# Patient Record
Sex: MALE | Race: ASIAN | NOT HISPANIC OR LATINO | URBAN - METROPOLITAN AREA
[De-identification: names, ages, dates, MRNs, and addresses within clinical notes are randomized per-mention and may not be internally consistent; named-entity substitution may affect disease eponyms.]

---

## 2021-09-25 ENCOUNTER — EMERGENCY (EMERGENCY)
Facility: HOSPITAL | Age: 41
LOS: 1 days | Discharge: HOME | End: 2021-09-25
Attending: EMERGENCY MEDICINE | Admitting: EMERGENCY MEDICINE
Payer: COMMERCIAL

## 2021-09-25 VITALS
OXYGEN SATURATION: 96 % | HEART RATE: 92 BPM | HEIGHT: 71 IN | DIASTOLIC BLOOD PRESSURE: 73 MMHG | RESPIRATION RATE: 16 BRPM | TEMPERATURE: 99 F | SYSTOLIC BLOOD PRESSURE: 112 MMHG | WEIGHT: 270.07 LBS

## 2021-09-25 VITALS
HEART RATE: 76 BPM | DIASTOLIC BLOOD PRESSURE: 77 MMHG | RESPIRATION RATE: 18 BRPM | OXYGEN SATURATION: 96 % | SYSTOLIC BLOOD PRESSURE: 123 MMHG | TEMPERATURE: 99 F

## 2021-09-25 LAB
ALBUMIN SERPL ELPH-MCNC: 2.5 G/DL — LOW (ref 3.3–5)
ALP SERPL-CCNC: 166 U/L — HIGH (ref 30–120)
ALT FLD-CCNC: 39 U/L DA — SIGNIFICANT CHANGE UP (ref 10–60)
ANION GAP SERPL CALC-SCNC: 6 MMOL/L — SIGNIFICANT CHANGE UP (ref 5–17)
APPEARANCE UR: CLEAR — SIGNIFICANT CHANGE UP
AST SERPL-CCNC: 125 U/L — HIGH (ref 10–40)
BACTERIA # UR AUTO: ABNORMAL
BASOPHILS # BLD AUTO: 0.05 K/UL — SIGNIFICANT CHANGE UP (ref 0–0.2)
BASOPHILS NFR BLD AUTO: 0.9 % — SIGNIFICANT CHANGE UP (ref 0–2)
BILIRUB SERPL-MCNC: 0.9 MG/DL — SIGNIFICANT CHANGE UP (ref 0.2–1.2)
BILIRUB UR-MCNC: NEGATIVE — SIGNIFICANT CHANGE UP
BUN SERPL-MCNC: 4 MG/DL — LOW (ref 7–23)
CALCIUM SERPL-MCNC: 8.5 MG/DL — SIGNIFICANT CHANGE UP (ref 8.4–10.5)
CHLORIDE SERPL-SCNC: 105 MMOL/L — SIGNIFICANT CHANGE UP (ref 96–108)
CO2 SERPL-SCNC: 30 MMOL/L — SIGNIFICANT CHANGE UP (ref 22–31)
COLOR SPEC: YELLOW — SIGNIFICANT CHANGE UP
CREAT SERPL-MCNC: 0.69 MG/DL — SIGNIFICANT CHANGE UP (ref 0.5–1.3)
D DIMER BLD IA.RAPID-MCNC: 541 NG/ML DDU — HIGH
DIFF PNL FLD: ABNORMAL
EOSINOPHIL # BLD AUTO: 0.27 K/UL — SIGNIFICANT CHANGE UP (ref 0–0.5)
EOSINOPHIL NFR BLD AUTO: 4.8 % — SIGNIFICANT CHANGE UP (ref 0–6)
EPI CELLS # UR: NEGATIVE — SIGNIFICANT CHANGE UP
ETHANOL SERPL-MCNC: 303 MG/DL — HIGH (ref 0–3)
GLUCOSE SERPL-MCNC: 127 MG/DL — HIGH (ref 70–99)
GLUCOSE UR QL: NEGATIVE MG/DL — SIGNIFICANT CHANGE UP
HCT VFR BLD CALC: 33.8 % — LOW (ref 39–50)
HGB BLD-MCNC: 11.1 G/DL — LOW (ref 13–17)
IMM GRANULOCYTES NFR BLD AUTO: 0.2 % — SIGNIFICANT CHANGE UP (ref 0–1.5)
KETONES UR-MCNC: NEGATIVE — SIGNIFICANT CHANGE UP
LEUKOCYTE ESTERASE UR-ACNC: NEGATIVE — SIGNIFICANT CHANGE UP
LIDOCAIN IGE QN: 604 U/L — HIGH (ref 73–393)
LYMPHOCYTES # BLD AUTO: 1.12 K/UL — SIGNIFICANT CHANGE UP (ref 1–3.3)
LYMPHOCYTES # BLD AUTO: 20 % — SIGNIFICANT CHANGE UP (ref 13–44)
MCHC RBC-ENTMCNC: 30.5 PG — SIGNIFICANT CHANGE UP (ref 27–34)
MCHC RBC-ENTMCNC: 32.8 GM/DL — SIGNIFICANT CHANGE UP (ref 32–36)
MCV RBC AUTO: 92.9 FL — SIGNIFICANT CHANGE UP (ref 80–100)
MONOCYTES # BLD AUTO: 0.76 K/UL — SIGNIFICANT CHANGE UP (ref 0–0.9)
MONOCYTES NFR BLD AUTO: 13.6 % — SIGNIFICANT CHANGE UP (ref 2–14)
NEUTROPHILS # BLD AUTO: 3.38 K/UL — SIGNIFICANT CHANGE UP (ref 1.8–7.4)
NEUTROPHILS NFR BLD AUTO: 60.5 % — SIGNIFICANT CHANGE UP (ref 43–77)
NITRITE UR-MCNC: NEGATIVE — SIGNIFICANT CHANGE UP
NRBC # BLD: 0 /100 WBCS — SIGNIFICANT CHANGE UP (ref 0–0)
PCP SPEC-MCNC: SIGNIFICANT CHANGE UP
PH UR: 8 — SIGNIFICANT CHANGE UP (ref 5–8)
PLATELET # BLD AUTO: 142 K/UL — LOW (ref 150–400)
POTASSIUM SERPL-MCNC: 3.8 MMOL/L — SIGNIFICANT CHANGE UP (ref 3.5–5.3)
POTASSIUM SERPL-SCNC: 3.8 MMOL/L — SIGNIFICANT CHANGE UP (ref 3.5–5.3)
PROT SERPL-MCNC: 8 G/DL — SIGNIFICANT CHANGE UP (ref 6–8.3)
PROT UR-MCNC: 100 MG/DL
RBC # BLD: 3.64 M/UL — LOW (ref 4.2–5.8)
RBC # FLD: 15.4 % — HIGH (ref 10.3–14.5)
RBC CASTS # UR COMP ASSIST: ABNORMAL /HPF (ref 0–4)
SARS-COV-2 RNA SPEC QL NAA+PROBE: SIGNIFICANT CHANGE UP
SODIUM SERPL-SCNC: 141 MMOL/L — SIGNIFICANT CHANGE UP (ref 135–145)
SP GR SPEC: 1.01 — SIGNIFICANT CHANGE UP (ref 1.01–1.02)
TROPONIN I SERPL-MCNC: 0 NG/ML — LOW (ref 0.02–0.06)
UROBILINOGEN FLD QL: NEGATIVE MG/DL — SIGNIFICANT CHANGE UP
WBC # BLD: 5.59 K/UL — SIGNIFICANT CHANGE UP (ref 3.8–10.5)
WBC # FLD AUTO: 5.59 K/UL — SIGNIFICANT CHANGE UP (ref 3.8–10.5)
WBC UR QL: SIGNIFICANT CHANGE UP

## 2021-09-25 PROCEDURE — 74177 CT ABD & PELVIS W/CONTRAST: CPT

## 2021-09-25 PROCEDURE — 96374 THER/PROPH/DIAG INJ IV PUSH: CPT | Mod: XU

## 2021-09-25 PROCEDURE — 85025 COMPLETE CBC W/AUTO DIFF WBC: CPT

## 2021-09-25 PROCEDURE — 71275 CT ANGIOGRAPHY CHEST: CPT

## 2021-09-25 PROCEDURE — 80307 DRUG TEST PRSMV CHEM ANLYZR: CPT

## 2021-09-25 PROCEDURE — 93005 ELECTROCARDIOGRAM TRACING: CPT

## 2021-09-25 PROCEDURE — 84484 ASSAY OF TROPONIN QUANT: CPT

## 2021-09-25 PROCEDURE — 85379 FIBRIN DEGRADATION QUANT: CPT

## 2021-09-25 PROCEDURE — 87635 SARS-COV-2 COVID-19 AMP PRB: CPT

## 2021-09-25 PROCEDURE — 80053 COMPREHEN METABOLIC PANEL: CPT

## 2021-09-25 PROCEDURE — 93010 ELECTROCARDIOGRAM REPORT: CPT

## 2021-09-25 PROCEDURE — 74177 CT ABD & PELVIS W/CONTRAST: CPT | Mod: 26,MA

## 2021-09-25 PROCEDURE — 87086 URINE CULTURE/COLONY COUNT: CPT

## 2021-09-25 PROCEDURE — 81001 URINALYSIS AUTO W/SCOPE: CPT

## 2021-09-25 PROCEDURE — 99284 EMERGENCY DEPT VISIT MOD MDM: CPT | Mod: 25

## 2021-09-25 PROCEDURE — 36415 COLL VENOUS BLD VENIPUNCTURE: CPT

## 2021-09-25 PROCEDURE — 71275 CT ANGIOGRAPHY CHEST: CPT | Mod: 26,MA

## 2021-09-25 PROCEDURE — 83690 ASSAY OF LIPASE: CPT

## 2021-09-25 PROCEDURE — 99285 EMERGENCY DEPT VISIT HI MDM: CPT

## 2021-09-25 RX ORDER — LIDOCAINE 4 G/100G
1 CREAM TOPICAL ONCE
Refills: 0 | Status: COMPLETED | OUTPATIENT
Start: 2021-09-25 | End: 2021-09-25

## 2021-09-25 RX ORDER — LIDOCAINE 4 G/100G
1 CREAM TOPICAL
Qty: 10 | Refills: 0
Start: 2021-09-25 | End: 2021-10-04

## 2021-09-25 RX ORDER — KETOROLAC TROMETHAMINE 30 MG/ML
15 SYRINGE (ML) INJECTION ONCE
Refills: 0 | Status: DISCONTINUED | OUTPATIENT
Start: 2021-09-25 | End: 2021-09-25

## 2021-09-25 RX ADMIN — Medication 15 MILLIGRAM(S): at 16:40

## 2021-09-25 RX ADMIN — Medication 15 MILLIGRAM(S): at 16:19

## 2021-09-25 RX ADMIN — LIDOCAINE 1 PATCH: 4 CREAM TOPICAL at 19:03

## 2021-09-25 NOTE — ED PROVIDER NOTE - OBJECTIVE STATEMENT
42 y/o male with a PMHx of sleep apnea presents to the ED c/o  right upper abd pain under the right  breast 3 days ago. Patient reports having constant pain. Patient reports having pain with deep breath and  exertion, movement of chest. Denies fever , chills , N/V/D  , pr falling today after cleaning pool. Denies head strike  Patient reports drinking alcohol, approximately 1.5 L of vodka. Patient reports having no surgery  No drug use, but patient reports vaping.  Vaccinated for COVID. Medications: advil, which didn't improve symptoms.

## 2021-09-25 NOTE — ED PROVIDER NOTE - CPE EDP RESP NORM
Patient extubated at 1610. Currently on room air, SP 02 @ 100%. Family at bedside. Will continue to monitor.   normal...

## 2021-09-25 NOTE — ED PROVIDER NOTE - PATIENT PORTAL LINK FT
You can access the FollowMyHealth Patient Portal offered by [facility] by registering at the following website: [email or web address]. By joining [employer]’s FollowMyHealth portal, you will also be able to view your health information using other applications (apps) compatible with our system.

## 2021-09-25 NOTE — ED PROVIDER NOTE - CLINICAL SUMMARY MEDICAL DECISION MAKING FREE TEXT BOX
pt presenting with reproducible TTP to right chest no meghana deformity also with chronic alcohol abuse. Will obtain screening labs, check d dimer, CE and monitor. Will obtain CT imaging of chest and due to alcohol use will obtain CT abd/pelvis

## 2021-09-25 NOTE — ED PROVIDER NOTE - PHYSICAL EXAMINATION
abrasion to anterior shin left   distended abd   TTP to right lower anterior ribs abrasion to anterior shin left no meghana TTP  distended abd   TTP to right lower anterior ribs no skin changes noted

## 2021-09-25 NOTE — ED ADULT NURSE NOTE - OBJECTIVE STATEMENT
41 YOM A&OX3 with pmh of sleep apnea presents to ED for abdominal pain. pt states abdominal pain in upper right quadrant started 3 days ago that worsens when breathing in. pt reports drinking 1.5L of vodka every 2 days. pt denies sob, chest pain, n/v/d, headaches, dizziness, blurry vision. safety maintained.

## 2021-09-25 NOTE — SBIRT NOTE ADULT - NSSBIRTSAVECONSULT_GEN_A_CORE
Complete
[FreeTextEntry1] : here with friends, Brianna Becker and her daughter\par Alea connect meter reviewed: 14d 174 (n12), 30d 167 (n12)\par testing once a day, every day except Sundays, alternating am and pm.\par am: 176 today.  185, 181, 220, 190, 182, 161\par after dinner (9p).  164, 147, 180, 178, 179, 109, 165\par have hypoglycemia rarely, when he doesn't eat "enough" carbs.\par eats 2 apples every day (not at the same time)\par going to gym\par seeing podiatry every 6 months but does not want to see ophtho\par no polyuria, polydipsia, SOB, chest pain, or neuropathy symptoms \par \par \par Meds: glipizide XL 5mg am \par metformin 1g bid\par Says he tried Actos in past and didn't like it (no specific symptoms mentioned)

## 2021-09-26 LAB
CULTURE RESULTS: SIGNIFICANT CHANGE UP
SPECIMEN SOURCE: SIGNIFICANT CHANGE UP

## 2021-10-02 ENCOUNTER — EMERGENCY (EMERGENCY)
Facility: HOSPITAL | Age: 41
LOS: 0 days | Discharge: HOME | End: 2021-10-02
Attending: EMERGENCY MEDICINE
Payer: COMMERCIAL

## 2021-10-02 VITALS
SYSTOLIC BLOOD PRESSURE: 126 MMHG | TEMPERATURE: 98 F | HEART RATE: 97 BPM | OXYGEN SATURATION: 95 % | RESPIRATION RATE: 18 BRPM | WEIGHT: 250 LBS | DIASTOLIC BLOOD PRESSURE: 73 MMHG | HEIGHT: 71 IN

## 2021-10-02 VITALS
OXYGEN SATURATION: 96 % | HEART RATE: 89 BPM | SYSTOLIC BLOOD PRESSURE: 129 MMHG | TEMPERATURE: 98 F | DIASTOLIC BLOOD PRESSURE: 77 MMHG | RESPIRATION RATE: 17 BRPM

## 2021-10-02 DIAGNOSIS — K70.10 ALCOHOLIC HEPATITIS WITHOUT ASCITES: ICD-10-CM

## 2021-10-02 DIAGNOSIS — R07.9 CHEST PAIN, UNSPECIFIED: ICD-10-CM

## 2021-10-02 DIAGNOSIS — R10.11 RIGHT UPPER QUADRANT PAIN: ICD-10-CM

## 2021-10-02 DIAGNOSIS — G47.33 OBSTRUCTIVE SLEEP APNEA (ADULT) (PEDIATRIC): ICD-10-CM

## 2021-10-02 DIAGNOSIS — I51.7 CARDIOMEGALY: ICD-10-CM

## 2021-10-02 LAB
ADD ON TEST-SPECIMEN IN LAB: SIGNIFICANT CHANGE UP
ALBUMIN SERPL ELPH-MCNC: 2.6 G/DL — LOW (ref 3.3–5)
ALP SERPL-CCNC: 185 U/L — HIGH (ref 40–120)
ALT FLD-CCNC: 34 U/L — SIGNIFICANT CHANGE UP (ref 12–78)
ANION GAP SERPL CALC-SCNC: 7 MMOL/L — SIGNIFICANT CHANGE UP (ref 5–17)
AST SERPL-CCNC: 111 U/L — HIGH (ref 15–37)
BASOPHILS # BLD AUTO: 0.06 K/UL — SIGNIFICANT CHANGE UP (ref 0–0.2)
BASOPHILS NFR BLD AUTO: 1.1 % — SIGNIFICANT CHANGE UP (ref 0–2)
BILIRUB SERPL-MCNC: 1 MG/DL — SIGNIFICANT CHANGE UP (ref 0.2–1.2)
BUN SERPL-MCNC: 6 MG/DL — LOW (ref 7–23)
CALCIUM SERPL-MCNC: 8.3 MG/DL — LOW (ref 8.5–10.1)
CHLORIDE SERPL-SCNC: 108 MMOL/L — SIGNIFICANT CHANGE UP (ref 96–108)
CO2 SERPL-SCNC: 26 MMOL/L — SIGNIFICANT CHANGE UP (ref 22–31)
CREAT SERPL-MCNC: 0.64 MG/DL — SIGNIFICANT CHANGE UP (ref 0.5–1.3)
EOSINOPHIL # BLD AUTO: 0.23 K/UL — SIGNIFICANT CHANGE UP (ref 0–0.5)
EOSINOPHIL NFR BLD AUTO: 4.1 % — SIGNIFICANT CHANGE UP (ref 0–6)
ETHANOL SERPL-MCNC: 255 MG/DL — HIGH (ref 0–10)
GLUCOSE SERPL-MCNC: 108 MG/DL — HIGH (ref 70–99)
HCT VFR BLD CALC: 33.9 % — LOW (ref 39–50)
HGB BLD-MCNC: 11.1 G/DL — LOW (ref 13–17)
IMM GRANULOCYTES NFR BLD AUTO: 0.2 % — SIGNIFICANT CHANGE UP (ref 0–1.5)
LIDOCAIN IGE QN: 515 U/L — HIGH (ref 73–393)
LYMPHOCYTES # BLD AUTO: 1.21 K/UL — SIGNIFICANT CHANGE UP (ref 1–3.3)
LYMPHOCYTES # BLD AUTO: 21.3 % — SIGNIFICANT CHANGE UP (ref 13–44)
MCHC RBC-ENTMCNC: 30.5 PG — SIGNIFICANT CHANGE UP (ref 27–34)
MCHC RBC-ENTMCNC: 32.7 GM/DL — SIGNIFICANT CHANGE UP (ref 32–36)
MCV RBC AUTO: 93.1 FL — SIGNIFICANT CHANGE UP (ref 80–100)
MONOCYTES # BLD AUTO: 0.59 K/UL — SIGNIFICANT CHANGE UP (ref 0–0.9)
MONOCYTES NFR BLD AUTO: 10.4 % — SIGNIFICANT CHANGE UP (ref 2–14)
NEUTROPHILS # BLD AUTO: 3.57 K/UL — SIGNIFICANT CHANGE UP (ref 1.8–7.4)
NEUTROPHILS NFR BLD AUTO: 62.9 % — SIGNIFICANT CHANGE UP (ref 43–77)
PLATELET # BLD AUTO: 104 K/UL — LOW (ref 150–400)
POTASSIUM SERPL-MCNC: 3.2 MMOL/L — LOW (ref 3.5–5.3)
POTASSIUM SERPL-SCNC: 3.2 MMOL/L — LOW (ref 3.5–5.3)
PROT SERPL-MCNC: 7.8 GM/DL — SIGNIFICANT CHANGE UP (ref 6–8.3)
RBC # BLD: 3.64 M/UL — LOW (ref 4.2–5.8)
RBC # FLD: 15 % — HIGH (ref 10.3–14.5)
SODIUM SERPL-SCNC: 141 MMOL/L — SIGNIFICANT CHANGE UP (ref 135–145)
TROPONIN I SERPL-MCNC: <0.015 NG/ML — SIGNIFICANT CHANGE UP (ref 0.01–0.04)
WBC # BLD: 5.67 K/UL — SIGNIFICANT CHANGE UP (ref 3.8–10.5)
WBC # FLD AUTO: 5.67 K/UL — SIGNIFICANT CHANGE UP (ref 3.8–10.5)

## 2021-10-02 PROCEDURE — 85025 COMPLETE CBC W/AUTO DIFF WBC: CPT

## 2021-10-02 PROCEDURE — 99285 EMERGENCY DEPT VISIT HI MDM: CPT

## 2021-10-02 PROCEDURE — 93010 ELECTROCARDIOGRAM REPORT: CPT

## 2021-10-02 PROCEDURE — 36415 COLL VENOUS BLD VENIPUNCTURE: CPT

## 2021-10-02 PROCEDURE — 76705 ECHO EXAM OF ABDOMEN: CPT

## 2021-10-02 PROCEDURE — 93005 ELECTROCARDIOGRAM TRACING: CPT

## 2021-10-02 PROCEDURE — 80053 COMPREHEN METABOLIC PANEL: CPT

## 2021-10-02 PROCEDURE — 96375 TX/PRO/DX INJ NEW DRUG ADDON: CPT

## 2021-10-02 PROCEDURE — 83690 ASSAY OF LIPASE: CPT

## 2021-10-02 PROCEDURE — 96374 THER/PROPH/DIAG INJ IV PUSH: CPT

## 2021-10-02 PROCEDURE — 83735 ASSAY OF MAGNESIUM: CPT

## 2021-10-02 PROCEDURE — 84484 ASSAY OF TROPONIN QUANT: CPT

## 2021-10-02 PROCEDURE — 99284 EMERGENCY DEPT VISIT MOD MDM: CPT | Mod: 25

## 2021-10-02 PROCEDURE — 76705 ECHO EXAM OF ABDOMEN: CPT | Mod: 26

## 2021-10-02 PROCEDURE — 80307 DRUG TEST PRSMV CHEM ANLYZR: CPT

## 2021-10-02 PROCEDURE — 87635 SARS-COV-2 COVID-19 AMP PRB: CPT

## 2021-10-02 RX ORDER — ONDANSETRON 8 MG/1
4 TABLET, FILM COATED ORAL ONCE
Refills: 0 | Status: COMPLETED | OUTPATIENT
Start: 2021-10-02 | End: 2021-10-02

## 2021-10-02 RX ORDER — MORPHINE SULFATE 50 MG/1
4 CAPSULE, EXTENDED RELEASE ORAL ONCE
Refills: 0 | Status: DISCONTINUED | OUTPATIENT
Start: 2021-10-02 | End: 2021-10-02

## 2021-10-02 RX ORDER — POTASSIUM CHLORIDE 20 MEQ
40 PACKET (EA) ORAL ONCE
Refills: 0 | Status: COMPLETED | OUTPATIENT
Start: 2021-10-02 | End: 2021-10-02

## 2021-10-02 RX ADMIN — Medication 40 MILLIEQUIVALENT(S): at 04:13

## 2021-10-02 NOTE — ED PROVIDER NOTE - OBJECTIVE STATEMENT
Pt. is a 42 yo M with PMHx of alcohol dependence, cirrhosis of the liver, ROSA on CPAP presents with right lower chest and RUQ abdominal pain.  Patient states there is pain under right lower anterior rib of chest.  He had this pain multiple times and was worked up at [facility] and [facility] and no cause was found. Patient states pain was severe tonight and he had nausea and constant pain across upper abdomen.  +alcohol use ; Denies drug use.

## 2021-10-02 NOTE — ED PROVIDER NOTE - PATIENT PORTAL LINK FT
You can access the FollowMyHealth Patient Portal offered by [facility] by registering at the following website: [email or web address]. By joining [employer]’s FollowMyHealth portal, you will also be able to view your health information using other applications (apps) compatible with our system.

## 2021-10-02 NOTE — ED PROVIDER NOTE - CLINICAL SUMMARY MEDICAL DECISION MAKING FREE TEXT BOX
RUQ abdominal pain. Labs including lipase, RUQ US and urine ordered.  Pain meds given.  Will re-assess.

## 2021-10-02 NOTE — ED PROVIDER NOTE - PROGRESS NOTE DETAILS
Patient without much pain anymore.  Did not need pain meds in ED, refused them.      Will f/u with GI.

## 2021-10-02 NOTE — ED ADULT NURSE NOTE - OBJECTIVE STATEMENT
Pt BIBEMS with c/o CP/RUQ abd pain. Pt states he was recently seen at two hospitals this week for same issues, was diagnosed with Cirrhosis but states she pain has continued.

## 2021-10-02 NOTE — ED PROVIDER NOTE - CARE PLAN
1 Principal Discharge DX:	Right upper quadrant abdominal pain  Secondary Diagnosis:	Alcoholic hepatitis

## 2021-10-02 NOTE — ED PROVIDER NOTE - NSFOLLOWUPINSTRUCTIONS_ED_ALL_ED_FT
See your gastroenterologist within 3-5 days.                       ALCOHOLIC HEPATITIS - AfterCare(R) Instructions(ER/ED)           Alcoholic Hepatitis    WHAT YOU NEED TO KNOW:    Alcoholic hepatitis (AH) is liver inflammation caused by heavy alcohol use. AH can develop if you binge drink or if you drink regularly or heavily over time. If you are at risk for AH, you may be able to prevent it from developing. Once your healthcare provider diagnoses AH, you will need to manage your condition.    DISCHARGE INSTRUCTIONS:    Call 911 for any of the following:   •You have trouble breathing.      •You vomit blood or material that looks like coffee grounds.      •You lose consciousness.      •You have dark or bloody bowel movements.      •You feel confused.      Return to the emergency department if:   •You have a fever or chills.      •You have pain or swelling in your abdomen.      •You feel dizzy or lightheaded.      •You vomit several times in a row.      Contact your healthcare provider if:   •You feel more tired than usual.      •You lose weight without trying, lose your appetite, or feel too nauseated to eat.      •You have worsening yellowing of your skin or the whites of your eyes.      •Your urine becomes very dark.       •You have questions or concerns about your condition or care.      Medicines:   •Medicines may be given to reduce inflammation in your liver or help your liver function better. You may also be given medicine to reduce fluid retention.       •Take your medicine as directed. Contact your healthcare provider if you think your medicine is not helping or if you have side effects. Tell him of her if you are allergic to any medicine. Keep a list of the medicines, vitamins, and herbs you take. Include the amounts, and when and why you take them. Bring the list or the pill bottles to follow-up visits. Carry your medicine list with you in case of an emergency.      Follow up with your healthcare provider as directed: You may need ongoing tests to check your liver function. Write down your questions so you remember to ask them during your visits.    Do not smoke: Nicotine and other chemicals in cigarettes and cigars can make it hard to manage AH. Ask your healthcare provider for information if you currently smoke and need help to quit. E-cigarettes or smokeless tobacco still contain nicotine. Talk to your healthcare provider before you use these products.    Prevent AH: If you are at risk for AH, the following may help prevent it from developing:  •Limit alcohol. Alcohol can cause liver damage and other serious health problems. Ask your healthcare provider how much alcohol is safe for you to have each day. A drink of alcohol is 12 ounces of beer, 5 ounces of wine, or 1½ ounces of liquor. Do not drink alcohol between meals or replace meals with alcohol.      •Ask about medicines. Some medicines can cause liver damage if taken with alcohol. Ask your healthcare provider about all of the medicines you take, including pain relievers such as acetaminophen.      Protect yourself against hepatitis C: Hepatitis C is a serious liver infection caused by a virus. Hepatitis C causes liver damage and makes it easier for you to develop AH. It also increases your risk for cirrhosis, especially if you continue to drink alcohol. Cirrhosis is a serious disease that causes scarring in your liver. Do not share needles if you inject drugs. Use a condom so you do not get hepatitis C during sex.     Ask about vaccines: You may need vaccines to protect you against hepatitis A or B, pneumonia, or the flu. AH can increase your risk for infections.    Drink liquids as directed: Your healthcare provider may recommend you drink more liquids to help your liver function or reduce fluid retention. Ask how much liquid to drink each day and which liquids are best for you.    Eat a variety of healthy foods: Healthy foods include fruits, vegetables, lean meats, fish, and low-fat dairy products. Your healthcare provider or dietitian can help you create meal plans to make sure you get enough calories and protein. It may be helpful to eat smaller meals throughout the day to prevent nausea and help your body absorb nutrition. Limit sodium (salt) to prevent or reduce fluid retention.    Take vitamins or minerals as directed: Your healthcare provider may recommend vitamin B or other vitamins or minerals. Do not take any vitamins or minerals without talking with your healthcare provider. Too much iron can be dangerous to your liver.        © Copyright [employer] 2021           back to top                          © Copyright [employer] 2021

## 2021-10-02 NOTE — ED ADULT TRIAGE NOTE - CHIEF COMPLAINT QUOTE
Pt BIBEMS with c/o of chest pain.  Pt states he was recently seen at two hospitals this week for same issues, was diagnosed with Cirrhosis but states she pain has continued.

## 2021-10-20 PROBLEM — Z00.00 ENCOUNTER FOR PREVENTIVE HEALTH EXAMINATION: Status: ACTIVE | Noted: 2021-10-20

## 2021-10-22 ENCOUNTER — APPOINTMENT (OUTPATIENT)
Dept: HEPATOLOGY | Facility: CLINIC | Age: 41
End: 2021-10-22
Payer: COMMERCIAL

## 2021-10-22 VITALS
TEMPERATURE: 98.3 F | HEIGHT: 66 IN | OXYGEN SATURATION: 98 % | HEART RATE: 73 BPM | SYSTOLIC BLOOD PRESSURE: 114 MMHG | DIASTOLIC BLOOD PRESSURE: 72 MMHG | RESPIRATION RATE: 16 BRPM | BODY MASS INDEX: 43.87 KG/M2 | WEIGHT: 273 LBS

## 2021-10-22 DIAGNOSIS — Z87.891 PERSONAL HISTORY OF NICOTINE DEPENDENCE: ICD-10-CM

## 2021-10-22 DIAGNOSIS — Z99.89 OBSTRUCTIVE SLEEP APNEA (ADULT) (PEDIATRIC): ICD-10-CM

## 2021-10-22 DIAGNOSIS — Z80.7 FAMILY HISTORY OF OTHER MALIGNANT NEOPLASMS OF LYMPHOID, HEMATOPOIETIC AND RELATED TISSUES: ICD-10-CM

## 2021-10-22 DIAGNOSIS — Z72.89 OTHER PROBLEMS RELATED TO LIFESTYLE: ICD-10-CM

## 2021-10-22 DIAGNOSIS — Z87.11 PERSONAL HISTORY OF PEPTIC ULCER DISEASE: ICD-10-CM

## 2021-10-22 DIAGNOSIS — G47.33 OBSTRUCTIVE SLEEP APNEA (ADULT) (PEDIATRIC): ICD-10-CM

## 2021-10-22 DIAGNOSIS — Z83.3 FAMILY HISTORY OF DIABETES MELLITUS: ICD-10-CM

## 2021-10-22 PROCEDURE — 99204 OFFICE O/P NEW MOD 45 MIN: CPT

## 2021-10-22 RX ORDER — NADOLOL 20 MG/1
20 TABLET ORAL
Refills: 0 | Status: ACTIVE | COMMUNITY

## 2021-10-22 RX ORDER — GLUC/MSM/COLGN2/HYAL/ANTIARTH3 375-375-20
TABLET ORAL
Refills: 0 | Status: ACTIVE | COMMUNITY

## 2021-10-22 RX ORDER — OMEGA-3/DHA/EPA/FISH OIL 300-1000MG
CAPSULE ORAL
Refills: 0 | Status: ACTIVE | COMMUNITY

## 2021-10-22 RX ORDER — MULTIVITAMIN
TABLET ORAL
Refills: 0 | Status: ACTIVE | COMMUNITY

## 2021-10-22 RX ORDER — NALTREXONE HYDROCHLORIDE 50 MG/1
50 TABLET, FILM COATED ORAL
Refills: 0 | Status: ACTIVE | COMMUNITY

## 2021-10-24 LAB
A1AT SERPL-MCNC: 151 MG/DL
AFP-TM SERPL-MCNC: 5.6 NG/ML
ALBUMIN SERPL ELPH-MCNC: 3.2 G/DL
ALP BLD-CCNC: 176 U/L
ALT SERPL-CCNC: 31 U/L
ANION GAP SERPL CALC-SCNC: 10 MMOL/L
AST SERPL-CCNC: 115 U/L
BASOPHILS # BLD AUTO: 0.06 K/UL
BASOPHILS NFR BLD AUTO: 1.3 %
BILIRUB DIRECT SERPL-MCNC: 0.5 MG/DL
BILIRUB INDIRECT SERPL-MCNC: 0.4 MG/DL
BILIRUB SERPL-MCNC: 0.8 MG/DL
BUN SERPL-MCNC: 6 MG/DL
CALCIUM SERPL-MCNC: 9 MG/DL
CERULOPLASMIN SERPL-MCNC: 26 MG/DL
CHLORIDE SERPL-SCNC: 104 MMOL/L
CO2 SERPL-SCNC: 26 MMOL/L
CREAT SERPL-MCNC: 0.67 MG/DL
DEPRECATED KAPPA LC FREE/LAMBDA SER: 1.46 RATIO
EOSINOPHIL # BLD AUTO: 0.27 K/UL
EOSINOPHIL NFR BLD AUTO: 5.8 %
FERRITIN SERPL-MCNC: 94 NG/ML
GLUCOSE SERPL-MCNC: 78 MG/DL
HBV CORE IGG+IGM SER QL: NONREACTIVE
HBV SURFACE AB SER QL: NONREACTIVE
HBV SURFACE AG SER QL: NONREACTIVE
HCT VFR BLD CALC: 36.9 %
HCV AB SER QL: NONREACTIVE
HCV S/CO RATIO: 0.25 S/CO
HEPATITIS A IGG ANTIBODY: REACTIVE
HGB BLD-MCNC: 11.4 G/DL
IGA SER QL IEP: 864 MG/DL
IGG SER QL IEP: 2149 MG/DL
IGM SER QL IEP: 283 MG/DL
IMM GRANULOCYTES NFR BLD AUTO: 0.2 %
INR PPP: 1.08 RATIO
IRON SATN MFR SERPL: 17 %
IRON SERPL-MCNC: 56 UG/DL
KAPPA LC CSF-MCNC: 5.9 MG/DL
KAPPA LC SERPL-MCNC: 8.59 MG/DL
LYMPHOCYTES # BLD AUTO: 1.07 K/UL
LYMPHOCYTES NFR BLD AUTO: 23 %
MAN DIFF?: NORMAL
MCHC RBC-ENTMCNC: 30.2 PG
MCHC RBC-ENTMCNC: 30.9 GM/DL
MCV RBC AUTO: 97.6 FL
MONOCYTES # BLD AUTO: 0.58 K/UL
MONOCYTES NFR BLD AUTO: 12.4 %
NEUTROPHILS # BLD AUTO: 2.67 K/UL
NEUTROPHILS NFR BLD AUTO: 57.3 %
PLATELET # BLD AUTO: 114 K/UL
POTASSIUM SERPL-SCNC: 4.2 MMOL/L
PROT SERPL-MCNC: 7.6 G/DL
PT BLD: 12.7 SEC
RBC # BLD: 3.78 M/UL
RBC # FLD: 16.2 %
SODIUM SERPL-SCNC: 141 MMOL/L
TIBC SERPL-MCNC: 318 UG/DL
UIBC SERPL-MCNC: 263 UG/DL
WBC # FLD AUTO: 4.66 K/UL

## 2021-10-24 NOTE — REASON FOR VISIT
[Initial Evaluation] : an initial evaluation [Parent] : parent [FreeTextEntry1] : Alcoholic cirrhosis

## 2021-10-24 NOTE — HISTORY OF PRESENT ILLNESS
[FreeTextEntry1] : 42 yo obese Male from [place] with Hx of alcohol use (since age 18, quit 10/18/21), alcoholic cirrhosis, ROSA on CPAP, was seen in [facility] in [place] (9/25/21), at [facility] and then at [facility] (10/2/21) for RUQ pain, 3 episodes. \par \par He was in [place] detox and then [facility] from 6/14/21-7/9/21. He has not been followed with outpatient program after the rehab regularly. He resumed drinking ~ 3 weeks after rehab and since then he has been drinking on and off. He was started on naltrexon around 10/9/21 and had last drink on 10/18.\par \par He had US abd 10/2/21 Cirrhosis, hepatomegaly, no ascites. \par He had CT abd/pelvis 9/25/21: Cirrhosis, with portal hypertension with recanalization of paraumbilical veins, esophageal varices, spontaneous splenorenal shunt, splenomegaly. Patent hepatic and portal veins. \par CTA chest 9/25/21: no PE\par Labs 10/2/21 Hb 11.1 , , WBC 5.67\par \par Last EGD 2021 summer. No records. Patient will ask his GI physician to fax us the result.\par

## 2021-10-24 NOTE — ASSESSMENT
[FreeTextEntry1] : 40 yo obese Male from [place] with Hx of alcohol use (since age 18, quit 10/18/21), alcoholic cirrhosis, ROSA on CPAP, with multiple ER visits for RUQ pain ([facility] in [place], at [facility] and then at [facility]), is here for initial evaluation for his cirrhosis. \par \par \par Cirrhosis (MELD - likely low, but no INR, ordered labs), without any signs or symptoms of decompensation.\par - No ascites on physical exam and on US abdomen, no hx of SBP in past , not on SBP prophylaxis, not on diuretics. Normal BUN/Cr.\par - No signs of overt hepatic encephalopathy, avoid constipation.\par - Esophageal varix (EV) screening: Last EGD reportedly in summer of 2021, but no records. He is on nadolol. Patient to ask his GI to send us EGD report.\par - HCC screening: Last US abdomen  without any focal lesion. Ordered AFP. Will c/w q6 months HCC surveillance. \par - Health maintenance:\par --- Will check Hep A and B immunity and offer vaccine if not immune.\par --- Will check Hep C abd and HIV\par --- Will get DEXA and vit D level next visit\par --- Counseled on liver healthy diet, including but not limited to avoiding alcohol, avoiding raw or undercooked seafood. \par - Etiology: likely EtOH, but ordered CLD blood work to rule out additional etiology\par \par Alcohol abuse\par - Substance abuse referral\par - Lengthy discussion about natural Hx of alcoholic liver disease, cirrhosis, risk of HCC, decompensation, and strongly advised on strict alcohol abstinence\par \par Morbid obesity\par - Lengthy discussion about metabolic risk factors. Requested to send us prior records. Will need HBA1c , lipid panel if has not been done. \par - Discussed that morbid obesity can lead to / worsen cirrhosis. Advised on diet, exercise as tolerated, weight loss.\par - Patient is actively using alcohol, thus elective surgery not recommended, but in future can discuss bariatric surgery if remains morbidly obese. Next visit will refer to weight management program, might benefit from semaglutide.\par \par RTC 3 weeks\par \par Addendum: WBC 4.66, Hb 11.4, , AFP WNL, INR 1.08, A1AT WNL, ceruloplasmin WNL, HBsAg neg, HBcAb neg, HBsAb neg, HCV ab neg, Hep A immune, ferritin 94, iron sat 17%, Cr 0.67, Na 141, AST 11, ALT 31, , Se bi 0.8, alb 3.2, all Igs elevated\par Hep A immune, Hep B neg, not immune, not exposed, need vaccine\par MELD-Na 7\par Anemia and thrombocytopenia - still current/recent EtOH use\par \par Addendum:\par Received outside records. Last EGD 5/2021, Gr I EVs\par

## 2021-10-24 NOTE — REVIEW OF SYSTEMS
[Negative] : Heme/Lymph [Vomiting] : no vomiting [Constipation] : no constipation [Diarrhea] : no diarrhea [Melena] : no melena [Dysuria] : no dysuria [Itching] : no itching [Confused] : no confusion [FreeTextEntry7] : 3 ER visits for RUQ and R sided lower chest pain

## 2021-10-24 NOTE — PHYSICAL EXAM
[General Appearance - Alert] : alert [General Appearance - In No Acute Distress] : in no acute distress [General Appearance - Well Nourished] : well nourished [General Appearance - Well Developed] : well developed [Sclera] : the sclera and conjunctiva were normal [Oropharynx] : the oropharynx was normal [Neck Appearance] : the appearance of the neck was normal [] : no respiratory distress [Respiration, Rhythm And Depth] : normal respiratory rhythm and effort [Exaggerated Use Of Accessory Muscles For Inspiration] : no accessory muscle use [Auscultation Breath Sounds / Voice Sounds] : lungs were clear to auscultation bilaterally [Heart Rate And Rhythm] : heart rate was normal and rhythm regular [Heart Sounds] : normal S1 and S2 [Obese] : obese [Normal] : normal [Soft, Nontender] : the abdomen was soft and nontender [No Mass] : no masses were palpated [None] : no CVA tenderness [Cervical Lymph Nodes Enlarged Anterior Bilaterally] : anterior cervical [Cervical Lymph Nodes Enlarged Posterior Bilaterally] : posterior cervical [Supraclavicular Lymph Nodes Enlarged Bilaterally] : supraclavicular [No CVA Tenderness] : no ~M costovertebral angle tenderness [No Spinal Tenderness] : no spinal tenderness [Abnormal Walk] : normal gait [Skin Color & Pigmentation] : normal skin color and pigmentation [Oriented To Time, Place, And Person] : oriented to person, place, and time [Impaired Insight] : insight and judgment were intact [Affect] : the affect was normal [Mood] : the mood was normal [Scleral Icterus] : No Scleral Icterus [Spider Angioma] : No spider angioma(s) were observed [Abdominal  Ascites] : no ascites [Asterixis] : no asterixis observed [Jaundice] : No jaundice [Palmar Erythema] : no Palmar Erythema [Depression] : no depression [FreeTextEntry4] : Limited exam due to morbid obesity [Dilated Collat. Veins] : no collateral vein dilation [Firm] : not firm [Rigid] : not rigid [Rebound] : no rebound [Guarding] : no guarding [Tejada's] : a negative Tejada's sign [Liver Tender To Palpation] : not tender [FreeTextEntry1] : Grossly intact

## 2021-11-02 ENCOUNTER — NON-APPOINTMENT (OUTPATIENT)
Age: 41
End: 2021-11-02

## 2021-11-02 LAB
ANA PAT FLD IF-IMP: ABNORMAL
ANA SER IF-ACNC: ABNORMAL
MITOCHONDRIA AB SER IF-ACNC: NORMAL
PHOSPHATIDYETHANOL (PETH), WHOLE BLOOD: 648 NG/ML
SMOOTH MUSCLE AB SER QL IF: NORMAL

## 2021-11-09 ENCOUNTER — APPOINTMENT (OUTPATIENT)
Dept: HEPATOLOGY | Facility: CLINIC | Age: 41
End: 2021-11-09

## 2021-11-09 ENCOUNTER — NON-APPOINTMENT (OUTPATIENT)
Age: 41
End: 2021-11-09

## 2021-11-18 ENCOUNTER — APPOINTMENT (OUTPATIENT)
Dept: HEPATOLOGY | Facility: CLINIC | Age: 41
End: 2021-11-18

## 2021-11-24 ENCOUNTER — APPOINTMENT (OUTPATIENT)
Dept: HEPATOLOGY | Facility: CLINIC | Age: 41
End: 2021-11-24
Payer: COMMERCIAL

## 2021-11-24 VITALS
WEIGHT: 276 LBS | OXYGEN SATURATION: 100 % | TEMPERATURE: 98.1 F | BODY MASS INDEX: 38.64 KG/M2 | DIASTOLIC BLOOD PRESSURE: 77 MMHG | HEIGHT: 71 IN | RESPIRATION RATE: 15 BRPM | SYSTOLIC BLOOD PRESSURE: 114 MMHG | HEART RATE: 84 BPM

## 2021-11-24 DIAGNOSIS — K70.30 ALCOHOLIC CIRRHOSIS OF LIVER W/OUT ASCITES: ICD-10-CM

## 2021-11-24 DIAGNOSIS — E66.9 OBESITY, UNSPECIFIED: ICD-10-CM

## 2021-11-24 DIAGNOSIS — Z23 ENCOUNTER FOR IMMUNIZATION: ICD-10-CM

## 2021-11-24 PROCEDURE — 90471 IMMUNIZATION ADMIN: CPT

## 2021-11-24 PROCEDURE — 90739 HEPB VACC 2/4 DOSE ADULT IM: CPT

## 2021-12-14 ENCOUNTER — APPOINTMENT (OUTPATIENT)
Dept: HEPATOLOGY | Facility: CLINIC | Age: 41
End: 2021-12-14

## 2022-01-04 ENCOUNTER — APPOINTMENT (OUTPATIENT)
Dept: HEPATOLOGY | Facility: CLINIC | Age: 42
End: 2022-01-04

## 2022-10-16 ENCOUNTER — EMERGENCY (EMERGENCY)
Facility: HOSPITAL | Age: 42
LOS: 1 days | Discharge: HOME | End: 2022-10-16
Attending: EMERGENCY MEDICINE | Admitting: EMERGENCY MEDICINE
Payer: COMMERCIAL

## 2022-10-16 VITALS
HEART RATE: 88 BPM | OXYGEN SATURATION: 98 % | DIASTOLIC BLOOD PRESSURE: 78 MMHG | RESPIRATION RATE: 18 BRPM | TEMPERATURE: 98 F | SYSTOLIC BLOOD PRESSURE: 136 MMHG

## 2022-10-16 VITALS
OXYGEN SATURATION: 94 % | WEIGHT: 281.97 LBS | TEMPERATURE: 98 F | RESPIRATION RATE: 24 BRPM | HEART RATE: 116 BPM | HEIGHT: 71 IN | SYSTOLIC BLOOD PRESSURE: 134 MMHG | DIASTOLIC BLOOD PRESSURE: 84 MMHG

## 2022-10-16 LAB
ALBUMIN SERPL ELPH-MCNC: 2.1 G/DL — LOW (ref 3.3–5)
ALP SERPL-CCNC: 197 U/L — HIGH (ref 30–120)
ALT FLD-CCNC: 24 U/L DA — SIGNIFICANT CHANGE UP (ref 10–60)
AMMONIA BLD-MCNC: 40 UMOL/L — HIGH (ref 11–32)
ANION GAP SERPL CALC-SCNC: 8 MMOL/L — SIGNIFICANT CHANGE UP (ref 5–17)
APPEARANCE UR: CLEAR — SIGNIFICANT CHANGE UP
APTT BLD: 35.6 SEC — HIGH (ref 27.5–35.5)
AST SERPL-CCNC: 166 U/L — HIGH (ref 10–40)
BASOPHILS # BLD AUTO: 0.07 K/UL — SIGNIFICANT CHANGE UP (ref 0–0.2)
BASOPHILS NFR BLD AUTO: 1.4 % — SIGNIFICANT CHANGE UP (ref 0–2)
BILIRUB SERPL-MCNC: 1 MG/DL — SIGNIFICANT CHANGE UP (ref 0.2–1.2)
BILIRUB UR-MCNC: NEGATIVE — SIGNIFICANT CHANGE UP
BUN SERPL-MCNC: 4 MG/DL — LOW (ref 7–23)
CALCIUM SERPL-MCNC: 7.9 MG/DL — LOW (ref 8.4–10.5)
CHLORIDE SERPL-SCNC: 104 MMOL/L — SIGNIFICANT CHANGE UP (ref 96–108)
CO2 SERPL-SCNC: 30 MMOL/L — SIGNIFICANT CHANGE UP (ref 22–31)
COLOR SPEC: YELLOW — SIGNIFICANT CHANGE UP
CREAT SERPL-MCNC: 0.57 MG/DL — SIGNIFICANT CHANGE UP (ref 0.5–1.3)
DIFF PNL FLD: ABNORMAL
EGFR: 124 ML/MIN/1.73M2 — SIGNIFICANT CHANGE UP
EOSINOPHIL # BLD AUTO: 0.35 K/UL — SIGNIFICANT CHANGE UP (ref 0–0.5)
EOSINOPHIL NFR BLD AUTO: 7 % — HIGH (ref 0–6)
ETHANOL SERPL-MCNC: 439 MG/DL — HIGH (ref 0–3)
GLUCOSE SERPL-MCNC: 128 MG/DL — HIGH (ref 70–99)
GLUCOSE UR QL: NEGATIVE MG/DL — SIGNIFICANT CHANGE UP
HCT VFR BLD CALC: 32.6 % — LOW (ref 39–50)
HGB BLD-MCNC: 10.8 G/DL — LOW (ref 13–17)
IMM GRANULOCYTES NFR BLD AUTO: 0.2 % — SIGNIFICANT CHANGE UP (ref 0–0.9)
INR BLD: 1.08 RATIO — SIGNIFICANT CHANGE UP (ref 0.88–1.16)
KETONES UR-MCNC: NEGATIVE — SIGNIFICANT CHANGE UP
LEUKOCYTE ESTERASE UR-ACNC: ABNORMAL
LIDOCAIN IGE QN: 369 U/L — SIGNIFICANT CHANGE UP (ref 73–393)
LYMPHOCYTES # BLD AUTO: 1.27 K/UL — SIGNIFICANT CHANGE UP (ref 1–3.3)
LYMPHOCYTES # BLD AUTO: 25.3 % — SIGNIFICANT CHANGE UP (ref 13–44)
MAGNESIUM SERPL-MCNC: 1.8 MG/DL — SIGNIFICANT CHANGE UP (ref 1.6–2.6)
MCHC RBC-ENTMCNC: 31.8 PG — SIGNIFICANT CHANGE UP (ref 27–34)
MCHC RBC-ENTMCNC: 33.1 GM/DL — SIGNIFICANT CHANGE UP (ref 32–36)
MCV RBC AUTO: 95.9 FL — SIGNIFICANT CHANGE UP (ref 80–100)
MONOCYTES # BLD AUTO: 0.58 K/UL — SIGNIFICANT CHANGE UP (ref 0–0.9)
MONOCYTES NFR BLD AUTO: 11.6 % — SIGNIFICANT CHANGE UP (ref 2–14)
NEUTROPHILS # BLD AUTO: 2.74 K/UL — SIGNIFICANT CHANGE UP (ref 1.8–7.4)
NEUTROPHILS NFR BLD AUTO: 54.5 % — SIGNIFICANT CHANGE UP (ref 43–77)
NITRITE UR-MCNC: NEGATIVE — SIGNIFICANT CHANGE UP
NRBC # BLD: 0 /100 WBCS — SIGNIFICANT CHANGE UP (ref 0–0)
NT-PROBNP SERPL-SCNC: 15 PG/ML — SIGNIFICANT CHANGE UP (ref 0–125)
PCP SPEC-MCNC: SIGNIFICANT CHANGE UP
PH UR: 6 — SIGNIFICANT CHANGE UP (ref 5–8)
PHOSPHATE SERPL-MCNC: 3.7 MG/DL — SIGNIFICANT CHANGE UP (ref 2.5–4.5)
PLATELET # BLD AUTO: 107 K/UL — LOW (ref 150–400)
POTASSIUM SERPL-MCNC: 3.6 MMOL/L — SIGNIFICANT CHANGE UP (ref 3.5–5.3)
POTASSIUM SERPL-SCNC: 3.6 MMOL/L — SIGNIFICANT CHANGE UP (ref 3.5–5.3)
PROT SERPL-MCNC: 7.2 G/DL — SIGNIFICANT CHANGE UP (ref 6–8.3)
PROT UR-MCNC: 500 MG/DL
PROTHROM AB SERPL-ACNC: 12.8 SEC — SIGNIFICANT CHANGE UP (ref 10.5–13.4)
RBC # BLD: 3.4 M/UL — LOW (ref 4.2–5.8)
RBC # FLD: 14.4 % — SIGNIFICANT CHANGE UP (ref 10.3–14.5)
SARS-COV-2 RNA SPEC QL NAA+PROBE: SIGNIFICANT CHANGE UP
SODIUM SERPL-SCNC: 142 MMOL/L — SIGNIFICANT CHANGE UP (ref 135–145)
SP GR SPEC: 1.01 — SIGNIFICANT CHANGE UP (ref 1.01–1.02)
UROBILINOGEN FLD QL: NEGATIVE MG/DL — SIGNIFICANT CHANGE UP
WBC # BLD: 5.02 K/UL — SIGNIFICANT CHANGE UP (ref 3.8–10.5)
WBC # FLD AUTO: 5.02 K/UL — SIGNIFICANT CHANGE UP (ref 3.8–10.5)

## 2022-10-16 PROCEDURE — 81001 URINALYSIS AUTO W/SCOPE: CPT

## 2022-10-16 PROCEDURE — 83735 ASSAY OF MAGNESIUM: CPT

## 2022-10-16 PROCEDURE — 83690 ASSAY OF LIPASE: CPT

## 2022-10-16 PROCEDURE — 93970 EXTREMITY STUDY: CPT | Mod: 26

## 2022-10-16 PROCEDURE — 80307 DRUG TEST PRSMV CHEM ANLYZR: CPT

## 2022-10-16 PROCEDURE — 71045 X-RAY EXAM CHEST 1 VIEW: CPT | Mod: 26

## 2022-10-16 PROCEDURE — 85025 COMPLETE CBC W/AUTO DIFF WBC: CPT

## 2022-10-16 PROCEDURE — 82140 ASSAY OF AMMONIA: CPT

## 2022-10-16 PROCEDURE — 83880 ASSAY OF NATRIURETIC PEPTIDE: CPT

## 2022-10-16 PROCEDURE — 99285 EMERGENCY DEPT VISIT HI MDM: CPT

## 2022-10-16 PROCEDURE — 80053 COMPREHEN METABOLIC PANEL: CPT

## 2022-10-16 PROCEDURE — 99285 EMERGENCY DEPT VISIT HI MDM: CPT | Mod: 25

## 2022-10-16 PROCEDURE — 76700 US EXAM ABDOM COMPLETE: CPT | Mod: 26

## 2022-10-16 PROCEDURE — 71045 X-RAY EXAM CHEST 1 VIEW: CPT

## 2022-10-16 PROCEDURE — 36415 COLL VENOUS BLD VENIPUNCTURE: CPT

## 2022-10-16 PROCEDURE — 85610 PROTHROMBIN TIME: CPT

## 2022-10-16 PROCEDURE — 93005 ELECTROCARDIOGRAM TRACING: CPT

## 2022-10-16 PROCEDURE — 87635 SARS-COV-2 COVID-19 AMP PRB: CPT

## 2022-10-16 PROCEDURE — 85730 THROMBOPLASTIN TIME PARTIAL: CPT

## 2022-10-16 PROCEDURE — 74177 CT ABD & PELVIS W/CONTRAST: CPT | Mod: MA

## 2022-10-16 PROCEDURE — 93970 EXTREMITY STUDY: CPT

## 2022-10-16 PROCEDURE — 76700 US EXAM ABDOM COMPLETE: CPT

## 2022-10-16 PROCEDURE — 93010 ELECTROCARDIOGRAM REPORT: CPT

## 2022-10-16 PROCEDURE — 84100 ASSAY OF PHOSPHORUS: CPT

## 2022-10-16 PROCEDURE — 74177 CT ABD & PELVIS W/CONTRAST: CPT | Mod: 26,MA

## 2022-10-16 RX ORDER — FUROSEMIDE 40 MG
1 TABLET ORAL
Qty: 0 | Refills: 0 | DISCHARGE

## 2022-10-16 RX ORDER — NADOLOL 80 MG/1
2 TABLET ORAL
Qty: 0 | Refills: 0 | DISCHARGE

## 2022-10-16 RX ORDER — URSODIOL 250 MG/1
0 TABLET, FILM COATED ORAL
Qty: 0 | Refills: 0 | DISCHARGE

## 2022-10-16 RX ORDER — SPIRONOLACTONE 25 MG/1
0 TABLET, FILM COATED ORAL
Qty: 0 | Refills: 0 | DISCHARGE

## 2022-10-16 NOTE — ED PROVIDER NOTE - PROGRESS NOTE DETAILS
spoke to pt and mother - pt is alert, walking in ED without assistance, speaking clearly, pt does have ascites and was clinically intoxicated on presentation, pt wants to go home, pt's mother wants him admitted for detox and paracentesis tomorrow. discussed with mother that as pt has significantly improved from his intoxicated state on arrival, I will not be able to admit him against his will, and he could be discharged with her at this time, it is in patient's best interest to seek help for his alcohol abuse but this is a decision the patient has to make and it cannot be forced on him, pt agrees and understands that he would need to check himself into a facility for detox/rehab, as pt states he has in the past, pt has a team of doctors for his liver in [place], will f/u with them for his ascites, provided copy of lab results, no acute finding that requires admission at this time, mother understands and agrees to take pt home tonight

## 2022-10-16 NOTE — ED PROVIDER NOTE - NS ED ATTENDING STATEMENT MOD
This was a shared visit with the ADRIANNA. I reviewed and verified the documentation and independently performed the documented:

## 2022-10-16 NOTE — ED PROVIDER NOTE - CARE PROVIDER_API CALL
YOUR GASTROENTEROLOGIST,   Phone: (   )    -  Fax: (   )    -  Follow Up Time: 1-3 Days    Jamie Smith (DO)  Internal Medicine  [address] [place] [address]  Phone: [phone]  Fax: [phone]  Follow Up Time: 1-3 Days

## 2022-10-16 NOTE — ED PROVIDER NOTE - OBJECTIVE STATEMENT
43-year-old male with history of sleep apnea, alcohol abuse, cirrhosis presents with complaint of abdominal distention x3 days.  Patient requesting paracentesis for fluid buildup in his stomach.  States that he had 2 beers last Wednesday and since has noticed worsening distention of his abdomen with discomfort and swelling of his legs bilateral.  States that he has a history of one prior paracentesis 6 months ago.  Patient saw his GI recently and was told to go to [facility] for paracentesis however was recommended with his cousin to come to [place] ED and hence drove from [place] today.  Patient states that he feels sluggish and weak.  States that he has bilateral lower extremity edema x 1 week.  States that he wants to go to rehab for his alcoholism and reports going to detox in [place] before. Denies chest pain, fever, vomiting.

## 2022-10-16 NOTE — ED PROVIDER NOTE - PROVIDER TOKENS
FREE:[LAST:[YOUR GASTROENTEROLOGIST],PHONE:[(   )    -],FAX:[(   )    -],FOLLOWUP:[1-3 Days]],PROVIDER:[TOKEN:[[id number]],FOLLOWUP:[1-3 Days]]

## 2022-10-16 NOTE — ED PROVIDER NOTE - NSFOLLOWUPINSTRUCTIONS_ED_ALL_ED_FT
Follow-up follow-up with gastroenterologist for reevaluation, ongoing care and treatment.  Follow-up with outpatient detox.  Quit drinking alcohol.  If having worsening symptoms, fever, vomiting or other related symptoms, return to the ER immediately.    Cirrhosis       Cirrhosis is long-term (chronic) liver injury. The liver is the body's largest internal organ, and it performs many functions. It converts food into energy, removes toxic material from the blood, makes important proteins, and absorbs necessary vitamins from food.    In cirrhosis, healthy liver cells are replaced by scar tissue. This prevents blood from flowing through the liver and makes it difficult for the liver to complete its functions.      What are the causes?    Common causes of this condition are hepatitis C and long-term alcohol abuse. Other causes include:  •Nonalcoholic fatty liver disease (NAFLD). This happens when fat is deposited in the liver by causes other than alcohol.      •Hepatitis B infection.      •Autoimmune hepatitis. In this condition, the body's defense system (immune system) mistakenly attacks the liver cells, causing inflammation.      •Diseases that cause blockage of ducts inside the liver.      •Inherited liver diseases, such as hemochromatosis. This is one of the most common inherited liver diseases. In this disease, deposits of iron collect in the liver and other organs.      •Reactions to certain long-term medicines, such as amiodarone, a heart medicine.      •Parasitic infections. These include schistosomiasis, which is caused by a flatworm.      •Long-term contact to certain toxins. These toxins include certain organic solvents, such as toluene and chloroform.        What increases the risk?    You are more likely to develop this condition if:  •You have certain types of viral hepatitis.      •You abuse alcohol, especially if you are female.      •You are overweight.      •You use IV drugs and share needles.      •You have unprotected sex with someone who has viral hepatitis.        What are the signs or symptoms?    You may not have any signs and symptoms at first. Symptoms may not develop until the damage to your liver starts to get worse.    Early symptoms may include:  •Weakness and tiredness (fatigue).      •Changes in sleep patterns or having trouble sleeping.      •Itchiness.      •Tenderness in the right-upper part of your abdomen.      •Weight loss and muscle loss.      •Nausea.      •Loss of appetite.      Later symptoms may include:  •Fatigue or weakness that is getting worse.      •Yellow skin and eyes (jaundice).      •Buildup of fluid in the abdomen (ascites). You may notice that your clothes are tight around your waist.      •Weight gain and swelling of the feet and ankles (edema).      •Trouble breathing.      •Easy bruising and bleeding.      •Vomiting blood, or black or bloody stool.      •Mental confusion.        How is this diagnosed?    Your health care provider may suspect cirrhosis based on your symptoms and medical history, especially if you have other medical conditions or a history of alcohol abuse. Your health care provider will do a physical exam to feel your liver and to check for signs of cirrhosis. Tests may include:•Blood tests to check:  •For hepatitis B or C.      •Kidney function.      •Liver function.      •Imaging tests such as:  •MRI or CT scan to look for changes seen in advanced cirrhosis.      •Ultrasound to see if normal liver tissue is being replaced by scar tissue.        •A procedure in which a long needle is used to take a sample of liver tissue to be checked in a lab (biopsy). Liver biopsy can confirm the diagnosis of cirrhosis.        How is this treated?    Treatment for this condition depends on how damaged your liver is and what caused the damage. It may include treating the symptoms of cirrhosis, or treating the underlying causes to slow the damage. Treatment may include:•Making lifestyle changes, such as:  •Eating a healthy diet. You may need to work with your health care provider or a dietitian to develop an eating plan.      •Restricting salt intake.      •Maintaining a healthy weight.      •Not abusing drugs or alcohol.      •Taking medicines to:  •Treat liver infections or other infections.      •Control itching.      •Reduce fluid buildup.      •Reduce certain blood toxins.      •Reduce risk of bleeding from enlarged blood vessels in the stomach or esophagus (varices).        •Liver transplant. In this procedure, a liver from a donor is used to replace your diseased liver. This is done if cirrhosis has caused liver failure.      Other treatments and procedures may be done depending on the problems that you get from cirrhosis. Common problems include liver-related kidney failure (hepatorenal syndrome).      Follow these instructions at home:     •Take medicines only as told by your health care provider. Do not use medicines that are toxic to your liver. Ask your health care provider before taking any new medicines, including over-the-counter medicines such as NSAIDs.      •Rest as needed.      •Eat a well-balanced diet.      •Limit your salt or water intake, if your health care provider asks you to do this.      • Do not drink alcohol. This is especially important if you routinely take acetaminophen.      •Keep all follow-up visits. This is important.        Contact a health care provider if you:    •Have fatigue or weakness that is getting worse.      •Develop swelling of the hands, feet, or legs, or a buildup of fluid in the abdomen (ascites).      •Have a fever or chills.      •Develop loss of appetite.      •Have nausea or vomiting.      •Develop jaundice.      •Develop easy bruising or bleeding.        Get help right away if you:    •Vomit bright red blood or a material that looks like coffee grounds.      •Have blood in your stools.      •Notice that your stools appear black and tarry.      •Become confused.      •Have chest pain or trouble breathing.      These symptoms may represent a serious problem that is an emergency. Do not wait to see if the symptoms will go away. Get medical help right away. Call your local emergency services (911 in the [place] Do not drive yourself to the hospital.       Summary    •Cirrhosis is chronic liver injury. Common causes are hepatitis C and long-term alcohol abuse.      •Tests used to diagnose cirrhosis include blood tests, imaging tests, and liver biopsy.      •Treatment for this condition involves treating the underlying cause. Avoid alcohol, drugs, salt, and medicines that may damage your liver.      •Get help right away if you vomit bright red blood or a material that looks like coffee grounds.      This information is not intended to replace advice given to you by your health care provider. Make sure you discuss any questions you have with your health care provider.    Alcohol Intoxication      Alcohol intoxication happens when you cannot think clearly or function well (get impaired) after drinking alcohol. This can happen after just one drink. The effect that alcohol has on how you think and function depends on:  •How much alcohol you drank.      •Your age, your weight, and whether you are a man or a woman.      •How often you drink alcohol.      •If you have other medical problems.      Alcohol intoxication can range from mild to very bad. It can be dangerous, especially if you:•Drink a large amount of alcohol in a short time (binge drink).  •For women, binge drinking is having four or more drinks at one time.      •For men, binge drinking is having five or more drinks at one time.        •Take certain drugs or medicines.      If you or anyone around you seems intoxicated:  •Tell someone.      •Get help from someone.        Follow these instructions at home:      Eating and drinking    •Ask your doctor if alcohol is safe for you.•If your doctor says that alcohol is safe for you, limit how much you drink to no more than 1 drink a day for women who are not pregnant and 2 drinks a day for men. One drink equals one of these:  •12 oz of beer.      •5 oz of wine.      •1½ oz of hard liquor.      •Do not drink alcohol if:  •Your doctor tells you not to drink.      •You are pregnant, may be pregnant, or are planning to get pregnant.      •You are under the legal drinking age (21 years old in the [place]      •You are taking medicines that you should not take with alcohol.      •Alcohol causes your medical problem to get worse.      •You have to drive or do activities that need you to be alert.      •You have substance use disorder. This is when using alcohol again and again causes problems with your health, your relationships, or with what you need to do at work, home, or school.        •Be sure to eat before you drink alcohol. Avoid drinking when you have an empty stomach.    •Make sure you have enough fluid in your body (stay hydrated). To do this:  •Drink enough fluid to keep your pee (urine) pale yellow.      •Avoid caffeine, which may be in coffee, tea, and some sodas. Caffeine can make you thirsty.        •Try not to drink more than one drink an hour.      •If you are having more than one drink, have a drink without alcohol (such as water) between your drinks.          General instructions      •Take over-the-counter and prescription medicines only as told by your doctor.      • Do not drive after drinking any amount of alcohol. Plan for a designated  or another way to go home.      •Have someone you trust stay with you while you are intoxicated. Youshould not be left alone.      •Keep all follow-up visits as told by your doctor. This is important.        Contact a doctor if:    •You do not feel better after a few days.      •You have problems at work, at school, or at home due to drinking.        Get help right away if:  •You have any of the following:•Moderate or very bad trouble with:  •Movement (coordination).      •Talking.      •Memory.      •Paying attention to things.        •Trouble staying awake.      •Being very confused.      •Jerky movements that you cannot control (seizure).      •Light-headedness.      •Fainting.      •Throwing up (vomiting) blood. The blood may be bright red or look like coffee grounds.    •Blood in your poop (stool). The blood may:  •Be bright red.      •Make your poop black and tarry and make it smell bad.        •Feeling shaky when you try to stop drinking.      •Thoughts about hurting yourself or others.        If you ever feel like you may hurt yourself or others, or have thoughts about taking your own life, get help right away. You can go to your nearest emergency department or call:   • Your local emergency services (911 in the [place]       • A suicide crisis helpline, such as the National Suicide Prevention Lifeline at 1-475.381.3969. This is open 24 hours a day.         Summary    •Alcohol intoxication happens when you cannot think clearly or function well (get impaired) after drinking alcohol. This can happen after just one drink.      •If your doctor says that alcohol is safe for you, limit how much you drink to no more than 1 drink a day for women who are not pregnant and 2 drinks a day for men.      •Contact a doctor if you have problems at work, at school, or at home due to drinking.      •Get help right away if you have thoughts about hurting yourself or others.      This information is not intended to replace advice given to you by your health care provider. Make sure you discuss any questions you have with your health care provider.

## 2022-10-16 NOTE — ED PROVIDER NOTE - CLINICAL SUMMARY MEDICAL DECISION MAKING FREE TEXT BOX
41 yo M with PMHx of sleep apnea presents to ED c/o abd pain. Pt wants a paracentesis for fluid (alcohol) build up in stomach. Reports having 2 beers last Wednesday. Had one prior paracentesis 6 months ago. Pt saw GI recently and was told to go to [facility] in [place] for the paracentesis procedure, but his cousin recommended [place] [facility] so he drove from [place] to come here today. Pt endorses sluggishness and b/l lower extremity edema since last week. Pt states he has cirrhosis of the liver x 1 year. Pt has seen PCP, but denies any pneumonia workup. Pt wants to go to rehab for his alcoholism and reports going to detox in [place] before. Takes Lasixs, spironolactone. PCP: Dr. Fowler (in [place]). Exma: VSS, afebrile, NAD, PERRL, EOMI, muddy sclera, neck is supple, heart S1S2, regular rhythm, no murmur. Lungs are clear, abd distended, ascites, no mass or HSM, nontender. Extremities: +2 pitting edema in lower legs. Neuro: A&O x3 mildly slurring, ?encephalopathy, no focal deficits, skin warm and dry with no rash. Impression: alcoholic liver dz ?encephalopathy. Plan: labs with PNA and alcohol level, CT/sono of abdominal, likely admission for GI evaluation Detail Level: Detailed Detail Level: Generalized

## 2022-10-16 NOTE — ED ADULT TRIAGE NOTE - HAVE YOU HAD A SECOND COVID-19 BOOSTER?
[FreeTextEntry1] : Varicella #1 \par \par RTC 1 mo for VZV #2  [] : The components of the vaccine(s) to be administered today are listed in the plan of care. The disease(s) for which the vaccine(s) are intended to prevent and the risks have been discussed with the caretaker.  The risks are also included in the appropriate vaccination information statements which have been provided to the patient's caregiver.  The caregiver has given consent to vaccinate. No

## 2022-10-16 NOTE — ED PROVIDER NOTE - PATIENT PORTAL LINK FT
You can access the FollowMyHealth Patient Portal offered by [facility] by registering at the following website: [email or web address]. By joining [facility]’s FollowMyHealth portal, you will also be able to view your health information using other applications (apps) compatible with our system.

## 2024-07-15 ENCOUNTER — RESULT REVIEW (OUTPATIENT)
Age: 44
End: 2024-07-15

## 2024-07-16 ENCOUNTER — RESULT REVIEW (OUTPATIENT)
Age: 44
End: 2024-07-16

## 2024-07-17 ENCOUNTER — TRANSCRIPTION ENCOUNTER (OUTPATIENT)
Age: 44
End: 2024-07-17

## 2024-07-18 ENCOUNTER — RESULT REVIEW (OUTPATIENT)
Age: 44
End: 2024-07-18

## 2024-07-21 NOTE — ED ADULT NURSE NOTE - NS ED NURSE IV DC DT
osteomyelitis of the first and second toes.  CBC showed no leukocytosis leukopenia.  Patient does not meet SIRS criteria.  Lactic acid is elevated, did get IV fluid however again patient does not meet SIRS criteria.  Patient started on cefepime and vancomycin.  Patient be admitted for further patient treatment, workup and evaluation for cellulitis and osteomyelitis. Consultation made to hospitalist regarding admission.     Disposition Considerations (tests considered but not done, Admit vs D/C, Shared Decision Making, Pt Expectation of Test or Tx.): see above       I am the Primary Clinician of Record.  FINAL IMPRESSION      1. Osteomyelitis of left foot, unspecified type (HCC)    2. Left leg cellulitis          DISPOSITION/PLAN     DISPOSITION Admitted 07/21/2024 03:55:18 PM      PATIENT REFERRED TO:  No follow-up provider specified.    DISCHARGE MEDICATIONS:  New Prescriptions    No medications on file       DISCONTINUED MEDICATIONS:  Discontinued Medications    No medications on file              (Please note that portions of this note were completed with a voice recognition program.  Efforts were made to edit the dictations but occasionally words are mis-transcribed.)    Mary Sanders PA-C (electronically signed)          Mary Sanders PA-C  07/21/24 [id number]     02-Oct-2021 04:31

## 2024-07-23 ENCOUNTER — TRANSCRIPTION ENCOUNTER (OUTPATIENT)
Age: 44
End: 2024-07-23

## 2024-07-29 PROBLEM — D64.9 ANEMIA, UNSPECIFIED: Chronic | Status: ACTIVE | Noted: 2024-07-13

## 2024-07-29 PROBLEM — K74.60 UNSPECIFIED CIRRHOSIS OF LIVER: Chronic | Status: ACTIVE | Noted: 2024-07-12

## 2024-07-29 PROBLEM — K76.6 PORTAL HYPERTENSION: Chronic | Status: ACTIVE | Noted: 2024-07-13

## 2024-07-29 PROBLEM — F10.10 ALCOHOL ABUSE, UNCOMPLICATED: Chronic | Status: ACTIVE | Noted: 2024-07-12

## 2024-07-29 PROBLEM — Z87.19 PERSONAL HISTORY OF OTHER DISEASES OF THE DIGESTIVE SYSTEM: Chronic | Status: ACTIVE | Noted: 2024-07-13

## 2024-07-29 PROBLEM — K72.91 HEPATIC COMA/ENCEPHALOPATHY: Status: ACTIVE | Noted: 2024-07-29

## 2024-07-29 RX ORDER — LACTULOSE 10 G/15ML
10 SOLUTION ORAL TWICE DAILY
Qty: 2 | Refills: 1 | Status: ACTIVE | COMMUNITY
Start: 2024-07-29 | End: 1900-01-01

## 2024-07-30 ENCOUNTER — NON-APPOINTMENT (OUTPATIENT)
Age: 44
End: 2024-07-30

## 2024-08-01 ENCOUNTER — APPOINTMENT (OUTPATIENT)
Dept: HEPATOLOGY | Facility: CLINIC | Age: 44
End: 2024-08-01

## 2024-08-06 ENCOUNTER — APPOINTMENT (OUTPATIENT)
Dept: HEPATOLOGY | Facility: CLINIC | Age: 44
End: 2024-08-06

## 2024-08-06 PROCEDURE — 99215 OFFICE O/P EST HI 40 MIN: CPT

## 2024-08-06 NOTE — PHYSICAL EXAM
[General Appearance - Alert] : alert [] : no respiratory distress [Respiration, Rhythm And Depth] : normal respiratory rhythm and effort [Auscultation Breath Sounds / Voice Sounds] : lungs were clear to auscultation bilaterally [Heart Rate And Rhythm] : heart rate was normal and rhythm regular [Heart Sounds] : normal S1 and S2 [Bowel Sounds] : normal bowel sounds [FreeTextEntry1] : Distended large pannus, difficult to assess for ascites, nontender [Oriented To Time, Place, And Person] : oriented to person, place, and time

## 2024-08-06 NOTE — ASSESSMENT
[FreeTextEntry1] : 43 y/o male with a pmh of Alcoholic Cirrhosis complicated by Etoh hepatitis, here for f/u after hospital d/c. Imaging and labs reviewed. Following issues were d/w pt and his mother in detail  EtOH hepatitis and cirrhosis on day 12 steroids, episode of high fever for 24 hours last week Pt educated on the diagnosis and natural history of cirrhosis including the manifestations of ESLD (HE, HCC, ascites, variceal bleeding etc). The discussion of medical management and/or candidacy for liver transplantation was discussed. The importance of Etoh and smoking cessation, adequate nutrition, activity, reporting new symptoms and compliance with follow up appointments advised.  I also emphasized the importance of HCC screening Q6 months and variceal screening as determined. Avoiding NSAIDs and common hepatotoxic medications stressed.  Hepatitis serologies will be checked if not already and patient will need appropriate Hep A/B vaccination if indicated.  The role of other meds like statins, acetaminophen safety was also discussed.  All questions were answered. Patient demonstrated understanding.  Continue alcohol abstinence-after blood work will consider acamprosate, need to follow-up with [facility] Volume overload-continue diuretics but will need adjustment based on electrolytes and renal function Update labs today We spent a long time talking about the importance of compliance specially the need to go to the hospital for example last week when he had fever and the risk of severe infection on steroids.  He now understands and will comply Continue steroids today for alcoholic hepatitis but given fever last week, very low threshold for tapering to off If bilirubin has risen from baseline of 4.5, will need transplant evaluation as he would have failed steroids at this point Will reduce lactulose dose to 30 cc twice daily, continue Xifaxan for hepatic encephalopathy, advised to not drive Will call patient tomorrow with further recommendations Follow-up in a month I spent total of 45 minutes on this patient encounter.

## 2024-08-06 NOTE — HISTORY OF PRESENT ILLNESS
[de-identified] : 43 y/o m with AUD here after hospital d/c. D/C summary below had appt with me last week but missed it as he had 104 fever x 1 day and 4-5 bms fever resolved; no cough; no dysuria; now feels better pt's mom accompanying and brother on the phone Meds- Lactulose 45 bid; xifaxan bid; carvedilol 3.125, bumex 2mg bid, spironolactone 150mg qd Prednisolone 45mg qd (day 12); Bactrim; thiamine, B1, Mg 400mg tid; protonix, gabapentin, ursodiol mvi, folic acid   wt today 240lbs; wt in hospital was 256lbs (7/27)  Mental status - stable Nutrition - low Na diet, high protien some etoh cravings but denies any alcohol consumption  Hospital Course: Discharge Date 25-Jul-2024 Admission Date 12-Jul-2024 21:04 Reason for Admission Worsening LE Edema Hospital Course HPI: Patient is a 43 y/o male with a pmh of Alcoholic Cirrhosis complicated by ascites (2 paracenteses in the past ~6 months) and esophageal varices (variceal bleed requiring banding ~10 years ago) and alcohol use disorder presenting to the hospital due to concerns of worsening LE edema. Patient reports that he has a Hx of LE edema but over the past few months the edema has worsened. He went to [facility] in April during which time he underwent a paracentesis. Afterwards he felt well and went to [facility] for a month. Since returning from that trip he has noticed that his edema has worsened and is the worst it has ever been. He abdominal distension is stable according to him. He currently denies any chest pain, dyspnea, or abdominal pain. Patient reports having intermittent fevers at home whenever his edema worsens. Last fever was last week. He denies any dysuria or hematuria. Denies hematochezia melena or hematemesis. Past has had irregular medical follow up regarding his cirrhosis.  Upon arrival to the ED, patient was afebrile and hemodynamically stable. Due to concerns of SBP, he underwent a diagnostic paracentesis and got a dose of Ceftriaxone. (13 Jul 2024 00:18)  Hospital Course: Paracentesis returned negative for SBP, however the patient was continued on a prophylactic dose of ceftriaxone and home rifaximin. The patient was found to have significant edema and pain in the legs, CIWA protocol was initiated, lactulose was continued, and the patient was admitted for further management. U/S of the lower extremities revealed no evidence of DVT, and Lasix 40mg IV BID was started for diuresis. The patient was noted to have a hyponatremia, likely secondary to volume overload, which resolved with diuresis. The patient's home spironolactone was initially held i/s/o MANUELITO with hyperkalemia, again likely secondary to volume overload vs. hepatorenal syndrome and potassium was successfully shifted and stabilized with Lokelma, insulin, and dextrose as needed and the MANUELITO resolved with continued diuresis. Labs also revealed hypoalbuminemia, and IV albumin was started. The patient also presented with significant anemia at Hgb 6.4. He was transfused with 1 U PRBC to 7.1, indicating poor response, concerning for active bleed i/s/o soft BPs. PPI therapy was initiated and home nadolol was held. CT abdomen was performed and did not show a source of intraabdominal bleeding. Shortly after, the patient had an episode of melena and had to be transfused with 1U of PRBCs on 7/14 and 7/15 for Hgb <7.0. EGD was performed and revealed no evidence of recent variceal/UGI bleeding. B12, folate, bilirubin, haptoglobin, and LDH were sent and did not suggest a megaloblastic or hemolytic anemia picture. The patient did not have another episode of melena and Hgb stabilized >7.0 without further transfusions.  Shortly after admission a significant holosystolic murmur was noted at the left 2nd intercostal space. A TTE was performed and revealed dilated LV, LA, and RV with tricuspid regurgitation and an EF of 66%, which was likely not contributing to this patient's volume status. Hepatology was consulted and recommended corticosteroid initiation after r/o GI bleed i/s/o alcoholic hepatitis with a MELD score of 24 and Maddrey score of 55. Lille score was calculated at 0.075 after 4 days of prednisolone treatment, indicating clinical benefit and corticosteroids were continued. MRI was performed for portal venous visualization i/s/o potential portal vein thrombosis but was limited by motion artifact. Follow up Duplex U/S RUQ was performed and indicated potential thrombosis in the right branch of the portal vein but was ultimately inconclusive. CT A/P w/ contrast was performed which showed patent portal veins and the patient was not started on anticoagulation. Pain controlled with dilaudid during his admission. The patient was transitioned to Bumex 2 g IV BID for increased diuresis and nadolol was restarted i/s/o normalizing BPs and low evidence of ongoing GI bleed. Spironolactone was also restarted to enhance diuresis i/s/o resolving MANUELITO and K+ stabilized WNL. Eventually transitioned to PO bumex 2mg BID prior to discharge as well as transition from nadolol to coreg 3.125mg BID for EV prophylaxis. Patient stable for discharge with plan for outpatient PCP and hepatology follow up for further management and evaluation. Total of 3U pRBCs given over course of admission.   Important Medication Changes and Reason: - Continue coreg 3.125mg BID for EV ppx - Continue prednisolone daily for total 28 days (8/13/24 last day) - Bactrim SS daily until done with this dose of steroids.   Active or Pending Issues Requiring Follow-up:   Please follow up with outpatient substance use treatment at: [address], [address] [phone]  EGD 7/18/24-  Impression:          - Grade II esophageal varices.                      - Non-bleeding gastric ulcer with no stigmata of bleeding. Biopsied.                      - Normal examined duodenum.                      - Portal hypertensive gastropathy   PROCEDURE DATE:  07/20/2024    INTERPRETATION:  CLINICAL INFORMATION: Questionable portal vein thrombus on MR abdomen 7/14/2024  COMPARISON: None.  CONTRAST/COMPLICATIONS: IV Contrast: Omnipaque 350  90 cc administered   10 cc discarded Oral Contrast: NONE Complications: None reported at time of study completion  PROCEDURE: CT of the Abdomen and Pelvis was performed. Sagittal and coronal reformats were performed.  FINDINGS: LOWER CHEST: Within normal limits.  LIVER:  Nodular cirrhotic contour. No focal hepatic lesion. BILE DUCTS: Normal caliber. GALLBLADDER: Within normal limits. SPLEEN: Enlarged, measuring 17.7 cm. PANCREAS: Within normal limits. ADRENALS: Within normal limits. KIDNEYS/URETERS: Within normal limits.  BLADDER: Within normal limits. REPRODUCTIVE ORGANS: Prostate within normal limits.  BOWEL: Scattered diverticulosis. No bowel obstruction. Appendix is normal. PERITONEUM/RETROPERITONEUM: Small amount of ascites. VESSELS: Massive left abdominal varices with splenorenal shunt. Paraesophageal varices also noted. The portal, hepatic and superior mesenteric veins are patent. LYMPH NODES: No lymphadenopathy. ABDOMINAL WALL: Anasarca. BONES: Within normal limits.  IMPRESSION: Patent portal veins. Cirrhotic liver with portal hypertension, splenomegaly and ascites. Massive abdominal varices.  labs from 7/25/24- reviewed

## 2024-08-08 ENCOUNTER — NON-APPOINTMENT (OUTPATIENT)
Age: 44
End: 2024-08-08

## 2024-08-08 PROBLEM — Z78.9 ALCOHOL USE: Status: ACTIVE | Noted: 2021-10-22

## 2024-08-20 DIAGNOSIS — K70.30 ALCOHOLIC CIRRHOSIS OF LIVER W/OUT ASCITES: ICD-10-CM

## 2024-08-27 ENCOUNTER — NON-APPOINTMENT (OUTPATIENT)
Age: 44
End: 2024-08-27

## 2024-09-03 ENCOUNTER — APPOINTMENT (OUTPATIENT)
Dept: HEPATOLOGY | Facility: CLINIC | Age: 44
End: 2024-09-03

## 2024-09-30 ENCOUNTER — INPATIENT (INPATIENT)
Facility: HOSPITAL | Age: 44
LOS: 18 days | Discharge: HOME | DRG: 605 | End: 2024-10-19
Attending: STUDENT IN AN ORGANIZED HEALTH CARE EDUCATION/TRAINING PROGRAM | Admitting: HOSPITALIST
Payer: MEDICAID

## 2024-09-30 VITALS
HEART RATE: 105 BPM | RESPIRATION RATE: 19 BRPM | DIASTOLIC BLOOD PRESSURE: 85 MMHG | TEMPERATURE: 98 F | HEIGHT: 71 IN | SYSTOLIC BLOOD PRESSURE: 143 MMHG | OXYGEN SATURATION: 100 %

## 2024-09-30 PROCEDURE — 99285 EMERGENCY DEPT VISIT HI MDM: CPT

## 2024-10-01 ENCOUNTER — APPOINTMENT (OUTPATIENT)
Dept: HEPATOLOGY | Facility: CLINIC | Age: 44
End: 2024-10-01

## 2024-10-01 DIAGNOSIS — Z29.9 ENCOUNTER FOR PROPHYLACTIC MEASURES, UNSPECIFIED: ICD-10-CM

## 2024-10-01 DIAGNOSIS — D64.9 ANEMIA, UNSPECIFIED: ICD-10-CM

## 2024-10-01 DIAGNOSIS — R60.0 LOCALIZED EDEMA: ICD-10-CM

## 2024-10-01 DIAGNOSIS — K70.30 ALCOHOLIC CIRRHOSIS OF LIVER WITHOUT ASCITES: ICD-10-CM

## 2024-10-01 DIAGNOSIS — G47.33 OBSTRUCTIVE SLEEP APNEA (ADULT) (PEDIATRIC): ICD-10-CM
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
## 2024-10-01 DIAGNOSIS — R18.8 OTHER ASCITES: ICD-10-CM

## 2024-10-01 LAB
ALBUMIN SERPL ELPH-MCNC: 2 G/DL — LOW (ref 3.3–5)
ALBUMIN SERPL ELPH-MCNC: 2.2 G/DL — LOW (ref 3.3–5)
ALP SERPL-CCNC: 154 U/L — HIGH (ref 40–120)
ALP SERPL-CCNC: 176 U/L — HIGH (ref 40–120)
ALT FLD-CCNC: 14 U/L — SIGNIFICANT CHANGE UP (ref 10–45)
ALT FLD-CCNC: 18 U/L — SIGNIFICANT CHANGE UP (ref 10–45)
AMMONIA BLD-MCNC: 60 UMOL/L — HIGH (ref 11–55)
ANION GAP SERPL CALC-SCNC: 10 MMOL/L — SIGNIFICANT CHANGE UP (ref 5–17)
ANION GAP SERPL CALC-SCNC: 14 MMOL/L — SIGNIFICANT CHANGE UP (ref 5–17)
APTT BLD: 36.3 SEC — HIGH (ref 24.5–35.6)
AST SERPL-CCNC: 102 U/L — HIGH (ref 10–40)
AST SERPL-CCNC: 94 U/L — HIGH (ref 10–40)
BASOPHILS # BLD AUTO: 0.07 K/UL — SIGNIFICANT CHANGE UP (ref 0–0.2)
BASOPHILS NFR BLD AUTO: 0.9 % — SIGNIFICANT CHANGE UP (ref 0–2)
BILIRUB DIRECT SERPL-MCNC: 2.7 MG/DL — HIGH (ref 0–0.3)
BILIRUB INDIRECT FLD-MCNC: 4.2 MG/DL — HIGH (ref 0.2–1)
BILIRUB SERPL-MCNC: 6.8 MG/DL — HIGH (ref 0.2–1.2)
BILIRUB SERPL-MCNC: 6.9 MG/DL — HIGH (ref 0.2–1.2)
BLD GP AB SCN SERPL QL: NEGATIVE — SIGNIFICANT CHANGE UP
BUN SERPL-MCNC: 8 MG/DL — SIGNIFICANT CHANGE UP (ref 7–23)
BUN SERPL-MCNC: 9 MG/DL — SIGNIFICANT CHANGE UP (ref 7–23)
CALCIUM SERPL-MCNC: 8.3 MG/DL — LOW (ref 8.4–10.5)
CALCIUM SERPL-MCNC: 8.4 MG/DL — SIGNIFICANT CHANGE UP (ref 8.4–10.5)
CHLORIDE SERPL-SCNC: 101 MMOL/L — SIGNIFICANT CHANGE UP (ref 96–108)
CHLORIDE SERPL-SCNC: 101 MMOL/L — SIGNIFICANT CHANGE UP (ref 96–108)
CO2 SERPL-SCNC: 20 MMOL/L — LOW (ref 22–31)
CO2 SERPL-SCNC: 21 MMOL/L — LOW (ref 22–31)
CREAT ?TM UR-MCNC: 79 MG/DL — SIGNIFICANT CHANGE UP
CREAT SERPL-MCNC: 0.94 MG/DL — SIGNIFICANT CHANGE UP (ref 0.5–1.3)
CREAT SERPL-MCNC: 1.04 MG/DL — SIGNIFICANT CHANGE UP (ref 0.5–1.3)
EGFR: 101 ML/MIN/1.73M2 — SIGNIFICANT CHANGE UP
EGFR: 101 ML/MIN/1.73M2 — SIGNIFICANT CHANGE UP
EGFR: 90 ML/MIN/1.73M2 — SIGNIFICANT CHANGE UP
EGFR: 90 ML/MIN/1.73M2 — SIGNIFICANT CHANGE UP
EOSINOPHIL # BLD AUTO: 0.96 K/UL — HIGH (ref 0–0.5)
EOSINOPHIL NFR BLD AUTO: 13.2 % — HIGH (ref 0–6)
ETHANOL SERPL-MCNC: <10 MG/DL — SIGNIFICANT CHANGE UP (ref 0–10)
GLUCOSE SERPL-MCNC: 109 MG/DL — HIGH (ref 70–99)
GLUCOSE SERPL-MCNC: 119 MG/DL — HIGH (ref 70–99)
HCT VFR BLD CALC: 20.7 % — CRITICAL LOW (ref 39–50)
HCT VFR BLD CALC: 20.7 % — CRITICAL LOW (ref 39–50)
HCT VFR BLD CALC: 22.6 % — LOW (ref 39–50)
HGB BLD-MCNC: 6.6 G/DL — CRITICAL LOW (ref 13–17)
HGB BLD-MCNC: 6.6 G/DL — CRITICAL LOW (ref 13–17)
HGB BLD-MCNC: 7.3 G/DL — LOW (ref 13–17)
INR BLD: 1.74 RATIO — HIGH (ref 0.85–1.16)
LYMPHOCYTES # BLD AUTO: 0.44 K/UL — LOW (ref 1–3.3)
LYMPHOCYTES # BLD AUTO: 6.1 % — LOW (ref 13–44)
MAGNESIUM SERPL-MCNC: 1.4 MG/DL — LOW (ref 1.6–2.6)
MAGNESIUM SERPL-MCNC: 1.4 MG/DL — LOW (ref 1.6–2.6)
MCHC RBC-ENTMCNC: 31.9 GM/DL — LOW (ref 32–36)
MCHC RBC-ENTMCNC: 31.9 GM/DL — LOW (ref 32–36)
MCHC RBC-ENTMCNC: 32.3 GM/DL — SIGNIFICANT CHANGE UP (ref 32–36)
MCHC RBC-ENTMCNC: 33.3 PG — SIGNIFICANT CHANGE UP (ref 27–34)
MCHC RBC-ENTMCNC: 33.3 PG — SIGNIFICANT CHANGE UP (ref 27–34)
MCHC RBC-ENTMCNC: 33.5 PG — SIGNIFICANT CHANGE UP (ref 27–34)
MCV RBC AUTO: 103.2 FL — HIGH (ref 80–100)
MCV RBC AUTO: 104.5 FL — HIGH (ref 80–100)
MCV RBC AUTO: 105.1 FL — HIGH (ref 80–100)
MONOCYTES # BLD AUTO: 0.19 K/UL — SIGNIFICANT CHANGE UP (ref 0–0.9)
MONOCYTES NFR BLD AUTO: 2.6 % — SIGNIFICANT CHANGE UP (ref 2–14)
NEUTROPHILS # BLD AUTO: 5.6 K/UL — SIGNIFICANT CHANGE UP (ref 1.8–7.4)
NEUTROPHILS NFR BLD AUTO: 77.2 % — HIGH (ref 43–77)
NRBC # BLD: 0 /100 WBCS — SIGNIFICANT CHANGE UP (ref 0–0)
NRBC # BLD: 0 /100 WBCS — SIGNIFICANT CHANGE UP (ref 0–0)
NRBC BLD-RTO: 0 /100 WBCS — SIGNIFICANT CHANGE UP (ref 0–0)
NRBC BLD-RTO: 0 /100 WBCS — SIGNIFICANT CHANGE UP (ref 0–0)
NT-PROBNP SERPL-SCNC: 168 PG/ML — SIGNIFICANT CHANGE UP (ref 0–300)
PHOSPHATE SERPL-MCNC: 2.4 MG/DL — LOW (ref 2.5–4.5)
PHOSPHATE SERPL-MCNC: 2.7 MG/DL — SIGNIFICANT CHANGE UP (ref 2.5–4.5)
PLATELET # BLD AUTO: 121 K/UL — LOW (ref 150–400)
PLATELET # BLD AUTO: 82 K/UL — LOW (ref 150–400)
PLATELET # BLD AUTO: 94 K/UL — LOW (ref 150–400)
POTASSIUM SERPL-MCNC: 3.7 MMOL/L — SIGNIFICANT CHANGE UP (ref 3.5–5.3)
POTASSIUM SERPL-MCNC: 3.9 MMOL/L — SIGNIFICANT CHANGE UP (ref 3.5–5.3)
POTASSIUM SERPL-SCNC: 3.7 MMOL/L — SIGNIFICANT CHANGE UP (ref 3.5–5.3)
POTASSIUM SERPL-SCNC: 3.9 MMOL/L — SIGNIFICANT CHANGE UP (ref 3.5–5.3)
PROT ?TM UR-MCNC: 14 MG/DL — HIGH (ref 0–12)
PROT SERPL-MCNC: 7.3 G/DL — SIGNIFICANT CHANGE UP (ref 6–8.3)
PROT SERPL-MCNC: 7.8 G/DL — SIGNIFICANT CHANGE UP (ref 6–8.3)
PROT/CREAT UR-RTO: 0.2 RATIO — SIGNIFICANT CHANGE UP (ref 0–0.2)
PROTHROM AB SERPL-ACNC: 19.7 SEC — HIGH (ref 9.9–13.4)
RBC # BLD: 1.97 M/UL — LOW (ref 4.2–5.8)
RBC # BLD: 1.98 M/UL — LOW (ref 4.2–5.8)
RBC # BLD: 2.19 M/UL — LOW (ref 4.2–5.8)
RBC # FLD: 16.6 % — HIGH (ref 10.3–14.5)
RBC # FLD: 17.5 % — HIGH (ref 10.3–14.5)
RBC # FLD: 18.4 % — HIGH (ref 10.3–14.5)
RH IG SCN BLD-IMP: POSITIVE — SIGNIFICANT CHANGE UP
SODIUM SERPL-SCNC: 132 MMOL/L — LOW (ref 135–145)
SODIUM SERPL-SCNC: 135 MMOL/L — SIGNIFICANT CHANGE UP (ref 135–145)
WBC # BLD: 5.72 K/UL — SIGNIFICANT CHANGE UP (ref 3.8–10.5)
WBC # BLD: 6.19 K/UL — SIGNIFICANT CHANGE UP (ref 3.8–10.5)
WBC # BLD: 7.25 K/UL — SIGNIFICANT CHANGE UP (ref 3.8–10.5)
WBC # FLD AUTO: 5.72 K/UL — SIGNIFICANT CHANGE UP (ref 3.8–10.5)
WBC # FLD AUTO: 6.19 K/UL — SIGNIFICANT CHANGE UP (ref 3.8–10.5)
WBC # FLD AUTO: 7.25 K/UL — SIGNIFICANT CHANGE UP (ref 3.8–10.5)

## 2024-10-01 PROCEDURE — 70450 CT HEAD/BRAIN W/O DYE: CPT | Mod: 26

## 2024-10-01 PROCEDURE — 73562 X-RAY EXAM OF KNEE 3: CPT | Mod: 26,RT

## 2024-10-01 PROCEDURE — 73590 X-RAY EXAM OF LOWER LEG: CPT | Mod: 26,RT

## 2024-10-01 PROCEDURE — 72125 CT NECK SPINE W/O DYE: CPT | Mod: 26

## 2024-10-01 PROCEDURE — 93970 EXTREMITY STUDY: CPT | Mod: 26

## 2024-10-01 PROCEDURE — 99221 1ST HOSP IP/OBS SF/LOW 40: CPT

## 2024-10-01 PROCEDURE — 76705 ECHO EXAM OF ABDOMEN: CPT | Mod: 26

## 2024-10-01 PROCEDURE — 73706 CT ANGIO LWR EXTR W/O&W/DYE: CPT | Mod: 26,RT

## 2024-10-01 PROCEDURE — 73610 X-RAY EXAM OF ANKLE: CPT | Mod: 26,RT

## 2024-10-01 PROCEDURE — 99233 SBSQ HOSP IP/OBS HIGH 50: CPT | Mod: GC

## 2024-10-01 RX ORDER — MAGNESIUM SULFATE 500 MG/ML
2 SYRINGE (ML) INJECTION ONCE
Refills: 0 | Status: DISCONTINUED | OUTPATIENT
Start: 2024-10-01 | End: 2024-10-01

## 2024-10-01 RX ORDER — SOD PHOS DI, MONO/K PHOS MONO 250 MG
1 TABLET ORAL EVERY 6 HOURS
Refills: 0 | Status: COMPLETED | OUTPATIENT
Start: 2024-10-01 | End: 2024-10-01

## 2024-10-01 RX ORDER — LACTULOSE 10 G/15ML
30 SOLUTION ORAL
Refills: 0 | Status: DISCONTINUED | OUTPATIENT
Start: 2024-10-01 | End: 2024-10-16

## 2024-10-01 RX ORDER — SPIRONOLACTONE 25 MG
150 TABLET ORAL DAILY
Refills: 0 | Status: DISCONTINUED | OUTPATIENT
Start: 2024-10-01 | End: 2024-10-19

## 2024-10-01 RX ORDER — GABAPENTIN 400 MG/1
300 CAPSULE ORAL
Refills: 0 | Status: DISCONTINUED | OUTPATIENT
Start: 2024-10-01 | End: 2024-10-19

## 2024-10-01 RX ORDER — CARVEDILOL 3.12 MG/1
3.12 TABLET, FILM COATED ORAL EVERY 12 HOURS
Refills: 0 | Status: DISCONTINUED | OUTPATIENT
Start: 2024-10-01 | End: 2024-10-02

## 2024-10-01 RX ORDER — ACETAMINOPHEN 500 MG/5ML
1000 LIQUID (ML) ORAL ONCE
Refills: 0 | Status: COMPLETED | OUTPATIENT
Start: 2024-10-01 | End: 2024-10-01

## 2024-10-01 RX ORDER — LORAZEPAM 4 MG/ML
1 VIAL (ML) INJECTION
Refills: 0 | Status: DISCONTINUED | OUTPATIENT
Start: 2024-10-01 | End: 2024-10-03

## 2024-10-01 RX ORDER — ACETAMINOPHEN 500 MG/5ML
1000 LIQUID (ML) ORAL ONCE
Refills: 0 | Status: DISCONTINUED | OUTPATIENT
Start: 2024-10-01 | End: 2024-10-02

## 2024-10-01 RX ORDER — MAGNESIUM SULFATE 500 MG/ML
2 SYRINGE (ML) INJECTION ONCE
Refills: 0 | Status: COMPLETED | OUTPATIENT
Start: 2024-10-01 | End: 2024-10-01

## 2024-10-01 RX ORDER — LORAZEPAM 4 MG/ML
1 VIAL (ML) INJECTION
Refills: 0 | Status: DISCONTINUED | OUTPATIENT
Start: 2024-10-01 | End: 2024-10-01

## 2024-10-01 RX ORDER — BUMETANIDE 1 MG/1
2 TABLET ORAL
Refills: 0 | Status: DISCONTINUED | OUTPATIENT
Start: 2024-10-01 | End: 2024-10-17

## 2024-10-01 RX ORDER — CHLORDIAZEPOXIDE HCL 10 MG
50 CAPSULE ORAL ONCE
Refills: 0 | Status: DISCONTINUED | OUTPATIENT
Start: 2024-10-01 | End: 2024-10-01

## 2024-10-01 RX ORDER — FOLIC ACID 1 MG/1
1 TABLET ORAL DAILY
Refills: 0 | Status: DISCONTINUED | OUTPATIENT
Start: 2024-10-01 | End: 2024-10-19

## 2024-10-01 RX ORDER — B1/B2/B3/B5/B6/B12/VIT C/FOLIC 500-0.5 MG
1 TABLET ORAL DAILY
Refills: 0 | Status: DISCONTINUED | OUTPATIENT
Start: 2024-10-01 | End: 2024-10-19

## 2024-10-01 RX ORDER — BUMETANIDE 1 MG/1
2 TABLET ORAL
Refills: 0 | Status: DISCONTINUED | OUTPATIENT
Start: 2024-10-01 | End: 2024-10-01

## 2024-10-01 RX ORDER — LORAZEPAM 4 MG/ML
2 VIAL (ML) INJECTION
Refills: 0 | Status: DISCONTINUED | OUTPATIENT
Start: 2024-10-01 | End: 2024-10-01

## 2024-10-01 RX ADMIN — CARVEDILOL 3.12 MILLIGRAM(S): 3.12 TABLET, FILM COATED ORAL at 17:57

## 2024-10-01 RX ADMIN — FOLIC ACID 1 MILLIGRAM(S): 1 TABLET ORAL at 12:09

## 2024-10-01 RX ADMIN — Medication 1 PACKET(S): at 10:00

## 2024-10-01 RX ADMIN — Medication 25 GRAM(S): at 09:48

## 2024-10-01 RX ADMIN — Medication 400 MILLIGRAM(S): at 09:48

## 2024-10-01 RX ADMIN — Medication 50 MILLIGRAM(S): at 01:23

## 2024-10-01 RX ADMIN — GABAPENTIN 300 MILLIGRAM(S): 400 CAPSULE ORAL at 17:56

## 2024-10-01 RX ADMIN — Medication 2 MILLIGRAM(S): at 18:25

## 2024-10-01 RX ADMIN — Medication 1 TABLET(S): at 12:09

## 2024-10-01 RX ADMIN — BUMETANIDE 2 MILLIGRAM(S): 1 TABLET ORAL at 14:51

## 2024-10-01 RX ADMIN — Medication 1 PACKET(S): at 12:24

## 2024-10-01 RX ADMIN — Medication 2 MILLIGRAM(S): at 01:23

## 2024-10-01 RX ADMIN — LACTULOSE 30 GRAM(S): 10 SOLUTION ORAL at 17:57

## 2024-10-01 RX ADMIN — Medication 100 MILLIGRAM(S): at 12:09

## 2024-10-02 DIAGNOSIS — S80.11XA CONTUSION OF RIGHT LOWER LEG, INITIAL ENCOUNTER: ICD-10-CM

## 2024-10-02 LAB
ALBUMIN SERPL ELPH-MCNC: 2 G/DL — LOW (ref 3.3–5)
ALBUMIN SERPL ELPH-MCNC: 2 G/DL — LOW (ref 3.3–5)
ALBUMIN SERPL ELPH-MCNC: 2.1 G/DL — LOW (ref 3.3–5)
ALP SERPL-CCNC: 138 U/L — HIGH (ref 40–120)
ALP SERPL-CCNC: 143 U/L — HIGH (ref 40–120)
ALP SERPL-CCNC: 148 U/L — HIGH (ref 40–120)
ALT FLD-CCNC: 12 U/L — SIGNIFICANT CHANGE UP (ref 10–45)
ALT FLD-CCNC: 13 U/L — SIGNIFICANT CHANGE UP (ref 10–45)
ALT FLD-CCNC: 14 U/L — SIGNIFICANT CHANGE UP (ref 10–45)
ANION GAP SERPL CALC-SCNC: 11 MMOL/L — SIGNIFICANT CHANGE UP (ref 5–17)
ANION GAP SERPL CALC-SCNC: 9 MMOL/L — SIGNIFICANT CHANGE UP (ref 5–17)
APTT BLD: 33.2 SEC — SIGNIFICANT CHANGE UP (ref 24.5–35.6)
APTT BLD: 37.6 SEC — HIGH (ref 24.5–35.6)
AST SERPL-CCNC: 85 U/L — HIGH (ref 10–40)
AST SERPL-CCNC: 92 U/L — HIGH (ref 10–40)
AST SERPL-CCNC: 93 U/L — HIGH (ref 10–40)
BASOPHILS # BLD AUTO: 0.05 K/UL — SIGNIFICANT CHANGE UP (ref 0–0.2)
BASOPHILS NFR BLD AUTO: 0.8 % — SIGNIFICANT CHANGE UP (ref 0–2)
BILIRUB DIRECT SERPL-MCNC: 3 MG/DL — HIGH (ref 0–0.3)
BILIRUB DIRECT SERPL-MCNC: 3.2 MG/DL — HIGH (ref 0–0.3)
BILIRUB INDIRECT FLD-MCNC: 4.3 MG/DL — HIGH (ref 0.2–1)
BILIRUB INDIRECT FLD-MCNC: 4.5 MG/DL — HIGH (ref 0.2–1)
BILIRUB SERPL-MCNC: 6.7 MG/DL — HIGH (ref 0.2–1.2)
BILIRUB SERPL-MCNC: 7.3 MG/DL — HIGH (ref 0.2–1.2)
BILIRUB SERPL-MCNC: 7.7 MG/DL — HIGH (ref 0.2–1.2)
BUN SERPL-MCNC: 10 MG/DL — SIGNIFICANT CHANGE UP (ref 7–23)
CALCIUM SERPL-MCNC: 8.3 MG/DL — LOW (ref 8.4–10.5)
CALCIUM SERPL-MCNC: 8.4 MG/DL — SIGNIFICANT CHANGE UP (ref 8.4–10.5)
CALCIUM SERPL-MCNC: 8.4 MG/DL — SIGNIFICANT CHANGE UP (ref 8.4–10.5)
CALCIUM SERPL-MCNC: 8.7 MG/DL — SIGNIFICANT CHANGE UP (ref 8.4–10.5)
CHLORIDE SERPL-SCNC: 100 MMOL/L — SIGNIFICANT CHANGE UP (ref 96–108)
CHLORIDE SERPL-SCNC: 101 MMOL/L — SIGNIFICANT CHANGE UP (ref 96–108)
CHLORIDE SERPL-SCNC: 99 MMOL/L — SIGNIFICANT CHANGE UP (ref 96–108)
CHLORIDE SERPL-SCNC: 99 MMOL/L — SIGNIFICANT CHANGE UP (ref 96–108)
CO2 SERPL-SCNC: 20 MMOL/L — LOW (ref 22–31)
CO2 SERPL-SCNC: 21 MMOL/L — LOW (ref 22–31)
CO2 SERPL-SCNC: 22 MMOL/L — SIGNIFICANT CHANGE UP (ref 22–31)
CO2 SERPL-SCNC: 22 MMOL/L — SIGNIFICANT CHANGE UP (ref 22–31)
CREAT SERPL-MCNC: 1.01 MG/DL — SIGNIFICANT CHANGE UP (ref 0.5–1.3)
CREAT SERPL-MCNC: 1.02 MG/DL — SIGNIFICANT CHANGE UP (ref 0.5–1.3)
CREAT SERPL-MCNC: 1.02 MG/DL — SIGNIFICANT CHANGE UP (ref 0.5–1.3)
CREAT SERPL-MCNC: 1.03 MG/DL — SIGNIFICANT CHANGE UP (ref 0.5–1.3)
EGFR: 91 ML/MIN/1.73M2 — SIGNIFICANT CHANGE UP
EGFR: 91 ML/MIN/1.73M2 — SIGNIFICANT CHANGE UP
EGFR: 92 ML/MIN/1.73M2 — SIGNIFICANT CHANGE UP
EGFR: 93 ML/MIN/1.73M2 — SIGNIFICANT CHANGE UP
EGFR: 93 ML/MIN/1.73M2 — SIGNIFICANT CHANGE UP
EOSINOPHIL # BLD AUTO: 0.86 K/UL — HIGH (ref 0–0.5)
EOSINOPHIL NFR BLD AUTO: 14.5 % — HIGH (ref 0–6)
GAS PNL BLDV: SIGNIFICANT CHANGE UP
GLUCOSE SERPL-MCNC: 106 MG/DL — HIGH (ref 70–99)
GLUCOSE SERPL-MCNC: 116 MG/DL — HIGH (ref 70–99)
GLUCOSE SERPL-MCNC: 117 MG/DL — HIGH (ref 70–99)
GLUCOSE SERPL-MCNC: 142 MG/DL — HIGH (ref 70–99)
HCT VFR BLD CALC: 21.2 % — LOW (ref 39–50)
HCT VFR BLD CALC: 22.7 % — LOW (ref 39–50)
HCT VFR BLD CALC: 24.1 % — LOW (ref 39–50)
HGB BLD-MCNC: 7.1 G/DL — LOW (ref 13–17)
HGB BLD-MCNC: 7.2 G/DL — LOW (ref 13–17)
HGB BLD-MCNC: 7.7 G/DL — LOW (ref 13–17)
IMM GRANULOCYTES NFR BLD AUTO: 0.3 % — SIGNIFICANT CHANGE UP (ref 0–0.9)
INR BLD: 1.77 RATIO — HIGH (ref 0.85–1.16)
INR BLD: 1.85 RATIO — HIGH (ref 0.85–1.16)
LYMPHOCYTES # BLD AUTO: 0.63 K/UL — LOW (ref 1–3.3)
LYMPHOCYTES # BLD AUTO: 10.6 % — LOW (ref 13–44)
MAGNESIUM SERPL-MCNC: 1.5 MG/DL — LOW (ref 1.6–2.6)
MAGNESIUM SERPL-MCNC: 1.5 MG/DL — LOW (ref 1.6–2.6)
MAGNESIUM SERPL-MCNC: 1.6 MG/DL — SIGNIFICANT CHANGE UP (ref 1.6–2.6)
MAGNESIUM SERPL-MCNC: 1.6 MG/DL — SIGNIFICANT CHANGE UP (ref 1.6–2.6)
MCHC RBC-ENTMCNC: 31.7 GM/DL — LOW (ref 32–36)
MCHC RBC-ENTMCNC: 32 GM/DL — SIGNIFICANT CHANGE UP (ref 32–36)
MCHC RBC-ENTMCNC: 32.6 PG — SIGNIFICANT CHANGE UP (ref 27–34)
MCHC RBC-ENTMCNC: 32.9 PG — SIGNIFICANT CHANGE UP (ref 27–34)
MCHC RBC-ENTMCNC: 33.5 GM/DL — SIGNIFICANT CHANGE UP (ref 32–36)
MCHC RBC-ENTMCNC: 34.3 PG — HIGH (ref 27–34)
MCV RBC AUTO: 102.1 FL — HIGH (ref 80–100)
MCV RBC AUTO: 102.4 FL — HIGH (ref 80–100)
MCV RBC AUTO: 103.7 FL — HIGH (ref 80–100)
MONOCYTES # BLD AUTO: 0.8 K/UL — SIGNIFICANT CHANGE UP (ref 0–0.9)
MONOCYTES NFR BLD AUTO: 13.5 % — SIGNIFICANT CHANGE UP (ref 2–14)
NEUTROPHILS # BLD AUTO: 3.56 K/UL — SIGNIFICANT CHANGE UP (ref 1.8–7.4)
NEUTROPHILS NFR BLD AUTO: 60.3 % — SIGNIFICANT CHANGE UP (ref 43–77)
NRBC # BLD: 0 /100 WBCS — SIGNIFICANT CHANGE UP (ref 0–0)
NRBC BLD-RTO: 0 /100 WBCS — SIGNIFICANT CHANGE UP (ref 0–0)
PHOSPHATE SERPL-MCNC: 2.8 MG/DL — SIGNIFICANT CHANGE UP (ref 2.5–4.5)
PHOSPHATE SERPL-MCNC: 2.9 MG/DL — SIGNIFICANT CHANGE UP (ref 2.5–4.5)
PHOSPHATE SERPL-MCNC: 2.9 MG/DL — SIGNIFICANT CHANGE UP (ref 2.5–4.5)
PHOSPHATE SERPL-MCNC: 3.5 MG/DL — SIGNIFICANT CHANGE UP (ref 2.5–4.5)
PLATELET # BLD AUTO: 108 K/UL — LOW (ref 150–400)
PLATELET # BLD AUTO: 109 K/UL — LOW (ref 150–400)
PLATELET # BLD AUTO: 87 K/UL — LOW (ref 150–400)
POTASSIUM SERPL-MCNC: 3.7 MMOL/L — SIGNIFICANT CHANGE UP (ref 3.5–5.3)
POTASSIUM SERPL-MCNC: 3.7 MMOL/L — SIGNIFICANT CHANGE UP (ref 3.5–5.3)
POTASSIUM SERPL-MCNC: 3.8 MMOL/L — SIGNIFICANT CHANGE UP (ref 3.5–5.3)
POTASSIUM SERPL-MCNC: 3.9 MMOL/L — SIGNIFICANT CHANGE UP (ref 3.5–5.3)
POTASSIUM SERPL-SCNC: 3.7 MMOL/L — SIGNIFICANT CHANGE UP (ref 3.5–5.3)
POTASSIUM SERPL-SCNC: 3.7 MMOL/L — SIGNIFICANT CHANGE UP (ref 3.5–5.3)
POTASSIUM SERPL-SCNC: 3.8 MMOL/L — SIGNIFICANT CHANGE UP (ref 3.5–5.3)
POTASSIUM SERPL-SCNC: 3.9 MMOL/L — SIGNIFICANT CHANGE UP (ref 3.5–5.3)
PROT SERPL-MCNC: 7 G/DL — SIGNIFICANT CHANGE UP (ref 6–8.3)
PROT SERPL-MCNC: 7.4 G/DL — SIGNIFICANT CHANGE UP (ref 6–8.3)
PROT SERPL-MCNC: 7.6 G/DL — SIGNIFICANT CHANGE UP (ref 6–8.3)
PROTHROM AB SERPL-ACNC: 20.1 SEC — HIGH (ref 9.9–13.4)
PROTHROM AB SERPL-ACNC: 21 SEC — HIGH (ref 9.9–13.4)
RAPIDTEG MAXIMUM AMPLITUDE: 54.1 MM — SIGNIFICANT CHANGE UP (ref 52–70)
RBC # BLD: 2.07 M/UL — LOW (ref 4.2–5.8)
RBC # BLD: 2.19 M/UL — LOW (ref 4.2–5.8)
RBC # BLD: 2.36 M/UL — LOW (ref 4.2–5.8)
RBC # FLD: 18.7 % — HIGH (ref 10.3–14.5)
RBC # FLD: 19.3 % — HIGH (ref 10.3–14.5)
RBC # FLD: 19.3 % — HIGH (ref 10.3–14.5)
SODIUM SERPL-SCNC: 129 MMOL/L — LOW (ref 135–145)
SODIUM SERPL-SCNC: 130 MMOL/L — LOW (ref 135–145)
SODIUM SERPL-SCNC: 131 MMOL/L — LOW (ref 135–145)
SODIUM SERPL-SCNC: 132 MMOL/L — LOW (ref 135–145)
TEG FUNCTIONAL FIBRINOGEN: 17.4 MM — SIGNIFICANT CHANGE UP (ref 15–32)
TEG LY30 (LYSIS): 0 % — SIGNIFICANT CHANGE UP (ref 0–2.6)
TEG REACTION TIME: 5.2 MIN — SIGNIFICANT CHANGE UP (ref 4.6–9.1)
WBC # BLD: 5.92 K/UL — SIGNIFICANT CHANGE UP (ref 3.8–10.5)
WBC # BLD: 6.72 K/UL — SIGNIFICANT CHANGE UP (ref 3.8–10.5)
WBC # BLD: 6.75 K/UL — SIGNIFICANT CHANGE UP (ref 3.8–10.5)
WBC # FLD AUTO: 5.92 K/UL — SIGNIFICANT CHANGE UP (ref 3.8–10.5)
WBC # FLD AUTO: 6.72 K/UL — SIGNIFICANT CHANGE UP (ref 3.8–10.5)
WBC # FLD AUTO: 6.75 K/UL — SIGNIFICANT CHANGE UP (ref 3.8–10.5)

## 2024-10-02 PROCEDURE — 99223 1ST HOSP IP/OBS HIGH 75: CPT

## 2024-10-02 PROCEDURE — 99231 SBSQ HOSP IP/OBS SF/LOW 25: CPT

## 2024-10-02 PROCEDURE — ZZZZZ: CPT

## 2024-10-02 RX ORDER — MAGNESIUM OXIDE 400 MG
400 TABLET ORAL ONCE
Refills: 0 | Status: DISCONTINUED | OUTPATIENT
Start: 2024-10-02 | End: 2024-10-02

## 2024-10-02 RX ORDER — SODIUM CHLORIDE 9 G/1000ML
1000 INJECTION, SOLUTION INTRAVENOUS
Refills: 0 | Status: DISCONTINUED | OUTPATIENT
Start: 2024-10-02 | End: 2024-10-02

## 2024-10-02 RX ORDER — OXYCODONE HYDROCHLORIDE 30 MG/1
5 TABLET ORAL EVERY 6 HOURS
Refills: 0 | Status: DISCONTINUED | OUTPATIENT
Start: 2024-10-02 | End: 2024-10-02

## 2024-10-02 RX ORDER — HYDROMORPHONE/SOD CHLOR,ISO/PF 2 MG/10 ML
0.5 SYRINGE (ML) INJECTION
Refills: 0 | Status: DISCONTINUED | OUTPATIENT
Start: 2024-10-02 | End: 2024-10-03

## 2024-10-02 RX ORDER — MAGNESIUM SULFATE 500 MG/ML
2 SYRINGE (ML) INJECTION
Refills: 0 | Status: COMPLETED | OUTPATIENT
Start: 2024-10-02 | End: 2024-10-02

## 2024-10-02 RX ORDER — OXYCODONE HYDROCHLORIDE 30 MG/1
10 TABLET ORAL EVERY 6 HOURS
Refills: 0 | Status: DISCONTINUED | OUTPATIENT
Start: 2024-10-02 | End: 2024-10-02

## 2024-10-02 RX ORDER — CARVEDILOL 3.12 MG/1
3.12 TABLET, FILM COATED ORAL EVERY 12 HOURS
Refills: 0 | Status: DISCONTINUED | OUTPATIENT
Start: 2024-10-02 | End: 2024-10-19

## 2024-10-02 RX ORDER — HYDROMORPHONE/SOD CHLOR,ISO/PF 2 MG/10 ML
0.5 SYRINGE (ML) INJECTION EVERY 4 HOURS
Refills: 0 | Status: DISCONTINUED | OUTPATIENT
Start: 2024-10-02 | End: 2024-10-02

## 2024-10-02 RX ORDER — ACETAMINOPHEN 500 MG/5ML
1000 LIQUID (ML) ORAL EVERY 6 HOURS
Refills: 0 | Status: DISCONTINUED | OUTPATIENT
Start: 2024-10-02 | End: 2024-10-02

## 2024-10-02 RX ADMIN — Medication 1 TABLET(S): at 13:35

## 2024-10-02 RX ADMIN — Medication 0.5 MILLIGRAM(S): at 11:26

## 2024-10-02 RX ADMIN — Medication 0.5 MILLIGRAM(S): at 12:00

## 2024-10-02 RX ADMIN — Medication 25 GRAM(S): at 14:53

## 2024-10-02 RX ADMIN — Medication 100 MILLIGRAM(S): at 13:35

## 2024-10-02 RX ADMIN — SODIUM CHLORIDE 50 MILLILITER(S): 9 INJECTION, SOLUTION INTRAVENOUS at 13:35

## 2024-10-02 RX ADMIN — LACTULOSE 30 GRAM(S): 10 SOLUTION ORAL at 17:59

## 2024-10-02 RX ADMIN — BUMETANIDE 2 MILLIGRAM(S): 1 TABLET ORAL at 05:50

## 2024-10-02 RX ADMIN — GABAPENTIN 300 MILLIGRAM(S): 400 CAPSULE ORAL at 05:51

## 2024-10-02 RX ADMIN — Medication 25 GRAM(S): at 16:54

## 2024-10-02 RX ADMIN — CARVEDILOL 3.12 MILLIGRAM(S): 3.12 TABLET, FILM COATED ORAL at 18:00

## 2024-10-02 RX ADMIN — GABAPENTIN 300 MILLIGRAM(S): 400 CAPSULE ORAL at 18:00

## 2024-10-02 RX ADMIN — Medication 0.5 MILLIGRAM(S): at 15:39

## 2024-10-02 RX ADMIN — BUMETANIDE 2 MILLIGRAM(S): 1 TABLET ORAL at 13:37

## 2024-10-02 RX ADMIN — Medication 0.5 MILLIGRAM(S): at 16:39

## 2024-10-02 RX ADMIN — FOLIC ACID 1 MILLIGRAM(S): 1 TABLET ORAL at 13:34

## 2024-10-02 RX ADMIN — CARVEDILOL 3.12 MILLIGRAM(S): 3.12 TABLET, FILM COATED ORAL at 05:51

## 2024-10-02 RX ADMIN — LACTULOSE 30 GRAM(S): 10 SOLUTION ORAL at 05:50

## 2024-10-02 RX ADMIN — Medication 40 MILLIGRAM(S): at 05:51

## 2024-10-02 RX ADMIN — Medication 150 MILLIGRAM(S): at 05:51

## 2024-10-03 LAB
ADD ON TEST-SPECIMEN IN LAB: SIGNIFICANT CHANGE UP
ALBUMIN FLD-MCNC: 0.3 G/DL — SIGNIFICANT CHANGE UP
ALBUMIN SERPL ELPH-MCNC: 2.3 G/DL — LOW (ref 3.3–5)
ALBUMIN SERPL ELPH-MCNC: 2.4 G/DL — LOW (ref 3.3–5)
ALP SERPL-CCNC: 129 U/L — HIGH (ref 40–120)
ALP SERPL-CCNC: 137 U/L — HIGH (ref 40–120)
ALT FLD-CCNC: 13 U/L — SIGNIFICANT CHANGE UP (ref 10–45)
ALT FLD-CCNC: 14 U/L — SIGNIFICANT CHANGE UP (ref 10–45)
AMMONIA BLD-MCNC: 53 UMOL/L — SIGNIFICANT CHANGE UP (ref 11–55)
ANION GAP SERPL CALC-SCNC: 9 MMOL/L — SIGNIFICANT CHANGE UP (ref 5–17)
ANION GAP SERPL CALC-SCNC: 9 MMOL/L — SIGNIFICANT CHANGE UP (ref 5–17)
APTT BLD: 34.5 SEC — SIGNIFICANT CHANGE UP (ref 24.5–35.6)
AST SERPL-CCNC: 83 U/L — HIGH (ref 10–40)
AST SERPL-CCNC: 89 U/L — HIGH (ref 10–40)
B PERT IGG+IGM PNL SER: CLEAR — SIGNIFICANT CHANGE UP
BILIRUB SERPL-MCNC: 7.2 MG/DL — HIGH (ref 0.2–1.2)
BILIRUB SERPL-MCNC: 7.3 MG/DL — HIGH (ref 0.2–1.2)
BILIRUB SERPL-MCNC: 7.8 MG/DL — HIGH (ref 0.2–1.2)
BUN SERPL-MCNC: 10 MG/DL — SIGNIFICANT CHANGE UP (ref 7–23)
BUN SERPL-MCNC: 10 MG/DL — SIGNIFICANT CHANGE UP (ref 7–23)
CALCIUM SERPL-MCNC: 8.6 MG/DL — SIGNIFICANT CHANGE UP (ref 8.4–10.5)
CALCIUM SERPL-MCNC: 8.8 MG/DL — SIGNIFICANT CHANGE UP (ref 8.4–10.5)
CHLORIDE SERPL-SCNC: 96 MMOL/L — SIGNIFICANT CHANGE UP (ref 96–108)
CHLORIDE SERPL-SCNC: 96 MMOL/L — SIGNIFICANT CHANGE UP (ref 96–108)
CK SERPL-CCNC: 64 U/L — SIGNIFICANT CHANGE UP (ref 30–200)
CK SERPL-CCNC: 70 U/L — SIGNIFICANT CHANGE UP (ref 30–200)
CO2 SERPL-SCNC: 23 MMOL/L — SIGNIFICANT CHANGE UP (ref 22–31)
CO2 SERPL-SCNC: 23 MMOL/L — SIGNIFICANT CHANGE UP (ref 22–31)
COLOR FLD: YELLOW
CREAT ?TM UR-MCNC: 65 MG/DL — SIGNIFICANT CHANGE UP
CREAT SERPL-MCNC: 1.06 MG/DL — SIGNIFICANT CHANGE UP (ref 0.5–1.3)
CREAT SERPL-MCNC: 1.06 MG/DL — SIGNIFICANT CHANGE UP (ref 0.5–1.3)
CREAT SERPL-MCNC: 1.07 MG/DL — SIGNIFICANT CHANGE UP (ref 0.5–1.3)
EGFR: 87 ML/MIN/1.73M2 — SIGNIFICANT CHANGE UP
EGFR: 87 ML/MIN/1.73M2 — SIGNIFICANT CHANGE UP
EGFR: 88 ML/MIN/1.73M2 — SIGNIFICANT CHANGE UP
FLUID INTAKE SUBSTANCE CLASS: SIGNIFICANT CHANGE UP
GLUCOSE FLD-MCNC: 143 MG/DL — SIGNIFICANT CHANGE UP
GLUCOSE SERPL-MCNC: 133 MG/DL — HIGH (ref 70–99)
GLUCOSE SERPL-MCNC: 151 MG/DL — HIGH (ref 70–99)
GRAM STN FLD: SIGNIFICANT CHANGE UP
GRAM STN FLD: SIGNIFICANT CHANGE UP
HCT VFR BLD CALC: 20.5 % — CRITICAL LOW (ref 39–50)
HCT VFR BLD CALC: 21.7 % — LOW (ref 39–50)
HCT VFR BLD CALC: 23.3 % — LOW (ref 39–50)
HGB BLD-MCNC: 6.7 G/DL — CRITICAL LOW (ref 13–17)
HGB BLD-MCNC: 7.1 G/DL — LOW (ref 13–17)
HGB BLD-MCNC: 7.5 G/DL — LOW (ref 13–17)
INR BLD: 1.55 RATIO — HIGH (ref 0.85–1.16)
INR BLD: 1.65 RATIO — HIGH (ref 0.85–1.16)
LDH SERPL L TO P-CCNC: 42 U/L — SIGNIFICANT CHANGE UP
LYMPHOCYTES # FLD: 25 % — SIGNIFICANT CHANGE UP
MAGNESIUM SERPL-MCNC: 2 MG/DL — SIGNIFICANT CHANGE UP (ref 1.6–2.6)
MAGNESIUM SERPL-MCNC: 2.1 MG/DL — SIGNIFICANT CHANGE UP (ref 1.6–2.6)
MCHC RBC-ENTMCNC: 32.2 GM/DL — SIGNIFICANT CHANGE UP (ref 32–36)
MCHC RBC-ENTMCNC: 32.6 PG — SIGNIFICANT CHANGE UP (ref 27–34)
MCHC RBC-ENTMCNC: 32.7 GM/DL — SIGNIFICANT CHANGE UP (ref 32–36)
MCHC RBC-ENTMCNC: 32.7 GM/DL — SIGNIFICANT CHANGE UP (ref 32–36)
MCHC RBC-ENTMCNC: 33.3 PG — SIGNIFICANT CHANGE UP (ref 27–34)
MCHC RBC-ENTMCNC: 33.7 PG — SIGNIFICANT CHANGE UP (ref 27–34)
MCV RBC AUTO: 101.3 FL — HIGH (ref 80–100)
MCV RBC AUTO: 101.9 FL — HIGH (ref 80–100)
MCV RBC AUTO: 103 FL — HIGH (ref 80–100)
MELD SCORE WITH DIALYSIS: 35 POINTS — SIGNIFICANT CHANGE UP
MELD SCORE WITHOUT DIALYSIS: 27 POINTS — SIGNIFICANT CHANGE UP
MONOS+MACROS # FLD: 54 % — SIGNIFICANT CHANGE UP
NEUTROPHILS-BODY FLUID: 21 % — SIGNIFICANT CHANGE UP
NRBC # BLD: 0 /100 WBCS — SIGNIFICANT CHANGE UP (ref 0–0)
NRBC BLD-RTO: 0 /100 WBCS — SIGNIFICANT CHANGE UP (ref 0–0)
OSMOLALITY SERPL: 281 MOSMOL/KG — SIGNIFICANT CHANGE UP (ref 275–300)
OSMOLALITY UR: 279 MOS/KG — LOW (ref 300–900)
PHOSPHATE SERPL-MCNC: 3.5 MG/DL — SIGNIFICANT CHANGE UP (ref 2.5–4.5)
PHOSPHATE SERPL-MCNC: 4.1 MG/DL — SIGNIFICANT CHANGE UP (ref 2.5–4.5)
PLATELET # BLD AUTO: 101 K/UL — LOW (ref 150–400)
PLATELET # BLD AUTO: 105 K/UL — LOW (ref 150–400)
PLATELET # BLD AUTO: 120 K/UL — LOW (ref 150–400)
POTASSIUM SERPL-MCNC: 4 MMOL/L — SIGNIFICANT CHANGE UP (ref 3.5–5.3)
POTASSIUM SERPL-MCNC: 4.1 MMOL/L — SIGNIFICANT CHANGE UP (ref 3.5–5.3)
POTASSIUM SERPL-SCNC: 4 MMOL/L — SIGNIFICANT CHANGE UP (ref 3.5–5.3)
POTASSIUM SERPL-SCNC: 4.1 MMOL/L — SIGNIFICANT CHANGE UP (ref 3.5–5.3)
PROT FLD-MCNC: 1 G/DL — SIGNIFICANT CHANGE UP
PROT SERPL-MCNC: 7.4 G/DL — SIGNIFICANT CHANGE UP (ref 6–8.3)
PROT SERPL-MCNC: 7.6 G/DL — SIGNIFICANT CHANGE UP (ref 6–8.3)
PROTHROM AB SERPL-ACNC: 17.6 SEC — HIGH (ref 9.9–13.4)
PROTHROM AB SERPL-ACNC: 18.7 SEC — HIGH (ref 9.9–13.4)
RBC # BLD: 1.99 M/UL — LOW (ref 4.2–5.8)
RBC # BLD: 2.13 M/UL — LOW (ref 4.2–5.8)
RBC # BLD: 2.3 M/UL — LOW (ref 4.2–5.8)
RBC # FLD: 18.7 % — HIGH (ref 10.3–14.5)
RBC # FLD: 18.9 % — HIGH (ref 10.3–14.5)
RBC # FLD: 20.1 % — HIGH (ref 10.3–14.5)
RCV VOL RI: <2000 CELLS/UL — HIGH (ref 0–5)
SODIUM SERPL-SCNC: 127 MMOL/L — LOW (ref 135–145)
SODIUM SERPL-SCNC: 128 MMOL/L — LOW (ref 135–145)
SODIUM SERPL-SCNC: 128 MMOL/L — LOW (ref 135–145)
SPECIMEN SOURCE: SIGNIFICANT CHANGE UP
TOTAL NUCLEATED CELL COUNT, BODY FLUID: 89 CELLS/UL — HIGH (ref 0–5)
TUBE TYPE: SIGNIFICANT CHANGE UP
WBC # BLD: 6 K/UL — SIGNIFICANT CHANGE UP (ref 3.8–10.5)
WBC # BLD: 6.22 K/UL — SIGNIFICANT CHANGE UP (ref 3.8–10.5)
WBC # BLD: 6.86 K/UL — SIGNIFICANT CHANGE UP (ref 3.8–10.5)
WBC # FLD AUTO: 6 K/UL — SIGNIFICANT CHANGE UP (ref 3.8–10.5)
WBC # FLD AUTO: 6.22 K/UL — SIGNIFICANT CHANGE UP (ref 3.8–10.5)
WBC # FLD AUTO: 6.86 K/UL — SIGNIFICANT CHANGE UP (ref 3.8–10.5)

## 2024-10-03 PROCEDURE — 99231 SBSQ HOSP IP/OBS SF/LOW 25: CPT

## 2024-10-03 PROCEDURE — 99233 SBSQ HOSP IP/OBS HIGH 50: CPT | Mod: 25

## 2024-10-03 PROCEDURE — 49082 ABD PARACENTESIS: CPT

## 2024-10-03 PROCEDURE — 99233 SBSQ HOSP IP/OBS HIGH 50: CPT

## 2024-10-03 RX ORDER — OXYCODONE HYDROCHLORIDE 30 MG/1
5 TABLET ORAL ONCE
Refills: 0 | Status: DISCONTINUED | OUTPATIENT
Start: 2024-10-03 | End: 2024-10-03

## 2024-10-03 RX ORDER — OXYCODONE HYDROCHLORIDE 30 MG/1
5 TABLET ORAL EVERY 4 HOURS
Refills: 0 | Status: DISCONTINUED | OUTPATIENT
Start: 2024-10-03 | End: 2024-10-03

## 2024-10-03 RX ORDER — OXYCODONE HYDROCHLORIDE 30 MG/1
2.5 TABLET ORAL ONCE
Refills: 0 | Status: DISCONTINUED | OUTPATIENT
Start: 2024-10-03 | End: 2024-10-03

## 2024-10-03 RX ORDER — ALBUMIN (HUMAN) 12.5 G/50ML
100 INJECTION, SOLUTION INTRAVENOUS ONCE
Refills: 0 | Status: COMPLETED | OUTPATIENT
Start: 2024-10-03 | End: 2024-10-03

## 2024-10-03 RX ORDER — LIDOCAINE HCL/PF 10 MG/ML
20 VIAL (ML) INJECTION ONCE
Refills: 0 | Status: DISCONTINUED | OUTPATIENT
Start: 2024-10-03 | End: 2024-10-03

## 2024-10-03 RX ORDER — OXYCODONE HYDROCHLORIDE 30 MG/1
2.5 TABLET ORAL EVERY 4 HOURS
Refills: 0 | Status: DISCONTINUED | OUTPATIENT
Start: 2024-10-03 | End: 2024-10-03

## 2024-10-03 RX ORDER — HYDROMORPHONE/SOD CHLOR,ISO/PF 2 MG/10 ML
0.5 SYRINGE (ML) INJECTION
Refills: 0 | Status: DISCONTINUED | OUTPATIENT
Start: 2024-10-03 | End: 2024-10-03

## 2024-10-03 RX ORDER — ALBUMIN (HUMAN) 12.5 G/50ML
100 INJECTION, SOLUTION INTRAVENOUS EVERY 6 HOURS
Refills: 0 | Status: COMPLETED | OUTPATIENT
Start: 2024-10-04 | End: 2024-10-04

## 2024-10-03 RX ORDER — OXYCODONE HYDROCHLORIDE 30 MG/1
10 TABLET ORAL EVERY 4 HOURS
Refills: 0 | Status: DISCONTINUED | OUTPATIENT
Start: 2024-10-03 | End: 2024-10-03

## 2024-10-03 RX ORDER — ALBUMIN (HUMAN) 12.5 G/50ML
100 INJECTION, SOLUTION INTRAVENOUS
Refills: 0 | Status: DISCONTINUED | OUTPATIENT
Start: 2024-10-03 | End: 2024-10-03

## 2024-10-03 RX ADMIN — Medication 0.5 MILLIGRAM(S): at 01:52

## 2024-10-03 RX ADMIN — OXYCODONE HYDROCHLORIDE 10 MILLIGRAM(S): 30 TABLET ORAL at 09:16

## 2024-10-03 RX ADMIN — Medication 1 TABLET(S): at 11:33

## 2024-10-03 RX ADMIN — BUMETANIDE 2 MILLIGRAM(S): 1 TABLET ORAL at 13:25

## 2024-10-03 RX ADMIN — CARVEDILOL 3.12 MILLIGRAM(S): 3.12 TABLET, FILM COATED ORAL at 17:25

## 2024-10-03 RX ADMIN — GABAPENTIN 300 MILLIGRAM(S): 400 CAPSULE ORAL at 05:46

## 2024-10-03 RX ADMIN — OXYCODONE HYDROCHLORIDE 5 MILLIGRAM(S): 30 TABLET ORAL at 18:22

## 2024-10-03 RX ADMIN — FOLIC ACID 1 MILLIGRAM(S): 1 TABLET ORAL at 11:33

## 2024-10-03 RX ADMIN — OXYCODONE HYDROCHLORIDE 10 MILLIGRAM(S): 30 TABLET ORAL at 08:16

## 2024-10-03 RX ADMIN — ALBUMIN (HUMAN) 200 MILLILITER(S): 12.5 INJECTION, SOLUTION INTRAVENOUS at 17:30

## 2024-10-03 RX ADMIN — BUMETANIDE 2 MILLIGRAM(S): 1 TABLET ORAL at 05:52

## 2024-10-03 RX ADMIN — LACTULOSE 30 GRAM(S): 10 SOLUTION ORAL at 17:27

## 2024-10-03 RX ADMIN — CARVEDILOL 3.12 MILLIGRAM(S): 3.12 TABLET, FILM COATED ORAL at 05:47

## 2024-10-03 RX ADMIN — GABAPENTIN 300 MILLIGRAM(S): 400 CAPSULE ORAL at 17:21

## 2024-10-03 RX ADMIN — Medication 150 MILLIGRAM(S): at 05:51

## 2024-10-03 RX ADMIN — Medication 100 MILLIGRAM(S): at 11:33

## 2024-10-03 RX ADMIN — LACTULOSE 30 GRAM(S): 10 SOLUTION ORAL at 05:53

## 2024-10-03 RX ADMIN — Medication 0.5 MILLIGRAM(S): at 02:05

## 2024-10-03 RX ADMIN — OXYCODONE HYDROCHLORIDE 2.5 MILLIGRAM(S): 30 TABLET ORAL at 15:45

## 2024-10-03 RX ADMIN — Medication 40 MILLIGRAM(S): at 06:10

## 2024-10-03 RX ADMIN — OXYCODONE HYDROCHLORIDE 2.5 MILLIGRAM(S): 30 TABLET ORAL at 14:45

## 2024-10-03 RX ADMIN — OXYCODONE HYDROCHLORIDE 5 MILLIGRAM(S): 30 TABLET ORAL at 17:22

## 2024-10-03 RX ADMIN — ALBUMIN (HUMAN) 200 MILLILITER(S): 12.5 INJECTION, SOLUTION INTRAVENOUS at 18:11

## 2024-10-04 LAB
ALBUMIN SERPL ELPH-MCNC: 2.6 G/DL — LOW (ref 3.3–5)
ALP SERPL-CCNC: 121 U/L — HIGH (ref 40–120)
ALT FLD-CCNC: 9 U/L — LOW (ref 10–45)
ANION GAP SERPL CALC-SCNC: 10 MMOL/L — SIGNIFICANT CHANGE UP (ref 5–17)
APTT BLD: 36 SEC — HIGH (ref 24.5–35.6)
AST SERPL-CCNC: 79 U/L — HIGH (ref 10–40)
BILIRUB SERPL-MCNC: 7.7 MG/DL — HIGH (ref 0.2–1.2)
BLD GP AB SCN SERPL QL: NEGATIVE — SIGNIFICANT CHANGE UP
BUN SERPL-MCNC: 10 MG/DL — SIGNIFICANT CHANGE UP (ref 7–23)
CALCIUM SERPL-MCNC: 8.6 MG/DL — SIGNIFICANT CHANGE UP (ref 8.4–10.5)
CHLORIDE SERPL-SCNC: 97 MMOL/L — SIGNIFICANT CHANGE UP (ref 96–108)
CK SERPL-CCNC: 121 U/L — SIGNIFICANT CHANGE UP (ref 30–200)
CO2 SERPL-SCNC: 22 MMOL/L — SIGNIFICANT CHANGE UP (ref 22–31)
CREAT SERPL-MCNC: 1.14 MG/DL — SIGNIFICANT CHANGE UP (ref 0.5–1.3)
EGFR: 80 ML/MIN/1.73M2 — SIGNIFICANT CHANGE UP
EGFR: 80 ML/MIN/1.73M2 — SIGNIFICANT CHANGE UP
GLUCOSE SERPL-MCNC: 118 MG/DL — HIGH (ref 70–99)
HCT VFR BLD CALC: 21.9 % — LOW (ref 39–50)
HGB BLD-MCNC: 7 G/DL — CRITICAL LOW (ref 13–17)
INR BLD: 1.81 RATIO — HIGH (ref 0.85–1.16)
MAGNESIUM SERPL-MCNC: 1.8 MG/DL — SIGNIFICANT CHANGE UP (ref 1.6–2.6)
MCHC RBC-ENTMCNC: 32 GM/DL — SIGNIFICANT CHANGE UP (ref 32–36)
MCHC RBC-ENTMCNC: 32.6 PG — SIGNIFICANT CHANGE UP (ref 27–34)
MCV RBC AUTO: 101.9 FL — HIGH (ref 80–100)
NRBC # BLD: 0 /100 WBCS — SIGNIFICANT CHANGE UP (ref 0–0)
NRBC BLD-RTO: 0 /100 WBCS — SIGNIFICANT CHANGE UP (ref 0–0)
PHOSPHATE SERPL-MCNC: 4 MG/DL — SIGNIFICANT CHANGE UP (ref 2.5–4.5)
PLATELET # BLD AUTO: 101 K/UL — LOW (ref 150–400)
POTASSIUM SERPL-MCNC: 3.8 MMOL/L — SIGNIFICANT CHANGE UP (ref 3.5–5.3)
POTASSIUM SERPL-SCNC: 3.8 MMOL/L — SIGNIFICANT CHANGE UP (ref 3.5–5.3)
PROT SERPL-MCNC: 7 G/DL — SIGNIFICANT CHANGE UP (ref 6–8.3)
PROTHROM AB SERPL-ACNC: 20.7 SEC — HIGH (ref 9.9–13.4)
RBC # BLD: 2.15 M/UL — LOW (ref 4.2–5.8)
RBC # FLD: 20.1 % — HIGH (ref 10.3–14.5)
RH IG SCN BLD-IMP: POSITIVE — SIGNIFICANT CHANGE UP
SODIUM SERPL-SCNC: 129 MMOL/L — LOW (ref 135–145)
SODIUM UR-SCNC: 79 MMOL/L — SIGNIFICANT CHANGE UP
WBC # BLD: 5.64 K/UL — SIGNIFICANT CHANGE UP (ref 3.8–10.5)
WBC # FLD AUTO: 5.64 K/UL — SIGNIFICANT CHANGE UP (ref 3.8–10.5)

## 2024-10-04 PROCEDURE — 99232 SBSQ HOSP IP/OBS MODERATE 35: CPT

## 2024-10-04 PROCEDURE — 99233 SBSQ HOSP IP/OBS HIGH 50: CPT

## 2024-10-04 RX ORDER — MAGNESIUM SULFATE 500 MG/ML
2 SYRINGE (ML) INJECTION ONCE
Refills: 0 | Status: COMPLETED | OUTPATIENT
Start: 2024-10-04 | End: 2024-10-04

## 2024-10-04 RX ORDER — ACETAMINOPHEN 500 MG/5ML
500 LIQUID (ML) ORAL EVERY 6 HOURS
Refills: 0 | Status: DISCONTINUED | OUTPATIENT
Start: 2024-10-04 | End: 2024-10-19

## 2024-10-04 RX ORDER — OXYCODONE HYDROCHLORIDE 30 MG/1
5 TABLET ORAL ONCE
Refills: 0 | Status: DISCONTINUED | OUTPATIENT
Start: 2024-10-04 | End: 2024-10-04

## 2024-10-04 RX ADMIN — Medication 500 MILLIGRAM(S): at 22:08

## 2024-10-04 RX ADMIN — Medication 500 MILLIGRAM(S): at 13:03

## 2024-10-04 RX ADMIN — Medication 100 MILLIGRAM(S): at 11:06

## 2024-10-04 RX ADMIN — Medication 100 MILLIEQUIVALENT(S): at 06:13

## 2024-10-04 RX ADMIN — OXYCODONE HYDROCHLORIDE 5 MILLIGRAM(S): 30 TABLET ORAL at 22:55

## 2024-10-04 RX ADMIN — Medication 500 MILLIGRAM(S): at 12:33

## 2024-10-04 RX ADMIN — Medication 1 APPLICATION(S): at 05:14

## 2024-10-04 RX ADMIN — ALBUMIN (HUMAN) 50 MILLILITER(S): 12.5 INJECTION, SOLUTION INTRAVENOUS at 05:15

## 2024-10-04 RX ADMIN — ALBUMIN (HUMAN) 50 MILLILITER(S): 12.5 INJECTION, SOLUTION INTRAVENOUS at 00:54

## 2024-10-04 RX ADMIN — OXYCODONE HYDROCHLORIDE 5 MILLIGRAM(S): 30 TABLET ORAL at 23:55

## 2024-10-04 RX ADMIN — ALBUMIN (HUMAN) 50 MILLILITER(S): 12.5 INJECTION, SOLUTION INTRAVENOUS at 11:06

## 2024-10-04 RX ADMIN — Medication 1 TABLET(S): at 11:06

## 2024-10-04 RX ADMIN — CARVEDILOL 3.12 MILLIGRAM(S): 3.12 TABLET, FILM COATED ORAL at 18:35

## 2024-10-04 RX ADMIN — CARVEDILOL 3.12 MILLIGRAM(S): 3.12 TABLET, FILM COATED ORAL at 05:13

## 2024-10-04 RX ADMIN — Medication 25 GRAM(S): at 06:13

## 2024-10-04 RX ADMIN — Medication 150 MILLIGRAM(S): at 05:13

## 2024-10-04 RX ADMIN — Medication 100 MILLIEQUIVALENT(S): at 07:37

## 2024-10-04 RX ADMIN — LACTULOSE 30 GRAM(S): 10 SOLUTION ORAL at 18:33

## 2024-10-04 RX ADMIN — ALBUMIN (HUMAN) 50 MILLILITER(S): 12.5 INJECTION, SOLUTION INTRAVENOUS at 18:34

## 2024-10-04 RX ADMIN — Medication 500 MILLIGRAM(S): at 21:08

## 2024-10-04 RX ADMIN — GABAPENTIN 300 MILLIGRAM(S): 400 CAPSULE ORAL at 18:34

## 2024-10-04 RX ADMIN — FOLIC ACID 1 MILLIGRAM(S): 1 TABLET ORAL at 11:06

## 2024-10-04 RX ADMIN — BUMETANIDE 2 MILLIGRAM(S): 1 TABLET ORAL at 13:02

## 2024-10-04 RX ADMIN — GABAPENTIN 300 MILLIGRAM(S): 400 CAPSULE ORAL at 05:13

## 2024-10-04 RX ADMIN — BUMETANIDE 2 MILLIGRAM(S): 1 TABLET ORAL at 05:14

## 2024-10-04 RX ADMIN — LACTULOSE 30 GRAM(S): 10 SOLUTION ORAL at 05:14

## 2024-10-04 RX ADMIN — Medication 40 MILLIGRAM(S): at 06:15

## 2024-10-05 LAB
ALBUMIN SERPL ELPH-MCNC: 3 G/DL — LOW (ref 3.3–5)
ALP SERPL-CCNC: 124 U/L — HIGH (ref 40–120)
ALT FLD-CCNC: 12 U/L — SIGNIFICANT CHANGE UP (ref 10–45)
ANION GAP SERPL CALC-SCNC: 10 MMOL/L — SIGNIFICANT CHANGE UP (ref 5–17)
AST SERPL-CCNC: 66 U/L — HIGH (ref 10–40)
BILIRUB SERPL-MCNC: 7.9 MG/DL — HIGH (ref 0.2–1.2)
BUN SERPL-MCNC: 13 MG/DL — SIGNIFICANT CHANGE UP (ref 7–23)
CALCIUM SERPL-MCNC: 8.6 MG/DL — SIGNIFICANT CHANGE UP (ref 8.4–10.5)
CHLORIDE SERPL-SCNC: 95 MMOL/L — LOW (ref 96–108)
CO2 SERPL-SCNC: 24 MMOL/L — SIGNIFICANT CHANGE UP (ref 22–31)
CREAT SERPL-MCNC: 1.18 MG/DL — SIGNIFICANT CHANGE UP (ref 0.5–1.3)
EGFR: 77 ML/MIN/1.73M2 — SIGNIFICANT CHANGE UP
EGFR: 77 ML/MIN/1.73M2 — SIGNIFICANT CHANGE UP
FERRITIN SERPL-MCNC: 437 NG/ML — HIGH (ref 30–400)
GLUCOSE SERPL-MCNC: 112 MG/DL — HIGH (ref 70–99)
HCT VFR BLD CALC: 20.9 % — CRITICAL LOW (ref 39–50)
HGB BLD-MCNC: 6.8 G/DL — CRITICAL LOW (ref 13–17)
IRON SATN MFR SERPL: 29 % — SIGNIFICANT CHANGE UP (ref 16–55)
IRON SATN MFR SERPL: 37 UG/DL — LOW (ref 45–165)
MAGNESIUM SERPL-MCNC: 1.9 MG/DL — SIGNIFICANT CHANGE UP (ref 1.6–2.6)
MCHC RBC-ENTMCNC: 32.5 GM/DL — SIGNIFICANT CHANGE UP (ref 32–36)
MCHC RBC-ENTMCNC: 33 PG — SIGNIFICANT CHANGE UP (ref 27–34)
MCV RBC AUTO: 101.5 FL — HIGH (ref 80–100)
NRBC # BLD: 0 /100 WBCS — SIGNIFICANT CHANGE UP (ref 0–0)
NRBC BLD-RTO: 0 /100 WBCS — SIGNIFICANT CHANGE UP (ref 0–0)
PHOSPHATE SERPL-MCNC: 3.2 MG/DL — SIGNIFICANT CHANGE UP (ref 2.5–4.5)
PLATELET # BLD AUTO: 98 K/UL — LOW (ref 150–400)
POTASSIUM SERPL-MCNC: 3.7 MMOL/L — SIGNIFICANT CHANGE UP (ref 3.5–5.3)
POTASSIUM SERPL-SCNC: 3.7 MMOL/L — SIGNIFICANT CHANGE UP (ref 3.5–5.3)
PROT SERPL-MCNC: 7.1 G/DL — SIGNIFICANT CHANGE UP (ref 6–8.3)
RBC # BLD: 2.06 M/UL — LOW (ref 4.2–5.8)
RBC # FLD: 20.1 % — HIGH (ref 10.3–14.5)
SODIUM SERPL-SCNC: 129 MMOL/L — LOW (ref 135–145)
TIBC SERPL-MCNC: 128 UG/DL — LOW (ref 220–430)
UIBC SERPL-MCNC: 91 UG/DL — LOW (ref 110–370)
WBC # BLD: 5.2 K/UL — SIGNIFICANT CHANGE UP (ref 3.8–10.5)
WBC # FLD AUTO: 5.2 K/UL — SIGNIFICANT CHANGE UP (ref 3.8–10.5)

## 2024-10-05 PROCEDURE — 99233 SBSQ HOSP IP/OBS HIGH 50: CPT

## 2024-10-05 RX ORDER — INFLUENZA A VIRUS A/IDAHO/07/2018 (H1N1) ANTIGEN (MDCK CELL DERIVED, PROPIOLACTONE INACTIVATED, INFLUENZA A VIRUS A/INDIANA/08/2018 (H3N2) ANTIGEN (MDCK CELL DERIVED, PROPIOLACTONE INACTIVATED), INFLUENZA B VIRUS B/SINGAPORE/INFTT-16-0610/2016 ANTIGEN (MDCK CELL DERIVED, PROPIOLACTONE INACTIVATED), INFLUENZA B VIRUS B/IOWA/06/2017 ANTIGEN (MDCK CELL DERIVED, PROPIOLACTONE INACTIVATED) 15; 15; 15; 15 UG/.5ML; UG/.5ML; UG/.5ML; UG/.5ML
0.5 INJECTION, SUSPENSION INTRAMUSCULAR ONCE
Refills: 0 | Status: DISCONTINUED | OUTPATIENT
Start: 2024-10-05 | End: 2024-10-19

## 2024-10-05 RX ADMIN — Medication 150 MILLIGRAM(S): at 06:41

## 2024-10-05 RX ADMIN — GABAPENTIN 300 MILLIGRAM(S): 400 CAPSULE ORAL at 17:11

## 2024-10-05 RX ADMIN — LACTULOSE 30 GRAM(S): 10 SOLUTION ORAL at 17:10

## 2024-10-05 RX ADMIN — GABAPENTIN 300 MILLIGRAM(S): 400 CAPSULE ORAL at 06:40

## 2024-10-05 RX ADMIN — CARVEDILOL 3.12 MILLIGRAM(S): 3.12 TABLET, FILM COATED ORAL at 17:11

## 2024-10-05 RX ADMIN — Medication 500 MILLIGRAM(S): at 18:28

## 2024-10-05 RX ADMIN — Medication 40 MILLIGRAM(S): at 06:41

## 2024-10-05 RX ADMIN — LACTULOSE 30 GRAM(S): 10 SOLUTION ORAL at 06:41

## 2024-10-05 RX ADMIN — FOLIC ACID 1 MILLIGRAM(S): 1 TABLET ORAL at 13:04

## 2024-10-05 RX ADMIN — Medication 100 MILLIGRAM(S): at 13:05

## 2024-10-05 RX ADMIN — BUMETANIDE 2 MILLIGRAM(S): 1 TABLET ORAL at 13:04

## 2024-10-05 RX ADMIN — Medication 1 TABLET(S): at 13:05

## 2024-10-05 RX ADMIN — BUMETANIDE 2 MILLIGRAM(S): 1 TABLET ORAL at 06:41

## 2024-10-05 RX ADMIN — CARVEDILOL 3.12 MILLIGRAM(S): 3.12 TABLET, FILM COATED ORAL at 06:41

## 2024-10-06 LAB
ALBUMIN SERPL ELPH-MCNC: 2.8 G/DL — LOW (ref 3.3–5)
ALP SERPL-CCNC: 102 U/L — SIGNIFICANT CHANGE UP (ref 40–120)
ALT FLD-CCNC: 13 U/L — SIGNIFICANT CHANGE UP (ref 10–45)
ANION GAP SERPL CALC-SCNC: 12 MMOL/L — SIGNIFICANT CHANGE UP (ref 5–17)
ANISOCYTOSIS BLD QL: SIGNIFICANT CHANGE UP
AST SERPL-CCNC: 73 U/L — HIGH (ref 10–40)
BASOPHILS # BLD AUTO: 0 K/UL — SIGNIFICANT CHANGE UP (ref 0–0.2)
BASOPHILS NFR BLD AUTO: 0 % — SIGNIFICANT CHANGE UP (ref 0–2)
BILIRUB DIRECT SERPL-MCNC: 3.4 MG/DL — HIGH (ref 0–0.3)
BILIRUB INDIRECT FLD-MCNC: 5.9 MG/DL — HIGH (ref 0.2–1)
BILIRUB SERPL-MCNC: 9.3 MG/DL — HIGH (ref 0.2–1.2)
BUN SERPL-MCNC: 14 MG/DL — SIGNIFICANT CHANGE UP (ref 7–23)
CALCIUM SERPL-MCNC: 8.8 MG/DL — SIGNIFICANT CHANGE UP (ref 8.4–10.5)
CHLORIDE SERPL-SCNC: 96 MMOL/L — SIGNIFICANT CHANGE UP (ref 96–108)
CO2 SERPL-SCNC: 25 MMOL/L — SIGNIFICANT CHANGE UP (ref 22–31)
CREAT SERPL-MCNC: 1 MG/DL — SIGNIFICANT CHANGE UP (ref 0.5–1.3)
EGFR: 94 ML/MIN/1.73M2 — SIGNIFICANT CHANGE UP
EGFR: 94 ML/MIN/1.73M2 — SIGNIFICANT CHANGE UP
ELLIPTOCYTES BLD QL SMEAR: SLIGHT — SIGNIFICANT CHANGE UP
EOSINOPHIL # BLD AUTO: 0.2 K/UL — SIGNIFICANT CHANGE UP (ref 0–0.5)
EOSINOPHIL NFR BLD AUTO: 3.5 % — SIGNIFICANT CHANGE UP (ref 0–6)
GLUCOSE SERPL-MCNC: 128 MG/DL — HIGH (ref 70–99)
HCT VFR BLD CALC: 20.8 % — CRITICAL LOW (ref 39–50)
HCT VFR BLD CALC: 22.2 % — LOW (ref 39–50)
HGB BLD-MCNC: 6.8 G/DL — CRITICAL LOW (ref 13–17)
HGB BLD-MCNC: 7.2 G/DL — LOW (ref 13–17)
HYPOCHROMIA BLD QL: SLIGHT — SIGNIFICANT CHANGE UP
INR BLD: 2.01 RATIO — HIGH (ref 0.85–1.16)
LYMPHOCYTES # BLD AUTO: 0.24 K/UL — LOW (ref 1–3.3)
LYMPHOCYTES # BLD AUTO: 4.3 % — LOW (ref 13–44)
MACROCYTES BLD QL: SIGNIFICANT CHANGE UP
MAGNESIUM SERPL-MCNC: 1.6 MG/DL — SIGNIFICANT CHANGE UP (ref 1.6–2.6)
MANUAL SMEAR VERIFICATION: SIGNIFICANT CHANGE UP
MCHC RBC-ENTMCNC: 32.4 GM/DL — SIGNIFICANT CHANGE UP (ref 32–36)
MCHC RBC-ENTMCNC: 32.7 GM/DL — SIGNIFICANT CHANGE UP (ref 32–36)
MCHC RBC-ENTMCNC: 32.9 PG — SIGNIFICANT CHANGE UP (ref 27–34)
MCHC RBC-ENTMCNC: 33.2 PG — SIGNIFICANT CHANGE UP (ref 27–34)
MCV RBC AUTO: 101.4 FL — HIGH (ref 80–100)
MCV RBC AUTO: 101.5 FL — HIGH (ref 80–100)
MONOCYTES # BLD AUTO: 0.98 K/UL — HIGH (ref 0–0.9)
MONOCYTES NFR BLD AUTO: 17.4 % — HIGH (ref 2–14)
NEUTROPHILS # BLD AUTO: 4.2 K/UL — SIGNIFICANT CHANGE UP (ref 1.8–7.4)
NEUTROPHILS NFR BLD AUTO: 74.8 % — SIGNIFICANT CHANGE UP (ref 43–77)
NRBC # BLD: 0 /100 WBCS — SIGNIFICANT CHANGE UP (ref 0–0)
NRBC BLD-RTO: 0 /100 WBCS — SIGNIFICANT CHANGE UP (ref 0–0)
PHOSPHATE SERPL-MCNC: 3.3 MG/DL — SIGNIFICANT CHANGE UP (ref 2.5–4.5)
PLAT MORPH BLD: NORMAL — SIGNIFICANT CHANGE UP
PLATELET # BLD AUTO: 91 K/UL — LOW (ref 150–400)
PLATELET # BLD AUTO: 94 K/UL — LOW (ref 150–400)
POLYCHROMASIA BLD QL SMEAR: SLIGHT — SIGNIFICANT CHANGE UP
POTASSIUM SERPL-MCNC: 3.7 MMOL/L — SIGNIFICANT CHANGE UP (ref 3.5–5.3)
POTASSIUM SERPL-SCNC: 3.7 MMOL/L — SIGNIFICANT CHANGE UP (ref 3.5–5.3)
PROT SERPL-MCNC: 7.2 G/DL — SIGNIFICANT CHANGE UP (ref 6–8.3)
PROTHROM AB SERPL-ACNC: 22.8 SEC — HIGH (ref 9.9–13.4)
RBC # BLD: 2.05 M/UL — LOW (ref 4.2–5.8)
RBC # BLD: 2.19 M/UL — LOW (ref 4.2–5.8)
RBC # FLD: 19.5 % — HIGH (ref 10.3–14.5)
RBC # FLD: 19.9 % — HIGH (ref 10.3–14.5)
RBC BLD AUTO: ABNORMAL
SODIUM SERPL-SCNC: 133 MMOL/L — LOW (ref 135–145)
WBC # BLD: 5.41 K/UL — SIGNIFICANT CHANGE UP (ref 3.8–10.5)
WBC # BLD: 5.61 K/UL — SIGNIFICANT CHANGE UP (ref 3.8–10.5)
WBC # FLD AUTO: 5.41 K/UL — SIGNIFICANT CHANGE UP (ref 3.8–10.5)
WBC # FLD AUTO: 5.61 K/UL — SIGNIFICANT CHANGE UP (ref 3.8–10.5)

## 2024-10-06 RX ADMIN — Medication 500 MILLIGRAM(S): at 21:28

## 2024-10-06 RX ADMIN — Medication 500 MILLIGRAM(S): at 11:11

## 2024-10-06 RX ADMIN — BUMETANIDE 2 MILLIGRAM(S): 1 TABLET ORAL at 13:28

## 2024-10-06 RX ADMIN — BUMETANIDE 2 MILLIGRAM(S): 1 TABLET ORAL at 05:45

## 2024-10-06 RX ADMIN — FOLIC ACID 1 MILLIGRAM(S): 1 TABLET ORAL at 11:12

## 2024-10-06 RX ADMIN — Medication 1 TABLET(S): at 11:12

## 2024-10-06 RX ADMIN — Medication 500 MILLIGRAM(S): at 04:19

## 2024-10-06 RX ADMIN — CARVEDILOL 3.12 MILLIGRAM(S): 3.12 TABLET, FILM COATED ORAL at 05:45

## 2024-10-06 RX ADMIN — Medication 500 MILLIGRAM(S): at 03:19

## 2024-10-06 RX ADMIN — GABAPENTIN 300 MILLIGRAM(S): 400 CAPSULE ORAL at 17:42

## 2024-10-06 RX ADMIN — CARVEDILOL 3.12 MILLIGRAM(S): 3.12 TABLET, FILM COATED ORAL at 17:42

## 2024-10-06 RX ADMIN — Medication 100 MILLIGRAM(S): at 11:12

## 2024-10-06 RX ADMIN — Medication 500 MILLIGRAM(S): at 22:28

## 2024-10-06 RX ADMIN — Medication 40 MILLIGRAM(S): at 05:45

## 2024-10-06 RX ADMIN — Medication 150 MILLIGRAM(S): at 05:45

## 2024-10-06 RX ADMIN — GABAPENTIN 300 MILLIGRAM(S): 400 CAPSULE ORAL at 05:44

## 2024-10-06 RX ADMIN — LACTULOSE 30 GRAM(S): 10 SOLUTION ORAL at 13:54

## 2024-10-06 RX ADMIN — Medication 500 MILLIGRAM(S): at 12:11

## 2024-10-07 LAB
ANION GAP SERPL CALC-SCNC: 11 MMOL/L — SIGNIFICANT CHANGE UP (ref 5–17)
BUN SERPL-MCNC: 16 MG/DL — SIGNIFICANT CHANGE UP (ref 7–23)
CALCIUM SERPL-MCNC: 8.7 MG/DL — SIGNIFICANT CHANGE UP (ref 8.4–10.5)
CHLORIDE SERPL-SCNC: 94 MMOL/L — LOW (ref 96–108)
CO2 SERPL-SCNC: 27 MMOL/L — SIGNIFICANT CHANGE UP (ref 22–31)
CREAT SERPL-MCNC: 0.97 MG/DL — SIGNIFICANT CHANGE UP (ref 0.5–1.3)
EGFR: 98 ML/MIN/1.73M2 — SIGNIFICANT CHANGE UP
EGFR: 98 ML/MIN/1.73M2 — SIGNIFICANT CHANGE UP
GLUCOSE SERPL-MCNC: 149 MG/DL — HIGH (ref 70–99)
HCT VFR BLD CALC: 21.5 % — LOW (ref 39–50)
HGB BLD-MCNC: 7 G/DL — CRITICAL LOW (ref 13–17)
MAGNESIUM SERPL-MCNC: 1.4 MG/DL — LOW (ref 1.6–2.6)
MCHC RBC-ENTMCNC: 32.6 GM/DL — SIGNIFICANT CHANGE UP (ref 32–36)
MCHC RBC-ENTMCNC: 32.9 PG — SIGNIFICANT CHANGE UP (ref 27–34)
MCV RBC AUTO: 100.9 FL — HIGH (ref 80–100)
NRBC # BLD: 0 /100 WBCS — SIGNIFICANT CHANGE UP (ref 0–0)
NRBC BLD-RTO: 0 /100 WBCS — SIGNIFICANT CHANGE UP (ref 0–0)
PHOSPHATE SERPL-MCNC: 3.3 MG/DL — SIGNIFICANT CHANGE UP (ref 2.5–4.5)
PLATELET # BLD AUTO: 108 K/UL — LOW (ref 150–400)
POTASSIUM SERPL-MCNC: 3.6 MMOL/L — SIGNIFICANT CHANGE UP (ref 3.5–5.3)
POTASSIUM SERPL-SCNC: 3.6 MMOL/L — SIGNIFICANT CHANGE UP (ref 3.5–5.3)
RBC # BLD: 2.13 M/UL — LOW (ref 4.2–5.8)
RBC # FLD: 19.4 % — HIGH (ref 10.3–14.5)
SODIUM SERPL-SCNC: 132 MMOL/L — LOW (ref 135–145)
WBC # BLD: 6.44 K/UL — SIGNIFICANT CHANGE UP (ref 3.8–10.5)
WBC # FLD AUTO: 6.44 K/UL — SIGNIFICANT CHANGE UP (ref 3.8–10.5)

## 2024-10-07 PROCEDURE — 99232 SBSQ HOSP IP/OBS MODERATE 35: CPT

## 2024-10-07 PROCEDURE — 99233 SBSQ HOSP IP/OBS HIGH 50: CPT

## 2024-10-07 RX ORDER — OXYCODONE HYDROCHLORIDE 30 MG/1
5 TABLET ORAL EVERY 6 HOURS
Refills: 0 | Status: DISCONTINUED | OUTPATIENT
Start: 2024-10-07 | End: 2024-10-07

## 2024-10-07 RX ORDER — MAGNESIUM SULFATE 500 MG/ML
2 SYRINGE (ML) INJECTION ONCE
Refills: 0 | Status: COMPLETED | OUTPATIENT
Start: 2024-10-07 | End: 2024-10-07

## 2024-10-07 RX ORDER — OXYCODONE HYDROCHLORIDE 30 MG/1
7.5 TABLET ORAL EVERY 6 HOURS
Refills: 0 | Status: DISCONTINUED | OUTPATIENT
Start: 2024-10-07 | End: 2024-10-14

## 2024-10-07 RX ADMIN — Medication 1 TABLET(S): at 12:13

## 2024-10-07 RX ADMIN — Medication 500 MILLIGRAM(S): at 06:39

## 2024-10-07 RX ADMIN — OXYCODONE HYDROCHLORIDE 7.5 MILLIGRAM(S): 30 TABLET ORAL at 15:00

## 2024-10-07 RX ADMIN — BUMETANIDE 2 MILLIGRAM(S): 1 TABLET ORAL at 05:31

## 2024-10-07 RX ADMIN — FOLIC ACID 1 MILLIGRAM(S): 1 TABLET ORAL at 12:13

## 2024-10-07 RX ADMIN — OXYCODONE HYDROCHLORIDE 7.5 MILLIGRAM(S): 30 TABLET ORAL at 20:24

## 2024-10-07 RX ADMIN — Medication 500 MILLIGRAM(S): at 05:39

## 2024-10-07 RX ADMIN — OXYCODONE HYDROCHLORIDE 7.5 MILLIGRAM(S): 30 TABLET ORAL at 13:59

## 2024-10-07 RX ADMIN — Medication 40 MILLIGRAM(S): at 05:32

## 2024-10-07 RX ADMIN — Medication 150 MILLIGRAM(S): at 05:32

## 2024-10-07 RX ADMIN — BUMETANIDE 2 MILLIGRAM(S): 1 TABLET ORAL at 13:39

## 2024-10-07 RX ADMIN — CARVEDILOL 3.12 MILLIGRAM(S): 3.12 TABLET, FILM COATED ORAL at 17:15

## 2024-10-07 RX ADMIN — OXYCODONE HYDROCHLORIDE 7.5 MILLIGRAM(S): 30 TABLET ORAL at 21:15

## 2024-10-07 RX ADMIN — Medication 40 MILLIEQUIVALENT(S): at 12:13

## 2024-10-07 RX ADMIN — CARVEDILOL 3.12 MILLIGRAM(S): 3.12 TABLET, FILM COATED ORAL at 05:32

## 2024-10-07 RX ADMIN — GABAPENTIN 300 MILLIGRAM(S): 400 CAPSULE ORAL at 17:15

## 2024-10-07 RX ADMIN — Medication 100 MILLIGRAM(S): at 12:13

## 2024-10-07 RX ADMIN — Medication 25 GRAM(S): at 12:13

## 2024-10-07 RX ADMIN — LACTULOSE 20 GRAM(S): 10 SOLUTION ORAL at 05:29

## 2024-10-07 RX ADMIN — Medication 500 MILLIGRAM(S): at 12:40

## 2024-10-07 RX ADMIN — Medication 500 MILLIGRAM(S): at 13:40

## 2024-10-07 RX ADMIN — GABAPENTIN 300 MILLIGRAM(S): 400 CAPSULE ORAL at 05:32

## 2024-10-08 LAB
CULTURE RESULTS: SIGNIFICANT CHANGE UP
SPECIMEN SOURCE: SIGNIFICANT CHANGE UP

## 2024-10-08 PROCEDURE — 99233 SBSQ HOSP IP/OBS HIGH 50: CPT

## 2024-10-08 RX ADMIN — Medication 40 MILLIGRAM(S): at 05:15

## 2024-10-08 RX ADMIN — BUMETANIDE 2 MILLIGRAM(S): 1 TABLET ORAL at 16:04

## 2024-10-08 RX ADMIN — Medication 500 MILLIGRAM(S): at 23:15

## 2024-10-08 RX ADMIN — OXYCODONE HYDROCHLORIDE 7.5 MILLIGRAM(S): 30 TABLET ORAL at 14:05

## 2024-10-08 RX ADMIN — OXYCODONE HYDROCHLORIDE 7.5 MILLIGRAM(S): 30 TABLET ORAL at 06:34

## 2024-10-08 RX ADMIN — GABAPENTIN 300 MILLIGRAM(S): 400 CAPSULE ORAL at 05:15

## 2024-10-08 RX ADMIN — OXYCODONE HYDROCHLORIDE 7.5 MILLIGRAM(S): 30 TABLET ORAL at 06:04

## 2024-10-08 RX ADMIN — Medication 500 MILLIGRAM(S): at 10:40

## 2024-10-08 RX ADMIN — OXYCODONE HYDROCHLORIDE 7.5 MILLIGRAM(S): 30 TABLET ORAL at 20:07

## 2024-10-08 RX ADMIN — OXYCODONE HYDROCHLORIDE 7.5 MILLIGRAM(S): 30 TABLET ORAL at 21:00

## 2024-10-08 RX ADMIN — Medication 1 TABLET(S): at 13:09

## 2024-10-08 RX ADMIN — Medication 500 MILLIGRAM(S): at 22:38

## 2024-10-08 RX ADMIN — Medication 100 MILLIGRAM(S): at 13:09

## 2024-10-08 RX ADMIN — Medication 500 MILLIGRAM(S): at 09:45

## 2024-10-08 RX ADMIN — BUMETANIDE 2 MILLIGRAM(S): 1 TABLET ORAL at 05:17

## 2024-10-08 RX ADMIN — Medication 150 MILLIGRAM(S): at 05:15

## 2024-10-08 RX ADMIN — CARVEDILOL 3.12 MILLIGRAM(S): 3.12 TABLET, FILM COATED ORAL at 05:15

## 2024-10-08 RX ADMIN — Medication 500 MILLIGRAM(S): at 16:04

## 2024-10-08 RX ADMIN — GABAPENTIN 300 MILLIGRAM(S): 400 CAPSULE ORAL at 17:55

## 2024-10-08 RX ADMIN — OXYCODONE HYDROCHLORIDE 7.5 MILLIGRAM(S): 30 TABLET ORAL at 13:09

## 2024-10-08 RX ADMIN — LACTULOSE 30 GRAM(S): 10 SOLUTION ORAL at 05:15

## 2024-10-08 RX ADMIN — CARVEDILOL 3.12 MILLIGRAM(S): 3.12 TABLET, FILM COATED ORAL at 17:55

## 2024-10-08 RX ADMIN — LACTULOSE 30 GRAM(S): 10 SOLUTION ORAL at 17:55

## 2024-10-08 RX ADMIN — Medication 500 MILLIGRAM(S): at 17:00

## 2024-10-09 LAB
ANION GAP SERPL CALC-SCNC: 10 MMOL/L — SIGNIFICANT CHANGE UP (ref 5–17)
BLD GP AB SCN SERPL QL: NEGATIVE — SIGNIFICANT CHANGE UP
BUN SERPL-MCNC: 28 MG/DL — HIGH (ref 7–23)
CALCIUM SERPL-MCNC: 9 MG/DL — SIGNIFICANT CHANGE UP (ref 8.4–10.5)
CHLORIDE SERPL-SCNC: 92 MMOL/L — LOW (ref 96–108)
CO2 SERPL-SCNC: 25 MMOL/L — SIGNIFICANT CHANGE UP (ref 22–31)
CREAT SERPL-MCNC: 1.39 MG/DL — HIGH (ref 0.5–1.3)
EGFR: 63 ML/MIN/1.73M2 — SIGNIFICANT CHANGE UP
EGFR: 63 ML/MIN/1.73M2 — SIGNIFICANT CHANGE UP
GLUCOSE SERPL-MCNC: 134 MG/DL — HIGH (ref 70–99)
HCT VFR BLD CALC: 20.4 % — CRITICAL LOW (ref 39–50)
HCT VFR BLD CALC: 20.8 % — CRITICAL LOW (ref 39–50)
HGB BLD-MCNC: 6.5 G/DL — CRITICAL LOW (ref 13–17)
HGB BLD-MCNC: 6.6 G/DL — CRITICAL LOW (ref 13–17)
MAGNESIUM SERPL-MCNC: 1.6 MG/DL — SIGNIFICANT CHANGE UP (ref 1.6–2.6)
MCHC RBC-ENTMCNC: 31.7 GM/DL — LOW (ref 32–36)
MCHC RBC-ENTMCNC: 31.9 GM/DL — LOW (ref 32–36)
MCHC RBC-ENTMCNC: 32.7 PG — SIGNIFICANT CHANGE UP (ref 27–34)
MCHC RBC-ENTMCNC: 32.8 PG — SIGNIFICANT CHANGE UP (ref 27–34)
MCV RBC AUTO: 103 FL — HIGH (ref 80–100)
MCV RBC AUTO: 103 FL — HIGH (ref 80–100)
NRBC # BLD: 0 /100 WBCS — SIGNIFICANT CHANGE UP (ref 0–0)
NRBC # BLD: 0 /100 WBCS — SIGNIFICANT CHANGE UP (ref 0–0)
NRBC BLD-RTO: 0 /100 WBCS — SIGNIFICANT CHANGE UP (ref 0–0)
NRBC BLD-RTO: 0 /100 WBCS — SIGNIFICANT CHANGE UP (ref 0–0)
PHOSPHATE SERPL-MCNC: 3.7 MG/DL — SIGNIFICANT CHANGE UP (ref 2.5–4.5)
PLATELET # BLD AUTO: 110 K/UL — LOW (ref 150–400)
PLATELET # BLD AUTO: 115 K/UL — LOW (ref 150–400)
POTASSIUM SERPL-MCNC: 4.9 MMOL/L — SIGNIFICANT CHANGE UP (ref 3.5–5.3)
POTASSIUM SERPL-SCNC: 4.9 MMOL/L — SIGNIFICANT CHANGE UP (ref 3.5–5.3)
RBC # BLD: 1.98 M/UL — LOW (ref 4.2–5.8)
RBC # BLD: 2.02 M/UL — LOW (ref 4.2–5.8)
RBC # FLD: 18.6 % — HIGH (ref 10.3–14.5)
RBC # FLD: 19 % — HIGH (ref 10.3–14.5)
RH IG SCN BLD-IMP: POSITIVE — SIGNIFICANT CHANGE UP
SODIUM SERPL-SCNC: 127 MMOL/L — LOW (ref 135–145)
WBC # BLD: 7.94 K/UL — SIGNIFICANT CHANGE UP (ref 3.8–10.5)
WBC # BLD: 8.14 K/UL — SIGNIFICANT CHANGE UP (ref 3.8–10.5)
WBC # FLD AUTO: 7.94 K/UL — SIGNIFICANT CHANGE UP (ref 3.8–10.5)
WBC # FLD AUTO: 8.14 K/UL — SIGNIFICANT CHANGE UP (ref 3.8–10.5)

## 2024-10-09 PROCEDURE — 99233 SBSQ HOSP IP/OBS HIGH 50: CPT

## 2024-10-09 PROCEDURE — 73706 CT ANGIO LWR EXTR W/O&W/DYE: CPT | Mod: 26,RT

## 2024-10-09 RX ADMIN — OXYCODONE HYDROCHLORIDE 7.5 MILLIGRAM(S): 30 TABLET ORAL at 05:40

## 2024-10-09 RX ADMIN — CARVEDILOL 3.12 MILLIGRAM(S): 3.12 TABLET, FILM COATED ORAL at 17:53

## 2024-10-09 RX ADMIN — Medication 500 MILLIGRAM(S): at 17:56

## 2024-10-09 RX ADMIN — LACTULOSE 30 GRAM(S): 10 SOLUTION ORAL at 22:35

## 2024-10-09 RX ADMIN — BUMETANIDE 2 MILLIGRAM(S): 1 TABLET ORAL at 05:30

## 2024-10-09 RX ADMIN — FOLIC ACID 1 MILLIGRAM(S): 1 TABLET ORAL at 12:43

## 2024-10-09 RX ADMIN — Medication 500 MILLIGRAM(S): at 08:35

## 2024-10-09 RX ADMIN — Medication 1 TABLET(S): at 12:44

## 2024-10-09 RX ADMIN — LACTULOSE 30 GRAM(S): 10 SOLUTION ORAL at 05:30

## 2024-10-09 RX ADMIN — CARVEDILOL 3.12 MILLIGRAM(S): 3.12 TABLET, FILM COATED ORAL at 05:30

## 2024-10-09 RX ADMIN — OXYCODONE HYDROCHLORIDE 7.5 MILLIGRAM(S): 30 TABLET ORAL at 13:15

## 2024-10-09 RX ADMIN — Medication 150 MILLIGRAM(S): at 05:29

## 2024-10-09 RX ADMIN — GABAPENTIN 300 MILLIGRAM(S): 400 CAPSULE ORAL at 05:30

## 2024-10-09 RX ADMIN — OXYCODONE HYDROCHLORIDE 7.5 MILLIGRAM(S): 30 TABLET ORAL at 12:44

## 2024-10-09 RX ADMIN — BUMETANIDE 2 MILLIGRAM(S): 1 TABLET ORAL at 14:23

## 2024-10-09 RX ADMIN — GABAPENTIN 300 MILLIGRAM(S): 400 CAPSULE ORAL at 17:52

## 2024-10-09 RX ADMIN — OXYCODONE HYDROCHLORIDE 7.5 MILLIGRAM(S): 30 TABLET ORAL at 06:30

## 2024-10-09 RX ADMIN — Medication 500 MILLIGRAM(S): at 07:48

## 2024-10-09 RX ADMIN — Medication 40 MILLIGRAM(S): at 05:29

## 2024-10-10 LAB
ALBUMIN SERPL ELPH-MCNC: 2.9 G/DL — LOW (ref 3.3–5)
ALP SERPL-CCNC: 110 U/L — SIGNIFICANT CHANGE UP (ref 40–120)
ALT FLD-CCNC: 14 U/L — SIGNIFICANT CHANGE UP (ref 10–45)
ANION GAP SERPL CALC-SCNC: 10 MMOL/L — SIGNIFICANT CHANGE UP (ref 5–17)
AST SERPL-CCNC: 88 U/L — HIGH (ref 10–40)
BILIRUB SERPL-MCNC: 9.8 MG/DL — HIGH (ref 0.2–1.2)
BUN SERPL-MCNC: 33 MG/DL — HIGH (ref 7–23)
CALCIUM SERPL-MCNC: 9 MG/DL — SIGNIFICANT CHANGE UP (ref 8.4–10.5)
CHLORIDE SERPL-SCNC: 93 MMOL/L — LOW (ref 96–108)
CO2 SERPL-SCNC: 24 MMOL/L — SIGNIFICANT CHANGE UP (ref 22–31)
CREAT SERPL-MCNC: 1.37 MG/DL — HIGH (ref 0.5–1.3)
EGFR: 64 ML/MIN/1.73M2 — SIGNIFICANT CHANGE UP
EGFR: 64 ML/MIN/1.73M2 — SIGNIFICANT CHANGE UP
GLUCOSE SERPL-MCNC: 143 MG/DL — HIGH (ref 70–99)
HCT VFR BLD CALC: 21.5 % — LOW (ref 39–50)
HCT VFR BLD CALC: 23 % — LOW (ref 39–50)
HCT VFR BLD CALC: 24.5 % — LOW (ref 39–50)
HEPARINASE TEG R TIME: 8.5 MIN — HIGH (ref 4.3–8.3)
HGB BLD-MCNC: 6.9 G/DL — CRITICAL LOW (ref 13–17)
HGB BLD-MCNC: 7.6 G/DL — LOW (ref 13–17)
HGB BLD-MCNC: 7.7 G/DL — LOW (ref 13–17)
MAGNESIUM SERPL-MCNC: 1.7 MG/DL — SIGNIFICANT CHANGE UP (ref 1.6–2.6)
MCHC RBC-ENTMCNC: 31.4 GM/DL — LOW (ref 32–36)
MCHC RBC-ENTMCNC: 31.6 PG — SIGNIFICANT CHANGE UP (ref 27–34)
MCHC RBC-ENTMCNC: 32.1 GM/DL — SIGNIFICANT CHANGE UP (ref 32–36)
MCHC RBC-ENTMCNC: 32.2 PG — SIGNIFICANT CHANGE UP (ref 27–34)
MCHC RBC-ENTMCNC: 32.8 PG — SIGNIFICANT CHANGE UP (ref 27–34)
MCHC RBC-ENTMCNC: 33 GM/DL — SIGNIFICANT CHANGE UP (ref 32–36)
MCV RBC AUTO: 100.4 FL — HIGH (ref 80–100)
MCV RBC AUTO: 100.5 FL — HIGH (ref 80–100)
MCV RBC AUTO: 99.1 FL — SIGNIFICANT CHANGE UP (ref 80–100)
NRBC # BLD: 0 /100 WBCS — SIGNIFICANT CHANGE UP (ref 0–0)
NRBC BLD-RTO: 0 /100 WBCS — SIGNIFICANT CHANGE UP (ref 0–0)
PETH 16:0/18:1: 255 NG/ML — HIGH
PETH 16:0/18:2: 213 NG/ML — HIGH
PETH COMMENTS: SIGNIFICANT CHANGE UP
PLATELET # BLD AUTO: 105 K/UL — LOW (ref 150–400)
PLATELET # BLD AUTO: 112 K/UL — LOW (ref 150–400)
PLATELET # BLD AUTO: 113 K/UL — LOW (ref 150–400)
POTASSIUM SERPL-MCNC: 5 MMOL/L — SIGNIFICANT CHANGE UP (ref 3.5–5.3)
POTASSIUM SERPL-SCNC: 5 MMOL/L — SIGNIFICANT CHANGE UP (ref 3.5–5.3)
PROT SERPL-MCNC: 7.2 G/DL — SIGNIFICANT CHANGE UP (ref 6–8.3)
RAPIDTEG MAXIMUM AMPLITUDE: 58.1 MM — SIGNIFICANT CHANGE UP (ref 52–70)
RBC # BLD: 2.14 M/UL — LOW (ref 4.2–5.8)
RBC # BLD: 2.32 M/UL — LOW (ref 4.2–5.8)
RBC # BLD: 2.44 M/UL — LOW (ref 4.2–5.8)
RBC # FLD: 18.8 % — HIGH (ref 10.3–14.5)
RBC # FLD: 19.4 % — HIGH (ref 10.3–14.5)
RBC # FLD: 19.5 % — HIGH (ref 10.3–14.5)
SODIUM SERPL-SCNC: 127 MMOL/L — LOW (ref 135–145)
TEG FUNCTIONAL FIBRINOGEN: 19.2 MM — SIGNIFICANT CHANGE UP (ref 15–32)
TEG MAXIMUM AMPLITUDE: 57.1 MM — SIGNIFICANT CHANGE UP (ref 52–69)
TEG REACTION TIME: 9.8 MIN — HIGH (ref 4.6–9.1)
WBC # BLD: 6.94 K/UL — SIGNIFICANT CHANGE UP (ref 3.8–10.5)
WBC # BLD: 7.25 K/UL — SIGNIFICANT CHANGE UP (ref 3.8–10.5)
WBC # BLD: 7.81 K/UL — SIGNIFICANT CHANGE UP (ref 3.8–10.5)
WBC # FLD AUTO: 6.94 K/UL — SIGNIFICANT CHANGE UP (ref 3.8–10.5)
WBC # FLD AUTO: 7.25 K/UL — SIGNIFICANT CHANGE UP (ref 3.8–10.5)
WBC # FLD AUTO: 7.81 K/UL — SIGNIFICANT CHANGE UP (ref 3.8–10.5)

## 2024-10-10 PROCEDURE — 99232 SBSQ HOSP IP/OBS MODERATE 35: CPT | Mod: GC

## 2024-10-10 PROCEDURE — 99232 SBSQ HOSP IP/OBS MODERATE 35: CPT

## 2024-10-10 PROCEDURE — 99233 SBSQ HOSP IP/OBS HIGH 50: CPT

## 2024-10-10 RX ADMIN — BUMETANIDE 2 MILLIGRAM(S): 1 TABLET ORAL at 15:27

## 2024-10-10 RX ADMIN — OXYCODONE HYDROCHLORIDE 7.5 MILLIGRAM(S): 30 TABLET ORAL at 01:09

## 2024-10-10 RX ADMIN — CARVEDILOL 3.12 MILLIGRAM(S): 3.12 TABLET, FILM COATED ORAL at 06:09

## 2024-10-10 RX ADMIN — BUMETANIDE 2 MILLIGRAM(S): 1 TABLET ORAL at 06:07

## 2024-10-10 RX ADMIN — Medication 500 MILLIGRAM(S): at 22:29

## 2024-10-10 RX ADMIN — OXYCODONE HYDROCHLORIDE 7.5 MILLIGRAM(S): 30 TABLET ORAL at 02:00

## 2024-10-10 RX ADMIN — Medication 150 MILLIGRAM(S): at 06:08

## 2024-10-10 RX ADMIN — GABAPENTIN 300 MILLIGRAM(S): 400 CAPSULE ORAL at 20:45

## 2024-10-10 RX ADMIN — LACTULOSE 30 GRAM(S): 10 SOLUTION ORAL at 20:45

## 2024-10-10 RX ADMIN — Medication 5 MILLIGRAM(S): at 14:02

## 2024-10-10 RX ADMIN — OXYCODONE HYDROCHLORIDE 7.5 MILLIGRAM(S): 30 TABLET ORAL at 09:08

## 2024-10-10 RX ADMIN — GABAPENTIN 300 MILLIGRAM(S): 400 CAPSULE ORAL at 06:08

## 2024-10-10 RX ADMIN — Medication 500 MILLIGRAM(S): at 23:00

## 2024-10-10 RX ADMIN — OXYCODONE HYDROCHLORIDE 7.5 MILLIGRAM(S): 30 TABLET ORAL at 10:08

## 2024-10-10 RX ADMIN — OXYCODONE HYDROCHLORIDE 7.5 MILLIGRAM(S): 30 TABLET ORAL at 22:29

## 2024-10-10 RX ADMIN — Medication 500 MILLIGRAM(S): at 01:09

## 2024-10-10 RX ADMIN — CARVEDILOL 3.12 MILLIGRAM(S): 3.12 TABLET, FILM COATED ORAL at 20:45

## 2024-10-10 RX ADMIN — Medication 40 MILLIGRAM(S): at 06:08

## 2024-10-10 RX ADMIN — Medication 500 MILLIGRAM(S): at 02:00

## 2024-10-10 RX ADMIN — OXYCODONE HYDROCHLORIDE 7.5 MILLIGRAM(S): 30 TABLET ORAL at 23:00

## 2024-10-10 RX ADMIN — LACTULOSE 30 GRAM(S): 10 SOLUTION ORAL at 06:07

## 2024-10-11 LAB
ALBUMIN SERPL ELPH-MCNC: 2.8 G/DL — LOW (ref 3.3–5)
ALP SERPL-CCNC: 117 U/L — SIGNIFICANT CHANGE UP (ref 40–120)
ALT FLD-CCNC: 18 U/L — SIGNIFICANT CHANGE UP (ref 10–45)
ANION GAP SERPL CALC-SCNC: 11 MMOL/L — SIGNIFICANT CHANGE UP (ref 5–17)
APTT BLD: 36.4 SEC — HIGH (ref 24.5–35.6)
AST SERPL-CCNC: 90 U/L — HIGH (ref 10–40)
BILIRUB SERPL-MCNC: 9.8 MG/DL — HIGH (ref 0.2–1.2)
BUN SERPL-MCNC: 35 MG/DL — HIGH (ref 7–23)
CALCIUM SERPL-MCNC: 9.1 MG/DL — SIGNIFICANT CHANGE UP (ref 8.4–10.5)
CHLORIDE SERPL-SCNC: 91 MMOL/L — LOW (ref 96–108)
CO2 SERPL-SCNC: 25 MMOL/L — SIGNIFICANT CHANGE UP (ref 22–31)
CREAT SERPL-MCNC: 1.27 MG/DL — SIGNIFICANT CHANGE UP (ref 0.5–1.3)
EGFR: 71 ML/MIN/1.73M2 — SIGNIFICANT CHANGE UP
EGFR: 71 ML/MIN/1.73M2 — SIGNIFICANT CHANGE UP
GLUCOSE SERPL-MCNC: 230 MG/DL — HIGH (ref 70–99)
HCT VFR BLD CALC: 21.7 % — LOW (ref 39–50)
HGB BLD-MCNC: 7.1 G/DL — LOW (ref 13–17)
INR BLD: 1.8 RATIO — HIGH (ref 0.85–1.16)
MCHC RBC-ENTMCNC: 32.6 PG — SIGNIFICANT CHANGE UP (ref 27–34)
MCHC RBC-ENTMCNC: 32.7 GM/DL — SIGNIFICANT CHANGE UP (ref 32–36)
MCV RBC AUTO: 99.5 FL — SIGNIFICANT CHANGE UP (ref 80–100)
NRBC # BLD: 0 /100 WBCS — SIGNIFICANT CHANGE UP (ref 0–0)
NRBC BLD-RTO: 0 /100 WBCS — SIGNIFICANT CHANGE UP (ref 0–0)
PLATELET # BLD AUTO: 106 K/UL — LOW (ref 150–400)
POTASSIUM SERPL-MCNC: 4.7 MMOL/L — SIGNIFICANT CHANGE UP (ref 3.5–5.3)
POTASSIUM SERPL-SCNC: 4.7 MMOL/L — SIGNIFICANT CHANGE UP (ref 3.5–5.3)
PROT SERPL-MCNC: 7.2 G/DL — SIGNIFICANT CHANGE UP (ref 6–8.3)
PROTHROM AB SERPL-ACNC: 20.6 SEC — HIGH (ref 9.9–13.4)
RBC # BLD: 2.18 M/UL — LOW (ref 4.2–5.8)
RBC # FLD: 19.2 % — HIGH (ref 10.3–14.5)
SODIUM SERPL-SCNC: 127 MMOL/L — LOW (ref 135–145)
WBC # BLD: 6.72 K/UL — SIGNIFICANT CHANGE UP (ref 3.8–10.5)
WBC # FLD AUTO: 6.72 K/UL — SIGNIFICANT CHANGE UP (ref 3.8–10.5)

## 2024-10-11 PROCEDURE — 99233 SBSQ HOSP IP/OBS HIGH 50: CPT

## 2024-10-11 RX ADMIN — Medication 5 MILLIGRAM(S): at 11:09

## 2024-10-11 RX ADMIN — Medication 1 TABLET(S): at 11:13

## 2024-10-11 RX ADMIN — CARVEDILOL 3.12 MILLIGRAM(S): 3.12 TABLET, FILM COATED ORAL at 09:07

## 2024-10-11 RX ADMIN — Medication 40 MILLIGRAM(S): at 05:34

## 2024-10-11 RX ADMIN — GABAPENTIN 300 MILLIGRAM(S): 400 CAPSULE ORAL at 08:55

## 2024-10-11 RX ADMIN — CARVEDILOL 3.12 MILLIGRAM(S): 3.12 TABLET, FILM COATED ORAL at 22:26

## 2024-10-11 RX ADMIN — BUMETANIDE 2 MILLIGRAM(S): 1 TABLET ORAL at 14:20

## 2024-10-11 RX ADMIN — OXYCODONE HYDROCHLORIDE 7.5 MILLIGRAM(S): 30 TABLET ORAL at 09:20

## 2024-10-11 RX ADMIN — GABAPENTIN 300 MILLIGRAM(S): 400 CAPSULE ORAL at 22:25

## 2024-10-11 RX ADMIN — Medication 100 MILLIGRAM(S): at 11:13

## 2024-10-11 RX ADMIN — LACTULOSE 30 GRAM(S): 10 SOLUTION ORAL at 08:55

## 2024-10-11 RX ADMIN — OXYCODONE HYDROCHLORIDE 7.5 MILLIGRAM(S): 30 TABLET ORAL at 10:20

## 2024-10-11 RX ADMIN — OXYCODONE HYDROCHLORIDE 7.5 MILLIGRAM(S): 30 TABLET ORAL at 22:24

## 2024-10-11 RX ADMIN — FOLIC ACID 1 MILLIGRAM(S): 1 TABLET ORAL at 11:13

## 2024-10-11 RX ADMIN — BUMETANIDE 2 MILLIGRAM(S): 1 TABLET ORAL at 05:33

## 2024-10-11 RX ADMIN — LACTULOSE 30 GRAM(S): 10 SOLUTION ORAL at 22:25

## 2024-10-11 RX ADMIN — Medication 150 MILLIGRAM(S): at 05:34

## 2024-10-11 RX ADMIN — OXYCODONE HYDROCHLORIDE 7.5 MILLIGRAM(S): 30 TABLET ORAL at 23:24

## 2024-10-12 LAB
ANION GAP SERPL CALC-SCNC: 10 MMOL/L — SIGNIFICANT CHANGE UP (ref 5–17)
BUN SERPL-MCNC: 32 MG/DL — HIGH (ref 7–23)
CALCIUM SERPL-MCNC: 9.3 MG/DL — SIGNIFICANT CHANGE UP (ref 8.4–10.5)
CHLORIDE SERPL-SCNC: 92 MMOL/L — LOW (ref 96–108)
CO2 SERPL-SCNC: 28 MMOL/L — SIGNIFICANT CHANGE UP (ref 22–31)
CREAT SERPL-MCNC: 1.26 MG/DL — SIGNIFICANT CHANGE UP (ref 0.5–1.3)
EGFR: 71 ML/MIN/1.73M2 — SIGNIFICANT CHANGE UP
EGFR: 71 ML/MIN/1.73M2 — SIGNIFICANT CHANGE UP
GLUCOSE SERPL-MCNC: 127 MG/DL — HIGH (ref 70–99)
HCT VFR BLD CALC: 23 % — LOW (ref 39–50)
HGB BLD-MCNC: 7.5 G/DL — LOW (ref 13–17)
MAGNESIUM SERPL-MCNC: 1.6 MG/DL — SIGNIFICANT CHANGE UP (ref 1.6–2.6)
MCHC RBC-ENTMCNC: 32.3 PG — SIGNIFICANT CHANGE UP (ref 27–34)
MCHC RBC-ENTMCNC: 32.6 GM/DL — SIGNIFICANT CHANGE UP (ref 32–36)
MCV RBC AUTO: 99.1 FL — SIGNIFICANT CHANGE UP (ref 80–100)
NRBC # BLD: 0 /100 WBCS — SIGNIFICANT CHANGE UP (ref 0–0)
NRBC BLD-RTO: 0 /100 WBCS — SIGNIFICANT CHANGE UP (ref 0–0)
PHOSPHATE SERPL-MCNC: 4.3 MG/DL — SIGNIFICANT CHANGE UP (ref 2.5–4.5)
PLATELET # BLD AUTO: 123 K/UL — LOW (ref 150–400)
POTASSIUM SERPL-MCNC: 4.6 MMOL/L — SIGNIFICANT CHANGE UP (ref 3.5–5.3)
POTASSIUM SERPL-SCNC: 4.6 MMOL/L — SIGNIFICANT CHANGE UP (ref 3.5–5.3)
RBC # BLD: 2.32 M/UL — LOW (ref 4.2–5.8)
RBC # FLD: 19.2 % — HIGH (ref 10.3–14.5)
SODIUM SERPL-SCNC: 130 MMOL/L — LOW (ref 135–145)
WBC # BLD: 7.12 K/UL — SIGNIFICANT CHANGE UP (ref 3.8–10.5)
WBC # FLD AUTO: 7.12 K/UL — SIGNIFICANT CHANGE UP (ref 3.8–10.5)

## 2024-10-12 PROCEDURE — 99233 SBSQ HOSP IP/OBS HIGH 50: CPT

## 2024-10-12 RX ADMIN — GABAPENTIN 300 MILLIGRAM(S): 400 CAPSULE ORAL at 08:24

## 2024-10-12 RX ADMIN — BUMETANIDE 2 MILLIGRAM(S): 1 TABLET ORAL at 05:38

## 2024-10-12 RX ADMIN — Medication 150 MILLIGRAM(S): at 05:38

## 2024-10-12 RX ADMIN — GABAPENTIN 300 MILLIGRAM(S): 400 CAPSULE ORAL at 20:24

## 2024-10-12 RX ADMIN — CARVEDILOL 3.12 MILLIGRAM(S): 3.12 TABLET, FILM COATED ORAL at 20:24

## 2024-10-12 RX ADMIN — Medication 40 MILLIGRAM(S): at 05:38

## 2024-10-12 RX ADMIN — OXYCODONE HYDROCHLORIDE 7.5 MILLIGRAM(S): 30 TABLET ORAL at 05:38

## 2024-10-12 RX ADMIN — OXYCODONE HYDROCHLORIDE 7.5 MILLIGRAM(S): 30 TABLET ORAL at 20:30

## 2024-10-12 RX ADMIN — CARVEDILOL 3.12 MILLIGRAM(S): 3.12 TABLET, FILM COATED ORAL at 08:24

## 2024-10-12 RX ADMIN — Medication 1 TABLET(S): at 11:15

## 2024-10-12 RX ADMIN — Medication 100 MILLIGRAM(S): at 11:16

## 2024-10-12 RX ADMIN — Medication 500 MILLIGRAM(S): at 00:17

## 2024-10-12 RX ADMIN — Medication 500 MILLIGRAM(S): at 01:17

## 2024-10-12 RX ADMIN — OXYCODONE HYDROCHLORIDE 7.5 MILLIGRAM(S): 30 TABLET ORAL at 21:30

## 2024-10-12 RX ADMIN — Medication 5 MILLIGRAM(S): at 14:44

## 2024-10-12 RX ADMIN — FOLIC ACID 1 MILLIGRAM(S): 1 TABLET ORAL at 11:15

## 2024-10-12 RX ADMIN — LACTULOSE 30 GRAM(S): 10 SOLUTION ORAL at 08:24

## 2024-10-12 RX ADMIN — BUMETANIDE 2 MILLIGRAM(S): 1 TABLET ORAL at 16:28

## 2024-10-13 LAB
ALBUMIN SERPL ELPH-MCNC: 2.5 G/DL — LOW (ref 3.3–5)
ALP SERPL-CCNC: 105 U/L — SIGNIFICANT CHANGE UP (ref 40–120)
ALT FLD-CCNC: 19 U/L — SIGNIFICANT CHANGE UP (ref 10–45)
ANION GAP SERPL CALC-SCNC: 10 MMOL/L — SIGNIFICANT CHANGE UP (ref 5–17)
AST SERPL-CCNC: 104 U/L — HIGH (ref 10–40)
BILIRUB SERPL-MCNC: 9.8 MG/DL — HIGH (ref 0.2–1.2)
BUN SERPL-MCNC: 29 MG/DL — HIGH (ref 7–23)
CALCIUM SERPL-MCNC: 8.9 MG/DL — SIGNIFICANT CHANGE UP (ref 8.4–10.5)
CHLORIDE SERPL-SCNC: 90 MMOL/L — LOW (ref 96–108)
CO2 SERPL-SCNC: 24 MMOL/L — SIGNIFICANT CHANGE UP (ref 22–31)
CREAT SERPL-MCNC: 1.28 MG/DL — SIGNIFICANT CHANGE UP (ref 0.5–1.3)
EGFR: 70 ML/MIN/1.73M2 — SIGNIFICANT CHANGE UP
EGFR: 70 ML/MIN/1.73M2 — SIGNIFICANT CHANGE UP
GLUCOSE SERPL-MCNC: 149 MG/DL — HIGH (ref 70–99)
HCT VFR BLD CALC: 21.9 % — LOW (ref 39–50)
HGB BLD-MCNC: 7.1 G/DL — LOW (ref 13–17)
MCHC RBC-ENTMCNC: 32.4 GM/DL — SIGNIFICANT CHANGE UP (ref 32–36)
MCHC RBC-ENTMCNC: 32.6 PG — SIGNIFICANT CHANGE UP (ref 27–34)
MCV RBC AUTO: 100.5 FL — HIGH (ref 80–100)
NRBC # BLD: 0 /100 WBCS — SIGNIFICANT CHANGE UP (ref 0–0)
NRBC BLD-RTO: 0 /100 WBCS — SIGNIFICANT CHANGE UP (ref 0–0)
PLATELET # BLD AUTO: 126 K/UL — LOW (ref 150–400)
POTASSIUM SERPL-MCNC: 4.4 MMOL/L — SIGNIFICANT CHANGE UP (ref 3.5–5.3)
POTASSIUM SERPL-SCNC: 4.4 MMOL/L — SIGNIFICANT CHANGE UP (ref 3.5–5.3)
PROT SERPL-MCNC: 7.3 G/DL — SIGNIFICANT CHANGE UP (ref 6–8.3)
RBC # BLD: 2.18 M/UL — LOW (ref 4.2–5.8)
RBC # FLD: 19.1 % — HIGH (ref 10.3–14.5)
SODIUM SERPL-SCNC: 124 MMOL/L — LOW (ref 135–145)
WBC # BLD: 8.98 K/UL — SIGNIFICANT CHANGE UP (ref 3.8–10.5)
WBC # FLD AUTO: 8.98 K/UL — SIGNIFICANT CHANGE UP (ref 3.8–10.5)

## 2024-10-13 PROCEDURE — 99233 SBSQ HOSP IP/OBS HIGH 50: CPT

## 2024-10-13 RX ADMIN — Medication 40 MILLIGRAM(S): at 05:34

## 2024-10-13 RX ADMIN — OXYCODONE HYDROCHLORIDE 7.5 MILLIGRAM(S): 30 TABLET ORAL at 05:41

## 2024-10-13 RX ADMIN — Medication 100 MILLIGRAM(S): at 11:55

## 2024-10-13 RX ADMIN — GABAPENTIN 300 MILLIGRAM(S): 400 CAPSULE ORAL at 08:44

## 2024-10-13 RX ADMIN — OXYCODONE HYDROCHLORIDE 7.5 MILLIGRAM(S): 30 TABLET ORAL at 19:41

## 2024-10-13 RX ADMIN — Medication 500 MILLIGRAM(S): at 20:04

## 2024-10-13 RX ADMIN — Medication 500 MILLIGRAM(S): at 21:04

## 2024-10-13 RX ADMIN — OXYCODONE HYDROCHLORIDE 7.5 MILLIGRAM(S): 30 TABLET ORAL at 06:41

## 2024-10-13 RX ADMIN — Medication 1 TABLET(S): at 11:55

## 2024-10-13 RX ADMIN — FOLIC ACID 1 MILLIGRAM(S): 1 TABLET ORAL at 11:55

## 2024-10-13 RX ADMIN — CARVEDILOL 3.12 MILLIGRAM(S): 3.12 TABLET, FILM COATED ORAL at 08:44

## 2024-10-13 RX ADMIN — Medication 150 MILLIGRAM(S): at 11:57

## 2024-10-13 RX ADMIN — BUMETANIDE 2 MILLIGRAM(S): 1 TABLET ORAL at 05:34

## 2024-10-13 RX ADMIN — GABAPENTIN 300 MILLIGRAM(S): 400 CAPSULE ORAL at 20:02

## 2024-10-13 RX ADMIN — LACTULOSE 30 GRAM(S): 10 SOLUTION ORAL at 08:45

## 2024-10-13 RX ADMIN — CARVEDILOL 3.12 MILLIGRAM(S): 3.12 TABLET, FILM COATED ORAL at 20:02

## 2024-10-13 RX ADMIN — Medication 500 MILLIGRAM(S): at 00:19

## 2024-10-13 RX ADMIN — BUMETANIDE 2 MILLIGRAM(S): 1 TABLET ORAL at 13:32

## 2024-10-13 RX ADMIN — OXYCODONE HYDROCHLORIDE 7.5 MILLIGRAM(S): 30 TABLET ORAL at 18:41

## 2024-10-13 RX ADMIN — Medication 500 MILLIGRAM(S): at 01:19

## 2024-10-14 DIAGNOSIS — Z29.9 ENCOUNTER FOR PROPHYLACTIC MEASURES, UNSPECIFIED: ICD-10-CM

## 2024-10-14 LAB
ALBUMIN SERPL ELPH-MCNC: 2.4 G/DL — LOW (ref 3.3–5)
ALP SERPL-CCNC: 118 U/L — SIGNIFICANT CHANGE UP (ref 40–120)
ALT FLD-CCNC: 21 U/L — SIGNIFICANT CHANGE UP (ref 10–45)
ANION GAP SERPL CALC-SCNC: 9 MMOL/L — SIGNIFICANT CHANGE UP (ref 5–17)
AST SERPL-CCNC: 91 U/L — HIGH (ref 10–40)
BILIRUB SERPL-MCNC: 9 MG/DL — HIGH (ref 0.2–1.2)
BLD GP AB SCN SERPL QL: NEGATIVE — SIGNIFICANT CHANGE UP
BUN SERPL-MCNC: 26 MG/DL — HIGH (ref 7–23)
CALCIUM SERPL-MCNC: 8.6 MG/DL — SIGNIFICANT CHANGE UP (ref 8.4–10.5)
CHLORIDE SERPL-SCNC: 95 MMOL/L — LOW (ref 96–108)
CK SERPL-CCNC: 51 U/L — SIGNIFICANT CHANGE UP (ref 30–200)
CO2 SERPL-SCNC: 26 MMOL/L — SIGNIFICANT CHANGE UP (ref 22–31)
CREAT SERPL-MCNC: 1.15 MG/DL — SIGNIFICANT CHANGE UP (ref 0.5–1.3)
EGFR: 79 ML/MIN/1.73M2 — SIGNIFICANT CHANGE UP
EGFR: 79 ML/MIN/1.73M2 — SIGNIFICANT CHANGE UP
FERRITIN SERPL-MCNC: 887 NG/ML — HIGH (ref 30–400)
FOLATE SERPL-MCNC: >20 NG/ML — SIGNIFICANT CHANGE UP
GLUCOSE SERPL-MCNC: 119 MG/DL — HIGH (ref 70–99)
HCT VFR BLD CALC: 21.1 % — LOW (ref 39–50)
HCT VFR BLD CALC: 22.7 % — LOW (ref 39–50)
HGB BLD-MCNC: 6.8 G/DL — CRITICAL LOW (ref 13–17)
HGB BLD-MCNC: 7.4 G/DL — LOW (ref 13–17)
INR BLD: 1.87 RATIO — HIGH (ref 0.85–1.16)
IRON SATN MFR SERPL: 59 % — HIGH (ref 16–55)
IRON SATN MFR SERPL: 69 UG/DL — SIGNIFICANT CHANGE UP (ref 45–165)
MCHC RBC-ENTMCNC: 32 PG — SIGNIFICANT CHANGE UP (ref 27–34)
MCHC RBC-ENTMCNC: 32.2 GM/DL — SIGNIFICANT CHANGE UP (ref 32–36)
MCHC RBC-ENTMCNC: 32.4 PG — SIGNIFICANT CHANGE UP (ref 27–34)
MCHC RBC-ENTMCNC: 32.6 GM/DL — SIGNIFICANT CHANGE UP (ref 32–36)
MCV RBC AUTO: 100.5 FL — HIGH (ref 80–100)
MCV RBC AUTO: 98.3 FL — SIGNIFICANT CHANGE UP (ref 80–100)
NRBC # BLD: 0 /100 WBCS — SIGNIFICANT CHANGE UP (ref 0–0)
NRBC # BLD: 0 /100 WBCS — SIGNIFICANT CHANGE UP (ref 0–0)
NRBC BLD-RTO: 0 /100 WBCS — SIGNIFICANT CHANGE UP (ref 0–0)
NRBC BLD-RTO: 0 /100 WBCS — SIGNIFICANT CHANGE UP (ref 0–0)
PLATELET # BLD AUTO: 118 K/UL — LOW (ref 150–400)
PLATELET # BLD AUTO: 126 K/UL — LOW (ref 150–400)
POTASSIUM SERPL-MCNC: 4.6 MMOL/L — SIGNIFICANT CHANGE UP (ref 3.5–5.3)
POTASSIUM SERPL-SCNC: 4.6 MMOL/L — SIGNIFICANT CHANGE UP (ref 3.5–5.3)
PROT SERPL-MCNC: 7 G/DL — SIGNIFICANT CHANGE UP (ref 6–8.3)
PROTHROM AB SERPL-ACNC: 21.2 SEC — HIGH (ref 9.9–13.4)
RBC # BLD: 2.1 M/UL — LOW (ref 4.2–5.8)
RBC # BLD: 2.11 M/UL — LOW (ref 4.2–5.8)
RBC # BLD: 2.31 M/UL — LOW (ref 4.2–5.8)
RBC # FLD: 18.7 % — HIGH (ref 10.3–14.5)
RBC # FLD: 19.7 % — HIGH (ref 10.3–14.5)
RETICS #: 121.1 K/UL — SIGNIFICANT CHANGE UP (ref 25–125)
RETICS/RBC NFR: 5.7 % — HIGH (ref 0.5–2.5)
RH IG SCN BLD-IMP: POSITIVE — SIGNIFICANT CHANGE UP
SODIUM SERPL-SCNC: 130 MMOL/L — LOW (ref 135–145)
TIBC SERPL-MCNC: 116 UG/DL — LOW (ref 220–430)
UIBC SERPL-MCNC: 48 UG/DL — LOW (ref 110–370)
VIT B12 SERPL-MCNC: 1106 PG/ML — SIGNIFICANT CHANGE UP (ref 232–1245)
WBC # BLD: 6.99 K/UL — SIGNIFICANT CHANGE UP (ref 3.8–10.5)
WBC # BLD: 8.43 K/UL — SIGNIFICANT CHANGE UP (ref 3.8–10.5)
WBC # FLD AUTO: 6.99 K/UL — SIGNIFICANT CHANGE UP (ref 3.8–10.5)
WBC # FLD AUTO: 8.43 K/UL — SIGNIFICANT CHANGE UP (ref 3.8–10.5)

## 2024-10-14 PROCEDURE — 99233 SBSQ HOSP IP/OBS HIGH 50: CPT

## 2024-10-14 PROCEDURE — 74176 CT ABD & PELVIS W/O CONTRAST: CPT | Mod: 26

## 2024-10-14 PROCEDURE — 93010 ELECTROCARDIOGRAM REPORT: CPT

## 2024-10-14 PROCEDURE — 99232 SBSQ HOSP IP/OBS MODERATE 35: CPT | Mod: GC

## 2024-10-14 RX ADMIN — OXYCODONE HYDROCHLORIDE 7.5 MILLIGRAM(S): 30 TABLET ORAL at 08:31

## 2024-10-14 RX ADMIN — OXYCODONE HYDROCHLORIDE 7.5 MILLIGRAM(S): 30 TABLET ORAL at 09:31

## 2024-10-14 RX ADMIN — LACTULOSE 30 GRAM(S): 10 SOLUTION ORAL at 08:24

## 2024-10-14 RX ADMIN — GABAPENTIN 300 MILLIGRAM(S): 400 CAPSULE ORAL at 19:59

## 2024-10-14 RX ADMIN — Medication 500 MILLIGRAM(S): at 06:18

## 2024-10-14 RX ADMIN — CARVEDILOL 3.12 MILLIGRAM(S): 3.12 TABLET, FILM COATED ORAL at 08:25

## 2024-10-14 RX ADMIN — OXYCODONE HYDROCHLORIDE 7.5 MILLIGRAM(S): 30 TABLET ORAL at 20:06

## 2024-10-14 RX ADMIN — Medication 150 MILLIGRAM(S): at 08:25

## 2024-10-14 RX ADMIN — Medication 101 MILLIGRAM(S): at 17:55

## 2024-10-14 RX ADMIN — BUMETANIDE 2 MILLIGRAM(S): 1 TABLET ORAL at 14:40

## 2024-10-14 RX ADMIN — Medication 1 TABLET(S): at 12:09

## 2024-10-14 RX ADMIN — FOLIC ACID 1 MILLIGRAM(S): 1 TABLET ORAL at 12:09

## 2024-10-14 RX ADMIN — Medication 500 MILLIGRAM(S): at 05:18

## 2024-10-14 RX ADMIN — Medication 500 MILLIGRAM(S): at 18:37

## 2024-10-14 RX ADMIN — Medication 500 MILLIGRAM(S): at 19:37

## 2024-10-14 RX ADMIN — Medication 40 MILLIGRAM(S): at 05:18

## 2024-10-14 RX ADMIN — Medication 100 MILLIGRAM(S): at 12:08

## 2024-10-14 RX ADMIN — OXYCODONE HYDROCHLORIDE 7.5 MILLIGRAM(S): 30 TABLET ORAL at 21:06

## 2024-10-14 RX ADMIN — CARVEDILOL 3.12 MILLIGRAM(S): 3.12 TABLET, FILM COATED ORAL at 20:00

## 2024-10-14 RX ADMIN — BUMETANIDE 2 MILLIGRAM(S): 1 TABLET ORAL at 05:19

## 2024-10-14 RX ADMIN — GABAPENTIN 300 MILLIGRAM(S): 400 CAPSULE ORAL at 08:25

## 2024-10-15 LAB
ANION GAP SERPL CALC-SCNC: 9 MMOL/L — SIGNIFICANT CHANGE UP (ref 5–17)
BUN SERPL-MCNC: 23 MG/DL — SIGNIFICANT CHANGE UP (ref 7–23)
CALCIUM SERPL-MCNC: 9 MG/DL — SIGNIFICANT CHANGE UP (ref 8.4–10.5)
CHLORIDE SERPL-SCNC: 94 MMOL/L — LOW (ref 96–108)
CK MB CFR SERPL CALC: 1.2 NG/ML — SIGNIFICANT CHANGE UP (ref 0–6.7)
CO2 SERPL-SCNC: 26 MMOL/L — SIGNIFICANT CHANGE UP (ref 22–31)
CREAT SERPL-MCNC: 1.09 MG/DL — SIGNIFICANT CHANGE UP (ref 0.5–1.3)
EGFR: 85 ML/MIN/1.73M2 — SIGNIFICANT CHANGE UP
EGFR: 85 ML/MIN/1.73M2 — SIGNIFICANT CHANGE UP
GLUCOSE BLDC GLUCOMTR-MCNC: 181 MG/DL — HIGH (ref 70–99)
GLUCOSE SERPL-MCNC: 97 MG/DL — SIGNIFICANT CHANGE UP (ref 70–99)
HCT VFR BLD CALC: 23.2 % — LOW (ref 39–50)
HGB BLD-MCNC: 7.6 G/DL — LOW (ref 13–17)
INR BLD: 1.76 RATIO — HIGH (ref 0.85–1.16)
MCHC RBC-ENTMCNC: 31.9 PG — SIGNIFICANT CHANGE UP (ref 27–34)
MCHC RBC-ENTMCNC: 32.8 GM/DL — SIGNIFICANT CHANGE UP (ref 32–36)
MCV RBC AUTO: 97.5 FL — SIGNIFICANT CHANGE UP (ref 80–100)
NRBC # BLD: 0 /100 WBCS — SIGNIFICANT CHANGE UP (ref 0–0)
NRBC BLD-RTO: 0 /100 WBCS — SIGNIFICANT CHANGE UP (ref 0–0)
PLATELET # BLD AUTO: 131 K/UL — LOW (ref 150–400)
POTASSIUM SERPL-MCNC: 4.1 MMOL/L — SIGNIFICANT CHANGE UP (ref 3.5–5.3)
POTASSIUM SERPL-SCNC: 4.1 MMOL/L — SIGNIFICANT CHANGE UP (ref 3.5–5.3)
PROTHROM AB SERPL-ACNC: 20 SEC — HIGH (ref 9.9–13.4)
RBC # BLD: 2.38 M/UL — LOW (ref 4.2–5.8)
RBC # FLD: 19.9 % — HIGH (ref 10.3–14.5)
SODIUM SERPL-SCNC: 129 MMOL/L — LOW (ref 135–145)
TROPONIN T, HIGH SENSITIVITY RESULT: 18 NG/L — SIGNIFICANT CHANGE UP (ref 0–51)
WBC # BLD: 9.18 K/UL — SIGNIFICANT CHANGE UP (ref 3.8–10.5)
WBC # FLD AUTO: 9.18 K/UL — SIGNIFICANT CHANGE UP (ref 3.8–10.5)

## 2024-10-15 PROCEDURE — 99233 SBSQ HOSP IP/OBS HIGH 50: CPT

## 2024-10-15 RX ORDER — OXYCODONE HYDROCHLORIDE 30 MG/1
2.5 TABLET ORAL EVERY 6 HOURS
Refills: 0 | Status: DISCONTINUED | OUTPATIENT
Start: 2024-10-15 | End: 2024-10-16

## 2024-10-15 RX ORDER — OXYCODONE HYDROCHLORIDE 30 MG/1
7.5 TABLET ORAL EVERY 6 HOURS
Refills: 0 | Status: DISCONTINUED | OUTPATIENT
Start: 2024-10-15 | End: 2024-10-15

## 2024-10-15 RX ADMIN — OXYCODONE HYDROCHLORIDE 2.5 MILLIGRAM(S): 30 TABLET ORAL at 19:51

## 2024-10-15 RX ADMIN — Medication 500 MILLIGRAM(S): at 22:00

## 2024-10-15 RX ADMIN — LACTULOSE 30 GRAM(S): 10 SOLUTION ORAL at 08:48

## 2024-10-15 RX ADMIN — BUMETANIDE 2 MILLIGRAM(S): 1 TABLET ORAL at 14:14

## 2024-10-15 RX ADMIN — BUMETANIDE 2 MILLIGRAM(S): 1 TABLET ORAL at 05:37

## 2024-10-15 RX ADMIN — FOLIC ACID 1 MILLIGRAM(S): 1 TABLET ORAL at 12:16

## 2024-10-15 RX ADMIN — OXYCODONE HYDROCHLORIDE 7.5 MILLIGRAM(S): 30 TABLET ORAL at 00:53

## 2024-10-15 RX ADMIN — Medication 100 MILLIGRAM(S): at 12:16

## 2024-10-15 RX ADMIN — Medication 500 MILLIGRAM(S): at 21:22

## 2024-10-15 RX ADMIN — CARVEDILOL 3.12 MILLIGRAM(S): 3.12 TABLET, FILM COATED ORAL at 08:47

## 2024-10-15 RX ADMIN — Medication 150 MILLIGRAM(S): at 05:38

## 2024-10-15 RX ADMIN — Medication 40 MILLIGRAM(S): at 05:38

## 2024-10-15 RX ADMIN — CARVEDILOL 3.12 MILLIGRAM(S): 3.12 TABLET, FILM COATED ORAL at 19:51

## 2024-10-15 RX ADMIN — OXYCODONE HYDROCHLORIDE 2.5 MILLIGRAM(S): 30 TABLET ORAL at 20:30

## 2024-10-15 RX ADMIN — OXYCODONE HYDROCHLORIDE 7.5 MILLIGRAM(S): 30 TABLET ORAL at 01:53

## 2024-10-15 RX ADMIN — GABAPENTIN 300 MILLIGRAM(S): 400 CAPSULE ORAL at 08:47

## 2024-10-15 RX ADMIN — Medication 1 TABLET(S): at 12:16

## 2024-10-15 RX ADMIN — Medication 101 MILLIGRAM(S): at 14:14

## 2024-10-15 RX ADMIN — GABAPENTIN 300 MILLIGRAM(S): 400 CAPSULE ORAL at 19:51

## 2024-10-16 LAB
ALBUMIN SERPL ELPH-MCNC: 2.4 G/DL — LOW (ref 3.3–5)
ALP SERPL-CCNC: 109 U/L — SIGNIFICANT CHANGE UP (ref 40–120)
ALT FLD-CCNC: 18 U/L — SIGNIFICANT CHANGE UP (ref 10–45)
ANION GAP SERPL CALC-SCNC: 10 MMOL/L — SIGNIFICANT CHANGE UP (ref 5–17)
AST SERPL-CCNC: 78 U/L — HIGH (ref 10–40)
BILIRUB SERPL-MCNC: 8.8 MG/DL — HIGH (ref 0.2–1.2)
BUN SERPL-MCNC: 24 MG/DL — HIGH (ref 7–23)
CALCIUM SERPL-MCNC: 8.5 MG/DL — SIGNIFICANT CHANGE UP (ref 8.4–10.5)
CHLORIDE SERPL-SCNC: 93 MMOL/L — LOW (ref 96–108)
CO2 SERPL-SCNC: 27 MMOL/L — SIGNIFICANT CHANGE UP (ref 22–31)
CREAT SERPL-MCNC: 1.18 MG/DL — SIGNIFICANT CHANGE UP (ref 0.5–1.3)
EGFR: 77 ML/MIN/1.73M2 — SIGNIFICANT CHANGE UP
EGFR: 77 ML/MIN/1.73M2 — SIGNIFICANT CHANGE UP
GLUCOSE SERPL-MCNC: 140 MG/DL — HIGH (ref 70–99)
HCT VFR BLD CALC: 22.4 % — LOW (ref 39–50)
HGB BLD-MCNC: 7.4 G/DL — LOW (ref 13–17)
INR BLD: 1.73 RATIO — HIGH (ref 0.85–1.16)
MAGNESIUM SERPL-MCNC: 1.5 MG/DL — LOW (ref 1.6–2.6)
MCHC RBC-ENTMCNC: 32.3 PG — SIGNIFICANT CHANGE UP (ref 27–34)
MCHC RBC-ENTMCNC: 33 GM/DL — SIGNIFICANT CHANGE UP (ref 32–36)
MCV RBC AUTO: 97.8 FL — SIGNIFICANT CHANGE UP (ref 80–100)
NRBC # BLD: 0 /100 WBCS — SIGNIFICANT CHANGE UP (ref 0–0)
NRBC BLD-RTO: 0 /100 WBCS — SIGNIFICANT CHANGE UP (ref 0–0)
PHOSPHATE SERPL-MCNC: 3.6 MG/DL — SIGNIFICANT CHANGE UP (ref 2.5–4.5)
PLATELET # BLD AUTO: 117 K/UL — LOW (ref 150–400)
POTASSIUM SERPL-MCNC: 3.9 MMOL/L — SIGNIFICANT CHANGE UP (ref 3.5–5.3)
POTASSIUM SERPL-SCNC: 3.9 MMOL/L — SIGNIFICANT CHANGE UP (ref 3.5–5.3)
PROT SERPL-MCNC: 7 G/DL — SIGNIFICANT CHANGE UP (ref 6–8.3)
PROTHROM AB SERPL-ACNC: 19.8 SEC — HIGH (ref 9.9–13.4)
RBC # BLD: 2.29 M/UL — LOW (ref 4.2–5.8)
RBC # FLD: 19.9 % — HIGH (ref 10.3–14.5)
SODIUM SERPL-SCNC: 130 MMOL/L — LOW (ref 135–145)
WBC # BLD: 7.43 K/UL — SIGNIFICANT CHANGE UP (ref 3.8–10.5)
WBC # FLD AUTO: 7.43 K/UL — SIGNIFICANT CHANGE UP (ref 3.8–10.5)

## 2024-10-16 PROCEDURE — 99233 SBSQ HOSP IP/OBS HIGH 50: CPT

## 2024-10-16 RX ORDER — LACTULOSE 10 G/15ML
30 SOLUTION ORAL
Refills: 0 | Status: DISCONTINUED | OUTPATIENT
Start: 2024-10-16 | End: 2024-10-19

## 2024-10-16 RX ORDER — OXYCODONE HYDROCHLORIDE 30 MG/1
7.5 TABLET ORAL EVERY 6 HOURS
Refills: 0 | Status: DISCONTINUED | OUTPATIENT
Start: 2024-10-16 | End: 2024-10-16

## 2024-10-16 RX ORDER — OXYCODONE HYDROCHLORIDE 30 MG/1
5 TABLET ORAL EVERY 4 HOURS
Refills: 0 | Status: DISCONTINUED | OUTPATIENT
Start: 2024-10-16 | End: 2024-10-19

## 2024-10-16 RX ORDER — OXYCODONE HYDROCHLORIDE 30 MG/1
2.5 TABLET ORAL EVERY 4 HOURS
Refills: 0 | Status: DISCONTINUED | OUTPATIENT
Start: 2024-10-16 | End: 2024-10-19

## 2024-10-16 RX ORDER — MAGNESIUM SULFATE 500 MG/ML
2 SYRINGE (ML) INJECTION ONCE
Refills: 0 | Status: COMPLETED | OUTPATIENT
Start: 2024-10-16 | End: 2024-10-16

## 2024-10-16 RX ADMIN — Medication 500 MILLIGRAM(S): at 20:15

## 2024-10-16 RX ADMIN — GABAPENTIN 300 MILLIGRAM(S): 400 CAPSULE ORAL at 08:23

## 2024-10-16 RX ADMIN — BUMETANIDE 2 MILLIGRAM(S): 1 TABLET ORAL at 06:26

## 2024-10-16 RX ADMIN — Medication 500 MILLIGRAM(S): at 06:27

## 2024-10-16 RX ADMIN — OXYCODONE HYDROCHLORIDE 2.5 MILLIGRAM(S): 30 TABLET ORAL at 09:25

## 2024-10-16 RX ADMIN — Medication 40 MILLIGRAM(S): at 05:24

## 2024-10-16 RX ADMIN — Medication 500 MILLIGRAM(S): at 21:15

## 2024-10-16 RX ADMIN — Medication 100 MILLIGRAM(S): at 13:33

## 2024-10-16 RX ADMIN — GABAPENTIN 300 MILLIGRAM(S): 400 CAPSULE ORAL at 20:15

## 2024-10-16 RX ADMIN — FOLIC ACID 1 MILLIGRAM(S): 1 TABLET ORAL at 14:14

## 2024-10-16 RX ADMIN — Medication 1 TABLET(S): at 13:33

## 2024-10-16 RX ADMIN — CARVEDILOL 3.12 MILLIGRAM(S): 3.12 TABLET, FILM COATED ORAL at 08:23

## 2024-10-16 RX ADMIN — OXYCODONE HYDROCHLORIDE 2.5 MILLIGRAM(S): 30 TABLET ORAL at 08:25

## 2024-10-16 RX ADMIN — LACTULOSE 30 GRAM(S): 10 SOLUTION ORAL at 08:22

## 2024-10-16 RX ADMIN — Medication 25 GRAM(S): at 13:33

## 2024-10-16 RX ADMIN — Medication 500 MILLIGRAM(S): at 06:05

## 2024-10-16 RX ADMIN — Medication 150 MILLIGRAM(S): at 05:24

## 2024-10-16 RX ADMIN — CARVEDILOL 3.12 MILLIGRAM(S): 3.12 TABLET, FILM COATED ORAL at 20:15

## 2024-10-16 RX ADMIN — Medication 101 MILLIGRAM(S): at 11:52

## 2024-10-16 RX ADMIN — BUMETANIDE 2 MILLIGRAM(S): 1 TABLET ORAL at 13:28

## 2024-10-17 ENCOUNTER — TRANSCRIPTION ENCOUNTER (OUTPATIENT)
Age: 44
End: 2024-10-17

## 2024-10-17 DIAGNOSIS — F43.22 ADJUSTMENT DISORDER WITH ANXIETY: ICD-10-CM

## 2024-10-17 DIAGNOSIS — Z87.898 PERSONAL HISTORY OF OTHER SPECIFIED CONDITIONS: ICD-10-CM

## 2024-10-17 LAB
ALBUMIN SERPL ELPH-MCNC: 2.5 G/DL — LOW (ref 3.3–5)
ALP SERPL-CCNC: 114 U/L — SIGNIFICANT CHANGE UP (ref 40–120)
ALT FLD-CCNC: 18 U/L — SIGNIFICANT CHANGE UP (ref 10–45)
ANION GAP SERPL CALC-SCNC: 10 MMOL/L — SIGNIFICANT CHANGE UP (ref 5–17)
APTT BLD: 36.1 SEC — HIGH (ref 24.5–35.6)
AST SERPL-CCNC: 86 U/L — HIGH (ref 10–40)
BILIRUB SERPL-MCNC: 9.4 MG/DL — HIGH (ref 0.2–1.2)
BUN SERPL-MCNC: 24 MG/DL — HIGH (ref 7–23)
CALCIUM SERPL-MCNC: 8.9 MG/DL — SIGNIFICANT CHANGE UP (ref 8.4–10.5)
CHLORIDE SERPL-SCNC: 94 MMOL/L — LOW (ref 96–108)
CO2 SERPL-SCNC: 25 MMOL/L — SIGNIFICANT CHANGE UP (ref 22–31)
CREAT SERPL-MCNC: 1.15 MG/DL — SIGNIFICANT CHANGE UP (ref 0.5–1.3)
EGFR: 79 ML/MIN/1.73M2 — SIGNIFICANT CHANGE UP
EGFR: 79 ML/MIN/1.73M2 — SIGNIFICANT CHANGE UP
GLUCOSE SERPL-MCNC: 117 MG/DL — HIGH (ref 70–99)
HCT VFR BLD CALC: 23.5 % — LOW (ref 39–50)
HGB BLD-MCNC: 7.9 G/DL — LOW (ref 13–17)
INR BLD: 1.73 RATIO — HIGH (ref 0.85–1.16)
MAGNESIUM SERPL-MCNC: 1.8 MG/DL — SIGNIFICANT CHANGE UP (ref 1.6–2.6)
MCHC RBC-ENTMCNC: 33.3 PG — SIGNIFICANT CHANGE UP (ref 27–34)
MCHC RBC-ENTMCNC: 33.6 GM/DL — SIGNIFICANT CHANGE UP (ref 32–36)
MCV RBC AUTO: 99.2 FL — SIGNIFICANT CHANGE UP (ref 80–100)
NRBC # BLD: 0 /100 WBCS — SIGNIFICANT CHANGE UP (ref 0–0)
NRBC BLD-RTO: 0 /100 WBCS — SIGNIFICANT CHANGE UP (ref 0–0)
PHOSPHATE SERPL-MCNC: 2.9 MG/DL — SIGNIFICANT CHANGE UP (ref 2.5–4.5)
PLATELET # BLD AUTO: 141 K/UL — LOW (ref 150–400)
POTASSIUM SERPL-MCNC: 4.3 MMOL/L — SIGNIFICANT CHANGE UP (ref 3.5–5.3)
POTASSIUM SERPL-SCNC: 4.3 MMOL/L — SIGNIFICANT CHANGE UP (ref 3.5–5.3)
PROT SERPL-MCNC: 7.4 G/DL — SIGNIFICANT CHANGE UP (ref 6–8.3)
PROTHROM AB SERPL-ACNC: 19.8 SEC — HIGH (ref 9.9–13.4)
RBC # BLD: 2.37 M/UL — LOW (ref 4.2–5.8)
RBC # FLD: 19.5 % — HIGH (ref 10.3–14.5)
SODIUM SERPL-SCNC: 129 MMOL/L — LOW (ref 135–145)
WBC # BLD: 8.59 K/UL — SIGNIFICANT CHANGE UP (ref 3.8–10.5)
WBC # FLD AUTO: 8.59 K/UL — SIGNIFICANT CHANGE UP (ref 3.8–10.5)

## 2024-10-17 PROCEDURE — 99239 HOSP IP/OBS DSCHRG MGMT >30: CPT

## 2024-10-17 PROCEDURE — 99222 1ST HOSP IP/OBS MODERATE 55: CPT

## 2024-10-17 RX ORDER — BUMETANIDE 1 MG/1
2 TABLET ORAL
Refills: 0 | Status: DISCONTINUED | OUTPATIENT
Start: 2024-10-17 | End: 2024-10-19

## 2024-10-17 RX ADMIN — Medication 500 MILLIGRAM(S): at 20:38

## 2024-10-17 RX ADMIN — CARVEDILOL 3.12 MILLIGRAM(S): 3.12 TABLET, FILM COATED ORAL at 20:00

## 2024-10-17 RX ADMIN — Medication 150 MILLIGRAM(S): at 06:19

## 2024-10-17 RX ADMIN — GABAPENTIN 300 MILLIGRAM(S): 400 CAPSULE ORAL at 09:21

## 2024-10-17 RX ADMIN — FOLIC ACID 1 MILLIGRAM(S): 1 TABLET ORAL at 11:22

## 2024-10-17 RX ADMIN — OXYCODONE HYDROCHLORIDE 5 MILLIGRAM(S): 30 TABLET ORAL at 00:11

## 2024-10-17 RX ADMIN — OXYCODONE HYDROCHLORIDE 5 MILLIGRAM(S): 30 TABLET ORAL at 12:22

## 2024-10-17 RX ADMIN — Medication 500 MILLIGRAM(S): at 19:38

## 2024-10-17 RX ADMIN — Medication 40 MILLIGRAM(S): at 06:18

## 2024-10-17 RX ADMIN — GABAPENTIN 300 MILLIGRAM(S): 400 CAPSULE ORAL at 20:00

## 2024-10-17 RX ADMIN — OXYCODONE HYDROCHLORIDE 5 MILLIGRAM(S): 30 TABLET ORAL at 01:11

## 2024-10-17 RX ADMIN — Medication 100 MILLIGRAM(S): at 11:22

## 2024-10-17 RX ADMIN — OXYCODONE HYDROCHLORIDE 5 MILLIGRAM(S): 30 TABLET ORAL at 18:33

## 2024-10-17 RX ADMIN — OXYCODONE HYDROCHLORIDE 5 MILLIGRAM(S): 30 TABLET ORAL at 11:22

## 2024-10-17 RX ADMIN — LACTULOSE 30 GRAM(S): 10 SOLUTION ORAL at 06:18

## 2024-10-17 RX ADMIN — Medication 500 MILLIGRAM(S): at 07:26

## 2024-10-17 RX ADMIN — BUMETANIDE 2 MILLIGRAM(S): 1 TABLET ORAL at 06:18

## 2024-10-17 RX ADMIN — Medication 1 TABLET(S): at 11:22

## 2024-10-17 RX ADMIN — Medication 500 MILLIGRAM(S): at 06:26

## 2024-10-17 RX ADMIN — OXYCODONE HYDROCHLORIDE 5 MILLIGRAM(S): 30 TABLET ORAL at 17:33

## 2024-10-17 RX ADMIN — BUMETANIDE 2 MILLIGRAM(S): 1 TABLET ORAL at 16:00

## 2024-10-17 RX ADMIN — CARVEDILOL 3.12 MILLIGRAM(S): 3.12 TABLET, FILM COATED ORAL at 08:49

## 2024-10-18 ENCOUNTER — TRANSCRIPTION ENCOUNTER (OUTPATIENT)
Age: 44
End: 2024-10-18

## 2024-10-18 LAB
ALBUMIN SERPL ELPH-MCNC: 2.4 G/DL — LOW (ref 3.3–5)
ALP SERPL-CCNC: 107 U/L — SIGNIFICANT CHANGE UP (ref 40–120)
ALT FLD-CCNC: 21 U/L — SIGNIFICANT CHANGE UP (ref 10–45)
ANION GAP SERPL CALC-SCNC: 9 MMOL/L — SIGNIFICANT CHANGE UP (ref 5–17)
AST SERPL-CCNC: 75 U/L — HIGH (ref 10–40)
BILIRUB SERPL-MCNC: 8.3 MG/DL — HIGH (ref 0.2–1.2)
BUN SERPL-MCNC: 25 MG/DL — HIGH (ref 7–23)
CALCIUM SERPL-MCNC: 9 MG/DL — SIGNIFICANT CHANGE UP (ref 8.4–10.5)
CHLORIDE SERPL-SCNC: 95 MMOL/L — LOW (ref 96–108)
CO2 SERPL-SCNC: 25 MMOL/L — SIGNIFICANT CHANGE UP (ref 22–31)
CREAT SERPL-MCNC: 1.12 MG/DL — SIGNIFICANT CHANGE UP (ref 0.5–1.3)
EGFR: 82 ML/MIN/1.73M2 — SIGNIFICANT CHANGE UP
EGFR: 82 ML/MIN/1.73M2 — SIGNIFICANT CHANGE UP
GLUCOSE SERPL-MCNC: 102 MG/DL — HIGH (ref 70–99)
HCT VFR BLD CALC: 22.7 % — LOW (ref 39–50)
HGB BLD-MCNC: 7.5 G/DL — LOW (ref 13–17)
INR BLD: 1.69 RATIO — HIGH (ref 0.85–1.16)
MCHC RBC-ENTMCNC: 32.8 PG — SIGNIFICANT CHANGE UP (ref 27–34)
MCHC RBC-ENTMCNC: 33 GM/DL — SIGNIFICANT CHANGE UP (ref 32–36)
MCV RBC AUTO: 99.1 FL — SIGNIFICANT CHANGE UP (ref 80–100)
NRBC # BLD: 0 /100 WBCS — SIGNIFICANT CHANGE UP (ref 0–0)
NRBC BLD-RTO: 0 /100 WBCS — SIGNIFICANT CHANGE UP (ref 0–0)
PLATELET # BLD AUTO: 131 K/UL — LOW (ref 150–400)
POTASSIUM SERPL-MCNC: 4.6 MMOL/L — SIGNIFICANT CHANGE UP (ref 3.5–5.3)
POTASSIUM SERPL-SCNC: 4.6 MMOL/L — SIGNIFICANT CHANGE UP (ref 3.5–5.3)
PROT SERPL-MCNC: 7.4 G/DL — SIGNIFICANT CHANGE UP (ref 6–8.3)
PROTHROM AB SERPL-ACNC: 19.3 SEC — HIGH (ref 9.9–13.4)
RBC # BLD: 2.29 M/UL — LOW (ref 4.2–5.8)
RBC # FLD: 19.5 % — HIGH (ref 10.3–14.5)
SODIUM SERPL-SCNC: 129 MMOL/L — LOW (ref 135–145)
WBC # BLD: 7.89 K/UL — SIGNIFICANT CHANGE UP (ref 3.8–10.5)
WBC # FLD AUTO: 7.89 K/UL — SIGNIFICANT CHANGE UP (ref 3.8–10.5)

## 2024-10-18 PROCEDURE — 99232 SBSQ HOSP IP/OBS MODERATE 35: CPT

## 2024-10-18 RX ORDER — MIRTAZAPINE 30 MG/1
7.5 TABLET, FILM COATED ORAL AT BEDTIME
Refills: 0 | Status: DISCONTINUED | OUTPATIENT
Start: 2024-10-18 | End: 2024-10-19

## 2024-10-18 RX ORDER — MELATONIN 5 MG
3 TABLET ORAL AT BEDTIME
Refills: 0 | Status: DISCONTINUED | OUTPATIENT
Start: 2024-10-18 | End: 2024-10-19

## 2024-10-18 RX ADMIN — GABAPENTIN 300 MILLIGRAM(S): 400 CAPSULE ORAL at 09:30

## 2024-10-18 RX ADMIN — Medication 1 TABLET(S): at 11:37

## 2024-10-18 RX ADMIN — Medication 3 MILLIGRAM(S): at 21:47

## 2024-10-18 RX ADMIN — Medication 100 MILLIGRAM(S): at 11:37

## 2024-10-18 RX ADMIN — BUMETANIDE 2 MILLIGRAM(S): 1 TABLET ORAL at 13:45

## 2024-10-18 RX ADMIN — CARVEDILOL 3.12 MILLIGRAM(S): 3.12 TABLET, FILM COATED ORAL at 09:29

## 2024-10-18 RX ADMIN — GABAPENTIN 300 MILLIGRAM(S): 400 CAPSULE ORAL at 21:46

## 2024-10-18 RX ADMIN — OXYCODONE HYDROCHLORIDE 5 MILLIGRAM(S): 30 TABLET ORAL at 14:45

## 2024-10-18 RX ADMIN — FOLIC ACID 1 MILLIGRAM(S): 1 TABLET ORAL at 11:37

## 2024-10-18 RX ADMIN — LACTULOSE 30 GRAM(S): 10 SOLUTION ORAL at 05:13

## 2024-10-18 RX ADMIN — LACTULOSE 30 GRAM(S): 10 SOLUTION ORAL at 13:44

## 2024-10-18 RX ADMIN — OXYCODONE HYDROCHLORIDE 5 MILLIGRAM(S): 30 TABLET ORAL at 10:30

## 2024-10-18 RX ADMIN — MIRTAZAPINE 7.5 MILLIGRAM(S): 30 TABLET, FILM COATED ORAL at 22:16

## 2024-10-18 RX ADMIN — Medication 150 MILLIGRAM(S): at 05:13

## 2024-10-18 RX ADMIN — Medication 40 MILLIGRAM(S): at 05:13

## 2024-10-18 RX ADMIN — OXYCODONE HYDROCHLORIDE 5 MILLIGRAM(S): 30 TABLET ORAL at 13:45

## 2024-10-18 RX ADMIN — CARVEDILOL 3.12 MILLIGRAM(S): 3.12 TABLET, FILM COATED ORAL at 21:46

## 2024-10-18 RX ADMIN — OXYCODONE HYDROCHLORIDE 5 MILLIGRAM(S): 30 TABLET ORAL at 23:16

## 2024-10-18 RX ADMIN — BUMETANIDE 2 MILLIGRAM(S): 1 TABLET ORAL at 05:12

## 2024-10-18 RX ADMIN — OXYCODONE HYDROCHLORIDE 5 MILLIGRAM(S): 30 TABLET ORAL at 22:16

## 2024-10-18 RX ADMIN — OXYCODONE HYDROCHLORIDE 5 MILLIGRAM(S): 30 TABLET ORAL at 09:30

## 2024-10-19 VITALS
HEART RATE: 60 BPM | DIASTOLIC BLOOD PRESSURE: 70 MMHG | OXYGEN SATURATION: 98 % | RESPIRATION RATE: 18 BRPM | SYSTOLIC BLOOD PRESSURE: 123 MMHG | TEMPERATURE: 98 F

## 2024-10-19 PROCEDURE — 73590 X-RAY EXAM OF LOWER LEG: CPT

## 2024-10-19 PROCEDURE — 82042 OTHER SOURCE ALBUMIN QUAN EA: CPT

## 2024-10-19 PROCEDURE — 97166 OT EVAL MOD COMPLEX 45 MIN: CPT

## 2024-10-19 PROCEDURE — 83735 ASSAY OF MAGNESIUM: CPT

## 2024-10-19 PROCEDURE — 87075 CULTR BACTERIA EXCEPT BLOOD: CPT

## 2024-10-19 PROCEDURE — 82570 ASSAY OF URINE CREATININE: CPT

## 2024-10-19 PROCEDURE — 84132 ASSAY OF SERUM POTASSIUM: CPT

## 2024-10-19 PROCEDURE — 85027 COMPLETE CBC AUTOMATED: CPT

## 2024-10-19 PROCEDURE — 36415 COLL VENOUS BLD VENIPUNCTURE: CPT

## 2024-10-19 PROCEDURE — 85025 COMPLETE CBC W/AUTO DIFF WBC: CPT

## 2024-10-19 PROCEDURE — 82945 GLUCOSE OTHER FLUID: CPT

## 2024-10-19 PROCEDURE — 85730 THROMBOPLASTIN TIME PARTIAL: CPT

## 2024-10-19 PROCEDURE — 83615 LACTATE (LD) (LDH) ENZYME: CPT

## 2024-10-19 PROCEDURE — 82550 ASSAY OF CK (CPK): CPT

## 2024-10-19 PROCEDURE — 85396 CLOTTING ASSAY WHOLE BLOOD: CPT

## 2024-10-19 PROCEDURE — 99232 SBSQ HOSP IP/OBS MODERATE 35: CPT

## 2024-10-19 PROCEDURE — P9059: CPT

## 2024-10-19 PROCEDURE — 82140 ASSAY OF AMMONIA: CPT

## 2024-10-19 PROCEDURE — 83540 ASSAY OF IRON: CPT

## 2024-10-19 PROCEDURE — 82803 BLOOD GASES ANY COMBINATION: CPT

## 2024-10-19 PROCEDURE — 82565 ASSAY OF CREATININE: CPT

## 2024-10-19 PROCEDURE — 82962 GLUCOSE BLOOD TEST: CPT

## 2024-10-19 PROCEDURE — 70450 CT HEAD/BRAIN W/O DYE: CPT | Mod: MC

## 2024-10-19 PROCEDURE — 97116 GAIT TRAINING THERAPY: CPT

## 2024-10-19 PROCEDURE — 82947 ASSAY GLUCOSE BLOOD QUANT: CPT

## 2024-10-19 PROCEDURE — 82728 ASSAY OF FERRITIN: CPT

## 2024-10-19 PROCEDURE — 93970 EXTREMITY STUDY: CPT

## 2024-10-19 PROCEDURE — 76705 ECHO EXAM OF ABDOMEN: CPT

## 2024-10-19 PROCEDURE — 73562 X-RAY EXAM OF KNEE 3: CPT

## 2024-10-19 PROCEDURE — 87015 SPECIMEN INFECT AGNT CONCNTJ: CPT

## 2024-10-19 PROCEDURE — 84100 ASSAY OF PHOSPHORUS: CPT

## 2024-10-19 PROCEDURE — 84484 ASSAY OF TROPONIN QUANT: CPT

## 2024-10-19 PROCEDURE — G0480: CPT

## 2024-10-19 PROCEDURE — 82607 VITAMIN B-12: CPT

## 2024-10-19 PROCEDURE — 82553 CREATINE MB FRACTION: CPT

## 2024-10-19 PROCEDURE — 74176 CT ABD & PELVIS W/O CONTRAST: CPT | Mod: MC

## 2024-10-19 PROCEDURE — 83930 ASSAY OF BLOOD OSMOLALITY: CPT

## 2024-10-19 PROCEDURE — 82247 BILIRUBIN TOTAL: CPT

## 2024-10-19 PROCEDURE — 73706 CT ANGIO LWR EXTR W/O&W/DYE: CPT | Mod: MC

## 2024-10-19 PROCEDURE — 80048 BASIC METABOLIC PNL TOTAL CA: CPT

## 2024-10-19 PROCEDURE — 86850 RBC ANTIBODY SCREEN: CPT

## 2024-10-19 PROCEDURE — 80053 COMPREHEN METABOLIC PANEL: CPT

## 2024-10-19 PROCEDURE — 84157 ASSAY OF PROTEIN OTHER: CPT

## 2024-10-19 PROCEDURE — 85045 AUTOMATED RETICULOCYTE COUNT: CPT

## 2024-10-19 PROCEDURE — 86901 BLOOD TYPING SEROLOGIC RH(D): CPT

## 2024-10-19 PROCEDURE — 85018 HEMOGLOBIN: CPT

## 2024-10-19 PROCEDURE — 87102 FUNGUS ISOLATION CULTURE: CPT

## 2024-10-19 PROCEDURE — 97535 SELF CARE MNGMENT TRAINING: CPT

## 2024-10-19 PROCEDURE — 85610 PROTHROMBIN TIME: CPT

## 2024-10-19 PROCEDURE — 82248 BILIRUBIN DIRECT: CPT

## 2024-10-19 PROCEDURE — 82746 ASSAY OF FOLIC ACID SERUM: CPT

## 2024-10-19 PROCEDURE — 89051 BODY FLUID CELL COUNT: CPT

## 2024-10-19 PROCEDURE — 97112 NEUROMUSCULAR REEDUCATION: CPT

## 2024-10-19 PROCEDURE — 36430 TRANSFUSION BLD/BLD COMPNT: CPT

## 2024-10-19 PROCEDURE — 82330 ASSAY OF CALCIUM: CPT

## 2024-10-19 PROCEDURE — 85014 HEMATOCRIT: CPT

## 2024-10-19 PROCEDURE — 72125 CT NECK SPINE W/O DYE: CPT | Mod: MC

## 2024-10-19 PROCEDURE — 87205 SMEAR GRAM STAIN: CPT

## 2024-10-19 PROCEDURE — P9016: CPT

## 2024-10-19 PROCEDURE — 86900 BLOOD TYPING SEROLOGIC ABO: CPT

## 2024-10-19 PROCEDURE — 83935 ASSAY OF URINE OSMOLALITY: CPT

## 2024-10-19 PROCEDURE — 83880 ASSAY OF NATRIURETIC PEPTIDE: CPT

## 2024-10-19 PROCEDURE — 80307 DRUG TEST PRSMV CHEM ANLYZR: CPT

## 2024-10-19 PROCEDURE — 73610 X-RAY EXAM OF ANKLE: CPT

## 2024-10-19 PROCEDURE — 83605 ASSAY OF LACTIC ACID: CPT

## 2024-10-19 PROCEDURE — 99285 EMERGENCY DEPT VISIT HI MDM: CPT

## 2024-10-19 PROCEDURE — 80076 HEPATIC FUNCTION PANEL: CPT

## 2024-10-19 PROCEDURE — 97110 THERAPEUTIC EXERCISES: CPT

## 2024-10-19 PROCEDURE — 83550 IRON BINDING TEST: CPT

## 2024-10-19 PROCEDURE — 84295 ASSAY OF SERUM SODIUM: CPT

## 2024-10-19 PROCEDURE — 97530 THERAPEUTIC ACTIVITIES: CPT

## 2024-10-19 PROCEDURE — 82435 ASSAY OF BLOOD CHLORIDE: CPT

## 2024-10-19 PROCEDURE — 93005 ELECTROCARDIOGRAM TRACING: CPT

## 2024-10-19 PROCEDURE — 87070 CULTURE OTHR SPECIMN AEROBIC: CPT

## 2024-10-19 PROCEDURE — 84300 ASSAY OF URINE SODIUM: CPT

## 2024-10-19 PROCEDURE — P9047: CPT

## 2024-10-19 PROCEDURE — 84156 ASSAY OF PROTEIN URINE: CPT

## 2024-10-19 PROCEDURE — 86922 COMPATIBILITY TEST ANTIGLOB: CPT

## 2024-10-19 RX ORDER — OXYCODONE HYDROCHLORIDE 30 MG/1
1 TABLET ORAL
Qty: 28 | Refills: 0
Start: 2024-10-19 | End: 2024-10-25

## 2024-10-19 RX ORDER — MIRTAZAPINE 30 MG/1
1 TABLET, FILM COATED ORAL
Qty: 30 | Refills: 0
Start: 2024-10-19 | End: 2024-11-17

## 2024-10-19 RX ADMIN — BUMETANIDE 2 MILLIGRAM(S): 1 TABLET ORAL at 14:38

## 2024-10-19 RX ADMIN — Medication 100 MILLIGRAM(S): at 14:34

## 2024-10-19 RX ADMIN — CARVEDILOL 3.12 MILLIGRAM(S): 3.12 TABLET, FILM COATED ORAL at 09:06

## 2024-10-19 RX ADMIN — OXYCODONE HYDROCHLORIDE 5 MILLIGRAM(S): 30 TABLET ORAL at 09:05

## 2024-10-19 RX ADMIN — OXYCODONE HYDROCHLORIDE 5 MILLIGRAM(S): 30 TABLET ORAL at 10:05

## 2024-10-19 RX ADMIN — Medication 1 TABLET(S): at 14:34

## 2024-10-19 RX ADMIN — LACTULOSE 30 GRAM(S): 10 SOLUTION ORAL at 05:13

## 2024-10-19 RX ADMIN — LACTULOSE 30 GRAM(S): 10 SOLUTION ORAL at 14:39

## 2024-10-19 RX ADMIN — Medication 40 MILLIGRAM(S): at 05:14

## 2024-10-19 RX ADMIN — GABAPENTIN 300 MILLIGRAM(S): 400 CAPSULE ORAL at 09:05

## 2024-10-19 RX ADMIN — OXYCODONE HYDROCHLORIDE 5 MILLIGRAM(S): 30 TABLET ORAL at 18:29

## 2024-10-19 RX ADMIN — BUMETANIDE 2 MILLIGRAM(S): 1 TABLET ORAL at 05:14

## 2024-10-19 RX ADMIN — FOLIC ACID 1 MILLIGRAM(S): 1 TABLET ORAL at 14:34

## 2024-10-19 RX ADMIN — Medication 150 MILLIGRAM(S): at 05:14

## 2024-10-21 DIAGNOSIS — K70.30 ALCOHOLIC CIRRHOSIS OF LIVER W/OUT ASCITES: ICD-10-CM

## 2024-11-02 LAB
CULTURE RESULTS: SIGNIFICANT CHANGE UP
SPECIMEN SOURCE: SIGNIFICANT CHANGE UP

## 2024-11-05 ENCOUNTER — APPOINTMENT (OUTPATIENT)
Dept: HEPATOLOGY | Facility: CLINIC | Age: 44
End: 2024-11-05

## 2024-11-16 NOTE — ED ADULT NURSE REASSESSMENT NOTE - NURSING NEURO ORIENTATION
Nephrology follow-up          Patient: Saranya Hightower MRN: [id number]  SSN: [id number]    YOB: 1953  Age: 71 y.o.  Sex: female      Subjective:   The patient is seen at the bedside.  She feels better.  On RA now  Elevated Bps  Worsening MAKENZIE  On p.o. Bumex 1 mg twice a day (will hold).  Past Medical History:   Diagnosis Date    AAA (abdominal aortic aneurysm) ([facility])     S/P STENTING    Abnormal weight loss 09/11/2021    Anemia     Atherosclerosis of native coronary artery without angina pectoris 09/11/2021    Bronchitis     CAD (coronary artery disease)     MI and hx of stents Lcx and RCA 8/2017    Carotid artery stenosis 09/11/2021    Cervicalgia 09/11/2021    Chronic bronchitis ([facility])     CKD (chronic kidney disease) stage 4, GFR 15-29 ml/min ([facility])     Colon polyps     Constipation 09/11/2021    COPD (chronic obstructive pulmonary disease) ([facility])     COPD (chronic obstructive pulmonary disease) ([facility])     HTN (hypertension) 09/11/2021    Hyperlipidemia     Menopause     Personal history of colonic polyps 09/11/2021    PVD (peripheral vascular disease) ([facility]) 2019    s/p left to right fem-fem bypass    Vitamin D deficiency 09/11/2021     Past Surgical History:   Procedure Laterality Date    CARDIAC CATHETERIZATION      X 3    CARDIAC DEFIBRILLATOR PLACEMENT  09/16/2013    COLONOSCOPY N/A 10/5/2021    COLONOSCOPY ( T I V A) performed by Talib Ortega MD at [facility] ENDOSCOPY    COLONOSCOPY  2015    ORTHOPEDIC SURGERY      R leg    OTHER SURGICAL HISTORY      BILAT LOWER EXTREM VASC BYPASS SURGERY    OTHER SURGICAL HISTORY      R CAROTID ENDORECTOMY 11-4-05,   L CAROTID ENDARECTOMY 11-6-2006    PACEMAKER      6 0r 7 years ago      Family History   Problem Relation Age of Onset    No Known Problems Father     Cancer Mother     Hypertension Mother      Social History     Tobacco Use    Smoking status: Every Day     Current packs/day: 0.50     Types: Cigarettes    Smokeless tobacco: Never   Substance  admission creatinine was 2.85.  Worsening MAKENZIE and BUN/creatinine 50/3.57   Clinically volume status is better and on room air..  I will hold bumex.    Hypokalemia  In the setting of on loop diuretics.  Potassium 3.1->3.8.  Received Oral p.o. KCl replacement.    Hypertension  Blood pressures are elevated.  On hydralazine 25 mg 3 times daily/Isordil 20 mg 3 times daily/metoprolol  mg daily.  increased hydralazine 50 mg 3 times daily.  Adding amlodipine 10 mg daily.    Acute CHF  Due to LV systolic dysfunction  LVEF 30 to 35%  Status post AICD placement  History of ischemic cardiomyopathy.  Presented with SOB/chest x-ray pulmonary edema.  Clinically responded to IV diuresis.  From nasal cannula 2 L down to room air.  Will hold bumex.    Acute hypoxic respiratory.  Due to fluid overload/pulmonary edema.  Initially required BiPAP support and currently on nasal cannula 2 L.  Iv to po diuresis.      DVT prophylaxis  Agreed with unfractioned subcu heparin.              Plan:       Signed By: Elian Duncan MD     November 16, 2024        oriented to person, place and time

## 2024-12-25 PROBLEM — F10.90 ALCOHOL USE: Status: ACTIVE | Noted: 2021-10-22

## 2025-01-16 ENCOUNTER — INPATIENT (INPATIENT)
Facility: HOSPITAL | Age: 45
LOS: 53 days | Discharge: HOME HEALTH | DRG: 5 | End: 2025-03-11
Attending: TRANSPLANT SURGERY | Admitting: TRANSPLANT SURGERY
Payer: MEDICAID

## 2025-01-16 VITALS
TEMPERATURE: 98 F | WEIGHT: 242.95 LBS | HEIGHT: 71 IN | HEART RATE: 78 BPM | OXYGEN SATURATION: 100 % | RESPIRATION RATE: 25 BRPM | SYSTOLIC BLOOD PRESSURE: 112 MMHG | DIASTOLIC BLOOD PRESSURE: 53 MMHG

## 2025-01-16 DIAGNOSIS — K72.90 HEPATIC FAILURE, UNSPECIFIED WITHOUT COMA: ICD-10-CM

## 2025-01-16 LAB
ADD ON TEST-SPECIMEN IN LAB: SIGNIFICANT CHANGE UP
ALBUMIN SERPL ELPH-MCNC: 2.8 G/DL — LOW (ref 3.3–5)
ALP SERPL-CCNC: 148 U/L — HIGH (ref 40–120)
ALT FLD-CCNC: 9 U/L — LOW (ref 10–45)
ANION GAP SERPL CALC-SCNC: 12 MMOL/L — SIGNIFICANT CHANGE UP (ref 5–17)
APTT BLD: 36.8 SEC — HIGH (ref 24.5–35.6)
AST SERPL-CCNC: 35 U/L — SIGNIFICANT CHANGE UP (ref 10–40)
BILIRUB SERPL-MCNC: 5.5 MG/DL — HIGH (ref 0.2–1.2)
BLD GP AB SCN SERPL QL: NEGATIVE — SIGNIFICANT CHANGE UP
BUN SERPL-MCNC: 39 MG/DL — HIGH (ref 7–23)
CALCIUM SERPL-MCNC: 8.6 MG/DL — SIGNIFICANT CHANGE UP (ref 8.4–10.5)
CHLORIDE SERPL-SCNC: 96 MMOL/L — SIGNIFICANT CHANGE UP (ref 96–108)
CO2 SERPL-SCNC: 22 MMOL/L — SIGNIFICANT CHANGE UP (ref 22–31)
CREAT SERPL-MCNC: 2.59 MG/DL — HIGH (ref 0.5–1.3)
EGFR: 30 ML/MIN/1.73M2 — LOW
EGFR: 30 ML/MIN/1.73M2 — LOW
GAS PNL BLDV: SIGNIFICANT CHANGE UP
GLUCOSE SERPL-MCNC: 117 MG/DL — HIGH (ref 70–99)
HCT VFR BLD CALC: 25.6 % — LOW (ref 39–50)
HCV AB S/CO SERPL IA: 0.06 S/CO — SIGNIFICANT CHANGE UP
HCV AB SERPL-IMP: SIGNIFICANT CHANGE UP
HGB BLD-MCNC: 8.5 G/DL — LOW (ref 13–17)
INR BLD: 1.71 RATIO — HIGH (ref 0.85–1.16)
MAGNESIUM SERPL-MCNC: 2.2 MG/DL — SIGNIFICANT CHANGE UP (ref 1.6–2.6)
MCHC RBC-ENTMCNC: 30.1 PG — SIGNIFICANT CHANGE UP (ref 27–34)
MCHC RBC-ENTMCNC: 33.2 G/DL — SIGNIFICANT CHANGE UP (ref 32–36)
MCV RBC AUTO: 90.8 FL — SIGNIFICANT CHANGE UP (ref 80–100)
NRBC # BLD: 0 /100 WBCS — SIGNIFICANT CHANGE UP (ref 0–0)
NRBC BLD-RTO: 0 /100 WBCS — SIGNIFICANT CHANGE UP (ref 0–0)
PHOSPHATE SERPL-MCNC: 3.5 MG/DL — SIGNIFICANT CHANGE UP (ref 2.5–4.5)
PLATELET # BLD AUTO: 64 K/UL — LOW (ref 150–400)
POTASSIUM SERPL-MCNC: 4.3 MMOL/L — SIGNIFICANT CHANGE UP (ref 3.5–5.3)
POTASSIUM SERPL-SCNC: 4.3 MMOL/L — SIGNIFICANT CHANGE UP (ref 3.5–5.3)
PROCALCITONIN SERPL-MCNC: 1.23 NG/ML — HIGH (ref 0.02–0.1)
PROT SERPL-MCNC: 7.1 G/DL — SIGNIFICANT CHANGE UP (ref 6–8.3)
PROTHROM AB SERPL-ACNC: 19.4 SEC — HIGH (ref 9.9–13.4)
RAPIDTEG MAXIMUM AMPLITUDE: 52.1 MM — SIGNIFICANT CHANGE UP (ref 52–70)
RBC # BLD: 2.82 M/UL — LOW (ref 4.2–5.8)
RBC # FLD: 20.9 % — HIGH (ref 10.3–14.5)
RH IG SCN BLD-IMP: POSITIVE — SIGNIFICANT CHANGE UP
SODIUM SERPL-SCNC: 130 MMOL/L — LOW (ref 135–145)
TEG FUNCTIONAL FIBRINOGEN: 18.1 MM — SIGNIFICANT CHANGE UP (ref 15–32)
TEG LY30 (LYSIS): 1.7 % — SIGNIFICANT CHANGE UP (ref 0–2.6)
TEG REACTION TIME: 6.7 MIN — SIGNIFICANT CHANGE UP (ref 4.6–9.1)
WBC # BLD: 14.53 K/UL — HIGH (ref 3.8–10.5)
WBC # FLD AUTO: 14.53 K/UL — HIGH (ref 3.8–10.5)

## 2025-01-16 PROCEDURE — 99223 1ST HOSP IP/OBS HIGH 75: CPT

## 2025-01-16 PROCEDURE — 71045 X-RAY EXAM CHEST 1 VIEW: CPT | Mod: 26,77

## 2025-01-16 PROCEDURE — 99233 SBSQ HOSP IP/OBS HIGH 50: CPT | Mod: GC

## 2025-01-16 PROCEDURE — 71045 X-RAY EXAM CHEST 1 VIEW: CPT | Mod: 26

## 2025-01-16 RX ORDER — B1/B2/B3/B5/B6/B12/VIT C/FOLIC 500-0.5 MG
1 TABLET ORAL DAILY
Refills: 0 | Status: DISCONTINUED | OUTPATIENT
Start: 2025-01-16 | End: 2025-01-28

## 2025-01-16 RX ORDER — PIPERACILLIN-TAZO-DEXTROSE,ISO 3.375G/5
3.38 IV SOLUTION, PIGGYBACK PREMIX FROZEN(ML) INTRAVENOUS EVERY 12 HOURS
Refills: 0 | Status: DISCONTINUED | OUTPATIENT
Start: 2025-01-17 | End: 2025-01-22

## 2025-01-16 RX ORDER — LACTULOSE 10 G/15ML
20 SOLUTION ORAL EVERY 12 HOURS
Refills: 0 | Status: DISCONTINUED | OUTPATIENT
Start: 2025-01-16 | End: 2025-01-28

## 2025-01-16 RX ORDER — FOLIC ACID 1 MG/1
1 TABLET ORAL DAILY
Refills: 0 | Status: DISCONTINUED | OUTPATIENT
Start: 2025-01-16 | End: 2025-01-28

## 2025-01-16 RX ORDER — PIPERACILLIN-TAZO-DEXTROSE,ISO 3.375G/5
3.38 IV SOLUTION, PIGGYBACK PREMIX FROZEN(ML) INTRAVENOUS ONCE
Refills: 0 | Status: COMPLETED | OUTPATIENT
Start: 2025-01-16 | End: 2025-01-16

## 2025-01-16 RX ORDER — ALBUTEROL SULFATE 2.5 MG/3ML
2 VIAL, NEBULIZER (ML) INHALATION EVERY 6 HOURS
Refills: 0 | Status: DISCONTINUED | OUTPATIENT
Start: 2025-01-16 | End: 2025-01-28

## 2025-01-16 RX ORDER — MIDODRINE HYDROCHLORIDE 5 MG/1
10 TABLET ORAL EVERY 8 HOURS
Refills: 0 | Status: DISCONTINUED | OUTPATIENT
Start: 2025-01-16 | End: 2025-01-28

## 2025-01-16 RX ADMIN — FOLIC ACID 1 MILLIGRAM(S): 1 TABLET ORAL at 12:19

## 2025-01-16 RX ADMIN — Medication 200 GRAM(S): at 08:27

## 2025-01-16 RX ADMIN — Medication 25 GRAM(S): at 17:42

## 2025-01-16 RX ADMIN — Medication 2 PUFF(S): at 22:02

## 2025-01-16 RX ADMIN — Medication 1 TABLET(S): at 12:19

## 2025-01-16 RX ADMIN — MIDODRINE HYDROCHLORIDE 10 MILLIGRAM(S): 5 TABLET ORAL at 21:00

## 2025-01-16 RX ADMIN — MIDODRINE HYDROCHLORIDE 10 MILLIGRAM(S): 5 TABLET ORAL at 12:19

## 2025-01-16 RX ADMIN — LACTULOSE 20 GRAM(S): 10 SOLUTION ORAL at 17:42

## 2025-01-16 RX ADMIN — Medication 100 MILLIGRAM(S): at 12:19

## 2025-01-16 RX ADMIN — Medication 1 APPLICATION(S): at 17:43

## 2025-01-16 NOTE — CONSULT NOTE ADULT - NS ATTEND AMEND GEN_ALL_CORE FT
Pt known to me from prev admission  H/o decompensated alc cirrhosis with ascites, s/p multiple paracentesis, recently admitted to [facility] with abd pain found to have worsening of HRS, failed medical management s/p PermCath placed by vascular surgery and started on HD. Meanwhile pt transferred here for uncontrolled bleed from catheter site bleed.  Awake and alert on Rifaximin and Lactulose  Saturating well, CXR small rt effusion.  Hemodynamically stable on Midodrine  PO, LFT improving  HD schedule  Empiric abx Zosyn  BS controlled  Hb and thrombocytopenia stable, [facility]    Bleeding from PermCath site stopped, was on pressure dressing

## 2025-01-16 NOTE — H&P ADULT - HISTORY OF PRESENT ILLNESS
45 yo M with PMH decompensated ETOH cirrhosis c/b recurrent ascites, grade II EV, and HE, right calf hematoma 10/2024 (s/p fall)  presented to [facility] for abdominal pain and distension. Patient found to have ascites on exam and MANUELITO requiring HD initation. PermCath was placed by vascular surgery on 1/10 with post procedure course c/b bleeding from insertion site. Transferred to [facility] for further management.     ETOH use:   -daily drinking since 2015,  up to 1L whiskey every two days.  -dx liver disease approximately 3-4 years ago  attempted alcohol rehab program in the past, but continued to drink

## 2025-01-16 NOTE — CONSULT NOTE ADULT - ASSESSMENT
ASSESSMENT:    45 y/o male w/ a PMHx of AUD c/b cirrhosis c/b ascites requiring frequent LVPs, hepatic encephalopathy, & grade II EVs initially presenting to [facility] w/ MANUELITO w/ concern for HRS requiring hemodialysis s/p Permacath placement c/b bleeding from insertion site    Neuro:  - Lactulose and rifaxmin for h/o hepatic encephalopathy  - Thiamine, folic acid, & multivitamin for h/o AUD    Resp:  - Out of bed to chair, ambulate as tolerated, and incentive spirometry to prevent atelectasis    CV:  - Midodrine for cirrhosis-related hypotension    GI:   - Regular diet as tolerated  - Lactulose and rifaxmin for h/o hepatic encephalopathy  - Holding home nadolol for h/o grade II EVs for now (non-formulary medication)  - Protonix for stress ulcer prophylaxis    Renal:  - Will consult nephrology  - Hemodialysis as per nephrology    Heme:  - Monitor CBC and coags, will also send TEG  - Holding chemical VTE prophylaxis given recent bleeding from Permacath insertion site, Venodynes for mechanical VTE prophylaxis    ID:   - Will continue empiric Zosyn that was started at [facility] on 1/12, management as per transplant surgery  - No positive cultures at [facility], will repeat blood cultures here    Endo: no acute issues    Other: bleeding right chest wall Permacath  - Bleeding has stopped for now but will consult IR vs. vascular surgery if bleeding reoccurs    Code Status: Full code    Disposition: Will admit to [facility] PA-C     [id number] ASSESSMENT:    45 y/o male w/ a PMHx of AUD c/b cirrhosis c/b ascites requiring frequent LVPs, hepatic encephalopathy, & grade II EVs initially presenting to [facility] w/ MANUELITO w/ concern for HRS requiring hemodialysis s/p Permacath placement c/b bleeding from insertion site    Neuro:  - Lactulose and rifaxmin for h/o hepatic encephalopathy  - Thiamine, folic acid, & multivitamin for h/o AUD    Resp:  - Out of bed to chair, ambulate as tolerated, and incentive spirometry to prevent atelectasis    CV:  - Midodrine for cirrhosis-related hypotension    GI:   - Regular diet as tolerated  - Lactulose and rifaxmin for h/o hepatic encephalopathy  - Holding home nadolol for h/o grade II EVs for now (non-formulary medication)  - Protonix for stress ulcer prophylaxis  - Will likely need another therapeutic LVP soon as abdomen is very distended    Renal:  - Will consult nephrology  - Hemodialysis as per nephrology    Heme:  - Monitor CBC and coags, will also send TEG  - Holding chemical VTE prophylaxis given recent bleeding from Permacath insertion site, Venodynes for mechanical VTE prophylaxis    ID:   - Will continue empiric Zosyn that was started at [facility] on 1/12, management as per transplant surgery  - No positive cultures at [facility], will repeat blood cultures here    Endo: no acute issues    Other: bleeding right chest wall Permacath  - Bleeding has stopped for now but will consult IR vs. vascular surgery if bleeding reoccurs    Code Status: Full code    [facility]     [id number]

## 2025-01-16 NOTE — H&P ADULT - NSHPPHYSICALEXAM_GEN_ALL_CORE
GENERAL:  No acute distress  HEENT:  Normocephalic/atraumatic, icteric   CHEST:  No accessory muscle use, crackles on the right  HEART:  Regular rate and rhythm  ABDOMEN:  Soft, mild diffuse tenderness, distended w/ fluid wave, normoactive bowel sounds, splenomegaly, stigmata of chronic liver dz  EXTREMITIES: No cyanosis, clubbing, or edema  SKIN:  No rash  NEURO:  Alert and oriented x 3, no asterixis

## 2025-01-16 NOTE — CONSULT NOTE ADULT - SUBJECTIVE AND OBJECTIVE BOX
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 Patient found to have ascites on exam and found to have MANUELITO with no improvement with diuretics and requiring HD initation. PermCath was placed by vascular surgery on 1/10 with post procedure course c/b bleeding from insertion site. Patient given 1 unit pRBC yesterday. Pt treated with empiric zosyn at [facility] and cultures there negative thus far. He was transferred to [facility] SICU for further management. He denies any f/c, n/v/d, dyspnea, or chest pain.     Patient reports daily drinking since 2015 after his father passed, up to 1L whiskey every two days. He states that he was diagnosed with liver disease approximately 3-4 years ago and received all previous care at [facility]. He is consistent with AA and completed previous detox programs in 2023 but has not attended any rehab programs in the past year and continues to drink regularly. He lives with family who are supportive including his mother. 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 He denies any family history of liver disease. He was previously admitted here 7/2024 for alcohol associated hepatitis with positive response to steroids and admitted 10/2024 for R calf hematoma and anemia. EGD 7/2024 for melena showed grade II EV, small gastric ulcer (neg HP), and portal gastropathy.     On arrival to SICU, T 98.2, bp 112/53, HR 78, RR 25, SPO2 100%. Labs notable for wbc 14/53, Hgb 8.5, plt 64 (from 131 10/2024), INR 1.71, Na 130, Cr 2.59 (Baseline 1.12), albumin 2.8, Tbili 5.5, , AST 35, and ALT 9. CT c/a/p without contrast notable for large complex right pleural effusion. Pt stable in no distress.     Allergies:  Salicylic Acid (Unknown)  sulfa drugs (Unknown)      Home Medications:  folic acid: 1 tab(s) orally once a day (25 Jul 2024 13:14)  gabapentin 300 mg oral tablet: 1 tab(s) orally 2 times a day (13 Jul 2024 00:12)  lactulose 10 g/15 mL oral syrup: 45 milliliter(s) orally 2 times a day (13 Jul 2024 00:12)  pantoprazole 40 mg oral delayed release tablet: 1 tab(s) orally once a day (13 Jul 2024 00:11)    Hospital Medications:  chlorhexidine 2% Cloths 1 Application(s) Topical <User Schedule>  folic acid 1 milliGRAM(s) Oral daily  lactulose Syrup 20 Gram(s) Oral every 12 hours  midodrine 10 milliGRAM(s) Oral every 8 hours  Nephro-shania 1 Tablet(s) Oral daily  pantoprazole    Tablet 40 milliGRAM(s) Oral before breakfast  piperacillin/tazobactam IVPB.- 3.375 Gram(s) IV Intermittent once  rifAXIMin 550 milliGRAM(s) Oral every 12 hours  thiamine 100 milliGRAM(s) Oral daily      PMHX/PSHX:  Sleep apnea, obstructive    Alcohol abuse    Cirrhosis of liver    Portal hypertension    H/O esophageal varices    Anemia    Mild alcohol use disorder    No significant past surgical history    No significant past surgical history        Family history:  No pertinent family history in first degree relatives    Family history of CABG (Mother)    FHx: multiple myeloma (Father)    FHx: diabetes mellitus (Father)        Denies family history of colon cancer/polyps, stomach cancer/polyps, pancreatic cancer/masses, liver cancer/disease, ovarian cancer and endometrial cancer.    Social History:     Tob: Denies  EtOH: As above  Illicit Drugs: Denies    ROS:   General:  No wt loss, fevers, chills, night sweats, fatigue  Eyes:  Good vision, no reported pain  ENT:  No sore throat, pain, runny nose, dysphagia  CV:  No pain, palpitations, hypo/hypertension  Pulm:  No dyspnea, cough, tachypnea, wheezing  GI:  As per HPI  :  No pain, bleeding, incontinence, nocturia  Muscle:  No pain, weakness  Neuro:  No weakness, tingling, memory problems  Psych:  No fatigue, insomnia, mood problems, depression  Endocrine:  No polyuria, polydipsia, cold/heat intolerance  Heme:  No petechiae, ecchymosis, easy bruisability  Skin:  No rash, tattoos, scars, edema    PHYSICAL EXAM:   GENERAL:  No acute distress  HEENT:  Normocephalic/atraumatic, +sceral icterus  CHEST:  No accessory muscle use, crackles on the right  HEART:  Regular rate and rhythm  ABDOMEN:  Soft, mild diffuse tenderness, distended w/ fluid wave, normoactive bowel sounds, splenomegaly, stigmata of chronic liver dz  EXTREMITIES: No cyanosis, clubbing, or edema  SKIN:  No rash  NEURO:  Alert and oriented x 3, no asterixis    Vital Signs:  Vital Signs Last 24 Hrs  T(C): 36.8 (16 Jan 2025 11:00), Max: 36.8 (16 Jan 2025 07:00)  T(F): 98.2 (16 Jan 2025 11:00), Max: 98.2 (16 Jan 2025 07:00)  HR: 75 (16 Jan 2025 14:00) (65 - 80)  BP: 123/58 (16 Jan 2025 14:00) (100/53 - 123/58)  BP(mean): 84 (16 Jan 2025 14:00) (74 - 84)  RR: 29 (16 Jan 2025 14:00) (18 - 30)  SpO2: 97% (16 Jan 2025 14:00) (95% - 100%)    Parameters below as of 16 Jan 2025 11:00  Patient On (Oxygen Delivery Method): room air      Daily Height in cm: 180.34 (16 Jan 2025 07:00)    Daily     LABS:                        8.5    14.53 )-----------( 64       ( 16 Jan 2025 07:39 )             25.6     Mean Cell Volume: 90.8 fl (01-16-25 @ 07:39)    01-16    130[L]  |  96  |  39[H]  ----------------------------<  117[H]  4.3   |  22  |  2.59[H]    Ca    8.6      16 Jan 2025 07:39  Phos  3.5     01-16  Mg     2.2     01-16    TPro  7.1  /  Alb  2.8[L]  /  TBili  5.5[H]  /  DBili  x   /  AST  35  /  ALT  9[L]  /  AlkPhos  148[H]  01-16    LIVER FUNCTIONS - ( 16 Jan 2025 07:39 )  Alb: 2.8 g/dL / Pro: 7.1 g/dL / ALK PHOS: 148 U/L / ALT: 9 U/L / AST: 35 U/L / GGT: x           PT/INR - ( 16 Jan 2025 07:39 )   PT: 19.4 sec;   INR: 1.71 ratio         PTT - ( 16 Jan 2025 07:39 )  PTT:36.8 sec  Urinalysis Basic - ( 16 Jan 2025 07:39 )    Color: x / Appearance: x / SG: x / pH: x  Gluc: 117 mg/dL / Ketone: x  / Bili: x / Urobili: x   Blood: x / Protein: x / Nitrite: x   Leuk Esterase: x / RBC: x / WBC x   Sq Epi: x / Non Sq Epi: x / Bacteria: x                              8.5    14.53 )-----------( 64       ( 16 Jan 2025 07:39 )             25.6              Chief Complaint: abdominal pain    HPI:  Zulema Rey is a 45 yo M with PMH of alcohol associated cirrhosis c/b recurrent ascites, grade II EV, and HE, 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 for abdominal pain and distension. Patient found to have ascites on exam and found to have MANUELITO with no improvement with diuretics and requiring HD initation. PermCath was placed by vascular surgery on 1/10 with post procedure course c/b bleeding from insertion site. Patient given 1 unit pRBC yesterday. Pt treated with empiric zosyn at [facility] and cultures there negative thus far. He was transferred to [facility] SICU for further management. He denies any f/c, n/v/d, dyspnea, or chest pain.     Patient reports daily drinking since 2015 after his father passed, up to 1L whiskey every two days. He states that he was diagnosed with liver disease approximately 3-4 years ago and received all previous care at [facility]. He is consistent with AA and completed previous detox programs in 2023 but has not attended any rehab programs in the past year and continues to drink regularly. He lives with family who are supportive including his mother. 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 He denies any family history of liver disease. He was previously admitted here 7/2024 for alcohol associated hepatitis with positive response to steroids and admitted 10/2024 for R calf hematoma and anemia. EGD 7/2024 for melena showed grade II EV, small gastric ulcer (neg HP), and portal gastropathy.     On arrival to SICU, T 98.2, bp 112/53, HR 78, RR 25, SPO2 100%. Labs notable for wbc 14/53, Hgb 8.5, plt 64 (from 131 10/2024), INR 1.71, Na 130, Cr 2.59 (Baseline 1.12), albumin 2.8, Tbili 5.5, , AST 35, and ALT 9. CT c/a/p without contrast notable for moderate complex right pleural effusion. Pt stable in no distress.     Allergies:  Salicylic Acid (Unknown)  sulfa drugs (Unknown)      Home Medications:  folic acid: 1 tab(s) orally once a day (25 Jul 2024 13:14)  gabapentin 300 mg oral tablet: 1 tab(s) orally 2 times a day (13 Jul 2024 00:12)  lactulose 10 g/15 mL oral syrup: 45 milliliter(s) orally 2 times a day (13 Jul 2024 00:12)  pantoprazole 40 mg oral delayed release tablet: 1 tab(s) orally once a day (13 Jul 2024 00:11)    Hospital Medications:  chlorhexidine 2% Cloths 1 Application(s) Topical <User Schedule>  folic acid 1 milliGRAM(s) Oral daily  lactulose Syrup 20 Gram(s) Oral every 12 hours  midodrine 10 milliGRAM(s) Oral every 8 hours  Nephro-shania 1 Tablet(s) Oral daily  pantoprazole    Tablet 40 milliGRAM(s) Oral before breakfast  piperacillin/tazobactam IVPB.- 3.375 Gram(s) IV Intermittent once  rifAXIMin 550 milliGRAM(s) Oral every 12 hours  thiamine 100 milliGRAM(s) Oral daily      PMHX/PSHX:  Sleep apnea, obstructive    Alcohol abuse    Cirrhosis of liver    Portal hypertension    H/O esophageal varices    Anemia    Mild alcohol use disorder    No significant past surgical history    No significant past surgical history        Family history:  No pertinent family history in first degree relatives    Family history of CABG (Mother)    FHx: multiple myeloma (Father)    FHx: diabetes mellitus (Father)        Denies family history of colon cancer/polyps, stomach cancer/polyps, pancreatic cancer/masses, liver cancer/disease, ovarian cancer and endometrial cancer.    Social History:     Tob: Denies  EtOH: As above  Illicit Drugs: Denies    ROS:   General:  No wt loss, fevers, chills, night sweats, fatigue  Eyes:  Good vision, no reported pain  ENT:  No sore throat, pain, runny nose, dysphagia  CV:  No pain, palpitations, hypo/hypertension  Pulm:  No dyspnea, cough, tachypnea, wheezing  GI:  As per HPI  :  No pain, bleeding, incontinence, nocturia  Muscle:  No pain, weakness  Neuro:  No weakness, tingling, memory problems  Psych:  No fatigue, insomnia, mood problems, depression  Endocrine:  No polyuria, polydipsia, cold/heat intolerance  Heme:  No petechiae, ecchymosis, easy bruisability  Skin:  No rash, tattoos, scars, edema    PHYSICAL EXAM:   GENERAL:  No acute distress  HEENT:  Normocephalic/atraumatic, +sceral icterus  CHEST:  No accessory muscle use, crackles on the right  HEART:  Regular rate and rhythm  ABDOMEN:  Soft, mild diffuse tenderness, distended w/ fluid wave, normoactive bowel sounds, splenomegaly, stigmata of chronic liver dz  EXTREMITIES: No cyanosis, clubbing, or edema  SKIN:  No rash  NEURO:  Alert and oriented x 3, no asterixis    Vital Signs:  Vital Signs Last 24 Hrs  T(C): 36.8 (16 Jan 2025 11:00), Max: 36.8 (16 Jan 2025 07:00)  T(F): 98.2 (16 Jan 2025 11:00), Max: 98.2 (16 Jan 2025 07:00)  HR: 75 (16 Jan 2025 14:00) (65 - 80)  BP: 123/58 (16 Jan 2025 14:00) (100/53 - 123/58)  BP(mean): 84 (16 Jan 2025 14:00) (74 - 84)  RR: 29 (16 Jan 2025 14:00) (18 - 30)  SpO2: 97% (16 Jan 2025 14:00) (95% - 100%)    Parameters below as of 16 Jan 2025 11:00  Patient On (Oxygen Delivery Method): room air      Daily Height in cm: 180.34 (16 Jan 2025 07:00)    Daily     LABS:                        8.5    14.53 )-----------( 64       ( 16 Jan 2025 07:39 )             25.6     Mean Cell Volume: 90.8 fl (01-16-25 @ 07:39)    01-16    130[L]  |  96  |  39[H]  ----------------------------<  117[H]  4.3   |  22  |  2.59[H]    Ca    8.6      16 Jan 2025 07:39  Phos  3.5     01-16  Mg     2.2     01-16    TPro  7.1  /  Alb  2.8[L]  /  TBili  5.5[H]  /  DBili  x   /  AST  35  /  ALT  9[L]  /  AlkPhos  148[H]  01-16    LIVER FUNCTIONS - ( 16 Jan 2025 07:39 )  Alb: 2.8 g/dL / Pro: 7.1 g/dL / ALK PHOS: 148 U/L / ALT: 9 U/L / AST: 35 U/L / GGT: x           PT/INR - ( 16 Jan 2025 07:39 )   PT: 19.4 sec;   INR: 1.71 ratio         PTT - ( 16 Jan 2025 07:39 )  PTT:36.8 sec  Urinalysis Basic - ( 16 Jan 2025 07:39 )    Color: x / Appearance: x / SG: x / pH: x  Gluc: 117 mg/dL / Ketone: x  / Bili: x / Urobili: x   Blood: x / Protein: x / Nitrite: x   Leuk Esterase: x / RBC: x / WBC x   Sq Epi: x / Non Sq Epi: x / Bacteria: x                              8.5    14.53 )-----------( 64       ( 16 Jan 2025 07:39 )             25.6

## 2025-01-16 NOTE — H&P ADULT - ASSESSMENT
43 yo M with PMH decompensated ETOH cirrhosis c/b recurrent ascites, grade II EV, and HE, right calf hematoma 10/2024 (s/p fall)  presented to [facility] for abdominal pain and distension. Patient found to have ascites on exam and MANUELITO requiring HD initation. PermCath was placed by vascular surgery on 1/10 with post procedure course c/b bleeding from insertion site. Transferred to [facility] SICU for further management.     [] Decompensated ETOH Cirrhosis   - infectious w/u   - Ascites: needs paracentesis    - EV: EGD 7/2024 for melena showed grade II EV, small gastric ulcer (neg HP), and portal gastropathy. Takes nadolol at home,  on hold in setting of hypotension   - HE: c/w lactulose and rifaximin, goal 3-4 BM/day  - HCC screening: last CT w/ contrast 7/2024 no lesion seen.   - MANUELITO: - C/w home midodrine 10 mg tid; renal c/s   - C/w PPI for stress ulcer ppx and hx of ulcer  - Liver transplant evaluation     [] Acute Renal Failure  - Found to have acute renal failure requiring dialysis at OSH  - Permacath placed at OSH  - Renal c/s     [] Complex Right Pleural Effusion  - CT chest noncontrast reviewed from OSH showing moderate sized complex R sided pleural effusion  - continue to monitor;

## 2025-01-16 NOTE — H&P ADULT - NS ATTEND AMEND GEN_ALL_CORE FT
repeat cx.  upload imaging from OSH  has tunneled line for HD  Open eval for Liver Txp  Renal txp eval

## 2025-01-16 NOTE — CONSULT NOTE ADULT - ASSESSMENT
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 Actively drinking. Now p/w abdominal pain/distension with worsening of liver tests possibly triggered by infection. DDx also includes alcohol associated hepatitis given hx of alc hep 7/2024 which improved with steroids  - OLT: send workup to activate evaluation, complex given active drinking. Will likely need colonoscopy (unknown last c-scope)  - Ascites: large ascites on exam, requiring frequent LVPs         - Please perform paracentesis today (send cell count w/ diff, culture, albumin, total protein, and cytology)  - EV: EGD 7/2024 for melena showed grade II EV, small gastric ulcer (neg HP), and portal gastropathy. Takes nadolol at home. Holding BB for now given hypotension  - HE: c/w lactulose and rifaximin, goal 3-4 BM/day  - HCC screening: last CT w/ contrast 7/2024 no lesion seen. Due for repeat         - Obtain MRI abdomen with and without contrast and MRCP for HCC screening (liver protocol) and evaluate portal veins  - C/w home midodrine 10 mg tid  - C/w PPI for stress ulcer ppx and hx of ulcer  - Trend MELD labs daily (cmp, INR) daily    #Complex Right Pleural Effusion  #Leukocytosis  CT chest noncontrast reviewed from OSH showing moderate sized complex R sided pleural effusion  - Clarify if patient underwent thoracentesis at OSH  - F/u BCx and UCx for infectious workup  - C/w empiric zosyn for now  - Trend cbc and fever curve    #Acute Renal Failure  Found to have acute renal failure requiring dialysis at OSH, ddx includes HRS vs ATN. Permacath in place. Cr baseline 1.12 10/2024  - Transplant nephrology consult  - HD per nephrology  - Trend CMP daily    #Anemia  #Thrombocytopenia  Normocytic anemia with Hgb 8.5 and plt count 64 likely due to chronic liver dz  - Trend cbc daily  - Transfuse Hgb>7, plt>10 (>20 if septic, >50 if bleeding)  - Hold DVT ppx for now given recent bleeding. If no further bleeding, likely can restart in 24-48 hrs    All recommendations are tentative until note is attested by attending.     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 Actively drinking. Now p/w abdominal pain/distension with worsening of liver tests possibly triggered by infection. DDx also includes alcohol associated hepatitis given hx of alc hep 7/2024 which improved with steroids  - OLT: send workup to activate evaluation, complex given active drinking. Will likely need colonoscopy (unknown last c-scope)  - Ascites: large ascites on exam, requiring frequent LVPs         - Please perform paracentesis today (send cell count w/ diff, culture, albumin, total protein, and cytology)  - EV: EGD 7/2024 for melena showed grade II EV, small gastric ulcer (neg HP), and portal gastropathy. Takes nadolol at home. Holding BB for now given hypotension  - HE: c/w lactulose and rifaximin, goal 3-4 BM/day  - HCC screening: last CT w/ contrast 7/2024 no lesion seen. Due for repeat         - Obtain MRI abdomen with and without contrast and MRCP for HCC screening (liver protocol) and evaluate portal veins  - C/w home midodrine 10 mg tid  - C/w PPI for stress ulcer ppx and hx of ulcer  - Trend MELD labs daily (cmp, INR) daily    #Complex Right Pleural Effusion  #Leukocytosis  CT chest noncontrast reviewed from OSH showing moderate sized complex R sided pleural effusion. No thoracentesis done per records  - Obtain CT chest noncontrast to evaluate pleural effusion  - F/u BCx and UCx for infectious workup  - C/w empiric zosyn for now  - Trend cbc and fever curve    #Acute Renal Failure  Found to have acute renal failure requiring dialysis at OSH, ddx includes HRS vs ATN. Permacath in place. Cr baseline 1.12 10/2024  - Transplant nephrology consult  - HD per nephrology  - Trend CMP daily    #Anemia  #Thrombocytopenia  Normocytic anemia with Hgb 8.5 and plt count 64 likely due to chronic liver dz  - Trend cbc daily  - Transfuse Hgb>7, plt>10 (>20 if septic, >50 if bleeding)  - Hold DVT ppx for now given recent bleeding. If no further bleeding, likely can restart in 24-48 hrs    All recommendations are tentative until note is attested by attending.     Judy Tucker, PGY4  Gastroenterology/Hepatology Fellow  Available on [employer] Teams  [phone] (Long Range Pager)  [phone] (Short Range Pager [facility])    After 5 pm, please contact the on-call GI fellow for any urgent issues via the Hospital Call

## 2025-01-16 NOTE — H&P ADULT - NSHPREVIEWOFSYSTEMS_GEN_ALL_CORE
Skin: No rashes, jaundiced  Head/Eyes/Ears/Mouth: No headache; Normal hearing; Normal vision w/o blurriness; No sinus pain/discomfort, sore throat  Respiratory: No dyspnea, cough, wheezing, hemoptysis  CV: No chest pain, PND, orthopnea  GI: abdominal distention   : No increased frequency, dysuria, hematuria, nocturia  MSK: No joint pain/swelling; no back pain; no edema  Neuro: No dizziness/lightheadedness, weakness, seizures, numbness, tingling  Heme: No easy bruising or bleeding  Endo: No heat/cold intolerance  Psych: No significant nervousness, anxiety, stress, depression  All other systems were reviewed and are negative, except as noted.
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
## 2025-01-17 DIAGNOSIS — F43.22 ADJUSTMENT DISORDER WITH ANXIETY: ICD-10-CM

## 2025-01-17 LAB
-  STAPHYLOCOCCUS EPIDERMIDIS, METHICILLIN RESISTANT: SIGNIFICANT CHANGE UP
A1C WITH ESTIMATED AVERAGE GLUCOSE RESULT: 5 % — SIGNIFICANT CHANGE UP (ref 4–5.6)
ACANTHOCYTES BLD QL SMEAR: SLIGHT — SIGNIFICANT CHANGE UP
AFP-TM SERPL-MCNC: <1.8 NG/ML — SIGNIFICANT CHANGE UP
ALBUMIN FLD-MCNC: 0.6 G/DL — SIGNIFICANT CHANGE UP
ALBUMIN SERPL ELPH-MCNC: 2.5 G/DL — LOW (ref 3.3–5)
ALP SERPL-CCNC: 116 U/L — SIGNIFICANT CHANGE UP (ref 40–120)
ALT FLD-CCNC: 7 U/L — LOW (ref 10–45)
AMMONIA BLD-MCNC: 46 UMOL/L — SIGNIFICANT CHANGE UP (ref 11–55)
ANION GAP SERPL CALC-SCNC: 13 MMOL/L — SIGNIFICANT CHANGE UP (ref 5–17)
ANISOCYTOSIS BLD QL: SLIGHT — SIGNIFICANT CHANGE UP
APPEARANCE UR: ABNORMAL
APTT BLD: 40.5 SEC — HIGH (ref 24.5–35.6)
AST SERPL-CCNC: 32 U/L — SIGNIFICANT CHANGE UP (ref 10–40)
B PERT IGG+IGM PNL SER: CLEAR — SIGNIFICANT CHANGE UP
BACTERIA # UR AUTO: NEGATIVE /HPF — SIGNIFICANT CHANGE UP
BASOPHILS # BLD AUTO: 0 K/UL — SIGNIFICANT CHANGE UP (ref 0–0.2)
BASOPHILS NFR BLD AUTO: 0 % — SIGNIFICANT CHANGE UP (ref 0–2)
BILIRUB DIRECT SERPL-MCNC: 2.1 MG/DL — HIGH (ref 0–0.3)
BILIRUB INDIRECT FLD-MCNC: 3.7 MG/DL — HIGH (ref 0.2–1)
BILIRUB SERPL-MCNC: 5.8 MG/DL — HIGH (ref 0.2–1.2)
BILIRUB UR-MCNC: ABNORMAL
BUN SERPL-MCNC: 47 MG/DL — HIGH (ref 7–23)
BURR CELLS BLD QL SMEAR: PRESENT — SIGNIFICANT CHANGE UP
CALCIUM SERPL-MCNC: 8.8 MG/DL — SIGNIFICANT CHANGE UP (ref 8.4–10.5)
CAST: 44 /LPF — HIGH (ref 0–4)
CERULOPLASMIN SERPL-MCNC: 14 MG/DL — LOW (ref 15–30)
CHLORIDE SERPL-SCNC: 98 MMOL/L — SIGNIFICANT CHANGE UP (ref 96–108)
CHOLEST SERPL-MCNC: 65 MG/DL — SIGNIFICANT CHANGE UP
CO2 SERPL-SCNC: 21 MMOL/L — LOW (ref 22–31)
COLOR FLD: YELLOW
COLOR SPEC: SIGNIFICANT CHANGE UP
CREAT ?TM UR-MCNC: 261 MG/DL — SIGNIFICANT CHANGE UP
CREAT SERPL-MCNC: 3.04 MG/DL — HIGH (ref 0.5–1.3)
DACRYOCYTES BLD QL SMEAR: SLIGHT — SIGNIFICANT CHANGE UP
DIFF PNL FLD: ABNORMAL
EBV DNA SERPL NAA+PROBE-ACNC: 41 IU/ML — HIGH
EBVPCR LOG: 1.61 LOG10IU/ML — HIGH
EGFR: 25 ML/MIN/1.73M2 — LOW
EGFR: 25 ML/MIN/1.73M2 — LOW
EOSINOPHIL # BLD AUTO: 0 K/UL — SIGNIFICANT CHANGE UP (ref 0–0.5)
EOSINOPHIL NFR BLD AUTO: 0 % — SIGNIFICANT CHANGE UP (ref 0–6)
ESTIMATED AVERAGE GLUCOSE: 97 MG/DL — SIGNIFICANT CHANGE UP (ref 68–114)
FERRITIN SERPL-MCNC: 849 NG/ML — HIGH (ref 30–400)
FLUID INTAKE SUBSTANCE CLASS: SIGNIFICANT CHANGE UP
FOLATE+VIT B12 SERBLD-IMP: 1 % — SIGNIFICANT CHANGE UP
G LAMBLIA DNA STL QL NAA+NON-PROBE: DETECTED
GI PCR PANEL: DETECTED
GLUCOSE BLDC GLUCOMTR-MCNC: 141 MG/DL — HIGH (ref 70–99)
GLUCOSE FLD-MCNC: 158 MG/DL — SIGNIFICANT CHANGE UP
GLUCOSE SERPL-MCNC: 153 MG/DL — HIGH (ref 70–99)
GLUCOSE UR QL: NEGATIVE MG/DL — SIGNIFICANT CHANGE UP
GRAM STN FLD: ABNORMAL
GRAM STN FLD: SIGNIFICANT CHANGE UP
HBV SURFACE AG SER-ACNC: SIGNIFICANT CHANGE UP
HCT VFR BLD CALC: 22.4 % — LOW (ref 39–50)
HCV RNA FLD QL NAA+PROBE: SIGNIFICANT CHANGE UP
HCV RNA SPEC QL PROBE+SIG AMP: SIGNIFICANT CHANGE UP
HDLC SERPL-MCNC: 26 MG/DL — LOW
HGB BLD-MCNC: 7.4 G/DL — LOW (ref 13–17)
IGA FLD-MCNC: 654 MG/DL — HIGH (ref 84–499)
IGG FLD-MCNC: 2239 MG/DL — HIGH (ref 610–1660)
IGM SERPL-MCNC: 234 MG/DL — SIGNIFICANT CHANGE UP (ref 35–242)
INR BLD: 1.88 RATIO — HIGH (ref 0.85–1.16)
IRON SATN MFR SERPL: 57 UG/DL — SIGNIFICANT CHANGE UP (ref 45–165)
IRON SATN MFR SERPL: 62 % — HIGH (ref 16–55)
KETONES UR-MCNC: ABNORMAL MG/DL
LDH SERPL L TO P-CCNC: 32 U/L — SIGNIFICANT CHANGE UP
LEUKOCYTE ESTERASE UR-ACNC: ABNORMAL
LIPID PNL WITH DIRECT LDL SERPL: 26 MG/DL — SIGNIFICANT CHANGE UP
LYMPHOCYTES # BLD AUTO: 0.87 K/UL — LOW (ref 1–3.3)
LYMPHOCYTES # BLD AUTO: 6.2 % — LOW (ref 13–44)
LYMPHOCYTES # FLD: 30 % — SIGNIFICANT CHANGE UP
MACROCYTES BLD QL: SLIGHT — SIGNIFICANT CHANGE UP
MAGNESIUM SERPL-MCNC: 2 MG/DL — SIGNIFICANT CHANGE UP (ref 1.6–2.6)
MANUAL SMEAR VERIFICATION: SIGNIFICANT CHANGE UP
MCHC RBC-ENTMCNC: 29.6 PG — SIGNIFICANT CHANGE UP (ref 27–34)
MCHC RBC-ENTMCNC: 33 G/DL — SIGNIFICANT CHANGE UP (ref 32–36)
MCV RBC AUTO: 89.6 FL — SIGNIFICANT CHANGE UP (ref 80–100)
METHOD TYPE: SIGNIFICANT CHANGE UP
MONOCYTES # BLD AUTO: 0.49 K/UL — SIGNIFICANT CHANGE UP (ref 0–0.9)
MONOCYTES NFR BLD AUTO: 3.5 % — SIGNIFICANT CHANGE UP (ref 2–14)
MONOS+MACROS # FLD: 53 % — SIGNIFICANT CHANGE UP
NEUTROPHILS # BLD AUTO: 12.61 K/UL — HIGH (ref 1.8–7.4)
NEUTROPHILS NFR BLD AUTO: 88.5 % — HIGH (ref 43–77)
NEUTROPHILS-BODY FLUID: 16 % — SIGNIFICANT CHANGE UP
NEUTS BAND # BLD: 1.8 % — SIGNIFICANT CHANGE UP (ref 0–8)
NEUTS BAND NFR BLD: 1.8 % — SIGNIFICANT CHANGE UP (ref 0–8)
NITRITE UR-MCNC: NEGATIVE — SIGNIFICANT CHANGE UP
NON HDL CHOLESTEROL: 39 MG/DL — SIGNIFICANT CHANGE UP
OVALOCYTES BLD QL SMEAR: SLIGHT — SIGNIFICANT CHANGE UP
PH UR: 5 — SIGNIFICANT CHANGE UP (ref 5–8)
PHOSPHATE SERPL-MCNC: 4.3 MG/DL — SIGNIFICANT CHANGE UP (ref 2.5–4.5)
PLAT MORPH BLD: NORMAL — SIGNIFICANT CHANGE UP
PLATELET # BLD AUTO: 73 K/UL — LOW (ref 150–400)
POIKILOCYTOSIS BLD QL AUTO: SLIGHT — SIGNIFICANT CHANGE UP
POTASSIUM SERPL-MCNC: 4.6 MMOL/L — SIGNIFICANT CHANGE UP (ref 3.5–5.3)
POTASSIUM SERPL-SCNC: 4.6 MMOL/L — SIGNIFICANT CHANGE UP (ref 3.5–5.3)
PROT ?TM UR-MCNC: 81 MG/DL — HIGH (ref 0–12)
PROT FLD-MCNC: 1.5 G/DL — SIGNIFICANT CHANGE UP
PROT SERPL-MCNC: 6.4 G/DL — SIGNIFICANT CHANGE UP (ref 6–8.3)
PROT UR-MCNC: 30 MG/DL
PROT/CREAT UR-RTO: 0.3 RATIO — HIGH (ref 0–0.2)
PROTHROM AB SERPL-ACNC: 21.3 SEC — HIGH (ref 9.9–13.4)
PSA SERPL-MCNC: <0.01 NG/ML — SIGNIFICANT CHANGE UP (ref 0–4)
RBC # BLD: 2.5 M/UL — LOW (ref 4.2–5.8)
RBC # FLD: 21 % — HIGH (ref 10.3–14.5)
RBC BLD AUTO: ABNORMAL
RBC CASTS # UR COMP ASSIST: 27 /HPF — HIGH (ref 0–4)
RCV VOL RI: <2000 CELLS/UL — HIGH
REVIEW: SIGNIFICANT CHANGE UP
SARS-COV-2 IGG+IGM SERPL QL IA: >250 U/ML — HIGH
SARS-COV-2 IGG+IGM SERPL QL IA: POSITIVE
SCHISTOCYTES BLD QL AUTO: SLIGHT — SIGNIFICANT CHANGE UP
SODIUM SERPL-SCNC: 132 MMOL/L — LOW (ref 135–145)
SP GR SPEC: 1.02 — SIGNIFICANT CHANGE UP (ref 1–1.03)
SPECIMEN SOURCE: SIGNIFICANT CHANGE UP
SPECIMEN SOURCE: SIGNIFICANT CHANGE UP
SQUAMOUS # UR AUTO: 24 /HPF — HIGH (ref 0–5)
TARGETS BLD QL SMEAR: SLIGHT — SIGNIFICANT CHANGE UP
TIBC SERPL-MCNC: 91 UG/DL — LOW (ref 220–430)
TOTAL NUCLEATED CELL COUNT, BODY FLUID: 155 CELLS/UL — SIGNIFICANT CHANGE UP
TRIGL SERPL-MCNC: 46 MG/DL — SIGNIFICANT CHANGE UP
TSH SERPL-MCNC: 8.18 UIU/ML — HIGH (ref 0.27–4.2)
TUBE TYPE: SIGNIFICANT CHANGE UP
UIBC SERPL-MCNC: 35 UG/DL — LOW (ref 110–370)
UROBILINOGEN FLD QL: 0.2 MG/DL — SIGNIFICANT CHANGE UP (ref 0.2–1)
WBC # BLD: 13.96 K/UL — HIGH (ref 3.8–10.5)
WBC # FLD AUTO: 13.96 K/UL — HIGH (ref 3.8–10.5)
WBC UR QL: 22 /HPF — HIGH (ref 0–5)

## 2025-01-17 PROCEDURE — 99232 SBSQ HOSP IP/OBS MODERATE 35: CPT | Mod: GC

## 2025-01-17 PROCEDURE — 71250 CT THORAX DX C-: CPT | Mod: 26

## 2025-01-17 PROCEDURE — 99222 1ST HOSP IP/OBS MODERATE 55: CPT

## 2025-01-17 PROCEDURE — 71045 X-RAY EXAM CHEST 1 VIEW: CPT | Mod: 26

## 2025-01-17 PROCEDURE — 49083 ABD PARACENTESIS W/IMAGING: CPT

## 2025-01-17 PROCEDURE — 99223 1ST HOSP IP/OBS HIGH 75: CPT

## 2025-01-17 RX ORDER — VANCOMYCIN HCL IN 5 % DEXTROSE 1.5G/250ML
1500 PLASTIC BAG, INJECTION (ML) INTRAVENOUS ONCE
Refills: 0 | Status: COMPLETED | OUTPATIENT
Start: 2025-01-17 | End: 2025-01-17

## 2025-01-17 RX ORDER — ACETAMINOPHEN 500 MG/5ML
1000 LIQUID (ML) ORAL ONCE
Refills: 0 | Status: COMPLETED | OUTPATIENT
Start: 2025-01-17 | End: 2025-01-17

## 2025-01-17 RX ORDER — ALBUMIN (HUMAN) 12.5 G/50ML
50 INJECTION, SOLUTION INTRAVENOUS ONCE
Refills: 0 | Status: COMPLETED | OUTPATIENT
Start: 2025-01-17 | End: 2025-01-17

## 2025-01-17 RX ORDER — MIRTAZAPINE 30 MG/1
7.5 TABLET, FILM COATED ORAL AT BEDTIME
Refills: 0 | Status: DISCONTINUED | OUTPATIENT
Start: 2025-01-17 | End: 2025-01-28

## 2025-01-17 RX ORDER — MELATONIN 5 MG
5 TABLET ORAL AT BEDTIME
Refills: 0 | Status: DISCONTINUED | OUTPATIENT
Start: 2025-01-17 | End: 2025-01-28

## 2025-01-17 RX ORDER — TRAMADOL HYDROCHLORIDE 50 MG/1
25 TABLET, FILM COATED ORAL EVERY 8 HOURS
Refills: 0 | Status: DISCONTINUED | OUTPATIENT
Start: 2025-01-17 | End: 2025-01-24

## 2025-01-17 RX ADMIN — Medication 100 MILLIGRAM(S): at 11:30

## 2025-01-17 RX ADMIN — Medication 1 TABLET(S): at 11:29

## 2025-01-17 RX ADMIN — Medication 1000 MILLIGRAM(S): at 01:00

## 2025-01-17 RX ADMIN — TRAMADOL HYDROCHLORIDE 25 MILLIGRAM(S): 50 TABLET, FILM COATED ORAL at 15:33

## 2025-01-17 RX ADMIN — FOLIC ACID 1 MILLIGRAM(S): 1 TABLET ORAL at 11:30

## 2025-01-17 RX ADMIN — MIDODRINE HYDROCHLORIDE 10 MILLIGRAM(S): 5 TABLET ORAL at 13:04

## 2025-01-17 RX ADMIN — Medication 25 GRAM(S): at 05:12

## 2025-01-17 RX ADMIN — ALBUMIN (HUMAN) 300 MILLILITER(S): 12.5 INJECTION, SOLUTION INTRAVENOUS at 11:04

## 2025-01-17 RX ADMIN — Medication 5 MILLIGRAM(S): at 22:16

## 2025-01-17 RX ADMIN — Medication 300 MILLIGRAM(S): at 17:20

## 2025-01-17 RX ADMIN — Medication 1 APPLICATION(S): at 13:05

## 2025-01-17 RX ADMIN — LACTULOSE 20 GRAM(S): 10 SOLUTION ORAL at 05:13

## 2025-01-17 RX ADMIN — Medication 25 GRAM(S): at 17:56

## 2025-01-17 RX ADMIN — Medication 400 MILLIGRAM(S): at 00:40

## 2025-01-17 RX ADMIN — Medication 40 MILLIGRAM(S): at 05:11

## 2025-01-17 RX ADMIN — MIDODRINE HYDROCHLORIDE 10 MILLIGRAM(S): 5 TABLET ORAL at 22:16

## 2025-01-17 RX ADMIN — MIDODRINE HYDROCHLORIDE 10 MILLIGRAM(S): 5 TABLET ORAL at 05:11

## 2025-01-17 RX ADMIN — Medication 1 APPLICATION(S): at 05:12

## 2025-01-17 RX ADMIN — Medication 1 APPLICATION(S): at 13:04

## 2025-01-17 RX ADMIN — TRAMADOL HYDROCHLORIDE 25 MILLIGRAM(S): 50 TABLET, FILM COATED ORAL at 16:33

## 2025-01-17 RX ADMIN — Medication 5 MILLIGRAM(S): at 00:40

## 2025-01-17 RX ADMIN — ALBUMIN (HUMAN) 300 MILLILITER(S): 12.5 INJECTION, SOLUTION INTRAVENOUS at 11:03

## 2025-01-17 RX ADMIN — MIRTAZAPINE 7.5 MILLIGRAM(S): 30 TABLET, FILM COATED ORAL at 22:16

## 2025-01-17 NOTE — PROGRESS NOTE ADULT - ASSESSMENT
45 yo M with PMH decompensated ETOH cirrhosis c/b recurrent ascites, grade II EV, and HE, right calf hematoma 10/2024 (s/p fall)  presented to [facility] for abdominal pain and distension. Patient found to have ascites on exam and MANUELITO requiring HD initation. PermCath was placed by vascular surgery on 1/10 with post procedure course c/b bleeding from insertion site. Transferred to [facility] SICU for further management.     [] Decompensated ETOH Cirrhosis   - infectious w/u   - Ascites: needs paracentesis    - EV: EGD 7/2024 for melena showed grade II EV, small gastric ulcer (neg HP), and portal gastropathy. Takes nadolol at home,  on hold in setting of hypotension   - HE: c/w lactulose and rifaximin, goal 3-4 BM/day  - HCC screening: last CT w/ contrast 7/2024 no lesion seen.   - MANUELITO: - C/w home midodrine 10 mg tid; renal c/s   - C/w PPI for stress ulcer ppx and hx of ulcer  - Liver transplant evaluation     [] Acute Renal Failure  - Found to have acute renal failure requiring dialysis at OSH  - Permacath placed at OSH  - Renal c/s     [] Complex Right Pleural Effusion  - CT chest noncontrast reviewed from OSH showing moderate sized complex R sided pleural effusion  - continue to monitor;

## 2025-01-17 NOTE — CONSULT NOTE ADULT - ASSESSMENT
44 year-old with history and cardiovascular risk factor as above presents with decompensated EtOH cirrhosis.  Liver transplant evaluation initiated.  MANUELITO requiring HD.    Patient is hypotensive requiring midodrine support. This is likely a vasodilatory state due to cirrhosis.  His hypotension precludes both DSE and CTCA.    Prior ECG and TTE reviewed.   Will plan for LHC if patient is otherwise considered an appropriate liver transplant candidate. I would not recommend invasive cardiac testing until he is otherwise deemed an appropriate transplant candidate.     Discussed with Transplant Team on rounds.

## 2025-01-17 NOTE — DIETITIAN INITIAL EVALUATION ADULT - PERSON TAUGHT/METHOD
Provided brief introduction to education on post transplant nutrition therapy and food safety guidelines for transplant recipients. Discussed needs to follow food safety guidelines with medications, increased needs for proper post-surgical healing, and moderate intake of sodium and carbohydrates; pt made aware detailed education with written material will be provided post-transplant. Pt amenable - denies having questions/concerns about diet and nutrition at this time; made aware RD remains available./verbal instruction/patient instructed

## 2025-01-17 NOTE — BH CONSULTATION LIAISON ASSESSMENT NOTE - RISK ASSESSMENT
Risk factors: substance abuse, acute/chronic medical issues, financial stressors    Protective factors: no current SIIP/HIIP, no h/o SA/SIB, no h/o psych admissions, dependent children, domiciled, intact marriage, social supports, help-seeking behavior, future-orientation    Overall, pt is at chronically elevated risk of harm mitigated by multiple protective factors and does not meet criteria for psychiatric admission at this time.

## 2025-01-17 NOTE — CONSULT NOTE ADULT - SUBJECTIVE AND OBJECTIVE BOX
Patient seen and evaluated @   Chief Complaint:     HPI:  43 yo M with PMH decompensated ETOH cirrhosis c/b recurrent ascites, grade II EV, and HE, right calf hematoma 10/2024 (s/p fall)  presented to [facility] for abdominal pain and distension. Patient found to have ascites on exam and MANUELITO requiring HD initation. PermCath was placed by vascular surgery on 1/10 with post procedure course c/b bleeding from insertion site. Transferred to [facility] SICU for further management.     ETOH use:   -daily drinking since ,  up to 1L whiskey every two days.  -dx liver disease approximately 3-4 years ago  attempted alcohol rehab program in the past, but continued to drink        (2025 08:32)    PMH:   Sleep apnea, obstructive    Alcohol abuse    Cirrhosis of liver    Portal hypertension    H/O esophageal varices    Anemia    Mild alcohol use disorder      PSH:   No significant past surgical history    No significant past surgical history      Medications:   albuterol    90 MICROgram(s) HFA Inhaler 2 Puff(s) Inhalation every 6 hours PRN  chlorhexidine 2% Cloths 1 Application(s) Topical daily  folic acid 1 milliGRAM(s) Oral daily  lactulose Syrup 20 Gram(s) Oral every 12 hours  melatonin 5 milliGRAM(s) Oral at bedtime  midodrine 10 milliGRAM(s) Oral every 8 hours  mirtazapine 7.5 milliGRAM(s) Oral at bedtime  Nephro-shania 1 Tablet(s) Oral daily  pantoprazole    Tablet 40 milliGRAM(s) Oral before breakfast  piperacillin/tazobactam IVPB.. 3.375 Gram(s) IV Intermittent every 12 hours  rifAXIMin 550 milliGRAM(s) Oral every 12 hours  thiamine 100 milliGRAM(s) Oral daily  traMADol 25 milliGRAM(s) Oral every 8 hours PRN    Allergies:  Salicylic Acid (Unknown)  sulfa drugs (Unknown)    FAMILY HISTORY:  Family history of CABG (Mother)    FHx: multiple myeloma (Father)    FHx: diabetes mellitus (Father)      Social History: , two children under the age of 5 years; unemployed  Smoking: nonsmoker  Alcohol: +EtOH abuse  Drugs: no illicit drug use    Review of Systems:    Constitutional: No fever, chills, fatigue, or changes in weight  HEENT: No blurry vision, eye pain, headache, runny nose, or sore throat  Respiratory: No shortness of breath, cough, or wheezing  Cardiovascular: No chest pain or palpitations  Gastrointestinal: No nausea, vomiting, diarrhea, or abdominal pain  Genitourinary: No dysuria or incontinence  Extremities: No lower extremity swelling  Neurologic: No focal findings  Lymphatic: No lymphadenopathy   Skin: No rash  Psychiatry: No anxiety or depression  10 point review of systems is otherwise negative except as mentioned above            Physical Exam:  T(C): 37.2 (25 @ 17:00), Max: 37.3 (25 @ 00:35)  HR: 90 (25 @ 17:00) (72 - 106)  BP: 102/56 (25 @ 17:00) (102/56 - 123/72)  RR: 18 (25 @ 17:00) (16 - 20)  SpO2: 97% (25 @ 17:00) (96% - 99%)  Wt(kg): --     @ 07:  -   @ 07:00  --------------------------------------------------------  IN: 850 mL / OUT: 100 mL / NET: 750 mL     @ 07:  -   @ 18:03  --------------------------------------------------------  IN: 480 mL / OUT: 300 mL / NET: 180 mL      Daily     Daily     Appearance: Normal, well groomed, NAD  Eyes: +scleral icterus   HENT: Normal oral mucosa  Cardiovascular: RRR, S1, S2, no murmur, rub, or gallop; no edema; no JVD  Procedural Access Site: Clean, dry, intact, without hematoma  Respiratory: Clear to auscultation bilaterally  Gastrointestinal: Soft, non-tender, +acsites  Musculoskeletal: No clubbing or joint deformity   Neurologic: No focal weakness  Lymphatic: No lymphadenopathy  Psychiatry: AAOx3 with appropriate mood and affect  Skin: No rashes, ecchymoses, or cyanosis    Cardiovascular Diagnostic Testing:  ECG: 10/14/2024 - sinus rhythm at 89 bpm with LVH    Echo:  < from: TTE W or WO Ultrasound Enhancing Agent (07.15.24 @ 15:56) >  _______________________________________________________________________________________     CONCLUSIONS:      1. Left ventricular cavity is mildly dilated. Left ventricular systolic function is normal with an ejection fraction of 66 % by Roberson's method of disks. There are no regional wall motion abnormalities seen.   2. The left ventricular diastolic function is indeterminate, with normal left ventricular filling pressure.   3. Mildly enlarged right ventricular cavity size, with normal wall thickness, and normal right ventricular systolic function.Tricuspid annular plane systolic excursion (TAPSE) is 2.9 cm (normal >=1.7 cm).   4. The left atrium is severely dilated.   5. No pericardial effusion seen.   6. Moderate tricuspid regurgitation.   7. Estimated pulmonary artery systolic pressure is 46 mmHg.   8. No prior echocardiogram is available for comparison.    ____________________________________________________________________    < end of copied text >    Stress Testing:    Cath:    Interpretation of Telemetry:    Imaging:    Labs:                        7.4    13.96 )-----------( 73       ( 2025 06:42 )             22.4     -    132[L]  |  98  |  47[H]  ----------------------------<  153[H]  4.6   |  21[L]  |  3.04[H]    Ca    8.8      2025 09:10  Phos  4.3       Mg     2.0         TPro  6.4  /  Alb  2.5[L]  /  TBili  5.8[H]  /  DBili  2.1[H]  /  AST  32  /  ALT  7[L]  /  AlkPhos  116      PT/INR - ( 2025 06:40 )   PT: 21.3 sec;   INR: 1.88 ratio         PTT - ( 2025 06:40 )  PTT:40.5 sec        Total Cholesterol: 65  LDL: --  HDL: 26  T      Thyroid Stimulating Hormone, Serum: 8.18 uIU/mL ( @ 06:41)

## 2025-01-17 NOTE — BH CONSULTATION LIAISON ASSESSMENT NOTE - NSICDXPASTMEDICALHX_GEN_ALL_CORE_FT
PAST MEDICAL HISTORY:  Alcohol abuse     Anemia     Cirrhosis of liver     H/O esophageal varices     Mild alcohol use disorder     Portal hypertension     Sleep apnea, obstructive
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
## 2025-01-17 NOTE — BH CONSULTATION LIAISON ASSESSMENT NOTE - NSBHCONSULTRECOMMENDOTHER_PSY_A_CORE FT
Pt declining MAT for AUD    Amenable for Remeron 7.5mg PO qhS    Melatonin 3mg PO qhS     SW for OP psych/substance abuse referral resources

## 2025-01-17 NOTE — CONSULT NOTE ADULT - TIME BILLING
Patient with decompensated chronic liver disease ETOH related, with MANUELITO, likely HRS, now dialysis dependent. Has been on dialysis for approx 1 week. Oliguric. Has anasarca, s/p paracentesis.  Suggestions:  Monitor blood chemistry, I/O.  Will schedule hemodialysis on 1/18  Discussed plan of care with liver team and dialysis team  Patient is aware of plan of care  I was present during and reviewed clinical and lab data as well as assessment and plan as documented by the house staff as noted. Please contact if any additional questions with any change in clinical condition or on availability of any additional information or reports.

## 2025-01-17 NOTE — BH CONSULTATION LIAISON ASSESSMENT NOTE - HPI (INCLUDE ILLNESS QUALITY, SEVERITY, DURATION, TIMING, CONTEXT, MODIFYING FACTORS, ASSOCIATED SIGNS AND SYMPTOMS)
Pt is a 45 yo  man with PMH decompensated ETOH cirrhosis c/b recurrent ascites, grade II EV, and HE, right calf hematoma 10/2024 (s/p fall)  presented to [facility] for abdominal pain and distension. Patient found to have ascites on exam and MANUELITO requiring HD initiation. PermCath was placed by vascular surgery on 1/10 with post procedure course c/b bleeding from insertion site. Transferred to [facility] SICU for further management.   Psych consulted for alcohol use disorder. Patient reports that heavy alcohol consumption began 2016 after he father passed away and he moved to [place]. Pt was in rehabilitation twice prior, 2021 and 2023, pt was sober for about 1 yr between two rehabs. Pt began drinking again since 03/2024. Pt reports that he was in [place] 5/2024 and did not drink while he was there because it was election month, no drinking was allowed. Pt says he was in the ICU in Nov 2024 and decided to stop drinking them and has not had any alcohol since the first week in Nov, 2024.

## 2025-01-17 NOTE — DIETITIAN INITIAL EVALUATION ADULT - REASON FOR ADMISSION
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

## 2025-01-17 NOTE — DIETITIAN INITIAL EVALUATION ADULT - OTHER INFO
GI/Intake:  -Moderate PO intake of meals thus far  -Agreeable to PO nutrition supplements  -x4 BMs documented ; Lactulose ordered   -S/p para: 5.5L removed ()   -Presenting with decompensated ETOH cirrhosis    Renal:  -Hyponatremic   -ARF; previously on HD at outside hospital     Weight Hx:  -Current dosin pounds   -Per Pt, UBW: 240's pounds   -? weight s/p paracentesis    LIVER TRANSPLANT EVALUATION:  -Pt resides with Mother and Aunt   -BMI: 33 - obese   -HbA1c: 5.0% - no DM detected   -Frailty: [pending PT evaluation]   -MELD: [pending]    Education:    -Reviewed post-transplant nutrition therapy and food safety guidelines for transplant recipients   -Reviewed recommendations to avoid grapefruit, pomegranate and star fruit while taking immunosuppressant medication.    -Reviewed recommendations for moderate intake of sodium and carbohydrates with transplant medications.      Pt was receptive and expressed understanding.     Pt with no known nutrition contraindication to transplant.     Nutrition assessment communicated with Transplant Hepatology Team and outpatient Transplant RDs

## 2025-01-17 NOTE — CONSULT NOTE ADULT - SUBJECTIVE AND OBJECTIVE BOX
Interventional Radiology    Evaluate for Procedure: Paracentesis     HPI: 43 yo M with PMH decompensated ETOH cirrhosis c/b recurrent ascites, grade II EV, and HE, right calf hematoma 10/2024 (s/p fall)  presented to [facility] for abdominal pain and distension. Patient found to have ascites on exam and MANUELITO requiring HD initation. PermCath was placed by vascular surgery on 1/10 with post procedure course c/b bleeding from insertion site. Transferred to [facility] SICU for further management. IR now consulted for paracentesis.     Allergies: Salicylic Acid (Unknown)  sulfa drugs (Unknown)    Medications (Abx/Cardiac/Anticoagulation/Blood Products)  midodrine: 10 milliGRAM(s) Oral (01-17 @ 05:11)  piperacillin/tazobactam IVPB.: 200 mL/Hr IV Intermittent (01-16 @ 08:27)  piperacillin/tazobactam IVPB.-: 25 mL/Hr IV Intermittent (01-16 @ 17:42)  piperacillin/tazobactam IVPB..: 25 mL/Hr IV Intermittent (01-17 @ 05:12)  rifAXIMin: 550 milliGRAM(s) Oral (01-17 @ 05:11)    Data:  180.3  108.3  T(C): 37  HR: 106  BP: 114/66  RR: 19  SpO2: 97%    -WBC 14.53 / HgB 8.5 / Hct 25.6 / Plt 64  -Na 130 / Cl 96 / BUN 39 / Glucose 117  -K 4.3 / CO2 22 / Cr 2.59  -ALT 9 / Alk Phos 148 / T.Bili 5.5  -INR 1.71 / PTT 36.8    Radiology:       Assessment/Plan:   43 yo M with PMH decompensated ETOH cirrhosis c/b recurrent ascites, grade II EV, and HE, right calf hematoma 10/2024 (s/p fall)  presented to [facility] for abdominal pain and distension. Patient found to have ascites on exam and MANUELITO requiring HD initation. PermCath was placed by vascular surgery on 1/10 with post procedure course c/b bleeding from insertion site. Transferred to [facility] SICU for further management. IR now consulted for paracentesis.     - case reviewed and approved for paracentesis today 1/17  - please place IR procedure order under PIEDAD Nazario (free text)   - STAT labs in AM (cbc,coags, bmp, T&S)  - patient not currently on any anticoagulation, if new anticoagulation started prior to procedure please contact IR for appropriate hold time   - d/w primary team

## 2025-01-17 NOTE — DIETITIAN INITIAL EVALUATION ADULT - NS FNS DIET ORDER
Diet, Renal Restrictions:   For patients receiving Renal Replacement - No Protein Restr, No Conc K, No Conc Phos, Low Sodium (01-16-25 @ 14:35) [Active]

## 2025-01-17 NOTE — PROGRESS NOTE ADULT - SUBJECTIVE AND OBJECTIVE BOX
Transplant Surgery - Multidisciplinary Rounds  --------------------------------------------------------------  Present:   Patient seen and examined with multidisciplinary Transplant team including  (Surgeon: Dr. Callaway. Hepatologist: Dr. Cantor. ACPs: Nicholas RNs in AM rounds.   Disciplines not in attendance will be notified of the plan.     Interval Events:  - downgraded from SICU  - increasing abd distention and slightly worsening R pleural effusion  - UOP not recorded as patient is voiding/incontinent    Immunosuppression:  Induction with   Maintenance Immunosuppression  Ongoing monitoring for signs of rejection.    Potential Discharge date:  Education:  Medications  Plan of care:  See below    MEDICATIONS  (STANDING):  chlorhexidine 2% Cloths 1 Application(s) Topical <User Schedule>  folic acid 1 milliGRAM(s) Oral daily  lactulose Syrup 20 Gram(s) Oral every 12 hours  melatonin 5 milliGRAM(s) Oral at bedtime  midodrine 10 milliGRAM(s) Oral every 8 hours  Nephro-shania 1 Tablet(s) Oral daily  pantoprazole    Tablet 40 milliGRAM(s) Oral before breakfast  piperacillin/tazobactam IVPB.. 3.375 Gram(s) IV Intermittent every 12 hours  rifAXIMin 550 milliGRAM(s) Oral every 12 hours  thiamine 100 milliGRAM(s) Oral daily    MEDICATIONS  (PRN):  albuterol    90 MICROgram(s) HFA Inhaler 2 Puff(s) Inhalation every 6 hours PRN Bronchospasm      PAST MEDICAL & SURGICAL HISTORY:  Sleep apnea, obstructive      Alcohol abuse      Cirrhosis of liver      Portal hypertension      H/O esophageal varices      Anemia      Mild alcohol use disorder      No significant past surgical history          Vital Signs Last 24 Hrs  T(C): 37.3 (17 Jan 2025 00:35), Max: 37.3 (17 Jan 2025 00:35)  T(F): 99.2 (17 Jan 2025 00:35), Max: 99.2 (17 Jan 2025 00:35)  HR: 98 (17 Jan 2025 00:35) (65 - 98)  BP: 123/72 (17 Jan 2025 00:35) (100/53 - 129/62)  BP(mean): 89 (16 Jan 2025 18:00) (74 - 89)  RR: 18 (17 Jan 2025 00:35) (18 - 36)  SpO2: 97% (17 Jan 2025 00:35) (95% - 100%)    Parameters below as of 17 Jan 2025 00:35  Patient On (Oxygen Delivery Method): room air        I&O's Summary    16 Jan 2025 07:01  -  17 Jan 2025 05:21  --------------------------------------------------------  IN: 750 mL / OUT: 100 mL / NET: 650 mL                              8.5    14.53 )-----------( 64       ( 16 Jan 2025 07:39 )             25.6     01-16    130[L]  |  96  |  39[H]  ----------------------------<  117[H]  4.3   |  22  |  2.59[H]    Ca    8.6      16 Jan 2025 07:39  Phos  3.5     01-16  Mg     2.2     01-16    TPro  7.1  /  Alb  2.8[L]  /  TBili  5.5[H]  /  DBili  x   /  AST  35  /  ALT  9[L]  /  AlkPhos  148[H]  01-16    ROS:   General:  No wt loss, fevers, chills, night sweats, fatigue  Eyes:  Good vision, no reported pain  ENT:  No sore throat, pain, runny nose, dysphagia  CV:  No pain, palpitations, hypo/hypertension  Pulm:  No dyspnea, cough, tachypnea, wheezing  GI:  As per HPI  :  No pain, bleeding, incontinence, nocturia  Muscle:  No pain, weakness  Neuro:  No weakness, tingling, memory problems  Psych:  No fatigue, insomnia, mood problems, depression  Endocrine:  No polyuria, polydipsia, cold/heat intolerance  Heme:  No petechiae, ecchymosis, easy bruisability  Skin:  No rash, tattoos, scars, edema    PHYSICAL EXAM:   GENERAL:  No acute distress  HEENT:  Normocephalic/atraumatic, +sceral icterus  CHEST:  No accessory muscle use, crackles on the right  HEART:  Regular rate and rhythm  ABDOMEN:  Soft, mild diffuse tenderness, distended w/ fluid wave, normoactive bowel sounds, splenomegaly, stigmata of chronic liver dz  EXTREMITIES: No cyanosis, clubbing, or edema  SKIN:  No rash  NEURO:  Alert and oriented x 3, no asterixis

## 2025-01-17 NOTE — PROGRESS NOTE ADULT - SUBJECTIVE AND OBJECTIVE BOX
Interval Events:   - He endorses mild abdominal discomfort  - He reports mild dyspnea and denies cough or f/c    ROS:   12 point review of systems performed and negative except otherwise noted in HPI.    Hospital Medications:  albuterol    90 MICROgram(s) HFA Inhaler 2 Puff(s) Inhalation every 6 hours PRN  chlorhexidine 2% Cloths 1 Application(s) Topical <User Schedule>  folic acid 1 milliGRAM(s) Oral daily  lactulose Syrup 20 Gram(s) Oral every 12 hours  melatonin 5 milliGRAM(s) Oral at bedtime  midodrine 10 milliGRAM(s) Oral every 8 hours  Nephro-shania 1 Tablet(s) Oral daily  pantoprazole    Tablet 40 milliGRAM(s) Oral before breakfast  piperacillin/tazobactam IVPB.. 3.375 Gram(s) IV Intermittent every 12 hours  rifAXIMin 550 milliGRAM(s) Oral every 12 hours  thiamine 100 milliGRAM(s) Oral daily      PHYSICAL EXAM:   Vital Signs:  Vital Signs Last 24 Hrs  T(C): 36.9 (17 Jan 2025 09:29), Max: 37.3 (17 Jan 2025 00:35)  T(F): 98.4 (17 Jan 2025 09:29), Max: 99.2 (17 Jan 2025 00:35)  HR: 84 (17 Jan 2025 09:29) (65 - 106)  BP: 109/54 (17 Jan 2025 09:29) (104/51 - 129/62)  BP(mean): 89 (16 Jan 2025 18:00) (74 - 89)  RR: 16 (17 Jan 2025 09:29) (16 - 36)  SpO2: 99% (17 Jan 2025 09:29) (95% - 99%)    Parameters below as of 17 Jan 2025 05:00  Patient On (Oxygen Delivery Method): room air      Daily     Daily     PHYSICAL EXAM:   GENERAL:  No acute distress  HEENT:  Normocephalic/atraumatic, +sceral icterus  CHEST:  No accessory muscle use, crackles on the right  HEART:  Regular rate and rhythm  ABDOMEN:  Soft, mild diffuse tenderness, distended w/ fluid wave, normoactive bowel sounds, splenomegaly, stigmata of chronic liver dz  EXTREMITIES: No cyanosis, clubbing, or edema  SKIN:  No rash  NEURO:  Alert and oriented x 3, no asterixis    LABS: reviewed                        7.4    13.96 )-----------( 73       ( 17 Jan 2025 06:42 )             22.4     01-16    130[L]  |  96  |  39[H]  ----------------------------<  117[H]  4.3   |  22  |  2.59[H]    Ca    8.6      16 Jan 2025 07:39  Phos  4.3     01-17  Mg     2.0     01-17    TPro  6.4  /  Alb  2.5[L]  /  TBili  5.8[H]  /  DBili  2.1[H]  /  AST  32  /  ALT  7[L]  /  AlkPhos  116  01-17    LIVER FUNCTIONS - ( 17 Jan 2025 06:41 )  Alb: 2.5 g/dL / Pro: 6.4 g/dL / ALK PHOS: 116 U/L / ALT: 7 U/L / AST: 32 U/L / GGT: x             Interval Diagnostic Studies: see sunrise for full report
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
## 2025-01-17 NOTE — BH CONSULTATION LIAISON ASSESSMENT NOTE - NSBHSAALC_PSY_A_CORE FT
almost 10 years of heavy ETOH use, previously having 1 handle of liquor every 2-3 days, now sober since Nov 2024

## 2025-01-17 NOTE — DIETITIAN INITIAL EVALUATION ADULT - ORAL INTAKE PTA/DIET HISTORY
Anesthesia Type: 1% lidocaine with epinephrine Bill For Surgical Tray: no PTA per pt  -Intake: poor PO intake x 3 weeks after being d/c'ed from OSH   -Chewing/Swallowing: denies difficulty   -Allergies/Intolerances: NKFA  -Vitamins/Supplements: thiamine, folic acid  Number Of Curettages: 2 Post-Care Instructions: I reviewed with the patient in detail post-care instructions. Patient is to keep the area dry for 48 hours, and not to engage in any swimming until the area is healed. Should the patient develop any fevers, chills, bleeding, severe pain patient will contact the office immediately. Billing Type: Third-Party Bill Biopsy Type: H and E Cautery Type: electrodesiccation Detail Level: Simple Additional Information: (Optional): The wound was cleaned, and a pressure dressing was applied.  The patient received detailed post-op instructions. Histology Text: Following the procedure a portion of the curetted material was sent for histologic evaluation. Size Of Lesion After Curettage: 1.2 Bill As A Line Item Or As Units: Line Item What Was Performed First?: Curettage Consent was obtained from the patient. The risks, benefits and alternatives to therapy were discussed in detail. Specifically, the risks of infection, scarring, bleeding, prolonged wound healing, nerve injury, incomplete removal, allergy to anesthesia and recurrence were addressed. Alternatives to ED&C, such as: surgical removal and XRT were also discussed.  Prior to the procedure, the treatment site was clearly identified and confirmed by the patient. All components of Universal Protocol/PAUSE Rule completed.

## 2025-01-17 NOTE — BH CONSULTATION LIAISON ASSESSMENT NOTE - SUMMARY
Pt is a 45 yo  man with PMH decompensated ETOH cirrhosis c/b recurrent ascites, grade II EV, and HE, right calf hematoma 10/2024 (s/p fall)  presented to [facility] for abdominal pain and distension. Patient found to have ascites on exam and MANUELITO requiring HD initiation. PermCath was placed by vascular surgery on 1/10 with post procedure course c/b bleeding from insertion site. Transferred to [facility] SICU for further management.   Psych consulted for alcohol use disorder. Patient reports that heavy alcohol consumption began 2016 after he father passed away and he moved to [place]. Pt was in rehabilitation twice prior, 2021 and 2023, pt was sober for about 1 yr between two rehabs. Pt began drinking again since 03/2024. Pt reports that he was in [place] 5/2024 and did not drink while he was there because it was election month, no drinking was allowed. Pt says he was in the ICU in Nov 2024 and decided to stop drinking them and has not had any alcohol since the first week in Nov, 2024.  Pt is a 45 yo  man with PMH decompensated ETOH cirrhosis c/b recurrent ascites, grade II EV, and HE, right calf hematoma 10/2024 (s/p fall)  presented to [facility] for abdominal pain and distension. Patient found to have ascites on exam and MANUELITO requiring HD initiation. PermCath was placed by vascular surgery on 1/10 with post procedure course c/b bleeding from insertion site. Transferred to [facility] SICU for further management.   Psych consulted for alcohol use disorder. Patient reports that heavy alcohol consumption began 2016 after he father passed away and he moved to [place]. Pt was in rehabilitation twice prior, 2021 and 2023, pt was sober for about 1 yr between two rehabs. Pt began drinking again since 03/2024. Pt reports that he was in [place] 5/2024 and did not drink while he was there because it was election month, no drinking was allowed. Pt says he was in the ICU in Nov 2024 and decided to stop drinking them and has not had any alcohol since the first week in Nov, 2024.   Pt is highly motivated to follow treatment recommendations but says he has not yet been educated regarding risks and benefits of immunosuppressant medications.  Will assess SIPATH score once pt is educated about the transplant process.

## 2025-01-17 NOTE — PRE PROCEDURE NOTE - PRE PROCEDURE EVALUATION
Interventional Radiology    HPI: 45 yo M with PMH decompensated ETOH cirrhosis c/b recurrent ascites, grade II EV, and HE, right calf hematoma 10/2024 (s/p fall)  presented to [facility] for abdominal pain and distension. Patient found to have ascites on exam and MANUELITO requiring HD initation. R now consulted for paracentesis. 2:1 albumin replacement.    Allergies: Salicylic Acid (Unknown)  sulfa drugs (Unknown)    Medications (Abx/Cardiac/Anticoagulation/Blood Products)  midodrine: 10 milliGRAM(s) Oral (01-17 @ 05:11)  piperacillin/tazobactam IVPB.: 200 mL/Hr IV Intermittent (01-16 @ 08:27)  piperacillin/tazobactam IVPB.-: 25 mL/Hr IV Intermittent (01-16 @ 17:42)  piperacillin/tazobactam IVPB..: 25 mL/Hr IV Intermittent (01-17 @ 05:12)  rifAXIMin: 550 milliGRAM(s) Oral (01-17 @ 05:11)    Data:    108.3  T(C): 36.6  HR: 92  BP: 120/72  RR: 20  SpO2: 96%    Exam  General: No acute distress  Chest: Non labored breathing  Abdomen: Non-distended  Extremities: No swelling, warm    -WBC 13.96 / HgB 7.4 / Hct 22.4 / Plt 73  -Na -- / Cl -- / BUN -- / Glucose --  -K -- / CO2 -- / Cr --  -ALT 7 / Alk Phos 116 / T.Bili 5.8  -INR1.88    Imaging: Reviewed    Plan: 44y Male presents for paracentesis.    -Risks/Benefits/alternatives explained with the patient and/or healthcare proxy and witnessed informed consent obtained.     --  Davdi Chapin NP  Interventional Radiology  Available on [employer] TEAMS / [email or web address]    For EMERGENT inquiries/questions:  IR Pager ([facility]): [phone]    For non-emergent consults/questions:   Please place a sunrise order "Consult- Interventional Radiology" with an appropriate callback number    For questions about scheduling during appropriate work hours, call IR :  [facility]: [phone]    For outpatient IR booking:  [facility]: [phone]

## 2025-01-17 NOTE — BH CONSULTATION LIAISON ASSESSMENT NOTE - DESCRIPTION
, has 2 children (Dtr age 4.5, son age 2), currently staying in  with mother in between medical treatments, wife/children are in [place]    pt currently unemployed, previously worked in

## 2025-01-17 NOTE — BH CONSULTATION LIAISON ASSESSMENT NOTE - CURRENT MEDICATION
MEDICATIONS  (STANDING):  chlorhexidine 2% Cloths 1 Application(s) Topical daily  folic acid 1 milliGRAM(s) Oral daily  lactulose Syrup 20 Gram(s) Oral every 12 hours  melatonin 5 milliGRAM(s) Oral at bedtime  midodrine 10 milliGRAM(s) Oral every 8 hours  Nephro-shania 1 Tablet(s) Oral daily  pantoprazole    Tablet 40 milliGRAM(s) Oral before breakfast  piperacillin/tazobactam IVPB.. 3.375 Gram(s) IV Intermittent every 12 hours  rifAXIMin 550 milliGRAM(s) Oral every 12 hours  thiamine 100 milliGRAM(s) Oral daily    MEDICATIONS  (PRN):  albuterol    90 MICROgram(s) HFA Inhaler 2 Puff(s) Inhalation every 6 hours PRN Bronchospasm

## 2025-01-17 NOTE — PROCEDURE NOTE - PROCEDURE FINDINGS AND DETAILS
Successful paracentesis via RUQ site. Site anesthetized, 5 fr drainer introduced under US guidance, 5500cc clear yellow fluid drained to completion. Patient tolerate procedure well. 3 albumin administered for 2:1 ascites replacement. Full report to follow.

## 2025-01-17 NOTE — DIETITIAN INITIAL EVALUATION ADULT - ADD RECOMMEND
1) Liberalize to regular diet   2) Add Ensure Max x1/day   3) multivitamin, folic acid, thiamine   4) Monitor for malnutrition   5) Monitor PO intake, diet tolerance, weight trends, labs, GI function, and skin integrity    Lila Hoskins MS, RDN, CDN (Teams)

## 2025-01-17 NOTE — BH CONSULTATION LIAISON ASSESSMENT NOTE - NSBHCHARTREVIEWVS_PSY_A_CORE FT
Vital Signs Last 24 Hrs  T(C): 36.9 (17 Jan 2025 12:53), Max: 37.3 (17 Jan 2025 00:35)  T(F): 98.4 (17 Jan 2025 12:53), Max: 99.2 (17 Jan 2025 00:35)  HR: 82 (17 Jan 2025 12:53) (71 - 106)  BP: 110/58 (17 Jan 2025 12:53) (109/54 - 129/62)  BP(mean): 89 (16 Jan 2025 18:00) (80 - 89)  RR: 20 (17 Jan 2025 12:53) (16 - 36)  SpO2: 99% (17 Jan 2025 12:53) (95% - 99%)    Parameters below as of 17 Jan 2025 12:53  Patient On (Oxygen Delivery Method): room air

## 2025-01-17 NOTE — DIETITIAN INITIAL EVALUATION ADULT - PERTINENT LABORATORY DATA
01-17    132[L]  |  98  |  47[H]  ----------------------------<  153[H]  4.6   |  21[L]  |  3.04[H]    Ca    8.8      17 Jan 2025 09:10  Phos  4.3     01-17  Mg     2.0     01-17    TPro  6.4  /  Alb  2.5[L]  /  TBili  5.8[H]  /  DBili  2.1[H]  /  AST  32  /  ALT  7[L]  /  AlkPhos  116  01-17  POCT Blood Glucose.: 141 mg/dL (01-17-25 @ 08:35)  A1C with Estimated Average Glucose Result: 5.0 % (01-17-25 @ 06:40)

## 2025-01-18 LAB
ALBUMIN SERPL ELPH-MCNC: 2.8 G/DL — LOW (ref 3.3–5)
ALP SERPL-CCNC: 117 U/L — SIGNIFICANT CHANGE UP (ref 40–120)
ALT FLD-CCNC: <5 U/L — LOW (ref 10–45)
ANION GAP SERPL CALC-SCNC: 13 MMOL/L — SIGNIFICANT CHANGE UP (ref 5–17)
APTT BLD: 42 SEC — HIGH (ref 24.5–35.6)
AST SERPL-CCNC: 28 U/L — SIGNIFICANT CHANGE UP (ref 10–40)
BASOPHILS # BLD AUTO: 0.08 K/UL — SIGNIFICANT CHANGE UP (ref 0–0.2)
BASOPHILS NFR BLD AUTO: 0.6 % — SIGNIFICANT CHANGE UP (ref 0–2)
BILIRUB DIRECT SERPL-MCNC: 2.1 MG/DL — HIGH (ref 0–0.3)
BILIRUB INDIRECT FLD-MCNC: 3.3 MG/DL — HIGH (ref 0.2–1)
BILIRUB SERPL-MCNC: 5.4 MG/DL — HIGH (ref 0.2–1.2)
BILIRUB SERPL-MCNC: 5.5 MG/DL — HIGH (ref 0.2–1.2)
BUN SERPL-MCNC: 52 MG/DL — HIGH (ref 7–23)
CALCIUM SERPL-MCNC: 8.7 MG/DL — SIGNIFICANT CHANGE UP (ref 8.4–10.5)
CHLORIDE SERPL-SCNC: 99 MMOL/L — SIGNIFICANT CHANGE UP (ref 96–108)
CMV IGG FLD QL: >10 U/ML — HIGH
CMV IGG SERPL-IMP: POSITIVE
CO2 SERPL-SCNC: 20 MMOL/L — LOW (ref 22–31)
CREAT SERPL-MCNC: 3.04 MG/DL — HIGH (ref 0.5–1.3)
CREAT SERPL-MCNC: 3.15 MG/DL — HIGH (ref 0.5–1.3)
CULTURE RESULTS: ABNORMAL
EBV EA AB SER IA-ACNC: 35 U/ML — HIGH
EBV EA AB TITR SER IF: POSITIVE
EBV EA IGG SER-ACNC: POSITIVE
EBV NA IGG SER IA-ACNC: >600 U/ML — HIGH
EBV PATRN SPEC IB-IMP: SIGNIFICANT CHANGE UP
EBV VCA IGG AVIDITY SER QL IA: POSITIVE
EBV VCA IGM SER IA-ACNC: <10 U/ML — SIGNIFICANT CHANGE UP
EBV VCA IGM SER IA-ACNC: >750 U/ML — HIGH
EBV VCA IGM TITR FLD: NEGATIVE — SIGNIFICANT CHANGE UP
EGFR: 24 ML/MIN/1.73M2 — LOW
EGFR: 24 ML/MIN/1.73M2 — LOW
EGFR: 25 ML/MIN/1.73M2 — LOW
EGFR: 25 ML/MIN/1.73M2 — LOW
EOSINOPHIL # BLD AUTO: 0.38 K/UL — SIGNIFICANT CHANGE UP (ref 0–0.5)
EOSINOPHIL NFR BLD AUTO: 3.1 % — SIGNIFICANT CHANGE UP (ref 0–6)
GLUCOSE SERPL-MCNC: 127 MG/DL — HIGH (ref 70–99)
HAV IGG SER QL IA: REACTIVE
HAV IGM SER-ACNC: SIGNIFICANT CHANGE UP
HBV CORE AB SER-ACNC: SIGNIFICANT CHANGE UP
HBV SURFACE AB SER-ACNC: 55.9 MIU/ML — SIGNIFICANT CHANGE UP
HBV SURFACE AB SER-ACNC: REACTIVE — SIGNIFICANT CHANGE UP
HCT VFR BLD CALC: 22.7 % — LOW (ref 39–50)
HGB BLD-MCNC: 7.6 G/DL — LOW (ref 13–17)
HIV 1+2 AB+HIV1 P24 AG SERPL QL IA: SIGNIFICANT CHANGE UP
HSV1 IGG SER-ACNC: 3.91 INDEX — HIGH
HSV1 IGG SERPL QL IA: POSITIVE
HSV2 IGG FLD-ACNC: 0.32 INDEX — SIGNIFICANT CHANGE UP
HSV2 IGG SERPL QL IA: NEGATIVE — SIGNIFICANT CHANGE UP
IMM GRANULOCYTES NFR BLD AUTO: 1.1 % — HIGH (ref 0–0.9)
INR BLD: 1.86 RATIO — HIGH (ref 0.85–1.16)
INR BLD: 1.93 RATIO — HIGH (ref 0.85–1.16)
LYMPHOCYTES # BLD AUTO: 1 K/UL — SIGNIFICANT CHANGE UP (ref 1–3.3)
LYMPHOCYTES # BLD AUTO: 8 % — LOW (ref 13–44)
MAGNESIUM SERPL-MCNC: 2.1 MG/DL — SIGNIFICANT CHANGE UP (ref 1.6–2.6)
MCHC RBC-ENTMCNC: 30.2 PG — SIGNIFICANT CHANGE UP (ref 27–34)
MCHC RBC-ENTMCNC: 33.5 G/DL — SIGNIFICANT CHANGE UP (ref 32–36)
MCV RBC AUTO: 90.1 FL — SIGNIFICANT CHANGE UP (ref 80–100)
MELD SCORE WITH DIALYSIS: 34 POINTS — SIGNIFICANT CHANGE UP
MELD SCORE WITHOUT DIALYSIS: 32 POINTS — SIGNIFICANT CHANGE UP
MEV IGG SER-ACNC: 155 AU/ML — SIGNIFICANT CHANGE UP
MEV IGG+IGM SER-IMP: POSITIVE — SIGNIFICANT CHANGE UP
MONOCYTES # BLD AUTO: 1.53 K/UL — HIGH (ref 0–0.9)
MONOCYTES NFR BLD AUTO: 12.3 % — SIGNIFICANT CHANGE UP (ref 2–14)
MUV AB SER-ACNC: POSITIVE — SIGNIFICANT CHANGE UP
MUV IGG FLD-ACNC: 191 AU/ML — SIGNIFICANT CHANGE UP
NEUTROPHILS # BLD AUTO: 9.32 K/UL — HIGH (ref 1.8–7.4)
NEUTROPHILS NFR BLD AUTO: 74.9 % — SIGNIFICANT CHANGE UP (ref 43–77)
NRBC # BLD: 0 /100 WBCS — SIGNIFICANT CHANGE UP (ref 0–0)
NRBC BLD-RTO: 0 /100 WBCS — SIGNIFICANT CHANGE UP (ref 0–0)
ORGANISM # SPEC MICROSCOPIC CNT: ABNORMAL
ORGANISM # SPEC MICROSCOPIC CNT: ABNORMAL
PHOSPHATE SERPL-MCNC: 4.4 MG/DL — SIGNIFICANT CHANGE UP (ref 2.5–4.5)
PLATELET # BLD AUTO: 72 K/UL — LOW (ref 150–400)
POTASSIUM SERPL-MCNC: 4.9 MMOL/L — SIGNIFICANT CHANGE UP (ref 3.5–5.3)
POTASSIUM SERPL-SCNC: 4.9 MMOL/L — SIGNIFICANT CHANGE UP (ref 3.5–5.3)
PROT SERPL-MCNC: 6.5 G/DL — SIGNIFICANT CHANGE UP (ref 6–8.3)
PROTHROM AB SERPL-ACNC: 21.3 SEC — HIGH (ref 9.9–13.4)
PROTHROM AB SERPL-ACNC: 22.1 SEC — HIGH (ref 9.9–13.4)
RAPIDTEG MAXIMUM AMPLITUDE: 50.5 MM — LOW (ref 52–70)
RBC # BLD: 2.52 M/UL — LOW (ref 4.2–5.8)
RBC # FLD: 21 % — HIGH (ref 10.3–14.5)
RUBV IGG SER-ACNC: 13.3 INDEX — SIGNIFICANT CHANGE UP
RUBV IGG SER-IMP: POSITIVE — SIGNIFICANT CHANGE UP
SODIUM SERPL-SCNC: 131 MMOL/L — LOW (ref 135–145)
SODIUM SERPL-SCNC: 132 MMOL/L — LOW (ref 135–145)
SPECIMEN SOURCE: SIGNIFICANT CHANGE UP
T GONDII IGG SER QL: <3 IU/ML — SIGNIFICANT CHANGE UP
T GONDII IGG SER QL: NEGATIVE — SIGNIFICANT CHANGE UP
T PALLIDUM AB TITR SER: NEGATIVE — SIGNIFICANT CHANGE UP
TEG FUNCTIONAL FIBRINOGEN: 15.8 MM — SIGNIFICANT CHANGE UP (ref 15–32)
TEG LY30 (LYSIS): 1.5 % — SIGNIFICANT CHANGE UP (ref 0–2.6)
TEG REACTION TIME: 8.2 MIN — SIGNIFICANT CHANGE UP (ref 4.6–9.1)
VZV IGG FLD QL IA: 20 S/CO — SIGNIFICANT CHANGE UP
VZV IGG FLD QL IA: POSITIVE — SIGNIFICANT CHANGE UP
WBC # BLD: 12.45 K/UL — HIGH (ref 3.8–10.5)
WBC # FLD AUTO: 12.45 K/UL — HIGH (ref 3.8–10.5)

## 2025-01-18 PROCEDURE — G0545: CPT

## 2025-01-18 PROCEDURE — 32557 INSERT CATH PLEURA W/ IMAGE: CPT | Mod: RT

## 2025-01-18 PROCEDURE — 99232 SBSQ HOSP IP/OBS MODERATE 35: CPT | Mod: GC

## 2025-01-18 PROCEDURE — 90935 HEMODIALYSIS ONE EVALUATION: CPT

## 2025-01-18 PROCEDURE — 74183 MRI ABD W/O CNTR FLWD CNTR: CPT | Mod: 26,76

## 2025-01-18 PROCEDURE — 99223 1ST HOSP IP/OBS HIGH 75: CPT

## 2025-01-18 RX ORDER — METRONIDAZOLE 250 MG
500 TABLET ORAL
Refills: 0 | Status: COMPLETED | OUTPATIENT
Start: 2025-01-18 | End: 2025-01-25

## 2025-01-18 RX ORDER — TRAMADOL HYDROCHLORIDE 50 MG/1
50 TABLET, FILM COATED ORAL ONCE
Refills: 0 | Status: DISCONTINUED | OUTPATIENT
Start: 2025-01-18 | End: 2025-01-18

## 2025-01-18 RX ADMIN — Medication 1 APPLICATION(S): at 14:16

## 2025-01-18 RX ADMIN — Medication 500 MILLIGRAM(S): at 17:45

## 2025-01-18 RX ADMIN — MIDODRINE HYDROCHLORIDE 10 MILLIGRAM(S): 5 TABLET ORAL at 21:29

## 2025-01-18 RX ADMIN — MIDODRINE HYDROCHLORIDE 10 MILLIGRAM(S): 5 TABLET ORAL at 05:26

## 2025-01-18 RX ADMIN — TRAMADOL HYDROCHLORIDE 50 MILLIGRAM(S): 50 TABLET, FILM COATED ORAL at 21:52

## 2025-01-18 RX ADMIN — Medication 100 MILLIGRAM(S): at 14:16

## 2025-01-18 RX ADMIN — Medication 25 GRAM(S): at 05:26

## 2025-01-18 RX ADMIN — TRAMADOL HYDROCHLORIDE 25 MILLIGRAM(S): 50 TABLET, FILM COATED ORAL at 18:46

## 2025-01-18 RX ADMIN — Medication 40 MILLIGRAM(S): at 05:26

## 2025-01-18 RX ADMIN — TRAMADOL HYDROCHLORIDE 50 MILLIGRAM(S): 50 TABLET, FILM COATED ORAL at 22:30

## 2025-01-18 RX ADMIN — LACTULOSE 20 GRAM(S): 10 SOLUTION ORAL at 14:15

## 2025-01-18 RX ADMIN — TRAMADOL HYDROCHLORIDE 25 MILLIGRAM(S): 50 TABLET, FILM COATED ORAL at 17:46

## 2025-01-18 RX ADMIN — Medication 5 MILLIGRAM(S): at 21:29

## 2025-01-18 RX ADMIN — MIRTAZAPINE 7.5 MILLIGRAM(S): 30 TABLET, FILM COATED ORAL at 21:29

## 2025-01-18 RX ADMIN — FOLIC ACID 1 MILLIGRAM(S): 1 TABLET ORAL at 14:16

## 2025-01-18 RX ADMIN — MIDODRINE HYDROCHLORIDE 10 MILLIGRAM(S): 5 TABLET ORAL at 14:16

## 2025-01-18 RX ADMIN — Medication 1 TABLET(S): at 14:16

## 2025-01-18 RX ADMIN — Medication 25 GRAM(S): at 21:29

## 2025-01-18 NOTE — PROCEDURE NOTE - PROCEDURE FINDINGS AND DETAILS
Successful ultrasound-guided right chest tube placement.  650cc of purulent fluid aspirated and sent for analysis.  Patient tolerated the procedure well without immediate complication.   Chest tube management per primary team.

## 2025-01-18 NOTE — PROGRESS NOTE ADULT - SUBJECTIVE AND OBJECTIVE BOX
Interval Events:   NAEO  patient with dry cough and normal respiratory effort, otherwise feeling well  Afebrile, vitally stable, NAD   CT Chest with loculated effusion  MELD 34    Hospital Medications:  albuterol    90 MICROgram(s) HFA Inhaler 2 Puff(s) Inhalation every 6 hours PRN  chlorhexidine 2% Cloths 1 Application(s) Topical daily  folic acid 1 milliGRAM(s) Oral daily  lactulose Syrup 20 Gram(s) Oral every 12 hours  melatonin 5 milliGRAM(s) Oral at bedtime  metroNIDAZOLE    Tablet 500 milliGRAM(s) Oral two times a day  midodrine 10 milliGRAM(s) Oral every 8 hours  mirtazapine 7.5 milliGRAM(s) Oral at bedtime  Nephro-shania 1 Tablet(s) Oral daily  pantoprazole    Tablet 40 milliGRAM(s) Oral before breakfast  piperacillin/tazobactam IVPB.. 3.375 Gram(s) IV Intermittent every 12 hours  rifAXIMin 550 milliGRAM(s) Oral every 12 hours  thiamine 100 milliGRAM(s) Oral daily  traMADol 25 milliGRAM(s) Oral every 8 hours PRN      ROS: See above.    PHYSICAL EXAM:   Vital Signs:  Vital Signs Last 24 Hrs  T(C): 36.8 (18 Jan 2025 13:00), Max: 37.5 (18 Jan 2025 00:00)  T(F): 98.2 (18 Jan 2025 13:00), Max: 99.5 (18 Jan 2025 00:00)  HR: 82 (18 Jan 2025 13:00) (82 - 102)  BP: 115/63 (18 Jan 2025 13:00) (102/56 - 119/66)  BP(mean): --  RR: 18 (18 Jan 2025 13:00) (16 - 18)  SpO2: 98% (18 Jan 2025 13:00) (94% - 99%)    Parameters below as of 18 Jan 2025 13:00  Patient On (Oxygen Delivery Method): room air      GENERAL:  NAD, resting comfortably in bed  HEENT:  sclera icteric  RESPIRATORY:  Normal Effort  CARDIAC:  HDS  ABDOMEN:  Soft, non-tender, non-distended  SKIN:  Warm & Dry. jaundice  NEURO:  Alert, conversant, no focal deficit    LABS:                        7.6    12.45 )-----------( 72       ( 18 Jan 2025 06:29 )             22.7     Mean Cell Volume: 90.1 fl (01-18-25 @ 06:29)    01-18    131[L]  |  99  |  52[H]  ----------------------------<  127[H]  4.9   |  20[L]  |  3.04[H]    Ca    8.7      18 Jan 2025 06:33  Phos  4.4     01-18  Mg     2.1     01-18    TPro  6.5  /  Alb  2.8[L]  /  TBili  5.5[H]  /  DBili  2.1[H]  /  AST  28  /  ALT  <5[L]  /  AlkPhos  117  01-18    LIVER FUNCTIONS - ( 18 Jan 2025 06:33 )  Alb: 2.8 g/dL / Pro: 6.5 g/dL / ALK PHOS: 117 U/L / ALT: <5 U/L / AST: 28 U/L / GGT: x           PT/INR - ( 18 Jan 2025 06:33 )   PT: 21.3 sec;   INR: 1.86 ratio         PTT - ( 18 Jan 2025 06:29 )  PTT:42.0 sec   Interval Events:   NAEO  patient with dry cough and normal respiratory effort, otherwise feeling well  Afebrile, vitally stable, NAD   CT Chest with loculated effusion  MELD 34    Hospital Medications:  albuterol    90 MICROgram(s) HFA Inhaler 2 Puff(s) Inhalation every 6 hours PRN  chlorhexidine 2% Cloths 1 Application(s) Topical daily  folic acid 1 milliGRAM(s) Oral daily  lactulose Syrup 20 Gram(s) Oral every 12 hours  melatonin 5 milliGRAM(s) Oral at bedtime  metroNIDAZOLE    Tablet 500 milliGRAM(s) Oral two times a day  midodrine 10 milliGRAM(s) Oral every 8 hours  mirtazapine 7.5 milliGRAM(s) Oral at bedtime  Nephro-shania 1 Tablet(s) Oral daily  pantoprazole    Tablet 40 milliGRAM(s) Oral before breakfast  piperacillin/tazobactam IVPB.. 3.375 Gram(s) IV Intermittent every 12 hours  rifAXIMin 550 milliGRAM(s) Oral every 12 hours  thiamine 100 milliGRAM(s) Oral daily  traMADol 25 milliGRAM(s) Oral every 8 hours PRN      ROS: See above.    PHYSICAL EXAM:   Vital Signs:  Vital Signs Last 24 Hrs  T(C): 36.8 (18 Jan 2025 13:00), Max: 37.5 (18 Jan 2025 00:00)  T(F): 98.2 (18 Jan 2025 13:00), Max: 99.5 (18 Jan 2025 00:00)  HR: 82 (18 Jan 2025 13:00) (82 - 102)  BP: 115/63 (18 Jan 2025 13:00) (102/56 - 119/66)  BP(mean): --  RR: 18 (18 Jan 2025 13:00) (16 - 18)  SpO2: 98% (18 Jan 2025 13:00) (94% - 99%)    Parameters below as of 18 Jan 2025 13:00  Patient On (Oxygen Delivery Method): room air      GENERAL:  NAD, resting comfortably in bed  HEENT:  sclera icteric  RESPIRATORY:  Normal Effort, decreased BS R base  CARDIAC:  HDS  ABDOMEN:  Soft, non-tender, non-distended  SKIN:  Warm & Dry. jaundiced  NEURO:  Alert, conversant, no focal deficit    LABS:                        7.6    12.45 )-----------( 72       ( 18 Jan 2025 06:29 )             22.7     Mean Cell Volume: 90.1 fl (01-18-25 @ 06:29)    01-18    131[L]  |  99  |  52[H]  ----------------------------<  127[H]  4.9   |  20[L]  |  3.04[H]    Ca    8.7      18 Jan 2025 06:33  Phos  4.4     01-18  Mg     2.1     01-18    TPro  6.5  /  Alb  2.8[L]  /  TBili  5.5[H]  /  DBili  2.1[H]  /  AST  28  /  ALT  <5[L]  /  AlkPhos  117  01-18    LIVER FUNCTIONS - ( 18 Jan 2025 06:33 )  Alb: 2.8 g/dL / Pro: 6.5 g/dL / ALK PHOS: 117 U/L / ALT: <5 U/L / AST: 28 U/L / GGT: x           PT/INR - ( 18 Jan 2025 06:33 )   PT: 21.3 sec;   INR: 1.86 ratio         PTT - ( 18 Jan 2025 06:29 )  PTT:42.0 sec
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
## 2025-01-18 NOTE — PROGRESS NOTE ADULT - ATTENDING COMMENTS
45 yo M with AUD (with PEth 255 and 213 ng/mL in 10/2024 and with repeat PEth sent on 1/17 and pending), obesity, ROSA, PUD, history of alcohol-associated hepatitis (7/2024), history of traumatic right calf hematoma (10/2024), and decompensated alcohol-associated cirrhosis complicated by ascites, pleural effusions, hyponatremia, and hepatic encephalopathy transferred from [facility] to [facility] on 1/16/25 due to recurrent ascites, increased right pleural effusion, and MANUELITO (with baseline Cr 1.1 in 10/2024) for which he has been receiving intermittent HD since 1/10, though need further urine studies (if feasible) to better understand the etiology of his MANUELITO. He underwent 5.5L LVP yesterday with 1:2 albumin replacement and no SBP, but CT chest (1/17) showed a moderate, loculated right pleural effusion with new locules of air concerning for possible empyema. HE is now s/p right chest tube placement today by IR with 650 mL of purulent fluid aspirated and sent for analysis. We will consult Thoracic Surgery tomorrow for chest tube management. Blood cultures (1/16) with 1 bottle MR Staph epidermidis, with repeat blood cultures (1/18) sent and pending. S/p IV vancomycin (1/17) and currently on Zosyn (1/16- ) as per Transplant ID, with metronidazole (1/18- ) added today given Giardia on GI PCR (1/17). MRI (1/18) was a limited study and therefore will obtain Doppler US to confirm patency of portal veins. ABO O with current MELD 3.0 score of 34 with liver transplant evaluation in progress.    Victoriano Marie M.D., Ph.D.  Transplant Hepatology

## 2025-01-18 NOTE — CONSULT NOTE ADULT - ASSESSMENT
45 yo M with PMH decompensated ETOH cirrhosis c/b recurrent ascites, grade II EV, and HE, right calf hematoma 10/2024 (s/p fall)  presented to [facility] for abdominal pain and distension. Patient found to have ascites on exam and MANUELITO requiring HD initation. PermCath was placed by vascular surgery on 1/10 with post procedure course c/b bleeding from insertion site. Transferred to [facility] SICU for further management.     Blood Cultures (1/16) 1/4 Staph epi.   GI PCR (1/17) + Giardia.   Paracentesis (1/17) 155 nucleated cell counts.     CT Chest (1/17) Moderate loculated right pleural effusion containing a few foci of air, which are new from 1/8/2025 and may represent empyema/bronchopleural fistula versus sequelae of prior thoracentesis.     #Giardia  Unclear if it's either lactulose or giardia contributing to diarrhea but reasonable to treat  --Agree with Metronidazole 500 mg Q12H    #Positive Blood Culture (Staph epi)  Concern for procurement contaminant  --Continue to follow CBC with diff  --Continue to follow temperature curve  --Follow up on preliminary blood cultures    #Pre-Liver Transplant Evaluation, Decompensated Cirrhosis  --Recommend COVID19 Nucleocapsid Antibody  --Recommend COVID19 Rodríguez Antibody  --Recommend HAV IgG  --Recommend HBVs Ab, HBVsAg, HBVc Ab  --Recommend HCV Ab  --Recommend HSV 1 IgG /HSV 2 IgG  --Recommend EBV IgG  --Recommend CMV IgG  --Recommend VZV IgG  --Recommend Measles IgG , Mumps IgG and Rubella IgG   --Recommend Quantiferon Gold  --Recommend HIV Ag/Ab by [facility]  --Recommend Syphilis Screen  --Recommend Toxoplasma IgG  --Recommend Strongyloides Ab  --Recommend Coccidiodes Ab  --Recommend Leishmania Ab  --Planned for right sided chest tube insertion, would ensure bacterial, fungal and AFB cultures are sent    #Encounter to Vaccinate Patient  COVID19: Would benefit from COVID19 [id number] Vaccine Dose  Influenza: Had vaccination for this year  Pneumococcal: Would benefit from PCV20  HAV: Check HAV IgG  HBV: Check HBVs Ab  MMR: Check MMR IgG   Varicella: Check Varicella IgG   Shingles: Will require Shingrix  Tdap: Tdap 6/23/24    I will continue to follow. Please feel free to contact me with any further questions.    Froilan Whitmore M.D.  [facility] Division of Infectious Disease  8AM-5PM Monday - Friday: Available on [employer] Teams  After Hours and Holidays (or if no response on [employer] Teams): Please contact the Infectious Diseases Office at [phone]    The above assessment and plan were discussed with Talon, transplant surgery pa

## 2025-01-18 NOTE — PROGRESS NOTE ADULT - ASSESSMENT
43 yo M with PMH of alcohol associated cirrhosis c/b recurrent ascites, grade II EV, and HE, alcohol associated hepatitis 7/2024, and right calf hematoma 10/2024 presenting to [facility] for abdominal pain and distension, found to have renal failure, large volume ascites and findings of right pleural effusion on CT chest.    Impression:  #Decompensated EtOH Associated Cirrhosis  #Hx of alcohol associated hepatitis 7/2024  MELD 3 score 34 on 1/18  Hx of decompensated cirrhosis c/b recurrent ascites requiring LVPs, grade II EV, and HE. Actively drinking. Now p/w abdominal pain/distension with worsening of liver tests possibly triggered by infection. DDx also includes alcohol associated hepatitis given hx of alc hep 7/2024 which improved with steroids  - OLT: send workup to activate evaluation, complex given active drinking. Will likely need colonoscopy (unknown last c-scope). Cardiology consult  - Ascites: large ascites on exam, requiring frequent LVPs         - Please perform paracentesis today (send cell count w/ diff, culture, albumin, total protein, and cytology)  - EV: EGD 7/2024 for melena showed grade II EV, small gastric ulcer (neg HP), and portal gastropathy. Takes nadolol at home. Holding BB for now given hypotension  - HE: c/w lactulose and rifaximin, goal 3-4 BM/day  - HCC screening: last CT w/ contrast 7/2024 no lesion seen. Due for repeat         - Obtain MRI abdomen with and without contrast and MRCP for HCC screening (liver protocol) and evaluate portal veins  - C/w home midodrine 10 mg tid  - C/w PPI for stress ulcer ppx and hx of ulcer  - Trend MELD labs daily (cmp, INR) daily  - MRI today     #Complex Right Pleural Effusion  #Leukocytosis  - CT chest noncontrast showing moderate sized complex R sided pleural effusion. No thoracentesis done per records  - F/u final CT chest report   - D/w IR pending chest tube placement. Check TEG pre-procedure  - F/u BCx and UCx for infectious workup  - C/w empiric zosyn for now  - Trend cbc and fever curve    #Acute Renal Failure  Found to have acute renal failure requiring dialysis at OSH, ddx includes HRS vs ATN. Permacath in place. Cr baseline 1.12 10/2024  - Transplant nephrology consult  - HD per nephrology. Plan for session today  - Trend CMP daily    #Anemia  #Thrombocytopenia  Normocytic anemia with Hgb 8.5 and plt count 64 likely due to chronic liver dz  - Trend cbc daily  - Transfuse Hgb>7, plt>10 (>20 if septic, >50 if bleeding)  - Hold DVT ppx for now given recent bleeding. If no further bleeding, likely can restart in 24-48 hrs    #Giardia infection  - Giardia lamblia detected in stool on 1/17. Not having significant diarrhea  - Treat with Metronidazole x 7 days (1/18-1/24)

## 2025-01-18 NOTE — CONSULT NOTE ADULT - SUBJECTIVE AND OBJECTIVE BOX
Patient is a 44y old  Male who presents with a chief complaint of decompensated cirrhosis (18 Jan 2025 14:15)      HPI:  45 yo M with PMH decompensated ETOH cirrhosis c/b recurrent ascites, grade II EV, and HE, right calf hematoma 10/2024 (s/p fall)  presented to [facility] for abdominal pain and distension. Patient found to have ascites on exam and MANUELITO requiring HD initation. PermCath was placed by vascular surgery on 1/10 with post procedure course c/b bleeding from insertion site. Transferred to [facility] SICU for further management.     ETOH use:   -daily drinking since 2015,  up to 1L whiskey every two days.  -dx liver disease approximately 3-4 years ago  attempted alcohol rehab program in the past, but continued to drink        (16 Jan 2025 08:32)     prior hospital charts reviewed [  ]  primary team notes reviewed [  ]  other consultant notes reviewed [  ]    PAST MEDICAL & SURGICAL HISTORY:  Sleep apnea, obstructive      Alcohol abuse      Cirrhosis of liver      Portal hypertension      H/O esophageal varices      Anemia      Mild alcohol use disorder      No significant past surgical history          Allergies  Salicylic Acid (Unknown)  sulfa drugs (Unknown)    ANTIMICROBIALS (past 90 days)  MEDICATIONS  (STANDING):    piperacillin/tazobactam IVPB.   200 mL/Hr IV Intermittent (01-16-25 @ 08:27)    piperacillin/tazobactam IVPB.-   25 mL/Hr IV Intermittent (01-16-25 @ 17:42)    piperacillin/tazobactam IVPB..   25 mL/Hr IV Intermittent (01-18-25 @ 05:26)   25 mL/Hr IV Intermittent (01-17-25 @ 17:56)   25 mL/Hr IV Intermittent (01-17-25 @ 05:12)    rifAXIMin   550 milliGRAM(s) Oral (01-18-25 @ 05:26)   550 milliGRAM(s) Oral (01-17-25 @ 17:22)   550 milliGRAM(s) Oral (01-17-25 @ 05:11)   550 milliGRAM(s) Oral (01-16-25 @ 17:42)    vancomycin  IVPB   300 mL/Hr IV Intermittent (01-17-25 @ 17:20)      ANTIMICROBIALS:    metroNIDAZOLE    Tablet 500 two times a day  piperacillin/tazobactam IVPB.. 3.375 every 12 hours  rifAXIMin 550 every 12 hours    OTHER MEDS: MEDICATIONS  (STANDING):  albuterol    90 MICROgram(s) HFA Inhaler 2 every 6 hours PRN  lactulose Syrup 20 every 12 hours  melatonin 5 at bedtime  midodrine 10 every 8 hours  mirtazapine 7.5 at bedtime  pantoprazole    Tablet 40 before breakfast  traMADol 25 every 8 hours PRN    SOCIAL HISTORY:   hx smoking  non-smoker    FAMILY HISTORY:  Family history of CABG (Mother)    FHx: multiple myeloma (Father)    FHx: diabetes mellitus (Father)      REVIEW OF SYSTEMS  [  ] ROS unobtainable because:    [  ] All other systems negative except as noted below:	    Constitutional:  [ ] fever [ ] chills  [ ] weight loss  [ ] weakness  Skin:  [ ] rash [ ] phlebitis	  Eyes: [ ] icterus [ ] pain  [ ] discharge	  ENMT: [ ] sore throat  [ ] thrush [ ] ulcers [ ] exudates  Respiratory: [ ] dyspnea [ ] hemoptysis [ ] cough [ ] sputum	  Cardiovascular:  [ ] chest pain [ ] palpitations [ ] edema	  Gastrointestinal:  [ ] nausea [ ] vomiting [ ] diarrhea [ ] constipation [ ] pain	  Genitourinary:  [ ] dysuria [ ] frequency [ ] hematuria [ ] discharge [ ] flank pain  [ ] incontinence  Musculoskeletal:  [ ] myalgias [ ] arthralgias [ ] arthritis  [ ] back pain  Neurological:  [ ] headache [ ] seizures  [ ] confusion/altered mental status  Psychiatric:  [ ] anxiety [ ] depression	  Hematology/Lymphatics:  [ ] lymphadenopathy  Endocrine:  [ ] adrenal [ ] thyroid  Allergic/Immunologic:	 [ ] transplant [ ] seasonal    Vital Signs Last 24 Hrs  T(F): 97.6 (01-18-25 @ 16:07), Max: 99.5 (01-18-25 @ 00:00)  Vital Signs Last 24 Hrs  HR: 84 (01-18-25 @ 16:07) (82 - 102)  BP: 119/56 (01-18-25 @ 16:07) (102/56 - 119/66)  RR: 18 (01-18-25 @ 16:07)  SpO2: 100% (01-18-25 @ 16:07) (94% - 100%)  Wt(kg): --    PHYSICAL EXAMINATION:  General: Alert and Awake, NAD  HEENT: PERRL, EOMI, No subconjunctival hemorrhages, Oropharynx Clear, MMM  Neck: Supple, No PINKY  Cardiac: RRR, No M/R/G  Resp: CTAB, No Wh/Rh/Ra  Abdomen: NBS, NT/ND, No HSM, No rigidity or guarding  MSK: No LE edema. No stigmata of IE. No evidence of phlebitis. No evidence of synovitis.  : No yates  Skin: No rashes or lesions. Skin is warm and dry to the touch.   Neuro: Alert and Awake. CN 2-12 Grossly intact. Moves all four extremities spontaneously.  Psych: Calm, Pleasant, Cooperative                          7.6    12.45 )-----------( 72       ( 18 Jan 2025 06:29 )             22.7     01-18    131[L]  |  99  |  52[H]  ----------------------------<  127[H]  4.9   |  20[L]  |  3.04[H]    Ca    8.7      18 Jan 2025 06:33  Phos  4.4     01-18  Mg     2.1     01-18    TPro  6.5  /  Alb  2.8[L]  /  TBili  5.5[H]  /  DBili  2.1[H]  /  AST  28  /  ALT  <5[L]  /  AlkPhos  117  01-18    Urinalysis Basic - ( 18 Jan 2025 06:33 )    Color: x / Appearance: x / SG: x / pH: x  Gluc: 127 mg/dL / Ketone: x  / Bili: x / Urobili: x   Blood: x / Protein: x / Nitrite: x   Leuk Esterase: x / RBC: x / WBC x   Sq Epi: x / Non Sq Epi: x / Bacteria: x    MICROBIOLOGY:  Culture - Fungal, Body Fluid (collected 17 Jan 2025 10:32)  Source: Peritoneal  Preliminary Report (18 Jan 2025 07:55):    Testing in progress    Culture - Body Fluid with Gram Stain (collected 17 Jan 2025 10:32)  Source: Ascites Fl  Gram Stain (17 Jan 2025 23:50):    No polymorphonuclear cells seen    No organisms seen    by cytocentrifuge    Culture - Blood (collected 16 Jan 2025 12:15)  Source: .Blood BLOOD  Preliminary Report (17 Jan 2025 18:01):    No growth at 24 hours    Culture - Blood (collected 16 Jan 2025 08:00)  Source: .Blood BLOOD  Gram Stain (17 Jan 2025 16:17):    Growth in aerobic bottle: Gram Positive Cocci in Clusters  Final Report (18 Jan 2025 14:57):    Growth in aerobic bottle: Staphylococcus epidermidis    Isolation of Coagulase negative Staphylococcus from single blood culture    sets may represent    contamination. Contact the Microbiology Department at [phone] if    susceptibility testing is needed.    clinically indicated.    Direct identification is available within approximately 3-5    hours either by Blood Panel Multiplexed PCR or Direct    MALDI-TOF. Details: [email or web address]  Organism: Blood Culture PCR (18 Jan 2025 14:57)  Organism: Blood Culture PCR (18 Jan 2025 14:57)      Method Type: PCR      -  Staphylococcus epidermidis, Methicillin resistant: Detec                    RADIOLOGY:    <The imaging below has been reviewed and visualized by me independently. Findings as detailed in report below>     Patient is a 44y old  Male who presents with a chief complaint of decompensated cirrhosis (18 Jan 2025 14:15)    HPI:    45 yo M with PMH decompensated ETOH cirrhosis c/b recurrent ascites, grade II EV, and HE, right calf hematoma 10/2024 (s/p fall)  presented to [facility] for abdominal pain and distension. Patient found to have ascites on exam and MANUELITO requiring HD initation. PermCath was placed by vascular surgery on 1/10 with post procedure course c/b bleeding from insertion site. Transferred to [facility] SICU for further management.     Blood Cultures (1/16) 1/4 Staph epi. GI PCR (1/17) + Giardia. Paracentesis (1/17) 155 nucleated cell counts. CT Chest (1/17) Moderate loculated right pleural effusion containing a few foci of air, which are new from 1/8/2025 and may represent empyema/bronchopleural fistula versus sequelae of prior thoracentesis.      prior hospital charts reviewed [  ]  primary team notes reviewed [ x ]  other consultant notes reviewed [x  ]    PAST MEDICAL & SURGICAL HISTORY:  Sleep apnea, obstructive      Alcohol abuse      Cirrhosis of liver      Portal hypertension      H/O esophageal varices      Anemia      Mild alcohol use disorder      No significant past surgical history          Allergies  Salicylic Acid (Unknown)  sulfa drugs (Unknown)    ANTIMICROBIALS (past 90 days)  MEDICATIONS  (STANDING):    piperacillin/tazobactam IVPB.   200 mL/Hr IV Intermittent (01-16-25 @ 08:27)    piperacillin/tazobactam IVPB.-   25 mL/Hr IV Intermittent (01-16-25 @ 17:42)    piperacillin/tazobactam IVPB..   25 mL/Hr IV Intermittent (01-18-25 @ 05:26)   25 mL/Hr IV Intermittent (01-17-25 @ 17:56)   25 mL/Hr IV Intermittent (01-17-25 @ 05:12)    rifAXIMin   550 milliGRAM(s) Oral (01-18-25 @ 05:26)   550 milliGRAM(s) Oral (01-17-25 @ 17:22)   550 milliGRAM(s) Oral (01-17-25 @ 05:11)   550 milliGRAM(s) Oral (01-16-25 @ 17:42)    vancomycin  IVPB   300 mL/Hr IV Intermittent (01-17-25 @ 17:20)      ANTIMICROBIALS:    metroNIDAZOLE    Tablet 500 two times a day  piperacillin/tazobactam IVPB.. 3.375 every 12 hours  rifAXIMin 550 every 12 hours    OTHER MEDS: MEDICATIONS  (STANDING):  albuterol    90 MICROgram(s) HFA Inhaler 2 every 6 hours PRN  lactulose Syrup 20 every 12 hours  melatonin 5 at bedtime  midodrine 10 every 8 hours  mirtazapine 7.5 at bedtime  pantoprazole    Tablet 40 before breakfast  traMADol 25 every 8 hours PRN    SOCIAL HISTORY: [place] in the [place]. Travelled to [facility] frequently (last in 2024). [place] his [place]. Works in [employer] for [employer]. No pets. No unusual hobbies. No prior positive latent MTB testing.     FAMILY HISTORY:  Family history of CABG (Mother)    FHx: multiple myeloma (Father)    FHx: diabetes mellitus (Father)      REVIEW OF SYSTEMS  [  ] ROS unobtainable because:    [ x ] All other systems negative except as noted below:	    Constitutional:  [ ] fever [ ] chills  [ ] weight loss  [x ] weakness  Skin:  [ ] rash [ ] phlebitis	  Eyes: [ ] icterus [ ] pain  [ ] discharge	  ENMT: [ ] sore throat  [ ] thrush [ ] ulcers [ ] exudates  Respiratory: [ ] dyspnea [ ] hemoptysis [ ] cough [ ] sputum	  Cardiovascular:  [ ] chest pain [ ] palpitations [ ] edema	  Gastrointestinal:  [ ] nausea [ ] vomiting [ ] diarrhea [ ] constipation [ ] pain	  Genitourinary:  [ ] dysuria [ ] frequency [ ] hematuria [ ] discharge [ ] flank pain  [ ] incontinence  Musculoskeletal:  [ ] myalgias [ ] arthralgias [ ] arthritis  [ ] back pain  Neurological:  [ ] headache [ ] seizures  [ ] confusion/altered mental status  Psychiatric:  [ ] anxiety [ ] depression	  Hematology/Lymphatics:  [ ] lymphadenopathy  Endocrine:  [ ] adrenal [ ] thyroid  Allergic/Immunologic:	 [ ] transplant [ ] seasonal    Vital Signs Last 24 Hrs  T(F): 97.6 (01-18-25 @ 16:07), Max: 99.5 (01-18-25 @ 00:00)  Vital Signs Last 24 Hrs  HR: 84 (01-18-25 @ 16:07) (82 - 102)  BP: 119/56 (01-18-25 @ 16:07) (102/56 - 119/66)  RR: 18 (01-18-25 @ 16:07)  SpO2: 100% (01-18-25 @ 16:07) (94% - 100%)  Wt(kg): --    PHYSICAL EXAMINATION:  General: Alert and Awake, NAD, jaundice  HEENT: PERRL, EOMI, No subconjunctival hemorrhages, Oropharynx Clear, MMM  Neck: Supple, No PINKY  Cardiac: RRR, No M/R/G  Resp: CTAB, No Wh/Rh/Ra  Abdomen: NBS, NT/ND, No HSM, No rigidity or guarding  MSK: 1-2+ LE edema. No stigmata of IE. No evidence of phlebitis. No evidence of synovitis.  : No yates  Skin: No rashes or lesions. Skin is warm and dry to the touch.   Neuro: Alert and Awake. CN 2-12 Grossly intact. Moves all four extremities spontaneously.  Psych: Calm, Pleasant, Cooperative                          7.6    12.45 )-----------( 72       ( 18 Jan 2025 06:29 )             22.7     01-18    131[L]  |  99  |  52[H]  ----------------------------<  127[H]  4.9   |  20[L]  |  3.04[H]    Ca    8.7      18 Jan 2025 06:33  Phos  4.4     01-18  Mg     2.1     01-18    TPro  6.5  /  Alb  2.8[L]  /  TBili  5.5[H]  /  DBili  2.1[H]  /  AST  28  /  ALT  <5[L]  /  AlkPhos  117  01-18    Urinalysis Basic - ( 18 Jan 2025 06:33 )    Color: x / Appearance: x / SG: x / pH: x  Gluc: 127 mg/dL / Ketone: x  / Bili: x / Urobili: x   Blood: x / Protein: x / Nitrite: x   Leuk Esterase: x / RBC: x / WBC x   Sq Epi: x / Non Sq Epi: x / Bacteria: x    MICROBIOLOGY:    Culture - Fungal, Body Fluid (collected 17 Jan 2025 10:32)  Source: Peritoneal  Preliminary Report (18 Jan 2025 07:55):    Testing in progress    Culture - Body Fluid with Gram Stain (collected 17 Jan 2025 10:32)  Source: Ascites Fl  Gram Stain (17 Jan 2025 23:50):    No polymorphonuclear cells seen    No organisms seen    by cytocentrifuge    Culture - Blood (collected 16 Jan 2025 12:15)  Source: .Blood BLOOD  Preliminary Report (17 Jan 2025 18:01):    No growth at 24 hours    Culture - Blood (collected 16 Jan 2025 08:00)  Source: .Blood BLOOD  Gram Stain (17 Jan 2025 16:17):    Growth in aerobic bottle: Gram Positive Cocci in Clusters  Final Report (18 Jan 2025 14:57):    Growth in aerobic bottle: Staphylococcus epidermidis    Isolation of Coagulase negative Staphylococcus from single blood culture    sets may represent    contamination. Contact the Microbiology Department at [phone] if    susceptibility testing is needed.    clinically indicated.    Direct identification is available within approximately 3-5    hours either by Blood Panel Multiplexed PCR or Direct    MALDI-TOF. Details: [email or web address]  Organism: Blood Culture PCR (18 Jan 2025 14:57)  Organism: Blood Culture PCR (18 Jan 2025 14:57)      Method Type: PCR      -  Staphylococcus epidermidis, Methicillin resistant: Detec    RADIOLOGY:    <The imaging below has been reviewed and visualized by me independently. Findings as detailed in report below>    < from: MR MRCP w/wo IV Cont (01.18.25 @ 12:03) >  IMPRESSION:  *  Limited examination due to increased artifacts associated with ascites   and scanning on 3 Chelle magnet. Consider repeat scanning on a 1.5 Chelle   magnet after paracentesis.  *  Nondiagnostic evaluation of the portal veins secondary to artifact in   the hafsa hepatis. Consider repeat MRI as above or duplex ultrasound.  *  Cirrhosis without obvious liver lesion.  *  Portal hypertension including splenomegaly, large ascites, and   extensive upper abdominal varices.    < end of copied text >

## 2025-01-19 LAB
ACANTHOCYTES BLD QL SMEAR: SLIGHT — SIGNIFICANT CHANGE UP
ALBUMIN SERPL ELPH-MCNC: 2.6 G/DL — LOW (ref 3.3–5)
ALP SERPL-CCNC: 97 U/L — SIGNIFICANT CHANGE UP (ref 40–120)
ALT FLD-CCNC: 7 U/L — LOW (ref 10–45)
ANION GAP SERPL CALC-SCNC: 11 MMOL/L — SIGNIFICANT CHANGE UP (ref 5–17)
ANISOCYTOSIS BLD QL: SIGNIFICANT CHANGE UP
APTT BLD: 41.2 SEC — HIGH (ref 24.5–35.6)
AST SERPL-CCNC: 25 U/L — SIGNIFICANT CHANGE UP (ref 10–40)
BASOPHILS # BLD AUTO: 0 K/UL — SIGNIFICANT CHANGE UP (ref 0–0.2)
BASOPHILS # BLD AUTO: 0.08 K/UL — SIGNIFICANT CHANGE UP (ref 0–0.2)
BASOPHILS NFR BLD AUTO: 0 % — SIGNIFICANT CHANGE UP (ref 0–2)
BASOPHILS NFR BLD AUTO: 0.8 % — SIGNIFICANT CHANGE UP (ref 0–2)
BILIRUB DIRECT SERPL-MCNC: 2.1 MG/DL — HIGH (ref 0–0.3)
BILIRUB INDIRECT FLD-MCNC: 3.9 MG/DL — HIGH (ref 0.2–1)
BILIRUB SERPL-MCNC: 6 MG/DL — HIGH (ref 0.2–1.2)
BLD GP AB SCN SERPL QL: NEGATIVE — SIGNIFICANT CHANGE UP
BUN SERPL-MCNC: 39 MG/DL — HIGH (ref 7–23)
BURR CELLS BLD QL SMEAR: PRESENT — SIGNIFICANT CHANGE UP
CALCIUM SERPL-MCNC: 8.5 MG/DL — SIGNIFICANT CHANGE UP (ref 8.4–10.5)
CHLORIDE SERPL-SCNC: 97 MMOL/L — SIGNIFICANT CHANGE UP (ref 96–108)
CO2 SERPL-SCNC: 22 MMOL/L — SIGNIFICANT CHANGE UP (ref 22–31)
CREAT SERPL-MCNC: 2.36 MG/DL — HIGH (ref 0.5–1.3)
EGFR: 34 ML/MIN/1.73M2 — LOW
EGFR: 34 ML/MIN/1.73M2 — LOW
EOSINOPHIL # BLD AUTO: 0.44 K/UL — SIGNIFICANT CHANGE UP (ref 0–0.5)
EOSINOPHIL # BLD AUTO: 0.63 K/UL — HIGH (ref 0–0.5)
EOSINOPHIL NFR BLD AUTO: 4.4 % — SIGNIFICANT CHANGE UP (ref 0–6)
EOSINOPHIL NFR BLD AUTO: 5.4 % — SIGNIFICANT CHANGE UP (ref 0–6)
GLUCOSE SERPL-MCNC: 132 MG/DL — HIGH (ref 70–99)
HCT VFR BLD CALC: 20.6 % — CRITICAL LOW (ref 39–50)
HCT VFR BLD CALC: 25.9 % — LOW (ref 39–50)
HGB BLD-MCNC: 6.8 G/DL — CRITICAL LOW (ref 13–17)
HGB BLD-MCNC: 8.5 G/DL — LOW (ref 13–17)
IMM GRANULOCYTES NFR BLD AUTO: 0.5 % — SIGNIFICANT CHANGE UP (ref 0–0.9)
INR BLD: 1.94 RATIO — HIGH (ref 0.85–1.16)
LYMPHOCYTES # BLD AUTO: 0.63 K/UL — LOW (ref 1–3.3)
LYMPHOCYTES # BLD AUTO: 1.22 K/UL — SIGNIFICANT CHANGE UP (ref 1–3.3)
LYMPHOCYTES # BLD AUTO: 12.1 % — LOW (ref 13–44)
LYMPHOCYTES # BLD AUTO: 5.4 % — LOW (ref 13–44)
MACROCYTES BLD QL: SLIGHT — SIGNIFICANT CHANGE UP
MAGNESIUM SERPL-MCNC: 1.9 MG/DL — SIGNIFICANT CHANGE UP (ref 1.6–2.6)
MANUAL SMEAR VERIFICATION: SIGNIFICANT CHANGE UP
MCHC RBC-ENTMCNC: 29.4 PG — SIGNIFICANT CHANGE UP (ref 27–34)
MCHC RBC-ENTMCNC: 29.6 PG — SIGNIFICANT CHANGE UP (ref 27–34)
MCHC RBC-ENTMCNC: 32.8 G/DL — SIGNIFICANT CHANGE UP (ref 32–36)
MCHC RBC-ENTMCNC: 33 G/DL — SIGNIFICANT CHANGE UP (ref 32–36)
MCV RBC AUTO: 89.2 FL — SIGNIFICANT CHANGE UP (ref 80–100)
MCV RBC AUTO: 90.2 FL — SIGNIFICANT CHANGE UP (ref 80–100)
MONOCYTES # BLD AUTO: 1.04 K/UL — HIGH (ref 0–0.9)
MONOCYTES # BLD AUTO: 1.2 K/UL — HIGH (ref 0–0.9)
MONOCYTES NFR BLD AUTO: 11.9 % — SIGNIFICANT CHANGE UP (ref 2–14)
MONOCYTES NFR BLD AUTO: 8.9 % — SIGNIFICANT CHANGE UP (ref 2–14)
NEUTROPHILS # BLD AUTO: 7.07 K/UL — SIGNIFICANT CHANGE UP (ref 1.8–7.4)
NEUTROPHILS # BLD AUTO: 9.29 K/UL — HIGH (ref 1.8–7.4)
NEUTROPHILS NFR BLD AUTO: 70.3 % — SIGNIFICANT CHANGE UP (ref 43–77)
NEUTROPHILS NFR BLD AUTO: 71.4 % — SIGNIFICANT CHANGE UP (ref 43–77)
NEUTS BAND # BLD: 8 % — SIGNIFICANT CHANGE UP (ref 0–8)
NEUTS BAND NFR BLD: 8 % — SIGNIFICANT CHANGE UP (ref 0–8)
NRBC # BLD: 0 /100 WBCS — SIGNIFICANT CHANGE UP (ref 0–0)
NRBC BLD-RTO: 0 /100 WBCS — SIGNIFICANT CHANGE UP (ref 0–0)
OVALOCYTES BLD QL SMEAR: SLIGHT — SIGNIFICANT CHANGE UP
PHOSPHATE SERPL-MCNC: 4.2 MG/DL — SIGNIFICANT CHANGE UP (ref 2.5–4.5)
PLAT MORPH BLD: NORMAL — SIGNIFICANT CHANGE UP
PLATELET # BLD AUTO: 63 K/UL — LOW (ref 150–400)
PLATELET # BLD AUTO: 75 K/UL — LOW (ref 150–400)
POIKILOCYTOSIS BLD QL AUTO: SIGNIFICANT CHANGE UP
POTASSIUM SERPL-MCNC: 4.3 MMOL/L — SIGNIFICANT CHANGE UP (ref 3.5–5.3)
POTASSIUM SERPL-SCNC: 4.3 MMOL/L — SIGNIFICANT CHANGE UP (ref 3.5–5.3)
POTASSIUM UR-SCNC: 60 MMOL/L — SIGNIFICANT CHANGE UP
PROT SERPL-MCNC: 6.3 G/DL — SIGNIFICANT CHANGE UP (ref 6–8.3)
PROTHROM AB SERPL-ACNC: 22 SEC — HIGH (ref 9.9–13.4)
RBC # BLD: 2.31 M/UL — LOW (ref 4.2–5.8)
RBC # BLD: 2.87 M/UL — LOW (ref 4.2–5.8)
RBC # FLD: 19.6 % — HIGH (ref 10.3–14.5)
RBC # FLD: 20.7 % — HIGH (ref 10.3–14.5)
RBC BLD AUTO: ABNORMAL
RH IG SCN BLD-IMP: POSITIVE — SIGNIFICANT CHANGE UP
SCHISTOCYTES BLD QL AUTO: SLIGHT — SIGNIFICANT CHANGE UP
SMUDGE CELLS # BLD: PRESENT — SIGNIFICANT CHANGE UP
SODIUM SERPL-SCNC: 130 MMOL/L — LOW (ref 135–145)
SODIUM UR-SCNC: 9 MMOL/L — SIGNIFICANT CHANGE UP
VARIANT LYMPHS # BLD: 0.9 % — SIGNIFICANT CHANGE UP (ref 0–6)
VARIANT LYMPHS NFR BLD MANUAL: 0.9 % — SIGNIFICANT CHANGE UP (ref 0–6)
WBC # BLD: 10.06 K/UL — SIGNIFICANT CHANGE UP (ref 3.8–10.5)
WBC # BLD: 11.7 K/UL — HIGH (ref 3.8–10.5)
WBC # FLD AUTO: 10.06 K/UL — SIGNIFICANT CHANGE UP (ref 3.8–10.5)
WBC # FLD AUTO: 11.7 K/UL — HIGH (ref 3.8–10.5)

## 2025-01-19 PROCEDURE — 99233 SBSQ HOSP IP/OBS HIGH 50: CPT

## 2025-01-19 PROCEDURE — G0545: CPT

## 2025-01-19 PROCEDURE — 99232 SBSQ HOSP IP/OBS MODERATE 35: CPT | Mod: GC

## 2025-01-19 PROCEDURE — 71045 X-RAY EXAM CHEST 1 VIEW: CPT | Mod: 26

## 2025-01-19 PROCEDURE — 93975 VASCULAR STUDY: CPT | Mod: 26

## 2025-01-19 RX ORDER — TRAMADOL HYDROCHLORIDE 50 MG/1
50 TABLET, FILM COATED ORAL ONCE
Refills: 0 | Status: DISCONTINUED | OUTPATIENT
Start: 2025-01-19 | End: 2025-01-19

## 2025-01-19 RX ADMIN — MIRTAZAPINE 7.5 MILLIGRAM(S): 30 TABLET, FILM COATED ORAL at 21:52

## 2025-01-19 RX ADMIN — Medication 25 GRAM(S): at 12:25

## 2025-01-19 RX ADMIN — Medication 1 APPLICATION(S): at 12:26

## 2025-01-19 RX ADMIN — Medication 5 MILLIGRAM(S): at 21:50

## 2025-01-19 RX ADMIN — FOLIC ACID 1 MILLIGRAM(S): 1 TABLET ORAL at 12:26

## 2025-01-19 RX ADMIN — Medication 500 MILLIGRAM(S): at 05:48

## 2025-01-19 RX ADMIN — Medication 500 MILLIGRAM(S): at 17:19

## 2025-01-19 RX ADMIN — TRAMADOL HYDROCHLORIDE 25 MILLIGRAM(S): 50 TABLET, FILM COATED ORAL at 15:52

## 2025-01-19 RX ADMIN — Medication 100 MILLIGRAM(S): at 12:25

## 2025-01-19 RX ADMIN — MIDODRINE HYDROCHLORIDE 10 MILLIGRAM(S): 5 TABLET ORAL at 13:12

## 2025-01-19 RX ADMIN — TRAMADOL HYDROCHLORIDE 25 MILLIGRAM(S): 50 TABLET, FILM COATED ORAL at 14:52

## 2025-01-19 RX ADMIN — MIDODRINE HYDROCHLORIDE 10 MILLIGRAM(S): 5 TABLET ORAL at 05:49

## 2025-01-19 RX ADMIN — Medication 1 TABLET(S): at 12:26

## 2025-01-19 RX ADMIN — TRAMADOL HYDROCHLORIDE 50 MILLIGRAM(S): 50 TABLET, FILM COATED ORAL at 20:52

## 2025-01-19 RX ADMIN — LACTULOSE 20 GRAM(S): 10 SOLUTION ORAL at 05:49

## 2025-01-19 RX ADMIN — MIDODRINE HYDROCHLORIDE 10 MILLIGRAM(S): 5 TABLET ORAL at 21:52

## 2025-01-19 RX ADMIN — Medication 2 PUFF(S): at 21:59

## 2025-01-19 RX ADMIN — TRAMADOL HYDROCHLORIDE 50 MILLIGRAM(S): 50 TABLET, FILM COATED ORAL at 19:49

## 2025-01-19 RX ADMIN — TRAMADOL HYDROCHLORIDE 25 MILLIGRAM(S): 50 TABLET, FILM COATED ORAL at 06:30

## 2025-01-19 RX ADMIN — TRAMADOL HYDROCHLORIDE 25 MILLIGRAM(S): 50 TABLET, FILM COATED ORAL at 05:50

## 2025-01-19 RX ADMIN — Medication 40 MILLIGRAM(S): at 05:49

## 2025-01-19 RX ADMIN — Medication 2 PUFF(S): at 05:50

## 2025-01-19 NOTE — PROGRESS NOTE ADULT - ASSESSMENT
43 yo M with PMH decompensated ETOH cirrhosis c/b recurrent ascites, grade II EV, and HE, right calf hematoma 10/2024 (s/p fall)  presented to [facility] for abdominal pain and distension. Patient found to have ascites on exam and MANUELITO requiring HD initation. PermCath was placed by vascular surgery on 1/10 with post procedure course c/b bleeding from insertion site. Transferred to [facility] SICU for further management.     Blood Cultures (1/16) 1/4 Staph epi.   GI PCR (1/17) + Giardia.   Paracentesis (1/17) 155 nucleated cell counts.     CT Chest (1/17) Moderate loculated right pleural effusion containing a few foci of air, which are new from 1/8/2025 and may represent empyema/bronchopleural fistula versus sequelae of prior thoracentesis.     #Empyema  --Continue Zosyn  --Follow up on bacterial, fungal and AFB cultures  --Follow up on CTS surgical plan    #Giardia  Unclear if it's either lactulose or giardia contributing to diarrhea but reasonable to treat  --Agree with Metronidazole 500 mg Q12H    #Positive Blood Culture (Staph epi)  Concern for procurement contaminant  --Continue to follow CBC with diff  --Continue to follow temperature curve  --Follow up on preliminary blood cultures    #Pre-Liver Transplant Evaluation, Decompensated Cirrhosis  COVID19 Rodríguez Antibody Positive  HAV IgG Positive  HBVs Ab Positive  HBVsAg Negative  HBVc Ab Negative  HCV Ab Negative  HSV 1 IgG Positive  HSV 2 IgG Negative  EBV IgG Positive  CMV IgG Positive  VZV IgG Positive  Measles IgG Positive  Mumps IgG Positive  Rubella IgG Positive  Quantiferon Gold PENDING  HIV Ag/Ab by CMIA Negative  Syphilis Screen Negative  Toxoplasma IgG Negative  Strongyloides Ab PENDING  Coccidiodes Ab PENDING  Leishmania Ab PENDING  --ID clearance to OLT pending treatment of empyema and Quantiferon Gold    #Encounter to Vaccinate Patient  COVID19: Would benefit from COVID19 [id number] Vaccine Dose  Influenza: Had vaccination for this year  Pneumococcal: Would benefit from PCV20  HAV: Immune, will not require further vaccination  HBV: Immune, will not require further vaccination  MMR: Immune, will not require further vaccination  Varicella: Immune, will not require further vaccination  Shingles: Will require Shingrix  Tdap: Tdap 6/23/24    I will continue to follow. Please feel free to contact me with any further questions.    Froilan Whitmore M.D.  [facility] Division of Infectious Disease  8AM-5PM Monday - Friday: Available on [employer] Teams  After Hours and Holidays (or if no response on [employer] Teams): Please contact the Infectious Diseases Office at [phone]

## 2025-01-19 NOTE — PROGRESS NOTE ADULT - SUBJECTIVE AND OBJECTIVE BOX
Follow Up:      Interval History:    REVIEW OF SYSTEMS  [  ] ROS unobtainable because:    [  ] All other systems negative except as noted below    Constitutional:  [ ] fever [ ] chills  [ ] weight loss  [ ] weakness  Skin:  [ ] rash [ ] phlebitis	  Eyes: [ ] icterus [ ] pain  [ ] discharge	  ENMT: [ ] sore throat  [ ] thrush [ ] ulcers [ ] exudates  Respiratory: [ ] dyspnea [ ] hemoptysis [ ] cough [ ] sputum	  Cardiovascular:  [ ] chest pain [ ] palpitations [ ] edema	  Gastrointestinal:  [ ] nausea [ ] vomiting [ ] diarrhea [ ] constipation [ ] pain	  Genitourinary:  [ ] dysuria [ ] frequency [ ] hematuria [ ] discharge [ ] flank pain  [ ] incontinence  Musculoskeletal:  [ ] myalgias [ ] arthralgias [ ] arthritis  [ ] back pain  Neurological:  [ ] headache [ ] seizures  [ ] confusion/altered mental status    Allergies  Salicylic Acid (Unknown)  sulfa drugs (Unknown)        ANTIMICROBIALS:  metroNIDAZOLE    Tablet 500 two times a day  piperacillin/tazobactam IVPB.. 3.375 every 12 hours  rifAXIMin 550 every 12 hours      OTHER MEDS:  MEDICATIONS  (STANDING):  albuterol    90 MICROgram(s) HFA Inhaler 2 every 6 hours PRN  lactulose Syrup 20 every 12 hours  melatonin 5 at bedtime  midodrine 10 every 8 hours  mirtazapine 7.5 at bedtime  pantoprazole    Tablet 40 before breakfast  traMADol 25 every 8 hours PRN      Vital Signs Last 24 Hrs  T(C): 36.7 (19 Jan 2025 13:00), Max: 37.1 (18 Jan 2025 14:30)  T(F): 98 (19 Jan 2025 13:00), Max: 98.7 (18 Jan 2025 14:30)  HR: 76 (19 Jan 2025 13:00) (71 - 135)  BP: 100/54 (19 Jan 2025 13:00) (100/54 - 127/71)  BP(mean): --  RR: 18 (19 Jan 2025 13:00) (18 - 18)  SpO2: 96% (19 Jan 2025 13:00) (95% - 100%)    Parameters below as of 19 Jan 2025 13:00  Patient On (Oxygen Delivery Method): room air        PHYSICAL EXAMINATION:  General: Alert and Awake, NAD  HEENT: PERRL, EOMI  Neck: Supple  Cardiac: RRR, No M/R/G  Resp: CTAB, No Wh/Rh/Ra  Abdomen: NBS, NT/ND, No HSM, No rigidity or guarding  MSK: No LE edema. No Calf tenderness  : No yates  Skin: No rashes or lesions. Skin is warm and dry to the touch.   Neuro: Alert and Awake. CN 2-12 Grossly intact. Moves all four extremities spontaneously.  Psych: Calm, Pleasant, Cooperative                          6.8    11.70 )-----------( 63       ( 19 Jan 2025 06:31 )             20.6       01-19    130[L]  |  97  |  39[H]  ----------------------------<  132[H]  4.3   |  22  |  2.36[H]    Ca    8.5      19 Jan 2025 06:29  Phos  4.2     01-19  Mg     1.9     01-19    TPro  6.3  /  Alb  2.6[L]  /  TBili  6.0[H]  /  DBili  2.1[H]  /  AST  25  /  ALT  7[L]  /  AlkPhos  97  01-19      Urinalysis Basic - ( 19 Jan 2025 06:29 )    Color: x / Appearance: x / SG: x / pH: x  Gluc: 132 mg/dL / Ketone: x  / Bili: x / Urobili: x   Blood: x / Protein: x / Nitrite: x   Leuk Esterase: x / RBC: x / WBC x   Sq Epi: x / Non Sq Epi: x / Bacteria: x        MICROBIOLOGY:  v  Pleural Fl  01-18-25 --  --    polymorphonuclear leukocytes seen  Gram Negative Rods seen  by cytocentrifuge      Ascites Fl  01-17-25   No growth  --    No polymorphonuclear cells seen  No organisms seen  by cytocentrifuge      .Blood BLOOD  01-16-25   No growth at 48 Hours  --  --      .Blood BLOOD  01-16-25   Growth in aerobic bottle: Staphylococcus epidermidis  Isolation of Coagulase negative Staphylococcus from single blood culture  sets may represent  contamination. Contact the Microbiology Department at [phone] if  susceptibility testing is needed.  clinically indicated.  Direct identification is available within approximately 3-5  hours either by Blood Panel Multiplexed PCR or Direct  MALDI-TOF. Details: [email or web address]  --  Blood Culture PCR        CMV IgG Antibody: >10.00 U/mL (01-17-25 @ 06:41)  Toxoplasma IgG Screen: <3.00 IU/mL (01-17-25 @ 06:41)          RADIOLOGY:    <The imaging below has been reviewed and visualized by me independently. Findings as detailed in report below> Follow Up:  Pre-OLT    Interval History: pleural fluid appeared purulent and gram stain with GNR. noting discomfort at chest tube insertion site.     REVIEW OF SYSTEMS  [  ] ROS unobtainable because:    [ x ] All other systems negative except as noted below    Constitutional:  [ ] fever [ ] chills  [ ] weight loss  [ ] weakness  Skin:  [ ] rash [ ] phlebitis	  Eyes: [ ] icterus [ ] pain  [ ] discharge	  ENMT: [ ] sore throat  [ ] thrush [ ] ulcers [ ] exudates  Respiratory: [ ] dyspnea [ ] hemoptysis [ ] cough [ ] sputum	  Cardiovascular:  [ ] chest pain [ ] palpitations [ ] edema	  Gastrointestinal:  [ ] nausea [ ] vomiting [ ] diarrhea [ ] constipation [ ] pain	  Genitourinary:  [ ] dysuria [ ] frequency [ ] hematuria [ ] discharge [ ] flank pain  [ ] incontinence  Musculoskeletal:  [ ] myalgias [ ] arthralgias [ ] arthritis  [ ] back pain  Neurological:  [ ] headache [ ] seizures  [ ] confusion/altered mental status    Allergies  Salicylic Acid (Unknown)  sulfa drugs (Unknown)        ANTIMICROBIALS:  metroNIDAZOLE    Tablet 500 two times a day  piperacillin/tazobactam IVPB.. 3.375 every 12 hours  rifAXIMin 550 every 12 hours      OTHER MEDS:  MEDICATIONS  (STANDING):  albuterol    90 MICROgram(s) HFA Inhaler 2 every 6 hours PRN  lactulose Syrup 20 every 12 hours  melatonin 5 at bedtime  midodrine 10 every 8 hours  mirtazapine 7.5 at bedtime  pantoprazole    Tablet 40 before breakfast  traMADol 25 every 8 hours PRN      Vital Signs Last 24 Hrs  T(C): 36.7 (19 Jan 2025 13:00), Max: 37.1 (18 Jan 2025 14:30)  T(F): 98 (19 Jan 2025 13:00), Max: 98.7 (18 Jan 2025 14:30)  HR: 76 (19 Jan 2025 13:00) (71 - 135)  BP: 100/54 (19 Jan 2025 13:00) (100/54 - 127/71)  BP(mean): --  RR: 18 (19 Jan 2025 13:00) (18 - 18)  SpO2: 96% (19 Jan 2025 13:00) (95% - 100%)    Parameters below as of 19 Jan 2025 13:00  Patient On (Oxygen Delivery Method): room air    PHYSICAL EXAMINATION:  General: Alert and Awake, NAD  HEENT: Normocephalic / Atraumatic  Resp: No accessory muscles of respiration utilized, R Chest tube with blood tinged output  Abdomen: Not distended.  MSK: No LE edema.   : No yates  Skin: No rashes or lesions.    Neuro: Alert and Awake. CN 2-12 Grossly intact. Moves all four extremities spontaneously.  Psych: Calm, Pleasant, Cooperative                        6.8    11.70 )-----------( 63       ( 19 Jan 2025 06:31 )             20.6       01-19    130[L]  |  97  |  39[H]  ----------------------------<  132[H]  4.3   |  22  |  2.36[H]    Ca    8.5      19 Jan 2025 06:29  Phos  4.2     01-19  Mg     1.9     01-19    TPro  6.3  /  Alb  2.6[L]  /  TBili  6.0[H]  /  DBili  2.1[H]  /  AST  25  /  ALT  7[L]  /  AlkPhos  97  01-19      Urinalysis Basic - ( 19 Jan 2025 06:29 )    Color: x / Appearance: x / SG: x / pH: x  Gluc: 132 mg/dL / Ketone: x  / Bili: x / Urobili: x   Blood: x / Protein: x / Nitrite: x   Leuk Esterase: x / RBC: x / WBC x   Sq Epi: x / Non Sq Epi: x / Bacteria: x        MICROBIOLOGY:  v  Pleural Fl  01-18-25 --  --    polymorphonuclear leukocytes seen  Gram Negative Rods seen  by cytocentrifuge      Ascites Fl  01-17-25   No growth  --    No polymorphonuclear cells seen  No organisms seen  by cytocentrifuge      .Blood BLOOD  01-16-25   No growth at 48 Hours  --  --      .Blood BLOOD  01-16-25   Growth in aerobic bottle: Staphylococcus epidermidis  Isolation of Coagulase negative Staphylococcus from single blood culture  sets may represent  contamination. Contact the Microbiology Department at [phone] if  susceptibility testing is needed.  clinically indicated.  Direct identification is available within approximately 3-5  hours either by Blood Panel Multiplexed PCR or Direct  MALDI-TOF. Details: [email or web address]  --  Blood Culture PCR        CMV IgG Antibody: >10.00 U/mL (01-17-25 @ 06:41)  Toxoplasma IgG Screen: <3.00 IU/mL (01-17-25 @ 06:41)          RADIOLOGY:    <The imaging below has been reviewed and visualized by me independently. Findings as detailed in report below>    < from: US Abdomen Doppler (01.19.25 @ 15:05) >  IMPRESSION:    1. Reversal of flow throughout the portal system. No evidence of thrombus.  2. Elevated velocities within the main hepatic artery which can be seen   in the setting of stenosis.  3. Cirrhosis, splenomegaly and moderate volume ascites.    < end of copied text >

## 2025-01-19 NOTE — PROVIDER CONTACT NOTE (OTHER) - SITUATION
Upon start of shift, chest tube output was 320 and now output is 750. Upon start of shift, chest tube output was 320 and now output is 750, put out 370 in 4 hrs.

## 2025-01-19 NOTE — PROGRESS NOTE ADULT - SUBJECTIVE AND OBJECTIVE BOX
Interval Events:   chest tube was placed by IR with drainage of purulent material, now growing GNR with > 900 cc output since placement. tramadol was provided for pain at the chest tube insertion site with relief  patient became tachycardic to 130s during IHD yesterday. IHD was held with improvement in HR. He remained hemodynamically stable   vitals otherwise stable. pt remains afebrile  hgb down to 6.8 g/dL s/p transfusion 1U pRBC this AM. Repeat Hgb 8.5 g/dL  Case d/w Pulm & Thoracics   Patient reports motivation and insight to achieve and maintain sobriety   MELD 35    Hospital Medications:  albuterol    90 MICROgram(s) HFA Inhaler 2 Puff(s) Inhalation every 6 hours PRN  chlorhexidine 2% Cloths 1 Application(s) Topical daily  folic acid 1 milliGRAM(s) Oral daily  lactulose Syrup 20 Gram(s) Oral every 12 hours  melatonin 5 milliGRAM(s) Oral at bedtime  metroNIDAZOLE    Tablet 500 milliGRAM(s) Oral two times a day  midodrine 10 milliGRAM(s) Oral every 8 hours  mirtazapine 7.5 milliGRAM(s) Oral at bedtime  Nephro-shania 1 Tablet(s) Oral daily  pantoprazole    Tablet 40 milliGRAM(s) Oral before breakfast  piperacillin/tazobactam IVPB.. 3.375 Gram(s) IV Intermittent every 12 hours  rifAXIMin 550 milliGRAM(s) Oral every 12 hours  thiamine 100 milliGRAM(s) Oral daily  traMADol 25 milliGRAM(s) Oral every 8 hours PRN    ROS: See above.    PHYSICAL EXAM:   Vital Signs:  Vital Signs Last 24 Hrs  T(C): 36.7 (19 Jan 2025 13:00), Max: 37 (18 Jan 2025 17:15)  T(F): 98 (19 Jan 2025 13:00), Max: 98.6 (18 Jan 2025 17:15)  HR: 76 (19 Jan 2025 13:00) (71 - 135)  BP: 100/54 (19 Jan 2025 13:00) (100/54 - 127/71)  BP(mean): --  RR: 18 (19 Jan 2025 13:00) (18 - 18)  SpO2: 96% (19 Jan 2025 13:00) (95% - 100%)    Parameters below as of 19 Jan 2025 13:00  Patient On (Oxygen Delivery Method): room air    GENERAL:  NAD, resting comfortably in bed  HEENT:  sclera icteric  RESPIRATORY:  Normal Effort. Chest tube in place w purulent drainage. site appears intact  CARDIAC:  HDS  ABDOMEN:  Soft, non-tender, non-distended  SKIN:  Warm & Dry. jaundice  NEURO:  Alert, conversant, no focal deficit    LABS:                        6.8    11.70 )-----------( 63       ( 19 Jan 2025 06:31 )             20.6     Mean Cell Volume: 89.2 fl (01-19-25 @ 06:31)    01-19    130[L]  |  97  |  39[H]  ----------------------------<  132[H]  4.3   |  22  |  2.36[H]    Ca    8.5      19 Jan 2025 06:29  Phos  4.2     01-19  Mg     1.9     01-19    TPro  6.3  /  Alb  2.6[L]  /  TBili  6.0[H]  /  DBili  2.1[H]  /  AST  25  /  ALT  7[L]  /  AlkPhos  97  01-19    LIVER FUNCTIONS - ( 19 Jan 2025 06:29 )  Alb: 2.6 g/dL / Pro: 6.3 g/dL / ALK PHOS: 97 U/L / ALT: 7 U/L / AST: 25 U/L / GGT: x           PT/INR - ( 19 Jan 2025 06:29 )   PT: 22.0 sec;   INR: 1.94 ratio         PTT - ( 19 Jan 2025 06:29 )  PTT:41.2 sec

## 2025-01-19 NOTE — CONSULT NOTE ADULT - SUBJECTIVE AND OBJECTIVE BOX
GENERAL SURGERY CONSULT NOTE  Consulting surgical team: Thoracic Surgery  Consulting attending: Dr. Maximiliano Hurt    HPI:  45yo M with Hx decompensated EtOH cirrhosis (c/b recurrent ascites, grade II EV, and HE) who presented to OSH on 1/16 with abdominal pain and distension, found to have ascites and acute renal failure requiring initiation of HD (s/p Permacath placement c/b bleeding), transferred to [facility] for transplant evaluation and management. CT chest at OSH was significant for a complex R pleural effusion s/p R pigtail placement by IR on 1/18 with drainage of purulent material c/f empyema. Thoracic Surgery consulted for empyema and chest tube management.     Patient seen and evaluated at bedside. He states that his chest pain and breathing have improved significantly since insertion of chest tube. He no longer has increased WOB and feels that he is no longer wheezing.     PAST MEDICAL HISTORY:  Sleep apnea, obstructive  Alcohol abuse  Cirrhosis of liver  Portal hypertension  H/O esophageal varices  Anemia  Mild alcohol use disorder    PAST SURGICAL HISTORY:  No significant past surgical history    MEDICATIONS:  albuterol    90 MICROgram(s) HFA Inhaler 2 Puff(s) Inhalation every 6 hours PRN  chlorhexidine 2% Cloths 1 Application(s) Topical daily  folic acid 1 milliGRAM(s) Oral daily  lactulose Syrup 20 Gram(s) Oral every 12 hours  melatonin 5 milliGRAM(s) Oral at bedtime  metroNIDAZOLE    Tablet 500 milliGRAM(s) Oral two times a day  midodrine 10 milliGRAM(s) Oral every 8 hours  mirtazapine 7.5 milliGRAM(s) Oral at bedtime  Nephro-shania 1 Tablet(s) Oral daily  pantoprazole    Tablet 40 milliGRAM(s) Oral before breakfast  piperacillin/tazobactam IVPB.. 3.375 Gram(s) IV Intermittent every 12 hours  rifAXIMin 550 milliGRAM(s) Oral every 12 hours  thiamine 100 milliGRAM(s) Oral daily  traMADol 25 milliGRAM(s) Oral every 8 hours PRN    ALLERGIES:  Salicylic Acid (Unknown)  sulfa drugs (Unknown)    VITALS & I/Os:  Vital Signs Last 24 Hrs  T(C): 36.7 (19 Jan 2025 13:00), Max: 37 (18 Jan 2025 17:15)  T(F): 98 (19 Jan 2025 13:00), Max: 98.6 (18 Jan 2025 17:15)  HR: 83 (19 Jan 2025 14:00) (71 - 135)  BP: 106/63 (19 Jan 2025 14:00) (100/54 - 127/71)  RR: 18 (19 Jan 2025 13:00) (18 - 18)  SpO2: 96% (19 Jan 2025 13:00) (95% - 99%)    Parameters below as of 19 Jan 2025 13:00  Patient On (Oxygen Delivery Method): room air    I&O's Summary    18 Jan 2025 07:01  -  19 Jan 2025 07:00  --------------------------------------------------------  IN: 580 mL / OUT: 2550 mL / NET: -1970 mL    19 Jan 2025 07:01  -  19 Jan 2025 17:02  --------------------------------------------------------  IN: 540 mL / OUT: 250 mL / NET: 290 mL      PHYSICAL EXAM:  GEN: resting in bed comfortably in NAD  NEURO: awake, alert  CV: warm, well-perfused  RESP: no increased WOB, pigtail in place, dressing c/d/i, with purulent OP  ABD: soft, non-distended, non-tender without rebound tenderness or guarding  EXTR: no gross deformities; spontaneous movement in b/l U/L extrem       LABS:             8.5    10.06 )-----------( 75       ( 19 Jan 2025 14:30 )             25.9     130[L]  |  97  |  39[H]  ----------------------------<  132[H]  4.3   |  22  |  2.36[H]    Ca    8.5      19 Jan 2025 06:29  Phos  4.2     01-19  Mg     1.9     01-19    TPro  6.3  /  Alb  2.6[L]  /  TBili  6.0[H]  /  DBili  2.1[H]  /  AST  25  /  ALT  7[L]  /  AlkPhos  97  01-19    Lactate:    PT/INR - ( 19 Jan 2025 06:29 )   PT: 22.0 sec;   INR: 1.94 ratio    PTT - ( 19 Jan 2025 06:29 )  PTT:41.2 sec    Urinalysis Basic - ( 19 Jan 2025 06:29 )    Color: x / Appearance: x / SG: x / pH: x  Gluc: 132 mg/dL / Ketone: x  / Bili: x / Urobili: x   Blood: x / Protein: x / Nitrite: x   Leuk Esterase: x / RBC: x / WBC x   Sq Epi: x / Non Sq Epi: x / Bacteria: x      IMAGING:  < from: CT Chest No Cont (01.17.25 @ 20:10) >  FINDINGS:    LUNGS AND LARGE AIRWAYS: Patent central airways. Near complete   atelectasis of the right middle and right lower lobes. Linear atelectasis   in the right upper lobe. No parenchymal consolidation on the left.  PLEURA: Moderate loculated pleural effusion containing a few internal   foci of air and thickening of the visceral and parietal pleura.  VESSELS: Enlarged main pulmonary artery measuring 4.2 cm.  HEART: Heart is enlarged. No pericardial effusion.  MEDIASTINUM AND REBECCA: Few mildly prominent mediastinal lymph nodes. For   reference a right paratracheal node measures up to 1.2 cm in short axis   (3, 28).  CHEST WALL AND LOWER NECK: Right IJ approach tunneled catheter terminates   in the SVC.  VISUALIZED UPPER ABDOMEN: Partially imaged ascites and upper abdominal   varices.  BONES: Multiple stable subacute right-sided rib fractures. Chronic   left-sidedrib deformities.    IMPRESSION:  Moderate loculated right pleural effusion containing a few foci of air,   which are new from 1/8/2025 and may represent empyema/bronchopleural   fistula versus sequelae of prior thoracentesis. There is associated   compressive atelectasis of the right middle and right lower lobes.    Partially imaged moderate volume ascites.    < end of copied text >

## 2025-01-19 NOTE — CONSULT NOTE ADULT - ASSESSMENT
43yo M with Hx decompensated EtOH cirrhosis (c/b recurrent ascites, grade II EV, and HE) who presented to OSH on 1/16 with abdominal pain and distension, found to have ascites and acute renal failure requiring initiation of HD (s/p Permacath placement c/b bleeding), transferred to NS for transplant evaluation and management. CT chest at OSH was significant for a complex R pleural effusion s/p R pigtail placement by IR on 1/18 with drainage of purulent material c/f empyema. Thoracic Surgery consulted for empyema and chest tube management.     PLAN:   - No acute thoracic surgery intervention at this time  - C/w pigtail to LCWS   - Repeat CT chest on Tuesday     Discussed with Dr. Hurt    Thoracic Surgery  #[id number]  45yo M with Hx decompensated EtOH cirrhosis (c/b recurrent ascites, grade II EV, and HE) who presented to OSH on 1/16 with abdominal pain and distension, found to have ascites and acute renal failure requiring initiation of HD (s/p Permacath placement c/b bleeding), transferred to NS for transplant evaluation and management. CT chest at OSH was significant for a complex R pleural effusion s/p R pigtail placement by IR on 1/18 with drainage of purulent material c/f empyema. Thoracic Surgery consulted for empyema and chest tube management.     PLAN:   - No acute thoracic surgery intervention at this time  - C/w pigtail to suction  - Repeat CT chest on Tuesday     Discussed with Dr. Hurt    Thoracic Surgery  #[id number]

## 2025-01-19 NOTE — PROGRESS NOTE ADULT - ASSESSMENT
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 Actively drinking. Now p/w abdominal pain/distension with worsening of liver tests possibly triggered by infection. DDx also includes alcohol associated hepatitis given hx of alc hep 7/2024 which improved with steroids  - OLT: send workup to activate evaluation, complex given active drinking. Will likely need colonoscopy (unknown last c-scope). Cardiology consult  - Ascites: large ascites on exam, requiring frequent LVPs         - Please perform paracentesis today (send cell count w/ diff, culture, albumin, total protein, and cytology)  - EV: EGD 7/2024 for melena showed grade II EV, small gastric ulcer (neg HP), and portal gastropathy. Takes nadolol at home. Holding BB for now given hypotension  - HE: c/w lactulose and rifaximin, goal 3-4 BM/day  - HCC screening: last CT w/ contrast 7/2024 no lesion seen. Due for repeat         - Obtain MRI abdomen with and without contrast and MRCP for HCC screening (liver protocol) and evaluate portal veins  - C/w home midodrine 10 mg tid  - C/w PPI for stress ulcer ppx and hx of ulcer  - Trend MELD labs daily (cmp, INR) daily  - MRI with suboptimal evaluation of portal vein. US doppler ordered     #Empyema, right chest  - CT chest demonstrated a moderate sized complex R sided pleural effusion with foci of air likely due to empyema due to gas-producing bacteria  - Right-sided pigtail catheter was placed by IR on 1/18 with drainage of purulent fluid. Fluid chemistry does not appear to be collected at the time of chest tube placement. Gram stain is positive for gram negative rods  - F/u fluid culture speciation   - Continue empiric Zosyn  - Appreciate ID input  - Thoracic Surgery consultation for empyema and assistance with chest tube management   - F/u BCx   - Trend cbc and fever curve    #Acute Renal Failure  Found to have acute renal failure requiring dialysis at OSH, ddx includes HRS vs ATN. Permacath in place. Cr baseline 1.12 10/2024  - Transplant nephrology consult  - HD per nephrology  - Trend CMP daily    #Anemia  #Thrombocytopenia  Normocytic anemia with Hgb 8.5 and plt count 64 likely due to chronic liver dz  - Trend cbc daily  - Transfuse Hgb>7, plt>10 (>20 if septic, >50 if bleeding)  - Hold DVT ppx for now given recent bleeding. If no further bleeding, likely can restart in 24-48 hrs    #Giardia infection  - Giardia lamblia detected in stool on 1/17. Not having significant diarrhea  - Treat with Metronidazole x 7 days (1/18-1/24)

## 2025-01-20 DIAGNOSIS — J90 PLEURAL EFFUSION, NOT ELSEWHERE CLASSIFIED: ICD-10-CM

## 2025-01-20 LAB
ALBUMIN SERPL ELPH-MCNC: 2.6 G/DL — LOW (ref 3.3–5)
ALP SERPL-CCNC: 135 U/L — HIGH (ref 40–120)
ALT FLD-CCNC: 8 U/L — LOW (ref 10–45)
ANION GAP SERPL CALC-SCNC: 10 MMOL/L — SIGNIFICANT CHANGE UP (ref 5–17)
APTT BLD: 41.4 SEC — HIGH (ref 24.5–35.6)
AST SERPL-CCNC: 29 U/L — SIGNIFICANT CHANGE UP (ref 10–40)
BASOPHILS # BLD AUTO: 0.08 K/UL — SIGNIFICANT CHANGE UP (ref 0–0.2)
BASOPHILS NFR BLD AUTO: 0.9 % — SIGNIFICANT CHANGE UP (ref 0–2)
BILIRUB DIRECT SERPL-MCNC: 2.2 MG/DL — HIGH (ref 0–0.3)
BILIRUB INDIRECT FLD-MCNC: 4 MG/DL — HIGH (ref 0.2–1)
BILIRUB SERPL-MCNC: 6.2 MG/DL — HIGH (ref 0.2–1.2)
BUN SERPL-MCNC: 43 MG/DL — HIGH (ref 7–23)
CALCIUM SERPL-MCNC: 8.7 MG/DL — SIGNIFICANT CHANGE UP (ref 8.4–10.5)
CHLORIDE SERPL-SCNC: 98 MMOL/L — SIGNIFICANT CHANGE UP (ref 96–108)
CO2 SERPL-SCNC: 23 MMOL/L — SIGNIFICANT CHANGE UP (ref 22–31)
CREAT SERPL-MCNC: 2.67 MG/DL — HIGH (ref 0.5–1.3)
DEPRECATED M TB RPOB XXX QL NAA+PROBE: SIGNIFICANT CHANGE UP
EGFR: 29 ML/MIN/1.73M2 — LOW
EGFR: 29 ML/MIN/1.73M2 — LOW
EOSINOPHIL # BLD AUTO: 0.53 K/UL — HIGH (ref 0–0.5)
EOSINOPHIL NFR BLD AUTO: 5.9 % — SIGNIFICANT CHANGE UP (ref 0–6)
GLUCOSE SERPL-MCNC: 160 MG/DL — HIGH (ref 70–99)
HCT VFR BLD CALC: 25.2 % — LOW (ref 39–50)
HGB BLD-MCNC: 8.4 G/DL — LOW (ref 13–17)
IMM GRANULOCYTES NFR BLD AUTO: 0.6 % — SIGNIFICANT CHANGE UP (ref 0–0.9)
INR BLD: 1.83 RATIO — HIGH (ref 0.85–1.16)
LYMPHOCYTES # BLD AUTO: 1.11 K/UL — SIGNIFICANT CHANGE UP (ref 1–3.3)
LYMPHOCYTES # BLD AUTO: 12.4 % — LOW (ref 13–44)
M TB CMPLX DNA SPT/BRO QL NAA+PROBE: SIGNIFICANT CHANGE UP
M TB CMPLX DNA SPT/BRO QL NAA+PROBE: SIGNIFICANT CHANGE UP
MAGNESIUM SERPL-MCNC: 2 MG/DL — SIGNIFICANT CHANGE UP (ref 1.6–2.6)
MCHC RBC-ENTMCNC: 30.8 PG — SIGNIFICANT CHANGE UP (ref 27–34)
MCHC RBC-ENTMCNC: 33.3 G/DL — SIGNIFICANT CHANGE UP (ref 32–36)
MCV RBC AUTO: 92.3 FL — SIGNIFICANT CHANGE UP (ref 80–100)
MONOCYTES # BLD AUTO: 1.15 K/UL — HIGH (ref 0–0.9)
MONOCYTES NFR BLD AUTO: 12.8 % — SIGNIFICANT CHANGE UP (ref 2–14)
MRSA PCR RESULT.: SIGNIFICANT CHANGE UP
MTB RIF RES GENE SPT NAA+PROBE: SIGNIFICANT CHANGE UP
NEUTROPHILS # BLD AUTO: 6.03 K/UL — SIGNIFICANT CHANGE UP (ref 1.8–7.4)
NEUTROPHILS NFR BLD AUTO: 67.4 % — SIGNIFICANT CHANGE UP (ref 43–77)
NIGHT BLUE STAIN TISS: SIGNIFICANT CHANGE UP
NRBC # BLD: 0 /100 WBCS — SIGNIFICANT CHANGE UP (ref 0–0)
NRBC BLD-RTO: 0 /100 WBCS — SIGNIFICANT CHANGE UP (ref 0–0)
PHOSPHATE SERPL-MCNC: 5.4 MG/DL — HIGH (ref 2.5–4.5)
PLATELET # BLD AUTO: 83 K/UL — LOW (ref 150–400)
POTASSIUM SERPL-MCNC: 4.9 MMOL/L — SIGNIFICANT CHANGE UP (ref 3.5–5.3)
POTASSIUM SERPL-SCNC: 4.9 MMOL/L — SIGNIFICANT CHANGE UP (ref 3.5–5.3)
PROT SERPL-MCNC: 6.7 G/DL — SIGNIFICANT CHANGE UP (ref 6–8.3)
PROTHROM AB SERPL-ACNC: 20.9 SEC — HIGH (ref 9.9–13.4)
RBC # BLD: 2.73 M/UL — LOW (ref 4.2–5.8)
RBC # FLD: 19.6 % — HIGH (ref 10.3–14.5)
S AUREUS DNA NOSE QL NAA+PROBE: SIGNIFICANT CHANGE UP
SODIUM SERPL-SCNC: 131 MMOL/L — LOW (ref 135–145)
SPECIMEN SOURCE: SIGNIFICANT CHANGE UP
STRONGYLOIDES AB SER-ACNC: NEGATIVE — SIGNIFICANT CHANGE UP
WBC # BLD: 8.95 K/UL — SIGNIFICANT CHANGE UP (ref 3.8–10.5)
WBC # FLD AUTO: 8.95 K/UL — SIGNIFICANT CHANGE UP (ref 3.8–10.5)

## 2025-01-20 PROCEDURE — 71045 X-RAY EXAM CHEST 1 VIEW: CPT | Mod: 26

## 2025-01-20 PROCEDURE — 99233 SBSQ HOSP IP/OBS HIGH 50: CPT

## 2025-01-20 PROCEDURE — 99232 SBSQ HOSP IP/OBS MODERATE 35: CPT

## 2025-01-20 PROCEDURE — 99231 SBSQ HOSP IP/OBS SF/LOW 25: CPT

## 2025-01-20 RX ORDER — FUROSEMIDE 10 MG/ML
80 INJECTION INTRAMUSCULAR; INTRAVENOUS ONCE
Refills: 0 | Status: COMPLETED | OUTPATIENT
Start: 2025-01-20 | End: 2025-01-20

## 2025-01-20 RX ORDER — FUROSEMIDE 10 MG/ML
80 INJECTION INTRAMUSCULAR; INTRAVENOUS ONCE
Refills: 0 | Status: DISCONTINUED | OUTPATIENT
Start: 2025-01-20 | End: 2025-01-20

## 2025-01-20 RX ADMIN — FUROSEMIDE 80 MILLIGRAM(S): 10 INJECTION INTRAMUSCULAR; INTRAVENOUS at 12:39

## 2025-01-20 RX ADMIN — Medication 1 TABLET(S): at 12:39

## 2025-01-20 RX ADMIN — TRAMADOL HYDROCHLORIDE 25 MILLIGRAM(S): 50 TABLET, FILM COATED ORAL at 04:03

## 2025-01-20 RX ADMIN — MIDODRINE HYDROCHLORIDE 10 MILLIGRAM(S): 5 TABLET ORAL at 14:31

## 2025-01-20 RX ADMIN — TRAMADOL HYDROCHLORIDE 25 MILLIGRAM(S): 50 TABLET, FILM COATED ORAL at 15:30

## 2025-01-20 RX ADMIN — Medication 25 GRAM(S): at 01:40

## 2025-01-20 RX ADMIN — MIRTAZAPINE 7.5 MILLIGRAM(S): 30 TABLET, FILM COATED ORAL at 22:21

## 2025-01-20 RX ADMIN — Medication 500 MILLIGRAM(S): at 17:40

## 2025-01-20 RX ADMIN — Medication 25 GRAM(S): at 23:13

## 2025-01-20 RX ADMIN — FUROSEMIDE 80 MILLIGRAM(S): 10 INJECTION INTRAMUSCULAR; INTRAVENOUS at 19:53

## 2025-01-20 RX ADMIN — Medication 1 APPLICATION(S): at 12:38

## 2025-01-20 RX ADMIN — MIDODRINE HYDROCHLORIDE 10 MILLIGRAM(S): 5 TABLET ORAL at 06:06

## 2025-01-20 RX ADMIN — TRAMADOL HYDROCHLORIDE 25 MILLIGRAM(S): 50 TABLET, FILM COATED ORAL at 03:20

## 2025-01-20 RX ADMIN — MIDODRINE HYDROCHLORIDE 10 MILLIGRAM(S): 5 TABLET ORAL at 22:21

## 2025-01-20 RX ADMIN — LACTULOSE 20 GRAM(S): 10 SOLUTION ORAL at 06:07

## 2025-01-20 RX ADMIN — Medication 500 MILLIGRAM(S): at 06:06

## 2025-01-20 RX ADMIN — TRAMADOL HYDROCHLORIDE 25 MILLIGRAM(S): 50 TABLET, FILM COATED ORAL at 23:13

## 2025-01-20 RX ADMIN — Medication 40 MILLIGRAM(S): at 06:06

## 2025-01-20 RX ADMIN — TRAMADOL HYDROCHLORIDE 25 MILLIGRAM(S): 50 TABLET, FILM COATED ORAL at 14:31

## 2025-01-20 RX ADMIN — TRAMADOL HYDROCHLORIDE 25 MILLIGRAM(S): 50 TABLET, FILM COATED ORAL at 22:33

## 2025-01-20 RX ADMIN — FOLIC ACID 1 MILLIGRAM(S): 1 TABLET ORAL at 12:40

## 2025-01-20 RX ADMIN — Medication 100 MILLIGRAM(S): at 12:40

## 2025-01-20 RX ADMIN — Medication 25 GRAM(S): at 12:39

## 2025-01-20 RX ADMIN — Medication 5 MILLIGRAM(S): at 22:21

## 2025-01-20 NOTE — PROGRESS NOTE ADULT - SUBJECTIVE AND OBJECTIVE BOX
[facility] DIVISION OF KIDNEY DISEASES AND HYPERTENSION -- FOLLOW UP NOTE  --------------------------------------------------------------------------------  Chief Complaint:    24 hour events/subjective:  Patient was seen and examined at bedside      PAST HISTORY  --------------------------------------------------------------------------------  No significant changes to PMH, PSH, FHx, SHx, unless otherwise noted    ALLERGIES & MEDICATIONS  --------------------------------------------------------------------------------  Allergies    Salicylic Acid (Unknown)  sulfa drugs (Unknown)    Intolerances      Standing Inpatient Medications  chlorhexidine 2% Cloths 1 Application(s) Topical daily  folic acid 1 milliGRAM(s) Oral daily  furosemide   Injectable 80 milliGRAM(s) IV Push once  lactulose Syrup 20 Gram(s) Oral every 12 hours  melatonin 5 milliGRAM(s) Oral at bedtime  metroNIDAZOLE    Tablet 500 milliGRAM(s) Oral two times a day  midodrine 10 milliGRAM(s) Oral every 8 hours  mirtazapine 7.5 milliGRAM(s) Oral at bedtime  Nephro-shania 1 Tablet(s) Oral daily  pantoprazole    Tablet 40 milliGRAM(s) Oral before breakfast  piperacillin/tazobactam IVPB.. 3.375 Gram(s) IV Intermittent every 12 hours  rifAXIMin 550 milliGRAM(s) Oral every 12 hours  thiamine 100 milliGRAM(s) Oral daily    PRN Inpatient Medications  albuterol    90 MICROgram(s) HFA Inhaler 2 Puff(s) Inhalation every 6 hours PRN  traMADol 25 milliGRAM(s) Oral every 8 hours PRN      REVIEW OF SYSTEMS  --------------------------------------------------------------------------------  Gen: +fatigue, No fevers/chills  Skin: No rashes  Head/Eyes/Ears: No HA  Respiratory: No SOB, cough  CV: No CP  GI: +distension, No abdominal pain, no diarrhea, no N/V  : decreased UOP  MSK: +edema  Heme: No easy bruising or bleeding      VITALS/PHYSICAL EXAM  --------------------------------------------------------------------------------  T(C): 36.4 (01-20-25 @ 09:07), Max: 37 (01-20-25 @ 00:56)  HR: 62 (01-20-25 @ 09:07) (62 - 92)  BP: 104/61 (01-20-25 @ 09:07) (100/54 - 110/66)  RR: 18 (01-20-25 @ 09:07) (18 - 18)  SpO2: 100% (01-20-25 @ 09:07) (95% - 100%)  Wt(kg): --        01-19-25 @ 07:01  -  01-20-25 @ 07:00  --------------------------------------------------------  IN: 900 mL / OUT: 1010 mL / NET: -110 mL      Physical Exam:              Gen: NAD  	HEENT: MMM  	Pulm: CTA B/L  	CV: S1S2  	Abd: Soft, +BS   	Ext: No LE edema B/L  	Neuro: Awake  	Skin: Warm and dry      LABS/STUDIES  --------------------------------------------------------------------------------              8.4    8.95  >-----------<  83       [01-20-25 @ 06:49]              25.2     131  |  98  |  43  ----------------------------<  160      [01-20-25 @ 06:51]  4.9   |  23  |  2.67        Ca     8.7     [01-20-25 @ 06:51]      Mg     2.0     [01-20-25 @ 06:51]      Phos  5.4     [01-20-25 @ 06:51]    TPro  6.7  /  Alb  2.6  /  TBili  6.2  /  DBili  2.2  /  AST  29  /  ALT  8   /  AlkPhos  135  [01-20-25 @ 06:51]    PT/INR: PT 20.9 , INR 1.83       [01-20-25 @ 06:49]  PTT: 41.4       [01-20-25 @ 06:49]      Creatinine Trend:  SCr 2.67 [01-20 @ 06:51]  SCr 2.36 [01-19 @ 06:29]  SCr 3.04 [01-18 @ 06:33]  SCr 3.04 [01-17 @ 09:10]  SCr 2.59 [01-16 @ 07:39]            Urinalysis - [01-20-25 @ 06:51]      Color  / Appearance  / SG  / pH       Gluc 160 / Ketone   / Bili  / Urobili        Blood  / Protein  / Leuk Est  / Nitrite       RBC  / WBC  / Hyaline  / Gran  / Sq Epi  / Non Sq Epi  / Bacteria     Urine Creatinine 261      [01-17-25 @ 08:53]  Urine Protein 81      [01-17-25 @ 08:53]  Urine Sodium 9      [01-19-25 @ 16:41]  Urine Potassium 60      [01-19-25 @ 16:41]    Iron 57, TIBC 91, %sat 62      [01-17-25 @ 06:41]  Ferritin 849      [01-17-25 @ 06:41]  TSH 8.18      [01-17-25 @ 06:41]  Lipid: chol 65, TG 46, HDL 26, LDL --      [01-17-25 @ 06:41]    HBsAb 55.9      [01-17-25 @ 06:41]  HBsAb Reactive      [01-17-25 @ 06:41]  HBsAg Nonreact      [01-17-25 @ 06:41]  HBcAb Nonreact      [01-17-25 @ 06:41]  HCV 0.06, Nonreact      [01-16-25 @ 18:23]  HIV Nonreact      [01-17-25 @ 06:41]    Syphilis Screen (Treponema Pallidum Ab) Negative      [01-17-25 @ 06:41]

## 2025-01-20 NOTE — PROGRESS NOTE ADULT - ASSESSMENT
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 Takes nadolol at home. Holding BB for now given hypotension  - HE: c/w lactulose and rifaximin, goal 3-4 BM/day  - HCC screening: last CT w/ contrast 7/2024 no lesion seen.   - MRI/MRPC (1/18) portal HTN, spleenomeg/ascites and extensive UE varices   - C/w home midodrine 10 mg tid  - C/w PPI for stress ulcer ppx and hx of ulcer  - Trend MELD labs daily (cmp, INR) daily    [] R Empyema   - s/p thoracentesis wtih CT placement 1/18  - pleural fluid grew: Citrobacter koseri    -CTS: repeat CT chest tomorrow     [] Acute Renal failure   - Permacath placed at OSH 1/10   - Renal consulted   - no indication for HD today     #Anemia  #Thrombocytopenia  Normocytic anemia with Hgb 8.5 and plt count 64 likely due to chronic liver dz  - Trend cbc daily  - Transfuse Hgb>7, plt>10 (>20 if septic, >50 if bleeding)  - Hold DVT ppx for now given recent bleeding. If no further bleeding, likely can restart in 24-48 hrs    [] Giardia infection  - Giardia lamblia detected in stool on 1/17. Not having significant diarrhea  - Treat with Metronidazole x 7 days (1/18-1/24)
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
## 2025-01-20 NOTE — PROGRESS NOTE ADULT - ASSESSMENT
45yo M with Hx decompensated EtOH cirrhosis (c/b recurrent ascites, grade II EV, and HE) who presented to OSH on 1/16 with abdominal pain and distension, found to have ascites and acute renal failure requiring initiation of HD (s/p Permacath placement c/b bleeding), transferred to [facility] for transplant evaluation and management. CT chest at OSH was significant for a complex R pleural effusion s/p R pigtail placement by IR on 1/18 with drainage of purulent material c/f empyema. Thoracic Surgery consulted for empyema and chest tube management.   1/20 R pigtail drained 700/910, CT of Chest on Tuesday to assess effusion and make final plan determination

## 2025-01-20 NOTE — PROGRESS NOTE ADULT - SUBJECTIVE AND OBJECTIVE BOX
Interval Events:   - transf 1u PRBC for Hgb 6.8  - Chest tube to suction   - no acute events overnight     MEDICATIONS  (STANDING):  chlorhexidine 2% Cloths 1 Application(s) Topical daily  folic acid 1 milliGRAM(s) Oral daily  lactulose Syrup 20 Gram(s) Oral every 12 hours  melatonin 5 milliGRAM(s) Oral at bedtime  metroNIDAZOLE    Tablet 500 milliGRAM(s) Oral two times a day  midodrine 10 milliGRAM(s) Oral every 8 hours  mirtazapine 7.5 milliGRAM(s) Oral at bedtime  Nephro-shania 1 Tablet(s) Oral daily  pantoprazole    Tablet 40 milliGRAM(s) Oral before breakfast  piperacillin/tazobactam IVPB.. 3.375 Gram(s) IV Intermittent every 12 hours  rifAXIMin 550 milliGRAM(s) Oral every 12 hours  thiamine 100 milliGRAM(s) Oral daily    MEDICATIONS  (PRN):  albuterol    90 MICROgram(s) HFA Inhaler 2 Puff(s) Inhalation every 6 hours PRN Bronchospasm  traMADol 25 milliGRAM(s) Oral every 8 hours PRN Moderate Pain (4 - 6)      PAST MEDICAL & SURGICAL HISTORY:  Sleep apnea, obstructive  Alcohol abuse  Cirrhosis of liver  Portal hypertension  H/O esophageal varices  Anemia  Mild alcohol use disorder  No significant past surgical history    Vital Signs Last 24 Hrs  T(C): 36.6 (20 Jan 2025 12:50), Max: 37 (20 Jan 2025 00:56)  T(F): 97.8 (20 Jan 2025 12:50), Max: 98.6 (20 Jan 2025 00:56)  HR: 76 (20 Jan 2025 12:50) (62 - 92)  BP: 113/66 (20 Jan 2025 12:50) (104/61 - 113/66)  BP(mean): --  RR: 18 (20 Jan 2025 12:50) (18 - 18)  SpO2: 99% (20 Jan 2025 12:50) (95% - 100%)    Parameters below as of 20 Jan 2025 12:50  Patient On (Oxygen Delivery Method): room air    I&O's Summary    19 Jan 2025 07:01  -  20 Jan 2025 07:00  --------------------------------------------------------  IN: 900 mL / OUT: 1010 mL / NET: -110 mL    20 Jan 2025 07:01  -  20 Jan 2025 15:12  --------------------------------------------------------  IN: 0 mL / OUT: 200 mL / NET: -200 mL                       8.4    8.95  )-----------( 83       ( 20 Jan 2025 06:49 )             25.2     01-20    131[L]  |  98  |  43[H]  ----------------------------<  160[H]  4.9   |  23  |  2.67[H]    Ca    8.7      20 Jan 2025 06:51  Phos  5.4     01-20  Mg     2.0     01-20    TPro  6.7  /  Alb  2.6[L]  /  TBili  6.2[H]  /  DBili  2.2[H]  /  AST  29  /  ALT  8[L]  /  AlkPhos  135[H]  01-20    Culture - Blood (collected 01-19-25 @ 06:30)  Source: .Blood BLOOD  Preliminary Report (01-20-25 @ 10:01):    No growth at 24 hours    Culture - Body Fluid with Gram Stain (collected 01-18-25 @ 16:44)  Source: Pleural Fl  Gram Stain (01-19-25 @ 02:12):    polymorphonuclear leukocytes seen    Gram Negative Rods seen    by cytocentrifuge  Preliminary Report (01-19-25 @ 21:22):    Moderate Citrobacter koseri    Culture - Acid Fast - Body Fluid w/Smear (collected 01-18-25 @ 16:38)  Source: Body Fluid    Culture - Fungal, Body Fluid (collected 01-18-25 @ 16:38)  Source: Body Fluid  Preliminary Report (01-20-25 @ 10:59):    Testing in progress    Culture - Blood (collected 01-18-25 @ 13:20)  Source: .Blood BLOOD  Preliminary Report (01-19-25 @ 19:01):    No growth at 24 hours    Culture - Fungal, Body Fluid (collected 01-17-25 @ 10:32)  Source: Peritoneal  Preliminary Report (01-19-25 @ 11:17):    No growth    Culture - Body Fluid with Gram Stain (collected 01-17-25 @ 10:32)  Source: Ascites Fl  Gram Stain (01-17-25 @ 23:50):    No polymorphonuclear cells seen    No organisms seen    by cytocentrifuge  Preliminary Report (01-18-25 @ 19:45):    No growth    Culture - Blood (collected 01-16-25 @ 12:15)  Source: .Blood BLOOD  Preliminary Report (01-19-25 @ 18:00):    No growth at 72 Hours    Culture - Blood (collected 01-16-25 @ 08:00)  Source: .Blood BLOOD  Gram Stain (01-17-25 @ 16:17):    Growth in aerobic bottle: Gram Positive Cocci in Clusters  Final Report (01-18-25 @ 14:57):    Growth in aerobic bottle: Staphylococcus epidermidis    Isolation of Coagulase negative Staphylococcus from single blood culture    sets may represent    contamination. Contact the Microbiology Department at [phone] if    susceptibility testing is needed.    clinically indicated.    Direct identification is available within approximately 3-5    hours either by Blood Panel Multiplexed PCR or Direct    MALDI-TOF. Details: [email or web address]  Organism: Blood Culture PCR (01-18-25 @ 14:57)  Organism: Blood Culture PCR (01-18-25 @ 14:57)    ROS: See above.    PHYSICAL EXAM:   GENERAL:  NAD, resting comfortably in bed  HEENT:  sclera icteric  RESPIRATORY:  Normal Effort. Chest tube in place w purulent drainage. site appears intact  CARDIAC:  HDS  ABDOMEN:  Soft, non-tender, non-distended  SKIN:  Warm & Dry. jaundice  NEURO:  Alert, conversant, no focal deficit

## 2025-01-20 NOTE — PROGRESS NOTE ADULT - SUBJECTIVE AND OBJECTIVE BOX
Patient is a 44y old  Male who presents with a chief complaint of decompensated cirrhosis (19 Jan 2025 17:01)      Vital Signs Last 24 Hrs  T(C): 36.4 (01-20-25 @ 09:07), Max: 37 (01-20-25 @ 00:56)  T(F): 97.6 (01-20-25 @ 09:07), Max: 98.6 (01-20-25 @ 00:56)  HR: 62 (01-20-25 @ 09:07) (62 - 92)  BP: 104/61 (01-20-25 @ 09:07) (100/54 - 110/66)  RR: 18 (01-20-25 @ 09:07) (18 - 18)  SpO2: 100% (01-20-25 @ 09:07) (95% - 100%)                01-19-25 @ 07:01  -  01-20-25 @ 07:00  --------------------------------------------------------  IN: 900 mL / OUT: 1010 mL / NET: -110 mL                                8.4    8.95  )-----------( 83       ( 20 Jan 2025 06:49 )             25.2     01-20    131[L]  |  98  |  43[H]  ----------------------------<  160[H]  4.9   |  23  |  2.67[H]    Ca    8.7      20 Jan 2025 06:51  Phos  5.4     01-20  Mg     2.0     01-20    TPro  6.7  /  Alb  2.6[L]  /  TBili  6.2[H]  /  DBili  2.2[H]  /  AST  29  /  ALT  8[L]  /  AlkPhos  135[H]  01-20          PHYSICAL EXAM  Neurology: A&Ox3, NAD, no gross deficits; C/O pain at chest tube site  CV : RRR+S1S2  Lungs: Respirations non-labored, B/L BS  Abdomen: Soft, NT/+ Distension from ascites, +BSx4Q  Extremities: 2+ B/L LE edema, negative calf tenderness, +PP    R pigtail catheter drained 700 cc overnight /910 cc /24 hrs         MEDICATIONS  albuterol    90 MICROgram(s) HFA Inhaler 2 Puff(s) Inhalation every 6 hours PRN  chlorhexidine 2% Cloths 1 Application(s) Topical daily  folic acid 1 milliGRAM(s) Oral daily  furosemide   Injectable 80 milliGRAM(s) IV Push once  lactulose Syrup 20 Gram(s) Oral every 12 hours  melatonin 5 milliGRAM(s) Oral at bedtime  metroNIDAZOLE    Tablet 500 milliGRAM(s) Oral two times a day  midodrine 10 milliGRAM(s) Oral every 8 hours  mirtazapine 7.5 milliGRAM(s) Oral at bedtime  Nephro-shania 1 Tablet(s) Oral daily  pantoprazole    Tablet 40 milliGRAM(s) Oral before breakfast  piperacillin/tazobactam IVPB.. 3.375 Gram(s) IV Intermittent every 12 hours  rifAXIMin 550 milliGRAM(s) Oral every 12 hours  thiamine 100 milliGRAM(s) Oral daily  traMADol 25 milliGRAM(s) Oral every 8 hours PRN

## 2025-01-20 NOTE — PROGRESS NOTE ADULT - PROBLEM SELECTOR PLAN 1
s/p R pigtail placement by IR on 1/18 with drainage of purulent material c/f empyema  Continue Pigtail to suction  Repeat CT of chest on Tuesday  General care as per primary team    Discussed with Dr. Hurt  [place] [facility] [place] [phone] with any questions

## 2025-01-21 LAB
ALBUMIN SERPL ELPH-MCNC: 2.5 G/DL — LOW (ref 3.3–5)
ALP SERPL-CCNC: 153 U/L — HIGH (ref 40–120)
ALT FLD-CCNC: 6 U/L — LOW (ref 10–45)
ANA PAT FLD IF-IMP: ABNORMAL
ANA TITR SER: ABNORMAL
ANION GAP SERPL CALC-SCNC: 11 MMOL/L — SIGNIFICANT CHANGE UP (ref 5–17)
APTT BLD: 44.9 SEC — HIGH (ref 24.5–35.6)
AST SERPL-CCNC: 29 U/L — SIGNIFICANT CHANGE UP (ref 10–40)
BASOPHILS # BLD AUTO: 0.09 K/UL — SIGNIFICANT CHANGE UP (ref 0–0.2)
BASOPHILS NFR BLD AUTO: 1.1 % — SIGNIFICANT CHANGE UP (ref 0–2)
BILIRUB DIRECT SERPL-MCNC: 1.9 MG/DL — HIGH (ref 0–0.3)
BILIRUB INDIRECT FLD-MCNC: 3.5 MG/DL — HIGH (ref 0.2–1)
BILIRUB SERPL-MCNC: 5.4 MG/DL — HIGH (ref 0.2–1.2)
BUN SERPL-MCNC: 45 MG/DL — HIGH (ref 7–23)
CALCIUM SERPL-MCNC: 8.8 MG/DL — SIGNIFICANT CHANGE UP (ref 8.4–10.5)
CHLORIDE SERPL-SCNC: 96 MMOL/L — SIGNIFICANT CHANGE UP (ref 96–108)
CO2 SERPL-SCNC: 22 MMOL/L — SIGNIFICANT CHANGE UP (ref 22–31)
CREAT SERPL-MCNC: 2.95 MG/DL — HIGH (ref 0.5–1.3)
CULTURE RESULTS: SIGNIFICANT CHANGE UP
EGFR: 26 ML/MIN/1.73M2 — LOW
EGFR: 26 ML/MIN/1.73M2 — LOW
EOSINOPHIL # BLD AUTO: 0.59 K/UL — HIGH (ref 0–0.5)
EOSINOPHIL NFR BLD AUTO: 7.2 % — HIGH (ref 0–6)
GLUCOSE SERPL-MCNC: 123 MG/DL — HIGH (ref 70–99)
HCT VFR BLD CALC: 22.6 % — LOW (ref 39–50)
HGB BLD-MCNC: 7.5 G/DL — LOW (ref 13–17)
IMM GRANULOCYTES NFR BLD AUTO: 0.6 % — SIGNIFICANT CHANGE UP (ref 0–0.9)
INR BLD: 2.16 RATIO — HIGH (ref 0.85–1.16)
LYMPHOCYTES # BLD AUTO: 1.04 K/UL — SIGNIFICANT CHANGE UP (ref 1–3.3)
LYMPHOCYTES # BLD AUTO: 12.7 % — LOW (ref 13–44)
MAGNESIUM SERPL-MCNC: 1.9 MG/DL — SIGNIFICANT CHANGE UP (ref 1.6–2.6)
MCHC RBC-ENTMCNC: 30.5 PG — SIGNIFICANT CHANGE UP (ref 27–34)
MCHC RBC-ENTMCNC: 33.2 G/DL — SIGNIFICANT CHANGE UP (ref 32–36)
MCV RBC AUTO: 91.9 FL — SIGNIFICANT CHANGE UP (ref 80–100)
MITOCHONDRIA AB SER-ACNC: SIGNIFICANT CHANGE UP
MONOCYTES # BLD AUTO: 1.13 K/UL — HIGH (ref 0–0.9)
MONOCYTES NFR BLD AUTO: 13.8 % — SIGNIFICANT CHANGE UP (ref 2–14)
NEUTROPHILS # BLD AUTO: 5.3 K/UL — SIGNIFICANT CHANGE UP (ref 1.8–7.4)
NEUTROPHILS NFR BLD AUTO: 64.6 % — SIGNIFICANT CHANGE UP (ref 43–77)
NRBC # BLD: 0 /100 WBCS — SIGNIFICANT CHANGE UP (ref 0–0)
NRBC BLD-RTO: 0 /100 WBCS — SIGNIFICANT CHANGE UP (ref 0–0)
PETH 16:0/18:1: 23 NG/ML — HIGH
PETH 16:0/18:2: NEGATIVE NG/ML — SIGNIFICANT CHANGE UP
PETH COMMENTS: SIGNIFICANT CHANGE UP
PHOSPHATE SERPL-MCNC: 6.1 MG/DL — HIGH (ref 2.5–4.5)
PLATELET # BLD AUTO: 83 K/UL — LOW (ref 150–400)
POTASSIUM SERPL-MCNC: 5 MMOL/L — SIGNIFICANT CHANGE UP (ref 3.5–5.3)
POTASSIUM SERPL-SCNC: 5 MMOL/L — SIGNIFICANT CHANGE UP (ref 3.5–5.3)
PROT SERPL-MCNC: 6.7 G/DL — SIGNIFICANT CHANGE UP (ref 6–8.3)
PROTHROM AB SERPL-ACNC: 24.4 SEC — HIGH (ref 9.9–13.4)
RBC # BLD: 2.46 M/UL — LOW (ref 4.2–5.8)
RBC # FLD: 19.7 % — HIGH (ref 10.3–14.5)
SMOOTH MUSCLE AB SER-ACNC: SIGNIFICANT CHANGE UP
SODIUM SERPL-SCNC: 129 MMOL/L — LOW (ref 135–145)
SPECIMEN SOURCE: SIGNIFICANT CHANGE UP
WBC # BLD: 8.2 K/UL — SIGNIFICANT CHANGE UP (ref 3.8–10.5)
WBC # FLD AUTO: 8.2 K/UL — SIGNIFICANT CHANGE UP (ref 3.8–10.5)

## 2025-01-21 PROCEDURE — 99233 SBSQ HOSP IP/OBS HIGH 50: CPT | Mod: 57

## 2025-01-21 PROCEDURE — 99231 SBSQ HOSP IP/OBS SF/LOW 25: CPT

## 2025-01-21 PROCEDURE — 99232 SBSQ HOSP IP/OBS MODERATE 35: CPT | Mod: GC

## 2025-01-21 PROCEDURE — 71250 CT THORAX DX C-: CPT | Mod: 26

## 2025-01-21 RX ORDER — FUROSEMIDE 10 MG/ML
80 INJECTION INTRAMUSCULAR; INTRAVENOUS ONCE
Refills: 0 | Status: COMPLETED | OUTPATIENT
Start: 2025-01-21 | End: 2025-01-21

## 2025-01-21 RX ORDER — ALBUMIN (HUMAN) 12.5 G/50ML
100 INJECTION, SOLUTION INTRAVENOUS ONCE
Refills: 0 | Status: DISCONTINUED | OUTPATIENT
Start: 2025-01-21 | End: 2025-01-21

## 2025-01-21 RX ORDER — FUROSEMIDE 10 MG/ML
80 INJECTION INTRAMUSCULAR; INTRAVENOUS
Refills: 0 | Status: DISCONTINUED | OUTPATIENT
Start: 2025-01-21 | End: 2025-01-21

## 2025-01-21 RX ORDER — ALBUMIN (HUMAN) 12.5 G/50ML
100 INJECTION, SOLUTION INTRAVENOUS
Refills: 0 | Status: COMPLETED | OUTPATIENT
Start: 2025-01-21 | End: 2025-01-21

## 2025-01-21 RX ORDER — ALBUMIN (HUMAN) 12.5 G/50ML
100 INJECTION, SOLUTION INTRAVENOUS
Refills: 0 | Status: DISCONTINUED | OUTPATIENT
Start: 2025-01-21 | End: 2025-01-21

## 2025-01-21 RX ORDER — FUROSEMIDE 10 MG/ML
80 INJECTION INTRAMUSCULAR; INTRAVENOUS
Refills: 0 | Status: COMPLETED | OUTPATIENT
Start: 2025-01-21 | End: 2025-01-21

## 2025-01-21 RX ORDER — INFLUENZA A VIRUS A/IDAHO/07/2018 (H1N1) ANTIGEN (MDCK CELL DERIVED, PROPIOLACTONE INACTIVATED, INFLUENZA A VIRUS A/INDIANA/08/2018 (H3N2) ANTIGEN (MDCK CELL DERIVED, PROPIOLACTONE INACTIVATED), INFLUENZA B VIRUS B/SINGAPORE/INFTT-16-0610/2016 ANTIGEN (MDCK CELL DERIVED, PROPIOLACTONE INACTIVATED), INFLUENZA B VIRUS B/IOWA/06/2017 ANTIGEN (MDCK CELL DERIVED, PROPIOLACTONE INACTIVATED) 15; 15; 15; 15 UG/.5ML; UG/.5ML; UG/.5ML; UG/.5ML
0.5 INJECTION, SUSPENSION INTRAMUSCULAR ONCE
Refills: 0 | Status: DISCONTINUED | OUTPATIENT
Start: 2025-01-21 | End: 2025-03-11

## 2025-01-21 RX ORDER — TRAMADOL HYDROCHLORIDE 50 MG/1
50 TABLET, FILM COATED ORAL ONCE
Refills: 0 | Status: DISCONTINUED | OUTPATIENT
Start: 2025-01-21 | End: 2025-01-21

## 2025-01-21 RX ORDER — ALBUMIN (HUMAN) 12.5 G/50ML
100 INJECTION, SOLUTION INTRAVENOUS ONCE
Refills: 0 | Status: COMPLETED | OUTPATIENT
Start: 2025-01-21 | End: 2025-01-21

## 2025-01-21 RX ORDER — LIDOCAINE HYDROCHLORIDE 20 MG/ML
1 JELLY TOPICAL EVERY 24 HOURS
Refills: 0 | Status: DISCONTINUED | OUTPATIENT
Start: 2025-01-21 | End: 2025-01-28

## 2025-01-21 RX ORDER — TRAMADOL HYDROCHLORIDE 50 MG/1
25 TABLET, FILM COATED ORAL ONCE
Refills: 0 | Status: DISCONTINUED | OUTPATIENT
Start: 2025-01-21 | End: 2025-01-21

## 2025-01-21 RX ADMIN — LACTULOSE 20 GRAM(S): 10 SOLUTION ORAL at 17:40

## 2025-01-21 RX ADMIN — TRAMADOL HYDROCHLORIDE 50 MILLIGRAM(S): 50 TABLET, FILM COATED ORAL at 21:43

## 2025-01-21 RX ADMIN — MIDODRINE HYDROCHLORIDE 10 MILLIGRAM(S): 5 TABLET ORAL at 05:52

## 2025-01-21 RX ADMIN — MIRTAZAPINE 7.5 MILLIGRAM(S): 30 TABLET, FILM COATED ORAL at 21:44

## 2025-01-21 RX ADMIN — TRAMADOL HYDROCHLORIDE 25 MILLIGRAM(S): 50 TABLET, FILM COATED ORAL at 02:22

## 2025-01-21 RX ADMIN — ALBUMIN (HUMAN) 50 MILLILITER(S): 12.5 INJECTION, SOLUTION INTRAVENOUS at 17:38

## 2025-01-21 RX ADMIN — TRAMADOL HYDROCHLORIDE 25 MILLIGRAM(S): 50 TABLET, FILM COATED ORAL at 02:52

## 2025-01-21 RX ADMIN — TRAMADOL HYDROCHLORIDE 25 MILLIGRAM(S): 50 TABLET, FILM COATED ORAL at 09:40

## 2025-01-21 RX ADMIN — LIDOCAINE HYDROCHLORIDE 1 PATCH: 20 JELLY TOPICAL at 21:44

## 2025-01-21 RX ADMIN — TRAMADOL HYDROCHLORIDE 50 MILLIGRAM(S): 50 TABLET, FILM COATED ORAL at 22:41

## 2025-01-21 RX ADMIN — Medication 500 MILLIGRAM(S): at 05:51

## 2025-01-21 RX ADMIN — TRAMADOL HYDROCHLORIDE 25 MILLIGRAM(S): 50 TABLET, FILM COATED ORAL at 17:47

## 2025-01-21 RX ADMIN — FOLIC ACID 1 MILLIGRAM(S): 1 TABLET ORAL at 12:37

## 2025-01-21 RX ADMIN — Medication 40 MILLIGRAM(S): at 05:50

## 2025-01-21 RX ADMIN — Medication 25 GRAM(S): at 14:14

## 2025-01-21 RX ADMIN — TRAMADOL HYDROCHLORIDE 25 MILLIGRAM(S): 50 TABLET, FILM COATED ORAL at 18:45

## 2025-01-21 RX ADMIN — Medication 5 MILLIGRAM(S): at 21:43

## 2025-01-21 RX ADMIN — Medication 500 MILLIGRAM(S): at 17:39

## 2025-01-21 RX ADMIN — MIDODRINE HYDROCHLORIDE 10 MILLIGRAM(S): 5 TABLET ORAL at 14:14

## 2025-01-21 RX ADMIN — Medication 1 TABLET(S): at 12:37

## 2025-01-21 RX ADMIN — TRAMADOL HYDROCHLORIDE 50 MILLIGRAM(S): 50 TABLET, FILM COATED ORAL at 12:37

## 2025-01-21 RX ADMIN — ALBUMIN (HUMAN) 50 MILLILITER(S): 12.5 INJECTION, SOLUTION INTRAVENOUS at 11:40

## 2025-01-21 RX ADMIN — LACTULOSE 20 GRAM(S): 10 SOLUTION ORAL at 05:51

## 2025-01-21 RX ADMIN — MIDODRINE HYDROCHLORIDE 10 MILLIGRAM(S): 5 TABLET ORAL at 21:43

## 2025-01-21 RX ADMIN — TRAMADOL HYDROCHLORIDE 25 MILLIGRAM(S): 50 TABLET, FILM COATED ORAL at 08:42

## 2025-01-21 RX ADMIN — Medication 1 APPLICATION(S): at 14:13

## 2025-01-21 RX ADMIN — TRAMADOL HYDROCHLORIDE 50 MILLIGRAM(S): 50 TABLET, FILM COATED ORAL at 13:35

## 2025-01-21 RX ADMIN — Medication 100 MILLIGRAM(S): at 12:37

## 2025-01-21 RX ADMIN — FUROSEMIDE 80 MILLIGRAM(S): 10 INJECTION INTRAMUSCULAR; INTRAVENOUS at 14:14

## 2025-01-21 RX ADMIN — FUROSEMIDE 80 MILLIGRAM(S): 10 INJECTION INTRAMUSCULAR; INTRAVENOUS at 21:43

## 2025-01-21 NOTE — PROGRESS NOTE ADULT - ASSESSMENT
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 Actively drinking. Now p/w abdominal pain/distension with worsening of liver tests possibly triggered by infection. DDx also includes alcohol associated hepatitis given hx of alc hep 7/2024 which improved with steroids  - OLT: evaluation ongoing, complex given active drinking. Pending cardiac eval- needs TTE  - Ascites: large ascites on exam, requiring frequent LVPs. s/p para 1/17 with 1.7 L fluid removed, neg for SBP  - EV: EGD 7/2024 for melena showed grade II EV, small gastric ulcer (neg HP), and portal gastropathy. Takes nadolol at home. Holding BB for now given hypotension  - HE: c/w lactulose and rifaximin, goal 3-4 BM/day  - HCC screening: no lesions seen on MRI 1/2025  - C/w home midodrine 10 mg tid  - C/w PPI for stress ulcer ppx and hx of ulcer  - Trend MELD labs daily (cmp, INR) daily    #Empyema  #Leukocytosis  CT chest noncontrast reviewed from OSH showing moderate sized complex R sided pleural effusion. s/p chest tube placed 1/18 with cultures growing Citrobacter c/w empyema   - Repeat CT chest noncontrast today  - C/w zosyn pending sensitivities  - Maintain chest tube in place for drainage  - Thoracic surgery following  - Trend cbc and fever curve    #Acute Renal Failure  Found to have acute renal failure requiring dialysis at OSH, ddx includes HRS vs ATN. Permacath in place. Cr baseline 1.12 10/2024  - Albumin assisted diuresis with lasix 80 IV q12h  - Strict I/Os, daily wts  - Trend CMP daily  - Appreciate transplant renal recs    #Anemia  #Thrombocytopenia  Normocytic anemia with Hgb 8.5 and plt count 64 likely due to chronic liver dz  - Trend cbc daily  - Transfuse Hgb>7, plt>10 (>20 if septic, >50 if bleeding)  - Hold DVT ppx for now given recent bleeding. If no further bleeding, likely can restart tomorrow    All recommendations are tentative until note is attested by attending.     Judy Tucker, PGY4  Gastroenterology/Hepatology Fellow  Available on [employer] Teams  [phone] (Long Range Pager)  [phone] (Short Range Pager [facility])    After 5 pm, please contact the on-call GI fellow for any urgent issues via the Hospital Call

## 2025-01-21 NOTE — PATIENT PROFILE ADULT - FALL HARM RISK - HARM RISK INTERVENTIONS

## 2025-01-21 NOTE — PROGRESS NOTE ADULT - SUBJECTIVE AND OBJECTIVE BOX
[facility] DIVISION OF KIDNEY DISEASES AND HYPERTENSION -- FOLLOW UP NOTE  --------------------------------------------------------------------------------  Chief Complaint: oliguric MANUELITO now on HD     24 hour events/subjective: Pt seen and evaluated bedside this morning. Endorses fatigue, abd distension and LE edema. UOP improved with diuretics yesterday. Otherwise no acute complaints. Denies any headaches, nausea, vomiting, fevers/chills, chest pain, SOB,.    PAST HISTORY  --------------------------------------------------------------------------------  No significant changes to PMH, PSH, FHx, SHx, unless otherwise noted    ALLERGIES & MEDICATIONS  --------------------------------------------------------------------------------  Allergies    Salicylic Acid (Unknown)  sulfa drugs (Unknown)    Intolerances    Standing Inpatient Medications  albumin human 25% IVPB 100 milliLiter(s) IV Intermittent once  albumin human 25% IVPB 100 milliLiter(s) IV Intermittent <User Schedule>  chlorhexidine 2% Cloths 1 Application(s) Topical daily  folic acid 1 milliGRAM(s) Oral daily  furosemide   Injectable 80 milliGRAM(s) IV Push once  furosemide   Injectable 80 milliGRAM(s) IV Push <User Schedule>  lactulose Syrup 20 Gram(s) Oral every 12 hours  melatonin 5 milliGRAM(s) Oral at bedtime  metroNIDAZOLE    Tablet 500 milliGRAM(s) Oral two times a day  midodrine 10 milliGRAM(s) Oral every 8 hours  mirtazapine 7.5 milliGRAM(s) Oral at bedtime  Nephro-shania 1 Tablet(s) Oral daily  pantoprazole    Tablet 40 milliGRAM(s) Oral before breakfast  piperacillin/tazobactam IVPB.. 3.375 Gram(s) IV Intermittent every 12 hours  rifAXIMin 550 milliGRAM(s) Oral every 12 hours  thiamine 100 milliGRAM(s) Oral daily    PRN Inpatient Medications  albuterol    90 MICROgram(s) HFA Inhaler 2 Puff(s) Inhalation every 6 hours PRN  traMADol 25 milliGRAM(s) Oral every 8 hours PRN    REVIEW OF SYSTEMS  --------------------------------------------------------------------------------  see above    VITALS/PHYSICAL EXAM  --------------------------------------------------------------------------------  T(C): 36.6 (01-21-25 @ 08:01), Max: 36.9 (01-20-25 @ 17:00)  HR: 83 (01-21-25 @ 08:01) (70 - 90)  BP: 121/70 (01-21-25 @ 08:01) (105/60 - 122/67)  RR: 18 (01-21-25 @ 08:01) (18 - 18)  SpO2: 98% (01-21-25 @ 08:01) (96% - 100%)  Wt(kg): --    01-20-25 @ 07:01  -  01-21-25 @ 07:00  --------------------------------------------------------  IN: 1140 mL / OUT: 890 mL / NET: 250 mL    01-21-25 @ 07:01  -  01-21-25 @ 11:40  --------------------------------------------------------  IN: 0 mL / OUT: 125 mL / NET: -125 mL    Physical Exam:  	Gen: NAD  	HEENT: MMM  	Pulm: CTA B/L  	CV: S1S2  	Abd: Soft, +BS   	Ext: No LE edema B/L  	Neuro: Awake  	Skin: Warm and dry  	Vascular access: [facility] HD Catheter     LABS/STUDIES  --------------------------------------------------------------------------------              7.5    8.20  >-----------<  83       [01-21-25 @ 06:28]              22.6     129  |  96  |  45  ----------------------------<  123      [01-21-25 @ 06:27]  5.0   |  22  |  2.95        Ca     8.8     [01-21-25 @ 06:27]      Mg     1.9     [01-21-25 @ 06:27]      Phos  6.1     [01-21-25 @ 06:27]    TPro  6.7  /  Alb  2.5  /  TBili  5.4  /  DBili  1.9  /  AST  29  /  ALT  6   /  AlkPhos  153  [01-21-25 @ 06:27]    PT/INR: PT 24.4 , INR 2.16       [01-21-25 @ 06:28]  PTT: 44.9       [01-21-25 @ 06:28]      Creatinine Trend:  SCr 2.95 [01-21 @ 06:27]  SCr 2.67 [01-20 @ 06:51]  SCr 2.36 [01-19 @ 06:29]  SCr 3.04 [01-18 @ 06:33]  SCr 3.04 [01-17 @ 09:10]    Urine Creatinine 261      [01-17-25 @ 08:53]  Urine Protein 81      [01-17-25 @ 08:53]  Urine Sodium 9      [01-19-25 @ 16:41]  Urine Potassium 60      [01-19-25 @ 16:41]    Iron 57, TIBC 91, %sat 62      [01-17-25 @ 06:41]  Ferritin 849      [01-17-25 @ 06:41]  TSH 8.18      [01-17-25 @ 06:41]  Lipid: chol 65, TG 46, HDL 26, LDL --      [01-17-25 @ 06:41]    HBsAb 55.9      [01-17-25 @ 06:41]  HBsAb Reactive      [01-17-25 @ 06:41]  HBsAg Nonreact      [01-17-25 @ 06:41]  HBcAb Nonreact      [01-17-25 @ 06:41]  HCV 0.06, Nonreact      [01-16-25 @ 18:23]  HIV Nonreact      [01-17-25 @ 06:41]    Syphilis Screen (Treponema Pallidum Ab) Negative      [01-17-25 @ 06:41]

## 2025-01-21 NOTE — PROGRESS NOTE ADULT - ASSESSMENT
43yo M with Hx decompensated EtOH cirrhosis (c/b recurrent ascites, grade II EV, and HE) who presented to OSH on 1/16 with abdominal pain and distension, found to have ascites and acute renal failure requiring initiation of HD (s/p Permacath placement c/b bleeding), transferred to [facility] for transplant evaluation and management. CT chest at OSH was significant for a complex R pleural effusion s/p R pigtail placement by IR on 1/18 with drainage of purulent material c/f empyema. Thoracic Surgery consulted for empyema and chest tube management.   1/20 R pigtail drained 700/910, CT of Chest on Tuesday to assess effusion and make final plan determination  1/21 R pigtail drained 80/240, CT of chest to assess effusion today

## 2025-01-21 NOTE — PROGRESS NOTE ADULT - PROBLEM SELECTOR PLAN 1
s/p R pigtail placement by IR on 1/18 with drainage of purulent material c/f empyema  Continue Pigtail to suction  Repeat CT of chest on Tuesday  General care as per primary team      Call Thoracic surgery at [phone] with any questions
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
## 2025-01-21 NOTE — PROGRESS NOTE ADULT - SUBJECTIVE AND OBJECTIVE BOX
Patient is a 44y old  Male who presents with a chief complaint of decompensated cirrhosis (2025 15:11)      Vital Signs Last 24 Hrs  T(C): 36.6 (25 @ 08:01), Max: 36.9 (25 @ 17:00)  T(F): 97.8 (25 @ 08:01), Max: 98.5 (25 @ 17:00)  HR: 83 (25 @ 08:01) (70 - 90)  BP: 121/70 (25 @ 08:01) (105/60 - 122/67)  RR: 18 (25 @ 08:01) (18 - 18)  SpO2: 98% (25 @ 08:01) (96% - 100%)                25 @ 07:01  -  25 @ 07:00  --------------------------------------------------------  IN: 1140 mL / OUT: 890 mL / NET: 250 mL    25 @ 07:01  -  25 @ 10:41  --------------------------------------------------------  IN: 0 mL / OUT: 125 mL / NET: -125 mL        Daily     Daily Weight in k.7 (2025 06:00)                          7.5    8.20  )-----------( 83       ( 2025 06:28 )             22.6         129[L]  |  96  |  45[H]  ----------------------------<  123[H]  5.0   |  22  |  2.95[H]    Ca    8.8      2025 06:27  Phos  6.1       Mg     1.9         TPro  6.7  /  Alb  2.5[L]  /  TBili  5.4[H]  /  DBili  1.9[H]  /  AST  29  /  ALT  6[L]  /  AlkPhos  153[H]            PHYSICAL EXAM  Neurology: A&Ox3, NAD, no gross deficits  CV : RRR+S1S2  Lungs: Respirations non-labored, B/L BS  Abdomen: Soft, NT/ND, +BSx4Q  Extremities: B/L LE edema, negative calf tenderness, +PP           +left chest tube to suction - AL     MEDICATIONS  albumin human  5% IVPB 100 milliLiter(s) IV Intermittent once  albumin human 25% IVPB 100 milliLiter(s) IV Intermittent <User Schedule>  albuterol    90 MICROgram(s) HFA Inhaler 2 Puff(s) Inhalation every 6 hours PRN  chlorhexidine 2% Cloths 1 Application(s) Topical daily  folic acid 1 milliGRAM(s) Oral daily  furosemide   Injectable 80 milliGRAM(s) IV Push once  furosemide   Injectable 80 milliGRAM(s) IV Push <User Schedule>  lactulose Syrup 20 Gram(s) Oral every 12 hours  melatonin 5 milliGRAM(s) Oral at bedtime  metroNIDAZOLE    Tablet 500 milliGRAM(s) Oral two times a day  midodrine 10 milliGRAM(s) Oral every 8 hours  mirtazapine 7.5 milliGRAM(s) Oral at bedtime  Nephro-shania 1 Tablet(s) Oral daily  pantoprazole    Tablet 40 milliGRAM(s) Oral before breakfast  piperacillin/tazobactam IVPB.. 3.375 Gram(s) IV Intermittent every 12 hours  rifAXIMin 550 milliGRAM(s) Oral every 12 hours  thiamine 100 milliGRAM(s) Oral daily  traMADol 25 milliGRAM(s) Oral every 8 hours PRN

## 2025-01-21 NOTE — PROGRESS NOTE ADULT - SUBJECTIVE AND OBJECTIVE BOX
44M with PMH of EtOH cirrhosis s/b recurrent ascites, EV and HE. Presents to OSH for abdominal pain and distension, found to have MANUELITO. Transferred to [facility] for OLT evaluation.    Allergies  sulfa drugs (Unknown) - pt reports a rash on face that he believes was unrelated to sulfa drug but has not retrialed since reaction.  Salicylic Acid (Other)    MEDICATIONS  (STANDING):  albumin human 25% IVPB 100 milliLiter(s) IV Intermittent <User Schedule>  chlorhexidine 2% Cloths 1 Application(s) Topical daily  folic acid 1 milliGRAM(s) Oral daily  furosemide   Injectable 80 milliGRAM(s) IV Push <User Schedule>  furosemide   Injectable 80 milliGRAM(s) IV Push once  lactulose Syrup 20 Gram(s) Oral every 12 hours  melatonin 5 milliGRAM(s) Oral at bedtime  metroNIDAZOLE    Tablet 500 milliGRAM(s) Oral two times a day  midodrine 10 milliGRAM(s) Oral every 8 hours  mirtazapine 7.5 milliGRAM(s) Oral at bedtime  Nephro-shania 1 Tablet(s) Oral daily  pantoprazole    Tablet 40 milliGRAM(s) Oral before breakfast  piperacillin/tazobactam IVPB.. 3.375 Gram(s) IV Intermittent every 12 hours  rifAXIMin 550 milliGRAM(s) Oral every 12 hours  thiamine 100 milliGRAM(s) Oral daily    MEDICATIONS  (PRN):  albuterol    90 MICROgram(s) HFA Inhaler 2 Puff(s) Inhalation every 6 hours PRN Bronchospasm  traMADol 25 milliGRAM(s) Oral every 8 hours PRN Moderate Pain (4 - 6)    Home Medications:  folic acid: 1 tab(s) orally once a day (25 Jul 2024 13:14)  gabapentin 300 mg oral tablet: 1 tab(s) orally 2 times a day (13 Jul 2024 00:12)  lactulose 10 g/15 mL oral syrup: 45 milliliter(s) orally 2 times a day (13 Jul 2024 00:12)  pantoprazole 40 mg oral delayed release tablet: 1 tab(s) orally once a day (13 Jul 2024 00:11)    Patient is being evaluated for a liver transplant. The patient was educated about transplant medications, and all questions and concerns were addressed with the patient. He manages medications independently and lives with his mother if he requires help post transplant. The patient has no pharmacologic contraindications to transplant and is cleared.    Kelly Novoa, PharmD  Clinical pharmacy specialist
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
## 2025-01-21 NOTE — PROGRESS NOTE ADULT - SUBJECTIVE AND OBJECTIVE BOX
Interval Events:   - He endorses improved dyspnea, afebrile  - Chest tube output: 240 cc in 24 hours    ROS:   12 point review of systems performed and negative except otherwise noted in HPI.    Hospital Medications:  albuterol    90 MICROgram(s) HFA Inhaler 2 Puff(s) Inhalation every 6 hours PRN  chlorhexidine 2% Cloths 1 Application(s) Topical <User Schedule>  folic acid 1 milliGRAM(s) Oral daily  lactulose Syrup 20 Gram(s) Oral every 12 hours  melatonin 5 milliGRAM(s) Oral at bedtime  midodrine 10 milliGRAM(s) Oral every 8 hours  Nephro-shania 1 Tablet(s) Oral daily  pantoprazole    Tablet 40 milliGRAM(s) Oral before breakfast  piperacillin/tazobactam IVPB.. 3.375 Gram(s) IV Intermittent every 12 hours  rifAXIMin 550 milliGRAM(s) Oral every 12 hours  thiamine 100 milliGRAM(s) Oral daily      PHYSICAL EXAM:   Vital Signs:  Vital Signs Last 24 Hrs  T(C): 36.9 (17 Jan 2025 09:29), Max: 37.3 (17 Jan 2025 00:35)  T(F): 98.4 (17 Jan 2025 09:29), Max: 99.2 (17 Jan 2025 00:35)  HR: 84 (17 Jan 2025 09:29) (65 - 106)  BP: 109/54 (17 Jan 2025 09:29) (104/51 - 129/62)  BP(mean): 89 (16 Jan 2025 18:00) (74 - 89)  RR: 16 (17 Jan 2025 09:29) (16 - 36)  SpO2: 99% (17 Jan 2025 09:29) (95% - 99%)    Parameters below as of 17 Jan 2025 05:00  Patient On (Oxygen Delivery Method): room air      Daily     Daily     PHYSICAL EXAM:   GENERAL:  No acute distress  HEENT:  Normocephalic/atraumatic, +sceral icterus  CHEST:  No accessory muscle use, mild crackles on the right base  HEART:  Regular rate and rhythm  ABDOMEN:  Soft, mild diffuse tenderness, distended w/ fluid wave, normoactive bowel sounds, splenomegaly, stigmata of chronic liver dz  EXTREMITIES: No cyanosis, clubbing, or edema  SKIN:  No rash  NEURO:  Alert and oriented x 3, no asterixis    LABS: reviewed                        7.4    13.96 )-----------( 73       ( 17 Jan 2025 06:42 )             22.4     01-16    130[L]  |  96  |  39[H]  ----------------------------<  117[H]  4.3   |  22  |  2.59[H]    Ca    8.6      16 Jan 2025 07:39  Phos  4.3     01-17  Mg     2.0     01-17    TPro  6.4  /  Alb  2.5[L]  /  TBili  5.8[H]  /  DBili  2.1[H]  /  AST  32  /  ALT  7[L]  /  AlkPhos  116  01-17    LIVER FUNCTIONS - ( 17 Jan 2025 06:41 )  Alb: 2.5 g/dL / Pro: 6.4 g/dL / ALK PHOS: 116 U/L / ALT: 7 U/L / AST: 32 U/L / GGT: x             Interval Diagnostic Studies: see sunrise for full report

## 2025-01-22 LAB
ALBUMIN SERPL ELPH-MCNC: 2.7 G/DL — LOW (ref 3.3–5)
ALP SERPL-CCNC: 101 U/L — SIGNIFICANT CHANGE UP (ref 40–120)
ALT FLD-CCNC: 8 U/L — LOW (ref 10–45)
ANION GAP SERPL CALC-SCNC: 12 MMOL/L — SIGNIFICANT CHANGE UP (ref 5–17)
APTT BLD: 48.6 SEC — HIGH (ref 24.5–35.6)
AST SERPL-CCNC: 30 U/L — SIGNIFICANT CHANGE UP (ref 10–40)
BASOPHILS # BLD AUTO: 0.07 K/UL — SIGNIFICANT CHANGE UP (ref 0–0.2)
BASOPHILS NFR BLD AUTO: 1.1 % — SIGNIFICANT CHANGE UP (ref 0–2)
BILIRUB DIRECT SERPL-MCNC: 1.7 MG/DL — HIGH (ref 0–0.3)
BILIRUB INDIRECT FLD-MCNC: 3.9 MG/DL — HIGH (ref 0.2–1)
BILIRUB SERPL-MCNC: 5.6 MG/DL — HIGH (ref 0.2–1.2)
BLD GP AB SCN SERPL QL: NEGATIVE — SIGNIFICANT CHANGE UP
BUN SERPL-MCNC: 48 MG/DL — HIGH (ref 7–23)
CALCIUM SERPL-MCNC: 9 MG/DL — SIGNIFICANT CHANGE UP (ref 8.4–10.5)
CHLORIDE SERPL-SCNC: 98 MMOL/L — SIGNIFICANT CHANGE UP (ref 96–108)
CO2 SERPL-SCNC: 20 MMOL/L — LOW (ref 22–31)
CREAT SERPL-MCNC: 2.92 MG/DL — HIGH (ref 0.5–1.3)
CULTURE RESULTS: SIGNIFICANT CHANGE UP
EGFR: 26 ML/MIN/1.73M2 — LOW
EGFR: 26 ML/MIN/1.73M2 — LOW
EOSINOPHIL # BLD AUTO: 0.59 K/UL — HIGH (ref 0–0.5)
EOSINOPHIL NFR BLD AUTO: 9.2 % — HIGH (ref 0–6)
GAMMA INTERFERON BACKGROUND BLD IA-ACNC: 0.08 IU/ML — SIGNIFICANT CHANGE UP
GLUCOSE SERPL-MCNC: 135 MG/DL — HIGH (ref 70–99)
HCT VFR BLD CALC: 21.1 % — LOW (ref 39–50)
HGB BLD-MCNC: 7 G/DL — CRITICAL LOW (ref 13–17)
IMM GRANULOCYTES NFR BLD AUTO: 0.5 % — SIGNIFICANT CHANGE UP (ref 0–0.9)
INR BLD: 2.36 RATIO — HIGH (ref 0.85–1.16)
LYMPHOCYTES # BLD AUTO: 1.03 K/UL — SIGNIFICANT CHANGE UP (ref 1–3.3)
LYMPHOCYTES # BLD AUTO: 16.1 % — SIGNIFICANT CHANGE UP (ref 13–44)
M TB IFN-G BLD-IMP: NEGATIVE — SIGNIFICANT CHANGE UP
M TB IFN-G CD4+ BCKGRND COR BLD-ACNC: 0 IU/ML — SIGNIFICANT CHANGE UP
M TB IFN-G CD4+CD8+ BCKGRND COR BLD-ACNC: 0 IU/ML — SIGNIFICANT CHANGE UP
MAGNESIUM SERPL-MCNC: 1.8 MG/DL — SIGNIFICANT CHANGE UP (ref 1.6–2.6)
MCHC RBC-ENTMCNC: 30.3 PG — SIGNIFICANT CHANGE UP (ref 27–34)
MCHC RBC-ENTMCNC: 33.2 G/DL — SIGNIFICANT CHANGE UP (ref 32–36)
MCV RBC AUTO: 91.3 FL — SIGNIFICANT CHANGE UP (ref 80–100)
MONOCYTES # BLD AUTO: 1.01 K/UL — HIGH (ref 0–0.9)
MONOCYTES NFR BLD AUTO: 15.8 % — HIGH (ref 2–14)
NEUTROPHILS # BLD AUTO: 3.67 K/UL — SIGNIFICANT CHANGE UP (ref 1.8–7.4)
NEUTROPHILS NFR BLD AUTO: 57.3 % — SIGNIFICANT CHANGE UP (ref 43–77)
NRBC # BLD: 0 /100 WBCS — SIGNIFICANT CHANGE UP (ref 0–0)
NRBC BLD-RTO: 0 /100 WBCS — SIGNIFICANT CHANGE UP (ref 0–0)
PHOSPHATE SERPL-MCNC: 6.4 MG/DL — HIGH (ref 2.5–4.5)
PLATELET # BLD AUTO: 67 K/UL — LOW (ref 150–400)
POTASSIUM SERPL-MCNC: 4.9 MMOL/L — SIGNIFICANT CHANGE UP (ref 3.5–5.3)
POTASSIUM SERPL-SCNC: 4.9 MMOL/L — SIGNIFICANT CHANGE UP (ref 3.5–5.3)
PROT SERPL-MCNC: 6.4 G/DL — SIGNIFICANT CHANGE UP (ref 6–8.3)
PROTHROM AB SERPL-ACNC: 26.9 SEC — HIGH (ref 9.9–13.4)
QUANT TB PLUS MITOGEN MINUS NIL: 1.24 IU/ML — SIGNIFICANT CHANGE UP
RAPIDTEG MAXIMUM AMPLITUDE: 44.3 MM — LOW (ref 52–70)
RBC # BLD: 2.31 M/UL — LOW (ref 4.2–5.8)
RBC # FLD: 19.7 % — HIGH (ref 10.3–14.5)
RH IG SCN BLD-IMP: POSITIVE — SIGNIFICANT CHANGE UP
SODIUM SERPL-SCNC: 130 MMOL/L — LOW (ref 135–145)
SPECIMEN SOURCE: SIGNIFICANT CHANGE UP
TEG FUNCTIONAL FIBRINOGEN: 12.4 MM — LOW (ref 15–32)
TEG LY30 (LYSIS): 0.5 % — SIGNIFICANT CHANGE UP (ref 0–2.6)
TEG REACTION TIME: 7.9 MIN — SIGNIFICANT CHANGE UP (ref 4.6–9.1)
WBC # BLD: 6.4 K/UL — SIGNIFICANT CHANGE UP (ref 3.8–10.5)
WBC # FLD AUTO: 6.4 K/UL — SIGNIFICANT CHANGE UP (ref 3.8–10.5)

## 2025-01-22 PROCEDURE — 99232 SBSQ HOSP IP/OBS MODERATE 35: CPT

## 2025-01-22 PROCEDURE — G0545: CPT

## 2025-01-22 PROCEDURE — 71045 X-RAY EXAM CHEST 1 VIEW: CPT | Mod: 26

## 2025-01-22 PROCEDURE — 99232 SBSQ HOSP IP/OBS MODERATE 35: CPT | Mod: GC

## 2025-01-22 PROCEDURE — 99233 SBSQ HOSP IP/OBS HIGH 50: CPT

## 2025-01-22 PROCEDURE — 99233 SBSQ HOSP IP/OBS HIGH 50: CPT | Mod: 57

## 2025-01-22 RX ORDER — BUMETANIDE 1 MG/1
2 TABLET ORAL
Refills: 0 | Status: COMPLETED | OUTPATIENT
Start: 2025-01-22 | End: 2025-01-22

## 2025-01-22 RX ORDER — SEVELAMER HYDROCHLORIDE 800 MG/1
800 TABLET ORAL
Refills: 0 | Status: DISCONTINUED | OUTPATIENT
Start: 2025-01-22 | End: 2025-01-28

## 2025-01-22 RX ORDER — MAGNESIUM SULFATE 500 MG/ML
2 SYRINGE (ML) INJECTION ONCE
Refills: 0 | Status: COMPLETED | OUTPATIENT
Start: 2025-01-22 | End: 2025-01-22

## 2025-01-22 RX ORDER — CEFTRIAXONE 500 MG/1
2000 INJECTION, POWDER, FOR SOLUTION INTRAMUSCULAR; INTRAVENOUS EVERY 24 HOURS
Refills: 0 | Status: DISCONTINUED | OUTPATIENT
Start: 2025-01-23 | End: 2025-01-28

## 2025-01-22 RX ORDER — CEFTRIAXONE 500 MG/1
INJECTION, POWDER, FOR SOLUTION INTRAMUSCULAR; INTRAVENOUS
Refills: 0 | Status: DISCONTINUED | OUTPATIENT
Start: 2025-01-22 | End: 2025-01-28

## 2025-01-22 RX ORDER — ALBUMIN (HUMAN) 12.5 G/50ML
100 INJECTION, SOLUTION INTRAVENOUS
Refills: 0 | Status: COMPLETED | OUTPATIENT
Start: 2025-01-22 | End: 2025-01-22

## 2025-01-22 RX ORDER — SIMETHICONE 80 MG
80 TABLET,CHEWABLE ORAL ONCE
Refills: 0 | Status: COMPLETED | OUTPATIENT
Start: 2025-01-22 | End: 2025-01-22

## 2025-01-22 RX ORDER — BUMETANIDE 1 MG/1
2 TABLET ORAL ONCE
Refills: 0 | Status: COMPLETED | OUTPATIENT
Start: 2025-01-22 | End: 2025-01-22

## 2025-01-22 RX ORDER — CEFTRIAXONE 500 MG/1
1000 INJECTION, POWDER, FOR SOLUTION INTRAMUSCULAR; INTRAVENOUS EVERY 24 HOURS
Refills: 0 | Status: DISCONTINUED | OUTPATIENT
Start: 2025-01-22 | End: 2025-01-22

## 2025-01-22 RX ORDER — CEFTRIAXONE 500 MG/1
1000 INJECTION, POWDER, FOR SOLUTION INTRAMUSCULAR; INTRAVENOUS ONCE
Refills: 0 | Status: DISCONTINUED | OUTPATIENT
Start: 2025-01-22 | End: 2025-01-22

## 2025-01-22 RX ORDER — TRAMADOL HYDROCHLORIDE 50 MG/1
50 TABLET, FILM COATED ORAL ONCE
Refills: 0 | Status: DISCONTINUED | OUTPATIENT
Start: 2025-01-22 | End: 2025-01-22

## 2025-01-22 RX ORDER — CEFTRIAXONE 500 MG/1
2000 INJECTION, POWDER, FOR SOLUTION INTRAMUSCULAR; INTRAVENOUS ONCE
Refills: 0 | Status: COMPLETED | OUTPATIENT
Start: 2025-01-22 | End: 2025-01-22

## 2025-01-22 RX ADMIN — BUMETANIDE 2 MILLIGRAM(S): 1 TABLET ORAL at 10:49

## 2025-01-22 RX ADMIN — LACTULOSE 20 GRAM(S): 10 SOLUTION ORAL at 05:59

## 2025-01-22 RX ADMIN — TRAMADOL HYDROCHLORIDE 25 MILLIGRAM(S): 50 TABLET, FILM COATED ORAL at 19:13

## 2025-01-22 RX ADMIN — TRAMADOL HYDROCHLORIDE 50 MILLIGRAM(S): 50 TABLET, FILM COATED ORAL at 22:08

## 2025-01-22 RX ADMIN — Medication 1 APPLICATION(S): at 14:34

## 2025-01-22 RX ADMIN — Medication 25 GRAM(S): at 00:53

## 2025-01-22 RX ADMIN — FOLIC ACID 1 MILLIGRAM(S): 1 TABLET ORAL at 14:34

## 2025-01-22 RX ADMIN — Medication 100 MILLIGRAM(S): at 14:33

## 2025-01-22 RX ADMIN — Medication 25 GRAM(S): at 14:35

## 2025-01-22 RX ADMIN — BUMETANIDE 2 MILLIGRAM(S): 1 TABLET ORAL at 21:41

## 2025-01-22 RX ADMIN — Medication 500 MILLIGRAM(S): at 05:58

## 2025-01-22 RX ADMIN — Medication 80 MILLIGRAM(S): at 22:08

## 2025-01-22 RX ADMIN — LIDOCAINE HYDROCHLORIDE 1 PATCH: 20 JELLY TOPICAL at 21:41

## 2025-01-22 RX ADMIN — MIDODRINE HYDROCHLORIDE 10 MILLIGRAM(S): 5 TABLET ORAL at 21:41

## 2025-01-22 RX ADMIN — Medication 500 MILLIGRAM(S): at 18:13

## 2025-01-22 RX ADMIN — Medication 5 MILLIGRAM(S): at 21:41

## 2025-01-22 RX ADMIN — CEFTRIAXONE 2000 MILLIGRAM(S): 500 INJECTION, POWDER, FOR SOLUTION INTRAMUSCULAR; INTRAVENOUS at 22:23

## 2025-01-22 RX ADMIN — Medication 40 MILLIGRAM(S): at 05:58

## 2025-01-22 RX ADMIN — ALBUMIN (HUMAN) 50 MILLILITER(S): 12.5 INJECTION, SOLUTION INTRAVENOUS at 18:15

## 2025-01-22 RX ADMIN — TRAMADOL HYDROCHLORIDE 25 MILLIGRAM(S): 50 TABLET, FILM COATED ORAL at 06:50

## 2025-01-22 RX ADMIN — MIRTAZAPINE 7.5 MILLIGRAM(S): 30 TABLET, FILM COATED ORAL at 21:41

## 2025-01-22 RX ADMIN — Medication 1 TABLET(S): at 14:34

## 2025-01-22 RX ADMIN — MIDODRINE HYDROCHLORIDE 10 MILLIGRAM(S): 5 TABLET ORAL at 14:46

## 2025-01-22 RX ADMIN — LIDOCAINE HYDROCHLORIDE 1 PATCH: 20 JELLY TOPICAL at 09:00

## 2025-01-22 RX ADMIN — TRAMADOL HYDROCHLORIDE 50 MILLIGRAM(S): 50 TABLET, FILM COATED ORAL at 23:00

## 2025-01-22 RX ADMIN — TRAMADOL HYDROCHLORIDE 25 MILLIGRAM(S): 50 TABLET, FILM COATED ORAL at 05:58

## 2025-01-22 RX ADMIN — MIDODRINE HYDROCHLORIDE 10 MILLIGRAM(S): 5 TABLET ORAL at 05:59

## 2025-01-22 RX ADMIN — SEVELAMER HYDROCHLORIDE 800 MILLIGRAM(S): 800 TABLET ORAL at 14:33

## 2025-01-22 RX ADMIN — Medication 25 GRAM(S): at 09:14

## 2025-01-22 RX ADMIN — LIDOCAINE HYDROCHLORIDE 1 PATCH: 20 JELLY TOPICAL at 07:00

## 2025-01-22 RX ADMIN — TRAMADOL HYDROCHLORIDE 25 MILLIGRAM(S): 50 TABLET, FILM COATED ORAL at 18:13

## 2025-01-22 RX ADMIN — SEVELAMER HYDROCHLORIDE 800 MILLIGRAM(S): 800 TABLET ORAL at 18:13

## 2025-01-22 NOTE — CONSULT NOTE ADULT - SUBJECTIVE AND OBJECTIVE BOX
Interventional Radiology    Evaluate for Procedure:   right pigtail access for possible reposition and large volume para    HPI: 43yo M with Hx decompensated EtOH cirrhosis (c/b recurrent ascites, grade II EV, and HE) who presented to OSH on  with abdominal pain and distension, found to have ascites and acute renal failure requiring initiation of HD (s/p Permacath placement c/b bleeding), transferred to [facility] for transplant evaluation and management. CT chest at OSH was significant for a complex R pleural effusion s/p R pigtail placement by IR on  with drainage of purulent material c/f empyema. Thoracic Surgery consulted for empyema and chest tube management.  IR consulted for chest tube repositioning and dx+tx para on  .         Allergies: sulfa drugs (Unknown)  Salicylic Acid (Other)    Medications (Abx/Cardiac/Anticoagulation/Blood Products)    buMETAnide Injectable: 2 milliGRAM(s) IV Push ( @ 10:49)  furosemide   Injectable: 80 milliGRAM(s) IV Push ( @ 19:53)  furosemide   Injectable: 80 milliGRAM(s) IV Push ( @ 21:43)  furosemide   Injectable: 80 milliGRAM(s) IV Push ( @ 14:14)  metroNIDAZOLE    Tablet: 500 milliGRAM(s) Oral ( @ 05:58)  midodrine: 10 milliGRAM(s) Oral ( @ 05:59)  piperacillin/tazobactam IVPB..: 25 mL/Hr IV Intermittent ( @ 00:53)  rifAXIMin: 550 milliGRAM(s) Oral ( @ 05:58)    Data:    T(C): 36.6  HR: 83  BP: 110/57  RR: 18  SpO2: 99%    -WBC 6.40 / HgB 7.0 / Hct 21.1 / Plt 67  -Na 130 / Cl 98 / BUN 48 / Glucose 135  -K 4.9 / CO2 20 / Cr 2.92  -ALT 8 / Alk Phos 101 / T.Bili 5.6  -INR 2.36 / PTT 48.6    Radiology: < from: CT Chest No Cont (25 @ 19:29) >      IMPRESSION:.    Marked interval decrease in size ofright-sided pleural fluid and gas   collection (presumably empyema) status post placement of pigtail catheter   as described above.    Much interval improvement of posterior right upper lobe and right lower   lobe consolidation.    New patchy ground-glass opacities at the left apex may be related to   edema or be infectious/inflammatory in etiology.       New small left pleural effusion.         Assessment/Plan: 43yo M with Hx decompensated EtOH cirrhosis (c/b recurrent ascites, grade II EV, and HE) who presented to OSH on  with abdominal pain and distension, found to have ascites and acute renal failure requiring initiation of HD (s/p Permacath placement c/b bleeding), transferred to NS for transplant evaluation and management. CT chest at OSH was significant for a complex R pleural effusion s/p R pigtail placement by IR on  with drainage of purulent material c/f empyema. Thoracic Surgery consulted for empyema and chest tube management.  IR consulted for chest tube repositioning and dx+tx para on  .      -  chest tube evaluated at bedside , outputs 250cc in 24hrs. CT imaging and xray shows good positioning.  at this time will not adjust positioning   -Will plan for dx and therapeutic para tmrw   -Please place IR procedure order under PA JALILI   -pt does not need to be npo. para performed under local   -STAT am labs  -Hold a/c x 24-48hrs prior to procedure and tentative plan for resumption 12 hrs post procedure.  -Maintain active T&S  -Above d/w primary team              Any questions or concerns regarding above please reach out to IR:   -Available on microsoft teams  -During working hours (7a-5p): call [phone]  -Emergent issues after 5pm: page: [phone]  -Non-emergent consults: Please place a [place] order "IR Consult" with an appropriate callback number  -Scheduling questions: [phone]  -Clinic/Outpatient [phone]   Interventional Radiology    Evaluate for Procedure:   right pigtail access for possible reposition and large volume para    HPI: 43yo M with Hx decompensated EtOH cirrhosis (c/b recurrent ascites, grade II EV, and HE) who presented to OSH on  with abdominal pain and distension, found to have ascites and acute renal failure requiring initiation of HD (s/p Permacath placement c/b bleeding), transferred to NS for transplant evaluation and management. CT chest at OSH was significant for a complex R pleural effusion s/p R pigtail placement by IR on  with drainage of purulent material c/f empyema. IR consulted for chest tube repositioning and dx+tx para on  .         Allergies: sulfa drugs (Unknown)  Salicylic Acid (Other)    Medications (Abx/Cardiac/Anticoagulation/Blood Products)    buMETAnide Injectable: 2 milliGRAM(s) IV Push ( @ 10:49)  furosemide   Injectable: 80 milliGRAM(s) IV Push ( @ 19:53)  furosemide   Injectable: 80 milliGRAM(s) IV Push ( @ 21:43)  furosemide   Injectable: 80 milliGRAM(s) IV Push ( @ 14:14)  metroNIDAZOLE    Tablet: 500 milliGRAM(s) Oral ( @ 05:58)  midodrine: 10 milliGRAM(s) Oral ( @ 05:59)  piperacillin/tazobactam IVPB..: 25 mL/Hr IV Intermittent ( @ 00:53)  rifAXIMin: 550 milliGRAM(s) Oral ( @ 05:58)    Data:    T(C): 36.6  HR: 83  BP: 110/57  RR: 18  SpO2: 99%    -WBC 6.40 / HgB 7.0 / Hct 21.1 / Plt 67  -Na 130 / Cl 98 / BUN 48 / Glucose 135  -K 4.9 / CO2 20 / Cr 2.92  -ALT 8 / Alk Phos 101 / T.Bili 5.6  -INR 2.36 / PTT 48.6    Radiology: < from: CT Chest No Cont (25 @ 19:29) >      IMPRESSION:.    Marked interval decrease in size ofright-sided pleural fluid and gas   collection (presumably empyema) status post placement of pigtail catheter   as described above.    Much interval improvement of posterior right upper lobe and right lower   lobe consolidation.    New patchy ground-glass opacities at the left apex may be related to   edema or be infectious/inflammatory in etiology.       New small left pleural effusion.         Assessment/Plan: 43yo M with Hx decompensated EtOH cirrhosis (c/b recurrent ascites, grade II EV, and HE) who presented to OSH on  with abdominal pain and distension, found to have ascites and acute renal failure requiring initiation of HD (s/p Permacath placement c/b bleeding), transferred to NS for transplant evaluation and management. CT chest at OSH was significant for a complex R pleural effusion s/p R pigtail placement by IR on  with drainage of purulent material c/f empyema. Thoracic Surgery consulted for empyema and chest tube management.  IR consulted for chest tube repositioning and dx+tx para on  .      -  chest tube evaluated at bedside , outputs 250cc in 24hrs.   -  CT imaging shows right pigtail marker slightly pulled out.  IR will plan for guidewire repositioning of current pigtail or new pigtail placement in more medial collection  + also paracentesis (dx+tx) on  under local anesthesia.  -Please place IR procedure order under Dr. Teixeira  -keep patient npo  -STAT am labs  -Hold a/c x 24-48hrs prior to procedure and tentative plan for resumption 12 hrs post procedure.  -Maintain active T&S  -Above d/w primary team              Any questions or concerns regarding above please reach out to IR:   -Available on micro[employer] teams  -During working hours (7a-5p): call [phone]  -Emergent issues after 5pm: page: [phone]  -Non-emergent consults: Please place a [place] order "IR Consult" with an appropriate callback number  -Scheduling questions: [phone]  -Clinic/Outpatient [phone]   Interventional Radiology    Evaluate for Procedure:   right pigtail access for possible reposition and large volume para    HPI: 45yo M with Hx decompensated EtOH cirrhosis (c/b recurrent ascites, grade II EV, and HE) who presented to OSH on  with abdominal pain and distension, found to have ascites and acute renal failure requiring initiation of HD (s/p Permacath placement c/b bleeding), transferred to NS for transplant evaluation and management. CT chest at OSH was significant for a complex R pleural effusion s/p R pigtail placement by IR on  with drainage of purulent material c/f empyema. IR consulted for chest tube repositioning and dx+tx para on  .         Allergies: sulfa drugs (Unknown)  Salicylic Acid (Other)    Medications (Abx/Cardiac/Anticoagulation/Blood Products)    buMETAnide Injectable: 2 milliGRAM(s) IV Push ( @ 10:49)  furosemide   Injectable: 80 milliGRAM(s) IV Push ( @ 19:53)  furosemide   Injectable: 80 milliGRAM(s) IV Push ( @ 21:43)  furosemide   Injectable: 80 milliGRAM(s) IV Push ( @ 14:14)  metroNIDAZOLE    Tablet: 500 milliGRAM(s) Oral ( @ 05:58)  midodrine: 10 milliGRAM(s) Oral ( @ 05:59)  piperacillin/tazobactam IVPB..: 25 mL/Hr IV Intermittent ( @ 00:53)  rifAXIMin: 550 milliGRAM(s) Oral ( @ 05:58)    Data:    T(C): 36.6  HR: 83  BP: 110/57  RR: 18  SpO2: 99%    -WBC 6.40 / HgB 7.0 / Hct 21.1 / Plt 67  -Na 130 / Cl 98 / BUN 48 / Glucose 135  -K 4.9 / CO2 20 / Cr 2.92  -ALT 8 / Alk Phos 101 / T.Bili 5.6  -INR 2.36 / PTT 48.6    Radiology: < from: CT Chest No Cont (25 @ 19:29) >      IMPRESSION:.    Marked interval decrease in size ofright-sided pleural fluid and gas   collection (presumably empyema) status post placement of pigtail catheter   as described above.    Much interval improvement of posterior right upper lobe and right lower   lobe consolidation.    New patchy ground-glass opacities at the left apex may be related to   edema or be infectious/inflammatory in etiology.       New small left pleural effusion.         Assessment/Plan: 45yo M with Hx decompensated EtOH cirrhosis (c/b recurrent ascites, grade II EV, and HE) who presented to OSH on  with abdominal pain and distension, found to have ascites and acute renal failure requiring initiation of HD (s/p Permacath placement c/b bleeding), transferred to [facility] for transplant evaluation and management. CT chest at OSH was significant for a complex R pleural effusion s/p R pigtail placement by IR on  with drainage of purulent material c/f empyema. Thoracic Surgery consulted for empyema and chest tube management.  IR consulted for chest tube repositioning and dx+tx para on  .      -  chest tube evaluated at bedside , outputs 250cc in 24hrs.   -  CT imaging shows right pigtail marker slightly pulled out.  IR will plan for guidewire repositioning of current pigtail or new pigtail placement in more medial collection  + also paracentesis (dx+tx) on  under local anesthesia.  -Please place IR procedure order under Dr. Teixeira  -keep patient npo  -STAT am labs  -Hold a/c x 24-48hrs prior to procedure and tentative plan for resumption 12 hrs post procedure.  -Maintain active T&S ;   may need  ffp prior to procedure  -Above d/w primary team              Any questions or concerns regarding above please reach out to IR:   -Available on [employer] teams  -During working hours (7a-5p): call [phone]  -Emergent issues after 5pm: page: [phone]  -Non-emergent consults: Please place a [place] order "IR Consult" with an appropriate callback number  -Scheduling questions: [phone]  -Clinic/Outpatient [phone]   Interventional Radiology    Evaluate for Procedure:   right pigtail access for possible reposition and large volume para    HPI: 43yo M with Hx decompensated EtOH cirrhosis (c/b recurrent ascites, grade II EV, and HE) who presented to OSH on  with abdominal pain and distension, found to have ascites and acute renal failure requiring initiation of HD (s/p Permacath placement c/b bleeding), transferred to NS for transplant evaluation and management. CT chest at OSH was significant for a complex R pleural effusion s/p R pigtail placement by IR on  with drainage of purulent material c/f empyema. IR consulted for chest tube repositioning and dx+tx para on  .         Allergies: sulfa drugs (Unknown)  Salicylic Acid (Other)    Medications (Abx/Cardiac/Anticoagulation/Blood Products)    buMETAnide Injectable: 2 milliGRAM(s) IV Push ( @ 10:49)  furosemide   Injectable: 80 milliGRAM(s) IV Push ( @ 19:53)  furosemide   Injectable: 80 milliGRAM(s) IV Push ( @ 21:43)  furosemide   Injectable: 80 milliGRAM(s) IV Push ( @ 14:14)  metroNIDAZOLE    Tablet: 500 milliGRAM(s) Oral ( @ 05:58)  midodrine: 10 milliGRAM(s) Oral ( @ 05:59)  piperacillin/tazobactam IVPB..: 25 mL/Hr IV Intermittent ( @ 00:53)  rifAXIMin: 550 milliGRAM(s) Oral ( @ 05:58)    Data:    T(C): 36.6  HR: 83  BP: 110/57  RR: 18  SpO2: 99%    -WBC 6.40 / HgB 7.0 / Hct 21.1 / Plt 67  -Na 130 / Cl 98 / BUN 48 / Glucose 135  -K 4.9 / CO2 20 / Cr 2.92  -ALT 8 / Alk Phos 101 / T.Bili 5.6  -INR 2.36 / PTT 48.6    Radiology: < from: CT Chest No Cont (25 @ 19:29) >      IMPRESSION:.    Marked interval decrease in size ofright-sided pleural fluid and gas   collection (presumably empyema) status post placement of pigtail catheter   as described above.    Much interval improvement of posterior right upper lobe and right lower   lobe consolidation.    New patchy ground-glass opacities at the left apex may be related to   edema or be infectious/inflammatory in etiology.       New small left pleural effusion.         Assessment/Plan: 43yo M with Hx decompensated EtOH cirrhosis (c/b recurrent ascites, grade II EV, and HE) who presented to OSH on  with abdominal pain and distension, found to have ascites and acute renal failure requiring initiation of HD (s/p Permacath placement c/b bleeding), transferred to NS for transplant evaluation and management. CT chest at OSH was significant for a complex R pleural effusion s/p R pigtail placement by IR on  with drainage of purulent material c/f empyema. Thoracic Surgery consulted for empyema and chest tube management.  IR consulted for chest tube repositioning and dx+tx para on  .      -  chest tube evaluated at bedside , outputs 250cc in 24hrs.   -  CT imaging shows right pigtail marker slightly pulled out.  IR will plan for guidewire repositioning of current pigtail or new pigtail placement in more medial collection  + also paracentesis (dx+tx) on  under local anesthesia.  -Please place IR procedure order under Dr. eTixeira  -procedures to be performed under local anesthesia.  patient does not need to be NPO.  -STAT am labs  -Hold a/c x 24-48hrs prior to procedure and tentative plan for resumption 12 hrs post procedure.  -Maintain active T&S ;   may need  ffp prior to procedure  -Above d/w primary team              Any questions or concerns regarding above please reach out to IR:   -Available on [employer] teams  -During working hours (7a-5p): call [phone]  -Emergent issues after 5pm: page: [phone]  -Non-emergent consults: Please place a [place] order "IR Consult" with an appropriate callback number  -Scheduling questions: [phone]  -Clinic/Outpatient [phone]

## 2025-01-22 NOTE — PROGRESS NOTE ADULT - PROBLEM SELECTOR PLAN 1
s/p R pigtail placement by IR on 1/18 with drainage of purulent material c/f empyema  Continue Pigtail to suction  CT chest 1/21:   Marked interval decrease in size of right-sided pleural fluid and gas   collection (presumably empyema) status post placement of pigtail catheter   as described above.  Much interval improvement of posterior right upper lobe and right lower   lobe consolidation.  New patchy ground-glass opacities at the left apex may be related to   edema or be infectious/inflammatory in etiology.  New small left pleural effusion.    General care as per primary team  Call Thoracic surgery at [phone] with any questions

## 2025-01-22 NOTE — BH CONSULTATION LIAISON PROGRESS NOTE - NSBHFUPINTERVALHXFT_PSY_A_CORE
Pt states that he was diagnosed with pneumonia and his ascites is much worse now so he has not yet spoken with staff about the transplant process.  He is in back pain and having much difficulty ambulating though walking to the bath room with much effort using his walker.  He is hopeful that his condition will improve when he receives a transplant but says the must first recover from infections.

## 2025-01-22 NOTE — PROGRESS NOTE ADULT - SUBJECTIVE AND OBJECTIVE BOX
INFECTIOUS DISEASES FOLLOW UP-- Carley Kam  [phone]    This is a follow up note for this  44yMale with  Liver failure without hepatic coma        ROS:  CONSTITUTIONAL:  feeling weak today      Allergies    sulfa drugs (Unknown)  Salicylic Acid (Other)    Intolerances        ANTIBIOTICS/RELEVANT:  antimicrobials  metroNIDAZOLE    Tablet 500 milliGRAM(s) Oral two times a day  rifAXIMin 550 milliGRAM(s) Oral every 12 hours    immunologic:  influenza   Vaccine 0.5 milliLiter(s) IntraMuscular once    OTHER:  albuterol    90 MICROgram(s) HFA Inhaler 2 Puff(s) Inhalation every 6 hours PRN  buMETAnide Injectable 2 milliGRAM(s) IV Push <User Schedule>  chlorhexidine 2% Cloths 1 Application(s) Topical daily  folic acid 1 milliGRAM(s) Oral daily  lactulose Syrup 20 Gram(s) Oral every 12 hours  lidocaine   4% Patch 1 Patch Transdermal every 24 hours  melatonin 5 milliGRAM(s) Oral at bedtime  midodrine 10 milliGRAM(s) Oral every 8 hours  mirtazapine 7.5 milliGRAM(s) Oral at bedtime  Nephro-shania 1 Tablet(s) Oral daily  pantoprazole    Tablet 40 milliGRAM(s) Oral before breakfast  sevelamer carbonate 800 milliGRAM(s) Oral three times a day with meals  thiamine 100 milliGRAM(s) Oral daily  traMADol 25 milliGRAM(s) Oral every 8 hours PRN      Objective:  Vital Signs Last 24 Hrs  T(C): 36.8 (22 Jan 2025 17:15), Max: 36.8 (21 Jan 2025 21:00)  T(F): 98.2 (22 Jan 2025 17:15), Max: 98.3 (21 Jan 2025 21:00)  HR: 81 (22 Jan 2025 17:15) (76 - 95)  BP: 123/74 (22 Jan 2025 17:15) (108/57 - 129/73)  BP(mean): --  RR: 18 (22 Jan 2025 17:15) (18 - 18)  SpO2: 100% (22 Jan 2025 17:15) (96% - 100%)    Parameters below as of 22 Jan 2025 17:15  Patient On (Oxygen Delivery Method): room air        PHYSICAL EXAM:  Constitutional:no acute distress  Eyes:EDITH, icteric  Ear/Nose/Throat: no oral lesions, 	  Respiratory: decreased BS right side  Cardiovascular: S1S2  Gastrointestinal: distended ascites  Extremities:no e/e/c edema 3+  No Lymphadenopathy  IV sites not inflammed.    LABS:                        7.0    6.40  )-----------( 67       ( 22 Jan 2025 06:39 )             21.1     01-22    130[L]  |  98  |  48[H]  ----------------------------<  135[H]  4.9   |  20[L]  |  2.92[H]    Ca    9.0      22 Jan 2025 06:39  Phos  6.4     01-22  Mg     1.8     01-22    TPro  6.4  /  Alb  2.7[L]  /  TBili  5.6[H]  /  DBili  1.7[H]  /  AST  30  /  ALT  8[L]  /  AlkPhos  101  01-22    PT/INR - ( 22 Jan 2025 06:39 )   PT: 26.9 sec;   INR: 2.36 ratio         PTT - ( 22 Jan 2025 06:39 )  PTT:48.6 sec  Urinalysis Basic - ( 22 Jan 2025 06:39 )    Color: x / Appearance: x / SG: x / pH: x  Gluc: 135 mg/dL / Ketone: x  / Bili: x / Urobili: x   Blood: x / Protein: x / Nitrite: x   Leuk Esterase: x / RBC: x / WBC x   Sq Epi: x / Non Sq Epi: x / Bacteria: x        MICROBIOLOGY:            RECENT CULTURES:  01-19 @ 06:30  .Blood BLOOD  --  --  --    No growth at 72 Hours  --  01-18 @ 16:44  Pleural Fl  Citrobacter koseri  Citrobacter koseri  AVA    Moderate Citrobacter koseri  --  01-18 @ 16:38  Body Fluid  --  --  --    No growth  --  01-18 @ 13:20  .Blood BLOOD  --  --  --    No growth at 4 days  --  01-17 @ 10:32  Ascites Fl  --  --  --    No growth  --  01-16 @ 12:15  .Blood BLOOD  --  --  --    No growth at 5 days  --  01-16 @ 08:00  .Blood BLOOD  Blood Culture PCR  Blood Culture PCR  PCR    Growth in aerobic bottle: Staphylococcus epidermidis  Isolation of Coagulase negative Staphylococcus from single blood culture  sets may represent  contamination. Contact the Microbiology Department at [phone] if  susceptibility testing is needed.  clinically indicated.  Direct identification is available within approximately 3-5  hours either by Blood Panel Multiplexed PCR or Direct  MALDI-TOF. Details: [email or web address]  --      RADIOLOGY & ADDITIONAL STUDIES:

## 2025-01-22 NOTE — PROGRESS NOTE ADULT - ASSESSMENT
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 for abdominal pain and distension, found to have renal failure, large volume ascites and findings of empyema s/p chest tube placement.     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 Actively drinking. Now p/w abdominal pain/distension with worsening of liver tests possibly triggered by infection. DDx also includes alcohol associated hepatitis given hx of alc hep 7/2024 which improved with steroids  - OLT: evaluation ongoing, complex given active drinking. Pending cardiac eval- needs TTE  - Ascites: large ascites on exam, requiring frequent LVPs. s/p para 1/17 with 1.7 L fluid removed, neg for SBP        - Repeat paracentesis today with IR (send cell count, culture, cytology, albumin, total protein). If >5 L removed, give albumin 6-8 g/L  - EV: EGD 7/2024 for melena showed grade II EV, small gastric ulcer (neg HP), and portal gastropathy. Takes nadolol at home. Holding BB for now given hypotension  - HE: c/w lactulose and rifaximin, goal 3-4 BM/day  - HCC screening: no lesions seen on MRI 1/2025  - C/w home midodrine 10 mg tid  - C/w PPI for stress ulcer ppx and hx of ulcer  - Trend MELD labs daily (cmp, INR) daily    #Empyema  #Leukocytosis  CT chest noncontrast reviewed from OSH showing moderate sized complex R sided pleural effusion. s/p chest tube placed 1/18 with cultures growing Citrobacter c/w empyema. Repeat CT chest shows improving R pleural effusion   - IR consult for repositioning/exchanging chest tube to improve drainage  - Discuss narrowing abx from zosyn given sensitivities  - Maintain chest tube in place for drainage  - Thoracic surgery following  - Trend cbc and fever curve    #Acute Renal Failure  Found to have acute renal failure requiring dialysis at OSH, ddx includes HRS vs ATN. Permacath in place. Cr baseline 1.12 10/2024  - Increase diuretics to bumex 2 mg q12h  - Dose albumin with diuretics  - Strict I/Os, daily wts  - Trend CMP daily  - Appreciate transplant renal recs    #Anemia  #Thrombocytopenia  Normocytic anemia with Hgb 8.5 and plt count 64 likely due to chronic liver dz  - Transfuse 1 unit pRBC today  - Send TEG to guide transfusion  - Trend cbc daily  - Transfuse Hgb>7, plt>10 (>20 if septic, >50 if bleeding)  - Hold DVT ppx for now given recent bleeding. If no further bleeding, likely can restart tomorrow    All recommendations are tentative until note is attested by attending.     Judy Tucker, PGY4  Gastroenterology/Hepatology Fellow  Available on [employer] Teams  [phone] ([place] Pager)  [phone] ([place] Pager [facility])    After 5 pm, please contact the on-call GI fellow for any urgent issues via the Hospital Call

## 2025-01-22 NOTE — BH CONSULTATION LIAISON PROGRESS NOTE - NSBHASSESSMENTFT_PSY_ALL_CORE
Pt is a 43 yo  man with PMH decompensated ETOH cirrhosis c/b recurrent ascites, grade II EV, and HE, right calf hematoma 10/2024 (s/p fall)  presented to [facility] for abdominal pain and distension. Patient found to have ascites on exam and MANUELITO requiring HD initiation. PermCath was placed by vascular surgery on 1/10 with post procedure course c/b bleeding from insertion site. Transferred to [facility] SICU for further management.   Psych consulted for alcohol use disorder. Patient reports that heavy alcohol consumption began 2016 after he father passed away and he moved to [place]. Pt was in rehabilitation twice prior, 2021 and 2023, pt was sober for about 1 yr between two rehabs. Pt began drinking again since 03/2024. Pt reports that he was in [place] 5/2024 and did not drink while he was there because it was election month, no drinking was allowed. Pt says he was in the ICU in Nov 2024 and decided to stop drinking them and has not had any alcohol since the first week in Nov, 2024.   Pt is highly motivated to follow treatment recommendations but says he has not yet been educated regarding risks and benefits of immunosuppressant medications.  Will assess SIPATH score once pt is educated about the transplant process.    1/22/25 Pt states that he was diagnosed with pneumonia and his ascites is much worse now so he has not yet spoken with staff about the transplant process.  He is in back pain and having much difficulty ambulating though walking to the bath room with much effort using his walker.  He is hopeful that his condition will improve when he receives a transplant but says the must first recover from infections.

## 2025-01-22 NOTE — PROGRESS NOTE ADULT - SUBJECTIVE AND OBJECTIVE BOX
Patient is a 44y old  Male who presents with a chief complaint of decompensated cirrhosis (2025 13:42)      Vital Signs Last 24 Hrs  T(C): 36.6 (25 @ 08:37), Max: 36.8 (25 @ 21:00)  T(F): 97.9 (25 @ 08:37), Max: 98.3 (25 @ 21:00)  HR: 83 (25 08:37) (78 - 95)  BP: 110/57 (25 @ 08:37) (104/58 - 129/73)  RR: 18 (25 @ 08:37) (18 - 18)  SpO2: 99% (25 @ 08:37) (96% - 100%)                25 @ 07:01  -  25 @ 07:00  --------------------------------------------------------  IN: 1240 mL / OUT: 775 mL / NET: 465 mL    25 @ 07:01  -  25 @ 10:04  --------------------------------------------------------  IN: 0 mL / OUT: 100 mL / NET: -100 mL        Daily     Daily Weight in k.7 (2025 05:51)                          7.0    6.40  )-----------( 67       ( 2025 06:39 )             21.1         130[L]  |  98  |  48[H]  ----------------------------<  135[H]  4.9   |  20[L]  |  2.92[H]    Ca    9.0      2025 06:39  Phos  6.4       Mg     1.8         TPro  6.4  /  Alb  2.7[L]  /  TBili  5.6[H]  /  DBili  1.7[H]  /  AST  30  /  ALT  8[L]  /  AlkPhos  101            PHYSICAL EXAM  Neurology: A&Ox3, NAD, no gross deficits  CV : RRR+S1S2  Lungs: Respirations non-labored, B/L BS  Abdomen: Soft, NT/ND, +BSx4Q  Extremities: B/L LE edema, negative calf tenderness, +PP           MEDICATIONS  albuterol    90 MICROgram(s) HFA Inhaler 2 Puff(s) Inhalation every 6 hours PRN  chlorhexidine 2% Cloths 1 Application(s) Topical daily  folic acid 1 milliGRAM(s) Oral daily  influenza   Vaccine 0.5 milliLiter(s) IntraMuscular once  lactulose Syrup 20 Gram(s) Oral every 12 hours  lidocaine   4% Patch 1 Patch Transdermal every 24 hours  melatonin 5 milliGRAM(s) Oral at bedtime  metroNIDAZOLE    Tablet 500 milliGRAM(s) Oral two times a day  midodrine 10 milliGRAM(s) Oral every 8 hours  mirtazapine 7.5 milliGRAM(s) Oral at bedtime  Nephro-shania 1 Tablet(s) Oral daily  pantoprazole    Tablet 40 milliGRAM(s) Oral before breakfast  piperacillin/tazobactam IVPB.. 3.375 Gram(s) IV Intermittent every 12 hours  rifAXIMin 550 milliGRAM(s) Oral every 12 hours  thiamine 100 milliGRAM(s) Oral daily  traMADol 25 milliGRAM(s) Oral every 8 hours PRN

## 2025-01-22 NOTE — PROGRESS NOTE ADULT - SUBJECTIVE AND OBJECTIVE BOX
Interval Events:   - He reports ongoing improvement in dyspnea  - Chest tube output: 250 cc in 24 hours    ROS:   12 point review of systems performed and negative except otherwise noted in HPI.    Hospital Medications:  albuterol    90 MICROgram(s) HFA Inhaler 2 Puff(s) Inhalation every 6 hours PRN  chlorhexidine 2% Cloths 1 Application(s) Topical <User Schedule>  folic acid 1 milliGRAM(s) Oral daily  lactulose Syrup 20 Gram(s) Oral every 12 hours  melatonin 5 milliGRAM(s) Oral at bedtime  midodrine 10 milliGRAM(s) Oral every 8 hours  Nephro-shania 1 Tablet(s) Oral daily  pantoprazole    Tablet 40 milliGRAM(s) Oral before breakfast  piperacillin/tazobactam IVPB.. 3.375 Gram(s) IV Intermittent every 12 hours  rifAXIMin 550 milliGRAM(s) Oral every 12 hours  thiamine 100 milliGRAM(s) Oral daily      PHYSICAL EXAM:   Vital Signs:  Vital Signs Last 24 Hrs  T(C): 36.9 (17 Jan 2025 09:29), Max: 37.3 (17 Jan 2025 00:35)  T(F): 98.4 (17 Jan 2025 09:29), Max: 99.2 (17 Jan 2025 00:35)  HR: 84 (17 Jan 2025 09:29) (65 - 106)  BP: 109/54 (17 Jan 2025 09:29) (104/51 - 129/62)  BP(mean): 89 (16 Jan 2025 18:00) (74 - 89)  RR: 16 (17 Jan 2025 09:29) (16 - 36)  SpO2: 99% (17 Jan 2025 09:29) (95% - 99%)    Parameters below as of 17 Jan 2025 05:00  Patient On (Oxygen Delivery Method): room air      Daily     Daily     PHYSICAL EXAM:   GENERAL:  No acute distress  HEENT:  Normocephalic/atraumatic, +sceral icterus  CHEST:  No accessory muscle use, mild crackles on the right base  HEART:  Regular rate and rhythm  ABDOMEN:  Soft, mild diffuse tenderness, distended w/ fluid wave, normoactive bowel sounds, splenomegaly, stigmata of chronic liver dz  EXTREMITIES: No cyanosis, clubbing, or edema  SKIN:  No rash  NEURO:  Alert and oriented x 3, no asterixis    LABS: reviewed                        7.4    13.96 )-----------( 73       ( 17 Jan 2025 06:42 )             22.4     01-16    130[L]  |  96  |  39[H]  ----------------------------<  117[H]  4.3   |  22  |  2.59[H]    Ca    8.6      16 Jan 2025 07:39  Phos  4.3     01-17  Mg     2.0     01-17    TPro  6.4  /  Alb  2.5[L]  /  TBili  5.8[H]  /  DBili  2.1[H]  /  AST  32  /  ALT  7[L]  /  AlkPhos  116  01-17    LIVER FUNCTIONS - ( 17 Jan 2025 06:41 )  Alb: 2.5 g/dL / Pro: 6.4 g/dL / ALK PHOS: 116 U/L / ALT: 7 U/L / AST: 32 U/L / GGT: x             Interval Diagnostic Studies: see sunrise for full report

## 2025-01-22 NOTE — PROGRESS NOTE ADULT - ASSESSMENT
45yo M with Hx decompensated EtOH cirrhosis (c/b recurrent ascites, grade II EV, and HE) who presented to OSH on 1/16 with abdominal pain and distension, found to have ascites and acute renal failure requiring initiation of HD (s/p Permacath placement c/b bleeding), transferred to NS for transplant evaluation and management. CT chest at OSH was significant for a complex R pleural effusion s/p R pigtail placement by IR on 1/18 with drainage of purulent material c/f empyema. Thoracic Surgery consulted for empyema and chest tube management.   1/20 R pigtail drained 700/910, CT of Chest on Tuesday to assess effusion and make final plan determination  1/21 R pigtail drained 80/240, CT of chest to assess effusion today   1/22 R pigtail maintained. CT of chest revealing: < from: CT Chest No Cont (01.21.25 @ 19:29) >  IMPRESSION:.    Marked interval decrease in size ofright-sided pleural fluid and gas   collection (presumably empyema) status post placement of pigtail catheter   as described above.    Much interval improvement of posterior right upper lobe and right lower   lobe consolidation.    New patchy ground-glass opacities at the left apex may be related to   edema or be infectious/inflammatory in etiology.    New small left pleural effusion.    < end of copied text >

## 2025-01-22 NOTE — PROGRESS NOTE ADULT - ATTENDING COMMENTS
45 yo M with AUD (with last reported drink in 11/2024 and with PEth borderline positive with 23 ng/mL 16:0/18:1 but negative 16:0/18:2 on 1/17/25), obesity, ROSA, PUD, history of alcohol-associated hepatitis (7/2024), history of traumatic right calf hematoma (10/2024), and decompensated alcohol-associated cirrhosis complicated by ascites, pleural effusions, hyponatremia, and hepatic encephalopathy, transferred from [facility] to [facility] on 1/16/25 due to recurrent ascites (with most recent LVP on 1/17 with 5.5L removed and no SBP), increased right pleural effusion, and HRS-MANUELITO (with baseline Cr 1.1 in 10/2024) for which received intermittent HD (started on 1/10 and last on 1/19). He is currently being monitored off HD per Transplant Nephrology and receiving albumin-supported IV diuresis. He will also receive 1u PRBCs today. CT chest (1/17) showed an empyema, now s/p IR placement of a right-sided chest tube (1/18- ) with fluid growing Citrobacter koseri. S/p Zosyn (1/16-22) and switched for ceftriaxone today (1/23- ) for the empyema as per Transplant ID and also completing a 7-day course of metronidazole (1/18-25) for Giardia on GI PCR (1/17). Based on repeat non-contrast CT chest (1/21), he is planned for guidewire repositioning of the current pigtail or new pigtail placement in more medial collection tomorrow by IR as well as paracentesis. If empyema is unable to be fully evacuated, he may need VATS bvy Thoracic Surgery early next week for adequate soure control but may be morbid given his advanced cirrhosis. Blood cultures (1/16) with 1 bottle MR Staph epidermidis, with repeat blood cultures (1/18 and 1/19) with NGTD. S/p IV vancomycin (1/17). ABO O with current MELD 3.0 score of 36 with expedited liver transplant evaluation in progress. Awaiting repeat TTE to reassess pulmonary pressures which were elevated on last TTE (7/2024). Pending the results, anticipate that he will need LHC +/- RHC for pre-transplant cardiac testing as per discussion with cardiologist Dr. Dean.    Victoriano Marie M.D., Ph.D.  Transplant Hepatology

## 2025-01-22 NOTE — PROGRESS NOTE ADULT - SUBJECTIVE AND OBJECTIVE BOX
[facility] DIVISION OF KIDNEY DISEASES AND HYPERTENSION -- FOLLOW UP NOTE  --------------------------------------------------------------------------------  Chief Complaint: oliguric MANUELITO now on HD     24 hour events/subjective: Pt seen and evaluated bedside this morning. Endorses fatigue, abd distension and LE edema. UOP stable with diuretics yesterday. Otherwise no acute complaints. Denies any headaches, nausea, vomiting, fevers/chills, chest pain, SOB,.    PAST HISTORY  --------------------------------------------------------------------------------  No significant changes to PMH, PSH, FHx, SHx, unless otherwise noted    ALLERGIES & MEDICATIONS  --------------------------------------------------------------------------------  Allergies    sulfa drugs (Unknown)  Salicylic Acid (Other)    Intolerances      Standing Inpatient Medications  chlorhexidine 2% Cloths 1 Application(s) Topical daily  folic acid 1 milliGRAM(s) Oral daily  influenza   Vaccine 0.5 milliLiter(s) IntraMuscular once  lactulose Syrup 20 Gram(s) Oral every 12 hours  lidocaine   4% Patch 1 Patch Transdermal every 24 hours  melatonin 5 milliGRAM(s) Oral at bedtime  metroNIDAZOLE    Tablet 500 milliGRAM(s) Oral two times a day  midodrine 10 milliGRAM(s) Oral every 8 hours  mirtazapine 7.5 milliGRAM(s) Oral at bedtime  Nephro-shania 1 Tablet(s) Oral daily  pantoprazole    Tablet 40 milliGRAM(s) Oral before breakfast  piperacillin/tazobactam IVPB.. 3.375 Gram(s) IV Intermittent every 12 hours  rifAXIMin 550 milliGRAM(s) Oral every 12 hours  sevelamer carbonate 800 milliGRAM(s) Oral three times a day with meals  thiamine 100 milliGRAM(s) Oral daily    PRN Inpatient Medications  albuterol    90 MICROgram(s) HFA Inhaler 2 Puff(s) Inhalation every 6 hours PRN  traMADol 25 milliGRAM(s) Oral every 8 hours PRN      REVIEW OF SYSTEMS  --------------------------------------------------------------------------------  see above    VITALS/PHYSICAL EXAM  --------------------------------------------------------------------------------  T(C): 36.6 (01-22-25 @ 08:37), Max: 36.8 (01-21-25 @ 21:00)  HR: 83 (01-22-25 @ 08:37) (78 - 95)  BP: 110/57 (01-22-25 @ 08:37) (104/58 - 129/73)  RR: 18 (01-22-25 @ 08:37) (18 - 18)  SpO2: 99% (01-22-25 @ 08:37) (96% - 100%)  Wt(kg): --        01-21-25 @ 07:01  -  01-22-25 @ 07:00  --------------------------------------------------------  IN: 1240 mL / OUT: 775 mL / NET: 465 mL    01-22-25 @ 07:01  -  01-22-25 @ 11:57  --------------------------------------------------------  IN: 0 mL / OUT: 100 mL / NET: -100 mL      Physical Exam:  	Gen: NAD  	HEENT: MMM  	Pulm: CTA B/L  	CV: S1S2  	Abd: Soft, +BS   	Ext: No LE edema B/L  	Neuro: Awake  	Skin: Warm and dry  	Vascular access: [facility] HD Catheter     LABS/STUDIES  --------------------------------------------------------------------------------              7.0    6.40  >-----------<  67       [01-22-25 @ 06:39]              21.1     130  |  98  |  48  ----------------------------<  135      [01-22-25 @ 06:39]  4.9   |  20  |  2.92        Ca     9.0     [01-22-25 @ 06:39]      Mg     1.8     [01-22-25 @ 06:39]      Phos  6.4     [01-22-25 @ 06:39]    TPro  6.4  /  Alb  2.7  /  TBili  5.6  /  DBili  1.7  /  AST  30  /  ALT  8   /  AlkPhos  101  [01-22-25 @ 06:39]    PT/INR: PT 26.9 , INR 2.36       [01-22-25 @ 06:39]  PTT: 48.6       [01-22-25 @ 06:39]      Creatinine Trend:  SCr 2.92 [01-22 @ 06:39]  SCr 2.95 [01-21 @ 06:27]  SCr 2.67 [01-20 @ 06:51]  SCr 2.36 [01-19 @ 06:29]  SCr 3.04 [01-18 @ 06:33]    Urine Creatinine 261      [01-17-25 @ 08:53]  Urine Protein 81      [01-17-25 @ 08:53]  Urine Sodium 9      [01-19-25 @ 16:41]  Urine Potassium 60      [01-19-25 @ 16:41]    Iron 57, TIBC 91, %sat 62      [01-17-25 @ 06:41]  Ferritin 849      [01-17-25 @ 06:41]  TSH 8.18      [01-17-25 @ 06:41]  Lipid: chol 65, TG 46, HDL 26, LDL --      [01-17-25 @ 06:41]    HBsAb 55.9      [01-17-25 @ 06:41]  HBsAb Reactive      [01-17-25 @ 06:41]  HBsAg Nonreact      [01-17-25 @ 06:41]  HBcAb Nonreact      [01-17-25 @ 06:41]  HCV 0.06, Nonreact      [01-16-25 @ 18:23]  HIV Nonreact      [01-17-25 @ 06:41]    IRMA: titer 1:160, pattern Speckled      [01-17-25 @ 06:41]  Syphilis Screen (Treponema Pallidum Ab) Negative      [01-17-25 @ 06:41]
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
## 2025-01-23 ENCOUNTER — RESULT REVIEW (OUTPATIENT)
Age: 45
End: 2025-01-23

## 2025-01-23 LAB
ALBUMIN FLD-MCNC: 1 G/DL — SIGNIFICANT CHANGE UP
ALBUMIN SERPL ELPH-MCNC: 2.8 G/DL — LOW (ref 3.3–5)
ALP SERPL-CCNC: 99 U/L — SIGNIFICANT CHANGE UP (ref 40–120)
ALT FLD-CCNC: 7 U/L — LOW (ref 10–45)
ANION GAP SERPL CALC-SCNC: 13 MMOL/L — SIGNIFICANT CHANGE UP (ref 5–17)
APTT BLD: 49.4 SEC — HIGH (ref 24.5–35.6)
AST SERPL-CCNC: 29 U/L — SIGNIFICANT CHANGE UP (ref 10–40)
B PERT IGG+IGM PNL SER: CLEAR — SIGNIFICANT CHANGE UP
BASOPHILS # BLD AUTO: 0.08 K/UL — SIGNIFICANT CHANGE UP (ref 0–0.2)
BASOPHILS NFR BLD AUTO: 1.2 % — SIGNIFICANT CHANGE UP (ref 0–2)
BILIRUB DIRECT SERPL-MCNC: 1.8 MG/DL — HIGH (ref 0–0.3)
BILIRUB INDIRECT FLD-MCNC: 4.6 MG/DL — HIGH (ref 0.2–1)
BILIRUB SERPL-MCNC: 6.4 MG/DL — HIGH (ref 0.2–1.2)
BUN SERPL-MCNC: 50 MG/DL — HIGH (ref 7–23)
CALCIUM SERPL-MCNC: 9.2 MG/DL — SIGNIFICANT CHANGE UP (ref 8.4–10.5)
CHLORIDE SERPL-SCNC: 98 MMOL/L — SIGNIFICANT CHANGE UP (ref 96–108)
CO2 SERPL-SCNC: 20 MMOL/L — LOW (ref 22–31)
COLOR FLD: YELLOW
CREAT SERPL-MCNC: 3.05 MG/DL — HIGH (ref 0.5–1.3)
CULTURE RESULTS: SIGNIFICANT CHANGE UP
EGFR: 25 ML/MIN/1.73M2 — LOW
EGFR: 25 ML/MIN/1.73M2 — LOW
EOSINOPHIL # BLD AUTO: 0.51 K/UL — HIGH (ref 0–0.5)
EOSINOPHIL NFR BLD AUTO: 7.8 % — HIGH (ref 0–6)
FLUID INTAKE SUBSTANCE CLASS: SIGNIFICANT CHANGE UP
GLUCOSE FLD-MCNC: 104 MG/DL — SIGNIFICANT CHANGE UP
GLUCOSE SERPL-MCNC: 120 MG/DL — HIGH (ref 70–99)
HCT VFR BLD CALC: 23.3 % — LOW (ref 39–50)
HGB BLD-MCNC: 7.6 G/DL — LOW (ref 13–17)
IMM GRANULOCYTES NFR BLD AUTO: 0.5 % — SIGNIFICANT CHANGE UP (ref 0–0.9)
INR BLD: 2.15 RATIO — HIGH (ref 0.85–1.16)
LDH SERPL L TO P-CCNC: 44 U/L — SIGNIFICANT CHANGE UP
LYMPHOCYTES # BLD AUTO: 1.02 K/UL — SIGNIFICANT CHANGE UP (ref 1–3.3)
LYMPHOCYTES # BLD AUTO: 15.5 % — SIGNIFICANT CHANGE UP (ref 13–44)
LYMPHOCYTES # FLD: 80 % — SIGNIFICANT CHANGE UP
MAGNESIUM SERPL-MCNC: 1.9 MG/DL — SIGNIFICANT CHANGE UP (ref 1.6–2.6)
MCHC RBC-ENTMCNC: 30.2 PG — SIGNIFICANT CHANGE UP (ref 27–34)
MCHC RBC-ENTMCNC: 32.6 G/DL — SIGNIFICANT CHANGE UP (ref 32–36)
MCV RBC AUTO: 92.5 FL — SIGNIFICANT CHANGE UP (ref 80–100)
MESOTHL CELL # FLD: SIGNIFICANT CHANGE UP
MONOCYTES # BLD AUTO: 0.84 K/UL — SIGNIFICANT CHANGE UP (ref 0–0.9)
MONOCYTES NFR BLD AUTO: 12.8 % — SIGNIFICANT CHANGE UP (ref 2–14)
MONOS+MACROS # FLD: 16 % — SIGNIFICANT CHANGE UP
NEUTROPHILS # BLD AUTO: 4.09 K/UL — SIGNIFICANT CHANGE UP (ref 1.8–7.4)
NEUTROPHILS NFR BLD AUTO: 62.2 % — SIGNIFICANT CHANGE UP (ref 43–77)
NEUTROPHILS-BODY FLUID: 4 % — SIGNIFICANT CHANGE UP
NRBC # BLD: 0 /100 WBCS — SIGNIFICANT CHANGE UP (ref 0–0)
NRBC BLD-RTO: 0 /100 WBCS — SIGNIFICANT CHANGE UP (ref 0–0)
PHOSPHATE SERPL-MCNC: 5.7 MG/DL — HIGH (ref 2.5–4.5)
PLATELET # BLD AUTO: 75 K/UL — LOW (ref 150–400)
POTASSIUM SERPL-MCNC: 4.9 MMOL/L — SIGNIFICANT CHANGE UP (ref 3.5–5.3)
POTASSIUM SERPL-SCNC: 4.9 MMOL/L — SIGNIFICANT CHANGE UP (ref 3.5–5.3)
PROT FLD-MCNC: 2.1 G/DL — SIGNIFICANT CHANGE UP
PROT SERPL-MCNC: 6.8 G/DL — SIGNIFICANT CHANGE UP (ref 6–8.3)
PROTHROM AB SERPL-ACNC: 24.5 SEC — HIGH (ref 9.9–13.4)
RAPIDTEG MAXIMUM AMPLITUDE: 45.2 MM — LOW (ref 52–70)
RBC # BLD: 2.52 M/UL — LOW (ref 4.2–5.8)
RBC # FLD: 19.7 % — HIGH (ref 10.3–14.5)
RCV VOL RI: <2000 CELLS/UL — HIGH
SODIUM SERPL-SCNC: 131 MMOL/L — LOW (ref 135–145)
SPECIMEN SOURCE: SIGNIFICANT CHANGE UP
TEG FUNCTIONAL FIBRINOGEN: 11.9 MM — LOW (ref 15–32)
TEG LY30 (LYSIS): 0.4 % — SIGNIFICANT CHANGE UP (ref 0–2.6)
TEG REACTION TIME: 10.2 MIN — HIGH (ref 4.6–9.1)
TOTAL CELLS COUNTED, BODY FLUID: 85 CELLS — SIGNIFICANT CHANGE UP
TOTAL NUCLEATED CELL COUNT, BODY FLUID: 85 CELLS/UL — SIGNIFICANT CHANGE UP
TUBE TYPE: SIGNIFICANT CHANGE UP
WBC # BLD: 6.57 K/UL — SIGNIFICANT CHANGE UP (ref 3.8–10.5)
WBC # FLD AUTO: 6.57 K/UL — SIGNIFICANT CHANGE UP (ref 3.8–10.5)
WBC COUNT.: 83 CELLS/UL — SIGNIFICANT CHANGE UP

## 2025-01-23 PROCEDURE — 88112 CYTOPATH CELL ENHANCE TECH: CPT | Mod: 26

## 2025-01-23 PROCEDURE — 49083 ABD PARACENTESIS W/IMAGING: CPT

## 2025-01-23 PROCEDURE — 49082 ABD PARACENTESIS: CPT

## 2025-01-23 PROCEDURE — 71045 X-RAY EXAM CHEST 1 VIEW: CPT | Mod: 26

## 2025-01-23 PROCEDURE — G0545: CPT

## 2025-01-23 PROCEDURE — 99232 SBSQ HOSP IP/OBS MODERATE 35: CPT | Mod: GC

## 2025-01-23 PROCEDURE — 93356 MYOCRD STRAIN IMG SPCKL TRCK: CPT

## 2025-01-23 PROCEDURE — 99232 SBSQ HOSP IP/OBS MODERATE 35: CPT

## 2025-01-23 PROCEDURE — 88305 TISSUE EXAM BY PATHOLOGIST: CPT | Mod: 26

## 2025-01-23 PROCEDURE — 99233 SBSQ HOSP IP/OBS HIGH 50: CPT

## 2025-01-23 PROCEDURE — 93308 TTE F-UP OR LMTD: CPT | Mod: 26

## 2025-01-23 PROCEDURE — 93321 DOPPLER ECHO F-UP/LMTD STD: CPT | Mod: 26

## 2025-01-23 PROCEDURE — 93325 DOPPLER ECHO COLOR FLOW MAPG: CPT | Mod: 26

## 2025-01-23 RX ORDER — ALBUMIN (HUMAN) 12.5 G/50ML
50 INJECTION, SOLUTION INTRAVENOUS ONCE
Refills: 0 | Status: COMPLETED | OUTPATIENT
Start: 2025-01-23 | End: 2025-01-23

## 2025-01-23 RX ORDER — BUMETANIDE 1 MG/1
2 TABLET ORAL ONCE
Refills: 0 | Status: COMPLETED | OUTPATIENT
Start: 2025-01-23 | End: 2025-01-23

## 2025-01-23 RX ORDER — TRAMADOL HYDROCHLORIDE 50 MG/1
25 TABLET, FILM COATED ORAL ONCE
Refills: 0 | Status: DISCONTINUED | OUTPATIENT
Start: 2025-01-23 | End: 2025-01-23

## 2025-01-23 RX ORDER — ALBUMIN (HUMAN) 12.5 G/50ML
50 INJECTION, SOLUTION INTRAVENOUS ONCE
Refills: 0 | Status: DISCONTINUED | OUTPATIENT
Start: 2025-01-23 | End: 2025-01-24

## 2025-01-23 RX ORDER — ALBUMIN (HUMAN) 12.5 G/50ML
100 INJECTION, SOLUTION INTRAVENOUS ONCE
Refills: 0 | Status: COMPLETED | OUTPATIENT
Start: 2025-01-23 | End: 2025-01-23

## 2025-01-23 RX ORDER — BUMETANIDE 1 MG/1
2 TABLET ORAL ONCE
Refills: 0 | Status: DISCONTINUED | OUTPATIENT
Start: 2025-01-23 | End: 2025-01-23

## 2025-01-23 RX ADMIN — Medication 40 MILLIGRAM(S): at 05:15

## 2025-01-23 RX ADMIN — Medication 1 APPLICATION(S): at 11:07

## 2025-01-23 RX ADMIN — LIDOCAINE HYDROCHLORIDE 1 PATCH: 20 JELLY TOPICAL at 08:43

## 2025-01-23 RX ADMIN — LACTULOSE 20 GRAM(S): 10 SOLUTION ORAL at 05:15

## 2025-01-23 RX ADMIN — SEVELAMER HYDROCHLORIDE 800 MILLIGRAM(S): 800 TABLET ORAL at 18:28

## 2025-01-23 RX ADMIN — FOLIC ACID 1 MILLIGRAM(S): 1 TABLET ORAL at 11:07

## 2025-01-23 RX ADMIN — MIRTAZAPINE 7.5 MILLIGRAM(S): 30 TABLET, FILM COATED ORAL at 22:18

## 2025-01-23 RX ADMIN — TRAMADOL HYDROCHLORIDE 25 MILLIGRAM(S): 50 TABLET, FILM COATED ORAL at 09:16

## 2025-01-23 RX ADMIN — TRAMADOL HYDROCHLORIDE 25 MILLIGRAM(S): 50 TABLET, FILM COATED ORAL at 04:20

## 2025-01-23 RX ADMIN — Medication 100 MILLIGRAM(S): at 11:07

## 2025-01-23 RX ADMIN — ALBUMIN (HUMAN) 50 MILLILITER(S): 12.5 INJECTION, SOLUTION INTRAVENOUS at 18:32

## 2025-01-23 RX ADMIN — ALBUMIN (HUMAN) 300 MILLILITER(S): 12.5 INJECTION, SOLUTION INTRAVENOUS at 16:40

## 2025-01-23 RX ADMIN — TRAMADOL HYDROCHLORIDE 25 MILLIGRAM(S): 50 TABLET, FILM COATED ORAL at 10:26

## 2025-01-23 RX ADMIN — TRAMADOL HYDROCHLORIDE 25 MILLIGRAM(S): 50 TABLET, FILM COATED ORAL at 23:30

## 2025-01-23 RX ADMIN — TRAMADOL HYDROCHLORIDE 25 MILLIGRAM(S): 50 TABLET, FILM COATED ORAL at 05:00

## 2025-01-23 RX ADMIN — BUMETANIDE 2 MILLIGRAM(S): 1 TABLET ORAL at 22:20

## 2025-01-23 RX ADMIN — CEFTRIAXONE 100 MILLIGRAM(S): 500 INJECTION, POWDER, FOR SOLUTION INTRAMUSCULAR; INTRAVENOUS at 22:22

## 2025-01-23 RX ADMIN — MIDODRINE HYDROCHLORIDE 10 MILLIGRAM(S): 5 TABLET ORAL at 05:15

## 2025-01-23 RX ADMIN — Medication 1 TABLET(S): at 11:07

## 2025-01-23 RX ADMIN — Medication 500 MILLIGRAM(S): at 18:28

## 2025-01-23 RX ADMIN — MIDODRINE HYDROCHLORIDE 10 MILLIGRAM(S): 5 TABLET ORAL at 22:17

## 2025-01-23 RX ADMIN — TRAMADOL HYDROCHLORIDE 25 MILLIGRAM(S): 50 TABLET, FILM COATED ORAL at 18:31

## 2025-01-23 RX ADMIN — ALBUMIN (HUMAN) 300 MILLILITER(S): 12.5 INJECTION, SOLUTION INTRAVENOUS at 16:55

## 2025-01-23 RX ADMIN — Medication 5 MILLIGRAM(S): at 22:18

## 2025-01-23 RX ADMIN — BUMETANIDE 2 MILLIGRAM(S): 1 TABLET ORAL at 11:06

## 2025-01-23 RX ADMIN — Medication 500 MILLIGRAM(S): at 05:14

## 2025-01-23 NOTE — PROGRESS NOTE ADULT - ASSESSMENT
45 yo M with PMH decompensated ETOH cirrhosis c/b recurrent ascites, grade II EV, and HE, right calf hematoma 10/2024 (s/p fall)  presented to [facility] for abdominal pain and distension. Patient found to have ascites on exam and MANUELITO requiring HD initiation PermCath was placed by vascular surgery on 1/10 with post procedure course c/b bleeding from insertion site. Transferred to [facility] SICU for further management.     Blood Cultures (1/16) 1/4 Staph epi.   GI PCR (1/17) + Giardia.   Paracentesis (1/17) 155 nucleated cell counts.     CT Chest (1/17) Moderate loculated right pleural effusion containing a few foci of air, which are new from 1/8/2025 and may represent empyema/bronchopleural fistula versus sequelae of prior thoracentesis.     #Empyema  --Continue antibiotics but changed to Ceftriaxone/flagyl  --Follow up on bacterial, fungal and AFB cultures  --Follow up on CTS surgical plan which currently IR to upsize the right chest tube as fluid has decreased on most recent CT scan, if unable to completely resolve the collection will take to [facility] on Tuesday 1/28    #Giardia  Unclear if it's either lactulose or giardia contributing to diarrhea but reasonable to treat  --Agree with Metronidazole 500 mg Q12H with plans to complete 7 days of therapy    #Positive Blood Culture (Staph epi)  Concern for procurement contaminant  --Continue to follow CBC with diff  --Continue to follow temperature curve  --Follow up on preliminary blood cultures    # ascites  --repeat paracentesis done today  --send fluid for cell count and cultures and pending     #Pre-Liver Transplant Evaluation, Decompensated Cirrhosis  COVID19 Rodríguez Antibody Positive  HAV IgG Positive  HBVs Ab Positive  HBVsAg Negative  HBVc Ab Negative  HCV Ab Negative  HSV 1 IgG Positive  HSV 2 IgG Negative  EBV IgG Positive  CMV IgG Positive  VZV IgG Positive  Measles IgG Positive  Mumps IgG Positive  Rubella IgG Positive  Quantiferon Gold negative  HIV Ag/Ab by CMIA Negative  Syphilis Screen Negative  Toxoplasma IgG Negative  Strongyloides Ab negative  Coccidiodes Ab PENDING  Leishmania Ab PENDING  --ID clearance to OLT pending treatment of empyema     #Encounter to Vaccinate Patient  COVID19: Would benefit from COVID19 [id number] Vaccine Dose  Influenza: Had vaccination for this year  Pneumococcal: Would benefit from PCV20  HAV: Immune, will not require further vaccination  HBV: Immune, will not require further vaccination  MMR: Immune, will not require further vaccination  Varicella: Immune, will not require further vaccination  Shingles: Will require Shingrix  Tdap: Tdap 6/23/24    I will continue to follow. Please feel free to contact me with any further questions.    Meliton Kam MD  Can be called via Teams  After 5pm/weekends [phone]

## 2025-01-23 NOTE — BH CONSULTATION LIAISON PROGRESS NOTE - NSBHFUPINTERVALHXFT_PSY_A_CORE
Patient seen for follow up for medication management and psychosocial clearance for liver transplant.     Patient seen at bedside. Patient's mother also present at bedside with patient's consent.     Patient was seen virtually.  ?  On evaluation, patient is calm, cooperative, pleasant, AOx4, denies any acute concerns. Reports that he was able to speak with the team and received education about his current medical condition and proposed treatment. Patient is aware that he will need a paracentesis at some point, but the primary concern is ongoing infection. States that the plan is for a larger catheter to be placed for drainage. Continues to require dialysis and expresses understanding of the need for various antibiotics. Today patient once again discusses longstanding history of alcohol use but expresses motivation to maintain sobriety. Reports his last drink was in November 4, 2024 and he does not have any cravings for it. Understands that post-transplant, there will be regular monitoring of alcohol use and is motivated to pursue recommended inpatient rehab treatment. Patient states he has been to rehab twice before so he knows what to expect. He denies any other recreational drug use including use of marijuana/gummies. Education provided in regards to the need to maintain sobriety. Patient expresses understanding and is in agreement with this. He reports he has met with the nutritionist and is also aware of changes to diet that are necessary for the liver transplant process. Denies any pertinent psychiatric history, denies history of inpatient/outpatient psychiatric treatment and denies history of SA/SIB. Patient admits to intermittent anxiety, has been tolerating mirtazapine well with overall benefit to mood. Endorses stable mood today and denies any SI/HI and AVH. Denies any safety concerns. Understands that after transplant, he may need to remain in the hospital for some time, and discussed with the team risks/benefits associated with steroids and lifelong need for immunosuppressants. Discussed with patient effect of steroid use on mood. Patient reports that should he find his anxiety worsening, he will be sure to follow up with psychiatry.  Patient states he is sleeping well, has somewhat of an appetite but is unable to eat due to pressure/distention of his stomach. . Patient states that his primary support system is his mother, and the plan will be to live with her after the hospitalization in [place]. His wife and two children are currently in [place]. States his family is aware and ready to help with post-transplant recovery and care. Pt states he has received education in regard to the transplant process and recovery. Pt demonstrates good understanding of transplant process including risks of organ rejection, infection, post-operative hospitalization and care, need for immunosuppression and lifestyle changes. He is notably future-oriented and help-seeking.   Patient seen for follow up for medication management and psychosocial clearance for liver transplant. Patient seen at bedside. Patient's mother also present at bedside with patient's consent.     On evaluation, patient is calm, cooperative, pleasant, AOx4, denies any acute concerns. Reports that he was able to speak with the team and received education about his current medical condition and proposed treatment. Patient is aware that he will need a paracentesis at some point, but the primary concern is ongoing infection. States that the plan is for a larger catheter to be placed for drainage. Continues to require dialysis and expresses understanding of the need for various antibiotics. Today patient once again discusses longstanding history of alcohol use but expresses motivation to maintain sobriety. Reports his last drink was in November 4, 2024 and he does not have any cravings for it. Understands that post-transplant, there will be regular monitoring of alcohol use and is motivated to pursue recommended inpatient rehab treatment. Patient states he has been to rehab twice before so he knows what to expect. He denies any other recreational drug use including use of marijuana/gummies. Education provided in regards to the need to maintain sobriety. Patient expresses understanding and is in agreement with this. He reports he has met with the nutritionist and is also aware of changes to diet that are necessary for the liver transplant process. Denies any pertinent psychiatric history, denies history of inpatient/outpatient psychiatric treatment and denies history of SA/SIB. Patient admits to intermittent anxiety, has been tolerating mirtazapine well with overall benefit to mood. Endorses stable mood today and denies any SI/HI and AVH. Denies any safety concerns. Understands that after transplant, he may need to remain in the hospital for some time, and discussed with the team risks/benefits associated with steroids and lifelong need for immunosuppressants. Discussed with patient effect of steroid use on mood. Patient reports that should he find his anxiety worsening, he will be sure to follow up with psychiatry.  Patient states he is sleeping well, has somewhat of an appetite but is unable to eat due to pressure/distention of his stomach. . Patient states that his primary support system is his mother, and the plan will be to live with her after the hospitalization in [place]. His wife and two children are currently in [place]. States his family is aware and ready to help with post-transplant recovery and care. Pt states he has received education in regard to the transplant process and recovery. Pt demonstrates good understanding of transplant process including risks of organ rejection, infection, post-operative hospitalization and care, need for immunosuppression and lifestyle changes. He is notably future-oriented and help-seeking.

## 2025-01-23 NOTE — PROGRESS NOTE ADULT - SUBJECTIVE AND OBJECTIVE BOX
{\rtf1\jyaojz50066\ansi\zhbnmnz1400\ftnbj\uc1\deff0  {\fonttbl{\f0 \fnil Segoe UI;}{\f1 \fnil \fcharset0 Segoe UI;}{\f2 \fnil Times New Fabián;}}  {\colortbl ;\mqz689\ygget916\yhno018 ;\red0\green0\blue0 ;\red0\green0\brkr866 ;\red0\green0\blue0 ;}  {\stylesheet{\f0\fs20 Normal;}{\cs1 Default Paragraph Font;}{\cs2\f0\fs16 Line Number;}{\cs3\f2\fs24\ul\cf3 Hyperlink;}}  {\*\revtbl{Unknown;}}  \jbavlg89679\rypkgk02500\tuarr0632\ghonp4979\pzrsc8998\vaaxp3375\kpwrzvo036\kxmugda232\nogrowautofit\rprspu620\formshade\nofeaturethrottle1\dntblnsbdb\fet4\aendnotes\aftnnrlc\pgbrdrhead\pgbrdrfoot  \sectd\eojkvc15093\knjpla38660\guttersxn0\rdgwceco4401\pbjxiwyc8998\tvuwtglr0622\cmiwhgvn6275\qoaiell083\ietznjq012\sbkpage\pgncont\pgndec  \plain\plain\f0\fs24\pard\plain\f0\fs24\plain\f0\fs20\rmat8498\hich\f0\dbch\f0\loch\f0\fs20 INFECTIOUS DISEASES FOLLOW UP-- Carley Kam  [phone]\par  \par  This is a follow up note for this  44yMale with\par  Liver failure without hepatic coma\par  \par  with ascites and pleural empyema\par  \par  underwent paracentesis today- with fluid studies pending\par  \par  ROS:\par  CONSTITUTIONAL:  No fever, good appetite\par  CARDIOVASCULAR:  No chest pain or palpitations\par  RESPIRATORY:  No dyspnea\par  GASTROINTESTINAL:  No nausea, vomiting, diarrhea, or abdominal pain\par  GENITOURINARY:  No dysuria\par  NEUROLOGIC:  No headache, \par  \par  Allergies\par  \par  sulfa drugs (Unknown)\par  Salicylic Acid (Other)\par  \par  Intolerances\par  \par  \par  \par  ANTIBIOTICS/RELEVANT:\par  antimicrobials\par  cefTRIAXone   IVPB    \par  cefTRIAXone   IVPB 2000 milliGRAM(s) IV Intermittent every 24 hours\par  metroNIDAZOLE    Tablet 500 milliGRAM(s) Oral two times a day\par  rifAXIMin 550 milliGRAM(s) Oral every 12 hours\par  \par  immunologic:\par  influenza   Vaccine 0.5 milliLiter(s) IntraMuscular once\par  \par  OTHER:\par  albumin human 25% IVPB 50 milliLiter(s) IV Intermittent once\par  albuterol    90 MICROgram(s) HFA Inhaler 2 Puff(s) Inhalation every 6 hours PRN\par  buMETAnide Injectable 2 milliGRAM(s) IV Push once\par  chlorhexidine 2% Cloths 1 Application(s) Topical daily\par  folic acid 1 milliGRAM(s) Oral daily\par  lactulose Syrup 20 Gram(s) Oral every 12 hours\par  lidocaine   4% Patch 1 Patch Transdermal every 24 hours\par  melatonin 5 milliGRAM(s) Oral at bedtime\par  midodrine 10 milliGRAM(s) Oral every 8 hours\par  mirtazapine 7.5 milliGRAM(s) Oral at bedtime\par  Nephro-shania 1 Tablet(s) Oral daily\par  pantoprazole    Tablet 40 milliGRAM(s) Oral before breakfast\par  sevelamer carbonate 800 milliGRAM(s) Oral three times a day with meals\par  thiamine 100 milliGRAM(s) Oral daily\par  traMADol 25 milliGRAM(s) Oral every 8 hours PRN\par  \par  \par  Objective:\par  Vital Signs Last 24 Hrs\par  T(C): 36.8 (23 Jan 2025 18:30), Max: 36.8 (23 Jan 2025 00:35)\par  T(F): 98.3 (23 Jan 2025 18:30), Max: 98.3 (23 Jan 2025 00:35)\par  HR: 68 (23 Jan 2025 18:30) (68 - 93)\par  BP: 118/62 (23 Jan 2025 18:30) (110/60 - 125/64)\par  BP(mean): --\par  RR: 18 (23 Jan 2025 18:30) (18 - 18)\par  SpO2: 98% (23 Jan 2025 18:30) (94% - 99%)\par  \par  Parameters below as of 23 Jan 2025 18:30\par  Patient On (Oxygen Delivery Method): room air\par  \par  \par  \par  PHYSICAL EXAM:\par  Constitutional:no acute distress\par  Eyes:EDITH, EOMI\par  Ear/Nose/Throat: no oral lesions, \tab\par  Respiratory: chest tube on right\par  Cardiovascular: S1S2\par  Gastrointestinal:soft, (+) BS, no tenderness\par  Extremities:no e/e/c\par  No Lymphadenopathy\par  IV sites not inflammed.\par  \par  LABS:\par           \par             7.6  \par  6.57  )-----------( 75       ( 23 Jan 2025 07:10 )\par             23.3 \par  \par  01-23\par  \par  131[L]  |  98  |  50[H]\par  ----------------------------<  120[H]\par  4.9   |  20[L]  |  3.05[H]\par  \par  Ca    9.2      23 Jan 2025 07:04\par  Phos  5.7     01-23\par  Mg     1.9     01-23\par  \par  TPro  6.8  /  Alb  2.8[L]  /  TBili  6.4[H]  /  DBili  1.8[H]  /  AST  29  /  ALT  7[L]  /  AlkPhos  99  01-23\par  \par  PT/INR - ( 23 Jan 2025 07:06 )   PT: 24.5 sec;   INR: 2.15 ratio  \par  \par   \par  PTT - ( 23 Jan 2025 07:06 )  PTT:49.4 sec\par  Urinalysis Basic - ( 23 Jan 2025 07:04 )\par  \par  Color: x / Appearance: x / SG: x / pH: x\par  Gluc: 120 mg/dL / Ketone: x  / Bili: x / Urobili: x \par  Blood: x / Protein: x / Nitrite: x \par  Leuk Esterase: x / RBC: x / WBC x \par  Sq Epi: x / Non Sq Epi: x / Bacteria: x\par  \par  \par  \par  MICROBIOLOGY:\par  \par  \par  \par  \par  \par  RECENT CULTURES:\par  01-19 @ 06:30\par  .Blood BLOOD\par  --\par  --\par  --\par  \par  No growth at 4 days\par  --\par  01-18 @ 16:44\par  Pleural Fl\par  Citrobacter koseri\par  Citrobacter koseri\par  AVA\par  \par  Moderate Citrobacter koseri\par  --\par  01-18 @ 16:38\par  Body Fluid\par  --\par  --\par  --\par  \par  No growth\par  --\par  01-18 @ 13:20\par  .Blood BLOOD\par  --\par  --\par  --\par  \par  No growth at 5 days\par  --\par  01-17 @ 10:32\par  Ascites Fl\par  --\par  --\par  --\par  \par  No growth at 5 days\par  --\par  \par  \par  RADIOLOGY & ADDITIONAL STUDIES:\par  \par  \trowd\hetyio13\egghyzd56\trpaddfl3\tmcvtzc55\trpaddfr3\trpaddt0\trpaddft3\trpaddb0\trpaddfb3\trleft0  \clvertalt\mlnvtf43\clpadft3\doicys35\clpadfr3\clpadl0\clpadfl3\clpadb0\clpadfb3\atxuf4772  \clvertalt\hyzdep78\clpadft3\jpfocb00\clpadfr3\clpadl0\clpadfl3\clpadb0\clpadfb3\hqizn8300  \pard\intbl\ssparaaux0\s0\fi-120\li120\ql\plain\f0\fs24{\*\bkmkstart [id number]}{\*\bkmkend [id number]}\plain\f0\fs20\vnty3532\hich\f0\dbch\f0\loch\f0\fs20 \'b7 \plain\f1\fs20\ybwa9441\hich\f1\dbch\f1\loch\f1\cf2\fs20\b Procedure Findings and   Details\plain\f0\fs20\ttbp7438\hich\f0\dbch\f0\loch\f0\fs20\cell  \pard\intbl\ssparaaux0\s0\li300\ri300\ql\plain\f0\fs24\plain\f0\fs20\ivmd2628\hich\f0\dbch\f0\loch\f0\fs20 Unsuccessful repositioning of chest tube, drain left in position. Clean, dry and sterile dressing applied. Full report to follow.\cell  \intbl\row  \pard\intbl\ssparaaux0\s0\li300\ri300\ql\plain\f0\fs24\plain\f0\fs20\xyxh6263\hich\f0\dbch\f0\loch\f0\fs20\cell  \pard\intbl\ssparaaux0\s0\li300\ri300\ql\plain\f0\fs24\plain\f1\fs20\bxfz7656\hich\f1\dbch\f1\loch\f1\cf2\fs20\strike\plain\f0\fs20\crdl7715\hich\f0\dbch\f0\loch\f0\fs20\cell  \intbl\row  \trowd\ymvblj07\lastrow\vmxlquh16\trpaddfl3\wkggamh56\trpaddfr3\trpaddt0\trpaddft3\trpaddb0\trpaddfb3\trleft0  \clvertalt\hlrwlo40\clpadft3\wudnsb27\clpadfr3\clpadl0\clpadfl3\clpadb0\clpadfb3\orzof2253  \clvertalt\kehzty06\clpadft3\wlwhus09\clpadfr3\clpadl0\clpadfl3\clpadb0\clpadfb3\cfizw9627  \pard\intbl\ssparaaux0\s0\fi-120\li120\ql\plain\f0\fs24{\*\bkmkstart [id number]}{\*\bkmkend [id number]}\plain\f0\fs20\uzwk7887\hich\f0\dbch\f0\loch\f0\fs20 \'b7 \plain\f1\fs20\waqc5133\hich\f1\dbch\f1\loch\f1\cf2\fs20\b Plan\plain\f0\fs20\qkuy5153\hich\f0\dbch\f0\loch\f0\fs20\cell  \pard\intbl\ssparaaux0\s0\li300\ri300\ql\plain\f0\fs24\plain\f0\fs20\nfzw3171\hich\f0\dbch\f0\loch\f0\fs20 Pt will return tomorrow for possible placement of 2nd chest tube, will need 2 units of FFP on call.\cell  \intbl\row  \pard\plain\f0\fs24\plain\f0\fs20\upme5703\hich\f0\dbch\f0\loch\f0\fs20\par  }

## 2025-01-23 NOTE — PROGRESS NOTE ADULT - ASSESSMENT
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 Actively drinking. Now p/w abdominal pain/distension with worsening of liver tests possibly triggered by infection. DDx also includes alcohol associated hepatitis given hx of alc hep 7/2024 which improved with steroids  - OLT: evaluation ongoing, complex given active drinking. Pending cardiac eval- needs TTE  - Ascites: large ascites on exam, requiring frequent LVPs. s/p para 1/17 with 1.7 L fluid removed, neg for SBP        - Repeat paracentesis today with IR (send cell count, culture, cytology, albumin, total protein)  - EV: EGD 7/2024 for melena showed grade II EV, small gastric ulcer (neg HP), and portal gastropathy. Takes nadolol at home. Holding BB for now given hypotension  - HE: c/w lactulose and rifaximin, goal 3-4 BM/day  - HCC screening: no lesions seen on MRI 1/2025  - C/w home midodrine 10 mg tid  - C/w PPI for stress ulcer ppx and hx of ulcer  - Trend MELD labs daily (cmp, INR) daily    #Empyema  #Leukocytosis  CT chest noncontrast reviewed from OSH showing moderate sized complex R sided pleural effusion. s/p chest tube placed 1/18 with cultures growing Citrobacter c/w empyema. Repeat CT chest shows improving R pleural effusion   - Plan for IR procedure today for repositioning/exchanging chest tube  - C/w CTX 2 g IV daily  - Maintain chest tube in place for drainage  - Trend cbc and fever curve  - Appreciate thoracic surgery and ID recs    #Acute Renal Failure  Found to have acute renal failure requiring dialysis at OSH, ddx includes HRS vs ATN. Permacath in place. Cr baseline 1.12 10/2024  - C/w albumin assisted diuresis with bumex 2 mg q12h  - Strict I/Os, daily wts  - Trend CMP daily  - Appreciate transplant renal recs    #Anemia  #Thrombocytopenia  Normocytic anemia with Hgb 8.5 and plt count 64 likely due to chronic liver dz  - s/p 1 unit plasma today  - Trend cbc daily  - Transfuse Hgb>7, plt>10 (>20 if septic, >50 if bleeding)  - Hold DVT ppx for now given recent bleeding. If no further bleeding, likely can restart tomorrow    All recommendations are tentative until note is attested by attending.     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 Actively drinking. Now p/w abdominal pain/distension with worsening of liver tests possibly triggered by infection. DDx also includes alcohol associated hepatitis given hx of alc hep 7/2024 which improved with steroids  - OLT: evaluation ongoing, complex given active drinking. Pending cardiac eval- needs TTE  - Ascites: large ascites on exam, requiring frequent LVPs. s/p para 1/17 with 1.7 L fluid removed, neg for SBP        - Repeat paracentesis today with IR (send cell count, culture, cytology, albumin, total protein)  - EV: EGD 7/2024 for melena showed grade II EV, small gastric ulcer (neg HP), and portal gastropathy. Takes nadolol at home. Holding BB for now given hypotension  - HE: c/w lactulose and rifaximin, goal 3-4 BM/day  - HCC screening: no lesions seen on MRI 1/2025  - C/w home midodrine 10 mg tid  - C/w PPI for stress ulcer ppx and hx of ulcer  - Trend MELD labs daily (cmp, INR) daily    #Empyema  #Leukocytosis  CT chest noncontrast reviewed from OSH showing moderate sized complex R sided pleural effusion. s/p chest tube placed 1/18 with cultures growing Citrobacter c/w empyema. Repeat CT chest shows improving R pleural effusion   - Plan for IR procedure today for repositioning/exchanging chest tube  - C/w CTX 2 g IV daily  - Maintain chest tube in place for drainage  - Trend cbc and fever curve  - Appreciate thoracic surgery and ID recs    #Acute Renal Failure  Found to have acute renal failure requiring dialysis at OSH, ddx includes HRS vs ATN. Permacath in place. Cr baseline 1.12 10/2024  - C/w albumin assisted diuresis with bumex 2 mg q12h  - Strict I/Os, daily wts  - Trend CMP daily  - Appreciate transplant renal recs    #Anemia  #Thrombocytopenia  Normocytic anemia with Hgb 8.5 and plt count 64 likely due to chronic liver dz  - s/p 1 unit plasma today  - Trend cbc daily  - Transfuse Hgb>7, plt>10 (>20 if septic, >50 if bleeding)  - Hold DVT ppx for procedure today    All recommendations are tentative until note is attested by attending.     Judy Tucker, PGY4  Gastroenterology/Hepatology Fellow  Available on Microsoft Teams  [phone] (Long Range Pager)  [phone] (Short Range Pager [facility])    After 5 pm, please contact the on-call GI fellow for any urgent issues via the Hospital Call

## 2025-01-23 NOTE — PROCEDURE NOTE - PROCEDURE FINDINGS AND DETAILS
No Unsuccessful repositioning of chest tube, drain left in position. Clean, dry and sterile dressing applied. Full report to follow.

## 2025-01-23 NOTE — PROGRESS NOTE ADULT - ATTENDING COMMENTS
43 yo M with AUD (with last reported drink in 11/2024 and with PEth borderline positive with 23 ng/mL 16:0/18:1 but negative 16:0/18:2 on 1/17/25), obesity, ROSA, PUD, history of alcohol-associated hepatitis (7/2024), history of traumatic right calf hematoma (10/2024), and decompensated alcohol-associated cirrhosis complicated by ascites, pleural effusions, hyponatremia, and hepatic encephalopathy, transferred from [facility] to [facility] on 1/16/25 due to recurrent ascites (with most recent LVP today with 4.1L removed and no SBP), increased right pleural effusion, and HRS-MANUELITO (with baseline Cr 1.1 in 10/2024) for which received intermittent HD (started on 1/10 and last on 1/19). He is currently being monitored off HD per Transplant Nephrology and receiving albumin-supported IV diuresis. CT chest (1/17) showed an empyema, now s/p IR placement of a right-sided chest tube (1/18- ) with fluid growing Citrobacter koseri. S/p Zosyn (1/16-22) and now on ceftriaxone (1/23- ) for the empyema as per Transplant ID and also completing a 7-day course of metronidazole (1/18-25) for Giardia on GI PCR (1/17). Based on repeat non-contrast CT chest (1/21), he is planned for guidewire repositioning of the current pigtail or new pigtail placement in more medial collection today by IR. If empyema is unable to be fully evacuated, he may need VATS bvy Thoracic Surgery early next week for adequate soure control but may be morbid given his advanced cirrhosis. Blood cultures (1/16) with 1 bottle MR Staph epidermidis, with repeat blood cultures (1/18 and 1/19) with NGTD. S/p IV vancomycin (1/17). ABO O with current MELD 3.0 score of 36 with expedited liver transplant evaluation in progress. Awaiting repeat TTE to reassess pulmonary pressures which were elevated on last TTE (7/2024). Pending the results, anticipate that he will need LHC +/- RHC for pre-transplant cardiac testing as per discussion with cardiologist Dr. Dean.    Victoriano Marie M.D., Ph.D.  Transplant Hepatology

## 2025-01-23 NOTE — PRE PROCEDURE NOTE - PRE PROCEDURE EVALUATION
Interventional Radiology    HPI: 43yo M with Hx decompensated EtOH cirrhosis (c/b recurrent ascites, grade II EV, and HE) who presented to OSH on 1/16 with abdominal pain and distension, found to have ascites and acute renal failure requiring initiation of HD (s/p Permacath placement c/b bleeding), transferred to [facility] for transplant evaluation and management. CT chest at OSH was significant for a complex R pleural effusion s/p R pigtail placement by IR on 1/18 with drainage of purulent material c/f empyema. Thoracic Surgery consulted for empyema and chest tube management.  IR consulted for chest tube repositioning and dx+tx para on 1/22.    Allergies: sulfa drugs (Unknown)  Salicylic Acid (Other)    Medications (Abx/Cardiac/Anticoagulation/Blood Products)    buMETAnide Injectable: 2 milliGRAM(s) IV Push (01-23 @ 11:06)  buMETAnide Injectable: 2 milliGRAM(s) IV Push (01-22 @ 21:41)  buMETAnide Injectable: 2 milliGRAM(s) IV Push (01-22 @ 10:49)  cefTRIAXone   IVPB: 2000 milliGRAM(s) IV Intermittent (01-22 @ 22:23)  furosemide   Injectable: 80 milliGRAM(s) IV Push (01-21 @ 21:43)  metroNIDAZOLE    Tablet: 500 milliGRAM(s) Oral (01-23 @ 05:14)  midodrine: 10 milliGRAM(s) Oral (01-23 @ 05:15)  piperacillin/tazobactam IVPB..: 25 mL/Hr IV Intermittent (01-22 @ 14:35)  rifAXIMin: 550 milliGRAM(s) Oral (01-23 @ 05:15)    Data:    T(C): 36.5  HR: 76  BP: 122/73  RR: 18  SpO2: 99%    Exam  General: No acute distress  Chest: Non labored breathing  Abdomen: Non-distended  Extremities: No swelling, warm    -WBC 6.57 / HgB 7.6 / Hct 23.3 / Plt 75  -Na 131 / Cl 98 / BUN 50 / Glucose 120  -K 4.9 / CO2 20 / Cr 3.05  -ALT 7 / Alk Phos 99 / T.Bili 6.4  -INR2.15    Imaging: Reviewed.    Plan: 44y Male presents for paracentesis and chest tube placement.    -Risks/Benefits/alternatives explained with the patient and/or healthcare proxy and witnessed informed consent obtained.

## 2025-01-23 NOTE — PROGRESS NOTE ADULT - ASSESSMENT
43yo M with Hx decompensated EtOH cirrhosis (c/b recurrent ascites, grade II EV, and HE) who presented to OSH on 1/16 with abdominal pain and distension, found to have ascites and acute renal failure requiring initiation of HD (s/p Permacath placement c/b bleeding), transferred to [facility] for transplant evaluation and management. CT chest at OSH was significant for a complex R pleural effusion s/p R pigtail placement by IR on 1/18 with drainage of purulent material c/f empyema. Thoracic Surgery consulted for empyema and chest tube management.   1/20 R pigtail drained 700/910, CT of Chest on Tuesday to assess effusion and make final plan determination  1/21 R pigtail drained 80/240, CT of chest to assess effusion today   1/22 R pigtail maintained. CT of chest revealing:   < from: CT Chest No Cont (01.21.25 @ 19:29) >  IMPRESSION:.  Marked interval decrease in size ofright-sided pleural fluid and gas   collection (presumably empyema) status post placement of pigtail catheter   as described above.  Much interval improvement of posterior right upper lobe and right lower   lobe consolidation.  New patchy ground-glass opacities at the left apex may be related to   edema or be infectious/inflammatory in etiology.  New small left pleural effusion. < end of copied text >  1/23 R PTC  (placed by IR 1/18) --> LWS this AM; patient NPO for IR repositioning/swap of chest tube today. Monitor Chest tube outputs

## 2025-01-23 NOTE — PROGRESS NOTE ADULT - SUBJECTIVE AND OBJECTIVE BOX
[facility] DIVISION OF KIDNEY DISEASES AND HYPERTENSION -- FOLLOW UP NOTE  --------------------------------------------------------------------------------  Chief Complaint: oliguric MANUELITO now on HD     24 hour events/subjective: Pt seen and evaluated bedside this morning. Endorses fatigue, abd distension and LE edema. UOP improved with diuretics yesterday. Currently NPO for paracentesis and pigtail reposition/swap. Otherwise denies any headaches, nausea, vomiting, fevers/chills, chest pain, SOB,.    PAST HISTORY  --------------------------------------------------------------------------------  No significant changes to PMH, PSH, FHx, SHx, unless otherwise noted    ALLERGIES & MEDICATIONS  --------------------------------------------------------------------------------  Allergies    sulfa drugs (Unknown)  Salicylic Acid (Other)    Intolerances    Standing Inpatient Medications  cefTRIAXone   IVPB 2000 milliGRAM(s) IV Intermittent every 24 hours  cefTRIAXone   IVPB      chlorhexidine 2% Cloths 1 Application(s) Topical daily  folic acid 1 milliGRAM(s) Oral daily  influenza   Vaccine 0.5 milliLiter(s) IntraMuscular once  lactulose Syrup 20 Gram(s) Oral every 12 hours  lidocaine   4% Patch 1 Patch Transdermal every 24 hours  melatonin 5 milliGRAM(s) Oral at bedtime  metroNIDAZOLE    Tablet 500 milliGRAM(s) Oral two times a day  midodrine 10 milliGRAM(s) Oral every 8 hours  mirtazapine 7.5 milliGRAM(s) Oral at bedtime  Nephro-shania 1 Tablet(s) Oral daily  pantoprazole    Tablet 40 milliGRAM(s) Oral before breakfast  rifAXIMin 550 milliGRAM(s) Oral every 12 hours  sevelamer carbonate 800 milliGRAM(s) Oral three times a day with meals  thiamine 100 milliGRAM(s) Oral daily    PRN Inpatient Medications  albuterol    90 MICROgram(s) HFA Inhaler 2 Puff(s) Inhalation every 6 hours PRN  traMADol 25 milliGRAM(s) Oral every 8 hours PRN    REVIEW OF SYSTEMS  --------------------------------------------------------------------------------  see above    VITALS/PHYSICAL EXAM  --------------------------------------------------------------------------------  T(C): 36.7 (01-23-25 @ 05:00), Max: 36.8 (01-22-25 @ 14:20)  HR: 89 (01-23-25 @ 05:00) (76 - 90)  BP: 116/57 (01-23-25 @ 05:00) (110/57 - 124/68)  RR: 18 (01-23-25 @ 05:00) (18 - 18)  SpO2: 94% (01-23-25 @ 05:00) (94% - 100%)  Wt(kg): --    01-22-25 @ 07:01  -  01-23-25 @ 07:00  --------------------------------------------------------  IN: 1390 mL / OUT: 1120 mL / NET: 270 mL    Physical Exam:  	Gen: NAD  	HEENT: MMM  	Pulm: CTA B/L  	CV: S1S2  	Abd: +BS, distended   	Ext: +++++LE edema B/L  	Neuro: Awake  	Skin: Warm and dry  	Vascular access: [facility]  LABS/STUDIES  --------------------------------------------------------------------------------              7.6    6.57  >-----------<  75       [01-23-25 @ 07:10]              23.3     131  |  98  |  50  ----------------------------<  120      [01-23-25 @ 07:04]  4.9   |  20  |  3.05        Ca     9.2     [01-23-25 @ 07:04]      Mg     1.9     [01-23-25 @ 07:04]      Phos  5.7     [01-23-25 @ 07:04]    TPro  6.8  /  Alb  2.8  /  TBili  6.4  /  DBili  1.8  /  AST  29  /  ALT  7   /  AlkPhos  99  [01-23-25 @ 07:04]    PT/INR: PT 24.5 , INR 2.15       [01-23-25 @ 07:06]  PTT: 49.4       [01-23-25 @ 07:06]    Creatinine Trend:  SCr 3.05 [01-23 @ 07:04]  SCr 2.92 [01-22 @ 06:39]  SCr 2.95 [01-21 @ 06:27]  SCr 2.67 [01-20 @ 06:51]  SCr 2.36 [01-19 @ 06:29]    Urine Creatinine 261      [01-17-25 @ 08:53]  Urine Protein 81      [01-17-25 @ 08:53]  Urine Sodium 9      [01-19-25 @ 16:41]  Urine Potassium 60      [01-19-25 @ 16:41]    Iron 57, TIBC 91, %sat 62      [01-17-25 @ 06:41]  Ferritin 849      [01-17-25 @ 06:41]  TSH 8.18      [01-17-25 @ 06:41]  Lipid: chol 65, TG 46, HDL 26, LDL --      [01-17-25 @ 06:41]    HBsAb 55.9      [01-17-25 @ 06:41]  HBsAb Reactive      [01-17-25 @ 06:41]  HBsAg Nonreact      [01-17-25 @ 06:41]  HBcAb Nonreact      [01-17-25 @ 06:41]  HCV 0.06, Nonreact      [01-16-25 @ 18:23]  HIV Nonreact      [01-17-25 @ 06:41]    IRMA: titer 1:160, pattern Speckled      [01-17-25 @ 06:41]  Syphilis Screen (Treponema Pallidum Ab) Negative      [01-17-25 @ 06:41]

## 2025-01-23 NOTE — PROGRESS NOTE ADULT - SUBJECTIVE AND OBJECTIVE BOX
Interval Events:   - He reports ongoing mild dyspnea, denies f/c  - Chest tube output: 85 cc in 24 hours    ROS:   12 point review of systems performed and negative except otherwise noted in HPI.    Hospital Medications:  albuterol    90 MICROgram(s) HFA Inhaler 2 Puff(s) Inhalation every 6 hours PRN  chlorhexidine 2% Cloths 1 Application(s) Topical <User Schedule>  folic acid 1 milliGRAM(s) Oral daily  lactulose Syrup 20 Gram(s) Oral every 12 hours  melatonin 5 milliGRAM(s) Oral at bedtime  midodrine 10 milliGRAM(s) Oral every 8 hours  Nephro-shania 1 Tablet(s) Oral daily  pantoprazole    Tablet 40 milliGRAM(s) Oral before breakfast  piperacillin/tazobactam IVPB.. 3.375 Gram(s) IV Intermittent every 12 hours  rifAXIMin 550 milliGRAM(s) Oral every 12 hours  thiamine 100 milliGRAM(s) Oral daily      PHYSICAL EXAM:   Vital Signs:  Vital Signs Last 24 Hrs  T(C): 36.9 (17 Jan 2025 09:29), Max: 37.3 (17 Jan 2025 00:35)  T(F): 98.4 (17 Jan 2025 09:29), Max: 99.2 (17 Jan 2025 00:35)  HR: 84 (17 Jan 2025 09:29) (65 - 106)  BP: 109/54 (17 Jan 2025 09:29) (104/51 - 129/62)  BP(mean): 89 (16 Jan 2025 18:00) (74 - 89)  RR: 16 (17 Jan 2025 09:29) (16 - 36)  SpO2: 99% (17 Jan 2025 09:29) (95% - 99%)    Parameters below as of 17 Jan 2025 05:00  Patient On (Oxygen Delivery Method): room air      Daily     Daily     PHYSICAL EXAM:   GENERAL:  No acute distress  HEENT:  Normocephalic/atraumatic, +sceral icterus  CHEST:  No accessory muscle use, mild crackles on the right base  HEART:  Regular rate and rhythm  ABDOMEN:  Soft, mild diffuse tenderness, distended w/ fluid wave, normoactive bowel sounds, splenomegaly, stigmata of chronic liver dz  EXTREMITIES: No cyanosis, clubbing, or edema  SKIN:  No rash  NEURO:  Alert and oriented x 3, no asterixis    LABS: reviewed                        7.4    13.96 )-----------( 73       ( 17 Jan 2025 06:42 )             22.4     01-16    130[L]  |  96  |  39[H]  ----------------------------<  117[H]  4.3   |  22  |  2.59[H]    Ca    8.6      16 Jan 2025 07:39  Phos  4.3     01-17  Mg     2.0     01-17    TPro  6.4  /  Alb  2.5[L]  /  TBili  5.8[H]  /  DBili  2.1[H]  /  AST  32  /  ALT  7[L]  /  AlkPhos  116  01-17    LIVER FUNCTIONS - ( 17 Jan 2025 06:41 )  Alb: 2.5 g/dL / Pro: 6.4 g/dL / ALK PHOS: 116 U/L / ALT: 7 U/L / AST: 32 U/L / GGT: x             Interval Diagnostic Studies: see sunrise for full report

## 2025-01-23 NOTE — PROCEDURE NOTE - ADDITIONAL PROCEDURE DETAILS
Technically successful paracentesis through RLQ window. 4.1L of clear yellow fluid was drained. Albumin 25% in 50 cc was given x 2. Full report to follow.

## 2025-01-23 NOTE — BH CONSULTATION LIAISON PROGRESS NOTE - NSBHATTESTCOMMENTATTENDFT_PSY_A_CORE
Pt is a 45 yo  man with PMH decompensated ETOH cirrhosis c/b recurrent ascites, grade II EV, and HE, right calf hematoma 10/2024 (s/p fall)  presented to [facility] for abdominal pain and distension. Patient found to have ascites on exam and MANUELITO requiring HD initiation. PermCath was placed by vascular surgery on 1/10 with post procedure course c/b bleeding from insertion site. Transferred to [facility] SICU for further management.   Psych consulted for alcohol use disorder. Patient reports that heavy alcohol consumption began 2016 after he father passed away and he moved to [place]. Pt was in rehabilitation twice prior, 2021 and 2023, pt was sober for about 1 yr between two rehabs. Pt began drinking again since 03/2024. Pt reports that he was in [place] 5/2024 and did not drink while he was there because it was election month, no drinking was allowed. Pt says he was in the ICU in Nov 2024 and decided to stop drinking them and has not had any alcohol since the first week in Nov, 2024.   Pt is highly motivated to follow treatment recommendations.  Agree with recommendations above.

## 2025-01-23 NOTE — PROGRESS NOTE ADULT - PROBLEM SELECTOR PLAN 1
s/p R pigtail placement by IR on 1/18 with drainage of purulent material c/f empyema  Continue Pigtail to suction; monitor output   CT chest 1/21:  Marked interval decrease in size of right-sided pleural fluid and gas collection (presumably empyema) status post placement of pigtail catheter as described above. Much interval improvement of posterior right upper lobe and right lower lobe consolidation. New patchy ground-glass opacities at the left apex may be related to edema or be infectious/inflammatory in etiology. New small left pleural effusion.  IR consult for reposition/increase size of PTC to help with drainage --> patient NPO for IR procedure today 1/23  Monitor chest tube outputs  Daily CXR while chest tube in  [facility] care as per primary team  Above plan as per d/w on-call Thoracic Attending Dr. Ramírez  Call Thoracic surgery at [phone] with any questions

## 2025-01-23 NOTE — PROGRESS NOTE ADULT - SUBJECTIVE AND OBJECTIVE BOX
SUBJECTIVE: "Hi." Denies acute chest pain, palpitations, or shortness of breath    Vital Signs Last 24 Hrs  T(C): 36.6 (01-23-25 @ 09:52), Max: 36.8 (01-22-25 @ 14:20)  T(F): 97.8 (01-23-25 @ 09:52), Max: 98.3 (01-22-25 @ 14:20)  HR: 84 (01-23-25 @ 09:52) (76 - 93)  BP: 112/63 (01-23-25 @ 09:52) (112/63 - 125/64)  RR: 18 (01-23-25 @ 09:52) (18 - 18)  SpO2: 98% (01-23-25 @ 09:52) (94% - 100%)           INPUT/OUTPUT:  22 Jan 2025 07:01  -  23 Jan 2025 07:00  --------------------------------------------------------  IN:    IV PiggyBack: 50 mL    Oral Fluid: 1340 mL  Total IN: 1390 mL  OUT:    Chest Tube (mL): 145 mL    Voided (mL): 975 mL  Total OUT: 1120 mL  Total NET: 270 mL      LABS:  01-23  131[L]  |  98  |  50[H]  ----------------------------<  120[H]  4.9   |  20[L]  |  3.05[H]  Ca    9.2      23 Jan 2025 07:04  Phos  5.7     01-23  Mg     1.9     01-23  TPro  6.8  /  Alb  2.8[L]  /  TBili  6.4[H]  /  DBili  1.8[H]  /  AST  29  /  ALT  7[L]  /  AlkPhos  99  01-23             7.6    6.57  )-----------( 75       ( 23 Jan 2025 07:10 )             23.3        PT/INR - ( 23 Jan 2025 07:06 )   PT: 24.5 sec;   INR: 2.15 ratio    PTT - ( 23 Jan 2025 07:06 )  PTT:49.4 sec      PHYSICAL EXAM:  Neurology: no gross deficits appreciated at time of evaluation  CV : +S1S2  Lungs: Respirations non-labored, B/L BS  (+) chestube (placed by IR 1/18) to LWS  Abdomen: Soft, NT/ND, +BSx4Q  Extremities: +peripheral pulses intact         ACTIVE MEDICATIONS:  albuterol    90 MICROgram(s) HFA Inhaler 2 Puff(s) Inhalation every 6 hours PRN  cefTRIAXone   IVPB 2000 milliGRAM(s) IV Intermittent every 24 hours  cefTRIAXone   IVPB      chlorhexidine 2% Cloths 1 Application(s) Topical daily  folic acid 1 milliGRAM(s) Oral daily  influenza   Vaccine 0.5 milliLiter(s) IntraMuscular once  lactulose Syrup 20 Gram(s) Oral every 12 hours  lidocaine   4% Patch 1 Patch Transdermal every 24 hours  melatonin 5 milliGRAM(s) Oral at bedtime  metroNIDAZOLE    Tablet 500 milliGRAM(s) Oral two times a day  midodrine 10 milliGRAM(s) Oral every 8 hours  mirtazapine 7.5 milliGRAM(s) Oral at bedtime  Nephro-shania 1 Tablet(s) Oral daily  pantoprazole    Tablet 40 milliGRAM(s) Oral before breakfast  rifAXIMin 550 milliGRAM(s) Oral every 12 hours  sevelamer carbonate 800 milliGRAM(s) Oral three times a day with meals  thiamine 100 milliGRAM(s) Oral daily  traMADol 25 milliGRAM(s) Oral every 8 hours PRN    Case discussed in detail with Thoracic on-call Attending Dr. Ramírez. Plan below as per discussion.

## 2025-01-23 NOTE — BH CONSULTATION LIAISON PROGRESS NOTE - NSBHASSESSMENTFT_PSY_ALL_CORE
Pt is a 43 yo  man with PMH decompensated ETOH cirrhosis c/b recurrent ascites, grade II EV, and HE, right calf hematoma 10/2024 (s/p fall)  presented to [facility] for abdominal pain and distension. Patient found to have ascites on exam and MANUELITO requiring HD initiation. PermCath was placed by vascular surgery on 1/10 with post procedure course c/b bleeding from insertion site. Transferred to [facility] SICU for further management.   Psych consulted for alcohol use disorder. Patient reports that heavy alcohol consumption began 2016 after he father passed away and he moved to [place]. Pt was in rehabilitation twice prior, 2021 and 2023, pt was sober for about 1 yr between two rehabs. Pt began drinking again since 03/2024. Pt reports that he was in [place] 5/2024 and did not drink while he was there because it was election month, no drinking was allowed. Pt says he was in the ICU in Nov 2024 and decided to stop drinking them and has not had any alcohol since the first week in Nov, 2024.   Pt is highly motivated to follow treatment recommendations but says he has not yet been educated regarding risks and benefits of immunosuppressant medications.  Will assess SIPATH score once pt is educated about the transplant process.    1/22/25 Pt states that he was diagnosed with pneumonia and his ascites is much worse now so he has not yet spoken with staff about the transplant process.  He is in back pain and having much difficulty ambulating though walking to the bath room with much effort using his walker.  He is hopeful that his condition will improve when he receives a transplant but says the must first recover from infections.                 1/23/25:  Pt demonstrates excellent understanding of transplant process including potential risks and post-operative recovery needs including but not limited to need for immunosuppression, risk of infection, and lifestyle modifications. Admits to some mild anxiety in context of medical stressors with good therapeutic response to mirtazapine which we will recommend continuing. Reporting overall stable mood, is notably future-oriented, is motivated to maintain sobriety from alcohol (last use 11/4/24). Patient is adherent to current recommendations made by transplant team and motivated to continue to follow any future suggestions. Is open to considering counseling or support groups should anxiety worsen post-transplant. Endorses primary family support for this process is his mother.   ?  DDX;  Pre-transplant evaluation  R/O adjustment disorder with anxiety    Plan  -Continue mirtazapine 7.5 mg QHS  - continue with melatonin 3 mg QHS  - SW assistance for referral to inpatient rehab appreciated. Patient is motivated to attend and complete the program.   - Discussed with patient additional psychiatric resources  for anxiety management including counseling/therapy/groups. Patient is open to exploring that in the future if anxiety worsens.  - SIPAT score: 23,  Pt minimally acceptable candidate. Would consider listing and will benefit greatly from ongoing counseling for alcohol use, and further treatment in inpatient rehab program.    addressed before representing for consideration.  - Dispo: likely to inpatient rehab. no indication for inpatient psychiatric hospitalization Pt is a 43 yo  man with PMH decompensated ETOH cirrhosis c/b recurrent ascites, grade II EV, and HE, right calf hematoma 10/2024 (s/p fall)  presented to [facility] for abdominal pain and distension. Patient found to have ascites on exam and MANUELITO requiring HD initiation. PermCath was placed by vascular surgery on 1/10 with post procedure course c/b bleeding from insertion site. Transferred to [facility] SICU for further management.   Psych consulted for alcohol use disorder. Patient reports that heavy alcohol consumption began 2016 after he father passed away and he moved to [place]. Pt was in rehabilitation twice prior, 2021 and 2023, pt was sober for about 1 yr between two rehabs. Pt began drinking again since 03/2024. Pt reports that he was in [place] 5/2024 and did not drink while he was there because it was election month, no drinking was allowed. Pt says he was in the ICU in Nov 2024 and decided to stop drinking them and has not had any alcohol since the first week in Nov, 2024.   Pt is highly motivated to follow treatment recommendations but says he has not yet been educated regarding risks and benefits of immunosuppressant medications.  Will assess SIPATH score once pt is educated about the transplant process.    1/22/25 Pt states that he was diagnosed with pneumonia and his ascites is much worse now so he has not yet spoken with staff about the transplant process.  He is in back pain and having much difficulty ambulating though walking to the bath room with much effort using his walker.  He is hopeful that his condition will improve when he receives a transplant but says the must first recover from infections.                 1/23/25:  Pt demonstrates overall good understanding of transplant process including potential risks and post-operative recovery needs including but not limited to need for immunosuppression, risk of infection, and lifestyle modifications. Admits to some mild anxiety in context of medical stressors with good therapeutic response to mirtazapine which we will recommend continuing. Reporting overall stable mood, is notably future-oriented, is motivated to maintain sobriety from alcohol (last use 11/4/24). Patient is adherent to current recommendations made by transplant team and motivated to continue to follow any future suggestions. Is open to considering counseling or support groups should anxiety worsen post-transplant. Endorses primary family support for this process is his mother.     DDX;  Pre-transplant evaluation  R/O adjustment disorder with anxiety    Plan  -Continue mirtazapine 7.5 mg QHS  - continue with melatonin 3 mg QHS  - SW assistance for referral to inpatient rehab appreciated. Patient is motivated to attend and complete the program.   - Discussed with patient additional psychiatric resources  for anxiety management including counseling/therapy/groups. Patient is open to exploring that in the future if anxiety worsens.  - SIPAT score: 23,  Pt minimally acceptable candidate. Would consider listing and will benefit greatly from ongoing counseling for alcohol use, and further treatment in inpatient rehab program.    addressed before representing for consideration.  - Dispo: likely to inpatient rehab. no indication for inpatient psychiatric hospitalization Pt is a 43 yo  man with PMH decompensated ETOH cirrhosis c/b recurrent ascites, grade II EV, and HE, right calf hematoma 10/2024 (s/p fall)  presented to [facility] for abdominal pain and distension. Patient found to have ascites on exam and MANUELITO requiring HD initiation. PermCath was placed by vascular surgery on 1/10 with post procedure course c/b bleeding from insertion site. Transferred to [facility] SICU for further management.   Psych consulted for alcohol use disorder. Patient reports that heavy alcohol consumption began 2016 after he father passed away and he moved to [place]. Pt was in rehabilitation twice prior, 2021 and 2023, pt was sober for about 1 yr between two rehabs. Pt began drinking again since 03/2024. Pt reports that he was in [place] 5/2024 and did not drink while he was there because it was election month, no drinking was allowed. Pt says he was in the ICU in Nov 2024 and decided to stop drinking them and has not had any alcohol since the first week in Nov, 2024.   Pt is highly motivated to follow treatment recommendations but says he has not yet been educated regarding risks and benefits of immunosuppressant medications.  Will assess SIPATH score once pt is educated about the transplant process.    1/22/25 Pt states that he was diagnosed with pneumonia and his ascites is much worse now so he has not yet spoken with staff about the transplant process.  He is in back pain and having much difficulty ambulating though walking to the bath room with much effort using his walker.  He is hopeful that his condition will improve when he receives a transplant but says the must first recover from infections.                 1/23/25:  Pt demonstrates overall good understanding of transplant process including potential risks and post-operative recovery needs including but not limited to need for immunosuppression, risk of infection, and lifestyle modifications. Admits to some mild anxiety in context of medical stressors with good therapeutic response to mirtazapine which we will recommend continuing. Reporting overall stable mood, is notably future-oriented, is motivated to maintain sobriety from alcohol (last use 11/4/24). Patient is adherent to current recommendations made by transplant team and motivated to continue to follow any future suggestions. Is open to considering counseling or support groups should anxiety worsen post-transplant. Endorses primary family support for this process is his mother.     DDX;  Pre-transplant evaluation  R/O adjustment disorder with anxiety    Plan  -Continue mirtazapine 7.5 mg QHS  - continue with melatonin 3 mg QHS  - SW assistance for referral to inpatient rehab appreciated. Patient is motivated to attend and complete the program.   - Discussed with patient additional psychiatric resources  for anxiety management including counseling/therapy/groups. Patient is open to exploring that in the future if anxiety worsens.  - SIPAT score: 23,  Pt minimally acceptable candidate. Would consider listing and will benefit greatly from ongoing counseling for alcohol use, and further treatment in inpatient rehab program.    - Dispo: likely to inpatient rehab. no indication for inpatient psychiatric hospitalization Pt is a 45 yo  man with PMH decompensated ETOH cirrhosis c/b recurrent ascites, grade II EV, and HE, right calf hematoma 10/2024 (s/p fall)  presented to [facility] for abdominal pain and distension. Patient found to have ascites on exam and MANUELITO requiring HD initiation. PermCath was placed by vascular surgery on 1/10 with post procedure course c/b bleeding from insertion site. Transferred to [facility] SICU for further management.   Psych consulted for alcohol use disorder. Patient reports that heavy alcohol consumption began 2016 after he father passed away and he moved to [place]. Pt was in rehabilitation twice prior, 2021 and 2023, pt was sober for about 1 yr between two rehabs. Pt began drinking again since 03/2024. Pt reports that he was in [place] 5/2024 and did not drink while he was there because it was election month, no drinking was allowed. Pt says he was in the ICU in Nov 2024 and decided to stop drinking them and has not had any alcohol since the first week in Nov, 2024.   Pt is highly motivated to follow treatment recommendations but says he has not yet been educated regarding risks and benefits of immunosuppressant medications.     1/22/25 Pt states that he was diagnosed with pneumonia and his ascites is much worse now so he has not yet spoken with staff about the transplant process.  He is in back pain and having much difficulty ambulating though walking to the bath room with much effort using his walker.  He is hopeful that his condition will improve when he receives a transplant but says the must first recover from infections.                 1/23/25:  Pt demonstrates overall good understanding of transplant process including potential risks and post-operative recovery needs including but not limited to need for immunosuppression, risk of infection, and lifestyle modifications. Admits to some mild anxiety in context of medical stressors with good therapeutic response to mirtazapine which we will recommend continuing. Reporting overall stable mood, is notably future-oriented, is motivated to maintain sobriety from alcohol (last use 11/4/24). Patient is adherent to current recommendations made by transplant team and motivated to continue to follow any future suggestions. Is open to considering counseling or support groups should anxiety worsen post-transplant. Endorses primary family support for this process is his mother.     DDX;  Pre-transplant evaluation  R/O adjustment disorder with anxiety    Plan  -Continue mirtazapine 7.5 mg QHS  - continue with melatonin 3 mg QHS  - SW assistance for referral to inpatient rehab appreciated. Patient is motivated to attend and complete the program.   - Discussed with patient additional psychiatric resources  for anxiety management including counseling/therapy/groups. Patient is open to exploring that in the future if anxiety worsens.  - SIPAT score: 23,  Pt minimally acceptable candidate. Would consider listing and will benefit greatly from ongoing counseling for alcohol use, and further treatment in inpatient rehab program.    - Dispo: likely to inpatient rehab. no indication for inpatient psychiatric hospitalization
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
## 2025-01-24 LAB
ALBUMIN SERPL ELPH-MCNC: 2.9 G/DL — LOW (ref 3.3–5)
ALP SERPL-CCNC: 80 U/L — SIGNIFICANT CHANGE UP (ref 40–120)
ALT FLD-CCNC: 5 U/L — LOW (ref 10–45)
ANION GAP SERPL CALC-SCNC: 13 MMOL/L — SIGNIFICANT CHANGE UP (ref 5–17)
APTT BLD: 50.8 SEC — HIGH (ref 24.5–35.6)
AST SERPL-CCNC: 23 U/L — SIGNIFICANT CHANGE UP (ref 10–40)
BASOPHILS # BLD AUTO: 0.05 K/UL — SIGNIFICANT CHANGE UP (ref 0–0.2)
BASOPHILS NFR BLD AUTO: 0.9 % — SIGNIFICANT CHANGE UP (ref 0–2)
BILIRUB DIRECT SERPL-MCNC: 2 MG/DL — HIGH (ref 0–0.3)
BILIRUB INDIRECT FLD-MCNC: 3.9 MG/DL — HIGH (ref 0.2–1)
BILIRUB SERPL-MCNC: 5.9 MG/DL — HIGH (ref 0.2–1.2)
BUN SERPL-MCNC: 46 MG/DL — HIGH (ref 7–23)
CALCIUM SERPL-MCNC: 9.8 MG/DL — SIGNIFICANT CHANGE UP (ref 8.4–10.5)
CHLORIDE SERPL-SCNC: 102 MMOL/L — SIGNIFICANT CHANGE UP (ref 96–108)
CO2 SERPL-SCNC: 21 MMOL/L — LOW (ref 22–31)
CREAT SERPL-MCNC: 2.59 MG/DL — HIGH (ref 0.5–1.3)
CULTURE RESULTS: SIGNIFICANT CHANGE UP
EGFR: 30 ML/MIN/1.73M2 — LOW
EGFR: 30 ML/MIN/1.73M2 — LOW
EOSINOPHIL # BLD AUTO: 0.39 K/UL — SIGNIFICANT CHANGE UP (ref 0–0.5)
EOSINOPHIL NFR BLD AUTO: 7.4 % — HIGH (ref 0–6)
GLUCOSE SERPL-MCNC: 170 MG/DL — HIGH (ref 70–99)
GRAM STN FLD: SIGNIFICANT CHANGE UP
HCT VFR BLD CALC: 21.6 % — LOW (ref 39–50)
HGB BLD-MCNC: 7.3 G/DL — LOW (ref 13–17)
IMM GRANULOCYTES NFR BLD AUTO: 0.4 % — SIGNIFICANT CHANGE UP (ref 0–0.9)
INR BLD: 2.46 RATIO — HIGH (ref 0.85–1.16)
LYMPHOCYTES # BLD AUTO: 0.79 K/UL — LOW (ref 1–3.3)
LYMPHOCYTES # BLD AUTO: 15 % — SIGNIFICANT CHANGE UP (ref 13–44)
MAGNESIUM SERPL-MCNC: 1.7 MG/DL — SIGNIFICANT CHANGE UP (ref 1.6–2.6)
MCHC RBC-ENTMCNC: 30.8 PG — SIGNIFICANT CHANGE UP (ref 27–34)
MCHC RBC-ENTMCNC: 33.8 G/DL — SIGNIFICANT CHANGE UP (ref 32–36)
MCV RBC AUTO: 91.1 FL — SIGNIFICANT CHANGE UP (ref 80–100)
MONOCYTES # BLD AUTO: 0.69 K/UL — SIGNIFICANT CHANGE UP (ref 0–0.9)
MONOCYTES NFR BLD AUTO: 13.1 % — SIGNIFICANT CHANGE UP (ref 2–14)
NEUTROPHILS # BLD AUTO: 3.33 K/UL — SIGNIFICANT CHANGE UP (ref 1.8–7.4)
NEUTROPHILS NFR BLD AUTO: 63.2 % — SIGNIFICANT CHANGE UP (ref 43–77)
NRBC # BLD: 0 /100 WBCS — SIGNIFICANT CHANGE UP (ref 0–0)
NRBC BLD-RTO: 0 /100 WBCS — SIGNIFICANT CHANGE UP (ref 0–0)
PHOSPHATE SERPL-MCNC: 5.8 MG/DL — HIGH (ref 2.5–4.5)
PLATELET # BLD AUTO: 55 K/UL — LOW (ref 150–400)
POTASSIUM SERPL-MCNC: 4.4 MMOL/L — SIGNIFICANT CHANGE UP (ref 3.5–5.3)
POTASSIUM SERPL-SCNC: 4.4 MMOL/L — SIGNIFICANT CHANGE UP (ref 3.5–5.3)
PROT SERPL-MCNC: 6.4 G/DL — SIGNIFICANT CHANGE UP (ref 6–8.3)
PROTHROM AB SERPL-ACNC: 28 SEC — HIGH (ref 9.9–13.4)
RAPIDTEG MAXIMUM AMPLITUDE: 42.7 MM — LOW (ref 52–70)
RBC # BLD: 2.37 M/UL — LOW (ref 4.2–5.8)
RBC # FLD: 19.7 % — HIGH (ref 10.3–14.5)
SODIUM SERPL-SCNC: 136 MMOL/L — SIGNIFICANT CHANGE UP (ref 135–145)
SPECIMEN SOURCE: SIGNIFICANT CHANGE UP
SPECIMEN SOURCE: SIGNIFICANT CHANGE UP
TEG FUNCTIONAL FIBRINOGEN: 11.4 MM — LOW (ref 15–32)
TEG LY30 (LYSIS): 0.5 % — SIGNIFICANT CHANGE UP (ref 0–2.6)
TEG REACTION TIME: 9.2 MIN — HIGH (ref 4.6–9.1)
WBC # BLD: 5.27 K/UL — SIGNIFICANT CHANGE UP (ref 3.8–10.5)
WBC # FLD AUTO: 5.27 K/UL — SIGNIFICANT CHANGE UP (ref 3.8–10.5)

## 2025-01-24 PROCEDURE — 71045 X-RAY EXAM CHEST 1 VIEW: CPT | Mod: 26

## 2025-01-24 PROCEDURE — 99232 SBSQ HOSP IP/OBS MODERATE 35: CPT | Mod: GC

## 2025-01-24 PROCEDURE — 32557 INSERT CATH PLEURA W/ IMAGE: CPT | Mod: RT

## 2025-01-24 PROCEDURE — 99233 SBSQ HOSP IP/OBS HIGH 50: CPT

## 2025-01-24 PROCEDURE — 99232 SBSQ HOSP IP/OBS MODERATE 35: CPT

## 2025-01-24 PROCEDURE — 99233 SBSQ HOSP IP/OBS HIGH 50: CPT | Mod: 57

## 2025-01-24 PROCEDURE — G0545: CPT

## 2025-01-24 RX ORDER — TRAMADOL HYDROCHLORIDE 50 MG/1
50 TABLET, FILM COATED ORAL ONCE
Refills: 0 | Status: DISCONTINUED | OUTPATIENT
Start: 2025-01-24 | End: 2025-01-24

## 2025-01-24 RX ORDER — BUMETANIDE 1 MG/1
4 TABLET ORAL ONCE
Refills: 0 | Status: COMPLETED | OUTPATIENT
Start: 2025-01-24 | End: 2025-01-24

## 2025-01-24 RX ORDER — BUMETANIDE 1 MG/1
2 TABLET ORAL
Refills: 0 | Status: DISCONTINUED | OUTPATIENT
Start: 2025-01-25 | End: 2025-01-28

## 2025-01-24 RX ADMIN — TRAMADOL HYDROCHLORIDE 50 MILLIGRAM(S): 50 TABLET, FILM COATED ORAL at 20:49

## 2025-01-24 RX ADMIN — Medication 1 APPLICATION(S): at 11:36

## 2025-01-24 RX ADMIN — Medication 100 MILLIGRAM(S): at 11:35

## 2025-01-24 RX ADMIN — CEFTRIAXONE 100 MILLIGRAM(S): 500 INJECTION, POWDER, FOR SOLUTION INTRAMUSCULAR; INTRAVENOUS at 20:50

## 2025-01-24 RX ADMIN — SEVELAMER HYDROCHLORIDE 800 MILLIGRAM(S): 800 TABLET ORAL at 09:13

## 2025-01-24 RX ADMIN — Medication 40 MILLIGRAM(S): at 06:14

## 2025-01-24 RX ADMIN — SEVELAMER HYDROCHLORIDE 800 MILLIGRAM(S): 800 TABLET ORAL at 19:34

## 2025-01-24 RX ADMIN — TRAMADOL HYDROCHLORIDE 25 MILLIGRAM(S): 50 TABLET, FILM COATED ORAL at 00:30

## 2025-01-24 RX ADMIN — LACTULOSE 20 GRAM(S): 10 SOLUTION ORAL at 06:14

## 2025-01-24 RX ADMIN — FOLIC ACID 1 MILLIGRAM(S): 1 TABLET ORAL at 11:35

## 2025-01-24 RX ADMIN — Medication 5 MILLIGRAM(S): at 20:50

## 2025-01-24 RX ADMIN — SEVELAMER HYDROCHLORIDE 800 MILLIGRAM(S): 800 TABLET ORAL at 11:35

## 2025-01-24 RX ADMIN — MIDODRINE HYDROCHLORIDE 10 MILLIGRAM(S): 5 TABLET ORAL at 20:50

## 2025-01-24 RX ADMIN — BUMETANIDE 132 MILLIGRAM(S): 1 TABLET ORAL at 19:32

## 2025-01-24 RX ADMIN — MIDODRINE HYDROCHLORIDE 10 MILLIGRAM(S): 5 TABLET ORAL at 06:14

## 2025-01-24 RX ADMIN — TRAMADOL HYDROCHLORIDE 25 MILLIGRAM(S): 50 TABLET, FILM COATED ORAL at 16:45

## 2025-01-24 RX ADMIN — TRAMADOL HYDROCHLORIDE 50 MILLIGRAM(S): 50 TABLET, FILM COATED ORAL at 21:30

## 2025-01-24 RX ADMIN — TRAMADOL HYDROCHLORIDE 25 MILLIGRAM(S): 50 TABLET, FILM COATED ORAL at 15:02

## 2025-01-24 RX ADMIN — MIRTAZAPINE 7.5 MILLIGRAM(S): 30 TABLET, FILM COATED ORAL at 20:49

## 2025-01-24 RX ADMIN — MIDODRINE HYDROCHLORIDE 10 MILLIGRAM(S): 5 TABLET ORAL at 15:02

## 2025-01-24 RX ADMIN — TRAMADOL HYDROCHLORIDE 25 MILLIGRAM(S): 50 TABLET, FILM COATED ORAL at 06:00

## 2025-01-24 RX ADMIN — Medication 1 TABLET(S): at 11:35

## 2025-01-24 RX ADMIN — TRAMADOL HYDROCHLORIDE 25 MILLIGRAM(S): 50 TABLET, FILM COATED ORAL at 05:01

## 2025-01-24 RX ADMIN — Medication 500 MILLIGRAM(S): at 06:14

## 2025-01-24 RX ADMIN — Medication 500 MILLIGRAM(S): at 19:34

## 2025-01-24 NOTE — PROCEDURE NOTE - PLAN
Pt will return tomorrow for possible placement of 2nd chest tube, will need 2 units of FFP on call.
Management of chest tube per thoracic.

## 2025-01-24 NOTE — PROGRESS NOTE ADULT - ASSESSMENT
45 yo M with PMH decompensated ETOH cirrhosis c/b recurrent ascites, grade II EV, and HE, right calf hematoma 10/2024 (s/p fall)  presented to [facility] for abdominal pain and distension. Patient found to have ascites on exam and MANUELITO requiring HD initiation PermCath was placed by vascular surgery on 1/10 with post procedure course c/b bleeding from insertion site. Transferred to [facility] SICU for further management.     Blood Cultures (1/16) 1/4 Staph epi.   GI PCR (1/17) + Giardia.   Paracentesis (1/17) 155 nucleated cell counts.     CT Chest (1/17) Moderate loculated right pleural effusion containing a few foci of air, which are new from 1/8/2025 and may represent empyema/bronchopleural fistula versus sequelae of prior thoracentesis.     #Empyema  --Continue antibiotics but changed to Ceftriaxone/flagyl  --Follow up on bacterial, fungal and AFB cultures  --Follow up on CTS surgical plan which currently IR to upsize the right chest tube as fluid has decreased on most recent CT scan, if unable to completely resolve the collection thoracic surgery will take to [place] on Tuesday 1/28    #Giardia  --Agree with Metronidazole with plans to complete 7 days of therapy through 1/25    #Positive Blood Culture (Staph epi)  Concern for procurement contaminant  --Continue to follow CBC with diff  --Continue to follow temperature curve  --Follow up on preliminary blood cultures    # ascites  --repeat paracentesis done 1/23 and cultures no growth      #Pre-Liver Transplant Evaluation, Decompensated Cirrhosis  COVID19 Rodríguez Antibody Positive  HAV IgG Positive  HBVs Ab Positive  HBVsAg Negative  HBVc Ab Negative  HCV Ab Negative  HSV 1 IgG Positive  HSV 2 IgG Negative  EBV IgG Positive  CMV IgG Positive  VZV IgG Positive  Measles IgG Positive  Mumps IgG Positive  Rubella IgG Positive  Quantiferon Gold negative  HIV Ag/Ab by CMIA Negative  Syphilis Screen Negative  Toxoplasma IgG Negative  Strongyloides Ab negative  Coccidiodes Ab PENDING  Leishmania Ab PENDING  --ID clearance to [facility] pending treatment of empyema     #Encounter to Vaccinate Patient  COVID19: Would benefit from COVID19 5600-5564 Vaccine Dose  Influenza: Had vaccination for this year  Pneumococcal: Would benefit from PCV20  HAV: Immune, will not require further vaccination  HBV: Immune, will not require further vaccination  MMR: Immune, will not require further vaccination  Varicella: Immune, will not require further vaccination  Shingles: Will require Shingrix  Tdap: Tdap 6/23/24        Meliton Kam MD  Can be called via Teams  After 5pm/weekends [phone]

## 2025-01-24 NOTE — BH CONSULTATION LIAISON PROGRESS NOTE - NSBHFUPINTERVALHXFT_PSY_A_CORE
Patient seen for follow up for medication management and psychosocial clearance for liver transplant. Patient seen at bedside. Patient's mother also present at bedside with patient's consent.     On evaluation, patient is calm, cooperative, pleasant, AOx4, denies any acute concerns. Reports that he was able to speak with the team and received education about his current medical condition and proposed treatment. Patient is aware that he will need a paracentesis at some point, but the primary concern is ongoing infection. States that the plan is for a larger catheter to be placed for drainage. Today patient once again discusses longstanding history of alcohol use but expresses motivation to maintain sobriety. Reports his last drink was in November 4, 2024 and he does not have any cravings for it. Understands that post-transplant, there will be regular monitoring of alcohol use and is motivated to pursue recommended inpatient rehab treatment. Patient states he has been to rehab twice before so he knows what to expect. He denies any other recreational drug use including use of marijuana/gummies. Education provided in regards to the need to maintain sobriety. Patient expresses understanding and is in agreement with this. He reports he has met with the nutritionist and is also aware of changes to diet that are necessary for the liver transplant process. Denies any pertinent psychiatric history, denies history of inpatient/outpatient psychiatric treatment and denies history of SA/SIB. Patient admits to intermittent anxiety, has been tolerating mirtazapine well with overall benefit to mood. Endorses stable mood today and denies any SI/HI and AVH. Denies any safety concerns. Understands that after transplant, he may need to remain in the hospital for some time, and discussed with the team risks/benefits associated with steroids and lifelong need for immunosuppressants. Discussed with patient effect of steroid use on mood. Patient reports that should he find his anxiety worsening, he will be sure to follow up with psychiatry.  Patient states he is sleeping well, has somewhat of an appetite but is unable to eat due to pressure/distention of his stomach. . Patient states that his primary support system is his mother, and the plan will be to live with her after the hospitalization in [place]. His wife and two children are currently in [place]. States his family is aware and ready to help with post-transplant recovery and care. Pt states he has received education in regard to the transplant process and recovery. Pt demonstrates good understanding of transplant process including risks of organ rejection, infection, post-operative hospitalization and care, need for immunosuppression and lifestyle changes. He is notably future-oriented and help-seeking.

## 2025-01-24 NOTE — PROGRESS NOTE ADULT - SUBJECTIVE AND OBJECTIVE BOX
[facility] DIVISION OF KIDNEY DISEASES AND HYPERTENSION -- FOLLOW UP NOTE  --------------------------------------------------------------------------------  Chief Complaint: oliguric MANUELITO now on HD     24 hour events/subjective: Pt seen and evaluated bedside this morning. Endorses fatigue, abd distension w/ pain and LE edema. UOP improved with diuretics yesterday. Otherwise denies any headaches, nausea, vomiting, fevers/chills, chest pain, SOB,.      PAST HISTORY  --------------------------------------------------------------------------------  No significant changes to PMH, PSH, FHx, SHx, unless otherwise noted    ALLERGIES & MEDICATIONS  --------------------------------------------------------------------------------  Allergies    sulfa drugs (Unknown)  Salicylic Acid (Other)    Intolerances    Standing Inpatient Medications  albumin human 25% IVPB 50 milliLiter(s) IV Intermittent once  cefTRIAXone   IVPB      cefTRIAXone   IVPB 2000 milliGRAM(s) IV Intermittent every 24 hours  chlorhexidine 2% Cloths 1 Application(s) Topical daily  folic acid 1 milliGRAM(s) Oral daily  influenza   Vaccine 0.5 milliLiter(s) IntraMuscular once  lactulose Syrup 20 Gram(s) Oral every 12 hours  lidocaine   4% Patch 1 Patch Transdermal every 24 hours  melatonin 5 milliGRAM(s) Oral at bedtime  metroNIDAZOLE    Tablet 500 milliGRAM(s) Oral two times a day  midodrine 10 milliGRAM(s) Oral every 8 hours  mirtazapine 7.5 milliGRAM(s) Oral at bedtime  Nephro-shania 1 Tablet(s) Oral daily  pantoprazole    Tablet 40 milliGRAM(s) Oral before breakfast  rifAXIMin 550 milliGRAM(s) Oral every 12 hours  sevelamer carbonate 800 milliGRAM(s) Oral three times a day with meals  thiamine 100 milliGRAM(s) Oral daily    PRN Inpatient Medications  albuterol    90 MICROgram(s) HFA Inhaler 2 Puff(s) Inhalation every 6 hours PRN  traMADol 25 milliGRAM(s) Oral every 8 hours PRN    REVIEW OF SYSTEMS  --------------------------------------------------------------------------------  see above    VITALS/PHYSICAL EXAM  --------------------------------------------------------------------------------  T(C): 36.9 (01-24-25 @ 13:09), Max: 37 (01-24-25 @ 09:08)  HR: 99 (01-24-25 @ 13:09) (68 - 104)  BP: 121/60 (01-24-25 @ 13:09) (113/58 - 131/68)  RR: 18 (01-24-25 @ 13:09) (17 - 18)  SpO2: 98% (01-24-25 @ 13:09) (94% - 98%)  Wt(kg): --    01-23-25 @ 07:01  -  01-24-25 @ 07:00  --------------------------------------------------------  IN: 480 mL / OUT: 1688 mL / NET: -1208 mL    01-24-25 @ 07:01  -  01-24-25 @ 14:07  --------------------------------------------------------  IN: 300 mL / OUT: 200 mL / NET: 100 mL    Physical Exam:  	Gen: NAD  	HEENT: MMM  	Pulm: CTA B/L  	CV: S1S2  	Abd: +BS, distended   	Ext: +++++LE edema B/L  	Neuro: Awake  	Skin: Warm and dry  	Vascular access: [facility]    LABS/STUDIES  --------------------------------------------------------------------------------              7.3    5.27  >-----------<  55       [01-24-25 @ 01:41]              21.6     136  |  102  |  46  ----------------------------<  170      [01-24-25 @ 01:41]  4.4   |  21  |  2.59        Ca     9.8     [01-24-25 @ 01:41]      Mg     1.7     [01-24-25 @ 01:41]      Phos  5.8     [01-24-25 @ 01:41]    TPro  6.4  /  Alb  2.9  /  TBili  5.9  /  DBili  2.0  /  AST  23  /  ALT  5   /  AlkPhos  80  [01-24-25 @ 01:41]    PT/INR: PT 28.0 , INR 2.46       [01-24-25 @ 01:41]  PTT: 50.8       [01-24-25 @ 01:41]    Creatinine Trend:  SCr 2.59 [01-24 @ 01:41]  SCr 3.05 [01-23 @ 07:04]  SCr 2.92 [01-22 @ 06:39]  SCr 2.95 [01-21 @ 06:27]  SCr 2.67 [01-20 @ 06:51]    Urine Sodium 9      [01-19-25 @ 16:41]  Urine Potassium 60      [01-19-25 @ 16:41]    Iron 57, TIBC 91, %sat 62      [01-17-25 @ 06:41]  Ferritin 849      [01-17-25 @ 06:41]  TSH 8.18      [01-17-25 @ 06:41]  Lipid: chol 65, TG 46, HDL 26, LDL --      [01-17-25 @ 06:41]    HBsAb 55.9      [01-17-25 @ 06:41]  HBsAb Reactive      [01-17-25 @ 06:41]  HBsAg Nonreact      [01-17-25 @ 06:41]  HBcAb Nonreact      [01-17-25 @ 06:41]  HCV 0.06, Nonreact      [01-16-25 @ 18:23]  HIV Nonreact      [01-17-25 @ 06:41]    IRMA: titer 1:160, pattern Speckled      [01-17-25 @ 06:41]  Syphilis Screen (Treponema Pallidum Ab) Negative      [01-17-25 @ 06:41]

## 2025-01-24 NOTE — PROGRESS NOTE ADULT - SUBJECTIVE AND OBJECTIVE BOX
Subjective: hello      Vital Signs Last 24 Hrs  T(C): 36.9 (25 @ 05:00), Max: 36.9 (25 @ 05:00)  T(F): 98.5 (25 @ 05:00), Max: 98.5 (25 @ 05:00)  HR: 103 (25 @ 05:00) (68 - 104)  BP: 125/75 (25 @ 05:00) (110/60 - 125/75)  RR: 18 (25 @ 05:00) (17 - 18)  SpO2: 94% (25 @ 05:00) (94% - 99%)            @ 07:  -   @ 07:00  --------------------------------------------------------  IN: 1390 mL / OUT: 1120 mL / NET: 270 mL     @ 07:  -   @ 06:44  --------------------------------------------------------  IN: 480 mL / OUT: 1600 mL / NET: -1120 mL                        7.3    5.27  )-----------( 55       ( 2025 01:41 )             21.6         136  |  102  |  46[H]  ----------------------------<  170[H]  4.4   |  21[L]  |  2.59[H]    Ca    9.8      2025 01:41  Phos  5.8       Mg     1.7         TPro  6.4  /  Alb  2.9[L]  /  TBili  5.9[H]  /  DBili  2.0[H]  /  AST  23  /  ALT  5[L]  /  AlkPhos  80      Peritoneal   @ 17:42 --  --    No polymorphonuclear cells seen  No organisms seen  by cytocentrifuge      .Blood BLOOD   @ 06:30   No growth at 4 days  --  --      Pleural Fl   @ 16:44   Moderate Citrobacter koseri  --  Citrobacter koseri      Body Fluid   @ 16:38   No growth  --  --      .Blood BLOOD   @ 13:20   No growth at 5 days  --  --      Ascites Fl   @ 10:32   No growth at 5 days  --    No polymorphonuclear cells seen  No organisms seen  by cytocentrifuge      .Blood BLOOD   @ 12:15   No growth at 5 days  --  --      .Blood BLOOD   @ 08:00   Growth in aerobic bottle: Staphylococcus epidermidis  Isolation of Coagulase negative Staphylococcus from single blood culture  sets may represent  contamination. Contact the Microbiology Department at [phone] if  susceptibility testing is needed.  clinically indicated.  Direct identification is available within approximately 3-5  hours either by Blood Panel Multiplexed PCR or Direct  MALDI-TOF. Details: [email or web address]  --  Blood Culture PCR          MEDICATIONS  (STANDING):  albumin human 25% IVPB 50 milliLiter(s) IV Intermittent once  cefTRIAXone   IVPB      cefTRIAXone   IVPB 2000 milliGRAM(s) IV Intermittent every 24 hours  chlorhexidine 2% Cloths 1 Application(s) Topical daily  folic acid 1 milliGRAM(s) Oral daily  influenza   Vaccine 0.5 milliLiter(s) IntraMuscular once  lactulose Syrup 20 Gram(s) Oral every 12 hours  lidocaine   4% Patch 1 Patch Transdermal every 24 hours  melatonin 5 milliGRAM(s) Oral at bedtime  metroNIDAZOLE    Tablet 500 milliGRAM(s) Oral two times a day  midodrine 10 milliGRAM(s) Oral every 8 hours  mirtazapine 7.5 milliGRAM(s) Oral at bedtime  Nephro-shania 1 Tablet(s) Oral daily  pantoprazole    Tablet 40 milliGRAM(s) Oral before breakfast  rifAXIMin 550 milliGRAM(s) Oral every 12 hours  sevelamer carbonate 800 milliGRAM(s) Oral three times a day with meals  thiamine 100 milliGRAM(s) Oral daily    MEDICATIONS  (PRN):  albuterol    90 MICROgram(s) HFA Inhaler 2 Puff(s) Inhalation every 6 hours PRN Bronchospasm  traMADol 25 milliGRAM(s) Oral every 8 hours PRN Moderate Pain (4 - 6)    Daily Weight in k (2025 05:00)      Bilirubin Direct: 2.0 mg/dL ( @ 01:41)  Bilirubin Total: 5.9 mg/dL ( @ 01:41)  Bilirubin Direct: 1.8 mg/dL ( @ 07:04)  Bilirubin Total: 6.4 mg/dL ( @ 07:04)    CAPILLARY BLOOD GLUCOSE              Drains:            right  Pleural  [ x ]  Drainage: 1120 LWS                             PHYSICAL EXAM        Neurology: alert and oriented x 3, nonfocal, no gross deficits    CV :S1S2    Lungs: B/l breath sounds Roomair  left pigtail     Abdomen:  soft, nontender, distended, positive bowel sounds, + bm     :    voids           Extremities:     warm well perfused equal strength throughout  B/L e + dp  no calftenderness                                           Discussed with Cardiothoracic Team at AM rounds.

## 2025-01-24 NOTE — PROGRESS NOTE ADULT - ASSESSMENT
43yo M with Hx decompensated EtOH cirrhosis (c/b recurrent ascites, grade II EV, and HE) who presented to OSH on 1/16 with abdominal pain and distension, found to have ascites and acute renal failure requiring initiation of HD (s/p Permacath placement c/b bleeding), transferred to [facility] for transplant evaluation and management. CT chest at OSH was significant for a complex R pleural effusion s/p R pigtail placement by IR on 1/18 with drainage of purulent material c/f empyema. Thoracic Surgery consulted for empyema and chest tube management.   1/20 R pigtail drained 700/910, CT of Chest on Tuesday to assess effusion and make final plan determination  1/21 R pigtail drained 80/240, CT of chest to assess effusion today   1/22 R pigtail maintained. CT of chest revealing:   < from: CT Chest No Cont (01.21.25 @ 19:29) >  IMPRESSION:.  Marked interval decrease in size ofright-sided pleural fluid and gas   collection (presumably empyema) status post placement of pigtail catheter   as described above.  Much interval improvement of posterior right upper lobe and right lower   lobe consolidation.  New patchy ground-glass opacities at the left apex may be related to   edema or be infectious/inflammatory in etiology.  New small left pleural effusion. < end of copied text >  1/23 R PTC  (placed by IR 1/18) --> LWS this AM; patient NPO for IR repositioning/swap of chest tube today. Monitor Chest tube outputs  1/24 VSS  pigtail repositioned 1/23 - output 1120 overnight -AM  XRay pending       43yo M with Hx decompensated EtOH cirrhosis (c/b recurrent ascites, grade II EV, and HE) who presented to OSH on 1/16 with abdominal pain and distension, found to have ascites and acute renal failure requiring initiation of HD (s/p Permacath placement c/b bleeding), transferred to NS for transplant evaluation and management. CT chest at OSH was significant for a complex R pleural effusion s/p R pigtail placement by IR on 1/18 with drainage of purulent material c/f empyema. Thoracic Surgery consulted for empyema and chest tube management.   1/20 R pigtail drained 700/910, CT of Chest on Tuesday to assess effusion and make final plan determination  1/21 R pigtail drained 80/240, CT of chest to assess effusion today   1/22 R pigtail maintained. CT of chest revealing:   < from: CT Chest No Cont (01.21.25 @ 19:29) >  IMPRESSION:.  Marked interval decrease in size ofright-sided pleural fluid and gas   collection (presumably empyema) status post placement of pigtail catheter   as described above.  Much interval improvement of posterior right upper lobe and right lower   lobe consolidation.  New patchy ground-glass opacities at the left apex may be related to   edema or be infectious/inflammatory in etiology.  New small left pleural effusion. < end of copied text >  1/23 R PTC  (placed by IR 1/18) --> LWS this AM; patient NPO for IR repositioning/swap of chest tube today. Monitor Chest tube outputs  1/24 VSS  pigtail  unsuccessful repositioning  1/23 - output 1120 overnight -AM  XRay pending

## 2025-01-24 NOTE — PROGRESS NOTE ADULT - SUBJECTIVE AND OBJECTIVE BOX
INFECTIOUS DISEASES FOLLOW UP-- Carley Kam  [phone]    This is a follow up note for this  44yMale with  Liver failure without hepatic coma        ROS:  CONSTITUTIONAL:  No fever, good appetite  CARDIOVASCULAR:  No chest pain or palpitations  RESPIRATORY:  No dyspnea  GASTROINTESTINAL:  No nausea, vomiting, diarrhea, or abdominal pain  GENITOURINARY:  No dysuria  NEUROLOGIC:  No headache,     Allergies    sulfa drugs (Unknown)  Salicylic Acid (Other)    Intolerances        ANTIBIOTICS/RELEVANT:  antimicrobials  cefTRIAXone   IVPB      cefTRIAXone   IVPB 2000 milliGRAM(s) IV Intermittent every 24 hours  metroNIDAZOLE    Tablet 500 milliGRAM(s) Oral two times a day  rifAXIMin 550 milliGRAM(s) Oral every 12 hours    immunologic:  influenza   Vaccine 0.5 milliLiter(s) IntraMuscular once    OTHER:  albuterol    90 MICROgram(s) HFA Inhaler 2 Puff(s) Inhalation every 6 hours PRN  buMETAnide IVPB 4 milliGRAM(s) IV Intermittent once  chlorhexidine 2% Cloths 1 Application(s) Topical daily  folic acid 1 milliGRAM(s) Oral daily  lactulose Syrup 20 Gram(s) Oral every 12 hours  lidocaine   4% Patch 1 Patch Transdermal every 24 hours  melatonin 5 milliGRAM(s) Oral at bedtime  midodrine 10 milliGRAM(s) Oral every 8 hours  mirtazapine 7.5 milliGRAM(s) Oral at bedtime  Nephro-shania 1 Tablet(s) Oral daily  pantoprazole    Tablet 40 milliGRAM(s) Oral before breakfast  sevelamer carbonate 800 milliGRAM(s) Oral three times a day with meals  thiamine 100 milliGRAM(s) Oral daily  traMADol 25 milliGRAM(s) Oral every 8 hours PRN      Objective:  Vital Signs Last 24 Hrs  T(C): 36.8 (24 Jan 2025 17:15), Max: 37 (24 Jan 2025 09:08)  T(F): 98.3 (24 Jan 2025 17:15), Max: 98.6 (24 Jan 2025 09:08)  HR: 86 (24 Jan 2025 17:15) (68 - 104)  BP: 118/66 (24 Jan 2025 17:15) (113/58 - 131/68)  BP(mean): --  RR: 18 (24 Jan 2025 17:15) (17 - 18)  SpO2: 99% (24 Jan 2025 17:15) (94% - 99%)    Parameters below as of 24 Jan 2025 17:15  Patient On (Oxygen Delivery Method): room air        PHYSICAL EXAM:  Constitutional:no acute distress  Eyes:EDITH, EOMI  Ear/Nose/Throat: no oral lesions, 	  Respiratory: clear BL  Cardiovascular: S1S2  Gastrointestinal:soft, (+) BS, no tenderness  Extremities:no e/e/c  No Lymphadenopathy  IV sites not inflammed.    LABS:                        7.3    5.27  )-----------( 55       ( 24 Jan 2025 01:41 )             21.6     01-24    136  |  102  |  46[H]  ----------------------------<  170[H]  4.4   |  21[L]  |  2.59[H]    Ca    9.8      24 Jan 2025 01:41  Phos  5.8     01-24  Mg     1.7     01-24    TPro  6.4  /  Alb  2.9[L]  /  TBili  5.9[H]  /  DBili  2.0[H]  /  AST  23  /  ALT  5[L]  /  AlkPhos  80  01-24    PT/INR - ( 24 Jan 2025 01:41 )   PT: 28.0 sec;   INR: 2.46 ratio         PTT - ( 24 Jan 2025 01:41 )  PTT:50.8 sec  Urinalysis Basic - ( 24 Jan 2025 01:41 )    Color: x / Appearance: x / SG: x / pH: x  Gluc: 170 mg/dL / Ketone: x  / Bili: x / Urobili: x   Blood: x / Protein: x / Nitrite: x   Leuk Esterase: x / RBC: x / WBC x   Sq Epi: x / Non Sq Epi: x / Bacteria: x        MICROBIOLOGY:            RECENT CULTURES:  01-23 @ 17:42  Peritoneal  --  --  --    Testing in progress  --  01-19 @ 06:30  .Blood BLOOD  --  --  --    No growth at 5 days  --  01-18 @ 16:44  Pleural Fl  Citrobacter koseri  Citrobacter koseri  AVA    Moderate Citrobacter koseri  --  01-18 @ 16:38  Body Fluid  --  --  --    No growth  --  01-18 @ 13:20  .Blood BLOOD  --  --  --    No growth at 5 days  --      RADIOLOGY & ADDITIONAL STUDIES:    Consent: Risks/benefits/alternatives were explained and informed written consent was obtained.     Procedure Plan: Plan for chest tube repositioning vs new chest tube placement.  INFECTIOUS DISEASES FOLLOW UP-- Carley Kam  [phone]    This is a follow up note for this  44yMale with  Liver failure without hepatic coma        ROS:  CONSTITUTIONAL:  No fever, good appetite  CARDIOVASCULAR:  No chest pain or palpitations  RESPIRATORY:  No dyspnea  GASTROINTESTINAL:  No nausea, vomiting, diarrhea, or abdominal pain  GENITOURINARY:  No dysuria  NEUROLOGIC:  No headache,     Allergies    sulfa drugs (Unknown)  Salicylic Acid (Other)    Intolerances        ANTIBIOTICS/RELEVANT:  antimicrobials  cefTRIAXone   IVPB      cefTRIAXone   IVPB 2000 milliGRAM(s) IV Intermittent every 24 hours  metroNIDAZOLE    Tablet 500 milliGRAM(s) Oral two times a day  rifAXIMin 550 milliGRAM(s) Oral every 12 hours    immunologic:  influenza   Vaccine 0.5 milliLiter(s) IntraMuscular once    OTHER:  albuterol    90 MICROgram(s) HFA Inhaler 2 Puff(s) Inhalation every 6 hours PRN  buMETAnide IVPB 4 milliGRAM(s) IV Intermittent once  chlorhexidine 2% Cloths 1 Application(s) Topical daily  folic acid 1 milliGRAM(s) Oral daily  lactulose Syrup 20 Gram(s) Oral every 12 hours  lidocaine   4% Patch 1 Patch Transdermal every 24 hours  melatonin 5 milliGRAM(s) Oral at bedtime  midodrine 10 milliGRAM(s) Oral every 8 hours  mirtazapine 7.5 milliGRAM(s) Oral at bedtime  Nephro-shania 1 Tablet(s) Oral daily  pantoprazole    Tablet 40 milliGRAM(s) Oral before breakfast  sevelamer carbonate 800 milliGRAM(s) Oral three times a day with meals  thiamine 100 milliGRAM(s) Oral daily  traMADol 25 milliGRAM(s) Oral every 8 hours PRN      Objective:  Vital Signs Last 24 Hrs  T(C): 36.8 (24 Jan 2025 17:15), Max: 37 (24 Jan 2025 09:08)  T(F): 98.3 (24 Jan 2025 17:15), Max: 98.6 (24 Jan 2025 09:08)  HR: 86 (24 Jan 2025 17:15) (68 - 104)  BP: 118/66 (24 Jan 2025 17:15) (113/58 - 131/68)  BP(mean): --  RR: 18 (24 Jan 2025 17:15) (17 - 18)  SpO2: 99% (24 Jan 2025 17:15) (94% - 99%)    Parameters below as of 24 Jan 2025 17:15  Patient On (Oxygen Delivery Method): room air        PHYSICAL EXAM:  Constitutional:no acute distress  Eyes:EDITH, EOMI, icteric  Ear/Nose/Throat: no oral lesions, 	  Respiratory: clear BL but decreased right base  Cardiovascular: S1S2  Gastrointestinal:soft, (+) BS, no tenderness, ascites  Extremities:no e/e/c  No Lymphadenopathy  IV sites not inflammed.    LABS:                        7.3    5.27  )-----------( 55       ( 24 Jan 2025 01:41 )             21.6     01-24    136  |  102  |  46[H]  ----------------------------<  170[H]  4.4   |  21[L]  |  2.59[H]    Ca    9.8      24 Jan 2025 01:41  Phos  5.8     01-24  Mg     1.7     01-24    TPro  6.4  /  Alb  2.9[L]  /  TBili  5.9[H]  /  DBili  2.0[H]  /  AST  23  /  ALT  5[L]  /  AlkPhos  80  01-24    PT/INR - ( 24 Jan 2025 01:41 )   PT: 28.0 sec;   INR: 2.46 ratio         PTT - ( 24 Jan 2025 01:41 )  PTT:50.8 sec  Urinalysis Basic - ( 24 Jan 2025 01:41 )    Color: x / Appearance: x / SG: x / pH: x  Gluc: 170 mg/dL / Ketone: x  / Bili: x / Urobili: x   Blood: x / Protein: x / Nitrite: x   Leuk Esterase: x / RBC: x / WBC x   Sq Epi: x / Non Sq Epi: x / Bacteria: x        MICROBIOLOGY:            RECENT CULTURES:  01-23 @ 17:42  Peritoneal  --  --  --    Testing in progress  --  01-19 @ 06:30  .Blood BLOOD  --  --  --    No growth at 5 days  --  01-18 @ 16:44  Pleural Fl  Citrobacter koseri  Citrobacter koseri  AVA    Moderate Citrobacter koseri  --  01-18 @ 16:38  Body Fluid  --  --  --    No growth  --  01-18 @ 13:20  .Blood BLOOD  --  --  --    No growth at 5 days  --      RADIOLOGY & ADDITIONAL STUDIES:    Consent: Risks/benefits/alternatives were explained and informed written consent was obtained.     Procedure Plan: Plan for chest tube repositioning vs new chest tube placement.

## 2025-01-24 NOTE — PROGRESS NOTE ADULT - ATTENDING COMMENTS
45 yo M with AUD (with last reported drink in 11/2024 and with PEth borderline positive with 23 ng/mL 16:0/18:1 but negative 16:0/18:2 on 1/17/25), obesity, ROSA, PUD, history of alcohol-associated hepatitis (7/2024), history of traumatic right calf hematoma (10/2024), and decompensated alcohol-associated cirrhosis complicated by ascites, pleural effusions, hyponatremia, and hepatic encephalopathy, transferred from [facility] to [facility] on 1/16/25 due to recurrent ascites (with most recent LVP today with 4.1L removed and no SBP), increased right pleural effusion, and HRS-MANUELITO (with baseline Cr 1.1 in 10/2024) for which received intermittent HD (started on 1/10 and last on 1/19). He is currently being monitored off HD per Transplant Nephrology and receiving albumin (or blood product)-supported IV diuresis with plan for additional Bumex 2 mg iv bid today. CT chest (1/17) showed an empyema, now s/p IR placement of a right-sided chest tube (1/18- ) with fluid growing Citrobacter koseri. S/p Zosyn (1/16-22) and now on ceftriaxone (1/23- ) for the empyema as per Transplant ID and also completing a 7-day course of metronidazole (1/18-25) for Giardia on GI PCR (1/17). Based on repeat non-contrast CT chest (1/21), guidewire repositioning of the current pigtail was attempted on 1/23 but unsuccessful and he is now planned for new pigtail placement in more medial collection today by IR. If empyema is unable to be fully evacuated, he may need VATS by Thoracic Surgery early next week for adequate source control but may be morbid given his advanced cirrhosis. Blood cultures (1/16) with 1 bottle MR Staph epidermidis, with repeat blood cultures (1/18 and 1/19) with NGTD. S/p IV vancomycin (1/17). ABO O with current MELD 3.0 score of 33 with expedited liver transplant evaluation in progress. He will need [facility] and [facility] for pre-transplant cardiac testing.    Victoriano Marie M.D., Ph.D.  Transplant Hepatology

## 2025-01-24 NOTE — PROCEDURE NOTE - PROCEDURE FINDINGS AND DETAILS
Status post successful placement of a 10Fr right chest tube with aspiration of air and blood tinged fluid. The catheter was secured to the skin and dry sterile dressing was applied.

## 2025-01-24 NOTE — PRE PROCEDURE NOTE - PRE PROCEDURE EVALUATION
------------------------------------------------------------  Interventional Radiology Pre-Procedure Note  ------------------------------------------------------------    Indication: 43yo M with Hx decompensated EtOH cirrhosis (c/b recurrent ascites, grade II EV, and HE) who presented to OSH on 1/16 with abdominal pain and distension, found to have ascites and acute renal failure requiring initiation of HD (s/p Permacath placement c/b bleeding), transferred to NS for transplant evaluation and management. CT chest at OSH was significant for a complex R pleural effusion s/p R pigtail placement by IR on 1/18 with drainage of purulent material c/f empyema. Thoracic Surgery consulted for empyema and chest tube management. Patient is status post unsuccessful chest tube repositioning on 1/23. Plan for chest tube repositioning vs new chest tube placement.     Past Medical History:  Sleep apnea, obstructive    Alcohol abuse    Cirrhosis of liver    Portal hypertension    H/O esophageal varices    Anemia    Mild alcohol use disorder        Allergies: sulfa drugs (Unknown)  Salicylic Acid (Other)      Medications:    buMETAnide Injectable: 2 milliGRAM(s) IV Push (01-23-25 @ 11:06)  buMETAnide Injectable: 2 milliGRAM(s) IV Push (01-23-25 @ 22:20)  buMETAnide Injectable: 2 milliGRAM(s) IV Push (01-22-25 @ 21:41)  cefTRIAXone   IVPB: 2000 milliGRAM(s) IV Intermittent (01-22-25 @ 22:23)  cefTRIAXone   IVPB: 100 mL/Hr IV Intermittent (01-23-25 @ 22:22)  metroNIDAZOLE    Tablet: 500 milliGRAM(s) Oral (01-24-25 @ 06:14)  midodrine: 10 milliGRAM(s) Oral (01-24-25 @ 15:02)  rifAXIMin: 550 milliGRAM(s) Oral (01-24-25 @ 06:14)      Vital Signs:   T(F): 98.3 (17:15), Max: 98.6 (09:08)  HR: 86 (17:15)  BP: 118/66 (17:15)  RR: 18 (17:15)  SpO2: 99% (17:15)    Labs:           7.3  5.27)-----(55     (01-24-25 @ 01:41)         21.6     136 | 102 | 46  --------------------< 170     (01-24-25 @ 01:41)  4.4 | 21 | 2.59       PT: 28.0[H] 01-24-25 @ 01:41  aPTT: 50.8[H] 01-24-25 @ 01:41   INR: 2.46[H] 01-24-25 @ 01:41    Consent: Risks/benefits/alternatives were explained and informed written consent was obtained.     Procedure Plan: Plan for chest tube repositioning vs new chest tube placement.

## 2025-01-24 NOTE — PROGRESS NOTE ADULT - SUBJECTIVE AND OBJECTIVE BOX
Interval Events:   - He had para yesterday with 4.1 L fluid removed and given albumin 25% 50 cc x2  - He reports ongoing mild dyspnea, denies f/c  - Chest tube output: 480 cc in 24 hours    ROS:   12 point review of systems performed and negative except otherwise noted in HPI.    Hospital Medications:  albuterol    90 MICROgram(s) HFA Inhaler 2 Puff(s) Inhalation every 6 hours PRN  chlorhexidine 2% Cloths 1 Application(s) Topical <User Schedule>  folic acid 1 milliGRAM(s) Oral daily  lactulose Syrup 20 Gram(s) Oral every 12 hours  melatonin 5 milliGRAM(s) Oral at bedtime  midodrine 10 milliGRAM(s) Oral every 8 hours  Nephro-shania 1 Tablet(s) Oral daily  pantoprazole    Tablet 40 milliGRAM(s) Oral before breakfast  piperacillin/tazobactam IVPB.. 3.375 Gram(s) IV Intermittent every 12 hours  rifAXIMin 550 milliGRAM(s) Oral every 12 hours  thiamine 100 milliGRAM(s) Oral daily      PHYSICAL EXAM:   Vital Signs:  Vital Signs Last 24 Hrs  T(C): 36.9 (17 Jan 2025 09:29), Max: 37.3 (17 Jan 2025 00:35)  T(F): 98.4 (17 Jan 2025 09:29), Max: 99.2 (17 Jan 2025 00:35)  HR: 84 (17 Jan 2025 09:29) (65 - 106)  BP: 109/54 (17 Jan 2025 09:29) (104/51 - 129/62)  BP(mean): 89 (16 Jan 2025 18:00) (74 - 89)  RR: 16 (17 Jan 2025 09:29) (16 - 36)  SpO2: 99% (17 Jan 2025 09:29) (95% - 99%)    Parameters below as of 17 Jan 2025 05:00  Patient On (Oxygen Delivery Method): room air      Daily     Daily     PHYSICAL EXAM:   GENERAL:  No acute distress  HEENT:  Normocephalic/atraumatic, +sceral icterus  CHEST:  No accessory muscle use, mild crackles on the right base  HEART:  Regular rate and rhythm  ABDOMEN:  Soft, mild diffuse tenderness, mildly distended, normoactive bowel sounds, splenomegaly, stigmata of chronic liver dz  EXTREMITIES: No cyanosis, clubbing, or edema  SKIN:  No rash  NEURO:  Alert and oriented x 3, no asterixis    LABS: reviewed                        7.4    13.96 )-----------( 73       ( 17 Jan 2025 06:42 )             22.4     01-16    130[L]  |  96  |  39[H]  ----------------------------<  117[H]  4.3   |  22  |  2.59[H]    Ca    8.6      16 Jan 2025 07:39  Phos  4.3     01-17  Mg     2.0     01-17    TPro  6.4  /  Alb  2.5[L]  /  TBili  5.8[H]  /  DBili  2.1[H]  /  AST  32  /  ALT  7[L]  /  AlkPhos  116  01-17    LIVER FUNCTIONS - ( 17 Jan 2025 06:41 )  Alb: 2.5 g/dL / Pro: 6.4 g/dL / ALK PHOS: 116 U/L / ALT: 7 U/L / AST: 32 U/L / GGT: x             Interval Diagnostic Studies: see sunrise for full report

## 2025-01-24 NOTE — BH CONSULTATION LIAISON PROGRESS NOTE - NSBHASSESSMENTFT_PSY_ALL_CORE
Pt is a 43 yo  man with PMH decompensated ETOH cirrhosis c/b recurrent ascites, grade II EV, and HE, right calf hematoma 10/2024 (s/p fall)  presented to [facility] for abdominal pain and distension. Patient found to have ascites on exam and MANUELITO requiring HD initiation. PermCath was placed by vascular surgery on 1/10 with post procedure course c/b bleeding from insertion site. Transferred to [facility] SICU for further management.   Psych consulted for alcohol use disorder. Patient reports that heavy alcohol consumption began 2016 after he father passed away and he moved to [place]. Pt was in rehabilitation twice prior, 2021 and 2023, pt was sober for about 1 yr between two rehabs. Pt began drinking again since 03/2024. Pt reports that he was in [place] 5/2024 and did not drink while he was there because it was election month, no drinking was allowed. Pt says he was in the ICU in Nov 2024 and decided to stop drinking them and has not had any alcohol since the first week in Nov, 2024.   Pt is highly motivated to follow treatment recommendations but says he has not yet been educated regarding risks and benefits of immunosuppressant medications.     1/22/25 Pt states that he was diagnosed with pneumonia and his ascites is much worse now so he has not yet spoken with staff about the transplant process.  He is in back pain and having much difficulty ambulating though walking to the bath room with much effort using his walker.  He is hopeful that his condition will improve when he receives a transplant but says the must first recover from infections.                 1/23/25:  Pt demonstrates overall good understanding of transplant process including potential risks and post-operative recovery needs including but not limited to need for immunosuppression, risk of infection, and lifestyle modifications. Admits to some mild anxiety in context of medical stressors with good therapeutic response to mirtazapine which we will recommend continuing. Reporting overall stable mood, is notably future-oriented, is motivated to maintain sobriety from alcohol (last use 11/4/24). Patient is adherent to current recommendations made by transplant team and motivated to continue to follow any future suggestions. Is open to considering counseling or support groups should anxiety worsen post-transplant. Endorses primary family support for this process is his mother.     DDX;  Pre-transplant evaluation  R/O adjustment disorder with anxiety    Plan  -Continue mirtazapine 7.5 mg QHS  - continue with melatonin 3 mg QHS  - SW assistance for referral to inpatient rehab appreciated. Patient is motivated to attend and complete the program.   - Discussed with patient additional psychiatric resources  for anxiety management including counseling/therapy/groups. Patient is open to exploring that in the future if anxiety worsens.  - SIPAT score: 23,  Pt minimally acceptable candidate. Would consider listing and will benefit greatly from ongoing counseling for alcohol use, and further treatment in inpatient rehab program.    - Dispo: likely to inpatient rehab. no indication for inpatient psychiatric hospitalization

## 2025-01-24 NOTE — PROGRESS NOTE ADULT - PROBLEM SELECTOR PLAN 1
s/p R pigtail placement by IR on 1/18 with drainage of purulent material c/f empyema  Continue Pigtail to suction; monitor output   CT chest 1/21:  Marked interval decrease in size of right-sided pleural fluid and gas collection (presumably empyema) status post placement of pigtail catheter as described above. Much interval improvement of posterior right upper lobe and right lower lobe consolidation. New patchy ground-glass opacities at the left apex may be related to edema or be infectious/inflammatory in etiology. New small left pleural effusion.  IR consult for reposition/increase size of PTC to help with drainage --> patient NPO for IR procedure today 1/23 1/24- 1120 output  Monitor chest tube outputs  Daily CXR while chest tube in  [facility] care as per primary team  Above plan as per d/w on-call Thoracic Attending Dr. Ramírez  Call Thoracic surgery at [phone] with any questions

## 2025-01-25 LAB
ALBUMIN SERPL ELPH-MCNC: 3.8 G/DL — SIGNIFICANT CHANGE UP (ref 3.3–5)
ALP SERPL-CCNC: 102 U/L — SIGNIFICANT CHANGE UP (ref 40–120)
ALT FLD-CCNC: 7 U/L — LOW (ref 10–45)
ANION GAP SERPL CALC-SCNC: 12 MMOL/L — SIGNIFICANT CHANGE UP (ref 5–17)
APTT BLD: 44.4 SEC — HIGH (ref 24.5–35.6)
AST SERPL-CCNC: 27 U/L — SIGNIFICANT CHANGE UP (ref 10–40)
BASOPHILS # BLD AUTO: 0.05 K/UL — SIGNIFICANT CHANGE UP (ref 0–0.2)
BASOPHILS NFR BLD AUTO: 1.1 % — SIGNIFICANT CHANGE UP (ref 0–2)
BILIRUB DIRECT SERPL-MCNC: 1.3 MG/DL — HIGH (ref 0–0.3)
BILIRUB INDIRECT FLD-MCNC: 3 MG/DL — HIGH (ref 0.2–1)
BILIRUB SERPL-MCNC: 4.3 MG/DL — HIGH (ref 0.2–1.2)
BLD GP AB SCN SERPL QL: NEGATIVE — SIGNIFICANT CHANGE UP
BUN SERPL-MCNC: 43 MG/DL — HIGH (ref 7–23)
C IMMITIS AB SER QL IA: REACTIVE — SIGNIFICANT CHANGE UP
CALCIUM SERPL-MCNC: 9.7 MG/DL — SIGNIFICANT CHANGE UP (ref 8.4–10.5)
CHLORIDE SERPL-SCNC: 101 MMOL/L — SIGNIFICANT CHANGE UP (ref 96–108)
CO2 SERPL-SCNC: 23 MMOL/L — SIGNIFICANT CHANGE UP (ref 22–31)
CREAT ?TM UR-MCNC: 84 MG/DL — SIGNIFICANT CHANGE UP
CREAT SERPL-MCNC: 2.26 MG/DL — HIGH (ref 0.5–1.3)
EGFR: 35 ML/MIN/1.73M2 — LOW
EGFR: 35 ML/MIN/1.73M2 — LOW
EOSINOPHIL # BLD AUTO: 0.38 K/UL — SIGNIFICANT CHANGE UP (ref 0–0.5)
EOSINOPHIL NFR BLD AUTO: 8.4 % — HIGH (ref 0–6)
GLUCOSE SERPL-MCNC: 146 MG/DL — HIGH (ref 70–99)
GRAM STN FLD: SIGNIFICANT CHANGE UP
HCT VFR BLD CALC: 21.8 % — LOW (ref 39–50)
HGB BLD-MCNC: 7.1 G/DL — LOW (ref 13–17)
IMM GRANULOCYTES NFR BLD AUTO: 0.4 % — SIGNIFICANT CHANGE UP (ref 0–0.9)
INR BLD: 2.1 RATIO — HIGH (ref 0.85–1.16)
LEISHMANIA AB SER QL: NEGATIVE — SIGNIFICANT CHANGE UP
LYMPHOCYTES # BLD AUTO: 0.84 K/UL — LOW (ref 1–3.3)
LYMPHOCYTES # BLD AUTO: 18.6 % — SIGNIFICANT CHANGE UP (ref 13–44)
MAGNESIUM SERPL-MCNC: 1.6 MG/DL — SIGNIFICANT CHANGE UP (ref 1.6–2.6)
MCHC RBC-ENTMCNC: 29.8 PG — SIGNIFICANT CHANGE UP (ref 27–34)
MCHC RBC-ENTMCNC: 32.6 G/DL — SIGNIFICANT CHANGE UP (ref 32–36)
MCV RBC AUTO: 91.6 FL — SIGNIFICANT CHANGE UP (ref 80–100)
MONOCYTES # BLD AUTO: 0.64 K/UL — SIGNIFICANT CHANGE UP (ref 0–0.9)
MONOCYTES NFR BLD AUTO: 14.2 % — HIGH (ref 2–14)
NEUTROPHILS # BLD AUTO: 2.58 K/UL — SIGNIFICANT CHANGE UP (ref 1.8–7.4)
NEUTROPHILS NFR BLD AUTO: 57.3 % — SIGNIFICANT CHANGE UP (ref 43–77)
NRBC # BLD: 0 /100 WBCS — SIGNIFICANT CHANGE UP (ref 0–0)
NRBC BLD-RTO: 0 /100 WBCS — SIGNIFICANT CHANGE UP (ref 0–0)
OSMOLALITY UR: 282 MOS/KG — LOW (ref 300–900)
PHOSPHATE SERPL-MCNC: 5 MG/DL — HIGH (ref 2.5–4.5)
PLATELET # BLD AUTO: 65 K/UL — LOW (ref 150–400)
POTASSIUM SERPL-MCNC: 3.7 MMOL/L — SIGNIFICANT CHANGE UP (ref 3.5–5.3)
POTASSIUM SERPL-SCNC: 3.7 MMOL/L — SIGNIFICANT CHANGE UP (ref 3.5–5.3)
POTASSIUM UR-SCNC: 50 MMOL/L — SIGNIFICANT CHANGE UP
PROT ?TM UR-MCNC: 13 MG/DL — HIGH (ref 0–12)
PROT SERPL-MCNC: 7 G/DL — SIGNIFICANT CHANGE UP (ref 6–8.3)
PROT/CREAT UR-RTO: 0.2 RATIO — SIGNIFICANT CHANGE UP (ref 0–0.2)
PROTHROM AB SERPL-ACNC: 23.8 SEC — HIGH (ref 9.9–13.4)
RBC # BLD: 2.38 M/UL — LOW (ref 4.2–5.8)
RBC # FLD: 19.6 % — HIGH (ref 10.3–14.5)
RH IG SCN BLD-IMP: POSITIVE — SIGNIFICANT CHANGE UP
SODIUM SERPL-SCNC: 136 MMOL/L — SIGNIFICANT CHANGE UP (ref 135–145)
SODIUM UR-SCNC: 11 MMOL/L — SIGNIFICANT CHANGE UP
SPECIMEN SOURCE: SIGNIFICANT CHANGE UP
WBC # BLD: 4.51 K/UL — SIGNIFICANT CHANGE UP (ref 3.8–10.5)
WBC # FLD AUTO: 4.51 K/UL — SIGNIFICANT CHANGE UP (ref 3.8–10.5)

## 2025-01-25 PROCEDURE — 71045 X-RAY EXAM CHEST 1 VIEW: CPT | Mod: 26

## 2025-01-25 PROCEDURE — 99232 SBSQ HOSP IP/OBS MODERATE 35: CPT

## 2025-01-25 PROCEDURE — 99233 SBSQ HOSP IP/OBS HIGH 50: CPT

## 2025-01-25 PROCEDURE — 99232 SBSQ HOSP IP/OBS MODERATE 35: CPT | Mod: GC

## 2025-01-25 RX ORDER — TRAMADOL HYDROCHLORIDE 50 MG/1
25 TABLET, FILM COATED ORAL EVERY 8 HOURS
Refills: 0 | Status: DISCONTINUED | OUTPATIENT
Start: 2025-01-25 | End: 2025-01-28

## 2025-01-25 RX ORDER — TRAMADOL HYDROCHLORIDE 50 MG/1
50 TABLET, FILM COATED ORAL ONCE
Refills: 0 | Status: DISCONTINUED | OUTPATIENT
Start: 2025-01-25 | End: 2025-01-25

## 2025-01-25 RX ORDER — TRAMADOL HYDROCHLORIDE 50 MG/1
50 TABLET, FILM COATED ORAL EVERY 8 HOURS
Refills: 0 | Status: DISCONTINUED | OUTPATIENT
Start: 2025-01-25 | End: 2025-01-28

## 2025-01-25 RX ORDER — MAGNESIUM SULFATE 500 MG/ML
1 SYRINGE (ML) INJECTION ONCE
Refills: 0 | Status: COMPLETED | OUTPATIENT
Start: 2025-01-25 | End: 2025-01-25

## 2025-01-25 RX ORDER — TRAMADOL HYDROCHLORIDE 50 MG/1
25 TABLET, FILM COATED ORAL ONCE
Refills: 0 | Status: DISCONTINUED | OUTPATIENT
Start: 2025-01-25 | End: 2025-01-25

## 2025-01-25 RX ADMIN — CEFTRIAXONE 100 MILLIGRAM(S): 500 INJECTION, POWDER, FOR SOLUTION INTRAMUSCULAR; INTRAVENOUS at 22:11

## 2025-01-25 RX ADMIN — MIDODRINE HYDROCHLORIDE 10 MILLIGRAM(S): 5 TABLET ORAL at 13:03

## 2025-01-25 RX ADMIN — TRAMADOL HYDROCHLORIDE 25 MILLIGRAM(S): 50 TABLET, FILM COATED ORAL at 13:59

## 2025-01-25 RX ADMIN — MIRTAZAPINE 7.5 MILLIGRAM(S): 30 TABLET, FILM COATED ORAL at 22:11

## 2025-01-25 RX ADMIN — Medication 1 APPLICATION(S): at 11:23

## 2025-01-25 RX ADMIN — FOLIC ACID 1 MILLIGRAM(S): 1 TABLET ORAL at 11:17

## 2025-01-25 RX ADMIN — Medication 40 MILLIGRAM(S): at 05:16

## 2025-01-25 RX ADMIN — Medication 100 GRAM(S): at 10:21

## 2025-01-25 RX ADMIN — SEVELAMER HYDROCHLORIDE 800 MILLIGRAM(S): 800 TABLET ORAL at 08:44

## 2025-01-25 RX ADMIN — TRAMADOL HYDROCHLORIDE 25 MILLIGRAM(S): 50 TABLET, FILM COATED ORAL at 19:40

## 2025-01-25 RX ADMIN — TRAMADOL HYDROCHLORIDE 50 MILLIGRAM(S): 50 TABLET, FILM COATED ORAL at 10:21

## 2025-01-25 RX ADMIN — MIDODRINE HYDROCHLORIDE 10 MILLIGRAM(S): 5 TABLET ORAL at 22:10

## 2025-01-25 RX ADMIN — Medication 5 MILLIGRAM(S): at 22:11

## 2025-01-25 RX ADMIN — SEVELAMER HYDROCHLORIDE 800 MILLIGRAM(S): 800 TABLET ORAL at 17:06

## 2025-01-25 RX ADMIN — SEVELAMER HYDROCHLORIDE 800 MILLIGRAM(S): 800 TABLET ORAL at 11:48

## 2025-01-25 RX ADMIN — TRAMADOL HYDROCHLORIDE 50 MILLIGRAM(S): 50 TABLET, FILM COATED ORAL at 22:10

## 2025-01-25 RX ADMIN — Medication 500 MILLIGRAM(S): at 05:16

## 2025-01-25 RX ADMIN — TRAMADOL HYDROCHLORIDE 25 MILLIGRAM(S): 50 TABLET, FILM COATED ORAL at 18:33

## 2025-01-25 RX ADMIN — TRAMADOL HYDROCHLORIDE 50 MILLIGRAM(S): 50 TABLET, FILM COATED ORAL at 10:51

## 2025-01-25 RX ADMIN — Medication 100 MILLIGRAM(S): at 11:18

## 2025-01-25 RX ADMIN — TRAMADOL HYDROCHLORIDE 25 MILLIGRAM(S): 50 TABLET, FILM COATED ORAL at 13:29

## 2025-01-25 RX ADMIN — Medication 1 TABLET(S): at 11:22

## 2025-01-25 RX ADMIN — BUMETANIDE 2 MILLIGRAM(S): 1 TABLET ORAL at 09:16

## 2025-01-25 RX ADMIN — BUMETANIDE 2 MILLIGRAM(S): 1 TABLET ORAL at 15:05

## 2025-01-25 RX ADMIN — LACTULOSE 20 GRAM(S): 10 SOLUTION ORAL at 05:16

## 2025-01-25 RX ADMIN — TRAMADOL HYDROCHLORIDE 50 MILLIGRAM(S): 50 TABLET, FILM COATED ORAL at 23:30

## 2025-01-25 RX ADMIN — MIDODRINE HYDROCHLORIDE 10 MILLIGRAM(S): 5 TABLET ORAL at 05:16

## 2025-01-25 NOTE — PROGRESS NOTE ADULT - SUBJECTIVE AND OBJECTIVE BOX
Patient seen and evaluated at bedside with Thoracic team and on-call Attending during AM rounds.     Vital Signs Last 24 Hrs  T(C): 36.8 (01-25-25 @ 12:21), Max: 37.1 (01-25-25 @ 05:05)  T(F): 98.2 (01-25-25 @ 12:21), Max: 98.7 (01-25-25 @ 05:05)  HR: 86 (01-25-25 @ 12:21) (81 - 92)  BP: 119/65 (01-25-25 @ 12:21) (107/60 - 124/63)  RR: 18 (01-25-25 @ 12:21) (18 - 18)  SpO2: 97% (01-25-25 @ 12:21) (95% - 99%)           INPUT/OUTPUT:  24 Jan 2025 07:01  -  25 Jan 2025 07:00  --------------------------------------------------------  IN:    Oral Fluid: 1410 mL  Total IN: 1410 mL  OUT:    Chest Tube (mL): 70 mL    Chest Tube (mL): 70 mL    Voided (mL): 1700 mL  Total OUT: 1840 mL  Total NET: -430 mL      LABS:  01-25  136  |  101  |  43[H]  ----------------------------<  146[H]  3.7   |  23  |  2.26[H]  Ca    9.7      25 Jan 2025 07:13  TPro  7.0  /  Alb  3.8  /  TBili  4.3[H]  /  DBili  1.3[H]  /  AST  27  /  ALT  7[L]  /  AlkPhos  102  01-25             7.1    4.51  )-----------( 65       ( 25 Jan 2025 07:13 )             21.8   PT/INR - ( 25 Jan 2025 07:13 )   PT: 23.8 sec;   INR: 2.10 ratio    PTT - ( 25 Jan 2025 07:13 )  PTT:44.4 sec      PHYSICAL EXAM:  Neurology: alert and oriented x 3, nonfocal, no gross deficits  CV :S1S2  Lungs: B/l breath sounds;   left pigtail   Abdomen:  soft, nontender, distended, positive bowel sounds  Extremities:     warm well perfused equal strength throughout  B/L e + dp  no calftenderness       ACTIVE MEDICATIONS:  albuterol    90 MICROgram(s) HFA Inhaler 2 Puff(s) Inhalation every 6 hours PRN  buMETAnide Injectable 2 milliGRAM(s) IV Push <User Schedule>  cefTRIAXone   IVPB      cefTRIAXone   IVPB 2000 milliGRAM(s) IV Intermittent every 24 hours  chlorhexidine 2% Cloths 1 Application(s) Topical daily  folic acid 1 milliGRAM(s) Oral daily  influenza   Vaccine 0.5 milliLiter(s) IntraMuscular once  lactulose Syrup 20 Gram(s) Oral every 12 hours  lidocaine   4% Patch 1 Patch Transdermal every 24 hours  melatonin 5 milliGRAM(s) Oral at bedtime  midodrine 10 milliGRAM(s) Oral every 8 hours  mirtazapine 7.5 milliGRAM(s) Oral at bedtime  Nephro-shania 1 Tablet(s) Oral daily  pantoprazole    Tablet 40 milliGRAM(s) Oral before breakfast  rifAXIMin 550 milliGRAM(s) Oral every 12 hours  sevelamer carbonate 800 milliGRAM(s) Oral three times a day with meals  thiamine 100 milliGRAM(s) Oral daily    Case discussed in detail with Thoracic Team and Attending Dr. Segura. Plan below as per discussion.

## 2025-01-25 NOTE — PROGRESS NOTE ADULT - SUBJECTIVE AND OBJECTIVE BOX
Interval Events:   - IR placement of new R chest tube  - diuresis with Bumex, u/o 1.7L  - no acute overnight events     MEDICATIONS  (STANDING):  buMETAnide Injectable 2 milliGRAM(s) IV Push <User Schedule>  cefTRIAXone   IVPB      cefTRIAXone   IVPB 2000 milliGRAM(s) IV Intermittent every 24 hours  chlorhexidine 2% Cloths 1 Application(s) Topical daily  folic acid 1 milliGRAM(s) Oral daily  influenza   Vaccine 0.5 milliLiter(s) IntraMuscular once  lactulose Syrup 20 Gram(s) Oral every 12 hours  lidocaine   4% Patch 1 Patch Transdermal every 24 hours  melatonin 5 milliGRAM(s) Oral at bedtime  midodrine 10 milliGRAM(s) Oral every 8 hours  mirtazapine 7.5 milliGRAM(s) Oral at bedtime  Nephro-shania 1 Tablet(s) Oral daily  pantoprazole    Tablet 40 milliGRAM(s) Oral before breakfast  rifAXIMin 550 milliGRAM(s) Oral every 12 hours  sevelamer carbonate 800 milliGRAM(s) Oral three times a day with meals  thiamine 100 milliGRAM(s) Oral daily    MEDICATIONS  (PRN):  albuterol    90 MICROgram(s) HFA Inhaler 2 Puff(s) Inhalation every 6 hours PRN Bronchospasm      PAST MEDICAL & SURGICAL HISTORY:  Sleep apnea, obstructive  Alcohol abuse  Cirrhosis of liver  Portal hypertension  H/O esophageal varices  Anemia  Mild alcohol use disorder  No significant past surgical history    Vital Signs Last 24 Hrs  T(C): 36.8 (25 Jan 2025 17:25), Max: 37.1 (25 Jan 2025 05:05)  T(F): 98.2 (25 Jan 2025 17:25), Max: 98.7 (25 Jan 2025 05:05)  HR: 90 (25 Jan 2025 17:25) (81 - 92)  BP: 120/67 (25 Jan 2025 17:25) (107/60 - 124/63)  BP(mean): --  RR: 16 (25 Jan 2025 17:25) (16 - 18)  SpO2: 100% (25 Jan 2025 17:25) (95% - 100%)    Parameters below as of 25 Jan 2025 12:21  Patient On (Oxygen Delivery Method): room air    I&O's Summary    24 Jan 2025 07:01  -  25 Jan 2025 07:00  --------------------------------------------------------  IN: 1410 mL / OUT: 1840 mL / NET: -430 mL    25 Jan 2025 07:01  -  25 Jan 2025 17:35  --------------------------------------------------------  IN: 900 mL / OUT: 1400 mL / NET: -500 mL                        7.1    4.51  )-----------( 65       ( 25 Jan 2025 07:13 )             21.8     01-25    136  |  101  |  43[H]  ----------------------------<  146[H]  3.7   |  23  |  2.26[H]    Ca    9.7      25 Jan 2025 07:13  Phos  5.0     01-25  Mg     1.6     01-25    TPro  7.0  /  Alb  3.8  /  TBili  4.3[H]  /  DBili  1.3[H]  /  AST  27  /  ALT  7[L]  /  AlkPhos  102  01-25    Culture - Body Fluid with Gram Stain (collected 01-24-25 @ 19:10)  Source: Pleural Fl  Gram Stain (01-25-25 @ 06:14):    polymorphonuclear leukocytes seen    No organisms seen    by cytocentrifuge    Culture - Fungal, Body Fluid (collected 01-23-25 @ 17:42)  Source: Peritoneal  Preliminary Report (01-25-25 @ 07:47):    No growth    Culture - Body Fluid with Gram Stain (collected 01-23-25 @ 17:42)  Source: Peritoneal  Gram Stain (01-24-25 @ 01:52):    No polymorphonuclear cells seen    No organisms seen    by cytocentrifuge  Preliminary Report (01-25-25 @ 10:04):    No growth to date.    Culture - Blood (collected 01-19-25 @ 06:30)  Source: .Blood BLOOD  Final Report (01-24-25 @ 10:00):    No growth at 5 days        PHYSICAL EXAM:   GENERAL:  No acute distress  HEENT:  Normocephalic/atraumatic, +sceral icterus  CHEST:  No accessory muscle use, mild crackles on the right base, chest tube x 2  HEART:  Regular rate and rhythm  ABDOMEN:  Soft, mild diffuse tenderness, mildly distended, normoactive bowel sounds, splenomegaly, stigmata of chronic liver dz  EXTREMITIES: No cyanosis, clubbing, or edema  SKIN:  No rash  NEURO:  Alert and oriented x 3, no asterixis

## 2025-01-25 NOTE — PROGRESS NOTE ADULT - PROBLEM SELECTOR PLAN 1
s/p R pigtail placement by IR on 1/18 with drainage of purulent material c/f empyema  Continue Pigtail to suction; monitor output   s/p CT chest 1/21: Marked interval decrease in size of right-sided pleural fluid and gas collection (presumably empyema) status post placement of pigtail catheter as described above. Much interval improvement of posterior right upper lobe and right lower lobe consolidation. New patchy ground-glass opacities at the left apex may be related to edema or be infectious/inflammatory in etiology. New small left pleural effusion.  IR consult for reposition/increase size of PTC to help with drainage --> s/p new placement 1/24  REPEAT CT IMAGING ON MONDAY 1/27  Monitor chest tube outputs  Daily CXR while chest tube in  [facility] care as per primary team  Above plan as per d/w on-call Thoracic Attending Dr. Segura  ------  Call Thoracic surgery at [phone] with any questions

## 2025-01-25 NOTE — PROGRESS NOTE ADULT - ASSESSMENT
43yo M with Hx decompensated EtOH cirrhosis (c/b recurrent ascites, grade II EV, and HE) who presented to OSH on 1/16 with abdominal pain and distension, found to have ascites and acute renal failure requiring initiation of HD (s/p Permacath placement c/b bleeding), transferred to NS for transplant evaluation and management. CT chest at OSH was significant for a complex R pleural effusion s/p R pigtail placement by IR on 1/18 with drainage of purulent material c/f empyema. Thoracic Surgery consulted for empyema and chest tube management.   1/20 R pigtail drained 700/910, CT of Chest on Tuesday to assess effusion and make final plan determination  1/21 R pigtail drained 80/240, CT of chest to assess effusion today   1/22 R pigtail maintained. CT of chest revealing:   < from: CT Chest No Cont (01.21.25 @ 19:29) >  IMPRESSION:.  Marked interval decrease in size ofright-sided pleural fluid and gas   collection (presumably empyema) status post placement of pigtail catheter   as described above.  Much interval improvement of posterior right upper lobe and right lower   lobe consolidation.  New patchy ground-glass opacities at the left apex may be related to   edema or be infectious/inflammatory in etiology.  New small left pleural effusion. < end of copied text >  1/23 R PTC  (placed by IR 1/18) --> LWS this AM; patient NPO for IR repositioning/swap of chest tube today. Monitor Chest tube outputs  1/24 VSS  pigtail  unsuccessful repositioning  1/23 - output 1120 overnight -AM  XRay pending  1/25 Daily CXR;  pulm toileting. rpt CT imaging on MONDAY per Thoracic Attending

## 2025-01-25 NOTE — PROGRESS NOTE ADULT - ASSESSMENT
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 Actively drinking. Now p/w abdominal pain/distension with worsening of liver tests possibly triggered by infection. DDx also includes alcohol associated hepatitis given hx of alc hep 7/2024 which improved with steroids  - OLT: evaluation ongoing, complex given active drinking  - EV: EGD 7/2024 for melena showed grade II EV, small gastric ulcer (neg HP), and portal gastropathy. Takes nadolol at home. Holding BB for now given hypotension  - HE: c/w lactulose and rifaximin, goal 3-4 BM/day  - HCC screening: no lesions seen on MRI 1/2025  - C/w bumex 2 mg IV BID for diuresis  - C/w home midodrine 10 mg tid  - C/w PPI for stress ulcer ppx and hx of ulcer  - Trend MELD labs daily (cmp, INR) daily  - Strict I/Os, daily wts    #Empyema  #Leukocytosis  - R sided pleural effusion. s/p chest tube placed 1/18 with cultures growing Citrobacter c/w empyema.   - new R sided chest tube placed 1/24   - Thoracis team following: daily CXR, repeat CTC on Mon 1/27   - C/w CTX 2 g IV daily  - Maintain chest tube in place for drainage  - Trend cbc and fever curve  - Appreciate thoracic surgery and ID recs    #Acute Renal Failure  Found to have acute renal failure requiring dialysis at OSH, ddx includes HRS vs ATN. Permacath in place. Cr baseline 1.12 10/2024  - C/w albumin assisted diuresis with bumex 2 mg q12h (give albumin only with 2nd dose given transfusions)  - Strict I/Os, daily wts  - Trend CMP daily  - Appreciate transplant renal recs    #Anemia  #Thrombocytopenia  Normocytic anemia with Hgb 8.5 and plt count 64 likely due to chronic liver dz  - Trend cbc daily  - Transfuse Hgb>7, plt>10 (>20 if septic, >50 if bleeding)  - Hold DVT ppx (low H/H and plt)

## 2025-01-26 LAB
ALBUMIN SERPL ELPH-MCNC: 3.2 G/DL — LOW (ref 3.3–5)
ALP SERPL-CCNC: 112 U/L — SIGNIFICANT CHANGE UP (ref 40–120)
ALT FLD-CCNC: 7 U/L — LOW (ref 10–45)
ANION GAP SERPL CALC-SCNC: 11 MMOL/L — SIGNIFICANT CHANGE UP (ref 5–17)
APTT BLD: 44.2 SEC — HIGH (ref 24.5–35.6)
AST SERPL-CCNC: 31 U/L — SIGNIFICANT CHANGE UP (ref 10–40)
BASOPHILS # BLD AUTO: 0.06 K/UL — SIGNIFICANT CHANGE UP (ref 0–0.2)
BASOPHILS NFR BLD AUTO: 0.9 % — SIGNIFICANT CHANGE UP (ref 0–2)
BILIRUB DIRECT SERPL-MCNC: 1.4 MG/DL — HIGH (ref 0–0.3)
BILIRUB INDIRECT FLD-MCNC: 3.3 MG/DL — HIGH (ref 0.2–1)
BILIRUB SERPL-MCNC: 4.7 MG/DL — HIGH (ref 0.2–1.2)
BUN SERPL-MCNC: 42 MG/DL — HIGH (ref 7–23)
CALCIUM SERPL-MCNC: 9.9 MG/DL — SIGNIFICANT CHANGE UP (ref 8.4–10.5)
CHLORIDE SERPL-SCNC: 97 MMOL/L — SIGNIFICANT CHANGE UP (ref 96–108)
CO2 SERPL-SCNC: 23 MMOL/L — SIGNIFICANT CHANGE UP (ref 22–31)
CREAT SERPL-MCNC: 1.94 MG/DL — HIGH (ref 0.5–1.3)
EGFR: 42 ML/MIN/1.73M2 — LOW
EGFR: 42 ML/MIN/1.73M2 — LOW
EOSINOPHIL # BLD AUTO: 0.68 K/UL — HIGH (ref 0–0.5)
EOSINOPHIL NFR BLD AUTO: 10 % — HIGH (ref 0–6)
GLUCOSE SERPL-MCNC: 144 MG/DL — HIGH (ref 70–99)
GRAM STN FLD: ABNORMAL
HCT VFR BLD CALC: 24.5 % — LOW (ref 39–50)
HGB BLD-MCNC: 8.2 G/DL — LOW (ref 13–17)
IMM GRANULOCYTES NFR BLD AUTO: 0.4 % — SIGNIFICANT CHANGE UP (ref 0–0.9)
INR BLD: 2.17 RATIO — HIGH (ref 0.85–1.16)
LYMPHOCYTES # BLD AUTO: 1.56 K/UL — SIGNIFICANT CHANGE UP (ref 1–3.3)
LYMPHOCYTES # BLD AUTO: 22.9 % — SIGNIFICANT CHANGE UP (ref 13–44)
MAGNESIUM SERPL-MCNC: 1.6 MG/DL — SIGNIFICANT CHANGE UP (ref 1.6–2.6)
MCHC RBC-ENTMCNC: 30.7 PG — SIGNIFICANT CHANGE UP (ref 27–34)
MCHC RBC-ENTMCNC: 33.5 G/DL — SIGNIFICANT CHANGE UP (ref 32–36)
MCV RBC AUTO: 91.8 FL — SIGNIFICANT CHANGE UP (ref 80–100)
MONOCYTES # BLD AUTO: 0.87 K/UL — SIGNIFICANT CHANGE UP (ref 0–0.9)
MONOCYTES NFR BLD AUTO: 12.8 % — SIGNIFICANT CHANGE UP (ref 2–14)
NEUTROPHILS # BLD AUTO: 3.62 K/UL — SIGNIFICANT CHANGE UP (ref 1.8–7.4)
NEUTROPHILS NFR BLD AUTO: 53 % — SIGNIFICANT CHANGE UP (ref 43–77)
NRBC # BLD: 0 /100 WBCS — SIGNIFICANT CHANGE UP (ref 0–0)
NRBC BLD-RTO: 0 /100 WBCS — SIGNIFICANT CHANGE UP (ref 0–0)
PHOSPHATE SERPL-MCNC: 4.5 MG/DL — SIGNIFICANT CHANGE UP (ref 2.5–4.5)
PLATELET # BLD AUTO: 73 K/UL — LOW (ref 150–400)
POTASSIUM SERPL-MCNC: 3.8 MMOL/L — SIGNIFICANT CHANGE UP (ref 3.5–5.3)
POTASSIUM SERPL-SCNC: 3.8 MMOL/L — SIGNIFICANT CHANGE UP (ref 3.5–5.3)
PROT SERPL-MCNC: 8 G/DL — SIGNIFICANT CHANGE UP (ref 6–8.3)
PROTHROM AB SERPL-ACNC: 24.8 SEC — HIGH (ref 9.9–13.4)
RBC # BLD: 2.67 M/UL — LOW (ref 4.2–5.8)
RBC # FLD: 19.3 % — HIGH (ref 10.3–14.5)
SODIUM SERPL-SCNC: 131 MMOL/L — LOW (ref 135–145)
UUN UR-MCNC: 345 MG/DL — SIGNIFICANT CHANGE UP
WBC # BLD: 6.82 K/UL — SIGNIFICANT CHANGE UP (ref 3.8–10.5)
WBC # FLD AUTO: 6.82 K/UL — SIGNIFICANT CHANGE UP (ref 3.8–10.5)

## 2025-01-26 PROCEDURE — 99233 SBSQ HOSP IP/OBS HIGH 50: CPT

## 2025-01-26 PROCEDURE — 71045 X-RAY EXAM CHEST 1 VIEW: CPT | Mod: 26

## 2025-01-26 PROCEDURE — 99232 SBSQ HOSP IP/OBS MODERATE 35: CPT

## 2025-01-26 RX ORDER — TRAMADOL HYDROCHLORIDE 50 MG/1
25 TABLET, FILM COATED ORAL ONCE
Refills: 0 | Status: DISCONTINUED | OUTPATIENT
Start: 2025-01-26 | End: 2025-01-26

## 2025-01-26 RX ORDER — ALBUMIN (HUMAN) 12.5 G/50ML
50 INJECTION, SOLUTION INTRAVENOUS EVERY 8 HOURS
Refills: 0 | Status: COMPLETED | OUTPATIENT
Start: 2025-01-26 | End: 2025-01-27

## 2025-01-26 RX ORDER — HEPARIN SODIUM 1000 [USP'U]/ML
5000 INJECTION INTRAVENOUS; SUBCUTANEOUS EVERY 12 HOURS
Refills: 0 | Status: DISCONTINUED | OUTPATIENT
Start: 2025-01-26 | End: 2025-01-27

## 2025-01-26 RX ORDER — TRAMADOL HYDROCHLORIDE 50 MG/1
50 TABLET, FILM COATED ORAL ONCE
Refills: 0 | Status: DISCONTINUED | OUTPATIENT
Start: 2025-01-26 | End: 2025-01-26

## 2025-01-26 RX ADMIN — TRAMADOL HYDROCHLORIDE 50 MILLIGRAM(S): 50 TABLET, FILM COATED ORAL at 15:07

## 2025-01-26 RX ADMIN — MIRTAZAPINE 7.5 MILLIGRAM(S): 30 TABLET, FILM COATED ORAL at 21:35

## 2025-01-26 RX ADMIN — TRAMADOL HYDROCHLORIDE 50 MILLIGRAM(S): 50 TABLET, FILM COATED ORAL at 20:02

## 2025-01-26 RX ADMIN — FOLIC ACID 1 MILLIGRAM(S): 1 TABLET ORAL at 12:41

## 2025-01-26 RX ADMIN — MIDODRINE HYDROCHLORIDE 10 MILLIGRAM(S): 5 TABLET ORAL at 21:35

## 2025-01-26 RX ADMIN — MIDODRINE HYDROCHLORIDE 10 MILLIGRAM(S): 5 TABLET ORAL at 06:54

## 2025-01-26 RX ADMIN — MIDODRINE HYDROCHLORIDE 10 MILLIGRAM(S): 5 TABLET ORAL at 14:42

## 2025-01-26 RX ADMIN — BUMETANIDE 2 MILLIGRAM(S): 1 TABLET ORAL at 14:43

## 2025-01-26 RX ADMIN — TRAMADOL HYDROCHLORIDE 50 MILLIGRAM(S): 50 TABLET, FILM COATED ORAL at 06:54

## 2025-01-26 RX ADMIN — LACTULOSE 20 GRAM(S): 10 SOLUTION ORAL at 06:52

## 2025-01-26 RX ADMIN — Medication 100 MILLIGRAM(S): at 12:41

## 2025-01-26 RX ADMIN — BUMETANIDE 2 MILLIGRAM(S): 1 TABLET ORAL at 09:01

## 2025-01-26 RX ADMIN — TRAMADOL HYDROCHLORIDE 50 MILLIGRAM(S): 50 TABLET, FILM COATED ORAL at 07:45

## 2025-01-26 RX ADMIN — TRAMADOL HYDROCHLORIDE 50 MILLIGRAM(S): 50 TABLET, FILM COATED ORAL at 15:58

## 2025-01-26 RX ADMIN — Medication 40 MILLIGRAM(S): at 06:52

## 2025-01-26 RX ADMIN — TRAMADOL HYDROCHLORIDE 25 MILLIGRAM(S): 50 TABLET, FILM COATED ORAL at 10:50

## 2025-01-26 RX ADMIN — SEVELAMER HYDROCHLORIDE 800 MILLIGRAM(S): 800 TABLET ORAL at 12:41

## 2025-01-26 RX ADMIN — HEPARIN SODIUM 5000 UNIT(S): 1000 INJECTION INTRAVENOUS; SUBCUTANEOUS at 17:22

## 2025-01-26 RX ADMIN — SEVELAMER HYDROCHLORIDE 800 MILLIGRAM(S): 800 TABLET ORAL at 17:14

## 2025-01-26 RX ADMIN — TRAMADOL HYDROCHLORIDE 25 MILLIGRAM(S): 50 TABLET, FILM COATED ORAL at 11:30

## 2025-01-26 RX ADMIN — CEFTRIAXONE 100 MILLIGRAM(S): 500 INJECTION, POWDER, FOR SOLUTION INTRAMUSCULAR; INTRAVENOUS at 21:35

## 2025-01-26 RX ADMIN — TRAMADOL HYDROCHLORIDE 50 MILLIGRAM(S): 50 TABLET, FILM COATED ORAL at 19:02

## 2025-01-26 RX ADMIN — Medication 5 MILLIGRAM(S): at 21:35

## 2025-01-26 RX ADMIN — ALBUMIN (HUMAN) 50 MILLILITER(S): 12.5 INJECTION, SOLUTION INTRAVENOUS at 21:35

## 2025-01-26 RX ADMIN — Medication 1 TABLET(S): at 12:41

## 2025-01-26 RX ADMIN — SEVELAMER HYDROCHLORIDE 800 MILLIGRAM(S): 800 TABLET ORAL at 09:01

## 2025-01-26 RX ADMIN — Medication 1 APPLICATION(S): at 12:42

## 2025-01-26 NOTE — PROGRESS NOTE ADULT - SUBJECTIVE AND OBJECTIVE BOX
Subjective:    V/S  T(C): 37.1 (01-26-25 @ 12:47), Max: 37.1 (01-26-25 @ 09:00)  HR: 78 (01-26-25 @ 12:47) (78 - 100)  BP: 113/65 (01-26-25 @ 12:47) (101/69 - 131/73)  ABP: --  ABP(mean): --  RR: 18 (01-26-25 @ 12:47) (16 - 18)  SpO2: 98% (01-26-25 @ 12:47) (95% - 100%)                                          CHEST TUBE:  RCT #1  OUTPUT:             per 24 hours     Drainage:    AIR LEAKS:  [ ] YES [ ] NO    I&O's Detail    25 Jan 2025 07:01  -  26 Jan 2025 07:00  --------------------------------------------------------  IN:    Oral Fluid: 1500 mL  Total IN: 1500 mL    OUT:    Chest Tube (mL): 5 mL    Chest Tube (mL): 180 mL    Voided (mL): 2600 mL  Total OUT: 2785 mL    Total NET: -1285 mL      26 Jan 2025 07:01  -  26 Jan 2025 12:51  --------------------------------------------------------  IN:    Oral Fluid: 360 mL  Total IN: 360 mL    OUT:    Voided (mL): 400 mL  Total OUT: 400 mL    Total NET: -40 mL    01-25-25 @ 07:01  -  01-26-25 @ 07:00  --------------------------------------------------------  IN: 1500 mL / OUT: 2785 mL / NET: -1285 mL    01-26-25 @ 07:01  -  01-26-25 @ 12:51  --------------------------------------------------------  IN: 360 mL / OUT: 400 mL / NET: -40 mL    MEDICATIONS  (STANDING):  buMETAnide Injectable 2 milliGRAM(s) IV Push <User Schedule>  cefTRIAXone   IVPB      cefTRIAXone   IVPB 2000 milliGRAM(s) IV Intermittent every 24 hours  chlorhexidine 2% Cloths 1 Application(s) Topical daily  folic acid 1 milliGRAM(s) Oral daily  influenza   Vaccine 0.5 milliLiter(s) IntraMuscular once  lactulose Syrup 20 Gram(s) Oral every 12 hours  lidocaine   4% Patch 1 Patch Transdermal every 24 hours  melatonin 5 milliGRAM(s) Oral at bedtime  midodrine 10 milliGRAM(s) Oral every 8 hours  mirtazapine 7.5 milliGRAM(s) Oral at bedtime  Nephro-shania 1 Tablet(s) Oral daily  pantoprazole    Tablet 40 milliGRAM(s) Oral before breakfast  rifAXIMin 550 milliGRAM(s) Oral every 12 hours  sevelamer carbonate 800 milliGRAM(s) Oral three times a day with meals  thiamine 100 milliGRAM(s) Oral daily    01-26    131[L]  |  97  |  42[H]  ----------------------------<  144[H]  3.8   |  23  |  1.94[H]    Ca    9.9      26 Jan 2025 07:07  Phos  4.5     01-26  Mg     1.6     01-26    TPro  8.0  /  Alb  3.2[L]  /  TBili  4.7[H]  /  DBili  1.4[H]  /  AST  31  /  ALT  7[L]  /  AlkPhos  112  01-26                               8.2    6.82  )-----------( 73       ( 26 Jan 2025 07:05 )             24.5        PT/INR - ( 26 Jan 2025 07:05 )   PT: 24.8 sec;   INR: 2.17 ratio    PTT - ( 26 Jan 2025 07:05 )  PTT:44.2 sec      CXR:     Physical Exam:    Neuro: a&o x 4  Pulm:  cta - diminished r base  CV: S1S2  Abd: soft nt nd +bs  Extremities: no c/c/e      PAST MEDICAL & SURGICAL HISTORY:  Sleep apnea, obstructive      Alcohol abuse      Cirrhosis of liver      Portal hypertension      H/O esophageal varices      Anemia      Mild alcohol use disorder      No significant past surgical history

## 2025-01-26 NOTE — PROGRESS NOTE ADULT - ASSESSMENT
45yo M with Hx decompensated EtOH cirrhosis (c/b recurrent ascites, grade II EV, and HE) who presented to OSH on 1/16 with abdominal pain and distension, found to have ascites and acute renal failure requiring initiation of HD (s/p Permacath placement c/b bleeding), transferred to NS for transplant evaluation and management. CT chest at OSH was significant for a complex R pleural effusion s/p R pigtail placement by IR on 1/18 with drainage of purulent material c/f empyema. Thoracic Surgery consulted for empyema and chest tube management.   1/20 R pigtail drained 700/910, CT of Chest on Tuesday to assess effusion and make final plan determination  1/21 R pigtail drained 80/240, CT of chest to assess effusion today   1/22 R pigtail maintained. CT of chest revealing:   < from: CT Chest No Cont (01.21.25 @ 19:29) >  IMPRESSION:.  Marked interval decrease in size ofright-sided pleural fluid and gas   collection (presumably empyema) status post placement of pigtail catheter   as described above.  Much interval improvement of posterior right upper lobe and right lower   lobe consolidation.  New patchy ground-glass opacities at the left apex may be related to   edema or be infectious/inflammatory in etiology.  New small left pleural effusion. < end of copied text >  1/23 R PTC  (placed by IR 1/18) --> LWS this AM; patient NPO for IR repositioning/swap of chest tube today. Monitor Chest tube outputs  1/24 VSS  pigtail  unsuccessful repositioning  1/23 - output 1120 overnight -AM  XRay pending  1/25 Daily CXR;  pulm toileting. rpt CT imaging on MONDAY per Thoracic Attending  1/26 VSS R Ct # 1 minimal drainage.  RCT #2- w/ 180cc in last 24 hrs keep to lws - rpt imaging monday to assess need for RVATS decort.

## 2025-01-26 NOTE — PROGRESS NOTE ADULT - ATTENDING COMMENTS
I have seen and examined patient at bedside. I agree with hx, ROS, physical exam, imp/suggestions as written by the fellow.

## 2025-01-26 NOTE — PROGRESS NOTE ADULT - PROBLEM SELECTOR PLAN 1
s/p R pigtail placement by IR on 1/18 with drainage of purulent material c/f empyema  Continue Pigtail to suction; monitor output   s/p CT chest 1/21: Marked interval decrease in size of right-sided pleural fluid and gas collection (presumably empyema) status post placement of pigtail catheter as described above. Much interval improvement of posterior right upper lobe and right lower lobe consolidation. New patchy ground-glass opacities at the left apex may be related to edema or be infectious/inflammatory in etiology. New small left pleural effusion.  IR consult for reposition/increase size of PTC to help with drainage --> s/p new placement 1/24  REPEAT CT IMAGING ON MONDAY 1/27  Monitor chest tube outputs  Daily CXR while chest tube in  [facility] care as per primary team  Above plan as per d/w on-call Thoracic Attending Dr. Segura  ------  Call Thoracic surgery at [phone] with any questions

## 2025-01-26 NOTE — PROGRESS NOTE ADULT - SUBJECTIVE AND OBJECTIVE BOX
Hepatology Progress Note    Interval Events:   -Pt overall feeling well with no ongoing nausea, vomiting or abd pain  -CXR with wrose interstitial edema, development of bibasilar atelectasis right greater than left with plan for CT chest on     Allergies:  sulfa drugs (Unknown)  Salicylic Acid (Other)      Hospital Medications:  albuterol    90 MICROgram(s) HFA Inhaler 2 Puff(s) Inhalation every 6 hours PRN  buMETAnide Injectable 2 milliGRAM(s) IV Push <User Schedule>  cefTRIAXone   IVPB 2000 milliGRAM(s) IV Intermittent every 24 hours  cefTRIAXone   IVPB      chlorhexidine 2% Cloths 1 Application(s) Topical daily  folic acid 1 milliGRAM(s) Oral daily  influenza   Vaccine 0.5 milliLiter(s) IntraMuscular once  lactulose Syrup 20 Gram(s) Oral every 12 hours  lidocaine   4% Patch 1 Patch Transdermal every 24 hours  melatonin 5 milliGRAM(s) Oral at bedtime  midodrine 10 milliGRAM(s) Oral every 8 hours  mirtazapine 7.5 milliGRAM(s) Oral at bedtime  Nephro-shania 1 Tablet(s) Oral daily  pantoprazole    Tablet 40 milliGRAM(s) Oral before breakfast  rifAXIMin 550 milliGRAM(s) Oral every 12 hours  sevelamer carbonate 800 milliGRAM(s) Oral three times a day with meals  thiamine 100 milliGRAM(s) Oral daily  traMADol 25 milliGRAM(s) Oral every 8 hours PRN  traMADol 50 milliGRAM(s) Oral every 8 hours PRN      ROS: 14 point ROS negative unless otherwise state in subjective    PHYSICAL EXAM:   Vital Signs:  Vital Signs Last 24 Hrs  T(C): 37.1 (2025 12:47), Max: 37.1 (2025 09:00)  T(F): 98.7 (2025 12:47), Max: 98.8 (2025 09:00)  HR: 78 (2025 12:47) (78 - 100)  BP: 113/65 (2025 12:47) (101/69 - 131/73)  BP(mean): --  RR: 18 (2025 12:47) (16 - 18)  SpO2: 98% (2025 12:47) (95% - 100%)    Parameters below as of 2025 12:47  Patient On (Oxygen Delivery Method): room air      Daily     Daily Weight in k.8 (2025 06:47)    GENERAL:  No acute distress  HEENT:  +scleral icterus  CHEST: chest tube in place   HEART:  RRR  ABDOMEN:  Soft, non-tender, non-distended, normoactive bowel sound  EXTREMITIES:  No edema  NEURO:  Alert and oriented x 3, no asterixis    LABS:                        8.2    6.82  )-----------( 73       ( 2025 07:05 )             24.5     Mean Cell Volume: 91.8 fl (25 @ 07:05)        131[L]  |  97  |  42[H]  ----------------------------<  144[H]  3.8   |  23  |  1.94[H]    Ca    9.9      2025 07:07  Phos  4.5       Mg     1.6         TPro  8.0  /  Alb  3.2[L]  /  TBili  4.7[H]  /  DBili  1.4[H]  /  AST  31  /  ALT  7[L]  /  AlkPhos  112      LIVER FUNCTIONS - ( 2025 07:07 )  Alb: 3.2 g/dL / Pro: 8.0 g/dL / ALK PHOS: 112 U/L / ALT: 7 U/L / AST: 31 U/L / GGT: x           PT/INR - ( 2025 07:05 )   PT: 24.8 sec;   INR: 2.17 ratio         PTT - ( 2025 07:05 )  PTT:44.2 sec  Urinalysis Basic - ( 2025 07:07 )    Color: x / Appearance: x / SG: x / pH: x  Gluc: 144 mg/dL / Ketone: x  / Bili: x / Urobili: x   Blood: x / Protein: x / Nitrite: x   Leuk Esterase: x / RBC: x / WBC x   Sq Epi: x / Non Sq Epi: x / Bacteria: x        Imaging:  < from: Xray Chest 1 View- PORTABLE-Routine (Xray Chest 1 View- PORTABLE-Routine in AM.) (01.25.25 @ 08:41) >  The follow-up is from  and shows pigtail catheter right base.   Worsening interstitial edema, development of bibasilar atelectasis right   greater than left.    --- End of Report ---    < end of copied text >

## 2025-01-26 NOTE — PROGRESS NOTE ADULT - ASSESSMENT
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 Actively drinking. Now p/w abdominal pain/distension with worsening of liver tests possibly triggered by infection. DDx also includes alcohol associated hepatitis given hx of alc hep 7/2024 which improved with steroids  - OLT: evaluation ongoing, complex given active drinking  - EV: EGD 7/2024 for melena showed grade II EV, small gastric ulcer (neg HP), and portal gastropathy. Takes nadolol at home. Holding BB for now given hypotension. Hgb overall stable today around 8 with no overt signs of bleeding   - HE: c/w lactulose and rifaximin, goal 3-4 BM/day  - HCC screening: no lesions seen on MRI 1/2025  - C/w bumex 2 mg IV BID for diuresis  - C/w home midodrine 10 mg tid  - Will give albumin 25% TID x 1 day  - C/w PPI for stress ulcer ppx and hx of ulcer  - Trend MELD labs daily (cmp, INR) daily  - Strict I/Os, daily wts    #Empyema  #Leukocytosis  - R sided pleural effusion. s/p chest tube placed 1/18 with cultures growing Citrobacter c/w empyema.   - new R sided chest tube placed 1/24   - Thoracis team following: daily CXR, repeat CTC on Mon 1/27   - C/w CTX 2 g IV daily  - Maintain chest tube in place for drainage  - Trend cbc and fever curve  - Appreciate thoracic surgery and ID recs    #Acute Renal Failure  Found to have acute renal failure requiring dialysis at OSH, ddx includes HRS vs ATN. Permacath in place. Cr baseline 1.12 10/2024  - C/w albumin assisted diuresis with bumex 2 mg q12h-> Will give albumin 25% TID x 1 day  - Strict I/Os, daily wts  - Trend CMP daily  - Appreciate transplant renal recs    #Anemia  #Thrombocytopenia  Normocytic anemia with Hgb 8.5 and plt count 64 likely due to chronic liver dz  - Trend cbc daily  - Transfuse Hgb>7, plt>10 (>20 if septic, >50 if bleeding)  - Resume DVT ppx today    All recommendations are tentative until note is attested by attending.    Previously Declined (within the last year)

## 2025-01-27 LAB
-  AMOXICILLIN/CLAVULANIC ACID: SIGNIFICANT CHANGE UP
-  AMPICILLIN/SULBACTAM: SIGNIFICANT CHANGE UP
-  AMPICILLIN: SIGNIFICANT CHANGE UP
-  AZTREONAM: SIGNIFICANT CHANGE UP
-  CEFAZOLIN: SIGNIFICANT CHANGE UP
-  CEFEPIME: SIGNIFICANT CHANGE UP
-  CEFOXITIN: SIGNIFICANT CHANGE UP
-  CEFTRIAXONE: SIGNIFICANT CHANGE UP
-  CIPROFLOXACIN: SIGNIFICANT CHANGE UP
-  ERTAPENEM: SIGNIFICANT CHANGE UP
-  GENTAMICIN: SIGNIFICANT CHANGE UP
-  IMIPENEM: SIGNIFICANT CHANGE UP
-  LEVOFLOXACIN: SIGNIFICANT CHANGE UP
-  MEROPENEM: SIGNIFICANT CHANGE UP
-  PIPERACILLIN/TAZOBACTAM: SIGNIFICANT CHANGE UP
-  TOBRAMYCIN: SIGNIFICANT CHANGE UP
-  TRIMETHOPRIM/SULFAMETHOXAZOLE: SIGNIFICANT CHANGE UP
ALBUMIN SERPL ELPH-MCNC: 3.1 G/DL — LOW (ref 3.3–5)
ALP SERPL-CCNC: 105 U/L — SIGNIFICANT CHANGE UP (ref 40–120)
ALT FLD-CCNC: 9 U/L — LOW (ref 10–45)
ANION GAP SERPL CALC-SCNC: 10 MMOL/L — SIGNIFICANT CHANGE UP (ref 5–17)
ANISOCYTOSIS BLD QL: SLIGHT — SIGNIFICANT CHANGE UP
APTT BLD: 48.2 SEC — HIGH (ref 24.5–35.6)
AST SERPL-CCNC: 32 U/L — SIGNIFICANT CHANGE UP (ref 10–40)
BASOPHILS # BLD AUTO: 0.06 K/UL — SIGNIFICANT CHANGE UP (ref 0–0.2)
BASOPHILS NFR BLD AUTO: 0.9 % — SIGNIFICANT CHANGE UP (ref 0–2)
BILIRUB DIRECT SERPL-MCNC: 1.3 MG/DL — HIGH (ref 0–0.3)
BILIRUB INDIRECT FLD-MCNC: 3.2 MG/DL — HIGH (ref 0.2–1)
BILIRUB SERPL-MCNC: 4.5 MG/DL — HIGH (ref 0.2–1.2)
BUN SERPL-MCNC: 47 MG/DL — HIGH (ref 7–23)
BURR CELLS BLD QL SMEAR: PRESENT — SIGNIFICANT CHANGE UP
CALCIUM SERPL-MCNC: 9.6 MG/DL — SIGNIFICANT CHANGE UP (ref 8.4–10.5)
CHLORIDE SERPL-SCNC: 98 MMOL/L — SIGNIFICANT CHANGE UP (ref 96–108)
CO2 SERPL-SCNC: 25 MMOL/L — SIGNIFICANT CHANGE UP (ref 22–31)
CREAT SERPL-MCNC: 1.84 MG/DL — HIGH (ref 0.5–1.3)
DACRYOCYTES BLD QL SMEAR: SLIGHT — SIGNIFICANT CHANGE UP
EGFR: 45 ML/MIN/1.73M2 — LOW
EGFR: 45 ML/MIN/1.73M2 — LOW
EOSINOPHIL # BLD AUTO: 0.54 K/UL — HIGH (ref 0–0.5)
EOSINOPHIL NFR BLD AUTO: 8.7 % — HIGH (ref 0–6)
GLUCOSE SERPL-MCNC: 113 MG/DL — HIGH (ref 70–99)
HCT VFR BLD CALC: 23 % — LOW (ref 39–50)
HGB BLD-MCNC: 7.6 G/DL — LOW (ref 13–17)
INR BLD: 2.4 RATIO — HIGH (ref 0.85–1.16)
LYMPHOCYTES # BLD AUTO: 0.59 K/UL — LOW (ref 1–3.3)
LYMPHOCYTES # BLD AUTO: 9.5 % — LOW (ref 13–44)
MACROCYTES BLD QL: SLIGHT — SIGNIFICANT CHANGE UP
MAGNESIUM SERPL-MCNC: 1.5 MG/DL — LOW (ref 1.6–2.6)
MANUAL SMEAR VERIFICATION: SIGNIFICANT CHANGE UP
MCHC RBC-ENTMCNC: 30.4 PG — SIGNIFICANT CHANGE UP (ref 27–34)
MCHC RBC-ENTMCNC: 33 G/DL — SIGNIFICANT CHANGE UP (ref 32–36)
MCV RBC AUTO: 92 FL — SIGNIFICANT CHANGE UP (ref 80–100)
METHOD TYPE: SIGNIFICANT CHANGE UP
MONOCYTES # BLD AUTO: 0.7 K/UL — SIGNIFICANT CHANGE UP (ref 0–0.9)
MONOCYTES NFR BLD AUTO: 11.3 % — SIGNIFICANT CHANGE UP (ref 2–14)
NEUTROPHILS # BLD AUTO: 4.32 K/UL — SIGNIFICANT CHANGE UP (ref 1.8–7.4)
NEUTROPHILS NFR BLD AUTO: 66.1 % — SIGNIFICANT CHANGE UP (ref 43–77)
NEUTS BAND # BLD: 3.5 % — SIGNIFICANT CHANGE UP (ref 0–8)
NEUTS BAND NFR BLD: 3.5 % — SIGNIFICANT CHANGE UP (ref 0–8)
NON-GYNECOLOGICAL CYTOLOGY STUDY: SIGNIFICANT CHANGE UP
OVALOCYTES BLD QL SMEAR: SLIGHT — SIGNIFICANT CHANGE UP
PHOSPHATE SERPL-MCNC: 4.1 MG/DL — SIGNIFICANT CHANGE UP (ref 2.5–4.5)
PLAT MORPH BLD: NORMAL — SIGNIFICANT CHANGE UP
PLATELET # BLD AUTO: 61 K/UL — LOW (ref 150–400)
POIKILOCYTOSIS BLD QL AUTO: SLIGHT — SIGNIFICANT CHANGE UP
POTASSIUM SERPL-MCNC: 3.9 MMOL/L — SIGNIFICANT CHANGE UP (ref 3.5–5.3)
POTASSIUM SERPL-SCNC: 3.9 MMOL/L — SIGNIFICANT CHANGE UP (ref 3.5–5.3)
PROT SERPL-MCNC: 7.9 G/DL — SIGNIFICANT CHANGE UP (ref 6–8.3)
PROTHROM AB SERPL-ACNC: 27.1 SEC — HIGH (ref 9.9–13.4)
RBC # BLD: 2.5 M/UL — LOW (ref 4.2–5.8)
RBC # FLD: 19.3 % — HIGH (ref 10.3–14.5)
RBC BLD AUTO: ABNORMAL
SCHISTOCYTES BLD QL AUTO: SLIGHT — SIGNIFICANT CHANGE UP
SODIUM SERPL-SCNC: 133 MMOL/L — LOW (ref 135–145)
TARGETS BLD QL SMEAR: SLIGHT — SIGNIFICANT CHANGE UP
WBC # BLD: 6.21 K/UL — SIGNIFICANT CHANGE UP (ref 3.8–10.5)
WBC # FLD AUTO: 6.21 K/UL — SIGNIFICANT CHANGE UP (ref 3.8–10.5)

## 2025-01-27 PROCEDURE — 71250 CT THORAX DX C-: CPT | Mod: 26

## 2025-01-27 PROCEDURE — 99231 SBSQ HOSP IP/OBS SF/LOW 25: CPT

## 2025-01-27 PROCEDURE — 71045 X-RAY EXAM CHEST 1 VIEW: CPT | Mod: 26

## 2025-01-27 PROCEDURE — 99232 SBSQ HOSP IP/OBS MODERATE 35: CPT | Mod: GC

## 2025-01-27 RX ORDER — MAGNESIUM SULFATE 500 MG/ML
2 SYRINGE (ML) INJECTION ONCE
Refills: 0 | Status: COMPLETED | OUTPATIENT
Start: 2025-01-27 | End: 2025-01-27

## 2025-01-27 RX ORDER — ALBUMIN (HUMAN) 12.5 G/50ML
50 INJECTION, SOLUTION INTRAVENOUS EVERY 8 HOURS
Refills: 0 | Status: DISCONTINUED | OUTPATIENT
Start: 2025-01-27 | End: 2025-01-28

## 2025-01-27 RX ADMIN — TRAMADOL HYDROCHLORIDE 50 MILLIGRAM(S): 50 TABLET, FILM COATED ORAL at 06:30

## 2025-01-27 RX ADMIN — TRAMADOL HYDROCHLORIDE 25 MILLIGRAM(S): 50 TABLET, FILM COATED ORAL at 20:45

## 2025-01-27 RX ADMIN — TRAMADOL HYDROCHLORIDE 25 MILLIGRAM(S): 50 TABLET, FILM COATED ORAL at 09:00

## 2025-01-27 RX ADMIN — CEFTRIAXONE 100 MILLIGRAM(S): 500 INJECTION, POWDER, FOR SOLUTION INTRAMUSCULAR; INTRAVENOUS at 22:02

## 2025-01-27 RX ADMIN — BUMETANIDE 2 MILLIGRAM(S): 1 TABLET ORAL at 14:28

## 2025-01-27 RX ADMIN — Medication 100 MILLIGRAM(S): at 11:30

## 2025-01-27 RX ADMIN — TRAMADOL HYDROCHLORIDE 50 MILLIGRAM(S): 50 TABLET, FILM COATED ORAL at 05:27

## 2025-01-27 RX ADMIN — MIDODRINE HYDROCHLORIDE 10 MILLIGRAM(S): 5 TABLET ORAL at 05:29

## 2025-01-27 RX ADMIN — Medication 40 MILLIGRAM(S): at 05:27

## 2025-01-27 RX ADMIN — MIDODRINE HYDROCHLORIDE 10 MILLIGRAM(S): 5 TABLET ORAL at 14:28

## 2025-01-27 RX ADMIN — TRAMADOL HYDROCHLORIDE 50 MILLIGRAM(S): 50 TABLET, FILM COATED ORAL at 23:23

## 2025-01-27 RX ADMIN — Medication 5 MILLIGRAM(S): at 22:01

## 2025-01-27 RX ADMIN — SEVELAMER HYDROCHLORIDE 800 MILLIGRAM(S): 800 TABLET ORAL at 18:06

## 2025-01-27 RX ADMIN — TRAMADOL HYDROCHLORIDE 25 MILLIGRAM(S): 50 TABLET, FILM COATED ORAL at 19:49

## 2025-01-27 RX ADMIN — SEVELAMER HYDROCHLORIDE 800 MILLIGRAM(S): 800 TABLET ORAL at 08:16

## 2025-01-27 RX ADMIN — BUMETANIDE 2 MILLIGRAM(S): 1 TABLET ORAL at 11:29

## 2025-01-27 RX ADMIN — TRAMADOL HYDROCHLORIDE 50 MILLIGRAM(S): 50 TABLET, FILM COATED ORAL at 15:11

## 2025-01-27 RX ADMIN — TRAMADOL HYDROCHLORIDE 25 MILLIGRAM(S): 50 TABLET, FILM COATED ORAL at 08:16

## 2025-01-27 RX ADMIN — TRAMADOL HYDROCHLORIDE 50 MILLIGRAM(S): 50 TABLET, FILM COATED ORAL at 16:00

## 2025-01-27 RX ADMIN — FOLIC ACID 1 MILLIGRAM(S): 1 TABLET ORAL at 11:30

## 2025-01-27 RX ADMIN — Medication 1 APPLICATION(S): at 11:30

## 2025-01-27 RX ADMIN — ALBUMIN (HUMAN) 50 MILLILITER(S): 12.5 INJECTION, SOLUTION INTRAVENOUS at 14:26

## 2025-01-27 RX ADMIN — MIDODRINE HYDROCHLORIDE 10 MILLIGRAM(S): 5 TABLET ORAL at 22:01

## 2025-01-27 RX ADMIN — Medication 25 GRAM(S): at 11:29

## 2025-01-27 RX ADMIN — ALBUMIN (HUMAN) 50 MILLILITER(S): 12.5 INJECTION, SOLUTION INTRAVENOUS at 05:27

## 2025-01-27 RX ADMIN — ALBUMIN (HUMAN) 50 MILLILITER(S): 12.5 INJECTION, SOLUTION INTRAVENOUS at 22:01

## 2025-01-27 RX ADMIN — HEPARIN SODIUM 5000 UNIT(S): 1000 INJECTION INTRAVENOUS; SUBCUTANEOUS at 05:27

## 2025-01-27 RX ADMIN — Medication 1 TABLET(S): at 11:30

## 2025-01-27 RX ADMIN — MIRTAZAPINE 7.5 MILLIGRAM(S): 30 TABLET, FILM COATED ORAL at 22:01

## 2025-01-27 RX ADMIN — SEVELAMER HYDROCHLORIDE 800 MILLIGRAM(S): 800 TABLET ORAL at 14:28

## 2025-01-27 RX ADMIN — LACTULOSE 20 GRAM(S): 10 SOLUTION ORAL at 05:27

## 2025-01-27 NOTE — PROGRESS NOTE ADULT - ASSESSMENT
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 Actively drinking. Now p/w abdominal pain/distension with worsening of liver tests possibly triggered by infection. DDx also includes alcohol associated hepatitis given hx of alc hep 7/2024 which improved with steroids  - OLT: evaluation ongoing, complex given active drinking  - EV: EGD 7/2024 for melena showed grade II EV, small gastric ulcer (neg HP), and portal gastropathy. Resume coreg 3.125 mg bid, uptitrate to home dose tomorrow  - HE: c/w lactulose and rifaximin, goal 3-4 BM/day  - HCC screening: pending MRI read  - C/w bumex 2 mg IV BID for albumin assisted diuresis  - C/w home midodrine 10 mg tid  - C/w PPI for stress ulcer ppx and hx of ulcer  - Trend MELD labs daily (cmp, INR) daily  - Strict I/Os, daily wts    #Empyema  #Leukocytosis  CT chest noncontrast reviewed from OSH showing moderate sized complex R sided pleural effusion. s/p chest tube placed 1/18 with cultures growing Citrobacter c/w empyema. Repeat CT chest shows improving R pleural effusion   - Plan for IR procedure today for 2nd chest tube placement  - C/w CTX 2 g IV daily  - Maintain chest tube in place for drainage  - Trend cbc and fever curve  - Appreciate thoracic surgery and ID recs    #Acute Renal Failure  Found to have acute renal failure requiring dialysis at OSH, ddx includes HRS vs ATN. Permacath in place. Cr baseline 1.12 10/2024  - C/w albumin assisted diuresis with bumex 2 mg q12h (give albumin only with 2nd dose given transfusions)  - Strict I/Os, daily wts  - Trend CMP daily  - Appreciate transplant renal recs    #Anemia  #Thrombocytopenia  Normocytic anemia with Hgb 8.5 and plt count 64 likely due to chronic liver dz  - Give 1 FFP,1 plt, 1 cyro pre procedure  - Trend cbc daily  - Transfuse Hgb>7, plt>10 (>20 if septic, >50 if bleeding)  - Hold DVT ppx for procedure today    All recommendations are tentative until note is attested by attending.     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 Actively drinking. Now p/w abdominal pain/distension with worsening of liver tests possibly triggered by infection. DDx also includes alcohol associated hepatitis given hx of alc hep 7/2024 which improved with steroids  - OLT: evaluation ongoing, pending cardiac evaluation  - EV: EGD 7/2024 for melena showed grade II EV, small gastric ulcer (neg HP), and portal gastropathy. Resume coreg 3.125 mg bid, uptitrate to home dose tomorrow  - HE: c/w lactulose and rifaximin, goal 3-4 BM/day  - HCC screening: pending MRI read  - MANUELITO: C/w bumex 2 mg IV BID for albumin assisted diuresis  - C/w home midodrine 10 mg tid  - C/w PPI for stress ulcer ppx and hx of ulcer  - Trend MELD labs daily (cmp, INR) daily  - Strict I/Os, daily wts    #Empyema  #Leukocytosis  CT chest noncontrast reviewed from OSH showing moderate sized complex R sided pleural effusion. s/p chest tube placed 1/18 with cultures growing Citrobacter c/w empyema. Required 2nd chest tube for persistent effusion  - Plan for IR procedure today for 2nd chest tube placement  - Repeat CT chest  - F/u thoracic surgery recs for VTS pending CT chest  - C/w CTX 2 g IV daily  - Maintain chest tubes in place for drainage  - Trend cbc and fever curve  - Appreciate thoracic surgery and ID recs    #MANUELITO, improving  Found to have acute renal failure requiring dialysis at OSH, ddx includes HRS vs ATN. Permacath in place. Cr baseline 1.12 10/2024  - C/w albumin assisted diuresis with bumex 2 mg q12h  - IR consult to remove tunneled cath  - Strict I/Os, daily wts  - Trend CMP daily  - Appreciate transplant renal recs    #Anemia  #Thrombocytopenia  Normocytic anemia with Hgb 8.5 and plt count 64 likely due to chronic liver dz  - Trend cbc daily  - Transfuse Hgb>7, plt>10 (>20 if septic, >50 if bleeding)  - Hold DVT ppx for procedure today    All recommendations are tentative until note is attested by attending.     Judy Tucker, PGY4  Gastroenterology/Hepatology Fellow  Available on [employer] Teams  [phone] (Long Range Pager)  [phone] (Short Range Pager [facility])    After 5 pm, please contact the on-call GI fellow for any urgent issues via the Hospital Call

## 2025-01-27 NOTE — PROGRESS NOTE ADULT - ATTENDING COMMENTS
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

## 2025-01-27 NOTE — PROGRESS NOTE ADULT - SUBJECTIVE AND OBJECTIVE BOX
Interval Events:   - He reports mild pain at catheter insertion sites, afebrile  - Chest tube output: 145 cc in 24 hours    ROS:   12 point review of systems performed and negative except otherwise noted in HPI.    Hospital Medications:  albuterol    90 MICROgram(s) HFA Inhaler 2 Puff(s) Inhalation every 6 hours PRN  chlorhexidine 2% Cloths 1 Application(s) Topical <User Schedule>  folic acid 1 milliGRAM(s) Oral daily  lactulose Syrup 20 Gram(s) Oral every 12 hours  melatonin 5 milliGRAM(s) Oral at bedtime  midodrine 10 milliGRAM(s) Oral every 8 hours  Nephro-shania 1 Tablet(s) Oral daily  pantoprazole    Tablet 40 milliGRAM(s) Oral before breakfast  piperacillin/tazobactam IVPB.. 3.375 Gram(s) IV Intermittent every 12 hours  rifAXIMin 550 milliGRAM(s) Oral every 12 hours  thiamine 100 milliGRAM(s) Oral daily      PHYSICAL EXAM:   Vital Signs:  Vital Signs Last 24 Hrs  T(C): 36.9 (17 Jan 2025 09:29), Max: 37.3 (17 Jan 2025 00:35)  T(F): 98.4 (17 Jan 2025 09:29), Max: 99.2 (17 Jan 2025 00:35)  HR: 84 (17 Jan 2025 09:29) (65 - 106)  BP: 109/54 (17 Jan 2025 09:29) (104/51 - 129/62)  BP(mean): 89 (16 Jan 2025 18:00) (74 - 89)  RR: 16 (17 Jan 2025 09:29) (16 - 36)  SpO2: 99% (17 Jan 2025 09:29) (95% - 99%)    Parameters below as of 17 Jan 2025 05:00  Patient On (Oxygen Delivery Method): room air      Daily     Daily     PHYSICAL EXAM:   GENERAL:  No acute distress  HEENT:  Normocephalic/atraumatic, +sceral icterus  CHEST:  No accessory muscle use, mild crackles on the right base  HEART:  Regular rate and rhythm  ABDOMEN:  Soft, mild diffuse tenderness, mildly distended, normoactive bowel sounds, splenomegaly, stigmata of chronic liver dz  EXTREMITIES: No cyanosis, clubbing, or edema  SKIN:  No rash  NEURO:  Alert and oriented x 3, no asterixis    LABS: reviewed                        7.4    13.96 )-----------( 73       ( 17 Jan 2025 06:42 )             22.4     01-16    130[L]  |  96  |  39[H]  ----------------------------<  117[H]  4.3   |  22  |  2.59[H]    Ca    8.6      16 Jan 2025 07:39  Phos  4.3     01-17  Mg     2.0     01-17    TPro  6.4  /  Alb  2.5[L]  /  TBili  5.8[H]  /  DBili  2.1[H]  /  AST  32  /  ALT  7[L]  /  AlkPhos  116  01-17    LIVER FUNCTIONS - ( 17 Jan 2025 06:41 )  Alb: 2.5 g/dL / Pro: 6.4 g/dL / ALK PHOS: 116 U/L / ALT: 7 U/L / AST: 32 U/L / GGT: x             Interval Diagnostic Studies: see sunrise for full report
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
## 2025-01-27 NOTE — PROGRESS NOTE ADULT - SUBJECTIVE AND OBJECTIVE BOX
Interventional Radiology Follow-Up Note    This is a 44y Male s/p right chest tube placement x 2 on 1/18/24 and subsequently 1/24/24 in Interventional Radiology with Dr. Webber.     S: Patient seen and examined @ bedside. No complaints offered.     Medication:     buMETAnide Injectable: (01-27)  cefTRIAXone   IVPB: (01-26)  heparin   Injectable: (01-27)  midodrine: (01-27)  rifAXIMin: (01-27)    Vitals:   T(F): 97.7, Max: 98.4 (08:04)  HR: 94  BP: 118/66  RR: 18  SpO2: 98%    Physical Exam:  General: Nontoxic, in NAD, A&O x3.  Abdomen: soft, NTND, no peritoneal signs.  Chest tube x 2 posteriorly dressings dry and intact. Both connected to pleurvac    24hr Drain output:   1/18 Chest tube: 10cc  1/24 Chest tube: 135cc    LABS:  WBC 6.21 / Hgb 7.6 / Hct 23.0 / Plt 61  Na 133 / K 3.9 / CO2 25 / Cl 98 / BUN 47 / Cr 1.84 / Glucose 113  ALT 9 / AST 32 / Alk Phos 105 / Tbili 4.5  Ptt 48.2 / Pt 27.1 / INR 2.40      Assessment/Plan:  45yo M with Hx decompensated EtOH cirrhosis (c/b recurrent ascites, grade II EV, and HE) who presented to OSH on 1/16 with abdominal pain and distension, found to have ascites and acute renal failure requiring initiation of HD (s/p Permacath placement c/b bleeding), transferred to [facility] for transplant evaluation and management. CT chest at OSH was significant for a complex R pleural effusion s/p R pigtail placement by IR on 1/18 with drainage of purulent material c/f empyema. Thoracic Surgery consulted for empyema and chest tube management. Patient is status post unsuccessful chest tube repositioning on 1/23. Patient now s/p 2nd chest tube placed into loculated effusion on 1/24.    -continue global management per primary team  -monitor h/h; transfuse as needed  -trend vs/labs  -Repeat CT today with minimal change in effusion  -Patient potentially going for VATS with thoracic surgery.  -Will keep both chest tubes in place at this time.  -Daily CXR while chest tubes are in place.  -IR to follow.     --  David Chapin NP  Interventional Radiology  Available on [employer] TEAMS / [email or web address]    For EMERGENT inquiries/questions:  IR Pager ([facility]): [phone]    For non-emergent consults/questions:   Please place a sunrise order "Consult- Interventional Radiology" with an appropriate callback number    For questions about scheduling during appropriate work hours, call IR :  [facility]: [phone]    For outpatient IR booking:  [facility]: [phone]

## 2025-01-27 NOTE — PROGRESS NOTE ADULT - ATTENDING COMMENTS
44 year old man with alcohol use disorder, liver cirrhosis complicated by portal hypertension, ascites requiring large volume paracentesis and, esophageal varices and hepatic encephalopathy.  Presented to [facility] on 1/8/25 with abdominal pain and distension, found to have worsening ascites and MANUELITO, PermCath placed on 1/10 and initiated on dialysis, transferred to [facility] for further care. CT chest 1/17 with loculated right effusion, s/p chest tube, culture +ve for Citrobacter. GI PCR +ve for Giardia. Currently on Ceftriaxone, completed metronidazole for giardia.     MANUELITO in the setting of infection (empyema), initiated on dialysis on 1/10/25, last HD done on 1/18/25.   Creatinine has been improving 1.8mg/dL today   UOP fair > 1 liter  Anasarca present   BP acceptable on midodrine   - No need for dialysis, may remove permcath   - Continue midodrine 10mg po q8 hours   - Bumex 2mg iv q8h, 25% Albumin 50ml iv q8h  - Phos normalized, may discontinue sevelamer

## 2025-01-27 NOTE — PRE PROCEDURE NOTE - PRE PROCEDURE EVALUATION
Thoracic Pre-op Note:    CC: Patient is a 44y old  Male who presents with a chief complaint of decompensated cirrhosis (26 Jan 2025 14:21)                                                                                                             Surgeon: Darrell    Procedure: R VATS    Allergies    sulfa drugs (Unknown)  Salicylic Acid (Other)    Intolerances        HPI:  43 yo M with PMH decompensated ETOH cirrhosis c/b recurrent ascites, grade II EV, and HE, right calf hematoma 10/2024 (s/p fall)  presented to [facility] for abdominal pain and distension. Patient found to have ascites on exam and MANUELITO requiring HD initation. PermCath was placed by vascular surgery on 1/10 with post procedure course c/b bleeding from insertion site. Transferred to [facility] SICU for further management.     ETOH use:   -daily drinking since 2015,  up to 1L whiskey every two days.  -dx liver disease approximately 3-4 years ago  attempted alcohol rehab program in the past, but continued to drink        (16 Jan 2025 08:32)      Subjective Assessment:    PAST MEDICAL & SURGICAL HISTORY:  Sleep apnea, obstructive      Alcohol abuse      Cirrhosis of liver      Portal hypertension      H/O esophageal varices      Anemia      Mild alcohol use disorder      No significant past surgical history          MEDICATIONS  (STANDING):  albumin human 25% IVPB 50 milliLiter(s) IV Intermittent every 8 hours  buMETAnide Injectable 2 milliGRAM(s) IV Push <User Schedule>  cefTRIAXone   IVPB      cefTRIAXone   IVPB 2000 milliGRAM(s) IV Intermittent every 24 hours  chlorhexidine 2% Cloths 1 Application(s) Topical daily  folic acid 1 milliGRAM(s) Oral daily  influenza   Vaccine 0.5 milliLiter(s) IntraMuscular once  lactulose Syrup 20 Gram(s) Oral every 12 hours  lidocaine   4% Patch 1 Patch Transdermal every 24 hours  melatonin 5 milliGRAM(s) Oral at bedtime  midodrine 10 milliGRAM(s) Oral every 8 hours  mirtazapine 7.5 milliGRAM(s) Oral at bedtime  Nephro-shania 1 Tablet(s) Oral daily  pantoprazole    Tablet 40 milliGRAM(s) Oral before breakfast  rifAXIMin 550 milliGRAM(s) Oral every 12 hours  sevelamer carbonate 800 milliGRAM(s) Oral three times a day with meals  thiamine 100 milliGRAM(s) Oral daily    MEDICATIONS  (PRN):  albuterol    90 MICROgram(s) HFA Inhaler 2 Puff(s) Inhalation every 6 hours PRN Bronchospasm  traMADol 25 milliGRAM(s) Oral every 8 hours PRN Moderate Pain (4 - 6)  traMADol 50 milliGRAM(s) Oral every 8 hours PRN Severe Pain (7 - 10)        Labs:                        7.6    6.21  )-----------( 61       ( 27 Jan 2025 06:42 )             23.0     01-27    133[L]  |  98  |  47[H]  ----------------------------<  113[H]  3.9   |  25  |  1.84[H]    Ca    9.6      27 Jan 2025 06:42  Phos  4.1     01-27  Mg     1.5     01-27    TPro  7.9  /  Alb  3.1[L]  /  TBili  4.5[H]  /  DBili  1.3[H]  /  AST  32  /  ALT  9[L]  /  AlkPhos  105  01-27    PT/INR - ( 27 Jan 2025 06:42 )   PT: 27.1 sec;   INR: 2.40 ratio         PTT - ( 27 Jan 2025 06:42 )  PTT:48.2 sec    Blood Type: ABO Interpretation: O (01-25 @ 08:35)      Urinalysis Basic - ( 27 Jan 2025 06:42 )    Color: x / Appearance: x / SG: x / pH: x  Gluc: 113 mg/dL / Ketone: x  / Bili: x / Urobili: x   Blood: x / Protein: x / Nitrite: x   Leuk Esterase: x / RBC: x / WBC x   Sq Epi: x / Non Sq Epi: x / Bacteria: x        CXR:     Gen: WN/WD NAD  Neuro: AAOx3, nonfocal  Pulm: CTA B/L  CV: RRR, S1S2  Abd: Soft, NT, ND +BS  Ext: No edema, + peripheral pulses      Pt has AICD/PPM [ ] Yes  [ ] No             Brand Name:  Type & Cross  [ ] Yes  [ ] No  NPO after Midnight [ ] Yes  [ ] No  Pre-op ABX ordered, to be taped on chart:  [ ] Yes  [ ] No     Intraop on Hold: PRBCs [ ]   Consent obtained  [ ] Yes  [ ] No   Thoracic Pre-op Note:    CC: Patient is a 44y old  Male who presents with a chief complaint of decompensated cirrhosis (26 Jan 2025 14:21)                                                                                                             Surgeon: Darrell    Procedure: R VATS    Allergies    sulfa drugs (Unknown)  Salicylic Acid (Other)    Intolerances        HPI:  45 yo M with PMH decompensated ETOH cirrhosis c/b recurrent ascites, grade II EV, and HE, right calf hematoma 10/2024 (s/p fall)  presented to [facility] for abdominal pain and distension. Patient found to have ascites on exam and MANUELITO requiring HD initation. PermCath was placed by vascular surgery on 1/10 with post procedure course c/b bleeding from insertion site. Transferred to [facility] SICU for further management.     ETOH use:   -daily drinking since 2015,  up to 1L whiskey every two days.  -dx liver disease approximately 3-4 years ago  attempted alcohol rehab program in the past, but continued to drink        (16 Jan 2025 08:32)      Subjective Assessment:    PAST MEDICAL & SURGICAL HISTORY:  Sleep apnea, obstructive      Alcohol abuse      Cirrhosis of liver      Portal hypertension      H/O esophageal varices      Anemia      Mild alcohol use disorder      No significant past surgical history          MEDICATIONS  (STANDING):  albumin human 25% IVPB 50 milliLiter(s) IV Intermittent every 8 hours  buMETAnide Injectable 2 milliGRAM(s) IV Push <User Schedule>  cefTRIAXone   IVPB      cefTRIAXone   IVPB 2000 milliGRAM(s) IV Intermittent every 24 hours  chlorhexidine 2% Cloths 1 Application(s) Topical daily  folic acid 1 milliGRAM(s) Oral daily  influenza   Vaccine 0.5 milliLiter(s) IntraMuscular once  lactulose Syrup 20 Gram(s) Oral every 12 hours  lidocaine   4% Patch 1 Patch Transdermal every 24 hours  melatonin 5 milliGRAM(s) Oral at bedtime  midodrine 10 milliGRAM(s) Oral every 8 hours  mirtazapine 7.5 milliGRAM(s) Oral at bedtime  Nephro-shania 1 Tablet(s) Oral daily  pantoprazole    Tablet 40 milliGRAM(s) Oral before breakfast  rifAXIMin 550 milliGRAM(s) Oral every 12 hours  sevelamer carbonate 800 milliGRAM(s) Oral three times a day with meals  thiamine 100 milliGRAM(s) Oral daily    MEDICATIONS  (PRN):  albuterol    90 MICROgram(s) HFA Inhaler 2 Puff(s) Inhalation every 6 hours PRN Bronchospasm  traMADol 25 milliGRAM(s) Oral every 8 hours PRN Moderate Pain (4 - 6)  traMADol 50 milliGRAM(s) Oral every 8 hours PRN Severe Pain (7 - 10)        Labs:                        7.6    6.21  )-----------( 61       ( 27 Jan 2025 06:42 )             23.0     01-27    133[L]  |  98  |  47[H]  ----------------------------<  113[H]  3.9   |  25  |  1.84[H]    Ca    9.6      27 Jan 2025 06:42  Phos  4.1     01-27  Mg     1.5     01-27    TPro  7.9  /  Alb  3.1[L]  /  TBili  4.5[H]  /  DBili  1.3[H]  /  AST  32  /  ALT  9[L]  /  AlkPhos  105  01-27    PT/INR - ( 27 Jan 2025 06:42 )   PT: 27.1 sec;   INR: 2.40 ratio         PTT - ( 27 Jan 2025 06:42 )  PTT:48.2 sec    Blood Type: ABO Interpretation: O (01-25 @ 08:35)      Urinalysis Basic - ( 27 Jan 2025 06:42 )    Color: x / Appearance: x / SG: x / pH: x  Gluc: 113 mg/dL / Ketone: x  / Bili: x / Urobili: x   Blood: x / Protein: x / Nitrite: x   Leuk Esterase: x / RBC: x / WBC x   Sq Epi: x / Non Sq Epi: x / Bacteria: x        CXR:     Gen: WN/WD NAD  Neuro: AAOx3, nonfocal  Pulm: CTA B/L  CV: RRR, S1S2  Abd: Soft, NT, ND +BS  Ext: No edema, + peripheral pulses      Pt has AICD/PPM [ ] Yes  [x ] No             Brand Name:  Type & Cross  [x ] Yes  [ ] No  NPO after Midnight [ x] Yes  [ ] No  Pre-op ABX ordered, to be taped on chart:  [x ] Yes  [ ] No     Intraop on Hold: PRBCs [x ]   Consent obtained  [ ] Yes  [ ] No  ***PT WILL NEED PLATELETS/FFP TOMORROW PRIOR TO SURGERY WITH GOAL PLTS >100 K INR BELOW 1.7  Discussed wIssa Cooley

## 2025-01-27 NOTE — PROGRESS NOTE ADULT - SUBJECTIVE AND OBJECTIVE BOX
[facility] DIVISION OF KIDNEY DISEASES AND HYPERTENSION -- FOLLOW UP NOTE  --------------------------------------------------------------------------------  Chief Complaint: oliguric MANUELITO now on HD     24 hour events/subjective: Pt seen and evaluated bedside this morning. Endorses fatigue, abd distension and LE edema, however all improving. UOP improved with diuretics. Otherwise denies any headaches, nausea, vomiting, fevers/chills, chest pain, SOB,.    PAST HISTORY  --------------------------------------------------------------------------------  No significant changes to PMH, PSH, FHx, SHx, unless otherwise noted    ALLERGIES & MEDICATIONS  --------------------------------------------------------------------------------  Allergies    sulfa drugs (Unknown)  Salicylic Acid (Other)    Intolerances    Standing Inpatient Medications  albumin human 25% IVPB 50 milliLiter(s) IV Intermittent every 8 hours  buMETAnide Injectable 2 milliGRAM(s) IV Push <User Schedule>  cefTRIAXone   IVPB      cefTRIAXone   IVPB 2000 milliGRAM(s) IV Intermittent every 24 hours  chlorhexidine 2% Cloths 1 Application(s) Topical daily  folic acid 1 milliGRAM(s) Oral daily  influenza   Vaccine 0.5 milliLiter(s) IntraMuscular once  lactulose Syrup 20 Gram(s) Oral every 12 hours  lidocaine   4% Patch 1 Patch Transdermal every 24 hours  melatonin 5 milliGRAM(s) Oral at bedtime  midodrine 10 milliGRAM(s) Oral every 8 hours  mirtazapine 7.5 milliGRAM(s) Oral at bedtime  Nephro-shania 1 Tablet(s) Oral daily  pantoprazole    Tablet 40 milliGRAM(s) Oral before breakfast  rifAXIMin 550 milliGRAM(s) Oral every 12 hours  sevelamer carbonate 800 milliGRAM(s) Oral three times a day with meals  thiamine 100 milliGRAM(s) Oral daily    PRN Inpatient Medications  albuterol    90 MICROgram(s) HFA Inhaler 2 Puff(s) Inhalation every 6 hours PRN  traMADol 25 milliGRAM(s) Oral every 8 hours PRN  traMADol 50 milliGRAM(s) Oral every 8 hours PRN    REVIEW OF SYSTEMS  --------------------------------------------------------------------------------  see above     VITALS/PHYSICAL EXAM  --------------------------------------------------------------------------------  T(C): 36.5 (01-27-25 @ 11:20), Max: 37.1 (01-26-25 @ 12:47)  HR: 81 (01-27-25 @ 11:20) (78 - 104)  BP: 111/62 (01-27-25 @ 11:20) (111/62 - 121/68)  RR: 18 (01-27-25 @ 11:20) (16 - 18)  SpO2: 97% (01-27-25 @ 11:20) (94% - 98%)  Wt(kg): --    01-26-25 @ 07:01  -  01-27-25 @ 07:00  --------------------------------------------------------  IN: 1080 mL / OUT: 1145 mL / NET: -65 mL    01-27-25 @ 07:01  -  01-27-25 @ 12:20  --------------------------------------------------------  IN: 240 mL / OUT: 150 mL / NET: 90 mL    Physical Exam:  	Gen: NAD  	HEENT: MMM  	Pulm: CTA B/L  	CV: S1S2  	Abd: +BS, soft, distended   	Ext: +++LE edema B/L (improving)  	Neuro: Awake  	Skin: Warm and dry  	Vascular access: [facility]    LABS/STUDIES  --------------------------------------------------------------------------------              7.6    6.21  >-----------<  61       [01-27-25 @ 06:42]              23.0     133  |  98  |  47  ----------------------------<  113      [01-27-25 @ 06:42]  3.9   |  25  |  1.84        Ca     9.6     [01-27-25 @ 06:42]      Mg     1.5     [01-27-25 @ 06:42]      Phos  4.1     [01-27-25 @ 06:42]    TPro  7.9  /  Alb  3.1  /  TBili  4.5  /  DBili  1.3  /  AST  32  /  ALT  9   /  AlkPhos  105  [01-27-25 @ 06:42]    PT/INR: PT 27.1 , INR 2.40       [01-27-25 @ 06:42]  PTT: 48.2       [01-27-25 @ 06:42]    Creatinine Trend:  SCr 1.84 [01-27 @ 06:42]  SCr 1.94 [01-26 @ 07:07]  SCr 2.26 [01-25 @ 07:13]  SCr 2.59 [01-24 @ 01:41]  SCr 3.05 [01-23 @ 07:04]    Urine Creatinine 84      [01-25-25 @ 11:11]  Urine Protein 13      [01-25-25 @ 11:11]  Urine Sodium 11      [01-25-25 @ 11:11]  Urine Urea Nitrogen 345      [01-25-25 @ 11:11]  Urine Potassium 50      [01-25-25 @ 11:11]  Urine Osmolality 282      [01-25-25 @ 11:11]    Iron 57, TIBC 91, %sat 62      [01-17-25 @ 06:41]  Ferritin 849      [01-17-25 @ 06:41]  TSH 8.18      [01-17-25 @ 06:41]  Lipid: chol 65, TG 46, HDL 26, LDL --      [01-17-25 @ 06:41]    HBsAb 55.9      [01-17-25 @ 06:41]  HBsAb Reactive      [01-17-25 @ 06:41]  HBsAg Nonreact      [01-17-25 @ 06:41]  HBcAb Nonreact      [01-17-25 @ 06:41]  HCV 0.06, Nonreact      [01-16-25 @ 18:23]  HIV Nonreact      [01-17-25 @ 06:41]    IRMA: titer 1:160, pattern Speckled      [01-17-25 @ 06:41]  Syphilis Screen (Treponema Pallidum Ab) Negative      [01-17-25 @ 06:41]

## 2025-01-28 ENCOUNTER — APPOINTMENT (OUTPATIENT)
Dept: THORACIC SURGERY | Facility: HOSPITAL | Age: 45
End: 2025-01-28

## 2025-01-28 ENCOUNTER — TRANSCRIPTION ENCOUNTER (OUTPATIENT)
Age: 45
End: 2025-01-28

## 2025-01-28 LAB
ALBUMIN SERPL ELPH-MCNC: 2.8 G/DL — LOW (ref 3.3–5)
ALBUMIN SERPL ELPH-MCNC: 2.8 G/DL — LOW (ref 3.3–5)
ALP SERPL-CCNC: 102 U/L — SIGNIFICANT CHANGE UP (ref 40–120)
ALP SERPL-CCNC: 81 U/L — SIGNIFICANT CHANGE UP (ref 40–120)
ALT FLD-CCNC: 8 U/L — LOW (ref 10–45)
ALT FLD-CCNC: 9 U/L — LOW (ref 10–45)
ANION GAP SERPL CALC-SCNC: 10 MMOL/L — SIGNIFICANT CHANGE UP (ref 5–17)
ANION GAP SERPL CALC-SCNC: 12 MMOL/L — SIGNIFICANT CHANGE UP (ref 5–17)
APTT BLD: 35.5 SEC — SIGNIFICANT CHANGE UP (ref 24.5–35.6)
APTT BLD: 43.3 SEC — HIGH (ref 24.5–35.6)
APTT BLD: 51.3 SEC — HIGH (ref 24.5–35.6)
AST SERPL-CCNC: 25 U/L — SIGNIFICANT CHANGE UP (ref 10–40)
AST SERPL-CCNC: 27 U/L — SIGNIFICANT CHANGE UP (ref 10–40)
BASOPHILS # BLD AUTO: 0.02 K/UL — SIGNIFICANT CHANGE UP (ref 0–0.2)
BASOPHILS # BLD AUTO: 0.06 K/UL — SIGNIFICANT CHANGE UP (ref 0–0.2)
BASOPHILS NFR BLD AUTO: 0.4 % — SIGNIFICANT CHANGE UP (ref 0–2)
BASOPHILS NFR BLD AUTO: 1 % — SIGNIFICANT CHANGE UP (ref 0–2)
BILIRUB DIRECT SERPL-MCNC: 1.2 MG/DL — HIGH (ref 0–0.3)
BILIRUB INDIRECT FLD-MCNC: 2.5 MG/DL — HIGH (ref 0.2–1)
BILIRUB SERPL-MCNC: 3.7 MG/DL — HIGH (ref 0.2–1.2)
BILIRUB SERPL-MCNC: 4 MG/DL — HIGH (ref 0.2–1.2)
BLD GP AB SCN SERPL QL: NEGATIVE — SIGNIFICANT CHANGE UP
BUN SERPL-MCNC: 40 MG/DL — HIGH (ref 7–23)
BUN SERPL-MCNC: 44 MG/DL — HIGH (ref 7–23)
CALCIUM SERPL-MCNC: 9.3 MG/DL — SIGNIFICANT CHANGE UP (ref 8.4–10.5)
CALCIUM SERPL-MCNC: 9.5 MG/DL — SIGNIFICANT CHANGE UP (ref 8.4–10.5)
CHLORIDE SERPL-SCNC: 97 MMOL/L — SIGNIFICANT CHANGE UP (ref 96–108)
CHLORIDE SERPL-SCNC: 99 MMOL/L — SIGNIFICANT CHANGE UP (ref 96–108)
CO2 SERPL-SCNC: 25 MMOL/L — SIGNIFICANT CHANGE UP (ref 22–31)
CO2 SERPL-SCNC: 25 MMOL/L — SIGNIFICANT CHANGE UP (ref 22–31)
CREAT SERPL-MCNC: 1.39 MG/DL — HIGH (ref 0.5–1.3)
CREAT SERPL-MCNC: 1.69 MG/DL — HIGH (ref 0.5–1.3)
EGFR: 50 ML/MIN/1.73M2 — LOW
EGFR: 50 ML/MIN/1.73M2 — LOW
EGFR: 63 ML/MIN/1.73M2 — SIGNIFICANT CHANGE UP
EGFR: 63 ML/MIN/1.73M2 — SIGNIFICANT CHANGE UP
EOSINOPHIL # BLD AUTO: 0.51 K/UL — HIGH (ref 0–0.5)
EOSINOPHIL # BLD AUTO: 0.67 K/UL — HIGH (ref 0–0.5)
EOSINOPHIL NFR BLD AUTO: 10.1 % — HIGH (ref 0–6)
EOSINOPHIL NFR BLD AUTO: 11.2 % — HIGH (ref 0–6)
GAS PNL BLDA: SIGNIFICANT CHANGE UP
GLUCOSE BLDC GLUCOMTR-MCNC: 104 MG/DL — HIGH (ref 70–99)
GLUCOSE BLDC GLUCOMTR-MCNC: 131 MG/DL — HIGH (ref 70–99)
GLUCOSE SERPL-MCNC: 138 MG/DL — HIGH (ref 70–99)
GLUCOSE SERPL-MCNC: 191 MG/DL — HIGH (ref 70–99)
HCT VFR BLD CALC: 17.8 % — CRITICAL LOW (ref 39–50)
HCT VFR BLD CALC: 18.3 % — CRITICAL LOW (ref 39–50)
HCT VFR BLD CALC: 23.1 % — LOW (ref 39–50)
HGB BLD-MCNC: 6.1 G/DL — CRITICAL LOW (ref 13–17)
HGB BLD-MCNC: 6.2 G/DL — CRITICAL LOW (ref 13–17)
HGB BLD-MCNC: 7.7 G/DL — LOW (ref 13–17)
IMM GRANULOCYTES NFR BLD AUTO: 0.2 % — SIGNIFICANT CHANGE UP (ref 0–0.9)
IMM GRANULOCYTES NFR BLD AUTO: 0.8 % — SIGNIFICANT CHANGE UP (ref 0–0.9)
INR BLD: 1.66 RATIO — HIGH (ref 0.85–1.16)
INR BLD: 1.95 RATIO — HIGH (ref 0.85–1.16)
INR BLD: 2.57 RATIO — HIGH (ref 0.85–1.16)
LYMPHOCYTES # BLD AUTO: 0.72 K/UL — LOW (ref 1–3.3)
LYMPHOCYTES # BLD AUTO: 0.96 K/UL — LOW (ref 1–3.3)
LYMPHOCYTES # BLD AUTO: 14.2 % — SIGNIFICANT CHANGE UP (ref 13–44)
LYMPHOCYTES # BLD AUTO: 16.1 % — SIGNIFICANT CHANGE UP (ref 13–44)
MAGNESIUM SERPL-MCNC: 1.6 MG/DL — SIGNIFICANT CHANGE UP (ref 1.6–2.6)
MAGNESIUM SERPL-MCNC: 1.7 MG/DL — SIGNIFICANT CHANGE UP (ref 1.6–2.6)
MCHC RBC-ENTMCNC: 30.3 PG — SIGNIFICANT CHANGE UP (ref 27–34)
MCHC RBC-ENTMCNC: 30.7 PG — SIGNIFICANT CHANGE UP (ref 27–34)
MCHC RBC-ENTMCNC: 31.3 PG — SIGNIFICANT CHANGE UP (ref 27–34)
MCHC RBC-ENTMCNC: 33.3 G/DL — SIGNIFICANT CHANGE UP (ref 32–36)
MCHC RBC-ENTMCNC: 33.3 G/DL — SIGNIFICANT CHANGE UP (ref 32–36)
MCHC RBC-ENTMCNC: 34.8 G/DL — SIGNIFICANT CHANGE UP (ref 32–36)
MCV RBC AUTO: 89.9 FL — SIGNIFICANT CHANGE UP (ref 80–100)
MCV RBC AUTO: 91 FL — SIGNIFICANT CHANGE UP (ref 80–100)
MCV RBC AUTO: 92 FL — SIGNIFICANT CHANGE UP (ref 80–100)
MONOCYTES # BLD AUTO: 0.6 K/UL — SIGNIFICANT CHANGE UP (ref 0–0.9)
MONOCYTES # BLD AUTO: 0.65 K/UL — SIGNIFICANT CHANGE UP (ref 0–0.9)
MONOCYTES NFR BLD AUTO: 10.9 % — SIGNIFICANT CHANGE UP (ref 2–14)
MONOCYTES NFR BLD AUTO: 11.9 % — SIGNIFICANT CHANGE UP (ref 2–14)
NEUTROPHILS # BLD AUTO: 3.2 K/UL — SIGNIFICANT CHANGE UP (ref 1.8–7.4)
NEUTROPHILS # BLD AUTO: 3.59 K/UL — SIGNIFICANT CHANGE UP (ref 1.8–7.4)
NEUTROPHILS NFR BLD AUTO: 60 % — SIGNIFICANT CHANGE UP (ref 43–77)
NEUTROPHILS NFR BLD AUTO: 63.2 % — SIGNIFICANT CHANGE UP (ref 43–77)
NRBC # BLD: 0 /100 WBCS — SIGNIFICANT CHANGE UP (ref 0–0)
NRBC BLD-RTO: 0 /100 WBCS — SIGNIFICANT CHANGE UP (ref 0–0)
PHOSPHATE SERPL-MCNC: 3.8 MG/DL — SIGNIFICANT CHANGE UP (ref 2.5–4.5)
PHOSPHATE SERPL-MCNC: 4 MG/DL — SIGNIFICANT CHANGE UP (ref 2.5–4.5)
PLATELET # BLD AUTO: 127 K/UL — LOW (ref 150–400)
PLATELET # BLD AUTO: 48 K/UL — LOW (ref 150–400)
PLATELET # BLD AUTO: 58 K/UL — LOW (ref 150–400)
POTASSIUM SERPL-MCNC: 3.5 MMOL/L — SIGNIFICANT CHANGE UP (ref 3.5–5.3)
POTASSIUM SERPL-MCNC: 3.5 MMOL/L — SIGNIFICANT CHANGE UP (ref 3.5–5.3)
POTASSIUM SERPL-SCNC: 3.5 MMOL/L — SIGNIFICANT CHANGE UP (ref 3.5–5.3)
POTASSIUM SERPL-SCNC: 3.5 MMOL/L — SIGNIFICANT CHANGE UP (ref 3.5–5.3)
PROT SERPL-MCNC: 6.3 G/DL — SIGNIFICANT CHANGE UP (ref 6–8.3)
PROT SERPL-MCNC: 6.9 G/DL — SIGNIFICANT CHANGE UP (ref 6–8.3)
PROTHROM AB SERPL-ACNC: 18.8 SEC — HIGH (ref 9.9–13.4)
PROTHROM AB SERPL-ACNC: 22.1 SEC — HIGH (ref 9.9–13.4)
PROTHROM AB SERPL-ACNC: 29 SEC — HIGH (ref 9.9–13.4)
RAPIDTEG MAXIMUM AMPLITUDE: 40.8 MM — LOW (ref 52–70)
RAPIDTEG MAXIMUM AMPLITUDE: 61.4 MM — SIGNIFICANT CHANGE UP (ref 52–70)
RBC # BLD: 1.98 M/UL — LOW (ref 4.2–5.8)
RBC # BLD: 2.01 M/UL — LOW (ref 4.2–5.8)
RBC # BLD: 2.51 M/UL — LOW (ref 4.2–5.8)
RBC # FLD: 16.8 % — HIGH (ref 10.3–14.5)
RBC # FLD: 18.1 % — HIGH (ref 10.3–14.5)
RBC # FLD: 18.8 % — HIGH (ref 10.3–14.5)
RH IG SCN BLD-IMP: POSITIVE — SIGNIFICANT CHANGE UP
SODIUM SERPL-SCNC: 132 MMOL/L — LOW (ref 135–145)
SODIUM SERPL-SCNC: 136 MMOL/L — SIGNIFICANT CHANGE UP (ref 135–145)
TEG FUNCTIONAL FIBRINOGEN: 11.7 MM — LOW (ref 15–32)
TEG FUNCTIONAL FIBRINOGEN: 20.7 MM — SIGNIFICANT CHANGE UP (ref 15–32)
TEG LY30 (LYSIS): 0 % — SIGNIFICANT CHANGE UP (ref 0–2.6)
TEG LY30 (LYSIS): 2.3 % — SIGNIFICANT CHANGE UP (ref 0–2.6)
TEG REACTION TIME: 11.1 MIN — HIGH (ref 4.6–9.1)
TEG REACTION TIME: 5.7 MIN — SIGNIFICANT CHANGE UP (ref 4.6–9.1)
WBC # BLD: 5.06 K/UL — SIGNIFICANT CHANGE UP (ref 3.8–10.5)
WBC # BLD: 5.98 K/UL — SIGNIFICANT CHANGE UP (ref 3.8–10.5)
WBC # BLD: 8.29 K/UL — SIGNIFICANT CHANGE UP (ref 3.8–10.5)
WBC # FLD AUTO: 5.06 K/UL — SIGNIFICANT CHANGE UP (ref 3.8–10.5)
WBC # FLD AUTO: 5.98 K/UL — SIGNIFICANT CHANGE UP (ref 3.8–10.5)
WBC # FLD AUTO: 8.29 K/UL — SIGNIFICANT CHANGE UP (ref 3.8–10.5)

## 2025-01-28 PROCEDURE — 31624 DX BRONCHOSCOPE/LAVAGE: CPT

## 2025-01-28 PROCEDURE — 32200 DRAIN OPEN LUNG LESION: CPT | Mod: 22,RT

## 2025-01-28 PROCEDURE — 31645 BRNCHSC W/THER ASPIR 1ST: CPT

## 2025-01-28 PROCEDURE — 99232 SBSQ HOSP IP/OBS MODERATE 35: CPT | Mod: GC

## 2025-01-28 PROCEDURE — S2900 ROBOTIC SURGICAL SYSTEM: CPT | Mod: NC

## 2025-01-28 PROCEDURE — 99232 SBSQ HOSP IP/OBS MODERATE 35: CPT

## 2025-01-28 PROCEDURE — 71045 X-RAY EXAM CHEST 1 VIEW: CPT | Mod: 26

## 2025-01-28 PROCEDURE — 32653 THORACOSCOPY REMOV FB/FIBRIN: CPT | Mod: 22,RT

## 2025-01-28 PROCEDURE — 32652 THORACOSCOPY REM TOTL CORTEX: CPT | Mod: 22,RT

## 2025-01-28 PROCEDURE — ZZZZZ: CPT

## 2025-01-28 DEVICE — PROGEL PLEURAL AIR LEAK 4ML: Type: IMPLANTABLE DEVICE | Status: FUNCTIONAL

## 2025-01-28 DEVICE — SURGICEL 2 X 14": Type: IMPLANTABLE DEVICE | Status: FUNCTIONAL

## 2025-01-28 DEVICE — ARISTA 3GR: Type: IMPLANTABLE DEVICE | Status: FUNCTIONAL

## 2025-01-28 DEVICE — CHEST DRAIN THORACIC ARGYLE PVC 28FR STRAIGHT: Type: IMPLANTABLE DEVICE | Status: FUNCTIONAL

## 2025-01-28 DEVICE — CHEST DRAIN THORACIC ARGYLE PVC 24FR RIGHT ANGLE: Type: IMPLANTABLE DEVICE | Status: FUNCTIONAL

## 2025-01-28 RX ORDER — NOREPINEPHRINE BITARTRATE 8 MG
0.03 SOLUTION INTRAVENOUS
Qty: 8 | Refills: 0 | Status: DISCONTINUED | OUTPATIENT
Start: 2025-01-28 | End: 2025-01-28

## 2025-01-28 RX ORDER — DEXMEDETOMIDINE HYDROCHLORIDE IN SODIUM CHLORIDE 4 UG/ML
0.5 INJECTION INTRAVENOUS
Qty: 200 | Refills: 0 | Status: DISCONTINUED | OUTPATIENT
Start: 2025-01-28 | End: 2025-01-29

## 2025-01-28 RX ORDER — MAGNESIUM SULFATE 500 MG/ML
1 SYRINGE (ML) INJECTION ONCE
Refills: 0 | Status: COMPLETED | OUTPATIENT
Start: 2025-01-28 | End: 2025-01-28

## 2025-01-28 RX ORDER — MAGNESIUM OXIDE 400 MG
400 TABLET ORAL
Refills: 0 | Status: DISCONTINUED | OUTPATIENT
Start: 2025-01-28 | End: 2025-01-28

## 2025-01-28 RX ORDER — BUMETANIDE 1 MG/1
2 TABLET ORAL
Refills: 0 | Status: DISCONTINUED | OUTPATIENT
Start: 2025-01-28 | End: 2025-01-30

## 2025-01-28 RX ORDER — CEFTRIAXONE 500 MG/1
1000 INJECTION, POWDER, FOR SOLUTION INTRAMUSCULAR; INTRAVENOUS EVERY 24 HOURS
Refills: 0 | Status: DISCONTINUED | OUTPATIENT
Start: 2025-01-28 | End: 2025-01-29

## 2025-01-28 RX ORDER — HYDROMORPHONE/SOD CHLOR,ISO/PF 2 MG/10 ML
0.5 SYRINGE (ML) INJECTION
Refills: 0 | Status: DISCONTINUED | OUTPATIENT
Start: 2025-01-28 | End: 2025-01-30

## 2025-01-28 RX ADMIN — BUMETANIDE 2 MILLIGRAM(S): 1 TABLET ORAL at 10:52

## 2025-01-28 RX ADMIN — Medication 100 GRAM(S): at 04:49

## 2025-01-28 RX ADMIN — Medication 20 MILLIEQUIVALENT(S): at 04:49

## 2025-01-28 RX ADMIN — Medication 40 MILLIGRAM(S): at 05:06

## 2025-01-28 RX ADMIN — TRAMADOL HYDROCHLORIDE 50 MILLIGRAM(S): 50 TABLET, FILM COATED ORAL at 00:15

## 2025-01-28 RX ADMIN — TRAMADOL HYDROCHLORIDE 25 MILLIGRAM(S): 50 TABLET, FILM COATED ORAL at 05:05

## 2025-01-28 RX ADMIN — TRAMADOL HYDROCHLORIDE 25 MILLIGRAM(S): 50 TABLET, FILM COATED ORAL at 06:00

## 2025-01-28 RX ADMIN — MIDODRINE HYDROCHLORIDE 10 MILLIGRAM(S): 5 TABLET ORAL at 05:05

## 2025-01-28 RX ADMIN — ALBUMIN (HUMAN) 50 MILLILITER(S): 12.5 INJECTION, SOLUTION INTRAVENOUS at 10:49

## 2025-01-28 NOTE — PROGRESS NOTE ADULT - ATTENDING COMMENTS
44 year old man with alcohol use disorder, liver cirrhosis complicated by portal hypertension, ascites requiring large volume paracentesis and, esophageal varices and hepatic encephalopathy.  Presented to [facility] on 1/8/25 with abdominal pain and distension, found to have worsening ascites and MANUELITO, PermCath placed on 1/10 and initiated on dialysis, transferred to [facility] for further care. CT chest 1/17 with loculated right effusion, s/p chest tube, culture +ve for Citrobacter. GI PCR +ve for Giardia. Currently on Ceftriaxone, completed metronidazole for giardia.     MANUELITO in the setting of infection (empyema), initiated on dialysis on 1/10/25, last HD done on 1/18/25.   Creatinine has been improving 1.69mg/dL today   UOP good 1.4 liter  Anasarca present   BP acceptable on midodrine   - No need for dialysis, may remove PermCath   - Continue midodrine 10mg po q8 hours   - Bumex 2mg iv q8h, 25% Albumin 50ml iv q8h  - Phos normalized, may discontinue sevelamer   - Wrap bilateral LE with ACE bandages to reduce edema

## 2025-01-28 NOTE — CONSULT NOTE ADULT - ASSESSMENT
44M PMH decompensated ETOH cirrhosis c/b recurrent ascites, grade II esophageal varices, and hepatic encephalopathy, who presented to OSH with abd pain and distension found to have decompensated EtOH cirrhosis with MELD 30, MANUELITO (HRS vs ATN; previous HD need with [facility] PC) found to have R empyema s/p R pigtail placement w/ IR 1/18 requiring several adjustments, s/p R robotic VATS with decortication (Dr. Ramírez 1/28/2025) for loculated empyema. OR course with oropharyngeal bleeding during intubation, coagulopathy/elevated EBL during case.     EBL 1.5L, 3 cryo, 3 platelets, 2 PRBC, 1 TXA, 3FFP, 1 crystal    NEUROLOGIC   - Precedex for sedation  - Dilaudid PRN pain/agitation  - Plan to wean to extubation likely 1/29 AM pending bloody airway secretions    RESPIRATORY   - Ventilator , RR 18, PEEP 10, FiO2 30  - Wean vent to SBT in AM  - 3x chest tubes to suction 20 cm H2O via dry pleurevacs    CARDIOVASCULAR   - Monitor hemodynamics   - SBP goal >110    GASTROINTESTINAL   - Diet: NPO    /RENAL   - IVL; Getting Bumex BID  - Prev HD; transplant nephro following  - Strict I/Os    HEMATOLOGIC  - Monitor H/H   - Will monitor TEG and transfuse PRN  - DVT ppx: SCDs, holding chem dvt ppx given coagulopathy    INFECTIOUS DISEASE  - CTX for empyema    ENDOCRINE  - Monitor gluc    DISPO: SICU  --------------------------------------------------------------------------------------

## 2025-01-28 NOTE — PROGRESS NOTE ADULT - SUBJECTIVE AND OBJECTIVE BOX
Interval Events:   - Given 1 unit pRBC for Hgb 6.1 overnight  - Chest tube output: 140 cc in 24 hours  - Ct chest showed persistent effusion     ROS:   12 point review of systems performed and negative except otherwise noted in HPI.    Hospital Medications:  albuterol    90 MICROgram(s) HFA Inhaler 2 Puff(s) Inhalation every 6 hours PRN  chlorhexidine 2% Cloths 1 Application(s) Topical <User Schedule>  folic acid 1 milliGRAM(s) Oral daily  lactulose Syrup 20 Gram(s) Oral every 12 hours  melatonin 5 milliGRAM(s) Oral at bedtime  midodrine 10 milliGRAM(s) Oral every 8 hours  Nephro-shania 1 Tablet(s) Oral daily  pantoprazole    Tablet 40 milliGRAM(s) Oral before breakfast  piperacillin/tazobactam IVPB.. 3.375 Gram(s) IV Intermittent every 12 hours  rifAXIMin 550 milliGRAM(s) Oral every 12 hours  thiamine 100 milliGRAM(s) Oral daily      PHYSICAL EXAM:   Vital Signs:  Vital Signs Last 24 Hrs  T(C): 36.9 (17 Jan 2025 09:29), Max: 37.3 (17 Jan 2025 00:35)  T(F): 98.4 (17 Jan 2025 09:29), Max: 99.2 (17 Jan 2025 00:35)  HR: 84 (17 Jan 2025 09:29) (65 - 106)  BP: 109/54 (17 Jan 2025 09:29) (104/51 - 129/62)  BP(mean): 89 (16 Jan 2025 18:00) (74 - 89)  RR: 16 (17 Jan 2025 09:29) (16 - 36)  SpO2: 99% (17 Jan 2025 09:29) (95% - 99%)    Parameters below as of 17 Jan 2025 05:00  Patient On (Oxygen Delivery Method): room air      Daily     Daily     PHYSICAL EXAM:   GENERAL:  No acute distress  HEENT:  Normocephalic/atraumatic, +sceral icterus  CHEST:  No accessory muscle use, mild crackles on the right base  HEART:  Regular rate and rhythm  ABDOMEN:  Soft, mild diffuse tenderness, mildly distended, normoactive bowel sounds, splenomegaly, stigmata of chronic liver dz  EXTREMITIES: No cyanosis, clubbing, or edema  SKIN:  No rash  NEURO:  Alert and oriented x 3, no asterixis    LABS: reviewed                        7.4    13.96 )-----------( 73       ( 17 Jan 2025 06:42 )             22.4     01-16    130[L]  |  96  |  39[H]  ----------------------------<  117[H]  4.3   |  22  |  2.59[H]    Ca    8.6      16 Jan 2025 07:39  Phos  4.3     01-17  Mg     2.0     01-17    TPro  6.4  /  Alb  2.5[L]  /  TBili  5.8[H]  /  DBili  2.1[H]  /  AST  32  /  ALT  7[L]  /  AlkPhos  116  01-17    LIVER FUNCTIONS - ( 17 Jan 2025 06:41 )  Alb: 2.5 g/dL / Pro: 6.4 g/dL / ALK PHOS: 116 U/L / ALT: 7 U/L / AST: 32 U/L / GGT: x             Interval Diagnostic Studies: see sunrise for full report

## 2025-01-28 NOTE — PROGRESS NOTE ADULT - ASSESSMENT
43yo M with Hx decompensated EtOH cirrhosis (c/b recurrent ascites, grade II EV, and HE) who presented to OSH on 1/16 with abdominal pain and distension, found to have ascites and acute renal failure requiring initiation of HD (s/p Permacath placement c/b bleeding), transferred to [facility] for transplant evaluation and management. CT chest at OSH was significant for a complex R pleural effusion s/p R pigtail placement by IR on 1/18 with drainage of purulent material c/f empyema. Thoracic Surgery consulted for empyema and chest tube management.   1/20 R pigtail drained 700/910, CT of Chest on Tuesday to assess effusion and make final plan determination  1/21 R pigtail drained 80/240, CT of chest to assess effusion today   1/22 R pigtail maintained. CT of chest revealing:   < from: CT Chest No Cont (01.21.25 @ 19:29) >  IMPRESSION:.  Marked interval decrease in size ofright-sided pleural fluid and gas   collection (presumably empyema) status post placement of pigtail catheter   as described above.  Much interval improvement of posterior right upper lobe and right lower   lobe consolidation.  New patchy ground-glass opacities at the left apex may be related to   edema or be infectious/inflammatory in etiology.  New small left pleural effusion. < end of copied text >  1/23 R PTC  (placed by IR 1/18) --> LWS this AM; patient NPO for IR repositioning/swap of chest tube today. Monitor Chest tube outputs  1/24 VSS  pigtail  unsuccessful repositioning  1/23 - output 1120 overnight -AM  XRay pending  1/25 Daily CXR;  pulm toileting. rpt CT imaging on MONDAY per Thoracic Attending  1/26 VSS R Ct # 1 minimal drainage.  RCT #2- w/ 180cc in last 24 hrs keep to lws - rpt imaging Monday to assess need for RVATS decort.  1/28 Keep NPO for Right VATs decort today with Dr. Ramírez. H/H 6.1/18.3, Platelets 48K, INR 2.57, please transfuse PRBC/PLATELETS/FFP WITH GOAL Hgb >8, PLTS >100 K, INR BELOW 1.7.

## 2025-01-28 NOTE — BRIEF OPERATIVE NOTE - NSICDXBRIEFPROCEDURE_GEN_ALL_CORE_FT
PROCEDURES:  Robot-assisted video-assisted thoracic surgery (VATS) using da Moises Si 29-Jan-2025 10:27:47  Chuck Ni
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
## 2025-01-28 NOTE — BH CONSULTATION LIAISON PROGRESS NOTE - NSBHASSESSMENTFT_PSY_ALL_CORE
Pt is a 45 yo  man with PMH decompensated ETOH cirrhosis c/b recurrent ascites, grade II EV, and HE, right calf hematoma 10/2024 (s/p fall)  presented to [facility] for abdominal pain and distension. Patient found to have ascites on exam and MANUELITO requiring HD initiation. PermCath was placed by vascular surgery on 1/10 with post procedure course c/b bleeding from insertion site. Transferred to [facility] SICU for further management.   Psych consulted for alcohol use disorder. Patient reports that heavy alcohol consumption began 2016 after he father passed away and he moved to [place]. Pt was in rehabilitation twice prior, 2021 and 2023, pt was sober for about 1 yr between two rehabs. Pt began drinking again since 03/2024. Pt reports that he was in [place] 5/2024 and did not drink while he was there because it was election month, no drinking was allowed. Pt says he was in the ICU in Nov 2024 and decided to stop drinking them and has not had any alcohol since the first week in Nov, 2024.   Pt is highly motivated to follow treatment recommendations but says he has not yet been educated regarding risks and benefits of immunosuppressant medications.     1/22/25 Pt states that he was diagnosed with pneumonia and his ascites is much worse now so he has not yet spoken with staff about the transplant process.  He is in back pain and having much difficulty ambulating though walking to the bath room with much effort using his walker.  He is hopeful that his condition will improve when he receives a transplant but says the must first recover from infections.                 1/23/25:  Pt demonstrates overall good understanding of transplant process including potential risks and post-operative recovery needs including but not limited to need for immunosuppression, risk of infection, and lifestyle modifications. Admits to some mild anxiety in context of medical stressors with good therapeutic response to mirtazapine which we will recommend continuing. Reporting overall stable mood, is notably future-oriented, is motivated to maintain sobriety from alcohol (last use 11/4/24). Patient is adherent to current recommendations made by transplant team and motivated to continue to follow any future suggestions. Is open to considering counseling or support groups should anxiety worsen post-transplant. Endorses primary family support for this process is his mother.     DDX;  Pre-transplant evaluation  R/O adjustment disorder with anxiety    Plan  -Continue mirtazapine 7.5 mg QHS  - continue with melatonin 3 mg QHS  - SW assistance for referral to inpatient rehab appreciated. Patient is motivated to attend and complete the program.   - Discussed with patient additional psychiatric resources  for anxiety management including counseling/therapy/groups. Patient is open to exploring that in the future if anxiety worsens.  - SIPAT score: 23,  Pt minimally acceptable candidate. Would consider listing and will benefit greatly from ongoing counseling for alcohol use, and further treatment in inpatient rehab program.    - Dispo: likely to inpatient rehab. no indication for inpatient psychiatric hospitalization

## 2025-01-28 NOTE — PROGRESS NOTE ADULT - ASSESSMENT
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 Actively drinking. Now p/w abdominal pain/distension with worsening of liver tests possibly triggered by infection. DDx also includes alcohol associated hepatitis given hx of alc hep 7/2024 which improved with steroids  - OLT: evaluation ongoing, pending cardiac evaluation and empyema clearance  - EV: EGD 7/2024 for melena showed grade II EV, small gastric ulcer (neg HP), and portal gastropathy   - HE: c/w lactulose and rifaximin, goal 3-4 BM/day  - HCC screening: pending MRI read  - MANUELITO: C/w bumex 2 mg IV BID for albumin assisted diuresis  - C/w home midodrine 10 mg tid  - C/w PPI for stress ulcer ppx and hx of ulcer  - Trend MELD labs daily (cmp, INR) daily  - Strict I/Os, daily wts    #Empyema  #Leukocytosis  CT chest noncontrast reviewed from OSH showing moderate sized complex R sided pleural effusion. s/p chest tube placed 1/18 with cultures growing Citrobacter c/w empyema. Required 2nd chest tube for persistent effusion. Still persistent empyema on CT scan  - F/u thoracic surgery recs for VATS. Clarify need with ID and CTS  - C/w CTX 2 g IV daily  - Maintain chest tubes in place for drainage  - Trend cbc and fever curve  - Appreciate thoracic surgery and ID recs    #MANUELTIO, improving  Found to have acute renal failure requiring dialysis at OSH, ddx includes HRS vs ATN. Permacath in place. Cr baseline 1.12 10/2024  - C/w albumin assisted diuresis with bumex 2 mg q12h  - IR consult to remove tunneled cath  - Strict I/Os, daily wts  - Trend CMP daily  - Appreciate transplant renal recs    #Anemia  #Thrombocytopenia  Normocytic anemia with Hgb 8.5 and plt count 64 likely due to chronic liver dz  - Transfuse Hgb>7, plt>10 (>20 if septic, >50 if bleeding)  - Send TEG to guide transfusion  - Trend cbc daily  - Hold DVT ppx for procedure today    All recommendations are tentative until note is attested by attending.     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 Actively drinking. Now p/w abdominal pain/distension with worsening of liver tests possibly triggered by infection. DDx also includes alcohol associated hepatitis given hx of alc hep 7/2024 which improved with steroids  - OLT: evaluation ongoing, pending cardiac evaluation and empyema clearance  - EV: EGD 7/2024 for melena showed grade II EV, small gastric ulcer (neg HP), and portal gastropathy   - HE: c/w lactulose and rifaximin, goal 3-4 BM/day  - HCC screening: pending MRI read  - MANUELITO: C/w bumex 2 mg IV BID for albumin assisted diuresis  - C/w home midodrine 10 mg tid  - C/w PPI for stress ulcer ppx and hx of ulcer  - Trend MELD labs daily (cmp, INR) daily  - Strict I/Os, daily wts    #Empyema  #Leukocytosis  CT chest noncontrast reviewed from OSH showing moderate sized complex R sided pleural effusion. s/p chest tube placed 1/18 with cultures growing Citrobacter c/w empyema. Required 2nd chest tube for persistent effusion. Still persistent empyema on CT scan  - F/u thoracic surgery recs for VATS. Clarify need with ID and CTS  - C/w CTX 2 g IV daily  - Maintain chest tubes in place for drainage  - Trend cbc and fever curve  - Appreciate thoracic surgery and ID recs    #MANUELITO, improving  Found to have acute renal failure requiring dialysis at OSH, ddx includes HRS vs ATN. Permacath in place. Cr baseline 1.12 10/2024  - C/w albumin assisted diuresis with bumex 2 mg q12h  - IR consult to remove tunneled cath  - Strict I/Os, daily wts  - Trend CMP daily  - Appreciate transplant renal recs    #Anemia  #Thrombocytopenia  Normocytic anemia with Hgb 8.5 and plt count 64 likely due to chronic liver dz  - s/p 1 unit pRBXC 1/28. Repeat cbc in pm  - Transfuse Hgb>7, plt>10 (>20 if septic, >50 if bleeding)  - Send TEG to guide transfusion  - Hold DVT ppx for procedure today    All recommendations are tentative until note is attested by attending.     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 Actively drinking. Now p/w abdominal pain/distension with worsening of liver tests possibly triggered by infection. DDx also includes alcohol associated hepatitis given hx of alc hep 7/2024 which improved with steroids  - OLT: evaluation ongoing, pending cardiac evaluation and empyema clearance  - EV: EGD 7/2024 for melena showed grade II EV, small gastric ulcer (neg HP), and portal gastropathy   - HE: c/w lactulose and rifaximin, goal 3-4 BM/day  - HCC screening: pending MRI read  - MANUELITO: C/w bumex 2 mg IV BID for albumin assisted diuresis  - C/w home midodrine 10 mg tid  - C/w PPI for stress ulcer ppx and hx of ulcer  - Trend MELD labs daily (cmp, INR) daily  - Strict I/Os, daily wts    #Empyema  #Leukocytosis  CT chest noncontrast reviewed from OSH showing moderate sized complex R sided pleural effusion. s/p chest tube placed 1/18 with cultures growing Citrobacter c/w empyema. Required 2nd chest tube for persistent effusion. Still persistent empyema on CT scan  - F/u thoracic surgery recs for VATS. Clarify need with ID and CTS  - C/w CTX 2 g IV daily  - Maintain chest tubes in place for drainage  - Trend cbc and fever curve  - Appreciate thoracic surgery and ID recs    #MANUELITO, improving  Found to have acute renal failure requiring dialysis at OSH, ddx includes HRS vs ATN. Permacath in place. Cr baseline 1.12 10/2024  - C/w albumin assisted diuresis with bumex 2 mg q12h  - IR consult to remove tunneled cath  - Strict I/Os, daily wts  - Trend CMP daily  - Appreciate transplant renal recs    #Anemia  #Thrombocytopenia  Normocytic anemia with Hgb 8.5 and plt count 64 likely due to chronic liver dz  - s/p 1 unit pRBC 1/28. Repeat cbc in pm  - Transfuse Hgb>7, plt>10 (>20 if septic, >50 if bleeding)  - Send TEG to guide transfusion  - Hold DVT ppx for procedure today    All recommendations are tentative until note is attested by attending.     Judy Tucker, PGY4  Gastroenterology/Hepatology Fellow  Available on Microsoft Teams  [phone] (Long Range Pager)  [phone] (Short Range Pager [facility])    After 5 pm, please contact the on-call GI fellow for any urgent issues via the Hospital Call

## 2025-01-28 NOTE — PROGRESS NOTE ADULT - ATTENDING COMMENTS
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

## 2025-01-28 NOTE — BRIEF OPERATIVE NOTE - OPERATION/FINDINGS
3 port robotic VATS/decortication for a loculated pleural effusion, course complicated by coagulapathy secondary to alcoholic cirrhosis. Right lower lobe loculated pleural effusion. Placement of 3 chest tubes (anterior, posterior, and diaphragm)

## 2025-01-28 NOTE — PROGRESS NOTE ADULT - SUBJECTIVE AND OBJECTIVE BOX
[facility] DIVISION OF KIDNEY DISEASES AND HYPERTENSION -- FOLLOW UP NOTE  --------------------------------------------------------------------------------  Chief Complaint: divina    24 hour events/subjective: Pt seen and evaluated bedside this morning. Endorses fatigue, abd distension and LE edema, however all improving. Otherwise denies any headaches, nausea, vomiting, fevers/chills, chest pain, SOB    PAST HISTORY  --------------------------------------------------------------------------------  No significant changes to PMH, PSH, FHx, SHx, unless otherwise noted    ALLERGIES & MEDICATIONS  --------------------------------------------------------------------------------  Allergies    sulfa drugs (Unknown)  Salicylic Acid (Other)    Intolerances      Standing Inpatient Medications  albumin human 25% IVPB 50 milliLiter(s) IV Intermittent every 8 hours  buMETAnide Injectable 2 milliGRAM(s) IV Push <User Schedule>  cefTRIAXone   IVPB 2000 milliGRAM(s) IV Intermittent every 24 hours  cefTRIAXone   IVPB      chlorhexidine 2% Cloths 1 Application(s) Topical daily  folic acid 1 milliGRAM(s) Oral daily  influenza   Vaccine 0.5 milliLiter(s) IntraMuscular once  lactulose Syrup 20 Gram(s) Oral every 12 hours  lidocaine   4% Patch 1 Patch Transdermal every 24 hours  magnesium oxide 400 milliGRAM(s) Oral two times a day with meals  melatonin 5 milliGRAM(s) Oral at bedtime  midodrine 10 milliGRAM(s) Oral every 8 hours  mirtazapine 7.5 milliGRAM(s) Oral at bedtime  Nephro-shania 1 Tablet(s) Oral daily  pantoprazole    Tablet 40 milliGRAM(s) Oral before breakfast  rifAXIMin 550 milliGRAM(s) Oral every 12 hours  sevelamer carbonate 800 milliGRAM(s) Oral three times a day with meals  thiamine 100 milliGRAM(s) Oral daily    PRN Inpatient Medications  albuterol    90 MICROgram(s) HFA Inhaler 2 Puff(s) Inhalation every 6 hours PRN  traMADol 25 milliGRAM(s) Oral every 8 hours PRN  traMADol 50 milliGRAM(s) Oral every 8 hours PRN      REVIEW OF SYSTEMS  --------------------------------------------------------------------------------  see above    VITALS/PHYSICAL EXAM  --------------------------------------------------------------------------------  T(C): 36.8 (01-28-25 @ 11:57), Max: 36.9 (01-28-25 @ 04:40)  HR: 88 (01-28-25 @ 11:57) (84 - 118)  BP: 113/63 (01-28-25 @ 11:57) (113/63 - 127/74)  RR: 18 (01-28-25 @ 11:57) (18 - 18)  SpO2: 96% (01-28-25 @ 11:57) (91% - 98%)  Wt(kg): --    01-27-25 @ 07:01  -  01-28-25 @ 07:00  --------------------------------------------------------  IN: 1010 mL / OUT: 1565 mL / NET: -555 mL    01-28-25 @ 07:01  -  01-28-25 @ 12:41  --------------------------------------------------------  IN: 0 mL / OUT: 475 mL / NET: -475 mL    Physical Exam:  	Gen: NAD  	HEENT: MMM  	Pulm: CTA B/L  	CV: S1S2  	Abd: +BS, soft, distended   	Ext: +++LE edema B/L (improving)  	Neuro: Awake  	Skin: Warm and dry  	Vascular access: [facility]  LABS/STUDIES  --------------------------------------------------------------------------------              7.7    5.98  >-----------<  58       [01-28-25 @ 12:29]              23.1     132  |  97  |  44  ----------------------------<  191      [01-28-25 @ 03:17]  3.5   |  25  |  1.69        Ca     9.3     [01-28-25 @ 03:17]      Mg     1.6     [01-28-25 @ 03:17]      Phos  3.8     [01-28-25 @ 03:17]    TPro  6.9  /  Alb  2.8  /  TBili  3.7  /  DBili  1.2  /  AST  27  /  ALT  8   /  AlkPhos  102  [01-28-25 @ 03:17]    PT/INR: PT 29.0 , INR 2.57       [01-28-25 @ 03:17]  PTT: 51.3       [01-28-25 @ 03:17]      Creatinine Trend:  SCr 1.69 [01-28 @ 03:17]  SCr 1.84 [01-27 @ 06:42]  SCr 1.94 [01-26 @ 07:07]  SCr 2.26 [01-25 @ 07:13]  SCr 2.59 [01-24 @ 01:41]    Urine Creatinine 84      [01-25-25 @ 11:11]  Urine Protein 13      [01-25-25 @ 11:11]  Urine Sodium 11      [01-25-25 @ 11:11]  Urine Urea Nitrogen 345      [01-25-25 @ 11:11]  Urine Potassium 50      [01-25-25 @ 11:11]  Urine Osmolality 282      [01-25-25 @ 11:11]    Iron 57, TIBC 91, %sat 62      [01-17-25 @ 06:41]  Ferritin 849      [01-17-25 @ 06:41]  TSH 8.18      [01-17-25 @ 06:41]  Lipid: chol 65, TG 46, HDL 26, LDL --      [01-17-25 @ 06:41]    HBsAb 55.9      [01-17-25 @ 06:41]  HBsAb Reactive      [01-17-25 @ 06:41]  HBsAg Nonreact      [01-17-25 @ 06:41]  HBcAb Nonreact      [01-17-25 @ 06:41]  HCV 0.06, Nonreact      [01-16-25 @ 18:23]  HIV Nonreact      [01-17-25 @ 06:41]    IRMA: titer 1:160, pattern Speckled      [01-17-25 @ 06:41]  Syphilis Screen (Treponema Pallidum Ab) Negative      [01-17-25 @ 06:41]

## 2025-01-28 NOTE — PROGRESS NOTE ADULT - SUBJECTIVE AND OBJECTIVE BOX
Patient is a 44y old  Male who presents with a chief complaint of decompensated cirrhosis (2025 09:40)      Vital Signs Last 24 Hrs  T(C): 36.8 (25 @ 07:51), Max: 36.9 (25 @ 04:40)  T(F): 98.2 (25 @ 07:51), Max: 98.5 (25 @ 04:57)  HR: 84 (25 07:51) (84 - 118)  BP: 127/74 (25 @ 07:51) (118/66 - 127/74)  RR: 18 (25 @ 07:51) (18 - 18)  SpO2: 97% (25 @ 07:51) (91% - 98%)              25 @ 07:01  -  25 @ 07:00  --------------------------------------------------------  IN: 1010 mL / OUT: 1565 mL / NET: -555 mL      Daily Weight in k.6 (2025 04:40)                          6.1    5.06  )-----------( 48       ( 2025 03:16 )             18.3     132[L]  |  97  |  44[H]  ----------------------------<  191[H]  3.5   |  25  |  1.69[H]      AST  27  /  ALT  8[L]  /  AlkPhos  102            PHYSICAL EXAM  Neurology: A&Ox3, NAD  CV : RRR+S1S2  Lungs: Respirations non-labored, B/L BS clear, diminished at bases R>L  +Right pigtail x2 to LWS with serosanguinous drainage, no air leak  Abdomen: Soft, NT/ND, +BSx4Q  Extremities: B/L LE warm, no edema, +PP             MEDICATIONS  albumin human 25% IVPB 50 milliLiter(s) IV Intermittent every 8 hours  albuterol    90 MICROgram(s) HFA Inhaler 2 Puff(s) Inhalation every 6 hours PRN  buMETAnide Injectable 2 milliGRAM(s) IV Push <User Schedule>  cefTRIAXone   IVPB 2000 milliGRAM(s) IV Intermittent every 24 hours     chlorhexidine 2% Cloths 1 Application(s) Topical daily  folic acid 1 milliGRAM(s) Oral daily  influenza   Vaccine 0.5 milliLiter(s) IntraMuscular once  lactulose Syrup 20 Gram(s) Oral every 12 hours  lidocaine   4% Patch 1 Patch Transdermal every 24 hours  magnesium oxide 400 milliGRAM(s) Oral two times a day with meals  melatonin 5 milliGRAM(s) Oral at bedtime  midodrine 10 milliGRAM(s) Oral every 8 hours  mirtazapine 7.5 milliGRAM(s) Oral at bedtime  Nephro-shania 1 Tablet(s) Oral daily  pantoprazole    Tablet 40 milliGRAM(s) Oral before breakfast  rifAXIMin 550 milliGRAM(s) Oral every 12 hours  sevelamer carbonate 800 milliGRAM(s) Oral three times a day with meals  thiamine 100 milliGRAM(s) Oral daily  traMADol 25 milliGRAM(s) Oral every 8 hours PRN  traMADol 50 milliGRAM(s) Oral every 8 hours PRN

## 2025-01-28 NOTE — CONSULT NOTE ADULT - SUBJECTIVE AND OBJECTIVE BOX
Incomplete SICU Consult Note    HPI: 44M PMH decompensated ETOH cirrhosis c/b recurrent ascites, grade II esophageal varices, and hepatic encephalopathy, who presented to OSH with abd pain and distension found to have decompensated EtOH cirrhosis with MELD 30, MANUELITO (HRS vs ATN; previous HD need with [facility]) who was found to have R empyema s/p R pigtail placement w/ IR 1/18 requiring several adjustments, who is now s/p R robotic VATS with decortication (Dr. Ramírez 1/28/2025) for loculated empyema.     SICU consulted for ventilator management and HD monitoring periop    ALLERGIES:  sulfa drugs (Unknown)  Salicylic Acid (Other)  --------------------------------------------------------------------------------------  MEDICATIONS:    Neurologic Medications  fentaNYL    Injectable 25 MICROGram(s) IV Push every 5 minutes PRN Moderate Pain (4 - 6)  fentaNYL    Injectable 50 MICROGram(s) IV Push every 15 minutes PRN Severe Pain (7 - 10)  ondansetron Injectable 4 milliGRAM(s) IV Push once PRN Nausea and/or Vomiting    Respiratory Medications    Cardiovascular Medications    Gastrointestinal Medications  lactated ringers. 1000 milliLiter(s) IV Continuous <Continuous>    Genitourinary Medications    Hematologic/Oncologic Medications    Antimicrobial/Immunologic Medications    Endocrine/Metabolic Medications  insulin lispro (ADMELOG) corrective regimen sliding scale   SubCutaneous every 6 hours    Topical/Other Medications  chlorhexidine 4% Liquid 1 Application(s) Topical daily    --------------------------------------------------------------------------------------  VITAL SIGNS:  T(C): 36.1 (01-29-25 @ 00:00), Max: 36.9 (01-28-25 @ 04:40)  HR: 72 (01-29-25 @ 00:00) (69 - 106)  BP: 113/63 (01-28-25 @ 15:45) (113/63 - 127/74)  RR: 18 (01-29-25 @ 00:00) (4 - 72)  SpO2: 100% (01-29-25 @ 00:00) (91% - 100%)  --------------------------------------------------------------------------------------  INS AND OUTS:    01-27-25 @ 07:01  -  01-28-25 @ 07:00  --------------------------------------------------------  IN: 1010 mL / OUT: 1565 mL / NET: -555 mL    01-28-25 @ 07:01  -  01-29-25 @ 01:08  --------------------------------------------------------  IN: 673.2 mL / OUT: 1260 mL / NET: -586.8 mL      --------------------------------------------------------------------------------------  EXAM    NEURO: Intubated, sedated with precedex, following commands  RESPIRATORY: Ventilator , RR 18, PEEP 10, FiO2 30  CARDIO: Mildly hypotensive to 90s SBP, non tachycardic, sinus  ABDOMEN: soft, nontender, nondistended  CHEST: R chest with 3 chest tubes covered by dressing c/d/i with serosanguinous output  EXTREMITIES: B/l pitting edema    --------------------------------------------------------------------------------------  LABS                          7.2    5.44  )-----------( 75       ( 29 Jan 2025 00:23 )             20.5     01-29    136  |  100  |  42[H]  ----------------------------<  148[H]  4.0   |  24  |  1.42[H]    Ca    9.3      29 Jan 2025 00:23  Phos  4.3     01-29  Mg     1.6     01-29    TPro  5.9[L]  /  Alb  2.7[L]  /  TBili  4.6[H]  /  DBili  x   /  AST  24  /  ALT  7[L]  /  AlkPhos  77  01-29  --------------------------------------------------------------------------------------

## 2025-01-28 NOTE — PROGRESS NOTE ADULT - PROBLEM SELECTOR PLAN 1
s/p R pigtail placement by IR on 1/18 with drainage of purulent material c/f empyema  Continue Pigtail to suction; monitor output   s/p CT chest 1/21: Marked interval decrease in size of right-sided pleural fluid and gas collection (presumably empyema) status post placement of pigtail catheter as described above. Much interval improvement of posterior right upper lobe and right lower lobe consolidation. New patchy ground-glass opacities at the left apex may be related to edema or be infectious/inflammatory in etiology. New small left pleural effusion.  IR consult for reposition/increase size of PTC to help with drainage --> s/p new placement 1/24  REPEAT CT IMAGING ON MONDAY 1/27  Monitor chest tube outputs  Daily CXR while chest tube in  [facility] care as per primary team  Above plan as per d/w on-call Thoracic Attending Dr. Segura  ------  [place] [phone] with any questions s/p R pigtail placement by IR on 1/18 with drainage of purulent material c/f empyema  s/p CT chest 1/21: Marked interval decrease in size of right-sided pleural fluid and gas collection (presumably empyema) status post placement of pigtail catheter as described above. Much interval improvement of posterior right upper lobe and right lower lobe consolidation. New patchy ground-glass opacities at the left apex may be related to edema or be infectious/inflammatory in etiology. New small left pleural effusion.  s/p new right pigtail on 1/24 by IR  Maintain tube to suction; monitor output   Daily CXR while chest tube in  NPO for Right VATS decortication 1/28 with Dr. Ramírez  please transfuse PRBC/PLATELETS/FFP WITH GOAL Hgb >8, PLTS >100 K, INR BELOW 1.7  Continue care as per primary team

## 2025-01-29 DIAGNOSIS — D69.9 HEMORRHAGIC CONDITION, UNSPECIFIED: ICD-10-CM

## 2025-01-29 LAB
ALBUMIN SERPL ELPH-MCNC: 2.7 G/DL — LOW (ref 3.3–5)
ALBUMIN SERPL ELPH-MCNC: 3 G/DL — LOW (ref 3.3–5)
ALP SERPL-CCNC: 77 U/L — SIGNIFICANT CHANGE UP (ref 40–120)
ALP SERPL-CCNC: 85 U/L — SIGNIFICANT CHANGE UP (ref 40–120)
ALT FLD-CCNC: 7 U/L — LOW (ref 10–45)
ALT FLD-CCNC: 8 U/L — LOW (ref 10–45)
ANION GAP SERPL CALC-SCNC: 12 MMOL/L — SIGNIFICANT CHANGE UP (ref 5–17)
ANION GAP SERPL CALC-SCNC: 13 MMOL/L — SIGNIFICANT CHANGE UP (ref 5–17)
APTT BLD: 36 SEC — HIGH (ref 24.5–35.6)
AST SERPL-CCNC: 24 U/L — SIGNIFICANT CHANGE UP (ref 10–40)
AST SERPL-CCNC: 34 U/L — SIGNIFICANT CHANGE UP (ref 10–40)
BILIRUB SERPL-MCNC: 4.6 MG/DL — HIGH (ref 0.2–1.2)
BILIRUB SERPL-MCNC: 6.5 MG/DL — HIGH (ref 0.2–1.2)
BUN SERPL-MCNC: 42 MG/DL — HIGH (ref 7–23)
BUN SERPL-MCNC: 47 MG/DL — HIGH (ref 7–23)
CALCIUM SERPL-MCNC: 9.2 MG/DL — SIGNIFICANT CHANGE UP (ref 8.4–10.5)
CALCIUM SERPL-MCNC: 9.3 MG/DL — SIGNIFICANT CHANGE UP (ref 8.4–10.5)
CHLORIDE SERPL-SCNC: 100 MMOL/L — SIGNIFICANT CHANGE UP (ref 96–108)
CHLORIDE SERPL-SCNC: 100 MMOL/L — SIGNIFICANT CHANGE UP (ref 96–108)
CO2 SERPL-SCNC: 23 MMOL/L — SIGNIFICANT CHANGE UP (ref 22–31)
CO2 SERPL-SCNC: 24 MMOL/L — SIGNIFICANT CHANGE UP (ref 22–31)
CREAT SERPL-MCNC: 1.42 MG/DL — HIGH (ref 0.5–1.3)
CREAT SERPL-MCNC: 1.54 MG/DL — HIGH (ref 0.5–1.3)
CULTURE RESULTS: SIGNIFICANT CHANGE UP
EGFR: 56 ML/MIN/1.73M2 — LOW
EGFR: 56 ML/MIN/1.73M2 — LOW
EGFR: 62 ML/MIN/1.73M2 — SIGNIFICANT CHANGE UP
EGFR: 62 ML/MIN/1.73M2 — SIGNIFICANT CHANGE UP
GAS PNL BLDA: SIGNIFICANT CHANGE UP
GLUCOSE SERPL-MCNC: 117 MG/DL — HIGH (ref 70–99)
GLUCOSE SERPL-MCNC: 148 MG/DL — HIGH (ref 70–99)
GRAM STN FLD: SIGNIFICANT CHANGE UP
GRAM STN FLD: SIGNIFICANT CHANGE UP
HCT VFR BLD CALC: 20.5 % — CRITICAL LOW (ref 39–50)
HCT VFR BLD CALC: 21.1 % — LOW (ref 39–50)
HCT VFR BLD CALC: 22.7 % — LOW (ref 39–50)
HCT VFR BLD CALC: 22.8 % — LOW (ref 39–50)
HCT VFR BLD CALC: 23.3 % — LOW (ref 39–50)
HGB BLD-MCNC: 7.2 G/DL — LOW (ref 13–17)
HGB BLD-MCNC: 7.3 G/DL — LOW (ref 13–17)
HGB BLD-MCNC: 8 G/DL — LOW (ref 13–17)
HGB BLD-MCNC: 8 G/DL — LOW (ref 13–17)
HGB BLD-MCNC: 8.3 G/DL — LOW (ref 13–17)
INR BLD: 1.68 RATIO — HIGH (ref 0.85–1.16)
MAGNESIUM SERPL-MCNC: 1.6 MG/DL — SIGNIFICANT CHANGE UP (ref 1.6–2.6)
MAGNESIUM SERPL-MCNC: 1.7 MG/DL — SIGNIFICANT CHANGE UP (ref 1.6–2.6)
MCHC RBC-ENTMCNC: 30.3 PG — SIGNIFICANT CHANGE UP (ref 27–34)
MCHC RBC-ENTMCNC: 30.3 PG — SIGNIFICANT CHANGE UP (ref 27–34)
MCHC RBC-ENTMCNC: 30.5 PG — SIGNIFICANT CHANGE UP (ref 27–34)
MCHC RBC-ENTMCNC: 30.7 PG — SIGNIFICANT CHANGE UP (ref 27–34)
MCHC RBC-ENTMCNC: 31.1 PG — SIGNIFICANT CHANGE UP (ref 27–34)
MCHC RBC-ENTMCNC: 34.6 G/DL — SIGNIFICANT CHANGE UP (ref 32–36)
MCHC RBC-ENTMCNC: 35.1 G/DL — SIGNIFICANT CHANGE UP (ref 32–36)
MCHC RBC-ENTMCNC: 35.1 G/DL — SIGNIFICANT CHANGE UP (ref 32–36)
MCHC RBC-ENTMCNC: 35.2 G/DL — SIGNIFICANT CHANGE UP (ref 32–36)
MCHC RBC-ENTMCNC: 35.6 G/DL — SIGNIFICANT CHANGE UP (ref 32–36)
MCV RBC AUTO: 86 FL — SIGNIFICANT CHANGE UP (ref 80–100)
MCV RBC AUTO: 86.1 FL — SIGNIFICANT CHANGE UP (ref 80–100)
MCV RBC AUTO: 87.3 FL — SIGNIFICANT CHANGE UP (ref 80–100)
MCV RBC AUTO: 87.4 FL — SIGNIFICANT CHANGE UP (ref 80–100)
MCV RBC AUTO: 88.3 FL — SIGNIFICANT CHANGE UP (ref 80–100)
NIGHT BLUE STAIN TISS: SIGNIFICANT CHANGE UP
NIGHT BLUE STAIN TISS: SIGNIFICANT CHANGE UP
NRBC # BLD: 0 /100 WBCS — SIGNIFICANT CHANGE UP (ref 0–0)
NRBC BLD-RTO: 0 /100 WBCS — SIGNIFICANT CHANGE UP (ref 0–0)
PHOSPHATE SERPL-MCNC: 4.3 MG/DL — SIGNIFICANT CHANGE UP (ref 2.5–4.5)
PHOSPHATE SERPL-MCNC: 4.4 MG/DL — SIGNIFICANT CHANGE UP (ref 2.5–4.5)
PLATELET # BLD AUTO: 73 K/UL — LOW (ref 150–400)
PLATELET # BLD AUTO: 75 K/UL — LOW (ref 150–400)
PLATELET # BLD AUTO: 85 K/UL — LOW (ref 150–400)
PLATELET # BLD AUTO: 87 K/UL — LOW (ref 150–400)
PLATELET # BLD AUTO: 89 K/UL — LOW (ref 150–400)
POTASSIUM SERPL-MCNC: 3.9 MMOL/L — SIGNIFICANT CHANGE UP (ref 3.5–5.3)
POTASSIUM SERPL-MCNC: 4 MMOL/L — SIGNIFICANT CHANGE UP (ref 3.5–5.3)
POTASSIUM SERPL-SCNC: 3.9 MMOL/L — SIGNIFICANT CHANGE UP (ref 3.5–5.3)
POTASSIUM SERPL-SCNC: 4 MMOL/L — SIGNIFICANT CHANGE UP (ref 3.5–5.3)
PROT SERPL-MCNC: 5.9 G/DL — LOW (ref 6–8.3)
PROT SERPL-MCNC: 6.7 G/DL — SIGNIFICANT CHANGE UP (ref 6–8.3)
PROTHROM AB SERPL-ACNC: 19.1 SEC — HIGH (ref 9.9–13.4)
RBC # BLD: 2.38 M/UL — LOW (ref 4.2–5.8)
RBC # BLD: 2.39 M/UL — LOW (ref 4.2–5.8)
RBC # BLD: 2.61 M/UL — LOW (ref 4.2–5.8)
RBC # BLD: 2.64 M/UL — LOW (ref 4.2–5.8)
RBC # BLD: 2.67 M/UL — LOW (ref 4.2–5.8)
RBC # FLD: 15.5 % — HIGH (ref 10.3–14.5)
RBC # FLD: 15.7 % — HIGH (ref 10.3–14.5)
RBC # FLD: 16 % — HIGH (ref 10.3–14.5)
RBC # FLD: 16 % — HIGH (ref 10.3–14.5)
RBC # FLD: 16.3 % — HIGH (ref 10.3–14.5)
SODIUM SERPL-SCNC: 136 MMOL/L — SIGNIFICANT CHANGE UP (ref 135–145)
SODIUM SERPL-SCNC: 136 MMOL/L — SIGNIFICANT CHANGE UP (ref 135–145)
SPECIMEN SOURCE: SIGNIFICANT CHANGE UP
WBC # BLD: 5.44 K/UL — SIGNIFICANT CHANGE UP (ref 3.8–10.5)
WBC # BLD: 6.76 K/UL — SIGNIFICANT CHANGE UP (ref 3.8–10.5)
WBC # BLD: 6.85 K/UL — SIGNIFICANT CHANGE UP (ref 3.8–10.5)
WBC # BLD: 6.95 K/UL — SIGNIFICANT CHANGE UP (ref 3.8–10.5)
WBC # BLD: 7.51 K/UL — SIGNIFICANT CHANGE UP (ref 3.8–10.5)
WBC # FLD AUTO: 5.44 K/UL — SIGNIFICANT CHANGE UP (ref 3.8–10.5)
WBC # FLD AUTO: 6.76 K/UL — SIGNIFICANT CHANGE UP (ref 3.8–10.5)
WBC # FLD AUTO: 6.85 K/UL — SIGNIFICANT CHANGE UP (ref 3.8–10.5)
WBC # FLD AUTO: 6.95 K/UL — SIGNIFICANT CHANGE UP (ref 3.8–10.5)
WBC # FLD AUTO: 7.51 K/UL — SIGNIFICANT CHANGE UP (ref 3.8–10.5)

## 2025-01-29 PROCEDURE — 99232 SBSQ HOSP IP/OBS MODERATE 35: CPT | Mod: GC

## 2025-01-29 PROCEDURE — 99291 CRITICAL CARE FIRST HOUR: CPT

## 2025-01-29 PROCEDURE — 71045 X-RAY EXAM CHEST 1 VIEW: CPT | Mod: 26

## 2025-01-29 PROCEDURE — 99232 SBSQ HOSP IP/OBS MODERATE 35: CPT

## 2025-01-29 PROCEDURE — G0545: CPT

## 2025-01-29 PROCEDURE — 93010 ELECTROCARDIOGRAM REPORT: CPT

## 2025-01-29 PROCEDURE — 99233 SBSQ HOSP IP/OBS HIGH 50: CPT

## 2025-01-29 RX ORDER — CEFTRIAXONE 500 MG/1
1000 INJECTION, POWDER, FOR SOLUTION INTRAMUSCULAR; INTRAVENOUS ONCE
Refills: 0 | Status: COMPLETED | OUTPATIENT
Start: 2025-01-29 | End: 2025-01-29

## 2025-01-29 RX ORDER — CEFTRIAXONE 500 MG/1
2000 INJECTION, POWDER, FOR SOLUTION INTRAMUSCULAR; INTRAVENOUS EVERY 24 HOURS
Refills: 0 | Status: COMPLETED | OUTPATIENT
Start: 2025-01-30 | End: 2025-02-05

## 2025-01-29 RX ORDER — NOREPINEPHRINE BITARTRATE 8 MG
0.01 SOLUTION INTRAVENOUS
Qty: 8 | Refills: 0 | Status: DISCONTINUED | OUTPATIENT
Start: 2025-01-29 | End: 2025-01-29

## 2025-01-29 RX ORDER — CEFTRIAXONE 500 MG/1
INJECTION, POWDER, FOR SOLUTION INTRAMUSCULAR; INTRAVENOUS
Refills: 0 | Status: DISCONTINUED | OUTPATIENT
Start: 2025-01-29 | End: 2025-01-29

## 2025-01-29 RX ORDER — CEFTRIAXONE 500 MG/1
1000 INJECTION, POWDER, FOR SOLUTION INTRAMUSCULAR; INTRAVENOUS EVERY 24 HOURS
Refills: 0 | Status: DISCONTINUED | OUTPATIENT
Start: 2025-01-29 | End: 2025-01-29

## 2025-01-29 RX ORDER — METRONIDAZOLE 250 MG
500 TABLET ORAL EVERY 12 HOURS
Refills: 0 | Status: DISCONTINUED | OUTPATIENT
Start: 2025-01-29 | End: 2025-02-06

## 2025-01-29 RX ORDER — CEFTRIAXONE 500 MG/1
INJECTION, POWDER, FOR SOLUTION INTRAMUSCULAR; INTRAVENOUS
Refills: 0 | Status: COMPLETED | OUTPATIENT
Start: 2025-01-29 | End: 2025-02-06

## 2025-01-29 RX ORDER — OXYCODONE HYDROCHLORIDE 30 MG/1
5 TABLET ORAL ONCE
Refills: 0 | Status: DISCONTINUED | OUTPATIENT
Start: 2025-01-29 | End: 2025-01-30

## 2025-01-29 RX ORDER — MAGNESIUM SULFATE 500 MG/ML
2 SYRINGE (ML) INJECTION
Refills: 0 | Status: COMPLETED | OUTPATIENT
Start: 2025-01-29 | End: 2025-01-29

## 2025-01-29 RX ORDER — CEFTRIAXONE 500 MG/1
2000 INJECTION, POWDER, FOR SOLUTION INTRAMUSCULAR; INTRAVENOUS ONCE
Refills: 0 | Status: DISCONTINUED | OUTPATIENT
Start: 2025-01-29 | End: 2025-01-29

## 2025-01-29 RX ADMIN — Medication 0.5 MILLIGRAM(S): at 13:15

## 2025-01-29 RX ADMIN — Medication 0.5 MILLIGRAM(S): at 12:58

## 2025-01-29 RX ADMIN — CEFTRIAXONE 100 MILLIGRAM(S): 500 INJECTION, POWDER, FOR SOLUTION INTRAMUSCULAR; INTRAVENOUS at 00:38

## 2025-01-29 RX ADMIN — BUMETANIDE 2 MILLIGRAM(S): 1 TABLET ORAL at 14:19

## 2025-01-29 RX ADMIN — Medication 0.5 MILLIGRAM(S): at 23:05

## 2025-01-29 RX ADMIN — Medication 40 MILLIGRAM(S): at 11:27

## 2025-01-29 RX ADMIN — DEXMEDETOMIDINE HYDROCHLORIDE IN SODIUM CHLORIDE 13.5 MICROGRAM(S)/KG/HR: 4 INJECTION INTRAVENOUS at 07:48

## 2025-01-29 RX ADMIN — NOREPINEPHRINE BITARTRATE 2.03 MICROGRAM(S)/KG/MIN: 8 SOLUTION at 07:48

## 2025-01-29 RX ADMIN — Medication 20 MILLIEQUIVALENT(S): at 16:04

## 2025-01-29 RX ADMIN — Medication 0.5 MILLIGRAM(S): at 20:04

## 2025-01-29 RX ADMIN — Medication 1 APPLICATION(S): at 06:18

## 2025-01-29 RX ADMIN — Medication 0.5 MILLIGRAM(S): at 23:35

## 2025-01-29 RX ADMIN — Medication 25 GRAM(S): at 17:15

## 2025-01-29 RX ADMIN — Medication 15 MILLILITER(S): at 06:18

## 2025-01-29 RX ADMIN — BUMETANIDE 2 MILLIGRAM(S): 1 TABLET ORAL at 09:10

## 2025-01-29 RX ADMIN — Medication 25 GRAM(S): at 16:03

## 2025-01-29 RX ADMIN — Medication 500 MILLIGRAM(S): at 17:15

## 2025-01-29 RX ADMIN — CEFTRIAXONE 100 MILLIGRAM(S): 500 INJECTION, POWDER, FOR SOLUTION INTRAMUSCULAR; INTRAVENOUS at 18:25

## 2025-01-29 RX ADMIN — Medication 0.5 MILLIGRAM(S): at 20:34

## 2025-01-29 NOTE — PROGRESS NOTE ADULT - CRITICAL CARE ATTENDING COMMENT
S/p Decompensated Laennec's cirrhosis R VATs for loculated empyema.  N Following commands. Dexmedetomidine gtt RASS 0.  P PRVC 18/500/5/40. SBT.  C Levophed 0.03, goal MAP >65.  G Monitor LFTs. NPO.  R Cr 1.4. Trend CMP, UOP. Net -700. Continue bumetanide.  DVT SCDs, d/w primary regarding chemical ppx.  I Continue ceftriaxone.  E Monitor glycemia. S/p Decompensated Laennec's cirrhosis R VATs for loculated empyema.  N Following commands. Dexmedetomidine gtt RASS 0.  P PRVC 18/500/5/40. SBT.  C Levophed 0.03, goal MAP >65.  G Monitor LFTs. NPO.  R Cr 1.4. Trend CMP, UOP. Net -700. Continue bumetanide. H/o MANUELITO/HRS, no current indication for RRT, nephrology following.  DVT SCDs, d/w primary regarding chemical ppx.  I Continue ceftriaxone.  E Monitor glycemia.

## 2025-01-29 NOTE — PROGRESS NOTE ADULT - SUBJECTIVE AND OBJECTIVE BOX
24 HOUR EVENTS:  - 2u PRBC     NEURO  Exam: follows commands   Meds: dexMEDEtomidine Infusion 0.5 MICROgram(s)/kG/Hr IV Continuous <Continuous>  HYDROmorphone  Injectable 0.5 milliGRAM(s) IV Push every 3 hours PRN breakthrough pain      RESPIRATORY  RR: 18 (01-29-25 @ 00:00) (4 - 72)  SpO2: 100% (01-29-25 @ 00:00) (91% - 100%)  Exam: unlabored  Mechanical Ventilation: Mode: AC/ CMV (Assist Control/ Continuous Mandatory Ventilation), RR (machine): 18, RR (patient): 21, TV (machine): 500, FiO2: 30, PEEP: 10, ITime: 1, MAP: 16, PIP: 31  ABG - ( 29 Jan 2025 00:17 )  pH: 7.46  /  pCO2: 36    /  pO2: 109   / HCO3: 26    / Base Excess: 1.7   /  SaO2: 99.2        CARDIOVASCULAR  HR: 72 (01-29-25 @ 00:00) (69 - 118)  BP: 113/63 (01-28-25 @ 15:45) (113/63 - 127/74)  BP(mean): 89 (01-28-25 @ 15:45) (89 - 89)  ABP: 132/61 (01-29-25 @ 00:00) (99/38 - 160/63)  ABP(mean): 87 (01-29-25 @ 00:00) (55 - 96)      Exam:   Cardiac Rhythm:   Perfusion     [ x]Adequate   [ ]Inadequate  Mentation   [x ]Normal       [ ]Reduced  Extremities  [x ]Warm         [ ]Cool  Volume Status [ ]Hypervolemic [ x]Euvolemic [ ]Hypovolemic  Meds: buMETAnide Injectable 2 milliGRAM(s) IV Push <User Schedule>      GI/NUTRITION  Exam: soft, NT/ND  Diet: NPO  Meds: pantoprazole  Injectable 40 milliGRAM(s) IV Push daily      GENITOURINARY  I&O's Detail    01-27 @ 07:01  -  01-28 @ 07:00  --------------------------------------------------------  IN:    Oral Fluid: 1010 mL  Total IN: 1010 mL    OUT:    Chest Tube (mL): 0 mL    Chest Tube (mL): 140 mL    Voided (mL): 1425 mL  Total OUT: 1565 mL    Total NET: -555 mL      01-28 @ 07:01  -  01-29 @ 00:45  --------------------------------------------------------  IN:    Dexmedetomidine: 21.6 mL    Norepinephrine: 1.6 mL    PRBCs (Packed Red Blood Cells): 600 mL  Total IN: 623.2 mL    OUT:    Chest Tube (mL): 175 mL    Chest Tube (mL): 150 mL    Chest Tube (mL): 275 mL    Indwelling Catheter - Urethral (mL): 170 mL    Voided (mL): 475 mL  Total OUT: 1245 mL    Total NET: -621.8 mL        Weight (kg): 108.3 (01-28 @ 15:45)  01-28    136  |  99  |  40[H]  ----------------------------<  138[H]  3.5   |  25  |  1.39[H]    Ca    9.5      28 Jan 2025 20:51  Phos  4.0     01-28  Mg     1.7     01-28    TPro  6.3  /  Alb  2.8[L]  /  TBili  4.0[H]  /  DBili  x   /  AST  25  /  ALT  9[L]  /  AlkPhos  81  01-28    Meds:     HEMATOLOGIC  Meds:                         6.2    8.29  )-----------( 127      ( 28 Jan 2025 20:51 )             17.8     PT/INR - ( 28 Jan 2025 20:51 )   PT: 18.8 sec;   INR: 1.66 ratio         PTT - ( 28 Jan 2025 20:51 )  PTT:35.5 sec    INFECTIOUS DISEASES  T(C): 36.1 (01-29-25 @ 00:00), Max: 36.9 (01-28-25 @ 04:40)  Wt(kg): --  WBC Count: 8.29 K/uL (01-28 @ 20:51)  WBC Count: 5.98 K/uL (01-28 @ 12:29)  WBC Count: 5.06 K/uL (01-28 @ 03:16)    Recent Cultures:  Specimen Source: Pleural Fl, 01-24 @ 19:10; Results   Rare Citrobacter koseri[!]; Gram Stain:   polymorphonuclear leukocytes seen  No organisms seen  by cytocentrifuge[!]; Organism: Citrobacter koseri[!]  Specimen Source: Peritoneal, 01-23 @ 17:42; Results   No growth to date.; Gram Stain:   No polymorphonuclear cells seen  No organisms seen  by cytocentrifuge; Organism: --    Meds: cefTRIAXone   IVPB 1000 milliGRAM(s) IV Intermittent every 24 hours  influenza   Vaccine 0.5 milliLiter(s) IntraMuscular once      ENDOCRINE  Capillary Blood Glucose        OTHER MEDICATIONS:  chlorhexidine 0.12% Liquid 15 milliLiter(s) Oral Mucosa every 12 hours  chlorhexidine 2% Cloths 1 Application(s) Topical <User Schedule>     24 HOUR EVENTS:  - 3u PRBC     NEURO  Exam: follows commands   Meds: dexMEDEtomidine Infusion 0.5 MICROgram(s)/kG/Hr IV Continuous <Continuous>  HYDROmorphone  Injectable 0.5 milliGRAM(s) IV Push every 3 hours PRN breakthrough pain    RESPIRATORY  RR: 18 (01-29-25 @ 00:00) (4 - 72)  SpO2: 100% (01-29-25 @ 00:00) (91% - 100%)  Exam: unlabored  Mechanical Ventilation: Mode: AC/ CMV (Assist Control/ Continuous Mandatory Ventilation), RR (machine): 18, RR (patient): 21, TV (machine): 500, FiO2: 30, PEEP: 10, ITime: 1, MAP: 16, PIP: 31  ABG - ( 29 Jan 2025 00:17 )  pH: 7.46  /  pCO2: 36    /  pO2: 109   / HCO3: 26    / Base Excess: 1.7   /  SaO2: 99.2        CARDIOVASCULAR  HR: 72 (01-29-25 @ 00:00) (69 - 118)  BP: 113/63 (01-28-25 @ 15:45) (113/63 - 127/74)  BP(mean): 89 (01-28-25 @ 15:45) (89 - 89)  ABP: 132/61 (01-29-25 @ 00:00) (99/38 - 160/63)  ABP(mean): 87 (01-29-25 @ 00:00) (55 - 96)    Exam:   Cardiac Rhythm:   Perfusion     [ x]Adequate   [ ]Inadequate  Mentation   [x ]Normal       [ ]Reduced  Extremities  [x ]Warm         [ ]Cool  Volume Status [ ]Hypervolemic [ x]Euvolemic [ ]Hypovolemic  Meds: buMETAnide Injectable 2 milliGRAM(s) IV Push <User Schedule>    GI/NUTRITION  Exam: soft, NT/ND  Diet: NPO  Meds: pantoprazole  Injectable 40 milliGRAM(s) IV Push daily    GENITOURINARY  I&O's Detail    01-27 @ 07:01  -  01-28 @ 07:00  --------------------------------------------------------  IN:    Oral Fluid: 1010 mL  Total IN: 1010 mL    OUT:    Chest Tube (mL): 0 mL    Chest Tube (mL): 140 mL    Voided (mL): 1425 mL  Total OUT: 1565 mL    Total NET: -555 mL    01-28 @ 07:01  -  01-29 @ 00:45  --------------------------------------------------------  IN:    Dexmedetomidine: 21.6 mL    Norepinephrine: 1.6 mL    PRBCs (Packed Red Blood Cells): 600 mL  Total IN: 623.2 mL    OUT:    Chest Tube (mL): 175 mL    Chest Tube (mL): 150 mL    Chest Tube (mL): 275 mL    Indwelling Catheter - Urethral (mL): 170 mL    Voided (mL): 475 mL  Total OUT: 1245 mL    Total NET: -621.8 mL    Weight (kg): 108.3 (01-28 @ 15:45)  01-28    136  |  99  |  40[H]  ----------------------------<  138[H]  3.5   |  25  |  1.39[H]    Ca    9.5      28 Jan 2025 20:51  Phos  4.0     01-28  Mg     1.7     01-28    TPro  6.3  /  Alb  2.8[L]  /  TBili  4.0[H]  /  DBili  x   /  AST  25  /  ALT  9[L]  /  AlkPhos  81  01-28    Meds:     HEMATOLOGIC  Meds:                         6.2    8.29  )-----------( 127      ( 28 Jan 2025 20:51 )             17.8     PT/INR - ( 28 Jan 2025 20:51 )   PT: 18.8 sec;   INR: 1.66 ratio         PTT - ( 28 Jan 2025 20:51 )  PTT:35.5 sec    INFECTIOUS DISEASES  T(C): 36.1 (01-29-25 @ 00:00), Max: 36.9 (01-28-25 @ 04:40)  Wt(kg): --  WBC Count: 8.29 K/uL (01-28 @ 20:51)  WBC Count: 5.98 K/uL (01-28 @ 12:29)  WBC Count: 5.06 K/uL (01-28 @ 03:16)    Recent Cultures:  Specimen Source: Pleural Fl, 01-24 @ 19:10; Results   Rare Citrobacter koseri[!]; Gram Stain:   polymorphonuclear leukocytes seen  No organisms seen  by cytocentrifuge[!]; Organism: Citrobacter koseri[!]  Specimen Source: Peritoneal, 01-23 @ 17:42; Results   No growth to date.; Gram Stain:   No polymorphonuclear cells seen  No organisms seen  by cytocentrifuge; Organism: --    Meds: cefTRIAXone   IVPB 1000 milliGRAM(s) IV Intermittent every 24 hours  influenza   Vaccine 0.5 milliLiter(s) IntraMuscular once    ENDOCRINE  Capillary Blood Glucose    OTHER MEDICATIONS:  chlorhexidine 0.12% Liquid 15 milliLiter(s) Oral Mucosa every 12 hours  chlorhexidine 2% Cloths 1 Application(s) Topical <User Schedule>

## 2025-01-29 NOTE — AIRWAY REMOVAL NOTE  ADULT & PEDS - ARTIFICAL AIRWAY REMOVAL COMMENTS
Written order for extubation verified, patient was identified by full name and birth date compared to the identification band. Present during the procedure was ... Sara Juarez (LYNDON)

## 2025-01-29 NOTE — PROGRESS NOTE ADULT - SUBJECTIVE AND OBJECTIVE BOX
Follow Up:  Pre-OLT    Interval History: s/p VATS overnight. extubated today.     REVIEW OF SYSTEMS  [  ] ROS unobtainable because:    [ x ] All other systems negative except as noted below    Constitutional:  [ ] fever [ ] chills  [ ] weight loss  [ ] weakness  Skin:  [ ] rash [ ] phlebitis	  Eyes: [ ] icterus [ ] pain  [ ] discharge	  ENMT: [ ] sore throat  [ ] thrush [ ] ulcers [ ] exudates  CHest: +Pain  Respiratory: [ ] dyspnea [ ] hemoptysis [ ] cough [ ] sputum	  Cardiovascular:  [ ] chest pain [ ] palpitations [ ] edema	  Gastrointestinal:  [ ] nausea [ ] vomiting [ ] diarrhea [ ] constipation [ ] pain	  Genitourinary:  [ ] dysuria [ ] frequency [ ] hematuria [ ] discharge [ ] flank pain  [ ] incontinence  Musculoskeletal:  [ ] myalgias [ ] arthralgias [ ] arthritis  [ ] back pain  Neurological:  [ ] headache [ ] seizures  [ ] confusion/altered mental status    Allergies  sulfa drugs (Unknown)  Salicylic Acid (Other)        ANTIMICROBIALS:  cefTRIAXone   IVPB 1000 every 24 hours      OTHER MEDS:  MEDICATIONS  (STANDING):  buMETAnide Injectable 2 <User Schedule>  HYDROmorphone  Injectable 0.5 every 3 hours PRN  influenza   Vaccine 0.5 once  norepinephrine Infusion 0.01 <Continuous>  pantoprazole  Injectable 40 daily      Vital Signs Last 24 Hrs  T(C): 36.6 (29 Jan 2025 15:00), Max: 37 (29 Jan 2025 07:00)  T(F): 97.8 (29 Jan 2025 15:00), Max: 98.6 (29 Jan 2025 07:00)  HR: 100 (29 Jan 2025 16:00) (66 - 116)  BP: 111/53 (29 Jan 2025 07:45) (111/53 - 111/53)  BP(mean): 76 (29 Jan 2025 07:45) (76 - 76)  RR: 13 (29 Jan 2025 16:00) (4 - 72)  SpO2: 100% (29 Jan 2025 16:00) (89% - 100%)    Parameters below as of 29 Jan 2025 14:20  Patient On (Oxygen Delivery Method): nasal cannula  O2 Flow (L/min): 1      PHYSICAL EXAMINATION:  General: Alert and Awake, NAD, jaundice  Cardiac: RRR, No M/R/G  Resp: Decreased breath sounds at right lung bases,   Chest: 3x Chest Tube Drains with s/s output  Abdomen: NBS, NT/ND, No HSM, No rigidity or guarding  MSK: No LE edema. No Calf tenderness  : No yates  Skin: No rashes or lesions. Skin is warm and dry to the touch.   Neuro: Alert and Awake. CN 2-12 Grossly intact. Moves all four extremities spontaneously.  Psych: Calm, Pleasant, Cooperative                          8.0    7.51  )-----------( 89       ( 29 Jan 2025 15:09 )             22.8       01-29    136  |  100  |  47[H]  ----------------------------<  117[H]  3.9   |  23  |  1.54[H]    Ca    9.2      29 Jan 2025 15:09  Phos  4.4     01-29  Mg     1.7     01-29    TPro  6.7  /  Alb  3.0[L]  /  TBili  6.5[H]  /  DBili  x   /  AST  34  /  ALT  8[L]  /  AlkPhos  85  01-29      Urinalysis Basic - ( 29 Jan 2025 15:09 )    Color: x / Appearance: x / SG: x / pH: x  Gluc: 117 mg/dL / Ketone: x  / Bili: x / Urobili: x   Blood: x / Protein: x / Nitrite: x   Leuk Esterase: x / RBC: x / WBC x   Sq Epi: x / Non Sq Epi: x / Bacteria: x        MICROBIOLOGY:  v  .Abscess  01-28-25   Testing in progress  --    Few polymorphonuclear leukocytes seen per low power field  No organisms seen per oil power field      Tissue  01-28-25   Testing in progress  --    Few polymorphonuclear leukocytes seen per low power field  No organisms seen per oil power field      Pleural Fl  01-24-25   Rare Citrobacter koseri  --  Citrobacter koseri      Peritoneal  01-23-25   No growth at 5 days  --    No polymorphonuclear cells seen  No organisms seen  by cytocentrifuge      .Blood BLOOD  01-19-25   No growth at 5 days  --  --      Pleural Fl  01-18-25   Moderate Citrobacter koseri  --  Citrobacter koseri      Body Fluid  01-18-25   No growth  --  --      .Blood BLOOD  01-18-25   No growth at 5 days  --  --      Ascites Fl  01-17-25   No growth at 5 days  --    No polymorphonuclear cells seen  No organisms seen  by cytocentrifuge      .Blood BLOOD  01-16-25   No growth at 5 days  --  --      .Blood BLOOD  01-16-25   Growth in aerobic bottle: Staphylococcus epidermidis  Isolation of Coagulase negative Staphylococcus from single blood culture  sets may represent  contamination. Contact the Microbiology Department at [phone] if  susceptibility testing is needed.  clinically indicated.  Direct identification is available within approximately 3-5  hours either by Blood Panel Multiplexed PCR or Direct  MALDI-TOF. Details: [email or web address]  --  Blood Culture PCR        CMV IgG Antibody: >10.00 U/mL (01-17-25 @ 06:41)  Toxoplasma IgG Screen: <3.00 IU/mL (01-17-25 @ 06:41)          RADIOLOGY:    <The imaging below has been reviewed and visualized by me independently. Findings as detailed in report below>    < from: Xray Chest 1 View- PORTABLE-Routine (Xray Chest 1 View- PORTABLE-Routine in AM.) (01.29.25 @ 06:16) >  IMPRESSION:  Lines and tubes as described above.    Mild interval decrease in size of loculated right pleural effusion.   Diffuse right lung airspace opacities.    < end of copied text >

## 2025-01-29 NOTE — PROGRESS NOTE ADULT - SUBJECTIVE AND OBJECTIVE BOX
Interval Events:   - Underwent VATS procedure yesterday  - Given 1 u pRBC,2 FFP, and 2 plt preprocedure  - Given 3 additional pRBC after procedure  - Chest tube output: 995 cc in 24 hours    ROS:   12 point review of systems performed and negative except otherwise noted in HPI.    Hospital Medications:  albuterol    90 MICROgram(s) HFA Inhaler 2 Puff(s) Inhalation every 6 hours PRN  chlorhexidine 2% Cloths 1 Application(s) Topical <User Schedule>  folic acid 1 milliGRAM(s) Oral daily  lactulose Syrup 20 Gram(s) Oral every 12 hours  melatonin 5 milliGRAM(s) Oral at bedtime  midodrine 10 milliGRAM(s) Oral every 8 hours  Nephro-shania 1 Tablet(s) Oral daily  pantoprazole    Tablet 40 milliGRAM(s) Oral before breakfast  piperacillin/tazobactam IVPB.. 3.375 Gram(s) IV Intermittent every 12 hours  rifAXIMin 550 milliGRAM(s) Oral every 12 hours  thiamine 100 milliGRAM(s) Oral daily      PHYSICAL EXAM:   Vital Signs:  Vital Signs Last 24 Hrs  T(C): 36.9 (17 Jan 2025 09:29), Max: 37.3 (17 Jan 2025 00:35)  T(F): 98.4 (17 Jan 2025 09:29), Max: 99.2 (17 Jan 2025 00:35)  HR: 84 (17 Jan 2025 09:29) (65 - 106)  BP: 109/54 (17 Jan 2025 09:29) (104/51 - 129/62)  BP(mean): 89 (16 Jan 2025 18:00) (74 - 89)  RR: 16 (17 Jan 2025 09:29) (16 - 36)  SpO2: 99% (17 Jan 2025 09:29) (95% - 99%)    Parameters below as of 17 Jan 2025 05:00  Patient On (Oxygen Delivery Method): room air      Daily     Daily     PHYSICAL EXAM:   GENERAL: intubated, sedated  HEENT:  Normocephalic/atraumatic, scleral icterus  CHEST:  intubated, chest tubes in place draining sanguinous output  HEART:  Regular rate and rhythm  ABDOMEN:  Soft, mild diffuse tenderness, mildly distended, normoactive bowel sounds, splenomegaly, stigmata of chronic liver dz  EXTREMITIES: No cyanosis, clubbing, or edema  SKIN:  No rash  NEURO:  sedated    LABS: reviewed                        7.4    13.96 )-----------( 73       ( 17 Jan 2025 06:42 )             22.4     01-16    130[L]  |  96  |  39[H]  ----------------------------<  117[H]  4.3   |  22  |  2.59[H]    Ca    8.6      16 Jan 2025 07:39  Phos  4.3     01-17  Mg     2.0     01-17    TPro  6.4  /  Alb  2.5[L]  /  TBili  5.8[H]  /  DBili  2.1[H]  /  AST  32  /  ALT  7[L]  /  AlkPhos  116  01-17    LIVER FUNCTIONS - ( 17 Jan 2025 06:41 )  Alb: 2.5 g/dL / Pro: 6.4 g/dL / ALK PHOS: 116 U/L / ALT: 7 U/L / AST: 32 U/L / GGT: x             Interval Diagnostic Studies: see sunrise for full report

## 2025-01-29 NOTE — PROGRESS NOTE ADULT - PROBLEM SELECTOR PLAN 1
s/p Right VATS decortication 1/28 with Dr. Ramírez  Transfused multiple blood products PRBC/PLATELETS/FFP WITH GOAL Hgb >8, PLTS >100 K, INR BELOW 1.7  Maintain chest tube to LWS x 72 hrs  anticipate extubation today  Continue care as per primary team

## 2025-01-29 NOTE — PROGRESS NOTE ADULT - SUBJECTIVE AND OBJECTIVE BOX
[facility] DIVISION OF KIDNEY DISEASES AND HYPERTENSION -- FOLLOW UP NOTE  --------------------------------------------------------------------------------  Chief Complaint:    24 hour events/subjective: s/p R VATS with decortication for loculated empyema overnight w/ OR course c/b oropharyngeal bleeding during intubation. Pt seen this morning in SICU, intubated/sedated on IV vasopressor. Unable to obtain ROS.     PAST HISTORY  --------------------------------------------------------------------------------  No significant changes to PMH, PSH, FHx, SHx, unless otherwise noted    ALLERGIES & MEDICATIONS  --------------------------------------------------------------------------------  Allergies    sulfa drugs (Unknown)  Salicylic Acid (Other)    Intolerances      Standing Inpatient Medications  buMETAnide Injectable 2 milliGRAM(s) IV Push <User Schedule>  cefTRIAXone   IVPB 1000 milliGRAM(s) IV Intermittent every 24 hours  chlorhexidine 0.12% Liquid 15 milliLiter(s) Oral Mucosa every 12 hours  chlorhexidine 2% Cloths 1 Application(s) Topical <User Schedule>  dexMEDEtomidine Infusion 0.5 MICROgram(s)/kG/Hr IV Continuous <Continuous>  influenza   Vaccine 0.5 milliLiter(s) IntraMuscular once  norepinephrine Infusion 0.01 MICROgram(s)/kG/Min IV Continuous <Continuous>  pantoprazole  Injectable 40 milliGRAM(s) IV Push daily    PRN Inpatient Medications  HYDROmorphone  Injectable 0.5 milliGRAM(s) IV Push every 3 hours PRN      REVIEW OF SYSTEMS  --------------------------------------------------------------------------------  see above    VITALS/PHYSICAL EXAM  --------------------------------------------------------------------------------  T(C): 37 (01-29-25 @ 07:00), Max: 37 (01-29-25 @ 07:00)  HR: 83 (01-29-25 @ 09:15) (66 - 106)  BP: 111/53 (01-29-25 @ 07:45) (111/53 - 113/63)  RR: 7 (01-29-25 @ 09:15) (4 - 72)  SpO2: 98% (01-29-25 @ 09:15) (91% - 100%)  Wt(kg): --  Height (cm): 180.3 (01-28-25 @ 15:45)  Weight (kg): 108.3 (01-28-25 @ 15:45)  BMI (kg/m2): 33.3 (01-28-25 @ 15:45)  BSA (m2): 2.27 (01-28-25 @ 15:45)      01-28-25 @ 07:01  -  01-29-25 @ 07:00  --------------------------------------------------------  IN: 1073 mL / OUT: 1785 mL / NET: -712 mL    01-29-25 @ 07:01  -  01-29-25 @ 09:25  --------------------------------------------------------  IN: 12.2 mL / OUT: 75 mL / NET: -62.8 mL      Physical Exam:  	Gen: NAD  	HEENT: +ETT  	Pulm: Mechanical breath sounds  	CV: S1S2  	Abd: +BS, soft, distended   	Ext: +++LE edema B/L (improving)  	Neuro: Sedated  	Skin: Warm and dry  	Vascular access: [facility]    LABS/STUDIES  --------------------------------------------------------------------------------              8.3    6.76  >-----------<  85       [01-29-25 @ 03:01]              23.3     136  |  100  |  42  ----------------------------<  148      [01-29-25 @ 00:23]  4.0   |  24  |  1.42        Ca     9.3     [01-29-25 @ 00:23]      Mg     1.6     [01-29-25 @ 00:23]      Phos  4.3     [01-29-25 @ 00:23]    TPro  5.9  /  Alb  2.7  /  TBili  4.6  /  DBili  x   /  AST  24  /  ALT  7   /  AlkPhos  77  [01-29-25 @ 00:23]    PT/INR: PT 19.1 , INR 1.68       [01-29-25 @ 00:23]  PTT: 36.0       [01-29-25 @ 00:23]      Creatinine Trend:  SCr 1.42 [01-29 @ 00:23]  SCr 1.39 [01-28 @ 20:51]  SCr 1.69 [01-28 @ 03:17]  SCr 1.84 [01-27 @ 06:42]  SCr 1.94 [01-26 @ 07:07]              Urinalysis - [01-29-25 @ 00:23]      Color  / Appearance  / SG  / pH       Gluc 148 / Ketone   / Bili  / Urobili        Blood  / Protein  / Leuk Est  / Nitrite       RBC  / WBC  / Hyaline  / Gran  / Sq Epi  / Non Sq Epi  / Bacteria     Urine Creatinine 84      [01-25-25 @ 11:11]  Urine Protein 13      [01-25-25 @ 11:11]  Urine Sodium 11      [01-25-25 @ 11:11]  Urine Urea Nitrogen 345      [01-25-25 @ 11:11]  Urine Potassium 50      [01-25-25 @ 11:11]  Urine Osmolality 282      [01-25-25 @ 11:11]    Iron 57, TIBC 91, %sat 62      [01-17-25 @ 06:41]  Ferritin 849      [01-17-25 @ 06:41]  TSH 8.18      [01-17-25 @ 06:41]  Lipid: chol 65, TG 46, HDL 26, LDL --      [01-17-25 @ 06:41]    HBsAb 55.9      [01-17-25 @ 06:41]  HBsAb Reactive      [01-17-25 @ 06:41]  HBsAg Nonreact      [01-17-25 @ 06:41]  HBcAb Nonreact      [01-17-25 @ 06:41]  HCV 0.06, Nonreact      [01-16-25 @ 18:23]  HIV Nonreact      [01-17-25 @ 06:41]    IRMA: titer 1:160, pattern Speckled      [01-17-25 @ 06:41]  Syphilis Screen (Treponema Pallidum Ab) Negative      [01-17-25 @ 06:41]

## 2025-01-29 NOTE — PROGRESS NOTE ADULT - SUBJECTIVE AND OBJECTIVE BOX
Multidiciplinary Rounds- Transplant surgery     Interval Events:   - VATS done, recieved 2FFP, 2 PLT, 2 PRBC       TX DATA HERE    Review of systems  All other systems were reviewed and are negative, except as noted.          PHYSICAL EXAM:   GENERAL:  No acute distress  HEENT:  Normocephalic/atraumatic, scleral icterus  CHEST:  No accessory muscle use, mild crackles on the right base, chest tube x 2  HEART:  Regular rate and rhythm  ABDOMEN:  Soft, mild diffuse tenderness, mildly distended, normoactive bowel sounds, splenomegaly, stigmata of chronic liver dz  EXTREMITIES: No cyanosis, clubbing, or edema  SKIN:  No rash  NEURO:  Alert and oriented x 3, no asterixis         Transplant Surgery Multidisciplinary Rounds    Interval Events:   - Underwent VATS procedure yesterday  - Given 1 u pRBC,2 FFP, and 2 plt preprocedure  - Given 3 additional pRBC after procedure  - Chest tube output: 995 cc in 24 hours        MEDICATIONS  (STANDING):  buMETAnide Injectable 2 milliGRAM(s) IV Push <User Schedule>  cefTRIAXone   IVPB 1000 milliGRAM(s) IV Intermittent every 24 hours  chlorhexidine 2% Cloths 1 Application(s) Topical <User Schedule>  influenza   Vaccine 0.5 milliLiter(s) IntraMuscular once  norepinephrine Infusion 0.01 MICROgram(s)/kG/Min (2.03 mL/Hr) IV Continuous <Continuous>  pantoprazole  Injectable 40 milliGRAM(s) IV Push daily    MEDICATIONS  (PRN):  HYDROmorphone  Injectable 0.5 milliGRAM(s) IV Push every 3 hours PRN breakthrough pain      PAST MEDICAL & SURGICAL HISTORY:  Sleep apnea, obstructive      Alcohol abuse      Cirrhosis of liver      Portal hypertension      H/O esophageal varices      Anemia      Mild alcohol use disorder      No significant past surgical history          Vital Signs Last 24 Hrs  T(C): 36.6 (29 Jan 2025 15:00), Max: 37 (29 Jan 2025 07:00)  T(F): 97.8 (29 Jan 2025 15:00), Max: 98.6 (29 Jan 2025 07:00)  HR: 97 (29 Jan 2025 15:15) (66 - 116)  BP: 111/53 (29 Jan 2025 07:45) (111/53 - 113/63)  BP(mean): 76 (29 Jan 2025 07:45) (76 - 89)  RR: 9 (29 Jan 2025 15:15) (4 - 72)  SpO2: 100% (29 Jan 2025 15:15) (89% - 100%)    Parameters below as of 29 Jan 2025 12:35    O2 Flow (L/min): 10  O2 Concentration (%): 40    I&O's Summary    28 Jan 2025 07:01  -  29 Jan 2025 07:00  --------------------------------------------------------  IN: 1073 mL / OUT: 1785 mL / NET: -712 mL    29 Jan 2025 07:01  -  29 Jan 2025 15:25  --------------------------------------------------------  IN: 33.2 mL / OUT: 255 mL / NET: -221.8 mL                              8.0    7.51  )-----------( 89       ( 29 Jan 2025 15:09 )             22.8     01-29    136  |  100  |  42[H]  ----------------------------<  148[H]  4.0   |  24  |  1.42[H]    Ca    9.3      29 Jan 2025 00:23  Phos  4.3     01-29  Mg     1.6     01-29    TPro  5.9[L]  /  Alb  2.7[L]  /  TBili  4.6[H]  /  DBili  x   /  AST  24  /  ALT  7[L]  /  AlkPhos  77  01-29          Culture - Acid Fast - Other w/Smear (collected 01-28-25 @ 20:57)  Source: .Other    Culture - Fungal, Other (collected 01-28-25 @ 20:57)  Source: .Other  Preliminary Report (01-29-25 @ 08:36):    Testing in progress    Culture - Abscess with Gram Stain (collected 01-28-25 @ 20:57)  Source: .Abscess  Gram Stain (01-29-25 @ 02:42):    Few polymorphonuclear leukocytes seen per low power field    No organisms seen per oil power field    Culture - Acid Fast - Tissue w/Smear (collected 01-28-25 @ 20:51)  Source: Tissue    Culture - Fungal, Tissue (collected 01-28-25 @ 20:51)  Source: Tissue  Preliminary Report (01-29-25 @ 08:09):    Testing in progress    Culture - Tissue with Gram Stain (collected 01-28-25 @ 20:51)  Source: Tissue  Gram Stain (01-29-25 @ 06:02):    Few polymorphonuclear leukocytes seen per low power field    No organisms seen per oil power field    Culture - Body Fluid with Gram Stain (collected 01-24-25 @ 19:10)  Source: Pleural Fl  Gram Stain (01-26-25 @ 12:04):    polymorphonuclear leukocytes seen    No organisms seen    by cytocentrifuge  Preliminary Report (01-26-25 @ 12:04):    Rare Citrobacter koseri  Organism: Citrobacter koseri (01-27-25 @ 08:17)  Organism: Citrobacter koseri (01-27-25 @ 08:17)    Culture - Fungal, Body Fluid (collected 01-23-25 @ 17:42)  Source: Peritoneal  Preliminary Report (01-26-25 @ 08:23):    No growth    Culture - Body Fluid with Gram Stain (collected 01-23-25 @ 17:42)  Source: Peritoneal  Gram Stain (01-24-25 @ 01:52):    No polymorphonuclear cells seen    No organisms seen    by cytocentrifuge  Final Report (01-29-25 @ 08:22):    No growth at 5 days                    Review of systems  All other systems were reviewed and are negative, except as noted.          PHYSICAL EXAM:   GENERAL:  No acute distress  HEENT:  Normocephalic/atraumatic, scleral icterus  CHEST:  No accessory muscle use, chest tube x 2 ss, intubated/sedated  HEART:  Regular rate and rhythm  ABDOMEN:  Soft, mild diffuse tenderness, mildly distended, normoactive bowel sounds, splenomegaly, stigmata of chronic liver dz  EXTREMITIES: No cyanosis, clubbing, or edema  SKIN:  No rash  NEURO:  intubated/sedated

## 2025-01-29 NOTE — PROGRESS NOTE ADULT - ATTENDING COMMENTS
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

## 2025-01-29 NOTE — PROGRESS NOTE ADULT - ASSESSMENT
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     #Decompensated EtOH Associated Cirrhosis  MELD 23O on 1/29  - OLT: evaluation ongoing, pending cardiac evaluation and empyema management  - EV: EGD 7/2024 for melena showed grade II EV, small gastric ulcer (neg HP), and portal gastropathy   - HE: c/w lactulose and rifaximin, goal 3-4 BM/day  - HCC screening: MRI nondiagnostic, consider repeat imaging when stabilized                                      - MANUELITO: C/w bumex 2 mg IV BID for albumin assisted diuresis  - C/w home midodrine 10 mg tid  - C/w PPI for stress ulcer ppx and hx of ulcer  - Trend MELD labs daily (cmp, INR) daily  - Strict I/Os, daily wts    #Empyema  -  CT chest noncontrast reviewed from OSH showing moderate sized complex R sided pleural effusion. s/p chest tube placed 1/18 with cultures growing Citrobacter c/w empyema. Requiring 2nd chest tube placement then now s/p VATS for persistent effusion  - s/p VATS 1/29  - C/w CTX 2 g IV daily  - Maintain chest tubes in place for drainage  - Trend cbc and fever curve  - Appreciate thoracic surgery and ID recs    #MANUELITO, improving  Found to have acute renal failure requiring dialysis at OSH, ddx includes HRS vs ATN. Permacath in place. Cr baseline 1.12 10/2024  - C/w albumin assisted diuresis with bumex 2 mg q12h  - IR consult to remove tunneled cath  - Strict I/Os, daily wts  - Trend CMP daily  - Appreciate transplant renal recs
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
## 2025-01-29 NOTE — PROGRESS NOTE ADULT - ASSESSMENT
ASSESSMENT:    PLAN:    Neuro:  - Assess neurological neurovascular status every  hours in the setting of  - Sedation while intubated w/  - Multimodal pain control w/    Resp:  - Out of bed to chair, ambulate as tolerated, and incentive spirometry to prevent atelectasis  - Mechanical ventilation for    CV:  - Will wean vasopressor support w/ goal MAP > 65 mmHg    GI:   -   - Bowel regimen with senna & Miralax  - Protonix for stress ulcer prophylaxis    Renal:  - Monitor I&Os and electrolytes w/ repletions as necessary    Heme:  - Monitor CBC and coags  - Lovenox for VTE prophylaxis    ID:   - Monitor for clinical evidence of active infection  - Monitor WBC, temperature, and procalcitonin  - Empiric antibiotics    Endo:   - Monitor glucose ; HgbA1C  - for HLD  - for hypothyroidism    Code Status:    Disposition:    Lorin Marinelli PA-C     Please call [phone] after 6am 44M PMH decompensated ETOH cirrhosis c/b recurrent ascites, grade II esophageal varices, and hepatic encephalopathy, who presented to OSH with abd pain and distension found to have decompensated EtOH cirrhosis with MELD 30, MANUELITO (HRS vs ATN; previous HD need with [facility]) found to have R empyema s/p R pigtail placement w/ IR 1/18 requiring several adjustments, s/p R robotic VATS with decortication (Dr. Ramírez 1/28/2025) for loculated empyema. OR course with oropharyngeal bleeding during intubation, coagulopathy/elevated EBL during case.     EBL 1.5L, 3 cryo, 3 platelets, 2 PRBC, 1 TXA, 3FFP, 1 crystal    Overnight events:   - 3U PRBC    NEUROLOGIC   - Precedex for sedation  - Dilaudid PRN pain/agitation  - Plan to wean to extubation likely 1/29 AM pending bloody airway secretions    RESPIRATORY   - Ventilator , RR 18, PEEP 10, FiO2 30  - Wean vent to SBT in AM  - 3x chest tubes to suction 20 cm H2O via dry pleurevacs    CARDIOVASCULAR   - Monitor hemodynamics   - SBP goal >110    GASTROINTESTINAL   - Diet: NPO    /RENAL   - IVL; Getting Bumex BID  - Prev HD; transplant nephro following  - Strict I/Os    HEMATOLOGIC  - Monitor H/H   - Will monitor TEG and transfuse PRN  - DVT ppx: SCDs, holding chem dvt ppx given coagulopathy    INFECTIOUS DISEASE  - CTX for empyema    ENDOCRINE  - Monitor gluc    DISPO: SICU  --------------------------------------------------------------------------------------

## 2025-01-29 NOTE — PROGRESS NOTE ADULT - ASSESSMENT
45yo M with Hx decompensated EtOH cirrhosis (c/b recurrent ascites, grade II EV, and HE) who presented to OSH on 1/16 with abdominal pain and distension, found to have ascites and acute renal failure requiring initiation of HD (s/p Permacath placement c/b bleeding), transferred to [facility] for transplant evaluation and management. CT chest at OSH was significant for a complex R pleural effusion s/p R pigtail placement by IR on 1/18 with drainage of purulent material c/f empyema. Thoracic Surgery consulted for empyema and chest tube management.   1/20 R pigtail drained 700/910, CT of Chest on Tuesday to assess effusion and make final plan determination  1/21 R pigtail drained 80/240, CT of chest to assess effusion today   1/22 R pigtail maintained. CT of chest revealing:   < from: CT Chest No Cont (01.21.25 @ 19:29) >  IMPRESSION:.  Marked interval decrease in size ofright-sided pleural fluid and gas   collection (presumably empyema) status post placement of pigtail catheter   as described above.  Much interval improvement of posterior right upper lobe and right lower   lobe consolidation.  New patchy ground-glass opacities at the left apex may be related to   edema or be infectious/inflammatory in etiology.  New small left pleural effusion. < end of copied text >  1/23 R PTC  (placed by IR 1/18) --> LWS this AM; patient NPO for IR repositioning/swap of chest tube today. Monitor Chest tube outputs  1/24 VSS  pigtail  unsuccessful repositioning  1/23 - output 1120 overnight -AM  XRay pending  1/25 Daily CXR;  pulm toileting. rpt CT imaging on MONDAY per Thoracic Attending  1/26 VSS R Ct # 1 minimal drainage.  RCT #2- w/ 180cc in last 24 hrs keep to lws - rpt imaging Monday to assess need for RVATS decort.  1/28 Keep NPO for Right VATs decort today with Dr. Ramírez. H/H 6.1/18.3, Platelets 48K, INR 2.57, please transfuse PRBC/PLATELETS/FFP WITH GOAL Hgb >8, PLTS >100 K, INR BELOW 1.7.   1/29 h/h 8/23, Maintain chest tubes to lws x 72 hrs

## 2025-01-29 NOTE — PROGRESS NOTE ADULT - SUBJECTIVE AND OBJECTIVE BOX
VITAL SIGNS    Vital Signs Last 24 Hrs  T(C): 36.8 (01-29-25 @ 11:00), Max: 37 (01-29-25 @ 07:00)  T(F): 98.2 (01-29-25 @ 11:00), Max: 98.6 (01-29-25 @ 07:00)  HR: 94 (01-29-25 @ 14:00) (66 - 107)  BP: 111/53 (01-29-25 @ 07:45) (111/53 - 113/63)  RR: 27 (01-29-25 @ 14:00) (4 - 72)  SpO2: 98% (01-29-25 @ 14:00) (91% - 100%)            01-28 @ 07:01  -  01-29 @ 07:00  --------------------------------------------------------  IN: 1073 mL / OUT: 1785 mL / NET: -712 mL    01-29 @ 07:01  -  01-29 @ 14:14  --------------------------------------------------------  IN: 33.2 mL / OUT: 240 mL / NET: -206.8 mL       Daily Height in cm: 180.34 (28 Jan 2025 15:45)    Daily   Admit Wt: Drug Dosing Weight  Height (cm): 180.3 (28 Jan 2025 15:45)  Weight (kg): 108.3 (28 Jan 2025 15:45)  BMI (kg/m2): 33.3 (28 Jan 2025 15:45)  BSA (m2): 2.27 (28 Jan 2025 15:45)    Bilirubin Total: 4.6 mg/dL (01-29 @ 00:23)  Bilirubin Total: 4.0 mg/dL (01-28 @ 20:51)    CAPILLARY BLOOD GLUCOSE              MEDICATIONS  buMETAnide Injectable 2 milliGRAM(s) IV Push <User Schedule>  cefTRIAXone   IVPB 1000 milliGRAM(s) IV Intermittent every 24 hours  chlorhexidine 2% Cloths 1 Application(s) Topical <User Schedule>  HYDROmorphone  Injectable 0.5 milliGRAM(s) IV Push every 3 hours PRN  influenza   Vaccine 0.5 milliLiter(s) IntraMuscular once  norepinephrine Infusion 0.01 MICROgram(s)/kG/Min IV Continuous <Continuous>  pantoprazole  Injectable 40 milliGRAM(s) IV Push daily      >>> <<<  PHYSICAL EXAM  Subjective: intubated, alert  Neurology: alert    CV :s1s2  Lungs: rt chest tubes to lws  Abdomen: soft, NT,ND, (- )BM  :  yates  Extremities:- c/c/e        LABS  01-29    136  |  100  |  42[H]  ----------------------------<  148[H]  4.0   |  24  |  1.42[H]    Ca    9.3      29 Jan 2025 00:23  Phos  4.3     01-29  Mg     1.6     01-29    TPro  5.9[L]  /  Alb  2.7[L]  /  TBili  4.6[H]  /  DBili  x   /  AST  24  /  ALT  7[L]  /  AlkPhos  77  01-29                                 8.0    6.85  )-----------( 87       ( 29 Jan 2025 09:28 )             22.7          PT/INR - ( 29 Jan 2025 00:23 )   PT: 19.1 sec;   INR: 1.68 ratio         PTT - ( 29 Jan 2025 00:23 )  PTT:36.0 sec       PAST MEDICAL & SURGICAL HISTORY:  Sleep apnea, obstructive      Alcohol abuse      Cirrhosis of liver      Portal hypertension      H/O esophageal varices      Anemia      Mild alcohol use disorder      No significant past surgical history

## 2025-01-29 NOTE — PROGRESS NOTE ADULT - ASSESSMENT
45 yo M with PMH decompensated ETOH cirrhosis c/b recurrent ascites, grade II EV, and HE, right calf hematoma 10/2024 (s/p fall)  presented to [facility] for abdominal pain and distension. Patient found to have ascites on exam and MANUELITO requiring HD initiation PermCath was placed by vascular surgery on 1/10 with post procedure course c/b bleeding from insertion site. Transferred to [facility] SICU for further management.     Blood Cultures (1/16) 1/4 Staph epi.   GI PCR (1/17) + Giardia.   Paracentesis (1/17) 155 nucleated cell counts.     CT Chest (1/17) Moderate loculated right pleural effusion containing a few foci of air, which are new from 1/8/2025 and may represent empyema/bronchopleural fistula versus sequelae of prior thoracentesis.     #Empyema  s/p VATS 1/28/25  --Will increase Ceftriaxone to 2g IV Q24H  --Will add Metronidazole 500 mg BID  --Follow up on operative cultures    #Giardia  s/p 7 days of Metronidazole    #Positive Blood Culture (Staph epi, 1/16)  Concern for procurement contaminant  --Continue to follow CBC with diff  --Continue to follow temperature curve  --Follow up on preliminary blood cultures      #Pre-Liver Transplant Evaluation, Decompensated Cirrhosis  COVID19 Rodríguez Antibody Positive  HAV IgG Positive  HBVs Ab Positive  HBVsAg Negative  HBVc Ab Negative  HCV Ab Negative  HSV 1 IgG Positive  HSV 2 IgG Negative  EBV IgG Positive  CMV IgG Positive  VZV IgG Positive  Measles IgG Positive  Mumps IgG Positive  Rubella IgG Positive  Quantiferon Gold negative  HIV Ag/Ab by CMIA Negative  Syphilis Screen Negative  Toxoplasma IgG Negative  Strongyloides Ab negative  Coccidiodes Ab PENDING  Leishmania Ab PENDING  --ID clearance to OLT pending treatment of empyema     #Encounter to Vaccinate Patient  COVID19: Would benefit from COVID19 [id number] Vaccine Dose  Influenza: Had vaccination for this year  Pneumococcal: Would benefit from PCV20  HAV: Immune, will not require further vaccination  HBV: Immune, will not require further vaccination  MMR: Immune, will not require further vaccination  Varicella: Immune, will not require further vaccination  Shingles: Will require Shingrix  Tdap: Tdap 6/23/24    I will continue to follow. Please feel free to contact me with any further questions.    Froilan Whitmore M.D.  [facility] Division of Infectious Disease  8AM-5PM Monday - Friday: Available on [employer] Teams  After Hours and Holidays (or if no response on [employer] Teams): Please contact the Infectious Diseases Office at [phone]    The above assessment and plan were discussed with SICU Team

## 2025-01-29 NOTE — PROGRESS NOTE ADULT - ASSESSMENT
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 Actively drinking. Now p/w abdominal pain/distension with worsening of liver tests possibly triggered by infection. DDx also includes alcohol associated hepatitis given hx of alc hep 7/2024 which improved with steroids  - OLT: evaluation ongoing, pending cardiac evaluation and empyema management  - EV: EGD 7/2024 for melena showed grade II EV, small gastric ulcer (neg HP), and portal gastropathy   - HE: c/w lactulose and rifaximin, goal 3-4 BM/day  - HCC screening: MRI nondiagnostic, consider repeat imaging when stabilized                                      - MANUELITO: C/w bumex 2 mg IV BID for albumin assisted diuresis  - C/w home midodrine 10 mg tid  - C/w PPI for stress ulcer ppx and hx of ulcer  - Trend MELD labs daily (cmp, INR) daily  - Strict I/Os, daily wts    #Empyema  #Leukocytosis  CT chest noncontrast reviewed from OSH showing moderate sized complex R sided pleural effusion. s/p chest tube placed 1/18 with cultures growing Citrobacter c/w empyema. Requiring 2nd chest tube placement then now s/p VATS for persistent effuision  - s/p VATS 1/29  - C/w CTX 2 g IV daily  - Maintain chest tubes in place for drainage  - Trend cbc and fever curve  - Appreciate thoracic surgery and ID recs    #MANUELITO, improving  Found to have acute renal failure requiring dialysis at OSH, ddx includes HRS vs ATN. Permacath in place. Cr baseline 1.12 10/2024  - C/w albumin assisted diuresis with bumex 2 mg q12h  - IR consult to remove tunneled cath  - Strict I/Os, daily wts  - Trend CMP daily  - Appreciate transplant renal recs    #Anemia  #Thrombocytopenia  Normocytic anemia with Hgb 8.5 and plt count 64 likely due to chronic liver dz  - s/p 4 units pRBC, 2 FFP, 2 plts on 1/28  - Transfuse Hgb>7, plt>50 given bleeding  - Hold DVT ppx for bleeding    All recommendations are tentative until note is attested by attending.     Judy Tucker, PGY4  Gastroenterology/Hepatology Fellow  Available on [employer] Teams  [phone] (Long Range Pager)  [phone] (Short Range Pager [facility])    After 5 pm, please contact the on-call GI fellow for any urgent issues via the Hospital Call

## 2025-01-29 NOTE — PROGRESS NOTE ADULT - ATTENDING COMMENTS
44 year old man with alcohol use disorder, liver cirrhosis complicated by portal hypertension, ascites requiring large volume paracentesis and, esophageal varices and hepatic encephalopathy.  Presented to [facility] on 1/8/25 with abdominal pain and distension, found to have worsening ascites and MANUELITO, PermCath placed on 1/10 and initiated on dialysis, transferred to [facility] for further care. CT chest 1/17 with loculated right effusion, s/p chest tube, culture +ve for Citrobacter. GI PCR +ve for Giardia. Currently on Ceftriaxone, completed metronidazole for giardia.     MANUELITO in the setting of infection (empyema), initiated on dialysis on 1/10/25, last HD done on 1/18/25.   S/p VATS and decortication for loculated empyema on 1/28, post op transferred to SICU intubated,, required levophed.   Extubated this AM, Levo weaned off, UOP 0.7liter   Creatinine continues to improve 1.4mg/dL today   Anasarca present   Hypotension improved, levo discontinued, continue midodrine   - No need for dialysis, may remove PermCath   - Continue midodrine 10mg po q8 hours   - Bumex 2mg iv q8h, 25% Albumin 50ml iv q8h

## 2025-01-30 LAB
ALBUMIN SERPL ELPH-MCNC: 2.9 G/DL — LOW (ref 3.3–5)
ALBUMIN SERPL ELPH-MCNC: 3 G/DL — LOW (ref 3.3–5)
ALP SERPL-CCNC: 102 U/L — SIGNIFICANT CHANGE UP (ref 40–120)
ALP SERPL-CCNC: 93 U/L — SIGNIFICANT CHANGE UP (ref 40–120)
ALT FLD-CCNC: 6 U/L — LOW (ref 10–45)
ALT FLD-CCNC: 7 U/L — LOW (ref 10–45)
ANION GAP SERPL CALC-SCNC: 13 MMOL/L — SIGNIFICANT CHANGE UP (ref 5–17)
ANION GAP SERPL CALC-SCNC: 9 MMOL/L — SIGNIFICANT CHANGE UP (ref 5–17)
APTT BLD: 37.1 SEC — HIGH (ref 24.5–35.6)
AST SERPL-CCNC: 32 U/L — SIGNIFICANT CHANGE UP (ref 10–40)
AST SERPL-CCNC: 33 U/L — SIGNIFICANT CHANGE UP (ref 10–40)
BILIRUB SERPL-MCNC: 4.4 MG/DL — HIGH (ref 0.2–1.2)
BILIRUB SERPL-MCNC: 5.2 MG/DL — HIGH (ref 0.2–1.2)
BUN SERPL-MCNC: 48 MG/DL — HIGH (ref 7–23)
BUN SERPL-MCNC: 50 MG/DL — HIGH (ref 7–23)
CALCIUM SERPL-MCNC: 8.3 MG/DL — LOW (ref 8.4–10.5)
CALCIUM SERPL-MCNC: 8.8 MG/DL — SIGNIFICANT CHANGE UP (ref 8.4–10.5)
CHLORIDE SERPL-SCNC: 97 MMOL/L — SIGNIFICANT CHANGE UP (ref 96–108)
CHLORIDE SERPL-SCNC: 99 MMOL/L — SIGNIFICANT CHANGE UP (ref 96–108)
CO2 SERPL-SCNC: 23 MMOL/L — SIGNIFICANT CHANGE UP (ref 22–31)
CO2 SERPL-SCNC: 25 MMOL/L — SIGNIFICANT CHANGE UP (ref 22–31)
CREAT SERPL-MCNC: 1.5 MG/DL — HIGH (ref 0.5–1.3)
CREAT SERPL-MCNC: 1.63 MG/DL — HIGH (ref 0.5–1.3)
CULTURE RESULTS: ABNORMAL
EGFR: 52 ML/MIN/1.73M2 — LOW
EGFR: 52 ML/MIN/1.73M2 — LOW
EGFR: 58 ML/MIN/1.73M2 — LOW
EGFR: 58 ML/MIN/1.73M2 — LOW
GAS PNL BLDA: SIGNIFICANT CHANGE UP
GLUCOSE BLDC GLUCOMTR-MCNC: 116 MG/DL — HIGH (ref 70–99)
GLUCOSE BLDC GLUCOMTR-MCNC: 147 MG/DL — HIGH (ref 70–99)
GLUCOSE BLDC GLUCOMTR-MCNC: 227 MG/DL — HIGH (ref 70–99)
GLUCOSE SERPL-MCNC: 215 MG/DL — HIGH (ref 70–99)
GLUCOSE SERPL-MCNC: 217 MG/DL — HIGH (ref 70–99)
HCT VFR BLD CALC: 20.6 % — CRITICAL LOW (ref 39–50)
HCT VFR BLD CALC: 21.7 % — LOW (ref 39–50)
HCT VFR BLD CALC: 22.4 % — LOW (ref 39–50)
HGB BLD-MCNC: 7.2 G/DL — LOW (ref 13–17)
HGB BLD-MCNC: 7.5 G/DL — LOW (ref 13–17)
HGB BLD-MCNC: 8.1 G/DL — LOW (ref 13–17)
INR BLD: 1.88 RATIO — HIGH (ref 0.85–1.16)
MAGNESIUM SERPL-MCNC: 2 MG/DL — SIGNIFICANT CHANGE UP (ref 1.6–2.6)
MAGNESIUM SERPL-MCNC: 2.2 MG/DL — SIGNIFICANT CHANGE UP (ref 1.6–2.6)
MCHC RBC-ENTMCNC: 30.6 PG — SIGNIFICANT CHANGE UP (ref 27–34)
MCHC RBC-ENTMCNC: 31.2 PG — SIGNIFICANT CHANGE UP (ref 27–34)
MCHC RBC-ENTMCNC: 32 PG — SIGNIFICANT CHANGE UP (ref 27–34)
MCHC RBC-ENTMCNC: 34.6 G/DL — SIGNIFICANT CHANGE UP (ref 32–36)
MCHC RBC-ENTMCNC: 35 G/DL — SIGNIFICANT CHANGE UP (ref 32–36)
MCHC RBC-ENTMCNC: 36.2 G/DL — HIGH (ref 32–36)
MCV RBC AUTO: 88.5 FL — SIGNIFICANT CHANGE UP (ref 80–100)
MCV RBC AUTO: 88.6 FL — SIGNIFICANT CHANGE UP (ref 80–100)
MCV RBC AUTO: 89.2 FL — SIGNIFICANT CHANGE UP (ref 80–100)
NRBC # BLD: 0 /100 WBCS — SIGNIFICANT CHANGE UP (ref 0–0)
NRBC BLD-RTO: 0 /100 WBCS — SIGNIFICANT CHANGE UP (ref 0–0)
ORGANISM # SPEC MICROSCOPIC CNT: ABNORMAL
ORGANISM # SPEC MICROSCOPIC CNT: ABNORMAL
PHOSPHATE SERPL-MCNC: 2.9 MG/DL — SIGNIFICANT CHANGE UP (ref 2.5–4.5)
PHOSPHATE SERPL-MCNC: 3.5 MG/DL — SIGNIFICANT CHANGE UP (ref 2.5–4.5)
PLATELET # BLD AUTO: 58 K/UL — LOW (ref 150–400)
PLATELET # BLD AUTO: 65 K/UL — LOW (ref 150–400)
PLATELET # BLD AUTO: 70 K/UL — LOW (ref 150–400)
POTASSIUM SERPL-MCNC: 3.6 MMOL/L — SIGNIFICANT CHANGE UP (ref 3.5–5.3)
POTASSIUM SERPL-MCNC: 3.7 MMOL/L — SIGNIFICANT CHANGE UP (ref 3.5–5.3)
POTASSIUM SERPL-SCNC: 3.6 MMOL/L — SIGNIFICANT CHANGE UP (ref 3.5–5.3)
POTASSIUM SERPL-SCNC: 3.7 MMOL/L — SIGNIFICANT CHANGE UP (ref 3.5–5.3)
PROT SERPL-MCNC: 6.6 G/DL — SIGNIFICANT CHANGE UP (ref 6–8.3)
PROT SERPL-MCNC: 6.7 G/DL — SIGNIFICANT CHANGE UP (ref 6–8.3)
PROTHROM AB SERPL-ACNC: 21.5 SEC — HIGH (ref 9.9–13.4)
RBC # BLD: 2.31 M/UL — LOW (ref 4.2–5.8)
RBC # BLD: 2.45 M/UL — LOW (ref 4.2–5.8)
RBC # BLD: 2.53 M/UL — LOW (ref 4.2–5.8)
RBC # FLD: 15.8 % — HIGH (ref 10.3–14.5)
RBC # FLD: 15.9 % — HIGH (ref 10.3–14.5)
RBC # FLD: 15.9 % — HIGH (ref 10.3–14.5)
SODIUM SERPL-SCNC: 133 MMOL/L — LOW (ref 135–145)
SODIUM SERPL-SCNC: 133 MMOL/L — LOW (ref 135–145)
SPECIMEN SOURCE: SIGNIFICANT CHANGE UP
WBC # BLD: 6.48 K/UL — SIGNIFICANT CHANGE UP (ref 3.8–10.5)
WBC # BLD: 6.59 K/UL — SIGNIFICANT CHANGE UP (ref 3.8–10.5)
WBC # BLD: 6.73 K/UL — SIGNIFICANT CHANGE UP (ref 3.8–10.5)
WBC # FLD AUTO: 6.48 K/UL — SIGNIFICANT CHANGE UP (ref 3.8–10.5)
WBC # FLD AUTO: 6.59 K/UL — SIGNIFICANT CHANGE UP (ref 3.8–10.5)
WBC # FLD AUTO: 6.73 K/UL — SIGNIFICANT CHANGE UP (ref 3.8–10.5)

## 2025-01-30 PROCEDURE — 71045 X-RAY EXAM CHEST 1 VIEW: CPT | Mod: 26

## 2025-01-30 PROCEDURE — 99232 SBSQ HOSP IP/OBS MODERATE 35: CPT

## 2025-01-30 PROCEDURE — 93010 ELECTROCARDIOGRAM REPORT: CPT

## 2025-01-30 PROCEDURE — 99233 SBSQ HOSP IP/OBS HIGH 50: CPT

## 2025-01-30 PROCEDURE — G0545: CPT

## 2025-01-30 PROCEDURE — 99232 SBSQ HOSP IP/OBS MODERATE 35: CPT | Mod: GC

## 2025-01-30 RX ORDER — OXYCODONE HYDROCHLORIDE 30 MG/1
2.5 TABLET ORAL EVERY 4 HOURS
Refills: 0 | Status: DISCONTINUED | OUTPATIENT
Start: 2025-01-30 | End: 2025-01-31

## 2025-01-30 RX ORDER — OXYCODONE HYDROCHLORIDE 30 MG/1
5 TABLET ORAL EVERY 4 HOURS
Refills: 0 | Status: DISCONTINUED | OUTPATIENT
Start: 2025-01-30 | End: 2025-01-31

## 2025-01-30 RX ORDER — MIRTAZAPINE 30 MG/1
7.5 TABLET, FILM COATED ORAL AT BEDTIME
Refills: 0 | Status: DISCONTINUED | OUTPATIENT
Start: 2025-01-30 | End: 2025-01-30

## 2025-01-30 RX ORDER — LACTULOSE 10 G/15ML
30 SOLUTION ORAL EVERY 12 HOURS
Refills: 0 | Status: DISCONTINUED | OUTPATIENT
Start: 2025-01-30 | End: 2025-02-16

## 2025-01-30 RX ORDER — B1/B2/B3/B5/B6/B12/VIT C/FOLIC 500-0.5 MG
1 TABLET ORAL DAILY
Refills: 0 | Status: DISCONTINUED | OUTPATIENT
Start: 2025-01-30 | End: 2025-02-27

## 2025-01-30 RX ORDER — INSULIN LISPRO 100 U/ML
INJECTION, SOLUTION INTRAVENOUS; SUBCUTANEOUS
Refills: 0 | Status: DISCONTINUED | OUTPATIENT
Start: 2025-01-30 | End: 2025-02-27

## 2025-01-30 RX ORDER — POLYETHYLENE GLYCOL 3350 17 G/17G
17 POWDER, FOR SOLUTION ORAL DAILY
Refills: 0 | Status: DISCONTINUED | OUTPATIENT
Start: 2025-01-30 | End: 2025-01-30

## 2025-01-30 RX ORDER — METOPROLOL SUCCINATE 50 MG/1
12.5 TABLET, EXTENDED RELEASE ORAL EVERY 12 HOURS
Refills: 0 | Status: DISCONTINUED | OUTPATIENT
Start: 2025-01-30 | End: 2025-02-01

## 2025-01-30 RX ORDER — GABAPENTIN 400 MG/1
300 CAPSULE ORAL EVERY 12 HOURS
Refills: 0 | Status: DISCONTINUED | OUTPATIENT
Start: 2025-01-30 | End: 2025-02-04

## 2025-01-30 RX ORDER — FOLIC ACID 1 MG/1
1 TABLET ORAL DAILY
Refills: 0 | Status: DISCONTINUED | OUTPATIENT
Start: 2025-01-30 | End: 2025-02-27

## 2025-01-30 RX ORDER — IPRATROPIUM BROMIDE AND ALBUTEROL SULFATE .5; 2.5 MG/3ML; MG/3ML
3 SOLUTION RESPIRATORY (INHALATION) ONCE
Refills: 0 | Status: COMPLETED | OUTPATIENT
Start: 2025-01-30 | End: 2025-01-30

## 2025-01-30 RX ORDER — INSULIN LISPRO 100 U/ML
INJECTION, SOLUTION INTRAVENOUS; SUBCUTANEOUS AT BEDTIME
Refills: 0 | Status: DISCONTINUED | OUTPATIENT
Start: 2025-01-30 | End: 2025-02-27

## 2025-01-30 RX ORDER — SOD PHOS DI, MONO/K PHOS MONO 250 MG
2 TABLET ORAL ONCE
Refills: 0 | Status: COMPLETED | OUTPATIENT
Start: 2025-01-30 | End: 2025-01-30

## 2025-01-30 RX ORDER — ALBUMIN (HUMAN) 12.5 G/50ML
250 INJECTION, SOLUTION INTRAVENOUS ONCE
Refills: 0 | Status: COMPLETED | OUTPATIENT
Start: 2025-01-30 | End: 2025-01-30

## 2025-01-30 RX ORDER — SENNA 187 MG
2 TABLET ORAL AT BEDTIME
Refills: 0 | Status: DISCONTINUED | OUTPATIENT
Start: 2025-01-30 | End: 2025-01-30

## 2025-01-30 RX ORDER — QUETIAPINE FUMARATE 25 MG/1
25 TABLET ORAL ONCE
Refills: 0 | Status: COMPLETED | OUTPATIENT
Start: 2025-01-30 | End: 2025-01-30

## 2025-01-30 RX ORDER — IPRATROPIUM BROMIDE AND ALBUTEROL SULFATE .5; 2.5 MG/3ML; MG/3ML
3 SOLUTION RESPIRATORY (INHALATION) EVERY 6 HOURS
Refills: 0 | Status: COMPLETED | OUTPATIENT
Start: 2025-01-30 | End: 2025-02-02

## 2025-01-30 RX ORDER — METOPROLOL SUCCINATE 50 MG/1
12.5 TABLET, EXTENDED RELEASE ORAL ONCE
Refills: 0 | Status: COMPLETED | OUTPATIENT
Start: 2025-01-30 | End: 2025-01-30

## 2025-01-30 RX ADMIN — Medication 0.5 MILLIGRAM(S): at 16:46

## 2025-01-30 RX ADMIN — Medication 40 MILLIGRAM(S): at 11:47

## 2025-01-30 RX ADMIN — Medication 0.5 MILLIGRAM(S): at 09:55

## 2025-01-30 RX ADMIN — Medication 100 MILLIGRAM(S): at 11:48

## 2025-01-30 RX ADMIN — Medication 4 MILLILITER(S): at 23:11

## 2025-01-30 RX ADMIN — GABAPENTIN 300 MILLIGRAM(S): 400 CAPSULE ORAL at 17:21

## 2025-01-30 RX ADMIN — Medication 500 MILLIGRAM(S): at 05:06

## 2025-01-30 RX ADMIN — Medication 0.5 MILLIGRAM(S): at 15:46

## 2025-01-30 RX ADMIN — QUETIAPINE FUMARATE 25 MILLIGRAM(S): 25 TABLET ORAL at 21:30

## 2025-01-30 RX ADMIN — Medication 0.5 MILLIGRAM(S): at 06:47

## 2025-01-30 RX ADMIN — FOLIC ACID 1 MILLIGRAM(S): 1 TABLET ORAL at 11:47

## 2025-01-30 RX ADMIN — METOPROLOL SUCCINATE 12.5 MILLIGRAM(S): 50 TABLET, EXTENDED RELEASE ORAL at 17:22

## 2025-01-30 RX ADMIN — IPRATROPIUM BROMIDE AND ALBUTEROL SULFATE 3 MILLILITER(S): .5; 2.5 SOLUTION RESPIRATORY (INHALATION) at 18:06

## 2025-01-30 RX ADMIN — Medication 0.5 MILLIGRAM(S): at 07:17

## 2025-01-30 RX ADMIN — Medication 0.5 MILLIGRAM(S): at 10:55

## 2025-01-30 RX ADMIN — ALBUMIN (HUMAN) 250 MILLILITER(S): 12.5 INJECTION, SOLUTION INTRAVENOUS at 10:19

## 2025-01-30 RX ADMIN — Medication 4 MILLILITER(S): at 18:07

## 2025-01-30 RX ADMIN — OXYCODONE HYDROCHLORIDE 5 MILLIGRAM(S): 30 TABLET ORAL at 21:18

## 2025-01-30 RX ADMIN — Medication 2 TABLET(S): at 12:40

## 2025-01-30 RX ADMIN — METOPROLOL SUCCINATE 12.5 MILLIGRAM(S): 50 TABLET, EXTENDED RELEASE ORAL at 10:19

## 2025-01-30 RX ADMIN — ALBUMIN (HUMAN) 500 MILLILITER(S): 12.5 INJECTION, SOLUTION INTRAVENOUS at 03:22

## 2025-01-30 RX ADMIN — IPRATROPIUM BROMIDE AND ALBUTEROL SULFATE 3 MILLILITER(S): .5; 2.5 SOLUTION RESPIRATORY (INHALATION) at 08:02

## 2025-01-30 RX ADMIN — CEFTRIAXONE 100 MILLIGRAM(S): 500 INJECTION, POWDER, FOR SOLUTION INTRAMUSCULAR; INTRAVENOUS at 17:22

## 2025-01-30 RX ADMIN — OXYCODONE HYDROCHLORIDE 5 MILLIGRAM(S): 30 TABLET ORAL at 00:09

## 2025-01-30 RX ADMIN — LACTULOSE 30 GRAM(S): 10 SOLUTION ORAL at 17:21

## 2025-01-30 RX ADMIN — IPRATROPIUM BROMIDE AND ALBUTEROL SULFATE 3 MILLILITER(S): .5; 2.5 SOLUTION RESPIRATORY (INHALATION) at 23:10

## 2025-01-30 RX ADMIN — Medication 500 MILLIGRAM(S): at 17:22

## 2025-01-30 RX ADMIN — ALBUMIN (HUMAN) 500 MILLILITER(S): 12.5 INJECTION, SOLUTION INTRAVENOUS at 02:27

## 2025-01-30 RX ADMIN — Medication 1 APPLICATION(S): at 05:05

## 2025-01-30 RX ADMIN — INSULIN LISPRO 4: 100 INJECTION, SOLUTION INTRAVENOUS; SUBCUTANEOUS at 11:48

## 2025-01-30 RX ADMIN — OXYCODONE HYDROCHLORIDE 5 MILLIGRAM(S): 30 TABLET ORAL at 01:09

## 2025-01-30 RX ADMIN — Medication 1 TABLET(S): at 11:47

## 2025-01-30 RX ADMIN — OXYCODONE HYDROCHLORIDE 5 MILLIGRAM(S): 30 TABLET ORAL at 20:18

## 2025-01-30 RX ADMIN — OXYCODONE HYDROCHLORIDE 5 MILLIGRAM(S): 30 TABLET ORAL at 15:00

## 2025-01-30 RX ADMIN — OXYCODONE HYDROCHLORIDE 5 MILLIGRAM(S): 30 TABLET ORAL at 14:00

## 2025-01-30 RX ADMIN — BUMETANIDE 2 MILLIGRAM(S): 1 TABLET ORAL at 09:17

## 2025-01-30 NOTE — PROGRESS NOTE ADULT - ASSESSMENT
ASSESSMENT: 44M PMH decompensated ETOH cirrhosis c/b recurrent ascites, grade II esophageal varices, and hepatic encephalopathy, who presented to OSH with abd pain and distension found to have decompensated EtOH cirrhosis with MELD 30, MANUELITO (HRS vs ATN; previous HD need with [facility]) found to have R empyema s/p R pigtail placement w/ IR 1/18 requiring several adjustments, s/p R robotic VATS with decortication (Dr. Ramírez 1/28/2025) for loculated empyema. OR course with oropharyngeal bleeding during intubation, coagulopathy/elevated EBL during case.     PLAN:    NEUROLOGIC   - A&O x 4  - Dilaudid PRN pain    RESPIRATORY   - room air  - 3x chest tubes to suction 20 cm H2O via dry pleurevacs 72h    CARDIOVASCULAR   - off pressors  - SBP goal >100  - trend lactate    GASTROINTESTINAL   - Regular diet  - Protonix     /RENAL   - Bumex BID  - Previously requiring HD, no longer needs, plan to remove permacath 1/30  - Strict I/Os, trend electrolytes, and replete PRN    HEMATOLOGIC  - Monitor H/H   - holding chemical VTE ppx i/s/o coagulopathy  - SCDs     INFECTIOUS DISEASE  - ABX; CTX & Flagyl   - Pleural cx (+) for Citrobacter 1/24  - Giardia (+)     ENDOCRINE  - Monitor gluc    Code Status: full code    Disposition: CHARLES Marinelli PA-C     Please call [phone] after 6am

## 2025-01-30 NOTE — PROGRESS NOTE ADULT - ASSESSMENT
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 Actively drinking. Now p/w abdominal pain/distension with worsening of liver tests possibly triggered by infection. DDx also includes alcohol associated hepatitis given hx of alc hep 7/2024 which improved with steroids  - OLT: evaluation ongoing, pending cardiac evaluation and empyema management  - EV: EGD 7/2024 for melena showed grade II EV, small gastric ulcer (neg HP), and portal gastropathy   - HE: c/w lactulose and rifaximin, goal 3-4 BM/day  - HCC screening: MRI nondiagnostic, consider repeat imaging when stabilized                                      - MANUELITO: C/w bumex 2 mg IV BID for albumin assisted diuresis  - C/w home midodrine 10 mg tid  - C/w PPI for stress ulcer ppx and hx of ulcer  - Trend MELD labs daily (cmp, INR) daily  - Strict I/Os, daily wts    #Empyema  #Leukocytosis  CT chest noncontrast reviewed from OSH showing moderate sized complex R sided pleural effusion. s/p chest tube placed 1/18 with cultures growing Citrobacter c/w empyema. Requiring 2nd chest tube placement then now s/p VATS for persistent effuision  - s/p VATS 1/29  - C/w CTX 2 g IV daily  - Maintain chest tubes in place for drainage  - Trend cbc and fever curve  - Appreciate thoracic surgery and ID recs    #MANUELITO, improving  Found to have acute renal failure requiring dialysis at OSH, ddx includes HRS vs ATN. Permacath in place. Cr baseline 1.12 10/2024  - C/w albumin assisted diuresis with bumex 2 mg q12h  - IR consult to remove tunneled cath  - Strict I/Os, daily wts  - Trend CMP daily  - Appreciate transplant renal recs    #Anemia  #Thrombocytopenia  Normocytic anemia with Hgb 8.5 and plt count 64 likely due to chronic liver dz  - s/p 4 units pRBC, 2 FFP, 2 plts on 1/28  - Transfuse Hgb>7, plt>50 given bleeding  - Hold DVT ppx for bleeding    All recommendations are tentative until note is attested by attending.     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 Actively drinking. Now p/w abdominal pain/distension with worsening of liver tests possibly triggered by infection. DDx also includes alcohol associated hepatitis given hx of alc hep 7/2024 which improved with steroids  - OLT: evaluation ongoing, pending cardiac evaluation and empyema management. f/u ID and cardiology recs  - EV: EGD 7/2024 for melena showed grade II EV, small gastric ulcer (neg HP), and portal gastropathy   - HE: c/w lactulose and rifaximin, goal 3-4 BM/day  - HCC screening: MRI nondiagnostic, consider repeat imaging when stabilized                                      - MANUELITO: C/w bumex 2 mg IV BID for albumin assisted diuresis  - C/w home midodrine 10 mg tid  - C/w PPI for stress ulcer ppx and hx of ulcer  - Trend MELD labs daily (cmp, INR) daily  - Strict I/Os, daily wts    #Empyema  #Leukocytosis  CT chest noncontrast reviewed from OSH showing moderate sized complex R sided pleural effusion. s/p chest tube placed 1/18 with cultures growing Citrobacter c/w empyema. Requiring 2nd chest tube placement then now s/p VATS for persistent effuision  - s/p VATS 1/29  - C/w CTX 2 g IV daily  - Maintain chest tubes in place for drainage  - Trend cbc and fever curve  - Appreciate thoracic surgery and ID recs    #MANUELITO, improving  Found to have acute renal failure requiring dialysis at OSH, ddx includes HRS vs ATN. Permacath in place. Cr baseline 1.12 10/2024  - C/w albumin assisted diuresis with bumex 2 mg q12h  - IR consult to remove tunneled cath  - Strict I/Os, daily wts  - Trend CMP daily  - Appreciate transplant renal recs    #Anemia  #Thrombocytopenia  Normocytic anemia with Hgb 8.5 and plt count 64 likely due to chronic liver dz  - s/p 4 units pRBC, 2 FFP, 2 plts on 1/28  - Transfuse Hgb>7, plt>50 given bleeding  - Hold DVT ppx for bleeding    All recommendations are tentative until note is attested by attending.     Judy Tucker, PGY4  Gastroenterology/Hepatology Fellow  Available on Microsoft Teams  [phone] (Long Range Pager)  [phone] (Short Range Pager [facility])    After 5 pm, please contact the on-call GI fellow for any urgent issues via the Hospital Call

## 2025-01-30 NOTE — PROGRESS NOTE ADULT - ASSESSMENT
45yo M with Hx decompensated EtOH cirrhosis (c/b recurrent ascites, grade II EV, and HE) who presented to OSH on 1/16 with abdominal pain and distension, found to have ascites and acute renal failure requiring initiation of HD (s/p Permacath placement c/b bleeding), transferred to [facility] for transplant evaluation and management. CT chest at OSH was significant for a complex R pleural effusion s/p R pigtail placement by IR on 1/18 with drainage of purulent material c/f empyema. Thoracic Surgery consulted for empyema and chest tube management.   1/20 R pigtail drained 700/910, CT of Chest on Tuesday to assess effusion and make final plan determination  1/21 R pigtail drained 80/240, CT of chest to assess effusion today   1/22 R pigtail maintained. CT of chest revealing:   < from: CT Chest No Cont (01.21.25 @ 19:29) >  IMPRESSION:.  Marked interval decrease in size ofright-sided pleural fluid and gas   collection (presumably empyema) status post placement of pigtail catheter   as described above.  Much interval improvement of posterior right upper lobe and right lower   lobe consolidation.  New patchy ground-glass opacities at the left apex may be related to   edema or be infectious/inflammatory in etiology.  New small left pleural effusion. < end of copied text >  1/23 R PTC  (placed by IR 1/18) --> LWS this AM; patient NPO for IR repositioning/swap of chest tube today. Monitor Chest tube outputs  1/24 VSS  pigtail  unsuccessful repositioning  1/23 - output 1120 overnight -AM  XRay pending  1/25 Daily CXR;  pulm toileting. rpt CT imaging on MONDAY per Thoracic Attending  1/26 VSS R Ct # 1 minimal drainage.  RCT #2- w/ 180cc in last 24 hrs keep to lws - rpt imaging Monday to assess need for RVATS decort.  1/28 Keep NPO for Right VATs decort today with Dr. Ramírez. H/H 6.1/18.3, Platelets 48K, INR 2.57, please transfuse PRBC/PLATELETS/FFP WITH GOAL Hgb >8, PLTS >100 K, INR BELOW 1.7.   1/29 h/h 8/23, Maintain chest tubes to lws x 72 hrs  1/30 Extubated, chest tube drainage serosanguinous. Transfused 1uPRBC today h/h 7/21
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
## 2025-01-30 NOTE — PROGRESS NOTE ADULT - SUBJECTIVE AND OBJECTIVE BOX
24 HOUR EVENTS:  - extubated  - plan for permacath removal tomorrow (1/30)  - bedside speech and swallow eval  - 1PRBC for drop in H/H and tachycardia       NEURO  Exam: A&O x 4  Meds: HYDROmorphone  Injectable 0.5 milliGRAM(s) IV Push every 3 hours PRN breakthrough pain      RESPIRATORY  RR: 24 (01-29-25 @ 23:00) (6 - 27)  SpO2: 94% (01-29-25 @ 23:00) (89% - 100%)  Exam: unlabored  Mechanical Ventilation: Mode: CPAP with PS, RR (patient): 23, FiO2: 30, PEEP: 5, PS: 5, MAP: 6.7  ABG - ( 29 Jan 2025 23:54 )  pH: 7.41  /  pCO2: 42    /  pO2: 88    / HCO3: 27    / Base Excess: 1.8   /  SaO2: 97.9        CARDIOVASCULAR  HR: 124 (01-29-25 @ 23:00) (66 - 124)  BP: 117/55 (01-29-25 @ 19:00) (111/53 - 117/55)  BP(mean): 78 (01-29-25 @ 19:00) (76 - 78)  ABP: 130/61 (01-29-25 @ 23:00) (89/31 - 147/58)  ABP(mean): 85 (01-29-25 @ 23:00) (49 - 92)      Exam:   Cardiac Rhythm:   Perfusion     [ x]Adequate   [ ]Inadequate  Mentation   [ x]Normal       [ ]Reduced  Extremities  [ x]Warm         [ ]Cool  Volume Status [ ]Hypervolemic [ x]Euvolemic [ ]Hypovolemic  Meds: buMETAnide Injectable 2 milliGRAM(s) IV Push <User Schedule>      GI/NUTRITION  Exam: soft, NT/ND  Diet: regular  Meds: pantoprazole  Injectable 40 milliGRAM(s) IV Push daily      GENITOURINARY  I&O's Detail    01-28 @ 07:01  -  01-29 @ 07:00  --------------------------------------------------------  IN:    Dexmedetomidine: 113.4 mL    IV PiggyBack: 50 mL    Norepinephrine: 1.6 mL    Norepinephrine: 8 mL    PRBCs (Packed Red Blood Cells): 900 mL  Total IN: 1073 mL    OUT:    Chest Tube (mL): 315 mL    Chest Tube (mL): 270 mL    Chest Tube (mL): 410 mL    Indwelling Catheter - Urethral (mL): 315 mL    Voided (mL): 475 mL  Total OUT: 1785 mL    Total NET: -712 mL      01-29 @ 07:01 - 01-30 @ 00:12  --------------------------------------------------------  IN:    Dexmedetomidine: 10.8 mL    IV PiggyBack: 150 mL    Norepinephrine: 22.4 mL    Oral Fluid: 180 mL    PRBCs (Packed Red Blood Cells): 300 mL  Total IN: 663.2 mL    OUT:    Chest Tube (mL): 30 mL    Chest Tube (mL): 40 mL    Chest Tube (mL): 70 mL    Indwelling Catheter - Urethral (mL): 500 mL  Total OUT: 640 mL    Total NET: 23.2 mL          01-29    136  |  100  |  47[H]  ----------------------------<  117[H]  3.9   |  23  |  1.54[H]    Ca    9.2      29 Jan 2025 15:09  Phos  4.4     01-29  Mg     1.7     01-29    TPro  6.7  /  Alb  3.0[L]  /  TBili  6.5[H]  /  DBili  x   /  AST  34  /  ALT  8[L]  /  AlkPhos  85  01-29    Meds:     HEMATOLOGIC  Meds:                         7.3    6.95  )-----------( 73       ( 29 Jan 2025 21:18 )             21.1     PT/INR - ( 29 Jan 2025 00:23 )   PT: 19.1 sec;   INR: 1.68 ratio         PTT - ( 29 Jan 2025 00:23 )  PTT:36.0 sec    INFECTIOUS DISEASES  T(C): 37.1 (01-29-25 @ 23:00), Max: 37.1 (01-29-25 @ 23:00)  Wt(kg): --  WBC Count: 6.95 K/uL (01-29 @ 21:18)  WBC Count: 7.51 K/uL (01-29 @ 15:09)  WBC Count: 6.85 K/uL (01-29 @ 09:28)  WBC Count: 6.76 K/uL (01-29 @ 03:01)  WBC Count: 5.44 K/uL (01-29 @ 00:23)    Recent Cultures:  Specimen Source: .Abscess, 01-28 @ 20:57; Results   No growth to date.; Gram Stain:   Few polymorphonuclear leukocytes seen per low power field  No organisms seen per oil power field; Organism: --  Specimen Source: Tissue, 01-28 @ 20:51; Results   Testing in progress; Gram Stain:   Few polymorphonuclear leukocytes seen per low power field  No organisms seen per oil power field; Organism: --  Specimen Source: Pleural Fl, 01-24 @ 19:10; Results   Rare Citrobacter koseri[!]; Gram Stain:   polymorphonuclear leukocytes seen  No organisms seen  by cytocentrifuge[!]; Organism: Citrobacter koseri[!]  Specimen Source: Peritoneal, 01-23 @ 17:42; Results   No growth at 5 days; Gram Stain:   No polymorphonuclear cells seen  No organisms seen  by cytocentrifuge; Organism: --    Meds: cefTRIAXone   IVPB      cefTRIAXone   IVPB 2000 milliGRAM(s) IV Intermittent every 24 hours  influenza   Vaccine 0.5 milliLiter(s) IntraMuscular once  metroNIDAZOLE    Tablet 500 milliGRAM(s) Oral every 12 hours      ENDOCRINE  Capillary Blood Glucose      OTHER MEDICATIONS:  chlorhexidine 2% Cloths 1 Application(s) Topical <User Schedule>

## 2025-01-30 NOTE — PROGRESS NOTE ADULT - SUBJECTIVE AND OBJECTIVE BOX
VITAL SIGNS    Telemetry:  ST 90's  Vital Signs Last 24 Hrs  T(C): 36.7 (01-30-25 @ 11:00), Max: 37.1 (01-29-25 @ 23:00)  T(F): 98.1 (01-30-25 @ 11:00), Max: 98.7 (01-29-25 @ 23:00)  HR: 101 (01-30-25 @ 12:00) (90 - 153)  BP: 117/55 (01-29-25 @ 19:00) (117/55 - 117/55)  RR: 29 (01-30-25 @ 12:00) (8 - 29)  SpO2: 97% (01-30-25 @ 12:00) (89% - 100%)            01-29 @ 07:01  -  01-30 @ 07:00  --------------------------------------------------------  IN: 1463.2 mL / OUT: 1230 mL / NET: 233.2 mL    01-30 @ 07:01  -  01-30 @ 12:57  --------------------------------------------------------  IN: 650 mL / OUT: 400 mL / NET: 250 mL       Daily     Daily   Admit Wt: Drug Dosing Weight  Height (cm): 180.3 (28 Jan 2025 15:45)  Weight (kg): 108.3 (28 Jan 2025 15:45)  BMI (kg/m2): 33.3 (28 Jan 2025 15:45)  BSA (m2): 2.27 (28 Jan 2025 15:45)    Bilirubin Total: 4.4 mg/dL (01-30 @ 11:48)  Bilirubin Total: 5.2 mg/dL (01-30 @ 00:06)  Bilirubin Total: 6.5 mg/dL (01-29 @ 15:09)    CAPILLARY BLOOD GLUCOSE      POCT Blood Glucose.: 227 mg/dL (30 Jan 2025 11:43)          MEDICATIONS  cefTRIAXone   IVPB      cefTRIAXone   IVPB 2000 milliGRAM(s) IV Intermittent every 24 hours  chlorhexidine 2% Cloths 1 Application(s) Topical <User Schedule>  folic acid 1 milliGRAM(s) Oral daily  gabapentin 300 milliGRAM(s) Oral every 12 hours  HYDROmorphone  Injectable 0.5 milliGRAM(s) IV Push every 3 hours PRN  influenza   Vaccine 0.5 milliLiter(s) IntraMuscular once  insulin lispro (ADMELOG) corrective regimen sliding scale   SubCutaneous three times a day before meals  insulin lispro (ADMELOG) corrective regimen sliding scale   SubCutaneous at bedtime  lactulose Syrup 30 Gram(s) Oral every 12 hours  metoprolol tartrate 12.5 milliGRAM(s) Oral every 12 hours  metroNIDAZOLE    Tablet 500 milliGRAM(s) Oral every 12 hours  mirtazapine 7.5 milliGRAM(s) Oral at bedtime  multivitamin 1 Tablet(s) Oral daily  oxyCODONE    IR 2.5 milliGRAM(s) Oral every 4 hours PRN  oxyCODONE    IR 5 milliGRAM(s) Oral every 4 hours PRN  pantoprazole    Tablet 40 milliGRAM(s) Oral daily  rifAXIMin 550 milliGRAM(s) Oral every 12 hours  thiamine 100 milliGRAM(s) Oral daily      >>> <<<  PHYSICAL EXAM  Subjective: NAD   Neurology: alert and oriented x 3, nonfocal, no gross deficits  CV :s1s2  Lungs:  right chest tubes to lws , serosanguinous drainage    Abdomen: soft, NT,ND, ( -)BM  :  voiding  Extremities: -c/c/e      LABS  01-30    133[L]  |  99  |  50[H]  ----------------------------<  217[H]  3.6   |  25  |  1.50[H]    Ca    8.3[L]      30 Jan 2025 11:48  Phos  2.9     01-30  Mg     2.0     01-30    TPro  6.6  /  Alb  3.0[L]  /  TBili  4.4[H]  /  DBili  x   /  AST  32  /  ALT  6[L]  /  AlkPhos  102  01-30                                 7.2    6.48  )-----------( 58       ( 30 Jan 2025 11:48 )             20.6          PT/INR - ( 30 Jan 2025 00:06 )   PT: 21.5 sec;   INR: 1.88 ratio         PTT - ( 30 Jan 2025 00:06 )  PTT:37.1 sec       PAST MEDICAL & SURGICAL HISTORY:  Sleep apnea, obstructive      Alcohol abuse      Cirrhosis of liver      Portal hypertension      H/O esophageal varices      Anemia      Mild alcohol use disorder      No significant past surgical history

## 2025-01-30 NOTE — OCCUPATIONAL THERAPY INITIAL EVALUATION ADULT - NSOTDISCHREC_GEN_A_CORE
TBD pending functional evaluation for ADLs and safety assessment in home environment. Assist as needed from mother/Home OT

## 2025-01-30 NOTE — PROGRESS NOTE ADULT - ASSESSMENT
43 yo M with PMH decompensated ETOH cirrhosis c/b recurrent ascites, grade II EV, and HE, right calf hematoma 10/2024 (s/p fall)  presented to [facility] for abdominal pain and distension. Patient found to have ascites on exam and MANUELITO requiring HD initiation PermCath was placed by vascular surgery on 1/10 with post procedure course c/b bleeding from insertion site. Transferred to [facility] SICU for further management.     Blood Cultures (1/16) 1/4 Staph epi.   GI PCR (1/17) + Giardia.   Paracentesis (1/17) 155 nucleated cell counts.     CT Chest (1/17) Moderate loculated right pleural effusion containing a few foci of air, which are new from 1/8/2025 and may represent empyema/bronchopleural fistula versus sequelae of prior thoracentesis.     #Empyema  s/p VATS 1/28/25  --Continue Ceftriaxone to 2g IV Q24H + Metronidazole 500 mg BID. Anticipate continuing for 7 days post-VATS (assuming no acute events)  --Follow up on operative cultures    #Giardia  s/p 7 days of Metronidazole    #Positive Blood Culture (Staph epi, 1/16)  Concern for procurement contaminant  --Continue to follow CBC with diff  --Continue to follow temperature curve  --Follow up on preliminary blood cultures      #Pre-Liver Transplant Evaluation, Decompensated Cirrhosis  COVID19 Rodríguez Antibody Positive  HAV IgG Positive  HBVs Ab Positive  HBVsAg Negative  HBVc Ab Negative  HCV Ab Negative  HSV 1 IgG Positive  HSV 2 IgG Negative  EBV IgG Positive  CMV IgG Positive  VZV IgG Positive  Measles IgG Positive  Mumps IgG Positive  Rubella IgG Positive  Quantiferon Gold negative  HIV Ag/Ab by CMIA Negative  Syphilis Screen Negative  Toxoplasma IgG Negative  Strongyloides Ab negative  Coccidiodes Ab PENDING  Leishmania Ab PENDING  --ID clearance to OLT pending treatment of empyema; anticipate that if there are no acute events through the weekend can reactivate from ID perspective    #Encounter to Vaccinate Patient  COVID19: Would benefit from COVID19 [id number] Vaccine Dose  Influenza: Had vaccination for this year  Pneumococcal: Would benefit from PCV20  HAV: Immune, will not require further vaccination  HBV: Immune, will not require further vaccination  MMR: Immune, will not require further vaccination  Varicella: Immune, will not require further vaccination  Shingles: Will require Shingrix  Tdap: Tdap 6/23/24    I will continue to follow. Please feel free to contact me with any further questions.    Froilan Whitmore M.D.  [facility] Division of Infectious Disease  8AM-5PM Monday - Friday: Available on [employer] Teams  After Hours and Holidays (or if no response on [employer] Teams): Please contact the Infectious Diseases Office at [phone]    The above assessment and plan were discussed with SICU Team

## 2025-01-30 NOTE — PROGRESS NOTE ADULT - SUBJECTIVE AND OBJECTIVE BOX
[facility] DIVISION OF KIDNEY DISEASES AND HYPERTENSION -- FOLLOW UP NOTE  --------------------------------------------------------------------------------  Chief Complaint: MANUELITO    24 hour events/subjective: Pt seen in SICU, awake and reports all over pain and swelling. Otherwise tolerating po intake this morning. No N/V, SOB, CP.     PAST HISTORY  --------------------------------------------------------------------------------  No significant changes to PMH, PSH, FHx, SHx, unless otherwise noted    ALLERGIES & MEDICATIONS  --------------------------------------------------------------------------------  Allergies    sulfa drugs (Unknown)  Salicylic Acid (Other)    Intolerances      Standing Inpatient Medications  albuterol/ipratropium for Nebulization 3 milliLiter(s) Nebulizer every 6 hours  cefTRIAXone   IVPB      cefTRIAXone   IVPB 2000 milliGRAM(s) IV Intermittent every 24 hours  chlorhexidine 2% Cloths 1 Application(s) Topical <User Schedule>  folic acid 1 milliGRAM(s) Oral daily  gabapentin 300 milliGRAM(s) Oral every 12 hours  influenza   Vaccine 0.5 milliLiter(s) IntraMuscular once  insulin lispro (ADMELOG) corrective regimen sliding scale   SubCutaneous three times a day before meals  insulin lispro (ADMELOG) corrective regimen sliding scale   SubCutaneous at bedtime  lactulose Syrup 30 Gram(s) Oral every 12 hours  metoprolol tartrate 12.5 milliGRAM(s) Oral every 12 hours  metroNIDAZOLE    Tablet 500 milliGRAM(s) Oral every 12 hours  mirtazapine 7.5 milliGRAM(s) Oral at bedtime  multivitamin 1 Tablet(s) Oral daily  pantoprazole    Tablet 40 milliGRAM(s) Oral daily  rifAXIMin 550 milliGRAM(s) Oral every 12 hours  sodium chloride 3%  Inhalation 4 milliLiter(s) Inhalation every 6 hours  thiamine 100 milliGRAM(s) Oral daily    PRN Inpatient Medications  HYDROmorphone  Injectable 0.5 milliGRAM(s) IV Push every 3 hours PRN  oxyCODONE    IR 5 milliGRAM(s) Oral every 4 hours PRN  oxyCODONE    IR 2.5 milliGRAM(s) Oral every 4 hours PRN    REVIEW OF SYSTEMS  --------------------------------------------------------------------------------  see above    VITALS/PHYSICAL EXAM  --------------------------------------------------------------------------------  T(C): 36.7 (01-30-25 @ 15:00), Max: 37.1 (01-29-25 @ 23:00)  HR: 99 (01-30-25 @ 15:00) (92 - 153)  BP: 117/55 (01-29-25 @ 19:00) (117/55 - 117/55)  RR: 25 (01-30-25 @ 15:00) (15 - 31)  SpO2: 95% (01-30-25 @ 15:00) (93% - 100%)  Wt(kg): --    01-29-25 @ 07:01  -  01-30-25 @ 07:00  --------------------------------------------------------  IN: 1463.2 mL / OUT: 1230 mL / NET: 233.2 mL    01-30-25 @ 07:01  -  01-30-25 @ 16:03  --------------------------------------------------------  IN: 1150 mL / OUT: 750 mL / NET: 400 mL    Physical Exam:  	Gen: NAD  	HEENT: MMM  	Pulm: 3 chest tubes, CTAB anterior lung fields   	CV: S1S2  	Abd: +BS, soft, distended   	Ext: +++LE edema B/L (improving)  	Neuro: Awake  	Skin: Warm and dry  	Vascular access: [facility]    LABS/STUDIES  --------------------------------------------------------------------------------              7.2    6.48  >-----------<  58       [01-30-25 @ 11:48]              20.6     133  |  99  |  50  ----------------------------<  217      [01-30-25 @ 11:48]  3.6   |  25  |  1.50        Ca     8.3     [01-30-25 @ 11:48]      Mg     2.0     [01-30-25 @ 11:48]      Phos  2.9     [01-30-25 @ 11:48]    TPro  6.6  /  Alb  3.0  /  TBili  4.4  /  DBili  x   /  AST  32  /  ALT  6   /  AlkPhos  102  [01-30-25 @ 11:48]    PT/INR: PT 21.5 , INR 1.88       [01-30-25 @ 00:06]  PTT: 37.1       [01-30-25 @ 00:06]      Creatinine Trend:  SCr 1.50 [01-30 @ 11:48]  SCr 1.63 [01-30 @ 00:06]  SCr 1.54 [01-29 @ 15:09]  SCr 1.42 [01-29 @ 00:23]  SCr 1.39 [01-28 @ 20:51]    Urine Creatinine 84      [01-25-25 @ 11:11]  Urine Protein 13      [01-25-25 @ 11:11]  Urine Sodium 11      [01-25-25 @ 11:11]  Urine Urea Nitrogen 345      [01-25-25 @ 11:11]  Urine Potassium 50      [01-25-25 @ 11:11]  Urine Osmolality 282      [01-25-25 @ 11:11]    Iron 57, TIBC 91, %sat 62      [01-17-25 @ 06:41]  Ferritin 849      [01-17-25 @ 06:41]  TSH 8.18      [01-17-25 @ 06:41]  Lipid: chol 65, TG 46, HDL 26, LDL --      [01-17-25 @ 06:41]    HBsAb 55.9      [01-17-25 @ 06:41]  HBsAb Reactive      [01-17-25 @ 06:41]  HBsAg Nonreact      [01-17-25 @ 06:41]  HBcAb Nonreact      [01-17-25 @ 06:41]  HCV 0.06, Nonreact      [01-16-25 @ 18:23]  HIV Nonreact      [01-17-25 @ 06:41]    IRMA: titer 1:160, pattern Speckled      [01-17-25 @ 06:41]  Syphilis Screen (Treponema Pallidum Ab) Negative      [01-17-25 @ 06:41]

## 2025-01-30 NOTE — PROGRESS NOTE ADULT - ASSESSMENT
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     #Decompensated EtOH Associated Cirrhosis  MELD 23O on 1/29  - OLT: evaluation ongoing, pending cardiac evaluation and empyema management  - EV: EGD 7/2024 for melena showed grade II EV, small gastric ulcer (neg HP), and portal gastropathy   - HE: c/w lactulose and rifaximin, goal 3-4 BM/day  - HCC screening: MRI nondiagnostic, consider repeat imaging when stabilized                                      - MANUELITO: C/w bumex 2 mg IV BID for albumin assisted diuresis  - C/w home midodrine 10 mg tid  - C/w PPI for stress ulcer ppx and hx of ulcer  - Trend MELD labs daily (cmp, INR) daily  - Strict I/Os, daily wts    #Empyema  -  CT chest noncontrast reviewed from OSH showing moderate sized complex R sided pleural effusion. s/p chest tube placed 1/18 with cultures growing Citrobacter c/w empyema. Requiring 2nd chest tube placement then now s/p VATS for persistent effusion  - s/p VATS 1/29  - C/w CTX 2 g IV daily  - Maintain chest tubes in place for drainage  - Trend cbc and fever curve  - Appreciate thoracic surgery and ID recs    #MANUELITO, improving  Found to have acute renal failure requiring dialysis at OSH, ddx includes HRS vs ATN. Permacath in place. 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     #Decompensated EtOH Associated Cirrhosis  MELD 27O on 1/29  - OLT: evaluation ongoing, pending cardiac evaluation and empyema management  - EV: EGD 7/2024 for melena showed grade II EV, small gastric ulcer (neg HP), and portal gastropathy   - HE: c/w lactulose and rifaximin, goal 3-4 BM/day  - HCC screening: MRI nondiagnostic, consider repeat imaging when stabilized                                      - MANUELITO: C/w bumex 2 mg IV BID for albumin assisted diuresis  - C/w home midodrine 10 mg tid  - C/w PPI for stress ulcer ppx and hx of ulcer  - Trend MELD labs daily (cmp, INR) daily  - Strict I/Os, daily wts    #Empyema  -  CT chest noncontrast reviewed from OSH showing moderate sized complex R sided pleural effusion. s/p chest tube placed 1/18 with cultures growing Citrobacter c/w empyema. Requiring 2nd chest tube placement then now s/p VATS for persistent effusion  - s/p VATS 1/29  - C/w CTX 2 g IV daily  - Maintain chest tubes in place for drainage  - Trend cbc and fever curve  - Appreciate thoracic surgery and ID recs    #MANUELITO, improving  Found to have acute renal failure requiring dialysis at OSH, ddx includes HRS vs ATN. Permacath in place.  Cr baseline 1.12 10/2024  - IR consult to remove tunneled cath 1/31  - Strict I/Os, daily wts  - Trend CMP daily  - Appreciate transplant renal recs

## 2025-01-30 NOTE — PROGRESS NOTE ADULT - ATTENDING COMMENTS
44 year old man with alcohol use disorder, liver cirrhosis complicated by portal hypertension, ascites requiring large volume paracentesis and, esophageal varices and hepatic encephalopathy.  Presented to [facility] on 1/8/25 with abdominal pain and distension, found to have worsening ascites and MANUELITO, PermCath placed on 1/10 and initiated on dialysis, transferred to [facility] for further care. CT chest 1/17 with loculated right effusion, s/p chest tube, culture +ve for Citrobacter. GI PCR +ve for Giardia. Currently on Ceftriaxone, completed metronidazole for giardia.     MANUELITO in the setting of infection (empyema), initiated on dialysis on 1/10/25, last HD done on 1/18/25.   S/p VATS and decortication for loculated empyema on 1/28, Extubated on 1/30/25     Creatinine 1.6mg/dL slightly up fro 1.4 but overall better. Anasarca present   - No need for dialysis  - Continue midodrine 10mg po q8 hours   - Bumex 2mg iv q8h, 25% Albumin 50ml iv q8h .

## 2025-01-30 NOTE — PROGRESS NOTE ADULT - ATTENDING COMMENTS
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

## 2025-01-30 NOTE — PROGRESS NOTE ADULT - SUBJECTIVE AND OBJECTIVE BOX
Transplant Surgery Multidisciplinary Rounds    Interval Events:   - s/p VATS  - extubated  - 1 prbc for drop in h/h      tx data here          Review of systems  All other systems were reviewed and are negative, except as noted.          PHYSICAL EXAM:   GENERAL:  No acute distress  HEENT:  Normocephalic/atraumatic, scleral icterus  CHEST:  No accessory muscle use, chest tube x 2 ss, intubated/sedated  HEART:  Regular rate and rhythm  ABDOMEN:  Soft, mild diffuse tenderness, mildly distended, normoactive bowel sounds, splenomegaly, stigmata of chronic liver dz  EXTREMITIES: No cyanosis, clubbing, or edema  SKIN:  No rash  NEURO:  intubated/sedated         Transplant Surgery Multidisciplinary Rounds    Interval Events:   - Extubated yesterday  - Tachycardiac wiht drop in Hgb, given 1 unit pRBC  - Chest tube output: 325 cc in 24 hours      MEDICATIONS  (STANDING):  albuterol/ipratropium for Nebulization 3 milliLiter(s) Nebulizer every 6 hours  cefTRIAXone   IVPB      cefTRIAXone   IVPB 2000 milliGRAM(s) IV Intermittent every 24 hours  chlorhexidine 2% Cloths 1 Application(s) Topical <User Schedule>  folic acid 1 milliGRAM(s) Oral daily  gabapentin 300 milliGRAM(s) Oral every 12 hours  influenza   Vaccine 0.5 milliLiter(s) IntraMuscular once  insulin lispro (ADMELOG) corrective regimen sliding scale   SubCutaneous three times a day before meals  insulin lispro (ADMELOG) corrective regimen sliding scale   SubCutaneous at bedtime  lactulose Syrup 30 Gram(s) Oral every 12 hours  metoprolol tartrate 12.5 milliGRAM(s) Oral every 12 hours  metroNIDAZOLE    Tablet 500 milliGRAM(s) Oral every 12 hours  multivitamin 1 Tablet(s) Oral daily  pantoprazole    Tablet 40 milliGRAM(s) Oral daily  rifAXIMin 550 milliGRAM(s) Oral every 12 hours  sodium chloride 3%  Inhalation 4 milliLiter(s) Inhalation every 6 hours  thiamine 100 milliGRAM(s) Oral daily    MEDICATIONS  (PRN):  HYDROmorphone  Injectable 0.5 milliGRAM(s) IV Push every 3 hours PRN breakthrough pain  oxyCODONE    IR 2.5 milliGRAM(s) Oral every 4 hours PRN Moderate Pain (4 - 6)  oxyCODONE    IR 5 milliGRAM(s) Oral every 4 hours PRN Severe Pain (7 - 10)      PAST MEDICAL & SURGICAL HISTORY:  Sleep apnea, obstructive      Alcohol abuse      Cirrhosis of liver      Portal hypertension      H/O esophageal varices      Anemia      Mild alcohol use disorder      No significant past surgical history          Vital Signs Last 24 Hrs  T(C): 36.7 (30 Jan 2025 15:00), Max: 37.1 (29 Jan 2025 23:00)  T(F): 98.1 (30 Jan 2025 15:00), Max: 98.7 (29 Jan 2025 23:00)  HR: 103 (30 Jan 2025 20:00) (92 - 153)  BP: --  BP(mean): --  RR: 26 (30 Jan 2025 20:00) (15 - 31)  SpO2: 97% (30 Jan 2025 20:00) (91% - 100%)    Parameters below as of 30 Jan 2025 20:00  Patient On (Oxygen Delivery Method): nasal cannula  O2 Flow (L/min): 1      I&O's Summary    29 Jan 2025 07:01  -  30 Jan 2025 07:00  --------------------------------------------------------  IN: 1463.2 mL / OUT: 1230 mL / NET: 233.2 mL    30 Jan 2025 07:01  -  30 Jan 2025 21:43  --------------------------------------------------------  IN: 1360 mL / OUT: 1245 mL / NET: 115 mL                              7.2    6.48  )-----------( 58       ( 30 Jan 2025 11:48 )             20.6     01-30    133[L]  |  99  |  50[H]  ----------------------------<  217[H]  3.6   |  25  |  1.50[H]    Ca    8.3[L]      30 Jan 2025 11:48  Phos  2.9     01-30  Mg     2.0     01-30    TPro  6.6  /  Alb  3.0[L]  /  TBili  4.4[H]  /  DBili  x   /  AST  32  /  ALT  6[L]  /  AlkPhos  102  01-30          Culture - Acid Fast - Other w/Smear (collected 01-28-25 @ 20:57)  Source: .Other    Culture - Fungal, Other (collected 01-28-25 @ 20:57)  Source: .Other  Preliminary Report (01-29-25 @ 08:36):    Testing in progress    Culture - Abscess with Gram Stain (collected 01-28-25 @ 20:57)  Source: .Abscess  Gram Stain (01-29-25 @ 02:42):    Few polymorphonuclear leukocytes seen per low power field    No organisms seen per oil power field  Preliminary Report (01-29-25 @ 17:38):    No growth to date.    Culture - Acid Fast - Tissue w/Smear (collected 01-28-25 @ 20:51)  Source: Tissue    Culture - Fungal, Tissue (collected 01-28-25 @ 20:51)  Source: Tissue  Preliminary Report (01-30-25 @ 10:42):    No growth    Culture - Tissue with Gram Stain (collected 01-28-25 @ 20:51)  Source: Tissue  Gram Stain (01-29-25 @ 06:02):    Few polymorphonuclear leukocytes seen per low power field    No organisms seen per oil power field  Preliminary Report (01-30-25 @ 07:50):    No growth to date.    Culture - Body Fluid with Gram Stain (collected 01-24-25 @ 19:10)  Source: Pleural Fl  Gram Stain (01-26-25 @ 12:04):    polymorphonuclear leukocytes seen    No organisms seen    by cytocentrifuge  Final Report (01-30-25 @ 07:12):    Rare Citrobacter koseri  Organism: Citrobacter koseri (01-30-25 @ 07:12)  Organism: Citrobacter koseri (01-30-25 @ 07:12)                      Review of systems  All other systems were reviewed and are negative, except as noted.          PHYSICAL EXAM:   GENERAL:  No acute distress  HEENT:  Normocephalic/atraumatic, scleral icterus  CHEST:  No accessory muscle use, chest tube x 3 ss,   HEART:  Regular rate and rhythm  ABDOMEN:  Soft, mild diffuse tenderness, mildly distended, normoactive bowel sounds, splenomegaly, stigmata of chronic liver dz  EXTREMITIES: No cyanosis, clubbing, or edema  SKIN:  No rash  NEURO:  AAOx3

## 2025-01-30 NOTE — PROGRESS NOTE ADULT - SUBJECTIVE AND OBJECTIVE BOX
Follow Up:  Pre-OLT    Interval History: afebrile. no acute events.     REVIEW OF SYSTEMS  [  ] ROS unobtainable because:    [ x ] All other systems negative except as noted below    Constitutional:  [ ] fever [ ] chills  [ ] weight loss  [ ] weakness  Skin:  [ ] rash [ ] phlebitis	  Eyes: [ ] icterus [ ] pain  [ ] discharge	  ENMT: [ ] sore throat  [ ] thrush [ ] ulcers [ ] exudates  CHest: +Pain  Respiratory: [ ] dyspnea [ ] hemoptysis [ ] cough [ ] sputum	  Cardiovascular:  [ ] chest pain [ ] palpitations [ ] edema	  Gastrointestinal:  [ ] nausea [ ] vomiting [ ] diarrhea [ ] constipation [ ] pain	  Genitourinary:  [ ] dysuria [ ] frequency [ ] hematuria [ ] discharge [ ] flank pain  [ ] incontinence  Musculoskeletal:  [ ] myalgias [ ] arthralgias [ ] arthritis  [ ] back pain  Neurological:  [ ] headache [ ] seizures  [ ] confusion/altered mental status    Allergies  sulfa drugs (Unknown)  Salicylic Acid (Other)        ANTIMICROBIALS:  cefTRIAXone   IVPB    cefTRIAXone   IVPB 2000 every 24 hours  metroNIDAZOLE    Tablet 500 every 12 hours  rifAXIMin 550 every 12 hours      OTHER MEDS:  MEDICATIONS  (STANDING):  albuterol/ipratropium for Nebulization 3 every 6 hours  gabapentin 300 every 12 hours  HYDROmorphone  Injectable 0.5 every 3 hours PRN  influenza   Vaccine 0.5 once  insulin lispro (ADMELOG) corrective regimen sliding scale  three times a day before meals  insulin lispro (ADMELOG) corrective regimen sliding scale  at bedtime  lactulose Syrup 30 every 12 hours  metoprolol tartrate 12.5 every 12 hours  mirtazapine 7.5 at bedtime  oxyCODONE    IR 2.5 every 4 hours PRN  oxyCODONE    IR 5 every 4 hours PRN  pantoprazole    Tablet 40 daily  sodium chloride 3%  Inhalation 4 every 6 hours      Vital Signs Last 24 Hrs  T(C): 36.7 (30 Jan 2025 15:00), Max: 37.1 (29 Jan 2025 23:00)  T(F): 98.1 (30 Jan 2025 15:00), Max: 98.7 (29 Jan 2025 23:00)  HR: 107 (30 Jan 2025 19:00) (92 - 153)  BP: --  BP(mean): --  RR: 28 (30 Jan 2025 19:00) (15 - 31)  SpO2: 92% (30 Jan 2025 19:00) (91% - 100%)    Parameters below as of 30 Jan 2025 19:00  Patient On (Oxygen Delivery Method): room air    PHYSICAL EXAMINATION:  General: Alert and Awake, NAD, jaundice  Cardiac: RRR, No M/R/G  Resp: Decreased breath sounds at right lung bases,   Chest: 3x Chest Tube Drains with s/s output  Abdomen: NBS, NT/ND, No HSM, No rigidity or guarding  MSK: No LE edema. No Calf tenderness  : No yates  Skin: No rashes or lesions. Skin is warm and dry to the touch.   Neuro: Alert and Awake. CN 2-12 Grossly intact. Moves all four extremities spontaneously.  Psych: Calm, Pleasant, Cooperative                          7.2    6.48  )-----------( 58       ( 30 Jan 2025 11:48 )             20.6       01-30    133[L]  |  99  |  50[H]  ----------------------------<  217[H]  3.6   |  25  |  1.50[H]    Ca    8.3[L]      30 Jan 2025 11:48  Phos  2.9     01-30  Mg     2.0     01-30    TPro  6.6  /  Alb  3.0[L]  /  TBili  4.4[H]  /  DBili  x   /  AST  32  /  ALT  6[L]  /  AlkPhos  102  01-30      Urinalysis Basic - ( 30 Jan 2025 11:48 )    Color: x / Appearance: x / SG: x / pH: x  Gluc: 217 mg/dL / Ketone: x  / Bili: x / Urobili: x   Blood: x / Protein: x / Nitrite: x   Leuk Esterase: x / RBC: x / WBC x   Sq Epi: x / Non Sq Epi: x / Bacteria: x        MICROBIOLOGY:  v  .Abscess  01-28-25   No growth to date.  --    Few polymorphonuclear leukocytes seen per low power field  No organisms seen per oil power field      Tissue  01-28-25   No growth to date.  --    Few polymorphonuclear leukocytes seen per low power field  No organisms seen per oil power field      Pleural Fl  01-24-25   Rare Citrobacter koseri  --  Citrobacter koseri      Peritoneal  01-23-25   No growth at 5 days  --    No polymorphonuclear cells seen  No organisms seen  by cytocentrifuge      .Blood BLOOD  01-19-25   No growth at 5 days  --  --      Pleural Fl  01-18-25   Moderate Citrobacter koseri  --  Citrobacter koseri      Body Fluid  01-18-25   No fungus isolated at 1 week.  --  --      .Blood BLOOD  01-18-25   No growth at 5 days  --  --      Ascites Fl  01-17-25   No growth at 5 days  --    No polymorphonuclear cells seen  No organisms seen  by cytocentrifuge      .Blood BLOOD  01-16-25   No growth at 5 days  --  --      .Blood BLOOD  01-16-25   Growth in aerobic bottle: Staphylococcus epidermidis  Isolation of Coagulase negative Staphylococcus from single blood culture  sets may represent  contamination. Contact the Microbiology Department at [phone] if  susceptibility testing is needed.  clinically indicated.  Direct identification is available within approximately 3-5  hours either by Blood Panel Multiplexed PCR or Direct  MALDI-TOF. Details: [email or web address]  --  Blood Culture PCR        CMV IgG Antibody: >10.00 U/mL (01-17-25 @ 06:41)  Toxoplasma IgG Screen: <3.00 IU/mL (01-17-25 @ 06:41)          RADIOLOGY:    <The imaging below has been reviewed and visualized by me independently. Findings as detailed in report below>    < from: Xray Chest 1 View- PORTABLE-Routine (Xray Chest 1 View- PORTABLE-Routine in AM.) (01.29.25 @ 06:16) >  IMPRESSION:  Lines and tubes as described above.    Mild interval decrease in size of loculated right pleural effusion.   Diffuse right lung airspace opacities.    < end of copied text >

## 2025-01-30 NOTE — PROGRESS NOTE ADULT - SUBJECTIVE AND OBJECTIVE BOX
Interval Events:   - Extubated yesterday  - Tachycardiac wiht drop in Hgb, given 1 unit pRBC  - Chest tube output: 325 cc in 24 hours    ROS:   12 point review of systems performed and negative except otherwise noted in HPI.    Hospital Medications:  albuterol    90 MICROgram(s) HFA Inhaler 2 Puff(s) Inhalation every 6 hours PRN  chlorhexidine 2% Cloths 1 Application(s) Topical <User Schedule>  folic acid 1 milliGRAM(s) Oral daily  lactulose Syrup 20 Gram(s) Oral every 12 hours  melatonin 5 milliGRAM(s) Oral at bedtime  midodrine 10 milliGRAM(s) Oral every 8 hours  Nephro-shania 1 Tablet(s) Oral daily  pantoprazole    Tablet 40 milliGRAM(s) Oral before breakfast  piperacillin/tazobactam IVPB.. 3.375 Gram(s) IV Intermittent every 12 hours  rifAXIMin 550 milliGRAM(s) Oral every 12 hours  thiamine 100 milliGRAM(s) Oral daily      PHYSICAL EXAM:   Vital Signs:  Vital Signs Last 24 Hrs  T(C): 36.9 (17 Jan 2025 09:29), Max: 37.3 (17 Jan 2025 00:35)  T(F): 98.4 (17 Jan 2025 09:29), Max: 99.2 (17 Jan 2025 00:35)  HR: 84 (17 Jan 2025 09:29) (65 - 106)  BP: 109/54 (17 Jan 2025 09:29) (104/51 - 129/62)  BP(mean): 89 (16 Jan 2025 18:00) (74 - 89)  RR: 16 (17 Jan 2025 09:29) (16 - 36)  SpO2: 99% (17 Jan 2025 09:29) (95% - 99%)    Parameters below as of 17 Jan 2025 05:00  Patient On (Oxygen Delivery Method): room air      Daily     Daily     PHYSICAL EXAM:   GENERAL: intubated, sedated  HEENT:  Normocephalic/atraumatic, scleral icterus  CHEST:  intubated, chest tubes in place draining sanguinous output  HEART:  Regular rate and rhythm  ABDOMEN:  Soft, mild diffuse tenderness, mildly distended, normoactive bowel sounds, splenomegaly, stigmata of chronic liver dz  EXTREMITIES: No cyanosis, clubbing, or edema  SKIN:  No rash  NEURO:  sedated    LABS: reviewed                        7.4    13.96 )-----------( 73       ( 17 Jan 2025 06:42 )             22.4     01-16    130[L]  |  96  |  39[H]  ----------------------------<  117[H]  4.3   |  22  |  2.59[H]    Ca    8.6      16 Jan 2025 07:39  Phos  4.3     01-17  Mg     2.0     01-17    TPro  6.4  /  Alb  2.5[L]  /  TBili  5.8[H]  /  DBili  2.1[H]  /  AST  32  /  ALT  7[L]  /  AlkPhos  116  01-17    LIVER FUNCTIONS - ( 17 Jan 2025 06:41 )  Alb: 2.5 g/dL / Pro: 6.4 g/dL / ALK PHOS: 116 U/L / ALT: 7 U/L / AST: 32 U/L / GGT: x             Interval Diagnostic Studies: see sunrise for full report

## 2025-01-31 LAB
ALBUMIN SERPL ELPH-MCNC: 2.9 G/DL — LOW (ref 3.3–5)
ALP SERPL-CCNC: 110 U/L — SIGNIFICANT CHANGE UP (ref 40–120)
ALT FLD-CCNC: 6 U/L — LOW (ref 10–45)
ANION GAP SERPL CALC-SCNC: 12 MMOL/L — SIGNIFICANT CHANGE UP (ref 5–17)
APTT BLD: 40.7 SEC — HIGH (ref 24.5–35.6)
AST SERPL-CCNC: 33 U/L — SIGNIFICANT CHANGE UP (ref 10–40)
BILIRUB SERPL-MCNC: 4.1 MG/DL — HIGH (ref 0.2–1.2)
BUN SERPL-MCNC: 50 MG/DL — HIGH (ref 7–23)
CALCIUM SERPL-MCNC: 8.3 MG/DL — LOW (ref 8.4–10.5)
CHLORIDE SERPL-SCNC: 100 MMOL/L — SIGNIFICANT CHANGE UP (ref 96–108)
CO2 SERPL-SCNC: 23 MMOL/L — SIGNIFICANT CHANGE UP (ref 22–31)
CREAT SERPL-MCNC: 1.54 MG/DL — HIGH (ref 0.5–1.3)
EGFR: 56 ML/MIN/1.73M2 — LOW
EGFR: 56 ML/MIN/1.73M2 — LOW
GLUCOSE BLDC GLUCOMTR-MCNC: 129 MG/DL — HIGH (ref 70–99)
GLUCOSE BLDC GLUCOMTR-MCNC: 131 MG/DL — HIGH (ref 70–99)
GLUCOSE BLDC GLUCOMTR-MCNC: 142 MG/DL — HIGH (ref 70–99)
GLUCOSE BLDC GLUCOMTR-MCNC: 150 MG/DL — HIGH (ref 70–99)
GLUCOSE SERPL-MCNC: 186 MG/DL — HIGH (ref 70–99)
HCT VFR BLD CALC: 23.5 % — LOW (ref 39–50)
HGB BLD-MCNC: 8.1 G/DL — LOW (ref 13–17)
INR BLD: 2.18 RATIO — HIGH (ref 0.85–1.16)
MAGNESIUM SERPL-MCNC: 1.9 MG/DL — SIGNIFICANT CHANGE UP (ref 1.6–2.6)
MCHC RBC-ENTMCNC: 30.7 PG — SIGNIFICANT CHANGE UP (ref 27–34)
MCHC RBC-ENTMCNC: 34.5 G/DL — SIGNIFICANT CHANGE UP (ref 32–36)
MCV RBC AUTO: 89 FL — SIGNIFICANT CHANGE UP (ref 80–100)
NRBC # BLD: 0 /100 WBCS — SIGNIFICANT CHANGE UP (ref 0–0)
NRBC BLD-RTO: 0 /100 WBCS — SIGNIFICANT CHANGE UP (ref 0–0)
PHOSPHATE SERPL-MCNC: 3.4 MG/DL — SIGNIFICANT CHANGE UP (ref 2.5–4.5)
PLATELET # BLD AUTO: 66 K/UL — LOW (ref 150–400)
POTASSIUM SERPL-MCNC: 3.7 MMOL/L — SIGNIFICANT CHANGE UP (ref 3.5–5.3)
POTASSIUM SERPL-SCNC: 3.7 MMOL/L — SIGNIFICANT CHANGE UP (ref 3.5–5.3)
PROT SERPL-MCNC: 6.6 G/DL — SIGNIFICANT CHANGE UP (ref 6–8.3)
PROTHROM AB SERPL-ACNC: 24.7 SEC — HIGH (ref 9.9–13.4)
RBC # BLD: 2.64 M/UL — LOW (ref 4.2–5.8)
RBC # FLD: 16.5 % — HIGH (ref 10.3–14.5)
SODIUM SERPL-SCNC: 135 MMOL/L — SIGNIFICANT CHANGE UP (ref 135–145)
WBC # BLD: 6.62 K/UL — SIGNIFICANT CHANGE UP (ref 3.8–10.5)
WBC # FLD AUTO: 6.62 K/UL — SIGNIFICANT CHANGE UP (ref 3.8–10.5)

## 2025-01-31 PROCEDURE — 99233 SBSQ HOSP IP/OBS HIGH 50: CPT

## 2025-01-31 PROCEDURE — 99232 SBSQ HOSP IP/OBS MODERATE 35: CPT | Mod: GC

## 2025-01-31 PROCEDURE — 36589 REMOVAL TUNNELED CV CATH: CPT

## 2025-01-31 PROCEDURE — 71045 X-RAY EXAM CHEST 1 VIEW: CPT | Mod: 26

## 2025-01-31 PROCEDURE — G0545: CPT

## 2025-01-31 PROCEDURE — 99232 SBSQ HOSP IP/OBS MODERATE 35: CPT

## 2025-01-31 RX ORDER — MAGNESIUM SULFATE 500 MG/ML
2 SYRINGE (ML) INJECTION ONCE
Refills: 0 | Status: COMPLETED | OUTPATIENT
Start: 2025-01-31 | End: 2025-01-31

## 2025-01-31 RX ORDER — ALBUMIN (HUMAN) 12.5 G/50ML
50 INJECTION, SOLUTION INTRAVENOUS EVERY 8 HOURS
Refills: 0 | Status: COMPLETED | OUTPATIENT
Start: 2025-01-31 | End: 2025-02-01

## 2025-01-31 RX ORDER — TRAMADOL HYDROCHLORIDE 50 MG/1
25 TABLET, FILM COATED ORAL EVERY 6 HOURS
Refills: 0 | Status: DISCONTINUED | OUTPATIENT
Start: 2025-01-31 | End: 2025-02-07

## 2025-01-31 RX ORDER — BUMETANIDE 1 MG/1
2 TABLET ORAL EVERY 8 HOURS
Refills: 0 | Status: COMPLETED | OUTPATIENT
Start: 2025-01-31 | End: 2025-02-01

## 2025-01-31 RX ORDER — HEPARIN SODIUM 1000 [USP'U]/ML
5000 INJECTION INTRAVENOUS; SUBCUTANEOUS EVERY 8 HOURS
Refills: 0 | Status: DISCONTINUED | OUTPATIENT
Start: 2025-01-31 | End: 2025-01-31

## 2025-01-31 RX ORDER — TRAMADOL HYDROCHLORIDE 50 MG/1
50 TABLET, FILM COATED ORAL EVERY 6 HOURS
Refills: 0 | Status: DISCONTINUED | OUTPATIENT
Start: 2025-01-31 | End: 2025-02-07

## 2025-01-31 RX ORDER — HEPARIN SODIUM 1000 [USP'U]/ML
5000 INJECTION INTRAVENOUS; SUBCUTANEOUS EVERY 8 HOURS
Refills: 0 | Status: DISCONTINUED | OUTPATIENT
Start: 2025-01-31 | End: 2025-02-05

## 2025-01-31 RX ORDER — MIDODRINE HYDROCHLORIDE 5 MG/1
10 TABLET ORAL EVERY 8 HOURS
Refills: 0 | Status: DISCONTINUED | OUTPATIENT
Start: 2025-01-31 | End: 2025-02-27

## 2025-01-31 RX ADMIN — Medication 500 MILLIGRAM(S): at 06:11

## 2025-01-31 RX ADMIN — IPRATROPIUM BROMIDE AND ALBUTEROL SULFATE 3 MILLILITER(S): .5; 2.5 SOLUTION RESPIRATORY (INHALATION) at 23:11

## 2025-01-31 RX ADMIN — Medication 20 MILLIEQUIVALENT(S): at 06:14

## 2025-01-31 RX ADMIN — METOPROLOL SUCCINATE 12.5 MILLIGRAM(S): 50 TABLET, EXTENDED RELEASE ORAL at 06:11

## 2025-01-31 RX ADMIN — Medication 1 APPLICATION(S): at 06:12

## 2025-01-31 RX ADMIN — ALBUMIN (HUMAN) 50 MILLILITER(S): 12.5 INJECTION, SOLUTION INTRAVENOUS at 17:23

## 2025-01-31 RX ADMIN — Medication 50 GRAM(S): at 06:14

## 2025-01-31 RX ADMIN — FOLIC ACID 1 MILLIGRAM(S): 1 TABLET ORAL at 11:28

## 2025-01-31 RX ADMIN — OXYCODONE HYDROCHLORIDE 5 MILLIGRAM(S): 30 TABLET ORAL at 15:13

## 2025-01-31 RX ADMIN — MIDODRINE HYDROCHLORIDE 10 MILLIGRAM(S): 5 TABLET ORAL at 11:28

## 2025-01-31 RX ADMIN — GABAPENTIN 300 MILLIGRAM(S): 400 CAPSULE ORAL at 17:23

## 2025-01-31 RX ADMIN — BUMETANIDE 2 MILLIGRAM(S): 1 TABLET ORAL at 11:29

## 2025-01-31 RX ADMIN — GABAPENTIN 300 MILLIGRAM(S): 400 CAPSULE ORAL at 06:11

## 2025-01-31 RX ADMIN — LACTULOSE 30 GRAM(S): 10 SOLUTION ORAL at 06:12

## 2025-01-31 RX ADMIN — ALBUMIN (HUMAN) 50 MILLILITER(S): 12.5 INJECTION, SOLUTION INTRAVENOUS at 10:55

## 2025-01-31 RX ADMIN — MIDODRINE HYDROCHLORIDE 10 MILLIGRAM(S): 5 TABLET ORAL at 17:23

## 2025-01-31 RX ADMIN — CEFTRIAXONE 100 MILLIGRAM(S): 500 INJECTION, POWDER, FOR SOLUTION INTRAMUSCULAR; INTRAVENOUS at 18:25

## 2025-01-31 RX ADMIN — HEPARIN SODIUM 5000 UNIT(S): 1000 INJECTION INTRAVENOUS; SUBCUTANEOUS at 23:10

## 2025-01-31 RX ADMIN — Medication 1 TABLET(S): at 11:28

## 2025-01-31 RX ADMIN — Medication 100 MILLIGRAM(S): at 11:28

## 2025-01-31 RX ADMIN — BUMETANIDE 2 MILLIGRAM(S): 1 TABLET ORAL at 18:24

## 2025-01-31 RX ADMIN — Medication 500 MILLIGRAM(S): at 17:22

## 2025-01-31 RX ADMIN — Medication 4 MILLILITER(S): at 19:02

## 2025-01-31 RX ADMIN — HEPARIN SODIUM 5000 UNIT(S): 1000 INJECTION INTRAVENOUS; SUBCUTANEOUS at 14:15

## 2025-01-31 RX ADMIN — Medication 4 MILLILITER(S): at 23:12

## 2025-01-31 RX ADMIN — Medication 40 MILLIGRAM(S): at 11:28

## 2025-01-31 RX ADMIN — OXYCODONE HYDROCHLORIDE 5 MILLIGRAM(S): 30 TABLET ORAL at 14:13

## 2025-01-31 RX ADMIN — IPRATROPIUM BROMIDE AND ALBUTEROL SULFATE 3 MILLILITER(S): .5; 2.5 SOLUTION RESPIRATORY (INHALATION) at 19:02

## 2025-01-31 NOTE — PROGRESS NOTE ADULT - SUBJECTIVE AND OBJECTIVE BOX
VITAL SIGNS    Vital Signs Last 24 Hrs  T(C): 36.3 (01-31-25 @ 11:00), Max: 37 (01-30-25 @ 19:00)  T(F): 97.3 (01-31-25 @ 11:00), Max: 98.6 (01-30-25 @ 19:00)  HR: 78 (01-31-25 @ 11:00) (78 - 107)  BP: 115/57 (01-30-25 @ 21:00) (115/57 - 115/57)  RR: 15 (01-31-25 @ 11:00) (12 - 31)  SpO2: 100% (01-31-25 @ 11:00) (91% - 100%)            01-30 @ 07:01  -  01-31 @ 07:00  --------------------------------------------------------  IN: 1690 mL / OUT: 2260 mL / NET: -570 mL    01-31 @ 07:01  -  01-31 @ 12:42  --------------------------------------------------------  IN: 250 mL / OUT: 400 mL / NET: -150 mL    Daily   Admit Wt: Drug Dosing Weight  Height (cm): 180.3 (28 Jan 2025 15:45)  Weight (kg): 108.3 (28 Jan 2025 15:45)  BMI (kg/m2): 33.3 (28 Jan 2025 15:45)  BSA (m2): 2.27 (28 Jan 2025 15:45)    Bilirubin Total: 4.1 mg/dL (01-31 @ 00:30)    CAPILLARY BLOOD GLUCOSE      POCT Blood Glucose.: 142 mg/dL (31 Jan 2025 12:13)  POCT Blood Glucose.: 150 mg/dL (31 Jan 2025 07:52)  POCT Blood Glucose.: 147 mg/dL (30 Jan 2025 22:06)  POCT Blood Glucose.: 116 mg/dL (30 Jan 2025 16:29)          MEDICATIONS  albumin human 25% IVPB 50 milliLiter(s) IV Intermittent every 8 hours  albuterol/ipratropium for Nebulization 3 milliLiter(s) Nebulizer every 6 hours  buMETAnide Injectable 2 milliGRAM(s) IV Push every 8 hours  cefTRIAXone   IVPB      cefTRIAXone   IVPB 2000 milliGRAM(s) IV Intermittent every 24 hours  chlorhexidine 2% Cloths 1 Application(s) Topical <User Schedule>  folic acid 1 milliGRAM(s) Oral daily  gabapentin 300 milliGRAM(s) Oral every 12 hours  heparin   Injectable 5000 Unit(s) SubCutaneous every 8 hours  HYDROmorphone  Injectable 0.5 milliGRAM(s) IV Push every 3 hours PRN  influenza   Vaccine 0.5 milliLiter(s) IntraMuscular once  insulin lispro (ADMELOG) corrective regimen sliding scale   SubCutaneous three times a day before meals  insulin lispro (ADMELOG) corrective regimen sliding scale   SubCutaneous at bedtime  lactulose Syrup 30 Gram(s) Oral every 12 hours  metoprolol tartrate 12.5 milliGRAM(s) Oral every 12 hours  metroNIDAZOLE    Tablet 500 milliGRAM(s) Oral every 12 hours  midodrine 10 milliGRAM(s) Oral every 8 hours  multivitamin 1 Tablet(s) Oral daily  oxyCODONE    IR 2.5 milliGRAM(s) Oral every 4 hours PRN  oxyCODONE    IR 5 milliGRAM(s) Oral every 4 hours PRN  pantoprazole    Tablet 40 milliGRAM(s) Oral daily  rifAXIMin 550 milliGRAM(s) Oral every 12 hours  sodium chloride 3%  Inhalation 4 milliLiter(s) Inhalation every 6 hours  thiamine 100 milliGRAM(s) Oral daily      >>> <<<  PHYSICAL EXAM  Subjective: NAD OOB to chair  Neurology: alert and oriented x 3, nonfocal, no gross deficits  CV :s1s2  Lungs: right chest tubes to lws   Abdomen: soft, NT,ND, ( +)BM  :  voiding  Extremities:  -c/c/e     LABS  01-31    135  |  100  |  50[H]  ----------------------------<  186[H]  3.7   |  23  |  1.54[H]    Ca    8.3[L]      31 Jan 2025 00:30  Phos  3.4     01-31  Mg     1.9     01-31    TPro  6.6  /  Alb  2.9[L]  /  TBili  4.1[H]  /  DBili  x   /  AST  33  /  ALT  6[L]  /  AlkPhos  110  01-31                                 8.1    6.62  )-----------( 66       ( 31 Jan 2025 00:30 )             23.5          PT/INR - ( 31 Jan 2025 00:30 )   PT: 24.7 sec;   INR: 2.18 ratio         PTT - ( 31 Jan 2025 00:30 )  PTT:40.7 sec       PAST MEDICAL & SURGICAL HISTORY:  Sleep apnea, obstructive      Alcohol abuse      Cirrhosis of liver      Portal hypertension      H/O esophageal varices      Anemia      Mild alcohol use disorder      No significant past surgical history

## 2025-01-31 NOTE — PROGRESS NOTE ADULT - SUBJECTIVE AND OBJECTIVE BOX
Follow Up:      Interval History:    REVIEW OF SYSTEMS  [  ] ROS unobtainable because:    [  ] All other systems negative except as noted below    Constitutional:  [ ] fever [ ] chills  [ ] weight loss  [ ] weakness  Skin:  [ ] rash [ ] phlebitis	  Eyes: [ ] icterus [ ] pain  [ ] discharge	  ENMT: [ ] sore throat  [ ] thrush [ ] ulcers [ ] exudates  Respiratory: [ ] dyspnea [ ] hemoptysis [ ] cough [ ] sputum	  Cardiovascular:  [ ] chest pain [ ] palpitations [ ] edema	  Gastrointestinal:  [ ] nausea [ ] vomiting [ ] diarrhea [ ] constipation [ ] pain	  Genitourinary:  [ ] dysuria [ ] frequency [ ] hematuria [ ] discharge [ ] flank pain  [ ] incontinence  Musculoskeletal:  [ ] myalgias [ ] arthralgias [ ] arthritis  [ ] back pain  Neurological:  [ ] headache [ ] seizures  [ ] confusion/altered mental status    Allergies  sulfa drugs (Unknown)  Salicylic Acid (Other)        ANTIMICROBIALS:  cefTRIAXone   IVPB    cefTRIAXone   IVPB 2000 every 24 hours  metroNIDAZOLE    Tablet 500 every 12 hours  rifAXIMin 550 every 12 hours      OTHER MEDS:  MEDICATIONS  (STANDING):  albuterol/ipratropium for Nebulization 3 every 6 hours  buMETAnide Injectable 2 every 8 hours  gabapentin 300 every 12 hours  heparin   Injectable 5000 every 8 hours  HYDROmorphone  Injectable 0.5 every 3 hours PRN  influenza   Vaccine 0.5 once  insulin lispro (ADMELOG) corrective regimen sliding scale  three times a day before meals  insulin lispro (ADMELOG) corrective regimen sliding scale  at bedtime  lactulose Syrup 30 every 12 hours  metoprolol tartrate 12.5 every 12 hours  midodrine 10 every 8 hours  oxyCODONE    IR 2.5 every 4 hours PRN  oxyCODONE    IR 5 every 4 hours PRN  pantoprazole    Tablet 40 daily  sodium chloride 3%  Inhalation 4 every 6 hours      Vital Signs Last 24 Hrs  T(C): 36.3 (31 Jan 2025 11:00), Max: 37 (30 Jan 2025 19:00)  T(F): 97.3 (31 Jan 2025 11:00), Max: 98.6 (30 Jan 2025 19:00)  HR: 71 (31 Jan 2025 13:00) (71 - 107)  BP: 112/57 (31 Jan 2025 13:00) (112/57 - 115/57)  BP(mean): 82 (31 Jan 2025 13:00) (80 - 82)  RR: 15 (31 Jan 2025 13:00) (12 - 31)  SpO2: 97% (31 Jan 2025 13:00) (91% - 100%)    Parameters below as of 31 Jan 2025 10:00  Patient On (Oxygen Delivery Method): nasal cannula  O2 Flow (L/min): 1      PHYSICAL EXAMINATION:  General: Alert and Awake, NAD  HEENT: PERRL, EOMI  Neck: Supple  Cardiac: RRR, No M/R/G  Resp: CTAB, No Wh/Rh/Ra  Abdomen: NBS, NT/ND, No HSM, No rigidity or guarding  MSK: No LE edema. No Calf tenderness  : No yates  Skin: No rashes or lesions. Skin is warm and dry to the touch.   Neuro: Alert and Awake. CN 2-12 Grossly intact. Moves all four extremities spontaneously.  Psych: Calm, Pleasant, Cooperative                          8.1    6.62  )-----------( 66       ( 31 Jan 2025 00:30 )             23.5       01-31    135  |  100  |  50[H]  ----------------------------<  186[H]  3.7   |  23  |  1.54[H]    Ca    8.3[L]      31 Jan 2025 00:30  Phos  3.4     01-31  Mg     1.9     01-31    TPro  6.6  /  Alb  2.9[L]  /  TBili  4.1[H]  /  DBili  x   /  AST  33  /  ALT  6[L]  /  AlkPhos  110  01-31      Urinalysis Basic - ( 31 Jan 2025 00:30 )    Color: x / Appearance: x / SG: x / pH: x  Gluc: 186 mg/dL / Ketone: x  / Bili: x / Urobili: x   Blood: x / Protein: x / Nitrite: x   Leuk Esterase: x / RBC: x / WBC x   Sq Epi: x / Non Sq Epi: x / Bacteria: x        MICROBIOLOGY:  v  .Abscess  01-28-25   No growth to date.  --    Few polymorphonuclear leukocytes seen per low power field  No organisms seen per oil power field      Tissue  01-28-25   No growth to date.  --    Few polymorphonuclear leukocytes seen per low power field  No organisms seen per oil power field      Pleural Fl  01-24-25   Rare Citrobacter koseri  --  Citrobacter koseri      Peritoneal  01-23-25   No growth at 5 days  --    No polymorphonuclear cells seen  No organisms seen  by cytocentrifuge      .Blood BLOOD  01-19-25   No growth at 5 days  --  --      Pleural Fl  01-18-25   Moderate Citrobacter koseri  --  Citrobacter koseri      Body Fluid  01-18-25   No fungus isolated at 1 week.  --  --      .Blood BLOOD  01-18-25   No growth at 5 days  --  --      Ascites Fl  01-17-25   No growth at 5 days  --    No polymorphonuclear cells seen  No organisms seen  by cytocentrifuge      .Blood BLOOD  01-16-25   No growth at 5 days  --  --      .Blood BLOOD  01-16-25   Growth in aerobic bottle: Staphylococcus epidermidis  Isolation of Coagulase negative Staphylococcus from single blood culture  sets may represent  contamination. Contact the Microbiology Department at [phone] if  susceptibility testing is needed.  clinically indicated.  Direct identification is available within approximately 3-5  hours either by Blood Panel Multiplexed PCR or Direct  MALDI-TOF. Details: [email or web address]  --  Blood Culture PCR        CMV IgG Antibody: >10.00 U/mL (01-17-25 @ 06:41)  Toxoplasma IgG Screen: <3.00 IU/mL (01-17-25 @ 06:41)          RADIOLOGY:    <The imaging below has been reviewed and visualized by me independently. Findings as detailed in report below> Follow Up:  Pre-OLT    Interval History: afebrile. no acute events.     REVIEW OF SYSTEMS  [  ] ROS unobtainable because:    [ x ] All other systems negative except as noted below    Constitutional:  [ ] fever [ ] chills  [ ] weight loss  [ ] weakness  Skin:  [ ] rash [ ] phlebitis	  Eyes: [ ] icterus [ ] pain  [ ] discharge	  ENMT: [ ] sore throat  [ ] thrush [ ] ulcers [ ] exudates  CHest: +Pain  Respiratory: [ ] dyspnea [ ] hemoptysis [ ] cough [ ] sputum	  Cardiovascular:  [ ] chest pain [ ] palpitations [ ] edema	  Gastrointestinal:  [ ] nausea [ ] vomiting [ ] diarrhea [ ] constipation [ ] pain	  Genitourinary:  [ ] dysuria [ ] frequency [ ] hematuria [ ] discharge [ ] flank pain  [ ] incontinence  Musculoskeletal:  [ ] myalgias [ ] arthralgias [ ] arthritis  [ ] back pain  Neurological:  [ ] headache [ ] seizures  [ ] confusion/altered mental status      Allergies  sulfa drugs (Unknown)  Salicylic Acid (Other)        ANTIMICROBIALS:  cefTRIAXone   IVPB    cefTRIAXone   IVPB 2000 every 24 hours  metroNIDAZOLE    Tablet 500 every 12 hours  rifAXIMin 550 every 12 hours      OTHER MEDS:  MEDICATIONS  (STANDING):  albuterol/ipratropium for Nebulization 3 every 6 hours  buMETAnide Injectable 2 every 8 hours  gabapentin 300 every 12 hours  heparin   Injectable 5000 every 8 hours  HYDROmorphone  Injectable 0.5 every 3 hours PRN  influenza   Vaccine 0.5 once  insulin lispro (ADMELOG) corrective regimen sliding scale  three times a day before meals  insulin lispro (ADMELOG) corrective regimen sliding scale  at bedtime  lactulose Syrup 30 every 12 hours  metoprolol tartrate 12.5 every 12 hours  midodrine 10 every 8 hours  oxyCODONE    IR 2.5 every 4 hours PRN  oxyCODONE    IR 5 every 4 hours PRN  pantoprazole    Tablet 40 daily  sodium chloride 3%  Inhalation 4 every 6 hours      Vital Signs Last 24 Hrs  T(C): 36.3 (31 Jan 2025 11:00), Max: 37 (30 Jan 2025 19:00)  T(F): 97.3 (31 Jan 2025 11:00), Max: 98.6 (30 Jan 2025 19:00)  HR: 71 (31 Jan 2025 13:00) (71 - 107)  BP: 112/57 (31 Jan 2025 13:00) (112/57 - 115/57)  BP(mean): 82 (31 Jan 2025 13:00) (80 - 82)  RR: 15 (31 Jan 2025 13:00) (12 - 31)  SpO2: 97% (31 Jan 2025 13:00) (91% - 100%)    Parameters below as of 31 Jan 2025 10:00  Patient On (Oxygen Delivery Method): nasal cannula  O2 Flow (L/min): 1      PHYSICAL EXAMINATION:  General: Alert and Awake, NAD, jaundice  Cardiac: RRR, No M/R/G  Resp: Decreased breath sounds at right lung bases,   Chest: 3x Chest Tube Drains with s/s output  Abdomen: NBS, NT/ND, No HSM, No rigidity or guarding  MSK: No LE edema. No Calf tenderness  : No yates  Skin: No rashes or lesions. Skin is warm and dry to the touch.   Neuro: Alert and Awake. CN 2-12 Grossly intact. Moves all four extremities spontaneously.  Psych: Calm, Pleasant, Cooperative                          8.1    6.62  )-----------( 66       ( 31 Jan 2025 00:30 )             23.5       01-31    135  |  100  |  50[H]  ----------------------------<  186[H]  3.7   |  23  |  1.54[H]    Ca    8.3[L]      31 Jan 2025 00:30  Phos  3.4     01-31  Mg     1.9     01-31    TPro  6.6  /  Alb  2.9[L]  /  TBili  4.1[H]  /  DBili  x   /  AST  33  /  ALT  6[L]  /  AlkPhos  110  01-31      Urinalysis Basic - ( 31 Jan 2025 00:30 )    Color: x / Appearance: x / SG: x / pH: x  Gluc: 186 mg/dL / Ketone: x  / Bili: x / Urobili: x   Blood: x / Protein: x / Nitrite: x   Leuk Esterase: x / RBC: x / WBC x   Sq Epi: x / Non Sq Epi: x / Bacteria: x        MICROBIOLOGY:  v  .Abscess  01-28-25   No growth to date.  --    Few polymorphonuclear leukocytes seen per low power field  No organisms seen per oil power field      Tissue  01-28-25   No growth to date.  --    Few polymorphonuclear leukocytes seen per low power field  No organisms seen per oil power field      Pleural Fl  01-24-25   Rare Citrobacter koseri  --  Citrobacter koseri      Peritoneal  01-23-25   No growth at 5 days  --    No polymorphonuclear cells seen  No organisms seen  by cytocentrifuge      .Blood BLOOD  01-19-25   No growth at 5 days  --  --      Pleural Fl  01-18-25   Moderate Citrobacter koseri  --  Citrobacter koseri      Body Fluid  01-18-25   No fungus isolated at 1 week.  --  --      .Blood BLOOD  01-18-25   No growth at 5 days  --  --      Ascites Fl  01-17-25   No growth at 5 days  --    No polymorphonuclear cells seen  No organisms seen  by cytocentrifuge      .Blood BLOOD  01-16-25   No growth at 5 days  --  --      .Blood BLOOD  01-16-25   Growth in aerobic bottle: Staphylococcus epidermidis  Isolation of Coagulase negative Staphylococcus from single blood culture  sets may represent  contamination. Contact the Microbiology Department at [phone] if  susceptibility testing is needed.  clinically indicated.  Direct identification is available within approximately 3-5  hours either by Blood Panel Multiplexed PCR or Direct  MALDI-TOF. Details: [email or web address]  --  Blood Culture PCR        CMV IgG Antibody: >10.00 U/mL (01-17-25 @ 06:41)  Toxoplasma IgG Screen: <3.00 IU/mL (01-17-25 @ 06:41)          RADIOLOGY:    <The imaging below has been reviewed and visualized by me independently. Findings as detailed in report below>    < from: Xray Chest 1 View- PORTABLE-Routine (Xray Chest 1 View- PORTABLE-Routine in AM.) (01.29.25 @ 06:16) >  IMPRESSION:  Lines and tubes as described above.    Mild interval decrease in size of loculated right pleural effusion.   Diffuse right lung airspace opacities.    < end of copied text >

## 2025-01-31 NOTE — PROGRESS NOTE ADULT - ASSESSMENT
ASSESSMENT: 44M PMH decompensated ETOH cirrhosis c/b recurrent ascites, grade II esophageal varices, and hepatic encephalopathy, who presented to OSH with abd pain and distension found to have decompensated EtOH cirrhosis with MELD 30, MANUELITO (HRS vs ATN; previous HD need with [facility]) found to have R empyema s/p R pigtail placement w/ IR 1/18 requiring several adjustments, s/p R robotic VATS with decortication (Dr. Ramírez 1/28/2025) for loculated empyema. OR course with oropharyngeal bleeding during intubation, coagulopathy/elevated EBL during case.     PLAN:    NEUROLOGIC   - A&O x 4  - Dilaudid PRN pain  - seroquel for sleep x1    RESPIRATORY   - room air  - 3x chest tubes to suction 20 cm H2O via dry pleuravacs 72h    CARDIOVASCULAR   - off pressors  - SBP goal >100  - trend lactate    GASTROINTESTINAL   - Regular diet  - Protonix     /RENAL   - Bumex BID held  - Previously requiring HD, no longer needs, plan to remove permacath  - Strict I/Os, trend electrolytes, and replete PRN    HEMATOLOGIC  - Monitor H/H   - holding chemical VTE ppx i/s/o coagulopathy  - SCDs   - 1 PRBC for 7.2    INFECTIOUS DISEASE  - ABX; CTX & Flagyl   - Pleural cx (+) for Citrobacter 1/24  - Giardia (+)     ENDOCRINE  - Monitor gluc    Code Status: full code    Disposition: CHARLES Almodovar PA-C  Surgical Intensive Care Unit  [id number]
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
## 2025-01-31 NOTE — PROGRESS NOTE ADULT - ASSESSMENT
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 Actively drinking. Now p/w abdominal pain/distension with worsening of liver tests possibly triggered by infection. DDx also includes alcohol associated hepatitis given hx of alc hep 7/2024 which improved with steroids  - OLT: evaluation ongoing, pending cardiac evaluation and empyema management. Tentative plan for [facility] Monday  - EV: EGD 7/2024 for melena showed grade II EV, small gastric ulcer (neg HP), and portal gastropathy   - HE: c/w lactulose and rifaximin, goal 3-4 BM/day  - HCC screening: MRI nondiagnostic, consider repeat imaging when stabilized                                      - MANUELITO: bumex for diuresis/volume overload  - C/w home midodrine 10 mg tid  - C/w PPI for stress ulcer ppx and hx of ulcer  - Trend MELD labs daily (cmp, INR) daily  - Strict I/Os, daily wts    #Empyema  #Leukocytosis  CT chest noncontrast reviewed from OSH showing moderate sized complex R sided pleural effusion. s/p chest tube placed 1/18 with cultures growing Citrobacter c/w empyema. Requiring 2nd chest tube placement then now s/p VATS for persistent effuision  - s/p VATS 1/29  - C/w CTX 2 g IV daily  - Maintain chest tubes in place for drainage  - Trend cbc and fever curve  - Appreciate thoracic surgery and ID recs    #MANUELITO  Found to have acute renal failure requiring dialysis at OSH, ddx includes HRS vs ATN. Permacath in place. Cr baseline 1.12 10/2024 required albumin assisted diuresis with bumex 2 mg IV q12h  - Restart bumex 2 mg IV x1  - IR consult to remove tunneled cath  - Strict I/Os, daily wts  - Trend CMP daily  - Appreciate transplant renal recs    #Anemia  #Thrombocytopenia  Normocytic anemia with Hgb 8.5 and plt count 64 likely due to chronic liver dz  - s/p 4 units pRBC, 2 FFP, 2 plts on 1/28  - Transfuse Hgb>7, plt>50 given bleeding  - Hold DVT ppx for bleeding    All recommendations are tentative until note is attested by attending.     Judy Tucker, PGY4  Gastroenterology/Hepatology Fellow  Available on [employer] Teams  [phone] (Long Range Pager)  [phone] (Short Range Pager [facility])    After 5 pm, please contact the on-call GI fellow for any urgent issues via the Hospital Call

## 2025-01-31 NOTE — PRE PROCEDURE NOTE - PRE PROCEDURE EVALUATION
Interventional Radiology    HPI: 43yo M with Hx decompensated EtOH cirrhosis (c/b recurrent ascites, grade II EV, and HE) who presented to OSH on 1/16 with abdominal pain and distension, found to have ascites and acute renal failure requiring initiation of HD (s/p Permacath placement c/b bleeding), transferred to [facility] for transplant evaluation and management.     IR consulted for removal of tunneled HD catheter. Per nephrology, no longer requiring dialysis.    Allergies: sulfa drugs (Unknown)  Salicylic Acid (Other)    Medications (Abx/Cardiac/Anticoagulation/Blood Products)    buMETAnide Injectable: 2 milliGRAM(s) IV Push (01-30 @ 09:17)  cefTRIAXone   IVPB: 100 mL/Hr IV Intermittent (01-29 @ 18:25)  cefTRIAXone   IVPB: 100 mL/Hr IV Intermittent (01-30 @ 17:22)  metoprolol tartrate: 12.5 milliGRAM(s) Oral (01-30 @ 10:19)  metoprolol tartrate: 12.5 milliGRAM(s) Oral (01-31 @ 06:11)  metroNIDAZOLE    Tablet: 500 milliGRAM(s) Oral (01-31 @ 06:11)  rifAXIMin: 550 milliGRAM(s) Oral (01-31 @ 06:11)    Data:    T(C): 36.4  HR: 85  BP: 115/57  RR: 12  SpO2: 98%    Exam  General: No acute distress  Chest: Non labored breathing  Abdomen: Non-distended  Extremities: No swelling, warm    -WBC 6.62 / HgB 8.1 / Hct 23.5 / Plt 66  -Na 135 / Cl 100 / BUN 50 / Glucose 186  -K 3.7 / CO2 23 / Cr 1.54  -ALT 6 / Alk Phos 110 / T.Bili 4.1  -INR2.18    Imaging: Reviewed.    Plan: 44y Male presents for tunneled HD catheter removal.     -Risks/Benefits/alternatives explained with the patient and/or healthcare proxy and witnessed informed consent obtained.

## 2025-01-31 NOTE — PROGRESS NOTE ADULT - ATTENDING COMMENTS
44 year old man with alcohol use disorder, liver cirrhosis complicated by portal hypertension, ascites requiring large volume paracentesis and, esophageal varices and hepatic encephalopathy.  Presented to [facility] on 1/8/25 with abdominal pain and distension, found to have worsening ascites and MANUELITO, PermCath placed on 1/10 and initiated on dialysis, transferred to [facility] for further care. CT chest 1/17 with loculated right effusion, s/p chest tube, culture +ve for Citrobacter. GI PCR +ve for Giardia. Currently on Ceftriaxone, completed metronidazole for giardia.     MANUELITO in the setting of infection (empyema), initiated on dialysis on 1/10/25, last HD done on 1/18/25.   S/p VATS and decortication for loculated empyema on 1/28, Extubated on 1/30/25     Creatinine 1.5mg/dL, UOP 1liter,  Anasarca present   - No need for dialysis  - Continue midodrine 10mg po q8 hours   - Bumex 2mg iv q8h, 25% Albumin 50ml iv q8h .

## 2025-01-31 NOTE — PROGRESS NOTE ADULT - SUBJECTIVE AND OBJECTIVE BOX
Interval Events:   - Afebrile, NAEON  - denies any dyspnea or f/c    ROS:   12 point review of systems performed and negative except otherwise noted in HPI.    Hospital Medications:  albuterol    90 MICROgram(s) HFA Inhaler 2 Puff(s) Inhalation every 6 hours PRN  chlorhexidine 2% Cloths 1 Application(s) Topical <User Schedule>  folic acid 1 milliGRAM(s) Oral daily  lactulose Syrup 20 Gram(s) Oral every 12 hours  melatonin 5 milliGRAM(s) Oral at bedtime  midodrine 10 milliGRAM(s) Oral every 8 hours  Nephro-shania 1 Tablet(s) Oral daily  pantoprazole    Tablet 40 milliGRAM(s) Oral before breakfast  piperacillin/tazobactam IVPB.. 3.375 Gram(s) IV Intermittent every 12 hours  rifAXIMin 550 milliGRAM(s) Oral every 12 hours  thiamine 100 milliGRAM(s) Oral daily      PHYSICAL EXAM:   Vital Signs:  Vital Signs Last 24 Hrs  T(C): 36.9 (17 Jan 2025 09:29), Max: 37.3 (17 Jan 2025 00:35)  T(F): 98.4 (17 Jan 2025 09:29), Max: 99.2 (17 Jan 2025 00:35)  HR: 84 (17 Jan 2025 09:29) (65 - 106)  BP: 109/54 (17 Jan 2025 09:29) (104/51 - 129/62)  BP(mean): 89 (16 Jan 2025 18:00) (74 - 89)  RR: 16 (17 Jan 2025 09:29) (16 - 36)  SpO2: 99% (17 Jan 2025 09:29) (95% - 99%)    Parameters below as of 17 Jan 2025 05:00  Patient On (Oxygen Delivery Method): room air      Daily     Daily     PHYSICAL EXAM:   GENERAL: intubated, sedated  HEENT:  Normocephalic/atraumatic, scleral icterus  CHEST:  intubated, chest tubes in place draining sanguinous output  HEART:  Regular rate and rhythm  ABDOMEN:  Soft, mild diffuse tenderness, mildly distended, normoactive bowel sounds, splenomegaly, stigmata of chronic liver dz  EXTREMITIES: No cyanosis, clubbing, or edema  SKIN:  No rash  NEURO:  sedated    LABS: reviewed                        7.4    13.96 )-----------( 73       ( 17 Jan 2025 06:42 )             22.4     01-16    130[L]  |  96  |  39[H]  ----------------------------<  117[H]  4.3   |  22  |  2.59[H]    Ca    8.6      16 Jan 2025 07:39  Phos  4.3     01-17  Mg     2.0     01-17    TPro  6.4  /  Alb  2.5[L]  /  TBili  5.8[H]  /  DBili  2.1[H]  /  AST  32  /  ALT  7[L]  /  AlkPhos  116  01-17    LIVER FUNCTIONS - ( 17 Jan 2025 06:41 )  Alb: 2.5 g/dL / Pro: 6.4 g/dL / ALK PHOS: 116 U/L / ALT: 7 U/L / AST: 32 U/L / GGT: x             Interval Diagnostic Studies: see sunrise for full report

## 2025-01-31 NOTE — PROGRESS NOTE ADULT - SUBJECTIVE AND OBJECTIVE BOX
24 HOUR EVENTS:  - sys >100  - held bumex   - 1 PRBC  - permacath removal deferred    NEURO  RASS (if intubated): 		CAM ICU (if concern for delirium):  Exam: AOx4  Meds: gabapentin 300 milliGRAM(s) Oral every 12 hours  HYDROmorphone  Injectable 0.5 milliGRAM(s) IV Push every 3 hours PRN breakthrough pain  oxyCODONE    IR 2.5 milliGRAM(s) Oral every 4 hours PRN Moderate Pain (4 - 6)  oxyCODONE    IR 5 milliGRAM(s) Oral every 4 hours PRN Severe Pain (7 - 10)      RESPIRATORY  RR: 20 (01-31-25 @ 01:00) (15 - 31)  SpO2: 99% (01-31-25 @ 01:00) (91% - 100%)  Wt(kg): --  Exam: Lungs CTA b/l  Mechanical Ventilation:   ABG - ( 30 Jan 2025 05:51 )  pH: 7.42  /  pCO2: 42    /  pO2: 89    / HCO3: 27    / Base Excess: 2.5   /  SaO2: 99.6    Lactate: x                Meds: albuterol/ipratropium for Nebulization 3 milliLiter(s) Nebulizer every 6 hours  sodium chloride 3%  Inhalation 4 milliLiter(s) Inhalation every 6 hours      CARDIOVASCULAR  HR: 93 (01-31-25 @ 01:00) (88 - 126)  BP: 115/57 (01-30-25 @ 21:00) (115/57 - 115/57)  BP(mean): 80 (01-30-25 @ 21:00) (80 - 80)  ABP: 100/50 (01-31-25 @ 01:00) (100/50 - 147/69)  ABP(mean): 69 (01-31-25 @ 01:00) (69 - 98)  Wt(kg): --  CVP(cm H2O): --      Exam: Normal S1/S2 w/o murmurs or rubs  Cardiac Rhythm: sinus tach  Perfusion     [x ]Adequate   [ ]Inadequate  Mentation   [ x]Normal       [ ]Reduced  Extremities  [ x]Warm         [ ]Cool  Volume Status [ ]Hypervolemic [x ]Euvolemic [ ]Hypovolemic  Meds: metoprolol tartrate 12.5 milliGRAM(s) Oral every 12 hours      GI/NUTRITION  Exam: abd non distended, non TTP  Diet: regular  Last Bowel Movement: 30-Jan-2025 (01-30-25 @ 19:00)  Last Bowel Movement: 30-Jan-2025 (01-30-25 @ 07:00)  Last Bowel Movement: 28-Jan-2025 (01-29-25 @ 19:00)  Last Bowel Movement: 27-Jan-2025 (01-29-25 @ 07:00)  Last Bowel Movement: 27-Jan-2025 (01-28-25 @ 20:45)  Last Bowel Movement: 27-Jan-2025 (01-28-25 @ 08:56)      Meds: lactulose Syrup 30 Gram(s) Oral every 12 hours  pantoprazole    Tablet 40 milliGRAM(s) Oral daily      GENITOURINARY  I&O's Detail    01-29 @ 07:01  -  01-30 @ 07:00  --------------------------------------------------------  IN:    Albumin 5%  - 250 mL: 500 mL    Dexmedetomidine: 10.8 mL    IV PiggyBack: 150 mL    Norepinephrine: 22.4 mL    Oral Fluid: 480 mL    PRBCs (Packed Red Blood Cells): 300 mL  Total IN: 1463.2 mL    OUT:    Chest Tube (mL): 50 mL    Chest Tube (mL): 95 mL    Chest Tube (mL): 180 mL    Indwelling Catheter - Urethral (mL): 905 mL  Total OUT: 1230 mL    Total NET: 233.2 mL      01-30 @ 07:01 - 01-31 @ 01:04  --------------------------------------------------------  IN:    Albumin 5%  - 250 mL: 250 mL    IV PiggyBack: 50 mL    Oral Fluid: 940 mL    PRBCs (Packed Red Blood Cells): 300 mL  Total IN: 1540 mL    OUT:    Chest Tube (mL): 50 mL    Chest Tube (mL): 30 mL    Chest Tube (mL): 10 mL    Indwelling Catheter - Urethral (mL): 1355 mL  Total OUT: 1445 mL    Total NET: 95 mL          01-30    133[L]  |  99  |  50[H]  ----------------------------<  217[H]  3.6   |  25  |  1.50[H]    Ca    8.3[L]      30 Jan 2025 11:48  Phos  2.9     01-30  Mg     2.0     01-30    TPro  6.6  /  Alb  3.0[L]  /  TBili  4.4[H]  /  DBili  x   /  AST  32  /  ALT  6[L]  /  AlkPhos  102  01-30    Meds: folic acid 1 milliGRAM(s) Oral daily  multivitamin 1 Tablet(s) Oral daily  thiamine 100 milliGRAM(s) Oral daily      HEMATOLOGIC  Meds:                         8.1    6.62  )-----------( 66       ( 31 Jan 2025 00:30 )             23.5     PT/INR - ( 31 Jan 2025 00:30 )   PT: 24.7 sec;   INR: 2.18 ratio         PTT - ( 31 Jan 2025 00:30 )  PTT:40.7 sec    INFECTIOUS DISEASES  T(C): 36.8 (01-30-25 @ 22:00), Max: 37 (01-30-25 @ 19:00)  Wt(kg): --  WBC Count: 6.62 K/uL (01-31 @ 00:30)  WBC Count: 6.48 K/uL (01-30 @ 11:48)  WBC Count: 6.73 K/uL (01-30 @ 05:56)    Recent Cultures:  Specimen Source: .Abscess, 01-28 @ 20:57; Results   No growth to date.; Gram Stain:   Few polymorphonuclear leukocytes seen per low power field  No organisms seen per oil power field; Organism: --  Specimen Source: Tissue, 01-28 @ 20:51; Results   No growth to date.; Gram Stain:   Few polymorphonuclear leukocytes seen per low power field  No organisms seen per oil power field; Organism: --  Specimen Source: Pleural Fl, 01-24 @ 19:10; Results   Rare Citrobacter koseri[!]; Gram Stain:   polymorphonuclear leukocytes seen  No organisms seen  by cytocentrifuge[!]; Organism: Citrobacter koseri[!]    Meds: cefTRIAXone   IVPB      cefTRIAXone   IVPB 2000 milliGRAM(s) IV Intermittent every 24 hours  influenza   Vaccine 0.5 milliLiter(s) IntraMuscular once  metroNIDAZOLE    Tablet 500 milliGRAM(s) Oral every 12 hours  rifAXIMin 550 milliGRAM(s) Oral every 12 hours      ENDOCRINE  Capillary Blood Glucose    Meds: insulin lispro (ADMELOG) corrective regimen sliding scale   SubCutaneous three times a day before meals  insulin lispro (ADMELOG) corrective regimen sliding scale   SubCutaneous at bedtime      ACCESS DEVICES:  [ ] Peripheral IV  [ ] Central Venous Line		[ ] R	[ ] L	[ ] IJ	[ ] Fem	[ ] SC	Placed:   [ ] Arterial Line			[ ] R	[ ] L	[ ] Fem	[ ] Rad	[ ] Ax	Placed:   [ ] PICC:					[ ] Mediport  [ ] Urinary Catheter, Date Placed:   [ ] Necessity of urinary, arterial, and venous catheters discussed    OTHER MEDICATIONS:  chlorhexidine 2% Cloths 1 Application(s) Topical <User Schedule>      IMAGING:

## 2025-01-31 NOTE — PROGRESS NOTE ADULT - ASSESSMENT
43 yo M with PMH decompensated ETOH cirrhosis c/b recurrent ascites, grade II EV, and HE, right calf hematoma 10/2024 (s/p fall)  presented to [facility] for abdominal pain and distension. Patient found to have ascites on exam and MANUELITO requiring HD initiation PermCath was placed by vascular surgery on 1/10 with post procedure course c/b bleeding from insertion site. Transferred to [facility] SICU for further management.     Blood Cultures (1/16) 1/4 Staph epi.   GI PCR (1/17) + Giardia.   Paracentesis (1/17) 155 nucleated cell counts.     CT Chest (1/17) Moderate loculated right pleural effusion containing a few foci of air, which are new from 1/8/2025 and may represent empyema/bronchopleural fistula versus sequelae of prior thoracentesis.     #Empyema  s/p VATS 1/28/25  --Continue Ceftriaxone to 2g IV Q24H + Metronidazole 500 mg BID. Anticipate continuing for 7 days post-VATS (assuming no acute events)  --Follow up on operative cultures    #Giardia  s/p 7 days of Metronidazole    #Positive Blood Culture (Staph epi, 1/16)  Concern for procurement contaminant  --Continue to follow CBC with diff  --Continue to follow temperature curve  --Follow up on preliminary blood cultures      #Pre-Liver Transplant Evaluation, Decompensated Cirrhosis  COVID19 Rodríguez Antibody Positive  HAV IgG Positive  HBVs Ab Positive  HBVsAg Negative  HBVc Ab Negative  HCV Ab Negative  HSV 1 IgG Positive  HSV 2 IgG Negative  EBV IgG Positive  CMV IgG Positive  VZV IgG Positive  Measles IgG Positive  Mumps IgG Positive  Rubella IgG Positive  Quantiferon Gold negative  HIV Ag/Ab by CMIA Negative  Syphilis Screen Negative  Toxoplasma IgG Negative  Strongyloides Ab negative  Coccidiodes Ab PENDING  Leishmania Ab PENDING  --ID clearance to OLT pending treatment of empyema; anticipate that if there are no acute events through the weekend can reactivate from ID perspective    #Encounter to Vaccinate Patient  COVID19: Would benefit from COVID19 8716-3330 Vaccine Dose  Influenza: Had vaccination for this year  Pneumococcal: Would benefit from PCV20  HAV: Immune, will not require further vaccination  HBV: Immune, will not require further vaccination  MMR: Immune, will not require further vaccination  Varicella: Immune, will not require further vaccination  Shingles: Will require Shingrix  Tdap: Tdap 6/23/24    Dr. Meliton Kam will be covering the patient starting from tomorrow. Please reach out to her for further questions and follow up.     Froilan Whitmore M.D.  [facility] Division of Infectious Disease  8AM-5PM Monday - Friday: Available on [employer] Teams  After Hours and Holidays (or if no response on [employer] Teams): Please contact the Infectious Diseases Office at [phone]     The above assessment and plan were discussed with Transplant Hepatology team

## 2025-01-31 NOTE — PROGRESS NOTE ADULT - ATTENDING COMMENTS
S/p R VATS empyema, cirrhosis.    N Rifaximin, lactulose.  P 1L NC sat >90. IS, pulmonary toilet.  C Off pressors. Resume midodrine. Lactate cleared.   G Mathew PO.   R Albumin challenge 25% q8, bumetanide 2mg. Cr 1.54. Monitor UOP.  H Trend CBC.  DVT SCDs, chemical on hold.  E ISS.

## 2025-01-31 NOTE — PROCEDURE NOTE - PROCEDURE FINDINGS AND DETAILS
Site prepped and draped in usual sterile fashion. Right IJ tunneled catheter was removed using blunt dissection without difficulty. Direct jugular and site pressure applied x10min, hemostasis achieved. Opsite dressing applied.    -Keep pt upright x2hrs   -Keep site dry x48hrs  -Check site with vitals q15min x1hr  -Can resume hep SQ in 6 hours if no concern for bleeding  -Please contact IR with any questions/ concerns regarding above

## 2025-01-31 NOTE — PROGRESS NOTE ADULT - ASSESSMENT
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     #Decompensated EtOH Associated Cirrhosis  MELD 27O   - OLT: evaluation ongoing, pending cardiac evaluation and empyema management  - EV: EGD 7/2024 for melena showed grade II EV, small gastric ulcer (neg HP), and portal gastropathy   - HE: c/w lactulose and rifaximin, goal 3-4 BM/day  - HCC screening: MRI nondiagnostic, consider repeat imaging when stabilized                                      - C/w PPI for stress ulcer ppx and hx of ulcer  - Trend MELD labs daily (cmp, INR) daily  - Strict I/Os, daily wts    #Empyema  - CT chest noncontrast reviewed from OSH showing moderate sized complex R sided pleural effusion. s/p chest tube placed 1/18 with cultures growing Citrobacter c/w empyema. Requiring 2nd chest tube placement then now s/p VATS for persistent effusion  - s/p VATS 1/29  - C/w CTX 2 g IV daily  - Maintain chest tubes in place for drainage  - Trend cbc and fever curve  - Appreciate thoracic surgery and ID recs    #MANUELITO, improving  Found to have acute renal failure requiring dialysis at OSH, ddx includes HRS vs ATN. Permacath in place.  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     [] Decompensated EtOH Associated Cirrhosis  MELD 27O   - OLT: evaluation ongoing, pending cardiac evaluation and empyema management  - EV: EGD 7/2024 for melena showed grade II EV, small gastric ulcer (neg HP), and portal gastropathy   - HE: c/w lactulose and rifaximin, goal 3-4 BM/day  - HCC screening: MRI nondiagnostic, consider repeat imaging when stabilized                                      - C/w PPI for stress ulcer ppx and hx of ulcer  - Trend MELD labs daily (cmp, INR) daily  - Strict I/Os, daily wts  - Bumex 2mg IV x 1      [] Empyema  - CT chest noncontrast reviewed from OSH showing moderate sized complex R sided pleural effusion. s/p chest tube placed 1/18 with cultures growing Citrobacter c/w empyema. Requiring 2nd chest tube placement then now s/p VATS for persistent effusion  - s/p VATS 1/29  - C/w CTX 2 g IV daily  - Maintain chest tubes in place for drainage  - Trend cbc and fever curve  - Appreciate thoracic surgery and ID recs    [] MANUELITO, improving  Found to have acute renal failure requiring dialysis at OSH, ddx includes HRS vs ATN. Permacath in place.  Cr baseline 1.12 10/2024  - IR s/p permcath removal today  - Strict I/Os, daily wts  - Trend CMP daily  - Appreciate transplant renal recs

## 2025-01-31 NOTE — PROGRESS NOTE ADULT - PROBLEM SELECTOR PLAN 1
s/p Right VATS decortication 1/28 with Dr. Ramírez  Transfused multiple blood products PRBC/PLATELETS/FFP WITH GOAL Hgb >8, PLTS >100 K, INR BELOW 1.7  Chest ubes to water seal  Continue care as per primary team

## 2025-01-31 NOTE — PROCEDURE NOTE - GENERAL PROCEDURE NAME
Tunneled hemodialysis catheter removal
Paracentesis
right chest tube placement
Right chest tube repositioning
right chest tube placement

## 2025-01-31 NOTE — PROGRESS NOTE ADULT - ASSESSMENT
45yo M with Hx decompensated EtOH cirrhosis (c/b recurrent ascites, grade II EV, and HE) who presented to OSH on 1/16 with abdominal pain and distension, found to have ascites and acute renal failure requiring initiation of HD (s/p Permacath placement c/b bleeding), transferred to [facility] for transplant evaluation and management. CT chest at OSH was significant for a complex R pleural effusion s/p R pigtail placement by IR on 1/18 with drainage of purulent material c/f empyema. Thoracic Surgery consulted for empyema and chest tube management.   1/20 R pigtail drained 700/910, CT of Chest on Tuesday to assess effusion and make final plan determination  1/21 R pigtail drained 80/240, CT of chest to assess effusion today   1/22 R pigtail maintained. CT of chest revealing:   < from: CT Chest No Cont (01.21.25 @ 19:29) >  IMPRESSION:.  Marked interval decrease in size ofright-sided pleural fluid and gas   collection (presumably empyema) status post placement of pigtail catheter   as described above.  Much interval improvement of posterior right upper lobe and right lower   lobe consolidation.  New patchy ground-glass opacities at the left apex may be related to   edema or be infectious/inflammatory in etiology.  New small left pleural effusion. < end of copied text >  1/23 R PTC  (placed by IR 1/18) --> LWS this AM; patient NPO for IR repositioning/swap of chest tube today. Monitor Chest tube outputs  1/24 VSS  pigtail  unsuccessful repositioning  1/23 - output 1120 overnight -AM  XRay pending  1/25 Daily CXR;  pulm toileting. rpt CT imaging on MONDAY per Thoracic Attending  1/26 VSS R Ct # 1 minimal drainage.  RCT #2- w/ 180cc in last 24 hrs keep to lws - rpt imaging Monday to assess need for RVATS decort.  1/28 Keep NPO for Right VATs decort today with Dr. Ramírez. H/H 6.1/18.3, Platelets 48K, INR 2.57, please transfuse PRBC/PLATELETS/FFP WITH GOAL Hgb >8, PLTS >100 K, INR BELOW 1.7.   1/29 h/h 8/23, Maintain chest tubes to lws x 72 hrs  1/30 Extubated, chest tube drainage serosanguinous. Transfused 1uPRBC today h/h 7/21 1/31 CXR increase right sided opacities. c/w pulmonary toileting, incentive spirometry

## 2025-01-31 NOTE — PROGRESS NOTE ADULT - ATTENDING COMMENTS
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

## 2025-01-31 NOTE — PROGRESS NOTE ADULT - SUBJECTIVE AND OBJECTIVE BOX
[facility] DIVISION OF KIDNEY DISEASES AND HYPERTENSION -- FOLLOW UP NOTE  --------------------------------------------------------------------------------  Chief Complaint: MANUELITO    24 hour events/subjective: No new complaints. Tolerating po intake and swelling improving.    PAST HISTORY  --------------------------------------------------------------------------------  No significant changes to PMH, PSH, FHx, SHx, unless otherwise noted    ALLERGIES & MEDICATIONS  --------------------------------------------------------------------------------  Allergies    sulfa drugs (Unknown)  Salicylic Acid (Other)    Intolerances      Standing Inpatient Medications  albumin human 25% IVPB 50 milliLiter(s) IV Intermittent every 8 hours  albuterol/ipratropium for Nebulization 3 milliLiter(s) Nebulizer every 6 hours  buMETAnide Injectable 2 milliGRAM(s) IV Push every 8 hours  cefTRIAXone   IVPB      cefTRIAXone   IVPB 2000 milliGRAM(s) IV Intermittent every 24 hours  chlorhexidine 2% Cloths 1 Application(s) Topical <User Schedule>  folic acid 1 milliGRAM(s) Oral daily  gabapentin 300 milliGRAM(s) Oral every 12 hours  heparin   Injectable 5000 Unit(s) SubCutaneous every 8 hours  influenza   Vaccine 0.5 milliLiter(s) IntraMuscular once  insulin lispro (ADMELOG) corrective regimen sliding scale   SubCutaneous three times a day before meals  insulin lispro (ADMELOG) corrective regimen sliding scale   SubCutaneous at bedtime  lactulose Syrup 30 Gram(s) Oral every 12 hours  metoprolol tartrate 12.5 milliGRAM(s) Oral every 12 hours  metroNIDAZOLE    Tablet 500 milliGRAM(s) Oral every 12 hours  midodrine 10 milliGRAM(s) Oral every 8 hours  multivitamin 1 Tablet(s) Oral daily  pantoprazole    Tablet 40 milliGRAM(s) Oral daily  rifAXIMin 550 milliGRAM(s) Oral every 12 hours  sodium chloride 3%  Inhalation 4 milliLiter(s) Inhalation every 6 hours  thiamine 100 milliGRAM(s) Oral daily    PRN Inpatient Medications  HYDROmorphone  Injectable 0.5 milliGRAM(s) IV Push every 3 hours PRN  oxyCODONE    IR 2.5 milliGRAM(s) Oral every 4 hours PRN  oxyCODONE    IR 5 milliGRAM(s) Oral every 4 hours PRN      REVIEW OF SYSTEMS  --------------------------------------------------------------------------------  see above     VITALS/PHYSICAL EXAM  --------------------------------------------------------------------------------  T(C): 36.4 (01-31-25 @ 15:00), Max: 37 (01-30-25 @ 19:00)  HR: 89 (01-31-25 @ 14:00) (71 - 107)  BP: 131/74 (01-31-25 @ 14:00) (112/57 - 131/74)  RR: 35 (01-31-25 @ 14:00) (12 - 35)  SpO2: 94% (01-31-25 @ 14:00) (91% - 100%)  Wt(kg): --        01-30-25 @ 07:01  -  01-31-25 @ 07:00  --------------------------------------------------------  IN: 1690 mL / OUT: 2260 mL / NET: -570 mL    01-31-25 @ 07:01  -  01-31-25 @ 15:14  --------------------------------------------------------  IN: 250 mL / OUT: 1075 mL / NET: -825 mL      Physical Exam:  	Gen: NAD  	HEENT: MMM  	Pulm: 3 chest tubes, CTAB anterior lung fields   	CV: S1S2  	Abd: +BS, soft, distended   	Ext: +++LE edema B/L (improving)  	Neuro: Awake  	Skin: Warm and dry  	Vascular access: [facility]    LABS/STUDIES  --------------------------------------------------------------------------------              8.1    6.62  >-----------<  66       [01-31-25 @ 00:30]              23.5     135  |  100  |  50  ----------------------------<  186      [01-31-25 @ 00:30]  3.7   |  23  |  1.54        Ca     8.3     [01-31-25 @ 00:30]      Mg     1.9     [01-31-25 @ 00:30]      Phos  3.4     [01-31-25 @ 00:30]    TPro  6.6  /  Alb  2.9  /  TBili  4.1  /  DBili  x   /  AST  33  /  ALT  6   /  AlkPhos  110  [01-31-25 @ 00:30]    PT/INR: PT 24.7 , INR 2.18       [01-31-25 @ 00:30]  PTT: 40.7       [01-31-25 @ 00:30]      Creatinine Trend:  SCr 1.54 [01-31 @ 00:30]  SCr 1.50 [01-30 @ 11:48]  SCr 1.63 [01-30 @ 00:06]  SCr 1.54 [01-29 @ 15:09]  SCr 1.42 [01-29 @ 00:23]              Urinalysis - [01-31-25 @ 00:30]      Color  / Appearance  / SG  / pH       Gluc 186 / Ketone   / Bili  / Urobili        Blood  / Protein  / Leuk Est  / Nitrite       RBC  / WBC  / Hyaline  / Gran  / Sq Epi  / Non Sq Epi  / Bacteria     Urine Creatinine 84      [01-25-25 @ 11:11]  Urine Protein 13      [01-25-25 @ 11:11]  Urine Sodium 11      [01-25-25 @ 11:11]  Urine Urea Nitrogen 345      [01-25-25 @ 11:11]  Urine Potassium 50      [01-25-25 @ 11:11]  Urine Osmolality 282      [01-25-25 @ 11:11]    Iron 57, TIBC 91, %sat 62      [01-17-25 @ 06:41]  Ferritin 849      [01-17-25 @ 06:41]  TSH 8.18      [01-17-25 @ 06:41]  Lipid: chol 65, TG 46, HDL 26, LDL --      [01-17-25 @ 06:41]    HBsAb 55.9      [01-17-25 @ 06:41]  HBsAb Reactive      [01-17-25 @ 06:41]  HBsAg Nonreact      [01-17-25 @ 06:41]  HBcAb Nonreact      [01-17-25 @ 06:41]  HCV 0.06, Nonreact      [01-16-25 @ 18:23]  HIV Nonreact      [01-17-25 @ 06:41]    IRMA: titer 1:160, pattern Speckled      [01-17-25 @ 06:41]  Syphilis Screen (Treponema Pallidum Ab) Negative      [01-17-25 @ 06:41]

## 2025-01-31 NOTE — PROGRESS NOTE ADULT - SUBJECTIVE AND OBJECTIVE BOX
Transplant Surgery - Multidisciplinary Rounds  Present: Patient seen and examined with multidisciplinary transplant team including Surgeon Dr. Vanessa, KAMAR Cooley, Hepatologist Dr. Burgess, Pharmacist and bedside RN during AM rounds. Disciplines not in attendance will be notified of plan.     Interval Events:   - afebrile on abx, VSS   - 3 CT, thoracic following  - tolerating diet    TX DATA HERE    Review of systems  All other systems were reviewed and are negative, except as noted.    PHYSICAL EXAM:   GENERAL:  No acute distress  HEENT:  Normocephalic/atraumatic, scleral icterus  CHEST:  No accessory muscle use, chest tube x 3 ss,   HEART:  Regular rate and rhythm  ABDOMEN:  Soft, mild diffuse tenderness, mildly distended, normoactive bowel sounds, splenomegaly, stigmata of chronic liver dz  EXTREMITIES: No cyanosis, clubbing, or edema  SKIN:  No rash  NEURO:  AAOx3         Transplant Surgery - Multidisciplinary Rounds  ----------------------------------------  Present: Patient seen and examined with multidisciplinary transplant team including Surgeon Dr. Vanessa, KAMAR Cooley, Hepatologist Dr. Burgess, Pharmacist and bedside RN during AM rounds. Disciplines not in attendance will be notified of plan.     Interval Events:   - afebrile on abx, VSS   - 3 CT, thoracic following  - tolerating diet    MEDICATIONS  (STANDING):  albumin human 25% IVPB 50 milliLiter(s) IV Intermittent every 8 hours  albuterol/ipratropium for Nebulization 3 milliLiter(s) Nebulizer every 6 hours  buMETAnide Injectable 2 milliGRAM(s) IV Push every 8 hours  cefTRIAXone   IVPB      cefTRIAXone   IVPB 2000 milliGRAM(s) IV Intermittent every 24 hours  chlorhexidine 2% Cloths 1 Application(s) Topical <User Schedule>  folic acid 1 milliGRAM(s) Oral daily  gabapentin 300 milliGRAM(s) Oral every 12 hours  heparin   Injectable 5000 Unit(s) SubCutaneous every 8 hours  influenza   Vaccine 0.5 milliLiter(s) IntraMuscular once  insulin lispro (ADMELOG) corrective regimen sliding scale   SubCutaneous three times a day before meals  insulin lispro (ADMELOG) corrective regimen sliding scale   SubCutaneous at bedtime  lactulose Syrup 30 Gram(s) Oral every 12 hours  metoprolol tartrate 12.5 milliGRAM(s) Oral every 12 hours  metroNIDAZOLE    Tablet 500 milliGRAM(s) Oral every 12 hours  midodrine 10 milliGRAM(s) Oral every 8 hours  multivitamin 1 Tablet(s) Oral daily  pantoprazole    Tablet 40 milliGRAM(s) Oral daily  rifAXIMin 550 milliGRAM(s) Oral every 12 hours  sodium chloride 3%  Inhalation 4 milliLiter(s) Inhalation every 6 hours  thiamine 100 milliGRAM(s) Oral daily    MEDICATIONS  (PRN):  HYDROmorphone  Injectable 0.5 milliGRAM(s) IV Push every 3 hours PRN breakthrough pain  oxyCODONE    IR 2.5 milliGRAM(s) Oral every 4 hours PRN Moderate Pain (4 - 6)  oxyCODONE    IR 5 milliGRAM(s) Oral every 4 hours PRN Severe Pain (7 - 10)      PAST MEDICAL & SURGICAL HISTORY:  Sleep apnea, obstructive  Alcohol abuse  Cirrhosis of liver  Portal hypertension  H/O esophageal varices  Anemia  Mild alcohol use disorder  No significant past surgical history    Vital Signs Last 24 Hrs  T(C): 36.4 (31 Jan 2025 15:00), Max: 37 (30 Jan 2025 19:00)  T(F): 97.6 (31 Jan 2025 15:00), Max: 98.6 (30 Jan 2025 19:00)  HR: 101 (31 Jan 2025 16:00) (71 - 107)  BP: 108/64 (31 Jan 2025 16:00) (108/64 - 131/74)  BP(mean): 81 (31 Jan 2025 16:00) (79 - 96)  RR: 22 (31 Jan 2025 16:00) (12 - 35)  SpO2: 98% (31 Jan 2025 16:00) (91% - 100%)    Parameters below as of 31 Jan 2025 15:00  Patient On (Oxygen Delivery Method): nasal cannula  O2 Flow (L/min): 1      I&O's Summary    30 Jan 2025 07:01  -  31 Jan 2025 07:00  --------------------------------------------------------  IN: 1690 mL / OUT: 2260 mL / NET: -570 mL    31 Jan 2025 07:01  -  31 Jan 2025 17:15  --------------------------------------------------------  IN: 250 mL / OUT: 1275 mL / NET: -1025 mL                         8.1    6.62  )-----------( 66       ( 31 Jan 2025 00:30 )             23.5     01-31    135  |  100  |  50[H]  ----------------------------<  186[H]  3.7   |  23  |  1.54[H]    Ca    8.3[L]      31 Jan 2025 00:30  Phos  3.4     01-31  Mg     1.9     01-31    TPro  6.6  /  Alb  2.9[L]  /  TBili  4.1[H]  /  DBili  x   /  AST  33  /  ALT  6[L]  /  AlkPhos  110  01-31    Culture - Acid Fast - Other w/Smear (collected 01-28-25 @ 20:57)  Source: .Other    Culture - Fungal, Other (collected 01-28-25 @ 20:57)  Source: .Other  Preliminary Report (01-29-25 @ 08:36):    Testing in progress    Culture - Abscess with Gram Stain (collected 01-28-25 @ 20:57)  Source: .Abscess  Gram Stain (01-29-25 @ 02:42):    Few polymorphonuclear leukocytes seen per low power field    No organisms seen per oil power field  Preliminary Report (01-29-25 @ 17:38):    No growth to date.    Culture - Acid Fast - Tissue w/Smear (collected 01-28-25 @ 20:51)  Source: Tissue    Culture - Fungal, Tissue (collected 01-28-25 @ 20:51)  Source: Tissue  Preliminary Report (01-30-25 @ 10:42):    No growth    Culture - Tissue with Gram Stain (collected 01-28-25 @ 20:51)  Source: Tissue  Gram Stain (01-29-25 @ 06:02):    Few polymorphonuclear leukocytes seen per low power field    No organisms seen per oil power field  Preliminary Report (01-30-25 @ 07:50):    No growth to date.    Culture - Body Fluid with Gram Stain (collected 01-24-25 @ 19:10)  Source: Pleural Fl  Gram Stain (01-26-25 @ 12:04):    polymorphonuclear leukocytes seen    No organisms seen    by cytocentrifuge  Final Report (01-30-25 @ 07:12):    Rare Citrobacter koseri  Organism: Citrobacter koseri (01-30-25 @ 07:12)  Organism: Citrobacter koseri (01-30-25 @ 07:12)    Review of systems  All other systems were reviewed and are negative, except as noted.    PHYSICAL EXAM:   GENERAL:  No acute distress  HEENT:  Normocephalic/atraumatic, scleral icterus  CHEST:  No accessory muscle use, chest tube x 3 ss,   HEART:  Regular rate and rhythm  ABDOMEN:  Soft, mild diffuse tenderness, mildly distended, normoactive bowel sounds, splenomegaly, stigmata of chronic liver dz  EXTREMITIES: No cyanosis, clubbing, or edema  SKIN:  No rash  NEURO:  AAOx3

## 2025-02-01 LAB
ALBUMIN SERPL ELPH-MCNC: 3 G/DL — LOW (ref 3.3–5)
ALP SERPL-CCNC: 117 U/L — SIGNIFICANT CHANGE UP (ref 40–120)
ALT FLD-CCNC: <5 U/L — LOW (ref 10–45)
ANION GAP SERPL CALC-SCNC: 10 MMOL/L — SIGNIFICANT CHANGE UP (ref 5–17)
APTT BLD: 45.3 SEC — HIGH (ref 24.5–35.6)
AST SERPL-CCNC: 30 U/L — SIGNIFICANT CHANGE UP (ref 10–40)
BILIRUB SERPL-MCNC: 4.3 MG/DL — HIGH (ref 0.2–1.2)
BUN SERPL-MCNC: 56 MG/DL — HIGH (ref 7–23)
CALCIUM SERPL-MCNC: 8.5 MG/DL — SIGNIFICANT CHANGE UP (ref 8.4–10.5)
CHLORIDE SERPL-SCNC: 101 MMOL/L — SIGNIFICANT CHANGE UP (ref 96–108)
CO2 SERPL-SCNC: 25 MMOL/L — SIGNIFICANT CHANGE UP (ref 22–31)
CREAT SERPL-MCNC: 1.38 MG/DL — HIGH (ref 0.5–1.3)
EGFR: 64 ML/MIN/1.73M2 — SIGNIFICANT CHANGE UP
EGFR: 64 ML/MIN/1.73M2 — SIGNIFICANT CHANGE UP
GLUCOSE BLDC GLUCOMTR-MCNC: 121 MG/DL — HIGH (ref 70–99)
GLUCOSE BLDC GLUCOMTR-MCNC: 139 MG/DL — HIGH (ref 70–99)
GLUCOSE BLDC GLUCOMTR-MCNC: 141 MG/DL — HIGH (ref 70–99)
GLUCOSE BLDC GLUCOMTR-MCNC: 169 MG/DL — HIGH (ref 70–99)
GLUCOSE SERPL-MCNC: 116 MG/DL — HIGH (ref 70–99)
HCT VFR BLD CALC: 23.7 % — LOW (ref 39–50)
HGB BLD-MCNC: 7.7 G/DL — LOW (ref 13–17)
INR BLD: 2.3 RATIO — HIGH (ref 0.85–1.16)
LYSOSOMAL ACID LIPASE INTERPRETATION: SIGNIFICANT CHANGE UP
LYSOSOMAL ACID LIPASE, RESULT: 61 CD:384473389 — SIGNIFICANT CHANGE UP (ref 54–220)
MAGNESIUM SERPL-MCNC: 1.8 MG/DL — SIGNIFICANT CHANGE UP (ref 1.6–2.6)
MCHC RBC-ENTMCNC: 29.6 PG — SIGNIFICANT CHANGE UP (ref 27–34)
MCHC RBC-ENTMCNC: 32.5 G/DL — SIGNIFICANT CHANGE UP (ref 32–36)
MCV RBC AUTO: 91.2 FL — SIGNIFICANT CHANGE UP (ref 80–100)
NRBC # BLD: 0 /100 WBCS — SIGNIFICANT CHANGE UP (ref 0–0)
NRBC BLD-RTO: 0 /100 WBCS — SIGNIFICANT CHANGE UP (ref 0–0)
PHOSPHATE SERPL-MCNC: 3.2 MG/DL — SIGNIFICANT CHANGE UP (ref 2.5–4.5)
PLATELET # BLD AUTO: 70 K/UL — LOW (ref 150–400)
POTASSIUM SERPL-MCNC: 3.8 MMOL/L — SIGNIFICANT CHANGE UP (ref 3.5–5.3)
POTASSIUM SERPL-SCNC: 3.8 MMOL/L — SIGNIFICANT CHANGE UP (ref 3.5–5.3)
PROT SERPL-MCNC: 6.8 G/DL — SIGNIFICANT CHANGE UP (ref 6–8.3)
PROTHROM AB SERPL-ACNC: 26.2 SEC — HIGH (ref 9.9–13.4)
RBC # BLD: 2.6 M/UL — LOW (ref 4.2–5.8)
RBC # FLD: 17 % — HIGH (ref 10.3–14.5)
SODIUM SERPL-SCNC: 136 MMOL/L — SIGNIFICANT CHANGE UP (ref 135–145)
WBC # BLD: 6.54 K/UL — SIGNIFICANT CHANGE UP (ref 3.8–10.5)
WBC # FLD AUTO: 6.54 K/UL — SIGNIFICANT CHANGE UP (ref 3.8–10.5)

## 2025-02-01 PROCEDURE — 99232 SBSQ HOSP IP/OBS MODERATE 35: CPT

## 2025-02-01 PROCEDURE — 71045 X-RAY EXAM CHEST 1 VIEW: CPT | Mod: 26

## 2025-02-01 RX ADMIN — LACTULOSE 30 GRAM(S): 10 SOLUTION ORAL at 06:44

## 2025-02-01 RX ADMIN — MIDODRINE HYDROCHLORIDE 10 MILLIGRAM(S): 5 TABLET ORAL at 01:13

## 2025-02-01 RX ADMIN — FOLIC ACID 1 MILLIGRAM(S): 1 TABLET ORAL at 13:02

## 2025-02-01 RX ADMIN — IPRATROPIUM BROMIDE AND ALBUTEROL SULFATE 3 MILLILITER(S): .5; 2.5 SOLUTION RESPIRATORY (INHALATION) at 17:52

## 2025-02-01 RX ADMIN — BUMETANIDE 2 MILLIGRAM(S): 1 TABLET ORAL at 02:23

## 2025-02-01 RX ADMIN — IPRATROPIUM BROMIDE AND ALBUTEROL SULFATE 3 MILLILITER(S): .5; 2.5 SOLUTION RESPIRATORY (INHALATION) at 13:01

## 2025-02-01 RX ADMIN — MIDODRINE HYDROCHLORIDE 10 MILLIGRAM(S): 5 TABLET ORAL at 18:04

## 2025-02-01 RX ADMIN — Medication 4 MILLILITER(S): at 17:51

## 2025-02-01 RX ADMIN — GABAPENTIN 300 MILLIGRAM(S): 400 CAPSULE ORAL at 17:51

## 2025-02-01 RX ADMIN — METOPROLOL SUCCINATE 12.5 MILLIGRAM(S): 50 TABLET, EXTENDED RELEASE ORAL at 06:44

## 2025-02-01 RX ADMIN — Medication 500 MILLIGRAM(S): at 06:45

## 2025-02-01 RX ADMIN — HEPARIN SODIUM 5000 UNIT(S): 1000 INJECTION INTRAVENOUS; SUBCUTANEOUS at 06:45

## 2025-02-01 RX ADMIN — Medication 1 TABLET(S): at 13:01

## 2025-02-01 RX ADMIN — HEPARIN SODIUM 5000 UNIT(S): 1000 INJECTION INTRAVENOUS; SUBCUTANEOUS at 13:01

## 2025-02-01 RX ADMIN — TRAMADOL HYDROCHLORIDE 25 MILLIGRAM(S): 50 TABLET, FILM COATED ORAL at 09:22

## 2025-02-01 RX ADMIN — IPRATROPIUM BROMIDE AND ALBUTEROL SULFATE 3 MILLILITER(S): .5; 2.5 SOLUTION RESPIRATORY (INHALATION) at 06:45

## 2025-02-01 RX ADMIN — Medication 4 MILLILITER(S): at 13:01

## 2025-02-01 RX ADMIN — TRAMADOL HYDROCHLORIDE 25 MILLIGRAM(S): 50 TABLET, FILM COATED ORAL at 10:22

## 2025-02-01 RX ADMIN — TRAMADOL HYDROCHLORIDE 50 MILLIGRAM(S): 50 TABLET, FILM COATED ORAL at 17:02

## 2025-02-01 RX ADMIN — TRAMADOL HYDROCHLORIDE 50 MILLIGRAM(S): 50 TABLET, FILM COATED ORAL at 02:34

## 2025-02-01 RX ADMIN — ALBUMIN (HUMAN) 50 MILLILITER(S): 12.5 INJECTION, SOLUTION INTRAVENOUS at 01:13

## 2025-02-01 RX ADMIN — TRAMADOL HYDROCHLORIDE 50 MILLIGRAM(S): 50 TABLET, FILM COATED ORAL at 18:02

## 2025-02-01 RX ADMIN — Medication 40 MILLIGRAM(S): at 13:02

## 2025-02-01 RX ADMIN — Medication 100 MILLIGRAM(S): at 13:02

## 2025-02-01 RX ADMIN — TRAMADOL HYDROCHLORIDE 50 MILLIGRAM(S): 50 TABLET, FILM COATED ORAL at 01:34

## 2025-02-01 RX ADMIN — HEPARIN SODIUM 5000 UNIT(S): 1000 INJECTION INTRAVENOUS; SUBCUTANEOUS at 21:14

## 2025-02-01 RX ADMIN — Medication 500 MILLIGRAM(S): at 17:51

## 2025-02-01 RX ADMIN — GABAPENTIN 300 MILLIGRAM(S): 400 CAPSULE ORAL at 06:44

## 2025-02-01 RX ADMIN — MIDODRINE HYDROCHLORIDE 10 MILLIGRAM(S): 5 TABLET ORAL at 09:32

## 2025-02-01 RX ADMIN — Medication 4 MILLILITER(S): at 06:45

## 2025-02-01 RX ADMIN — Medication 1 APPLICATION(S): at 09:20

## 2025-02-01 RX ADMIN — CEFTRIAXONE 100 MILLIGRAM(S): 500 INJECTION, POWDER, FOR SOLUTION INTRAMUSCULAR; INTRAVENOUS at 17:52

## 2025-02-01 NOTE — PROGRESS NOTE ADULT - SUBJECTIVE AND OBJECTIVE BOX
Transplant Hepatology Progress Note    Interval Events:   - no acute ON events     Allergies:  sulfa drugs (Unknown)  Salicylic Acid (Other)      Hospital Medications:  albuterol/ipratropium for Nebulization 3 milliLiter(s) Nebulizer every 6 hours  cefTRIAXone   IVPB      cefTRIAXone   IVPB 2000 milliGRAM(s) IV Intermittent every 24 hours  chlorhexidine 2% Cloths 1 Application(s) Topical <User Schedule>  folic acid 1 milliGRAM(s) Oral daily  gabapentin 300 milliGRAM(s) Oral every 12 hours  heparin   Injectable 5000 Unit(s) SubCutaneous every 8 hours  HYDROmorphone  Injectable 0.5 milliGRAM(s) IV Push every 3 hours PRN  influenza   Vaccine 0.5 milliLiter(s) IntraMuscular once  insulin lispro (ADMELOG) corrective regimen sliding scale   SubCutaneous three times a day before meals  insulin lispro (ADMELOG) corrective regimen sliding scale   SubCutaneous at bedtime  lactulose Syrup 30 Gram(s) Oral every 12 hours  metoprolol tartrate 12.5 milliGRAM(s) Oral every 12 hours  metroNIDAZOLE    Tablet 500 milliGRAM(s) Oral every 12 hours  midodrine 10 milliGRAM(s) Oral every 8 hours  multivitamin 1 Tablet(s) Oral daily  pantoprazole    Tablet 40 milliGRAM(s) Oral daily  rifAXIMin 550 milliGRAM(s) Oral every 12 hours  sodium chloride 3%  Inhalation 4 milliLiter(s) Inhalation every 6 hours  thiamine 100 milliGRAM(s) Oral daily  traMADol 50 milliGRAM(s) Oral every 6 hours PRN  traMADol 25 milliGRAM(s) Oral every 6 hours PRN      ROS: 14 point ROS negative unless otherwise state in subjective    PHYSICAL EXAM:   Vital Signs:  Vital Signs Last 24 Hrs  T(C): 36.9 (01 Feb 2025 08:17), Max: 36.9 (31 Jan 2025 18:00)  T(F): 98.5 (01 Feb 2025 08:17), Max: 98.5 (31 Jan 2025 18:00)  HR: 86 (01 Feb 2025 09:20) (71 - 101)  BP: 115/71 (01 Feb 2025 09:20) (101/64 - 131/74)  BP(mean): 78 (31 Jan 2025 17:00) (78 - 96)  RR: 18 (01 Feb 2025 08:17) (15 - 35)  SpO2: 99% (01 Feb 2025 08:17) (94% - 100%)    Parameters below as of 01 Feb 2025 08:17  Patient On (Oxygen Delivery Method): nasal cannula  O2 Flow (L/min): 1.5    Daily     Daily     GENERAL:  No acute distress  HEENT:  NCAT, no scleral icterus  CHEST: no resp distress, chest tubes in place draining sanguinous output  HEART:  RRR  ABDOMEN:  Soft, non-tender, non-distended, no masses  EXTREMITIES:  No cyanosis, clubbing, or edema  SKIN:  No rash/erythema/ecchymoses/petechiae/wounds/abscess/warm/dry  NEURO:  Alert and oriented x 3, no asterixis, no tremor    LABS:                        7.7    6.54  )-----------( 70       ( 01 Feb 2025 07:26 )             23.7     Mean Cell Volume: 91.2 fl (02-01-25 @ 07:26)    02-01    136  |  101  |  56[H]  ----------------------------<  116[H]  3.8   |  25  |  1.38[H]    Ca    8.5      01 Feb 2025 07:25  Phos  3.2     02-01  Mg     1.8     02-01    TPro  6.8  /  Alb  3.0[L]  /  TBili  4.3[H]  /  DBili  x   /  AST  30  /  ALT  <5[L]  /  AlkPhos  117  02-01    LIVER FUNCTIONS - ( 01 Feb 2025 07:25 )  Alb: 3.0 g/dL / Pro: 6.8 g/dL / ALK PHOS: 117 U/L / ALT: <5 U/L / AST: 30 U/L / GGT: x           PT/INR - ( 01 Feb 2025 07:26 )   PT: 26.2 sec;   INR: 2.30 ratio         PTT - ( 01 Feb 2025 07:26 )  PTT:45.3 sec  Urinalysis Basic - ( 01 Feb 2025 07:25 )    Color: x / Appearance: x / SG: x / pH: x  Gluc: 116 mg/dL / Ketone: x  / Bili: x / Urobili: x   Blood: x / Protein: x / Nitrite: x   Leuk Esterase: x / RBC: x / WBC x   Sq Epi: x / Non Sq Epi: x / Bacteria: x            Imaging:

## 2025-02-01 NOTE — PROGRESS NOTE ADULT - SUBJECTIVE AND OBJECTIVE BOX
Subjective " hello I am feeling better today"      Vital Signs Last 24 Hrs  T(C): 36.9 (02-01-25 @ 08:17), Max: 36.9 (01-31-25 @ 18:00)  T(F): 98.5 (02-01-25 @ 08:17), Max: 98.5 (01-31-25 @ 18:00)  HR: 86 (02-01-25 @ 09:20) (71 - 101)  BP: 115/71 (02-01-25 @ 09:20) (101/64 - 131/74)  RR: 18 (02-01-25 @ 08:17) (15 - 35)  SpO2: 99% (02-01-25 @ 08:17) (94% - 100%)           01-31 @ 07:01  -  02-01 @ 07:00  --------------------------------------------------------  IN: 520 mL / OUT: 1460 mL / NET: -940 mL    02-01 @ 07:01  -  02-01 @ 12:33  --------------------------------------------------------  IN: 0 mL / OUT: 325 mL / NET: -325 mL                            7.7    6.54  )-----------( 70       ( 01 Feb 2025 07:26 )             23.7     02-01    136  |  101  |  56[H]  ----------------------------<  116[H]  3.8   |  25  |  1.38[H]    Ca    8.5      01 Feb 2025 07:25  Phos  3.2     02-01  Mg     1.8     02-01    TPro  6.8  /  Alb  3.0[L]  /  TBili  4.3[H]  /  DBili  x   /  AST  30  /  ALT  <5[L]  /  AlkPhos  117  02-01      MEDICATIONS  (STANDING):  albuterol/ipratropium for Nebulization 3 milliLiter(s) Nebulizer every 6 hours  cefTRIAXone   IVPB      cefTRIAXone   IVPB 2000 milliGRAM(s) IV Intermittent every 24 hours  chlorhexidine 2% Cloths 1 Application(s) Topical <User Schedule>  folic acid 1 milliGRAM(s) Oral daily  gabapentin 300 milliGRAM(s) Oral every 12 hours  heparin   Injectable 5000 Unit(s) SubCutaneous every 8 hours  influenza   Vaccine 0.5 milliLiter(s) IntraMuscular once  insulin lispro (ADMELOG) corrective regimen sliding scale   SubCutaneous three times a day before meals  insulin lispro (ADMELOG) corrective regimen sliding scale   SubCutaneous at bedtime  lactulose Syrup 30 Gram(s) Oral every 12 hours  metoprolol tartrate 12.5 milliGRAM(s) Oral every 12 hours  metroNIDAZOLE    Tablet 500 milliGRAM(s) Oral every 12 hours  midodrine 10 milliGRAM(s) Oral every 8 hours  multivitamin 1 Tablet(s) Oral daily  pantoprazole    Tablet 40 milliGRAM(s) Oral daily  rifAXIMin 550 milliGRAM(s) Oral every 12 hours  sodium chloride 3%  Inhalation 4 milliLiter(s) Inhalation every 6 hours  thiamine 100 milliGRAM(s) Oral daily    MEDICATIONS  (PRN):  HYDROmorphone  Injectable 0.5 milliGRAM(s) IV Push every 3 hours PRN breakthrough pain  traMADol 50 milliGRAM(s) Oral every 6 hours PRN Severe Pain (7 - 10)  traMADol 25 milliGRAM(s) Oral every 6 hours PRN Moderate Pain (4 - 6)    CAPILLARY BLOOD GLUCOSE      POCT Blood Glucose.: 139 mg/dL (01 Feb 2025 12:32)  POCT Blood Glucose.: 121 mg/dL (01 Feb 2025 08:20)  POCT Blood Glucose.: 129 mg/dL (31 Jan 2025 23:18)  POCT Blood Glucose.: 131 mg/dL (31 Jan 2025 17:02)          Drains:    RT anterior 5/5 Rt posterior 5/50  Diagphragm5/5                              PHYSICAL EXAM        Neurology: alert and oriented x 3, nonfocal, no gross deficits    CV :S1S2    Lungs: B/l breath sounds   \chest tubes x3 lws dark sanguinous drainage    Abdomen: soft, nontender, distended, positive bowel sounds, last bowel movement  2/1    : yates              Extremities: warm well perfused equal strength throughout  B/lle + DP +edema no calf tenderness                                              Discussed with Cardiothoracic Team at AM rounds.

## 2025-02-01 NOTE — PROGRESS NOTE ADULT - ASSESSMENT
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 Actively drinking. Now p/w abdominal pain/distension with worsening of liver tests possibly triggered by infection. DDx also includes alcohol associated hepatitis given hx of alc hep 7/2024 which improved with steroids  - OLT: evaluation ongoing, pending cardiac evaluation and empyema management. Tentative plan for [facility] Monday  - EV: EGD 7/2024 for melena showed grade II EV, small gastric ulcer (neg HP), and portal gastropathy   - HE: c/w lactulose and rifaximin, goal 3-4 BM/day  - HCC screening: MRI nondiagnostic, consider repeat imaging when stabilized                                      - MANUELITO: bumex for diuresis/volume overload  - C/w home midodrine 10 mg tid  - C/w PPI for stress ulcer ppx and hx of ulcer  - Trend MELD labs daily (cmp, INR) daily  - Strict I/Os, daily wts    #Empyema  #Leukocytosis  CT chest noncontrast reviewed from OSH showing moderate sized complex R sided pleural effusion. s/p chest tube placed 1/18 with cultures growing Citrobacter c/w empyema. Requiring 2nd chest tube placement then now s/p VATS for persistent effuision  - s/p VATS 1/29  - C/w CTX 2 g IV daily  - Maintain chest tubes in place for drainage  - Trend cbc and fever curve  - Appreciate thoracic surgery and ID recs    #MANUELITO  Found to have acute renal failure requiring dialysis at OSH, ddx includes HRS vs ATN. Permacath in place. Cr baseline 1.12 10/2024 required albumin assisted diuresis with bumex 2 mg IV q12h  - Restart bumex 2 mg IV x1  - IR consult to remove tunneled cath  - Strict I/Os, daily wts  - Trend CMP daily  - Appreciate transplant renal recs    #Anemia  #Thrombocytopenia  Normocytic anemia with Hgb 8.5 and plt count 64 likely due to chronic liver dz  - s/p 4 units pRBC, 2 FFP, 2 plts on 1/28  - Transfuse Hgb>7, plt>50 given bleeding  - Hold DVT ppx for bleeding    All recommendations are tentative until note is attested by attending.     Nina Bah, PGY-6  Gastroenterology/Hepatology Fellow  Available on [employer] Teams  [phone] ([facility] Short Range Pager)  [phone] ([facility] Long Range Pager)    On Weekends/Holidays (All day) and Weekdays after 5 PM to 8AM:  For non-urgent consults, please email [email or web address] or [email or web address]  For emergent consults, please contact on call GI team.  See Amion schedule ([facility]), [employer] paging system ([facility]), or call hospital  ([facility])

## 2025-02-01 NOTE — PROGRESS NOTE ADULT - ASSESSMENT
45yo M with Hx decompensated EtOH cirrhosis (c/b recurrent ascites, grade II EV, and HE) who presented to OSH on 1/16 with abdominal pain and distension, found to have ascites and acute renal failure requiring initiation of HD (s/p Permacath placement c/b bleeding), transferred to [facility] for transplant evaluation and management. CT chest at OSH was significant for a complex R pleural effusion s/p R pigtail placement by IR on 1/18 with drainage of purulent material c/f empyema. Thoracic Surgery consulted for empyema and chest tube management.   1/20 R pigtail drained 700/910, CT of Chest on Tuesday to assess effusion and make final plan determination  1/21 R pigtail drained 80/240, CT of chest to assess effusion today   1/22 R pigtail maintained. CT of chest revealing:   < from: CT Chest No Cont (01.21.25 @ 19:29) >  IMPRESSION:.  Marked interval decrease in size ofright-sided pleural fluid and gas   collection (presumably empyema) status post placement of pigtail catheter   as described above.  Much interval improvement of posterior right upper lobe and right lower   lobe consolidation.  New patchy ground-glass opacities at the left apex may be related to   edema or be infectious/inflammatory in etiology.  New small left pleural effusion. < end of copied text >  1/23 R PTC  (placed by IR 1/18) --> LWS this AM; patient NPO for IR repositioning/swap of chest tube today. Monitor Chest tube outputs  1/24 VSS  pigtail  unsuccessful repositioning  1/23 - output 1120 overnight -AM  XRay pending  1/25 Daily CXR;  pulm toileting. rpt CT imaging on MONDAY per Thoracic Attending  1/26 VSS R Ct # 1 minimal drainage.  RCT #2- w/ 180cc in last 24 hrs keep to lws - rpt imaging Monday to assess need for RVATS decort.  1/28 Keep NPO for Right VATs decort today with Dr. Ramírez. H/H 6.1/18.3, Platelets 48K, INR 2.57, please transfuse PRBC/PLATELETS/FFP WITH GOAL Hgb >8, PLTS >100 K, INR BELOW 1.7.   1/29 h/h 8/23, Maintain chest tubes to lws x 72 hrs  1/30 Extubated, chest tube drainage serosanguinous. Transfused 1uPRBC today h/h 7/21 1/31 CXR increase right sided opacities. c/w pulmonary toileting, incentive spirometry  2/1 VSS OOB  + BM   XRAY this am chest tubes  to water seal      45yo M with Hx decompensated EtOH cirrhosis (c/b recurrent ascites, grade II EV, and HE) who presented to OSH on 1/16 with abdominal pain and distension, found to have ascites and acute renal failure requiring initiation of HD (s/p Permacath placement c/b bleeding), transferred to [facility] for transplant evaluation and management. CT chest at OSH was significant for a complex R pleural effusion s/p R pigtail placement by IR on 1/18 with drainage of purulent material c/f empyema. Thoracic Surgery consulted for empyema and chest tube management.   1/20 R pigtail drained 700/910, CT of Chest on Tuesday to assess effusion and make final plan determination  1/21 R pigtail drained 80/240, CT of chest to assess effusion today   1/22 R pigtail maintained. CT of chest revealing:   < from: CT Chest No Cont (01.21.25 @ 19:29) >  IMPRESSION:.  Marked interval decrease in size ofright-sided pleural fluid and gas   collection (presumably empyema) status post placement of pigtail catheter   as described above.  Much interval improvement of posterior right upper lobe and right lower   lobe consolidation.  New patchy ground-glass opacities at the left apex may be related to   edema or be infectious/inflammatory in etiology.  New small left pleural effusion. < end of copied text >  1/23 R PTC  (placed by IR 1/18) --> LWS this AM; patient NPO for IR repositioning/swap of chest tube today. Monitor Chest tube outputs  1/24 VSS  pigtail  unsuccessful repositioning  1/23 - output 1120 overnight -AM  XRay pending  1/25 Daily CXR;  pulm toileting. rpt CT imaging on MONDAY per Thoracic Attending  1/26 VSS R Ct # 1 minimal drainage.  RCT #2- w/ 180cc in last 24 hrs keep to lws - rpt imaging Monday to assess need for RVATS decort.  1/28 Keep NPO for Right VATs decort today with Dr. Ramírez. H/H 6.1/18.3, Platelets 48K, INR 2.57, please transfuse PRBC/PLATELETS/FFP WITH GOAL Hgb >8, PLTS >100 K, INR BELOW 1.7.   1/29 h/h 8/23, Maintain chest tubes to lws x 72 hrs  1/30 Extubated, chest tube drainage serosanguinous. Transfused 1uPRBC today h/h 7/21 1/31 CXR increase right sided opacities. c/w pulmonary toileting, incentive spirometry  2/1 VSS OOB  + BM   XRAY this am chest tubes  tosuction -please plts or products INR 2.3 tube swith sanguinous drainage

## 2025-02-01 NOTE — PROGRESS NOTE ADULT - PROBLEM SELECTOR PLAN 1
s/p Right VATS decortication 1/28 with Dr. Ramírez  Transfused multiple blood products PRBC/PLATELETS/FFP WITH GOAL Hgb >8, PLTS >100 K, INR BELOW 1.7   2/1 Chest tubes to water seal  Continue care as per primary team  daily xray  Plan as per Dr Ramírez s/p Right VATS decortication 1/28 with Dr. Ramírez  Transfused multiple blood products PRBC/PLATELETS/FFP WITH GOAL Hgb >8, PLTS >100 K, INR BELOW 1.7   2/1 Chest tubes tosuction  Continue care as per primary team-please plts or products INR 2.3 tube swith sanguinous drainage       daily xray  Plan as per Dr Ramírez

## 2025-02-02 LAB
ALBUMIN SERPL ELPH-MCNC: 2.8 G/DL — LOW (ref 3.3–5)
ALP SERPL-CCNC: 125 U/L — HIGH (ref 40–120)
ALT FLD-CCNC: <5 U/L — LOW (ref 10–45)
ANION GAP SERPL CALC-SCNC: 11 MMOL/L — SIGNIFICANT CHANGE UP (ref 5–17)
APTT BLD: 48.4 SEC — HIGH (ref 24.5–35.6)
AST SERPL-CCNC: 30 U/L — SIGNIFICANT CHANGE UP (ref 10–40)
BILIRUB SERPL-MCNC: 3.8 MG/DL — HIGH (ref 0.2–1.2)
BUN SERPL-MCNC: 60 MG/DL — HIGH (ref 7–23)
CALCIUM SERPL-MCNC: 8.4 MG/DL — SIGNIFICANT CHANGE UP (ref 8.4–10.5)
CHLORIDE SERPL-SCNC: 96 MMOL/L — SIGNIFICANT CHANGE UP (ref 96–108)
CO2 SERPL-SCNC: 23 MMOL/L — SIGNIFICANT CHANGE UP (ref 22–31)
CREAT SERPL-MCNC: 1.37 MG/DL — HIGH (ref 0.5–1.3)
CULTURE RESULTS: SIGNIFICANT CHANGE UP
EGFR: 64 ML/MIN/1.73M2 — SIGNIFICANT CHANGE UP
EGFR: 64 ML/MIN/1.73M2 — SIGNIFICANT CHANGE UP
GLUCOSE BLDC GLUCOMTR-MCNC: 117 MG/DL — HIGH (ref 70–99)
GLUCOSE BLDC GLUCOMTR-MCNC: 147 MG/DL — HIGH (ref 70–99)
GLUCOSE BLDC GLUCOMTR-MCNC: 171 MG/DL — HIGH (ref 70–99)
GLUCOSE BLDC GLUCOMTR-MCNC: 217 MG/DL — HIGH (ref 70–99)
GLUCOSE SERPL-MCNC: 79 MG/DL — SIGNIFICANT CHANGE UP (ref 70–99)
HCT VFR BLD CALC: 23 % — LOW (ref 39–50)
HEPARINASE TEG R TIME: 9.8 MIN — HIGH (ref 4.3–8.3)
HGB BLD-MCNC: 7.8 G/DL — LOW (ref 13–17)
INR BLD: 2.24 RATIO — HIGH (ref 0.85–1.16)
MAGNESIUM SERPL-MCNC: 1.8 MG/DL — SIGNIFICANT CHANGE UP (ref 1.6–2.6)
MCHC RBC-ENTMCNC: 31.3 PG — SIGNIFICANT CHANGE UP (ref 27–34)
MCHC RBC-ENTMCNC: 33.9 G/DL — SIGNIFICANT CHANGE UP (ref 32–36)
MCV RBC AUTO: 92.4 FL — SIGNIFICANT CHANGE UP (ref 80–100)
NRBC # BLD: 0 /100 WBCS — SIGNIFICANT CHANGE UP (ref 0–0)
NRBC BLD-RTO: 0 /100 WBCS — SIGNIFICANT CHANGE UP (ref 0–0)
PHOSPHATE SERPL-MCNC: 2.9 MG/DL — SIGNIFICANT CHANGE UP (ref 2.5–4.5)
PLATELET # BLD AUTO: 91 K/UL — LOW (ref 150–400)
POTASSIUM SERPL-MCNC: 3.7 MMOL/L — SIGNIFICANT CHANGE UP (ref 3.5–5.3)
POTASSIUM SERPL-SCNC: 3.7 MMOL/L — SIGNIFICANT CHANGE UP (ref 3.5–5.3)
PROT SERPL-MCNC: 6.9 G/DL — SIGNIFICANT CHANGE UP (ref 6–8.3)
PROTHROM AB SERPL-ACNC: 25.5 SEC — HIGH (ref 9.9–13.4)
RAPIDTEG MAXIMUM AMPLITUDE: 48.1 MM — LOW (ref 52–70)
RBC # BLD: 2.49 M/UL — LOW (ref 4.2–5.8)
RBC # FLD: 17.4 % — HIGH (ref 10.3–14.5)
SODIUM SERPL-SCNC: 130 MMOL/L — LOW (ref 135–145)
SPECIMEN SOURCE: SIGNIFICANT CHANGE UP
TEG FUNCTIONAL FIBRINOGEN: 15.5 MM — SIGNIFICANT CHANGE UP (ref 15–32)
TEG MAXIMUM AMPLITUDE: 44.7 MM — LOW (ref 52–69)
TEG REACTION TIME: 11.3 MIN — HIGH (ref 4.6–9.1)
WBC # BLD: 7.25 K/UL — SIGNIFICANT CHANGE UP (ref 3.8–10.5)
WBC # FLD AUTO: 7.25 K/UL — SIGNIFICANT CHANGE UP (ref 3.8–10.5)

## 2025-02-02 PROCEDURE — 99232 SBSQ HOSP IP/OBS MODERATE 35: CPT

## 2025-02-02 PROCEDURE — 71045 X-RAY EXAM CHEST 1 VIEW: CPT | Mod: 26

## 2025-02-02 RX ORDER — ALBUMIN (HUMAN) 12.5 G/50ML
100 INJECTION, SOLUTION INTRAVENOUS EVERY 12 HOURS
Refills: 0 | Status: COMPLETED | OUTPATIENT
Start: 2025-02-02 | End: 2025-02-03

## 2025-02-02 RX ADMIN — Medication 1 APPLICATION(S): at 06:35

## 2025-02-02 RX ADMIN — TRAMADOL HYDROCHLORIDE 50 MILLIGRAM(S): 50 TABLET, FILM COATED ORAL at 13:59

## 2025-02-02 RX ADMIN — HEPARIN SODIUM 5000 UNIT(S): 1000 INJECTION INTRAVENOUS; SUBCUTANEOUS at 06:33

## 2025-02-02 RX ADMIN — Medication 4 MILLILITER(S): at 06:33

## 2025-02-02 RX ADMIN — HEPARIN SODIUM 5000 UNIT(S): 1000 INJECTION INTRAVENOUS; SUBCUTANEOUS at 23:29

## 2025-02-02 RX ADMIN — FOLIC ACID 1 MILLIGRAM(S): 1 TABLET ORAL at 12:29

## 2025-02-02 RX ADMIN — TRAMADOL HYDROCHLORIDE 50 MILLIGRAM(S): 50 TABLET, FILM COATED ORAL at 23:38

## 2025-02-02 RX ADMIN — CEFTRIAXONE 100 MILLIGRAM(S): 500 INJECTION, POWDER, FOR SOLUTION INTRAMUSCULAR; INTRAVENOUS at 17:04

## 2025-02-02 RX ADMIN — IPRATROPIUM BROMIDE AND ALBUTEROL SULFATE 3 MILLILITER(S): .5; 2.5 SOLUTION RESPIRATORY (INHALATION) at 06:33

## 2025-02-02 RX ADMIN — ALBUMIN (HUMAN) 50 MILLILITER(S): 12.5 INJECTION, SOLUTION INTRAVENOUS at 17:04

## 2025-02-02 RX ADMIN — TRAMADOL HYDROCHLORIDE 50 MILLIGRAM(S): 50 TABLET, FILM COATED ORAL at 14:40

## 2025-02-02 RX ADMIN — Medication 500 MILLIGRAM(S): at 06:32

## 2025-02-02 RX ADMIN — MIDODRINE HYDROCHLORIDE 10 MILLIGRAM(S): 5 TABLET ORAL at 17:03

## 2025-02-02 RX ADMIN — Medication 4 MILLILITER(S): at 12:30

## 2025-02-02 RX ADMIN — TRAMADOL HYDROCHLORIDE 50 MILLIGRAM(S): 50 TABLET, FILM COATED ORAL at 01:09

## 2025-02-02 RX ADMIN — Medication 1 TABLET(S): at 12:29

## 2025-02-02 RX ADMIN — TRAMADOL HYDROCHLORIDE 50 MILLIGRAM(S): 50 TABLET, FILM COATED ORAL at 07:58

## 2025-02-02 RX ADMIN — TRAMADOL HYDROCHLORIDE 50 MILLIGRAM(S): 50 TABLET, FILM COATED ORAL at 02:09

## 2025-02-02 RX ADMIN — IPRATROPIUM BROMIDE AND ALBUTEROL SULFATE 3 MILLILITER(S): .5; 2.5 SOLUTION RESPIRATORY (INHALATION) at 12:30

## 2025-02-02 RX ADMIN — HEPARIN SODIUM 5000 UNIT(S): 1000 INJECTION INTRAVENOUS; SUBCUTANEOUS at 13:57

## 2025-02-02 RX ADMIN — MIDODRINE HYDROCHLORIDE 10 MILLIGRAM(S): 5 TABLET ORAL at 12:31

## 2025-02-02 RX ADMIN — Medication 100 MILLIGRAM(S): at 12:30

## 2025-02-02 RX ADMIN — TRAMADOL HYDROCHLORIDE 25 MILLIGRAM(S): 50 TABLET, FILM COATED ORAL at 18:18

## 2025-02-02 RX ADMIN — TRAMADOL HYDROCHLORIDE 25 MILLIGRAM(S): 50 TABLET, FILM COATED ORAL at 19:00

## 2025-02-02 RX ADMIN — Medication 500 MILLIGRAM(S): at 17:03

## 2025-02-02 RX ADMIN — INSULIN LISPRO 2: 100 INJECTION, SOLUTION INTRAVENOUS; SUBCUTANEOUS at 12:30

## 2025-02-02 RX ADMIN — LACTULOSE 30 GRAM(S): 10 SOLUTION ORAL at 06:33

## 2025-02-02 RX ADMIN — MIDODRINE HYDROCHLORIDE 10 MILLIGRAM(S): 5 TABLET ORAL at 01:09

## 2025-02-02 RX ADMIN — INSULIN LISPRO 4: 100 INJECTION, SOLUTION INTRAVENOUS; SUBCUTANEOUS at 17:05

## 2025-02-02 RX ADMIN — Medication 40 MILLIGRAM(S): at 12:29

## 2025-02-02 RX ADMIN — TRAMADOL HYDROCHLORIDE 50 MILLIGRAM(S): 50 TABLET, FILM COATED ORAL at 08:45

## 2025-02-02 NOTE — PROGRESS NOTE ADULT - ASSESSMENT
43yo M with Hx decompensated EtOH cirrhosis (c/b recurrent ascites, grade II EV, and HE) who presented to OSH on 1/16 with abdominal pain and distension, found to have ascites and acute renal failure requiring initiation of HD (s/p Permacath placement c/b bleeding), transferred to [facility] for transplant evaluation and management. CT chest at OSH was significant for a complex R pleural effusion s/p R pigtail placement by IR on 1/18 with drainage of purulent material c/f empyema. Thoracic Surgery consulted for empyema and chest tube management.   1/20 R pigtail drained 700/910, CT of Chest on Tuesday to assess effusion and make final plan determination  1/21 R pigtail drained 80/240, CT of chest to assess effusion today   1/22 R pigtail maintained. CT of chest revealing:   < from: CT Chest No Cont (01.21.25 @ 19:29) >  IMPRESSION:.  Marked interval decrease in size ofright-sided pleural fluid and gas   collection (presumably empyema) status post placement of pigtail catheter   as described above.  Much interval improvement of posterior right upper lobe and right lower   lobe consolidation.  New patchy ground-glass opacities at the left apex may be related to   edema or be infectious/inflammatory in etiology.  New small left pleural effusion. < end of copied text >  1/23 R PTC  (placed by IR 1/18) --> LWS this AM; patient NPO for IR repositioning/swap of chest tube today. Monitor Chest tube outputs  1/24 VSS  pigtail  unsuccessful repositioning  1/23 - output 1120 overnight -AM  XRay pending  1/25 Daily CXR;  pulm toileting. rpt CT imaging on MONDAY per Thoracic Attending  1/26 VSS R Ct # 1 minimal drainage.  RCT #2- w/ 180cc in last 24 hrs keep to lws - rpt imaging Monday to assess need for RVATS decort.  1/28 Keep NPO for Right VATs decort today with Dr. Ramírez. H/H 6.1/18.3, Platelets 48K, INR 2.57, please transfuse PRBC/PLATELETS/FFP WITH GOAL Hgb >8, PLTS >100 K, INR BELOW 1.7.   1/29 h/h 8/23, Maintain chest tubes to lws x 72 hrs  1/30 Extubated, chest tube drainage serosanguinous. Transfused 1uPRBC today h/h 7/21 1/31 CXR increase right sided opacities. c/w pulmonary toileting, incentive spirometry  2/1 VSS OOB  + BM   XRAY this am chest tubes  tosuction -please plts or products INR 2.3 tube with sanguinous drainage   2/2 H/H 7/23 INR 2.2 Recommend FFP today. Maintain chest tubes to LWS

## 2025-02-02 NOTE — PROGRESS NOTE ADULT - ASSESSMENT
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 Actively drinking. Now p/w abdominal pain/distension with worsening of liver tests possibly triggered by infection. DDx also includes alcohol associated hepatitis given hx of alc hep 7/2024 which improved with steroids  - OLT: evaluation ongoing, pending cardiac evaluation and empyema management. Tentative plan for [facility] Monday  - EV: EGD 7/2024 for melena showed grade II EV, small gastric ulcer (neg HP), and portal gastropathy   - HE: c/w lactulose and rifaximin, goal 3-4 BM/day  - HCC screening: MRI nondiagnostic, consider repeat imaging when stabilized                                      - MANUELITO: bumex for diuresis/volume overload  - C/w home midodrine 10 mg tid  - C/w PPI for stress ulcer ppx and hx of ulcer  - Trend MELD labs daily (cmp, INR) daily  - Strict I/Os, daily wts    #Empyema  #Leukocytosis  CT chest noncontrast reviewed from OSH showing moderate sized complex R sided pleural effusion. s/p chest tube placed 1/18 with cultures growing Citrobacter c/w empyema. Requiring 2nd chest tube placement then now s/p VATS for persistent effuision  - s/p VATS 1/29  - C/w CTX 2 g IV daily  - Maintain chest tubes in place for drainage  - Trend cbc and fever curve  - Appreciate thoracic surgery and ID recs    #MANUELITO  Found to have acute renal failure requiring dialysis at OSH, ddx includes HRS vs ATN. Permacath in place. Cr baseline 1.12 10/2024 required albumin assisted diuresis with bumex 2 mg IV q12h  - IR consult to remove tunneled cath  - Strict I/Os, daily wts  - Trend CMP daily  - Appreciate transplant renal recs    #Anemia  #Thrombocytopenia  Normocytic anemia with Hgb 8.5 and plt count 64 likely due to chronic liver dz  - s/p 4 units pRBC, 2 FFP, 2 plts on 1/28  - Transfuse Hgb>7, plt>50 given bleeding  - Hold DVT ppx for bleeding    All recommendations are tentative until note is attested by attending.     Nina Bah, PGY-6  Gastroenterology/Hepatology Fellow  Available on [employer] Teams  [phone] ([facility] Short Range Pager)  [phone] ([facility] Long Range Pager)    On Weekends/Holidays (All day) and Weekdays after 5 PM to 8AM:  For non-urgent consults, please email [email or web address]  For emergent consults, please contact on call GI team.  See Amion schedule ([facility]), [employer] paging system ([facility]), or call hospital  ([facility])

## 2025-02-02 NOTE — PROGRESS NOTE ADULT - PROVIDER SPECIALTY LIST ADULT
Thoracic Surgery Topical Clindamycin Counseling: Patient counseled that this medication may cause skin irritation or allergic reactions.  In the event of skin irritation, the patient was advised to reduce the amount of the drug applied or use it less frequently.   The patient verbalized understanding of the proper use and possible adverse effects of clindamycin.  All of the patient's questions and concerns were addressed.

## 2025-02-02 NOTE — PROGRESS NOTE ADULT - SUBJECTIVE AND OBJECTIVE BOX
Transplant Hepatology Progress Note    Interval Events:   - no acute ON events     Allergies:  sulfa drugs (Unknown)  Salicylic Acid (Other)      Hospital Medications:  albuterol/ipratropium for Nebulization 3 milliLiter(s) Nebulizer every 6 hours  cefTRIAXone   IVPB      cefTRIAXone   IVPB 2000 milliGRAM(s) IV Intermittent every 24 hours  chlorhexidine 2% Cloths 1 Application(s) Topical <User Schedule>  folic acid 1 milliGRAM(s) Oral daily  gabapentin 300 milliGRAM(s) Oral every 12 hours  heparin   Injectable 5000 Unit(s) SubCutaneous every 8 hours  HYDROmorphone  Injectable 0.5 milliGRAM(s) IV Push every 3 hours PRN  influenza   Vaccine 0.5 milliLiter(s) IntraMuscular once  insulin lispro (ADMELOG) corrective regimen sliding scale   SubCutaneous three times a day before meals  insulin lispro (ADMELOG) corrective regimen sliding scale   SubCutaneous at bedtime  lactulose Syrup 30 Gram(s) Oral every 12 hours  metroNIDAZOLE    Tablet 500 milliGRAM(s) Oral every 12 hours  midodrine 10 milliGRAM(s) Oral every 8 hours  multivitamin 1 Tablet(s) Oral daily  pantoprazole    Tablet 40 milliGRAM(s) Oral daily  rifAXIMin 550 milliGRAM(s) Oral every 12 hours  sodium chloride 3%  Inhalation 4 milliLiter(s) Inhalation every 6 hours  thiamine 100 milliGRAM(s) Oral daily  traMADol 50 milliGRAM(s) Oral every 6 hours PRN  traMADol 25 milliGRAM(s) Oral every 6 hours PRN      ROS: 14 point ROS negative unless otherwise state in subjective    PHYSICAL EXAM:   Vital Signs:  Vital Signs Last 24 Hrs  T(C): 36.8 (2025 08:04), Max: 37.4 (2025 17:23)  T(F): 98.3 (2025 08:04), Max: 99.4 (2025 17:23)  HR: 97 (2025 08:04) (76 - 101)  BP: 117/71 (2025 08:04) (108/64 - 118/67)  BP(mean): --  RR: 18 (2025 08:04) (18 - 18)  SpO2: 99% (2025 08:04) (95% - 99%)    Parameters below as of 2025 08:04  Patient On (Oxygen Delivery Method): room air      Daily     Daily Weight in k.1 (2025 04:57)    GENERAL:  No acute distress  HEENT:  NCAT, no scleral icterus  CHEST: no resp distress, chest tubes in place draining dark red output  HEART:  RRR  ABDOMEN:  Soft, non-tender, non-distended, no masses  EXTREMITIES:  No cyanosis, clubbing, or edema  SKIN:  No rash/erythema/ecchymoses/petechiae/wounds/abscess/warm/dry  NEURO:  Alert and oriented x 3     LABS:                        7.8    7.25  )-----------( 91       ( 2025 07:21 )             23.0     Mean Cell Volume: 92.4 fl (25 @ 07:21)        130[L]  |  96  |  60[H]  ----------------------------<  79  3.7   |  23  |  1.37[H]    Ca    8.4      2025 07:25  Phos  2.9     -  Mg     1.8         TPro  6.9  /  Alb  2.8[L]  /  TBili  3.8[H]  /  DBili  x   /  AST  30  /  ALT  <5[L]  /  AlkPhos  125[H]      LIVER FUNCTIONS - ( 2025 07:25 )  Alb: 2.8 g/dL / Pro: 6.9 g/dL / ALK PHOS: 125 U/L / ALT: <5 U/L / AST: 30 U/L / GGT: x           PT/INR - ( 2025 07:28 )   PT: 25.5 sec;   INR: 2.24 ratio         PTT - ( 2025 07:28 )  PTT:48.4 sec  Urinalysis Basic - ( 2025 07:25 )    Color: x / Appearance: x / SG: x / pH: x  Gluc: 79 mg/dL / Ketone: x  / Bili: x / Urobili: x   Blood: x / Protein: x / Nitrite: x   Leuk Esterase: x / RBC: x / WBC x   Sq Epi: x / Non Sq Epi: x / Bacteria: x            Imaging:

## 2025-02-02 NOTE — PROGRESS NOTE ADULT - SUBJECTIVE AND OBJECTIVE BOX
VITAL SIGNS    Vital Signs Last 24 Hrs  T(C): 36.8 (25 @ 08:04), Max: 37.4 (25 @ 17:23)  T(F): 98.3 (25 @ 08:04), Max: 99.4 (25 @ 17:23)  HR: 97 (25 @ 08:04) (76 - 101)  BP: 117/71 (25 @ 08:04) (108/64 - 118/67)  RR: 18 (25 @ 08:04) (18 - 18)  SpO2: 99% (25 @ 08:04) (95% - 99%)             07:01  -   @ 07:00  --------------------------------------------------------  IN: 1980 mL / OUT: 1405 mL / NET: 575 mL    :01  -   @ 12:25  --------------------------------------------------------  IN: 240 mL / OUT: 0 mL / NET: 240 mL       Daily     Daily Weight in k.1 (2025 04:57)  Admit Wt: Drug Dosing Weight  Height (cm): 180.3 (2025 15:45)  Weight (kg): 108.3 (2025 15:45)  BMI (kg/m2): 33.3 (2025 15:45)  BSA (m2): 2.27 (2025 15:45)    Bilirubin Total: 3.8 mg/dL ( 07:25)    CAPILLARY BLOOD GLUCOSE      POCT Blood Glucose.: 171 mg/dL (2025 12:07)  POCT Blood Glucose.: 117 mg/dL (2025 08:07)  POCT Blood Glucose.: 169 mg/dL (2025 21:12)  POCT Blood Glucose.: 141 mg/dL (2025 17:07)  POCT Blood Glucose.: 139 mg/dL (2025 12:32)          MEDICATIONS  albumin human 25% IVPB 100 milliLiter(s) IV Intermittent every 12 hours  albuterol/ipratropium for Nebulization 3 milliLiter(s) Nebulizer every 6 hours  cefTRIAXone   IVPB      cefTRIAXone   IVPB 2000 milliGRAM(s) IV Intermittent every 24 hours  chlorhexidine 2% Cloths 1 Application(s) Topical <User Schedule>  folic acid 1 milliGRAM(s) Oral daily  gabapentin 300 milliGRAM(s) Oral every 12 hours  heparin   Injectable 5000 Unit(s) SubCutaneous every 8 hours  HYDROmorphone  Injectable 0.5 milliGRAM(s) IV Push every 3 hours PRN  influenza   Vaccine 0.5 milliLiter(s) IntraMuscular once  insulin lispro (ADMELOG) corrective regimen sliding scale   SubCutaneous three times a day before meals  insulin lispro (ADMELOG) corrective regimen sliding scale   SubCutaneous at bedtime  lactulose Syrup 30 Gram(s) Oral every 12 hours  metroNIDAZOLE    Tablet 500 milliGRAM(s) Oral every 12 hours  midodrine 10 milliGRAM(s) Oral every 8 hours  multivitamin 1 Tablet(s) Oral daily  pantoprazole    Tablet 40 milliGRAM(s) Oral daily  rifAXIMin 550 milliGRAM(s) Oral every 12 hours  sodium chloride 3%  Inhalation 4 milliLiter(s) Inhalation every 6 hours  thiamine 100 milliGRAM(s) Oral daily  traMADol 50 milliGRAM(s) Oral every 6 hours PRN  traMADol 25 milliGRAM(s) Oral every 6 hours PRN      >>> <<<  PHYSICAL EXAM  Subjective: NAD  Neurology: alert and oriented x 3, nonfocal, no gross deficits  CV :s1s2  Lungs: 3 right chest tubes, minimal serosanguinous drainage.  Abdomen: soft, NT,ND, (+ )BM  :  voiding  Extremities: + 2edema      LABS  -    130[L]  |  96  |  60[H]  ----------------------------<  79  3.7   |  23  |  1.37[H]    Ca    8.4      2025 07:25  Phos  2.9     02-  Mg     1.8         TPro  6.9  /  Alb  2.8[L]  /  TBili  3.8[H]  /  DBili  x   /  AST  30  /  ALT  <5[L]  /  AlkPhos  125[H]                                   7.8    7.25  )-----------( 91       ( 2025 07:21 )             23.0          PT/INR - ( 2025 07:28 )   PT: 25.5 sec;   INR: 2.24 ratio         PTT - ( 2025 07:28 )  PTT:48.4 sec       PAST MEDICAL & SURGICAL HISTORY:  Sleep apnea, obstructive      Alcohol abuse      Cirrhosis of liver      Portal hypertension      H/O esophageal varices      Anemia      Mild alcohol use disorder      No significant past surgical history

## 2025-02-02 NOTE — PROGRESS NOTE ADULT - PROBLEM SELECTOR PLAN 1
s/p Right VATS decortication 1/28 with Dr. Ramírez  Transfused multiple blood products PRBC/PLATELETS/FFP WITH GOAL Hgb >8, PLTS >100 K, INR BELOW 1.7  Chest tubes to suction  Continue care as per primary team-INR 2.2 tube with sanguinous drainage   Recommend FFP today  daily xray  Plan as per Dr Ramírez

## 2025-02-03 LAB
ALBUMIN SERPL ELPH-MCNC: 2.9 G/DL — LOW (ref 3.3–5)
ALP SERPL-CCNC: 125 U/L — HIGH (ref 40–120)
ALT FLD-CCNC: 9 U/L — LOW (ref 10–45)
ANION GAP SERPL CALC-SCNC: 8 MMOL/L — SIGNIFICANT CHANGE UP (ref 5–17)
APTT BLD: 44.9 SEC — HIGH (ref 24.5–35.6)
AST SERPL-CCNC: 36 U/L — SIGNIFICANT CHANGE UP (ref 10–40)
BILIRUB SERPL-MCNC: 3.8 MG/DL — HIGH (ref 0.2–1.2)
BLD GP AB SCN SERPL QL: NEGATIVE — SIGNIFICANT CHANGE UP
BUN SERPL-MCNC: 54 MG/DL — HIGH (ref 7–23)
CALCIUM SERPL-MCNC: 9 MG/DL — SIGNIFICANT CHANGE UP (ref 8.4–10.5)
CHLORIDE SERPL-SCNC: 98 MMOL/L — SIGNIFICANT CHANGE UP (ref 96–108)
CO2 SERPL-SCNC: 24 MMOL/L — SIGNIFICANT CHANGE UP (ref 22–31)
CREAT SERPL-MCNC: 1.2 MG/DL — SIGNIFICANT CHANGE UP (ref 0.5–1.3)
CULTURE RESULTS: SIGNIFICANT CHANGE UP
EGFR: 76 ML/MIN/1.73M2 — SIGNIFICANT CHANGE UP
EGFR: 76 ML/MIN/1.73M2 — SIGNIFICANT CHANGE UP
GLUCOSE BLDC GLUCOMTR-MCNC: 120 MG/DL — HIGH (ref 70–99)
GLUCOSE BLDC GLUCOMTR-MCNC: 127 MG/DL — HIGH (ref 70–99)
GLUCOSE BLDC GLUCOMTR-MCNC: 140 MG/DL — HIGH (ref 70–99)
GLUCOSE BLDC GLUCOMTR-MCNC: 161 MG/DL — HIGH (ref 70–99)
GLUCOSE SERPL-MCNC: 106 MG/DL — HIGH (ref 70–99)
HCT VFR BLD CALC: 22.9 % — LOW (ref 39–50)
HGB BLD-MCNC: 7.5 G/DL — LOW (ref 13–17)
INR BLD: 2.28 RATIO — HIGH (ref 0.85–1.16)
MAGNESIUM SERPL-MCNC: 1.8 MG/DL — SIGNIFICANT CHANGE UP (ref 1.6–2.6)
MCHC RBC-ENTMCNC: 30 PG — SIGNIFICANT CHANGE UP (ref 27–34)
MCHC RBC-ENTMCNC: 32.8 G/DL — SIGNIFICANT CHANGE UP (ref 32–36)
MCV RBC AUTO: 91.6 FL — SIGNIFICANT CHANGE UP (ref 80–100)
NRBC # BLD: 0 /100 WBCS — SIGNIFICANT CHANGE UP (ref 0–0)
NRBC BLD-RTO: 0 /100 WBCS — SIGNIFICANT CHANGE UP (ref 0–0)
PHOSPHATE SERPL-MCNC: 2.6 MG/DL — SIGNIFICANT CHANGE UP (ref 2.5–4.5)
PLATELET # BLD AUTO: 84 K/UL — LOW (ref 150–400)
POTASSIUM SERPL-MCNC: 3.9 MMOL/L — SIGNIFICANT CHANGE UP (ref 3.5–5.3)
POTASSIUM SERPL-SCNC: 3.9 MMOL/L — SIGNIFICANT CHANGE UP (ref 3.5–5.3)
PROT SERPL-MCNC: 7.2 G/DL — SIGNIFICANT CHANGE UP (ref 6–8.3)
PROTHROM AB SERPL-ACNC: 26 SEC — HIGH (ref 9.9–13.4)
RBC # BLD: 2.5 M/UL — LOW (ref 4.2–5.8)
RBC # FLD: 17.8 % — HIGH (ref 10.3–14.5)
RH IG SCN BLD-IMP: POSITIVE — SIGNIFICANT CHANGE UP
SODIUM SERPL-SCNC: 130 MMOL/L — LOW (ref 135–145)
SPECIMEN SOURCE: SIGNIFICANT CHANGE UP
WBC # BLD: 5.85 K/UL — SIGNIFICANT CHANGE UP (ref 3.8–10.5)
WBC # FLD AUTO: 5.85 K/UL — SIGNIFICANT CHANGE UP (ref 3.8–10.5)

## 2025-02-03 PROCEDURE — 99232 SBSQ HOSP IP/OBS MODERATE 35: CPT

## 2025-02-03 PROCEDURE — 71045 X-RAY EXAM CHEST 1 VIEW: CPT | Mod: 26,77

## 2025-02-03 PROCEDURE — 71045 X-RAY EXAM CHEST 1 VIEW: CPT | Mod: 26

## 2025-02-03 PROCEDURE — G0545: CPT

## 2025-02-03 RX ORDER — BUMETANIDE 1 MG/1
1 TABLET ORAL
Refills: 0 | Status: DISCONTINUED | OUTPATIENT
Start: 2025-02-03 | End: 2025-02-05

## 2025-02-03 RX ORDER — ALBUMIN (HUMAN) 12.5 G/50ML
100 INJECTION, SOLUTION INTRAVENOUS EVERY 12 HOURS
Refills: 0 | Status: COMPLETED | OUTPATIENT
Start: 2025-02-03 | End: 2025-02-06

## 2025-02-03 RX ADMIN — HEPARIN SODIUM 5000 UNIT(S): 1000 INJECTION INTRAVENOUS; SUBCUTANEOUS at 15:18

## 2025-02-03 RX ADMIN — MIDODRINE HYDROCHLORIDE 10 MILLIGRAM(S): 5 TABLET ORAL at 02:40

## 2025-02-03 RX ADMIN — LACTULOSE 30 GRAM(S): 10 SOLUTION ORAL at 05:26

## 2025-02-03 RX ADMIN — Medication 100 MILLIGRAM(S): at 11:28

## 2025-02-03 RX ADMIN — HEPARIN SODIUM 5000 UNIT(S): 1000 INJECTION INTRAVENOUS; SUBCUTANEOUS at 21:57

## 2025-02-03 RX ADMIN — BUMETANIDE 1 MILLIGRAM(S): 1 TABLET ORAL at 11:28

## 2025-02-03 RX ADMIN — TRAMADOL HYDROCHLORIDE 25 MILLIGRAM(S): 50 TABLET, FILM COATED ORAL at 11:32

## 2025-02-03 RX ADMIN — TRAMADOL HYDROCHLORIDE 50 MILLIGRAM(S): 50 TABLET, FILM COATED ORAL at 06:27

## 2025-02-03 RX ADMIN — TRAMADOL HYDROCHLORIDE 50 MILLIGRAM(S): 50 TABLET, FILM COATED ORAL at 17:11

## 2025-02-03 RX ADMIN — TRAMADOL HYDROCHLORIDE 50 MILLIGRAM(S): 50 TABLET, FILM COATED ORAL at 17:53

## 2025-02-03 RX ADMIN — INSULIN LISPRO 2: 100 INJECTION, SOLUTION INTRAVENOUS; SUBCUTANEOUS at 12:04

## 2025-02-03 RX ADMIN — Medication 1 APPLICATION(S): at 05:26

## 2025-02-03 RX ADMIN — MIDODRINE HYDROCHLORIDE 10 MILLIGRAM(S): 5 TABLET ORAL at 17:07

## 2025-02-03 RX ADMIN — Medication 40 MILLIGRAM(S): at 11:28

## 2025-02-03 RX ADMIN — CEFTRIAXONE 100 MILLIGRAM(S): 500 INJECTION, POWDER, FOR SOLUTION INTRAMUSCULAR; INTRAVENOUS at 16:40

## 2025-02-03 RX ADMIN — TRAMADOL HYDROCHLORIDE 25 MILLIGRAM(S): 50 TABLET, FILM COATED ORAL at 12:10

## 2025-02-03 RX ADMIN — Medication 500 MILLIGRAM(S): at 17:07

## 2025-02-03 RX ADMIN — FOLIC ACID 1 MILLIGRAM(S): 1 TABLET ORAL at 11:27

## 2025-02-03 RX ADMIN — Medication 500 MILLIGRAM(S): at 05:26

## 2025-02-03 RX ADMIN — HEPARIN SODIUM 5000 UNIT(S): 1000 INJECTION INTRAVENOUS; SUBCUTANEOUS at 05:25

## 2025-02-03 RX ADMIN — ALBUMIN (HUMAN) 50 MILLILITER(S): 12.5 INJECTION, SOLUTION INTRAVENOUS at 17:43

## 2025-02-03 RX ADMIN — TRAMADOL HYDROCHLORIDE 50 MILLIGRAM(S): 50 TABLET, FILM COATED ORAL at 00:38

## 2025-02-03 RX ADMIN — ALBUMIN (HUMAN) 50 MILLILITER(S): 12.5 INJECTION, SOLUTION INTRAVENOUS at 05:24

## 2025-02-03 RX ADMIN — Medication 1 TABLET(S): at 11:27

## 2025-02-03 RX ADMIN — MIDODRINE HYDROCHLORIDE 10 MILLIGRAM(S): 5 TABLET ORAL at 11:27

## 2025-02-03 NOTE — PROGRESS NOTE ADULT - ASSESSMENT
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 Actively drinking. Now p/w abdominal pain/distension with worsening of liver tests possibly triggered by infection. DDx also includes alcohol associated hepatitis given hx of alc hep 7/2024 which improved with steroids  - OLT: evaluation ongoing, if cleared from ID standpoint pursue [facility] today  - EV: EGD 7/2024 for melena showed grade II EV, small gastric ulcer (neg HP), and portal gastropathy   - HE: c/w lactulose and rifaximin, goal 3-4 BM/day  - HCC screening: MRI nondiagnostic, consider repeat imaging                                 - MANUELITO: restart albumin assisted diuresis with bumex 1 mg BID for volume overload  - C/w home midodrine 10 mg tid  - C/w PPI for stress ulcer ppx and hx of ulcer  - Trend MELD labs daily (cmp, INR) daily  - Strict I/Os, daily wts    #Empyema  #Leukocytosis  CT chest noncontrast reviewed from OSH showing moderate sized complex R sided pleural effusion. s/p chest tube placed 1/18 with cultures growing Citrobacter c/w empyema. Requiring 2nd chest tube placement then now s/p VATS for persistent effusion  - s/p VATS 1/29  - Clarify with CTS regarding chest tube removal today  - C/w CTX 2 g IV daily (clarify tx duration w/ ID)  - Trend cbc and fever curve  - Appreciate thoracic surgery and ID recs    #MANUELITO  Found to have acute renal failure requiring dialysis at OSH, ddx includes HRS vs ATN. Permacath in place. Cr baseline 1.12 10/2024 required albumin assisted diuresis with bumex 2 mg IV q12h  - Give albumin assisted diuresis w/ bumex 1 mg bid  - Strict I/Os, daily wts  - Trend CMP daily  - Appreciate transplant renal recs    #Anemia  #Thrombocytopenia  Normocytic anemia with Hgb 8.5 and plt count 64 likely due to chronic liver dz  - If planned for chest tube removal today, give 1 unit plt and 1 unit FFP prior to removal  - s/p 4 units pRBC, 2 FFP, 2 plts on 1/28  - Transfuse Hgb>7, plt>50 given bleeding  - Hold DVT ppx for bleeding    All recommendations are tentative until note is attested by attending.     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 Actively drinking. Now p/w abdominal pain/distension with worsening of liver tests possibly triggered by infection. DDx also includes alcohol associated hepatitis given hx of alc hep 7/2024 which improved with steroids  - OLT: evaluation ongoing, if cleared from ID standpoint pursue [facility] today  - EV: EGD 7/2024 for melena showed grade II EV, small gastric ulcer (neg HP), and portal gastropathy   - HE: c/w lactulose and rifaximin, goal 3-4 BM/day  - HCC screening: MRI nondiagnostic, consider repeat imaging                                 - MANUELITO: restart albumin assisted diuresis with bumex 1 mg BID for volume overload  - C/w home midodrine 10 mg tid  - C/w PPI for stress ulcer ppx and hx of ulcer  - Trend MELD labs daily (cmp, INR) daily  - Strict I/Os, daily wts    #Empyema  CT chest noncontrast reviewed from OSH showing moderate sized complex R sided pleural effusion. s/p chest tube placed 1/18 with cultures growing Citrobacter c/w empyema. Requiring 2nd chest tube placement then now s/p VATS for persistent effusion  - s/p VATS 1/29  - Clarify with CTS regarding chest tube removal today  - C/w CTX 2 g IV daily (clarify tx duration w/ ID)  - Trend cbc and fever curve  - Appreciate thoracic surgery and ID recs    #MANUELITO  Found to have acute renal failure requiring dialysis at OSH, ddx includes HRS vs ATN. Permacath in place. Cr baseline 1.12 10/2024 required albumin assisted diuresis with bumex 2 mg IV q12h  - Give albumin assisted diuresis w/ bumex 1 mg bid  - Strict I/Os, daily wts  - Trend CMP daily  - Appreciate transplant renal recs    #Anemia  #Thrombocytopenia  Normocytic anemia with Hgb 8.5 and plt count 64 likely due to chronic liver dz  - If planned for chest tube removal today, give 1 unit plt and 1 unit FFP prior to removal  - s/p 4 units pRBC, 2 FFP, 2 plts on 1/28  - Transfuse Hgb>7, plt>50 given bleeding  - Hold DVT ppx for bleeding    All recommendations are tentative until note is attested by attending.     Judy Tucker, PGY4  Gastroenterology/Hepatology Fellow  Available on [employer] Teams  [phone] (Long Range Pager)  [phone] (Short Range Pager [facility])    After 5 pm, please contact the on-call GI fellow for any urgent issues via the Hospital Call

## 2025-02-03 NOTE — PROGRESS NOTE ADULT - SUBJECTIVE AND OBJECTIVE BOX
INFECTIOUS DISEASES FOLLOW UP-- Carley Kam  [phone]    This is a follow up note for this  44yMale with  Liver failure without hepatic coma    afebrile, no interval events    ROS:  CONSTITUTIONAL:  No fever, good appetite  CARDIOVASCULAR:  No chest pain or palpitations  RESPIRATORY:  No dyspnea  GASTROINTESTINAL:  No nausea, vomiting, diarrhea, or abdominal pain  GENITOURINARY:  No dysuria  NEUROLOGIC:  No headache,     Allergies    sulfa drugs (Unknown)  Salicylic Acid (Other)    Intolerances        ANTIBIOTICS/RELEVANT:  antimicrobials  cefTRIAXone   IVPB      cefTRIAXone   IVPB 2000 milliGRAM(s) IV Intermittent every 24 hours  metroNIDAZOLE    Tablet 500 milliGRAM(s) Oral every 12 hours  rifAXIMin 550 milliGRAM(s) Oral every 12 hours    immunologic:  influenza   Vaccine 0.5 milliLiter(s) IntraMuscular once    OTHER:  albumin human 25% IVPB 100 milliLiter(s) IV Intermittent every 12 hours  buMETAnide 1 milliGRAM(s) Oral two times a day  chlorhexidine 2% Cloths 1 Application(s) Topical <User Schedule>  folic acid 1 milliGRAM(s) Oral daily  gabapentin 300 milliGRAM(s) Oral every 12 hours  heparin   Injectable 5000 Unit(s) SubCutaneous every 8 hours  HYDROmorphone  Injectable 0.5 milliGRAM(s) IV Push every 3 hours PRN  insulin lispro (ADMELOG) corrective regimen sliding scale   SubCutaneous three times a day before meals  insulin lispro (ADMELOG) corrective regimen sliding scale   SubCutaneous at bedtime  lactulose Syrup 30 Gram(s) Oral every 12 hours  midodrine 10 milliGRAM(s) Oral every 8 hours  multivitamin 1 Tablet(s) Oral daily  pantoprazole    Tablet 40 milliGRAM(s) Oral daily  thiamine 100 milliGRAM(s) Oral daily  traMADol 50 milliGRAM(s) Oral every 6 hours PRN  traMADol 25 milliGRAM(s) Oral every 6 hours PRN      Objective:  Vital Signs Last 24 Hrs  T(C): 36.8 (03 Feb 2025 18:20), Max: 36.9 (03 Feb 2025 08:21)  T(F): 98.2 (03 Feb 2025 18:20), Max: 98.4 (03 Feb 2025 08:21)  HR: 89 (03 Feb 2025 18:20) (81 - 105)  BP: 124/72 (03 Feb 2025 18:20) (115/64 - 125/72)  BP(mean): --  RR: 18 (03 Feb 2025 18:20) (18 - 18)  SpO2: 98% (03 Feb 2025 18:20) (93% - 99%)    Parameters below as of 03 Feb 2025 18:20  Patient On (Oxygen Delivery Method): room air        PHYSICAL EXAM:  Constitutional:no acute distress  Eyes:EDITH, EOMI  Ear/Nose/Throat: no oral lesions, 	  Respiratory: clear BL  right chest tube  Cardiovascular: S1S2  Gastrointestinal:soft, (+) BS, no tenderness  Extremities:no e/e/c  No Lymphadenopathy  IV sites not inflammed.    LABS:                        7.5    5.85  )-----------( 84       ( 03 Feb 2025 06:59 )             22.9     02-03    130[L]  |  98  |  54[H]  ----------------------------<  106[H]  3.9   |  24  |  1.20    Ca    9.0      03 Feb 2025 06:57  Phos  2.6     02-03  Mg     1.8     02-03    TPro  7.2  /  Alb  2.9[L]  /  TBili  3.8[H]  /  DBili  x   /  AST  36  /  ALT  9[L]  /  AlkPhos  125[H]  02-03    PT/INR - ( 03 Feb 2025 06:57 )   PT: 26.0 sec;   INR: 2.28 ratio         PTT - ( 03 Feb 2025 06:57 )  PTT:44.9 sec  Urinalysis Basic - ( 03 Feb 2025 06:57 )    Color: x / Appearance: x / SG: x / pH: x  Gluc: 106 mg/dL / Ketone: x  / Bili: x / Urobili: x   Blood: x / Protein: x / Nitrite: x   Leuk Esterase: x / RBC: x / WBC x   Sq Epi: x / Non Sq Epi: x / Bacteria: x        MICROBIOLOGY:    Culture - Body Fluid with Gram Stain (01.24.25 @ 19:10)    Gram Stain:   polymorphonuclear leukocytes seen  No organisms seen  by cytocentrifuge   -  Amoxicillin/Clavulanic Acid: S <=8/4   -  Ampicillin: R >16 These ampicillin results predict results for amoxicillin   -  Ampicillin/Sulbactam: S <=4/2   -  Aztreonam: S <=4   -  Cefazolin: S <=2   -  Cefepime: S <=2   -  Cefoxitin: S <=8   -  Ceftriaxone: S <=1   -  Trimethoprim/Sulfamethoxazole: S <=0.5/9.5   -  Ciprofloxacin: S <=0.25   -  Ertapenem: S <=0.5   -  Gentamicin: S <=2   -  Imipenem: S <=1   -  Levofloxacin: S <=0.5   -  Meropenem: S <=1   -  Piperacillin/Tazobactam: S <=8   -  Tobramycin: S <=2   Specimen Source: Pleural Fl   Culture Results:   Rare Citrobacter koseri   Organism Identification: Citrobacter koseri   Organism: Citrobacter koseri   Method Type: AVA            RECENT CULTURES:  01-28 @ 20:57  .Abscess  --  --  --    No growth at 5 days  --  01-28 @ 20:51  Tissue  --  --  --    No growth at 5 days  --      RADIOLOGY & ADDITIONAL STUDIES:    < from: Xray Chest 1 View- PORTABLE-Urgent (Xray Chest 1 View- PORTABLE-Urgent .) (02.03.25 @ 11:18) >    IMPRESSION:  Decreasing right effusion. Small pneumothorax in latest image.        Thank you for the courtesy of this referral.    --- End of Report ---    < end of copied text >

## 2025-02-03 NOTE — PROGRESS NOTE ADULT - ASSESSMENT
45 yo M with PMH decompensated ETOH cirrhosis c/b recurrent ascites, grade II EV, and HE, right calf hematoma 10/2024 (s/p fall)  presented to [facility] for abdominal pain and distension. Patient found to have ascites on exam and MANUELITO requiring HD initiation PermCath was placed by vascular surgery on 1/10 with post procedure course c/b bleeding from insertion site. Transferred to [facility] SICU for further management.     Blood Cultures (1/16) 1/4 Staph epi.   GI PCR (1/17) + Giardia.   Paracentesis (1/17) 155 nucleated cell counts.     CT Chest (1/17) Moderate loculated right pleural effusion containing a few foci of air, which are new from 1/8/2025 and may represent empyema/bronchopleural fistula versus sequelae of prior thoracentesis.     #Empyema  s/p VATS 1/28/25  --Continue Ceftriaxone to 2g IV Q24H + Metronidazole 500 mg BID. Anticipate continuing for 7 days post-VATS (assuming no acute events)  --Follow up on operative cultures    #Giardia  s/p 7 days of Metronidazole    #Positive Blood Culture (Staph epi, 1/16)  Concern for procurement contaminant  --Continue to follow CBC with diff  --Continue to follow temperature curve  --Follow up on preliminary blood cultures      #Pre-Liver Transplant Evaluation, Decompensated Cirrhosis  COVID19 Rodríguez Antibody Positive  HAV IgG Positive  HBVs Ab Positive  HBVsAg Negative  HBVc Ab Negative  HCV Ab Negative  HSV 1 IgG Positive  HSV 2 IgG Negative  EBV IgG Positive  CMV IgG Positive  VZV IgG Positive  Measles IgG Positive  Mumps IgG Positive  Rubella IgG Positive  Quantiferon Gold negative  HIV Ag/Ab by CMIA Negative  Syphilis Screen Negative  Toxoplasma IgG Negative  Strongyloides Ab negative  Coccidiodes Ab PENDING  Leishmania Ab PENDING  --ID clearance to OLT pending treatment of empyema; anticipate that if there are no acute events through the weekend can reactivate from ID perspective    #Encounter to Vaccinate Patient  COVID19: Would benefit from COVID19 [id number] Vaccine Dose  Influenza: Had vaccination for this year  Pneumococcal: Would benefit from PCV20  HAV: Immune, will not require further vaccination  HBV: Immune, will not require further vaccination  MMR: Immune, will not require further vaccination  Varicella: Immune, will not require further vaccination  Shingles: Will require Shingrix  Tdap: Tdap 6/23/24    Meliton Kam MD  Can be called via Teams  After 5pm/weekends [phone]      The above assessment and plan were discussed with Transplant Hepatology team

## 2025-02-03 NOTE — ED PROVIDER NOTE - NSFOLLOWUPINSTRUCTIONS_ED_ALL_ED_FT
"Patient Education     Routine physical for adults   The Basics   Written by the doctors and editors at [facility]   What is a physical? -- A physical is a routine visit, or \"check-up,\" with your doctor. You might also hear it called a \"wellness visit\" or \"preventive visit.\"  During each visit, the doctor will:   Ask about your physical and mental health   Ask about your habits, behaviors, and lifestyle   Do an exam   Give you vaccines if needed   Talk to you about any medicines you take   Give advice about your health   Answer your questions  Getting regular check-ups is an important part of taking care of your health. It can help your doctor find and treat any problems you have. But it's also important for preventing health problems.  A routine physical is different from a \"sick visit.\" A sick visit is when you see a doctor because of a health concern or problem. Since physicals are scheduled ahead of time, you can think about what you want to ask the doctor.  How often should I get a physical? -- It depends on your age and health. In general, for people age 21 years and older:   If you are younger than 50 years, you might be able to get a physical every 3 years.   If you are 50 years or older, your doctor might recommend a physical every year.  If you have an ongoing health condition, like diabetes or high blood pressure, your doctor will probably want to see you more often.  What happens during a physical? -- In general, each visit will include:   Physical exam - The doctor or nurse will check your height, weight, heart rate, and blood pressure. They will also look at your eyes and ears. They will ask about how you are feeling and whether you have any symptoms that bother you.   Medicines - It's a good idea to bring a list of all the medicines you take to each doctor visit. Your doctor will talk to you about your medicines and answer any questions. Tell them if you are having any side effects that bother you. You " "should also tell them if you are having trouble paying for any of your medicines.   Habits and behaviors - This includes:   Your diet   Your exercise habits   Whether you smoke, drink alcohol, or use drugs   Whether you are sexually active   Whether you feel safe at home  Your doctor will talk to you about things you can do to improve your health and lower your risk of health problems. They will also offer help and support. For example, if you want to quit smoking, they can give you advice and might prescribe medicines. If you want to improve your diet or get more physical activity, they can help you with this, too.   Lab tests, if needed - The tests you get will depend on your age and situation. For example, your doctor might want to check your:   Cholesterol   Blood sugar   Iron level   Vaccines - The recommended vaccines will depend on your age, health, and what vaccines you already had. Vaccines are very important because they can prevent certain serious or deadly infections.   Discussion of screening - \"Screening\" means checking for diseases or other health problems before they cause symptoms. Your doctor can recommend screening based on your age, risk, and preferences. This might include tests to check for:   Cancer, such as breast, prostate, cervical, ovarian, colorectal, prostate, lung, or skin cancer   Sexually transmitted infections, such as chlamydia and gonorrhea   Mental health conditions like depression and anxiety  Your doctor will talk to you about the different types of screening tests. They can help you decide which screenings to have. They can also explain what the results might mean.   Answering questions - The physical is a good time to ask the doctor or nurse questions about your health. If needed, they can refer you to other doctors or specialists, too.  Adults older than 65 years often need other care, too. As you get older, your doctor will talk to you about:   How to prevent falling at " home   Hearing or vision tests   Memory testing   How to take your medicines safely   Making sure that you have the help and support you need at home  All topics are updated as new evidence becomes available and our peer review process is complete.  This topic retrieved from [employer] on: May 02, 2024.  Topic 081718 Version 1.0  Release: 32.4.3 - C32.122  © 2024 UpToDate, Inc. and/or its affiliates. All rights reserved.  Consumer Information Use and Disclaimer   Disclaimer: This generalized information is a limited summary of diagnosis, treatment, and/or medication information. It is not meant to be comprehensive and should be used as a tool to help the user understand and/or assess potential diagnostic and treatment options. It does NOT include all information about conditions, treatments, medications, side effects, or risks that may apply to a specific patient. It is not intended to be medical advice or a substitute for the medical advice, diagnosis, or treatment of a health care provider based on the health care provider's examination and assessment of a patient's specific and unique circumstances. Patients must speak with a health care provider for complete information about their health, medical questions, and treatment options, including any risks or benefits regarding use of medications. This information does not endorse any treatments or medications as safe, effective, or approved for treating a specific patient. UpToDate, Inc. and its affiliates disclaim any warranty or liability relating to this information or the use thereof.The use of this information is governed by the Terms of Use, available at https://www.woltersBoombotixuwer.com/en/know/clinical-effectiveness-terms. 2024© UpToDate, Inc. and its affiliates and/or licensors. All rights reserved.  Copyright   © 2024 UpToDate, Inc. and/or its affiliates. All rights reserved.     Return to the ED for any new or worsening symptoms  Take your medication as prescribed  Lidoderm patch to affected region as instructed  Please consider refraining from alcohol and following up for outpatient support groups  Follow up with your gastroenterologist call on Monday to schedule follow up  Follow up with your PMD call on Monday to schedule follow up   Advance activity as tolerated    Costochondritis    WHAT YOU NEED TO KNOW:    Costochondritis is a condition that causes pain in the cartilage that connect your ribs to your sternum (breastbone). Cartilage is the tough, bendable tissue that protects your bones.     DISCHARGE INSTRUCTIONS:    Medicines:   •Acetaminophen: This medicine decreases pain. Acetaminophen is available without a doctor's order. Ask how much to take and how often to take it. Follow directions. Acetaminophen can cause liver damage if not taken correctly.      •NSAIDs, such as ibuprofen, help decrease swelling, pain, and fever. This medicine is available with or without a doctor's order. NSAIDs can cause stomach bleeding or kidney problems in certain people. If you take blood thinner medicine, always ask if NSAIDs are safe for you. Always read the medicine label and follow directions. Do not give these medicines to children under 6 months of age without direction from your child's healthcare provider.      •Take your medicine as directed. Contact your healthcare provider if you think your medicine is not helping or if you have side effects. Tell him of her if you are allergic to any medicine. Keep a list of the medicines, vitamins, and herbs you take. Include the amounts, and when and why you take them. Bring the list or the pill bottles to follow-up visits. Carry your medicine list with you in case of an emergency.      Follow up with your doctor as directed: Write down your questions so you remember to ask them during your visits.     Rest: You may need to get more rest. Learn which movements and activities cause pain, and avoid doing them. Do not carry objects, such as a purse or backpack, if this is painful. Avoid activities such as rowing and weightlifting until your pain decreases or goes away. Ask which activities are best for you to do while you recover.    Heat: Heat helps decrease pain in some patients. Apply heat on the area for 20 to 30 minutes every 2 hours for as many days as directed.     Ice: Ice helps decrease swelling and pain. Ice may also help prevent tissue damage. Use an ice pack, or put crushed ice in a plastic bag. Cover it with a towel and place it on the painful area for 15 to 20 minutes every hour or as directed.    Stretching exercises: Gentle stretching may help your symptoms.  a doorway and put your hands on the door frame at the level of your ears or shoulders. Take 1 step forward and gently stretch your chest. Try this with your hands higher up on the doorway.     Contact your healthcare provider if:   •You have a fever.      •The painful areas of your chest look swollen, red, and feel warm to the touch.       •You cannot sleep because of the pain.      •You have questions or concerns about your condition or care.

## 2025-02-03 NOTE — PROGRESS NOTE ADULT - PROBLEM SELECTOR PLAN 1
s/p Right VATS decortication 1/28 with Dr. Ramírez  Transfused multiple blood products PRBC/PLATELETS/FFP WITH GOAL Hgb >8, PLTS >100 K, INR BELOW 1.7  Chest tubes to suction  Continue care as per primary team-INR 2.28 tube with sanguinous drainage   daily xray  Plan as per Dr Ramírez

## 2025-02-03 NOTE — PROGRESS NOTE ADULT - SUBJECTIVE AND OBJECTIVE BOX
VITAL SIGNS      Vital Signs Last 24 Hrs  T(C): 36.9 (25 @ 08:21), Max: 36.9 (25 @ 08:21)  T(F): 98.4 (25 @ 08:21), Max: 98.4 (25 @ 08:21)  HR: 101 (25 @ 08:21) (79 - 105)  BP: 117/69 (25 @ 08:21) (109/67 - 121/68)  RR: 18 (25 @ 08:21) (18 - 18)  SpO2: 93% (25 @ 08:21) (93% - 100%)             @ 07:01  -   @ 07:00  --------------------------------------------------------  IN: 890 mL / OUT: 1570 mL / NET: -680 mL     @ 07:01  -   @ 09:52  --------------------------------------------------------  IN: 0 mL / OUT: 150 mL / NET: -150 mL       Daily     Daily Weight in k.2 (2025 05:16)  Admit Wt: Drug Dosing Weight  Height (cm): 180.3 (2025 15:45)  Weight (kg): 108.3 (2025 15:45)  BMI (kg/m2): 33.3 (2025 15:45)  BSA (m2): 2.27 (2025 15:45)    Bilirubin Total: 3.8 mg/dL ( @ 06:57)    CAPILLARY BLOOD GLUCOSE      POCT Blood Glucose.: 127 mg/dL (2025 08:16)  POCT Blood Glucose.: 147 mg/dL (2025 21:23)  POCT Blood Glucose.: 217 mg/dL (2025 16:40)  POCT Blood Glucose.: 171 mg/dL (2025 12:07)          MEDICATIONS  cefTRIAXone   IVPB      cefTRIAXone   IVPB 2000 milliGRAM(s) IV Intermittent every 24 hours  chlorhexidine 2% Cloths 1 Application(s) Topical <User Schedule>  folic acid 1 milliGRAM(s) Oral daily  gabapentin 300 milliGRAM(s) Oral every 12 hours  heparin   Injectable 5000 Unit(s) SubCutaneous every 8 hours  HYDROmorphone  Injectable 0.5 milliGRAM(s) IV Push every 3 hours PRN  influenza   Vaccine 0.5 milliLiter(s) IntraMuscular once  insulin lispro (ADMELOG) corrective regimen sliding scale   SubCutaneous three times a day before meals  insulin lispro (ADMELOG) corrective regimen sliding scale   SubCutaneous at bedtime  lactulose Syrup 30 Gram(s) Oral every 12 hours  metroNIDAZOLE    Tablet 500 milliGRAM(s) Oral every 12 hours  midodrine 10 milliGRAM(s) Oral every 8 hours  multivitamin 1 Tablet(s) Oral daily  pantoprazole    Tablet 40 milliGRAM(s) Oral daily  rifAXIMin 550 milliGRAM(s) Oral every 12 hours  thiamine 100 milliGRAM(s) Oral daily  traMADol 50 milliGRAM(s) Oral every 6 hours PRN  traMADol 25 milliGRAM(s) Oral every 6 hours PRN      >>> <<<  PHYSICAL EXAM  Subjective: NAD  Neurology: alert and oriented x 3, nonfocal, no gross deficits  CV :s1s2  Lungs: right ant  -100            posterior -20            diaphragm -40  Abdomen: soft, NT,ND, (+ )BM  :  voiding  Extremities:   + 1-2 edema    LABS      130[L]  |  98  |  54[H]  ----------------------------<  106[H]  3.9   |  24  |  1.20    Ca    9.0      2025 06:57  Phos  2.6       Mg     1.8         TPro  7.2  /  Alb  2.9[L]  /  TBili  3.8[H]  /  DBili  x   /  AST  36  /  ALT  9[L]  /  AlkPhos  125[H]                                   7.5    5.85  )-----------( 84       ( 2025 06:59 )             22.9          PT/INR - ( 2025 06:57 )   PT: 26.0 sec;   INR: 2.28 ratio         PTT - ( 2025 06:57 )  PTT:44.9 sec       PAST MEDICAL & SURGICAL HISTORY:  Sleep apnea, obstructive      Alcohol abuse      Cirrhosis of liver      Portal hypertension      H/O esophageal varices      Anemia      Mild alcohol use disorder      No significant past surgical history

## 2025-02-03 NOTE — PROGRESS NOTE ADULT - ATTENDING COMMENTS
44M with alcohol-associated cirrhosis, ascites, grade II EVs, and HE presents with abdominal pain/distension, renal failure, and empyema (s/p chest tube and VATS 1/29/25).  He has a history of alcoholic hepatitis (7/2024) and is actively drinking. MELD is 26. OLT evaluation is ongoing, pending ID clearance.  He is being treated for HE with lactulose and rifaximin. Prior EGD (7/2024) showed grade II EVs and a small gastric ulcer. HCC screening MRI was nondiagnostic.  He is on albumin and bumetanide for ascites/MANUELITO (likely HRS), midodrine, and a PPI. The empyema is being treated with ceftriaxone (duration to be clarified with ID). Thoracic surgery will be consulted regarding chest tube removal.  He has anemia and thrombocytopenia (s/p transfusion) and will be transfused for Hgb < 7 and platelets < 50. DVT prophylaxis is held. Will continue to monitor. 44M with alcohol-associated cirrhosis, ascites, grade II EVs, and HE presents with abdominal pain/distension, renal failure, and empyema (s/p chest tube and VATS 1/29/25).  He has a history of alcoholic hepatitis (7/2024) and is actively drinking. MELD is 26. OLT evaluation is ongoing, pending ID clearance.  He is being treated for HE with lactulose and rifaximin. Prior EGD (7/2024) showed grade II EVs and a small gastric ulcer. HCC screening MRI was nondiagnostic.  He is on albumin and bumetanide for ascites/MANUELITO (likely HRS), midodrine, and a PPI. The empyema is being treated with ceftriaxone (duration to be clarified with ID). Thoracic surgery will be consulted regarding chest tube removal.  He has anemia and thrombocytopenia (s/p transfusion) and will be transfused for Hgb < 7 and platelets < 50. DVT prophylaxis is held. He will need left heart cardiac cath prior to presentation for potential listing. Will continue to monitor.

## 2025-02-03 NOTE — PROGRESS NOTE ADULT - ASSESSMENT
45yo M with Hx decompensated EtOH cirrhosis (c/b recurrent ascites, grade II EV, and HE) who presented to OSH on 1/16 with abdominal pain and distension, found to have ascites and acute renal failure requiring initiation of HD (s/p Permacath placement c/b bleeding), transferred to [facility] for transplant evaluation and management. CT chest at OSH was significant for a complex R pleural effusion s/p R pigtail placement by IR on 1/18 with drainage of purulent material c/f empyema. Thoracic Surgery consulted for empyema and chest tube management.   1/20 R pigtail drained 700/910, CT of Chest on Tuesday to assess effusion and make final plan determination  1/21 R pigtail drained 80/240, CT of chest to assess effusion today   1/22 R pigtail maintained. CT of chest revealing:   < from: CT Chest No Cont (01.21.25 @ 19:29) >  IMPRESSION:.  Marked interval decrease in size ofright-sided pleural fluid and gas   collection (presumably empyema) status post placement of pigtail catheter   as described above.  Much interval improvement of posterior right upper lobe and right lower   lobe consolidation.  New patchy ground-glass opacities at the left apex may be related to   edema or be infectious/inflammatory in etiology.  New small left pleural effusion. < end of copied text >  1/23 R PTC  (placed by IR 1/18) --> LWS this AM; patient NPO for IR repositioning/swap of chest tube today. Monitor Chest tube outputs  1/24 VSS  pigtail  unsuccessful repositioning  1/23 - output 1120 overnight -AM  XRay pending  1/25 Daily CXR;  pulm toileting. rpt CT imaging on MONDAY per Thoracic Attending  1/26 VSS R Ct # 1 minimal drainage.  RCT #2- w/ 180cc in last 24 hrs keep to lws - rpt imaging Monday to assess need for RVATS decort.  1/28 Keep NPO for Right VATs decort today with Dr. Ramírez. H/H 6.1/18.3, Platelets 48K, INR 2.57, please transfuse PRBC/PLATELETS/FFP WITH GOAL Hgb >8, PLTS >100 K, INR BELOW 1.7.   1/29 h/h 8/23, Maintain chest tubes to lws x 72 hrs  1/30 Extubated, chest tube drainage serosanguinous. Transfused 1uPRBC today h/h 7/21 1/31 CXR increase right sided opacities. c/w pulmonary toileting, incentive spirometry  2/1 VSS OOB  + BM   XRAY this am chest tubes  tosuction -please plts or products INR 2.3 tube with sanguinous drainage   2/2 H/H 7/23 INR 2.2 Recommend FFP today. Maintain chest tubes to LWS  2/3 H/H 7/22 Pending cxr. Anticipate chest tube removal

## 2025-02-03 NOTE — PROGRESS NOTE ADULT - SUBJECTIVE AND OBJECTIVE BOX
Interval Events:   - Afebrile, NAEON  - He denies any dyspnea but notes progressive LE edema    ROS:   12 point review of systems performed and negative except otherwise noted in HPI.    Hospital Medications:  albuterol    90 MICROgram(s) HFA Inhaler 2 Puff(s) Inhalation every 6 hours PRN  chlorhexidine 2% Cloths 1 Application(s) Topical <User Schedule>  folic acid 1 milliGRAM(s) Oral daily  lactulose Syrup 20 Gram(s) Oral every 12 hours  melatonin 5 milliGRAM(s) Oral at bedtime  midodrine 10 milliGRAM(s) Oral every 8 hours  Nephro-shania 1 Tablet(s) Oral daily  pantoprazole    Tablet 40 milliGRAM(s) Oral before breakfast  piperacillin/tazobactam IVPB.. 3.375 Gram(s) IV Intermittent every 12 hours  rifAXIMin 550 milliGRAM(s) Oral every 12 hours  thiamine 100 milliGRAM(s) Oral daily      PHYSICAL EXAM:   Vital Signs:  Vital Signs Last 24 Hrs  T(C): 36.9 (17 Jan 2025 09:29), Max: 37.3 (17 Jan 2025 00:35)  T(F): 98.4 (17 Jan 2025 09:29), Max: 99.2 (17 Jan 2025 00:35)  HR: 84 (17 Jan 2025 09:29) (65 - 106)  BP: 109/54 (17 Jan 2025 09:29) (104/51 - 129/62)  BP(mean): 89 (16 Jan 2025 18:00) (74 - 89)  RR: 16 (17 Jan 2025 09:29) (16 - 36)  SpO2: 99% (17 Jan 2025 09:29) (95% - 99%)    Parameters below as of 17 Jan 2025 05:00  Patient On (Oxygen Delivery Method): room air      Daily     Daily     PHYSICAL EXAM:   GENERAL: intubated, sedated  HEENT:  Normocephalic/atraumatic, scleral icterus  CHEST:  intubated, chest tubes in place draining sanguinous output  HEART:  Regular rate and rhythm  ABDOMEN:  Soft, mild diffuse tenderness, mildly distended, normoactive bowel sounds, splenomegaly, stigmata of chronic liver dz  EXTREMITIES: No cyanosis, clubbing, or edema  SKIN:  No rash  NEURO:  sedated    LABS: reviewed                        7.4    13.96 )-----------( 73       ( 17 Jan 2025 06:42 )             22.4     01-16    130[L]  |  96  |  39[H]  ----------------------------<  117[H]  4.3   |  22  |  2.59[H]    Ca    8.6      16 Jan 2025 07:39  Phos  4.3     01-17  Mg     2.0     01-17    TPro  6.4  /  Alb  2.5[L]  /  TBili  5.8[H]  /  DBili  2.1[H]  /  AST  32  /  ALT  7[L]  /  AlkPhos  116  01-17    LIVER FUNCTIONS - ( 17 Jan 2025 06:41 )  Alb: 2.5 g/dL / Pro: 6.4 g/dL / ALK PHOS: 116 U/L / ALT: 7 U/L / AST: 32 U/L / GGT: x             Interval Diagnostic Studies: see sunrise for full report

## 2025-02-04 LAB
ALBUMIN SERPL ELPH-MCNC: 3.2 G/DL — LOW (ref 3.3–5)
ALP SERPL-CCNC: 105 U/L — SIGNIFICANT CHANGE UP (ref 40–120)
ALT FLD-CCNC: 8 U/L — LOW (ref 10–45)
ANION GAP SERPL CALC-SCNC: 9 MMOL/L — SIGNIFICANT CHANGE UP (ref 5–17)
APTT BLD: 49.7 SEC — HIGH (ref 24.5–35.6)
AST SERPL-CCNC: 37 U/L — SIGNIFICANT CHANGE UP (ref 10–40)
BILIRUB SERPL-MCNC: 3.9 MG/DL — HIGH (ref 0.2–1.2)
BUN SERPL-MCNC: 54 MG/DL — HIGH (ref 7–23)
CALCIUM SERPL-MCNC: 9.4 MG/DL — SIGNIFICANT CHANGE UP (ref 8.4–10.5)
CHLORIDE SERPL-SCNC: 99 MMOL/L — SIGNIFICANT CHANGE UP (ref 96–108)
CO2 SERPL-SCNC: 25 MMOL/L — SIGNIFICANT CHANGE UP (ref 22–31)
CREAT SERPL-MCNC: 1.2 MG/DL — SIGNIFICANT CHANGE UP (ref 0.5–1.3)
EGFR: 76 ML/MIN/1.73M2 — SIGNIFICANT CHANGE UP
EGFR: 76 ML/MIN/1.73M2 — SIGNIFICANT CHANGE UP
GLUCOSE BLDC GLUCOMTR-MCNC: 116 MG/DL — HIGH (ref 70–99)
GLUCOSE BLDC GLUCOMTR-MCNC: 119 MG/DL — HIGH (ref 70–99)
GLUCOSE BLDC GLUCOMTR-MCNC: 160 MG/DL — HIGH (ref 70–99)
GLUCOSE SERPL-MCNC: 126 MG/DL — HIGH (ref 70–99)
HCT VFR BLD CALC: 22.8 % — LOW (ref 39–50)
HGB BLD-MCNC: 7.5 G/DL — LOW (ref 13–17)
INR BLD: 2.34 RATIO — HIGH (ref 0.85–1.16)
MAGNESIUM SERPL-MCNC: 1.8 MG/DL — SIGNIFICANT CHANGE UP (ref 1.6–2.6)
MCHC RBC-ENTMCNC: 30.6 PG — SIGNIFICANT CHANGE UP (ref 27–34)
MCHC RBC-ENTMCNC: 32.9 G/DL — SIGNIFICANT CHANGE UP (ref 32–36)
MCV RBC AUTO: 93.1 FL — SIGNIFICANT CHANGE UP (ref 80–100)
NRBC # BLD: 0 /100 WBCS — SIGNIFICANT CHANGE UP (ref 0–0)
NRBC BLD-RTO: 0 /100 WBCS — SIGNIFICANT CHANGE UP (ref 0–0)
PHOSPHATE SERPL-MCNC: 2.6 MG/DL — SIGNIFICANT CHANGE UP (ref 2.5–4.5)
PLATELET # BLD AUTO: 81 K/UL — LOW (ref 150–400)
POTASSIUM SERPL-MCNC: 3.9 MMOL/L — SIGNIFICANT CHANGE UP (ref 3.5–5.3)
POTASSIUM SERPL-SCNC: 3.9 MMOL/L — SIGNIFICANT CHANGE UP (ref 3.5–5.3)
PROT SERPL-MCNC: 7.2 G/DL — SIGNIFICANT CHANGE UP (ref 6–8.3)
PROTHROM AB SERPL-ACNC: 26.4 SEC — HIGH (ref 9.9–13.4)
RBC # BLD: 2.45 M/UL — LOW (ref 4.2–5.8)
RBC # FLD: 18.1 % — HIGH (ref 10.3–14.5)
SODIUM SERPL-SCNC: 133 MMOL/L — LOW (ref 135–145)
SODIUM UR-SCNC: 12 MMOL/L — SIGNIFICANT CHANGE UP
WBC # BLD: 5.04 K/UL — SIGNIFICANT CHANGE UP (ref 3.8–10.5)
WBC # FLD AUTO: 5.04 K/UL — SIGNIFICANT CHANGE UP (ref 3.8–10.5)

## 2025-02-04 PROCEDURE — 99232 SBSQ HOSP IP/OBS MODERATE 35: CPT

## 2025-02-04 PROCEDURE — 71045 X-RAY EXAM CHEST 1 VIEW: CPT | Mod: 26

## 2025-02-04 PROCEDURE — 99152 MOD SED SAME PHYS/QHP 5/>YRS: CPT

## 2025-02-04 PROCEDURE — 74183 MRI ABD W/O CNTR FLWD CNTR: CPT | Mod: 26

## 2025-02-04 PROCEDURE — 93454 CORONARY ARTERY ANGIO S&I: CPT | Mod: 26

## 2025-02-04 RX ADMIN — Medication 1 APPLICATION(S): at 05:29

## 2025-02-04 RX ADMIN — BUMETANIDE 1 MILLIGRAM(S): 1 TABLET ORAL at 12:02

## 2025-02-04 RX ADMIN — HEPARIN SODIUM 5000 UNIT(S): 1000 INJECTION INTRAVENOUS; SUBCUTANEOUS at 21:18

## 2025-02-04 RX ADMIN — FOLIC ACID 1 MILLIGRAM(S): 1 TABLET ORAL at 12:02

## 2025-02-04 RX ADMIN — MIDODRINE HYDROCHLORIDE 10 MILLIGRAM(S): 5 TABLET ORAL at 17:28

## 2025-02-04 RX ADMIN — MIDODRINE HYDROCHLORIDE 10 MILLIGRAM(S): 5 TABLET ORAL at 01:53

## 2025-02-04 RX ADMIN — MIDODRINE HYDROCHLORIDE 10 MILLIGRAM(S): 5 TABLET ORAL at 12:02

## 2025-02-04 RX ADMIN — ALBUMIN (HUMAN) 50 MILLILITER(S): 12.5 INJECTION, SOLUTION INTRAVENOUS at 17:32

## 2025-02-04 RX ADMIN — CEFTRIAXONE 100 MILLIGRAM(S): 500 INJECTION, POWDER, FOR SOLUTION INTRAMUSCULAR; INTRAVENOUS at 17:31

## 2025-02-04 RX ADMIN — ALBUMIN (HUMAN) 50 MILLILITER(S): 12.5 INJECTION, SOLUTION INTRAVENOUS at 05:27

## 2025-02-04 RX ADMIN — Medication 500 MILLIGRAM(S): at 17:27

## 2025-02-04 RX ADMIN — Medication 40 MILLIGRAM(S): at 12:02

## 2025-02-04 RX ADMIN — TRAMADOL HYDROCHLORIDE 50 MILLIGRAM(S): 50 TABLET, FILM COATED ORAL at 00:35

## 2025-02-04 RX ADMIN — LACTULOSE 30 GRAM(S): 10 SOLUTION ORAL at 17:27

## 2025-02-04 RX ADMIN — TRAMADOL HYDROCHLORIDE 50 MILLIGRAM(S): 50 TABLET, FILM COATED ORAL at 18:59

## 2025-02-04 RX ADMIN — HEPARIN SODIUM 5000 UNIT(S): 1000 INJECTION INTRAVENOUS; SUBCUTANEOUS at 05:29

## 2025-02-04 RX ADMIN — Medication 1 TABLET(S): at 12:02

## 2025-02-04 RX ADMIN — INSULIN LISPRO 2: 100 INJECTION, SOLUTION INTRAVENOUS; SUBCUTANEOUS at 17:26

## 2025-02-04 RX ADMIN — TRAMADOL HYDROCHLORIDE 50 MILLIGRAM(S): 50 TABLET, FILM COATED ORAL at 06:57

## 2025-02-04 RX ADMIN — HEPARIN SODIUM 5000 UNIT(S): 1000 INJECTION INTRAVENOUS; SUBCUTANEOUS at 12:03

## 2025-02-04 RX ADMIN — TRAMADOL HYDROCHLORIDE 50 MILLIGRAM(S): 50 TABLET, FILM COATED ORAL at 00:05

## 2025-02-04 RX ADMIN — Medication 100 MILLIGRAM(S): at 12:03

## 2025-02-04 RX ADMIN — BUMETANIDE 1 MILLIGRAM(S): 1 TABLET ORAL at 05:28

## 2025-02-04 RX ADMIN — Medication 500 MILLIGRAM(S): at 05:29

## 2025-02-04 RX ADMIN — TRAMADOL HYDROCHLORIDE 50 MILLIGRAM(S): 50 TABLET, FILM COATED ORAL at 19:27

## 2025-02-04 RX ADMIN — LACTULOSE 30 GRAM(S): 10 SOLUTION ORAL at 05:29

## 2025-02-04 NOTE — PROGRESS NOTE ADULT - SUBJECTIVE AND OBJECTIVE BOX
Interval Events:   - Afebrile, NAEON  - He denies any dyspnea at this time  - Had 1.77 L urine output    ROS:   12 point review of systems performed and negative except otherwise noted in HPI.    Hospital Medications:  albuterol    90 MICROgram(s) HFA Inhaler 2 Puff(s) Inhalation every 6 hours PRN  chlorhexidine 2% Cloths 1 Application(s) Topical <User Schedule>  folic acid 1 milliGRAM(s) Oral daily  lactulose Syrup 20 Gram(s) Oral every 12 hours  melatonin 5 milliGRAM(s) Oral at bedtime  midodrine 10 milliGRAM(s) Oral every 8 hours  Nephro-shania 1 Tablet(s) Oral daily  pantoprazole    Tablet 40 milliGRAM(s) Oral before breakfast  piperacillin/tazobactam IVPB.. 3.375 Gram(s) IV Intermittent every 12 hours  rifAXIMin 550 milliGRAM(s) Oral every 12 hours  thiamine 100 milliGRAM(s) Oral daily      PHYSICAL EXAM:   Vital Signs:  Vital Signs Last 24 Hrs  T(C): 36.9 (17 Jan 2025 09:29), Max: 37.3 (17 Jan 2025 00:35)  T(F): 98.4 (17 Jan 2025 09:29), Max: 99.2 (17 Jan 2025 00:35)  HR: 84 (17 Jan 2025 09:29) (65 - 106)  BP: 109/54 (17 Jan 2025 09:29) (104/51 - 129/62)  BP(mean): 89 (16 Jan 2025 18:00) (74 - 89)  RR: 16 (17 Jan 2025 09:29) (16 - 36)  SpO2: 99% (17 Jan 2025 09:29) (95% - 99%)    Parameters below as of 17 Jan 2025 05:00  Patient On (Oxygen Delivery Method): room air      Daily     Daily     PHYSICAL EXAM:   GENERAL: awake, well appearing  HEENT:  Normocephalic/atraumatic, scleral icterus  CHEST:  no resp distress, crackles at base  HEART:  Regular rate and rhythm  ABDOMEN:  Soft, mild diffuse tenderness, mildly distended, normoactive bowel sounds, splenomegaly, stigmata of chronic liver dz  EXTREMITIES: No cyanosis, clubbing, or edema  SKIN:  No rash  NEURO: AAOx3, no asterixis    LABS: reviewed                        7.4    13.96 )-----------( 73       ( 17 Jan 2025 06:42 )             22.4     01-16    130[L]  |  96  |  39[H]  ----------------------------<  117[H]  4.3   |  22  |  2.59[H]    Ca    8.6      16 Jan 2025 07:39  Phos  4.3     01-17  Mg     2.0     01-17    TPro  6.4  /  Alb  2.5[L]  /  TBili  5.8[H]  /  DBili  2.1[H]  /  AST  32  /  ALT  7[L]  /  AlkPhos  116  01-17    LIVER FUNCTIONS - ( 17 Jan 2025 06:41 )  Alb: 2.5 g/dL / Pro: 6.4 g/dL / ALK PHOS: 116 U/L / ALT: 7 U/L / AST: 32 U/L / GGT: x             Interval Diagnostic Studies: see sunrise for full report

## 2025-02-04 NOTE — PROGRESS NOTE ADULT - ATTENDING COMMENTS
44M with alcohol-associated cirrhosis, ascites, grade 2 EVs, and HE, with a history of alcoholic hepatitis (7/2024) and right calf hematoma (10/2024), presents with abdominal pain/distension, renal failure, and empyema. He is actively drinking. MELD is 24. OLT evaluation is ongoing. Prior EGD (7/2024) showed grade 2 EVs and a small gastric ulcer. He is being treated for HE (now resolved).  Repeat MRI abdomen is needed for HCC surveillance (prior MRI poor quality). He has MANUELITO (likely HRS/ATN) requiring dialysis and is on albumin and bumetanide, tolerating well, Cr ~1.2 now. He also has an empyema (s/p chest tube placement and VATS) with *Citrobacter* bacteremia treated with ceftriaxone and metronidazole.  ID and thoracic surgery recommendations are being followed. Chest tube removal per CT surgery. He has anemia and thrombocytopenia and received blood products on 1/28 ( HB 7.5 stable). Further transfusions will be given as needed. DVT prophylaxis is held. He will need left heart cath prior to presentation for listing. SW/psyche evaluated.

## 2025-02-04 NOTE — PROGRESS NOTE ADULT - ASSESSMENT
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 Actively drinking. Now p/w abdominal pain/distension with worsening of liver tests possibly triggered by infection. DDx also includes alcohol associated hepatitis given hx of alc hep 7/2024 which improved with steroids  - OLT: evaluation ongoing, if cleared from ID standpoint pursue [facility] today  - EV: EGD 7/2024 for melena showed grade II EV, small gastric ulcer (neg HP), and portal gastropathy   - HE: c/w lactulose and rifaximin, goal 3-4 BM/day  - HCC screening: MRI nondiagnostic, consider repeat imaging                                 - MANUELITO: C/w albumin assisted diuresis with bumex 1 mg BID for volume overload  - C/w home midodrine 10 mg tid  - C/w PPI for stress ulcer ppx and hx of ulcer  - Trend MELD labs daily (cmp, INR) daily  - Strict I/Os, daily wts    #Empyema  CT chest noncontrast reviewed from OSH showing moderate sized complex R sided pleural effusion. s/p chest tube placed 1/18 with cultures growing Citrobacter c/w empyema. Requiring 2nd chest tube placement then now s/p VATS for persistent effusion  - s/p VATS 1/29  - Clarify with CTS regarding chest tube removal  - C/w CTX 2 g IV and metronidazole 500 mg q12h daily (for 7d from VATS)  - Trend cbc and fever curve  - Appreciate thoracic surgery and ID recs    #MANUELITO  Found to have acute renal failure requiring dialysis at OSH, ddx includes HRS vs ATN. Permacath in place. Cr baseline 1.12 10/2024 required albumin assisted diuresis with bumex 2 mg IV q12h  - C/w albumin assisted diuresis w/ bumex 1 mg bid  - Strict I/Os, daily wts  - Trend CMP daily  - Appreciate transplant renal recs    #Anemia  #Thrombocytopenia  Normocytic anemia with Hgb 8.5 and plt count 64 likely due to chronic liver dz  - If planned for chest tube removal today, give 1 unit plt and 1 unit FFP prior to removal  - s/p 4 units pRBC, 2 FFP, 2 plts on 1/28  - Transfuse Hgb>7, plt>50 given bleeding  - Hold DVT ppx for bleeding    All recommendations are tentative until note is attested by attending.     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 Actively drinking. Now p/w abdominal pain/distension with worsening of liver tests possibly triggered by infection. DDx also includes alcohol associated hepatitis given hx of alc hep 7/2024 which improved with steroids  - OLT: evaluation ongoing, pursue [facility] today  - EV: EGD 7/2024 for melena showed grade II EV, small gastric ulcer (neg HP), and portal gastropathy   - HE: c/w lactulose and rifaximin, goal 3-4 BM/day  - HCC screening: MRI nondiagnostic, repeat MRI abdomen (1.5 Chelle magnet)                               - MANUELITO: C/w albumin assisted diuresis with bumex 1 mg BID for volume overload  - C/w home midodrine 10 mg tid  - C/w PPI for stress ulcer ppx and hx of ulcer  - Trend MELD labs daily (cmp, INR) daily  - Strict I/Os, daily wts    #Empyema  CT chest noncontrast reviewed from OSH showing moderate sized complex R sided pleural effusion. s/p chest tube placed 1/18 with cultures growing Citrobacter c/w empyema. Requiring 2nd chest tube placement then now s/p VATS for persistent effusion  - s/p VATS 1/29  - Clarify with CTS regarding chest tube removal  - C/w CTX 2 g IV and metronidazole 500 mg q12h daily (for 7d from VATS)  - Trend cbc and fever curve  - Appreciate thoracic surgery and ID recs    #MANUELITO  Found to have acute renal failure requiring dialysis at OSH, ddx includes HRS vs ATN. Permacath in place. Cr baseline 1.12 10/2024 required albumin assisted diuresis with bumex 2 mg IV q12h  - C/w albumin assisted diuresis w/ bumex 1 mg bid  - Send Louise for diuretic dosing  - Strict I/Os, daily wts  - Trend CMP daily  - Appreciate transplant renal recs    #Anemia  #Thrombocytopenia  Normocytic anemia with Hgb 8.5 and plt count 64 likely due to chronic liver dz  - If planned for chest tube removal today, give 1 unit plt and 1 unit FFP prior to removal  - s/p 4 units pRBC, 2 FFP, 2 plts on 1/28  - Transfuse Hgb>7, plt>50 given bleeding  - Hold DVT ppx for bleeding    All recommendations are tentative until note is attested by attending.     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 Actively drinking. Now p/w abdominal pain/distension with worsening of liver tests possibly triggered by infection. DDx also includes alcohol associated hepatitis given hx of alc hep 7/2024 which improved with steroids  - OLT: evaluation ongoing, pursue [facility] today  - EV: EGD 7/2024 for melena showed grade II EV, small gastric ulcer (neg HP), and portal gastropathy   - HE: c/w lactulose and rifaximin, goal 3-4 BM/day  - HCC screening: MRI nondiagnostic, repeat MRI abdomen (1.5 Chelle magnet)                               - MANUELITO: C/w albumin assisted diuresis with bumex 1 mg BID for volume overload  - C/w home midodrine 10 mg tid  - C/w PPI for stress ulcer ppx and hx of ulcer  - Trend MELD labs daily (cmp, INR) daily  - Strict I/Os, daily wts    #Empyema  CT chest noncontrast reviewed from OSH showing moderate sized complex R sided pleural effusion. s/p chest tube placed 1/18 with cultures growing Citrobacter c/w empyema. Requiring 2nd chest tube placement then now s/p VATS for persistent effusion  - s/p VATS 1/29  - s/p removal of one chest tube with 2 chest tubes in place  - C/w CTX 2 g IV and metronidazole 500 mg q12h daily (for 7d from VATS)  - Trend cbc and fever curve  - Appreciate thoracic surgery and ID recs    #MANUELITO  Found to have acute renal failure requiring dialysis at OSH, ddx includes HRS vs ATN. Permacath in place. Cr baseline 1.12 10/2024 required albumin assisted diuresis with bumex 2 mg IV q12h  - C/w albumin assisted diuresis w/ bumex 1 mg bid  - Send Louise for diuretic dosing  - Strict I/Os, daily wts  - Trend CMP daily  - Appreciate transplant renal recs    #Anemia  #Thrombocytopenia  Normocytic anemia with Hgb 8.5 and plt count 64 likely due to chronic liver dz  - If planned for chest tube removal today, give 1 unit plt and 1 unit FFP prior to removal  - s/p 4 units pRBC, 2 FFP, 2 plts on 1/28  - Transfuse Hgb>7, plt>50 given bleeding  - Hold DVT ppx for bleeding    All recommendations are tentative until note is attested by attending.     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 Actively drinking. Now p/w abdominal pain/distension with worsening of liver tests possibly triggered by infection. DDx also includes alcohol associated hepatitis given hx of alc hep 7/2024 which improved with steroids  - OLT: evaluation ongoing, pursue [facility] today  - EV: EGD 7/2024 for melena showed grade II EV, small gastric ulcer (neg HP), and portal gastropathy   - HE: c/w lactulose and rifaximin, goal 3-4 BM/day  - HCC screening: MRI nondiagnostic, repeat MRI abdomen (1.5 Chelle magnet)                               - MANUELITO: C/w albumin assisted diuresis with bumex 1 mg BID for volume overload  - C/w home midodrine 10 mg tid  - C/w PPI for stress ulcer ppx and hx of ulcer  - Trend MELD labs daily (cmp, INR) daily  - Strict I/Os, daily wts    #Empyema  CT chest noncontrast reviewed from OSH showing moderate sized complex R sided pleural effusion. s/p chest tube placed 1/18 with cultures growing Citrobacter c/w empyema. Requiring 2nd chest tube placement then now s/p VATS for persistent effusion  - s/p VATS 1/29  - s/p removal of one chest tube with 2 chest tubes in place  - Confirm can stop CTX 2 g IV and metronidazole 500 mg q12h daily with ID s/p 7d course  - Trend cbc and fever curve  - Appreciate thoracic surgery and ID recs    #MANUELITO  Found to have acute renal failure requiring dialysis at OSH, ddx includes HRS vs ATN. Permacath in place. Cr baseline 1.12 10/2024 required albumin assisted diuresis with bumex 2 mg IV q12h  - C/w albumin assisted diuresis w/ bumex 1 mg bid  - Send Louise for diuretic dosing  - Strict I/Os, daily wts  - Trend CMP daily  - Appreciate transplant renal recs    #Anemia  #Thrombocytopenia  Normocytic anemia with Hgb 8.5 and plt count 64 likely due to chronic liver dz  - If planned for chest tube removal today, give 1 unit plt and 1 unit FFP prior to removal  - s/p 4 units pRBC, 2 FFP, 2 plts on 1/28  - Transfuse Hgb>7, plt>50 given bleeding  - Hold DVT ppx for bleeding    All recommendations are tentative until note is attested by attending.     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 Actively drinking. Now p/w abdominal pain/distension with worsening of liver tests possibly triggered by infection. DDx also includes alcohol associated hepatitis given hx of alc hep 7/2024 which improved with steroids  - OLT: evaluation ongoing, pursue [facility] today  - EV: EGD 7/2024 for melena showed grade II EV, small gastric ulcer (neg HP), and portal gastropathy   - HE: c/w lactulose and rifaximin, goal 3-4 BM/day  - HCC screening: MRI nondiagnostic, repeat MRI abdomen (1.5 Chelle magnet)                               - MANUELITO: C/w albumin assisted diuresis with bumex 1 mg BID for volume overload  - C/w home midodrine 10 mg tid  - C/w PPI for stress ulcer ppx and hx of ulcer  - Trend MELD labs daily (cmp, INR) daily  - Strict I/Os, daily wts    #Empyema  CT chest noncontrast reviewed from OSH showing moderate sized complex R sided pleural effusion. s/p chest tube placed 1/18 with cultures growing Citrobacter c/w empyema. Requiring 2nd chest tube placement then now s/p VATS for persistent effusion  - s/p VATS 1/28  - s/p removal of one chest tube with 2 chest tubes in place  - s/p CTX 2 g IV and metronidazole 500 mg q12h daily for 7d from VATS 1/28, will confirm can stop abx with ID  - Trend cbc and fever curve  - Appreciate thoracic surgery and ID recs    #MANUELITO  Found to have acute renal failure requiring dialysis at OSH, ddx includes HRS vs ATN. Permacath in place. Cr baseline 1.12 10/2024 required albumin assisted diuresis with bumex 2 mg IV q12h  - C/w albumin assisted diuresis w/ bumex 1 mg bid  - Send Louise for diuretic dosing  - Strict I/Os, daily wts  - Trend CMP daily  - Appreciate transplant renal recs    #Anemia  #Thrombocytopenia  Normocytic anemia with Hgb 8.5 and plt count 64 likely due to chronic liver dz  - If planned for chest tube removal today, give 1 unit plt and 1 unit FFP prior to removal  - s/p 4 units pRBC, 2 FFP, 2 plts on 1/28  - Transfuse Hgb>7, plt>50 given bleeding  - Hold DVT ppx for bleeding    All recommendations are tentative until note is attested by attending.     Judy Tucker, PGY4  Gastroenterology/Hepatology Fellow  Available on [employer] Teams  [phone] (Long Range Pager)  [phone] (Short Range Pager [facility])    After 5 pm, please contact the on-call GI fellow for any urgent issues via the Hospital Call

## 2025-02-04 NOTE — PROGRESS NOTE ADULT - SUBJECTIVE AND OBJECTIVE BOX
Patient is a 44y old  Male who presents with a chief complaint of decompensated cirrhosis (2025 18:56)      Vital Signs Last 24 Hrs  T(C): 36.9 (25 @ 09:00), Max: 37.2 (25 @ 05:00)  T(F): 98.5 (25 @ 09:00), Max: 98.9 (25 @ 05:00)  HR: 108 (25 @ 09:00) (81 - 108)  BP: 108/59 (25 @ 09:00) (108/59 - 130/71)  RR: 20 (25 @ 09:00) (18 - 20)  SpO2: 97% (25 @ 09:00) (94% - 99%)                25 @ 07:01  -  25 @ 07:00  --------------------------------------------------------  IN: 1000 mL / OUT: 1765 mL / NET: -765 mL        Daily     Daily Weight in k.4 (2025 05:00)                          7.5    5.04  )-----------( 81       ( 2025 07:21 )             22.8     02-04    133[L]  |  99  |  54[H]  ----------------------------<  126[H]  3.9   |  25  |  1.20    Ca    9.4      2025 07:21  Phos  2.6     02-04  Mg     1.8     02-04    TPro  7.2  /  Alb  3.2[L]  /  TBili  3.9[H]  /  DBili  x   /  AST  37  /  ALT  8[L]  /  AlkPhos  105  02-04          PHYSICAL EXAM  Neurology: A&Ox3, NAD  CV : RRR+S1S2  Lungs: Respirations non-labored, B/L BS CTA  Abdomen: Soft, NT/ND, +BSx4Q  Extremities: B/L LE warm, no edema, +PP           MEDICATIONS  albumin human 25% IVPB 100 milliLiter(s) IV Intermittent every 12 hours  buMETAnide 1 milliGRAM(s) Oral two times a day  cefTRIAXone   IVPB      cefTRIAXone   IVPB 2000 milliGRAM(s) IV Intermittent every 24 hours  chlorhexidine 2% Cloths 1 Application(s) Topical <User Schedule>  folic acid 1 milliGRAM(s) Oral daily  heparin   Injectable 5000 Unit(s) SubCutaneous every 8 hours  HYDROmorphone  Injectable 0.5 milliGRAM(s) IV Push every 3 hours PRN  influenza   Vaccine 0.5 milliLiter(s) IntraMuscular once  insulin lispro (ADMELOG) corrective regimen sliding scale   SubCutaneous three times a day before meals  insulin lispro (ADMELOG) corrective regimen sliding scale   SubCutaneous at bedtime  lactulose Syrup 30 Gram(s) Oral every 12 hours  metroNIDAZOLE    Tablet 500 milliGRAM(s) Oral every 12 hours  midodrine 10 milliGRAM(s) Oral every 8 hours  multivitamin 1 Tablet(s) Oral daily  pantoprazole    Tablet 40 milliGRAM(s) Oral daily  rifAXIMin 550 milliGRAM(s) Oral every 12 hours  thiamine 100 milliGRAM(s) Oral daily  traMADol 25 milliGRAM(s) Oral every 6 hours PRN  traMADol 50 milliGRAM(s) Oral every 6 hours PRN     Patient is a 44y old  Male who presents with a chief complaint of decompensated cirrhosis (2025 18:56)      Vital Signs Last 24 Hrs  T(C): 36.9 (25 @ 09:00), Max: 37.2 (25 @ 05:00)  T(F): 98.5 (25 @ 09:00), Max: 98.9 (25 @ 05:00)  HR: 108 (25 @ 09:00) (81 - 108)  BP: 108/59 (25 @ 09:00) (108/59 - 130/71)  RR: 20 (25 @ 09:00) (18 - 20)  SpO2: 97% (25 @ 09:00) (94% - 99%)                25 @ 07:01  -  25 @ 07:00  --------------------------------------------------------  IN: 1000 mL / OUT: 1765 mL / NET: -765 mL        Daily Weight in k.4 (2025 05:00)                          7.5    5.04  )-----------( 81       ( 2025 07:21 )             22.8     133[L]  |  99  |  54[H]  ----------------------------<  126[H]  3.9   |  25  |  1.20    Phos  2.6       Mg     1.8       AST  37  /  ALT  8[L]  /  AlkPhos  105  -          PHYSICAL EXAM  Neurology: A&Ox3, NAD  CV : RRR+S1S2  Lungs: Respirations non-labored, B/L BS CTA  +Right chest tubes x2 with serosanguinous drainage to LWS, no air leak  Abdomen: Soft, NT/ND, +BSx4Q  Extremities: B/L LE warm, no edema, +PP             MEDICATIONS  albumin human 25% IVPB 100 milliLiter(s) IV Intermittent every 12 hours  buMETAnide 1 milliGRAM(s) Oral two times a day  cefTRIAXone   IVPB 2000 milliGRAM(s) IV Intermittent every 24 hours  chlorhexidine 2% Cloths 1 Application(s) Topical <User Schedule>  folic acid 1 milliGRAM(s) Oral daily  heparin   Injectable 5000 Unit(s) SubCutaneous every 8 hours  HYDROmorphone  Injectable 0.5 milliGRAM(s) IV Push every 3 hours PRN  influenza   Vaccine 0.5 milliLiter(s) IntraMuscular once  insulin lispro (ADMELOG) corrective regimen sliding scale   SubCutaneous three times a day before meals  insulin lispro (ADMELOG) corrective regimen sliding scale   SubCutaneous at bedtime  lactulose Syrup 30 Gram(s) Oral every 12 hours  metroNIDAZOLE    Tablet 500 milliGRAM(s) Oral every 12 hours  midodrine 10 milliGRAM(s) Oral every 8 hours  multivitamin 1 Tablet(s) Oral daily  pantoprazole    Tablet 40 milliGRAM(s) Oral daily  rifAXIMin 550 milliGRAM(s) Oral every 12 hours  thiamine 100 milliGRAM(s) Oral daily  traMADol 25 milliGRAM(s) Oral every 6 hours PRN  traMADol 50 milliGRAM(s) Oral every 6 hours PRN

## 2025-02-04 NOTE — PROGRESS NOTE ADULT - PROBLEM SELECTOR PLAN 1
s/p Right VATS decortication 1/28 with Dr. Ramírez  Transfused multiple blood products PRBC/PLATELETS/FFP WITH GOAL Hgb >8, PLTS >100 K, INR BELOW 1.7  Chest tubes to suction  Continue care as per primary team-INR 2.28 tube with sanguinous drainage   daily xray  Plan as per Dr Ramírez s/p Right VATS decortication 1/28 with Dr. Ramírez  Maintain right chest tubes to water seal  Strict I & Os, monitor drainage  Daily CXR while tube in place   Transfused multiple blood products PRBC/PLATELETS/FFP WITH GOAL Hgb >8, PLTS >100 K, INR BELOW 1.7  Continue care as per primary team

## 2025-02-04 NOTE — PROGRESS NOTE ADULT - ASSESSMENT
43yo M with Hx decompensated EtOH cirrhosis (c/b recurrent ascites, grade II EV, and HE) who presented to OSH on 1/16 with abdominal pain and distension, found to have ascites and acute renal failure requiring initiation of HD (s/p Permacath placement c/b bleeding), transferred to [facility] for transplant evaluation and management. CT chest at OSH was significant for a complex R pleural effusion s/p R pigtail placement by IR on 1/18 with drainage of purulent material c/f empyema. Thoracic Surgery consulted for empyema and chest tube management.   1/20 R pigtail drained 700/910, CT of Chest on Tuesday to assess effusion and make final plan determination  1/21 R pigtail drained 80/240, CT of chest to assess effusion today   1/22 R pigtail maintained. CT of chest revealing:   < from: CT Chest No Cont (01.21.25 @ 19:29) >  IMPRESSION:.  Marked interval decrease in size ofright-sided pleural fluid and gas   collection (presumably empyema) status post placement of pigtail catheter   as described above.  Much interval improvement of posterior right upper lobe and right lower   lobe consolidation.  New patchy ground-glass opacities at the left apex may be related to   edema or be infectious/inflammatory in etiology.  New small left pleural effusion. < end of copied text >  1/23 R PTC  (placed by IR 1/18) --> LWS this AM; patient NPO for IR repositioning/swap of chest tube today. Monitor Chest tube outputs  1/24 VSS  pigtail  unsuccessful repositioning  1/23 - output 1120 overnight -AM  XRay pending  1/25 Daily CXR;  pulm toileting. rpt CT imaging on MONDAY per Thoracic Attending  1/26 VSS R Ct # 1 minimal drainage.  RCT #2- w/ 180cc in last 24 hrs keep to lws - rpt imaging Monday to assess need for RVATS decort.  1/28 Keep NPO for Right VATs decort today with Dr. Ramírez. H/H 6.1/18.3, Platelets 48K, INR 2.57, please transfuse PRBC/PLATELETS/FFP WITH GOAL Hgb >8, PLTS >100 K, INR BELOW 1.7.   1/29 h/h 8/23, Maintain chest tubes to lws x 72 hrs  1/30 Extubated, chest tube drainage serosanguinous. Transfused 1uPRBC today h/h 7/21 1/31 CXR increase right sided opacities. c/w pulmonary toileting, incentive spirometry  2/1 VSS OOB  + BM   XRAY this am chest tubes  tosuction -please plts or products INR 2.3 tube with sanguinous drainage   2/2 H/H 7/23 INR 2.2 Recommend FFP today. Maintain chest tubes to LWS  2/3 H/H 7/22 Pending cxr. Anticipate chest tube removal   45yo M with Hx decompensated EtOH cirrhosis (c/b recurrent ascites, grade II EV, and HE) who presented to OSH on 1/16 with abdominal pain and distension, found to have ascites and acute renal failure requiring initiation of HD (s/p Permacath placement c/b bleeding), transferred to NS for transplant evaluation and management. CT chest at OSH was significant for a complex R pleural effusion s/p R pigtail placement by IR on 1/18 with drainage of purulent material c/f empyema. Thoracic Surgery consulted for empyema and chest tube management.   1/20 R pigtail drained 700/910, CT of Chest on Tuesday to assess effusion and make final plan determination  1/21 R pigtail drained 80/240, CT of chest to assess effusion today   1/22 R pigtail maintained. CT of chest revealing:   < from: CT Chest No Cont (01.21.25 @ 19:29) >  IMPRESSION:.  Marked interval decrease in size ofright-sided pleural fluid and gas   collection (presumably empyema) status post placement of pigtail catheter   as described above.  Much interval improvement of posterior right upper lobe and right lower   lobe consolidation.  New patchy ground-glass opacities at the left apex may be related to   edema or be infectious/inflammatory in etiology.  New small left pleural effusion. < end of copied text >  1/23 R PTC  (placed by IR 1/18) --> LWS this AM; patient NPO for IR repositioning/swap of chest tube today. Monitor Chest tube outputs  1/24 VSS  pigtail  unsuccessful repositioning  1/23 - output 1120 overnight -AM  XRay pending  1/25 Daily CXR;  pulm toileting. rpt CT imaging on MONDAY per Thoracic Attending  1/26 VSS R Ct # 1 minimal drainage.  RCT #2- w/ 180cc in last 24 hrs keep to lws - rpt imaging Monday to assess need for RVATS decort.  1/28 Keep NPO for Right VATs decort today with Dr. Ramírez. H/H 6.1/18.3, Platelets 48K, INR 2.57, please transfuse PRBC/PLATELETS/FFP WITH GOAL Hgb >8, PLTS >100 K, INR BELOW 1.7.   1/29 h/h 8/23, Maintain chest tubes to lws x 72 hrs  1/30 Extubated, chest tube drainage serosanguinous. Transfused 1uPRBC today h/h 7/21 1/31 CXR increase right sided opacities. c/w pulmonary toileting, incentive spirometry  2/1 VSS OOB  + BM   XRAY this am chest tubes  tosuction -please plts or products INR 2.3 tube with sanguinous drainage   2/2 H/H 7/23 INR 2.2 Recommend FFP today. Maintain chest tubes to LWS  2/3 H/H 7/22 Pending cxr. Anticipate chest tube removal  2/4 VSS, H/H stable 7.5/22.8, pending AM CXR. Right #1 CT output 30cc/24h, #2 CT 30cc/24h. Chest tubes placed to water seal.      45yo M with Hx decompensated EtOH cirrhosis (c/b recurrent ascites, grade II EV, and HE) who presented to OSH on 1/16 with abdominal pain and distension, found to have ascites and acute renal failure requiring initiation of HD (s/p Permacath placement c/b bleeding), transferred to NS for transplant evaluation and management. CT chest at OSH was significant for a complex R pleural effusion s/p R pigtail placement by IR on 1/18 with drainage of purulent material c/f empyema. Thoracic Surgery consulted for empyema and chest tube management.   1/20 R pigtail drained 700/910, CT of Chest on Tuesday to assess effusion and make final plan determination  1/21 R pigtail drained 80/240, CT of chest to assess effusion today   1/22 R pigtail maintained. CT of chest revealing:   < from: CT Chest No Cont (01.21.25 @ 19:29) >  IMPRESSION:.  Marked interval decrease in size ofright-sided pleural fluid and gas   collection (presumably empyema) status post placement of pigtail catheter   as described above.  Much interval improvement of posterior right upper lobe and right lower   lobe consolidation.  New patchy ground-glass opacities at the left apex may be related to   edema or be infectious/inflammatory in etiology.  New small left pleural effusion. < end of copied text >  1/23 R PTC  (placed by IR 1/18) --> LWS this AM; patient NPO for IR repositioning/swap of chest tube today. Monitor Chest tube outputs  1/24 VSS  pigtail  unsuccessful repositioning  1/23 - output 1120 overnight -AM  XRay pending  1/25 Daily CXR;  pulm toileting. rpt CT imaging on MONDAY per Thoracic Attending  1/26 VSS R Ct # 1 minimal drainage.  RCT #2- w/ 180cc in last 24 hrs keep to lws - rpt imaging Monday to assess need for RVATS decort.  1/28 Keep NPO for Right VATs decort today with Dr. Ramírez. H/H 6.1/18.3, Platelets 48K, INR 2.57, please transfuse PRBC/PLATELETS/FFP WITH GOAL Hgb >8, PLTS >100 K, INR BELOW 1.7.   1/29 h/h 8/23, Maintain chest tubes to lws x 72 hrs  1/30 Extubated, chest tube drainage serosanguinous. Transfused 1uPRBC today h/h 7/21 1/31 CXR increase right sided opacities. c/w pulmonary toileting, incentive spirometry  2/1 VSS OOB  + BM   XRAY this am chest tubes  tosuction -please plts or products INR 2.3 tube with sanguinous drainage   2/2 H/H 7/23 INR 2.2 Recommend FFP today. Maintain chest tubes to LWS  2/3 H/H 7/22 Pending cxr. Anticipate chest tube removal  2/4 VSS, H/H stable 7.5/22.8, pending AM CXR. Right #1 CT output 30cc/24h, #2 CT 30cc/24h. Chest tubes placed to water seal. Right anterior CT dc'd.

## 2025-02-05 LAB
ALBUMIN SERPL ELPH-MCNC: 3.2 G/DL — LOW (ref 3.3–5)
ALP SERPL-CCNC: 108 U/L — SIGNIFICANT CHANGE UP (ref 40–120)
ALT FLD-CCNC: 9 U/L — LOW (ref 10–45)
ANION GAP SERPL CALC-SCNC: 8 MMOL/L — SIGNIFICANT CHANGE UP (ref 5–17)
APTT BLD: 53.6 SEC — HIGH (ref 24.5–35.6)
AST SERPL-CCNC: 38 U/L — SIGNIFICANT CHANGE UP (ref 10–40)
BILIRUB SERPL-MCNC: 3.8 MG/DL — HIGH (ref 0.2–1.2)
BUN SERPL-MCNC: 49 MG/DL — HIGH (ref 7–23)
CALCIUM SERPL-MCNC: 9.4 MG/DL — SIGNIFICANT CHANGE UP (ref 8.4–10.5)
CHLORIDE SERPL-SCNC: 100 MMOL/L — SIGNIFICANT CHANGE UP (ref 96–108)
CO2 SERPL-SCNC: 25 MMOL/L — SIGNIFICANT CHANGE UP (ref 22–31)
CREAT SERPL-MCNC: 1.23 MG/DL — SIGNIFICANT CHANGE UP (ref 0.5–1.3)
EGFR: 73 ML/MIN/1.73M2 — SIGNIFICANT CHANGE UP
EGFR: 73 ML/MIN/1.73M2 — SIGNIFICANT CHANGE UP
GLUCOSE BLDC GLUCOMTR-MCNC: 114 MG/DL — HIGH (ref 70–99)
GLUCOSE BLDC GLUCOMTR-MCNC: 120 MG/DL — HIGH (ref 70–99)
GLUCOSE BLDC GLUCOMTR-MCNC: 144 MG/DL — HIGH (ref 70–99)
GLUCOSE BLDC GLUCOMTR-MCNC: 152 MG/DL — HIGH (ref 70–99)
GLUCOSE SERPL-MCNC: 127 MG/DL — HIGH (ref 70–99)
HCT VFR BLD CALC: 22.9 % — LOW (ref 39–50)
HGB BLD-MCNC: 7.4 G/DL — LOW (ref 13–17)
INR BLD: 2.45 RATIO — HIGH (ref 0.85–1.16)
MAGNESIUM SERPL-MCNC: 1.6 MG/DL — SIGNIFICANT CHANGE UP (ref 1.6–2.6)
MCHC RBC-ENTMCNC: 30.5 PG — SIGNIFICANT CHANGE UP (ref 27–34)
MCHC RBC-ENTMCNC: 32.3 G/DL — SIGNIFICANT CHANGE UP (ref 32–36)
MCV RBC AUTO: 94.2 FL — SIGNIFICANT CHANGE UP (ref 80–100)
NRBC # BLD: 0 /100 WBCS — SIGNIFICANT CHANGE UP (ref 0–0)
NRBC BLD-RTO: 0 /100 WBCS — SIGNIFICANT CHANGE UP (ref 0–0)
PHOSPHATE SERPL-MCNC: 2.5 MG/DL — SIGNIFICANT CHANGE UP (ref 2.5–4.5)
PLATELET # BLD AUTO: 74 K/UL — LOW (ref 150–400)
POTASSIUM SERPL-MCNC: 3.9 MMOL/L — SIGNIFICANT CHANGE UP (ref 3.5–5.3)
POTASSIUM SERPL-SCNC: 3.9 MMOL/L — SIGNIFICANT CHANGE UP (ref 3.5–5.3)
PROT SERPL-MCNC: 7.1 G/DL — SIGNIFICANT CHANGE UP (ref 6–8.3)
PROTHROM AB SERPL-ACNC: 27.7 SEC — HIGH (ref 9.9–13.4)
RBC # BLD: 2.43 M/UL — LOW (ref 4.2–5.8)
RBC # FLD: 18.3 % — HIGH (ref 10.3–14.5)
SODIUM SERPL-SCNC: 133 MMOL/L — LOW (ref 135–145)
WBC # BLD: 5.32 K/UL — SIGNIFICANT CHANGE UP (ref 3.8–10.5)
WBC # FLD AUTO: 5.32 K/UL — SIGNIFICANT CHANGE UP (ref 3.8–10.5)

## 2025-02-05 PROCEDURE — 99232 SBSQ HOSP IP/OBS MODERATE 35: CPT

## 2025-02-05 PROCEDURE — G0545: CPT

## 2025-02-05 PROCEDURE — 71045 X-RAY EXAM CHEST 1 VIEW: CPT | Mod: 26

## 2025-02-05 RX ORDER — BUMETANIDE 1 MG/1
1 TABLET ORAL ONCE
Refills: 0 | Status: COMPLETED | OUTPATIENT
Start: 2025-02-05 | End: 2025-02-05

## 2025-02-05 RX ORDER — BUMETANIDE 1 MG/1
2 TABLET ORAL
Refills: 0 | Status: DISCONTINUED | OUTPATIENT
Start: 2025-02-05 | End: 2025-02-06

## 2025-02-05 RX ORDER — MAGNESIUM SULFATE 500 MG/ML
1 SYRINGE (ML) INJECTION ONCE
Refills: 0 | Status: COMPLETED | OUTPATIENT
Start: 2025-02-05 | End: 2025-02-05

## 2025-02-05 RX ADMIN — LACTULOSE 30 GRAM(S): 10 SOLUTION ORAL at 05:21

## 2025-02-05 RX ADMIN — ALBUMIN (HUMAN) 50 MILLILITER(S): 12.5 INJECTION, SOLUTION INTRAVENOUS at 17:03

## 2025-02-05 RX ADMIN — Medication 40 MILLIGRAM(S): at 12:32

## 2025-02-05 RX ADMIN — TRAMADOL HYDROCHLORIDE 50 MILLIGRAM(S): 50 TABLET, FILM COATED ORAL at 09:04

## 2025-02-05 RX ADMIN — Medication 500 MILLIGRAM(S): at 05:20

## 2025-02-05 RX ADMIN — TRAMADOL HYDROCHLORIDE 50 MILLIGRAM(S): 50 TABLET, FILM COATED ORAL at 01:02

## 2025-02-05 RX ADMIN — TRAMADOL HYDROCHLORIDE 25 MILLIGRAM(S): 50 TABLET, FILM COATED ORAL at 05:33

## 2025-02-05 RX ADMIN — TRAMADOL HYDROCHLORIDE 50 MILLIGRAM(S): 50 TABLET, FILM COATED ORAL at 17:11

## 2025-02-05 RX ADMIN — TRAMADOL HYDROCHLORIDE 50 MILLIGRAM(S): 50 TABLET, FILM COATED ORAL at 16:22

## 2025-02-05 RX ADMIN — HEPARIN SODIUM 5000 UNIT(S): 1000 INJECTION INTRAVENOUS; SUBCUTANEOUS at 05:21

## 2025-02-05 RX ADMIN — BUMETANIDE 2 MILLIGRAM(S): 1 TABLET ORAL at 16:22

## 2025-02-05 RX ADMIN — MIDODRINE HYDROCHLORIDE 10 MILLIGRAM(S): 5 TABLET ORAL at 09:06

## 2025-02-05 RX ADMIN — FOLIC ACID 1 MILLIGRAM(S): 1 TABLET ORAL at 12:32

## 2025-02-05 RX ADMIN — TRAMADOL HYDROCHLORIDE 50 MILLIGRAM(S): 50 TABLET, FILM COATED ORAL at 22:55

## 2025-02-05 RX ADMIN — MIDODRINE HYDROCHLORIDE 10 MILLIGRAM(S): 5 TABLET ORAL at 17:04

## 2025-02-05 RX ADMIN — Medication 1 APPLICATION(S): at 05:28

## 2025-02-05 RX ADMIN — TRAMADOL HYDROCHLORIDE 50 MILLIGRAM(S): 50 TABLET, FILM COATED ORAL at 23:55

## 2025-02-05 RX ADMIN — BUMETANIDE 1 MILLIGRAM(S): 1 TABLET ORAL at 12:32

## 2025-02-05 RX ADMIN — ALBUMIN (HUMAN) 50 MILLILITER(S): 12.5 INJECTION, SOLUTION INTRAVENOUS at 05:25

## 2025-02-05 RX ADMIN — BUMETANIDE 1 MILLIGRAM(S): 1 TABLET ORAL at 05:20

## 2025-02-05 RX ADMIN — TRAMADOL HYDROCHLORIDE 25 MILLIGRAM(S): 50 TABLET, FILM COATED ORAL at 06:31

## 2025-02-05 RX ADMIN — TRAMADOL HYDROCHLORIDE 50 MILLIGRAM(S): 50 TABLET, FILM COATED ORAL at 02:02

## 2025-02-05 RX ADMIN — Medication 100 MILLIGRAM(S): at 12:32

## 2025-02-05 RX ADMIN — Medication 1 TABLET(S): at 12:32

## 2025-02-05 RX ADMIN — Medication 500 MILLIGRAM(S): at 17:05

## 2025-02-05 RX ADMIN — MIDODRINE HYDROCHLORIDE 10 MILLIGRAM(S): 5 TABLET ORAL at 01:02

## 2025-02-05 RX ADMIN — CEFTRIAXONE 100 MILLIGRAM(S): 500 INJECTION, POWDER, FOR SOLUTION INTRAMUSCULAR; INTRAVENOUS at 16:22

## 2025-02-05 RX ADMIN — TRAMADOL HYDROCHLORIDE 50 MILLIGRAM(S): 50 TABLET, FILM COATED ORAL at 10:04

## 2025-02-05 RX ADMIN — Medication 100 GRAM(S): at 09:46

## 2025-02-05 NOTE — PROGRESS NOTE ADULT - ATTENDING COMMENTS
44M with alcohol-associated cirrhosis, recurrent ascites, grade II esophageal varices, and hepatic encephalopathy (HE) presents with abdominal pain and distension, renal failure, and empyema. He has a history of alcoholic hepatitis (July 2024) and a right calf hematoma (October 2024). He is actively drinking.  His MELD score is 25.  He is being evaluated for liver transplantation; left heart catheterization is complete.  He has large-volume ascites and is being treated with albumin and bumetanide ( will increase to 1 mg bid); IR is consulted for paracentesis.  He is receiving lactulose and rifaximin for HE.  An EGD in July 2024 showed grade II esophageal varices, a small gastric ulcer, and portal gastropathy.  He is on a PPI.  An MRI for HCC screening was nondiagnostic.  He has an empyema (positive for *Citrobacter*) and has undergone chest tube placement and VATS; he is receiving ceftriaxone and metronidazole.  He has MANUELITO (likely HRS vs. ATN) and required dialysis at an outside hospital; a permacath is in place.  He also has anemia and thrombocytopenia and received blood product transfusions; DVT prophylaxis is held. I/Os, daily weights, MELD labs, and CMP are being monitored.  Thoracic surgery and ID recommendations appreciated. Transplant evaluation completed, and candidacy will be discussed in the upcoming selection  meeting.

## 2025-02-05 NOTE — PROGRESS NOTE ADULT - SUBJECTIVE AND OBJECTIVE BOX
Subjective:  "Hello, this dressing was leaking blood last nite"  Sitting up in bed  Wife visiting      V/S  T(C): 37.1 (02-05-25 @ 08:38), Max: 37.1 (02-05-25 @ 08:38)  HR: 113 (02-05-25 @ 08:38) (66 - 120)  BP: 122/57 (02-05-25 @ 08:38) (111/66 - 144/65)  RR: 20 (02-05-25 @ 08:38) (15 - 23)  SpO2: 95% (02-05-25 @ 08:38) (93% - 100%)    CHEST TUBES:    Right CT   [  ]LWS  [ x ]H2O seal    I&O's Detail    04 Feb 2025 07:01  -  05 Feb 2025 07:00  --------------------------------------------------------  IN:    Oral Fluid: 1000 mL  Total IN: 1000 mL    OUT:    Chest Tube (mL): 25 mL    Chest Tube (mL): 110 mL    Voided (mL): 1440 mL  Total OUT: 1575 mL    Total NET: -575 mL          02-04-25 @ 07:01  -  02-05-25 @ 07:00  --------------------------------------------------------  IN: 1000 mL / OUT: 1575 mL / NET: -575 mL      MEDICATIONS  (STANDING):  albumin human 25% IVPB 100 milliLiter(s) IV Intermittent every 12 hours  buMETAnide 1 milliGRAM(s) Oral two times a day  cefTRIAXone   IVPB      cefTRIAXone   IVPB 2000 milliGRAM(s) IV Intermittent every 24 hours  chlorhexidine 2% Cloths 1 Application(s) Topical <User Schedule>  folic acid 1 milliGRAM(s) Oral daily  heparin   Injectable 5000 Unit(s) SubCutaneous every 8 hours  influenza   Vaccine 0.5 milliLiter(s) IntraMuscular once  insulin lispro (ADMELOG) corrective regimen sliding scale   SubCutaneous three times a day before meals  insulin lispro (ADMELOG) corrective regimen sliding scale   SubCutaneous at bedtime  lactulose Syrup 30 Gram(s) Oral every 12 hours  metroNIDAZOLE    Tablet 500 milliGRAM(s) Oral every 12 hours  midodrine 10 milliGRAM(s) Oral every 8 hours  multivitamin 1 Tablet(s) Oral daily  pantoprazole    Tablet 40 milliGRAM(s) Oral daily  rifAXIMin 550 milliGRAM(s) Oral every 12 hours  thiamine 100 milliGRAM(s) Oral daily      02-05    133[L]  |  100  |  49[H]  ----------------------------<  127[H]  3.9   |  25  |  1.23    Ca    9.4      05 Feb 2025 06:24  Phos  2.5     02-05  Mg     1.6     02-05    TPro  7.1  /  Alb  3.2[L]  /  TBili  3.8[H]  /  DBili  x   /  AST  38  /  ALT  9[L]  /  AlkPhos  108  02-05                               7.4    5.32  )-----------( 74       ( 05 Feb 2025 06:24 )             22.9        PT/INR - ( 05 Feb 2025 06:24 )   PT: 27.7 sec;   INR: 2.45 ratio         PTT - ( 05 Feb 2025 06:24 )  PTT:53.6 sec         CAPILLARY BLOOD GLUCOSE      POCT Blood Glucose.: 120 mg/dL (05 Feb 2025 08:06)  POCT Blood Glucose.: 119 mg/dL (04 Feb 2025 21:17)  POCT Blood Glucose.: 160 mg/dL (04 Feb 2025 16:35)           CXR:      Physical Exam:    Neurology: A&Ox3, NAD  CV : RRR+S1S2  Lungs: Respirations non-labored, B/L BS CTA  +Right chest tube with serosanguinous drainage waterseal , no air leak  Abdomen: Soft, NT/ND, +BSx4Q  Extremities: B/L LE warm, no edema, +PP                 PAST MEDICAL & SURGICAL HISTORY:  Sleep apnea, obstructive      Alcohol abuse      Cirrhosis of liver      Portal hypertension      H/O esophageal varices      Anemia      Mild alcohol use disorder      No significant past surgical history

## 2025-02-05 NOTE — PROGRESS NOTE ADULT - SUBJECTIVE AND OBJECTIVE BOX
Interval Events:   - Afebrile, NAEON  - 2nd chest tube removed yesterday  - Had 1.44 L urine output    ROS:   12 point review of systems performed and negative except otherwise noted in HPI.    Hospital Medications:  albuterol    90 MICROgram(s) HFA Inhaler 2 Puff(s) Inhalation every 6 hours PRN  chlorhexidine 2% Cloths 1 Application(s) Topical <User Schedule>  folic acid 1 milliGRAM(s) Oral daily  lactulose Syrup 20 Gram(s) Oral every 12 hours  melatonin 5 milliGRAM(s) Oral at bedtime  midodrine 10 milliGRAM(s) Oral every 8 hours  Nephro-shania 1 Tablet(s) Oral daily  pantoprazole    Tablet 40 milliGRAM(s) Oral before breakfast  piperacillin/tazobactam IVPB.. 3.375 Gram(s) IV Intermittent every 12 hours  rifAXIMin 550 milliGRAM(s) Oral every 12 hours  thiamine 100 milliGRAM(s) Oral daily      PHYSICAL EXAM:   Vital Signs:  Vital Signs Last 24 Hrs  T(C): 36.9 (17 Jan 2025 09:29), Max: 37.3 (17 Jan 2025 00:35)  T(F): 98.4 (17 Jan 2025 09:29), Max: 99.2 (17 Jan 2025 00:35)  HR: 84 (17 Jan 2025 09:29) (65 - 106)  BP: 109/54 (17 Jan 2025 09:29) (104/51 - 129/62)  BP(mean): 89 (16 Jan 2025 18:00) (74 - 89)  RR: 16 (17 Jan 2025 09:29) (16 - 36)  SpO2: 99% (17 Jan 2025 09:29) (95% - 99%)    Parameters below as of 17 Jan 2025 05:00  Patient On (Oxygen Delivery Method): room air      Daily     Daily     PHYSICAL EXAM:   GENERAL: awake, well appearing  HEENT:  Normocephalic/atraumatic, scleral icterus  CHEST:  no resp distress, crackles at base  HEART:  Regular rate and rhythm  ABDOMEN:  Soft, mild diffuse tenderness, mildly distended, normoactive bowel sounds, splenomegaly, stigmata of chronic liver dz  EXTREMITIES: 2+ LE edema to knee  SKIN:  No rash  NEURO: AAOx3, no asterixis    LABS: reviewed                        7.4    13.96 )-----------( 73       ( 17 Jan 2025 06:42 )             22.4     01-16    130[L]  |  96  |  39[H]  ----------------------------<  117[H]  4.3   |  22  |  2.59[H]    Ca    8.6      16 Jan 2025 07:39  Phos  4.3     01-17  Mg     2.0     01-17    TPro  6.4  /  Alb  2.5[L]  /  TBili  5.8[H]  /  DBili  2.1[H]  /  AST  32  /  ALT  7[L]  /  AlkPhos  116  01-17    LIVER FUNCTIONS - ( 17 Jan 2025 06:41 )  Alb: 2.5 g/dL / Pro: 6.4 g/dL / ALK PHOS: 116 U/L / ALT: 7 U/L / AST: 32 U/L / GGT: x             Interval Diagnostic Studies: see sunrise for full report

## 2025-02-05 NOTE — PROGRESS NOTE ADULT - ASSESSMENT
43 yo M with PMH decompensated ETOH cirrhosis c/b recurrent ascites, grade II EV, and HE, right calf hematoma 10/2024 (s/p fall)  presented to [facility] for abdominal pain and distension. Patient found to have ascites on exam and MANUELITO requiring HD initiation PermCath was placed by vascular surgery on 1/10 with post procedure course c/b bleeding from insertion site. Transferred to [facility] SICU for further management.     Blood Cultures (1/16) 1/4 Staph epi.   GI PCR (1/17) + Giardia.   Paracentesis (1/17) 155 nucleated cell counts.     CT Chest (1/17) Moderate loculated right pleural effusion containing a few foci of air, which are new from 1/8/2025 and may represent empyema/bronchopleural fistula versus sequelae of prior thoracentesis.     #Empyema  s/p VATS 1/28/25  --Continue Ceftriaxone to 2g IV Q24H ( please reorder) + Metronidazole 500 mg BID. Anticipate continuing for 7 days post-VATS (assuming no acute events)  --Follow up on operative cultures grew citrobacter    #Giardia  s/p 7 days of Metronidazole    #Positive Blood Culture (Staph epi, 1/16)  Concern for procurement contaminant  --Continue to follow CBC with diff  --Continue to follow temperature curve  --Follow up on  blood cultures no growth      #Pre-Liver Transplant Evaluation, Decompensated Cirrhosis  COVID19 Rodríguez Antibody Positive  HAV IgG Positive  HBVs Ab Positive  HBVsAg Negative  HBVc Ab Negative  HCV Ab Negative  HSV 1 IgG Positive  HSV 2 IgG Negative  EBV IgG Positive  CMV IgG Positive  VZV IgG Positive  Measles IgG Positive  Mumps IgG Positive  Rubella IgG Positive  Quantiferon Gold negative  HIV Ag/Ab by CMIA Negative  Syphilis Screen Negative  Toxoplasma IgG Negative  Strongyloides Ab negative  Coccidiodes Ab PENDING  Leishmania Ab PENDING    #Encounter to Vaccinate Patient  COVID19: Would benefit from COVID19 [id number] Vaccine Dose  Influenza: Had vaccination for this year  Pneumococcal: Would benefit from PCV20  HAV: Immune, will not require further vaccination  HBV: Immune, will not require further vaccination  MMR: Immune, will not require further vaccination  Varicella: Immune, will not require further vaccination  Shingles: Will require Shingrix  Tdap: Tdap 6/23/24    No absolute contra indications to listing for liver transplant at this time    Meliton Kam MD  Can be called via Teams  After 5pm/weekends [phone]      The above assessment and plan were discussed with Transplant Hepatology team

## 2025-02-05 NOTE — PROGRESS NOTE ADULT - PROBLEM SELECTOR PLAN 1
s/p Right VATS decortication 1/28 with Dr. Ramírez  Maintain mediastinal chest tube to water seal  Strict I & Os, monitor drainage  Daily CXR while tube in place   Transfused multiple blood products PRBC/PLATELETS/FFP WITH GOAL Hgb >8, PLTS >100 K, INR BELOW 1.7  Continue care as per primary team

## 2025-02-05 NOTE — BH CONSULTATION LIAISON PROGRESS NOTE - NSBHFUPINTERVALHXFT_PSY_A_CORE
Patient seen for follow up    On evaluation, patient is calm, cooperative, pleasant, AOx4, denies any acute concerns. Reports that he was able to speak with the team and received education about his current medical condition and proposed treatment. Patient is aware that he will need a paracentesis. Reports his last drink was in November 4, 2024 and he does not have any cravings for it. Understands that post-transplant, there will be regular monitoring of alcohol use and is motivated to pursue recommended inpatient rehab treatment. Patient states he has been to rehab twice before so he knows what to expect. He denies any other recreational drug use including use of marijuana/gummies. Education provided in regards to the need to maintain sobriety. Patient expresses understanding and is in agreement with this. He reports he has met with the nutritionist and is also aware of changes to diet that are necessary for the liver transplant process. Denies any pertinent psychiatric history, denies history of inpatient/outpatient psychiatric treatment and denies history of SA/SIB. Patient admits to intermittent anxiety, has been tolerating mirtazapine well with overall benefit to mood. Endorses stable mood today and denies any SI/HI and AVH. Denies any safety concerns. Understands that after transplant, he may need to remain in the hospital for some time, and discussed with the team risks/benefits associated with steroids and lifelong need for immunosuppressants. Discussed with patient effect of steroid use on mood. Patient reports that should he find his anxiety worsening, he will be sure to follow up with psychiatry.  Patient states he is sleeping well, has somewhat of an appetite but is unable to eat due to pressure/distention of his stomach. . Patient states that his primary support system is his mother, and the plan will be to live with her after the hospitalization in [place]. His wife and two children are currently in [place]. States his family is aware and ready to help with post-transplant recovery and care. Pt states he has received education in regard to the transplant process and recovery. Pt demonstrates good understanding of transplant process including risks of organ rejection, infection, post-operative hospitalization and care, need for immunosuppression and lifestyle changes. He is notably future-oriented and help-seeking.

## 2025-02-05 NOTE — PROGRESS NOTE ADULT - ASSESSMENT
43 yo M with PMH of alcohol associated cirrhosis c/b recurrent ascites, grade II EV, and HE, alcohol associated hepatitis 7/2024, and right calf hematoma 10/2024 presenting to [facility] for abdominal pain and distension, found to have renal failure, large volume ascites and findings of empyema s/p chest tube placement.     Impression:  #Decompensated EtOH Associated Cirrhosis  #Hx of alcohol associated hepatitis 7/2024  MELD 3 score 25 (2/5/25)  Hx of decompensated cirrhosis c/b recurrent ascites requiring LVPs, grade II EV, and HE. Actively drinking. Now p/w abdominal pain/distension with worsening of liver tests possibly triggered by infection. DDx also includes alcohol associated hepatitis given hx of alc hep 7/2024 which improved with steroids  - OLT: evaluation ongoing, [facility] completed, present on Friday   - EV: EGD 7/2024 for melena showed grade II EV, small gastric ulcer (neg HP), and portal gastropathy   - Ascites: consult IR for paracentesis, increase bumex to 2 mg bid for volume overload  - HE: c/w lactulose and rifaximin, goal 3-4 BM/day  - HCC screening: MRI nondiagnostic, no clear lesion seen                             - MANUELITO: C/w albumin assisted diuresis with bumex 2 mg BID for volume overload  - C/w home midodrine 10 mg tid  - C/w PPI for stress ulcer ppx and hx of ulcer  - Trend MELD labs daily (cmp, INR) daily  - Strict I/Os, daily wts    #Empyema  CT chest noncontrast reviewed from OSH showing moderate sized complex R sided pleural effusion. s/p chest tube placed 1/18 with cultures growing Citrobacter c/w empyema. Requiring 2nd chest tube placement then now s/p VATS 1/28 for persistent effusion  - s/p removal of two chest tubes with 1 chest tube in place  - C/w CTX 2 g IV and metronidazole 500 mg q12h daily until chest tubes removed  - Trend cbc and fever curve  - Appreciate thoracic surgery and ID recs    #MANUELITO  Found to have acute renal failure requiring dialysis at OSH, ddx includes HRS vs ATN. Permacath in place. Cr baseline 1.12 10/2024 required albumin assisted diuresis with bumex 2 mg IV q12h  - C/w albumin assisted diuresis w/ bumex 2 mg bid  - Repeat Louise to adjust diuresis  - Strict I/Os, daily wts  - Trend CMP daily  - Appreciate transplant renal recs    #Anemia  #Thrombocytopenia  Normocytic anemia with Hgb 8.5 and plt count 64 likely due to chronic liver dz  - s/p 4 units pRBC, 2 FFP, 2 plts on 1/28  - Transfuse Hgb>7, plt>50 given bleeding  - Hold DVT ppx for bleeding    All recommendations are tentative until note is attested by attending.     Judy Tucker, PGY4  Gastroenterology/Hepatology Fellow  Available on [employer] Teams  [phone] (Long Range Pager)  [phone] (Short Range Pager [facility])    After 5 pm, please contact the on-call GI fellow for any urgent issues via the Hospital Call

## 2025-02-05 NOTE — PROGRESS NOTE ADULT - ASSESSMENT
45yo M with Hx decompensated EtOH cirrhosis (c/b recurrent ascites, grade II EV, and HE) who presented to OSH on 1/16 with abdominal pain and distension, found to have ascites and acute renal failure requiring initiation of HD (s/p Permacath placement c/b bleeding), transferred to NS for transplant evaluation and management. CT chest at OSH was significant for a complex R pleural effusion s/p R pigtail placement by IR on 1/18 with drainage of purulent material c/f empyema. Thoracic Surgery consulted for empyema and chest tube management.   1/20 R pigtail drained 700/910, CT of Chest on Tuesday to assess effusion and make final plan determination  1/21 R pigtail drained 80/240, CT of chest to assess effusion today   1/22 R pigtail maintained. CT of chest revealing:   < from: CT Chest No Cont (01.21.25 @ 19:29) >  IMPRESSION:.  Marked interval decrease in size ofright-sided pleural fluid and gas   collection (presumably empyema) status post placement of pigtail catheter   as described above.  Much interval improvement of posterior right upper lobe and right lower   lobe consolidation.  New patchy ground-glass opacities at the left apex may be related to   edema or be infectious/inflammatory in etiology.  New small left pleural effusion. < end of copied text >  1/23 R PTC  (placed by IR 1/18) --> LWS this AM; patient NPO for IR repositioning/swap of chest tube today. Monitor Chest tube outputs  1/24 VSS  pigtail  unsuccessful repositioning  1/23 - output 1120 overnight -AM  XRay pending  1/25 Daily CXR;  pulm toileting. rpt CT imaging on MONDAY per Thoracic Attending  1/26 VSS R Ct # 1 minimal drainage.  RCT #2- w/ 180cc in last 24 hrs keep to lws - rpt imaging Monday to assess need for RVATS decort.  1/28 Keep NPO for Right VATs decort today with Dr. Ramírez. H/H 6.1/18.3, Platelets 48K, INR 2.57, please transfuse PRBC/PLATELETS/FFP WITH GOAL Hgb >8, PLTS >100 K, INR BELOW 1.7.   1/29 h/h 8/23, Maintain chest tubes to lws x 72 hrs  1/30 Extubated, chest tube drainage serosanguinous. Transfused 1uPRBC today h/h 7/21 1/31 CXR increase right sided opacities. c/w pulmonary toileting, incentive spirometry  2/1 VSS OOB  + BM   XRAY this am chest tubes  tosuction -please plts or products INR 2.3 tube with sanguinous drainage   2/2 H/H 7/23 INR 2.2 Recommend FFP today. Maintain chest tubes to LWS  2/3 H/H 7/22 Pending cxr. Anticipate chest tube removal  2/4 VSS, H/H stable 7.5/22.8, pending AM CXR. Right #1 CT output 30cc/24h, #2 CT 30cc/24h. Chest tubes placed to water seal. Right anterior CT dc'd.   2/5  Stable hemodynamics  Maintain diaphragmatic tube waterseal

## 2025-02-05 NOTE — CONSULT NOTE ADULT - SUBJECTIVE AND OBJECTIVE BOX
Interventional Radiology    Evaluate for Procedure:   therapeutic paracentesis     HPI:    43 yo M with PMH of alcohol associated cirrhosis c/b recurrent ascites, grade II EV, and HE, alcohol associated hepatitis 2024, and right calf hematoma 10/2024 presenting to [facility] for abdominal pain and distension, found to have renal failure, large volume ascites and findings of empyema s/p chest tube placement.  IR consulted on  for paracentesis.        Allergies: sulfa drugs (Unknown)  Salicylic Acid (Other)    Medications (Abx/Cardiac/Anticoagulation/Blood Products)    buMETAnide: 1 milliGRAM(s) Oral ( @ 05:20)  cefTRIAXone   IVPB: 100 mL/Hr IV Intermittent ( @ 17:31)  heparin   Injectable: 5000 Unit(s) SubCutaneous ( @ 05:21)  metroNIDAZOLE    Tablet: 500 milliGRAM(s) Oral ( @ 05:20)  midodrine: 10 milliGRAM(s) Oral ( @ 09:06)  rifAXIMin: 550 milliGRAM(s) Oral ( @ 05:21)    Data:    T(C): 37.1  HR: 113  BP: 122/57  RR: 20  SpO2: 95%    -WBC 5.32 / HgB 7.4 / Hct 22.9 / Plt 74  -Na 133 / Cl 100 / BUN 49 / Glucose 127  -K 3.9 / CO2 25 / Cr 1.23  -ALT 9 / Alk Phos 108 / T.Bili 3.8  -INR 2.45 / PTT 53.6    Radiology:   reviewed     Assessment/Plan: 43 yo M with PMH of alcohol associated cirrhosis c/b recurrent ascites, grade II EV, and HE, alcohol associated hepatitis 2024, and right calf hematoma 10/2024 presenting to [facility] for abdominal pain and distension, found to have renal failure, large volume ascites and findings of empyema s/p chest tube placement.  IR consulted on  for paracentesis.          - Will plan for therapeutic para tomorrow  in IR.   - Please place IR procedure order under Dr. Teixeira  - STAT am labs  - performed under local anesthesia.  patient does not need to be npo  - Hold prophylactic dvt  meds- 12hrs prior to procedure and tentative plan for resumption  12 hrs post procedure.  - Maintain active T&S  - Above d/w  Satapathy.  therapeutic para only.     - 3:1 albumin replacement.       Any questions or concerns regarding above please reach out to IR:   -Available on [employer] teams  -During working hours (7a-5p): call [phone]  -Emergent issues after 5pm: page: [phone]  -Non-emergent consults: Please place a Carlos order "IR Consult" with an appropriate callback number  -Scheduling questions: [phone]  -Clinic/Outpatient [phone]

## 2025-02-05 NOTE — PROGRESS NOTE ADULT - SUBJECTIVE AND OBJECTIVE BOX
INFECTIOUS DISEASES FOLLOW UP-- Carley Kam  [phone]    This is a follow up note for this  44yMale with  Liver failure without hepatic coma        ROS:  CONSTITUTIONAL:  No fever, good appetite  CARDIOVASCULAR:  No chest pain or palpitations  RESPIRATORY:  No dyspnea  GASTROINTESTINAL:  No nausea, vomiting, diarrhea, or abdominal pain  GENITOURINARY:  No dysuria  NEUROLOGIC:  No headache,     Allergies    sulfa drugs (Unknown)  Salicylic Acid (Other)    Intolerances        ANTIBIOTICS/RELEVANT:  antimicrobials  metroNIDAZOLE    Tablet 500 milliGRAM(s) Oral every 12 hours  rifAXIMin 550 milliGRAM(s) Oral every 12 hours    immunologic:  influenza   Vaccine 0.5 milliLiter(s) IntraMuscular once    OTHER:  albumin human 25% IVPB 100 milliLiter(s) IV Intermittent every 12 hours  buMETAnide 2 milliGRAM(s) Oral <User Schedule>  chlorhexidine 2% Cloths 1 Application(s) Topical <User Schedule>  folic acid 1 milliGRAM(s) Oral daily  insulin lispro (ADMELOG) corrective regimen sliding scale   SubCutaneous three times a day before meals  insulin lispro (ADMELOG) corrective regimen sliding scale   SubCutaneous at bedtime  lactulose Syrup 30 Gram(s) Oral every 12 hours  midodrine 10 milliGRAM(s) Oral every 8 hours  multivitamin 1 Tablet(s) Oral daily  pantoprazole    Tablet 40 milliGRAM(s) Oral daily  thiamine 100 milliGRAM(s) Oral daily  traMADol 50 milliGRAM(s) Oral every 6 hours PRN  traMADol 25 milliGRAM(s) Oral every 6 hours PRN      Objective:  Vital Signs Last 24 Hrs  T(C): 37.2 (05 Feb 2025 21:29), Max: 37.2 (05 Feb 2025 21:29)  T(F): 98.9 (05 Feb 2025 21:29), Max: 98.9 (05 Feb 2025 21:29)  HR: 109 (05 Feb 2025 21:29) (89 - 113)  BP: 120/57 (05 Feb 2025 21:29) (117/62 - 132/65)  BP(mean): --  RR: 17 (05 Feb 2025 21:29) (17 - 20)  SpO2: 97% (05 Feb 2025 21:29) (95% - 99%)    Parameters below as of 05 Feb 2025 21:29  Patient On (Oxygen Delivery Method): room air        PHYSICAL EXAM:  Constitutional:no acute distress  Eyes:EDITH, EOMI, icteric  Ear/Nose/Throat: no oral lesions, 	  Respiratory: clear BL  right side chest tube with sanguinous fluid  Cardiovascular: S1S2  Gastrointestinal:soft, (+) BS, no tenderness, ascites  Extremities:no e/e/c  No Lymphadenopathy  IV sites not inflammed.    LABS:                        7.4    5.32  )-----------( 74       ( 05 Feb 2025 06:24 )             22.9     02-05    133[L]  |  100  |  49[H]  ----------------------------<  127[H]  3.9   |  25  |  1.23    Ca    9.4      05 Feb 2025 06:24  Phos  2.5     02-05  Mg     1.6     02-05    TPro  7.1  /  Alb  3.2[L]  /  TBili  3.8[H]  /  DBili  x   /  AST  38  /  ALT  9[L]  /  AlkPhos  108  02-05    PT/INR - ( 05 Feb 2025 06:24 )   PT: 27.7 sec;   INR: 2.45 ratio         PTT - ( 05 Feb 2025 06:24 )  PTT:53.6 sec  Urinalysis Basic - ( 05 Feb 2025 06:24 )    Color: x / Appearance: x / SG: x / pH: x  Gluc: 127 mg/dL / Ketone: x  / Bili: x / Urobili: x   Blood: x / Protein: x / Nitrite: x   Leuk Esterase: x / RBC: x / WBC x   Sq Epi: x / Non Sq Epi: x / Bacteria: x        MICROBIOLOGY:    Culture - Abscess with Gram Stain (01.28.25 @ 20:57)    Gram Stain:   Few polymorphonuclear leukocytes seen per low power field  No organisms seen per oil power field   Specimen Source: .Abscess   Culture Results:   No growth at 5 days    Culture - Body Fluid with Gram Stain (01.24.25 @ 19:10)    Gram Stain:   polymorphonuclear leukocytes seen  No organisms seen  by cytocentrifuge   -  Amoxicillin/Clavulanic Acid: S <=8/4   -  Ampicillin: R >16 These ampicillin results predict results for amoxicillin   -  Ampicillin/Sulbactam: S <=4/2   -  Aztreonam: S <=4   -  Cefazolin: S <=2   -  Cefepime: S <=2   -  Piperacillin/Tazobactam: S <=8   -  Tobramycin: S <=2   -  Trimethoprim/Sulfamethoxazole: S <=0.5/9.5   -  Cefoxitin: S <=8   -  Ceftriaxone: S <=1   -  Ciprofloxacin: S <=0.25   -  Ertapenem: S <=0.5   -  Gentamicin: S <=2   -  Imipenem: S <=1   -  Levofloxacin: S <=0.5   -  Meropenem: S <=1   Specimen Source: Pleural Fl   Culture Results:   Rare Citrobacter koseri   Organism Identification: Citrobacter koseri   Organism: Citrobacter koseri   Method Type: AVA        Culture - Acid Fast - Other w/Smear (01.28.25 @ 20:57)    Specimen Source: .Other   Acid Fast Bacilli Smear:   No acid-fast bacilli seen by fluorochrome stain   Culture Results:   Culture is being performed.    Culture - Fungal, Other (01.28.25 @ 20:57)    Specimen Source: .Other   Culture Results:   Culture is being performed. Fungal cultures are held for 4 weeks.        RECENT CULTURES:      RADIOLOGY & ADDITIONAL STUDIES:    < from: MR Abdomen w/wo IV Cont (02.04.25 @ 22:53) >  IMPRESSION:  Severely limited study.  No obvious liver lesions.    < end of copied text >

## 2025-02-06 LAB
ALBUMIN FLD-MCNC: 1.3 G/DL — SIGNIFICANT CHANGE UP
ALBUMIN SERPL ELPH-MCNC: 3.4 G/DL — SIGNIFICANT CHANGE UP (ref 3.3–5)
ALP SERPL-CCNC: 125 U/L — HIGH (ref 40–120)
ALT FLD-CCNC: 8 U/L — LOW (ref 10–45)
ANION GAP SERPL CALC-SCNC: 9 MMOL/L — SIGNIFICANT CHANGE UP (ref 5–17)
APTT BLD: 48 SEC — HIGH (ref 24.5–35.6)
AST SERPL-CCNC: 41 U/L — HIGH (ref 10–40)
B PERT IGG+IGM PNL SER: CLEAR — SIGNIFICANT CHANGE UP
BILIRUB SERPL-MCNC: 3.9 MG/DL — HIGH (ref 0.2–1.2)
BLD GP AB SCN SERPL QL: NEGATIVE — SIGNIFICANT CHANGE UP
BUN SERPL-MCNC: 46 MG/DL — HIGH (ref 7–23)
C IMMITIS AB SER QL IA: REACTIVE — SIGNIFICANT CHANGE UP
CALCIUM SERPL-MCNC: 9.5 MG/DL — SIGNIFICANT CHANGE UP (ref 8.4–10.5)
CHLORIDE SERPL-SCNC: 100 MMOL/L — SIGNIFICANT CHANGE UP (ref 96–108)
CO2 SERPL-SCNC: 26 MMOL/L — SIGNIFICANT CHANGE UP (ref 22–31)
COLOR FLD: YELLOW
CREAT ?TM UR-MCNC: 37 MG/DL — SIGNIFICANT CHANGE UP
CREAT SERPL-MCNC: 1.19 MG/DL — SIGNIFICANT CHANGE UP (ref 0.5–1.3)
EGFR: 76 ML/MIN/1.73M2 — SIGNIFICANT CHANGE UP
EGFR: 76 ML/MIN/1.73M2 — SIGNIFICANT CHANGE UP
FLUID INTAKE SUBSTANCE CLASS: SIGNIFICANT CHANGE UP
GLUCOSE BLDC GLUCOMTR-MCNC: 124 MG/DL — HIGH (ref 70–99)
GLUCOSE BLDC GLUCOMTR-MCNC: 138 MG/DL — HIGH (ref 70–99)
GLUCOSE BLDC GLUCOMTR-MCNC: 162 MG/DL — HIGH (ref 70–99)
GLUCOSE BLDC GLUCOMTR-MCNC: 188 MG/DL — HIGH (ref 70–99)
GLUCOSE FLD-MCNC: 137 MG/DL — SIGNIFICANT CHANGE UP
GLUCOSE SERPL-MCNC: 120 MG/DL — HIGH (ref 70–99)
HCT VFR BLD CALC: 22.2 % — LOW (ref 39–50)
HGB BLD-MCNC: 7.2 G/DL — LOW (ref 13–17)
INR BLD: 2.59 RATIO — HIGH (ref 0.85–1.16)
LDH SERPL L TO P-CCNC: 55 U/L — SIGNIFICANT CHANGE UP
LYMPHOCYTES # FLD: 82 % — SIGNIFICANT CHANGE UP
MAGNESIUM SERPL-MCNC: 1.6 MG/DL — SIGNIFICANT CHANGE UP (ref 1.6–2.6)
MCHC RBC-ENTMCNC: 30.8 PG — SIGNIFICANT CHANGE UP (ref 27–34)
MCHC RBC-ENTMCNC: 32.4 G/DL — SIGNIFICANT CHANGE UP (ref 32–36)
MCV RBC AUTO: 94.9 FL — SIGNIFICANT CHANGE UP (ref 80–100)
MESOTHL CELL # FLD: SIGNIFICANT CHANGE UP
MONOS+MACROS # FLD: 13 % — SIGNIFICANT CHANGE UP
NEUTROPHILS-BODY FLUID: 5 % — SIGNIFICANT CHANGE UP
NRBC # BLD: 0 /100 WBCS — SIGNIFICANT CHANGE UP (ref 0–0)
NRBC BLD-RTO: 0 /100 WBCS — SIGNIFICANT CHANGE UP (ref 0–0)
OSMOLALITY UR: 289 MOS/KG — LOW (ref 300–900)
PHOSPHATE SERPL-MCNC: 3 MG/DL — SIGNIFICANT CHANGE UP (ref 2.5–4.5)
PLATELET # BLD AUTO: 72 K/UL — LOW (ref 150–400)
POTASSIUM SERPL-MCNC: 3.7 MMOL/L — SIGNIFICANT CHANGE UP (ref 3.5–5.3)
POTASSIUM SERPL-SCNC: 3.7 MMOL/L — SIGNIFICANT CHANGE UP (ref 3.5–5.3)
POTASSIUM UR-SCNC: 27 MMOL/L — SIGNIFICANT CHANGE UP
PROT ?TM UR-MCNC: 24 MG/DL — HIGH (ref 0–12)
PROT FLD-MCNC: 2.5 G/DL — SIGNIFICANT CHANGE UP
PROT SERPL-MCNC: 7.3 G/DL — SIGNIFICANT CHANGE UP (ref 6–8.3)
PROT/CREAT UR-RTO: 0.6 RATIO — HIGH (ref 0–0.2)
PROTHROM AB SERPL-ACNC: 29.2 SEC — HIGH (ref 9.9–13.4)
RBC # BLD: 2.34 M/UL — LOW (ref 4.2–5.8)
RBC # FLD: 19.2 % — HIGH (ref 10.3–14.5)
RCV VOL RI: <2000 CELLS/UL — HIGH
RH IG SCN BLD-IMP: POSITIVE — SIGNIFICANT CHANGE UP
SODIUM SERPL-SCNC: 135 MMOL/L — SIGNIFICANT CHANGE UP (ref 135–145)
SODIUM UR-SCNC: 37 MMOL/L — SIGNIFICANT CHANGE UP
TOTAL CELLS COUNTED, BODY FLUID: 89 CELLS — SIGNIFICANT CHANGE UP
TOTAL NUCLEATED CELL COUNT, BODY FLUID: 89 CELLS/UL — SIGNIFICANT CHANGE UP
TUBE TYPE: SIGNIFICANT CHANGE UP
UUN UR-MCNC: 376 MG/DL — SIGNIFICANT CHANGE UP
WBC # BLD: 5.03 K/UL — SIGNIFICANT CHANGE UP (ref 3.8–10.5)
WBC # FLD AUTO: 5.03 K/UL — SIGNIFICANT CHANGE UP (ref 3.8–10.5)
WBC COUNT.: 86 CELLS/UL — SIGNIFICANT CHANGE UP

## 2025-02-06 PROCEDURE — 49083 ABD PARACENTESIS W/IMAGING: CPT

## 2025-02-06 PROCEDURE — 71045 X-RAY EXAM CHEST 1 VIEW: CPT | Mod: 26,76

## 2025-02-06 PROCEDURE — 99232 SBSQ HOSP IP/OBS MODERATE 35: CPT

## 2025-02-06 PROCEDURE — G0545: CPT

## 2025-02-06 RX ORDER — MAGNESIUM SULFATE 500 MG/ML
1 SYRINGE (ML) INJECTION ONCE
Refills: 0 | Status: COMPLETED | OUTPATIENT
Start: 2025-02-06 | End: 2025-02-06

## 2025-02-06 RX ORDER — SPIRONOLACTONE 25 MG
50 TABLET ORAL DAILY
Refills: 0 | Status: DISCONTINUED | OUTPATIENT
Start: 2025-02-06 | End: 2025-02-09

## 2025-02-06 RX ORDER — ALBUMIN (HUMAN) 12.5 G/50ML
50 INJECTION, SOLUTION INTRAVENOUS
Refills: 0 | Status: COMPLETED | OUTPATIENT
Start: 2025-02-06 | End: 2025-02-06

## 2025-02-06 RX ORDER — CEFTRIAXONE 500 MG/1
1000 INJECTION, POWDER, FOR SOLUTION INTRAMUSCULAR; INTRAVENOUS ONCE
Refills: 0 | Status: COMPLETED | OUTPATIENT
Start: 2025-02-06 | End: 2025-02-06

## 2025-02-06 RX ORDER — ALBUMIN (HUMAN) 12.5 G/50ML
100 INJECTION, SOLUTION INTRAVENOUS EVERY 12 HOURS
Refills: 0 | Status: DISCONTINUED | OUTPATIENT
Start: 2025-02-06 | End: 2025-02-08

## 2025-02-06 RX ORDER — BUMETANIDE 1 MG/1
2 TABLET ORAL EVERY 12 HOURS
Refills: 0 | Status: DISCONTINUED | OUTPATIENT
Start: 2025-02-06 | End: 2025-02-09

## 2025-02-06 RX ADMIN — ALBUMIN (HUMAN) 50 MILLILITER(S): 12.5 INJECTION, SOLUTION INTRAVENOUS at 17:00

## 2025-02-06 RX ADMIN — FOLIC ACID 1 MILLIGRAM(S): 1 TABLET ORAL at 11:54

## 2025-02-06 RX ADMIN — Medication 500 MILLIGRAM(S): at 17:51

## 2025-02-06 RX ADMIN — Medication 1 TABLET(S): at 11:54

## 2025-02-06 RX ADMIN — Medication 500 MILLIGRAM(S): at 06:07

## 2025-02-06 RX ADMIN — TRAMADOL HYDROCHLORIDE 50 MILLIGRAM(S): 50 TABLET, FILM COATED ORAL at 12:23

## 2025-02-06 RX ADMIN — TRAMADOL HYDROCHLORIDE 50 MILLIGRAM(S): 50 TABLET, FILM COATED ORAL at 06:07

## 2025-02-06 RX ADMIN — CEFTRIAXONE 100 MILLIGRAM(S): 500 INJECTION, POWDER, FOR SOLUTION INTRAMUSCULAR; INTRAVENOUS at 11:54

## 2025-02-06 RX ADMIN — TRAMADOL HYDROCHLORIDE 50 MILLIGRAM(S): 50 TABLET, FILM COATED ORAL at 21:51

## 2025-02-06 RX ADMIN — Medication 40 MILLIGRAM(S): at 11:53

## 2025-02-06 RX ADMIN — TRAMADOL HYDROCHLORIDE 25 MILLIGRAM(S): 50 TABLET, FILM COATED ORAL at 04:00

## 2025-02-06 RX ADMIN — TRAMADOL HYDROCHLORIDE 25 MILLIGRAM(S): 50 TABLET, FILM COATED ORAL at 23:30

## 2025-02-06 RX ADMIN — BUMETANIDE 2 MILLIGRAM(S): 1 TABLET ORAL at 09:19

## 2025-02-06 RX ADMIN — TRAMADOL HYDROCHLORIDE 50 MILLIGRAM(S): 50 TABLET, FILM COATED ORAL at 20:50

## 2025-02-06 RX ADMIN — Medication 50 MILLIGRAM(S): at 12:23

## 2025-02-06 RX ADMIN — TRAMADOL HYDROCHLORIDE 50 MILLIGRAM(S): 50 TABLET, FILM COATED ORAL at 07:00

## 2025-02-06 RX ADMIN — LACTULOSE 30 GRAM(S): 10 SOLUTION ORAL at 06:07

## 2025-02-06 RX ADMIN — MIDODRINE HYDROCHLORIDE 10 MILLIGRAM(S): 5 TABLET ORAL at 02:59

## 2025-02-06 RX ADMIN — ALBUMIN (HUMAN) 50 MILLILITER(S): 12.5 INJECTION, SOLUTION INTRAVENOUS at 17:52

## 2025-02-06 RX ADMIN — ALBUMIN (HUMAN) 50 MILLILITER(S): 12.5 INJECTION, SOLUTION INTRAVENOUS at 06:08

## 2025-02-06 RX ADMIN — BUMETANIDE 2 MILLIGRAM(S): 1 TABLET ORAL at 17:52

## 2025-02-06 RX ADMIN — ALBUMIN (HUMAN) 50 MILLILITER(S): 12.5 INJECTION, SOLUTION INTRAVENOUS at 16:21

## 2025-02-06 RX ADMIN — MIDODRINE HYDROCHLORIDE 10 MILLIGRAM(S): 5 TABLET ORAL at 11:53

## 2025-02-06 RX ADMIN — TRAMADOL HYDROCHLORIDE 25 MILLIGRAM(S): 50 TABLET, FILM COATED ORAL at 22:24

## 2025-02-06 RX ADMIN — INSULIN LISPRO 2: 100 INJECTION, SOLUTION INTRAVENOUS; SUBCUTANEOUS at 09:18

## 2025-02-06 RX ADMIN — TRAMADOL HYDROCHLORIDE 25 MILLIGRAM(S): 50 TABLET, FILM COATED ORAL at 03:03

## 2025-02-06 RX ADMIN — Medication 100 MILLIGRAM(S): at 11:54

## 2025-02-06 RX ADMIN — MIDODRINE HYDROCHLORIDE 10 MILLIGRAM(S): 5 TABLET ORAL at 17:51

## 2025-02-06 RX ADMIN — TRAMADOL HYDROCHLORIDE 50 MILLIGRAM(S): 50 TABLET, FILM COATED ORAL at 13:20

## 2025-02-06 RX ADMIN — Medication 100 GRAM(S): at 09:17

## 2025-02-06 RX ADMIN — LACTULOSE 30 GRAM(S): 10 SOLUTION ORAL at 17:50

## 2025-02-06 NOTE — PROGRESS NOTE ADULT - PROBLEM SELECTOR PLAN 1
s/p Right VATS decortication 1/28 with Dr. Ramírez  DC R chest tube   CXR reviewed  Strict I & Os, monitor drainage  Thoracic surgery will sign off  Continue care as per primary team

## 2025-02-06 NOTE — PROGRESS NOTE ADULT - ASSESSMENT
45yo M with Hx decompensated EtOH cirrhosis (c/b recurrent ascites, grade II EV, and HE) who presented to OSH on 1/16 with abdominal pain and distension, found to have ascites and acute renal failure requiring initiation of HD (s/p Permacath placement c/b bleeding), transferred to NS for transplant evaluation and management. CT chest at OSH was significant for a complex R pleural effusion s/p R pigtail placement by IR on 1/18 with drainage of purulent material c/f empyema. Thoracic Surgery consulted for empyema and chest tube management.   1/20 R pigtail drained 700/910, CT of Chest on Tuesday to assess effusion and make final plan determination  1/21 R pigtail drained 80/240, CT of chest to assess effusion today   1/22 R pigtail maintained. CT of chest revealing:   < from: CT Chest No Cont (01.21.25 @ 19:29) >  IMPRESSION:.  Marked interval decrease in size ofright-sided pleural fluid and gas   collection (presumably empyema) status post placement of pigtail catheter   as described above.  Much interval improvement of posterior right upper lobe and right lower   lobe consolidation.  New patchy ground-glass opacities at the left apex may be related to   edema or be infectious/inflammatory in etiology.  New small left pleural effusion. < end of copied text >  1/23 R PTC  (placed by IR 1/18) --> LWS this AM; patient NPO for IR repositioning/swap of chest tube today. Monitor Chest tube outputs  1/24 VSS  pigtail  unsuccessful repositioning  1/23 - output 1120 overnight -AM  XRay pending  1/25 Daily CXR;  pulm toileting. rpt CT imaging on MONDAY per Thoracic Attending  1/26 VSS R Ct # 1 minimal drainage.  RCT #2- w/ 180cc in last 24 hrs keep to lws - rpt imaging Monday to assess need for RVATS decort.  1/28 Keep NPO for Right VATs decort today with Dr. Ramírez. H/H 6.1/18.3, Platelets 48K, INR 2.57, please transfuse PRBC/PLATELETS/FFP WITH GOAL Hgb >8, PLTS >100 K, INR BELOW 1.7.   1/29 h/h 8/23, Maintain chest tubes to lws x 72 hrs  1/30 Extubated, chest tube drainage serosanguinous. Transfused 1uPRBC today h/h 7/21 1/31 CXR increase right sided opacities. c/w pulmonary toileting, incentive spirometry  2/1 VSS OOB  + BM   XRAY this am chest tubes  tosuction -please plts or products INR 2.3 tube with sanguinous drainage   2/2 H/H 7/23 INR 2.2 Recommend FFP today. Maintain chest tubes to LWS  2/3 H/H 7/22 Pending cxr. Anticipate chest tube removal  2/4 VSS, H/H stable 7.5/22.8, pending AM CXR. Right #1 CT output 30cc/24h, #2 CT 30cc/24h. Chest tubes placed to water seal. Right anterior CT dc'd.   2/5  Stable hemodynamics  Maintain diaphragmatic tube waterseal  2/6 FFP this am> R CT removed CXR reviewed  IR for para this afternoon

## 2025-02-06 NOTE — PRE PROCEDURE NOTE - PRE PROCEDURE EVALUATION
Interventional Radiology    HPI: 45 yo M with PMH of alcohol associated cirrhosis c/b recurrent ascites, grade II EV, and HE, alcohol associated hepatitis 7/2024, and right calf hematoma 10/2024 presenting to [facility] for abdominal pain and distension, found to have renal failure, large volume ascites and findings of empyema s/p chest tube placement.  IR consulted on 2/5 for paracentesis. Last para on 1/23 with 4100cc drained, Albumin 25% in 50 cc was given x 2.    Allergies: sulfa drugs (Unknown)  Salicylic Acid (Other)    Medications (Abx/Cardiac/Anticoagulation/Blood Products)  buMETAnide: 1 milliGRAM(s) Oral (02-05 @ 05:20)  buMETAnide: 2 milliGRAM(s) Oral (02-06 @ 09:19)  buMETAnide: 1 milliGRAM(s) Oral (02-05 @ 12:32)  cefTRIAXone   IVPB: 100 mL/Hr IV Intermittent (02-05 @ 16:22)  cefTRIAXone   IVPB: 100 mL/Hr IV Intermittent (02-06 @ 11:54)  heparin   Injectable: 5000 Unit(s) SubCutaneous (02-05 @ 05:21)  metroNIDAZOLE    Tablet: 500 milliGRAM(s) Oral (02-06 @ 06:07)  midodrine: 10 milliGRAM(s) Oral (02-06 @ 11:53)  rifAXIMin: 550 milliGRAM(s) Oral (02-06 @ 06:07)  spironolactone: 50 milliGRAM(s) Oral (02-06 @ 12:23)    Data:    T(C): 36.9  HR: 103  BP: 127/70  RR: 18  SpO2: 92%    Exam  General: No acute distress  Chest: Non labored breathing  Abdomen: Non-distended  Extremities: No swelling, warm    -WBC 5.03 / HgB 7.2 / Hct 22.2 / Plt 72  -Na 135 / Cl 100 / BUN 46 / Glucose 120  -K 3.7 / CO2 26 / Cr 1.19  -ALT 8 / Alk Phos 125 / T.Bili 3.9  -INR2.59    Imaging: Reviewed    Plan: 45 yo M with PMH of alcohol associated cirrhosis c/b recurrent ascites, grade II EV, and HE, alcohol associated hepatitis 7/2024, and right calf hematoma 10/2024 presenting to [facility] for abdominal pain and distension, found to have renal failure, large volume ascites and findings of empyema s/p chest tube placement.  IR consulted on 2/5 for paracentesis. Last para on 1/23 with 4100cc drained, Albumin 25% in 50 cc was given x 2.    -Risks/Benefits/alternatives explained with the patient and/or healthcare proxy and witnessed informed consent obtained.

## 2025-02-06 NOTE — PROGRESS NOTE ADULT - SUBJECTIVE AND OBJECTIVE BOX
INFECTIOUS DISEASES FOLLOW UP-- Carley Kam  [phone]    This is a follow up note for this  44yMale with  Liver failure without hepatic coma    s/p removal of right sided chest tube    plans for paracentesis    ROS:  CONSTITUTIONAL:  No fever, good appetite  CARDIOVASCULAR:  No chest pain or palpitations  RESPIRATORY:  No dyspnea  GASTROINTESTINAL:  No nausea, vomiting, diarrhea, or abdominal pain  GENITOURINARY:  No dysuria  NEUROLOGIC:  No headache,     Allergies    sulfa drugs (Unknown)  Salicylic Acid (Other)    Intolerances        ANTIBIOTICS/RELEVANT:  antimicrobials  rifAXIMin 550 milliGRAM(s) Oral every 12 hours    immunologic:  influenza   Vaccine 0.5 milliLiter(s) IntraMuscular once    OTHER:  albumin human 25% IVPB 100 milliLiter(s) IV Intermittent every 12 hours  buMETAnide Injectable 2 milliGRAM(s) IV Push every 12 hours  chlorhexidine 2% Cloths 1 Application(s) Topical <User Schedule>  folic acid 1 milliGRAM(s) Oral daily  insulin lispro (ADMELOG) corrective regimen sliding scale   SubCutaneous three times a day before meals  insulin lispro (ADMELOG) corrective regimen sliding scale   SubCutaneous at bedtime  lactulose Syrup 30 Gram(s) Oral every 12 hours  midodrine 10 milliGRAM(s) Oral every 8 hours  multivitamin 1 Tablet(s) Oral daily  pantoprazole    Tablet 40 milliGRAM(s) Oral daily  spironolactone 50 milliGRAM(s) Oral daily  thiamine 100 milliGRAM(s) Oral daily  traMADol 25 milliGRAM(s) Oral every 6 hours PRN  traMADol 50 milliGRAM(s) Oral every 6 hours PRN      Objective:  Vital Signs Last 24 Hrs  T(C): 37.1 (06 Feb 2025 20:57), Max: 37.3 (06 Feb 2025 06:10)  T(F): 98.7 (06 Feb 2025 20:57), Max: 99.1 (06 Feb 2025 06:10)  HR: 102 (06 Feb 2025 20:57) (102 - 113)  BP: 125/62 (06 Feb 2025 20:57) (108/58 - 127/70)  BP(mean): --  RR: 18 (06 Feb 2025 20:57) (18 - 18)  SpO2: 93% (06 Feb 2025 20:57) (92% - 96%)    Parameters below as of 06 Feb 2025 20:57  Patient On (Oxygen Delivery Method): room air        PHYSICAL EXAM:  Constitutional:no acute distress  Eyes:EDITH, EOMI  Ear/Nose/Throat: no oral lesions, 	  Respiratory: clear BL  Cardiovascular: S1S2  Gastrointestinal:soft, (+) BS, no tenderness,+ ascites  Extremities:no e/e/c  No Lymphadenopathy  IV sites not inflammed.    LABS:                        7.2    5.03  )-----------( 72       ( 06 Feb 2025 07:16 )             22.2     02-06    135  |  100  |  46[H]  ----------------------------<  120[H]  3.7   |  26  |  1.19    Ca    9.5      06 Feb 2025 07:27  Phos  3.0     02-06  Mg     1.6     02-06    TPro  7.3  /  Alb  3.4  /  TBili  3.9[H]  /  DBili  x   /  AST  41[H]  /  ALT  8[L]  /  AlkPhos  125[H]  02-06    PT/INR - ( 06 Feb 2025 07:17 )   PT: 29.2 sec;   INR: 2.59 ratio         PTT - ( 06 Feb 2025 07:17 )  PTT:48.0 sec  Urinalysis Basic - ( 06 Feb 2025 07:27 )    Color: x / Appearance: x / SG: x / pH: x  Gluc: 120 mg/dL / Ketone: x  / Bili: x / Urobili: x   Blood: x / Protein: x / Nitrite: x   Leuk Esterase: x / RBC: x / WBC x   Sq Epi: x / Non Sq Epi: x / Bacteria: x        MICROBIOLOGY:    Culture - Acid Fast - Other w/Smear (01.28.25 @ 20:57)    Specimen Source: .Other   Acid Fast Bacilli Smear:   No acid-fast bacilli seen by fluorochrome stain   Culture Results:   No acid-fast bacilli isolated at 1 week. ****Acid-fast cultures are held  for 6 weeks.****    Culture - Fungal, Other (01.28.25 @ 20:57)    Specimen Source: .Other   Culture Results:   No fungus isolated at 1 week.                RECENT CULTURES:      RADIOLOGY & ADDITIONAL STUDIES:    < from: Xray Chest 1 View- PORTABLE-Urgent (Xray Chest 1 View- PORTABLE-Urgent .) (02.06.25 @ 12:56) >  IMPRESSION:  Interval removal of right-sided chest tube on most recent film. Small   right apical pneumothorax, unchanged.  Moderate right pleural effusion, either loculated or layering laterally,   with adjacent atelectasis, slightly improved.  Mild to moderate pulmonary edema. Cardiomegaly.    --- End of Report ---    < end of copied text >

## 2025-02-06 NOTE — PROGRESS NOTE ADULT - ASSESSMENT
43 yo M with PMH decompensated ETOH cirrhosis c/b recurrent ascites, grade II EV, and HE, right calf hematoma 10/2024 (s/p fall)  presented to [facility] for abdominal pain and distension. Patient found to have ascites on exam and MANUELITO requiring HD initiation PermCath was placed by vascular surgery on 1/10 with post procedure course c/b bleeding from insertion site. Transferred to [facility] SICU for further management.     Blood Cultures (1/16) 1/4 Staph epi.   GI PCR (1/17) + Giardia.   Paracentesis (1/17) 155 nucleated cell counts.     CT Chest (1/17) Moderate loculated right pleural effusion containing a few foci of air, which are new from 1/8/2025 and may represent empyema/bronchopleural fistula versus sequelae of prior thoracentesis.     #Empyema  s/p VATS 1/28/25  --Continue Ceftriaxone to 2g IV Q24H ( please reorder) + Metronidazole 500 mg BID. Anticipate continuing for 7 days post-VATS (assuming no acute events)  --Follow up on operative cultures grew citrobacter    #Giardia  s/p 7 days of Metronidazole    #Positive Blood Culture (Staph epi, 1/16)  Concern for procurement contaminant  --Continue to follow CBC with diff  --Continue to follow temperature curve  --Follow up on  blood cultures no growth      #Pre-Liver Transplant Evaluation, Decompensated Cirrhosis  COVID19 Rodríguez Antibody Positive  HAV IgG Positive  HBVs Ab Positive  HBVsAg Negative  HBVc Ab Negative  HCV Ab Negative  HSV 1 IgG Positive  HSV 2 IgG Negative  EBV IgG Positive  CMV IgG Positive  VZV IgG Positive  Measles IgG Positive  Mumps IgG Positive  Rubella IgG Positive  Quantiferon Gold negative  HIV Ag/Ab by CMIA Negative  Syphilis Screen Negative  Toxoplasma IgG Negative  Strongyloides Ab negative  Coccidiodes Ab PENDING  Leishmania Ab PENDING    #Encounter to Vaccinate Patient  COVID19: Would benefit from COVID19 [id number] Vaccine Dose  Influenza: Had vaccination for this year  Pneumococcal: Would benefit from PCV20  HAV: Immune, will not require further vaccination  HBV: Immune, will not require further vaccination  MMR: Immune, will not require further vaccination  Varicella: Immune, will not require further vaccination  Shingles: Will require Shingrix  Tdap: Tdap 6/23/24    No absolute contra indications to listing for liver transplant at this time    Meliton Kam MD  Can be called via Teams  After 5pm/weekends [phone]      The above assessment and plan were discussed with Transplant Hepatology team

## 2025-02-06 NOTE — PROGRESS NOTE ADULT - SUBJECTIVE AND OBJECTIVE BOX
Interval Events:   - Afebrile, NAEON  - 3rd chest tube removed today  - Had 1.3 L urine output    ROS:   12 point review of systems performed and negative except otherwise noted in HPI.    Hospital Medications:  albuterol    90 MICROgram(s) HFA Inhaler 2 Puff(s) Inhalation every 6 hours PRN  chlorhexidine 2% Cloths 1 Application(s) Topical <User Schedule>  folic acid 1 milliGRAM(s) Oral daily  lactulose Syrup 20 Gram(s) Oral every 12 hours  melatonin 5 milliGRAM(s) Oral at bedtime  midodrine 10 milliGRAM(s) Oral every 8 hours  Nephro-shania 1 Tablet(s) Oral daily  pantoprazole    Tablet 40 milliGRAM(s) Oral before breakfast  piperacillin/tazobactam IVPB.. 3.375 Gram(s) IV Intermittent every 12 hours  rifAXIMin 550 milliGRAM(s) Oral every 12 hours  thiamine 100 milliGRAM(s) Oral daily      PHYSICAL EXAM:   Vital Signs:  Vital Signs Last 24 Hrs  T(C): 36.9 (17 Jan 2025 09:29), Max: 37.3 (17 Jan 2025 00:35)  T(F): 98.4 (17 Jan 2025 09:29), Max: 99.2 (17 Jan 2025 00:35)  HR: 84 (17 Jan 2025 09:29) (65 - 106)  BP: 109/54 (17 Jan 2025 09:29) (104/51 - 129/62)  BP(mean): 89 (16 Jan 2025 18:00) (74 - 89)  RR: 16 (17 Jan 2025 09:29) (16 - 36)  SpO2: 99% (17 Jan 2025 09:29) (95% - 99%)    Parameters below as of 17 Jan 2025 05:00  Patient On (Oxygen Delivery Method): room air      Daily     Daily     PHYSICAL EXAM:   GENERAL: awake, well appearing  HEENT:  Normocephalic/atraumatic, scleral icterus  CHEST:  no resp distress, crackles at base  HEART:  Regular rate and rhythm  ABDOMEN:  Soft, mild diffuse tenderness, mildly distended, normoactive bowel sounds, splenomegaly, stigmata of chronic liver dz  EXTREMITIES: 2+ LE edema to knee  SKIN:  No rash  NEURO: AAOx3, no asterixis    LABS: reviewed                        7.4    13.96 )-----------( 73       ( 17 Jan 2025 06:42 )             22.4     01-16    130[L]  |  96  |  39[H]  ----------------------------<  117[H]  4.3   |  22  |  2.59[H]    Ca    8.6      16 Jan 2025 07:39  Phos  4.3     01-17  Mg     2.0     01-17    TPro  6.4  /  Alb  2.5[L]  /  TBili  5.8[H]  /  DBili  2.1[H]  /  AST  32  /  ALT  7[L]  /  AlkPhos  116  01-17    LIVER FUNCTIONS - ( 17 Jan 2025 06:41 )  Alb: 2.5 g/dL / Pro: 6.4 g/dL / ALK PHOS: 116 U/L / ALT: 7 U/L / AST: 32 U/L / GGT: x             Interval Diagnostic Studies: see sunrise for full report

## 2025-02-06 NOTE — PROGRESS NOTE ADULT - ASSESSMENT
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 Actively drinking. Now p/w abdominal pain/distension with worsening of liver tests possibly triggered by infection. DDx also includes alcohol associated hepatitis given hx of alc hep 7/2024 which improved with steroids  - OLT: evaluation ongoing, [facility] completed, present on Friday   - EV: EGD 7/2024 for melena showed grade II EV, small gastric ulcer (neg HP), and portal gastropathy   - Ascites: consult IR for paracentesis, increase bumex to 2 mg bid for volume overload  - HE: c/w lactulose and rifaximin, goal 3-4 BM/day  - HCC screening: MRI nondiagnostic, no clear lesion seen                             - MANUELITO: C/w albumin assisted diuresis with bumex 2 mg BID for volume overload  - C/w home midodrine 10 mg tid  - C/w PPI for stress ulcer ppx and hx of ulcer  - Trend MELD labs daily (cmp, INR) daily  - Strict I/Os, daily wts    #Empyema  CT chest noncontrast reviewed from OSH showing moderate sized complex R sided pleural effusion. s/p chest tube placed 1/18 with cultures growing Citrobacter c/w empyema. Requiring 2nd chest tube placement then now s/p VATS 1/28 for persistent effusion  - s/p removal of two chest tubes with 1 chest tube in place  - C/w CTX 2 g IV and metronidazole 500 mg q12h daily until chest tubes removed  - Trend cbc and fever curve  - Appreciate thoracic surgery and ID recs    #MANUELITO  Found to have acute renal failure requiring dialysis at OSH, ddx includes HRS vs ATN. Permacath in place. Cr baseline 1.12 10/2024 required albumin assisted diuresis with bumex 2 mg IV q12h  - C/w albumin assisted diuresis w/ bumex 2 mg bid  - Repeat Louise to adjust diuresis  - Strict I/Os, daily wts  - Trend CMP daily  - Appreciate transplant renal recs    #Anemia  #Thrombocytopenia  Normocytic anemia with Hgb 8.5 and plt count 64 likely due to chronic liver dz  - s/p 4 units pRBC, 2 FFP, 2 plts on 1/28  - Transfuse Hgb>7, plt>50 given bleeding  - Hold DVT ppx for bleeding    All recommendations are tentative until note is attested by attending.     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 Actively drinking. Now p/w abdominal pain/distension with worsening of liver tests possibly triggered by infection. DDx also includes alcohol associated hepatitis given hx of alc hep 7/2024 which improved with steroids  - OLT: evaluation ongoing, [facility] completed, present on Friday   - EV: EGD 7/2024 for melena showed grade II EV, small gastric ulcer (neg HP), and portal gastropathy   - Ascites: therapeutic paracentesis today, c/w bumex 2 mg bid for volume overload  - HE: c/w lactulose and rifaximin, goal 3-4 BM/day  - HCC screening: MRI nondiagnostic, no clear lesion seen                             - MANUELITO: C/w albumin assisted diuresis with bumex 2 mg BID for volume overload  - C/w home midodrine 10 mg tid  - C/w PPI for stress ulcer ppx and hx of ulcer  - Trend MELD labs daily (cmp, INR) daily  - Strict I/Os, daily wts    #Empyema  CT chest noncontrast reviewed from OSH showing moderate sized complex R sided pleural effusion. s/p chest tube placed 1/18 with cultures growing Citrobacter c/w empyema. Requiring 2nd chest tube placement then now s/p VATS 1/28 for persistent effusion  - s/p removal of all chest tubes  - Stop CTX and flagyl now that chest tubes removed  - Trend cbc and fever curve  - Appreciate thoracic surgery and ID recs    #MANUELITO  Found to have acute renal failure requiring dialysis at OSH, ddx includes HRS vs ATN. Permacath in place. Cr baseline 1.12 10/2024 required albumin assisted diuresis with bumex 2 mg IV q12h  - C/w albumin assisted diuresis w/ bumex 2 mg bid  - Repeat Louise to adjust diuresis  - Strict I/Os, daily wts  - Trend CMP daily  - Appreciate transplant renal recs    #Anemia  #Thrombocytopenia  Normocytic anemia with Hgb 8.5 and plt count 64 likely due to chronic liver dz  - s/p 4 units pRBC, 2 FFP, 2 plts on 1/28  - Transfuse Hgb>7, plt>50 given bleeding  - Hold DVT ppx for bleeding    All recommendations are tentative until note is attested by attending.     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 Actively drinking. Now p/w abdominal pain/distension with worsening of liver tests possibly triggered by infection. DDx also includes alcohol associated hepatitis given hx of alc hep 7/2024 which improved with steroids  - OLT: evaluation ongoing, [facility] completed, present on Friday   - EV: EGD 7/2024 for melena showed grade II EV, small gastric ulcer (neg HP), and portal gastropathy   - Ascites: therapeutic paracentesis today, increase bumex to 2 mg IV BID and add sprinolactone 50 daily  - HE: c/w lactulose and rifaximin, goal 3-4 BM/day  - HCC screening: MRI nondiagnostic, no clear lesion seen                             - MANUELITO: increase bumex to 2 mg q12h and add aldactone 50 mg daily with albumin for volume overload  - C/w home midodrine 10 mg tid  - C/w PPI for stress ulcer ppx and hx of ulcer  - Trend MELD labs daily (cmp, INR) daily  - Strict I/Os, daily wts    #Empyema  CT chest noncontrast reviewed from OSH showing moderate sized complex R sided pleural effusion. s/p chest tube placed 1/18 with cultures growing Citrobacter c/w empyema. Requiring 2nd chest tube placement then now s/p VATS 1/28 for persistent effusion  - s/p removal of all chest tubes  - Stop CTX and flagyl now that chest tubes removed  - Trend cbc and fever curve  - Appreciate thoracic surgery and ID recs    #MANUELITO  Found to have acute renal failure requiring dialysis at OSH, ddx includes HRS vs ATN. Permacath in place. Cr baseline 1.12 10/2024 required albumin assisted diuresis with bumex 2 mg IV q12h  - Albumin assisted diuressis with bumex 2 mg IV q12h and aldactone 50 daily  - Repeat Louise to adjust diuresis  - Strict I/Os, daily wts  - Trend CMP daily  - Appreciate transplant renal recs    #Anemia  #Thrombocytopenia  Normocytic anemia with Hgb 8.5 and plt count 64 likely due to chronic liver dz  - s/p 4 units pRBC, 2 FFP, 2 plts on 1/28  - Transfuse Hgb>7, plt>50 given bleeding  - Hold DVT ppx for bleeding    All recommendations are tentative until note is attested by attending.     Judy Tucker, PGY4  Gastroenterology/Hepatology Fellow  Available on Microsoft Teams  [phone] (Long Range Pager)  [phone] (Short Range Pager [facility])    After 5 pm, please contact the on-call GI fellow for any urgent issues via the Hospital Call

## 2025-02-06 NOTE — PROCEDURE NOTE - ADDITIONAL PROCEDURE DETAILS
Specimen sent for [  x ] analysis [  x ] culture  [   ] cytology    Total 2 albumin 25%/50ml administered at 3:1 ratio

## 2025-02-06 NOTE — PROGRESS NOTE ADULT - ATTENDING COMMENTS
44M with a history of alcohol-associated cirrhosis complicated by recurrent ascites, grade II esophageal varices, hepatic encephalopathy, alcohol-associated hepatitis (July 2024), and a right calf hematoma (October 2024) presents with abdominal pain and distension. He was found to have renal failure, large volume ascites, and an empyema requiring chest tube placement and subsequent VATS procedure. His MELD score is 27.  He was actively drinking alcohol until recently.  Worsening liver tests are possibly due to infection or recurrent alcoholic hepatitis.    He underwent a left heart catheterization as part of his liver transplant evaluation. A July 2024 EGD showed grade II esophageal varices, a small gastric ulcer (H. pylori negative), and portal gastropathy. He is receiving therapeutic paracentesis, bumetanide, spironolactone, albumin, lactulose, rifaximin, midodrine, and a PPI. An abdominal MRI did not show a definite HCC lesion.      His empyema was treated with chest tube placement and VATS; cultures grew *Citrobacter*.  All chest tubes have been removed, and ceftriaxone and metronidazole have been stopped.  His acute kidney injury (likely HRS vs. ATN) is being managed with albumin, bumetanide ( 1 mg bid), spironolactone (added on 2/8), and dialysis via Permacath. A urine sodium shows > 78 mEq/L suggesting effective natriuresis.  He also has anemia and thrombocytopenia (Hgb 8.5, platelets 64), attributed to chronic liver disease.  He has received blood product transfusions, and DVT prophylaxis is held due to bleeding.  Thoracic surgery, ID, and renal recommendations appreciated. CMP, INR, CBC, I/Os, and daily weights are being monitored. We will discuss his transplant candidacy at the selection meeting tomorrow.

## 2025-02-06 NOTE — PROGRESS NOTE ADULT - SUBJECTIVE AND OBJECTIVE BOX
Subjective:      V/S  T(C): 36.6 (02-06-25 @ 09:00), Max: 37.3 (02-06-25 @ 06:10)  HR: 110 (02-06-25 @ 09:00) (102 - 113)  BP: 108/58 (02-06-25 @ 09:00) (108/58 - 132/65)  ABP: --  ABP(mean): --  RR: 18 (02-06-25 @ 09:00) (17 - 20)  SpO2: 93% (02-06-25 @ 09:00) (93% - 97%)  Wt(kg): --  CVP(mm Hg): --  CO: --  CI: --  PA: --                                                Tele:  CHEST TUBES:  Left CT     [  ]LWS  [  ]H2O seal  Right CT   [  ]LWS  [  ]H2O seal    I&O's Detail    05 Feb 2025 07:01  -  06 Feb 2025 07:00  --------------------------------------------------------  IN:    Albumin 5%  - 250 mL: 100 mL    IV PiggyBack: 100 mL    IV PiggyBack: 50 mL    Oral Fluid: 1000 mL    Platelets - Single Donor: 300 mL  Total IN: 1550 mL    OUT:    Chest Tube (mL): 170 mL    Voided (mL): 1300 mL  Total OUT: 1470 mL    Total NET: 80 mL      06 Feb 2025 07:01  -  06 Feb 2025 12:21  --------------------------------------------------------  IN:    IV PiggyBack: 100 mL    IV PiggyBack: 50 mL    Oral Fluid: 420 mL  Total IN: 570 mL    OUT:  Total OUT: 0 mL    Total NET: 570 mL          02-05-25 @ 07:01  -  02-06-25 @ 07:00  --------------------------------------------------------  IN: 1550 mL / OUT: 1470 mL / NET: 80 mL    02-06-25 @ 07:01  -  02-06-25 @ 12:21  --------------------------------------------------------  IN: 570 mL / OUT: 0 mL / NET: 570 mL      MEDICATIONS  (STANDING):  albumin human 25% IVPB 100 milliLiter(s) IV Intermittent every 12 hours  buMETAnide Injectable 2 milliGRAM(s) IV Push every 12 hours  chlorhexidine 2% Cloths 1 Application(s) Topical <User Schedule>  folic acid 1 milliGRAM(s) Oral daily  influenza   Vaccine 0.5 milliLiter(s) IntraMuscular once  insulin lispro (ADMELOG) corrective regimen sliding scale   SubCutaneous three times a day before meals  insulin lispro (ADMELOG) corrective regimen sliding scale   SubCutaneous at bedtime  lactulose Syrup 30 Gram(s) Oral every 12 hours  metroNIDAZOLE    Tablet 500 milliGRAM(s) Oral every 12 hours  midodrine 10 milliGRAM(s) Oral every 8 hours  multivitamin 1 Tablet(s) Oral daily  pantoprazole    Tablet 40 milliGRAM(s) Oral daily  rifAXIMin 550 milliGRAM(s) Oral every 12 hours  spironolactone 50 milliGRAM(s) Oral daily  thiamine 100 milliGRAM(s) Oral daily      02-06    135  |  100  |  46[H]  ----------------------------<  120[H]  3.7   |  26  |  1.19    Ca    9.5      06 Feb 2025 07:27  Phos  3.0     02-06  Mg     1.6     02-06    TPro  7.3  /  Alb  3.4  /  TBili  3.9[H]  /  DBili  x   /  AST  41[H]  /  ALT  8[L]  /  AlkPhos  125[H]  02-06                               7.2    5.03  )-----------( 72       ( 06 Feb 2025 07:16 )             22.2        PT/INR - ( 06 Feb 2025 07:17 )   PT: 29.2 sec;   INR: 2.59 ratio         PTT - ( 06 Feb 2025 07:17 )  PTT:48.0 sec         CAPILLARY BLOOD GLUCOSE      POCT Blood Glucose.: 162 mg/dL (06 Feb 2025 09:01)  POCT Blood Glucose.: 152 mg/dL (05 Feb 2025 21:29)  POCT Blood Glucose.: 144 mg/dL (05 Feb 2025 16:37)  POCT Blood Glucose.: 114 mg/dL (05 Feb 2025 12:40)           CXR:      Physical Exam:    Neuro:     Pulm:     CV:    Abd:     Extremities:     Incision:              PAST MEDICAL & SURGICAL HISTORY:  Sleep apnea, obstructive      Alcohol abuse      Cirrhosis of liver      Portal hypertension      H/O esophageal varices      Anemia      Mild alcohol use disorder      No significant past surgical history               Subjective: "Hello"  Sitting up in bed      V/S  T(C): 36.6 (02-06-25 @ 09:00), Max: 37.3 (02-06-25 @ 06:10)  HR: 110 (02-06-25 @ 09:00) (102 - 113)  BP: 108/58 (02-06-25 @ 09:00) (108/58 - 132/65)  RR: 18 (02-06-25 @ 09:00) (17 - 20)  SpO2: 93% (02-06-25 @ 09:00) (93% - 97%)    CHEST TUBES:    Right CT   [  ]LWS  [ x ]H2O seal> removed    I&O's Detail    05 Feb 2025 07:01  -  06 Feb 2025 07:00  --------------------------------------------------------  IN:    Albumin 5%  - 250 mL: 100 mL    IV PiggyBack: 100 mL    IV PiggyBack: 50 mL    Oral Fluid: 1000 mL    Platelets - Single Donor: 300 mL  Total IN: 1550 mL    OUT:    Chest Tube (mL): 170 mL    Voided (mL): 1300 mL  Total OUT: 1470 mL    Total NET: 80 mL      06 Feb 2025 07:01  -  06 Feb 2025 12:21  --------------------------------------------------------  IN:    IV PiggyBack: 100 mL    IV PiggyBack: 50 mL    Oral Fluid: 420 mL  Total IN: 570 mL    OUT:  Total OUT: 0 mL    Total NET: 570 mL          02-05-25 @ 07:01  -  02-06-25 @ 07:00  --------------------------------------------------------  IN: 1550 mL / OUT: 1470 mL / NET: 80 mL    02-06-25 @ 07:01  -  02-06-25 @ 12:21  --------------------------------------------------------  IN: 570 mL / OUT: 0 mL / NET: 570 mL      MEDICATIONS  (STANDING):  albumin human 25% IVPB 100 milliLiter(s) IV Intermittent every 12 hours  buMETAnide Injectable 2 milliGRAM(s) IV Push every 12 hours  chlorhexidine 2% Cloths 1 Application(s) Topical <User Schedule>  folic acid 1 milliGRAM(s) Oral daily  influenza   Vaccine 0.5 milliLiter(s) IntraMuscular once  insulin lispro (ADMELOG) corrective regimen sliding scale   SubCutaneous three times a day before meals  insulin lispro (ADMELOG) corrective regimen sliding scale   SubCutaneous at bedtime  lactulose Syrup 30 Gram(s) Oral every 12 hours  metroNIDAZOLE    Tablet 500 milliGRAM(s) Oral every 12 hours  midodrine 10 milliGRAM(s) Oral every 8 hours  multivitamin 1 Tablet(s) Oral daily  pantoprazole    Tablet 40 milliGRAM(s) Oral daily  rifAXIMin 550 milliGRAM(s) Oral every 12 hours  spironolactone 50 milliGRAM(s) Oral daily  thiamine 100 milliGRAM(s) Oral daily      02-06    135  |  100  |  46[H]  ----------------------------<  120[H]  3.7   |  26  |  1.19    Ca    9.5      06 Feb 2025 07:27  Phos  3.0     02-06  Mg     1.6     02-06    TPro  7.3  /  Alb  3.4  /  TBili  3.9[H]  /  DBili  x   /  AST  41[H]  /  ALT  8[L]  /  AlkPhos  125[H]  02-06                               7.2    5.03  )-----------( 72       ( 06 Feb 2025 07:16 )             22.2        PT/INR - ( 06 Feb 2025 07:17 )   PT: 29.2 sec;   INR: 2.59 ratio         PTT - ( 06 Feb 2025 07:17 )  PTT:48.0 sec         CAPILLARY BLOOD GLUCOSE      POCT Blood Glucose.: 162 mg/dL (06 Feb 2025 09:01)  POCT Blood Glucose.: 152 mg/dL (05 Feb 2025 21:29)  POCT Blood Glucose.: 144 mg/dL (05 Feb 2025 16:37)  POCT Blood Glucose.: 114 mg/dL (05 Feb 2025 12:40)           CXR:      Physical Exam:    Neurology: A&Ox3, NAD  CV : [id number]  Lungs: Respirations non-labored, B/L BS CTA  +Right chest tube removed this am  Abdomen: Soft, NT/ND, +[id number]  Extremities: B/L LE warm, no edema, +PP                 PAST MEDICAL & SURGICAL HISTORY:  Sleep apnea, obstructive      Alcohol abuse      Cirrhosis of liver      Portal hypertension      H/O esophageal varices      Anemia      Mild alcohol use disorder      No significant past surgical history

## 2025-02-07 ENCOUNTER — NON-APPOINTMENT (OUTPATIENT)
Age: 45
End: 2025-02-07

## 2025-02-07 DIAGNOSIS — Z01.818 ENCOUNTER FOR OTHER PREPROCEDURAL EXAMINATION: ICD-10-CM

## 2025-02-07 LAB
ALBUMIN SERPL ELPH-MCNC: 3.4 G/DL — SIGNIFICANT CHANGE UP (ref 3.3–5)
ALP SERPL-CCNC: 111 U/L — SIGNIFICANT CHANGE UP (ref 40–120)
ALT FLD-CCNC: 7 U/L — LOW (ref 10–45)
ANION GAP SERPL CALC-SCNC: 11 MMOL/L — SIGNIFICANT CHANGE UP (ref 5–17)
APTT BLD: 47.5 SEC — HIGH (ref 24.5–35.6)
AST SERPL-CCNC: 35 U/L — SIGNIFICANT CHANGE UP (ref 10–40)
BILIRUB SERPL-MCNC: 3.9 MG/DL — HIGH (ref 0.2–1.2)
BUN SERPL-MCNC: 38 MG/DL — HIGH (ref 7–23)
CALCIUM SERPL-MCNC: 9.7 MG/DL — SIGNIFICANT CHANGE UP (ref 8.4–10.5)
CHLORIDE SERPL-SCNC: 101 MMOL/L — SIGNIFICANT CHANGE UP (ref 96–108)
CO2 SERPL-SCNC: 25 MMOL/L — SIGNIFICANT CHANGE UP (ref 22–31)
CREAT SERPL-MCNC: 1.05 MG/DL — SIGNIFICANT CHANGE UP (ref 0.5–1.3)
EGFR: 89 ML/MIN/1.73M2 — SIGNIFICANT CHANGE UP
EGFR: 89 ML/MIN/1.73M2 — SIGNIFICANT CHANGE UP
GLUCOSE BLDC GLUCOMTR-MCNC: 139 MG/DL — HIGH (ref 70–99)
GLUCOSE BLDC GLUCOMTR-MCNC: 156 MG/DL — HIGH (ref 70–99)
GLUCOSE BLDC GLUCOMTR-MCNC: 159 MG/DL — HIGH (ref 70–99)
GLUCOSE BLDC GLUCOMTR-MCNC: 180 MG/DL — HIGH (ref 70–99)
GLUCOSE SERPL-MCNC: 138 MG/DL — HIGH (ref 70–99)
GRAM STN FLD: SIGNIFICANT CHANGE UP
HCT VFR BLD CALC: 22.5 % — LOW (ref 39–50)
HGB BLD-MCNC: 7.3 G/DL — LOW (ref 13–17)
INR BLD: 2.73 RATIO — HIGH (ref 0.85–1.16)
MAGNESIUM SERPL-MCNC: 1.4 MG/DL — LOW (ref 1.6–2.6)
MCHC RBC-ENTMCNC: 30.9 PG — SIGNIFICANT CHANGE UP (ref 27–34)
MCHC RBC-ENTMCNC: 32.4 G/DL — SIGNIFICANT CHANGE UP (ref 32–36)
MCV RBC AUTO: 95.3 FL — SIGNIFICANT CHANGE UP (ref 80–100)
NRBC # BLD: 0 /100 WBCS — SIGNIFICANT CHANGE UP (ref 0–0)
NRBC BLD-RTO: 0 /100 WBCS — SIGNIFICANT CHANGE UP (ref 0–0)
PHOSPHATE SERPL-MCNC: 3 MG/DL — SIGNIFICANT CHANGE UP (ref 2.5–4.5)
PLATELET # BLD AUTO: 57 K/UL — LOW (ref 150–400)
POTASSIUM SERPL-MCNC: 3.8 MMOL/L — SIGNIFICANT CHANGE UP (ref 3.5–5.3)
POTASSIUM SERPL-SCNC: 3.8 MMOL/L — SIGNIFICANT CHANGE UP (ref 3.5–5.3)
PROT SERPL-MCNC: 7.2 G/DL — SIGNIFICANT CHANGE UP (ref 6–8.3)
PROTHROM AB SERPL-ACNC: 30.8 SEC — HIGH (ref 9.9–13.4)
RBC # BLD: 2.36 M/UL — LOW (ref 4.2–5.8)
RBC # FLD: 19.7 % — HIGH (ref 10.3–14.5)
SODIUM SERPL-SCNC: 137 MMOL/L — SIGNIFICANT CHANGE UP (ref 135–145)
SPECIMEN SOURCE: SIGNIFICANT CHANGE UP
WBC # BLD: 4.74 K/UL — SIGNIFICANT CHANGE UP (ref 3.8–10.5)
WBC # FLD AUTO: 4.74 K/UL — SIGNIFICANT CHANGE UP (ref 3.8–10.5)

## 2025-02-07 PROCEDURE — G0545: CPT

## 2025-02-07 PROCEDURE — 99232 SBSQ HOSP IP/OBS MODERATE 35: CPT

## 2025-02-07 PROCEDURE — 71045 X-RAY EXAM CHEST 1 VIEW: CPT | Mod: 26

## 2025-02-07 PROCEDURE — 71250 CT THORAX DX C-: CPT | Mod: 26

## 2025-02-07 PROCEDURE — 99233 SBSQ HOSP IP/OBS HIGH 50: CPT

## 2025-02-07 RX ORDER — CEFTRIAXONE 500 MG/1
1000 INJECTION, POWDER, FOR SOLUTION INTRAMUSCULAR; INTRAVENOUS EVERY 24 HOURS
Refills: 0 | Status: DISCONTINUED | OUTPATIENT
Start: 2025-02-07 | End: 2025-02-08

## 2025-02-07 RX ORDER — TRAMADOL HYDROCHLORIDE 50 MG/1
25 TABLET, FILM COATED ORAL EVERY 6 HOURS
Refills: 0 | Status: DISCONTINUED | OUTPATIENT
Start: 2025-02-07 | End: 2025-02-15

## 2025-02-07 RX ORDER — MAGNESIUM OXIDE 400 MG
400 TABLET ORAL
Refills: 0 | Status: DISCONTINUED | OUTPATIENT
Start: 2025-02-07 | End: 2025-02-11

## 2025-02-07 RX ORDER — MAGNESIUM SULFATE 500 MG/ML
1 SYRINGE (ML) INJECTION ONCE
Refills: 0 | Status: COMPLETED | OUTPATIENT
Start: 2025-02-07 | End: 2025-02-07

## 2025-02-07 RX ORDER — METRONIDAZOLE 250 MG
500 TABLET ORAL EVERY 12 HOURS
Refills: 0 | Status: DISCONTINUED | OUTPATIENT
Start: 2025-02-07 | End: 2025-02-20

## 2025-02-07 RX ADMIN — CEFTRIAXONE 100 MILLIGRAM(S): 500 INJECTION, POWDER, FOR SOLUTION INTRAMUSCULAR; INTRAVENOUS at 12:34

## 2025-02-07 RX ADMIN — TRAMADOL HYDROCHLORIDE 25 MILLIGRAM(S): 50 TABLET, FILM COATED ORAL at 19:49

## 2025-02-07 RX ADMIN — Medication 40 MILLIGRAM(S): at 12:34

## 2025-02-07 RX ADMIN — INSULIN LISPRO 2: 100 INJECTION, SOLUTION INTRAVENOUS; SUBCUTANEOUS at 17:01

## 2025-02-07 RX ADMIN — TRAMADOL HYDROCHLORIDE 25 MILLIGRAM(S): 50 TABLET, FILM COATED ORAL at 20:10

## 2025-02-07 RX ADMIN — LACTULOSE 30 GRAM(S): 10 SOLUTION ORAL at 05:26

## 2025-02-07 RX ADMIN — Medication 100 GRAM(S): at 10:35

## 2025-02-07 RX ADMIN — TRAMADOL HYDROCHLORIDE 25 MILLIGRAM(S): 50 TABLET, FILM COATED ORAL at 13:39

## 2025-02-07 RX ADMIN — TRAMADOL HYDROCHLORIDE 50 MILLIGRAM(S): 50 TABLET, FILM COATED ORAL at 22:19

## 2025-02-07 RX ADMIN — TRAMADOL HYDROCHLORIDE 50 MILLIGRAM(S): 50 TABLET, FILM COATED ORAL at 22:50

## 2025-02-07 RX ADMIN — TRAMADOL HYDROCHLORIDE 50 MILLIGRAM(S): 50 TABLET, FILM COATED ORAL at 17:02

## 2025-02-07 RX ADMIN — TRAMADOL HYDROCHLORIDE 50 MILLIGRAM(S): 50 TABLET, FILM COATED ORAL at 18:02

## 2025-02-07 RX ADMIN — Medication 100 MILLIGRAM(S): at 12:34

## 2025-02-07 RX ADMIN — TRAMADOL HYDROCHLORIDE 25 MILLIGRAM(S): 50 TABLET, FILM COATED ORAL at 12:39

## 2025-02-07 RX ADMIN — TRAMADOL HYDROCHLORIDE 50 MILLIGRAM(S): 50 TABLET, FILM COATED ORAL at 11:35

## 2025-02-07 RX ADMIN — Medication 1 APPLICATION(S): at 08:02

## 2025-02-07 RX ADMIN — MIDODRINE HYDROCHLORIDE 10 MILLIGRAM(S): 5 TABLET ORAL at 17:02

## 2025-02-07 RX ADMIN — FOLIC ACID 1 MILLIGRAM(S): 1 TABLET ORAL at 12:34

## 2025-02-07 RX ADMIN — TRAMADOL HYDROCHLORIDE 50 MILLIGRAM(S): 50 TABLET, FILM COATED ORAL at 10:35

## 2025-02-07 RX ADMIN — Medication 500 MILLIGRAM(S): at 17:02

## 2025-02-07 RX ADMIN — MIDODRINE HYDROCHLORIDE 10 MILLIGRAM(S): 5 TABLET ORAL at 10:36

## 2025-02-07 RX ADMIN — ALBUMIN (HUMAN) 50 MILLILITER(S): 12.5 INJECTION, SOLUTION INTRAVENOUS at 05:26

## 2025-02-07 RX ADMIN — ALBUMIN (HUMAN) 50 MILLILITER(S): 12.5 INJECTION, SOLUTION INTRAVENOUS at 17:03

## 2025-02-07 RX ADMIN — Medication 400 MILLIGRAM(S): at 17:02

## 2025-02-07 RX ADMIN — MIDODRINE HYDROCHLORIDE 10 MILLIGRAM(S): 5 TABLET ORAL at 01:56

## 2025-02-07 RX ADMIN — BUMETANIDE 2 MILLIGRAM(S): 1 TABLET ORAL at 17:03

## 2025-02-07 RX ADMIN — Medication 1 TABLET(S): at 12:34

## 2025-02-07 RX ADMIN — Medication 50 MILLIGRAM(S): at 05:25

## 2025-02-07 RX ADMIN — INSULIN LISPRO 2: 100 INJECTION, SOLUTION INTRAVENOUS; SUBCUTANEOUS at 12:35

## 2025-02-07 RX ADMIN — BUMETANIDE 2 MILLIGRAM(S): 1 TABLET ORAL at 05:25

## 2025-02-07 NOTE — PROGRESS NOTE ADULT - SUBJECTIVE AND OBJECTIVE BOX
Interval Events:   - Afebrile, NAEON  - Had 2.6 L urine output in past 24 hr  - s/p paracentesis with 6.8 L fluid removed    ROS:   12 point review of systems performed and negative except otherwise noted in HPI.    Hospital Medications:  albuterol    90 MICROgram(s) HFA Inhaler 2 Puff(s) Inhalation every 6 hours PRN  chlorhexidine 2% Cloths 1 Application(s) Topical <User Schedule>  folic acid 1 milliGRAM(s) Oral daily  lactulose Syrup 20 Gram(s) Oral every 12 hours  melatonin 5 milliGRAM(s) Oral at bedtime  midodrine 10 milliGRAM(s) Oral every 8 hours  Nephro-shania 1 Tablet(s) Oral daily  pantoprazole    Tablet 40 milliGRAM(s) Oral before breakfast  piperacillin/tazobactam IVPB.. 3.375 Gram(s) IV Intermittent every 12 hours  rifAXIMin 550 milliGRAM(s) Oral every 12 hours  thiamine 100 milliGRAM(s) Oral daily      PHYSICAL EXAM:   Vital Signs:  Vital Signs Last 24 Hrs  T(C): 36.9 (17 Jan 2025 09:29), Max: 37.3 (17 Jan 2025 00:35)  T(F): 98.4 (17 Jan 2025 09:29), Max: 99.2 (17 Jan 2025 00:35)  HR: 84 (17 Jan 2025 09:29) (65 - 106)  BP: 109/54 (17 Jan 2025 09:29) (104/51 - 129/62)  BP(mean): 89 (16 Jan 2025 18:00) (74 - 89)  RR: 16 (17 Jan 2025 09:29) (16 - 36)  SpO2: 99% (17 Jan 2025 09:29) (95% - 99%)    Parameters below as of 17 Jan 2025 05:00  Patient On (Oxygen Delivery Method): room air      Daily     Daily     PHYSICAL EXAM:   GENERAL: awake, well appearing  HEENT:  Normocephalic/atraumatic, scleral icterus  CHEST:  no resp distress, crackles at base  HEART:  Regular rate and rhythm  ABDOMEN:  Soft, mild diffuse tenderness, mildly distended, normoactive bowel sounds, splenomegaly, stigmata of chronic liver dz  EXTREMITIES: 2+ LE edema to knee  SKIN:  No rash  NEURO: AAOx3, no asterixis    LABS: reviewed                        7.4    13.96 )-----------( 73       ( 17 Jan 2025 06:42 )             22.4     01-16    130[L]  |  96  |  39[H]  ----------------------------<  117[H]  4.3   |  22  |  2.59[H]    Ca    8.6      16 Jan 2025 07:39  Phos  4.3     01-17  Mg     2.0     01-17    TPro  6.4  /  Alb  2.5[L]  /  TBili  5.8[H]  /  DBili  2.1[H]  /  AST  32  /  ALT  7[L]  /  AlkPhos  116  01-17    LIVER FUNCTIONS - ( 17 Jan 2025 06:41 )  Alb: 2.5 g/dL / Pro: 6.4 g/dL / ALK PHOS: 116 U/L / ALT: 7 U/L / AST: 32 U/L / GGT: x             Interval Diagnostic Studies: see sunrise for full report

## 2025-02-07 NOTE — PROGRESS NOTE ADULT - ASSESSMENT
44 year-old with history and cardiovascular risk factor as above presents with decompensated EtOH cirrhosis.  Liver transplant evaluation initiated.  MANUELITO requiring HD.  Chest tube placed earlier in admission for empyema, now removed.    Patient is hypotensive requiring midodrine support. This is likely a vasodilatory state due to cirrhosis.  His hypotension precludes both DSE and CTCA.  Cath performed 2/4/2025 shows normal coronary arteries.       No further cardiovascular testing is necessary prior to consideration for liver transplant.   Discussed with Transplant Team on rounds.

## 2025-02-07 NOTE — PROGRESS NOTE ADULT - SUBJECTIVE AND OBJECTIVE BOX
INFECTIOUS DISEASES FOLLOW UP-- Carley Kam  [phone]    This is a follow up note for this  44yMale with  Liver failure without hepatic coma        ROS:  CONSTITUTIONAL:  No fever, good appetite  CARDIOVASCULAR:  No chest pain or palpitations  RESPIRATORY:  No dyspnea  GASTROINTESTINAL:  No nausea, vomiting, diarrhea, or abdominal pain  GENITOURINARY:  No dysuria  NEUROLOGIC:  No headache,     Allergies    sulfa drugs (Unknown)  Salicylic Acid (Other)    Intolerances        ANTIBIOTICS/RELEVANT:  antimicrobials  cefTRIAXone   IVPB 1000 milliGRAM(s) IV Intermittent every 24 hours  metroNIDAZOLE    Tablet 500 milliGRAM(s) Oral every 12 hours  rifAXIMin 550 milliGRAM(s) Oral every 12 hours    immunologic:  influenza   Vaccine 0.5 milliLiter(s) IntraMuscular once    OTHER:  albumin human 25% IVPB 100 milliLiter(s) IV Intermittent every 12 hours  buMETAnide Injectable 2 milliGRAM(s) IV Push every 12 hours  chlorhexidine 2% Cloths 1 Application(s) Topical <User Schedule>  folic acid 1 milliGRAM(s) Oral daily  insulin lispro (ADMELOG) corrective regimen sliding scale   SubCutaneous three times a day before meals  insulin lispro (ADMELOG) corrective regimen sliding scale   SubCutaneous at bedtime  lactulose Syrup 30 Gram(s) Oral every 12 hours  magnesium oxide 400 milliGRAM(s) Oral two times a day with meals  midodrine 10 milliGRAM(s) Oral every 8 hours  multivitamin 1 Tablet(s) Oral daily  pantoprazole    Tablet 40 milliGRAM(s) Oral daily  spironolactone 50 milliGRAM(s) Oral daily  thiamine 100 milliGRAM(s) Oral daily  traMADol 25 milliGRAM(s) Oral every 6 hours PRN  traMADol 50 milliGRAM(s) Oral every 6 hours PRN      Objective:  Vital Signs Last 24 Hrs  T(C): 36.8 (07 Feb 2025 16:48), Max: 37.1 (06 Feb 2025 20:57)  T(F): 98.3 (07 Feb 2025 16:48), Max: 98.8 (07 Feb 2025 09:10)  HR: 96 (07 Feb 2025 16:48) (96 - 116)  BP: 132/67 (07 Feb 2025 16:48) (121/61 - 132/67)  BP(mean): --  RR: 18 (07 Feb 2025 16:48) (18 - 20)  SpO2: 97% (07 Feb 2025 16:48) (93% - 98%)    Parameters below as of 07 Feb 2025 16:48  Patient On (Oxygen Delivery Method): room air        PHYSICAL EXAM:  Constitutional:no acute distress  Eyes:EDITH, EOMI  Ear/Nose/Throat: no oral lesions, 	  Respiratory: clear BL  Cardiovascular: S1S2  Gastrointestinal:soft, (+) BS, no tenderness  Extremities:no e/e/c  No Lymphadenopathy  IV sites not inflammed.    LABS:                        7.3    4.74  )-----------( 57       ( 07 Feb 2025 06:33 )             22.5     02-07    137  |  101  |  38[H]  ----------------------------<  138[H]  3.8   |  25  |  1.05    Ca    9.7      07 Feb 2025 06:33  Phos  3.0     02-07  Mg     1.4     02-07    TPro  7.2  /  Alb  3.4  /  TBili  3.9[H]  /  DBili  x   /  AST  35  /  ALT  7[L]  /  AlkPhos  111  02-07    PT/INR - ( 07 Feb 2025 06:33 )   PT: 30.8 sec;   INR: 2.73 ratio         PTT - ( 07 Feb 2025 06:33 )  PTT:47.5 sec  Urinalysis Basic - ( 07 Feb 2025 06:33 )    Color: x / Appearance: x / SG: x / pH: x  Gluc: 138 mg/dL / Ketone: x  / Bili: x / Urobili: x   Blood: x / Protein: x / Nitrite: x   Leuk Esterase: x / RBC: x / WBC x   Sq Epi: x / Non Sq Epi: x / Bacteria: x        MICROBIOLOGY:            RECENT CULTURES:  02-06 @ 18:00  Ascites Fl  --  --  --    No growth  --      RADIOLOGY & ADDITIONAL STUDIES:    < from: Xray Chest 1 View- PORTABLE-Urgent (Xray Chest 1 View- PORTABLE-Urgent .) (02.06.25 @ 12:56) >    IMPRESSION:  Interval removal of right-sided chest tube on most recent film. Small   right apical pneumothorax, unchanged.  Moderate right pleural effusion, either loculated or layering laterally,   with adjacent atelectasis, slightly improved.  Mild to moderate pulmonary edema. Cardiomegaly.    < end of copied text >

## 2025-02-07 NOTE — PROGRESS NOTE ADULT - ATTENDING COMMENTS
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 He was found to have renal failure, large volume ascites, and an empyema.  His MELD score is 22. He was actively drinking until recently.  Worsening liver tests are possibly infection driven ACLF or recurrent alcoholic hepatitis. He underwent large-volume paracentesis (6.8L). He is on lactulose and rifaximin for hepatic encephalopathy, bumetanide, spironolactone, and albumin for ascites and volume overload, and a PPI for stress ulcer prophylaxis.  A July 2024 EGD showed grade II varices, a small gastric ulcer (H. pylori negative), and portal gastropathy. An MRI for HCC screening was nondiagnostic.  He previously had acute renal failure requiring dialysis; the etiology is unclear (HRS vs. ATN).  His creatinine is now improving.  He is being treated for an empyema (*Citrobacter*) with ceftriaxone and metronidazole following chest tube placement and VATS.  Repeat CT chest is pending. Thoracic surgery and ID recommendations appreciated.  He has anemia and thrombocytopenia (likely from cirrhosis, alcohol related BM suppression, hypersplenism) and has received multiple blood product transfusions.  DVT prophylaxis is held due to bleeding, but could be resumed, if no worsening coagulopathy.  His case was discussed at the transplant listing meeting today, pending repeat CT chest, approved for listing. He is on strict I/Os and daily weights, and his MELD labs are being monitored. SW/Psyche concerns were discussed, and cleared for listing with concerns, and recommendations for RPP.

## 2025-02-07 NOTE — BH CONSULTATION LIAISON PROGRESS NOTE - NSBHASSESSMENTFT_PSY_ALL_CORE
Pt is a 45 yo  man with PMH decompensated ETOH cirrhosis c/b recurrent ascites, grade II EV, and HE, right calf hematoma 10/2024 (s/p fall)  presented to [facility] for abdominal pain and distension. Patient found to have ascites on exam and MANUELITO requiring HD initiation. PermCath was placed by vascular surgery on 1/10 with post procedure course c/b bleeding from insertion site. Transferred to [facility] SICU for further management.   Psych consulted for alcohol use disorder. Patient reports that heavy alcohol consumption began 2016 after he father passed away and he moved to [place]. Pt was in rehabilitation twice prior, 2021 and 2023, pt was sober for about 1 yr between two rehabs. Pt began drinking again since 03/2024. Pt reports that he was in [place] 5/2024 and did not drink while he was there because it was election month, no drinking was allowed. Pt says he was in the ICU in Nov 2024 and decided to stop drinking them and has not had any alcohol since the first week in Nov, 2024.   Pt is highly motivated to follow treatment recommendations   Pt demonstrates overall good understanding of transplant process including potential risks and post-operative recovery needs including but not limited to need for immunosuppression, risk of infection, and lifestyle modifications. Admits to some mild anxiety in context of medical stressors with good therapeutic response to mirtazapine which we will recommend continuing. Reporting overall stable mood, is notably future-oriented, is motivated to maintain sobriety from alcohol (last use 11/4/24). Patient is adherent to current recommendations made by transplant team and motivated to continue to follow any future suggestions. Is open to considering counseling or support groups should anxiety worsen post-transplant. Endorses primary family support for this process is his mother.     DDX;  Pre-transplant evaluation  R/O adjustment disorder with anxiety    Plan  -Continue mirtazapine 7.5 mg QHS  - continue with melatonin 3 mg QHS  - SW assistance for referral to inpatient rehab appreciated. Patient is motivated to attend and complete the program.   - Discussed with patient additional psychiatric resources  for anxiety management including counseling/therapy/groups. Patient is open to exploring that in the future if anxiety worsens.  - SIPAT score: 23,  Pt minimally acceptable candidate. Would consider listing and will benefit greatly from ongoing counseling for alcohol use, and further treatment in inpatient rehab program.    - Dispo: likely to inpatient rehab. no indication for inpatient psychiatric hospitalization

## 2025-02-07 NOTE — PROGRESS NOTE ADULT - SUBJECTIVE AND OBJECTIVE BOX
HPI:  Patient seen and examined at bedside on [address] [place].    Review Of Systems:           Respiratory: No shortness of breath, cough, or wheezing  Cardiovascular: No chest pain or palpitations  10 point review of systems is otherwise negative except as mentioned above        Medications:  albumin human 25% IVPB 100 milliLiter(s) IV Intermittent every 12 hours  buMETAnide Injectable 2 milliGRAM(s) IV Push every 12 hours  chlorhexidine 2% Cloths 1 Application(s) Topical <User Schedule>  folic acid 1 milliGRAM(s) Oral daily  influenza   Vaccine 0.5 milliLiter(s) IntraMuscular once  insulin lispro (ADMELOG) corrective regimen sliding scale   SubCutaneous three times a day before meals  insulin lispro (ADMELOG) corrective regimen sliding scale   SubCutaneous at bedtime  lactulose Syrup 30 Gram(s) Oral every 12 hours  magnesium oxide 400 milliGRAM(s) Oral two times a day with meals  magnesium sulfate  IVPB 1 Gram(s) IV Intermittent once  midodrine 10 milliGRAM(s) Oral every 8 hours  multivitamin 1 Tablet(s) Oral daily  pantoprazole    Tablet 40 milliGRAM(s) Oral daily  rifAXIMin 550 milliGRAM(s) Oral every 12 hours  spironolactone 50 milliGRAM(s) Oral daily  thiamine 100 milliGRAM(s) Oral daily  traMADol 25 milliGRAM(s) Oral every 6 hours PRN  traMADol 50 milliGRAM(s) Oral every 6 hours PRN    PAST MEDICAL & SURGICAL HISTORY:  Sleep apnea, obstructive      Alcohol abuse      Cirrhosis of liver      Portal hypertension      H/O esophageal varices      Anemia      Mild alcohol use disorder      No significant past surgical history        Vitals:  T(C): 37.1 (25 @ 09:10), Max: 37.1 (25 @ 17:56)  HR: 105 (25 @ 09:10) (102 - 116)  BP: 128/61 (25 @ 09:10) (118/62 - 128/72)  BP(mean): --  RR: 20 (25 @ 09:10) (18 - 20)  SpO2: 98% (25 @ 09:10) (92% - 98%)  Wt(kg): --  Daily     Daily Weight in k.2 (2025 05:22)  I&O's Summary    2025 07:01  -  2025 07:00  --------------------------------------------------------  IN: 1310 mL / OUT: 2600 mL / NET: -1290 mL    2025 07:01  -  2025 10:25  --------------------------------------------------------  IN: 280 mL / OUT: 600 mL / NET: -320 mL        Physical Exam:  Appearance: No acute distress; well appearing  Eyes: PERRL, EOMI, pink conjunctiva  HENT: Normal oral mucosa  Cardiovascular: RRR, S1, S2, no murmurs, rubs, or gallops; no edema; no JVD  Respiratory: Clear to auscultation bilaterally  Gastrointestinal: soft, non-tender, non-distended with normal bowel sounds  Musculoskeletal: No clubbing; no joint deformity   Neurologic: Non-focal  Lymphatic: No lymphadenopathy  Psychiatry: AAOx3, mood & affect appropriate  Skin: No rashes, ecchymoses, or cyanosis                          7.3    4.74  )-----------( 57       ( 2025 06:33 )             22.5     02-07    137  |  101  |  38[H]  ----------------------------<  138[H]  3.8   |  25  |  1.05    Ca    9.7      2025 06:33  Phos  3.0     02-07  Mg     1.4     02-07    TPro  7.2  /  Alb  3.4  /  TBili  3.9[H]  /  DBili  x   /  AST  35  /  ALT  7[L]  /  AlkPhos  111  02-07    PT/INR - ( 2025 06:33 )   PT: 30.8 sec;   INR: 2.73 ratio         PTT - ( 2025 06:33 )  PTT:47.5 sec      Cardiovascular Diagnostic Testing:  ECG: 10/14/2024 - sinus rhythm at 89 bpm with LVH    Echo:  < from: TTE W or WO Ultrasound Enhancing Agent (07.15.24 @ 15:56) >  _______________________________________________________________________________________     CONCLUSIONS:      1. Left ventricular cavity is mildly dilated. Left ventricular systolic function is normal with an ejection fraction of 66 % by Roberson's method of disks. There are no regional wall motion abnormalities seen.   2. The left ventricular diastolic function is indeterminate, with normal left ventricular filling pressure.   3. Mildly enlarged right ventricular cavity size, with normal wall thickness, and normal right ventricular systolic function.Tricuspid annular plane systolic excursion (TAPSE) is 2.9 cm (normal >=1.7 cm).   4. The left atrium is severely dilated.   5. No pericardial effusion seen.   6. Moderate tricuspid regurgitation.   7. Estimated pulmonary artery systolic pressure is 46 mmHg.   8. No prior echocardiogram is available for comparison.    ____________________________________________________________________    < end of copied text >    Stress Testing:    Cath: 2025 with Josselin Ayala MD - normal coronary arteries    Interpretation of Telemetry:    Imaging:

## 2025-02-07 NOTE — PROGRESS NOTE ADULT - ASSESSMENT
45 yo M with PMH decompensated ETOH cirrhosis c/b recurrent ascites, grade II EV, and HE, right calf hematoma 10/2024 (s/p fall)  presented to [facility] for abdominal pain and distension. Patient found to have ascites on exam and MANUELITO requiring HD initiation PermCath was placed by vascular surgery on 1/10 with post procedure course c/b bleeding from insertion site. Transferred to [facility] SICU for further management.     Blood Cultures (1/16) 1/4 Staph epi.   GI PCR (1/17) + Giardia.   Paracentesis (1/17) 155 nucleated cell counts.     CT Chest (1/17) Moderate loculated right pleural effusion containing a few foci of air, which are new from 1/8/2025 and may represent empyema/bronchopleural fistula versus sequelae of prior thoracentesis.     #Empyema  s/p VATS 1/28/25  --Continue Ceftriaxone to 2g IV Q24H ( please reorder) + Metronidazole 500 mg BID.   --Obtain chest CT to better evaluate ?reaccumulation of pleural effusion vs hematoma in right chest  --Follow up on operative cultures grew citrobacter sensitive to the ceftriaxone    #Giardia  s/p 7 days of Metronidazole    #Positive Blood Culture (Staph epi, 1/16)  Concern for procurement contaminant  --Continue to follow CBC with diff  --Continue to follow temperature curve  --Follow up on  blood cultures no growth      #Pre-Liver Transplant Evaluation, Decompensated Cirrhosis  COVID19 Rodríguez Antibody Positive  HAV IgG Positive  HBVs Ab Positive  HBVsAg Negative  HBVc Ab Negative  HCV Ab Negative  HSV 1 IgG Positive  HSV 2 IgG Negative  EBV IgG Positive  CMV IgG Positive  VZV IgG Positive  Measles IgG Positive  Mumps IgG Positive  Rubella IgG Positive  Quantiferon Gold negative  HIV Ag/Ab by CMIA Negative  Syphilis Screen Negative  Toxoplasma IgG Negative  Strongyloides Ab negative  Coccidiodes Ab PENDING  Leishmania Ab PENDING    #Encounter to Vaccinate Patient  COVID19: Would benefit from COVID19 [id number] Vaccine Dose  Influenza: Had vaccination for this year  Pneumococcal: Would benefit from PCV20  HAV: Immune, will not require further vaccination  HBV: Immune, will not require further vaccination  MMR: Immune, will not require further vaccination  Varicella: Immune, will not require further vaccination  Shingles: Will require Shingrix  Tdap: Tdap 6/23/24        Meliton Kam MD  Can be called via Teams  After 5pm/weekends [phone]      The above assessment and plan were discussed with Transplant Hepatology team

## 2025-02-07 NOTE — PROGRESS NOTE ADULT - ASSESSMENT
43 yo M with PMH of alcohol associated cirrhosis c/b recurrent ascites, grade II EV, and HE, alcohol associated hepatitis 7/2024, and right calf hematoma 10/2024 presenting to [facility] for abdominal pain and distension, found to have renal failure, large volume ascites and findings of empyema s/p chest tube placement.     Impression:  #Decompensated EtOH Associated Cirrhosis  #Hx of alcohol associated hepatitis 7/2024  MELD 3 score 22 (2/7/25)  Hx of decompensated cirrhosis c/b recurrent ascites requiring LVPs, grade II EV, and HE. Actively drinking. Now p/w abdominal pain/distension with worsening of liver tests possibly triggered by infection. DDx also includes alcohol associated hepatitis given hx of alc hep 7/2024 which improved with steroids  - OLT: discuss at transplant meeting today, pending repeat CT chest  - EV: EGD 7/2024 for melena showed grade II EV, small gastric ulcer (neg HP), and portal gastropathy   - Ascites: s/p para with 6.8 L fluid removed, c/w bumex 2 mg IV BID and sprinolactone 50 daily  - HE: c/w lactulose and rifaximin, goal 3-4 BM/day  - HCC screening: MRI nondiagnostic, no clear lesion seen                             - MANUELIOT: C/w bumex 2 mg q12h and aldactone 50 mg daily with albumin for volume overload  - C/w home midodrine 10 mg tid  - C/w PPI for stress ulcer ppx and hx of ulcer  - Trend MELD labs daily (cmp, INR) daily  - Strict I/Os, daily wts    #Empyema  CT chest noncontrast reviewed from OSH showing moderate sized complex R sided pleural effusion. s/p chest tube placed 1/18 with cultures growing Citrobacter c/w empyema. Requiring 2nd chest tube placement then now s/p VATS 1/28 for persistent effusion  - Repeat CT chest to evaluate empyema  - s/p removal of all chest tubes  - C/w CTX and flagyl pending repeat CT chest  - Trend cbc and fever curve  - Appreciate thoracic surgery and ID recs    #MANUELITO, improving  Found to have acute renal failure requiring dialysis at OSH, ddx includes HRS vs ATN. Cr baseline 1.12 10/2024 required albumin assisted diuresis with bumex 2 mg IV q12h. Cr improving  - Albumin assisted diuresis with bumex 2 mg IV q12h and aldactone 50 daily  - Strict I/Os, daily wts  - Trend CMP daily  - Appreciate transplant renal recs    #Anemia  #Thrombocytopenia  Normocytic anemia with Hgb 8.5 and plt count 64 likely due to chronic liver dz  - s/p 4 units pRBC, 2 FFP, 2 plts on 1/28  - Transfuse Hgb>7, plt>50 given bleeding  - Hold DVT ppx for bleeding    All recommendations are tentative until note is attested by attending.     Judy Tucker, PGY4  Gastroenterology/Hepatology Fellow  Available on [employer] Teams  [phone] (Long Range Pager)  [phone] (Short Range Pager [facility])    After 5 pm, please contact the on-call GI fellow for any urgent issues via the Hospital Call

## 2025-02-08 LAB
ALBUMIN SERPL ELPH-MCNC: 3.6 G/DL — SIGNIFICANT CHANGE UP (ref 3.3–5)
ALP SERPL-CCNC: 120 U/L — SIGNIFICANT CHANGE UP (ref 40–120)
ALT FLD-CCNC: 9 U/L — LOW (ref 10–45)
ANION GAP SERPL CALC-SCNC: 9 MMOL/L — SIGNIFICANT CHANGE UP (ref 5–17)
APTT BLD: 50.2 SEC — HIGH (ref 24.5–35.6)
AST SERPL-CCNC: 35 U/L — SIGNIFICANT CHANGE UP (ref 10–40)
BILIRUB SERPL-MCNC: 4.8 MG/DL — HIGH (ref 0.2–1.2)
BUN SERPL-MCNC: 37 MG/DL — HIGH (ref 7–23)
CALCIUM SERPL-MCNC: 9.3 MG/DL — SIGNIFICANT CHANGE UP (ref 8.4–10.5)
CHLORIDE SERPL-SCNC: 99 MMOL/L — SIGNIFICANT CHANGE UP (ref 96–108)
CO2 SERPL-SCNC: 26 MMOL/L — SIGNIFICANT CHANGE UP (ref 22–31)
CREAT SERPL-MCNC: 1.11 MG/DL — SIGNIFICANT CHANGE UP (ref 0.5–1.3)
EGFR: 83 ML/MIN/1.73M2 — SIGNIFICANT CHANGE UP
EGFR: 83 ML/MIN/1.73M2 — SIGNIFICANT CHANGE UP
GLUCOSE BLDC GLUCOMTR-MCNC: 126 MG/DL — HIGH (ref 70–99)
GLUCOSE BLDC GLUCOMTR-MCNC: 130 MG/DL — HIGH (ref 70–99)
GLUCOSE BLDC GLUCOMTR-MCNC: 156 MG/DL — HIGH (ref 70–99)
GLUCOSE BLDC GLUCOMTR-MCNC: 156 MG/DL — HIGH (ref 70–99)
GLUCOSE SERPL-MCNC: 114 MG/DL — HIGH (ref 70–99)
HCT VFR BLD CALC: 22 % — LOW (ref 39–50)
HGB BLD-MCNC: 7.1 G/DL — LOW (ref 13–17)
INR BLD: 2.86 RATIO — HIGH (ref 0.85–1.16)
MAGNESIUM SERPL-MCNC: 1.4 MG/DL — LOW (ref 1.6–2.6)
MCHC RBC-ENTMCNC: 30.7 PG — SIGNIFICANT CHANGE UP (ref 27–34)
MCHC RBC-ENTMCNC: 32.3 G/DL — SIGNIFICANT CHANGE UP (ref 32–36)
MCV RBC AUTO: 95.2 FL — SIGNIFICANT CHANGE UP (ref 80–100)
NRBC # BLD: 0 /100 WBCS — SIGNIFICANT CHANGE UP (ref 0–0)
NRBC BLD-RTO: 0 /100 WBCS — SIGNIFICANT CHANGE UP (ref 0–0)
PHOSPHATE SERPL-MCNC: 2.9 MG/DL — SIGNIFICANT CHANGE UP (ref 2.5–4.5)
PLATELET # BLD AUTO: 59 K/UL — LOW (ref 150–400)
POTASSIUM SERPL-MCNC: 3.9 MMOL/L — SIGNIFICANT CHANGE UP (ref 3.5–5.3)
POTASSIUM SERPL-SCNC: 3.9 MMOL/L — SIGNIFICANT CHANGE UP (ref 3.5–5.3)
PROT SERPL-MCNC: 7.2 G/DL — SIGNIFICANT CHANGE UP (ref 6–8.3)
PROTHROM AB SERPL-ACNC: 32.5 SEC — HIGH (ref 9.9–13.4)
RAPIDTEG MAXIMUM AMPLITUDE: 41.1 MM — LOW (ref 52–70)
RBC # BLD: 2.31 M/UL — LOW (ref 4.2–5.8)
RBC # FLD: 20 % — HIGH (ref 10.3–14.5)
SODIUM SERPL-SCNC: 134 MMOL/L — LOW (ref 135–145)
TEG FUNCTIONAL FIBRINOGEN: 8 MM — LOW (ref 15–32)
TEG LY30 (LYSIS): 0.3 % — SIGNIFICANT CHANGE UP (ref 0–2.6)
TEG REACTION TIME: 8 MIN — SIGNIFICANT CHANGE UP (ref 4.6–9.1)
WBC # BLD: 6.11 K/UL — SIGNIFICANT CHANGE UP (ref 3.8–10.5)
WBC # FLD AUTO: 6.11 K/UL — SIGNIFICANT CHANGE UP (ref 3.8–10.5)

## 2025-02-08 PROCEDURE — 99232 SBSQ HOSP IP/OBS MODERATE 35: CPT | Mod: GC

## 2025-02-08 RX ORDER — IPRATROPIUM BROMIDE AND ALBUTEROL SULFATE .5; 2.5 MG/3ML; MG/3ML
3 SOLUTION RESPIRATORY (INHALATION) ONCE
Refills: 0 | Status: COMPLETED | OUTPATIENT
Start: 2025-02-08 | End: 2025-02-08

## 2025-02-08 RX ORDER — ALBUTEROL SULFATE 2.5 MG/3ML
1 VIAL, NEBULIZER (ML) INHALATION EVERY 12 HOURS
Refills: 0 | Status: DISCONTINUED | OUTPATIENT
Start: 2025-02-08 | End: 2025-02-27

## 2025-02-08 RX ORDER — TRAMADOL HYDROCHLORIDE 50 MG/1
25 TABLET, FILM COATED ORAL ONCE
Refills: 0 | Status: DISCONTINUED | OUTPATIENT
Start: 2025-02-08 | End: 2025-02-08

## 2025-02-08 RX ORDER — TRAMADOL HYDROCHLORIDE 50 MG/1
50 TABLET, FILM COATED ORAL EVERY 6 HOURS
Refills: 0 | Status: DISCONTINUED | OUTPATIENT
Start: 2025-02-08 | End: 2025-02-15

## 2025-02-08 RX ORDER — IPRATROPIUM BROMIDE AND ALBUTEROL SULFATE .5; 2.5 MG/3ML; MG/3ML
3 SOLUTION RESPIRATORY (INHALATION) EVERY 6 HOURS
Refills: 0 | Status: DISCONTINUED | OUTPATIENT
Start: 2025-02-08 | End: 2025-02-12

## 2025-02-08 RX ORDER — MAGNESIUM SULFATE 500 MG/ML
2 SYRINGE (ML) INJECTION ONCE
Refills: 0 | Status: COMPLETED | OUTPATIENT
Start: 2025-02-08 | End: 2025-02-08

## 2025-02-08 RX ORDER — CEFTRIAXONE 500 MG/1
2000 INJECTION, POWDER, FOR SOLUTION INTRAMUSCULAR; INTRAVENOUS EVERY 24 HOURS
Refills: 0 | Status: COMPLETED | OUTPATIENT
Start: 2025-02-09 | End: 2025-02-15

## 2025-02-08 RX ADMIN — Medication 100 MILLIGRAM(S): at 12:15

## 2025-02-08 RX ADMIN — Medication 1 APPLICATION(S): at 09:06

## 2025-02-08 RX ADMIN — BUMETANIDE 2 MILLIGRAM(S): 1 TABLET ORAL at 17:01

## 2025-02-08 RX ADMIN — Medication 1 TABLET(S): at 12:15

## 2025-02-08 RX ADMIN — ALBUMIN (HUMAN) 50 MILLILITER(S): 12.5 INJECTION, SOLUTION INTRAVENOUS at 05:53

## 2025-02-08 RX ADMIN — TRAMADOL HYDROCHLORIDE 50 MILLIGRAM(S): 50 TABLET, FILM COATED ORAL at 18:28

## 2025-02-08 RX ADMIN — Medication 500 MILLIGRAM(S): at 05:52

## 2025-02-08 RX ADMIN — IPRATROPIUM BROMIDE AND ALBUTEROL SULFATE 3 MILLILITER(S): .5; 2.5 SOLUTION RESPIRATORY (INHALATION) at 22:58

## 2025-02-08 RX ADMIN — Medication 400 MILLIGRAM(S): at 17:00

## 2025-02-08 RX ADMIN — TRAMADOL HYDROCHLORIDE 50 MILLIGRAM(S): 50 TABLET, FILM COATED ORAL at 17:00

## 2025-02-08 RX ADMIN — ALBUMIN (HUMAN) 50 MILLILITER(S): 12.5 INJECTION, SOLUTION INTRAVENOUS at 17:08

## 2025-02-08 RX ADMIN — TRAMADOL HYDROCHLORIDE 50 MILLIGRAM(S): 50 TABLET, FILM COATED ORAL at 22:01

## 2025-02-08 RX ADMIN — Medication 25 GRAM(S): at 09:04

## 2025-02-08 RX ADMIN — Medication 50 MILLIGRAM(S): at 05:52

## 2025-02-08 RX ADMIN — TRAMADOL HYDROCHLORIDE 50 MILLIGRAM(S): 50 TABLET, FILM COATED ORAL at 23:00

## 2025-02-08 RX ADMIN — TRAMADOL HYDROCHLORIDE 50 MILLIGRAM(S): 50 TABLET, FILM COATED ORAL at 05:50

## 2025-02-08 RX ADMIN — Medication 40 MILLIGRAM(S): at 12:15

## 2025-02-08 RX ADMIN — Medication 400 MILLIGRAM(S): at 09:05

## 2025-02-08 RX ADMIN — MIDODRINE HYDROCHLORIDE 10 MILLIGRAM(S): 5 TABLET ORAL at 17:00

## 2025-02-08 RX ADMIN — TRAMADOL HYDROCHLORIDE 25 MILLIGRAM(S): 50 TABLET, FILM COATED ORAL at 03:17

## 2025-02-08 RX ADMIN — FOLIC ACID 1 MILLIGRAM(S): 1 TABLET ORAL at 12:15

## 2025-02-08 RX ADMIN — MIDODRINE HYDROCHLORIDE 10 MILLIGRAM(S): 5 TABLET ORAL at 10:26

## 2025-02-08 RX ADMIN — INSULIN LISPRO 2: 100 INJECTION, SOLUTION INTRAVENOUS; SUBCUTANEOUS at 16:41

## 2025-02-08 RX ADMIN — INSULIN LISPRO 2: 100 INJECTION, SOLUTION INTRAVENOUS; SUBCUTANEOUS at 13:28

## 2025-02-08 RX ADMIN — TRAMADOL HYDROCHLORIDE 25 MILLIGRAM(S): 50 TABLET, FILM COATED ORAL at 04:00

## 2025-02-08 RX ADMIN — TRAMADOL HYDROCHLORIDE 50 MILLIGRAM(S): 50 TABLET, FILM COATED ORAL at 08:00

## 2025-02-08 RX ADMIN — BUMETANIDE 2 MILLIGRAM(S): 1 TABLET ORAL at 05:52

## 2025-02-08 RX ADMIN — LACTULOSE 30 GRAM(S): 10 SOLUTION ORAL at 05:51

## 2025-02-08 RX ADMIN — CEFTRIAXONE 100 MILLIGRAM(S): 500 INJECTION, POWDER, FOR SOLUTION INTRAMUSCULAR; INTRAVENOUS at 10:20

## 2025-02-08 RX ADMIN — Medication 500 MILLIGRAM(S): at 17:00

## 2025-02-08 NOTE — PROGRESS NOTE ADULT - ATTENDING COMMENTS
Repeat CT chest (2/7) with gas and fluid within right pleural space with associated atelectasis, likely representing known hemothorax per discussion with thoracic surgeon Dr. Ramírez today who recommended against re-intervention with thoracentesis, reinsertion of a chest tube, or repeat VATS at least for now given his risks of re-bleeding and no definite active infection. Currently, he is back on ceftriaxone (2/7- ) and metronidazole (2/7- ) with plan for close monitoring given prior Citrobacter empyema as well as recent borderline temperature of 99.5F this AM. We will need further multidisciplinary discussion with thoracic surgery, transplant ID, and transplant surgery re: plan for post-transplant management of the hemothorax. Meanwhile, he is continuing midodrine 10 mg po q8h and albumin-supported diuresis with albumin 25% 100 mL iv q12h, Bumex 2 mg iv q12h, and spironolactone 50 mg po daily. Net negative 2L in the past 24h. ABO O with current MELD 3.0 score of 25. His candidacy was discussed at our multidisciplinary liver transplant selection meeting and he was tentatively approved once medically optimized with clear plan in terms of the pleural space issue.    Victoriano Marie M.D., Ph.D.  Transplant Hepatology

## 2025-02-08 NOTE — PROGRESS NOTE ADULT - SUBJECTIVE AND OBJECTIVE BOX
Gastroenterology/Hepatology Progress Note      Interval Events:     - Patient complained of pain in the right side of the chest where the sutures are located. Sharp pain and worsens with  movement. Reported he has mild shortness of breath w/ exertion but not hypoxic.   - No fevers or chills.   - Tolerating po diet well.   - CT chest showed recurrence of the right sided pleural effusion.     Allergies:  sulfa drugs (Unknown)  Salicylic Acid (Other)      Hospital Medications:  albumin human 25% IVPB 100 milliLiter(s) IV Intermittent every 12 hours  buMETAnide Injectable 2 milliGRAM(s) IV Push every 12 hours  chlorhexidine 2% Cloths 1 Application(s) Topical <User Schedule>  folic acid 1 milliGRAM(s) Oral daily  influenza   Vaccine 0.5 milliLiter(s) IntraMuscular once  insulin lispro (ADMELOG) corrective regimen sliding scale   SubCutaneous three times a day before meals  insulin lispro (ADMELOG) corrective regimen sliding scale   SubCutaneous at bedtime  lactulose Syrup 30 Gram(s) Oral every 12 hours  magnesium oxide 400 milliGRAM(s) Oral two times a day with meals  metroNIDAZOLE    Tablet 500 milliGRAM(s) Oral every 12 hours  midodrine 10 milliGRAM(s) Oral every 8 hours  multivitamin 1 Tablet(s) Oral daily  pantoprazole    Tablet 40 milliGRAM(s) Oral daily  rifAXIMin 550 milliGRAM(s) Oral every 12 hours  spironolactone 50 milliGRAM(s) Oral daily  thiamine 100 milliGRAM(s) Oral daily  traMADol 25 milliGRAM(s) Oral every 6 hours PRN  traMADol 50 milliGRAM(s) Oral every 6 hours PRN      ROS: 14 point ROS negative unless otherwise state in subjective    PHYSICAL EXAM:   Vital Signs:  Vital Signs Last 24 Hrs  T(C): 36.9 (2025 12:41), Max: 37.5 (2025 05:00)  T(F): 98.4 (2025 12:41), Max: 99.5 (2025 05:00)  HR: 89 (2025 12:41) (89 - 116)  BP: 112/72 (2025 12:41) (112/72 - 134/71)  BP(mean): --  RR: 20 (2025 12:41) (18 - 20)  SpO2: 100% (2025 12:41) (93% - 100%)    Parameters below as of 2025 12:41  Patient On (Oxygen Delivery Method): room air      Daily     Daily Weight in k (2025 05:00)    PHYSICAL EXAM:   GENERAL: awake, well appearing  HEENT:  Normocephalic/atraumatic, scleral icterus  CHEST:  no resp distress, crackles at base  HEART:  Regular rate and rhythm  ABDOMEN:  Soft, mild diffuse tenderness, mildly distended, normoactive bowel sounds, splenomegaly, stigmata of chronic liver dz  EXTREMITIES: 2+ LE edema to knee  SKIN:  No rash  NEURO: AAOx3, no asterixis    LABS:                        7.1    6.11  )-----------( 59       ( 2025 06:34 )             22.0     Mean Cell Volume: 95.2 fl (- @ 06:34)    02-    134[L]  |  99  |  37[H]  ----------------------------<  114[H]  3.9   |  26  |  1.11    Ca    9.3      2025 06:32  Phos  2.9     -08  Mg     1.4     -08    TPro  7.2  /  Alb  3.6  /  TBili  4.8[H]  /  DBili  x   /  AST  35  /  ALT  9[L]  /  AlkPhos  120  02-08    LIVER FUNCTIONS - ( 2025 06:32 )  Alb: 3.6 g/dL / Pro: 7.2 g/dL / ALK PHOS: 120 U/L / ALT: 9 U/L / AST: 35 U/L / GGT: x           PT/INR - ( 2025 06:34 )   PT: 32.5 sec;   INR: 2.86 ratio         PTT - ( 2025 06:34 )  PTT:50.2 sec  Urinalysis Basic - ( 2025 06:32 )    Color: x / Appearance: x / SG: x / pH: x  Gluc: 114 mg/dL / Ketone: x  / Bili: x / Urobili: x   Blood: x / Protein: x / Nitrite: x   Leuk Esterase: x / RBC: x / WBC x   Sq Epi: x / Non Sq Epi: x / Bacteria: x                 Gastroenterology/Hepatology Progress Note      Interval Events:     - Patient complained of pain in the right side of the chest where the sutures are located. Sharp pain and worsens with  movement. Reported he has mild shortness of breath w/ exertion but not hypoxic.   - No fevers or chills.   - Tolerating po diet well.   - CT chest showed recurrence of the right sided pleural effusion.     Allergies:  sulfa drugs (Unknown)  Salicylic Acid (Other)      Hospital Medications:  albumin human 25% IVPB 100 milliLiter(s) IV Intermittent every 12 hours  buMETAnide Injectable 2 milliGRAM(s) IV Push every 12 hours  chlorhexidine 2% Cloths 1 Application(s) Topical <User Schedule>  folic acid 1 milliGRAM(s) Oral daily  influenza   Vaccine 0.5 milliLiter(s) IntraMuscular once  insulin lispro (ADMELOG) corrective regimen sliding scale   SubCutaneous three times a day before meals  insulin lispro (ADMELOG) corrective regimen sliding scale   SubCutaneous at bedtime  lactulose Syrup 30 Gram(s) Oral every 12 hours  magnesium oxide 400 milliGRAM(s) Oral two times a day with meals  metroNIDAZOLE    Tablet 500 milliGRAM(s) Oral every 12 hours  midodrine 10 milliGRAM(s) Oral every 8 hours  multivitamin 1 Tablet(s) Oral daily  pantoprazole    Tablet 40 milliGRAM(s) Oral daily  rifAXIMin 550 milliGRAM(s) Oral every 12 hours  spironolactone 50 milliGRAM(s) Oral daily  thiamine 100 milliGRAM(s) Oral daily  traMADol 25 milliGRAM(s) Oral every 6 hours PRN  traMADol 50 milliGRAM(s) Oral every 6 hours PRN      ROS: 14 point ROS negative unless otherwise state in subjective    PHYSICAL EXAM:   Vital Signs:  Vital Signs Last 24 Hrs  T(C): 36.9 (2025 12:41), Max: 37.5 (2025 05:00)  T(F): 98.4 (2025 12:41), Max: 99.5 (2025 05:00)  HR: 89 (2025 12:41) (89 - 116)  BP: 112/72 (2025 12:41) (112/72 - 134/71)  BP(mean): --  RR: 20 (2025 12:41) (18 - 20)  SpO2: 100% (2025 12:41) (93% - 100%)    Parameters below as of 2025 12:41  Patient On (Oxygen Delivery Method): room air      Daily     Daily Weight in k (2025 05:00)    PHYSICAL EXAM:   GENERAL: awake, well appearing  HEENT:  Normocephalic/atraumatic, scleral icterus  CHEST:  no resp distress, decreased BS at R lower lung  HEART:  Regular rate and rhythm  ABDOMEN:  Soft, mild diffuse tenderness, mildly distended, normoactive bowel sounds, splenomegaly, stigmata of chronic liver dz  EXTREMITIES: 2+ LE edema to knee  SKIN:  No rash  NEURO: AAOx3, no asterixis    LABS:                        7.1    6.11  )-----------( 59       ( 2025 06:34 )             22.0     Mean Cell Volume: 95.2 fl (- @ 06:34)    02-    134[L]  |  99  |  37[H]  ----------------------------<  114[H]  3.9   |  26  |  1.11    Ca    9.3      2025 06:32  Phos  2.9     02-08  Mg     1.4     -    TPro  7.2  /  Alb  3.6  /  TBili  4.8[H]  /  DBili  x   /  AST  35  /  ALT  9[L]  /  AlkPhos  120  02-08    LIVER FUNCTIONS - ( 2025 06:32 )  Alb: 3.6 g/dL / Pro: 7.2 g/dL / ALK PHOS: 120 U/L / ALT: 9 U/L / AST: 35 U/L / GGT: x           PT/INR - ( 2025 06:34 )   PT: 32.5 sec;   INR: 2.86 ratio         PTT - ( 2025 06:34 )  PTT:50.2 sec  Urinalysis Basic - ( 2025 06:32 )    Color: x / Appearance: x / SG: x / pH: x  Gluc: 114 mg/dL / Ketone: x  / Bili: x / Urobili: x   Blood: x / Protein: x / Nitrite: x   Leuk Esterase: x / RBC: x / WBC x   Sq Epi: x / Non Sq Epi: x / Bacteria: x

## 2025-02-08 NOTE — PROGRESS NOTE ADULT - ASSESSMENT
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 Actively drinking. Now p/w abdominal pain/distension with worsening of liver tests possibly triggered by infection. DDx also includes alcohol associated hepatitis given hx of alc hep 7/2024 which improved with steroids  - OLT: discuss at transplant meeting today, repeated CT chest which showed worsening right sided pleural effusion, will need to discuss w/ CT thoracic surgeon.   - EV: EGD 7/2024 for melena showed grade II EV, small gastric ulcer (neg HP), and portal gastropathy   - Ascites: s/p para with 6.8 L fluid removed, c/w bumex 2 mg IV BID and sprinolactone 50 daily  - HE: c/w lactulose and rifaximin, goal 3-4 BM/day  - HCC screening: MRI nondiagnostic, no clear lesion seen                             - MANUELITO: C/w bumex 2 mg q12h and aldactone 50 mg daily with albumin for volume overload  - C/w home midodrine 10 mg tid  - C/w PPI for stress ulcer ppx and hx of ulcer  - Trend MELD labs daily (cmp, INR) daily  - Strict I/Os, daily wts    #Empyema  CT chest noncontrast reviewed from OSH showing moderate sized complex R sided pleural effusion. s/p chest tube placed 1/18 with cultures growing Citrobacter c/w empyema. Requiring 2nd chest tube placement then now s/p VATS 1/28 for persistent effusion  - Repeat CT chest to evaluate empyema  - s/p removal of all chest tubes  - C/w CTX and flagyl pending repeat CT chest  - Trend cbc and fever curve  - Appreciate thoracic surgery and ID recs    #MANUELITO, improving  Found to have acute renal failure requiring dialysis at OSH, ddx includes HRS vs ATN. Cr baseline 1.12 10/2024 required albumin assisted diuresis with bumex 2 mg IV q12h. Cr improving  - Albumin assisted diuresis with bumex 2 mg IV q12h and aldactone 50 daily  - Strict I/Os, daily wts  - Trend CMP daily  - Appreciate transplant renal recs    #Anemia  #Thrombocytopenia  Normocytic anemia with Hgb 8.5 and plt count 64 likely due to chronic liver dz  - s/p 4 units pRBC, 2 FFP, 2 plts on 1/28  - Transfuse Hgb>7, plt>50 given bleeding  - Hold DVT ppx for bleeding    Plan:   - discussed with CT thoracic surgical team, high risk for repeat surgery, will need to discuss about chest tube placement.   - Will obtain TEG levels.   - Daily MELD labs.     Discussed the case with Dr. Marie.     All recommendations are tentative until note is attested by an attending.     Bianca Vail, PGY-6  Gastroenterology/Hepatology Fellow  Available on [employer] Teams  [phone] (Short Range Pager)  [phone] (Long Range Pager)    After 5pm, please contact the on-call GI fellow.

## 2025-02-09 LAB
ALBUMIN SERPL ELPH-MCNC: 3.8 G/DL — SIGNIFICANT CHANGE UP (ref 3.3–5)
ALP SERPL-CCNC: 109 U/L — SIGNIFICANT CHANGE UP (ref 40–120)
ALT FLD-CCNC: 6 U/L — LOW (ref 10–45)
ANION GAP SERPL CALC-SCNC: 11 MMOL/L — SIGNIFICANT CHANGE UP (ref 5–17)
APTT BLD: 50.6 SEC — HIGH (ref 24.5–35.6)
AST SERPL-CCNC: 37 U/L — SIGNIFICANT CHANGE UP (ref 10–40)
BILIRUB SERPL-MCNC: 6.3 MG/DL — HIGH (ref 0.2–1.2)
BUN SERPL-MCNC: 35 MG/DL — HIGH (ref 7–23)
CALCIUM SERPL-MCNC: 9.6 MG/DL — SIGNIFICANT CHANGE UP (ref 8.4–10.5)
CHLORIDE SERPL-SCNC: 98 MMOL/L — SIGNIFICANT CHANGE UP (ref 96–108)
CO2 SERPL-SCNC: 25 MMOL/L — SIGNIFICANT CHANGE UP (ref 22–31)
CREAT SERPL-MCNC: 1.12 MG/DL — SIGNIFICANT CHANGE UP (ref 0.5–1.3)
EGFR: 82 ML/MIN/1.73M2 — SIGNIFICANT CHANGE UP
EGFR: 82 ML/MIN/1.73M2 — SIGNIFICANT CHANGE UP
GLUCOSE BLDC GLUCOMTR-MCNC: 138 MG/DL — HIGH (ref 70–99)
GLUCOSE BLDC GLUCOMTR-MCNC: 143 MG/DL — HIGH (ref 70–99)
GLUCOSE BLDC GLUCOMTR-MCNC: 144 MG/DL — HIGH (ref 70–99)
GLUCOSE BLDC GLUCOMTR-MCNC: 155 MG/DL — HIGH (ref 70–99)
GLUCOSE SERPL-MCNC: 110 MG/DL — HIGH (ref 70–99)
HCT VFR BLD CALC: 23.7 % — LOW (ref 39–50)
HGB BLD-MCNC: 7.6 G/DL — LOW (ref 13–17)
INR BLD: 3.14 RATIO — HIGH (ref 0.85–1.16)
MAGNESIUM SERPL-MCNC: 1.5 MG/DL — LOW (ref 1.6–2.6)
MCHC RBC-ENTMCNC: 31.3 PG — SIGNIFICANT CHANGE UP (ref 27–34)
MCHC RBC-ENTMCNC: 32.1 G/DL — SIGNIFICANT CHANGE UP (ref 32–36)
MCV RBC AUTO: 97.5 FL — SIGNIFICANT CHANGE UP (ref 80–100)
NRBC # BLD: 0 /100 WBCS — SIGNIFICANT CHANGE UP (ref 0–0)
NRBC BLD-RTO: 0 /100 WBCS — SIGNIFICANT CHANGE UP (ref 0–0)
PHOSPHATE SERPL-MCNC: 2.9 MG/DL — SIGNIFICANT CHANGE UP (ref 2.5–4.5)
PLATELET # BLD AUTO: 63 K/UL — LOW (ref 150–400)
POTASSIUM SERPL-MCNC: 4 MMOL/L — SIGNIFICANT CHANGE UP (ref 3.5–5.3)
POTASSIUM SERPL-SCNC: 4 MMOL/L — SIGNIFICANT CHANGE UP (ref 3.5–5.3)
PROT SERPL-MCNC: 7.5 G/DL — SIGNIFICANT CHANGE UP (ref 6–8.3)
PROTHROM AB SERPL-ACNC: 35.4 SEC — HIGH (ref 9.9–13.4)
RBC # BLD: 2.43 M/UL — LOW (ref 4.2–5.8)
RBC # FLD: 20.6 % — HIGH (ref 10.3–14.5)
SODIUM SERPL-SCNC: 134 MMOL/L — LOW (ref 135–145)
WBC # BLD: 7.76 K/UL — SIGNIFICANT CHANGE UP (ref 3.8–10.5)
WBC # FLD AUTO: 7.76 K/UL — SIGNIFICANT CHANGE UP (ref 3.8–10.5)

## 2025-02-09 PROCEDURE — 99232 SBSQ HOSP IP/OBS MODERATE 35: CPT | Mod: GC

## 2025-02-09 PROCEDURE — 71045 X-RAY EXAM CHEST 1 VIEW: CPT | Mod: 26

## 2025-02-09 RX ORDER — ALBUMIN (HUMAN) 12.5 G/50ML
100 INJECTION, SOLUTION INTRAVENOUS ONCE
Refills: 0 | Status: COMPLETED | OUTPATIENT
Start: 2025-02-09 | End: 2025-02-09

## 2025-02-09 RX ORDER — HYPROMELLOSE 0.4 %
1 DROPS OPHTHALMIC (EYE)
Refills: 0 | Status: DISCONTINUED | OUTPATIENT
Start: 2025-02-09 | End: 2025-02-27

## 2025-02-09 RX ORDER — MAGNESIUM SULFATE 500 MG/ML
2 SYRINGE (ML) INJECTION ONCE
Refills: 0 | Status: COMPLETED | OUTPATIENT
Start: 2025-02-09 | End: 2025-02-09

## 2025-02-09 RX ADMIN — Medication 500 MILLIGRAM(S): at 05:33

## 2025-02-09 RX ADMIN — Medication 100 MILLIGRAM(S): at 11:41

## 2025-02-09 RX ADMIN — Medication 1 DROP(S): at 21:57

## 2025-02-09 RX ADMIN — Medication 1 TABLET(S): at 11:41

## 2025-02-09 RX ADMIN — MIDODRINE HYDROCHLORIDE 10 MILLIGRAM(S): 5 TABLET ORAL at 18:21

## 2025-02-09 RX ADMIN — Medication 50 MILLIGRAM(S): at 05:33

## 2025-02-09 RX ADMIN — MIDODRINE HYDROCHLORIDE 10 MILLIGRAM(S): 5 TABLET ORAL at 11:41

## 2025-02-09 RX ADMIN — BUMETANIDE 2 MILLIGRAM(S): 1 TABLET ORAL at 05:33

## 2025-02-09 RX ADMIN — Medication 400 MILLIGRAM(S): at 18:22

## 2025-02-09 RX ADMIN — ALBUMIN (HUMAN) 50 MILLILITER(S): 12.5 INJECTION, SOLUTION INTRAVENOUS at 15:12

## 2025-02-09 RX ADMIN — IPRATROPIUM BROMIDE AND ALBUTEROL SULFATE 3 MILLILITER(S): .5; 2.5 SOLUTION RESPIRATORY (INHALATION) at 05:32

## 2025-02-09 RX ADMIN — CEFTRIAXONE 100 MILLIGRAM(S): 500 INJECTION, POWDER, FOR SOLUTION INTRAMUSCULAR; INTRAVENOUS at 08:30

## 2025-02-09 RX ADMIN — Medication 500 MILLIGRAM(S): at 18:21

## 2025-02-09 RX ADMIN — Medication 40 MILLIGRAM(S): at 11:40

## 2025-02-09 RX ADMIN — TRAMADOL HYDROCHLORIDE 50 MILLIGRAM(S): 50 TABLET, FILM COATED ORAL at 08:30

## 2025-02-09 RX ADMIN — Medication 1 APPLICATION(S): at 08:31

## 2025-02-09 RX ADMIN — LACTULOSE 30 GRAM(S): 10 SOLUTION ORAL at 05:32

## 2025-02-09 RX ADMIN — IPRATROPIUM BROMIDE AND ALBUTEROL SULFATE 3 MILLILITER(S): .5; 2.5 SOLUTION RESPIRATORY (INHALATION) at 11:42

## 2025-02-09 RX ADMIN — FOLIC ACID 1 MILLIGRAM(S): 1 TABLET ORAL at 11:40

## 2025-02-09 RX ADMIN — Medication 400 MILLIGRAM(S): at 08:28

## 2025-02-09 RX ADMIN — Medication 25 GRAM(S): at 11:39

## 2025-02-09 RX ADMIN — IPRATROPIUM BROMIDE AND ALBUTEROL SULFATE 3 MILLILITER(S): .5; 2.5 SOLUTION RESPIRATORY (INHALATION) at 18:22

## 2025-02-09 RX ADMIN — INSULIN LISPRO 2: 100 INJECTION, SOLUTION INTRAVENOUS; SUBCUTANEOUS at 11:43

## 2025-02-09 RX ADMIN — TRAMADOL HYDROCHLORIDE 50 MILLIGRAM(S): 50 TABLET, FILM COATED ORAL at 18:26

## 2025-02-09 RX ADMIN — TRAMADOL HYDROCHLORIDE 50 MILLIGRAM(S): 50 TABLET, FILM COATED ORAL at 09:10

## 2025-02-09 NOTE — PROGRESS NOTE ADULT - ASSESSMENT
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 Actively drinking. Now p/w abdominal pain/distension with worsening of liver tests possibly triggered by infection. DDx also includes alcohol associated hepatitis given hx of alc hep 7/2024 which improved with steroids  - OLT: discuss at transplant meeting today, repeated CT chest which showed worsening right sided pleural effusion, will need to discuss w/ CT thoracic surgeon.   - EV: EGD 7/2024 for melena showed grade II EV, small gastric ulcer (neg HP), and portal gastropathy   - Ascites: s/p para with 6.8 L fluid removed, c/w bumex 2 mg IV BID and sprinolactone 50 daily  - HE: c/w lactulose and rifaximin, goal 3-4 BM/day  - HCC screening: MRI nondiagnostic, no clear lesion seen                             - MANUELITO: C/w bumex 2 mg q12h and aldactone 50 mg daily with albumin for volume overload  - C/w home midodrine 10 mg tid  - C/w PPI for stress ulcer ppx and hx of ulcer  - Trend MELD labs daily (cmp, INR) daily  - Strict I/Os, daily wts    #Empyema  CT chest noncontrast reviewed from OSH showing moderate sized complex R sided pleural effusion. s/p chest tube placed 1/18 with cultures growing Citrobacter c/w empyema. Requiring 2nd chest tube placement then now s/p VATS 1/28 for persistent effusion  - Repeat CT chest to evaluate empyema  - s/p removal of all chest tubes  - C/w CTX and flagyl pending repeat CT chest  - Trend cbc and fever curve  - Appreciate thoracic surgery and ID recs  - CT surgery - deferred both the chest tube and surgery as the patient is high risk.     #MANUELITO, improving  Found to have acute renal failure requiring dialysis at OSH, ddx includes HRS vs ATN. Cr baseline 1.12 10/2024 required albumin assisted diuresis with bumex 2 mg IV q12h. Cr improving  - Albumin assisted diuresis with bumex 2 mg IV q12h and aldactone 50 daily  - Strict I/Os, daily wts  - Trend CMP daily  - Appreciate transplant renal recs    #Anemia  #Thrombocytopenia  Normocytic anemia with Hgb 8.5 and plt count 64 likely due to chronic liver dz  - s/p 4 units pRBC, 2 FFP, 2 plts on 1/28  - Transfuse Hgb>7, plt>50 given bleeding  - Hold DVT ppx for bleeding    Plan:   - will consult cardiology for tachycardia.   - Will need to discuss w/ surgical team regarding LT.   - Discontinued diuretics as he was tachycardic.   - Gave one dose of 25% Albumin 100 cc.   - Will obtain TEG levels.   - Daily MELD labs.     Discussed the case with Dr. Marie.     All recommendations are tentative until note is attested by an attending.     Bianca Vail, PGY-6  Gastroenterology/Hepatology Fellow  Available on [employer] Teams  [phone] (Short Range Pager)  [phone] (Long Range Pager)    After 5pm, please contact the on-call GI fellow.

## 2025-02-09 NOTE — PROGRESS NOTE ADULT - SUBJECTIVE AND OBJECTIVE BOX
Gastroenterology/Hepatology Progress Note      Interval Events:     Allergies:  sulfa drugs (Unknown)  Salicylic Acid (Other)      Hospital Medications:  albuterol    90 MICROgram(s) HFA Inhaler 1 Puff(s) Inhalation every 12 hours PRN  albuterol/ipratropium for Nebulization 3 milliLiter(s) Nebulizer every 6 hours  buMETAnide Injectable 2 milliGRAM(s) IV Push every 12 hours  cefTRIAXone   IVPB 2000 milliGRAM(s) IV Intermittent every 24 hours  chlorhexidine 2% Cloths 1 Application(s) Topical <User Schedule>  folic acid 1 milliGRAM(s) Oral daily  influenza   Vaccine 0.5 milliLiter(s) IntraMuscular once  insulin lispro (ADMELOG) corrective regimen sliding scale   SubCutaneous three times a day before meals  insulin lispro (ADMELOG) corrective regimen sliding scale   SubCutaneous at bedtime  lactulose Syrup 30 Gram(s) Oral every 12 hours  magnesium oxide 400 milliGRAM(s) Oral two times a day with meals  magnesium sulfate  IVPB 2 Gram(s) IV Intermittent once  metroNIDAZOLE    Tablet 500 milliGRAM(s) Oral every 12 hours  midodrine 10 milliGRAM(s) Oral every 8 hours  multivitamin 1 Tablet(s) Oral daily  pantoprazole    Tablet 40 milliGRAM(s) Oral daily  rifAXIMin 550 milliGRAM(s) Oral every 12 hours  spironolactone 50 milliGRAM(s) Oral daily  thiamine 100 milliGRAM(s) Oral daily  traMADol 25 milliGRAM(s) Oral every 6 hours PRN  traMADol 50 milliGRAM(s) Oral every 6 hours PRN      ROS: 14 point ROS negative unless otherwise state in subjective    PHYSICAL EXAM:   Vital Signs:  Vital Signs Last 24 Hrs  T(C): 37.2 (2025 08:52), Max: 37.2 (2025 17:00)  T(F): 98.9 (2025 08:52), Max: 99 (2025 17:00)  HR: 106 (2025 08:56) (75 - 115)  BP: 129/71 (2025 08:52) (112/72 - 136/74)  BP(mean): --  RR: 20 (2025 08:52) (18 - 20)  SpO2: 96% (2025 08:56) (92% - 100%)    Parameters below as of 2025 08:52  Patient On (Oxygen Delivery Method): room air      Daily     Daily Weight in k.2 (2025 05:37)    GENERAL:  No acute distress  HEENT:  NCAT, no scleral icterus  CHEST: no resp distress  HEART:  RRR  ABDOMEN:  Soft, non-tender, non-distended, normoactive bowel sounds, no masses  EXTREMITIES:  No cyanosis, clubbing, or edema  SKIN:  No rash/erythema/ecchymoses/petechiae/wounds/abscess/warm/dry  NEURO:  Alert and oriented x 3, no asterixis, no tremor    LABS:                        7.6    7.76  )-----------( 63       ( 2025 06:31 )             23.7     Mean Cell Volume: 97.5 fl (25 @ 06:31)    -    134[L]  |  98  |  35[H]  ----------------------------<  110[H]  4.0   |  25  |  1.12    Ca    9.6      2025 06:27  Phos  2.9     -  Mg     1.5     -    TPro  7.5  /  Alb  3.8  /  TBili  6.3[H]  /  DBili  x   /  AST  37  /  ALT  6[L]  /  AlkPhos  109  02-09    LIVER FUNCTIONS - ( 2025 06:27 )  Alb: 3.8 g/dL / Pro: 7.5 g/dL / ALK PHOS: 109 U/L / ALT: 6 U/L / AST: 37 U/L / GGT: x           PT/INR - ( 2025 06:31 )   PT: 35.4 sec;   INR: 3.14 ratio         PTT - ( 2025 06:31 )  PTT:50.6 sec  Urinalysis Basic - ( 2025 06:27 )    Color: x / Appearance: x / SG: x / pH: x  Gluc: 110 mg/dL / Ketone: x  / Bili: x / Urobili: x   Blood: x / Protein: x / Nitrite: x   Leuk Esterase: x / RBC: x / WBC x   Sq Epi: x / Non Sq Epi: x / Bacteria: x            Imaging:           Gastroenterology/Hepatology Progress Note      Interval Events:     - No acute events overnight.   - This morning, patient complained of palpitations, HR increased to 140s, and EKG showed sinus tach w/ possible Afib.   - He also complained of intermittent SOB, but denied abd pain, nausea or vomiting.   - Still has LE edema b/l.   - complained of pain at the suture site in the right side of the chest where the CT was there.     Allergies:  sulfa drugs (Unknown)  Salicylic Acid (Other)      Hospital Medications:  albuterol    90 MICROgram(s) HFA Inhaler 1 Puff(s) Inhalation every 12 hours PRN  albuterol/ipratropium for Nebulization 3 milliLiter(s) Nebulizer every 6 hours  buMETAnide Injectable 2 milliGRAM(s) IV Push every 12 hours  cefTRIAXone   IVPB 2000 milliGRAM(s) IV Intermittent every 24 hours  chlorhexidine 2% Cloths 1 Application(s) Topical <User Schedule>  folic acid 1 milliGRAM(s) Oral daily  influenza   Vaccine 0.5 milliLiter(s) IntraMuscular once  insulin lispro (ADMELOG) corrective regimen sliding scale   SubCutaneous three times a day before meals  insulin lispro (ADMELOG) corrective regimen sliding scale   SubCutaneous at bedtime  lactulose Syrup 30 Gram(s) Oral every 12 hours  magnesium oxide 400 milliGRAM(s) Oral two times a day with meals  magnesium sulfate  IVPB 2 Gram(s) IV Intermittent once  metroNIDAZOLE    Tablet 500 milliGRAM(s) Oral every 12 hours  midodrine 10 milliGRAM(s) Oral every 8 hours  multivitamin 1 Tablet(s) Oral daily  pantoprazole    Tablet 40 milliGRAM(s) Oral daily  rifAXIMin 550 milliGRAM(s) Oral every 12 hours  spironolactone 50 milliGRAM(s) Oral daily  thiamine 100 milliGRAM(s) Oral daily  traMADol 25 milliGRAM(s) Oral every 6 hours PRN  traMADol 50 milliGRAM(s) Oral every 6 hours PRN      ROS: 14 point ROS negative unless otherwise state in subjective    PHYSICAL EXAM:   Vital Signs:  Vital Signs Last 24 Hrs  T(C): 37.2 (2025 08:52), Max: 37.2 (2025 17:00)  T(F): 98.9 (2025 08:52), Max: 99 (2025 17:00)  HR: 106 (2025 08:56) (75 - 115)  BP: 129/71 (2025 08:52) (112/72 - 136/74)  BP(mean): --  RR: 20 (2025 08:52) (18 - 20)  SpO2: 96% (2025 08:56) (92% - 100%)    Parameters below as of 2025 08:52  Patient On (Oxygen Delivery Method): room air      Daily     Daily Weight in k.2 (2025 05:37)    PHYSICAL EXAM:   GENERAL: awake, well appearing  HEENT:  Normocephalic/atraumatic, scleral icterus  CHEST:  no resp distress, decreased BS at R lower lung  HEART:  Regular rate and rhythm  ABDOMEN:  Soft, mild diffuse tenderness, mildly distended, normoactive bowel sounds, splenomegaly, stigmata of chronic liver dz  EXTREMITIES: 2+ LE edema to knee  SKIN:  No rash  NEURO: AAOx3, no asterixis    LABS:                        7.6    7.76  )-----------( 63       ( 2025 06:31 )             23.7     Mean Cell Volume: 97.5 fl (25 @ 06:31)        134[L]  |  98  |  35[H]  ----------------------------<  110[H]  4.0   |  25  |  1.12    Ca    9.6      2025 06:27  Phos  2.9       Mg     1.5         TPro  7.5  /  Alb  3.8  /  TBili  6.3[H]  /  DBili  x   /  AST  37  /  ALT  6[L]  /  AlkPhos  109  09    LIVER FUNCTIONS - ( 2025 06:27 )  Alb: 3.8 g/dL / Pro: 7.5 g/dL / ALK PHOS: 109 U/L / ALT: 6 U/L / AST: 37 U/L / GGT: x           PT/INR - ( 2025 06:31 )   PT: 35.4 sec;   INR: 3.14 ratio         PTT - ( 2025 06:31 )  PTT:50.6 sec  Urinalysis Basic - ( 2025 06:27 )    Color: x / Appearance: x / SG: x / pH: x  Gluc: 110 mg/dL / Ketone: x  / Bili: x / Urobili: x   Blood: x / Protein: x / Nitrite: x   Leuk Esterase: x / RBC: x / WBC x   Sq Epi: x / Non Sq Epi: x / Bacteria: x

## 2025-02-10 ENCOUNTER — NON-APPOINTMENT (OUTPATIENT)
Age: 45
End: 2025-02-10

## 2025-02-10 ENCOUNTER — APPOINTMENT (OUTPATIENT)
Dept: HEPATOLOGY | Facility: CLINIC | Age: 45
End: 2025-02-10

## 2025-02-10 LAB
ALBUMIN SERPL ELPH-MCNC: 3.5 G/DL — SIGNIFICANT CHANGE UP (ref 3.3–5)
ALP SERPL-CCNC: 104 U/L — SIGNIFICANT CHANGE UP (ref 40–120)
ALT FLD-CCNC: 6 U/L — LOW (ref 10–45)
ANION GAP SERPL CALC-SCNC: 12 MMOL/L — SIGNIFICANT CHANGE UP (ref 5–17)
APTT BLD: 50.2 SEC — HIGH (ref 24.5–35.6)
AST SERPL-CCNC: 34 U/L — SIGNIFICANT CHANGE UP (ref 10–40)
BILIRUB SERPL-MCNC: 5.8 MG/DL — HIGH (ref 0.2–1.2)
BLD GP AB SCN SERPL QL: NEGATIVE — SIGNIFICANT CHANGE UP
BUN SERPL-MCNC: 40 MG/DL — HIGH (ref 7–23)
CALCIUM SERPL-MCNC: 9 MG/DL — SIGNIFICANT CHANGE UP (ref 8.4–10.5)
CHLORIDE SERPL-SCNC: 100 MMOL/L — SIGNIFICANT CHANGE UP (ref 96–108)
CO2 SERPL-SCNC: 25 MMOL/L — SIGNIFICANT CHANGE UP (ref 22–31)
CREAT SERPL-MCNC: 1.22 MG/DL — SIGNIFICANT CHANGE UP (ref 0.5–1.3)
EGFR: 74 ML/MIN/1.73M2 — SIGNIFICANT CHANGE UP
EGFR: 74 ML/MIN/1.73M2 — SIGNIFICANT CHANGE UP
GLUCOSE BLDC GLUCOMTR-MCNC: 133 MG/DL — HIGH (ref 70–99)
GLUCOSE BLDC GLUCOMTR-MCNC: 142 MG/DL — HIGH (ref 70–99)
GLUCOSE BLDC GLUCOMTR-MCNC: 155 MG/DL — HIGH (ref 70–99)
GLUCOSE BLDC GLUCOMTR-MCNC: 180 MG/DL — HIGH (ref 70–99)
GLUCOSE SERPL-MCNC: 130 MG/DL — HIGH (ref 70–99)
HCT VFR BLD CALC: 21.4 % — LOW (ref 39–50)
HCT VFR BLD CALC: 23.7 % — LOW (ref 39–50)
HGB BLD-MCNC: 6.9 G/DL — CRITICAL LOW (ref 13–17)
HGB BLD-MCNC: 7.7 G/DL — LOW (ref 13–17)
INR BLD: 3.02 RATIO — HIGH (ref 0.85–1.16)
MAGNESIUM SERPL-MCNC: 1.6 MG/DL — SIGNIFICANT CHANGE UP (ref 1.6–2.6)
MCHC RBC-ENTMCNC: 31.2 PG — SIGNIFICANT CHANGE UP (ref 27–34)
MCHC RBC-ENTMCNC: 31.7 PG — SIGNIFICANT CHANGE UP (ref 27–34)
MCHC RBC-ENTMCNC: 32.2 G/DL — SIGNIFICANT CHANGE UP (ref 32–36)
MCHC RBC-ENTMCNC: 32.5 G/DL — SIGNIFICANT CHANGE UP (ref 32–36)
MCV RBC AUTO: 96 FL — SIGNIFICANT CHANGE UP (ref 80–100)
MCV RBC AUTO: 98.2 FL — SIGNIFICANT CHANGE UP (ref 80–100)
NRBC # BLD: 0 /100 WBCS — SIGNIFICANT CHANGE UP (ref 0–0)
NRBC # BLD: 0 /100 WBCS — SIGNIFICANT CHANGE UP (ref 0–0)
NRBC BLD-RTO: 0 /100 WBCS — SIGNIFICANT CHANGE UP (ref 0–0)
NRBC BLD-RTO: 0 /100 WBCS — SIGNIFICANT CHANGE UP (ref 0–0)
PHOSPHATE SERPL-MCNC: 3 MG/DL — SIGNIFICANT CHANGE UP (ref 2.5–4.5)
PLATELET # BLD AUTO: 60 K/UL — LOW (ref 150–400)
PLATELET # BLD AUTO: 72 K/UL — LOW (ref 150–400)
POTASSIUM SERPL-MCNC: 4.3 MMOL/L — SIGNIFICANT CHANGE UP (ref 3.5–5.3)
POTASSIUM SERPL-SCNC: 4.3 MMOL/L — SIGNIFICANT CHANGE UP (ref 3.5–5.3)
PROT SERPL-MCNC: 7.3 G/DL — SIGNIFICANT CHANGE UP (ref 6–8.3)
PROTHROM AB SERPL-ACNC: 34 SEC — HIGH (ref 9.9–13.4)
RBC # BLD: 2.18 M/UL — LOW (ref 4.2–5.8)
RBC # BLD: 2.47 M/UL — LOW (ref 4.2–5.8)
RBC # FLD: 20.7 % — HIGH (ref 10.3–14.5)
RBC # FLD: 20.7 % — HIGH (ref 10.3–14.5)
RH IG SCN BLD-IMP: POSITIVE — SIGNIFICANT CHANGE UP
SODIUM SERPL-SCNC: 137 MMOL/L — SIGNIFICANT CHANGE UP (ref 135–145)
WBC # BLD: 7.49 K/UL — SIGNIFICANT CHANGE UP (ref 3.8–10.5)
WBC # BLD: 8.27 K/UL — SIGNIFICANT CHANGE UP (ref 3.8–10.5)
WBC # FLD AUTO: 7.49 K/UL — SIGNIFICANT CHANGE UP (ref 3.8–10.5)
WBC # FLD AUTO: 8.27 K/UL — SIGNIFICANT CHANGE UP (ref 3.8–10.5)

## 2025-02-10 PROCEDURE — G0545: CPT

## 2025-02-10 PROCEDURE — 99232 SBSQ HOSP IP/OBS MODERATE 35: CPT | Mod: GC

## 2025-02-10 PROCEDURE — 99232 SBSQ HOSP IP/OBS MODERATE 35: CPT

## 2025-02-10 PROCEDURE — 71045 X-RAY EXAM CHEST 1 VIEW: CPT | Mod: 26

## 2025-02-10 RX ORDER — LIDOCAINE HCL/PF 10 MG/ML
20 VIAL (ML) INJECTION ONCE
Refills: 0 | Status: COMPLETED | OUTPATIENT
Start: 2025-02-10 | End: 2025-02-10

## 2025-02-10 RX ORDER — CARVEDILOL 3.12 MG/1
3.12 TABLET, FILM COATED ORAL ONCE
Refills: 0 | Status: DISCONTINUED | OUTPATIENT
Start: 2025-02-10 | End: 2025-02-10

## 2025-02-10 RX ORDER — BUMETANIDE 1 MG/1
2 TABLET ORAL ONCE
Refills: 0 | Status: COMPLETED | OUTPATIENT
Start: 2025-02-10 | End: 2025-02-10

## 2025-02-10 RX ADMIN — Medication 1 DROP(S): at 17:55

## 2025-02-10 RX ADMIN — CEFTRIAXONE 100 MILLIGRAM(S): 500 INJECTION, POWDER, FOR SOLUTION INTRAMUSCULAR; INTRAVENOUS at 09:28

## 2025-02-10 RX ADMIN — IPRATROPIUM BROMIDE AND ALBUTEROL SULFATE 3 MILLILITER(S): .5; 2.5 SOLUTION RESPIRATORY (INHALATION) at 00:28

## 2025-02-10 RX ADMIN — INSULIN LISPRO 2: 100 INJECTION, SOLUTION INTRAVENOUS; SUBCUTANEOUS at 17:47

## 2025-02-10 RX ADMIN — IPRATROPIUM BROMIDE AND ALBUTEROL SULFATE 3 MILLILITER(S): .5; 2.5 SOLUTION RESPIRATORY (INHALATION) at 06:37

## 2025-02-10 RX ADMIN — TRAMADOL HYDROCHLORIDE 50 MILLIGRAM(S): 50 TABLET, FILM COATED ORAL at 17:55

## 2025-02-10 RX ADMIN — TRAMADOL HYDROCHLORIDE 50 MILLIGRAM(S): 50 TABLET, FILM COATED ORAL at 01:28

## 2025-02-10 RX ADMIN — TRAMADOL HYDROCHLORIDE 50 MILLIGRAM(S): 50 TABLET, FILM COATED ORAL at 09:59

## 2025-02-10 RX ADMIN — Medication 1 TABLET(S): at 12:55

## 2025-02-10 RX ADMIN — MIDODRINE HYDROCHLORIDE 10 MILLIGRAM(S): 5 TABLET ORAL at 01:30

## 2025-02-10 RX ADMIN — Medication 1 DROP(S): at 06:37

## 2025-02-10 RX ADMIN — IPRATROPIUM BROMIDE AND ALBUTEROL SULFATE 3 MILLILITER(S): .5; 2.5 SOLUTION RESPIRATORY (INHALATION) at 17:55

## 2025-02-10 RX ADMIN — TRAMADOL HYDROCHLORIDE 50 MILLIGRAM(S): 50 TABLET, FILM COATED ORAL at 00:28

## 2025-02-10 RX ADMIN — Medication 400 MILLIGRAM(S): at 09:25

## 2025-02-10 RX ADMIN — FOLIC ACID 1 MILLIGRAM(S): 1 TABLET ORAL at 12:54

## 2025-02-10 RX ADMIN — MIDODRINE HYDROCHLORIDE 10 MILLIGRAM(S): 5 TABLET ORAL at 09:57

## 2025-02-10 RX ADMIN — Medication 500 MILLIGRAM(S): at 17:55

## 2025-02-10 RX ADMIN — TRAMADOL HYDROCHLORIDE 50 MILLIGRAM(S): 50 TABLET, FILM COATED ORAL at 18:30

## 2025-02-10 RX ADMIN — Medication 500 MILLIGRAM(S): at 06:36

## 2025-02-10 RX ADMIN — LACTULOSE 30 GRAM(S): 10 SOLUTION ORAL at 06:37

## 2025-02-10 RX ADMIN — Medication 100 MILLIGRAM(S): at 12:54

## 2025-02-10 RX ADMIN — MIDODRINE HYDROCHLORIDE 10 MILLIGRAM(S): 5 TABLET ORAL at 17:55

## 2025-02-10 RX ADMIN — Medication 40 MILLIGRAM(S): at 12:55

## 2025-02-10 RX ADMIN — Medication 1 APPLICATION(S): at 13:01

## 2025-02-10 RX ADMIN — Medication 400 MILLIGRAM(S): at 17:55

## 2025-02-10 RX ADMIN — TRAMADOL HYDROCHLORIDE 50 MILLIGRAM(S): 50 TABLET, FILM COATED ORAL at 23:23

## 2025-02-10 RX ADMIN — BUMETANIDE 2 MILLIGRAM(S): 1 TABLET ORAL at 12:56

## 2025-02-10 RX ADMIN — Medication 20 MILLILITER(S): at 14:24

## 2025-02-10 RX ADMIN — IPRATROPIUM BROMIDE AND ALBUTEROL SULFATE 3 MILLILITER(S): .5; 2.5 SOLUTION RESPIRATORY (INHALATION) at 23:23

## 2025-02-10 RX ADMIN — TRAMADOL HYDROCHLORIDE 50 MILLIGRAM(S): 50 TABLET, FILM COATED ORAL at 10:30

## 2025-02-10 NOTE — PROVIDER CONTACT NOTE (CRITICAL VALUE NOTIFICATION) - NAME OF MD/NP/PA/DO NOTIFIED:
ESTRELLA Barnard
John DE LA ROSA
KAMAR Fox
MD Alex Fofana
ESTRELLA Peterson
ESTRELLA Barnard
ESTRELLA Barnard
ESTRELLA Peterson
Karen Gagnon and Irasema Gutierrez

## 2025-02-10 NOTE — PROGRESS NOTE ADULT - ASSESSMENT
43 yo M with PMH decompensated ETOH cirrhosis c/b recurrent ascites, grade II EV, and HE, right calf hematoma 10/2024 (s/p fall)  presented to [facility] for abdominal pain and distension. Patient found to have ascites on exam and MANUELITO requiring HD initiation PermCath was placed by vascular surgery on 1/10 with post procedure course c/b bleeding from insertion site. Transferred to [facility] SICU for further management.     Blood Cultures (1/16) 1/4 Staph epi.   GI PCR (1/17) + Giardia.   Paracentesis (1/17) 155 nucleated cell counts.     CT Chest (1/17) Moderate loculated right pleural effusion containing a few foci of air, which are new from 1/8/2025 and may represent empyema/bronchopleural fistula versus sequelae of prior thoracentesis.     #Empyema  s/p VATS 1/28/25  --Continue Ceftriaxone to 2g IV Q24H ( please reorder) + Metronidazole 500 mg BID.   --While CT chest appears to visualize residual fluid at the right lung base it is unclear at this point if this represents hematoma or infected hematoma.  Patient does not have any systemic signs or symptoms of infection.  It does not appear that we will achieve optimal source control prior to liver transplant given patient's coagulopathy.  At the minimum I am reassured that patient has had good source control with the VATS procedure for the empyema.  At this point benefits of proceeding with transplant likely outweigh risks and thus no absolute ID contraindications for transplant.  I would continue patient's antibiotics and would try to achieve drainage of the residual hematoma versus fluid post liver transplant.    #Giardia  s/p 7 days of Metronidazole    #Positive Blood Culture (Staph epi, 1/16)  Concern for procurement contaminant  --Continue to follow CBC with diff  --Continue to follow temperature curve  --Follow up on  blood cultures no growth      #Pre-Liver Transplant Evaluation, Decompensated Cirrhosis  COVID19 Rodríguez Antibody Positive  HAV IgG Positive  HBVs Ab Positive  HBVsAg Negative  HBVc Ab Negative  HCV Ab Negative  HSV 1 IgG Positive  HSV 2 IgG Negative  EBV IgG Positive  CMV IgG Positive  VZV IgG Positive  Measles IgG Positive  Mumps IgG Positive  Rubella IgG Positive  Quantiferon Gold negative  HIV Ag/Ab by CMIA Negative  Syphilis Screen Negative  Toxoplasma IgG Negative  Strongyloides Ab negative  Coccidiodes Ab Screen Positive but confirmatory negative  Leishmania Ab negative    #Encounter to Vaccinate Patient  COVID19: Would benefit from COVID19 [id number] Vaccine Dose  Influenza: Had vaccination for this year  Pneumococcal: Would benefit from PCV20  HAV: Immune, will not require further vaccination  HBV: Immune, will not require further vaccination  MMR: Immune, will not require further vaccination  Varicella: Immune, will not require further vaccination  Shingles: Will require Shingrix  Tdap: Tdap 6/23/24    I will continue to follow. Please feel free to contact me with any further questions.    Froilan Whitmore M.D.  [facility] Division of Infectious Disease  8AM-5PM Monday - Friday: Available on [employer] Teams  After Hours and Holidays (or if no response on [employer] Teams): Please contact the Infectious Diseases Office at [phone]    The above assessment and plan were discussed with transplant surgery team

## 2025-02-10 NOTE — PROGRESS NOTE ADULT - ATTENDING COMMENTS
S/p 1u PRBCs today for acute on chronic anemia with serosanguinous oozing from R VATS incision with plan for additional suture to be placed today. Continuing ceftriaxone (2/7- ) and metronidazole (2/7- ), with plan for continuation until transplant. He remains afebrile. Tachycardia has partially improved since yesterday with reduced diuresis, with plan for Bumex 2 mg iv x1 only today. Continuing midodrine 10 mg po q8h. He is now actively wait-listed for liver transplant as of today. ABO O with current MELD 3.0 score of 26 (with re-certification due 2/17). Due to residual R hemothorax, he will need chest tube reinserted at the time of his liver transplant.    Victoriano Marie M.D., Ph.D.  Transplant Hepatology

## 2025-02-10 NOTE — PROVIDER CONTACT NOTE (CRITICAL VALUE NOTIFICATION) - ACTION/TREATMENT ORDERED:
MD aware. No further interventions as of this note.
1 unit PRBC
1 unit PRBC's ordered
KAMAR Fox made aware. 1 unit PRBC's given as per orders.
Provider notified
continue to monitor and as treat as per EMAR
ESTRELLA Barnard notified, will follow up with team, pt is already on Zosyn.
ESTRELLA Barnard made aware.
ESTRELLA Barnard made aware. as per ESTRELLA chery already on zosyn.

## 2025-02-10 NOTE — PROGRESS NOTE ADULT - SUBJECTIVE AND OBJECTIVE BOX
Follow Up:      Interval History:    REVIEW OF SYSTEMS  [  ] ROS unobtainable because:    [  ] All other systems negative except as noted below    Constitutional:  [ ] fever [ ] chills  [ ] weight loss  [ ] weakness  Skin:  [ ] rash [ ] phlebitis	  Eyes: [ ] icterus [ ] pain  [ ] discharge	  ENMT: [ ] sore throat  [ ] thrush [ ] ulcers [ ] exudates  Respiratory: [ ] dyspnea [ ] hemoptysis [ ] cough [ ] sputum	  Cardiovascular:  [ ] chest pain [ ] palpitations [ ] edema	  Gastrointestinal:  [ ] nausea [ ] vomiting [ ] diarrhea [ ] constipation [ ] pain	  Genitourinary:  [ ] dysuria [ ] frequency [ ] hematuria [ ] discharge [ ] flank pain  [ ] incontinence  Musculoskeletal:  [ ] myalgias [ ] arthralgias [ ] arthritis  [ ] back pain  Neurological:  [ ] headache [ ] seizures  [ ] confusion/altered mental status    Allergies  sulfa drugs (Unknown)  Salicylic Acid (Other)        ANTIMICROBIALS:  cefTRIAXone   IVPB 2000 every 24 hours  metroNIDAZOLE    Tablet 500 every 12 hours  rifAXIMin 550 every 12 hours      OTHER MEDS:  MEDICATIONS  (STANDING):  albuterol    90 MICROgram(s) HFA Inhaler 1 every 12 hours PRN  albuterol/ipratropium for Nebulization 3 every 6 hours  influenza   Vaccine 0.5 once  insulin lispro (ADMELOG) corrective regimen sliding scale  three times a day before meals  insulin lispro (ADMELOG) corrective regimen sliding scale  at bedtime  lactulose Syrup 30 every 12 hours  midodrine 10 every 8 hours  pantoprazole    Tablet 40 daily  traMADol 25 every 6 hours PRN  traMADol 50 every 6 hours PRN      Vital Signs Last 24 Hrs  T(C): 37.1 (10 Feb 2025 13:00), Max: 37.4 (09 Feb 2025 21:34)  T(F): 98.8 (10 Feb 2025 13:00), Max: 99.3 (09 Feb 2025 21:34)  HR: 99 (10 Feb 2025 13:00) (99 - 121)  BP: 127/69 (10 Feb 2025 13:00) (116/64 - 130/65)  BP(mean): --  RR: 18 (10 Feb 2025 13:00) (18 - 18)  SpO2: 96% (10 Feb 2025 13:00) (92% - 100%)    Parameters below as of 10 Feb 2025 13:00  Patient On (Oxygen Delivery Method): room air        PHYSICAL EXAMINATION:  General: Alert and Awake, NAD  HEENT: PERRL, EOMI  Neck: Supple  Cardiac: RRR, No M/R/G  Resp: CTAB, No Wh/Rh/Ra  Abdomen: NBS, NT/ND, No HSM, No rigidity or guarding  MSK: No LE edema. No Calf tenderness  : No yates  Skin: No rashes or lesions. Skin is warm and dry to the touch.   Neuro: Alert and Awake. CN 2-12 Grossly intact. Moves all four extremities spontaneously.  Psych: Calm, Pleasant, Cooperative                          7.7    7.49  )-----------( 60       ( 10 Feb 2025 15:39 )             23.7       02-10    137  |  100  |  40[H]  ----------------------------<  130[H]  4.3   |  25  |  1.22    Ca    9.0      10 Feb 2025 07:17  Phos  3.0     02-10  Mg     1.6     02-10    TPro  7.3  /  Alb  3.5  /  TBili  5.8[H]  /  DBili  x   /  AST  34  /  ALT  6[L]  /  AlkPhos  104  02-10      Urinalysis Basic - ( 10 Feb 2025 07:17 )    Color: x / Appearance: x / SG: x / pH: x  Gluc: 130 mg/dL / Ketone: x  / Bili: x / Urobili: x   Blood: x / Protein: x / Nitrite: x   Leuk Esterase: x / RBC: x / WBC x   Sq Epi: x / Non Sq Epi: x / Bacteria: x        MICROBIOLOGY:  v  Ascites Fl  02-06-25   No growth  --    No polymorphonuclear cells seen  No organisms seen  by cytocentrifuge      .Abscess  01-28-25   No growth at 5 days  --    Few polymorphonuclear leukocytes seen per low power field  No organisms seen per oil power field      Tissue  01-28-25   No growth at 5 days  --    Few polymorphonuclear leukocytes seen per low power field  No organisms seen per oil power field      Pleural Fl  01-24-25   Rare Citrobacter koseri  --  Citrobacter koseri      Peritoneal  01-23-25   No growth at 5 days  --    No polymorphonuclear cells seen  No organisms seen  by cytocentrifuge      .Blood BLOOD  01-19-25   No growth at 5 days  --  --      Pleural Fl  01-18-25   Moderate Citrobacter koseri  --  Citrobacter koseri      Body Fluid  01-18-25   No fungus isolated at 2 weeks.  --  --      .Blood BLOOD  01-18-25   No growth at 5 days  --  --      Ascites Fl  01-17-25   No growth at 5 days  --    No polymorphonuclear cells seen  No organisms seen  by cytocentrifuge      .Blood BLOOD  01-16-25   No growth at 5 days  --  --      .Blood BLOOD  01-16-25   Growth in aerobic bottle: Staphylococcus epidermidis  Isolation of Coagulase negative Staphylococcus from single blood culture  sets may represent  contamination. Contact the Microbiology Department at [phone] if  susceptibility testing is needed.  clinically indicated.  Direct identification is available within approximately 3-5  hours either by Blood Panel Multiplexed PCR or Direct  MALDI-TOF. Details: [email or web address]  --  Blood Culture PCR        CMV IgG Antibody: >10.00 U/mL (01-17-25 @ 06:41)  Toxoplasma IgG Screen: <3.00 IU/mL (01-17-25 @ 06:41)          RADIOLOGY:    <The imaging below has been reviewed and visualized by me independently. Findings as detailed in report below> Follow Up:  Pre-OLT    Interval History: afebrile. no acute events.     REVIEW OF SYSTEMS  [  ] ROS unobtainable because:    [ x ] All other systems negative except as noted below    Constitutional:  [ ] fever [ ] chills  [ ] weight loss  [ ] weakness  Skin:  [ ] rash [ ] phlebitis	  Eyes: [ ] icterus [ ] pain  [ ] discharge	  ENMT: [ ] sore throat  [ ] thrush [ ] ulcers [ ] exudates  Respiratory: [ ] dyspnea [ ] hemoptysis [ ] cough [ ] sputum	  Cardiovascular:  [ ] chest pain [ ] palpitations [ ] edema	  Gastrointestinal:  [ ] nausea [ ] vomiting [ ] diarrhea [ ] constipation [ ] pain	  Genitourinary:  [ ] dysuria [ ] frequency [ ] hematuria [ ] discharge [ ] flank pain  [ ] incontinence  Musculoskeletal:  [ ] myalgias [ ] arthralgias [ ] arthritis  [ ] back pain  Neurological:  [ ] headache [ ] seizures  [ ] confusion/altered mental status    Allergies  sulfa drugs (Unknown)  Salicylic Acid (Other)        ANTIMICROBIALS:  cefTRIAXone   IVPB 2000 every 24 hours  metroNIDAZOLE    Tablet 500 every 12 hours  rifAXIMin 550 every 12 hours      OTHER MEDS:  MEDICATIONS  (STANDING):  albuterol    90 MICROgram(s) HFA Inhaler 1 every 12 hours PRN  albuterol/ipratropium for Nebulization 3 every 6 hours  influenza   Vaccine 0.5 once  insulin lispro (ADMELOG) corrective regimen sliding scale  three times a day before meals  insulin lispro (ADMELOG) corrective regimen sliding scale  at bedtime  lactulose Syrup 30 every 12 hours  midodrine 10 every 8 hours  pantoprazole    Tablet 40 daily  traMADol 25 every 6 hours PRN  traMADol 50 every 6 hours PRN      Vital Signs Last 24 Hrs  T(C): 37.1 (10 Feb 2025 13:00), Max: 37.4 (09 Feb 2025 21:34)  T(F): 98.8 (10 Feb 2025 13:00), Max: 99.3 (09 Feb 2025 21:34)  HR: 99 (10 Feb 2025 13:00) (99 - 121)  BP: 127/69 (10 Feb 2025 13:00) (116/64 - 130/65)  BP(mean): --  RR: 18 (10 Feb 2025 13:00) (18 - 18)  SpO2: 96% (10 Feb 2025 13:00) (92% - 100%)    Parameters below as of 10 Feb 2025 13:00  Patient On (Oxygen Delivery Method): room air        PHYSICAL EXAMINATION:  General: Alert and Awake, NAD  Cardiac: RRR, No M/R/G  Resp: CTAB, No Wh/Rh/Ra  Abdomen: NBS, NT/ND, No HSM, No rigidity or guarding  MSK: No LE edema. No Calf tenderness  : No yates  Skin: No rashes or lesions. Skin is warm and dry to the touch.   Neuro: Alert and Awake. CN 2-12 Grossly intact. Moves all four extremities spontaneously.  Psych: Calm, Pleasant, Cooperative                          7.7    7.49  )-----------( 60       ( 10 Feb 2025 15:39 )             23.7       02-10    137  |  100  |  40[H]  ----------------------------<  130[H]  4.3   |  25  |  1.22    Ca    9.0      10 Feb 2025 07:17  Phos  3.0     02-10  Mg     1.6     02-10    TPro  7.3  /  Alb  3.5  /  TBili  5.8[H]  /  DBili  x   /  AST  34  /  ALT  6[L]  /  AlkPhos  104  02-10      Urinalysis Basic - ( 10 Feb 2025 07:17 )    Color: x / Appearance: x / SG: x / pH: x  Gluc: 130 mg/dL / Ketone: x  / Bili: x / Urobili: x   Blood: x / Protein: x / Nitrite: x   Leuk Esterase: x / RBC: x / WBC x   Sq Epi: x / Non Sq Epi: x / Bacteria: x        MICROBIOLOGY:  v  Ascites Fl  02-06-25   No growth  --    No polymorphonuclear cells seen  No organisms seen  by cytocentrifuge      .Abscess  01-28-25   No growth at 5 days  --    Few polymorphonuclear leukocytes seen per low power field  No organisms seen per oil power field      Tissue  01-28-25   No growth at 5 days  --    Few polymorphonuclear leukocytes seen per low power field  No organisms seen per oil power field      Pleural Fl  01-24-25   Rare Citrobacter koseri  --  Citrobacter koseri      Peritoneal  01-23-25   No growth at 5 days  --    No polymorphonuclear cells seen  No organisms seen  by cytocentrifuge      .Blood BLOOD  01-19-25   No growth at 5 days  --  --      Pleural Fl  01-18-25   Moderate Citrobacter koseri  --  Citrobacter koseri      Body Fluid  01-18-25   No fungus isolated at 2 weeks.  --  --      .Blood BLOOD  01-18-25   No growth at 5 days  --  --      Ascites Fl  01-17-25   No growth at 5 days  --    No polymorphonuclear cells seen  No organisms seen  by cytocentrifuge      .Blood BLOOD  01-16-25   No growth at 5 days  --  --      .Blood BLOOD  01-16-25   Growth in aerobic bottle: Staphylococcus epidermidis  Isolation of Coagulase negative Staphylococcus from single blood culture  sets may represent  contamination. Contact the Microbiology Department at [phone] if  susceptibility testing is needed.  clinically indicated.  Direct identification is available within approximately 3-5  hours either by Blood Panel Multiplexed PCR or Direct  MALDI-TOF. Details: [email or web address]  --  Blood Culture PCR        CMV IgG Antibody: >10.00 U/mL (01-17-25 @ 06:41)  Toxoplasma IgG Screen: <3.00 IU/mL (01-17-25 @ 06:41)          RADIOLOGY:    <The imaging below has been reviewed and visualized by me independently. Findings as detailed in report below>    < from: CT Chest No Cont (02.07.25 @ 22:37) >  IMPRESSION:    Interval removal of right chest tubes. A moderate right gas and liquid   containing right pleural collection is mildly increased since 1/27/2025.    Persistent bilateral nephrograms suggestive of acute kidney injury.    < end of copied text >

## 2025-02-10 NOTE — PROVIDER CONTACT NOTE (CRITICAL VALUE NOTIFICATION) - BACKGROUND
Pt 45 y/o male, PMH ETOH Cirrhosis, c/b recurrent ascites.
Pt PMH decompensated ETOH cirrhosis with recurrent ascites, grade II EV and HE.
liver failure
PMH decompensated ETOH cirrhosis c/b recurrent ascites, grade II EV, and HE, right calf hematoma, presents for liver evaluation.
Pt admitted w/ liver failure
liver failure
43 yo M with PMH decompensated ETOH cirrhosis c/b recurrent ascites, grade II EV, and HE, right calf hematoma. [facility] for abdominal pain and distension. Patient found to have ascites on exam and MANUELITO requiring HD initation, right pleural effusion on CT chest.
pt admitted on 1/16/25 with liver failure
Pt diagnosed with liver failure

## 2025-02-10 NOTE — PROGRESS NOTE ADULT - ASSESSMENT
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 Actively drinking. Now p/w abdominal pain/distension with worsening of liver tests possibly triggered by infection. DDx also includes alcohol associated hepatitis given hx of alc hep 7/2024 which improved with steroids  - OLT: discuss to offically list pending ID clearance for empyema  - EV: EGD 7/2024 for melena showed grade II EV, small gastric ulcer (neg HP), and portal gastropathy   - Ascites: s/p para with 6.8 L fluid removed, give bumex 2 mg IV x1 with transfusion  - HE: c/w lactulose and rifaximin, goal 3-4 BM/day  - HCC screening: MRI nondiagnostic, no clear lesion seen                             - C/w home midodrine 10 mg tid  - C/w PPI for stress ulcer ppx and hx of ulcer  - Trend MELD labs daily (cmp, INR) daily  - Strict I/Os, daily wts    #Empyema  CT chest noncontrast reviewed from OSH showing moderate sized complex R sided pleural effusion. s/p chest tube placed 1/18 with cultures growing Citrobacter c/w empyema. Requiring 2nd chest tube placement then now s/p VATS 1/28 for persistent effusion with 3rd chest tube placed. Now all chest tubes removed with persistent collection on CXR  - C/w CTX and flagyl given persistent collection  - Discuss whether can proceed with listing with persistent empyema  - Trend cbc and fever curve  - Appreciate thoracic surgery and ID recs    #MANUELITO, improving  Found to have acute renal failure requiring dialysis at OSH, ddx includes HRS vs ATN. Cr baseline 1.12 10/2024 required albumin assisted diuresis with bumex 2 mg IV q12h. Cr improving. May now be intravascularly depleted  - Give bumex 2 mg IV x1 today  - Strict I/Os, daily wts  - Trend CMP daily    #Anemia  #Thrombocytopenia  Normocytic anemia with Hgb 8.5 and plt count 64 likely due to chronic liver dz  - Transfuse 1 unit pRBC today for Hgb<7  - s/p 4 units pRBC, 2 FFP, 2 plts on 1/28  - Hold DVT ppx for bleeding    All recommendations are tentative until note is attested by attending.     Judy Tucker, PGY4  Gastroenterology/Hepatology Fellow  Available on Microsoft Teams  [phone] (Long Range Pager)  [phone] (Short Range Pager [facility])    After 5 pm, please contact the on-call GI fellow for any urgent issues via the Hospital Call

## 2025-02-10 NOTE — PROVIDER CONTACT NOTE (CRITICAL VALUE NOTIFICATION) - ASSESSMENT
Pt A&Ox4. Pt had complex pleural effusion, received chest tube 1/18. Pt has some pain at chest tube insertion site over night. Pt hemodynamically stable.
pt remains at baseline, pt denies pain.
pt remains at baseline.
Pt AOx4. Remains hemodynamically stable. No s/s of bleeding. No signs of cp, sob or distress at this time
VS as charted
No acute distress noted
Pt A&Ox4. VS as charted. Pt offers no complaints at this time, resting comfortably in bed.
Vital charted
Pt a&Ox4. VS as charted. No c/o pain or discomfort at the moment.

## 2025-02-10 NOTE — PROVIDER CONTACT NOTE (CRITICAL VALUE NOTIFICATION) - SITUATION
Body fluid cx showed polymorphonulcear leukocytes gram negative rods seen by centrifuge
Hemaglobin 6.8, hematacrit 20.6
Pt arrived to [facility] with ascites and abdominal pain. Upon exam had MANUELITO and recuring HD initation. Received a PermCath. Pt transferred for further management.
Hgb 6.1, Hct 18.3
Hgb 6.9
Blood cultures 1/16 Growth in aerobic bottle: Gram Positive
GI PCR dected giarotia lamblia
hemoglobin 7.0
Body fluid culture collected on 1/24 is positive for rare citrobacter koseri

## 2025-02-10 NOTE — PROVIDER CONTACT NOTE (CRITICAL VALUE NOTIFICATION) - RECOMMENDATIONS
notify provider
Notify Provider. Await orders.
Notify provider
1 unit PRBC
notify provider.
Notify provider
continue to monitor and as treat as per EMAR

## 2025-02-10 NOTE — PROGRESS NOTE ADULT - SUBJECTIVE AND OBJECTIVE BOX
Interval Events:   - Afebrile, NAEON  - Had 2 L urine output overnight  - CXR shows persistent R sided empyema  - Hgb 6.9 today, ordered for 1 unit pRBC    ROS:   12 point review of systems performed and negative except otherwise noted in HPI.    Hospital Medications:  albuterol    90 MICROgram(s) HFA Inhaler 2 Puff(s) Inhalation every 6 hours PRN  chlorhexidine 2% Cloths 1 Application(s) Topical <User Schedule>  folic acid 1 milliGRAM(s) Oral daily  lactulose Syrup 20 Gram(s) Oral every 12 hours  melatonin 5 milliGRAM(s) Oral at bedtime  midodrine 10 milliGRAM(s) Oral every 8 hours  Nephro-shania 1 Tablet(s) Oral daily  pantoprazole    Tablet 40 milliGRAM(s) Oral before breakfast  piperacillin/tazobactam IVPB.. 3.375 Gram(s) IV Intermittent every 12 hours  rifAXIMin 550 milliGRAM(s) Oral every 12 hours  thiamine 100 milliGRAM(s) Oral daily      PHYSICAL EXAM:   Vital Signs:  Vital Signs Last 24 Hrs  T(C): 36.9 (17 Jan 2025 09:29), Max: 37.3 (17 Jan 2025 00:35)  T(F): 98.4 (17 Jan 2025 09:29), Max: 99.2 (17 Jan 2025 00:35)  HR: 84 (17 Jan 2025 09:29) (65 - 106)  BP: 109/54 (17 Jan 2025 09:29) (104/51 - 129/62)  BP(mean): 89 (16 Jan 2025 18:00) (74 - 89)  RR: 16 (17 Jan 2025 09:29) (16 - 36)  SpO2: 99% (17 Jan 2025 09:29) (95% - 99%)    Parameters below as of 17 Jan 2025 05:00  Patient On (Oxygen Delivery Method): room air      Daily     Daily     PHYSICAL EXAM:   GENERAL: awake, well appearing  HEENT:  Normocephalic/atraumatic, scleral icterus  CHEST:  no resp distress, crackles at base  HEART:  Regular rate and rhythm  ABDOMEN:  Soft, mild diffuse tenderness, mildly distended, normoactive bowel sounds, splenomegaly, stigmata of chronic liver dz  EXTREMITIES: 2+ LE edema to knee  SKIN:  No rash  NEURO: AAOx3, no asterixis    LABS: reviewed                        7.4    13.96 )-----------( 73       ( 17 Jan 2025 06:42 )             22.4     01-16    130[L]  |  96  |  39[H]  ----------------------------<  117[H]  4.3   |  22  |  2.59[H]    Ca    8.6      16 Jan 2025 07:39  Phos  4.3     01-17  Mg     2.0     01-17    TPro  6.4  /  Alb  2.5[L]  /  TBili  5.8[H]  /  DBili  2.1[H]  /  AST  32  /  ALT  7[L]  /  AlkPhos  116  01-17    LIVER FUNCTIONS - ( 17 Jan 2025 06:41 )  Alb: 2.5 g/dL / Pro: 6.4 g/dL / ALK PHOS: 116 U/L / ALT: 7 U/L / AST: 32 U/L / GGT: x             Interval Diagnostic Studies: see sunrise for full report

## 2025-02-10 NOTE — PROVIDER CONTACT NOTE (CRITICAL VALUE NOTIFICATION) - TEST AND RESULT REPORTED:
Blood cultures 1/16 Growth in aerobic bottle: Gram Positive Cocci in Clusters
Body fluid culture collected on 1/24 is positive for rare citrobacter koseri
Body fluid cx showed polymorphonulcear leukocytes gram negative rods seen by centrifuge
Hemaglobin 6.8, hematacrit 20.6
Pos Mod Citro Bactor Koseri
Hgb 6.1, Hct 18.3
hemoglobin 7.0
GI PCR dected giarotia lamblia
Hgb 6.9

## 2025-02-10 NOTE — PROVIDER CONTACT NOTE (CRITICAL VALUE NOTIFICATION) - PERSON GIVING RESULT:
Reema Martinez
Reema Martinez
fermin cho
Bijan Thomas
Nahid Hernandez
Dee Garg
Gokul Vanessa [facility]
Mary Kay from lab
Lindsay Fang

## 2025-02-11 LAB
ALBUMIN SERPL ELPH-MCNC: 3.5 G/DL — SIGNIFICANT CHANGE UP (ref 3.3–5)
ALP SERPL-CCNC: 108 U/L — SIGNIFICANT CHANGE UP (ref 40–120)
ALT FLD-CCNC: 6 U/L — LOW (ref 10–45)
ANION GAP SERPL CALC-SCNC: 10 MMOL/L — SIGNIFICANT CHANGE UP (ref 5–17)
APTT BLD: 52.1 SEC — HIGH (ref 24.5–35.6)
AST SERPL-CCNC: 41 U/L — HIGH (ref 10–40)
BILIRUB SERPL-MCNC: 5.7 MG/DL — HIGH (ref 0.2–1.2)
BUN SERPL-MCNC: 41 MG/DL — HIGH (ref 7–23)
CALCIUM SERPL-MCNC: 9.8 MG/DL — SIGNIFICANT CHANGE UP (ref 8.4–10.5)
CHLORIDE SERPL-SCNC: 100 MMOL/L — SIGNIFICANT CHANGE UP (ref 96–108)
CO2 SERPL-SCNC: 25 MMOL/L — SIGNIFICANT CHANGE UP (ref 22–31)
CREAT SERPL-MCNC: 1.22 MG/DL — SIGNIFICANT CHANGE UP (ref 0.5–1.3)
CULTURE RESULTS: SIGNIFICANT CHANGE UP
EGFR: 74 ML/MIN/1.73M2 — SIGNIFICANT CHANGE UP
EGFR: 74 ML/MIN/1.73M2 — SIGNIFICANT CHANGE UP
GLUCOSE BLDC GLUCOMTR-MCNC: 136 MG/DL — HIGH (ref 70–99)
GLUCOSE BLDC GLUCOMTR-MCNC: 151 MG/DL — HIGH (ref 70–99)
GLUCOSE BLDC GLUCOMTR-MCNC: 163 MG/DL — HIGH (ref 70–99)
GLUCOSE BLDC GLUCOMTR-MCNC: 199 MG/DL — HIGH (ref 70–99)
GLUCOSE SERPL-MCNC: 149 MG/DL — HIGH (ref 70–99)
HCT VFR BLD CALC: 23 % — LOW (ref 39–50)
HGB BLD-MCNC: 7.6 G/DL — LOW (ref 13–17)
INR BLD: 3.1 RATIO — HIGH (ref 0.85–1.16)
MAGNESIUM SERPL-MCNC: 1.6 MG/DL — SIGNIFICANT CHANGE UP (ref 1.6–2.6)
MCHC RBC-ENTMCNC: 32.1 PG — SIGNIFICANT CHANGE UP (ref 27–34)
MCHC RBC-ENTMCNC: 33 G/DL — SIGNIFICANT CHANGE UP (ref 32–36)
MCV RBC AUTO: 97 FL — SIGNIFICANT CHANGE UP (ref 80–100)
NRBC # BLD: 0 /100 WBCS — SIGNIFICANT CHANGE UP (ref 0–0)
NRBC BLD-RTO: 0 /100 WBCS — SIGNIFICANT CHANGE UP (ref 0–0)
PHOSPHATE SERPL-MCNC: 2.9 MG/DL — SIGNIFICANT CHANGE UP (ref 2.5–4.5)
PLATELET # BLD AUTO: 70 K/UL — LOW (ref 150–400)
POTASSIUM SERPL-MCNC: 4.4 MMOL/L — SIGNIFICANT CHANGE UP (ref 3.5–5.3)
POTASSIUM SERPL-SCNC: 4.4 MMOL/L — SIGNIFICANT CHANGE UP (ref 3.5–5.3)
PROT SERPL-MCNC: 7.4 G/DL — SIGNIFICANT CHANGE UP (ref 6–8.3)
PROTHROM AB SERPL-ACNC: 35.2 SEC — HIGH (ref 9.9–13.4)
RBC # BLD: 2.37 M/UL — LOW (ref 4.2–5.8)
RBC # FLD: 20.9 % — HIGH (ref 10.3–14.5)
SODIUM SERPL-SCNC: 135 MMOL/L — SIGNIFICANT CHANGE UP (ref 135–145)
SPECIMEN SOURCE: SIGNIFICANT CHANGE UP
WBC # BLD: 6.98 K/UL — SIGNIFICANT CHANGE UP (ref 3.8–10.5)
WBC # FLD AUTO: 6.98 K/UL — SIGNIFICANT CHANGE UP (ref 3.8–10.5)

## 2025-02-11 PROCEDURE — 99232 SBSQ HOSP IP/OBS MODERATE 35: CPT | Mod: GC

## 2025-02-11 RX ORDER — MAGNESIUM OXIDE 400 MG
800 TABLET ORAL
Refills: 0 | Status: DISCONTINUED | OUTPATIENT
Start: 2025-02-11 | End: 2025-02-27

## 2025-02-11 RX ORDER — BUMETANIDE 1 MG/1
2 TABLET ORAL ONCE
Refills: 0 | Status: COMPLETED | OUTPATIENT
Start: 2025-02-11 | End: 2025-02-11

## 2025-02-11 RX ORDER — SPIRONOLACTONE 25 MG
100 TABLET ORAL ONCE
Refills: 0 | Status: COMPLETED | OUTPATIENT
Start: 2025-02-11 | End: 2025-02-11

## 2025-02-11 RX ORDER — MAGNESIUM SULFATE 500 MG/ML
1 SYRINGE (ML) INJECTION ONCE
Refills: 0 | Status: COMPLETED | OUTPATIENT
Start: 2025-02-11 | End: 2025-02-11

## 2025-02-11 RX ADMIN — Medication 100 MILLIGRAM(S): at 13:29

## 2025-02-11 RX ADMIN — INSULIN LISPRO 2: 100 INJECTION, SOLUTION INTRAVENOUS; SUBCUTANEOUS at 09:00

## 2025-02-11 RX ADMIN — Medication 1 DROP(S): at 17:35

## 2025-02-11 RX ADMIN — Medication 500 MILLIGRAM(S): at 17:35

## 2025-02-11 RX ADMIN — IPRATROPIUM BROMIDE AND ALBUTEROL SULFATE 3 MILLILITER(S): .5; 2.5 SOLUTION RESPIRATORY (INHALATION) at 22:25

## 2025-02-11 RX ADMIN — Medication 100 MILLIGRAM(S): at 13:28

## 2025-02-11 RX ADMIN — TRAMADOL HYDROCHLORIDE 50 MILLIGRAM(S): 50 TABLET, FILM COATED ORAL at 00:30

## 2025-02-11 RX ADMIN — TRAMADOL HYDROCHLORIDE 25 MILLIGRAM(S): 50 TABLET, FILM COATED ORAL at 23:30

## 2025-02-11 RX ADMIN — Medication 1 TABLET(S): at 13:28

## 2025-02-11 RX ADMIN — Medication 1 APPLICATION(S): at 06:20

## 2025-02-11 RX ADMIN — FOLIC ACID 1 MILLIGRAM(S): 1 TABLET ORAL at 13:32

## 2025-02-11 RX ADMIN — Medication 800 MILLIGRAM(S): at 17:33

## 2025-02-11 RX ADMIN — MIDODRINE HYDROCHLORIDE 10 MILLIGRAM(S): 5 TABLET ORAL at 17:35

## 2025-02-11 RX ADMIN — Medication 40 MILLIGRAM(S): at 13:30

## 2025-02-11 RX ADMIN — Medication 1 DROP(S): at 05:11

## 2025-02-11 RX ADMIN — MIDODRINE HYDROCHLORIDE 10 MILLIGRAM(S): 5 TABLET ORAL at 00:43

## 2025-02-11 RX ADMIN — CEFTRIAXONE 100 MILLIGRAM(S): 500 INJECTION, POWDER, FOR SOLUTION INTRAMUSCULAR; INTRAVENOUS at 09:01

## 2025-02-11 RX ADMIN — Medication 400 MILLIGRAM(S): at 09:01

## 2025-02-11 RX ADMIN — IPRATROPIUM BROMIDE AND ALBUTEROL SULFATE 3 MILLILITER(S): .5; 2.5 SOLUTION RESPIRATORY (INHALATION) at 08:59

## 2025-02-11 RX ADMIN — INSULIN LISPRO 2: 100 INJECTION, SOLUTION INTRAVENOUS; SUBCUTANEOUS at 17:32

## 2025-02-11 RX ADMIN — Medication 500 MILLIGRAM(S): at 05:11

## 2025-02-11 RX ADMIN — BUMETANIDE 2 MILLIGRAM(S): 1 TABLET ORAL at 17:35

## 2025-02-11 RX ADMIN — TRAMADOL HYDROCHLORIDE 25 MILLIGRAM(S): 50 TABLET, FILM COATED ORAL at 22:25

## 2025-02-11 RX ADMIN — TRAMADOL HYDROCHLORIDE 50 MILLIGRAM(S): 50 TABLET, FILM COATED ORAL at 18:20

## 2025-02-11 RX ADMIN — MIDODRINE HYDROCHLORIDE 10 MILLIGRAM(S): 5 TABLET ORAL at 13:28

## 2025-02-11 RX ADMIN — Medication 100 GRAM(S): at 11:05

## 2025-02-11 RX ADMIN — TRAMADOL HYDROCHLORIDE 50 MILLIGRAM(S): 50 TABLET, FILM COATED ORAL at 17:33

## 2025-02-11 RX ADMIN — IPRATROPIUM BROMIDE AND ALBUTEROL SULFATE 3 MILLILITER(S): .5; 2.5 SOLUTION RESPIRATORY (INHALATION) at 17:33

## 2025-02-11 RX ADMIN — TRAMADOL HYDROCHLORIDE 50 MILLIGRAM(S): 50 TABLET, FILM COATED ORAL at 06:40

## 2025-02-11 RX ADMIN — LACTULOSE 30 GRAM(S): 10 SOLUTION ORAL at 05:11

## 2025-02-11 RX ADMIN — BUMETANIDE 2 MILLIGRAM(S): 1 TABLET ORAL at 13:31

## 2025-02-11 NOTE — PROGRESS NOTE ADULT - SUBJECTIVE AND OBJECTIVE BOX
Interval Events:   - Afebrile, NAEON  - Had 1.17 L urine output past 24 hr    ROS:   12 point review of systems performed and negative except otherwise noted in HPI.    Hospital Medications:  albuterol    90 MICROgram(s) HFA Inhaler 2 Puff(s) Inhalation every 6 hours PRN  chlorhexidine 2% Cloths 1 Application(s) Topical <User Schedule>  folic acid 1 milliGRAM(s) Oral daily  lactulose Syrup 20 Gram(s) Oral every 12 hours  melatonin 5 milliGRAM(s) Oral at bedtime  midodrine 10 milliGRAM(s) Oral every 8 hours  Nephro-shania 1 Tablet(s) Oral daily  pantoprazole    Tablet 40 milliGRAM(s) Oral before breakfast  piperacillin/tazobactam IVPB.. 3.375 Gram(s) IV Intermittent every 12 hours  rifAXIMin 550 milliGRAM(s) Oral every 12 hours  thiamine 100 milliGRAM(s) Oral daily      PHYSICAL EXAM:   Vital Signs:  Vital Signs Last 24 Hrs  T(C): 36.9 (17 Jan 2025 09:29), Max: 37.3 (17 Jan 2025 00:35)  T(F): 98.4 (17 Jan 2025 09:29), Max: 99.2 (17 Jan 2025 00:35)  HR: 84 (17 Jan 2025 09:29) (65 - 106)  BP: 109/54 (17 Jan 2025 09:29) (104/51 - 129/62)  BP(mean): 89 (16 Jan 2025 18:00) (74 - 89)  RR: 16 (17 Jan 2025 09:29) (16 - 36)  SpO2: 99% (17 Jan 2025 09:29) (95% - 99%)    Parameters below as of 17 Jan 2025 05:00  Patient On (Oxygen Delivery Method): room air      Daily     Daily     PHYSICAL EXAM:   GENERAL: awake, well appearing  HEENT:  Normocephalic/atraumatic, scleral icterus  CHEST:  no resp distress, crackles at base, leaking of sangunious fluid from VATS surgery site  HEART:  Regular rate and rhythm  ABDOMEN:  Soft, mild diffuse tenderness, mildly distended, normoactive bowel sounds, splenomegaly, stigmata of chronic liver dz  EXTREMITIES: 2+ LE edema to knee  SKIN:  No rash  NEURO: AAOx3, no asterixis    LABS: reviewed                        7.4    13.96 )-----------( 73       ( 17 Jan 2025 06:42 )             22.4     01-16    130[L]  |  96  |  39[H]  ----------------------------<  117[H]  4.3   |  22  |  2.59[H]    Ca    8.6      16 Jan 2025 07:39  Phos  4.3     01-17  Mg     2.0     01-17    TPro  6.4  /  Alb  2.5[L]  /  TBili  5.8[H]  /  DBili  2.1[H]  /  AST  32  /  ALT  7[L]  /  AlkPhos  116  01-17    LIVER FUNCTIONS - ( 17 Jan 2025 06:41 )  Alb: 2.5 g/dL / Pro: 6.4 g/dL / ALK PHOS: 116 U/L / ALT: 7 U/L / AST: 32 U/L / GGT: x             Interval Diagnostic Studies: see sunrise for full report

## 2025-02-11 NOTE — PROGRESS NOTE ADULT - ATTENDING COMMENTS
Despite additional suturing, he continues to have serosanguinous oozing from R VATS incision with plan for pressure dressing to be placed today by Thoracic Surgery. Continuing ceftriaxone (2/7- ) and metronidazole (2/7- ), with plan for continuation until transplant. He remains afebrile. Continuing diuresis, increased to Bumex 2 mg iv bid and spironolactone 100 mg po daily today. Continuing midodrine 10 mg po q8h. He remains wait-listed for liver transplant. ABO O with current MELD 3.0 score of 27 (re-certified in [facility] today and now with re-certification due 2/18). Due to residual R hemothorax, he will need chest tube reinserted at the time of his liver transplant.    Victoriano Marie M.D., Ph.D.  Transplant Hepatology

## 2025-02-11 NOTE — PROGRESS NOTE ADULT - ASSESSMENT
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 Actively drinking. Now p/w abdominal pain/distension with worsening of liver tests possibly triggered by infection. DDx also includes alcohol associated hepatitis given hx of alc hep 7/2024 which improved with steroids  - OLT: listed at meld 27 (2/11/25)  - EV: EGD 7/2024 for melena showed grade II EV, small gastric ulcer (neg HP), and portal gastropathy   - Ascites: s/p para with 6.8 L fluid removed, increase bumex to 2 mg IV q12h and add aldactone 100 mg daily  - HE: c/w lactulose and rifaximin, goal 3-4 BM/day  - HCC screening: MRI nondiagnostic, no clear lesion seen                             - C/w home midodrine 10 mg tid  - C/w PPI for stress ulcer ppx and hx of ulcer  - Trend MELD labs daily (cmp, INR) daily  - Strict I/Os, daily wts    #Empyema  CT chest noncontrast reviewed from OSH showing moderate sized complex R sided pleural effusion. s/p chest tube placed 1/18 with cultures growing Citrobacter c/w empyema. Requiring 2nd chest tube placement then now s/p VATS 1/28 for persistent effusion with 3rd chest tube placed. Now all chest tubes removed with persistent collection on CXR  - C/w CTX and flagyl given persistent collection  - CTS evaluation for oozing from VATS site  - Trend cbc and fever curve  - Appreciate thoracic surgery and ID recs    #MANUELITO, improving  #Volume overload  Found to have acute renal failure requiring dialysis at OSH, ddx includes HRS vs ATN. Cr baseline 1.12 10/2024 required albumin assisted diuresis with bumex 2 mg IV q12h. Cr improving. May now be intravascularly depleted  - Increase bumex to 2 mg IV q12h and add aldactone 100 mg daily  - Strict I/Os, daily wts  - Trend CMP daily    #Anemia  #Thrombocytopenia  Normocytic anemia with Hgb 8.5 and plt count 64 likely due to chronic liver dz  - s/p 4 units pRBC, 2 FFP, 2 plts on 1/28 and 1 unit on 2/10  - Hold DVT ppx for bleeding    All recommendations are tentative until note is attested by attending.     Judy Tucker, PGY4  Gastroenterology/Hepatology Fellow  Available on [employer] Teams  [phone] (Long Range Pager)  [phone] (Short Range Pager LIN)    After 5 pm, please contact the on-call GI fellow for any urgent issues via the Hospital Call

## 2025-02-12 LAB
ALBUMIN SERPL ELPH-MCNC: 3.4 G/DL — SIGNIFICANT CHANGE UP (ref 3.3–5)
ALP SERPL-CCNC: 123 U/L — HIGH (ref 40–120)
ALT FLD-CCNC: 6 U/L — LOW (ref 10–45)
ANION GAP SERPL CALC-SCNC: 10 MMOL/L — SIGNIFICANT CHANGE UP (ref 5–17)
APTT BLD: 50.8 SEC — HIGH (ref 24.5–35.6)
AST SERPL-CCNC: 36 U/L — SIGNIFICANT CHANGE UP (ref 10–40)
BILIRUB SERPL-MCNC: 5.8 MG/DL — HIGH (ref 0.2–1.2)
BUN SERPL-MCNC: 41 MG/DL — HIGH (ref 7–23)
CALCIUM SERPL-MCNC: 9.6 MG/DL — SIGNIFICANT CHANGE UP (ref 8.4–10.5)
CHLORIDE SERPL-SCNC: 100 MMOL/L — SIGNIFICANT CHANGE UP (ref 96–108)
CO2 SERPL-SCNC: 24 MMOL/L — SIGNIFICANT CHANGE UP (ref 22–31)
CREAT SERPL-MCNC: 1.21 MG/DL — SIGNIFICANT CHANGE UP (ref 0.5–1.3)
EGFR: 75 ML/MIN/1.73M2 — SIGNIFICANT CHANGE UP
EGFR: 75 ML/MIN/1.73M2 — SIGNIFICANT CHANGE UP
GLUCOSE BLDC GLUCOMTR-MCNC: 128 MG/DL — HIGH (ref 70–99)
GLUCOSE BLDC GLUCOMTR-MCNC: 133 MG/DL — HIGH (ref 70–99)
GLUCOSE BLDC GLUCOMTR-MCNC: 137 MG/DL — HIGH (ref 70–99)
GLUCOSE BLDC GLUCOMTR-MCNC: 156 MG/DL — HIGH (ref 70–99)
GLUCOSE SERPL-MCNC: 109 MG/DL — HIGH (ref 70–99)
HCT VFR BLD CALC: 24.5 % — LOW (ref 39–50)
HGB BLD-MCNC: 8 G/DL — LOW (ref 13–17)
INR BLD: 3.1 RATIO — HIGH (ref 0.85–1.16)
MAGNESIUM SERPL-MCNC: 1.5 MG/DL — LOW (ref 1.6–2.6)
MCHC RBC-ENTMCNC: 31.1 PG — SIGNIFICANT CHANGE UP (ref 27–34)
MCHC RBC-ENTMCNC: 32.7 G/DL — SIGNIFICANT CHANGE UP (ref 32–36)
MCV RBC AUTO: 95.3 FL — SIGNIFICANT CHANGE UP (ref 80–100)
NRBC BLD AUTO-RTO: 0 /100 WBCS — SIGNIFICANT CHANGE UP (ref 0–0)
PHOSPHATE SERPL-MCNC: 3.1 MG/DL — SIGNIFICANT CHANGE UP (ref 2.5–4.5)
PLATELET # BLD AUTO: 85 K/UL — LOW (ref 150–400)
POTASSIUM SERPL-MCNC: 4.8 MMOL/L — SIGNIFICANT CHANGE UP (ref 3.5–5.3)
POTASSIUM SERPL-SCNC: 4.8 MMOL/L — SIGNIFICANT CHANGE UP (ref 3.5–5.3)
PROT SERPL-MCNC: 7.8 G/DL — SIGNIFICANT CHANGE UP (ref 6–8.3)
PROTHROM AB SERPL-ACNC: 35.3 SEC — HIGH (ref 9.9–13.4)
RBC # BLD: 2.57 M/UL — LOW (ref 4.2–5.8)
RBC # FLD: 21 % — HIGH (ref 10.3–14.5)
SODIUM SERPL-SCNC: 134 MMOL/L — LOW (ref 135–145)
WBC # BLD: 6.93 K/UL — SIGNIFICANT CHANGE UP (ref 3.8–10.5)
WBC # FLD AUTO: 6.93 K/UL — SIGNIFICANT CHANGE UP (ref 3.8–10.5)

## 2025-02-12 PROCEDURE — 99232 SBSQ HOSP IP/OBS MODERATE 35: CPT | Mod: GC

## 2025-02-12 RX ORDER — LEVALBUTEROL HYDROCHLORIDE 1.25 MG/3ML
0.63 SOLUTION RESPIRATORY (INHALATION) EVERY 6 HOURS
Refills: 0 | Status: DISCONTINUED | OUTPATIENT
Start: 2025-02-12 | End: 2025-02-27

## 2025-02-12 RX ORDER — MAGNESIUM SULFATE 500 MG/ML
2 SYRINGE (ML) INJECTION ONCE
Refills: 0 | Status: COMPLETED | OUTPATIENT
Start: 2025-02-12 | End: 2025-02-12

## 2025-02-12 RX ORDER — BUMETANIDE 1 MG/1
2 TABLET ORAL ONCE
Refills: 0 | Status: COMPLETED | OUTPATIENT
Start: 2025-02-12 | End: 2025-02-12

## 2025-02-12 RX ORDER — SPIRONOLACTONE 25 MG
100 TABLET ORAL ONCE
Refills: 0 | Status: COMPLETED | OUTPATIENT
Start: 2025-02-12 | End: 2025-02-12

## 2025-02-12 RX ADMIN — LACTULOSE 30 GRAM(S): 10 SOLUTION ORAL at 05:34

## 2025-02-12 RX ADMIN — TRAMADOL HYDROCHLORIDE 50 MILLIGRAM(S): 50 TABLET, FILM COATED ORAL at 17:31

## 2025-02-12 RX ADMIN — Medication 500 MILLIGRAM(S): at 17:30

## 2025-02-12 RX ADMIN — BUMETANIDE 2 MILLIGRAM(S): 1 TABLET ORAL at 12:11

## 2025-02-12 RX ADMIN — TRAMADOL HYDROCHLORIDE 50 MILLIGRAM(S): 50 TABLET, FILM COATED ORAL at 10:28

## 2025-02-12 RX ADMIN — Medication 800 MILLIGRAM(S): at 08:32

## 2025-02-12 RX ADMIN — MIDODRINE HYDROCHLORIDE 10 MILLIGRAM(S): 5 TABLET ORAL at 00:35

## 2025-02-12 RX ADMIN — TRAMADOL HYDROCHLORIDE 50 MILLIGRAM(S): 50 TABLET, FILM COATED ORAL at 00:00

## 2025-02-12 RX ADMIN — Medication 1 TABLET(S): at 12:10

## 2025-02-12 RX ADMIN — TRAMADOL HYDROCHLORIDE 50 MILLIGRAM(S): 50 TABLET, FILM COATED ORAL at 01:30

## 2025-02-12 RX ADMIN — TRAMADOL HYDROCHLORIDE 25 MILLIGRAM(S): 50 TABLET, FILM COATED ORAL at 07:00

## 2025-02-12 RX ADMIN — Medication 1 DROP(S): at 17:30

## 2025-02-12 RX ADMIN — CEFTRIAXONE 100 MILLIGRAM(S): 500 INJECTION, POWDER, FOR SOLUTION INTRAMUSCULAR; INTRAVENOUS at 08:33

## 2025-02-12 RX ADMIN — MIDODRINE HYDROCHLORIDE 10 MILLIGRAM(S): 5 TABLET ORAL at 17:31

## 2025-02-12 RX ADMIN — Medication 800 MILLIGRAM(S): at 17:30

## 2025-02-12 RX ADMIN — FOLIC ACID 1 MILLIGRAM(S): 1 TABLET ORAL at 12:10

## 2025-02-12 RX ADMIN — Medication 100 MILLIGRAM(S): at 12:11

## 2025-02-12 RX ADMIN — Medication 100 MILLIGRAM(S): at 12:10

## 2025-02-12 RX ADMIN — TRAMADOL HYDROCHLORIDE 25 MILLIGRAM(S): 50 TABLET, FILM COATED ORAL at 05:45

## 2025-02-12 RX ADMIN — BUMETANIDE 2 MILLIGRAM(S): 1 TABLET ORAL at 15:40

## 2025-02-12 RX ADMIN — LEVALBUTEROL HYDROCHLORIDE 0.63 MILLIGRAM(S): 1.25 SOLUTION RESPIRATORY (INHALATION) at 13:01

## 2025-02-12 RX ADMIN — Medication 1 DROP(S): at 05:34

## 2025-02-12 RX ADMIN — Medication 1 APPLICATION(S): at 05:36

## 2025-02-12 RX ADMIN — LEVALBUTEROL HYDROCHLORIDE 0.63 MILLIGRAM(S): 1.25 SOLUTION RESPIRATORY (INHALATION) at 05:35

## 2025-02-12 RX ADMIN — TRAMADOL HYDROCHLORIDE 50 MILLIGRAM(S): 50 TABLET, FILM COATED ORAL at 11:00

## 2025-02-12 RX ADMIN — Medication 500 MILLIGRAM(S): at 05:34

## 2025-02-12 RX ADMIN — TRAMADOL HYDROCHLORIDE 50 MILLIGRAM(S): 50 TABLET, FILM COATED ORAL at 00:35

## 2025-02-12 RX ADMIN — LEVALBUTEROL HYDROCHLORIDE 0.63 MILLIGRAM(S): 1.25 SOLUTION RESPIRATORY (INHALATION) at 17:31

## 2025-02-12 RX ADMIN — Medication 40 MILLIGRAM(S): at 12:10

## 2025-02-12 RX ADMIN — INSULIN LISPRO 2: 100 INJECTION, SOLUTION INTRAVENOUS; SUBCUTANEOUS at 17:30

## 2025-02-12 RX ADMIN — Medication 25 GRAM(S): at 10:28

## 2025-02-12 RX ADMIN — MIDODRINE HYDROCHLORIDE 10 MILLIGRAM(S): 5 TABLET ORAL at 10:28

## 2025-02-12 NOTE — PROGRESS NOTE ADULT - ASSESSMENT
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 Actively drinking. Now p/w abdominal pain/distension with worsening of liver tests possibly triggered by infection. DDx also includes alcohol associated hepatitis given hx of alc hep 7/2024 which improved with steroids  - OLT: listed at meld 27 (2/11/25)  - EV: EGD 7/2024 for melena showed grade II EV, small gastric ulcer (neg HP), and portal gastropathy   - Ascites: s/p para with 6.8 L fluid removed, increase bumex to 2 mg IV q12h and add aldactone 100 mg daily  - HE: c/w lactulose and rifaximin, goal 3-4 BM/day  - HCC screening: MRI nondiagnostic, no clear lesion seen                             - C/w home midodrine 10 mg tid  - C/w PPI for stress ulcer ppx and hx of ulcer  - Trend MELD labs daily (cmp, INR) daily  - Strict I/Os, daily wts    #Empyema  CT chest noncontrast reviewed from OSH showing moderate sized complex R sided pleural effusion. s/p chest tube placed 1/18 with cultures growing Citrobacter c/w empyema. Requiring 2nd chest tube placement then now s/p VATS 1/28 for persistent effusion with 3rd chest tube placed. Now all chest tubes removed with persistent collection on CXR  - C/w CTX and flagyl given persistent collection  - CTS evaluation for oozing from VATS site  - Trend cbc and fever curve  - Appreciate thoracic surgery and ID recs    #MANUELITO, improving  #Volume overload  Found to have acute renal failure requiring dialysis at OSH, ddx includes HRS vs ATN. Cr baseline 1.12 10/2024 required albumin assisted diuresis with bumex 2 mg IV q12h. Cr improving. Currently at goal diuresis  - Continue bumex to 2 mg IV q12h and add aldactone 100 mg daily - Goal 2.5L removal/day  - Strict I/Os, daily wts  - Trend CMP daily    #Anemia  #Thrombocytopenia  Normocytic anemia with Hgb 8.5 and plt count 64 likely due to chronic liver dz  - s/p 4 units pRBC, 2 FFP, 2 plts on 1/28 and 1 unit on 2/10  - Hold DVT ppx for bleeding

## 2025-02-12 NOTE — PROGRESS NOTE ADULT - SUBJECTIVE AND OBJECTIVE BOX
Patient is a 44y old  Male who presents with a chief complaint of decompensated cirrhosis (2025 12:18)      Interval Events:   Patient still complaining of some tightness in his legs b/l at the ankles.  2.7L output yesterday on diuretic therapy.    ROS:   A 12-point ROS was performed and negative except as noted in HPI.    Hospital Medications:  albuterol    90 MICROgram(s) HFA Inhaler 1 Puff(s) Inhalation every 12 hours PRN  artificial tears (preservative free) Ophthalmic Solution 1 Drop(s) Both EYES two times a day  buMETAnide Injectable 2 milliGRAM(s) IV Push once  cefTRIAXone   IVPB 2000 milliGRAM(s) IV Intermittent every 24 hours  chlorhexidine 2% Cloths 1 Application(s) Topical <User Schedule>  folic acid 1 milliGRAM(s) Oral daily  influenza   Vaccine 0.5 milliLiter(s) IntraMuscular once  insulin lispro (ADMELOG) corrective regimen sliding scale   SubCutaneous three times a day before meals  insulin lispro (ADMELOG) corrective regimen sliding scale   SubCutaneous at bedtime  lactulose Syrup 30 Gram(s) Oral every 12 hours  levalbuterol Inhalation 0.63 milliGRAM(s) Inhalation every 6 hours  magnesium oxide 800 milliGRAM(s) Oral two times a day with meals  metroNIDAZOLE    Tablet 500 milliGRAM(s) Oral every 12 hours  midodrine 10 milliGRAM(s) Oral every 8 hours  multivitamin 1 Tablet(s) Oral daily  pantoprazole    Tablet 40 milliGRAM(s) Oral daily  rifAXIMin 550 milliGRAM(s) Oral every 12 hours  thiamine 100 milliGRAM(s) Oral daily  traMADol 25 milliGRAM(s) Oral every 6 hours PRN  traMADol 50 milliGRAM(s) Oral every 6 hours PRN      PHYSICAL EXAM:   Vital Signs:  Vital Signs Last 24 Hrs  T(C): 37.3 (2025 13:01), Max: 37.3 (2025 13:01)  T(F): 99.1 (2025 13:01), Max: 99.1 (2025 13:01)  HR: 97 (2025 13:01) (97 - 114)  BP: 122/69 (2025 13:01) (115/68 - 127/68)  BP(mean): --  RR: 20 (2025 13:01) (18 - 20)  SpO2: 93% (2025 13:01) (92% - 100%)    Parameters below as of 2025 13:01  Patient On (Oxygen Delivery Method): room air      Daily     Daily Weight in k.4 (2025 05:00)    GENERAL: no acute distress  NEURO: alert  HEENT: anicteric sclera, no conjunctival pallor appreciated  CHEST: no respiratory distress, no accessory muscle use  CARDIAC: regular rate  ABDOMEN: soft, mildly distended abd  EXTREMITIES: b/l LE edema up to upper thighs b/l  SKIN: no lesions noted    LABS: reviewed                        8.0    6.93  )-----------( 85       ( 2025 06:49 )             24.5     02-12    134[L]  |  100  |  41[H]  ----------------------------<  109[H]  4.8   |  24  |  1.21    Ca    9.6      2025 06:48  Phos  3.1     02-12  Mg     1.5     02-12    TPro  7.8  /  Alb  3.4  /  TBili  5.8[H]  /  DBili  x   /  AST  36  /  ALT  6[L]  /  AlkPhos  123[H]  02-12    LIVER FUNCTIONS - ( 2025 06:48 )  Alb: 3.4 g/dL / Pro: 7.8 g/dL / ALK PHOS: 123 U/L / ALT: 6 U/L / AST: 36 U/L / GGT: x             Interval Diagnostic Studies: see sunrise for full report

## 2025-02-12 NOTE — PROGRESS NOTE ADULT - ATTENDING COMMENTS
No further saturation of dressing over R VATS incision site with serosanguinous drainage after pressure dressing was placed yesterday evening. Continuing ceftriaxone (2/7- ) and metronidazole (2/7- ), with plan for continuation until transplant. He remains afebrile. Continuing diuresis with Bumex 2 mg iv bid and spironolactone 100 mg po daily again today. He was net negative 2.7L in the past 24h but still has marked anasarca. Continuing midodrine 10 mg po q8h to support diuresis. He remains wait-listed for liver transplant. ABO O with current MELD 3.0 score of 28 (re-certified in [facility] today and now with re-certification due 2/19). Due to residual R hemothorax, he will need chest tube reinserted at the time of his liver transplant.    Victoriano Marie M.D., Ph.D.  Transplant Hepatology

## 2025-02-13 LAB
ALBUMIN SERPL ELPH-MCNC: 3.2 G/DL — LOW (ref 3.3–5)
ALP SERPL-CCNC: 112 U/L — SIGNIFICANT CHANGE UP (ref 40–120)
ALT FLD-CCNC: 8 U/L — LOW (ref 10–45)
ANION GAP SERPL CALC-SCNC: 9 MMOL/L — SIGNIFICANT CHANGE UP (ref 5–17)
APTT BLD: 53.4 SEC — HIGH (ref 24.5–35.6)
AST SERPL-CCNC: 37 U/L — SIGNIFICANT CHANGE UP (ref 10–40)
BILIRUB SERPL-MCNC: 6 MG/DL — HIGH (ref 0.2–1.2)
BUN SERPL-MCNC: 41 MG/DL — HIGH (ref 7–23)
CALCIUM SERPL-MCNC: 9.3 MG/DL — SIGNIFICANT CHANGE UP (ref 8.4–10.5)
CHLORIDE SERPL-SCNC: 99 MMOL/L — SIGNIFICANT CHANGE UP (ref 96–108)
CO2 SERPL-SCNC: 25 MMOL/L — SIGNIFICANT CHANGE UP (ref 22–31)
CREAT SERPL-MCNC: 1.13 MG/DL — SIGNIFICANT CHANGE UP (ref 0.5–1.3)
EGFR: 81 ML/MIN/1.73M2 — SIGNIFICANT CHANGE UP
EGFR: 81 ML/MIN/1.73M2 — SIGNIFICANT CHANGE UP
GLUCOSE BLDC GLUCOMTR-MCNC: 108 MG/DL — HIGH (ref 70–99)
GLUCOSE BLDC GLUCOMTR-MCNC: 125 MG/DL — HIGH (ref 70–99)
GLUCOSE BLDC GLUCOMTR-MCNC: 137 MG/DL — HIGH (ref 70–99)
GLUCOSE BLDC GLUCOMTR-MCNC: 142 MG/DL — HIGH (ref 70–99)
GLUCOSE SERPL-MCNC: 108 MG/DL — HIGH (ref 70–99)
HCT VFR BLD CALC: 22.8 % — LOW (ref 39–50)
HGB BLD-MCNC: 7.3 G/DL — LOW (ref 13–17)
INR BLD: 3.35 RATIO — HIGH (ref 0.85–1.16)
MAGNESIUM SERPL-MCNC: 1.5 MG/DL — LOW (ref 1.6–2.6)
MCHC RBC-ENTMCNC: 31.2 PG — SIGNIFICANT CHANGE UP (ref 27–34)
MCHC RBC-ENTMCNC: 32 G/DL — SIGNIFICANT CHANGE UP (ref 32–36)
MCV RBC AUTO: 97.4 FL — SIGNIFICANT CHANGE UP (ref 80–100)
NRBC BLD AUTO-RTO: 0 /100 WBCS — SIGNIFICANT CHANGE UP (ref 0–0)
PHOSPHATE SERPL-MCNC: 3.2 MG/DL — SIGNIFICANT CHANGE UP (ref 2.5–4.5)
PLATELET # BLD AUTO: 75 K/UL — LOW (ref 150–400)
POTASSIUM SERPL-MCNC: 4.8 MMOL/L — SIGNIFICANT CHANGE UP (ref 3.5–5.3)
POTASSIUM SERPL-SCNC: 4.8 MMOL/L — SIGNIFICANT CHANGE UP (ref 3.5–5.3)
PROT SERPL-MCNC: 7.4 G/DL — SIGNIFICANT CHANGE UP (ref 6–8.3)
PROTHROM AB SERPL-ACNC: 37.7 SEC — HIGH (ref 9.9–13.4)
RBC # BLD: 2.34 M/UL — LOW (ref 4.2–5.8)
RBC # FLD: 20.8 % — HIGH (ref 10.3–14.5)
SODIUM SERPL-SCNC: 133 MMOL/L — LOW (ref 135–145)
WBC # BLD: 6.51 K/UL — SIGNIFICANT CHANGE UP (ref 3.8–10.5)
WBC # FLD AUTO: 6.51 K/UL — SIGNIFICANT CHANGE UP (ref 3.8–10.5)

## 2025-02-13 PROCEDURE — 99232 SBSQ HOSP IP/OBS MODERATE 35: CPT

## 2025-02-13 PROCEDURE — 99232 SBSQ HOSP IP/OBS MODERATE 35: CPT | Mod: GC

## 2025-02-13 PROCEDURE — G0545: CPT

## 2025-02-13 RX ORDER — MAGNESIUM SULFATE 500 MG/ML
2 SYRINGE (ML) INJECTION ONCE
Refills: 0 | Status: COMPLETED | OUTPATIENT
Start: 2025-02-13 | End: 2025-02-13

## 2025-02-13 RX ORDER — BUMETANIDE 1 MG/1
2 TABLET ORAL ONCE
Refills: 0 | Status: COMPLETED | OUTPATIENT
Start: 2025-02-13 | End: 2025-02-13

## 2025-02-13 RX ORDER — SPIRONOLACTONE 25 MG
100 TABLET ORAL
Refills: 0 | Status: DISCONTINUED | OUTPATIENT
Start: 2025-02-14 | End: 2025-02-14

## 2025-02-13 RX ORDER — BUMETANIDE 1 MG/1
2 TABLET ORAL
Refills: 0 | Status: DISCONTINUED | OUTPATIENT
Start: 2025-02-13 | End: 2025-02-14

## 2025-02-13 RX ORDER — SPIRONOLACTONE 25 MG
100 TABLET ORAL ONCE
Refills: 0 | Status: COMPLETED | OUTPATIENT
Start: 2025-02-13 | End: 2025-02-13

## 2025-02-13 RX ADMIN — TRAMADOL HYDROCHLORIDE 25 MILLIGRAM(S): 50 TABLET, FILM COATED ORAL at 20:55

## 2025-02-13 RX ADMIN — TRAMADOL HYDROCHLORIDE 50 MILLIGRAM(S): 50 TABLET, FILM COATED ORAL at 09:37

## 2025-02-13 RX ADMIN — LEVALBUTEROL HYDROCHLORIDE 0.63 MILLIGRAM(S): 1.25 SOLUTION RESPIRATORY (INHALATION) at 13:15

## 2025-02-13 RX ADMIN — Medication 800 MILLIGRAM(S): at 09:17

## 2025-02-13 RX ADMIN — LEVALBUTEROL HYDROCHLORIDE 0.63 MILLIGRAM(S): 1.25 SOLUTION RESPIRATORY (INHALATION) at 18:00

## 2025-02-13 RX ADMIN — LACTULOSE 30 GRAM(S): 10 SOLUTION ORAL at 06:40

## 2025-02-13 RX ADMIN — FOLIC ACID 1 MILLIGRAM(S): 1 TABLET ORAL at 13:16

## 2025-02-13 RX ADMIN — BUMETANIDE 2 MILLIGRAM(S): 1 TABLET ORAL at 20:55

## 2025-02-13 RX ADMIN — Medication 1 DROP(S): at 18:00

## 2025-02-13 RX ADMIN — MIDODRINE HYDROCHLORIDE 10 MILLIGRAM(S): 5 TABLET ORAL at 01:14

## 2025-02-13 RX ADMIN — Medication 100 MILLIGRAM(S): at 13:16

## 2025-02-13 RX ADMIN — Medication 800 MILLIGRAM(S): at 17:59

## 2025-02-13 RX ADMIN — TRAMADOL HYDROCHLORIDE 50 MILLIGRAM(S): 50 TABLET, FILM COATED ORAL at 00:45

## 2025-02-13 RX ADMIN — BUMETANIDE 2 MILLIGRAM(S): 1 TABLET ORAL at 13:15

## 2025-02-13 RX ADMIN — MIDODRINE HYDROCHLORIDE 10 MILLIGRAM(S): 5 TABLET ORAL at 09:18

## 2025-02-13 RX ADMIN — LEVALBUTEROL HYDROCHLORIDE 0.63 MILLIGRAM(S): 1.25 SOLUTION RESPIRATORY (INHALATION) at 06:57

## 2025-02-13 RX ADMIN — TRAMADOL HYDROCHLORIDE 50 MILLIGRAM(S): 50 TABLET, FILM COATED ORAL at 01:45

## 2025-02-13 RX ADMIN — TRAMADOL HYDROCHLORIDE 50 MILLIGRAM(S): 50 TABLET, FILM COATED ORAL at 10:37

## 2025-02-13 RX ADMIN — TRAMADOL HYDROCHLORIDE 25 MILLIGRAM(S): 50 TABLET, FILM COATED ORAL at 21:45

## 2025-02-13 RX ADMIN — Medication 40 MILLIGRAM(S): at 13:15

## 2025-02-13 RX ADMIN — TRAMADOL HYDROCHLORIDE 50 MILLIGRAM(S): 50 TABLET, FILM COATED ORAL at 18:37

## 2025-02-13 RX ADMIN — Medication 1 TABLET(S): at 13:15

## 2025-02-13 RX ADMIN — Medication 500 MILLIGRAM(S): at 17:59

## 2025-02-13 RX ADMIN — BUMETANIDE 2 MILLIGRAM(S): 1 TABLET ORAL at 17:59

## 2025-02-13 RX ADMIN — Medication 25 GRAM(S): at 09:17

## 2025-02-13 RX ADMIN — Medication 500 MILLIGRAM(S): at 06:41

## 2025-02-13 RX ADMIN — Medication 1 APPLICATION(S): at 09:36

## 2025-02-13 RX ADMIN — MIDODRINE HYDROCHLORIDE 10 MILLIGRAM(S): 5 TABLET ORAL at 17:59

## 2025-02-13 RX ADMIN — CEFTRIAXONE 100 MILLIGRAM(S): 500 INJECTION, POWDER, FOR SOLUTION INTRAMUSCULAR; INTRAVENOUS at 09:18

## 2025-02-13 RX ADMIN — Medication 1 DROP(S): at 06:57

## 2025-02-13 NOTE — PROGRESS NOTE ADULT - ASSESSMENT
45 yo M with PMH decompensated ETOH cirrhosis c/b recurrent ascites, grade II EV, and HE, right calf hematoma 10/2024 (s/p fall)  presented to [facility] for abdominal pain and distension. Patient found to have ascites on exam and MANUELITO requiring HD initiation PermCath was placed by vascular surgery on 1/10 with post procedure course c/b bleeding from insertion site. Transferred to [facility] SICU for further management.     Blood Cultures (1/16) 1/4 Staph epi.   GI PCR (1/17) + Giardia.   Paracentesis (1/17) 155 nucleated cell counts.     CT Chest (1/17) Moderate loculated right pleural effusion containing a few foci of air, which are new from 1/8/2025 and may represent empyema/bronchopleural fistula versus sequelae of prior thoracentesis.     #Empyema  s/p VATS 1/28/25  --Continue Ceftriaxone to 2g IV Q24H + Metronidazole 500 mg BID.   --While CT chest appears to visualize residual fluid at the right lung base it is unclear at this point if this represents hematoma or infected hematoma.  Patient does not have any systemic signs or symptoms of infection.  It does not appear that we will achieve optimal source control prior to liver transplant given patient's coagulopathy.  At the minimum I am reassured that patient has had good source control with the VATS procedure for the empyema.  At this point benefits of proceeding with transplant likely outweigh risks and thus no absolute ID contraindications for transplant.  I would continue patient's antibiotics and would try to achieve drainage of the residual hematoma versus fluid post liver transplant.    #Giardia  s/p 7 days of Metronidazole    #Positive Blood Culture (Staph epi, 1/16)  Concern for procurement contaminant  --Continue to follow CBC with diff  --Continue to follow temperature curve  --Follow up on  blood cultures no growth      #Pre-Liver Transplant Evaluation, Decompensated Cirrhosis  COVID19 Rodríguez Antibody Positive  HAV IgG Positive  HBVs Ab Positive  HBVsAg Negative  HBVc Ab Negative  HCV Ab Negative  HSV 1 IgG Positive  HSV 2 IgG Negative  EBV IgG Positive  CMV IgG Positive  VZV IgG Positive  Measles IgG Positive  Mumps IgG Positive  Rubella IgG Positive  Quantiferon Gold negative  HIV Ag/Ab by CMIA Negative  Syphilis Screen Negative  Toxoplasma IgG Negative  Strongyloides Ab negative  Coccidiodes Ab Screen Positive but confirmatory negative  Leishmania Ab negative    #Encounter to Vaccinate Patient  COVID19: Would benefit from COVID19 [id number] Vaccine Dose  Influenza: Had vaccination for this year  Pneumococcal: Would benefit from PCV20  HAV: Immune, will not require further vaccination  HBV: Immune, will not require further vaccination  MMR: Immune, will not require further vaccination  Varicella: Immune, will not require further vaccination  Shingles: Will require Shingrix  Tdap: Tdap 6/23/24    I will continue to follow. Please feel free to contact me with any further questions.    Froilan Whitmore M.D.  [facility] Division of Infectious Disease  8AM-5PM Monday - Friday: Available on [employer] Teams  After Hours and Holidays (or if no response on [employer] Teams): Please contact the Infectious Diseases Office at [phone]

## 2025-02-13 NOTE — PROGRESS NOTE ADULT - ATTENDING COMMENTS
43 yo M with AUD (with last reported drink in 11/2024 and with PEth borderline positive with 23 ng/mL 16:0/18:1 but negative 16:0/18:2 on 1/17/25), obesity, ROSA, PUD, history of alcohol-associated hepatitis (7/2024), history of traumatic right calf hematoma (10/2024), and decompensated alcohol-associated cirrhosis complicated by ascites, pleural effusions, hyponatremia, non-bleeding EV, and hepatic encephalopathy, transferred from [facility] to [facility] on 1/16/25 due to refractory ascites, recurrent right pleural effusion, and initial HRS-MANUELITO that has since improved (with prior intermittent HD from 1/10-19). Due to right-sided empyema (as initially seen on CT chest on 1/17 and with pleural fluid culture with Citrobacter koseri after prior chest tube placement) with prior inability to achieve source control with chest tubes only, he underwent R VATS (1/28) with decortication complicated by hemothorax. He was extubated on 1/30 and eventually had the last chest tube removed on 2/6. He had previously completed antibiotics but remains on ceftriaxone (1/22- ) and metronidazole (1/18-25, 1/29- ) as per Transplant ID as his most recent CT chest (2/7) still showed a moderate right gas and liquid containing pleural collection that, per Thoracic Surgery, likely reflects prior hemothorax that they feel cannot be safely evacuated either with another chest tube or surgically pre-transplant. No further saturation of dressing over R VATS incision site with decreased and now minimal serosanguinous drainage with pressure dressings being changed daily. He remains afebrile. Continuing diuresis with Bumex 2 mg iv bid and spironolactone 100 mg po daily. Continuing midodrine 10 mg po q8h to support diuresis. He remains wait-listed for liver transplant. ABO O with current MELD 3.0 score of 29 (re-certified in [facility] today and now with re-certification due 2/20). Due to the residual R hemothorax, plan is for chest tube reinsertion at the time of his liver transplant.    Victoriano Marie M.D., Ph.D.  Transplant Hepatology

## 2025-02-13 NOTE — PROGRESS NOTE ADULT - SUBJECTIVE AND OBJECTIVE BOX
Follow Up:      Interval History:    REVIEW OF SYSTEMS  [  ] ROS unobtainable because:    [  ] All other systems negative except as noted below    Constitutional:  [ ] fever [ ] chills  [ ] weight loss  [ ] weakness  Skin:  [ ] rash [ ] phlebitis	  Eyes: [ ] icterus [ ] pain  [ ] discharge	  ENMT: [ ] sore throat  [ ] thrush [ ] ulcers [ ] exudates  Respiratory: [ ] dyspnea [ ] hemoptysis [ ] cough [ ] sputum	  Cardiovascular:  [ ] chest pain [ ] palpitations [ ] edema	  Gastrointestinal:  [ ] nausea [ ] vomiting [ ] diarrhea [ ] constipation [ ] pain	  Genitourinary:  [ ] dysuria [ ] frequency [ ] hematuria [ ] discharge [ ] flank pain  [ ] incontinence  Musculoskeletal:  [ ] myalgias [ ] arthralgias [ ] arthritis  [ ] back pain  Neurological:  [ ] headache [ ] seizures  [ ] confusion/altered mental status    Allergies  sulfa drugs (Unknown)  Salicylic Acid (Other)        ANTIMICROBIALS:  cefTRIAXone   IVPB 2000 every 24 hours  metroNIDAZOLE    Tablet 500 every 12 hours  rifAXIMin 550 every 12 hours      OTHER MEDS:  MEDICATIONS  (STANDING):  albuterol    90 MICROgram(s) HFA Inhaler 1 every 12 hours PRN  buMETAnide Injectable 2 <User Schedule>  influenza   Vaccine 0.5 once  insulin lispro (ADMELOG) corrective regimen sliding scale  three times a day before meals  insulin lispro (ADMELOG) corrective regimen sliding scale  at bedtime  lactulose Syrup 30 every 12 hours  levalbuterol Inhalation 0.63 every 6 hours  midodrine 10 every 8 hours  pantoprazole    Tablet 40 daily  traMADol 25 every 6 hours PRN  traMADol 50 every 6 hours PRN      Vital Signs Last 24 Hrs  T(C): 36.5 (13 Feb 2025 12:27), Max: 37.1 (12 Feb 2025 20:57)  T(F): 97.7 (13 Feb 2025 12:27), Max: 98.7 (12 Feb 2025 20:57)  HR: 91 (13 Feb 2025 12:27) (91 - 110)  BP: 135/75 (13 Feb 2025 12:27) (116/69 - 135/75)  BP(mean): --  RR: 20 (13 Feb 2025 12:27) (18 - 20)  SpO2: 100% (13 Feb 2025 12:27) (93% - 100%)    Parameters below as of 13 Feb 2025 12:27  Patient On (Oxygen Delivery Method): room air        PHYSICAL EXAMINATION:  General: Alert and Awake, NAD  HEENT: PERRL, EOMI  Neck: Supple  Cardiac: RRR, No M/R/G  Resp: CTAB, No Wh/Rh/Ra  Abdomen: NBS, NT/ND, No HSM, No rigidity or guarding  MSK: No LE edema. No Calf tenderness  : No yates  Skin: No rashes or lesions. Skin is warm and dry to the touch.   Neuro: Alert and Awake. CN 2-12 Grossly intact. Moves all four extremities spontaneously.  Psych: Calm, Pleasant, Cooperative                          7.3    6.51  )-----------( 75       ( 13 Feb 2025 06:22 )             22.8       02-13    133[L]  |  99  |  41[H]  ----------------------------<  108[H]  4.8   |  25  |  1.13    Ca    9.3      13 Feb 2025 06:22  Phos  3.2     02-13  Mg     1.5     02-13    TPro  7.4  /  Alb  3.2[L]  /  TBili  6.0[H]  /  DBili  x   /  AST  37  /  ALT  8[L]  /  AlkPhos  112  02-13      Urinalysis Basic - ( 13 Feb 2025 06:22 )    Color: x / Appearance: x / SG: x / pH: x  Gluc: 108 mg/dL / Ketone: x  / Bili: x / Urobili: x   Blood: x / Protein: x / Nitrite: x   Leuk Esterase: x / RBC: x / WBC x   Sq Epi: x / Non Sq Epi: x / Bacteria: x        MICROBIOLOGY:  v  Ascites Fl  02-06-25   No growth at 5 days  --    No polymorphonuclear cells seen  No organisms seen  by cytocentrifuge      .Abscess  01-28-25   No growth at 5 days  --    Few polymorphonuclear leukocytes seen per low power field  No organisms seen per oil power field      Tissue  01-28-25   No growth at 5 days  --    Few polymorphonuclear leukocytes seen per low power field  No organisms seen per oil power field      Pleural Fl  01-24-25   Rare Citrobacter koseri  --  Citrobacter koseri      Peritoneal  01-23-25   No growth at 5 days  --    No polymorphonuclear cells seen  No organisms seen  by cytocentrifuge      .Blood BLOOD  01-19-25   No growth at 5 days  --  --      Pleural Fl  01-18-25   Moderate Citrobacter koseri  --  Citrobacter koseri      Body Fluid  01-18-25   No fungus isolated at 3 weeks.  --  --      .Blood BLOOD  01-18-25   No growth at 5 days  --  --      Ascites Fl  01-17-25   No growth at 5 days  --    No polymorphonuclear cells seen  No organisms seen  by cytocentrifuge      .Blood BLOOD  01-16-25   No growth at 5 days  --  --      .Blood BLOOD  01-16-25   Growth in aerobic bottle: Staphylococcus epidermidis  Isolation of Coagulase negative Staphylococcus from single blood culture  sets may represent  contamination. Contact the Microbiology Department at [phone] if  susceptibility testing is needed.  clinically indicated.  Direct identification is available within approximately 3-5  hours either by Blood Panel Multiplexed PCR or Direct  MALDI-TOF. Details: [email or web address]  --  Blood Culture PCR        CMV IgG Antibody: >10.00 U/mL (01-17-25 @ 06:41)  Toxoplasma IgG Screen: <3.00 IU/mL (01-17-25 @ 06:41)          RADIOLOGY:    <The imaging below has been reviewed and visualized by me independently. Findings as detailed in report below> Follow Up:  Preliver transplant evaluation    Interval History: Afebrile.  Denies any acute complaints.    REVIEW OF SYSTEMS  [  ] ROS unobtainable because:    [ x ] All other systems negative except as noted below    Constitutional:  [ ] fever [ ] chills  [ ] weight loss  [ ] weakness  Skin:  [ ] rash [ ] phlebitis	  Eyes: [ ] icterus [ ] pain  [ ] discharge	  ENMT: [ ] sore throat  [ ] thrush [ ] ulcers [ ] exudates  Respiratory: [ ] dyspnea [ ] hemoptysis [ ] cough [ ] sputum	  Cardiovascular:  [ ] chest pain [ ] palpitations [ ] edema	  Gastrointestinal:  [ ] nausea [ ] vomiting [ ] diarrhea [ ] constipation [ ] pain	  Genitourinary:  [ ] dysuria [ ] frequency [ ] hematuria [ ] discharge [ ] flank pain  [ ] incontinence  Musculoskeletal:  [ ] myalgias [ ] arthralgias [ ] arthritis  [ ] back pain  Neurological:  [ ] headache [ ] seizures  [ ] confusion/altered mental status    Allergies  sulfa drugs (Unknown)  Salicylic Acid (Other)        ANTIMICROBIALS:  cefTRIAXone   IVPB 2000 every 24 hours  metroNIDAZOLE    Tablet 500 every 12 hours  rifAXIMin 550 every 12 hours      OTHER MEDS:  MEDICATIONS  (STANDING):  albuterol    90 MICROgram(s) HFA Inhaler 1 every 12 hours PRN  buMETAnide Injectable 2 <User Schedule>  influenza   Vaccine 0.5 once  insulin lispro (ADMELOG) corrective regimen sliding scale  three times a day before meals  insulin lispro (ADMELOG) corrective regimen sliding scale  at bedtime  lactulose Syrup 30 every 12 hours  levalbuterol Inhalation 0.63 every 6 hours  midodrine 10 every 8 hours  pantoprazole    Tablet 40 daily  traMADol 25 every 6 hours PRN  traMADol 50 every 6 hours PRN      Vital Signs Last 24 Hrs  T(C): 36.5 (13 Feb 2025 12:27), Max: 37.1 (12 Feb 2025 20:57)  T(F): 97.7 (13 Feb 2025 12:27), Max: 98.7 (12 Feb 2025 20:57)  HR: 91 (13 Feb 2025 12:27) (91 - 110)  BP: 135/75 (13 Feb 2025 12:27) (116/69 - 135/75)  BP(mean): --  RR: 20 (13 Feb 2025 12:27) (18 - 20)  SpO2: 100% (13 Feb 2025 12:27) (93% - 100%)    Parameters below as of 13 Feb 2025 12:27  Patient On (Oxygen Delivery Method): room air      PHYSICAL EXAMINATION:  General: Alert and Awake, NAD, jaundice  HEENT: Normocephalic / Atraumatic  Resp: No accessory muscles of respiration utilized  Abdomen: Not distended.  MSK: No LE edema.   : No yates  Skin: No rashes or lesions.    Neuro: Alert and Awake. CN 2-12 Grossly intact. Moves all four extremities spontaneously.  Psych: Calm, Pleasant, Cooperative                          7.3    6.51  )-----------( 75       ( 13 Feb 2025 06:22 )             22.8       02-13    133[L]  |  99  |  41[H]  ----------------------------<  108[H]  4.8   |  25  |  1.13    Ca    9.3      13 Feb 2025 06:22  Phos  3.2     02-13  Mg     1.5     02-13    TPro  7.4  /  Alb  3.2[L]  /  TBili  6.0[H]  /  DBili  x   /  AST  37  /  ALT  8[L]  /  AlkPhos  112  02-13      Urinalysis Basic - ( 13 Feb 2025 06:22 )    Color: x / Appearance: x / SG: x / pH: x  Gluc: 108 mg/dL / Ketone: x  / Bili: x / Urobili: x   Blood: x / Protein: x / Nitrite: x   Leuk Esterase: x / RBC: x / WBC x   Sq Epi: x / Non Sq Epi: x / Bacteria: x        MICROBIOLOGY:  v  Ascites Fl  02-06-25   No growth at 5 days  --    No polymorphonuclear cells seen  No organisms seen  by cytocentrifuge      .Abscess  01-28-25   No growth at 5 days  --    Few polymorphonuclear leukocytes seen per low power field  No organisms seen per oil power field      Tissue  01-28-25   No growth at 5 days  --    Few polymorphonuclear leukocytes seen per low power field  No organisms seen per oil power field      Pleural Fl  01-24-25   Rare Citrobacter koseri  --  Citrobacter koseri      Peritoneal  01-23-25   No growth at 5 days  --    No polymorphonuclear cells seen  No organisms seen  by cytocentrifuge      .Blood BLOOD  01-19-25   No growth at 5 days  --  --      Pleural Fl  01-18-25   Moderate Citrobacter koseri  --  Citrobacter koseri      Body Fluid  01-18-25   No fungus isolated at 3 weeks.  --  --      .Blood BLOOD  01-18-25   No growth at 5 days  --  --      Ascites Fl  01-17-25   No growth at 5 days  --    No polymorphonuclear cells seen  No organisms seen  by cytocentrifuge      .Blood BLOOD  01-16-25   No growth at 5 days  --  --      .Blood BLOOD  01-16-25   Growth in aerobic bottle: Staphylococcus epidermidis  Isolation of Coagulase negative Staphylococcus from single blood culture  sets may represent  contamination. Contact the Microbiology Department at [phone] if  susceptibility testing is needed.  clinically indicated.  Direct identification is available within approximately 3-5  hours either by Blood Panel Multiplexed PCR or Direct  MALDI-TOF. Details: [email or web address]  --  Blood Culture PCR        CMV IgG Antibody: >10.00 U/mL (01-17-25 @ 06:41)  Toxoplasma IgG Screen: <3.00 IU/mL (01-17-25 @ 06:41)          RADIOLOGY:    <The imaging below has been reviewed and visualized by me independently. Findings as detailed in report below>    < from: Xray Chest 1 View- PORTABLE-Urgent (Xray Chest 1 View- PORTABLE-Urgent .) (02.09.25 @ 03:13) >  IMPRESSION:  Mild interval increase in right-sided loculated pleural fluid. Increased   opacities overlying the right lung.    < end of copied text >

## 2025-02-13 NOTE — PROGRESS NOTE ADULT - ASSESSMENT
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 Actively drinking. Now p/w abdominal pain/distension with worsening of liver tests possibly triggered by infection. DDx also includes alcohol associated hepatitis given hx of alc hep 7/2024 which improved with steroids  - OLT: listed at meld 27 (2/11/25)  - EV: EGD 7/2024 for melena showed grade II EV, small gastric ulcer (neg HP), and portal gastropathy   - Ascites: s/p para with 6.8 L fluid removed, increase bumex to 2 mg IV q12h and add aldactone 100 mg daily  - HE: c/w lactulose and rifaximin, goal 3-4 BM/day  - HCC screening: MRI nondiagnostic, no clear lesion seen                             - C/w home midodrine 10 mg tid  - C/w PPI for stress ulcer ppx and hx of ulcer  - Trend MELD labs daily (cmp, INR) daily  - Strict I/Os, daily wts    #Empyema  CT chest noncontrast reviewed from OSH showing moderate sized complex R sided pleural effusion. s/p chest tube placed 1/18 with cultures growing Citrobacter c/w empyema. Requiring 2nd chest tube placement then now s/p VATS 1/28 for persistent effusion with 3rd chest tube placed. Now all chest tubes removed with persistent collection on CXR  - C/w CTX and flagyl given persistent collection  - CTS evaluation for oozing from VATS site  - Trend cbc and fever curve  - Appreciate thoracic surgery and ID recs    #MANUELITO, improving  #Volume overload  Found to have acute renal failure requiring dialysis at OSH, ddx includes HRS vs ATN. Cr baseline 1.12 10/2024 required albumin assisted diuresis with bumex 2 mg IV q12h. Cr improving. Currently at goal diuresis  - Continue bumex to 2 mg IV q12h and add aldactone 100 mg daily - Goal 2.5L removal/day  - Strict I/Os, daily wts  - Trend CMP daily    #Anemia  #Thrombocytopenia  Normocytic anemia with Hgb 8.5 and plt count 64 likely due to chronic liver dz  - s/p 4 units pRBC, 2 FFP, 2 plts on 1/28 and 1 unit on 2/10  - Hold DVT ppx for bleeding      Note incomplete until finalized by attending signature/attestation.    Saul Zarate  GI/Hepatology Fellow, PGY-4    MONDAY-FRIDAY 8AM-5PM:  Please message via [employer] or email [email or web address]     On Weekends/Holidays (All day) and Weekdays after 5 PM to 8 AM  For nonurgent consults please email:  Please email [email or web address]  For urgent consults:  Please contact on call GI team. See Amion schedule ([facility]), ID.me paging system ([facility]), or call hospital  ([facility])

## 2025-02-13 NOTE — PROGRESS NOTE ADULT - SUBJECTIVE AND OBJECTIVE BOX
Patient is a 44y old  Male who presents with a chief complaint of decompensated cirrhosis (2025 14:09)      Interval Events:   Patient reports good urination, had one large urination episode that was not charted. Patient still w/ some tightness in his ankles.     ROS:   A 12-point ROS was performed and negative except as noted in HPI.    Hospital Medications:  albuterol    90 MICROgram(s) HFA Inhaler 1 Puff(s) Inhalation every 12 hours PRN  artificial tears (preservative free) Ophthalmic Solution 1 Drop(s) Both EYES two times a day  buMETAnide Injectable 2 milliGRAM(s) IV Push <User Schedule>  cefTRIAXone   IVPB 2000 milliGRAM(s) IV Intermittent every 24 hours  chlorhexidine 2% Cloths 1 Application(s) Topical <User Schedule>  folic acid 1 milliGRAM(s) Oral daily  influenza   Vaccine 0.5 milliLiter(s) IntraMuscular once  insulin lispro (ADMELOG) corrective regimen sliding scale   SubCutaneous three times a day before meals  insulin lispro (ADMELOG) corrective regimen sliding scale   SubCutaneous at bedtime  lactulose Syrup 30 Gram(s) Oral every 12 hours  levalbuterol Inhalation 0.63 milliGRAM(s) Inhalation every 6 hours  magnesium oxide 800 milliGRAM(s) Oral two times a day with meals  metroNIDAZOLE    Tablet 500 milliGRAM(s) Oral every 12 hours  midodrine 10 milliGRAM(s) Oral every 8 hours  multivitamin 1 Tablet(s) Oral daily  pantoprazole    Tablet 40 milliGRAM(s) Oral daily  rifAXIMin 550 milliGRAM(s) Oral every 12 hours  thiamine 100 milliGRAM(s) Oral daily  traMADol 25 milliGRAM(s) Oral every 6 hours PRN  traMADol 50 milliGRAM(s) Oral every 6 hours PRN      PHYSICAL EXAM:   Vital Signs:  Vital Signs Last 24 Hrs  T(C): 36.5 (2025 12:27), Max: 37.1 (2025 20:57)  T(F): 97.7 (2025 12:27), Max: 98.7 (2025 20:57)  HR: 91 (2025 12:27) (91 - 110)  BP: 135/75 (2025 12:27) (116/69 - 135/75)  BP(mean): --  RR: 20 (2025 12:27) (18 - 20)  SpO2: 100% (2025 12:27) (93% - 100%)    Parameters below as of 2025 12:27  Patient On (Oxygen Delivery Method): room air      Daily     Daily Weight in k (2025 05:02)    GENERAL: no acute distress  NEURO: alert  HEENT: anicteric sclera, no conjunctival pallor appreciated  CHEST: no respiratory distress, no accessory muscle use  CARDIAC: regular rate  ABDOMEN: soft, mildly distended abd  EXTREMITIES: b/l LE edema up to upper thighs b/l  SKIN: no lesions noted    LABS: reviewed                        7.3    6.51  )-----------( 75       ( 2025 06:22 )             22.8     02-13    133[L]  |  99  |  41[H]  ----------------------------<  108[H]  4.8   |  25  |  1.13    Ca    9.3      2025 06:22  Phos  3.2     02-13  Mg     1.5     02-13    TPro  7.4  /  Alb  3.2[L]  /  TBili  6.0[H]  /  DBili  x   /  AST  37  /  ALT  8[L]  /  AlkPhos  112  02-13    LIVER FUNCTIONS - ( 2025 06:22 )  Alb: 3.2 g/dL / Pro: 7.4 g/dL / ALK PHOS: 112 U/L / ALT: 8 U/L / AST: 37 U/L / GGT: x             Interval Diagnostic Studies: see sunrise for full report   Patient is a 44y old  Male who presents with a chief complaint of decompensated cirrhosis (2025 14:09)      Interval Events:   Patient reports good urination, had one large urination episode that was not charted. Patient still w/ some tightness in his ankles.     ROS:   A 12-point ROS was performed and negative except as noted in HPI.    Hospital Medications:  albuterol    90 MICROgram(s) HFA Inhaler 1 Puff(s) Inhalation every 12 hours PRN  artificial tears (preservative free) Ophthalmic Solution 1 Drop(s) Both EYES two times a day  buMETAnide Injectable 2 milliGRAM(s) IV Push <User Schedule>  cefTRIAXone   IVPB 2000 milliGRAM(s) IV Intermittent every 24 hours  chlorhexidine 2% Cloths 1 Application(s) Topical <User Schedule>  folic acid 1 milliGRAM(s) Oral daily  influenza   Vaccine 0.5 milliLiter(s) IntraMuscular once  insulin lispro (ADMELOG) corrective regimen sliding scale   SubCutaneous three times a day before meals  insulin lispro (ADMELOG) corrective regimen sliding scale   SubCutaneous at bedtime  lactulose Syrup 30 Gram(s) Oral every 12 hours  levalbuterol Inhalation 0.63 milliGRAM(s) Inhalation every 6 hours  magnesium oxide 800 milliGRAM(s) Oral two times a day with meals  metroNIDAZOLE    Tablet 500 milliGRAM(s) Oral every 12 hours  midodrine 10 milliGRAM(s) Oral every 8 hours  multivitamin 1 Tablet(s) Oral daily  pantoprazole    Tablet 40 milliGRAM(s) Oral daily  rifAXIMin 550 milliGRAM(s) Oral every 12 hours  thiamine 100 milliGRAM(s) Oral daily  traMADol 25 milliGRAM(s) Oral every 6 hours PRN  traMADol 50 milliGRAM(s) Oral every 6 hours PRN      PHYSICAL EXAM:   Vital Signs:  Vital Signs Last 24 Hrs  T(C): 36.5 (2025 12:27), Max: 37.1 (2025 20:57)  T(F): 97.7 (2025 12:27), Max: 98.7 (2025 20:57)  HR: 91 (2025 12:27) (91 - 110)  BP: 135/75 (2025 12:27) (116/69 - 135/75)  BP(mean): --  RR: 20 (2025 12:27) (18 - 20)  SpO2: 100% (2025 12:27) (93% - 100%)    Parameters below as of 2025 12:27  Patient On (Oxygen Delivery Method): room air      Daily     Daily Weight in k (2025 05:02)    GENERAL: no acute distress  NEURO: alert, oriented x4, no asterixis  HEENT: icteric sclera,  CHEST: no respiratory distress, no accessory muscle use, +R VATS site with dry dressing, +decreased BS R base  CARDIAC: regular rate  ABDOMEN: soft, mildly distended abd, +anasarca  EXTREMITIES: b/l LE edema up to upper thighs b/l  SKIN: no lesions noted, +jaundiced    LABS: reviewed                        7.3    6.51  )-----------( 75       ( 2025 06:22 )             22.8     02-13    133[L]  |  99  |  41[H]  ----------------------------<  108[H]  4.8   |  25  |  1.13    Ca    9.3      2025 06:22  Phos  3.2     02-13  Mg     1.5     02-13    TPro  7.4  /  Alb  3.2[L]  /  TBili  6.0[H]  /  DBili  x   /  AST  37  /  ALT  8[L]  /  AlkPhos  112  02-13    LIVER FUNCTIONS - ( 2025 06:22 )  Alb: 3.2 g/dL / Pro: 7.4 g/dL / ALK PHOS: 112 U/L / ALT: 8 U/L / AST: 37 U/L / GGT: x             Interval Diagnostic Studies: see sunrise for full report

## 2025-02-14 LAB
ALBUMIN SERPL ELPH-MCNC: 3.4 G/DL — SIGNIFICANT CHANGE UP (ref 3.3–5)
ALP SERPL-CCNC: 119 U/L — SIGNIFICANT CHANGE UP (ref 40–120)
ALT FLD-CCNC: 8 U/L — LOW (ref 10–45)
ANION GAP SERPL CALC-SCNC: 15 MMOL/L — SIGNIFICANT CHANGE UP (ref 5–17)
APTT BLD: 51.7 SEC — HIGH (ref 24.5–35.6)
AST SERPL-CCNC: 43 U/L — HIGH (ref 10–40)
BILIRUB SERPL-MCNC: 6.8 MG/DL — HIGH (ref 0.2–1.2)
BLD GP AB SCN SERPL QL: NEGATIVE — SIGNIFICANT CHANGE UP
BUN SERPL-MCNC: 42 MG/DL — HIGH (ref 7–23)
CALCIUM SERPL-MCNC: 9.8 MG/DL — SIGNIFICANT CHANGE UP (ref 8.4–10.5)
CHLORIDE SERPL-SCNC: 97 MMOL/L — SIGNIFICANT CHANGE UP (ref 96–108)
CO2 SERPL-SCNC: 22 MMOL/L — SIGNIFICANT CHANGE UP (ref 22–31)
CREAT SERPL-MCNC: 1.17 MG/DL — SIGNIFICANT CHANGE UP (ref 0.5–1.3)
EGFR: 78 ML/MIN/1.73M2 — SIGNIFICANT CHANGE UP
EGFR: 78 ML/MIN/1.73M2 — SIGNIFICANT CHANGE UP
GLUCOSE BLDC GLUCOMTR-MCNC: 125 MG/DL — HIGH (ref 70–99)
GLUCOSE BLDC GLUCOMTR-MCNC: 125 MG/DL — HIGH (ref 70–99)
GLUCOSE BLDC GLUCOMTR-MCNC: 132 MG/DL — HIGH (ref 70–99)
GLUCOSE BLDC GLUCOMTR-MCNC: 167 MG/DL — HIGH (ref 70–99)
GLUCOSE SERPL-MCNC: 116 MG/DL — HIGH (ref 70–99)
HCT VFR BLD CALC: 24.5 % — LOW (ref 39–50)
HGB BLD-MCNC: 7.9 G/DL — LOW (ref 13–17)
INR BLD: 2.86 RATIO — HIGH (ref 0.85–1.16)
MAGNESIUM SERPL-MCNC: 1.7 MG/DL — SIGNIFICANT CHANGE UP (ref 1.6–2.6)
MCHC RBC-ENTMCNC: 32.1 PG — SIGNIFICANT CHANGE UP (ref 27–34)
MCHC RBC-ENTMCNC: 32.2 G/DL — SIGNIFICANT CHANGE UP (ref 32–36)
MCV RBC AUTO: 99.6 FL — SIGNIFICANT CHANGE UP (ref 80–100)
NRBC BLD AUTO-RTO: 0 /100 WBCS — SIGNIFICANT CHANGE UP (ref 0–0)
PHOSPHATE SERPL-MCNC: 3.2 MG/DL — SIGNIFICANT CHANGE UP (ref 2.5–4.5)
PLATELET # BLD AUTO: 80 K/UL — LOW (ref 150–400)
POTASSIUM SERPL-MCNC: 4.6 MMOL/L — SIGNIFICANT CHANGE UP (ref 3.5–5.3)
POTASSIUM SERPL-SCNC: 4.6 MMOL/L — SIGNIFICANT CHANGE UP (ref 3.5–5.3)
PROT SERPL-MCNC: 8.1 G/DL — SIGNIFICANT CHANGE UP (ref 6–8.3)
PROTHROM AB SERPL-ACNC: 32.5 SEC — HIGH (ref 9.9–13.4)
RBC # BLD: 2.46 M/UL — LOW (ref 4.2–5.8)
RBC # FLD: 20.7 % — HIGH (ref 10.3–14.5)
RH IG SCN BLD-IMP: POSITIVE — SIGNIFICANT CHANGE UP
SODIUM SERPL-SCNC: 134 MMOL/L — LOW (ref 135–145)
WBC # BLD: 7.22 K/UL — SIGNIFICANT CHANGE UP (ref 3.8–10.5)
WBC # FLD AUTO: 7.22 K/UL — SIGNIFICANT CHANGE UP (ref 3.8–10.5)

## 2025-02-14 PROCEDURE — 99231 SBSQ HOSP IP/OBS SF/LOW 25: CPT

## 2025-02-14 PROCEDURE — 99232 SBSQ HOSP IP/OBS MODERATE 35: CPT | Mod: GC

## 2025-02-14 RX ORDER — BUMETANIDE 1 MG/1
4 TABLET ORAL
Refills: 0 | Status: COMPLETED | OUTPATIENT
Start: 2025-02-14 | End: 2025-02-14

## 2025-02-14 RX ORDER — SPIRONOLACTONE 25 MG
50 TABLET ORAL ONCE
Refills: 0 | Status: COMPLETED | OUTPATIENT
Start: 2025-02-14 | End: 2025-02-14

## 2025-02-14 RX ORDER — SPIRONOLACTONE 25 MG
150 TABLET ORAL
Refills: 0 | Status: DISCONTINUED | OUTPATIENT
Start: 2025-02-15 | End: 2025-02-27

## 2025-02-14 RX ORDER — BUMETANIDE 1 MG/1
4 TABLET ORAL
Refills: 0 | Status: DISCONTINUED | OUTPATIENT
Start: 2025-02-15 | End: 2025-02-18

## 2025-02-14 RX ORDER — BUMETANIDE 1 MG/1
2 TABLET ORAL ONCE
Refills: 0 | Status: COMPLETED | OUTPATIENT
Start: 2025-02-14 | End: 2025-02-14

## 2025-02-14 RX ORDER — BUMETANIDE 1 MG/1
4 TABLET ORAL
Refills: 0 | Status: DISCONTINUED | OUTPATIENT
Start: 2025-02-14 | End: 2025-02-14

## 2025-02-14 RX ORDER — MAGNESIUM SULFATE 500 MG/ML
2 SYRINGE (ML) INJECTION ONCE
Refills: 0 | Status: COMPLETED | OUTPATIENT
Start: 2025-02-14 | End: 2025-02-14

## 2025-02-14 RX ADMIN — MIDODRINE HYDROCHLORIDE 10 MILLIGRAM(S): 5 TABLET ORAL at 17:09

## 2025-02-14 RX ADMIN — Medication 500 MILLIGRAM(S): at 17:05

## 2025-02-14 RX ADMIN — LACTULOSE 30 GRAM(S): 10 SOLUTION ORAL at 05:12

## 2025-02-14 RX ADMIN — Medication 1 DROP(S): at 05:11

## 2025-02-14 RX ADMIN — Medication 800 MILLIGRAM(S): at 08:15

## 2025-02-14 RX ADMIN — MIDODRINE HYDROCHLORIDE 10 MILLIGRAM(S): 5 TABLET ORAL at 01:06

## 2025-02-14 RX ADMIN — INSULIN LISPRO 2: 100 INJECTION, SOLUTION INTRAVENOUS; SUBCUTANEOUS at 17:15

## 2025-02-14 RX ADMIN — Medication 1 TABLET(S): at 10:55

## 2025-02-14 RX ADMIN — BUMETANIDE 132 MILLIGRAM(S): 1 TABLET ORAL at 17:09

## 2025-02-14 RX ADMIN — LEVALBUTEROL HYDROCHLORIDE 0.63 MILLIGRAM(S): 1.25 SOLUTION RESPIRATORY (INHALATION) at 17:07

## 2025-02-14 RX ADMIN — CEFTRIAXONE 100 MILLIGRAM(S): 500 INJECTION, POWDER, FOR SOLUTION INTRAMUSCULAR; INTRAVENOUS at 08:16

## 2025-02-14 RX ADMIN — Medication 25 GRAM(S): at 10:56

## 2025-02-14 RX ADMIN — TRAMADOL HYDROCHLORIDE 50 MILLIGRAM(S): 50 TABLET, FILM COATED ORAL at 01:06

## 2025-02-14 RX ADMIN — Medication 100 MILLIGRAM(S): at 10:56

## 2025-02-14 RX ADMIN — LEVALBUTEROL HYDROCHLORIDE 0.63 MILLIGRAM(S): 1.25 SOLUTION RESPIRATORY (INHALATION) at 05:11

## 2025-02-14 RX ADMIN — Medication 1 DROP(S): at 17:06

## 2025-02-14 RX ADMIN — Medication 1 APPLICATION(S): at 05:15

## 2025-02-14 RX ADMIN — Medication 800 MILLIGRAM(S): at 17:05

## 2025-02-14 RX ADMIN — BUMETANIDE 2 MILLIGRAM(S): 1 TABLET ORAL at 10:57

## 2025-02-14 RX ADMIN — TRAMADOL HYDROCHLORIDE 50 MILLIGRAM(S): 50 TABLET, FILM COATED ORAL at 14:11

## 2025-02-14 RX ADMIN — TRAMADOL HYDROCHLORIDE 50 MILLIGRAM(S): 50 TABLET, FILM COATED ORAL at 02:00

## 2025-02-14 RX ADMIN — Medication 500 MILLIGRAM(S): at 05:11

## 2025-02-14 RX ADMIN — LEVALBUTEROL HYDROCHLORIDE 0.63 MILLIGRAM(S): 1.25 SOLUTION RESPIRATORY (INHALATION) at 01:06

## 2025-02-14 RX ADMIN — LEVALBUTEROL HYDROCHLORIDE 0.63 MILLIGRAM(S): 1.25 SOLUTION RESPIRATORY (INHALATION) at 11:03

## 2025-02-14 RX ADMIN — FOLIC ACID 1 MILLIGRAM(S): 1 TABLET ORAL at 10:55

## 2025-02-14 RX ADMIN — Medication 40 MILLIGRAM(S): at 10:56

## 2025-02-14 RX ADMIN — TRAMADOL HYDROCHLORIDE 50 MILLIGRAM(S): 50 TABLET, FILM COATED ORAL at 22:00

## 2025-02-14 RX ADMIN — Medication 100 MILLIGRAM(S): at 10:55

## 2025-02-14 RX ADMIN — TRAMADOL HYDROCHLORIDE 50 MILLIGRAM(S): 50 TABLET, FILM COATED ORAL at 09:16

## 2025-02-14 RX ADMIN — MIDODRINE HYDROCHLORIDE 10 MILLIGRAM(S): 5 TABLET ORAL at 10:56

## 2025-02-14 RX ADMIN — TRAMADOL HYDROCHLORIDE 25 MILLIGRAM(S): 50 TABLET, FILM COATED ORAL at 05:11

## 2025-02-14 RX ADMIN — TRAMADOL HYDROCHLORIDE 25 MILLIGRAM(S): 50 TABLET, FILM COATED ORAL at 06:00

## 2025-02-14 RX ADMIN — TRAMADOL HYDROCHLORIDE 50 MILLIGRAM(S): 50 TABLET, FILM COATED ORAL at 15:11

## 2025-02-14 RX ADMIN — TRAMADOL HYDROCHLORIDE 50 MILLIGRAM(S): 50 TABLET, FILM COATED ORAL at 21:05

## 2025-02-14 RX ADMIN — TRAMADOL HYDROCHLORIDE 50 MILLIGRAM(S): 50 TABLET, FILM COATED ORAL at 08:16

## 2025-02-14 NOTE — BH CONSULTATION LIAISON PROGRESS NOTE - NSBHASSESSMENTFT_PSY_ALL_CORE
Pt is a 43 yo  man with PMH decompensated ETOH cirrhosis c/b recurrent ascites, grade II EV, and HE, right calf hematoma 10/2024 (s/p fall)  presented to [facility] [place] for abdominal pain and distension. Patient found to have ascites on exam and MANUELITO requiring HD initiation. PermCath was placed by vascular surgery on 1/10 with post procedure course c/b bleeding from insertion site. Transferred to [facility] SICU for further management.   Psych consulted for alcohol use disorder. Patient reports that heavy alcohol consumption began 2016 after he father passed away and he moved to [place]. Pt was in rehabilitation twice prior, 2021 and 2023, pt was sober for about 1 yr between two rehabs. Pt began drinking again since 03/2024. Pt reports that he was in [place] 5/2024 and did not drink while he was there because it was election month, no drinking was allowed. Pt says he was in the ICU in Nov 2024 and decided to stop drinking them and has not had any alcohol since the first week in Nov, 2024.   Pt is highly motivated to follow treatment recommendations   Pt demonstrates overall good understanding of transplant process including potential risks and post-operative recovery needs including but not limited to need for immunosuppression, risk of infection, and lifestyle modifications. Admits to some mild anxiety in context of medical stressors with good therapeutic response to mirtazapine which we will recommend continuing. Reporting overall stable mood, is notably future-oriented, is motivated to maintain sobriety from alcohol (last use 11/4/24). Patient is adherent to current recommendations made by transplant team and motivated to continue to follow any future suggestions. Is open to considering counseling or support groups should anxiety worsen post-transplant. Endorses primary family support for this process is his mother.     DDX;  Pre-transplant evaluation  R/O adjustment disorder with anxiety    Plan  -Continue mirtazapine 7.5 mg QHS  - continue with melatonin 3 mg QHS  - SW assistance for referral to inpatient rehab appreciated. Patient is motivated to attend and complete the program.   - Discussed with patient additional psychiatric resources  for anxiety management including counseling/therapy/groups. Patient is open to exploring that in the future if anxiety worsens.  - SIPAT score: 23,  Pt minimally acceptable candidate. Would consider listing and will benefit greatly from ongoing counseling for alcohol use, and further treatment in inpatient rehab program.    - Dispo: likely to inpatient rehab. no indication for inpatient psychiatric hospitalization

## 2025-02-14 NOTE — PROGRESS NOTE ADULT - SUBJECTIVE AND OBJECTIVE BOX
Patient is a 44y old  Male who presents with a chief complaint of decompensated cirrhosis (2025 15:40)      Interval Events:   Patient w/ 2700 cc output overnight.   Patient still w/ complaints of LE pain at night.     ROS:   A 12-point ROS was performed and negative except as noted in HPI.    Hospital Medications:  albuterol    90 MICROgram(s) HFA Inhaler 1 Puff(s) Inhalation every 12 hours PRN  artificial tears (preservative free) Ophthalmic Solution 1 Drop(s) Both EYES two times a day  buMETAnide Injectable 2 milliGRAM(s) IV Push once  buMETAnide Injectable 4 milliGRAM(s) IV Push <User Schedule>  buMETAnide IVPB 4 milliGRAM(s) IV Intermittent <User Schedule>  cefTRIAXone   IVPB 2000 milliGRAM(s) IV Intermittent every 24 hours  chlorhexidine 2% Cloths 1 Application(s) Topical <User Schedule>  folic acid 1 milliGRAM(s) Oral daily  influenza   Vaccine 0.5 milliLiter(s) IntraMuscular once  insulin lispro (ADMELOG) corrective regimen sliding scale   SubCutaneous three times a day before meals  insulin lispro (ADMELOG) corrective regimen sliding scale   SubCutaneous at bedtime  lactulose Syrup 30 Gram(s) Oral every 12 hours  levalbuterol Inhalation 0.63 milliGRAM(s) Inhalation every 6 hours  magnesium oxide 800 milliGRAM(s) Oral two times a day with meals  metroNIDAZOLE    Tablet 500 milliGRAM(s) Oral every 12 hours  midodrine 10 milliGRAM(s) Oral every 8 hours  multivitamin 1 Tablet(s) Oral daily  pantoprazole    Tablet 40 milliGRAM(s) Oral daily  rifAXIMin 550 milliGRAM(s) Oral every 12 hours  spironolactone 50 milliGRAM(s) Oral once  thiamine 100 milliGRAM(s) Oral daily  traMADol 25 milliGRAM(s) Oral every 6 hours PRN  traMADol 50 milliGRAM(s) Oral every 6 hours PRN      PHYSICAL EXAM:   Vital Signs:  Vital Signs Last 24 Hrs  T(C): 36.7 (2025 08:41), Max: 37.2 (2025 01:00)  T(F): 98.1 (2025 08:41), Max: 99 (2025 01:00)  HR: 104 (2025 08:59) (91 - 112)  BP: 123/73 (2025 08:41) (111/59 - 135/75)  BP(mean): --  RR: 20 (2025 08:41) (18 - 20)  SpO2: 95% (2025 08:59) (93% - 100%)    Parameters below as of 2025 08:41  Patient On (Oxygen Delivery Method): room air      Daily     Daily Weight in k.3 (2025 05:00)    GENERAL: no acute distress  NEURO: alert, oriented x4, no asterixis  HEENT: icteric sclera,  CHEST: no respiratory distress, no accessory muscle use,   CARDIAC: regular rate  ABDOMEN: soft, mildly distended abd, anasarca  EXTREMITIES: b/l LE edema up to upper thighs b/l  SKIN: no lesions noted, +jaundiced    LABS: reviewed                        7.9    7.22  )-----------( 80       ( 2025 07:26 )             24.5     02-14    134[L]  |  97  |  42[H]  ----------------------------<  116[H]  4.6   |  22  |  1.17    Ca    9.8      2025 07:16  Phos  3.2     02-14  Mg     1.7     02-14    TPro  8.1  /  Alb  3.4  /  TBili  6.8[H]  /  DBili  x   /  AST  43[H]  /  ALT  8[L]  /  AlkPhos  119  02-14    LIVER FUNCTIONS - ( 2025 07:16 )  Alb: 3.4 g/dL / Pro: 8.1 g/dL / ALK PHOS: 119 U/L / ALT: 8 U/L / AST: 43 U/L / GGT: x             Interval Diagnostic Studies: see sunrise for full report

## 2025-02-14 NOTE — PROGRESS NOTE ADULT - ASSESSMENT
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 Actively drinking. Now p/w abdominal pain/distension with worsening of liver tests possibly triggered by infection. DDx also includes alcohol associated hepatitis given hx of alc hep 7/2024 which improved with steroids  - OLT: listed at meld 27 (2/11/25)  - EV: EGD 7/2024 for melena showed grade II EV, small gastric ulcer (neg HP), and portal gastropathy   - Ascites: s/p para with 6.8 L fluid removed, increase bumex to 2 mg IV q12h and add aldactone 100 mg daily  - HE: c/w lactulose and rifaximin, goal 3-4 BM/day  - HCC screening: MRI nondiagnostic, no clear lesion seen                             - C/w home midodrine 10 mg tid  - C/w PPI for stress ulcer ppx and hx of ulcer  - Trend MELD labs daily (cmp, INR) daily  - Strict I/Os, daily wts    #Empyema  CT chest noncontrast reviewed from OSH showing moderate sized complex R sided pleural effusion. s/p chest tube placed 1/18 with cultures growing Citrobacter c/w empyema. Requiring 2nd chest tube placement then now s/p VATS 1/28 for persistent effusion with 3rd chest tube placed. Now all chest tubes removed with persistent collection on CXR  - C/w CTX and flagyl given persistent collection  - CTS evaluation for oozing from VATS site  - Trend cbc and fever curve  - Appreciate thoracic surgery and ID recs    #MANUELITO, improving  #Volume overload  Found to have acute renal failure requiring dialysis at OSH, ddx includes HRS vs ATN. Cr baseline 1.12 10/2024 required albumin assisted diuresis with bumex 2 mg IV q12h. Cr improving. Currently at goal diuresis  - Increase bumex to 2 mg AM and 4mg PM IV and aldactone 150 mg daily - Goal 2.5L removal/day  - Strict I/Os, daily wts  - Trend CMP daily    #Anemia  #Thrombocytopenia  Normocytic anemia with Hgb 8.5 and plt count 64 likely due to chronic liver dz  - s/p 4 units pRBC, 2 FFP, 2 plts on 1/28 and 1 unit on 2/10  - Hold DVT ppx for bleeding      Note incomplete until finalized by attending signature/attestation.    Saul Zarate  GI/Hepatology Fellow, PGY-4    MONDAY-FRIDAY 8AM-5PM:  Please message via [employer] or email [email or web address]     On Weekends/Holidays (All day) and Weekdays after 5 PM to 8 AM  For nonurgent consults please email:  Please email Brass [email or web address]  For urgent consults:  Please contact on call GI team. See Amion schedule ([facility]), [employer] paging system ([facility]), or call hospital  ([facility])

## 2025-02-14 NOTE — PROGRESS NOTE ADULT - ATTENDING COMMENTS
43 yo M with AUD (with last reported drink in 11/2024 and with PEth borderline positive with 23 ng/mL 16:0/18:1 but negative 16:0/18:2 on 1/17/25), obesity, ROSA, PUD, history of alcohol-associated hepatitis (7/2024), history of traumatic right calf hematoma (10/2024), and decompensated alcohol-associated cirrhosis complicated by ascites, pleural effusions, hyponatremia, non-bleeding EV, and hepatic encephalopathy, transferred from [facility] to [facility] on 1/16/25 due to refractory ascites, recurrent right pleural effusion, and initial HRS-MANUELITO that has since improved (with prior intermittent HD from 1/10-19). Due to right-sided empyema (as initially seen on CT chest on 1/17 and with pleural fluid culture with Citrobacter koseri after prior chest tube placement) with prior inability to achieve source control with chest tubes only, he underwent R VATS (1/28) with decortication complicated by hemothorax. He was extubated on 1/30 and eventually had the last chest tube removed on 2/6. He had previously completed antibiotics but remains on ceftriaxone (1/22- ) and metronidazole (1/18-25, 1/29- ) as per Transplant ID as his most recent CT chest (2/7) still showed a moderate right gas and liquid containing pleural collection that, per Thoracic Surgery, likely reflects prior hemothorax that they feel cannot be safely evacuated either with another chest tube or surgically pre-transplant. No further saturation of dressing over R VATS incision site with decreased and now minimal serosanguinous drainage with pressure dressings being changed daily. He remains afebrile. Continuing diuresis, increased today to Bumex 4 mg iv bid and spironolactone 150 mg po daily. Continuing midodrine 10 mg po q8h to support diuresis. He remains wait-listed for liver transplant. ABO O with current MELD 3.0 score of 27 and listed MELD 3.0 score of 29 (with re-certification due 2/20). Due to the residual R hemothorax, plan is for chest tube reinsertion at the time of his liver transplant. He is currently medically stable to proceed with transplant when a suitable donor organ becomes available.    Victoriano Marie M.D., Ph.D.  Transplant Hepatology

## 2025-02-14 NOTE — BH CONSULTATION LIAISON PROGRESS NOTE - NSBHFUPINTERVALHXFT_PSY_A_CORE
On evaluation, patient is calm, cooperative, pleasant, but having back pain.  Reports that he was able to speak with the team and received education about his current medical condition and proposed treatment.  Reports his last drink was in November 4, 2024 and he does not have any cravings for it. Understands that post-transplant, there will be regular monitoring of alcohol use and is motivated to pursue recommended inpatient rehab treatment. Patient states he has been to rehab twice before so he knows what to expect. He denies any other recreational drug use including use of marijuana/gummies. Education provided in regards to the need to maintain sobriety. Patient expresses understanding and is in agreement with this. He reports he has met with the nutritionist and is also aware of changes to diet that are necessary for the liver transplant process. Denies any pertinent psychiatric history, denies history of inpatient/outpatient psychiatric treatment and denies history of SA/SIB. Patient admits to intermittent anxiety, has been tolerating mirtazapine well with overall benefit to mood. Endorses stable mood today and denies any SI/HI and AVH. Denies any safety concerns. Understands that after transplant, he may need to remain in the hospital for some time, and discussed with the team risks/benefits associated with steroids and lifelong need for immunosuppressants. Discussed with patient effect of steroid use on mood. Patient reports that should he find his anxiety worsening, he will be sure to follow up with psychiatry.  Patient states he is sleeping well, has somewhat of an appetite but is unable to eat due to pressure/distention of his stomach. . Patient states that his primary support system is his mother, and the plan will be to live with her after the hospitalization in [place]. His wife and two children are currently in [place]. States his family is aware and ready to help with post-transplant recovery and care. Pt states he has received education in regard to the transplant process and recovery. Pt demonstrates good understanding of transplant process including risks of organ rejection, infection, post-operative hospitalization and care, need for immunosuppression and lifestyle changes. He is notably future-oriented and help-seeking.

## 2025-02-15 LAB
ALBUMIN SERPL ELPH-MCNC: 3.2 G/DL — LOW (ref 3.3–5)
ALP SERPL-CCNC: 126 U/L — HIGH (ref 40–120)
ALT FLD-CCNC: 8 U/L — LOW (ref 10–45)
ANION GAP SERPL CALC-SCNC: 10 MMOL/L — SIGNIFICANT CHANGE UP (ref 5–17)
APTT BLD: 51.2 SEC — HIGH (ref 24.5–35.6)
AST SERPL-CCNC: 45 U/L — HIGH (ref 10–40)
BILIRUB SERPL-MCNC: 6.7 MG/DL — HIGH (ref 0.2–1.2)
BUN SERPL-MCNC: 45 MG/DL — HIGH (ref 7–23)
CALCIUM SERPL-MCNC: 9.6 MG/DL — SIGNIFICANT CHANGE UP (ref 8.4–10.5)
CHLORIDE SERPL-SCNC: 98 MMOL/L — SIGNIFICANT CHANGE UP (ref 96–108)
CO2 SERPL-SCNC: 26 MMOL/L — SIGNIFICANT CHANGE UP (ref 22–31)
CREAT SERPL-MCNC: 1.26 MG/DL — SIGNIFICANT CHANGE UP (ref 0.5–1.3)
CULTURE RESULTS: SIGNIFICANT CHANGE UP
EGFR: 71 ML/MIN/1.73M2 — SIGNIFICANT CHANGE UP
EGFR: 71 ML/MIN/1.73M2 — SIGNIFICANT CHANGE UP
GLUCOSE BLDC GLUCOMTR-MCNC: 115 MG/DL — HIGH (ref 70–99)
GLUCOSE BLDC GLUCOMTR-MCNC: 163 MG/DL — HIGH (ref 70–99)
GLUCOSE BLDC GLUCOMTR-MCNC: 172 MG/DL — HIGH (ref 70–99)
GLUCOSE BLDC GLUCOMTR-MCNC: 174 MG/DL — HIGH (ref 70–99)
GLUCOSE SERPL-MCNC: 107 MG/DL — HIGH (ref 70–99)
HCT VFR BLD CALC: 23 % — LOW (ref 39–50)
HGB BLD-MCNC: 7.4 G/DL — LOW (ref 13–17)
INR BLD: 3.09 RATIO — HIGH (ref 0.85–1.16)
MAGNESIUM SERPL-MCNC: 1.7 MG/DL — SIGNIFICANT CHANGE UP (ref 1.6–2.6)
MCHC RBC-ENTMCNC: 31 PG — SIGNIFICANT CHANGE UP (ref 27–34)
MCHC RBC-ENTMCNC: 32.2 G/DL — SIGNIFICANT CHANGE UP (ref 32–36)
MCV RBC AUTO: 96.2 FL — SIGNIFICANT CHANGE UP (ref 80–100)
NRBC BLD AUTO-RTO: 0 /100 WBCS — SIGNIFICANT CHANGE UP (ref 0–0)
PHOSPHATE SERPL-MCNC: 3.4 MG/DL — SIGNIFICANT CHANGE UP (ref 2.5–4.5)
PLATELET # BLD AUTO: 87 K/UL — LOW (ref 150–400)
POTASSIUM SERPL-MCNC: 4.6 MMOL/L — SIGNIFICANT CHANGE UP (ref 3.5–5.3)
POTASSIUM SERPL-SCNC: 4.6 MMOL/L — SIGNIFICANT CHANGE UP (ref 3.5–5.3)
PROT SERPL-MCNC: 8.2 G/DL — SIGNIFICANT CHANGE UP (ref 6–8.3)
PROTHROM AB SERPL-ACNC: 35.1 SEC — HIGH (ref 9.9–13.4)
RBC # BLD: 2.39 M/UL — LOW (ref 4.2–5.8)
RBC # FLD: 20.7 % — HIGH (ref 10.3–14.5)
SODIUM SERPL-SCNC: 134 MMOL/L — LOW (ref 135–145)
SPECIMEN SOURCE: SIGNIFICANT CHANGE UP
WBC # BLD: 7.2 K/UL — SIGNIFICANT CHANGE UP (ref 3.8–10.5)
WBC # FLD AUTO: 7.2 K/UL — SIGNIFICANT CHANGE UP (ref 3.8–10.5)

## 2025-02-15 PROCEDURE — 99232 SBSQ HOSP IP/OBS MODERATE 35: CPT | Mod: GC

## 2025-02-15 PROCEDURE — 71045 X-RAY EXAM CHEST 1 VIEW: CPT | Mod: 26

## 2025-02-15 RX ORDER — CEFTRIAXONE 500 MG/1
2000 INJECTION, POWDER, FOR SOLUTION INTRAMUSCULAR; INTRAVENOUS EVERY 24 HOURS
Refills: 0 | Status: COMPLETED | OUTPATIENT
Start: 2025-02-16 | End: 2025-02-22

## 2025-02-15 RX ORDER — TRAMADOL HYDROCHLORIDE 50 MG/1
25 TABLET, FILM COATED ORAL EVERY 6 HOURS
Refills: 0 | Status: DISCONTINUED | OUTPATIENT
Start: 2025-02-15 | End: 2025-02-21

## 2025-02-15 RX ORDER — TRAMADOL HYDROCHLORIDE 50 MG/1
50 TABLET, FILM COATED ORAL ONCE
Refills: 0 | Status: DISCONTINUED | OUTPATIENT
Start: 2025-02-15 | End: 2025-02-15

## 2025-02-15 RX ORDER — TRAMADOL HYDROCHLORIDE 50 MG/1
50 TABLET, FILM COATED ORAL EVERY 6 HOURS
Refills: 0 | Status: DISCONTINUED | OUTPATIENT
Start: 2025-02-15 | End: 2025-02-22

## 2025-02-15 RX ADMIN — TRAMADOL HYDROCHLORIDE 50 MILLIGRAM(S): 50 TABLET, FILM COATED ORAL at 16:04

## 2025-02-15 RX ADMIN — Medication 100 MILLIGRAM(S): at 12:13

## 2025-02-15 RX ADMIN — Medication 500 MILLIGRAM(S): at 17:42

## 2025-02-15 RX ADMIN — TRAMADOL HYDROCHLORIDE 50 MILLIGRAM(S): 50 TABLET, FILM COATED ORAL at 02:58

## 2025-02-15 RX ADMIN — BUMETANIDE 132 MILLIGRAM(S): 1 TABLET ORAL at 12:11

## 2025-02-15 RX ADMIN — LACTULOSE 30 GRAM(S): 10 SOLUTION ORAL at 05:52

## 2025-02-15 RX ADMIN — LEVALBUTEROL HYDROCHLORIDE 0.63 MILLIGRAM(S): 1.25 SOLUTION RESPIRATORY (INHALATION) at 05:51

## 2025-02-15 RX ADMIN — BUMETANIDE 132 MILLIGRAM(S): 1 TABLET ORAL at 15:45

## 2025-02-15 RX ADMIN — TRAMADOL HYDROCHLORIDE 50 MILLIGRAM(S): 50 TABLET, FILM COATED ORAL at 01:58

## 2025-02-15 RX ADMIN — TRAMADOL HYDROCHLORIDE 50 MILLIGRAM(S): 50 TABLET, FILM COATED ORAL at 10:09

## 2025-02-15 RX ADMIN — Medication 500 MILLIGRAM(S): at 05:51

## 2025-02-15 RX ADMIN — Medication 150 MILLIGRAM(S): at 12:12

## 2025-02-15 RX ADMIN — MIDODRINE HYDROCHLORIDE 10 MILLIGRAM(S): 5 TABLET ORAL at 12:12

## 2025-02-15 RX ADMIN — FOLIC ACID 1 MILLIGRAM(S): 1 TABLET ORAL at 12:13

## 2025-02-15 RX ADMIN — Medication 1 TABLET(S): at 12:13

## 2025-02-15 RX ADMIN — TRAMADOL HYDROCHLORIDE 50 MILLIGRAM(S): 50 TABLET, FILM COATED ORAL at 22:26

## 2025-02-15 RX ADMIN — Medication 1 APPLICATION(S): at 05:54

## 2025-02-15 RX ADMIN — MIDODRINE HYDROCHLORIDE 10 MILLIGRAM(S): 5 TABLET ORAL at 01:07

## 2025-02-15 RX ADMIN — TRAMADOL HYDROCHLORIDE 50 MILLIGRAM(S): 50 TABLET, FILM COATED ORAL at 17:04

## 2025-02-15 RX ADMIN — Medication 40 MILLIGRAM(S): at 12:13

## 2025-02-15 RX ADMIN — Medication 1 DROP(S): at 17:42

## 2025-02-15 RX ADMIN — TRAMADOL HYDROCHLORIDE 50 MILLIGRAM(S): 50 TABLET, FILM COATED ORAL at 21:03

## 2025-02-15 RX ADMIN — TRAMADOL HYDROCHLORIDE 25 MILLIGRAM(S): 50 TABLET, FILM COATED ORAL at 12:17

## 2025-02-15 RX ADMIN — LEVALBUTEROL HYDROCHLORIDE 0.63 MILLIGRAM(S): 1.25 SOLUTION RESPIRATORY (INHALATION) at 12:12

## 2025-02-15 RX ADMIN — TRAMADOL HYDROCHLORIDE 50 MILLIGRAM(S): 50 TABLET, FILM COATED ORAL at 09:09

## 2025-02-15 RX ADMIN — INSULIN LISPRO 2: 100 INJECTION, SOLUTION INTRAVENOUS; SUBCUTANEOUS at 17:41

## 2025-02-15 RX ADMIN — Medication 800 MILLIGRAM(S): at 17:43

## 2025-02-15 RX ADMIN — TRAMADOL HYDROCHLORIDE 25 MILLIGRAM(S): 50 TABLET, FILM COATED ORAL at 13:17

## 2025-02-15 RX ADMIN — MIDODRINE HYDROCHLORIDE 10 MILLIGRAM(S): 5 TABLET ORAL at 17:43

## 2025-02-15 RX ADMIN — LEVALBUTEROL HYDROCHLORIDE 0.63 MILLIGRAM(S): 1.25 SOLUTION RESPIRATORY (INHALATION) at 17:42

## 2025-02-15 RX ADMIN — CEFTRIAXONE 100 MILLIGRAM(S): 500 INJECTION, POWDER, FOR SOLUTION INTRAMUSCULAR; INTRAVENOUS at 08:56

## 2025-02-15 RX ADMIN — INSULIN LISPRO 2: 100 INJECTION, SOLUTION INTRAVENOUS; SUBCUTANEOUS at 12:13

## 2025-02-15 RX ADMIN — Medication 800 MILLIGRAM(S): at 08:57

## 2025-02-15 RX ADMIN — Medication 1 DROP(S): at 05:52

## 2025-02-15 NOTE — PROGRESS NOTE ADULT - SUBJECTIVE AND OBJECTIVE BOX
Interval Events:   no acute events  patient is feeling well with more than 3L UOP  Afebrile, HR 90-100s, on RA breathing well  eating, moving his bowels and ambulating  MELD 29    Hospital Medications:  albuterol    90 MICROgram(s) HFA Inhaler 1 Puff(s) Inhalation every 12 hours PRN  artificial tears (preservative free) Ophthalmic Solution 1 Drop(s) Both EYES two times a day  buMETAnide IVPB 4 milliGRAM(s) IV Intermittent <User Schedule>  chlorhexidine 2% Cloths 1 Application(s) Topical <User Schedule>  folic acid 1 milliGRAM(s) Oral daily  influenza   Vaccine 0.5 milliLiter(s) IntraMuscular once  insulin lispro (ADMELOG) corrective regimen sliding scale   SubCutaneous three times a day before meals  insulin lispro (ADMELOG) corrective regimen sliding scale   SubCutaneous at bedtime  lactulose Syrup 30 Gram(s) Oral every 12 hours  levalbuterol Inhalation 0.63 milliGRAM(s) Inhalation every 6 hours  magnesium oxide 800 milliGRAM(s) Oral two times a day with meals  metroNIDAZOLE    Tablet 500 milliGRAM(s) Oral every 12 hours  midodrine 10 milliGRAM(s) Oral every 8 hours  multivitamin 1 Tablet(s) Oral daily  pantoprazole    Tablet 40 milliGRAM(s) Oral daily  rifAXIMin 550 milliGRAM(s) Oral every 12 hours  spironolactone 150 milliGRAM(s) Oral <User Schedule>  thiamine 100 milliGRAM(s) Oral daily  traMADol 25 milliGRAM(s) Oral every 6 hours PRN  traMADol 50 milliGRAM(s) Oral every 6 hours PRN    ROS: See above.    PHYSICAL EXAM:   Vital Signs:  Vital Signs Last 24 Hrs  T(C): 36.7 (15 Feb 2025 13:14), Max: 37.3 (14 Feb 2025 17:12)  T(F): 98.1 (15 Feb 2025 13:14), Max: 99.1 (14 Feb 2025 17:12)  HR: 96 (15 Feb 2025 13:14) (96 - 106)  BP: 117/82 (15 Feb 2025 13:14) (117/82 - 130/68)  BP(mean): --  RR: 18 (15 Feb 2025 13:14) (18 - 20)  SpO2: 96% (15 Feb 2025 13:14) (91% - 96%)    Parameters below as of 15 Feb 2025 13:14  Patient On (Oxygen Delivery Method): room air    GENERAL:  NAD, resting comfortably in bed  HEENT:  sclera icteric  RESPIRATORY:  Normal Effort  CARDIAC:  HDS  ABDOMEN:  Soft, non-tender, distended  SKIN:  Warm & Dry. jaundice  NEURO:  Alert, conversant, no focal deficit    LABS:                        7.4    7.20  )-----------( 87       ( 15 Feb 2025 07:01 )             23.0     Mean Cell Volume: 96.2 fl (02-15-25 @ 07:01)    02-15    134[L]  |  98  |  45[H]  ----------------------------<  107[H]  4.6   |  26  |  1.26    Ca    9.6      15 Feb 2025 07:02  Phos  3.4     02-15  Mg     1.7     02-15    TPro  8.2  /  Alb  3.2[L]  /  TBili  6.7[H]  /  DBili  x   /  AST  45[H]  /  ALT  8[L]  /  AlkPhos  126[H]  02-15    LIVER FUNCTIONS - ( 15 Feb 2025 07:02 )  Alb: 3.2 g/dL / Pro: 8.2 g/dL / ALK PHOS: 126 U/L / ALT: 8 U/L / AST: 45 U/L / GGT: x           PT/INR - ( 15 Feb 2025 07:02 )   PT: 35.1 sec;   INR: 3.09 ratio         PTT - ( 15 Feb 2025 07:02 )  PTT:51.2 sec

## 2025-02-15 NOTE — PROGRESS NOTE ADULT - ASSESSMENT
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 Actively drinking. Now p/w abdominal pain/distension with worsening of liver tests possibly triggered by infection. DDx also includes alcohol associated hepatitis given hx of alc hep 7/2024 which improved with steroids  - Listed for OLT. Re-certify today with MELD 29  - EV: EGD 7/2024 for melena showed grade II EV, small gastric ulcer (neg HP), and portal gastropathy   - Ascites: s/p para with 6.8 L fluid removed, increase bumex to 2 mg IV q12h, aldactone 150 mg daily  - HE: c/w lactulose and rifaximin, goal 3-4 BM/day  - HCC screening: MRI nondiagnostic, no clear lesion seen                             - C/w home midodrine 10 mg tid  - C/w PPI for stress ulcer ppx and hx of ulcer  - Trend MELD labs daily (cmp, INR) daily  - Strict I/Os, daily wts    #Empyema  CT chest noncontrast reviewed from OSH showing moderate sized complex R sided pleural effusion. s/p chest tube placed 1/18 with cultures growing Citrobacter c/w empyema. Requiring 2nd chest tube placement then now s/p VATS 1/28 for persistent effusion with 3rd chest tube placed. Now all chest tubes removed with persistent collection on CXR  - C/w Ceftriaxone 2g daily & Flagyl given persistent collection   - CTS evaluation for oozing from VATS site  - Trend cbc and fever curve  - Appreciate thoracic surgery and ID recs    #MANUELITO, improving  #Volume overload  Found to have acute renal failure requiring dialysis at OSH, ddx includes HRS vs ATN. Cr baseline 1.12 10/2024 required albumin assisted diuresis with bumex 2 mg IV q12h. Cr improving. Currently at goal diuresis  - Diuretics as above - Goal 2.5L removal/day  - Strict I/Os, daily wts  - Trend CMP daily    #Anemia  #Thrombocytopenia  Normocytic anemia with Hgb 8.5 and plt count 64 likely due to chronic liver dz  - s/p 4 units pRBC, 2 FFP, 2 plts on 1/28 and 1 unit on 2/10  - Hold DVT ppx for bleeding

## 2025-02-15 NOTE — PROGRESS NOTE ADULT - ATTENDING COMMENTS
Patient is a 44-year-old gentleman with a history of alcohol use disorder with last ROSA, peptic ulcer disease and decompensated alcohol associated liver cirrhosis HHT, hyponatremia and encephalopathy who was transferred from [facility] on 1/16/2022 for refractory ascites and recurrent right pleural effusion.  His hospitalization has been complicated by HRS MANUELITO requiring intermittent HD from 1/10-19 which is since improved and right-sided empyema with pleural fluid culture positive for citrobacter koseri and status post VATS on 1/28 with decortication complicated by hemothorax.  He was eventually extubated 1/30 and his last chest tube removed 2/6.  His most recent CT chest from 2/7 showed moderate right gas and liquid containing fluid collection that cannot be safely evacuated per CT surgery prior to liver transplantation.  He remains on ceftriaxone, Flagyl until transplant.    He is overall clinically stable.  Walking, eating and mentating well.  Will continue diuresis.  Now on Bumex 4 mg twice a day and spironolactone 150 mg daily.  Made 3 L of urine in the past 24 hours.  MELD score of 29 (recertified today).  Awaiting organ offer.    Marion Smith MD/MPH  Transplant hepatology

## 2025-02-16 LAB
ALBUMIN SERPL ELPH-MCNC: 3.2 G/DL — LOW (ref 3.3–5)
ALP SERPL-CCNC: 124 U/L — HIGH (ref 40–120)
ALT FLD-CCNC: 9 U/L — LOW (ref 10–45)
ANION GAP SERPL CALC-SCNC: 10 MMOL/L — SIGNIFICANT CHANGE UP (ref 5–17)
APTT BLD: 52.7 SEC — HIGH (ref 24.5–35.6)
AST SERPL-CCNC: 44 U/L — HIGH (ref 10–40)
BASOPHILS # BLD AUTO: 0.04 K/UL — SIGNIFICANT CHANGE UP (ref 0–0.2)
BASOPHILS NFR BLD AUTO: 0.6 % — SIGNIFICANT CHANGE UP (ref 0–2)
BILIRUB SERPL-MCNC: 6.4 MG/DL — HIGH (ref 0.2–1.2)
BUN SERPL-MCNC: 43 MG/DL — HIGH (ref 7–23)
CALCIUM SERPL-MCNC: 9.5 MG/DL — SIGNIFICANT CHANGE UP (ref 8.4–10.5)
CHLORIDE SERPL-SCNC: 98 MMOL/L — SIGNIFICANT CHANGE UP (ref 96–108)
CO2 SERPL-SCNC: 26 MMOL/L — SIGNIFICANT CHANGE UP (ref 22–31)
CREAT SERPL-MCNC: 1.32 MG/DL — HIGH (ref 0.5–1.3)
EGFR: 67 ML/MIN/1.73M2 — SIGNIFICANT CHANGE UP
EGFR: 67 ML/MIN/1.73M2 — SIGNIFICANT CHANGE UP
EOSINOPHIL # BLD AUTO: 0.45 K/UL — SIGNIFICANT CHANGE UP (ref 0–0.5)
EOSINOPHIL NFR BLD AUTO: 6.2 % — HIGH (ref 0–6)
FIBRINOGEN PPP-MCNC: 137 MG/DL — LOW (ref 200–445)
FLUAV AG NPH QL: SIGNIFICANT CHANGE UP
FLUBV AG NPH QL: SIGNIFICANT CHANGE UP
GGT SERPL-CCNC: 15 U/L — SIGNIFICANT CHANGE UP (ref 9–50)
GLUCOSE BLDC GLUCOMTR-MCNC: 119 MG/DL — HIGH (ref 70–99)
GLUCOSE BLDC GLUCOMTR-MCNC: 142 MG/DL — HIGH (ref 70–99)
GLUCOSE BLDC GLUCOMTR-MCNC: 157 MG/DL — HIGH (ref 70–99)
GLUCOSE BLDC GLUCOMTR-MCNC: 173 MG/DL — HIGH (ref 70–99)
GLUCOSE SERPL-MCNC: 162 MG/DL — HIGH (ref 70–99)
HBV SURFACE AG SER-ACNC: SIGNIFICANT CHANGE UP
HCT VFR BLD CALC: 23.4 % — LOW (ref 39–50)
HCV AB S/CO SERPL IA: 0.07 S/CO — SIGNIFICANT CHANGE UP
HCV AB SERPL-IMP: SIGNIFICANT CHANGE UP
HGB BLD-MCNC: 7.6 G/DL — LOW (ref 13–17)
HIV 1+2 AB+HIV1 P24 AG SERPL QL IA: SIGNIFICANT CHANGE UP
IMM GRANULOCYTES NFR BLD AUTO: 0.4 % — SIGNIFICANT CHANGE UP (ref 0–0.9)
INR BLD: 3.17 RATIO — HIGH (ref 0.85–1.16)
LYMPHOCYTES # BLD AUTO: 0.68 K/UL — LOW (ref 1–3.3)
LYMPHOCYTES # BLD AUTO: 9.4 % — LOW (ref 13–44)
MAGNESIUM SERPL-MCNC: 1.4 MG/DL — LOW (ref 1.6–2.6)
MCHC RBC-ENTMCNC: 31.9 PG — SIGNIFICANT CHANGE UP (ref 27–34)
MCHC RBC-ENTMCNC: 32.5 G/DL — SIGNIFICANT CHANGE UP (ref 32–36)
MCV RBC AUTO: 98.3 FL — SIGNIFICANT CHANGE UP (ref 80–100)
MONOCYTES # BLD AUTO: 0.93 K/UL — HIGH (ref 0–0.9)
MONOCYTES NFR BLD AUTO: 12.9 % — SIGNIFICANT CHANGE UP (ref 2–14)
NEUTROPHILS # BLD AUTO: 5.08 K/UL — SIGNIFICANT CHANGE UP (ref 1.8–7.4)
NEUTROPHILS NFR BLD AUTO: 70.5 % — SIGNIFICANT CHANGE UP (ref 43–77)
NRBC BLD AUTO-RTO: 0 /100 WBCS — SIGNIFICANT CHANGE UP (ref 0–0)
PHOSPHATE SERPL-MCNC: 3.1 MG/DL — SIGNIFICANT CHANGE UP (ref 2.5–4.5)
PLATELET # BLD AUTO: 83 K/UL — LOW (ref 150–400)
POTASSIUM SERPL-MCNC: 4.8 MMOL/L — SIGNIFICANT CHANGE UP (ref 3.5–5.3)
POTASSIUM SERPL-SCNC: 4.8 MMOL/L — SIGNIFICANT CHANGE UP (ref 3.5–5.3)
PROT SERPL-MCNC: 8.3 G/DL — SIGNIFICANT CHANGE UP (ref 6–8.3)
PROTHROM AB SERPL-ACNC: 36 SEC — HIGH (ref 9.9–13.4)
RAPIDTEG MAXIMUM AMPLITUDE: 48.5 MM — LOW (ref 52–70)
RBC # BLD: 2.38 M/UL — LOW (ref 4.2–5.8)
RBC # FLD: 21.2 % — HIGH (ref 10.3–14.5)
RSV RNA NPH QL NAA+NON-PROBE: SIGNIFICANT CHANGE UP
SARS-COV-2 RNA SPEC QL NAA+PROBE: SIGNIFICANT CHANGE UP
SODIUM SERPL-SCNC: 134 MMOL/L — LOW (ref 135–145)
TEG FUNCTIONAL FIBRINOGEN: 12.6 MM — LOW (ref 15–32)
TEG LY30 (LYSIS): 1.5 % — SIGNIFICANT CHANGE UP (ref 0–2.6)
TEG REACTION TIME: 8.1 MIN — SIGNIFICANT CHANGE UP (ref 4.6–9.1)
WBC # BLD: 7.21 K/UL — SIGNIFICANT CHANGE UP (ref 3.8–10.5)
WBC # FLD AUTO: 7.21 K/UL — SIGNIFICANT CHANGE UP (ref 3.8–10.5)

## 2025-02-16 PROCEDURE — 99232 SBSQ HOSP IP/OBS MODERATE 35: CPT | Mod: GC

## 2025-02-16 RX ORDER — CEFTRIAXONE 500 MG/1
1000 INJECTION, POWDER, FOR SOLUTION INTRAMUSCULAR; INTRAVENOUS ONCE
Refills: 0 | Status: COMPLETED | OUTPATIENT
Start: 2025-02-16 | End: 2025-02-21

## 2025-02-16 RX ORDER — LACTULOSE 10 G/15ML
30 SOLUTION ORAL EVERY 12 HOURS
Refills: 0 | Status: DISCONTINUED | OUTPATIENT
Start: 2025-02-16 | End: 2025-02-19

## 2025-02-16 RX ORDER — OXYCODONE HYDROCHLORIDE 30 MG/1
5 TABLET ORAL ONCE
Refills: 0 | Status: DISCONTINUED | OUTPATIENT
Start: 2025-02-16 | End: 2025-02-16

## 2025-02-16 RX ORDER — METRONIDAZOLE 250 MG
500 TABLET ORAL ONCE
Refills: 0 | Status: COMPLETED | OUTPATIENT
Start: 2025-02-16 | End: 2025-02-16

## 2025-02-16 RX ADMIN — Medication 500 MILLIGRAM(S): at 17:59

## 2025-02-16 RX ADMIN — Medication 1 DROP(S): at 05:21

## 2025-02-16 RX ADMIN — LEVALBUTEROL HYDROCHLORIDE 0.63 MILLIGRAM(S): 1.25 SOLUTION RESPIRATORY (INHALATION) at 17:59

## 2025-02-16 RX ADMIN — Medication 40 MILLIGRAM(S): at 13:10

## 2025-02-16 RX ADMIN — TRAMADOL HYDROCHLORIDE 50 MILLIGRAM(S): 50 TABLET, FILM COATED ORAL at 13:09

## 2025-02-16 RX ADMIN — LACTULOSE 30 GRAM(S): 10 SOLUTION ORAL at 05:21

## 2025-02-16 RX ADMIN — TRAMADOL HYDROCHLORIDE 25 MILLIGRAM(S): 50 TABLET, FILM COATED ORAL at 18:03

## 2025-02-16 RX ADMIN — BUMETANIDE 132 MILLIGRAM(S): 1 TABLET ORAL at 10:07

## 2025-02-16 RX ADMIN — Medication 100 MILLIGRAM(S): at 13:10

## 2025-02-16 RX ADMIN — LEVALBUTEROL HYDROCHLORIDE 0.63 MILLIGRAM(S): 1.25 SOLUTION RESPIRATORY (INHALATION) at 13:09

## 2025-02-16 RX ADMIN — FOLIC ACID 1 MILLIGRAM(S): 1 TABLET ORAL at 13:10

## 2025-02-16 RX ADMIN — Medication 150 MILLIGRAM(S): at 10:08

## 2025-02-16 RX ADMIN — MIDODRINE HYDROCHLORIDE 10 MILLIGRAM(S): 5 TABLET ORAL at 10:08

## 2025-02-16 RX ADMIN — Medication 1 DROP(S): at 18:03

## 2025-02-16 RX ADMIN — Medication 1 TABLET(S): at 13:10

## 2025-02-16 RX ADMIN — Medication 800 MILLIGRAM(S): at 10:08

## 2025-02-16 RX ADMIN — INSULIN LISPRO 2: 100 INJECTION, SOLUTION INTRAVENOUS; SUBCUTANEOUS at 13:09

## 2025-02-16 RX ADMIN — Medication 500 MILLIGRAM(S): at 05:20

## 2025-02-16 RX ADMIN — Medication 800 MILLIGRAM(S): at 16:37

## 2025-02-16 RX ADMIN — TRAMADOL HYDROCHLORIDE 50 MILLIGRAM(S): 50 TABLET, FILM COATED ORAL at 20:08

## 2025-02-16 RX ADMIN — TRAMADOL HYDROCHLORIDE 50 MILLIGRAM(S): 50 TABLET, FILM COATED ORAL at 04:20

## 2025-02-16 RX ADMIN — TRAMADOL HYDROCHLORIDE 50 MILLIGRAM(S): 50 TABLET, FILM COATED ORAL at 21:00

## 2025-02-16 RX ADMIN — MIDODRINE HYDROCHLORIDE 10 MILLIGRAM(S): 5 TABLET ORAL at 18:03

## 2025-02-16 RX ADMIN — OXYCODONE HYDROCHLORIDE 5 MILLIGRAM(S): 30 TABLET ORAL at 23:36

## 2025-02-16 RX ADMIN — TRAMADOL HYDROCHLORIDE 50 MILLIGRAM(S): 50 TABLET, FILM COATED ORAL at 07:40

## 2025-02-16 RX ADMIN — LEVALBUTEROL HYDROCHLORIDE 0.63 MILLIGRAM(S): 1.25 SOLUTION RESPIRATORY (INHALATION) at 05:21

## 2025-02-16 RX ADMIN — BUMETANIDE 132 MILLIGRAM(S): 1 TABLET ORAL at 16:37

## 2025-02-16 RX ADMIN — CEFTRIAXONE 100 MILLIGRAM(S): 500 INJECTION, POWDER, FOR SOLUTION INTRAMUSCULAR; INTRAVENOUS at 02:21

## 2025-02-16 RX ADMIN — TRAMADOL HYDROCHLORIDE 50 MILLIGRAM(S): 50 TABLET, FILM COATED ORAL at 14:09

## 2025-02-16 RX ADMIN — MIDODRINE HYDROCHLORIDE 10 MILLIGRAM(S): 5 TABLET ORAL at 02:21

## 2025-02-16 RX ADMIN — INSULIN LISPRO 2: 100 INJECTION, SOLUTION INTRAVENOUS; SUBCUTANEOUS at 16:46

## 2025-02-16 NOTE — PROGRESS NOTE ADULT - ASSESSMENT
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 Actively drinking. Now p/w abdominal pain/distension with worsening of liver tests possibly triggered by infection. DDx also includes alcohol associated hepatitis given hx of alc hep 7/2024 which improved with steroids  - Listed for OLT. Re-certify today with MELD 30  - Await donor offers  - EV: EGD 7/2024 for melena showed grade II EV, small gastric ulcer (neg HP), and portal gastropathy   - Ascites: s/p para with 6.8 L fluid removed, bumex 4 mg IV q12h, aldactone 150 mg daily  - HE: c/w lactulose and rifaximin, goal 3-4 BM/day  - HCC screening: MRI nondiagnostic, no clear lesion seen                             - C/w home midodrine 10 mg tid  - C/w PPI for stress ulcer ppx and hx of ulcer  - Trend MELD labs daily (cmp, INR) daily  - Strict I/Os, daily wts    #Empyema  CT chest noncontrast reviewed from OSH showing moderate sized complex R sided pleural effusion. s/p chest tube placed 1/18 with cultures growing Citrobacter c/w empyema. Requiring 2nd chest tube placement then now s/p VATS 1/28 for persistent effusion with 3rd chest tube placed. Now all chest tubes removed with persistent collection on CXR  - C/w Ceftriaxone 2g daily & Flagyl given persistent collection   - CTS evaluation for oozing from VATS site  - Trend cbc and fever curve  - Appreciate thoracic surgery and ID recs    #MANUELITO, improving  #Volume overload  Found to have acute renal failure requiring dialysis at OSH, ddx includes HRS vs ATN. Cr baseline 1.12 10/2024 required albumin assisted diuresis with bumex 2 mg IV q12h. Cr improving. Currently at goal diuresis  - Diuretics as above - Goal 2.5L removal/day  - Strict I/Os, daily wts  - Trend CMP daily    #Anemia  #Thrombocytopenia  Normocytic anemia with Hgb 8.5 and plt count 64 likely due to chronic liver dz  - s/p 4 units pRBC, 2 FFP, 2 plts on 1/28 and 1 unit on 2/10  - Hold DVT ppx for bleeding

## 2025-02-16 NOTE — PROGRESS NOTE ADULT - SUBJECTIVE AND OBJECTIVE BOX
Interval Events:   no acute events  patient feels well, experiencing lower back pain likely MSK, back exam benign  anasarca on exam  AVSS, on RA  2.9L UOP, BM x1  MELD 30    Hospital Medications:  albuterol    90 MICROgram(s) HFA Inhaler 1 Puff(s) Inhalation every 12 hours PRN  artificial tears (preservative free) Ophthalmic Solution 1 Drop(s) Both EYES two times a day  buMETAnide IVPB 4 milliGRAM(s) IV Intermittent <User Schedule>  cefTRIAXone   IVPB 2000 milliGRAM(s) IV Intermittent every 24 hours  cefTRIAXone   IVPB 1000 milliGRAM(s) IV Intermittent once  chlorhexidine 2% Cloths 1 Application(s) Topical <User Schedule>  folic acid 1 milliGRAM(s) Oral daily  influenza   Vaccine 0.5 milliLiter(s) IntraMuscular once  insulin lispro (ADMELOG) corrective regimen sliding scale   SubCutaneous three times a day before meals  insulin lispro (ADMELOG) corrective regimen sliding scale   SubCutaneous at bedtime  lactulose Syrup 30 Gram(s) Oral every 12 hours  levalbuterol Inhalation 0.63 milliGRAM(s) Inhalation every 6 hours  magnesium oxide 800 milliGRAM(s) Oral two times a day with meals  metroNIDAZOLE    Tablet 500 milliGRAM(s) Oral every 12 hours  midodrine 10 milliGRAM(s) Oral every 8 hours  multivitamin 1 Tablet(s) Oral daily  pantoprazole    Tablet 40 milliGRAM(s) Oral daily  rifAXIMin 550 milliGRAM(s) Oral every 12 hours  spironolactone 150 milliGRAM(s) Oral <User Schedule>  thiamine 100 milliGRAM(s) Oral daily  traMADol 50 milliGRAM(s) Oral every 6 hours PRN  traMADol 25 milliGRAM(s) Oral every 6 hours PRN      ROS: See above.    PHYSICAL EXAM:   Vital Signs:  Vital Signs Last 24 Hrs  T(C): 36.4 (16 Feb 2025 09:00), Max: 37.3 (15 Feb 2025 21:00)  T(F): 97.6 (16 Feb 2025 09:00), Max: 99.2 (15 Feb 2025 21:00)  HR: 109 (16 Feb 2025 09:00) (60 - 109)  BP: 120/62 (16 Feb 2025 09:00) (113/60 - 125/72)  BP(mean): --  RR: 18 (16 Feb 2025 09:00) (18 - 18)  SpO2: 98% (16 Feb 2025 09:00) (94% - 99%)    Parameters below as of 16 Feb 2025 09:00  Patient On (Oxygen Delivery Method): room air        GENERAL:  NAD, resting comfortably in bed, anasarca  HEENT:  sclera icteric  RESPIRATORY:  Normal Effort  CARDIAC:  HDS  BACK: No midline ttp, no CVA ttp  ABDOMEN:  Soft, non-tender, non-distended  SKIN:  Warm & Dry. jaundice  NEURO:  Alert, conversant, no focal deficit    LABS:                        7.6    7.21  )-----------( 83       ( 16 Feb 2025 00:39 )             23.4     Mean Cell Volume: 98.3 fl (02-16-25 @ 00:39)    02-16    134[L]  |  98  |  43[H]  ----------------------------<  162[H]  4.8   |  26  |  1.32[H]    Ca    9.5      16 Feb 2025 00:42  Phos  3.1     02-16  Mg     1.4     02-16    TPro  8.3  /  Alb  3.2[L]  /  TBili  6.4[H]  /  DBili  x   /  AST  44[H]  /  ALT  9[L]  /  AlkPhos  124[H]  02-16    LIVER FUNCTIONS - ( 16 Feb 2025 00:42 )  Alb: 3.2 g/dL / Pro: 8.3 g/dL / ALK PHOS: 124 U/L / ALT: 9 U/L / AST: 44 U/L / GGT: 15 U/L       PT/INR - ( 16 Feb 2025 00:42 )   PT: 36.0 sec;   INR: 3.17 ratio         PTT - ( 16 Feb 2025 00:42 )  PTT:52.7 sec

## 2025-02-16 NOTE — PROGRESS NOTE ADULT - ATTENDING COMMENTS
Patient is a 44-year-old gentleman with a history of alcohol use disorder with last ROSA, peptic ulcer disease and decompensated alcohol associated liver cirrhosis HHT, hyponatremia and encephalopathy who was transferred from [facility] on 1/16/2022 for refractory ascites and recurrent right pleural effusion.  His hospitalization has been complicated by HRS MANUELITO requiring intermittent HD from 1/10-19 which is since improved and right-sided empyema with pleural fluid culture positive for citrobacter koseri and status post VATS on 1/28 with decortication complicated by hemothorax.  He was eventually extubated 1/30 and his last chest tube removed 2/6.  His most recent CT chest from 2/7 showed moderate right gas and liquid containing fluid collection that cannot be safely evacuated per CT surgery prior to liver transplantation.  He remains on ceftriaxone, Flagyl until transplant.    He is overall clinically stable.  Walking, eating and mentating well.  Will continue diuresis.  Now on Bumex 4 mg twice a day and spironolactone 150 mg daily.  Made ~3 L of urine in the past 24 hours.  MELD score of 30 (recertified today).  Awaiting organ offer.  Discussed plan of care with pt, his mother and his sister in law who is a critical care attending.    Marion Smith MD/MPH  Transplant hepatology .

## 2025-02-17 LAB
ALBUMIN SERPL ELPH-MCNC: 3.1 G/DL — LOW (ref 3.3–5)
ALP SERPL-CCNC: 110 U/L — SIGNIFICANT CHANGE UP (ref 40–120)
ALT FLD-CCNC: 8 U/L — LOW (ref 10–45)
ANION GAP SERPL CALC-SCNC: 11 MMOL/L — SIGNIFICANT CHANGE UP (ref 5–17)
APTT BLD: 54 SEC — HIGH (ref 24.5–35.6)
AST SERPL-CCNC: 41 U/L — HIGH (ref 10–40)
BILIRUB SERPL-MCNC: 6.5 MG/DL — HIGH (ref 0.2–1.2)
BLD GP AB SCN SERPL QL: NEGATIVE — SIGNIFICANT CHANGE UP
BUN SERPL-MCNC: 43 MG/DL — HIGH (ref 7–23)
CALCIUM SERPL-MCNC: 9.3 MG/DL — SIGNIFICANT CHANGE UP (ref 8.4–10.5)
CHLORIDE SERPL-SCNC: 99 MMOL/L — SIGNIFICANT CHANGE UP (ref 96–108)
CMV IGG FLD QL: >10 U/ML — HIGH
CMV IGG SERPL-IMP: POSITIVE
CO2 SERPL-SCNC: 26 MMOL/L — SIGNIFICANT CHANGE UP (ref 22–31)
CREAT SERPL-MCNC: 1.22 MG/DL — SIGNIFICANT CHANGE UP (ref 0.5–1.3)
EBV EA AB SER IA-ACNC: 12.1 U/ML — HIGH
EBV EA AB TITR SER IF: POSITIVE
EBV EA IGG SER-ACNC: POSITIVE
EBV NA IGG SER IA-ACNC: >600 U/ML — HIGH
EBV PATRN SPEC IB-IMP: SIGNIFICANT CHANGE UP
EBV VCA IGG AVIDITY SER QL IA: POSITIVE
EBV VCA IGM SER IA-ACNC: 29.5 U/ML — SIGNIFICANT CHANGE UP
EBV VCA IGM SER IA-ACNC: >750 U/ML — HIGH
EBV VCA IGM TITR FLD: NEGATIVE — SIGNIFICANT CHANGE UP
EGFR: 74 ML/MIN/1.73M2 — SIGNIFICANT CHANGE UP
EGFR: 74 ML/MIN/1.73M2 — SIGNIFICANT CHANGE UP
GLUCOSE BLDC GLUCOMTR-MCNC: 140 MG/DL — HIGH (ref 70–99)
GLUCOSE BLDC GLUCOMTR-MCNC: 145 MG/DL — HIGH (ref 70–99)
GLUCOSE BLDC GLUCOMTR-MCNC: 152 MG/DL — HIGH (ref 70–99)
GLUCOSE BLDC GLUCOMTR-MCNC: 159 MG/DL — HIGH (ref 70–99)
GLUCOSE SERPL-MCNC: 98 MG/DL — SIGNIFICANT CHANGE UP (ref 70–99)
HBV CORE AB SER-ACNC: SIGNIFICANT CHANGE UP
HBV SURFACE AB SER-ACNC: 19.2 MIU/ML — SIGNIFICANT CHANGE UP
HCT VFR BLD CALC: 23.1 % — LOW (ref 39–50)
HGB BLD-MCNC: 7.5 G/DL — LOW (ref 13–17)
INR BLD: 3.36 RATIO — HIGH (ref 0.85–1.16)
MAGNESIUM SERPL-MCNC: 1.2 MG/DL — LOW (ref 1.6–2.6)
MCHC RBC-ENTMCNC: 31.8 PG — SIGNIFICANT CHANGE UP (ref 27–34)
MCHC RBC-ENTMCNC: 32.5 G/DL — SIGNIFICANT CHANGE UP (ref 32–36)
MCV RBC AUTO: 97.9 FL — SIGNIFICANT CHANGE UP (ref 80–100)
NRBC BLD AUTO-RTO: 0 /100 WBCS — SIGNIFICANT CHANGE UP (ref 0–0)
PHOSPHATE SERPL-MCNC: 3.6 MG/DL — SIGNIFICANT CHANGE UP (ref 2.5–4.5)
PLATELET # BLD AUTO: 76 K/UL — LOW (ref 150–400)
POTASSIUM SERPL-MCNC: 4.7 MMOL/L — SIGNIFICANT CHANGE UP (ref 3.5–5.3)
POTASSIUM SERPL-SCNC: 4.7 MMOL/L — SIGNIFICANT CHANGE UP (ref 3.5–5.3)
PROT SERPL-MCNC: 7.9 G/DL — SIGNIFICANT CHANGE UP (ref 6–8.3)
PROTHROM AB SERPL-ACNC: 38.2 SEC — HIGH (ref 9.9–13.4)
RBC # BLD: 2.36 M/UL — LOW (ref 4.2–5.8)
RBC # FLD: 21 % — HIGH (ref 10.3–14.5)
RH IG SCN BLD-IMP: POSITIVE — SIGNIFICANT CHANGE UP
SODIUM SERPL-SCNC: 136 MMOL/L — SIGNIFICANT CHANGE UP (ref 135–145)
VZV IGG FLD QL IA: 20.4 S/CO — SIGNIFICANT CHANGE UP
VZV IGG FLD QL IA: POSITIVE — SIGNIFICANT CHANGE UP
WBC # BLD: 5.77 K/UL — SIGNIFICANT CHANGE UP (ref 3.8–10.5)
WBC # FLD AUTO: 5.77 K/UL — SIGNIFICANT CHANGE UP (ref 3.8–10.5)

## 2025-02-17 PROCEDURE — 99232 SBSQ HOSP IP/OBS MODERATE 35: CPT | Mod: GC

## 2025-02-17 RX ORDER — BUMETANIDE 1 MG/1
4 TABLET ORAL ONCE
Refills: 0 | Status: COMPLETED | OUTPATIENT
Start: 2025-02-17 | End: 2025-02-17

## 2025-02-17 RX ORDER — MAGNESIUM SULFATE 500 MG/ML
2 SYRINGE (ML) INJECTION ONCE
Refills: 0 | Status: COMPLETED | OUTPATIENT
Start: 2025-02-17 | End: 2025-02-17

## 2025-02-17 RX ORDER — IPRATROPIUM BROMIDE AND ALBUTEROL SULFATE .5; 2.5 MG/3ML; MG/3ML
3 SOLUTION RESPIRATORY (INHALATION) ONCE
Refills: 0 | Status: COMPLETED | OUTPATIENT
Start: 2025-02-17 | End: 2025-02-19

## 2025-02-17 RX ADMIN — MIDODRINE HYDROCHLORIDE 10 MILLIGRAM(S): 5 TABLET ORAL at 01:03

## 2025-02-17 RX ADMIN — Medication 1 APPLICATION(S): at 08:58

## 2025-02-17 RX ADMIN — OXYCODONE HYDROCHLORIDE 5 MILLIGRAM(S): 30 TABLET ORAL at 00:30

## 2025-02-17 RX ADMIN — BUMETANIDE 132 MILLIGRAM(S): 1 TABLET ORAL at 14:41

## 2025-02-17 RX ADMIN — BUMETANIDE 132 MILLIGRAM(S): 1 TABLET ORAL at 17:24

## 2025-02-17 RX ADMIN — INSULIN LISPRO 2: 100 INJECTION, SOLUTION INTRAVENOUS; SUBCUTANEOUS at 17:25

## 2025-02-17 RX ADMIN — Medication 800 MILLIGRAM(S): at 17:25

## 2025-02-17 RX ADMIN — Medication 100 MILLIGRAM(S): at 13:09

## 2025-02-17 RX ADMIN — Medication 1 DROP(S): at 17:25

## 2025-02-17 RX ADMIN — TRAMADOL HYDROCHLORIDE 50 MILLIGRAM(S): 50 TABLET, FILM COATED ORAL at 15:43

## 2025-02-17 RX ADMIN — Medication 25 GRAM(S): at 14:48

## 2025-02-17 RX ADMIN — BUMETANIDE 132 MILLIGRAM(S): 1 TABLET ORAL at 10:57

## 2025-02-17 RX ADMIN — Medication 1 DROP(S): at 05:22

## 2025-02-17 RX ADMIN — Medication 500 MILLIGRAM(S): at 05:19

## 2025-02-17 RX ADMIN — CEFTRIAXONE 100 MILLIGRAM(S): 500 INJECTION, POWDER, FOR SOLUTION INTRAMUSCULAR; INTRAVENOUS at 01:03

## 2025-02-17 RX ADMIN — TRAMADOL HYDROCHLORIDE 50 MILLIGRAM(S): 50 TABLET, FILM COATED ORAL at 14:43

## 2025-02-17 RX ADMIN — Medication 1 TABLET(S): at 13:09

## 2025-02-17 RX ADMIN — FOLIC ACID 1 MILLIGRAM(S): 1 TABLET ORAL at 13:08

## 2025-02-17 RX ADMIN — TRAMADOL HYDROCHLORIDE 25 MILLIGRAM(S): 50 TABLET, FILM COATED ORAL at 17:31

## 2025-02-17 RX ADMIN — LACTULOSE 30 GRAM(S): 10 SOLUTION ORAL at 09:34

## 2025-02-17 RX ADMIN — MIDODRINE HYDROCHLORIDE 10 MILLIGRAM(S): 5 TABLET ORAL at 09:34

## 2025-02-17 RX ADMIN — Medication 500 MILLIGRAM(S): at 17:26

## 2025-02-17 RX ADMIN — Medication 40 MILLIGRAM(S): at 13:09

## 2025-02-17 RX ADMIN — LEVALBUTEROL HYDROCHLORIDE 0.63 MILLIGRAM(S): 1.25 SOLUTION RESPIRATORY (INHALATION) at 17:37

## 2025-02-17 RX ADMIN — Medication 150 MILLIGRAM(S): at 10:58

## 2025-02-17 RX ADMIN — INSULIN LISPRO 2: 100 INJECTION, SOLUTION INTRAVENOUS; SUBCUTANEOUS at 13:08

## 2025-02-17 RX ADMIN — MIDODRINE HYDROCHLORIDE 10 MILLIGRAM(S): 5 TABLET ORAL at 17:25

## 2025-02-17 RX ADMIN — TRAMADOL HYDROCHLORIDE 50 MILLIGRAM(S): 50 TABLET, FILM COATED ORAL at 21:55

## 2025-02-17 RX ADMIN — Medication 800 MILLIGRAM(S): at 08:57

## 2025-02-17 NOTE — PROGRESS NOTE ADULT - ASSESSMENT
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 Actively drinking. Now p/w abdominal pain/distension with worsening of liver tests possibly triggered by infection. DDx also includes alcohol associated hepatitis given hx of alc hep 7/2024 which improved with steroids  - Listed for OLT as noted above and currently awiating donor offers  - EV: EGD 7/2024 for melena showed grade II EV, small gastric ulcer (neg HP), and portal gastropathy   - Ascites: s/p para with 6.8 L fluid removed. Urine output decreased to -1L/24 hours. Will increase Bumex to 4 mg IV  q8h and c/w aldactone 150 mg daily  - HE: c/w lactulose and rifaximin, goal 3-4 BM/day. Will avoid any further sedating medications (mild confusion after a dose of oxy o/n)  - HCC screening: MRI nondiagnostic, no clear lesion seen                             - C/w home midodrine 10 mg tid  - C/w PPI for stress ulcer ppx and hx of ulcer  - Trend MELD labs daily (cmp, INR) daily  - Strict I/Os, daily wts    #Empyema  CT chest noncontrast reviewed from OSH showing moderate sized complex R sided pleural effusion. s/p chest tube placed 1/18 with cultures growing Citrobacter c/w empyema. Requiring 2nd chest tube placement then now s/p VATS 1/28 for persistent effusion with 3rd chest tube placed. Now all chest tubes removed with persistent collection on CXR  - C/w Ceftriaxone 2g daily & Flagyl given persistent collection   - CTS evaluation for oozing from VATS site  - Trend cbc and fever curve  - Appreciate thoracic surgery and ID recs    #MANUELITO, improving  #Volume overload  Found to have acute renal failure requiring dialysis at OSH, ddx includes HRS vs ATN. Cr baseline 1.12 10/2024 required albumin assisted diuresis with bumex 2 mg IV q12h. Cr improving with plan to continue diuresis as noted above.   - Diuretics as above - Goal 2.5L removal/day  - Strict I/Os, daily wts  - Trend CMP daily    #Anemia  #Thrombocytopenia  Normocytic anemia with Hgb 8.5 and plt count 64 likely due to chronic liver dz  - s/p 4 units pRBC, 2 FFP, 2 plts on 1/28 and 1 unit on 2/10  - Hold DVT ppx for bleeding    All recommendations are tentative until note is attested by attending.

## 2025-02-17 NOTE — PROGRESS NOTE ADULT - ATTENDING COMMENTS
Patient is a 44-year-old gentleman with a history of alcohol use disorder with last ROSA, peptic ulcer disease and alcohol associated liver cirrhosis  c/b Ascites, hyponatremia and encephalopathy who was transferred from [facility] on 1/16/2022 for refractory ascites and recurrent right pleural effusion.  His hospitalization has been complicated by HRS MANUELITO requiring intermittent HD from 1/10-19 which has since improved and right-sided empyema with pleural fluid culture positive for citrobacter koseri (difficult to control) and as such is status post VATS on 1/28 with decortication complicated by hemothorax.  He was eventually extubated 1/30 and his last chest tube removed 2/6.  His most recent CT chest from 2/7 showed moderate right gas and liquid containing fluid collection that cannot be safely evacuated per CT surgery prior to liver transplantation.  He remains on ceftriaxone, Flagyl until transplant.    He is overall clinically stable.  Walking, eating and mentating well.  Will continue diuresis.  Now on Bumex 4 mg twice a day and spironolactone 150 mg daily.  Made ~3 L of urine in the past 24 hours.  MELD score of 30 (recertified today).  Awaiting organ offer.  Discussed plan of care with pt, his mother and his sister in law who is a critical care attending.    Marion Smith MD/MPH  Transplant hepatology . Patient is a 44-year-old gentleman with a history of alcohol use disorder with last ROSA, peptic ulcer disease and alcohol associated liver cirrhosis  c/b Ascites, hyponatremia and encephalopathy who was transferred from [facility] on 1/16/2022 for refractory ascites and recurrent right pleural effusion.  His hospitalization has been complicated by HRS MANUELITO requiring intermittent HD from 1/10-19 which has since improved and right-sided empyema with pleural fluid culture positive for citrobacter koseri (difficult to control) and as such is status post VATS on 1/28 with decortication complicated by hemothorax.  He was eventually extubated 1/30 and his last chest tube removed 2/6.  His most recent CT chest from 2/7 showed moderate right gas and liquid containing fluid collection that cannot be safely evacuated per CT surgery prior to liver transplantation.  He remains on ceftriaxone, Flagyl until transplant.    He is overall clinically stable.  Walking, eating and mentating well.  Will continue diuresis.  Now on Bumex 4 mg twice a day and spironolactone 150 mg daily.  Urine output is decent but will challenge with an extra dose of 4mg Bumex for extra diuresis.   Recertified at a MELD score of 30 (but native MELD today is 29).  Awaiting organ offer.  Lactulose & rifaximin for a goal of 2-3 BMs daily.  Avoid opiates as he does not appear to respond well to it mentally - mild hallucinations.  Will consider repeat paracentesis if needed for symptomatic relief. He does not think he is ready at this time.  Discussed plan of care with pt, his mother and his sister in law who is a critical care attending.      Marion Smith MD/MPH  Transplant hepatology .

## 2025-02-17 NOTE — PROGRESS NOTE ADULT - SUBJECTIVE AND OBJECTIVE BOX
Hepatology Progress Note    Interval Events:   -MELD 30O on  with pt recertified  -Pt got one dose of Oxycodone yesterday for pain and felt confused but now currently back to baseline   -Urine Output: -1l/24 hours on Bumex BID with pt noting dec pedal edema  -Feeling a little short of breath but otherwise without any ongoing fevers, chills, chest pain or abd pain      Allergies:  sulfa drugs (Unknown)  Salicylic Acid (Other)      Hospital Medications:  albuterol    90 MICROgram(s) HFA Inhaler 1 Puff(s) Inhalation every 12 hours PRN  albuterol/ipratropium for Nebulization. 3 milliLiter(s) Nebulizer once  artificial tears (preservative free) Ophthalmic Solution 1 Drop(s) Both EYES two times a day  buMETAnide IVPB 4 milliGRAM(s) IV Intermittent <User Schedule>  buMETAnide IVPB 4 milliGRAM(s) IV Intermittent once  cefTRIAXone   IVPB 2000 milliGRAM(s) IV Intermittent every 24 hours  cefTRIAXone   IVPB 1000 milliGRAM(s) IV Intermittent once  chlorhexidine 2% Cloths 1 Application(s) Topical <User Schedule>  folic acid 1 milliGRAM(s) Oral daily  influenza   Vaccine 0.5 milliLiter(s) IntraMuscular once  insulin lispro (ADMELOG) corrective regimen sliding scale   SubCutaneous three times a day before meals  insulin lispro (ADMELOG) corrective regimen sliding scale   SubCutaneous at bedtime  lactulose Syrup 30 Gram(s) Oral every 12 hours  levalbuterol Inhalation 0.63 milliGRAM(s) Inhalation every 6 hours  magnesium oxide 800 milliGRAM(s) Oral two times a day with meals  magnesium sulfate  IVPB 2 Gram(s) IV Intermittent once  metroNIDAZOLE    Tablet 500 milliGRAM(s) Oral every 12 hours  midodrine 10 milliGRAM(s) Oral every 8 hours  multivitamin 1 Tablet(s) Oral daily  pantoprazole    Tablet 40 milliGRAM(s) Oral daily  rifAXIMin 550 milliGRAM(s) Oral every 12 hours  spironolactone 150 milliGRAM(s) Oral <User Schedule>  thiamine 100 milliGRAM(s) Oral daily  traMADol 25 milliGRAM(s) Oral every 6 hours PRN  traMADol 50 milliGRAM(s) Oral every 6 hours PRN      ROS: 14 point ROS negative unless otherwise state in subjective    PHYSICAL EXAM:   Vital Signs:  Vital Signs Last 24 Hrs  T(C): 36.8 (2025 09:00), Max: 37.2 (2025 21:48)  T(F): 98.3 (2025 09:00), Max: 99 (2025 21:48)  HR: 98 (2025 09:00) (98 - 112)  BP: 119/63 (2025 09:00) (118/64 - 128/72)  BP(mean): --  RR: 18 (2025 09:00) (18 - 18)  SpO2: 98% (2025 09:00) (92% - 98%)    Parameters below as of 2025 09:00  Patient On (Oxygen Delivery Method): room air      Daily     Daily Weight in k.9 (2025 08:00)    GENERAL:  No acute distress  HEENT:  +scleral icterus  CHEST: no resp distress  HEART:  RRR  ABDOMEN:  Soft, distended with slight fluid wave   EXTREMITIES: 2-3+ edema b/l, improving   NEURO:  Alert and oriented x 3, no asterixis    LABS:                        7.5    5.77  )-----------( 76       ( 2025 07:09 )             23.1     Mean Cell Volume: 97.9 fl (25 @ 07:09)        136  |  99  |  43[H]  ----------------------------<  98  4.7   |  26  |  1.22    Ca    9.3      2025 07:09  Phos  3.6       Mg     1.2         TPro  7.9  /  Alb  3.1[L]  /  TBili  6.5[H]  /  DBili  x   /  AST  41[H]  /  ALT  8[L]  /  AlkPhos  110      LIVER FUNCTIONS - ( 2025 07:09 )  Alb: 3.1 g/dL / Pro: 7.9 g/dL / ALK PHOS: 110 U/L / ALT: 8 U/L / AST: 41 U/L / GGT: x           PT/INR - ( 2025 07:09 )   PT: 38.2 sec;   INR: 3.36 ratio         PTT - ( 2025 07:09 )  PTT:54.0 sec  Urinalysis Basic - ( 2025 07:09 )    Color: x / Appearance: x / SG: x / pH: x  Gluc: 98 mg/dL / Ketone: x  / Bili: x / Urobili: x   Blood: x / Protein: x / Nitrite: x   Leuk Esterase: x / RBC: x / WBC x   Sq Epi: x / Non Sq Epi: x / Bacteria: x      Imaging:    < from: Xray Chest 1 View- PORTABLE-Urgent (Xray Chest 1 View- PORTABLE-Urgent .) (02.15.25 @ 12:25) >    IMPRESSION:    Similar diffuse right lung opacities with a large pleural effusion.    --- End of Report ---    < end of copied text >

## 2025-02-18 LAB
ALBUMIN SERPL ELPH-MCNC: 3.1 G/DL — LOW (ref 3.3–5)
ALP SERPL-CCNC: 108 U/L — SIGNIFICANT CHANGE UP (ref 40–120)
ALT FLD-CCNC: 9 U/L — LOW (ref 10–45)
ANION GAP SERPL CALC-SCNC: 11 MMOL/L — SIGNIFICANT CHANGE UP (ref 5–17)
APTT BLD: 55.6 SEC — HIGH (ref 24.5–35.6)
AST SERPL-CCNC: 40 U/L — SIGNIFICANT CHANGE UP (ref 10–40)
BILIRUB SERPL-MCNC: 7.2 MG/DL — HIGH (ref 0.2–1.2)
BUN SERPL-MCNC: 40 MG/DL — HIGH (ref 7–23)
CALCIUM SERPL-MCNC: 9.5 MG/DL — SIGNIFICANT CHANGE UP (ref 8.4–10.5)
CHLORIDE SERPL-SCNC: 97 MMOL/L — SIGNIFICANT CHANGE UP (ref 96–108)
CO2 SERPL-SCNC: 28 MMOL/L — SIGNIFICANT CHANGE UP (ref 22–31)
CREAT SERPL-MCNC: 1.14 MG/DL — SIGNIFICANT CHANGE UP (ref 0.5–1.3)
EGFR: 80 ML/MIN/1.73M2 — SIGNIFICANT CHANGE UP
EGFR: 80 ML/MIN/1.73M2 — SIGNIFICANT CHANGE UP
GLUCOSE BLDC GLUCOMTR-MCNC: 108 MG/DL — HIGH (ref 70–99)
GLUCOSE BLDC GLUCOMTR-MCNC: 110 MG/DL — HIGH (ref 70–99)
GLUCOSE BLDC GLUCOMTR-MCNC: 146 MG/DL — HIGH (ref 70–99)
GLUCOSE BLDC GLUCOMTR-MCNC: 158 MG/DL — HIGH (ref 70–99)
GLUCOSE SERPL-MCNC: 97 MG/DL — SIGNIFICANT CHANGE UP (ref 70–99)
HCT VFR BLD CALC: 22.3 % — LOW (ref 39–50)
HCV RNA SPEC NAA+PROBE-LOG IU: SIGNIFICANT CHANGE UP
HCV RNA SPEC NAA+PROBE-LOG IU: SIGNIFICANT CHANGE UP LOGIU/ML
HGB BLD-MCNC: 7.3 G/DL — LOW (ref 13–17)
INR BLD: 3.14 RATIO — HIGH (ref 0.85–1.16)
MAGNESIUM SERPL-MCNC: 1.2 MG/DL — LOW (ref 1.6–2.6)
MCHC RBC-ENTMCNC: 32.2 PG — SIGNIFICANT CHANGE UP (ref 27–34)
MCHC RBC-ENTMCNC: 32.7 G/DL — SIGNIFICANT CHANGE UP (ref 32–36)
MCV RBC AUTO: 98.2 FL — SIGNIFICANT CHANGE UP (ref 80–100)
NRBC BLD AUTO-RTO: 0 /100 WBCS — SIGNIFICANT CHANGE UP (ref 0–0)
PHOSPHATE SERPL-MCNC: 3.2 MG/DL — SIGNIFICANT CHANGE UP (ref 2.5–4.5)
PLATELET # BLD AUTO: 79 K/UL — LOW (ref 150–400)
POTASSIUM SERPL-MCNC: 4.3 MMOL/L — SIGNIFICANT CHANGE UP (ref 3.5–5.3)
POTASSIUM SERPL-SCNC: 4.3 MMOL/L — SIGNIFICANT CHANGE UP (ref 3.5–5.3)
PROT SERPL-MCNC: 8 G/DL — SIGNIFICANT CHANGE UP (ref 6–8.3)
PROTHROM AB SERPL-ACNC: 35.7 SEC — HIGH (ref 9.9–13.4)
RBC # BLD: 2.27 M/UL — LOW (ref 4.2–5.8)
RBC # FLD: 21.2 % — HIGH (ref 10.3–14.5)
SODIUM SERPL-SCNC: 136 MMOL/L — SIGNIFICANT CHANGE UP (ref 135–145)
WBC # BLD: 6 K/UL — SIGNIFICANT CHANGE UP (ref 3.8–10.5)
WBC # FLD AUTO: 6 K/UL — SIGNIFICANT CHANGE UP (ref 3.8–10.5)

## 2025-02-18 PROCEDURE — G0545: CPT

## 2025-02-18 PROCEDURE — 99232 SBSQ HOSP IP/OBS MODERATE 35: CPT | Mod: GC

## 2025-02-18 PROCEDURE — 99233 SBSQ HOSP IP/OBS HIGH 50: CPT

## 2025-02-18 RX ORDER — BUMETANIDE 1 MG/1
2 TABLET ORAL
Qty: 20 | Refills: 0 | Status: DISCONTINUED | OUTPATIENT
Start: 2025-02-18 | End: 2025-02-19

## 2025-02-18 RX ORDER — MAGNESIUM SULFATE 500 MG/ML
2 SYRINGE (ML) INJECTION ONCE
Refills: 0 | Status: COMPLETED | OUTPATIENT
Start: 2025-02-18 | End: 2025-02-18

## 2025-02-18 RX ADMIN — TRAMADOL HYDROCHLORIDE 25 MILLIGRAM(S): 50 TABLET, FILM COATED ORAL at 22:00

## 2025-02-18 RX ADMIN — Medication 800 MILLIGRAM(S): at 09:05

## 2025-02-18 RX ADMIN — Medication 25 GRAM(S): at 12:17

## 2025-02-18 RX ADMIN — Medication 500 MILLIGRAM(S): at 18:22

## 2025-02-18 RX ADMIN — Medication 1 DROP(S): at 18:19

## 2025-02-18 RX ADMIN — TRAMADOL HYDROCHLORIDE 25 MILLIGRAM(S): 50 TABLET, FILM COATED ORAL at 01:23

## 2025-02-18 RX ADMIN — TRAMADOL HYDROCHLORIDE 25 MILLIGRAM(S): 50 TABLET, FILM COATED ORAL at 10:40

## 2025-02-18 RX ADMIN — MIDODRINE HYDROCHLORIDE 10 MILLIGRAM(S): 5 TABLET ORAL at 09:04

## 2025-02-18 RX ADMIN — BUMETANIDE 132 MILLIGRAM(S): 1 TABLET ORAL at 09:03

## 2025-02-18 RX ADMIN — BUMETANIDE 10 MG/HR: 1 TABLET ORAL at 20:04

## 2025-02-18 RX ADMIN — TRAMADOL HYDROCHLORIDE 25 MILLIGRAM(S): 50 TABLET, FILM COATED ORAL at 20:49

## 2025-02-18 RX ADMIN — CEFTRIAXONE 100 MILLIGRAM(S): 500 INJECTION, POWDER, FOR SOLUTION INTRAMUSCULAR; INTRAVENOUS at 01:21

## 2025-02-18 RX ADMIN — TRAMADOL HYDROCHLORIDE 50 MILLIGRAM(S): 50 TABLET, FILM COATED ORAL at 18:18

## 2025-02-18 RX ADMIN — TRAMADOL HYDROCHLORIDE 50 MILLIGRAM(S): 50 TABLET, FILM COATED ORAL at 19:18

## 2025-02-18 RX ADMIN — FOLIC ACID 1 MILLIGRAM(S): 1 TABLET ORAL at 11:29

## 2025-02-18 RX ADMIN — Medication 800 MILLIGRAM(S): at 18:18

## 2025-02-18 RX ADMIN — MIDODRINE HYDROCHLORIDE 10 MILLIGRAM(S): 5 TABLET ORAL at 18:17

## 2025-02-18 RX ADMIN — Medication 1 APPLICATION(S): at 09:03

## 2025-02-18 RX ADMIN — TRAMADOL HYDROCHLORIDE 50 MILLIGRAM(S): 50 TABLET, FILM COATED ORAL at 06:28

## 2025-02-18 RX ADMIN — TRAMADOL HYDROCHLORIDE 25 MILLIGRAM(S): 50 TABLET, FILM COATED ORAL at 02:25

## 2025-02-18 RX ADMIN — TRAMADOL HYDROCHLORIDE 25 MILLIGRAM(S): 50 TABLET, FILM COATED ORAL at 11:40

## 2025-02-18 RX ADMIN — BUMETANIDE 10 MG/HR: 1 TABLET ORAL at 11:25

## 2025-02-18 RX ADMIN — Medication 150 MILLIGRAM(S): at 09:04

## 2025-02-18 RX ADMIN — Medication 100 MILLIGRAM(S): at 11:29

## 2025-02-18 RX ADMIN — TRAMADOL HYDROCHLORIDE 50 MILLIGRAM(S): 50 TABLET, FILM COATED ORAL at 07:00

## 2025-02-18 RX ADMIN — LACTULOSE 30 GRAM(S): 10 SOLUTION ORAL at 05:28

## 2025-02-18 RX ADMIN — Medication 500 MILLIGRAM(S): at 05:29

## 2025-02-18 RX ADMIN — MIDODRINE HYDROCHLORIDE 10 MILLIGRAM(S): 5 TABLET ORAL at 01:21

## 2025-02-18 RX ADMIN — Medication 1 TABLET(S): at 11:29

## 2025-02-18 RX ADMIN — Medication 40 MILLIGRAM(S): at 11:29

## 2025-02-18 NOTE — PROGRESS NOTE ADULT - ASSESSMENT
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 Actively drinking. Now p/w abdominal pain/distension with worsening of liver tests possibly triggered by infection. DDx also includes alcohol associated hepatitis given hx of alc hep 7/2024 which improved with steroids  - Listed for OLT as noted above and currently awiating donor offers  - EV: EGD 7/2024 for melena showed grade II EV, small gastric ulcer (neg HP), and portal gastropathy   - Ascites: s/p para with 6.8 L fluid removed. Increasing diuresis today to bumex gtt 2U/hr   - HE: c/w lactulose and rifaximin, goal 3-4 BM/day. Will avoid any further sedating medications (mild confusion after a dose of oxy o/n)  - HCC screening: MRI nondiagnostic, no clear lesion seen                             - C/w home midodrine 10 mg tid  - C/w PPI for stress ulcer ppx and hx of ulcer  - Trend MELD labs daily (cmp, INR) daily  - Strict I/Os, daily wts    #Empyema  CT chest noncontrast reviewed from OSH showing moderate sized complex R sided pleural effusion. s/p chest tube placed 1/18 with cultures growing Citrobacter c/w empyema. Requiring 2nd chest tube placement then now s/p VATS 1/28 for persistent effusion with 3rd chest tube placed. Now all chest tubes removed with persistent collection on CXR  - C/w Ceftriaxone 2g daily & Flagyl given persistent collection   - CTS evaluation for oozing from VATS site  - Trend cbc and fever curve  - Appreciate thoracic surgery and ID recs    #MANUELITO, improving  #Volume overload  Found to have acute renal failure requiring dialysis at OSH, ddx includes HRS vs ATN. Cr baseline 1.12 10/2024 required albumin assisted diuresis with bumex 2 mg IV q12h. Cr improving with plan to continue diuresis as noted above.   - Diuretics as above - Goal 2.5L removal/day  - Strict I/Os, daily wts  - Trend CMP daily    #Anemia  #Thrombocytopenia  Normocytic anemia with Hgb 8.5 and plt count 64 likely due to chronic liver dz  - s/p 4 units pRBC, 2 FFP, 2 plts on 1/28 and 1 unit on 2/10  - Hold DVT ppx for bleeding    All recommendations are tentative until note is attested by attending.     Note incomplete until finalized by attending signature/attestation.    Saul Zarate  GI/Hepatology Fellow, PGY-4    MONDAY-FRIDAY 8AM-5PM:  Please message via [employer] or email [email or web address]     On Weekends/Holidays (All day) and Weekdays after 5 PM to 8 AM  For nonurgent consults please email:  Please email [email or web address]  For urgent consults:  Please contact on call GI team. See Amion schedule ([facility]), [email or web address] paging system ([facility]), or call hospital  ([facility])

## 2025-02-18 NOTE — PROGRESS NOTE ADULT - SUBJECTIVE AND OBJECTIVE BOX
Patient is a 44y old  Male who presents with a chief complaint of decompensated cirrhosis (2025 10:22)      Interval Events:   Diuresing well w/ decrease ankle pain   - 3450 UOP    ROS:   A 12-point ROS was performed and negative except as noted in HPI.    Hospital Medications:  albuterol    90 MICROgram(s) HFA Inhaler 1 Puff(s) Inhalation every 12 hours PRN  albuterol/ipratropium for Nebulization. 3 milliLiter(s) Nebulizer once  artificial tears (preservative free) Ophthalmic Solution 1 Drop(s) Both EYES two times a day  buMETAnide Infusion 2 mG/Hr IV Continuous <Continuous>  cefTRIAXone   IVPB 2000 milliGRAM(s) IV Intermittent every 24 hours  cefTRIAXone   IVPB 1000 milliGRAM(s) IV Intermittent once  chlorhexidine 2% Cloths 1 Application(s) Topical <User Schedule>  folic acid 1 milliGRAM(s) Oral daily  influenza   Vaccine 0.5 milliLiter(s) IntraMuscular once  insulin lispro (ADMELOG) corrective regimen sliding scale   SubCutaneous three times a day before meals  insulin lispro (ADMELOG) corrective regimen sliding scale   SubCutaneous at bedtime  lactulose Syrup 30 Gram(s) Oral every 12 hours  levalbuterol Inhalation 0.63 milliGRAM(s) Inhalation every 6 hours  magnesium oxide 800 milliGRAM(s) Oral two times a day with meals  magnesium sulfate  IVPB 2 Gram(s) IV Intermittent once  metroNIDAZOLE    Tablet 500 milliGRAM(s) Oral every 12 hours  midodrine 10 milliGRAM(s) Oral every 8 hours  multivitamin 1 Tablet(s) Oral daily  pantoprazole    Tablet 40 milliGRAM(s) Oral daily  rifAXIMin 550 milliGRAM(s) Oral every 12 hours  spironolactone 150 milliGRAM(s) Oral <User Schedule>  thiamine 100 milliGRAM(s) Oral daily  traMADol 25 milliGRAM(s) Oral every 6 hours PRN  traMADol 50 milliGRAM(s) Oral every 6 hours PRN      PHYSICAL EXAM:   Vital Signs:  Vital Signs Last 24 Hrs  T(C): 36.7 (2025 16:53), Max: 37.1 (2025 09:00)  T(F): 98 (2025 16:53), Max: 98.7 (2025 09:00)  HR: 96 (2025 16:53) (94 - 102)  BP: 122/66 (2025 16:53) (117/64 - 129/68)  BP(mean): --  RR: 16 (2025 16:53) (16 - 18)  SpO2: 96% (2025 16:53) (95% - 98%)    Parameters below as of 2025 16:53  Patient On (Oxygen Delivery Method): room air      Daily     Daily Weight in k.3 (2025 04:52)    GENERAL: no acute distress  NEURO: alert  HEENT: anicteric sclera, no conjunctival pallor appreciated  CHEST: no respiratory distress, no accessory muscle use  CARDIAC: regular rate  ABDOMEN: soft, nondistended, nontender, no rebound or guarding  EXTREMITIES: b/l LE edema improved from prior but still w/ significant anasarca   SKIN: no lesions noted    LABS: reviewed                        7.3    6.00  )-----------( 79       ( 2025 06:39 )             22.3     02-18    136  |  97  |  40[H]  ----------------------------<  97  4.3   |  28  |  1.14    Ca    9.5      2025 06:39  Phos  3.2     02-18  Mg     1.2     -18    TPro  8.0  /  Alb  3.1[L]  /  TBili  7.2[H]  /  DBili  x   /  AST  40  /  ALT  9[L]  /  AlkPhos  108  02-18    LIVER FUNCTIONS - ( 2025 06:39 )  Alb: 3.1 g/dL / Pro: 8.0 g/dL / ALK PHOS: 108 U/L / ALT: 9 U/L / AST: 40 U/L / GGT: x             Interval Diagnostic Studies: see sunrise for full report

## 2025-02-18 NOTE — PROGRESS NOTE ADULT - SUBJECTIVE AND OBJECTIVE BOX
transplant Id follow up   Citrobacter empyema in liver candidate    Afebrile, decreased UOP  some confusion over the weekend  remains on ctx and flagyl   MELD 30    Vital Signs Last 24 Hrs  T(C): 37.1 (18 Feb 2025 09:00), Max: 37.1 (18 Feb 2025 09:00)  T(F): 98.7 (18 Feb 2025 09:00), Max: 98.7 (18 Feb 2025 09:00)  HR: 99 (18 Feb 2025 09:00) (85 - 102)  BP: 117/72 (18 Feb 2025 09:00) (117/64 - 131/76)  BP(mean): --  RR: 18 (18 Feb 2025 09:00) (18 - 18)  SpO2: 96% (18 Feb 2025 09:00) (95% - 100%)    Parameters below as of 18 Feb 2025 09:00  Patient On (Oxygen Delivery Method): room air      PHYSICAL EXAMINATION:  General: Alert and Awake, NAD, jaundice  HEENT: Normocephalic / Atraumatic  Resp: No accessory muscles of respiration utilized  Abdomen: Not distended.  MSK: No LE edema.   : No yates  Skin: No rashes or lesions.    Neuro: Alert and Awake. CN 2-12 Grossly intact. Moves all four extremities spontaneously.  Psych: Calm, Pleasant, Cooperative               ____________________________________________________  ROS  GENERAL: denies chills, , night sweats, weight loss.   PSYCH: denies depression, anxiety, suicidal ideation, hallucination, and delusions  SKIN: no rash or lesions; no color changes, no abnormal nevi,no  dryness, and nojaundice    EYES: denies visual changes, floaters, pain, inflammation, blurred vision, and discharge  ENT: denies tinnitus, vertigo, epistaxis, oral lesion, and decreased acuity  PULM: denies, hemoptysis, pleurisy  CVS: denies angina, palpitations,+ orthopnea, no syncope, or heart murmur  GI: denies constipation, diarrhea, melena, abdominal pain, nausea.   : denies dysuria, frequency, discharge, incontinence, stones or macroscopic hematuria  MS: no arthralgias, no erythema or swelling, no myalgias, noedema, or lower back pain.   CNS: denies numbness, dizziness, seizure, or tremor  ENDO: denies heat/cold intolerance, polyuria, polydipsia, malaise.    HEME: denies bruising, bleeding, lymphadenopathy, anemia, and calf pain    Allergies  sulfa drugs (Unknown)  Salicylic Acid (Other)    __________________________________________________  MEDS:  MEDICATIONS  (STANDING):  albuterol    90 MICROgram(s) HFA Inhaler 1 every 12 hours PRN  albuterol/ipratropium for Nebulization. 3 once  buMETAnide IVPB 4 <User Schedule>  influenza   Vaccine 0.5 once  insulin lispro (ADMELOG) corrective regimen sliding scale  three times a day before meals  insulin lispro (ADMELOG) corrective regimen sliding scale  at bedtime  lactulose Syrup 30 every 12 hours  levalbuterol Inhalation 0.63 every 6 hours  midodrine 10 every 8 hours  pantoprazole    Tablet 40 daily  spironolactone 150 <User Schedule>  traMADol 25 every 6 hours PRN  traMADol 50 every 6 hours PRN    _________________________________________________  ANTIMICOBIALS  cefTRIAXone   IVPB 1000 once  cefTRIAXone   IVPB 2000 every 24 hours  influenza   Vaccine 0.5 once  metroNIDAZOLE    Tablet 500 every 12 hours  rifAXIMin 550 every 12 hours      GENERAL LABS              7.3                  136  | 28   | 40           6.00  >-----------< 79      ------------------------< 97                    22.3                 4.3  | 97   | 1.14                                         Ca 9.5   Mg 1.2   Ph 3.2        Urinalysis Basic - ( 18 Feb 2025 06:39 )    Color: x / Appearance: x / SG: x / pH: x  Gluc: 97 mg/dL / Ketone: x  / Bili: x / Urobili: x   Blood: x / Protein: x / Nitrite: x   Leuk Esterase: x / RBC: x / WBC x   Sq Epi: x / Non Sq Epi: x / Bacteria: x        _________________________________________________  MICROBIOLOGY  -----------                  Fungitell:   _______________________________________________  PERTINENT IMAGING  < from: Xray Chest 1 View- PORTABLE-Urgent (Xray Chest 1 View- PORTABLE-Urgent .) (02.09.25 @ 03:13) >  IMPRESSION:  Mild interval increase in right-sided loculated pleural fluid. Increased   opacities overlying the right lung.    < end of copied text >

## 2025-02-18 NOTE — PROGRESS NOTE ADULT - ATTENDING COMMENTS
Patient is a 44-year-old gentleman with a history of alcohol use disorder with last ROSA, peptic ulcer disease and alcohol associated liver cirrhosis  c/b Ascites, hyponatremia and encephalopathy who was transferred from [facility] on 1/16/2022 for refractory ascites and recurrent right pleural effusion.  His hospitalization has been complicated by HRS MANUELITO requiring intermittent HD from 1/10-19 which has since improved and right-sided empyema with pleural fluid culture positive for citrobacter koseri (difficult to control) and as such is status post VATS on 1/28 with decortication complicated by hemothorax.  He was eventually extubated 1/30 and his last chest tube removed 2/6.  His most recent CT chest from 2/7 showed moderate right gas and liquid containing fluid collection that cannot be safely evacuated per CT surgery prior to liver transplantation.  He remains on ceftriaxone, Flagyl until transplant.    He is overall clinically stable.  Walking, eating and mentating well.  Will continue diuresis.  Now on Bumex 4 mg twice a day and spironolactone 150 mg daily.  Urine output is good. Will start on a bumex drip at 2mg (2/18-) for further diuresis.  Recertified at a MELD score of 30 (but native MELD today is 28) on 2/16.  Awaiting organ offer.  Lactulose & rifaximin for a goal of 2-3 BMs daily.  Avoid opiates as he does not appear to respond well to it mentally - mild hallucinations.  Will consider repeat paracentesis if needed for symptomatic relief. He does not think he is ready at this time.      Marion Smith MD/MPH  Transplant hepatology .

## 2025-02-18 NOTE — PROGRESS NOTE ADULT - ASSESSMENT
45 yo M with PMH decompensated ETOH cirrhosis c/b recurrent ascites, grade II EV, and HE, right calf hematoma 10/2024 (s/p fall)  presented to [facility] for abdominal pain and distension. Patient found to have ascites on exam and MANUELITO requiring HD initiation PermCath was placed by vascular surgery on 1/10 with post procedure course c/b bleeding from insertion site. Transferred to [facility] SICU for further management.     Blood Cultures (1/16) 1/4 Staph epi.   GI PCR (1/17) + Giardia.   Paracentesis (1/17) 155 nucleated cell counts.     CT Chest (1/17) Moderate loculated right pleural effusion containing a few foci of air, which are new from 1/8/2025 and may represent empyema/bronchopleural fistula versus sequelae of prior thoracentesis.     #Empyema  s/p VATS 1/28/25-  last positive cultures 1/25/25  for citrobacter koseri  --Continue Ceftriaxone to 2g IV Q24H + Metronidazole 500 mg BID.   --While CT chest appears to visualize residual fluid at the right lung base it is unclear at this point if this represents hematoma or infected hematoma.  Patient does not have any systemic signs or symptoms of infection.  It does not appear that we will achieve optimal source control prior to liver transplant given patient's coagulopathy.  At the minimum I am reassured that patient has had good source control with the VATS procedure for the empyema.  At this point benefits of proceeding with transplant likely outweigh risks and thus no absolute ID contraindications for transplant.  I would continue patient's antibiotics and would try to achieve drainage of the residual hematoma versus fluid post liver transplant.    #Giardia  s/p 7 days of Metronidazole    #Positive Blood Culture (Staph epi, 1/16)  procurement contaminant  --Continue to follow CBC with diff  --Continue to follow temperature curve  --Follow up on  blood cultures no growth      #Pre-Liver Transplant Evaluation, Decompensated Cirrhosis  COVID19 Rodríguez Antibody Positive  HAV IgG Positive  HBVs Ab Positive  HBVsAg Negative  HBVc Ab Negative  HCV Ab Negative  HSV 1 IgG Positive  HSV 2 IgG Negative  EBV IgG Positive  CMV IgG Positive  VZV IgG Positive  Measles IgG Positive  Mumps IgG Positive  Rubella IgG Positive  Quantiferon Gold negative  HIV Ag/Ab by CMIA Negative  Syphilis Screen Negative  Toxoplasma IgG Negative  Strongyloides Ab negative  Coccidiodes Ab Screen Positive but confirmatory negative  Leishmania Ab negative    #Encounter to Vaccinate Patient  COVID19: Would benefit from COVID19 9787-4191 Vaccine Dose  Influenza: Had vaccination for this year  Pneumococcal: Would benefit from PCV20  HAV: Immune, will not require further vaccination  HBV: Immune, will not require further vaccination  MMR: Immune, will not require further vaccination  Varicella: Immune, will not require further vaccination  Shingles: Will require Shingrix  Tdap: Tdap 6/23/24  immune to hepatitis A and B  Would benefit from RSV vaccine Arexvy (approved for young patinets) vs Abrysvo       Thank you for involving us in the care of this patient  Transplant ID will continue to follow  Please call or page with additional questions  Pager; #[phone]  Teams: from 8 am to 5 pm  Jocleyn Chavez MD

## 2025-02-19 LAB
ALBUMIN SERPL ELPH-MCNC: 3.1 G/DL — LOW (ref 3.3–5)
ALP SERPL-CCNC: 112 U/L — SIGNIFICANT CHANGE UP (ref 40–120)
ALT FLD-CCNC: 9 U/L — LOW (ref 10–45)
ANION GAP SERPL CALC-SCNC: 11 MMOL/L — SIGNIFICANT CHANGE UP (ref 5–17)
APTT BLD: 52.3 SEC — HIGH (ref 24.5–35.6)
AST SERPL-CCNC: 42 U/L — HIGH (ref 10–40)
BILIRUB SERPL-MCNC: 7.7 MG/DL — HIGH (ref 0.2–1.2)
BUN SERPL-MCNC: 39 MG/DL — HIGH (ref 7–23)
CALCIUM SERPL-MCNC: 9.5 MG/DL — SIGNIFICANT CHANGE UP (ref 8.4–10.5)
CHLORIDE SERPL-SCNC: 96 MMOL/L — SIGNIFICANT CHANGE UP (ref 96–108)
CO2 SERPL-SCNC: 27 MMOL/L — SIGNIFICANT CHANGE UP (ref 22–31)
CREAT SERPL-MCNC: 1.19 MG/DL — SIGNIFICANT CHANGE UP (ref 0.5–1.3)
CULTURE RESULTS: SIGNIFICANT CHANGE UP
EGFR: 76 ML/MIN/1.73M2 — SIGNIFICANT CHANGE UP
EGFR: 76 ML/MIN/1.73M2 — SIGNIFICANT CHANGE UP
GLUCOSE BLDC GLUCOMTR-MCNC: 120 MG/DL — HIGH (ref 70–99)
GLUCOSE BLDC GLUCOMTR-MCNC: 123 MG/DL — HIGH (ref 70–99)
GLUCOSE BLDC GLUCOMTR-MCNC: 134 MG/DL — HIGH (ref 70–99)
GLUCOSE BLDC GLUCOMTR-MCNC: 140 MG/DL — HIGH (ref 70–99)
GLUCOSE SERPL-MCNC: 91 MG/DL — SIGNIFICANT CHANGE UP (ref 70–99)
HCT VFR BLD CALC: 23.8 % — LOW (ref 39–50)
HGB BLD-MCNC: 7.7 G/DL — LOW (ref 13–17)
INR BLD: 3.19 RATIO — HIGH (ref 0.85–1.16)
MAGNESIUM SERPL-MCNC: 1.3 MG/DL — LOW (ref 1.6–2.6)
MCHC RBC-ENTMCNC: 32.2 PG — SIGNIFICANT CHANGE UP (ref 27–34)
MCHC RBC-ENTMCNC: 32.4 G/DL — SIGNIFICANT CHANGE UP (ref 32–36)
MCV RBC AUTO: 99.6 FL — SIGNIFICANT CHANGE UP (ref 80–100)
NRBC BLD AUTO-RTO: 0 /100 WBCS — SIGNIFICANT CHANGE UP (ref 0–0)
PHOSPHATE SERPL-MCNC: 3.4 MG/DL — SIGNIFICANT CHANGE UP (ref 2.5–4.5)
PLATELET # BLD AUTO: 76 K/UL — LOW (ref 150–400)
POTASSIUM SERPL-MCNC: 4.1 MMOL/L — SIGNIFICANT CHANGE UP (ref 3.5–5.3)
POTASSIUM SERPL-SCNC: 4.1 MMOL/L — SIGNIFICANT CHANGE UP (ref 3.5–5.3)
PROT SERPL-MCNC: 8.2 G/DL — SIGNIFICANT CHANGE UP (ref 6–8.3)
PROTHROM AB SERPL-ACNC: 35.9 SEC — HIGH (ref 9.9–13.4)
RBC # BLD: 2.39 M/UL — LOW (ref 4.2–5.8)
RBC # FLD: 21.2 % — HIGH (ref 10.3–14.5)
SODIUM SERPL-SCNC: 134 MMOL/L — LOW (ref 135–145)
SPECIMEN SOURCE: SIGNIFICANT CHANGE UP
WBC # BLD: 6.44 K/UL — SIGNIFICANT CHANGE UP (ref 3.8–10.5)
WBC # FLD AUTO: 6.44 K/UL — SIGNIFICANT CHANGE UP (ref 3.8–10.5)

## 2025-02-19 PROCEDURE — 71045 X-RAY EXAM CHEST 1 VIEW: CPT | Mod: 26

## 2025-02-19 PROCEDURE — 99232 SBSQ HOSP IP/OBS MODERATE 35: CPT | Mod: GC

## 2025-02-19 RX ORDER — MAGNESIUM SULFATE 500 MG/ML
2 SYRINGE (ML) INJECTION ONCE
Refills: 0 | Status: COMPLETED | OUTPATIENT
Start: 2025-02-19 | End: 2025-02-19

## 2025-02-19 RX ORDER — IPRATROPIUM BROMIDE AND ALBUTEROL SULFATE .5; 2.5 MG/3ML; MG/3ML
3 SOLUTION RESPIRATORY (INHALATION) EVERY 12 HOURS
Refills: 0 | Status: DISCONTINUED | OUTPATIENT
Start: 2025-02-19 | End: 2025-02-20

## 2025-02-19 RX ORDER — POLYETHYLENE GLYCOL 3350 17 G/17G
17 POWDER, FOR SOLUTION ORAL
Refills: 0 | Status: DISCONTINUED | OUTPATIENT
Start: 2025-02-19 | End: 2025-02-25

## 2025-02-19 RX ADMIN — Medication 100 MILLIGRAM(S): at 11:29

## 2025-02-19 RX ADMIN — Medication 800 MILLIGRAM(S): at 17:49

## 2025-02-19 RX ADMIN — TRAMADOL HYDROCHLORIDE 50 MILLIGRAM(S): 50 TABLET, FILM COATED ORAL at 00:24

## 2025-02-19 RX ADMIN — Medication 25 GRAM(S): at 17:48

## 2025-02-19 RX ADMIN — MIDODRINE HYDROCHLORIDE 10 MILLIGRAM(S): 5 TABLET ORAL at 01:44

## 2025-02-19 RX ADMIN — Medication 500 MILLIGRAM(S): at 05:04

## 2025-02-19 RX ADMIN — BUMETANIDE 10 MG/HR: 1 TABLET ORAL at 04:39

## 2025-02-19 RX ADMIN — CEFTRIAXONE 100 MILLIGRAM(S): 500 INJECTION, POWDER, FOR SOLUTION INTRAMUSCULAR; INTRAVENOUS at 01:27

## 2025-02-19 RX ADMIN — TRAMADOL HYDROCHLORIDE 50 MILLIGRAM(S): 50 TABLET, FILM COATED ORAL at 19:26

## 2025-02-19 RX ADMIN — Medication 40 MILLIGRAM(S): at 11:29

## 2025-02-19 RX ADMIN — Medication 1 TABLET(S): at 11:30

## 2025-02-19 RX ADMIN — MIDODRINE HYDROCHLORIDE 10 MILLIGRAM(S): 5 TABLET ORAL at 17:49

## 2025-02-19 RX ADMIN — IPRATROPIUM BROMIDE AND ALBUTEROL SULFATE 3 MILLILITER(S): .5; 2.5 SOLUTION RESPIRATORY (INHALATION) at 05:06

## 2025-02-19 RX ADMIN — TRAMADOL HYDROCHLORIDE 50 MILLIGRAM(S): 50 TABLET, FILM COATED ORAL at 08:17

## 2025-02-19 RX ADMIN — POLYETHYLENE GLYCOL 3350 17 GRAM(S): 17 POWDER, FOR SOLUTION ORAL at 17:48

## 2025-02-19 RX ADMIN — Medication 1 DROP(S): at 17:49

## 2025-02-19 RX ADMIN — TRAMADOL HYDROCHLORIDE 50 MILLIGRAM(S): 50 TABLET, FILM COATED ORAL at 18:27

## 2025-02-19 RX ADMIN — FOLIC ACID 1 MILLIGRAM(S): 1 TABLET ORAL at 11:29

## 2025-02-19 RX ADMIN — LEVALBUTEROL HYDROCHLORIDE 0.63 MILLIGRAM(S): 1.25 SOLUTION RESPIRATORY (INHALATION) at 17:53

## 2025-02-19 RX ADMIN — Medication 500 MILLIGRAM(S): at 17:49

## 2025-02-19 RX ADMIN — Medication 1 APPLICATION(S): at 08:18

## 2025-02-19 RX ADMIN — MIDODRINE HYDROCHLORIDE 10 MILLIGRAM(S): 5 TABLET ORAL at 10:28

## 2025-02-19 RX ADMIN — Medication 150 MILLIGRAM(S): at 11:29

## 2025-02-19 RX ADMIN — LACTULOSE 30 GRAM(S): 10 SOLUTION ORAL at 05:05

## 2025-02-19 RX ADMIN — Medication 25 GRAM(S): at 11:27

## 2025-02-19 RX ADMIN — Medication 800 MILLIGRAM(S): at 08:18

## 2025-02-19 RX ADMIN — Medication 1 DROP(S): at 05:04

## 2025-02-19 RX ADMIN — IPRATROPIUM BROMIDE AND ALBUTEROL SULFATE 3 MILLILITER(S): .5; 2.5 SOLUTION RESPIRATORY (INHALATION) at 18:53

## 2025-02-19 RX ADMIN — TRAMADOL HYDROCHLORIDE 50 MILLIGRAM(S): 50 TABLET, FILM COATED ORAL at 01:30

## 2025-02-19 NOTE — PROGRESS NOTE ADULT - ATTENDING COMMENTS
Patient is a 44-year-old gentleman with a history of alcohol use disorder, ROSA, peptic ulcer disease and alcohol associated liver cirrhosis  c/b Ascites, hyponatremia and encephalopathy who was transferred from [facility] on 1/16/2022 for refractory ascites and recurrent right pleural effusion.  His hospitalization has been complicated by HRS MANUELITO requiring intermittent HD from 1/10-19 which has since improved and right-sided empyema with pleural fluid culture positive for citrobacter koseri (difficult to control) and as such is status post VATS on 1/28 with decortication complicated by hemothorax.  He was eventually extubated 1/30 and his last chest tube removed 2/6.  His most recent CT chest from 2/7 showed moderate right gas and liquid containing fluid collection that cannot be safely evacuated per CT surgery prior to liver transplantation.  He remains on ceftriaxone, Flagyl until transplant.    He is overall clinically stable.  Walking, eating and mentating well.  Will continue diuresis.  We switched to a drip for 2 days but it appears to be draining him so will switch right back to Bumex 4 mg twice a day and spironolactone 150 mg daily.  Urine output is great so far.  Repeat CXR today  Recertified at a MELD score of 30 (but native MELD today is 29) on 2/16.  Awaiting organ offer.  Lactulose & rifaximin for a goal of 2-3 BMs daily.  Avoid opiates as he does not appear to respond well to it mentally - mild hallucinations.  Therapeutic paracentesis tomorrow for symptomatic relief.       Marion Smith MD/MPH  Transplant hepatology .

## 2025-02-19 NOTE — PROGRESS NOTE ADULT - SUBJECTIVE AND OBJECTIVE BOX
Patient is a 44y old  Male who presents with a chief complaint of decompensated cirrhosis (2025 18:10)      Interval Events:   Patient reports good urination about 3L over the past 24 hours.   Patient w/ issues w/ abdominal pain from lactulose    ROS:   A 12-point ROS was performed and negative except as noted in HPI.    Hospital Medications:  albuterol    90 MICROgram(s) HFA Inhaler 1 Puff(s) Inhalation every 12 hours PRN  artificial tears (preservative free) Ophthalmic Solution 1 Drop(s) Both EYES two times a day  buMETAnide Infusion 2 mG/Hr IV Continuous <Continuous>  cefTRIAXone   IVPB 2000 milliGRAM(s) IV Intermittent every 24 hours  cefTRIAXone   IVPB 1000 milliGRAM(s) IV Intermittent once  chlorhexidine 2% Cloths 1 Application(s) Topical <User Schedule>  folic acid 1 milliGRAM(s) Oral daily  influenza   Vaccine 0.5 milliLiter(s) IntraMuscular once  insulin lispro (ADMELOG) corrective regimen sliding scale   SubCutaneous three times a day before meals  insulin lispro (ADMELOG) corrective regimen sliding scale   SubCutaneous at bedtime  levalbuterol Inhalation 0.63 milliGRAM(s) Inhalation every 6 hours  magnesium oxide 800 milliGRAM(s) Oral two times a day with meals  metroNIDAZOLE    Tablet 500 milliGRAM(s) Oral every 12 hours  midodrine 10 milliGRAM(s) Oral every 8 hours  multivitamin 1 Tablet(s) Oral daily  pantoprazole    Tablet 40 milliGRAM(s) Oral daily  rifAXIMin 550 milliGRAM(s) Oral every 12 hours  spironolactone 150 milliGRAM(s) Oral <User Schedule>  thiamine 100 milliGRAM(s) Oral daily  traMADol 25 milliGRAM(s) Oral every 6 hours PRN  traMADol 50 milliGRAM(s) Oral every 6 hours PRN      PHYSICAL EXAM:   Vital Signs:  Vital Signs Last 24 Hrs  T(C): 36.8 (2025 13:00), Max: 37.1 (2025 21:00)  T(F): 98.3 (2025 13:00), Max: 98.8 (2025 21:00)  HR: 97 (2025 13:00) (92 - 102)  BP: 117/72 (2025 13:00) (117/72 - 131/74)  BP(mean): --  RR: 18 (2025 13:00) (16 - 20)  SpO2: 96% (2025 13:00) (95% - 97%)    Parameters below as of 2025 13:00  Patient On (Oxygen Delivery Method): room air      Daily     Daily Weight in k (2025 05:16)    GENERAL: no acute distress  NEURO: alert  HEENT: anicteric sclera, no conjunctival pallor appreciated  CHEST: no respiratory distress, no accessory muscle use  CARDIAC: regular rate  ABDOMEN: soft, nondistended, nontender, no rebound or guarding  EXTREMITIES: warm, well perfused, no edema  SKIN: no lesions noted    LABS: reviewed                        7.7    6.44  )-----------( 76       ( 2025 06:57 )             23.8     02-    134[L]  |  96  |  39[H]  ----------------------------<  91  4.1   |  27  |  1.19    Ca    9.5      2025 06:57  Phos  3.4     02-  Mg     1.3     -    TPro  8.2  /  Alb  3.1[L]  /  TBili  7.7[H]  /  DBili  x   /  AST  42[H]  /  ALT  9[L]  /  AlkPhos  112  02-19    LIVER FUNCTIONS - ( 2025 06:57 )  Alb: 3.1 g/dL / Pro: 8.2 g/dL / ALK PHOS: 112 U/L / ALT: 9 U/L / AST: 42 U/L / GGT: x             Interval Diagnostic Studies: see sunrise for full report   Patient is a 44y old  Male who presents with a chief complaint of decompensated cirrhosis (2025 18:10)      Interval Events:   Patient reports good urination about 3L over the past 24 hours.   Patient w/ issues w/ abdominal pain from lactulose    ROS:   A 12-point ROS was performed and negative except as noted in HPI.    Hospital Medications:  albuterol    90 MICROgram(s) HFA Inhaler 1 Puff(s) Inhalation every 12 hours PRN  artificial tears (preservative free) Ophthalmic Solution 1 Drop(s) Both EYES two times a day  buMETAnide Infusion 2 mG/Hr IV Continuous <Continuous>  cefTRIAXone   IVPB 2000 milliGRAM(s) IV Intermittent every 24 hours  cefTRIAXone   IVPB 1000 milliGRAM(s) IV Intermittent once  chlorhexidine 2% Cloths 1 Application(s) Topical <User Schedule>  folic acid 1 milliGRAM(s) Oral daily  influenza   Vaccine 0.5 milliLiter(s) IntraMuscular once  insulin lispro (ADMELOG) corrective regimen sliding scale   SubCutaneous three times a day before meals  insulin lispro (ADMELOG) corrective regimen sliding scale   SubCutaneous at bedtime  levalbuterol Inhalation 0.63 milliGRAM(s) Inhalation every 6 hours  magnesium oxide 800 milliGRAM(s) Oral two times a day with meals  metroNIDAZOLE    Tablet 500 milliGRAM(s) Oral every 12 hours  midodrine 10 milliGRAM(s) Oral every 8 hours  multivitamin 1 Tablet(s) Oral daily  pantoprazole    Tablet 40 milliGRAM(s) Oral daily  rifAXIMin 550 milliGRAM(s) Oral every 12 hours  spironolactone 150 milliGRAM(s) Oral <User Schedule>  thiamine 100 milliGRAM(s) Oral daily  traMADol 25 milliGRAM(s) Oral every 6 hours PRN  traMADol 50 milliGRAM(s) Oral every 6 hours PRN      PHYSICAL EXAM:   Vital Signs:  Vital Signs Last 24 Hrs  T(C): 36.8 (2025 13:00), Max: 37.1 (2025 21:00)  T(F): 98.3 (2025 13:00), Max: 98.8 (2025 21:00)  HR: 97 (2025 13:00) (92 - 102)  BP: 117/72 (2025 13:00) (117/72 - 131/74)  BP(mean): --  RR: 18 (2025 13:00) (16 - 20)  SpO2: 96% (2025 13:00) (95% - 97%)    Parameters below as of 2025 13:00  Patient On (Oxygen Delivery Method): room air      Daily     Daily Weight in k (2025 05:16)      GENERAL: no acute distress  NEURO: alert, no asterixis  HEENT: anicteric sclera, no conjunctival pallor appreciated  CHEST: no respiratory distress, no accessory muscle use  CARDIAC: regular rate  ABDOMEN: soft, nondistended, nontender, no rebound or guarding  EXTREMITIES: b/l LE edema improved from prior but still w/ significant anasarca   SKIN: no lesions noted    LABS: reviewed                        7.7    6.44  )-----------( 76       ( 2025 06:57 )             23.8     02-    134[L]  |  96  |  39[H]  ----------------------------<  91  4.1   |  27  |  1.19    Ca    9.5      2025 06:57  Phos  3.4     -  Mg     1.3     -    TPro  8.2  /  Alb  3.1[L]  /  TBili  7.7[H]  /  DBili  x   /  AST  42[H]  /  ALT  9[L]  /  AlkPhos  112  02-19    LIVER FUNCTIONS - ( 2025 06:57 )  Alb: 3.1 g/dL / Pro: 8.2 g/dL / ALK PHOS: 112 U/L / ALT: 9 U/L / AST: 42 U/L / GGT: x             Interval Diagnostic Studies: see sunrise for full report

## 2025-02-19 NOTE — CONSULT NOTE ADULT - SUBJECTIVE AND OBJECTIVE BOX
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 CT chest at OSH was significant for a complex R pleural effusion s/p R pigtail placement by IR on 1/18 with drainage of purulent material c/f empyema, s/p Right VATS decortication 1/28 and chest tube removal.    IR consulted for repeat dx/tx paracentesis. Last paracentesis done on 2/6 with 6.8L fluid removed      Allergies: sulfa drugs (Unknown)  Salicylic Acid (Other)    Medications (Abx/Cardiac/Anticoagulation/Blood Products)    buMETAnide Infusion: 10 mL/Hr IV Continuous (02-18 @ 20:05)  buMETAnide IVPB: 132 mL/Hr IV Intermittent (02-18 @ 09:03)  cefTRIAXone   IVPB: 100 mL/Hr IV Intermittent (02-19 @ 01:27)  metroNIDAZOLE    Tablet: 500 milliGRAM(s) Oral (02-19 @ 17:49)  midodrine: 10 milliGRAM(s) Oral (02-19 @ 17:49)  rifAXIMin: 550 milliGRAM(s) Oral (02-19 @ 17:49)  spironolactone: 150 milliGRAM(s) Oral (02-19 @ 11:29)    Data:  T(C): 36.9  HR: 100  BP: 133/77  RR: 20  SpO2: 94%    -WBC 6.44 / HgB 7.7 / Hct 23.8 / Plt 76  -Na 134 / Cl 96 / BUN 39 / Glucose 91  -K 4.1 / CO2 27 / Cr 1.19  -ALT 9 / Alk Phos 112 / T.Bili 7.7  -INR 3.19 / PTT 52.3    Assessment/Plan:   43 yo M with PMH of alcohol associated cirrhosis c/b recurrent ascites, grade II EV, and HE, 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 CT chest at OSH was significant for a complex R pleural effusion s/p R pigtail placement by IR on 1/18 with drainage of purulent material c/f empyema, s/p Right VATS decortication 1/28 and chest tube removal.    IR consulted for repeat dx/tx paracentesis. Last paracentesis done on 2/6 with 6.8L fluid removed    - case reviewed and approved for dx/tx paracentesis Thursday 2/19  - please place IR procedure order under ESTRELLA Zaragoza (free text)   - STAT labs in AM (cbc,coags, bmp, T&S)  - Of note INR 3.1 today, for decompensated cirrhosis please maintain INR goal < 3.5  - Patient not currently on any anticoagulation, please notify IR if plan to start as it may need to be held prior to procedure.   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 CT chest at OSH was significant for a complex R pleural effusion s/p R pigtail placement by IR on 1/18 with drainage of purulent material c/f empyema, s/p Right VATS decortication 1/28 and chest tube removal.    IR consulted for repeat dx/tx paracentesis. Last paracentesis done on 2/6 with 6.8L fluid removed      Allergies: sulfa drugs (Unknown)  Salicylic Acid (Other)    Medications (Abx/Cardiac/Anticoagulation/Blood Products)    buMETAnide Infusion: 10 mL/Hr IV Continuous (02-18 @ 20:05)  buMETAnide IVPB: 132 mL/Hr IV Intermittent (02-18 @ 09:03)  cefTRIAXone   IVPB: 100 mL/Hr IV Intermittent (02-19 @ 01:27)  metroNIDAZOLE    Tablet: 500 milliGRAM(s) Oral (02-19 @ 17:49)  midodrine: 10 milliGRAM(s) Oral (02-19 @ 17:49)  rifAXIMin: 550 milliGRAM(s) Oral (02-19 @ 17:49)  spironolactone: 150 milliGRAM(s) Oral (02-19 @ 11:29)    Data:  T(C): 36.9  HR: 100  BP: 133/77  RR: 20  SpO2: 94%    -WBC 6.44 / HgB 7.7 / Hct 23.8 / Plt 76  -Na 134 / Cl 96 / BUN 39 / Glucose 91  -K 4.1 / CO2 27 / Cr 1.19  -ALT 9 / Alk Phos 112 / T.Bili 7.7  -INR 3.19 / PTT 52.3    Assessment/Plan:   45 yo M with PMH of alcohol associated cirrhosis c/b recurrent ascites, grade II EV, and HE, 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 CT chest at OSH was significant for a complex R pleural effusion s/p R pigtail placement by IR on 1/18 with drainage of purulent material c/f empyema, s/p Right VATS decortication 1/28 and chest tube removal.    IR consulted for repeat dx/tx paracentesis. Last paracentesis done on 2/6 with 6.8L fluid removed    - case reviewed and approved for dx/tx paracentesis Thursday 2/20  - please place IR procedure order under ESTRELLA Zaragoza (free text)   - STAT labs in AM (cbc,coags, bmp, T&S)  - Of note INR 3.1 today, for decompensated cirrhosis please maintain INR goal < 3.5  - Patient not currently on any anticoagulation, please notify IR if plan to start as it may need to be held prior to procedure.   - No need to NPO  - d/w primary team

## 2025-02-19 NOTE — PROGRESS NOTE ADULT - ASSESSMENT
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 Actively drinking. Now p/w abdominal pain/distension with worsening of liver tests possibly triggered by infection. DDx also includes alcohol associated hepatitis given hx of alc hep 7/2024 which improved with steroids  - Listed for OLT as noted above and currently awiating donor offers  - EV: EGD 7/2024 for melena showed grade II EV, small gastric ulcer (neg HP), and portal gastropathy   - Ascites: s/p para with 6.8 L fluid removed. Continue diuresis w/ bumex gtt 2U/hr   - HE: c/w  rifaximin, goal 3-4 BM/day. Transition to miralax from lactulose per pt request   - HCC screening: MRI nondiagnostic, no clear lesion seen                             - C/w home midodrine 10 mg tid  - C/w PPI for stress ulcer ppx and hx of ulcer  - Trend MELD labs daily (cmp, INR) daily  - Strict I/Os, daily wts    #Empyema  CT chest noncontrast reviewed from OSH showing moderate sized complex R sided pleural effusion. s/p chest tube placed 1/18 with cultures growing Citrobacter c/w empyema. Requiring 2nd chest tube placement then now s/p VATS 1/28 for persistent effusion with 3rd chest tube placed. Now all chest tubes removed with persistent collection on CXR  - C/w Ceftriaxone 2g daily & Flagyl given persistent collection   - CTS evaluation for oozing from VATS site  - Trend cbc and fever curve  - Appreciate thoracic surgery and ID recs    #MANUELITO, improving  #Volume overload  Found to have acute renal failure requiring dialysis at OSH, ddx includes HRS vs ATN. Cr baseline 1.12 10/2024 required albumin assisted diuresis with bumex 2 mg IV q12h. Cr improving with plan to continue diuresis as noted above.   - Diuretics as above - Goal 2.5L removal/day  - Strict I/Os, daily wts  - Trend CMP daily    #Anemia  #Thrombocytopenia  Normocytic anemia with Hgb 8.5 and plt count 64 likely due to chronic liver dz  - s/p 4 units pRBC, 2 FFP, 2 plts on 1/28 and 1 unit on 2/10  - Hold DVT ppx for bleeding    All recommendations are tentative until note is attested by attending.     Note incomplete until finalized by attending signature/attestation.    Saul Zarate  GI/Hepatology Fellow, PGY-4    MONDAY-FRIDAY 8AM-5PM:  Please message via [employer] or email [email or web address] [email or web address]     On Weekends/Holidays (All day) and Weekdays after 5 PM to 8 AM  For nonurgent consults please email:  Please email Front [email or web address]  For urgent consults:  Please contact on call GI team. See Amion schedule ([facility]), [employer] paging system ([facility]), or call hospital  ([facility])

## 2025-02-20 LAB
ALBUMIN SERPL ELPH-MCNC: 3.1 G/DL — LOW (ref 3.3–5)
ALP SERPL-CCNC: 105 U/L — SIGNIFICANT CHANGE UP (ref 40–120)
ALT FLD-CCNC: 9 U/L — LOW (ref 10–45)
ANION GAP SERPL CALC-SCNC: 11 MMOL/L — SIGNIFICANT CHANGE UP (ref 5–17)
APTT BLD: 53.2 SEC — HIGH (ref 24.5–35.6)
AST SERPL-CCNC: 43 U/L — HIGH (ref 10–40)
BILIRUB SERPL-MCNC: 7.7 MG/DL — HIGH (ref 0.2–1.2)
BLD GP AB SCN SERPL QL: NEGATIVE — SIGNIFICANT CHANGE UP
BUN SERPL-MCNC: 40 MG/DL — HIGH (ref 7–23)
CALCIUM SERPL-MCNC: 9.4 MG/DL — SIGNIFICANT CHANGE UP (ref 8.4–10.5)
CHLORIDE SERPL-SCNC: 97 MMOL/L — SIGNIFICANT CHANGE UP (ref 96–108)
CO2 SERPL-SCNC: 27 MMOL/L — SIGNIFICANT CHANGE UP (ref 22–31)
CREAT SERPL-MCNC: 1.14 MG/DL — SIGNIFICANT CHANGE UP (ref 0.5–1.3)
EGFR: 80 ML/MIN/1.73M2 — SIGNIFICANT CHANGE UP
EGFR: 80 ML/MIN/1.73M2 — SIGNIFICANT CHANGE UP
GLUCOSE BLDC GLUCOMTR-MCNC: 113 MG/DL — HIGH (ref 70–99)
GLUCOSE BLDC GLUCOMTR-MCNC: 129 MG/DL — HIGH (ref 70–99)
GLUCOSE BLDC GLUCOMTR-MCNC: 141 MG/DL — HIGH (ref 70–99)
GLUCOSE BLDC GLUCOMTR-MCNC: 160 MG/DL — HIGH (ref 70–99)
GLUCOSE SERPL-MCNC: 101 MG/DL — HIGH (ref 70–99)
HCT VFR BLD CALC: 22.3 % — LOW (ref 39–50)
HGB BLD-MCNC: 7.2 G/DL — LOW (ref 13–17)
INR BLD: 3.19 RATIO — HIGH (ref 0.85–1.16)
MAGNESIUM SERPL-MCNC: 1.6 MG/DL — SIGNIFICANT CHANGE UP (ref 1.6–2.6)
MCHC RBC-ENTMCNC: 32 PG — SIGNIFICANT CHANGE UP (ref 27–34)
MCHC RBC-ENTMCNC: 32.3 G/DL — SIGNIFICANT CHANGE UP (ref 32–36)
MCV RBC AUTO: 99.1 FL — SIGNIFICANT CHANGE UP (ref 80–100)
NRBC BLD AUTO-RTO: 0 /100 WBCS — SIGNIFICANT CHANGE UP (ref 0–0)
PHOSPHATE SERPL-MCNC: 3.4 MG/DL — SIGNIFICANT CHANGE UP (ref 2.5–4.5)
PLATELET # BLD AUTO: 70 K/UL — LOW (ref 150–400)
POTASSIUM SERPL-MCNC: 4.2 MMOL/L — SIGNIFICANT CHANGE UP (ref 3.5–5.3)
POTASSIUM SERPL-SCNC: 4.2 MMOL/L — SIGNIFICANT CHANGE UP (ref 3.5–5.3)
PROT SERPL-MCNC: 8.2 G/DL — SIGNIFICANT CHANGE UP (ref 6–8.3)
PROTHROM AB SERPL-ACNC: 35.9 SEC — HIGH (ref 9.9–13.4)
RBC # BLD: 2.25 M/UL — LOW (ref 4.2–5.8)
RBC # FLD: 21.3 % — HIGH (ref 10.3–14.5)
RH IG SCN BLD-IMP: POSITIVE — SIGNIFICANT CHANGE UP
SODIUM SERPL-SCNC: 135 MMOL/L — SIGNIFICANT CHANGE UP (ref 135–145)
WBC # BLD: 5.99 K/UL — SIGNIFICANT CHANGE UP (ref 3.8–10.5)
WBC # FLD AUTO: 5.99 K/UL — SIGNIFICANT CHANGE UP (ref 3.8–10.5)

## 2025-02-20 PROCEDURE — 71045 X-RAY EXAM CHEST 1 VIEW: CPT | Mod: 26

## 2025-02-20 PROCEDURE — 99232 SBSQ HOSP IP/OBS MODERATE 35: CPT | Mod: GC

## 2025-02-20 RX ORDER — BUMETANIDE 1 MG/1
2 TABLET ORAL EVERY 8 HOURS
Refills: 0 | Status: DISCONTINUED | OUTPATIENT
Start: 2025-02-20 | End: 2025-02-20

## 2025-02-20 RX ORDER — MAGNESIUM SULFATE 500 MG/ML
2 SYRINGE (ML) INJECTION ONCE
Refills: 0 | Status: COMPLETED | OUTPATIENT
Start: 2025-02-20 | End: 2025-02-20

## 2025-02-20 RX ORDER — BUMETANIDE 1 MG/1
2 TABLET ORAL EVERY 8 HOURS
Refills: 0 | Status: COMPLETED | OUTPATIENT
Start: 2025-02-20 | End: 2025-02-21

## 2025-02-20 RX ORDER — SODIUM CHLORIDE 0.65 %
1 AEROSOL, SPRAY (ML) NASAL THREE TIMES A DAY
Refills: 0 | Status: DISCONTINUED | OUTPATIENT
Start: 2025-02-20 | End: 2025-02-27

## 2025-02-20 RX ADMIN — Medication 25 GRAM(S): at 13:40

## 2025-02-20 RX ADMIN — Medication 1 APPLICATION(S): at 04:49

## 2025-02-20 RX ADMIN — Medication 1 DROP(S): at 18:19

## 2025-02-20 RX ADMIN — Medication 40 MILLIGRAM(S): at 13:41

## 2025-02-20 RX ADMIN — TRAMADOL HYDROCHLORIDE 25 MILLIGRAM(S): 50 TABLET, FILM COATED ORAL at 13:41

## 2025-02-20 RX ADMIN — Medication 1 DROP(S): at 04:48

## 2025-02-20 RX ADMIN — Medication 150 MILLIGRAM(S): at 10:08

## 2025-02-20 RX ADMIN — TRAMADOL HYDROCHLORIDE 50 MILLIGRAM(S): 50 TABLET, FILM COATED ORAL at 21:26

## 2025-02-20 RX ADMIN — CEFTRIAXONE 100 MILLIGRAM(S): 500 INJECTION, POWDER, FOR SOLUTION INTRAMUSCULAR; INTRAVENOUS at 01:44

## 2025-02-20 RX ADMIN — LEVALBUTEROL HYDROCHLORIDE 0.63 MILLIGRAM(S): 1.25 SOLUTION RESPIRATORY (INHALATION) at 13:41

## 2025-02-20 RX ADMIN — Medication 800 MILLIGRAM(S): at 10:08

## 2025-02-20 RX ADMIN — TRAMADOL HYDROCHLORIDE 25 MILLIGRAM(S): 50 TABLET, FILM COATED ORAL at 14:41

## 2025-02-20 RX ADMIN — BUMETANIDE 2 MILLIGRAM(S): 1 TABLET ORAL at 21:26

## 2025-02-20 RX ADMIN — MIDODRINE HYDROCHLORIDE 10 MILLIGRAM(S): 5 TABLET ORAL at 01:43

## 2025-02-20 RX ADMIN — MIDODRINE HYDROCHLORIDE 10 MILLIGRAM(S): 5 TABLET ORAL at 18:19

## 2025-02-20 RX ADMIN — Medication 1 TABLET(S): at 13:41

## 2025-02-20 RX ADMIN — Medication 800 MILLIGRAM(S): at 18:19

## 2025-02-20 RX ADMIN — Medication 100 MILLIGRAM(S): at 13:41

## 2025-02-20 RX ADMIN — TRAMADOL HYDROCHLORIDE 50 MILLIGRAM(S): 50 TABLET, FILM COATED ORAL at 22:30

## 2025-02-20 RX ADMIN — BUMETANIDE 2 MILLIGRAM(S): 1 TABLET ORAL at 13:42

## 2025-02-20 RX ADMIN — FOLIC ACID 1 MILLIGRAM(S): 1 TABLET ORAL at 13:41

## 2025-02-20 RX ADMIN — LEVALBUTEROL HYDROCHLORIDE 0.63 MILLIGRAM(S): 1.25 SOLUTION RESPIRATORY (INHALATION) at 04:48

## 2025-02-20 RX ADMIN — LEVALBUTEROL HYDROCHLORIDE 0.63 MILLIGRAM(S): 1.25 SOLUTION RESPIRATORY (INHALATION) at 18:20

## 2025-02-20 RX ADMIN — TRAMADOL HYDROCHLORIDE 50 MILLIGRAM(S): 50 TABLET, FILM COATED ORAL at 09:20

## 2025-02-20 RX ADMIN — POLYETHYLENE GLYCOL 3350 17 GRAM(S): 17 POWDER, FOR SOLUTION ORAL at 04:48

## 2025-02-20 RX ADMIN — TRAMADOL HYDROCHLORIDE 50 MILLIGRAM(S): 50 TABLET, FILM COATED ORAL at 00:49

## 2025-02-20 RX ADMIN — TRAMADOL HYDROCHLORIDE 50 MILLIGRAM(S): 50 TABLET, FILM COATED ORAL at 08:20

## 2025-02-20 RX ADMIN — INSULIN LISPRO 2: 100 INJECTION, SOLUTION INTRAVENOUS; SUBCUTANEOUS at 17:08

## 2025-02-20 RX ADMIN — MIDODRINE HYDROCHLORIDE 10 MILLIGRAM(S): 5 TABLET ORAL at 10:08

## 2025-02-20 RX ADMIN — Medication 500 MILLIGRAM(S): at 04:48

## 2025-02-20 NOTE — PROGRESS NOTE ADULT - SUBJECTIVE AND OBJECTIVE BOX
Patient is a 44y old  Male who presents with a chief complaint of decompensated cirrhosis (2025 18:11)      Interval Events:   - patient feeling weaker and w/ headache yesterday. bumex gtt stopped.  - Still w/ UOP 3.3L, however CXR w/ significant R PLEF   - Feels better and w/ more energy this AM    ROS:   A 12-point ROS was performed and negative except as noted in HPI.    Hospital Medications:  albuterol    90 MICROgram(s) HFA Inhaler 1 Puff(s) Inhalation every 12 hours PRN  artificial tears (preservative free) Ophthalmic Solution 1 Drop(s) Both EYES two times a day  buMETAnide Injectable 2 milliGRAM(s) IV Push every 8 hours  cefTRIAXone   IVPB 2000 milliGRAM(s) IV Intermittent every 24 hours  cefTRIAXone   IVPB 1000 milliGRAM(s) IV Intermittent once  chlorhexidine 2% Cloths 1 Application(s) Topical <User Schedule>  folic acid 1 milliGRAM(s) Oral daily  influenza   Vaccine 0.5 milliLiter(s) IntraMuscular once  insulin lispro (ADMELOG) corrective regimen sliding scale   SubCutaneous three times a day before meals  insulin lispro (ADMELOG) corrective regimen sliding scale   SubCutaneous at bedtime  levalbuterol Inhalation 0.63 milliGRAM(s) Inhalation every 6 hours  magnesium oxide 800 milliGRAM(s) Oral two times a day with meals  magnesium sulfate  IVPB 2 Gram(s) IV Intermittent once  metroNIDAZOLE    Tablet 500 milliGRAM(s) Oral every 12 hours  midodrine 10 milliGRAM(s) Oral every 8 hours  multivitamin 1 Tablet(s) Oral daily  pantoprazole    Tablet 40 milliGRAM(s) Oral daily  polyethylene glycol 3350 17 Gram(s) Oral two times a day  rifAXIMin 550 milliGRAM(s) Oral every 12 hours  sodium chloride 0.65% Nasal 1 Spray(s) Both Nostrils three times a day PRN  spironolactone 150 milliGRAM(s) Oral <User Schedule>  thiamine 100 milliGRAM(s) Oral daily  traMADol 25 milliGRAM(s) Oral every 6 hours PRN  traMADol 50 milliGRAM(s) Oral every 6 hours PRN      PHYSICAL EXAM:   Vital Signs:  Vital Signs Last 24 Hrs  T(C): 36.8 (2025 09:00), Max: 37 (2025 00:31)  T(F): 98.3 (2025 09:00), Max: 98.6 (2025 00:31)  HR: 90 (:00) (90 - 105)  BP: 121/70 (2025 09:00) (121/70 - 133/77)  BP(mean): --  RR: 18 (2025 09:) (18 - 20)  SpO2: 98% (2025 09:00) (94% - 98%)    Parameters below as of 2025 09:00  Patient On (Oxygen Delivery Method): nasal cannula      Daily     Daily Weight in k.4 (2025 04:49)      GENERAL: no acute distress  NEURO: alert, no asterixis  HEENT: anicteric sclera, no conjunctival pallor appreciated  CHEST: no respiratory distress, no accessory muscle use  CARDIAC: regular rate  ABDOMEN: soft, mildly distended, nontender, no rebound or guarding  EXTREMITIES: b/l LE edema improved from prior but still w/ significant anasarca   SKIN: no lesions noted        LABS: reviewed                        7.2    5.99  )-----------( 70       ( 2025 07:16 )             22.3     02-20    135  |  97  |  40[H]  ----------------------------<  101[H]  4.2   |  27  |  1.14    Ca    9.4      2025 07:15  Phos  3.4     02-20  Mg     1.6     -20    TPro  8.2  /  Alb  3.1[L]  /  TBili  7.7[H]  /  DBili  x   /  AST  43[H]  /  ALT  9[L]  /  AlkPhos  105  02-20    LIVER FUNCTIONS - ( 2025 07:15 )  Alb: 3.1 g/dL / Pro: 8.2 g/dL / ALK PHOS: 105 U/L / ALT: 9 U/L / AST: 43 U/L / GGT: x             Interval Diagnostic Studies: see sunrise for full report   1.9

## 2025-02-20 NOTE — PROGRESS NOTE ADULT - ATTENDING COMMENTS
Patient is a 44-year-old gentleman with a history of alcohol use disorder, ROSA, peptic ulcer disease and alcohol associated liver cirrhosis  c/b Ascites, hyponatremia and encephalopathy who was transferred from [facility] on 1/16/2022 for refractory ascites and recurrent right pleural effusion.  His hospitalization has been complicated by HRS MANUELITO requiring intermittent HD from 1/10-19 which has since improved and right-sided empyema with pleural fluid culture positive for citrobacter koseri (difficult to control) and as such is status post VATS on 1/28 with decortication complicated by hemothorax.  He was eventually extubated 1/30 and his last chest tube removed 2/6.  His most recent CT chest from 2/7 showed moderate right gas and liquid containing fluid collection that cannot be safely evacuated per CT surgery prior to liver transplantation.  He remains on ceftriaxone, Flagyl until transplant.    He is overall clinically stable.  Walking, eating and mentating well.  Will continue diuresis.  We switched to a drip for 2 days but it appears to be draining him so will switch right back to Bumex 4 mg twice a day and spironolactone 150 mg daily.  Urine output is great so far.  Repeat CXR today  Recertified at a MELD score of 30 (but native MELD today is 29) on 2/16.  Awaiting organ offer.  Lactulose & rifaximin for a goal of 2-3 BMs daily.  Avoid opiates as he does not appear to respond well to it mentally - mild hallucinations.  Therapeutic paracentesis tomorrow for symptomatic relief.       Marion Smith MD/MPH  Transplant hepatology . Patient is a 44-year-old gentleman with a history of alcohol use disorder, ROSA, peptic ulcer disease and alcohol associated liver cirrhosis  c/b Ascites, hyponatremia and encephalopathy who was transferred from [facility] on 1/16/2022 for refractory ascites and recurrent right pleural effusion.  His hospitalization has been complicated by HRS MANUELITO requiring intermittent HD from 1/10-19 which has since improved and right-sided empyema with pleural fluid culture positive for citrobacter koseri (difficult to control) and as such is status post VATS on 1/28 with decortication complicated by hemothorax.  He was eventually extubated 1/30 and his last chest tube removed 2/6.  His most recent CT chest from 2/7 showed moderate right gas and liquid containing fluid collection that cannot be safely evacuated per CT surgery prior to liver transplantation and so they have plans to do it when he goes to the OR.  He Is to remain on ceftriaxone & Flagyl until transplant.    He is overall clinically stable.  Walking, eating and mentating well.  Will continue diuresis.  We switched to a drip for 2 days but it appears to be draining him so we have switched right back to Bumex 2 mg three times a day and spironolactone 150 mg daily.  Urine output is great so far.  Repeat CXR 2/19 showed relatively stable findings on the right.  Recertified at a MELD score of 30 (but native MELD today is 29) on 2/16.  Awaiting organ offer.  Lactulose & rifaximin for a goal of 2-3 BMs daily.  Holding off on Coreg for EVB ppx as he is on Midodrine - started for prior hypotension.  Avoid opiates as he does not appear to respond well to it mentally - mild hallucinations.  Therapeutic paracentesis tomorrow for symptomatic relief.       Marion Smith MD/MPH  Transplant hepatology .

## 2025-02-20 NOTE — PROGRESS NOTE ADULT - ASSESSMENT
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 Actively drinking. Now p/w abdominal pain/distension with worsening of liver tests possibly triggered by infection. DDx also includes alcohol associated hepatitis given hx of alc hep 7/2024 which improved with steroids  - Listed for OLT as noted above and currently awiating donor offers  - EV: EGD 7/2024 for melena showed grade II EV, small gastric ulcer (neg HP), and portal gastropathy   - Ascites: s/p para with 6.8 L fluid removed. Decrease bumex to 2mg IV Q8. Paracentesis scheduled for tomorrow   - HE: c/w  rifaximin, goal 3-4 BM/day. Continue miralax BID (transitioned off lactulose per pt request)  - HCC screening: MRI nondiagnostic, no clear lesion seen                             - C/w home midodrine 10 mg tid  - C/w PPI for stress ulcer ppx and hx of ulcer  - Trend MELD labs daily (cmp, INR) daily  - Strict I/Os, daily wts    #Empyema  CT chest noncontrast reviewed from OSH showing moderate sized complex R sided pleural effusion. s/p chest tube placed 1/18 with cultures growing Citrobacter c/w empyema. Requiring 2nd chest tube placement then now s/p VATS 1/28 for persistent effusion with 3rd chest tube placed. Now all chest tubes removed with persistent collection on CXR  - C/w Ceftriaxone 2g daily & Flagyl given persistent collection   - CTS evaluation for oozing from VATS site  - Trend cbc and fever curve  - Appreciate thoracic surgery and ID recs    #MANUELITO, improving  #Volume overload  Found to have acute renal failure requiring dialysis at OSH, ddx includes HRS vs ATN. Cr baseline 1.12 10/2024 required albumin assisted diuresis with bumex 2 mg IV q12h. Cr improving with plan to continue diuresis as noted above.   - Diuretics as above - Goal 2.5L removal/day  - Strict I/Os, daily wts  - Trend CMP daily    #Anemia  #Thrombocytopenia  Normocytic anemia with Hgb 8.5 and plt count 64 likely due to chronic liver dz  - s/p 4 units pRBC, 2 FFP, 2 plts on 1/28 and 1 unit on 2/10  - Hold DVT ppx for bleeding    All recommendations are tentative until note is attested by attending.     Note incomplete until finalized by attending signature/attestation.    Saul Zarate  GI/Hepatology Fellow, PGY-4    MONDAY-FRIDAY 8AM-5PM:  Please message via [employer] or email [email or web address]     On Weekends/Holidays (All day) and Weekdays after 5 PM to 8 AM  For nonurgent consults please email:  Please email [email or web address]  For urgent consults:  Please contact on call GI team. See Amion schedule ([facility]), [email or web address] paging system ([facility]), or call hospital  ([facility])

## 2025-02-21 LAB
ALBUMIN FLD-MCNC: 1.3 G/DL — SIGNIFICANT CHANGE UP
ALBUMIN SERPL ELPH-MCNC: 3.2 G/DL — LOW (ref 3.3–5)
ALP SERPL-CCNC: 121 U/L — HIGH (ref 40–120)
ALT FLD-CCNC: 9 U/L — LOW (ref 10–45)
ANION GAP SERPL CALC-SCNC: 10 MMOL/L — SIGNIFICANT CHANGE UP (ref 5–17)
APTT BLD: 50.1 SEC — HIGH (ref 24.5–35.6)
AST SERPL-CCNC: 47 U/L — HIGH (ref 10–40)
B PERT IGG+IGM PNL SER: ABNORMAL
BILIRUB SERPL-MCNC: 8.1 MG/DL — HIGH (ref 0.2–1.2)
BUN SERPL-MCNC: 39 MG/DL — HIGH (ref 7–23)
CALCIUM SERPL-MCNC: 9.9 MG/DL — SIGNIFICANT CHANGE UP (ref 8.4–10.5)
CHLORIDE SERPL-SCNC: 96 MMOL/L — SIGNIFICANT CHANGE UP (ref 96–108)
CO2 SERPL-SCNC: 27 MMOL/L — SIGNIFICANT CHANGE UP (ref 22–31)
COLOR FLD: ABNORMAL
CREAT SERPL-MCNC: 1.06 MG/DL — SIGNIFICANT CHANGE UP (ref 0.5–1.3)
EGFR: 88 ML/MIN/1.73M2 — SIGNIFICANT CHANGE UP
EGFR: 88 ML/MIN/1.73M2 — SIGNIFICANT CHANGE UP
EOSINOPHIL # FLD: 1 % — SIGNIFICANT CHANGE UP
FLUID INTAKE SUBSTANCE CLASS: SIGNIFICANT CHANGE UP
GLUCOSE BLDC GLUCOMTR-MCNC: 114 MG/DL — HIGH (ref 70–99)
GLUCOSE BLDC GLUCOMTR-MCNC: 114 MG/DL — HIGH (ref 70–99)
GLUCOSE BLDC GLUCOMTR-MCNC: 147 MG/DL — HIGH (ref 70–99)
GLUCOSE BLDC GLUCOMTR-MCNC: 169 MG/DL — HIGH (ref 70–99)
GLUCOSE FLD-MCNC: 149 MG/DL — SIGNIFICANT CHANGE UP
GLUCOSE SERPL-MCNC: 105 MG/DL — HIGH (ref 70–99)
HCT VFR BLD CALC: 23.8 % — LOW (ref 39–50)
HGB BLD-MCNC: 7.7 G/DL — LOW (ref 13–17)
INR BLD: 3.25 RATIO — HIGH (ref 0.85–1.16)
LDH SERPL L TO P-CCNC: 79 U/L — SIGNIFICANT CHANGE UP
LYMPHOCYTES # FLD: 53 % — SIGNIFICANT CHANGE UP
MAGNESIUM SERPL-MCNC: 1.6 MG/DL — SIGNIFICANT CHANGE UP (ref 1.6–2.6)
MCHC RBC-ENTMCNC: 32.1 PG — SIGNIFICANT CHANGE UP (ref 27–34)
MCHC RBC-ENTMCNC: 32.4 G/DL — SIGNIFICANT CHANGE UP (ref 32–36)
MCV RBC AUTO: 99.2 FL — SIGNIFICANT CHANGE UP (ref 80–100)
MESOTHL CELL # FLD: SIGNIFICANT CHANGE UP
MONOS+MACROS # FLD: 41 % — SIGNIFICANT CHANGE UP
NEUTROPHILS-BODY FLUID: 5 % — SIGNIFICANT CHANGE UP
NRBC BLD AUTO-RTO: 0 /100 WBCS — SIGNIFICANT CHANGE UP (ref 0–0)
PHOSPHATE SERPL-MCNC: 3.5 MG/DL — SIGNIFICANT CHANGE UP (ref 2.5–4.5)
PLATELET # BLD AUTO: 77 K/UL — LOW (ref 150–400)
POTASSIUM SERPL-MCNC: 4.1 MMOL/L — SIGNIFICANT CHANGE UP (ref 3.5–5.3)
POTASSIUM SERPL-SCNC: 4.1 MMOL/L — SIGNIFICANT CHANGE UP (ref 3.5–5.3)
PROT FLD-MCNC: 3.1 G/DL — SIGNIFICANT CHANGE UP
PROT SERPL-MCNC: 8.9 G/DL — HIGH (ref 6–8.3)
PROTHROM AB SERPL-ACNC: 36.6 SEC — HIGH (ref 9.9–13.4)
RBC # BLD: 2.4 M/UL — LOW (ref 4.2–5.8)
RBC # FLD: 20.9 % — HIGH (ref 10.3–14.5)
RCV VOL RI: 3000 CELLS/UL — HIGH
SODIUM SERPL-SCNC: 133 MMOL/L — LOW (ref 135–145)
TOTAL CELLS COUNTED, BODY FLUID: 164 CELLS — SIGNIFICANT CHANGE UP
TOTAL NUCLEATED CELL COUNT, BODY FLUID: 164 CELLS/UL — SIGNIFICANT CHANGE UP
TUBE TYPE: SIGNIFICANT CHANGE UP
WBC # BLD: 6.35 K/UL — SIGNIFICANT CHANGE UP (ref 3.8–10.5)
WBC # FLD AUTO: 6.35 K/UL — SIGNIFICANT CHANGE UP (ref 3.8–10.5)
WBC COUNT.: 155 CELLS/UL — SIGNIFICANT CHANGE UP

## 2025-02-21 PROCEDURE — 49082 ABD PARACENTESIS: CPT

## 2025-02-21 PROCEDURE — 99232 SBSQ HOSP IP/OBS MODERATE 35: CPT | Mod: GC

## 2025-02-21 PROCEDURE — 49083 ABD PARACENTESIS W/IMAGING: CPT

## 2025-02-21 RX ORDER — MAGNESIUM SULFATE 500 MG/ML
2 SYRINGE (ML) INJECTION ONCE
Refills: 0 | Status: COMPLETED | OUTPATIENT
Start: 2025-02-21 | End: 2025-02-21

## 2025-02-21 RX ORDER — MELATONIN 5 MG
5 TABLET ORAL AT BEDTIME
Refills: 0 | Status: DISCONTINUED | OUTPATIENT
Start: 2025-02-21 | End: 2025-02-27

## 2025-02-21 RX ORDER — ONDANSETRON HCL/PF 4 MG/2 ML
4 VIAL (ML) INJECTION ONCE
Refills: 0 | Status: COMPLETED | OUTPATIENT
Start: 2025-02-21 | End: 2025-02-21

## 2025-02-21 RX ORDER — ALBUMIN (HUMAN) 12.5 G/50ML
50 INJECTION, SOLUTION INTRAVENOUS ONCE
Refills: 0 | Status: COMPLETED | OUTPATIENT
Start: 2025-02-21 | End: 2025-02-21

## 2025-02-21 RX ORDER — BUMETANIDE 1 MG/1
4 TABLET ORAL
Refills: 0 | Status: COMPLETED | OUTPATIENT
Start: 2025-02-21 | End: 2025-02-21

## 2025-02-21 RX ADMIN — CEFTRIAXONE 100 MILLIGRAM(S): 500 INJECTION, POWDER, FOR SOLUTION INTRAMUSCULAR; INTRAVENOUS at 01:18

## 2025-02-21 RX ADMIN — Medication 4 MILLIGRAM(S): at 13:49

## 2025-02-21 RX ADMIN — Medication 150 MILLIGRAM(S): at 09:09

## 2025-02-21 RX ADMIN — TRAMADOL HYDROCHLORIDE 50 MILLIGRAM(S): 50 TABLET, FILM COATED ORAL at 06:42

## 2025-02-21 RX ADMIN — Medication 100 MILLIGRAM(S): at 13:20

## 2025-02-21 RX ADMIN — Medication 40 MILLIGRAM(S): at 13:21

## 2025-02-21 RX ADMIN — BUMETANIDE 132 MILLIGRAM(S): 1 TABLET ORAL at 18:23

## 2025-02-21 RX ADMIN — TRAMADOL HYDROCHLORIDE 50 MILLIGRAM(S): 50 TABLET, FILM COATED ORAL at 14:20

## 2025-02-21 RX ADMIN — Medication 1 DROP(S): at 18:23

## 2025-02-21 RX ADMIN — CEFTRIAXONE 100 MILLIGRAM(S): 500 INJECTION, POWDER, FOR SOLUTION INTRAMUSCULAR; INTRAVENOUS at 01:30

## 2025-02-21 RX ADMIN — TRAMADOL HYDROCHLORIDE 50 MILLIGRAM(S): 50 TABLET, FILM COATED ORAL at 13:21

## 2025-02-21 RX ADMIN — POLYETHYLENE GLYCOL 3350 17 GRAM(S): 17 POWDER, FOR SOLUTION ORAL at 09:09

## 2025-02-21 RX ADMIN — Medication 5 MILLIGRAM(S): at 03:12

## 2025-02-21 RX ADMIN — BUMETANIDE 132 MILLIGRAM(S): 1 TABLET ORAL at 13:20

## 2025-02-21 RX ADMIN — MIDODRINE HYDROCHLORIDE 10 MILLIGRAM(S): 5 TABLET ORAL at 18:22

## 2025-02-21 RX ADMIN — MIDODRINE HYDROCHLORIDE 10 MILLIGRAM(S): 5 TABLET ORAL at 01:17

## 2025-02-21 RX ADMIN — ALBUMIN (HUMAN) 300 MILLILITER(S): 12.5 INJECTION, SOLUTION INTRAVENOUS at 15:50

## 2025-02-21 RX ADMIN — FOLIC ACID 1 MILLIGRAM(S): 1 TABLET ORAL at 13:21

## 2025-02-21 RX ADMIN — TRAMADOL HYDROCHLORIDE 25 MILLIGRAM(S): 50 TABLET, FILM COATED ORAL at 01:16

## 2025-02-21 RX ADMIN — Medication 800 MILLIGRAM(S): at 09:09

## 2025-02-21 RX ADMIN — LEVALBUTEROL HYDROCHLORIDE 0.63 MILLIGRAM(S): 1.25 SOLUTION RESPIRATORY (INHALATION) at 18:21

## 2025-02-21 RX ADMIN — LEVALBUTEROL HYDROCHLORIDE 0.63 MILLIGRAM(S): 1.25 SOLUTION RESPIRATORY (INHALATION) at 13:20

## 2025-02-21 RX ADMIN — Medication 25 GRAM(S): at 09:09

## 2025-02-21 RX ADMIN — TRAMADOL HYDROCHLORIDE 50 MILLIGRAM(S): 50 TABLET, FILM COATED ORAL at 07:40

## 2025-02-21 RX ADMIN — Medication 1 APPLICATION(S): at 09:08

## 2025-02-21 RX ADMIN — TRAMADOL HYDROCHLORIDE 25 MILLIGRAM(S): 50 TABLET, FILM COATED ORAL at 10:58

## 2025-02-21 RX ADMIN — TRAMADOL HYDROCHLORIDE 25 MILLIGRAM(S): 50 TABLET, FILM COATED ORAL at 09:58

## 2025-02-21 RX ADMIN — Medication 1 TABLET(S): at 13:20

## 2025-02-21 RX ADMIN — Medication 800 MILLIGRAM(S): at 18:22

## 2025-02-21 RX ADMIN — LEVALBUTEROL HYDROCHLORIDE 0.63 MILLIGRAM(S): 1.25 SOLUTION RESPIRATORY (INHALATION) at 09:08

## 2025-02-21 RX ADMIN — TRAMADOL HYDROCHLORIDE 25 MILLIGRAM(S): 50 TABLET, FILM COATED ORAL at 02:20

## 2025-02-21 RX ADMIN — MIDODRINE HYDROCHLORIDE 10 MILLIGRAM(S): 5 TABLET ORAL at 09:09

## 2025-02-21 RX ADMIN — BUMETANIDE 2 MILLIGRAM(S): 1 TABLET ORAL at 05:31

## 2025-02-21 NOTE — PROGRESS NOTE ADULT - SUBJECTIVE AND OBJECTIVE BOX
Patient is a 44y old  Male who presents with a chief complaint of decompensated cirrhosis (2025 13:24)      Interval Events:   - taken off diuretics yesterday evening. Total UOP 2300 ccs  - This AM patient w/ N/V, though feels better afterwards. Patient still w/ abd pain and poor appetite related to distension     ROS:   A 12-point ROS was performed and negative except as noted in HPI.    Hospital Medications:  albuterol    90 MICROgram(s) HFA Inhaler 1 Puff(s) Inhalation every 12 hours PRN  artificial tears (preservative free) Ophthalmic Solution 1 Drop(s) Both EYES two times a day  buMETAnide IVPB 4 milliGRAM(s) IV Intermittent <User Schedule>  cefTRIAXone   IVPB 2000 milliGRAM(s) IV Intermittent every 24 hours  chlorhexidine 2% Cloths 1 Application(s) Topical <User Schedule>  folic acid 1 milliGRAM(s) Oral daily  influenza   Vaccine 0.5 milliLiter(s) IntraMuscular once  insulin lispro (ADMELOG) corrective regimen sliding scale   SubCutaneous three times a day before meals  insulin lispro (ADMELOG) corrective regimen sliding scale   SubCutaneous at bedtime  levalbuterol Inhalation 0.63 milliGRAM(s) Inhalation every 6 hours  magnesium oxide 800 milliGRAM(s) Oral two times a day with meals  melatonin 5 milliGRAM(s) Oral at bedtime PRN  midodrine 10 milliGRAM(s) Oral every 8 hours  multivitamin 1 Tablet(s) Oral daily  pantoprazole    Tablet 40 milliGRAM(s) Oral daily  polyethylene glycol 3350 17 Gram(s) Oral two times a day  rifAXIMin 550 milliGRAM(s) Oral every 12 hours  sodium chloride 0.65% Nasal 1 Spray(s) Both Nostrils three times a day PRN  spironolactone 150 milliGRAM(s) Oral <User Schedule>  thiamine 100 milliGRAM(s) Oral daily  traMADol 50 milliGRAM(s) Oral every 6 hours PRN  traMADol 25 milliGRAM(s) Oral every 6 hours PRN      PHYSICAL EXAM:   Vital Signs:  Vital Signs Last 24 Hrs  T(C): 36.3 (2025 08:44), Max: 37.4 (2025 17:29)  T(F): 97.4 (2025 08:44), Max: 99.4 (2025 17:29)  HR: 87 (2025 08:44) (87 - 99)  BP: 128/74 (2025 08:44) (122/69 - 128/74)  BP(mean): --  RR: 18 (2025 08:44) (18 - 18)  SpO2: 99% (2025 08:44) (91% - 99%)    Parameters below as of 2025 08:44  Patient On (Oxygen Delivery Method): nasal cannula  O2 Flow (L/min): 2    Daily     Daily Weight in k.2 (2025 04:58)    GENERAL: no acute distress  NEURO: alert, no asterixis   HEENT: scleral icterus   CHEST: no respiratory distress, no accessory muscle use  CARDIAC: regular rate  ABDOMEN: abd distension, no TTP  EXTREMITIES: anasarca   SKIN: jaundiced     LABS: reviewed                        7.7    6.35  )-----------( 77       ( 2025 06:46 )             23.8     02-    133[L]  |  96  |  39[H]  ----------------------------<  105[H]  4.1   |  27  |  1.06    Ca    9.9      2025 06:46  Phos  3.5     02-  Mg     1.6     -    TPro  8.9[H]  /  Alb  3.2[L]  /  TBili  8.1[H]  /  DBili  x   /  AST  47[H]  /  ALT  9[L]  /  AlkPhos  121[H]  02-21    LIVER FUNCTIONS - ( 2025 06:46 )  Alb: 3.2 g/dL / Pro: 8.9 g/dL / ALK PHOS: 121 U/L / ALT: 9 U/L / AST: 47 U/L / GGT: x             Interval Diagnostic Studies: see sunrise for full report

## 2025-02-21 NOTE — PRE PROCEDURE NOTE - PRE PROCEDURE EVALUATION
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 CT chest at OSH was significant for a complex R pleural effusion s/p R pigtail placement by IR on 1/18 with drainage of purulent material c/f empyema, s/p Right VATS decortication 1/28 and chest tube removal. Patient now presents to IR for therapeutic paracentesis.     Allergies: sulfa drugs (Unknown)  Salicylic Acid (Other)    Medications (Abx/Cardiac/Anticoagulation/Blood Products)    buMETAnide Injectable: 2 milliGRAM(s) IV Push (02-21 @ 05:31)  buMETAnide IVPB: 132 mL/Hr IV Intermittent (02-21 @ 13:20)  cefTRIAXone   IVPB: 100 mL/Hr IV Intermittent (02-21 @ 01:30)  cefTRIAXone   IVPB: 100 mL/Hr IV Intermittent (02-21 @ 01:18)  metroNIDAZOLE    Tablet: 500 milliGRAM(s) Oral (02-20 @ 04:48)  midodrine: 10 milliGRAM(s) Oral (02-21 @ 09:09)  rifAXIMin: 550 milliGRAM(s) Oral (02-21 @ 05:31)  spironolactone: 150 milliGRAM(s) Oral (02-21 @ 09:09)    Data:  180.3  108.3  T(C): 36.9  HR: 90  BP: 115/69  RR: 18  SpO2: 100%    Exam  General: No acute distress  Chest: Non labored breathing    -WBC 6.35 / HgB 7.7 / Hct 23.8 / Plt 77  -Na 133 / Cl 96 / BUN 39 / Glucose 105  -K 4.1 / CO2 27 / Cr 1.06  -ALT 9 / Alk Phos 121 / T.Bili 8.1  -INR3.25    Imaging:   Reviewed    Plan: 44y Male presents for paracentesis  -Risks/Benefits/alternatives explained with the patient and/or healthcare proxy and witnessed informed consent obtained.

## 2025-02-21 NOTE — PROCEDURE NOTE - ADDITIONAL PROCEDURE DETAILS
Technically successful paracentesis through RUQ window. 3.9L of clear yellow fluid was drained. Albumin 25% in 50 cc was given x 1. Full report to follow. Technically successful paracentesis through RUQ window. 3.9L of clear donte fluid was drained. Albumin 25% in 50 cc was given x 1. Full report to follow.

## 2025-02-21 NOTE — PROGRESS NOTE ADULT - ASSESSMENT
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 Actively drinking. Now p/w abdominal pain/distension with worsening of liver tests possibly triggered by infection. DDx also includes alcohol associated hepatitis given hx of alc hep 7/2024 which improved with steroids  - Listed for OLT as noted above and currently awiating donor offers  - EV: EGD 7/2024 for melena showed grade II EV, small gastric ulcer (neg HP), and portal gastropathy   - Ascites: s/p para with 6.8 L fluid removed. Bumex at 4mg IV BID . Paracentesis scheduled for today  - HE: c/w  rifaximin, goal 3-4 BM/day. Continue miralax BID (transitioned off lactulose per pt request)  - HCC screening: MRI nondiagnostic, no clear lesion seen                             - C/w home midodrine 10 mg tid  - C/w PPI for stress ulcer ppx and hx of ulcer  - Trend MELD labs daily (cmp, INR) daily  - Strict I/Os, daily wts    #Empyema  CT chest noncontrast reviewed from OSH showing moderate sized complex R sided pleural effusion. s/p chest tube placed 1/18 with cultures growing Citrobacter c/w empyema. Requiring 2nd chest tube placement then now s/p VATS 1/28 for persistent effusion with 3rd chest tube placed. Now all chest tubes removed with persistent collection on CXR  - C/w Ceftriaxone 2g daily & Flagyl given persistent collection   - CTS evaluation for oozing from VATS site  - Repeat Xray tomorrow AM  - Trend cbc and fever curve  - Appreciate thoracic surgery and ID recs      #Anemia  #Thrombocytopenia  Normocytic anemia with Hgb 8.5 and plt count 64 likely due to chronic liver dz  - s/p 4 units pRBC, 2 FFP, 2 plts on 1/28 and 1 unit on 2/10  - Hold DVT ppx for bleeding    All recommendations are tentative until note is attested by attending.     Note incomplete until finalized by attending signature/attestation.    Saul Zarate  GI/Hepatology Fellow, PGY-4    MONDAY-FRIDAY 8AM-5PM:  Please message via [employer] or email [email or web address]     On Weekends/Holidays (All day) and Weekdays after 5 PM to 8 AM  For nonurgent consults please email:  Please email [email or web address]  For urgent consults:  Please contact on call GI team. See Amion schedule ([facility]), [employer] paging system ([facility]), or call hospital  ([facility])

## 2025-02-22 LAB
ALBUMIN SERPL ELPH-MCNC: 3 G/DL — LOW (ref 3.3–5)
ALP SERPL-CCNC: 110 U/L — SIGNIFICANT CHANGE UP (ref 40–120)
ALT FLD-CCNC: 7 U/L — LOW (ref 10–45)
ANION GAP SERPL CALC-SCNC: 9 MMOL/L — SIGNIFICANT CHANGE UP (ref 5–17)
APTT BLD: 53.3 SEC — HIGH (ref 24.5–35.6)
AST SERPL-CCNC: 43 U/L — HIGH (ref 10–40)
BILIRUB SERPL-MCNC: 7 MG/DL — HIGH (ref 0.2–1.2)
BUN SERPL-MCNC: 40 MG/DL — HIGH (ref 7–23)
CALCIUM SERPL-MCNC: 9.6 MG/DL — SIGNIFICANT CHANGE UP (ref 8.4–10.5)
CHLORIDE SERPL-SCNC: 95 MMOL/L — LOW (ref 96–108)
CO2 SERPL-SCNC: 29 MMOL/L — SIGNIFICANT CHANGE UP (ref 22–31)
CREAT SERPL-MCNC: 1.12 MG/DL — SIGNIFICANT CHANGE UP (ref 0.5–1.3)
CULTURE RESULTS: SIGNIFICANT CHANGE UP
EGFR: 82 ML/MIN/1.73M2 — SIGNIFICANT CHANGE UP
EGFR: 82 ML/MIN/1.73M2 — SIGNIFICANT CHANGE UP
GLUCOSE BLDC GLUCOMTR-MCNC: 132 MG/DL — HIGH (ref 70–99)
GLUCOSE BLDC GLUCOMTR-MCNC: 135 MG/DL — HIGH (ref 70–99)
GLUCOSE BLDC GLUCOMTR-MCNC: 169 MG/DL — HIGH (ref 70–99)
GLUCOSE BLDC GLUCOMTR-MCNC: 171 MG/DL — HIGH (ref 70–99)
GLUCOSE SERPL-MCNC: 127 MG/DL — HIGH (ref 70–99)
GRAM STN FLD: SIGNIFICANT CHANGE UP
HCT VFR BLD CALC: 20.9 % — CRITICAL LOW (ref 39–50)
HCT VFR BLD CALC: 25.3 % — LOW (ref 39–50)
HCT VFR BLD CALC: 25.4 % — LOW (ref 39–50)
HGB BLD-MCNC: 6.7 G/DL — CRITICAL LOW (ref 13–17)
HGB BLD-MCNC: 8.3 G/DL — LOW (ref 13–17)
HGB BLD-MCNC: 8.4 G/DL — LOW (ref 13–17)
INR BLD: 3.27 RATIO — HIGH (ref 0.85–1.16)
MAGNESIUM SERPL-MCNC: 1.6 MG/DL — SIGNIFICANT CHANGE UP (ref 1.6–2.6)
MCHC RBC-ENTMCNC: 31.6 PG — SIGNIFICANT CHANGE UP (ref 27–34)
MCHC RBC-ENTMCNC: 32 PG — SIGNIFICANT CHANGE UP (ref 27–34)
MCHC RBC-ENTMCNC: 32.1 G/DL — SIGNIFICANT CHANGE UP (ref 32–36)
MCHC RBC-ENTMCNC: 32.8 G/DL — SIGNIFICANT CHANGE UP (ref 32–36)
MCHC RBC-ENTMCNC: 32.8 PG — SIGNIFICANT CHANGE UP (ref 27–34)
MCHC RBC-ENTMCNC: 33.1 G/DL — SIGNIFICANT CHANGE UP (ref 32–36)
MCV RBC AUTO: 97.7 FL — SIGNIFICANT CHANGE UP (ref 80–100)
MCV RBC AUTO: 98.6 FL — SIGNIFICANT CHANGE UP (ref 80–100)
MCV RBC AUTO: 99.2 FL — SIGNIFICANT CHANGE UP (ref 80–100)
NRBC BLD AUTO-RTO: 0 /100 WBCS — SIGNIFICANT CHANGE UP (ref 0–0)
PHOSPHATE SERPL-MCNC: 3.4 MG/DL — SIGNIFICANT CHANGE UP (ref 2.5–4.5)
PLATELET # BLD AUTO: 63 K/UL — LOW (ref 150–400)
PLATELET # BLD AUTO: 73 K/UL — LOW (ref 150–400)
PLATELET # BLD AUTO: 77 K/UL — LOW (ref 150–400)
POTASSIUM SERPL-MCNC: 3.8 MMOL/L — SIGNIFICANT CHANGE UP (ref 3.5–5.3)
POTASSIUM SERPL-SCNC: 3.8 MMOL/L — SIGNIFICANT CHANGE UP (ref 3.5–5.3)
PROT SERPL-MCNC: 8.3 G/DL — SIGNIFICANT CHANGE UP (ref 6–8.3)
PROTHROM AB SERPL-ACNC: 37.2 SEC — HIGH (ref 9.9–13.4)
RBC # BLD: 2.12 M/UL — LOW (ref 4.2–5.8)
RBC # BLD: 2.56 M/UL — LOW (ref 4.2–5.8)
RBC # BLD: 2.59 M/UL — LOW (ref 4.2–5.8)
RBC # FLD: 21 % — HIGH (ref 10.3–14.5)
SODIUM SERPL-SCNC: 133 MMOL/L — LOW (ref 135–145)
SPECIMEN SOURCE: SIGNIFICANT CHANGE UP
SPECIMEN SOURCE: SIGNIFICANT CHANGE UP
WBC # BLD: 4.8 K/UL — SIGNIFICANT CHANGE UP (ref 3.8–10.5)
WBC # BLD: 6.2 K/UL — SIGNIFICANT CHANGE UP (ref 3.8–10.5)
WBC # BLD: 6.31 K/UL — SIGNIFICANT CHANGE UP (ref 3.8–10.5)
WBC # FLD AUTO: 4.8 K/UL — SIGNIFICANT CHANGE UP (ref 3.8–10.5)
WBC # FLD AUTO: 6.2 K/UL — SIGNIFICANT CHANGE UP (ref 3.8–10.5)
WBC # FLD AUTO: 6.31 K/UL — SIGNIFICANT CHANGE UP (ref 3.8–10.5)

## 2025-02-22 PROCEDURE — 71045 X-RAY EXAM CHEST 1 VIEW: CPT | Mod: 26

## 2025-02-22 RX ORDER — MAGNESIUM SULFATE 500 MG/ML
2 SYRINGE (ML) INJECTION ONCE
Refills: 0 | Status: DISCONTINUED | OUTPATIENT
Start: 2025-02-22 | End: 2025-02-22

## 2025-02-22 RX ORDER — MAGNESIUM SULFATE 500 MG/ML
1 SYRINGE (ML) INJECTION ONCE
Refills: 0 | Status: COMPLETED | OUTPATIENT
Start: 2025-02-22 | End: 2025-02-22

## 2025-02-22 RX ORDER — BUMETANIDE 1 MG/1
4 TABLET ORAL
Refills: 0 | Status: COMPLETED | OUTPATIENT
Start: 2025-02-22 | End: 2025-02-22

## 2025-02-22 RX ORDER — CEFTRIAXONE 500 MG/1
2000 INJECTION, POWDER, FOR SOLUTION INTRAMUSCULAR; INTRAVENOUS EVERY 24 HOURS
Refills: 0 | Status: DISCONTINUED | OUTPATIENT
Start: 2025-02-23 | End: 2025-02-27

## 2025-02-22 RX ADMIN — Medication 1 APPLICATION(S): at 08:13

## 2025-02-22 RX ADMIN — LEVALBUTEROL HYDROCHLORIDE 0.63 MILLIGRAM(S): 1.25 SOLUTION RESPIRATORY (INHALATION) at 23:45

## 2025-02-22 RX ADMIN — MIDODRINE HYDROCHLORIDE 10 MILLIGRAM(S): 5 TABLET ORAL at 17:24

## 2025-02-22 RX ADMIN — LEVALBUTEROL HYDROCHLORIDE 0.63 MILLIGRAM(S): 1.25 SOLUTION RESPIRATORY (INHALATION) at 17:24

## 2025-02-22 RX ADMIN — BUMETANIDE 132 MILLIGRAM(S): 1 TABLET ORAL at 21:43

## 2025-02-22 RX ADMIN — INSULIN LISPRO 2: 100 INJECTION, SOLUTION INTRAVENOUS; SUBCUTANEOUS at 12:28

## 2025-02-22 RX ADMIN — FOLIC ACID 1 MILLIGRAM(S): 1 TABLET ORAL at 12:27

## 2025-02-22 RX ADMIN — TRAMADOL HYDROCHLORIDE 50 MILLIGRAM(S): 50 TABLET, FILM COATED ORAL at 23:48

## 2025-02-22 RX ADMIN — LEVALBUTEROL HYDROCHLORIDE 0.63 MILLIGRAM(S): 1.25 SOLUTION RESPIRATORY (INHALATION) at 12:27

## 2025-02-22 RX ADMIN — Medication 40 MILLIGRAM(S): at 12:27

## 2025-02-22 RX ADMIN — POLYETHYLENE GLYCOL 3350 17 GRAM(S): 17 POWDER, FOR SOLUTION ORAL at 17:25

## 2025-02-22 RX ADMIN — Medication 100 MILLIGRAM(S): at 12:27

## 2025-02-22 RX ADMIN — POLYETHYLENE GLYCOL 3350 17 GRAM(S): 17 POWDER, FOR SOLUTION ORAL at 12:27

## 2025-02-22 RX ADMIN — TRAMADOL HYDROCHLORIDE 50 MILLIGRAM(S): 50 TABLET, FILM COATED ORAL at 15:43

## 2025-02-22 RX ADMIN — TRAMADOL HYDROCHLORIDE 50 MILLIGRAM(S): 50 TABLET, FILM COATED ORAL at 16:43

## 2025-02-22 RX ADMIN — Medication 100 GRAM(S): at 09:26

## 2025-02-22 RX ADMIN — LEVALBUTEROL HYDROCHLORIDE 0.63 MILLIGRAM(S): 1.25 SOLUTION RESPIRATORY (INHALATION) at 00:24

## 2025-02-22 RX ADMIN — Medication 5 MILLIGRAM(S): at 00:23

## 2025-02-22 RX ADMIN — Medication 800 MILLIGRAM(S): at 17:25

## 2025-02-22 RX ADMIN — INSULIN LISPRO 2: 100 INJECTION, SOLUTION INTRAVENOUS; SUBCUTANEOUS at 17:25

## 2025-02-22 RX ADMIN — BUMETANIDE 132 MILLIGRAM(S): 1 TABLET ORAL at 15:31

## 2025-02-22 RX ADMIN — TRAMADOL HYDROCHLORIDE 50 MILLIGRAM(S): 50 TABLET, FILM COATED ORAL at 00:23

## 2025-02-22 RX ADMIN — CEFTRIAXONE 100 MILLIGRAM(S): 500 INJECTION, POWDER, FOR SOLUTION INTRAMUSCULAR; INTRAVENOUS at 03:06

## 2025-02-22 RX ADMIN — Medication 1 TABLET(S): at 12:27

## 2025-02-22 RX ADMIN — TRAMADOL HYDROCHLORIDE 50 MILLIGRAM(S): 50 TABLET, FILM COATED ORAL at 01:00

## 2025-02-22 RX ADMIN — Medication 1 DROP(S): at 17:25

## 2025-02-22 RX ADMIN — Medication 800 MILLIGRAM(S): at 09:26

## 2025-02-22 RX ADMIN — Medication 150 MILLIGRAM(S): at 12:27

## 2025-02-22 RX ADMIN — MIDODRINE HYDROCHLORIDE 10 MILLIGRAM(S): 5 TABLET ORAL at 09:26

## 2025-02-22 NOTE — PROGRESS NOTE ADULT - SUBJECTIVE AND OBJECTIVE BOX
Patient is a 44y old  Male who presents with a chief complaint of decompensated cirrhosis (20 Feb 2025 13:24)      Interval Events:   - Hgb low today - transfuse 1U PRBC  - bumex continue today dose ivp   - resumed CTX  - afebrile, VSS good UOP      MEDICATIONS  (STANDING):  artificial tears (preservative free) Ophthalmic Solution 1 Drop(s) Both EYES two times a day  buMETAnide IVPB 4 milliGRAM(s) IV Intermittent <User Schedule>  chlorhexidine 2% Cloths 1 Application(s) Topical <User Schedule>  folic acid 1 milliGRAM(s) Oral daily  influenza   Vaccine 0.5 milliLiter(s) IntraMuscular once  insulin lispro (ADMELOG) corrective regimen sliding scale   SubCutaneous three times a day before meals  insulin lispro (ADMELOG) corrective regimen sliding scale   SubCutaneous at bedtime  levalbuterol Inhalation 0.63 milliGRAM(s) Inhalation every 6 hours  magnesium oxide 800 milliGRAM(s) Oral two times a day with meals  midodrine 10 milliGRAM(s) Oral every 8 hours  multivitamin 1 Tablet(s) Oral daily  pantoprazole    Tablet 40 milliGRAM(s) Oral daily  polyethylene glycol 3350 17 Gram(s) Oral two times a day  rifAXIMin 550 milliGRAM(s) Oral every 12 hours  spironolactone 150 milliGRAM(s) Oral <User Schedule>  thiamine 100 milliGRAM(s) Oral daily    MEDICATIONS  (PRN):  albuterol    90 MICROgram(s) HFA Inhaler 1 Puff(s) Inhalation every 12 hours PRN Shortness of Breath and/or Wheezing  melatonin 5 milliGRAM(s) Oral at bedtime PRN Insomnia  sodium chloride 0.65% Nasal 1 Spray(s) Both Nostrils three times a day PRN Nasal Congestion  traMADol 25 milliGRAM(s) Oral every 6 hours PRN Moderate Pain (4 - 6)  traMADol 50 milliGRAM(s) Oral every 6 hours PRN Severe Pain (7 - 10)      PAST MEDICAL & SURGICAL HISTORY:  Sleep apnea, obstructive      Alcohol abuse      Cirrhosis of liver      Portal hypertension      H/O esophageal varices      Anemia      Mild alcohol use disorder      No significant past surgical history          Vital Signs Last 24 Hrs  T(C): 36.8 (22 Feb 2025 16:38), Max: 37.2 (22 Feb 2025 05:11)  T(F): 98.2 (22 Feb 2025 16:38), Max: 98.9 (22 Feb 2025 05:11)  HR: 86 (22 Feb 2025 16:38) (82 - 101)  BP: 124/69 (22 Feb 2025 16:38) (105/60 - 125/69)  BP(mean): --  RR: 20 (22 Feb 2025 16:38) (16 - 20)  SpO2: 96% (22 Feb 2025 16:38) (95% - 100%)    Parameters below as of 22 Feb 2025 16:38  Patient On (Oxygen Delivery Method): room air        I&O's Summary    21 Feb 2025 07:01  -  22 Feb 2025 07:00  --------------------------------------------------------  IN: 1250 mL / OUT: 1752 mL / NET: -502 mL    22 Feb 2025 07:01  -  22 Feb 2025 18:26  --------------------------------------------------------  IN: 0 mL / OUT: 1200 mL / NET: -1200 mL                              6.7    4.80  )-----------( 63       ( 22 Feb 2025 06:33 )             20.9     02-22    133[L]  |  95[L]  |  40[H]  ----------------------------<  127[H]  3.8   |  29  |  1.12    Ca    9.6      22 Feb 2025 06:34  Phos  3.4     02-22  Mg     1.6     02-22    TPro  8.3  /  Alb  3.0[L]  /  TBili  7.0[H]  /  DBili  x   /  AST  43[H]  /  ALT  7[L]  /  AlkPhos  110  02-22          Culture - Fungal, Body Fluid (collected 02-21-25 @ 17:38)  Source: Peritoneal Peritoneal Fluid  Preliminary Report (02-22-25 @ 08:26):    Testing in progress    Culture - Body Fluid with Gram Stain (collected 02-21-25 @ 17:38)  Source: Peritoneal Peritoneal Fluid  Gram Stain (02-22-25 @ 03:39):    polymorphonuclear leukocytes seen    No organisms seen    by cytocentrifuge        ROS:   A 12-point ROS was performed and negative except as noted in HPI.      PHYSICAL EXAM:   GENERAL: no acute distress  NEURO: alert, no asterixis   HEENT: scleral icterus   CHEST: no respiratory distress, no accessory muscle use  CARDIAC: regular rate  ABDOMEN: abd distension, no TTP  EXTREMITIES: anasarca   SKIN: jaundiced

## 2025-02-22 NOTE — PROGRESS NOTE ADULT - ASSESSMENT
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 Actively drinking. Now p/w abdominal pain/distension with worsening of liver tests possibly triggered by infection. DDx also includes alcohol associated hepatitis given hx of alc hep 7/2024 which improved with steroids  - Listed for OLT as noted above and currently awiating donor offers  - EV: EGD 7/2024 for melena showed grade II EV, small gastric ulcer (neg HP), and portal gastropathy   - Ascites: s/p para with 6.8 L fluid removed. Bumex at 4mg IV BID . Paracentesis completed minus 3.9 L  - HE: c/w  rifaximin, goal 3-4 BM/day. Continue miralax BID (transitioned off lactulose per pt request)  - HCC screening: MRI nondiagnostic, no clear lesion seen                             - C/w home midodrine 10 mg tid  - C/w PPI for stress ulcer ppx and hx of ulcer  - Trend MELD labs daily (cmp, INR) daily  - Strict I/Os, daily wts    #Empyema  CT chest noncontrast reviewed from OSH showing moderate sized complex R sided pleural effusion. s/p chest tube placed 1/18 with cultures growing Citrobacter c/w empyema. Requiring 2nd chest tube placement then now s/p VATS 1/28 for persistent effusion with 3rd chest tube placed. Now all chest tubes removed with persistent collection on CXR  - C/w Ceftriaxone 2g daily & Flagyl given persistent collection   - CTS evaluation for oozing from VATS site  - Repeat Xray as needed  - Trend cbc and fever curve  - Appreciate thoracic surgery and ID recs      #Anemia  #Thrombocytopenia  Normocytic anemia with Hgb 8.5 and plt count 64 likely due to chronic liver dz  - s/p 4 units pRBC, 2 FFP, 2 plts on 1/28 and 1 unit on 2/10  - s/p 1U PRBC today 2/22  - Hold DVT ppx for bleeding

## 2025-02-23 LAB
ALBUMIN SERPL ELPH-MCNC: 2.9 G/DL — LOW (ref 3.3–5)
ALP SERPL-CCNC: 104 U/L — SIGNIFICANT CHANGE UP (ref 40–120)
ALT FLD-CCNC: 7 U/L — LOW (ref 10–45)
ANION GAP SERPL CALC-SCNC: 11 MMOL/L — SIGNIFICANT CHANGE UP (ref 5–17)
APTT BLD: 51.6 SEC — HIGH (ref 24.5–35.6)
AST SERPL-CCNC: 43 U/L — HIGH (ref 10–40)
BILIRUB SERPL-MCNC: 7 MG/DL — HIGH (ref 0.2–1.2)
BLD GP AB SCN SERPL QL: NEGATIVE — SIGNIFICANT CHANGE UP
BUN SERPL-MCNC: 40 MG/DL — HIGH (ref 7–23)
CALCIUM SERPL-MCNC: 9.7 MG/DL — SIGNIFICANT CHANGE UP (ref 8.4–10.5)
CHLORIDE SERPL-SCNC: 94 MMOL/L — LOW (ref 96–108)
CO2 SERPL-SCNC: 28 MMOL/L — SIGNIFICANT CHANGE UP (ref 22–31)
CREAT SERPL-MCNC: 1.12 MG/DL — SIGNIFICANT CHANGE UP (ref 0.5–1.3)
EGFR: 82 ML/MIN/1.73M2 — SIGNIFICANT CHANGE UP
EGFR: 82 ML/MIN/1.73M2 — SIGNIFICANT CHANGE UP
GLUCOSE BLDC GLUCOMTR-MCNC: 110 MG/DL — HIGH (ref 70–99)
GLUCOSE BLDC GLUCOMTR-MCNC: 117 MG/DL — HIGH (ref 70–99)
GLUCOSE BLDC GLUCOMTR-MCNC: 166 MG/DL — HIGH (ref 70–99)
GLUCOSE BLDC GLUCOMTR-MCNC: 238 MG/DL — HIGH (ref 70–99)
GLUCOSE SERPL-MCNC: 101 MG/DL — HIGH (ref 70–99)
HCT VFR BLD CALC: 22.6 % — LOW (ref 39–50)
HGB BLD-MCNC: 7.6 G/DL — LOW (ref 13–17)
INR BLD: 3.08 RATIO — HIGH (ref 0.85–1.16)
MAGNESIUM SERPL-MCNC: 1.3 MG/DL — LOW (ref 1.6–2.6)
MCHC RBC-ENTMCNC: 33 PG — SIGNIFICANT CHANGE UP (ref 27–34)
MCHC RBC-ENTMCNC: 33.6 G/DL — SIGNIFICANT CHANGE UP (ref 32–36)
MCV RBC AUTO: 98.3 FL — SIGNIFICANT CHANGE UP (ref 80–100)
NRBC BLD AUTO-RTO: 0 /100 WBCS — SIGNIFICANT CHANGE UP (ref 0–0)
PHOSPHATE SERPL-MCNC: 3 MG/DL — SIGNIFICANT CHANGE UP (ref 2.5–4.5)
PLATELET # BLD AUTO: 60 K/UL — LOW (ref 150–400)
POTASSIUM SERPL-MCNC: 4.1 MMOL/L — SIGNIFICANT CHANGE UP (ref 3.5–5.3)
POTASSIUM SERPL-SCNC: 4.1 MMOL/L — SIGNIFICANT CHANGE UP (ref 3.5–5.3)
PROT SERPL-MCNC: 8.4 G/DL — HIGH (ref 6–8.3)
PROTHROM AB SERPL-ACNC: 35 SEC — HIGH (ref 9.9–13.4)
RBC # BLD: 2.3 M/UL — LOW (ref 4.2–5.8)
RBC # FLD: 20.9 % — HIGH (ref 10.3–14.5)
RH IG SCN BLD-IMP: POSITIVE — SIGNIFICANT CHANGE UP
SODIUM SERPL-SCNC: 133 MMOL/L — LOW (ref 135–145)
WBC # BLD: 4.74 K/UL — SIGNIFICANT CHANGE UP (ref 3.8–10.5)
WBC # FLD AUTO: 4.74 K/UL — SIGNIFICANT CHANGE UP (ref 3.8–10.5)

## 2025-02-23 PROCEDURE — 99232 SBSQ HOSP IP/OBS MODERATE 35: CPT

## 2025-02-23 RX ORDER — BUMETANIDE 1 MG/1
4 TABLET ORAL
Refills: 0 | Status: COMPLETED | OUTPATIENT
Start: 2025-02-23 | End: 2025-02-25

## 2025-02-23 RX ORDER — MAGNESIUM SULFATE 500 MG/ML
2 SYRINGE (ML) INJECTION ONCE
Refills: 0 | Status: COMPLETED | OUTPATIENT
Start: 2025-02-23 | End: 2025-02-23

## 2025-02-23 RX ORDER — TRAMADOL HYDROCHLORIDE 50 MG/1
25 TABLET, FILM COATED ORAL EVERY 6 HOURS
Refills: 0 | Status: DISCONTINUED | OUTPATIENT
Start: 2025-02-23 | End: 2025-02-27

## 2025-02-23 RX ORDER — TRAMADOL HYDROCHLORIDE 50 MG/1
50 TABLET, FILM COATED ORAL ONCE
Refills: 0 | Status: DISCONTINUED | OUTPATIENT
Start: 2025-02-23 | End: 2025-02-23

## 2025-02-23 RX ADMIN — Medication 1 TABLET(S): at 09:39

## 2025-02-23 RX ADMIN — LEVALBUTEROL HYDROCHLORIDE 0.63 MILLIGRAM(S): 1.25 SOLUTION RESPIRATORY (INHALATION) at 17:09

## 2025-02-23 RX ADMIN — Medication 100 MILLIGRAM(S): at 09:38

## 2025-02-23 RX ADMIN — MIDODRINE HYDROCHLORIDE 10 MILLIGRAM(S): 5 TABLET ORAL at 17:07

## 2025-02-23 RX ADMIN — POLYETHYLENE GLYCOL 3350 17 GRAM(S): 17 POWDER, FOR SOLUTION ORAL at 04:52

## 2025-02-23 RX ADMIN — MIDODRINE HYDROCHLORIDE 10 MILLIGRAM(S): 5 TABLET ORAL at 02:06

## 2025-02-23 RX ADMIN — CEFTRIAXONE 100 MILLIGRAM(S): 500 INJECTION, POWDER, FOR SOLUTION INTRAMUSCULAR; INTRAVENOUS at 04:55

## 2025-02-23 RX ADMIN — TRAMADOL HYDROCHLORIDE 50 MILLIGRAM(S): 50 TABLET, FILM COATED ORAL at 00:10

## 2025-02-23 RX ADMIN — BUMETANIDE 132 MILLIGRAM(S): 1 TABLET ORAL at 14:27

## 2025-02-23 RX ADMIN — INSULIN LISPRO 4: 100 INJECTION, SOLUTION INTRAVENOUS; SUBCUTANEOUS at 17:07

## 2025-02-23 RX ADMIN — Medication 25 GRAM(S): at 09:40

## 2025-02-23 RX ADMIN — POLYETHYLENE GLYCOL 3350 17 GRAM(S): 17 POWDER, FOR SOLUTION ORAL at 17:08

## 2025-02-23 RX ADMIN — TRAMADOL HYDROCHLORIDE 50 MILLIGRAM(S): 50 TABLET, FILM COATED ORAL at 21:18

## 2025-02-23 RX ADMIN — Medication 150 MILLIGRAM(S): at 09:40

## 2025-02-23 RX ADMIN — BUMETANIDE 132 MILLIGRAM(S): 1 TABLET ORAL at 21:17

## 2025-02-23 RX ADMIN — Medication 800 MILLIGRAM(S): at 09:39

## 2025-02-23 RX ADMIN — MIDODRINE HYDROCHLORIDE 10 MILLIGRAM(S): 5 TABLET ORAL at 09:38

## 2025-02-23 RX ADMIN — Medication 1 DROP(S): at 17:08

## 2025-02-23 RX ADMIN — TRAMADOL HYDROCHLORIDE 25 MILLIGRAM(S): 50 TABLET, FILM COATED ORAL at 12:15

## 2025-02-23 RX ADMIN — FOLIC ACID 1 MILLIGRAM(S): 1 TABLET ORAL at 09:38

## 2025-02-23 RX ADMIN — Medication 40 MILLIGRAM(S): at 09:39

## 2025-02-23 RX ADMIN — TRAMADOL HYDROCHLORIDE 50 MILLIGRAM(S): 50 TABLET, FILM COATED ORAL at 22:00

## 2025-02-23 NOTE — PROGRESS NOTE ADULT - SUBJECTIVE AND OBJECTIVE BOX
Hepatology Progress Note    Interval Events:   -Hgb 6.7 - transf 1u PRBC with repeat of 7.6 with no signs of bleeding  -Bumex 4mg x 2 doses and overall net negative 2l   -Resumed CTX  (fell off)   -Pt overall feeling well with no ongoing fevers, chills, nausea, vomiting or abd pain     Allergies:  sulfa drugs (Unknown)  Salicylic Acid (Other)      Hospital Medications:  albuterol    90 MICROgram(s) HFA Inhaler 1 Puff(s) Inhalation every 12 hours PRN  artificial tears (preservative free) Ophthalmic Solution 1 Drop(s) Both EYES two times a day  buMETAnide IVPB 4 milliGRAM(s) IV Intermittent <User Schedule>  cefTRIAXone   IVPB 2000 milliGRAM(s) IV Intermittent every 24 hours  chlorhexidine 2% Cloths 1 Application(s) Topical <User Schedule>  folic acid 1 milliGRAM(s) Oral daily  influenza   Vaccine 0.5 milliLiter(s) IntraMuscular once  insulin lispro (ADMELOG) corrective regimen sliding scale   SubCutaneous three times a day before meals  insulin lispro (ADMELOG) corrective regimen sliding scale   SubCutaneous at bedtime  levalbuterol Inhalation 0.63 milliGRAM(s) Inhalation every 6 hours  magnesium oxide 800 milliGRAM(s) Oral two times a day with meals  melatonin 5 milliGRAM(s) Oral at bedtime PRN  midodrine 10 milliGRAM(s) Oral every 8 hours  multivitamin 1 Tablet(s) Oral daily  pantoprazole    Tablet 40 milliGRAM(s) Oral daily  polyethylene glycol 3350 17 Gram(s) Oral two times a day  rifAXIMin 550 milliGRAM(s) Oral every 12 hours  sodium chloride 0.65% Nasal 1 Spray(s) Both Nostrils three times a day PRN  spironolactone 150 milliGRAM(s) Oral <User Schedule>  thiamine 100 milliGRAM(s) Oral daily  traMADol 25 milliGRAM(s) Oral every 6 hours PRN      ROS: 14 point ROS negative unless otherwise state in subjective    PHYSICAL EXAM:   Vital Signs:  Vital Signs Last 24 Hrs  T(C): 36.5 (2025 12:45), Max: 36.8 (2025 16:38)  T(F): 97.7 (2025 12:45), Max: 98.2 (2025 16:38)  HR: 78 (2025 12:45) (78 - 95)  BP: 116/68 (2025 12:45) (114/63 - 124/69)  BP(mean): --  RR: 18 (2025 12:45) (16 - 20)  SpO2: 94% (2025 12:45) (94% - 100%)    Parameters below as of 2025 12:45  Patient On (Oxygen Delivery Method): room air      Daily     Daily Weight in k.4 (2025 05:10)    GENERAL:  No acute distress  HEENT:  +scleral icterus  CHEST: no resp distress  HEART:  RRR  ABDOMEN:  Soft, non-tender, distended with mild fluid wave, normoactive bowel sounds,  EXTREMITIES: 1-2+ edema b/l   NEURO:  Alert and oriented x 3, no asterixis    LABS:                        7.6    4.74  )-----------( 60       ( 2025 07:09 )             22.6     Mean Cell Volume: 98.3 fl (25 @ 07:09)        133[L]  |  94[L]  |  40[H]  ----------------------------<  101[H]  4.1   |  28  |  1.12    Ca    9.7      2025 07:08  Phos  3.0       Mg     1.3         TPro  8.4[H]  /  Alb  2.9[L]  /  TBili  7.0[H]  /  DBili  x   /  AST  43[H]  /  ALT  7[L]  /  AlkPhos  104      LIVER FUNCTIONS - ( 2025 07:08 )  Alb: 2.9 g/dL / Pro: 8.4 g/dL / ALK PHOS: 104 U/L / ALT: 7 U/L / AST: 43 U/L / GGT: x           PT/INR - ( 2025 07:10 )   PT: 35.0 sec;   INR: 3.08 ratio         PTT - ( 2025 07:10 )  PTT:51.6 sec  Urinalysis Basic - ( 2025 07:08 )    Color: x / Appearance: x / SG: x / pH: x  Gluc: 101 mg/dL / Ketone: x  / Bili: x / Urobili: x   Blood: x / Protein: x / Nitrite: x   Leuk Esterase: x / RBC: x / WBC x   Sq Epi: x / Non Sq Epi: x / Bacteria: x      Imaging:    < from: Xray Chest 1 View- PORTABLE-Routine (Xray Chest 1 View- PORTABLE-Routine in AM.) (25 @ 08:45) >    Mild pulmonary vascular congestion.  Large loculated right pleural effusion and hazy opacity likely   atelectasis are unchanged.    --- End of Report ---    < end of copied text >

## 2025-02-23 NOTE — PROGRESS NOTE ADULT - ATTENDING COMMENTS
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  Hospital course complicated by HRS (required iHD 1/10 - 1/19; now resolved), right-sided empyema (fluid culture + Citrobacter koseri) s/p VATS on 1/28/2025 with decortication, which was complicated by hemothorax.  Patient was extubated on 1/30/2025, and the last chest tube was removed on 2/6/2025.  CT chest on 2/7/2025 showed a moderate gas and liquid containing right pleural fluid collection that cannot be safely evacuated prior to OLT per CT surgery; he remains on antibiotics until OLT.    Today: Patient without complaints.  UOP 1.9 L / 24 hours.  Had 3 bowel movements yesterday.  Patient reports that his abdominal distention and lower extremity edema improved.  On exam: AOx3, no asterixis.  Abdomen moderately distended, nontender, soft.  1-2+ lower extremity edema bilaterally.    Listed for OLT.  MELD = 29. ABO O.  - HE: Continue MiraLAX, rifaximin.  - Ascites: Continue Bumex 4 mg IV twice daily, Aldactone 150 mg daily.  Low-sodium diet.  Daily weights.  - EV screening: EGD 7/2024 for melena showed grade II EV, small gastric ulcer (neg HP), and portal gastropathy.  - HCC screening: MR abdomen with and without contrast on 2/4/2025 was severely limited study, however did not note any obvious liver lesions.  - Hypotension: Continue home midodrine 10 mg 3 times daily.  - Empyema: Continue ceftriaxone 2 g daily until OLT, as per transplant ID recommendations.  Plan for additional source control by CT surgery team during OLT.  - AUD: Continue multivitamin, folic acid, thiamine supplementation daily.      Sonal Samaniego MD  Transplant Hepatology

## 2025-02-23 NOTE — PROGRESS NOTE ADULT - ASSESSMENT
Zulema Rey is a 44 year old M with PMH of alcohol associated cirrhosis c/b recurrent ascites, grade II EV, and HE, alcohol associated hepatitis 7/2024, and right calf hematoma 10/2024 presenting to  for abdominal pain and distension, found to have renal failure, large volume ascites and findings of empyema s/p chest tube placement currently listed with a MELD 3.0 of 30 ABO O on 2/16 today with a MELD of 29    #Decompensated EtOH Associated Cirrhosis  #Hx of alcohol associated hepatitis 7/2024  Hx of decompensated cirrhosis c/b recurrent ascites requiring LVPs, grade II EV, and HE. Actively drinking. Now p/w abdominal pain/distension with worsening of liver tests possibly triggered by infection. DDx also includes alcohol associated hepatitis given hx of alc hep 7/2024 which improved with steroids and now listed  MELD 3.0 score is 29 (2/23/25) ABO O currently listed with MELD of 29 as noted above and currently awaiting donor offers  -Volume:  s/p para with 4L removed on 2/21/ Continue with Bumex at 4mg IV BID with goal -2L per day   -Infection: pt with note of empyema (see below)  - EV: EGD 7/2024 for melena showed grade II EV, small gastric ulcer (neg HP), and portal gastropathy   - HE: c/w  rifaximin, goal 3-4 BM/day. Continue miralax BID (transitioned off lactulose per pt request)  - HCC screening: MRI nondiagnostic, no clear lesion seen                             - C/w home midodrine 10 mg tid  - C/w PPI for stress ulcer ppx and hx of ulcer  - Trend MELD labs daily (cmp, INR) daily  - Strict I/Os, daily wts    #Empyema  CT chest noncontrast reviewed from OSH showing moderate sized complex R sided pleural effusion. s/p chest tube placed 1/18 with cultures growing Citrobacter c/w empyema. Requiring 2nd chest tube placement then now s/p VATS 1/28 for persistent effusion with 3rd chest tube placed. Now all chest tubes removed with persistent collection on CXR  - C/w Ceftriaxone 2g daily & Flagyl given persistent collection   - Per ID will need to continue with CTX and IRWIN. Currently back on CTX although will clarify with ID team if he needs to be back on IRWIN    #Anemia  #Thrombocytopenia  Normocytic anemia with Hgb 8.5 and plt count 64 likely due to chronic liver dz  - s/p 4 units pRBC, 2 FFP, 2 plts on 1/28 and 1 unit on 2/10  - s/p 1U PRBC 2/22 with no signs of bleeding  - Hold DVT ppx for bleeding

## 2025-02-24 LAB
ALBUMIN SERPL ELPH-MCNC: 3 G/DL — LOW (ref 3.3–5)
ALP SERPL-CCNC: 115 U/L — SIGNIFICANT CHANGE UP (ref 40–120)
ALT FLD-CCNC: 7 U/L — LOW (ref 10–45)
AMMONIA BLD-MCNC: 35 UMOL/L — SIGNIFICANT CHANGE UP (ref 11–55)
ANION GAP SERPL CALC-SCNC: 9 MMOL/L — SIGNIFICANT CHANGE UP (ref 5–17)
APPEARANCE UR: CLEAR — SIGNIFICANT CHANGE UP
APTT BLD: 44 SEC — HIGH (ref 24.5–35.6)
AST SERPL-CCNC: 43 U/L — HIGH (ref 10–40)
BACTERIA # UR AUTO: NEGATIVE /HPF — SIGNIFICANT CHANGE UP
BILIRUB SERPL-MCNC: 7 MG/DL — HIGH (ref 0.2–1.2)
BILIRUB UR-MCNC: NEGATIVE — SIGNIFICANT CHANGE UP
BLD GP AB SCN SERPL QL: NEGATIVE — SIGNIFICANT CHANGE UP
BUN SERPL-MCNC: 41 MG/DL — HIGH (ref 7–23)
CALCIUM SERPL-MCNC: 9.6 MG/DL — SIGNIFICANT CHANGE UP (ref 8.4–10.5)
CAST: 9 /LPF — HIGH (ref 0–4)
CHLORIDE SERPL-SCNC: 94 MMOL/L — LOW (ref 96–108)
CMV IGG FLD QL: >10 U/ML — HIGH
CMV IGG SERPL-IMP: POSITIVE
CO2 SERPL-SCNC: 28 MMOL/L — SIGNIFICANT CHANGE UP (ref 22–31)
COLOR SPEC: SIGNIFICANT CHANGE UP
CREAT SERPL-MCNC: 1.19 MG/DL — SIGNIFICANT CHANGE UP (ref 0.5–1.3)
DIFF PNL FLD: ABNORMAL
EBV EA AB SER IA-ACNC: 13 U/ML — HIGH
EBV EA AB TITR SER IF: POSITIVE
EBV EA IGG SER-ACNC: POSITIVE
EBV NA IGG SER IA-ACNC: >600 U/ML — HIGH
EBV PATRN SPEC IB-IMP: SIGNIFICANT CHANGE UP
EBV VCA IGG AVIDITY SER QL IA: POSITIVE
EBV VCA IGM SER IA-ACNC: 21.3 U/ML — SIGNIFICANT CHANGE UP
EBV VCA IGM SER IA-ACNC: >750 U/ML — HIGH
EBV VCA IGM TITR FLD: NEGATIVE — SIGNIFICANT CHANGE UP
EGFR: 76 ML/MIN/1.73M2 — SIGNIFICANT CHANGE UP
EGFR: 76 ML/MIN/1.73M2 — SIGNIFICANT CHANGE UP
FIBRINOGEN PPP-MCNC: 119 MG/DL — LOW (ref 200–445)
GGT SERPL-CCNC: 13 U/L — SIGNIFICANT CHANGE UP (ref 9–50)
GLUCOSE BLDC GLUCOMTR-MCNC: 123 MG/DL — HIGH (ref 70–99)
GLUCOSE BLDC GLUCOMTR-MCNC: 125 MG/DL — HIGH (ref 70–99)
GLUCOSE BLDC GLUCOMTR-MCNC: 126 MG/DL — HIGH (ref 70–99)
GLUCOSE BLDC GLUCOMTR-MCNC: 176 MG/DL — HIGH (ref 70–99)
GLUCOSE SERPL-MCNC: 171 MG/DL — HIGH (ref 70–99)
GLUCOSE UR QL: NEGATIVE MG/DL — SIGNIFICANT CHANGE UP
HBV CORE AB SER-ACNC: SIGNIFICANT CHANGE UP
HBV SURFACE AB SER-ACNC: 15 MIU/ML — SIGNIFICANT CHANGE UP
HBV SURFACE AG SER-ACNC: SIGNIFICANT CHANGE UP
HCT VFR BLD CALC: 22.7 % — LOW (ref 39–50)
HCV AB S/CO SERPL IA: 0.07 S/CO — SIGNIFICANT CHANGE UP
HCV AB SERPL-IMP: SIGNIFICANT CHANGE UP
HGB BLD-MCNC: 7.5 G/DL — LOW (ref 13–17)
HIV 1+2 AB+HIV1 P24 AG SERPL QL IA: SIGNIFICANT CHANGE UP
INR BLD: 3.2 RATIO — HIGH (ref 0.85–1.16)
KETONES UR-MCNC: NEGATIVE MG/DL — SIGNIFICANT CHANGE UP
LEUKOCYTE ESTERASE UR-ACNC: ABNORMAL
MAGNESIUM SERPL-MCNC: 1.4 MG/DL — LOW (ref 1.6–2.6)
MCHC RBC-ENTMCNC: 32.8 PG — SIGNIFICANT CHANGE UP (ref 27–34)
MCHC RBC-ENTMCNC: 33 G/DL — SIGNIFICANT CHANGE UP (ref 32–36)
MCV RBC AUTO: 99.1 FL — SIGNIFICANT CHANGE UP (ref 80–100)
NITRITE UR-MCNC: NEGATIVE — SIGNIFICANT CHANGE UP
NRBC BLD AUTO-RTO: 0 /100 WBCS — SIGNIFICANT CHANGE UP (ref 0–0)
PH UR: 5.5 — SIGNIFICANT CHANGE UP (ref 5–8)
PHOSPHATE SERPL-MCNC: 2.9 MG/DL — SIGNIFICANT CHANGE UP (ref 2.5–4.5)
PLATELET # BLD AUTO: 60 K/UL — LOW (ref 150–400)
POTASSIUM SERPL-MCNC: 4.2 MMOL/L — SIGNIFICANT CHANGE UP (ref 3.5–5.3)
POTASSIUM SERPL-SCNC: 4.2 MMOL/L — SIGNIFICANT CHANGE UP (ref 3.5–5.3)
PROT SERPL-MCNC: 8.5 G/DL — HIGH (ref 6–8.3)
PROT UR-MCNC: 30 MG/DL
PROTHROM AB SERPL-ACNC: 36.4 SEC — HIGH (ref 9.9–13.4)
RBC # BLD: 2.29 M/UL — LOW (ref 4.2–5.8)
RBC # FLD: 21 % — HIGH (ref 10.3–14.5)
RBC CASTS # UR COMP ASSIST: 28 /HPF — HIGH (ref 0–4)
REVIEW: SIGNIFICANT CHANGE UP
RH IG SCN BLD-IMP: POSITIVE — SIGNIFICANT CHANGE UP
SODIUM SERPL-SCNC: 131 MMOL/L — LOW (ref 135–145)
SP GR SPEC: 1.01 — SIGNIFICANT CHANGE UP (ref 1–1.03)
SQUAMOUS # UR AUTO: 1 /HPF — SIGNIFICANT CHANGE UP (ref 0–5)
UROBILINOGEN FLD QL: 0.2 MG/DL — SIGNIFICANT CHANGE UP (ref 0.2–1)
VZV IGG FLD QL IA: 18.6 S/CO — SIGNIFICANT CHANGE UP
VZV IGG FLD QL IA: POSITIVE — SIGNIFICANT CHANGE UP
WBC # BLD: 5.18 K/UL — SIGNIFICANT CHANGE UP (ref 3.8–10.5)
WBC # FLD AUTO: 5.18 K/UL — SIGNIFICANT CHANGE UP (ref 3.8–10.5)
WBC UR QL: 2 /HPF — SIGNIFICANT CHANGE UP (ref 0–5)

## 2025-02-24 PROCEDURE — 99232 SBSQ HOSP IP/OBS MODERATE 35: CPT

## 2025-02-24 PROCEDURE — 71045 X-RAY EXAM CHEST 1 VIEW: CPT | Mod: 26

## 2025-02-24 RX ORDER — MAGNESIUM SULFATE 500 MG/ML
2 SYRINGE (ML) INJECTION ONCE
Refills: 0 | Status: COMPLETED | OUTPATIENT
Start: 2025-02-24 | End: 2025-02-24

## 2025-02-24 RX ORDER — BASILIXIMAB 20 MG/5ML
20 INJECTION, POWDER, FOR SOLUTION INTRAVENOUS ONCE
Refills: 0 | Status: DISCONTINUED | OUTPATIENT
Start: 2025-02-24 | End: 2025-02-24

## 2025-02-24 RX ORDER — METHYLPREDNISOLONE ACETATE 80 MG/ML
1000 INJECTION, SUSPENSION INTRA-ARTICULAR; INTRALESIONAL; INTRAMUSCULAR; SOFT TISSUE ONCE
Refills: 0 | Status: DISCONTINUED | OUTPATIENT
Start: 2025-02-24 | End: 2025-02-24

## 2025-02-24 RX ORDER — PIPERACILLIN-TAZO-DEXTROSE,ISO 3.375G/5
3.38 IV SOLUTION, PIGGYBACK PREMIX FROZEN(ML) INTRAVENOUS ONCE
Refills: 0 | Status: DISCONTINUED | OUTPATIENT
Start: 2025-02-24 | End: 2025-02-24

## 2025-02-24 RX ADMIN — LEVALBUTEROL HYDROCHLORIDE 0.63 MILLIGRAM(S): 1.25 SOLUTION RESPIRATORY (INHALATION) at 01:18

## 2025-02-24 RX ADMIN — Medication 1 DROP(S): at 17:39

## 2025-02-24 RX ADMIN — CEFTRIAXONE 100 MILLIGRAM(S): 500 INJECTION, POWDER, FOR SOLUTION INTRAMUSCULAR; INTRAVENOUS at 04:57

## 2025-02-24 RX ADMIN — Medication 100 MILLIGRAM(S): at 17:40

## 2025-02-24 RX ADMIN — Medication 40 MILLIGRAM(S): at 13:07

## 2025-02-24 RX ADMIN — Medication 1 APPLICATION(S): at 05:02

## 2025-02-24 RX ADMIN — FOLIC ACID 1 MILLIGRAM(S): 1 TABLET ORAL at 13:06

## 2025-02-24 RX ADMIN — TRAMADOL HYDROCHLORIDE 25 MILLIGRAM(S): 50 TABLET, FILM COATED ORAL at 06:00

## 2025-02-24 RX ADMIN — POLYETHYLENE GLYCOL 3350 17 GRAM(S): 17 POWDER, FOR SOLUTION ORAL at 05:03

## 2025-02-24 RX ADMIN — MIDODRINE HYDROCHLORIDE 10 MILLIGRAM(S): 5 TABLET ORAL at 17:39

## 2025-02-24 RX ADMIN — Medication 1 DROP(S): at 05:03

## 2025-02-24 RX ADMIN — MIDODRINE HYDROCHLORIDE 10 MILLIGRAM(S): 5 TABLET ORAL at 03:54

## 2025-02-24 RX ADMIN — Medication 150 MILLIGRAM(S): at 09:07

## 2025-02-24 RX ADMIN — MIDODRINE HYDROCHLORIDE 10 MILLIGRAM(S): 5 TABLET ORAL at 09:07

## 2025-02-24 RX ADMIN — TRAMADOL HYDROCHLORIDE 25 MILLIGRAM(S): 50 TABLET, FILM COATED ORAL at 19:17

## 2025-02-24 RX ADMIN — Medication 1 TABLET(S): at 13:06

## 2025-02-24 RX ADMIN — POLYETHYLENE GLYCOL 3350 17 GRAM(S): 17 POWDER, FOR SOLUTION ORAL at 17:40

## 2025-02-24 RX ADMIN — Medication 800 MILLIGRAM(S): at 09:07

## 2025-02-24 RX ADMIN — TRAMADOL HYDROCHLORIDE 25 MILLIGRAM(S): 50 TABLET, FILM COATED ORAL at 18:17

## 2025-02-24 RX ADMIN — Medication 25 GRAM(S): at 12:01

## 2025-02-24 RX ADMIN — LEVALBUTEROL HYDROCHLORIDE 0.63 MILLIGRAM(S): 1.25 SOLUTION RESPIRATORY (INHALATION) at 05:03

## 2025-02-24 RX ADMIN — Medication 800 MILLIGRAM(S): at 17:40

## 2025-02-24 RX ADMIN — TRAMADOL HYDROCHLORIDE 25 MILLIGRAM(S): 50 TABLET, FILM COATED ORAL at 05:04

## 2025-02-24 RX ADMIN — BUMETANIDE 132 MILLIGRAM(S): 1 TABLET ORAL at 18:06

## 2025-02-24 NOTE — PROGRESS NOTE ADULT - ATTENDING COMMENTS
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  Hospital course complicated by HRS (required iHD 1/10 - 1/19; now resolved), right-sided empyema (fluid culture + Citrobacter koseri) s/p VATS on 1/28/2025 with decortication, which was complicated by hemothorax.  Patient was extubated on 1/30/2025, and the last chest tube was removed on 2/6/2025.  CT chest on 2/7/2025 showed a moderate gas and liquid containing right pleural fluid collection that cannot be safely evacuated prior to OLT per CT surgery; he remains on antibiotics until OLT.    Today: Patient without complaints.  UOP 2.4 L / 24 hours.  Patient reports that his abdominal distention and lower extremity edema improved.  Patient had a potential transplant offer today, however the case was canceled due to the donor liver biopsy results.  On exam: AOx3, no asterixis.  Abdomen moderately distended, nontender, soft.  1-2+ lower extremity edema bilaterally.  Awaiting OLT.    Listed for OLT.  MELD = 30. ABO O. -- will recert MELD today  - HE: Continue MiraLAX, rifaximin.  - Ascites: Continue Bumex 4 mg IV twice daily, Aldactone 150 mg daily.  Low-sodium diet.  Daily weights.  - EV screening: EGD 7/2024 for melena showed grade II EV, small gastric ulcer (neg HP), and portal gastropathy.  - HCC screening: MR abdomen with and without contrast on 2/4/2025 was severely limited study, however did not note any obvious liver lesions.  - Hypotension: Continue home midodrine 10 mg 3 times daily.  - Empyema: Continue ceftriaxone 2 g daily until OLT, as per transplant ID recommendations.  Plan for additional source control by CT surgery team during OLT.  - AUD: Continue multivitamin, folic acid, thiamine supplementation daily.      Sonal Samaniego MD  Transplant Hepatology

## 2025-02-24 NOTE — PROGRESS NOTE ADULT - ASSESSMENT
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 Actively drinking. Now p/w abdominal pain/distension with worsening of liver tests possibly triggered by infection. DDx also includes alcohol associated hepatitis given hx of alc hep 7/2024 which improved with steroids and now listed  MELD 3.0 score is 29 (2/23/25) ABO O currently listed with MELD of 29 as noted above; OLT offer accepted, plan for OR this afternoon  -Volume:  s/p para with 4L removed on 2/21/ Continue with Bumex at 4mg IV BID with goal -2L per day   -Infection: pt with note of empyema (see below)  - EV: EGD 7/2024 for melena showed grade II EV, small gastric ulcer (neg HP), and portal gastropathy   - HE: c/w  rifaximin, goal 3-4 BM/day. Continue miralax BID (transitioned off lactulose per pt request)  - HCC screening: MRI nondiagnostic, no clear lesion seen                             - C/w home midodrine 10 mg tid  - C/w PPI for stress ulcer ppx and hx of ulcer  - Trend MELD labs daily (cmp, INR) daily  - Strict I/Os, daily wts    #Empyema  CT chest noncontrast reviewed from OSH showing moderate sized complex R sided pleural effusion. s/p chest tube placed 1/18 with cultures growing Citrobacter c/w empyema. Requiring 2nd chest tube placement then now s/p VATS 1/28 for persistent effusion with 3rd chest tube placed. Now all chest tubes removed with persistent collection on CXR  - C/w Ceftriaxone 2g daily & Flagyl given persistent collection   - Per ID will need to continue with CTX and IRWIN    #Anemia  #Thrombocytopenia  Normocytic anemia with Hgb 8.5 and plt count 64 likely due to chronic liver dz  - s/p 4 units pRBC, 2 FFP, 2 plts on 1/28 and 1 unit on 2/10  - s/p 1U PRBC 2/22 with no signs of bleeding  - Hold DVT ppx for bleeding

## 2025-02-24 NOTE — PROGRESS NOTE ADULT - SUBJECTIVE AND OBJECTIVE BOX
Chief Complaint:  Patient is a 44y old  Male who presents with a chief complaint of decompensated cirrhosis (2025 11:10)      Interval Events:  OLT offer  vitals stable    Allergies:  sulfa drugs (Unknown)  Salicylic Acid (Other)        Hospital Medications:  albuterol    90 MICROgram(s) HFA Inhaler 1 Puff(s) Inhalation every 12 hours PRN  artificial tears (preservative free) Ophthalmic Solution 1 Drop(s) Both EYES two times a day  buMETAnide IVPB 4 milliGRAM(s) IV Intermittent <User Schedule>  cefTRIAXone   IVPB 2000 milliGRAM(s) IV Intermittent every 24 hours  chlorhexidine 2% Cloths 1 Application(s) Topical <User Schedule>  folic acid 1 milliGRAM(s) Oral daily  influenza   Vaccine 0.5 milliLiter(s) IntraMuscular once  insulin lispro (ADMELOG) corrective regimen sliding scale   SubCutaneous three times a day before meals  insulin lispro (ADMELOG) corrective regimen sliding scale   SubCutaneous at bedtime  levalbuterol Inhalation 0.63 milliGRAM(s) Inhalation every 6 hours  magnesium oxide 800 milliGRAM(s) Oral two times a day with meals  melatonin 5 milliGRAM(s) Oral at bedtime PRN  midodrine 10 milliGRAM(s) Oral every 8 hours  multivitamin 1 Tablet(s) Oral daily  pantoprazole    Tablet 40 milliGRAM(s) Oral daily  piperacillin/tazobactam IVPB.. 3.375 Gram(s) IV Intermittent once  polyethylene glycol 3350 17 Gram(s) Oral two times a day  rifAXIMin 550 milliGRAM(s) Oral every 12 hours  sodium chloride 0.65% Nasal 1 Spray(s) Both Nostrils three times a day PRN  spironolactone 150 milliGRAM(s) Oral <User Schedule>  thiamine 100 milliGRAM(s) Oral daily  traMADol 25 milliGRAM(s) Oral every 6 hours PRN      PMHX/PSHX:  Sleep apnea, obstructive    Alcohol abuse    Cirrhosis of liver    Portal hypertension    H/O esophageal varices    Anemia    Mild alcohol use disorder    No significant past surgical history    No significant past surgical history        Family history:  No pertinent family history in first degree relatives    Family history of CABG (Mother)    FHx: multiple myeloma (Father)    FHx: diabetes mellitus (Father)        PHYSICAL EXAM:   Vital Signs:  Vital Signs Last 24 Hrs  T(C): 36.9 (2025 17:56), Max: 37.2 (2025 00:37)  T(F): 98.5 (2025 17:56), Max: 98.9 (2025 00:37)  HR: 91 (2025 17:56) (90 - 100)  BP: 123/75 (2025 17:56) (110/62 - 127/70)  BP(mean): 78 (2025 08:11) (78 - 78)  RR: 18 (2025 17:56) (18 - 20)  SpO2: 95% (2025 17:56) (93% - 98%)    Parameters below as of :56  Patient On (Oxygen Delivery Method): room air      Daily Height in cm: 180.34 (2025 08:11)    Daily Weight in k.3 (2025 05:00)    GENERAL:  No acute distress  HEENT:  +scleral icterus  CHEST:  no accessory muscle use  HEART:  Regular rate and rhythm  ABDOMEN:  Soft, non-tender, distended  EXTREMITIES:  2+ LE edema  SKIN:  No rash/ecchymoses  NEURO:  Alert and oriented x 3    LABS:                        7.5    5.18  )-----------( 60       ( 2025 04:59 )             22.7     Mean Cell Volume: 99.1 fl (- @ 04:59)        131[L]  |  94[L]  |  41[H]  ----------------------------<  171[H]  4.2   |  28  |  1.19    Ca    9.6      2025 04:59  Phos  2.9       Mg     1.4         TPro  8.5[H]  /  Alb  3.0[L]  /  TBili  7.0[H]  /  DBili  x   /  AST  43[H]  /  ALT  7[L]  /  AlkPhos  115  02-24    LIVER FUNCTIONS - ( 2025 04:59 )  Alb: 3.0 g/dL / Pro: 8.5 g/dL / ALK PHOS: 115 U/L / ALT: 7 U/L / AST: 43 U/L / GGT: 13 U/L       PT/INR - ( 2025 04:59 )   PT: 36.4 sec;   INR: 3.20 ratio         PTT - ( 2025 04:59 )  PTT:44.0 sec  Urinalysis Basic - ( 2025 04:59 )    Color: Dark Yellow / Appearance: Clear / S.009 / pH: x  Gluc: 171 mg/dL / Ketone: Negative mg/dL  / Bili: Negative / Urobili: 0.2 mg/dL   Blood: x / Protein: 30 mg/dL / Nitrite: Negative   Leuk Esterase: Trace / RBC: 28 /HPF / WBC 2 /HPF   Sq Epi: x / Non Sq Epi: 1 /HPF / Bacteria: Negative /HPF      Amylase Serum--      Lipase serum--       [id number]                          7.5    5.18  )-----------( 60       ( 2025 04:59 )             22.7                         7.6    4.74  )-----------( 60       ( 2025 07:09 )             22.6                         8.3    6.31  )-----------( 73       ( 2025 19:43 )             25.3                         8.4    6.20  )-----------( 77       ( 2025 19:35 )             25.4                         6.7    4.80  )-----------( 63       ( 2025 06:33 )             20.9

## 2025-02-24 NOTE — CONSULT NOTE ADULT - ASSESSMENT
43yo M with Hx decompensated EtOH cirrhosis c/b recurrent ascites, grade II EV, and HE who presented to OSH for abd pain and distension, found to have ascites and MANUELITO requiring HD. Patient was transferred to [facility] for management and liver transplant evaluation. His hospital course has been c/b an empyema s/p VATS decortication on 1/28 with Dr. Ramírez. Postoperative course has been c/b recurrent R pleural effusion. Thoracic Surgery consulted for intraoperative chest tube placement for drainage of recurrent R pleural effusion.     PLAN:   -     Discussed with Dr. Darrell Frank, PGY-3  Thoracic Surgery  #[id number]  43yo M with Hx decompensated EtOH cirrhosis c/b recurrent ascites, grade II EV, and HE who presented to OSH for abd pain and distension, found to have ascites and MANUELITO requiring HD. Patient was transferred to [facility] for management and liver transplant evaluation. His hospital course has been c/b an empyema s/p VATS decortication on 1/28 with Dr. Ramírez. Postoperative course has been c/b recurrent R pleural effusion. Thoracic Surgery consulted for intraoperative chest tube placement for drainage of recurrent R pleural effusion.     PLAN:   -     To be discussed with Dr. Darrell Frank, PGY-3  Thoracic Surgery  #[id number]  43yo M with Hx decompensated EtOH cirrhosis c/b recurrent ascites, grade II EV, and HE who presented to OSH for abd pain and distension, found to have ascites and MANUELITO requiring HD. Patient was transferred to [facility] for management and liver transplant evaluation. His hospital course has been c/b an empyema s/p VATS decortication on 1/28 with Dr. Ramírez. Postoperative course has been c/b recurrent R pleural effusion. Thoracic Surgery consulted for intraoperative chest tube placement for drainage of recurrent R pleural effusion.     PLAN:   - Thoracic Surgery available intraoperatively for chest tube placement     Discussed with Dr. Ramírez by Dr. Louann Frank, PGY-3  Thoracic Surgery  #[id number]

## 2025-02-24 NOTE — PROGRESS NOTE ADULT - ASSESSMENT
MOE WALL was seen and evaluated today.  As documented, MOE WALL is a 44y Male on our OLTx waiting list.  A  donor organ has been made available to MOE WALL , and MOE WALL has agreed to proceed with liver transplantation. MOE WALL has had no major illnesses or hospitalizations since the last clinic visit.    We discussed the risks and benefits of liver transplantation in depth.  Surgical risks included but were not limited to bleeding, infection, vascular thrombosis, graft non-function, bile leak, biliary stricture and potential need for re operation postoperatively.  We discussed the risk of dying as an immediate consequence of surgery.  We also discussed the risks of postoperative immunosuppression including but not limited to increased risk of infection, cancer, weight gain, new onset or worsening of diabetes or hypertension on a temporary or permanent basis, water retention, back pain, constipation, diarrhea, dizziness, headache, joint pain, loss of appetite, nausea, stomach pain or upset, trouble sleeping, vomiting, and/or mental or mood changes were fully disclosed. MOE WALL understands these risks and is willing to proceed with liver transplantation.

## 2025-02-24 NOTE — PRE-ANESTHESIA EVALUATION ADULT - NSRADCARDRESULTSFT_GEN_ALL_CORE
Impression:          - Grade II esophageal varices.                       - Non-bleeding gastric ulcer with no stigmata of bleeding. Biopsied.                       - Normal examined duodenum.                       - Portal hypertensive gastropathy      CONCLUSIONS:      1. Left ventricular cavity is normal in size. Left ventricular wall thickness is normal. Left ventricular systolic function is normal with an ejection fraction of 70 % by Roberson's method of disks. There are no regional wall motion abnormalities seen.   2. Left ventricular global longitudinal strain is -28.0 % which is normal (< -18%). Images were acquired on a [employer] ultrasound system and processed on the ultrasound machine with a heart rate of 76 bpm and a blood pressure of 110/60 mmHg.   3. Normal left ventricular diastolic function, with normal left ventricular filling pressure.   4. Normal right ventricular cavity size, with normal wall thickness, and normal right ventricular systolic function.   5. No significant valvular disease.   6. No pericardial effusion seen.   7. Estimated pulmonary artery systolic pressure is 54 mmHg.   8. Compared to the transthoracic echocardiogram performed on 7/15/2024, there have been no significant interval changes.

## 2025-02-24 NOTE — CONSULT NOTE ADULT - SUBJECTIVE AND OBJECTIVE BOX
GENERAL SURGERY CONSULT NOTE  Consulting surgical team: Thoracic Surgery  Consulting attending: Dr. Ramírez    HPI:  43yo M with Hx decompensated EtOH cirrhosis c/b recurrent ascites, grade II EV, and HE who presented to OSH for abd pain and distension, found to have ascites and MANUELITO requiring HD. Patient was transferred to [facility] for management and liver transplant evaluation. His hospital course has been c/b an empyema s/p VATS decortication on  with Dr. Ramírez. 3 chest tubes were placed at that time and his final chest tube was removed on . Since his chest tube removal, patient has slowly developed a reaccumulating right pleural effusion. Given his coagulopathy iso decompensated cirrhosis, drainage prior to transplant was not pursued, with plans for drainage after transplantation. A liver has been accepted for this patient who is planned for OLT later today. Thoracic Surgery consulted for intraoperative chest tube placement for drainage of recurrent R pleural effusion.     PAST MEDICAL HISTORY:  Sleep apnea, obstructive  Alcohol abuse  Cirrhosis of liver  Portal hypertension  H/O esophageal varices  Anemia  Mild alcohol use disorder    PAST SURGICAL HISTORY:  No significant past surgical history    MEDICATIONS:  albuterol    90 MICROgram(s) HFA Inhaler 1 Puff(s) Inhalation every 12 hours PRN  artificial tears (preservative free) Ophthalmic Solution 1 Drop(s) Both EYES two times a day  buMETAnide IVPB 4 milliGRAM(s) IV Intermittent <User Schedule>  cefTRIAXone   IVPB 2000 milliGRAM(s) IV Intermittent every 24 hours  chlorhexidine 2% Cloths 1 Application(s) Topical <User Schedule>  folic acid 1 milliGRAM(s) Oral daily  influenza   Vaccine 0.5 milliLiter(s) IntraMuscular once  insulin lispro (ADMELOG) corrective regimen sliding scale   SubCutaneous three times a day before meals  insulin lispro (ADMELOG) corrective regimen sliding scale   SubCutaneous at bedtime  levalbuterol Inhalation 0.63 milliGRAM(s) Inhalation every 6 hours  magnesium oxide 800 milliGRAM(s) Oral two times a day with meals  magnesium sulfate  IVPB 2 Gram(s) IV Intermittent once  melatonin 5 milliGRAM(s) Oral at bedtime PRN  midodrine 10 milliGRAM(s) Oral every 8 hours  multivitamin 1 Tablet(s) Oral daily  pantoprazole    Tablet 40 milliGRAM(s) Oral daily  piperacillin/tazobactam IVPB.. 3.375 Gram(s) IV Intermittent once  polyethylene glycol 3350 17 Gram(s) Oral two times a day  rifAXIMin 550 milliGRAM(s) Oral every 12 hours  sodium chloride 0.65% Nasal 1 Spray(s) Both Nostrils three times a day PRN  spironolactone 150 milliGRAM(s) Oral <User Schedule>  thiamine 100 milliGRAM(s) Oral daily  traMADol 25 milliGRAM(s) Oral every 6 hours PRN    ALLERGIES:  sulfa drugs (Unknown)  Salicylic Acid (Other)    VITALS & I/Os:  Vital Signs Last 24 Hrs  T(C): 36.7 (2025 09:20), Max: 37.2 (2025 00:37)  T(F): 98.1 (2025 09:20), Max: 98.9 (2025 00:37)  HR: 98 (2025 09:20) (78 - 100)  BP: 124/76 (2025 09:20) (110/62 - 124/76)  BP(mean): 78 (2025 08:11) (78 - 78)  RR: 18 (2025 09:20) (18 - 18)  SpO2: 95% (2025 09:20) (92% - 98%)    Parameters below as of 2025 09:20  Patient On (Oxygen Delivery Method): room air    I&O's Summary    2025 07:01  -  2025 07:00  --------------------------------------------------------  IN: 360 mL / OUT: 2390 mL / NET: -2030 mL    2025 07:01  -  2025 11:11  --------------------------------------------------------  IN: 0 mL / OUT: 450 mL / NET: -450 mL      PHYSICAL EXAM:  GEN: resting in bed comfortably in NAD  NEURO: awake, alert  CV: warm, well-perfused  RESP: requiring 1L NC, speaking in complete sentences  ABD: soft, non-distended, non-tender without rebound tenderness or guarding  EXTR: no gross deformities; spontaneous movement in b/l U/L extrem     LABS:             7.5    5.18  )-----------( 60       ( 2025 04:59 )             22.7     131[L]  |  94[L]  |  41[H]  ----------------------------<  171[H]  4.2   |  28  |  1.19    Ca    9.6      2025 04:59  Phos  2.9       Mg     1.4         TPro  8.5[H]  /  Alb  3.0[L]  /  TBili  7.0[H]  /  DBili  x   /  AST  43[H]  /  ALT  7[L]  /  AlkPhos  115      Lactate:    PT/INR - ( 2025 04:59 )   PT: 36.4 sec;   INR: 3.20 ratio    PTT - ( 2025 04:59 )  PTT:44.0 sec    Urinalysis Basic - ( 2025 04:59 )    Color: Dark Yellow / Appearance: Clear / S.009 / pH: x  Gluc: 171 mg/dL / Ketone: Negative mg/dL  / Bili: Negative / Urobili: 0.2 mg/dL   Blood: x / Protein: 30 mg/dL / Nitrite: Negative   Leuk Esterase: Trace / RBC: 28 /HPF / WBC 2 /HPF   Sq Epi: x / Non Sq Epi: 1 /HPF / Bacteria: Negative /HPF      IMAGING:  < from: Xray Chest 1 View- PORTABLE-Routine (Xray Chest 1 View- PORTABLE-Routine in AM.) (25 @ 08:45) >  FINDINGS:  2025 8:32 AM:  The heart is unchanged in size.  There is mild perihilar interstitial opacities.  There is a large loculated right pleural effusion and patchy airspace   disease in the right hemithorax which is unchanged from the prior study.  There is no pneumothorax.    2020 5:18 AM:  There is no significant change.    IMPRESSION:    Mild pulmonary vascular congestion.  Large loculated right pleural effusion and hazy opacity likely   atelectasis are unchanged.    < end of copied text >   GENERAL SURGERY CONSULT NOTE  Consulting surgical team: Thoracic Surgery  Consulting attending: Dr. Ramírez    HPI:  45yo M with Hx decompensated EtOH cirrhosis c/b recurrent ascites, grade II EV, and HE who presented to OSH for abd pain and distension, found to have ascites and MANUELITO requiring HD. Patient was transferred to [facility] for management and liver transplant evaluation. His hospital course has been c/b an empyema s/p VATS decortication on  with Dr. Ramírez. 3 chest tubes were placed at that time and his final chest tube was removed on . Since his chest tube removal, patient has slowly developed a reaccumulating right pleural effusion. Given his coagulopathy iso decompensated cirrhosis, drainage prior to transplant was not pursued, with plans for drainage after transplantation. A liver has been accepted for this patient who is planned for OLT later today. Thoracic Surgery consulted for intraoperative/postoperative chest tube placement for drainage of recurrent R pleural effusion.     PAST MEDICAL HISTORY:  Sleep apnea, obstructive  Alcohol abuse  Cirrhosis of liver  Portal hypertension  H/O esophageal varices  Anemia  Mild alcohol use disorder    PAST SURGICAL HISTORY:  No significant past surgical history    MEDICATIONS:  albuterol    90 MICROgram(s) HFA Inhaler 1 Puff(s) Inhalation every 12 hours PRN  artificial tears (preservative free) Ophthalmic Solution 1 Drop(s) Both EYES two times a day  buMETAnide IVPB 4 milliGRAM(s) IV Intermittent <User Schedule>  cefTRIAXone   IVPB 2000 milliGRAM(s) IV Intermittent every 24 hours  chlorhexidine 2% Cloths 1 Application(s) Topical <User Schedule>  folic acid 1 milliGRAM(s) Oral daily  influenza   Vaccine 0.5 milliLiter(s) IntraMuscular once  insulin lispro (ADMELOG) corrective regimen sliding scale   SubCutaneous three times a day before meals  insulin lispro (ADMELOG) corrective regimen sliding scale   SubCutaneous at bedtime  levalbuterol Inhalation 0.63 milliGRAM(s) Inhalation every 6 hours  magnesium oxide 800 milliGRAM(s) Oral two times a day with meals  magnesium sulfate  IVPB 2 Gram(s) IV Intermittent once  melatonin 5 milliGRAM(s) Oral at bedtime PRN  midodrine 10 milliGRAM(s) Oral every 8 hours  multivitamin 1 Tablet(s) Oral daily  pantoprazole    Tablet 40 milliGRAM(s) Oral daily  piperacillin/tazobactam IVPB.. 3.375 Gram(s) IV Intermittent once  polyethylene glycol 3350 17 Gram(s) Oral two times a day  rifAXIMin 550 milliGRAM(s) Oral every 12 hours  sodium chloride 0.65% Nasal 1 Spray(s) Both Nostrils three times a day PRN  spironolactone 150 milliGRAM(s) Oral <User Schedule>  thiamine 100 milliGRAM(s) Oral daily  traMADol 25 milliGRAM(s) Oral every 6 hours PRN    ALLERGIES:  sulfa drugs (Unknown)  Salicylic Acid (Other)    VITALS & I/Os:  Vital Signs Last 24 Hrs  T(C): 36.7 (2025 09:20), Max: 37.2 (2025 00:37)  T(F): 98.1 (2025 09:20), Max: 98.9 (2025 00:37)  HR: 98 (2025 09:20) (78 - 100)  BP: 124/76 (2025 09:20) (110/62 - 124/76)  BP(mean): 78 (2025 08:11) (78 - 78)  RR: 18 (2025 09:20) (18 - 18)  SpO2: 95% (2025 09:20) (92% - 98%)    Parameters below as of 2025 09:20  Patient On (Oxygen Delivery Method): room air    I&O's Summary    2025 07:01  -  2025 07:00  --------------------------------------------------------  IN: 360 mL / OUT: 2390 mL / NET: -2030 mL    2025 07:01  -  2025 11:11  --------------------------------------------------------  IN: 0 mL / OUT: 450 mL / NET: -450 mL      PHYSICAL EXAM:  GEN: resting in bed comfortably in NAD  NEURO: awake, alert  CV: warm, well-perfused  RESP: requiring 1L NC, speaking in complete sentences  ABD: soft, non-distended, non-tender without rebound tenderness or guarding  EXTR: no gross deformities; spontaneous movement in b/l U/L extrem     LABS:             7.5    5.18  )-----------( 60       ( 2025 04:59 )             22.7     131[L]  |  94[L]  |  41[H]  ----------------------------<  171[H]  4.2   |  28  |  1.19    Ca    9.6      2025 04:59  Phos  2.9       Mg     1.4         TPro  8.5[H]  /  Alb  3.0[L]  /  TBili  7.0[H]  /  DBili  x   /  AST  43[H]  /  ALT  7[L]  /  AlkPhos  115  24    Lactate:    PT/INR - ( 2025 04:59 )   PT: 36.4 sec;   INR: 3.20 ratio    PTT - ( 2025 04:59 )  PTT:44.0 sec    Urinalysis Basic - ( 2025 04:59 )    Color: Dark Yellow / Appearance: Clear / S.009 / pH: x  Gluc: 171 mg/dL / Ketone: Negative mg/dL  / Bili: Negative / Urobili: 0.2 mg/dL   Blood: x / Protein: 30 mg/dL / Nitrite: Negative   Leuk Esterase: Trace / RBC: 28 /HPF / WBC 2 /HPF   Sq Epi: x / Non Sq Epi: 1 /HPF / Bacteria: Negative /HPF      IMAGING:  < from: Xray Chest 1 View- PORTABLE-Routine (Xray Chest 1 View- PORTABLE-Routine in AM.) (25 @ 08:45) >  FINDINGS:  2025 8:32 AM:  The heart is unchanged in size.  There is mild perihilar interstitial opacities.  There is a large loculated right pleural effusion and patchy airspace   disease in the right hemithorax which is unchanged from the prior study.  There is no pneumothorax.    2020 5:18 AM:  There is no significant change.    IMPRESSION:    Mild pulmonary vascular congestion.  Large loculated right pleural effusion and hazy opacity likely   atelectasis are unchanged.    < end of copied text >

## 2025-02-25 LAB
ALBUMIN SERPL ELPH-MCNC: 2.9 G/DL — LOW (ref 3.3–5)
ALP SERPL-CCNC: 94 U/L — SIGNIFICANT CHANGE UP (ref 40–120)
ALT FLD-CCNC: 9 U/L — LOW (ref 10–45)
ANION GAP SERPL CALC-SCNC: 9 MMOL/L — SIGNIFICANT CHANGE UP (ref 5–17)
APTT BLD: 50.2 SEC — HIGH (ref 24.5–35.6)
AST SERPL-CCNC: 40 U/L — SIGNIFICANT CHANGE UP (ref 10–40)
BILIRUB SERPL-MCNC: 8 MG/DL — HIGH (ref 0.2–1.2)
BUN SERPL-MCNC: 40 MG/DL — HIGH (ref 7–23)
CALCIUM SERPL-MCNC: 9.6 MG/DL — SIGNIFICANT CHANGE UP (ref 8.4–10.5)
CHLORIDE SERPL-SCNC: 94 MMOL/L — LOW (ref 96–108)
CO2 SERPL-SCNC: 30 MMOL/L — SIGNIFICANT CHANGE UP (ref 22–31)
CREAT SERPL-MCNC: 1.1 MG/DL — SIGNIFICANT CHANGE UP (ref 0.5–1.3)
EGFR: 84 ML/MIN/1.73M2 — SIGNIFICANT CHANGE UP
EGFR: 84 ML/MIN/1.73M2 — SIGNIFICANT CHANGE UP
GLUCOSE BLDC GLUCOMTR-MCNC: 114 MG/DL — HIGH (ref 70–99)
GLUCOSE BLDC GLUCOMTR-MCNC: 118 MG/DL — HIGH (ref 70–99)
GLUCOSE BLDC GLUCOMTR-MCNC: 147 MG/DL — HIGH (ref 70–99)
GLUCOSE BLDC GLUCOMTR-MCNC: 186 MG/DL — HIGH (ref 70–99)
GLUCOSE SERPL-MCNC: 106 MG/DL — HIGH (ref 70–99)
HCT VFR BLD CALC: 23 % — LOW (ref 39–50)
HCV RNA SPEC NAA+PROBE-LOG IU: SIGNIFICANT CHANGE UP
HCV RNA SPEC NAA+PROBE-LOG IU: SIGNIFICANT CHANGE UP LOGIU/ML
HGB BLD-MCNC: 7.6 G/DL — LOW (ref 13–17)
INR BLD: 3.18 RATIO — HIGH (ref 0.85–1.16)
MAGNESIUM SERPL-MCNC: 1.4 MG/DL — LOW (ref 1.6–2.6)
MCHC RBC-ENTMCNC: 32.5 PG — SIGNIFICANT CHANGE UP (ref 27–34)
MCHC RBC-ENTMCNC: 33 G/DL — SIGNIFICANT CHANGE UP (ref 32–36)
MCV RBC AUTO: 98.3 FL — SIGNIFICANT CHANGE UP (ref 80–100)
NRBC BLD AUTO-RTO: 0 /100 WBCS — SIGNIFICANT CHANGE UP (ref 0–0)
PHOSPHATE SERPL-MCNC: 3.2 MG/DL — SIGNIFICANT CHANGE UP (ref 2.5–4.5)
PLATELET # BLD AUTO: 67 K/UL — LOW (ref 150–400)
POTASSIUM SERPL-MCNC: 4.1 MMOL/L — SIGNIFICANT CHANGE UP (ref 3.5–5.3)
POTASSIUM SERPL-SCNC: 4.1 MMOL/L — SIGNIFICANT CHANGE UP (ref 3.5–5.3)
PROT SERPL-MCNC: 8.3 G/DL — SIGNIFICANT CHANGE UP (ref 6–8.3)
PROTHROM AB SERPL-ACNC: 35.8 SEC — HIGH (ref 9.9–13.4)
RBC # BLD: 2.34 M/UL — LOW (ref 4.2–5.8)
RBC # FLD: 21.2 % — HIGH (ref 10.3–14.5)
SODIUM SERPL-SCNC: 133 MMOL/L — LOW (ref 135–145)
WBC # BLD: 5.73 K/UL — SIGNIFICANT CHANGE UP (ref 3.8–10.5)
WBC # FLD AUTO: 5.73 K/UL — SIGNIFICANT CHANGE UP (ref 3.8–10.5)

## 2025-02-25 PROCEDURE — 99232 SBSQ HOSP IP/OBS MODERATE 35: CPT

## 2025-02-25 RX ORDER — BUMETANIDE 1 MG/1
4 TABLET ORAL
Refills: 0 | Status: DISCONTINUED | OUTPATIENT
Start: 2025-02-25 | End: 2025-02-27

## 2025-02-25 RX ORDER — MAGNESIUM SULFATE 500 MG/ML
2 SYRINGE (ML) INJECTION
Refills: 0 | Status: COMPLETED | OUTPATIENT
Start: 2025-02-25 | End: 2025-02-25

## 2025-02-25 RX ORDER — LACTULOSE 10 G/15ML
30 SOLUTION ORAL EVERY 12 HOURS
Refills: 0 | Status: DISCONTINUED | OUTPATIENT
Start: 2025-02-25 | End: 2025-02-26

## 2025-02-25 RX ADMIN — MIDODRINE HYDROCHLORIDE 10 MILLIGRAM(S): 5 TABLET ORAL at 18:03

## 2025-02-25 RX ADMIN — BUMETANIDE 132 MILLIGRAM(S): 1 TABLET ORAL at 21:34

## 2025-02-25 RX ADMIN — Medication 1 APPLICATION(S): at 08:44

## 2025-02-25 RX ADMIN — MIDODRINE HYDROCHLORIDE 10 MILLIGRAM(S): 5 TABLET ORAL at 01:10

## 2025-02-25 RX ADMIN — BUMETANIDE 132 MILLIGRAM(S): 1 TABLET ORAL at 01:10

## 2025-02-25 RX ADMIN — Medication 1 DROP(S): at 06:05

## 2025-02-25 RX ADMIN — TRAMADOL HYDROCHLORIDE 25 MILLIGRAM(S): 50 TABLET, FILM COATED ORAL at 01:13

## 2025-02-25 RX ADMIN — Medication 25 GRAM(S): at 08:39

## 2025-02-25 RX ADMIN — TRAMADOL HYDROCHLORIDE 25 MILLIGRAM(S): 50 TABLET, FILM COATED ORAL at 10:33

## 2025-02-25 RX ADMIN — TRAMADOL HYDROCHLORIDE 25 MILLIGRAM(S): 50 TABLET, FILM COATED ORAL at 09:08

## 2025-02-25 RX ADMIN — FOLIC ACID 1 MILLIGRAM(S): 1 TABLET ORAL at 11:20

## 2025-02-25 RX ADMIN — TRAMADOL HYDROCHLORIDE 25 MILLIGRAM(S): 50 TABLET, FILM COATED ORAL at 02:25

## 2025-02-25 RX ADMIN — Medication 25 GRAM(S): at 11:21

## 2025-02-25 RX ADMIN — Medication 150 MILLIGRAM(S): at 11:21

## 2025-02-25 RX ADMIN — BUMETANIDE 132 MILLIGRAM(S): 1 TABLET ORAL at 14:04

## 2025-02-25 RX ADMIN — POLYETHYLENE GLYCOL 3350 17 GRAM(S): 17 POWDER, FOR SOLUTION ORAL at 11:21

## 2025-02-25 RX ADMIN — CEFTRIAXONE 100 MILLIGRAM(S): 500 INJECTION, POWDER, FOR SOLUTION INTRAMUSCULAR; INTRAVENOUS at 05:55

## 2025-02-25 RX ADMIN — Medication 1 TABLET(S): at 11:20

## 2025-02-25 RX ADMIN — Medication 100 MILLIGRAM(S): at 11:20

## 2025-02-25 RX ADMIN — TRAMADOL HYDROCHLORIDE 25 MILLIGRAM(S): 50 TABLET, FILM COATED ORAL at 18:11

## 2025-02-25 RX ADMIN — MIDODRINE HYDROCHLORIDE 10 MILLIGRAM(S): 5 TABLET ORAL at 11:20

## 2025-02-25 RX ADMIN — LEVALBUTEROL HYDROCHLORIDE 0.63 MILLIGRAM(S): 1.25 SOLUTION RESPIRATORY (INHALATION) at 05:57

## 2025-02-25 RX ADMIN — Medication 800 MILLIGRAM(S): at 18:03

## 2025-02-25 RX ADMIN — LEVALBUTEROL HYDROCHLORIDE 0.63 MILLIGRAM(S): 1.25 SOLUTION RESPIRATORY (INHALATION) at 11:21

## 2025-02-25 RX ADMIN — Medication 800 MILLIGRAM(S): at 08:38

## 2025-02-25 RX ADMIN — Medication 1 DROP(S): at 18:04

## 2025-02-25 RX ADMIN — LEVALBUTEROL HYDROCHLORIDE 0.63 MILLIGRAM(S): 1.25 SOLUTION RESPIRATORY (INHALATION) at 21:35

## 2025-02-25 RX ADMIN — Medication 40 MILLIGRAM(S): at 11:20

## 2025-02-25 NOTE — PROGRESS NOTE ADULT - SUBJECTIVE AND OBJECTIVE BOX
Patient is a 44y old  Male who presents with a chief complaint of decompensated cirrhosis (2025 11:10)      Interval Events:   - Donor liver was poor quality, no transplant yet.  - Still w/ good diuresis 2.6L today     ROS:   A 12-point ROS was performed and negative except as noted in HPI.    Hospital Medications:  albuterol    90 MICROgram(s) HFA Inhaler 1 Puff(s) Inhalation every 12 hours PRN  artificial tears (preservative free) Ophthalmic Solution 1 Drop(s) Both EYES two times a day  buMETAnide IVPB 4 milliGRAM(s) IV Intermittent <User Schedule>  cefTRIAXone   IVPB 2000 milliGRAM(s) IV Intermittent every 24 hours  chlorhexidine 2% Cloths 1 Application(s) Topical <User Schedule>  folic acid 1 milliGRAM(s) Oral daily  influenza   Vaccine 0.5 milliLiter(s) IntraMuscular once  insulin lispro (ADMELOG) corrective regimen sliding scale   SubCutaneous three times a day before meals  insulin lispro (ADMELOG) corrective regimen sliding scale   SubCutaneous at bedtime  lactulose Syrup 30 Gram(s) Oral every 12 hours  levalbuterol Inhalation 0.63 milliGRAM(s) Inhalation every 6 hours  magnesium oxide 800 milliGRAM(s) Oral two times a day with meals  melatonin 5 milliGRAM(s) Oral at bedtime PRN  midodrine 10 milliGRAM(s) Oral every 8 hours  multivitamin 1 Tablet(s) Oral daily  pantoprazole    Tablet 40 milliGRAM(s) Oral daily  rifAXIMin 550 milliGRAM(s) Oral every 12 hours  sodium chloride 0.65% Nasal 1 Spray(s) Both Nostrils three times a day PRN  spironolactone 150 milliGRAM(s) Oral <User Schedule>  thiamine 100 milliGRAM(s) Oral daily  traMADol 25 milliGRAM(s) Oral every 6 hours PRN      PHYSICAL EXAM:   Vital Signs:  Vital Signs Last 24 Hrs  T(C): 37.2 (2025 16:58), Max: 37.2 (2025 16:58)  T(F): 98.9 (2025 16:58), Max: 98.9 (2025 16:58)  HR: 82 (2025 16:58) (75 - 88)  BP: 110/68 (2025 16:58) (110/56 - 125/66)  BP(mean): --  RR: 18 (2025 16:58) (18 - 18)  SpO2: 97% (2025 16:58) (93% - 98%)    Parameters below as of 2025 16:58  Patient On (Oxygen Delivery Method): nasal cannula  O2 Flow (L/min): 1    Daily     Daily Weight in k.7 (2025 04:53)    GENERAL:  No acute distress  HEENT:  +scleral icterus  CHEST:  no accessory muscle use  HEART:  Regular rate and rhythm  ABDOMEN:  Soft, non-tender, distended  EXTREMITIES:  2+ LE edema  SKIN:  No rash/ecchymoses  NEURO:  Alert and oriented x 3      LABS: reviewed                        7.6    5.73  )-----------( 67       ( 2025 06:58 )             23.0     02-25    133[L]  |  94[L]  |  40[H]  ----------------------------<  106[H]  4.1   |  30  |  1.10    Ca    9.6      2025 06:55  Phos  3.2     02-25  Mg     1.4     02-25    TPro  8.3  /  Alb  2.9[L]  /  TBili  8.0[H]  /  DBili  x   /  AST  40  /  ALT  9[L]  /  AlkPhos  94  02-25    LIVER FUNCTIONS - ( 2025 06:55 )  Alb: 2.9 g/dL / Pro: 8.3 g/dL / ALK PHOS: 94 U/L / ALT: 9 U/L / AST: 40 U/L / GGT: x             Interval Diagnostic Studies: see sunrise for full report

## 2025-02-25 NOTE — PROGRESS NOTE ADULT - ATTENDING COMMENTS
Patient is a 44-year-old male with PMH ALD cirrhosis (+HE, +ascites, +G2 EV), history of alcohol associated hepatitis in 7/2024, hx of AUD (last drink in 11/2024), who was transferred from [facility] to [facility] on 1/16/2025 for refractory ascites and recurrent right pleural effusion.  Hospital course complicated by HRS (required iHD 1/10 - 1/19; now resolved), right-sided empyema (fluid culture + Citrobacter koseri) s/p VATS on 1/28/2025 with decortication, which was complicated by hemothorax.  Patient was extubated on 1/30/2025, and the last chest tube was removed on 2/6/2025.  CT chest on 2/7/2025 showed a moderate gas and liquid containing right pleural fluid collection that cannot be safely evacuated prior to OLT per CT surgery; he remains on antibiotics until OLT.    Yesterday: Patient had a potential liver transplant offer; however, the case was canceled due to poor donor quality.  Today: Patient without complaints.  UOP 2.6 L / 24 hours.  Patient reports that his abdominal distention and lower extremity edema continue to improve.  Patient has another potential transplant offer today.    On exam: AOx3, no asterixis.  Abdomen moderately distended, nontender, soft.  1-2+ lower extremity edema bilaterally (improving).  Awaiting OLT.    Listed for OLT @ MELD 30 (2/24/25).  Current MELD = 29. ABO O.   - HE: Continue MiraLAX, rifaximin.  - Ascites: Continue Bumex 4 mg IV twice daily, Aldactone 150 mg daily.  Low-sodium diet.  Daily weights.  - EV screening: EGD 7/2024 for melena showed grade II EV, small gastric ulcer (neg HP), and portal gastropathy.  Patient is not on NSBB due to hypotension.  - HCC screening: MR abdomen with and without contrast on 2/4/2025 was severely limited study, however did not note any obvious liver lesions.  - Hypotension: Continue home midodrine 10 mg 3 times daily.  - Empyema: Continue ceftriaxone 2 g daily until OLT, as per transplant ID recommendations.  Plan for additional source control by CT surgery team during OLT.  - AUD: Continue multivitamin, folic acid, thiamine supplementation daily.    Sonal Samaniego MD  Transplant Hepatology

## 2025-02-25 NOTE — PROGRESS NOTE ADULT - ASSESSMENT
Zulema Rey is a 44 year old M with PMH of alcohol associated cirrhosis c/b recurrent ascites, grade II EV, and HE, alcohol associated hepatitis 7/2024, and right calf hematoma 10/2024 presenting to [facility] for abdominal pain and distension, found to have renal failure, large volume ascites and findings of empyema s/p chest tube placement currently listed with a MELD 3.0 of 30 ABO O on 2/16 today with a MELD of 29    #Decompensated EtOH Associated Cirrhosis  #Hx of alcohol associated hepatitis 7/2024  Hx of decompensated cirrhosis c/b recurrent ascites requiring LVPs, grade II EV, and HE. Actively drinking. Now p/w abdominal pain/distension with worsening of liver tests possibly triggered by infection. DDx also includes alcohol associated hepatitis given hx of alc hep 7/2024 which improved with steroids and now listed  MELD 3.0 score is 29 (2/23/25) ABO O currently listed with MELD of 29 as noted above;  -Volume:  s/p para with 4L removed on 2/21/ Continue with Bumex at 4mg IV BID with goal -2L per day   -Infection: pt with note of empyema (see below)  - EV: EGD 7/2024 for melena showed grade II EV, small gastric ulcer (neg HP), and portal gastropathy   - HE: c/w  rifaximin, goal 3-4 BM/day. Continue miralax BID (transitioned off lactulose per pt request)  - HCC screening: MRI nondiagnostic, no clear lesion seen                             - C/w home midodrine 10 mg tid  - C/w PPI for stress ulcer ppx and hx of ulcer  - Trend MELD labs daily (cmp, INR) daily  - Strict I/Os, daily wts    #Empyema  CT chest noncontrast reviewed from OSH showing moderate sized complex R sided pleural effusion. s/p chest tube placed 1/18 with cultures growing Citrobacter c/w empyema. Requiring 2nd chest tube placement then now s/p VATS 1/28 for persistent effusion with 3rd chest tube placed. Now all chest tubes removed with persistent collection on CXR  - C/w Ceftriaxone 2g daily & Flagyl given persistent collection   - Per ID will need to continue with CTX and IRWIN    #Anemia  #Thrombocytopenia  Normocytic anemia with Hgb 8.5 and plt count 64 likely due to chronic liver dz  - s/p 4 units pRBC, 2 FFP, 2 plts on 1/28 and 1 unit on 2/10  - s/p 1U PRBC 2/22 with no signs of bleeding  - Hold DVT ppx for bleeding    Note incomplete until finalized by attending signature/attestation.    Saul Zarate  GI/Hepatology Fellow, PGY-4    MONDAY-FRIDAY 8AM-5PM:  Please message via [employer] or email [email or web address]     On Weekends/Holidays (All day) and Weekdays after 5 PM to 8 AM  For nonurgent consults please email:  Please email [email or web address]  For urgent consults:  Please contact on call GI team. 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 Actively drinking. Now p/w abdominal pain/distension with worsening of liver tests possibly triggered by infection. DDx also includes alcohol associated hepatitis given hx of alc hep 7/2024 which improved with steroids and now listed  MELD 3.0 score is 29 (2/25/25) ABO O currently listed with MELD of 29 as noted above;  -Volume:  s/p para with 4L removed on 2/21/ Continue with Bumex at 4mg IV BID with goal -2L per day   -Infection: pt with note of empyema (see below)  - EV: EGD 7/2024 for melena showed grade II EV, small gastric ulcer (neg HP), and portal gastropathy   - HE: c/w  rifaximin, goal 3-4 BM/day. Continue miralax BID (transitioned off lactulose per pt request)  - HCC screening: MRI nondiagnostic, no clear lesion seen                             - C/w home midodrine 10 mg tid  - C/w PPI for stress ulcer ppx and hx of ulcer  - Trend MELD labs daily (cmp, INR) daily  - Strict I/Os, daily wts    #Empyema  CT chest noncontrast reviewed from OSH showing moderate sized complex R sided pleural effusion. s/p chest tube placed 1/18 with cultures growing Citrobacter c/w empyema. Requiring 2nd chest tube placement then now s/p VATS 1/28 for persistent effusion with 3rd chest tube placed. Now all chest tubes removed with persistent collection on CXR  - C/w Ceftriaxone 2g daily & Flagyl given persistent collection   - Per ID will need to continue with CTX and IRWIN    #Anemia  #Thrombocytopenia  Normocytic anemia with Hgb 8.5 and plt count 64 likely due to chronic liver dz  - s/p 4 units pRBC, 2 FFP, 2 plts on 1/28 and 1 unit on 2/10  - s/p 1U PRBC 2/22 with no signs of bleeding  - Hold DVT ppx for bleeding    Note incomplete until finalized by attending signature/attestation.    Saul Zarate  GI/Hepatology Fellow, PGY-4    MONDAY-FRIDAY 8AM-5PM:  Please message via [employer] or email [email or web address]     On Weekends/Holidays (All day) and Weekdays after 5 PM to 8 AM  For nonurgent consults please email:  Please email [email or web address]  For urgent consults:  Please contact on call GI team. See Amion schedule ([facility]), [employer] paging system ([facility]), or call hospital  ([facility])

## 2025-02-26 LAB
-  AMPICILLIN: SIGNIFICANT CHANGE UP
-  CIPROFLOXACIN: SIGNIFICANT CHANGE UP
-  DAPTOMYCIN: SIGNIFICANT CHANGE UP
-  LEVOFLOXACIN: SIGNIFICANT CHANGE UP
-  LINEZOLID: SIGNIFICANT CHANGE UP
-  NITROFURANTOIN: SIGNIFICANT CHANGE UP
-  TETRACYCLINE: SIGNIFICANT CHANGE UP
-  VANCOMYCIN: SIGNIFICANT CHANGE UP
ALBUMIN SERPL ELPH-MCNC: 2.9 G/DL — LOW (ref 3.3–5)
ALP SERPL-CCNC: 107 U/L — SIGNIFICANT CHANGE UP (ref 40–120)
ALT FLD-CCNC: 10 U/L — SIGNIFICANT CHANGE UP (ref 10–45)
ANION GAP SERPL CALC-SCNC: 8 MMOL/L — SIGNIFICANT CHANGE UP (ref 5–17)
APTT BLD: 47.4 SEC — HIGH (ref 24.5–35.6)
AST SERPL-CCNC: 42 U/L — HIGH (ref 10–40)
BILIRUB SERPL-MCNC: 7.6 MG/DL — HIGH (ref 0.2–1.2)
BUN SERPL-MCNC: 42 MG/DL — HIGH (ref 7–23)
CALCIUM SERPL-MCNC: 9.6 MG/DL — SIGNIFICANT CHANGE UP (ref 8.4–10.5)
CHLORIDE SERPL-SCNC: 93 MMOL/L — LOW (ref 96–108)
CO2 SERPL-SCNC: 32 MMOL/L — HIGH (ref 22–31)
CREAT SERPL-MCNC: 1.19 MG/DL — SIGNIFICANT CHANGE UP (ref 0.5–1.3)
CULTURE RESULTS: ABNORMAL
CULTURE RESULTS: SIGNIFICANT CHANGE UP
EGFR: 76 ML/MIN/1.73M2 — SIGNIFICANT CHANGE UP
EGFR: 76 ML/MIN/1.73M2 — SIGNIFICANT CHANGE UP
FLUAV AG NPH QL: SIGNIFICANT CHANGE UP
FLUBV AG NPH QL: SIGNIFICANT CHANGE UP
GLUCOSE BLDC GLUCOMTR-MCNC: 137 MG/DL — HIGH (ref 70–99)
GLUCOSE BLDC GLUCOMTR-MCNC: 148 MG/DL — HIGH (ref 70–99)
GLUCOSE BLDC GLUCOMTR-MCNC: 172 MG/DL — HIGH (ref 70–99)
GLUCOSE BLDC GLUCOMTR-MCNC: 224 MG/DL — HIGH (ref 70–99)
GLUCOSE SERPL-MCNC: 153 MG/DL — HIGH (ref 70–99)
HCT VFR BLD CALC: 22.7 % — LOW (ref 39–50)
HGB BLD-MCNC: 7.7 G/DL — LOW (ref 13–17)
INR BLD: 2.88 RATIO — HIGH (ref 0.85–1.16)
MAGNESIUM SERPL-MCNC: 1.6 MG/DL — SIGNIFICANT CHANGE UP (ref 1.6–2.6)
MCHC RBC-ENTMCNC: 33.8 PG — SIGNIFICANT CHANGE UP (ref 27–34)
MCHC RBC-ENTMCNC: 33.9 G/DL — SIGNIFICANT CHANGE UP (ref 32–36)
MCV RBC AUTO: 99.6 FL — SIGNIFICANT CHANGE UP (ref 80–100)
METHOD TYPE: SIGNIFICANT CHANGE UP
NRBC BLD AUTO-RTO: 0 /100 WBCS — SIGNIFICANT CHANGE UP (ref 0–0)
ORGANISM # SPEC MICROSCOPIC CNT: ABNORMAL
ORGANISM # SPEC MICROSCOPIC CNT: ABNORMAL
PHOSPHATE SERPL-MCNC: 3.3 MG/DL — SIGNIFICANT CHANGE UP (ref 2.5–4.5)
PLATELET # BLD AUTO: 66 K/UL — LOW (ref 150–400)
POTASSIUM SERPL-MCNC: 3.9 MMOL/L — SIGNIFICANT CHANGE UP (ref 3.5–5.3)
POTASSIUM SERPL-SCNC: 3.9 MMOL/L — SIGNIFICANT CHANGE UP (ref 3.5–5.3)
PROT SERPL-MCNC: 8.7 G/DL — HIGH (ref 6–8.3)
PROTHROM AB SERPL-ACNC: 32.8 SEC — HIGH (ref 9.9–13.4)
RAPIDTEG MAXIMUM AMPLITUDE: 44.3 MM — LOW (ref 52–70)
RBC # BLD: 2.28 M/UL — LOW (ref 4.2–5.8)
RBC # FLD: 21 % — HIGH (ref 10.3–14.5)
RSV RNA NPH QL NAA+NON-PROBE: SIGNIFICANT CHANGE UP
SARS-COV-2 RNA SPEC QL NAA+PROBE: SIGNIFICANT CHANGE UP
SODIUM SERPL-SCNC: 133 MMOL/L — LOW (ref 135–145)
SPECIMEN SOURCE: SIGNIFICANT CHANGE UP
TEG FUNCTIONAL FIBRINOGEN: 14.2 MM — LOW (ref 15–32)
TEG LY30 (LYSIS): 1.2 % — SIGNIFICANT CHANGE UP (ref 0–2.6)
TEG REACTION TIME: 12.1 MIN — HIGH (ref 4.6–9.1)
WBC # BLD: 5.27 K/UL — SIGNIFICANT CHANGE UP (ref 3.8–10.5)
WBC # FLD AUTO: 5.27 K/UL — SIGNIFICANT CHANGE UP (ref 3.8–10.5)

## 2025-02-26 PROCEDURE — 99232 SBSQ HOSP IP/OBS MODERATE 35: CPT

## 2025-02-26 RX ORDER — PIPERACILLIN-TAZO-DEXTROSE,ISO 3.375G/5
3.38 IV SOLUTION, PIGGYBACK PREMIX FROZEN(ML) INTRAVENOUS ONCE
Refills: 0 | Status: DISCONTINUED | OUTPATIENT
Start: 2025-02-27 | End: 2025-02-27

## 2025-02-26 RX ORDER — TRAMADOL HYDROCHLORIDE 50 MG/1
25 TABLET, FILM COATED ORAL ONCE
Refills: 0 | Status: DISCONTINUED | OUTPATIENT
Start: 2025-02-26 | End: 2025-02-26

## 2025-02-26 RX ORDER — PIPERACILLIN-TAZO-DEXTROSE,ISO 3.375G/5
3.38 IV SOLUTION, PIGGYBACK PREMIX FROZEN(ML) INTRAVENOUS ONCE
Refills: 0 | Status: DISCONTINUED | OUTPATIENT
Start: 2025-02-26 | End: 2025-02-26

## 2025-02-26 RX ORDER — MAGNESIUM SULFATE 500 MG/ML
2 SYRINGE (ML) INJECTION ONCE
Refills: 0 | Status: COMPLETED | OUTPATIENT
Start: 2025-02-26 | End: 2025-02-26

## 2025-02-26 RX ADMIN — BUMETANIDE 132 MILLIGRAM(S): 1 TABLET ORAL at 20:06

## 2025-02-26 RX ADMIN — FOLIC ACID 1 MILLIGRAM(S): 1 TABLET ORAL at 08:22

## 2025-02-26 RX ADMIN — Medication 1 DROP(S): at 06:00

## 2025-02-26 RX ADMIN — TRAMADOL HYDROCHLORIDE 25 MILLIGRAM(S): 50 TABLET, FILM COATED ORAL at 10:08

## 2025-02-26 RX ADMIN — Medication 100 MILLIGRAM(S): at 08:21

## 2025-02-26 RX ADMIN — LACTULOSE 30 GRAM(S): 10 SOLUTION ORAL at 06:00

## 2025-02-26 RX ADMIN — LEVALBUTEROL HYDROCHLORIDE 0.63 MILLIGRAM(S): 1.25 SOLUTION RESPIRATORY (INHALATION) at 16:53

## 2025-02-26 RX ADMIN — MIDODRINE HYDROCHLORIDE 10 MILLIGRAM(S): 5 TABLET ORAL at 17:04

## 2025-02-26 RX ADMIN — Medication 1 APPLICATION(S): at 06:29

## 2025-02-26 RX ADMIN — Medication 1 DROP(S): at 17:03

## 2025-02-26 RX ADMIN — BUMETANIDE 132 MILLIGRAM(S): 1 TABLET ORAL at 13:12

## 2025-02-26 RX ADMIN — Medication 40 MILLIGRAM(S): at 08:22

## 2025-02-26 RX ADMIN — Medication 150 MILLIGRAM(S): at 08:21

## 2025-02-26 RX ADMIN — TRAMADOL HYDROCHLORIDE 25 MILLIGRAM(S): 50 TABLET, FILM COATED ORAL at 12:04

## 2025-02-26 RX ADMIN — TRAMADOL HYDROCHLORIDE 25 MILLIGRAM(S): 50 TABLET, FILM COATED ORAL at 18:52

## 2025-02-26 RX ADMIN — TRAMADOL HYDROCHLORIDE 25 MILLIGRAM(S): 50 TABLET, FILM COATED ORAL at 12:34

## 2025-02-26 RX ADMIN — TRAMADOL HYDROCHLORIDE 25 MILLIGRAM(S): 50 TABLET, FILM COATED ORAL at 18:17

## 2025-02-26 RX ADMIN — TRAMADOL HYDROCHLORIDE 25 MILLIGRAM(S): 50 TABLET, FILM COATED ORAL at 06:07

## 2025-02-26 RX ADMIN — TRAMADOL HYDROCHLORIDE 25 MILLIGRAM(S): 50 TABLET, FILM COATED ORAL at 00:40

## 2025-02-26 RX ADMIN — Medication 5 MILLIGRAM(S): at 00:41

## 2025-02-26 RX ADMIN — TRAMADOL HYDROCHLORIDE 25 MILLIGRAM(S): 50 TABLET, FILM COATED ORAL at 10:45

## 2025-02-26 RX ADMIN — Medication 1 TABLET(S): at 08:22

## 2025-02-26 RX ADMIN — CEFTRIAXONE 100 MILLIGRAM(S): 500 INJECTION, POWDER, FOR SOLUTION INTRAMUSCULAR; INTRAVENOUS at 06:01

## 2025-02-26 RX ADMIN — Medication 800 MILLIGRAM(S): at 08:21

## 2025-02-26 RX ADMIN — TRAMADOL HYDROCHLORIDE 25 MILLIGRAM(S): 50 TABLET, FILM COATED ORAL at 01:48

## 2025-02-26 RX ADMIN — Medication 25 GRAM(S): at 14:49

## 2025-02-26 RX ADMIN — MIDODRINE HYDROCHLORIDE 10 MILLIGRAM(S): 5 TABLET ORAL at 00:40

## 2025-02-26 RX ADMIN — Medication 800 MILLIGRAM(S): at 17:04

## 2025-02-26 RX ADMIN — INSULIN LISPRO 4: 100 INJECTION, SOLUTION INTRAVENOUS; SUBCUTANEOUS at 17:22

## 2025-02-26 RX ADMIN — MIDODRINE HYDROCHLORIDE 10 MILLIGRAM(S): 5 TABLET ORAL at 08:22

## 2025-02-26 NOTE — DIETITIAN NUTRITION RISK NOTIFICATION - TREATMENT: THE FOLLOWING DIET HAS BEEN RECOMMENDED
Diet, NPO after Midnight:      NPO Start Date: 26-Feb-2025,   NPO Start Time: 23:59  Except Medications  With Ice Chips/Sips of Water (02-26-25 @ 14:03) [Active]  Diet, Consistent Carbohydrate w/Evening Snack:   Low Sodium  Supplement Feeding Modality:  Oral  Ensure Max Cans or Servings Per Day:  2       Frequency:  Daily (02-24-25 @ 17:04) [Active]

## 2025-02-26 NOTE — PROGRESS NOTE ADULT - SUBJECTIVE AND OBJECTIVE BOX
Patient is a 44y old  Male who presents with a chief complaint of decompensated cirrhosis (2025 18:34)      Interval Events:   - Patient reports improvement in LE edema  - No significant complaints overnight     ROS:   A 12-point ROS was performed and negative except as noted in HPI.    Hospital Medications:  albuterol    90 MICROgram(s) HFA Inhaler 1 Puff(s) Inhalation every 12 hours PRN  artificial tears (preservative free) Ophthalmic Solution 1 Drop(s) Both EYES two times a day  buMETAnide IVPB 4 milliGRAM(s) IV Intermittent <User Schedule>  cefTRIAXone   IVPB 2000 milliGRAM(s) IV Intermittent every 24 hours  chlorhexidine 2% Cloths 1 Application(s) Topical <User Schedule>  folic acid 1 milliGRAM(s) Oral daily  influenza   Vaccine 0.5 milliLiter(s) IntraMuscular once  insulin lispro (ADMELOG) corrective regimen sliding scale   SubCutaneous three times a day before meals  insulin lispro (ADMELOG) corrective regimen sliding scale   SubCutaneous at bedtime  lactulose Syrup 30 Gram(s) Oral every 12 hours  levalbuterol Inhalation 0.63 milliGRAM(s) Inhalation every 6 hours  magnesium oxide 800 milliGRAM(s) Oral two times a day with meals  melatonin 5 milliGRAM(s) Oral at bedtime PRN  midodrine 10 milliGRAM(s) Oral every 8 hours  multivitamin 1 Tablet(s) Oral daily  pantoprazole    Tablet 40 milliGRAM(s) Oral daily  rifAXIMin 550 milliGRAM(s) Oral every 12 hours  sodium chloride 0.65% Nasal 1 Spray(s) Both Nostrils three times a day PRN  spironolactone 150 milliGRAM(s) Oral <User Schedule>  thiamine 100 milliGRAM(s) Oral daily  traMADol 25 milliGRAM(s) Oral every 6 hours PRN      PHYSICAL EXAM:   Vital Signs:  Vital Signs Last 24 Hrs  T(C): 37.2 (2025 13:00), Max: 37.2 (2025 16:58)  T(F): 98.9 (2025 13:00), Max: 98.9 (2025 16:58)  HR: 81 (2025 13:00) (79 - 93)  BP: 134/72 (2025 13:00) (110/68 - 134/72)  BP(mean): --  RR: 18 (2025 13:00) (18 - 18)  SpO2: 96% (2025 13:00) (96% - 99%)    Parameters below as of 2025 13:00  Patient On (Oxygen Delivery Method): room air      Daily     Daily Weight in k (2025 09:17)    GENERAL:  No acute distress  HEENT:  +scleral icterus  CHEST:  no accessory muscle use  HEART:  Regular rate and rhythm  ABDOMEN:  Soft, non-tender, distended  EXTREMITIES:  2+ LE edema  SKIN:  No rash/ecchymoses  NEURO:  Alert and oriented x 3    LABS: reviewed                        7.7    5.27  )-----------( 66       ( 2025 06:23 )             22.7     02-    133[L]  |  93[L]  |  42[H]  ----------------------------<  153[H]  3.9   |  32[H]  |  1.19    Ca    9.6      2025 11:13  Phos  3.3     -  Mg     1.6     -    TPro  8.7[H]  /  Alb  2.9[L]  /  TBili  7.6[H]  /  DBili  x   /  AST  42[H]  /  ALT  10  /  AlkPhos  107  -    LIVER FUNCTIONS - ( 2025 11:13 )  Alb: 2.9 g/dL / Pro: 8.7 g/dL / ALK PHOS: 107 U/L / ALT: 10 U/L / AST: 42 U/L / GGT: x             Interval Diagnostic Studies: see sunrise for full report

## 2025-02-26 NOTE — PROGRESS NOTE ADULT - ASSESSMENT
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 Actively drinking. Now p/w abdominal pain/distension with worsening of liver tests possibly triggered by infection. DDx also includes alcohol associated hepatitis given hx of alc hep 7/2024 which improved with steroids and now listed  MELD 3.0 score is 28 (2/26/25) ABO O currently listed with MELD of 29 as noted above;  -Volume:  s/p para with 4L removed on 2/21/ Continue with Bumex at 4mg IV BID with goal -2L per day   -Infection: pt with note of empyema (see below)  - EV: EGD 7/2024 for melena showed grade II EV, small gastric ulcer (neg HP), and portal gastropathy   - HE: c/w  rifaximin, goal 3-4 BM/day. Continue miralax BID (transitioned off lactulose per pt request)  - HCC screening: MRI nondiagnostic, no clear lesion seen                             - C/w home midodrine 10 mg tid  - C/w PPI for stress ulcer ppx and hx of ulcer  - Trend MELD labs daily (cmp, INR) daily  - Strict I/Os, daily wts    #Empyema  CT chest noncontrast reviewed from OSH showing moderate sized complex R sided pleural effusion. s/p chest tube placed 1/18 with cultures growing Citrobacter c/w empyema. Requiring 2nd chest tube placement then now s/p VATS 1/28 for persistent effusion with 3rd chest tube placed. Now all chest tubes removed with persistent collection on CXR  - C/w Ceftriaxone 2g daily & Flagyl given persistent collection   - Per ID will need to continue with CTX and IRWIN    #Anemia  #Thrombocytopenia  Normocytic anemia with Hgb 8.5 and plt count 64 likely due to chronic liver dz  - s/p 4 units pRBC, 2 FFP, 2 plts on 1/28 and 1 unit on 2/10  - s/p 1U PRBC 2/22 with no signs of bleeding  - Hold DVT ppx for bleeding    Note incomplete until finalized by attending signature/attestation.    Saul Zarate  GI/Hepatology Fellow, PGY-4    MONDAY-FRIDAY 8AM-5PM:  Please message via [employer] or email [email or web address]     On Weekends/Holidays (All day) and Weekdays after 5 PM to 8 AM  For nonurgent consults please email:  Please email [email or web address]  For urgent consults:  Please contact on call GI team. See Amion schedule ([facility]), [employer] paging system ([facility]), or call hospital  ([facility])

## 2025-02-26 NOTE — PROGRESS NOTE ADULT - ATTENDING COMMENTS
Patient is a 44-year-old male with PMH ALD cirrhosis (+HE, +ascites, +G2 EV), history of alcohol associated hepatitis in 7/2024, hx of AUD (last drink in 11/2024), who was transferred from [facility] to [facility] on 1/16/2025 for refractory ascites and recurrent right pleural effusion.  Hospital course complicated by HRS (required iHD 1/10 - 1/19; now resolved), right-sided empyema (fluid culture + Citrobacter koseri) s/p VATS on 1/28/2025 with decortication, which was complicated by hemothorax.  Patient was extubated on 1/30/2025, and the last chest tube was removed on 2/6/2025.  CT chest on 2/7/2025 showed a moderate gas and liquid containing right pleural fluid collection that cannot be safely evacuated prior to OLT per CT surgery; he remains on antibiotics until OLT.    Today: Patient has a potential liver transplant offer.  Patient reports that he feels well.  He reports that his abdominal distention and lower extremity edema continue to improve daily with diuresis.  UOP: 3.3L/24H.    Listed for OLT @ MELD 30 (2/24/25).  Current MELD = 28.  ABO O.   - HE: Continue MiraLAX, rifaximin.  - Ascites: Continue Bumex 4 mg IV twice daily, Aldactone 150 mg daily.  Low-sodium diet.  Daily weights.  - EV screening: EGD 7/2024 for melena showed grade II EV, small gastric ulcer (neg HP), and portal gastropathy.  Patient is not on NSBB due to hypotension.  - HCC screening: MR abdomen with and without contrast on 2/4/2025 was severely limited study, however did not note any obvious liver lesions.  - Hypotension: Continue home midodrine 10 mg 3 times daily.  - Empyema: Continue ceftriaxone 2 g daily until OLT, as per transplant ID recommendations.  Plan for additional source control by CT surgery team during OLT.  - AUD: Continue multivitamin, folic acid, thiamine supplementation daily.    Sonal Samaniego MD  Transplant Hepatology

## 2025-02-26 NOTE — PROGRESS NOTE ADULT - ATTENDING SUPERVISION STATEMENT
Department of Internal Medicine        CHIEF COMPLAINT: Generalized weakness, inability to ambulate, shortness of breath    Reason for Admission: Failure to thrive    HISTORY OF PRESENT ILLNESS:      The patient is a [de-identified] y.o. male who presents with generalized weakness which has been going on for about a week. Patient does live home alone but the patient son frequently checks on them. Patient is now having difficulty walking because of his weakness. Patient has not fallen yet. Patient though is on warfarin. He was seen by home health agency nurse the day of admission and his O2 sat was 90% because of shortness of breath and weakness she sent him to the emergency room. He denies any problem with fever/chills, nausea/vomiting, diarrhea/constipation. Patient denies any chest pain palpitation or dizziness. Patient had a potassium of 6.5 in the ED but was moderately hemolyzed. BUN/creatinine 25/0.8. WBC 6.3 with hemoglobin 14.6. Temperature is 90.5 with heart rate 82 and blood pressure 137/75. O2 sat 93% room air at rest.    9/10/2021 patient seen examined on PCU. Patient again denies any problem with chest pain, abdominal pain, palpitations. Patient still very very weak in the lower extremities. Patient denies any numbness in his legs. Patient has some mild low back pain but no history of any chronic disc problems. Patient serum troponin increased from 102 up to 114 yesterday. ECG reviewed and showed chronic A. fib with no ST-T changes to suggest acute ischemia. Temperature 97.7 with heart rate of 80. Blood pressure 112/60. O2 sat was 87% on room air at rest early this morning so nursing put patient on 2 L nasal cannula and O2 sat currently 93%. Urine output 600 cc last 2 shifts. Melchor catheter is in place and was put in by ED. Patient does not want the catheter taken out at this time with his inability to ambulate. Patient also has home O2 but he only wears it periodically.   We will consult
cardiology and neurology. Check x-ray lumbar spine and CT of the chest.      Past Medical History:    Past Medical History:   Diagnosis Date    Atrial fibrillation ([facility] [address]     CAD (coronary artery disease)     coronary stent 12yrs ago    COPD (chronic obstructive pulmonary disease) ([facility] [address]     Hypertension      Past Surgical History:    Past Surgical History:   Procedure Laterality Date    CARDIAC SURGERY      coronary stent    CATARACT REMOVAL WITH IMPLANT Left 2013    COLONOSCOPY      CYST REMOVAL      tailbone    EYE SURGERY         Medications Prior to Admission:    @  Prior to Admission medications    Medication Sig Start Date End Date Taking? Authorizing Provider   tamsulosin (FLOMAX) 0.4 MG capsule Take 0.4 mg by mouth daily   Yes Historical Provider, MD   metoprolol (TOPROL-XL) 50 MG XL tablet Take 50 mg by mouth daily. Yes Historical Provider, MD   finasteride (PROSCAR) 5 MG tablet Take 5 mg by mouth daily. Yes Historical Provider, MD   aspirin 81 MG tablet Take 81 mg by mouth daily. Yes Historical Provider, MD   warfarin (COUMADIN) 7.5 MG tablet Take 7.5 mg by mouth Daily. Yes Historical Provider, MD   terazosin (HYTRIN) 5 MG capsule Take 10 mg by mouth daily.     Historical Provider, MD       Allergies:  Dye [iodides]    Social History:   Social History     Socioeconomic History    Marital status:      Spouse name: Not on file    Number of children: Not on file    Years of education: Not on file    Highest education level: Not on file   Occupational History    Not on file   Tobacco Use    Smoking status: Former Smoker     Quit date: 3/27/1992     Years since quittin.4    Smokeless tobacco: Never Used   Substance and Sexual Activity    Alcohol use: No    Drug use: No    Sexual activity: Not on file   Other Topics Concern    Not on file   Social History Narrative    Not on file     Social Determinants of Health     Financial Resource Strain:     Difficulty of
Paying Living Expenses:    Food Insecurity:     Worried About Running Out of Food in the Last Year:     [address] in the Last Year:    Transportation Needs:     Lack of Transportation (Medical):  Lack of Transportation (Non-Medical):    Physical Activity:     Days of Exercise per Week:     Minutes of Exercise per Session:    Stress:     Feeling of Stress :    Social Connections:     Frequency of Communication with Friends and Family:     Frequency of Social Gatherings with Friends and Family:     Attends [employer] Services:     Active Member of Clubs or Organizations:     Attends Club or Organization Meetings:     Marital Status:    Intimate Partner Violence:     Fear of Current or Ex-Partner:     Emotionally Abused:     Physically Abused:     Sexually Abused:        Family History:   History reviewed. No pertinent family history. REVIEW OF SYSTEMS:    Gen: Patient denies any lightheadedness or dizziness. No LOC or syncope. No fevers or chills. HEENT: No earache, sore throat or nasal congestion. Resp: Denies cough, hemoptysis or sputum production. Cardiac: Denies chest pain,+ mild SOB, no diaphoresis or palpitations. GI: No nausea, vomiting, diarrhea or constipation. No melena or hematochezia. : No urinary complaints, dysuria, hematuria or frequency. MSK: + Generalized weakness. No extremity weakness, paralysis or paresthesias. PHYSICAL EXAM:    Vitals:  /60   Pulse 80   Temp 97.7 °F (36.5 °C) (Oral)   Resp 25   Ht 5' 8\" (1.727 m)   Wt 165 lb (74.8 kg)   SpO2 93%   BMI 25.09 kg/m²     General:  This is a [de-identified] y.o. yo male who is alert and oriented in NAD  HEENT:  Head is normocephalic and atraumatic, PERRLA, EOMI, mucus membranes moist with no pharyngeal erythema or exudate. Neck:  Supple with no carotid bruits, JVD or thyromegaly.   No cervical adenopathy  CV:  Irregular rate and rhythm, 2/6 systolic murmurs  Lungs:  Clear to auscultation
bilaterally with no wheezes, rales or rhonchi  Abdomen:  Soft, nontender, nondistended, bowel sounds present  Extremities:  No edema, peripheral pulses intact bilaterally  Neuro:  Cranial nerves II-XII grossly intact; motor and sensory function intact with no focal deficits  Skin:  No rashes, lesions or wounds    DATA:  CBC with Differential:    Lab Results   Component Value Date    WBC 6.3 09/08/2021    RBC 4.49 09/08/2021    HGB 14.6 09/08/2021    HCT 44.6 09/08/2021     09/08/2021    MCV 99.3 09/08/2021    MCH 32.5 09/08/2021    MCHC 32.7 09/08/2021    RDW 14.6 09/08/2021    LYMPHOPCT 20.0 09/08/2021    MONOPCT 6.0 09/08/2021    BASOPCT 0.0 09/08/2021    MONOSABS 0.38 09/08/2021    LYMPHSABS 1.26 09/08/2021    EOSABS 0.06 09/08/2021    BASOSABS 0.00 09/08/2021     CMP:    Lab Results   Component Value Date     09/09/2021    K 4.4 09/09/2021    K 6.5 09/08/2021     09/09/2021    CO2 21 09/09/2021    BUN 24 09/09/2021    CREATININE 0.7 09/09/2021    GFRAA >60 09/09/2021    LABGLOM >60 09/09/2021    GLUCOSE 82 09/09/2021    PROT 6.9 09/08/2021    LABALBU 3.5 09/08/2021    CALCIUM 8.7 09/09/2021    BILITOT 0.9 09/08/2021    ALKPHOS 118 09/08/2021    AST 40 09/08/2021    ALT 16 09/08/2021     Magnesium:  No results found for: MG  Phosphorus:  No results found for: PHOS  PT/INR:    Lab Results   Component Value Date    PROTIME 26.9 09/09/2021    INR 2.3 09/09/2021     Troponin:  No results found for: TROPONINI  U/A:    Lab Results   Component Value Date    COLORU DKYELLOW 09/08/2021    PROTEINU Negative 09/08/2021    PHUR 5.5 09/08/2021    WBCUA 0-1 09/08/2021    RBCUA >20 09/08/2021    BACTERIA NONE SEEN 09/08/2021    CLARITYU SLCLOUDY 09/08/2021    SPECGRAV 1.015 09/08/2021    LEUKOCYTESUR Negative 09/08/2021    UROBILINOGEN 0.2 09/08/2021    BILIRUBINUR Negative 09/08/2021    BLOODU LARGE 09/08/2021    GLUCOSEU Negative 09/08/2021     ABG:  No results found for: PH, PCO2, PO2, HCO3, BE, THGB,
Fellow
Resident
TCO2, O2SAT  HgBA1c:  No results found for: LABA1C  FLP:  No results found for: TRIG, HDL, LDLCALC, LDLDIRECT, LABVLDL  TSH:  No results found for: TSH  IRON:  No results found for: IRON  LIPASE:    Lab Results   Component Value Date    LIPASE 74 09/08/2021       ASSESSMENT AND PLAN:      Patient Active Problem List    Diagnosis Date Noted    Metabolic encephalopathy 64/63/9374     Impression:  1. Failure to thrive  2. History of coronary disease  3. History of chronic atrial fibrillation  4. History of COPD with acute exacerbation  5. History hypertension  6. Elevated troponin  7. Acute hypoxic respiratory failure secondary to #4    Plan:  Patient admitted to monitored bed  Home medications reviewed  Monitor heart rate, blood pressure, O2 saturations  Repeat serum potassium 3 PM today  Repeat Troponin- 102-114  Consult PT/OT  Blood pressure supine and standing  Echocardiogram  IVF- NS 75 cc/hr    Consult cardiology-elevated troponin, chronic A. Fib  Consult neurology-bilateral leg weakness  X-ray lumbar spine  O2 nasal cannula  DuoNeb aerosols  CT the chest without contrast with patient's hypoxia and prior history of tobacco abuse. Patient was been therapeutic with the INR 2.3 so we will hold off on doing a CTA of the lungs.   Repeat UA with large amount of blood in urine      Cheo Hurtado DO, D.O.  9/10/2021  8:38 AM
Fellow

## 2025-02-27 ENCOUNTER — APPOINTMENT (OUTPATIENT)
Dept: TRANSPLANT | Facility: HOSPITAL | Age: 45
End: 2025-02-27

## 2025-02-27 LAB
ALBUMIN SERPL ELPH-MCNC: 2.9 G/DL — LOW (ref 3.3–5)
ALBUMIN SERPL ELPH-MCNC: 3.1 G/DL — LOW (ref 3.3–5)
ALBUMIN SERPL ELPH-MCNC: 3.3 G/DL — SIGNIFICANT CHANGE UP (ref 3.3–5)
ALP SERPL-CCNC: 110 U/L — SIGNIFICANT CHANGE UP (ref 40–120)
ALP SERPL-CCNC: 84 U/L — SIGNIFICANT CHANGE UP (ref 40–120)
ALP SERPL-CCNC: 90 U/L — SIGNIFICANT CHANGE UP (ref 40–120)
ALT FLD-CCNC: 63 U/L — HIGH (ref 10–45)
ALT FLD-CCNC: 64 U/L — HIGH (ref 10–45)
ALT FLD-CCNC: 9 U/L — LOW (ref 10–45)
ANION GAP SERPL CALC-SCNC: 15 MMOL/L — SIGNIFICANT CHANGE UP (ref 5–17)
ANION GAP SERPL CALC-SCNC: 15 MMOL/L — SIGNIFICANT CHANGE UP (ref 5–17)
ANION GAP SERPL CALC-SCNC: 8 MMOL/L — SIGNIFICANT CHANGE UP (ref 5–17)
APTT BLD: 32.5 SEC — SIGNIFICANT CHANGE UP (ref 24.5–35.6)
APTT BLD: 32.9 SEC — SIGNIFICANT CHANGE UP (ref 24.5–35.6)
APTT BLD: 46.4 SEC — HIGH (ref 24.5–35.6)
AST SERPL-CCNC: 284 U/L — HIGH (ref 10–40)
AST SERPL-CCNC: 346 U/L — HIGH (ref 10–40)
AST SERPL-CCNC: 42 U/L — HIGH (ref 10–40)
BILIRUB SERPL-MCNC: 5.3 MG/DL — HIGH (ref 0.2–1.2)
BILIRUB SERPL-MCNC: 6.7 MG/DL — HIGH (ref 0.2–1.2)
BILIRUB SERPL-MCNC: 8.8 MG/DL — HIGH (ref 0.2–1.2)
BUN SERPL-MCNC: 37 MG/DL — HIGH (ref 7–23)
BUN SERPL-MCNC: 39 MG/DL — HIGH (ref 7–23)
BUN SERPL-MCNC: 42 MG/DL — HIGH (ref 7–23)
CALCIUM SERPL-MCNC: 10.6 MG/DL — HIGH (ref 8.4–10.5)
CALCIUM SERPL-MCNC: 11 MG/DL — HIGH (ref 8.4–10.5)
CALCIUM SERPL-MCNC: 9.5 MG/DL — SIGNIFICANT CHANGE UP (ref 8.4–10.5)
CHLORIDE SERPL-SCNC: 95 MMOL/L — LOW (ref 96–108)
CHLORIDE SERPL-SCNC: 96 MMOL/L — SIGNIFICANT CHANGE UP (ref 96–108)
CHLORIDE SERPL-SCNC: 97 MMOL/L — SIGNIFICANT CHANGE UP (ref 96–108)
CO2 SERPL-SCNC: 26 MMOL/L — SIGNIFICANT CHANGE UP (ref 22–31)
CO2 SERPL-SCNC: 28 MMOL/L — SIGNIFICANT CHANGE UP (ref 22–31)
CO2 SERPL-SCNC: 29 MMOL/L — SIGNIFICANT CHANGE UP (ref 22–31)
CREAT SERPL-MCNC: 0.96 MG/DL — SIGNIFICANT CHANGE UP (ref 0.5–1.3)
CREAT SERPL-MCNC: 0.99 MG/DL — SIGNIFICANT CHANGE UP (ref 0.5–1.3)
CREAT SERPL-MCNC: 1.2 MG/DL — SIGNIFICANT CHANGE UP (ref 0.5–1.3)
EGFR: 76 ML/MIN/1.73M2 — SIGNIFICANT CHANGE UP
EGFR: 76 ML/MIN/1.73M2 — SIGNIFICANT CHANGE UP
EGFR: 95 ML/MIN/1.73M2 — SIGNIFICANT CHANGE UP
EGFR: 95 ML/MIN/1.73M2 — SIGNIFICANT CHANGE UP
EGFR: 99 ML/MIN/1.73M2 — SIGNIFICANT CHANGE UP
EGFR: 99 ML/MIN/1.73M2 — SIGNIFICANT CHANGE UP
GAS PNL BLDA: SIGNIFICANT CHANGE UP
GLUCOSE BLDC GLUCOMTR-MCNC: 102 MG/DL — HIGH (ref 70–99)
GLUCOSE BLDC GLUCOMTR-MCNC: 245 MG/DL — HIGH (ref 70–99)
GLUCOSE BLDC GLUCOMTR-MCNC: 253 MG/DL — HIGH (ref 70–99)
GLUCOSE SERPL-MCNC: 119 MG/DL — HIGH (ref 70–99)
GLUCOSE SERPL-MCNC: 226 MG/DL — HIGH (ref 70–99)
GLUCOSE SERPL-MCNC: 256 MG/DL — HIGH (ref 70–99)
HCT VFR BLD CALC: 22 % — LOW (ref 39–50)
HCT VFR BLD CALC: 33.8 % — LOW (ref 39–50)
HCT VFR BLD CALC: 34 % — LOW (ref 39–50)
HEPARINASE TEG R TIME: 10.2 MIN — HIGH (ref 4.3–8.3)
HEPARINASE TEG R TIME: 5.2 MIN — SIGNIFICANT CHANGE UP (ref 4.3–8.3)
HEPARINASE TEG R TIME: 5.7 MIN — SIGNIFICANT CHANGE UP (ref 4.3–8.3)
HEPARINASE TEG R TIME: 5.9 MIN — SIGNIFICANT CHANGE UP (ref 4.3–8.3)
HGB BLD-MCNC: 11.8 G/DL — LOW (ref 13–17)
HGB BLD-MCNC: 12.2 G/DL — LOW (ref 13–17)
HGB BLD-MCNC: 7.3 G/DL — LOW (ref 13–17)
INR BLD: 1.25 RATIO — HIGH (ref 0.85–1.16)
INR BLD: 1.3 RATIO — HIGH (ref 0.85–1.16)
INR BLD: 2.77 RATIO — HIGH (ref 0.85–1.16)
MAGNESIUM SERPL-MCNC: 1.6 MG/DL — SIGNIFICANT CHANGE UP (ref 1.6–2.6)
MAGNESIUM SERPL-MCNC: 1.8 MG/DL — SIGNIFICANT CHANGE UP (ref 1.6–2.6)
MAGNESIUM SERPL-MCNC: 1.8 MG/DL — SIGNIFICANT CHANGE UP (ref 1.6–2.6)
MCHC RBC-ENTMCNC: 30.5 PG — SIGNIFICANT CHANGE UP (ref 27–34)
MCHC RBC-ENTMCNC: 31.3 PG — SIGNIFICANT CHANGE UP (ref 27–34)
MCHC RBC-ENTMCNC: 32.7 PG — SIGNIFICANT CHANGE UP (ref 27–34)
MCHC RBC-ENTMCNC: 33.2 G/DL — SIGNIFICANT CHANGE UP (ref 32–36)
MCHC RBC-ENTMCNC: 34.9 G/DL — SIGNIFICANT CHANGE UP (ref 32–36)
MCHC RBC-ENTMCNC: 35.9 G/DL — SIGNIFICANT CHANGE UP (ref 32–36)
MCV RBC AUTO: 87.2 FL — SIGNIFICANT CHANGE UP (ref 80–100)
MCV RBC AUTO: 87.3 FL — SIGNIFICANT CHANGE UP (ref 80–100)
MCV RBC AUTO: 98.7 FL — SIGNIFICANT CHANGE UP (ref 80–100)
NRBC BLD AUTO-RTO: 0 /100 WBCS — SIGNIFICANT CHANGE UP (ref 0–0)
PHOSPHATE SERPL-MCNC: 2.9 MG/DL — SIGNIFICANT CHANGE UP (ref 2.5–4.5)
PHOSPHATE SERPL-MCNC: 4.5 MG/DL — SIGNIFICANT CHANGE UP (ref 2.5–4.5)
PHOSPHATE SERPL-MCNC: 4.9 MG/DL — HIGH (ref 2.5–4.5)
PLATELET # BLD AUTO: 138 K/UL — LOW (ref 150–400)
PLATELET # BLD AUTO: 173 K/UL — SIGNIFICANT CHANGE UP (ref 150–400)
PLATELET # BLD AUTO: 60 K/UL — LOW (ref 150–400)
POTASSIUM SERPL-MCNC: 3.5 MMOL/L — SIGNIFICANT CHANGE UP (ref 3.5–5.3)
POTASSIUM SERPL-MCNC: 3.8 MMOL/L — SIGNIFICANT CHANGE UP (ref 3.5–5.3)
POTASSIUM SERPL-MCNC: 3.8 MMOL/L — SIGNIFICANT CHANGE UP (ref 3.5–5.3)
POTASSIUM SERPL-SCNC: 3.5 MMOL/L — SIGNIFICANT CHANGE UP (ref 3.5–5.3)
POTASSIUM SERPL-SCNC: 3.8 MMOL/L — SIGNIFICANT CHANGE UP (ref 3.5–5.3)
POTASSIUM SERPL-SCNC: 3.8 MMOL/L — SIGNIFICANT CHANGE UP (ref 3.5–5.3)
PROT SERPL-MCNC: 7.2 G/DL — SIGNIFICANT CHANGE UP (ref 6–8.3)
PROT SERPL-MCNC: 7.3 G/DL — SIGNIFICANT CHANGE UP (ref 6–8.3)
PROT SERPL-MCNC: 8.5 G/DL — HIGH (ref 6–8.3)
PROTHROM AB SERPL-ACNC: 14.3 SEC — HIGH (ref 9.9–13.4)
PROTHROM AB SERPL-ACNC: 14.9 SEC — HIGH (ref 9.9–13.4)
PROTHROM AB SERPL-ACNC: 31.3 SEC — HIGH (ref 9.9–13.4)
RAPIDTEG MAXIMUM AMPLITUDE: 45.8 MM — LOW (ref 52–70)
RAPIDTEG MAXIMUM AMPLITUDE: 46.3 MM — LOW (ref 52–70)
RAPIDTEG MAXIMUM AMPLITUDE: 49.2 MM — LOW (ref 52–70)
RAPIDTEG MAXIMUM AMPLITUDE: 49.5 MM — LOW (ref 52–70)
RAPIDTEG MAXIMUM AMPLITUDE: 58.8 MM — SIGNIFICANT CHANGE UP (ref 52–70)
RAPIDTEG MAXIMUM AMPLITUDE: 61.4 MM — SIGNIFICANT CHANGE UP (ref 52–70)
RBC # BLD: 2.23 M/UL — LOW (ref 4.2–5.8)
RBC # BLD: 3.87 M/UL — LOW (ref 4.2–5.8)
RBC # BLD: 3.9 M/UL — LOW (ref 4.2–5.8)
RBC # FLD: 19.5 % — HIGH (ref 10.3–14.5)
RBC # FLD: 19.9 % — HIGH (ref 10.3–14.5)
RBC # FLD: 21 % — HIGH (ref 10.3–14.5)
SODIUM SERPL-SCNC: 132 MMOL/L — LOW (ref 135–145)
SODIUM SERPL-SCNC: 138 MMOL/L — SIGNIFICANT CHANGE UP (ref 135–145)
SODIUM SERPL-SCNC: 139 MMOL/L — SIGNIFICANT CHANGE UP (ref 135–145)
TEG FUNCTIONAL FIBRINOGEN: 15.3 MM — SIGNIFICANT CHANGE UP (ref 15–32)
TEG FUNCTIONAL FIBRINOGEN: 15.3 MM — SIGNIFICANT CHANGE UP (ref 15–32)
TEG FUNCTIONAL FIBRINOGEN: 18 MM — SIGNIFICANT CHANGE UP (ref 15–32)
TEG FUNCTIONAL FIBRINOGEN: 18.7 MM — SIGNIFICANT CHANGE UP (ref 15–32)
TEG FUNCTIONAL FIBRINOGEN: 20.7 MM — SIGNIFICANT CHANGE UP (ref 15–32)
TEG FUNCTIONAL FIBRINOGEN: 21.7 MM — SIGNIFICANT CHANGE UP (ref 15–32)
TEG LY30 (LYSIS): 0 % — SIGNIFICANT CHANGE UP (ref 0–2.6)
TEG LY30 (LYSIS): 1.1 % — SIGNIFICANT CHANGE UP (ref 0–2.6)
TEG MAXIMUM AMPLITUDE: 43.8 MM — LOW (ref 52–69)
TEG MAXIMUM AMPLITUDE: 50 MM — LOW (ref 52–69)
TEG MAXIMUM AMPLITUDE: 50.7 MM — LOW (ref 52–69)
TEG MAXIMUM AMPLITUDE: 59.4 MM — SIGNIFICANT CHANGE UP (ref 52–69)
TEG REACTION TIME: 5.4 MIN — SIGNIFICANT CHANGE UP (ref 4.6–9.1)
TEG REACTION TIME: 5.4 MIN — SIGNIFICANT CHANGE UP (ref 4.6–9.1)
TEG REACTION TIME: 6.2 MIN — SIGNIFICANT CHANGE UP (ref 4.6–9.1)
TEG REACTION TIME: 6.4 MIN — SIGNIFICANT CHANGE UP (ref 4.6–9.1)
TEG REACTION TIME: 8.4 MIN — SIGNIFICANT CHANGE UP (ref 4.6–9.1)
TEG REACTION TIME: 9.2 MIN — HIGH (ref 4.6–9.1)
WBC # BLD: 5.54 K/UL — SIGNIFICANT CHANGE UP (ref 3.8–10.5)
WBC # BLD: 5.74 K/UL — SIGNIFICANT CHANGE UP (ref 3.8–10.5)
WBC # BLD: 6.79 K/UL — SIGNIFICANT CHANGE UP (ref 3.8–10.5)
WBC # FLD AUTO: 5.54 K/UL — SIGNIFICANT CHANGE UP (ref 3.8–10.5)
WBC # FLD AUTO: 5.74 K/UL — SIGNIFICANT CHANGE UP (ref 3.8–10.5)
WBC # FLD AUTO: 6.79 K/UL — SIGNIFICANT CHANGE UP (ref 3.8–10.5)

## 2025-02-27 PROCEDURE — 88313 SPECIAL STAINS GROUP 2: CPT | Mod: 26

## 2025-02-27 PROCEDURE — 76705 ECHO EXAM OF ABDOMEN: CPT | Mod: 26,59

## 2025-02-27 PROCEDURE — 88309 TISSUE EXAM BY PATHOLOGIST: CPT | Mod: 26

## 2025-02-27 PROCEDURE — 88307 TISSUE EXAM BY PATHOLOGIST: CPT | Mod: 26

## 2025-02-27 PROCEDURE — 99291 CRITICAL CARE FIRST HOUR: CPT | Mod: GC

## 2025-02-27 PROCEDURE — 47135 TRANSPLANTATION OF LIVER: CPT | Mod: GC

## 2025-02-27 PROCEDURE — 88304 TISSUE EXAM BY PATHOLOGIST: CPT | Mod: 26

## 2025-02-27 PROCEDURE — 71045 X-RAY EXAM CHEST 1 VIEW: CPT | Mod: 26

## 2025-02-27 PROCEDURE — 93975 VASCULAR STUDY: CPT | Mod: 26

## 2025-02-27 DEVICE — KIT CVC 2LUM MAC 9FR CHG: Type: IMPLANTABLE DEVICE | Status: FUNCTIONAL

## 2025-02-27 DEVICE — STAPLER ETHICON ECHELON FLEX 45MM X 340MM: Type: IMPLANTABLE DEVICE | Status: FUNCTIONAL

## 2025-02-27 DEVICE — STAPLER ETHICON GST ECHELON 45MM WHITE RELOAD: Type: IMPLANTABLE DEVICE | Status: FUNCTIONAL

## 2025-02-27 DEVICE — SURGICEL NU-KNIT 6 X 9": Type: IMPLANTABLE DEVICE | Status: FUNCTIONAL

## 2025-02-27 DEVICE — KIT A-LINE 1LUM 20G X 12CM SAFE KIT: Type: IMPLANTABLE DEVICE | Status: FUNCTIONAL

## 2025-02-27 RX ORDER — PIPERACILLIN-TAZO-DEXTROSE,ISO 3.375G/5
3.38 IV SOLUTION, PIGGYBACK PREMIX FROZEN(ML) INTRAVENOUS EVERY 8 HOURS
Refills: 0 | Status: DISCONTINUED | OUTPATIENT
Start: 2025-02-27 | End: 2025-02-27

## 2025-02-27 RX ORDER — MAGNESIUM SULFATE 500 MG/ML
2 SYRINGE (ML) INJECTION ONCE
Refills: 0 | Status: COMPLETED | OUTPATIENT
Start: 2025-02-27 | End: 2025-02-27

## 2025-02-27 RX ORDER — METHYLPREDNISOLONE ACETATE 80 MG/ML
INJECTION, SUSPENSION INTRA-ARTICULAR; INTRALESIONAL; INTRAMUSCULAR; SOFT TISSUE
Refills: 0 | Status: DISCONTINUED | OUTPATIENT
Start: 2025-03-02 | End: 2025-03-04

## 2025-02-27 RX ORDER — HYDROMORPHONE/SOD CHLOR,ISO/PF 2 MG/10 ML
0.25 SYRINGE (ML) INJECTION
Refills: 0 | Status: DISCONTINUED | OUTPATIENT
Start: 2025-02-27 | End: 2025-02-28

## 2025-02-27 RX ORDER — INSULIN LISPRO 100 U/ML
INJECTION, SOLUTION INTRAVENOUS; SUBCUTANEOUS EVERY 4 HOURS
Refills: 0 | Status: DISCONTINUED | OUTPATIENT
Start: 2025-02-27 | End: 2025-02-28

## 2025-02-27 RX ORDER — MYCOPHENOLATE MOFETIL 500 MG/1
1000 TABLET, FILM COATED ORAL
Refills: 0 | Status: DISCONTINUED | OUTPATIENT
Start: 2025-02-27 | End: 2025-02-28

## 2025-02-27 RX ORDER — METHYLPREDNISOLONE ACETATE 80 MG/ML
500 INJECTION, SUSPENSION INTRA-ARTICULAR; INTRALESIONAL; INTRAMUSCULAR; SOFT TISSUE EVERY 24 HOURS
Refills: 0 | Status: COMPLETED | OUTPATIENT
Start: 2025-02-28 | End: 2025-02-28

## 2025-02-27 RX ORDER — METHYLPREDNISOLONE ACETATE 80 MG/ML
125 INJECTION, SUSPENSION INTRA-ARTICULAR; INTRALESIONAL; INTRAMUSCULAR; SOFT TISSUE EVERY 24 HOURS
Refills: 0 | Status: COMPLETED | OUTPATIENT
Start: 2025-03-02 | End: 2025-03-02

## 2025-02-27 RX ORDER — METHYLPREDNISOLONE ACETATE 80 MG/ML
INJECTION, SUSPENSION INTRA-ARTICULAR; INTRALESIONAL; INTRAMUSCULAR; SOFT TISSUE
Refills: 0 | Status: COMPLETED | OUTPATIENT
Start: 2025-02-28 | End: 2025-03-01

## 2025-02-27 RX ORDER — SENNA 187 MG
2 TABLET ORAL
Refills: 0 | Status: DISCONTINUED | OUTPATIENT
Start: 2025-02-27 | End: 2025-02-28

## 2025-02-27 RX ORDER — SODIUM CHLORIDE 9 G/1000ML
1000 INJECTION, SOLUTION INTRAVENOUS
Refills: 0 | Status: DISCONTINUED | OUTPATIENT
Start: 2025-02-27 | End: 2025-02-28

## 2025-02-27 RX ORDER — FLUCONAZOLE 150 MG
400 TABLET ORAL DAILY
Refills: 0 | Status: DISCONTINUED | OUTPATIENT
Start: 2025-02-27 | End: 2025-02-28

## 2025-02-27 RX ORDER — VALGANCICLOVIR 450 MG/1
450 TABLET, FILM COATED ORAL DAILY
Refills: 0 | Status: DISCONTINUED | OUTPATIENT
Start: 2025-02-28 | End: 2025-02-28

## 2025-02-27 RX ORDER — HYDROMORPHONE/SOD CHLOR,ISO/PF 2 MG/10 ML
0.5 SYRINGE (ML) INJECTION
Refills: 0 | Status: DISCONTINUED | OUTPATIENT
Start: 2025-02-27 | End: 2025-02-28

## 2025-02-27 RX ORDER — PIPERACILLIN-TAZO-DEXTROSE,ISO 3.375G/5
3.38 IV SOLUTION, PIGGYBACK PREMIX FROZEN(ML) INTRAVENOUS EVERY 8 HOURS
Refills: 0 | Status: DISCONTINUED | OUTPATIENT
Start: 2025-02-27 | End: 2025-03-01

## 2025-02-27 RX ADMIN — MIDODRINE HYDROCHLORIDE 10 MILLIGRAM(S): 5 TABLET ORAL at 00:45

## 2025-02-27 RX ADMIN — MYCOPHENOLATE MOFETIL 1000 MILLIGRAM(S): 500 TABLET, FILM COATED ORAL at 20:46

## 2025-02-27 RX ADMIN — INSULIN LISPRO 6: 100 INJECTION, SOLUTION INTRAVENOUS; SUBCUTANEOUS at 22:03

## 2025-02-27 RX ADMIN — Medication 50 GRAM(S): at 22:02

## 2025-02-27 RX ADMIN — Medication 2 TABLET(S): at 21:06

## 2025-02-27 RX ADMIN — Medication 100 MILLIEQUIVALENT(S): at 22:03

## 2025-02-27 RX ADMIN — Medication 0.5 MILLIGRAM(S): at 22:30

## 2025-02-27 RX ADMIN — Medication 25 GRAM(S): at 23:08

## 2025-02-27 RX ADMIN — Medication 1 APPLICATION(S): at 05:45

## 2025-02-27 RX ADMIN — Medication 100 MILLIEQUIVALENT(S): at 23:09

## 2025-02-27 RX ADMIN — Medication 0.5 MILLIGRAM(S): at 23:00

## 2025-02-27 RX ADMIN — SODIUM CHLORIDE 125 MILLILITER(S): 9 INJECTION, SOLUTION INTRAVENOUS at 20:46

## 2025-02-27 RX ADMIN — TRAMADOL HYDROCHLORIDE 25 MILLIGRAM(S): 50 TABLET, FILM COATED ORAL at 00:43

## 2025-02-27 RX ADMIN — TRAMADOL HYDROCHLORIDE 25 MILLIGRAM(S): 50 TABLET, FILM COATED ORAL at 01:40

## 2025-02-27 NOTE — CONSULT NOTE ADULT - ASSESSMENT
ASSESSMENT:  43yo M with Hx decompensated EtOH cirrhosis c/b recurrent ascites, grade II EV, and HE who presented to OSH for abd pain and distension, found to have ascites and MANUELITO requiring HD. Patient was transferred to [facility] for management and liver transplant evaluation. His hospital course has been c/b an empyema s/p VATS decortication on 1/28/25. Chest tubes have since been removed, with reaccumulation of right pleural effusion. Patient is now s/p OLT, brought to SICU extubated, off vasopressors.     PLAN:   Neuro:  - Multimodal pain control w/    Resp:  - Out of bed to chair, ambulate as tolerated, and incentive spirometry to prevent atelectasis  - Mechanical ventilation     CVS:   - Will wean vasopressor support w/ goal MAP > 65 mmHg    GI:   -   - Bowel regimen with senna & Miralax  - Protonix for stress ulcer prophylaxis while intubated    Renal:  - Monitor I&Os and electrolytes  - replete electrolytes as needed     Heme:  - Monitor CBC and coags  - chemical VTE prophylaxis:   - SCD's    ID:   - Monitor for clinical evidence of active infection  - Antibiotics:  - Cultures:     Endo:   - Monitor glucose    Lines/Tubes:         ASSESSMENT:  45yo M with Hx decompensated EtOH cirrhosis c/b recurrent ascites, grade II EV, and HE who presented to OSH for abd pain and distension, found to have ascites and MANUELITO requiring HD. Patient was transferred to [facility] for management and liver transplant evaluation. His hospital course has been c/b an empyema s/p VATS decortication on 1/28/25. Chest tubes have since been removed, with reaccumulation of right pleural effusion. Patient is now s/p OLT, brought to SICU extubated, off vasopressors.     PLAN:   Neuro:  - Multimodal pain control w/ PRN Dilaudid    Resp:  - Out of bed to chair, ambulate as tolerated, and incentive spirometry to prevent atelectasis  - nasal cannula, titrate down as tolerated  - hx VATS decortication 1/28 with reaccumulation of right pleural effusion  - thoracics following, appreciate recs    CVS:   - hemodynamically stable  - lactate cleared    GI:   - s/p OLT  - NPO, NGT to low intermittent suction  - protonix  - ADAM x3, monitor ouput  - trend LFT's   - f/u US    Renal:  - plasmalyte @125  - Monitor I&Os and electrolytes  - replete electrolytes as needed   - yates in place    Heme:  - Monitor CBC and coags  - holding chemical VTE prophylaxis per primary team  - SCD's    ID:   - Monitor for clinical evidence of active infection  - Antibiotics: zosyn x48 hours, valcyte, fluconazole (bactrim held due to sulfa allergy)  - immunosuppression per transplant team    Endo:   - insulin gtt  - steroid taper    Lines/Tubes:  - RIJ intro  - b/l radial a-line  - yates  - NGT  - L EDUARDO

## 2025-02-27 NOTE — PRE-ANESTHESIA EVALUATION ADULT - NSANTHADDINFOFT_GEN_ALL_CORE
see previous pre-op's for further info.
Risks and benefits of anesthesia discussed with patient - including headache, nausea/vomiting, sore throat, dental injury, and cardiopulmonary complications. All questions were answered. All concerns were addressed.
r/b/a discussed, all questions answered, pt wishes to proceed.

## 2025-02-27 NOTE — PRE-ANESTHESIA EVALUATION ADULT - NSANTHPROCED_GEN_ALL_CORE
Arterial Catheter/Central Venous Catheter/Pulmonary Artery Catheter/Transesophageal Echocardiogram
Arterial Catheter/Central Venous Catheter
Arterial Catheter/Central Venous Catheter/Pulmonary Artery Catheter/Transesophageal Echocardiogram

## 2025-02-27 NOTE — CONSULT NOTE ADULT - SUBJECTIVE AND OBJECTIVE BOX
HISTORY  45yo M with Hx decompensated EtOH cirrhosis c/b recurrent ascites, grade II EV, and HE who presented to OSH for abd pain and distension, found to have ascites and MANUELITO requiring HD. Patient was transferred to [facility] for management and liver transplant evaluation. His hospital course has been c/b an empyema s/p VATS decortication on 1/28/25. Chest tubes have since been removed, with reaccumulation of right pleural effusion. Patient is now s/p OLT, brought to SICU extubated, off vasopressors.     intra-op: 10 prbc, 9 ffp, 6 platelet, 4 cryo; 2.5L IVF; 1L urine; ; 5L ascites drained    SUBJECTIVE/ROS:  [ ] A ten-point review of systems was otherwise negative except as noted.  [ ] Due to altered mental status/intubation, subjective information were not able to be obtained from the patient. History was obtained, to the extent possible, from review of the chart and collateral sources of information.      NEURO  Exam: awake, alert, oriented  Meds: HYDROmorphone  Injectable 0.25 milliGRAM(s) IV Push every 3 hours PRN Moderate Pain (4 - 6)  HYDROmorphone  Injectable 0.5 milliGRAM(s) IV Push every 3 hours PRN Severe Pain (7 - 10)  [x] Adequacy of sedation and pain control has been assessed and adjusted      RESPIRATORY  RR: 27 (02-28-25 @ 02:00) (18 - 35)  SpO2: 98% (02-28-25 @ 02:00) (95% - 98%)  Exam: unlabored, clear to auscultation bilaterally  ABG - ( 28 Feb 2025 02:02 )  pH: 7.40  /  pCO2: 50    /  pO2: 85    / HCO3: 31    / Base Excess: 5.2   /  SaO2: 98.1        CARDIOVASCULAR  HR: 82 (02-28-25 @ 02:00) (82 - 107)  BP: 136/72 (02-27-25 @ 19:04) (119/64 - 136/72)  BP(mean): 96 (02-27-25 @ 19:04) (78 - 96)  ABP: 165/83 (02-28-25 @ 02:00) (138/63 - 165/83)  ABP(mean): 117 (02-28-25 @ 02:00) (91 - 117)    Exam: regular rate and rhythm  Cardiac Rhythm: sinus  Perfusion     [x]Adequate   [ ]Inadequate  Mentation   [x]Normal       [ ]Reduced  Extremities  [x]Warm         [ ]Cool  Volume Status [ ]Hypervolemic [x]Euvolemic [ ]Hypovolemic       GI/NUTRITION  Exam: soft, nontender, nondistended  Diet: npo  Meds: pantoprazole  Injectable 40 milliGRAM(s) IV Push every 24 hours  senna 2 Tablet(s) Oral two times a day      GENITOURINARY  I&O's Detail    02-26 @ 07:01  -  02-27 @ 07:00  --------------------------------------------------------  IN:    IV PiggyBack: 50 mL    IV PiggyBack: 50 mL    Oral Fluid: 1240 mL  Total IN: 1340 mL    OUT:    Voided (mL): 1625 mL  Total OUT: 1625 mL    Total NET: -285 mL      02-27 @ 07:01  -  02-28 @ 02:59  --------------------------------------------------------  IN:    Albumin 5%  - 250 mL: 500 mL    Enteral Tube Flush: 60 mL    Insulin: 2 mL    IV PiggyBack: 200 mL    IV PiggyBack: 150 mL    multiple electrolytes Injection Type 1.: 875 mL  Total IN: 1787 mL    OUT:    Bulb (mL): 330 mL    Bulb (mL): 90 mL    Bulb (mL): 230 mL    Indwelling Catheter - Urethral (mL): 615 mL  Total OUT: 1265 mL  Total NET: 522 mL      Weight (kg): 108.3 (02-27 @ 08:02)  02-28    138  |  97  |  42[H]  ----------------------------<  278[H]  4.2   |  29  |  1.04    Ca    10.0      28 Feb 2025 02:16  Phos  5.0     02-28  Mg     2.3     02-28    TPro  7.5  /  Alb  3.3  /  TBili  3.1[H]  /  DBili  x   /  AST  185[H]  /  ALT  55[H]  /  AlkPhos  76  02-28    [ ] Bush catheter, indication: N/A  Meds: multiple electrolytes Injection Type 1 1000 milliLiter(s) IV Continuous <Continuous>      HEMATOLOGIC  Meds:   [x] VTE Prophylaxis                        11.2   5.05  )-----------( 114      ( 28 Feb 2025 02:16 )             31.7     PT/INR - ( 28 Feb 2025 02:16 )   PT: 14.3 sec;   INR: 1.25 ratio         PTT - ( 28 Feb 2025 02:16 )  PTT:31.6 sec      INFECTIOUS DISEASES  WBC Count: 5.05 K/uL (02-28 @ 02:16)  WBC Count: 5.54 K/uL (02-27 @ 22:09)  WBC Count: 6.79 K/uL (02-27 @ 19:18)  WBC Count: 5.74 K/uL (02-27 @ 03:26)    RECENT CULTURES:  Specimen Source: Body Fluid ascites  Date/Time: 02-27 @ 19:26  Culture Results: --  Gram Stain:   No polymorphonuclear leukocytes seen  No organisms seen  by cytocentrifuge  Organism: --  Specimen Source: Clean Catch Clean Catch (Midstream)  Date/Time: 02-24 @ 04:59  Culture Results:   10,000 - 49,000 CFU/mL Enterococcus faecium (vancomycin resistant)[!]  Gram Stain: --  Organism: Enterococcus faecium (vancomycin resistant)[!]    Meds: fluconAZOLE IVPB 400 milliGRAM(s) IV Intermittent daily  influenza   Vaccine 0.5 milliLiter(s) IntraMuscular once  mycophenolate mofetil Suspension 1000 milliGRAM(s) Oral <User Schedule>  piperacillin/tazobactam IVPB.. 3.375 Gram(s) IV Intermittent every 8 hours  valGANciclovir 50 mG/mL Oral Solution 450 milliGRAM(s) Oral daily        ENDOCRINE  CAPILLARY BLOOD GLUCOSE    POCT Blood Glucose.: 270 mg/dL (28 Feb 2025 02:56)  POCT Blood Glucose.: 270 mg/dL (28 Feb 2025 01:17)  POCT Blood Glucose.: 245 mg/dL (27 Feb 2025 23:53)  POCT Blood Glucose.: 253 mg/dL (27 Feb 2025 21:57)  POCT Blood Glucose.: 102 mg/dL (27 Feb 2025 08:14)    Meds: insulin regular Infusion 2 Unit(s)/Hr IV Continuous <Continuous>  methylPREDNISolone sodium succinate IVPB 500 milliGRAM(s) IV Intermittent every 24 hours  methylPREDNISolone sodium succinate IVPB   IV Intermittent       CODE STATUS: full code

## 2025-02-27 NOTE — BRIEF OPERATIVE NOTE - OPERATION/FINDINGS
Orthotopic Liver Transplant  Standard Donor Anatomy, Right Replaced Hepatic Artery Recipient. Side to side cavocavostomy, end to end portal anastomosis, end to end hepatic artery anastomosis, end to end common bile duct anastomosis 9 mm donor CBD, 6 mm CBD recipient  CIT 16 hours. Liver on Transmedic pump   Orthotopic Liver Transplant  Standard Donor Anatomy, Right Replaced Hepatic Artery Recipient. Side to side cavocavostomy, end to end portal anastomosis, end to end hepatic artery anastomosis, end to end common bile duct anastomosis 9 mm donor CBD, 6 mm CBD recipient  CIT 16 hours. Liver on Transmedic pump  Umbilical Hernia repair

## 2025-02-27 NOTE — CONSULT NOTE ADULT - CRITICAL CARE ATTENDING COMMENT
Dr. Priest (Attending Physician)  N - Awake and following commands pain control with dilaudid   P - R loculated effusion ho VATS, extubated without resp insufficiency postop  C - No vasopressors, lactate normal  GI - s/p OLT with improving bili/LFTs duplex completed awaiting read   - good UO, Cr normal, on plasmalyte at 125 mL/hr  H - holding dvt ppx Hb 11 postop  ID - periop abx per tx ID  E - started insulin gtt for hyperglycemia  Lines - RIJ MAC cordis, b/l radial alines  Dispo - full code

## 2025-02-28 LAB
ALBUMIN SERPL ELPH-MCNC: 3.3 G/DL — SIGNIFICANT CHANGE UP (ref 3.3–5)
ALBUMIN SERPL ELPH-MCNC: 3.3 G/DL — SIGNIFICANT CHANGE UP (ref 3.3–5)
ALBUMIN SERPL ELPH-MCNC: 3.4 G/DL — SIGNIFICANT CHANGE UP (ref 3.3–5)
ALP SERPL-CCNC: 72 U/L — SIGNIFICANT CHANGE UP (ref 40–120)
ALP SERPL-CCNC: 76 U/L — SIGNIFICANT CHANGE UP (ref 40–120)
ALP SERPL-CCNC: 76 U/L — SIGNIFICANT CHANGE UP (ref 40–120)
ALT FLD-CCNC: 46 U/L — HIGH (ref 10–45)
ALT FLD-CCNC: 48 U/L — HIGH (ref 10–45)
ALT FLD-CCNC: 55 U/L — HIGH (ref 10–45)
ANION GAP SERPL CALC-SCNC: 12 MMOL/L — SIGNIFICANT CHANGE UP (ref 5–17)
ANION GAP SERPL CALC-SCNC: 15 MMOL/L — SIGNIFICANT CHANGE UP (ref 5–17)
ANION GAP SERPL CALC-SCNC: 15 MMOL/L — SIGNIFICANT CHANGE UP (ref 5–17)
APTT BLD: 31 SEC — SIGNIFICANT CHANGE UP (ref 24.5–35.6)
APTT BLD: 31.6 SEC — SIGNIFICANT CHANGE UP (ref 24.5–35.6)
APTT BLD: 31.6 SEC — SIGNIFICANT CHANGE UP (ref 24.5–35.6)
AST SERPL-CCNC: 113 U/L — HIGH (ref 10–40)
AST SERPL-CCNC: 151 U/L — HIGH (ref 10–40)
AST SERPL-CCNC: 185 U/L — HIGH (ref 10–40)
BILIRUB SERPL-MCNC: 2.5 MG/DL — HIGH (ref 0.2–1.2)
BILIRUB SERPL-MCNC: 2.5 MG/DL — HIGH (ref 0.2–1.2)
BILIRUB SERPL-MCNC: 3.1 MG/DL — HIGH (ref 0.2–1.2)
BUN SERPL-MCNC: 42 MG/DL — HIGH (ref 7–23)
BUN SERPL-MCNC: 44 MG/DL — HIGH (ref 7–23)
BUN SERPL-MCNC: 46 MG/DL — HIGH (ref 7–23)
CALCIUM SERPL-MCNC: 10 MG/DL — SIGNIFICANT CHANGE UP (ref 8.4–10.5)
CALCIUM SERPL-MCNC: 10.1 MG/DL — SIGNIFICANT CHANGE UP (ref 8.4–10.5)
CALCIUM SERPL-MCNC: 9.8 MG/DL — SIGNIFICANT CHANGE UP (ref 8.4–10.5)
CHLORIDE SERPL-SCNC: 101 MMOL/L — SIGNIFICANT CHANGE UP (ref 96–108)
CHLORIDE SERPL-SCNC: 97 MMOL/L — SIGNIFICANT CHANGE UP (ref 96–108)
CHLORIDE SERPL-SCNC: 98 MMOL/L — SIGNIFICANT CHANGE UP (ref 96–108)
CO2 SERPL-SCNC: 26 MMOL/L — SIGNIFICANT CHANGE UP (ref 22–31)
CO2 SERPL-SCNC: 28 MMOL/L — SIGNIFICANT CHANGE UP (ref 22–31)
CO2 SERPL-SCNC: 29 MMOL/L — SIGNIFICANT CHANGE UP (ref 22–31)
CREAT SERPL-MCNC: 1 MG/DL — SIGNIFICANT CHANGE UP (ref 0.5–1.3)
CREAT SERPL-MCNC: 1.04 MG/DL — SIGNIFICANT CHANGE UP (ref 0.5–1.3)
CREAT SERPL-MCNC: 1.11 MG/DL — SIGNIFICANT CHANGE UP (ref 0.5–1.3)
EGFR: 83 ML/MIN/1.73M2 — SIGNIFICANT CHANGE UP
EGFR: 83 ML/MIN/1.73M2 — SIGNIFICANT CHANGE UP
EGFR: 90 ML/MIN/1.73M2 — SIGNIFICANT CHANGE UP
EGFR: 90 ML/MIN/1.73M2 — SIGNIFICANT CHANGE UP
EGFR: 94 ML/MIN/1.73M2 — SIGNIFICANT CHANGE UP
EGFR: 94 ML/MIN/1.73M2 — SIGNIFICANT CHANGE UP
GAS PNL BLDA: SIGNIFICANT CHANGE UP
GLUCOSE BLDC GLUCOMTR-MCNC: 130 MG/DL — HIGH (ref 70–99)
GLUCOSE BLDC GLUCOMTR-MCNC: 133 MG/DL — HIGH (ref 70–99)
GLUCOSE BLDC GLUCOMTR-MCNC: 140 MG/DL — HIGH (ref 70–99)
GLUCOSE BLDC GLUCOMTR-MCNC: 146 MG/DL — HIGH (ref 70–99)
GLUCOSE BLDC GLUCOMTR-MCNC: 157 MG/DL — HIGH (ref 70–99)
GLUCOSE BLDC GLUCOMTR-MCNC: 157 MG/DL — HIGH (ref 70–99)
GLUCOSE BLDC GLUCOMTR-MCNC: 167 MG/DL — HIGH (ref 70–99)
GLUCOSE BLDC GLUCOMTR-MCNC: 170 MG/DL — HIGH (ref 70–99)
GLUCOSE BLDC GLUCOMTR-MCNC: 172 MG/DL — HIGH (ref 70–99)
GLUCOSE BLDC GLUCOMTR-MCNC: 175 MG/DL — HIGH (ref 70–99)
GLUCOSE BLDC GLUCOMTR-MCNC: 182 MG/DL — HIGH (ref 70–99)
GLUCOSE BLDC GLUCOMTR-MCNC: 197 MG/DL — HIGH (ref 70–99)
GLUCOSE BLDC GLUCOMTR-MCNC: 217 MG/DL — HIGH (ref 70–99)
GLUCOSE BLDC GLUCOMTR-MCNC: 219 MG/DL — HIGH (ref 70–99)
GLUCOSE BLDC GLUCOMTR-MCNC: 223 MG/DL — HIGH (ref 70–99)
GLUCOSE BLDC GLUCOMTR-MCNC: 238 MG/DL — HIGH (ref 70–99)
GLUCOSE BLDC GLUCOMTR-MCNC: 243 MG/DL — HIGH (ref 70–99)
GLUCOSE BLDC GLUCOMTR-MCNC: 270 MG/DL — HIGH (ref 70–99)
GLUCOSE BLDC GLUCOMTR-MCNC: 270 MG/DL — HIGH (ref 70–99)
GLUCOSE SERPL-MCNC: 171 MG/DL — HIGH (ref 70–99)
GLUCOSE SERPL-MCNC: 224 MG/DL — HIGH (ref 70–99)
GLUCOSE SERPL-MCNC: 278 MG/DL — HIGH (ref 70–99)
GRAM STN FLD: SIGNIFICANT CHANGE UP
HCT VFR BLD CALC: 30.6 % — LOW (ref 39–50)
HCT VFR BLD CALC: 31.1 % — LOW (ref 39–50)
HCT VFR BLD CALC: 31.7 % — LOW (ref 39–50)
HGB BLD-MCNC: 11 G/DL — LOW (ref 13–17)
HGB BLD-MCNC: 11.1 G/DL — LOW (ref 13–17)
HGB BLD-MCNC: 11.2 G/DL — LOW (ref 13–17)
INR BLD: 1.23 RATIO — HIGH (ref 0.85–1.16)
INR BLD: 1.25 RATIO — HIGH (ref 0.85–1.16)
INR BLD: 1.25 RATIO — HIGH (ref 0.85–1.16)
MAGNESIUM SERPL-MCNC: 2.2 MG/DL — SIGNIFICANT CHANGE UP (ref 1.6–2.6)
MAGNESIUM SERPL-MCNC: 2.2 MG/DL — SIGNIFICANT CHANGE UP (ref 1.6–2.6)
MAGNESIUM SERPL-MCNC: 2.3 MG/DL — SIGNIFICANT CHANGE UP (ref 1.6–2.6)
MCHC RBC-ENTMCNC: 30.7 PG — SIGNIFICANT CHANGE UP (ref 27–34)
MCHC RBC-ENTMCNC: 30.8 PG — SIGNIFICANT CHANGE UP (ref 27–34)
MCHC RBC-ENTMCNC: 31.1 PG — SIGNIFICANT CHANGE UP (ref 27–34)
MCHC RBC-ENTMCNC: 35.3 G/DL — SIGNIFICANT CHANGE UP (ref 32–36)
MCHC RBC-ENTMCNC: 35.4 G/DL — SIGNIFICANT CHANGE UP (ref 32–36)
MCHC RBC-ENTMCNC: 36.3 G/DL — HIGH (ref 32–36)
MCV RBC AUTO: 85.7 FL — SIGNIFICANT CHANGE UP (ref 80–100)
MCV RBC AUTO: 86.8 FL — SIGNIFICANT CHANGE UP (ref 80–100)
MCV RBC AUTO: 87.1 FL — SIGNIFICANT CHANGE UP (ref 80–100)
NIGHT BLUE STAIN TISS: SIGNIFICANT CHANGE UP
NRBC BLD AUTO-RTO: 0 /100 WBCS — SIGNIFICANT CHANGE UP (ref 0–0)
PHOSPHATE SERPL-MCNC: 4.1 MG/DL — SIGNIFICANT CHANGE UP (ref 2.5–4.5)
PHOSPHATE SERPL-MCNC: 4.4 MG/DL — SIGNIFICANT CHANGE UP (ref 2.5–4.5)
PHOSPHATE SERPL-MCNC: 5 MG/DL — HIGH (ref 2.5–4.5)
PLATELET # BLD AUTO: 112 K/UL — LOW (ref 150–400)
PLATELET # BLD AUTO: 114 K/UL — LOW (ref 150–400)
PLATELET # BLD AUTO: 118 K/UL — LOW (ref 150–400)
POTASSIUM SERPL-MCNC: 4 MMOL/L — SIGNIFICANT CHANGE UP (ref 3.5–5.3)
POTASSIUM SERPL-MCNC: 4.2 MMOL/L — SIGNIFICANT CHANGE UP (ref 3.5–5.3)
POTASSIUM SERPL-MCNC: 4.2 MMOL/L — SIGNIFICANT CHANGE UP (ref 3.5–5.3)
POTASSIUM SERPL-SCNC: 4 MMOL/L — SIGNIFICANT CHANGE UP (ref 3.5–5.3)
POTASSIUM SERPL-SCNC: 4.2 MMOL/L — SIGNIFICANT CHANGE UP (ref 3.5–5.3)
POTASSIUM SERPL-SCNC: 4.2 MMOL/L — SIGNIFICANT CHANGE UP (ref 3.5–5.3)
PROT SERPL-MCNC: 7.5 G/DL — SIGNIFICANT CHANGE UP (ref 6–8.3)
PROT SERPL-MCNC: 7.5 G/DL — SIGNIFICANT CHANGE UP (ref 6–8.3)
PROT SERPL-MCNC: 7.9 G/DL — SIGNIFICANT CHANGE UP (ref 6–8.3)
PROTHROM AB SERPL-ACNC: 14.1 SEC — HIGH (ref 9.9–13.4)
PROTHROM AB SERPL-ACNC: 14.2 SEC — HIGH (ref 9.9–13.4)
PROTHROM AB SERPL-ACNC: 14.3 SEC — HIGH (ref 9.9–13.4)
RBC # BLD: 3.57 M/UL — LOW (ref 4.2–5.8)
RBC # BLD: 3.57 M/UL — LOW (ref 4.2–5.8)
RBC # BLD: 3.65 M/UL — LOW (ref 4.2–5.8)
RBC # FLD: 19.6 % — HIGH (ref 10.3–14.5)
RBC # FLD: 19.8 % — HIGH (ref 10.3–14.5)
RBC # FLD: 19.8 % — HIGH (ref 10.3–14.5)
SODIUM SERPL-SCNC: 138 MMOL/L — SIGNIFICANT CHANGE UP (ref 135–145)
SODIUM SERPL-SCNC: 141 MMOL/L — SIGNIFICANT CHANGE UP (ref 135–145)
SODIUM SERPL-SCNC: 142 MMOL/L — SIGNIFICANT CHANGE UP (ref 135–145)
SPECIMEN SOURCE: SIGNIFICANT CHANGE UP
SPECIMEN SOURCE: SIGNIFICANT CHANGE UP
WBC # BLD: 5.05 K/UL — SIGNIFICANT CHANGE UP (ref 3.8–10.5)
WBC # BLD: 5.64 K/UL — SIGNIFICANT CHANGE UP (ref 3.8–10.5)
WBC # BLD: 7.73 K/UL — SIGNIFICANT CHANGE UP (ref 3.8–10.5)
WBC # FLD AUTO: 5.05 K/UL — SIGNIFICANT CHANGE UP (ref 3.8–10.5)
WBC # FLD AUTO: 5.64 K/UL — SIGNIFICANT CHANGE UP (ref 3.8–10.5)
WBC # FLD AUTO: 7.73 K/UL — SIGNIFICANT CHANGE UP (ref 3.8–10.5)

## 2025-02-28 PROCEDURE — 99232 SBSQ HOSP IP/OBS MODERATE 35: CPT

## 2025-02-28 PROCEDURE — 99233 SBSQ HOSP IP/OBS HIGH 50: CPT

## 2025-02-28 PROCEDURE — 99232 SBSQ HOSP IP/OBS MODERATE 35: CPT | Mod: GC,24

## 2025-02-28 PROCEDURE — 76705 ECHO EXAM OF ABDOMEN: CPT | Mod: 26,59

## 2025-02-28 PROCEDURE — G0545: CPT

## 2025-02-28 PROCEDURE — 71045 X-RAY EXAM CHEST 1 VIEW: CPT | Mod: 26

## 2025-02-28 PROCEDURE — 93975 VASCULAR STUDY: CPT | Mod: 26

## 2025-02-28 RX ORDER — VALGANCICLOVIR 450 MG/1
450 TABLET, FILM COATED ORAL DAILY
Refills: 0 | Status: DISCONTINUED | OUTPATIENT
Start: 2025-03-01 | End: 2025-03-11

## 2025-02-28 RX ORDER — TRAMADOL HYDROCHLORIDE 50 MG/1
50 TABLET, FILM COATED ORAL EVERY 8 HOURS
Refills: 0 | Status: DISCONTINUED | OUTPATIENT
Start: 2025-02-28 | End: 2025-03-01

## 2025-02-28 RX ORDER — INSULIN LISPRO 100 U/ML
INJECTION, SOLUTION INTRAVENOUS; SUBCUTANEOUS
Refills: 0 | Status: DISCONTINUED | OUTPATIENT
Start: 2025-02-28 | End: 2025-03-01

## 2025-02-28 RX ORDER — TACROLIMUS 0.5 MG/1
0.5 CAPSULE ORAL
Refills: 0 | Status: DISCONTINUED | OUTPATIENT
Start: 2025-02-28 | End: 2025-03-01

## 2025-02-28 RX ORDER — DEXTROSE 50 % IN WATER 50 %
25 SYRINGE (ML) INTRAVENOUS ONCE
Refills: 0 | Status: DISCONTINUED | OUTPATIENT
Start: 2025-02-28 | End: 2025-03-11

## 2025-02-28 RX ORDER — FLUCONAZOLE 150 MG
400 TABLET ORAL DAILY
Refills: 0 | Status: DISCONTINUED | OUTPATIENT
Start: 2025-02-28 | End: 2025-03-11

## 2025-02-28 RX ORDER — OXYCODONE HYDROCHLORIDE 30 MG/1
5 TABLET ORAL EVERY 4 HOURS
Refills: 0 | Status: DISCONTINUED | OUTPATIENT
Start: 2025-02-28 | End: 2025-02-28

## 2025-02-28 RX ORDER — SENNA 187 MG
2 TABLET ORAL AT BEDTIME
Refills: 0 | Status: DISCONTINUED | OUTPATIENT
Start: 2025-02-28 | End: 2025-03-07

## 2025-02-28 RX ORDER — HYDROMORPHONE/SOD CHLOR,ISO/PF 2 MG/10 ML
0.5 SYRINGE (ML) INJECTION
Refills: 0 | Status: DISCONTINUED | OUTPATIENT
Start: 2025-02-28 | End: 2025-02-28

## 2025-02-28 RX ORDER — OXYCODONE HYDROCHLORIDE 30 MG/1
2.5 TABLET ORAL EVERY 4 HOURS
Refills: 0 | Status: DISCONTINUED | OUTPATIENT
Start: 2025-02-28 | End: 2025-02-28

## 2025-02-28 RX ORDER — TRAMADOL HYDROCHLORIDE 50 MG/1
25 TABLET, FILM COATED ORAL EVERY 6 HOURS
Refills: 0 | Status: DISCONTINUED | OUTPATIENT
Start: 2025-02-28 | End: 2025-03-01

## 2025-02-28 RX ORDER — SODIUM CHLORIDE 9 G/1000ML
1000 INJECTION, SOLUTION INTRAVENOUS
Refills: 0 | Status: DISCONTINUED | OUTPATIENT
Start: 2025-02-28 | End: 2025-03-11

## 2025-02-28 RX ORDER — POLYETHYLENE GLYCOL 3350 17 G/17G
17 POWDER, FOR SOLUTION ORAL DAILY
Refills: 0 | Status: DISCONTINUED | OUTPATIENT
Start: 2025-02-28 | End: 2025-03-05

## 2025-02-28 RX ORDER — HEPARIN SODIUM 1000 [USP'U]/ML
5000 INJECTION INTRAVENOUS; SUBCUTANEOUS EVERY 12 HOURS
Refills: 0 | Status: DISCONTINUED | OUTPATIENT
Start: 2025-02-28 | End: 2025-03-05

## 2025-02-28 RX ORDER — SODIUM CHLORIDE 0.65 %
1 AEROSOL, SPRAY (ML) NASAL
Refills: 0 | Status: DISCONTINUED | OUTPATIENT
Start: 2025-02-28 | End: 2025-03-11

## 2025-02-28 RX ORDER — INSULIN GLARGINE-YFGN 100 [IU]/ML
6 INJECTION, SOLUTION SUBCUTANEOUS AT BEDTIME
Refills: 0 | Status: DISCONTINUED | OUTPATIENT
Start: 2025-02-28 | End: 2025-03-11

## 2025-02-28 RX ORDER — HYDROMORPHONE/SOD CHLOR,ISO/PF 2 MG/10 ML
0.25 SYRINGE (ML) INJECTION
Refills: 0 | Status: DISCONTINUED | OUTPATIENT
Start: 2025-02-28 | End: 2025-03-01

## 2025-02-28 RX ORDER — ALBUMIN (HUMAN) 12.5 G/50ML
250 INJECTION, SOLUTION INTRAVENOUS ONCE
Refills: 0 | Status: COMPLETED | OUTPATIENT
Start: 2025-02-28 | End: 2025-02-28

## 2025-02-28 RX ORDER — DEXTROSE 50 % IN WATER 50 %
15 SYRINGE (ML) INTRAVENOUS ONCE
Refills: 0 | Status: DISCONTINUED | OUTPATIENT
Start: 2025-02-28 | End: 2025-03-11

## 2025-02-28 RX ORDER — INSULIN LISPRO 100 U/ML
INJECTION, SOLUTION INTRAVENOUS; SUBCUTANEOUS AT BEDTIME
Refills: 0 | Status: DISCONTINUED | OUTPATIENT
Start: 2025-02-28 | End: 2025-03-01

## 2025-02-28 RX ORDER — GLUCAGON 3 MG/1
1 POWDER NASAL ONCE
Refills: 0 | Status: DISCONTINUED | OUTPATIENT
Start: 2025-02-28 | End: 2025-03-11

## 2025-02-28 RX ORDER — MYCOPHENOLATE MOFETIL 500 MG/1
1000 TABLET, FILM COATED ORAL
Refills: 0 | Status: DISCONTINUED | OUTPATIENT
Start: 2025-02-28 | End: 2025-03-03

## 2025-02-28 RX ORDER — DEXTROSE 50 % IN WATER 50 %
50 SYRINGE (ML) INTRAVENOUS
Refills: 0 | Status: DISCONTINUED | OUTPATIENT
Start: 2025-02-28 | End: 2025-02-28

## 2025-02-28 RX ORDER — TACROLIMUS 0.5 MG/1
0.5 CAPSULE ORAL
Refills: 0 | Status: DISCONTINUED | OUTPATIENT
Start: 2025-02-28 | End: 2025-02-28

## 2025-02-28 RX ORDER — DEXTROSE 50 % IN WATER 50 %
12.5 SYRINGE (ML) INTRAVENOUS ONCE
Refills: 0 | Status: DISCONTINUED | OUTPATIENT
Start: 2025-02-28 | End: 2025-03-11

## 2025-02-28 RX ORDER — HYDROMORPHONE/SOD CHLOR,ISO/PF 2 MG/10 ML
0.5 SYRINGE (ML) INJECTION ONCE
Refills: 0 | Status: DISCONTINUED | OUTPATIENT
Start: 2025-02-28 | End: 2025-02-28

## 2025-02-28 RX ORDER — METHYLPREDNISOLONE ACETATE 80 MG/ML
250 INJECTION, SUSPENSION INTRA-ARTICULAR; INTRALESIONAL; INTRAMUSCULAR; SOFT TISSUE EVERY 24 HOURS
Refills: 0 | Status: COMPLETED | OUTPATIENT
Start: 2025-03-01 | End: 2025-03-01

## 2025-02-28 RX ORDER — TACROLIMUS 0.5 MG/1
0.5 CAPSULE ORAL ONCE
Refills: 0 | Status: COMPLETED | OUTPATIENT
Start: 2025-02-28 | End: 2025-02-28

## 2025-02-28 RX ADMIN — TRAMADOL HYDROCHLORIDE 25 MILLIGRAM(S): 50 TABLET, FILM COATED ORAL at 22:22

## 2025-02-28 RX ADMIN — METHYLPREDNISOLONE ACETATE 100 MILLIGRAM(S): 80 INJECTION, SUSPENSION INTRA-ARTICULAR; INTRALESIONAL; INTRAMUSCULAR; SOFT TISSUE at 05:35

## 2025-02-28 RX ADMIN — Medication 0.25 MILLIGRAM(S): at 04:47

## 2025-02-28 RX ADMIN — TACROLIMUS 0.5 MILLIGRAM(S): 0.5 CAPSULE ORAL at 20:00

## 2025-02-28 RX ADMIN — TRAMADOL HYDROCHLORIDE 50 MILLIGRAM(S): 50 TABLET, FILM COATED ORAL at 21:00

## 2025-02-28 RX ADMIN — ALBUMIN (HUMAN) 1000 MILLILITER(S): 12.5 INJECTION, SOLUTION INTRAVENOUS at 01:09

## 2025-02-28 RX ADMIN — Medication 0.5 MILLIGRAM(S): at 14:20

## 2025-02-28 RX ADMIN — INSULIN GLARGINE-YFGN 6 UNIT(S): 100 INJECTION, SOLUTION SUBCUTANEOUS at 22:03

## 2025-02-28 RX ADMIN — Medication 25 GRAM(S): at 06:34

## 2025-02-28 RX ADMIN — Medication 0.5 MILLIGRAM(S): at 13:49

## 2025-02-28 RX ADMIN — TRAMADOL HYDROCHLORIDE 25 MILLIGRAM(S): 50 TABLET, FILM COATED ORAL at 23:20

## 2025-02-28 RX ADMIN — HEPARIN SODIUM 5000 UNIT(S): 1000 INJECTION INTRAVENOUS; SUBCUTANEOUS at 18:31

## 2025-02-28 RX ADMIN — Medication 0.5 MILLIGRAM(S): at 05:34

## 2025-02-28 RX ADMIN — Medication 40 MILLIGRAM(S): at 05:35

## 2025-02-28 RX ADMIN — Medication 0.5 MILLIGRAM(S): at 17:16

## 2025-02-28 RX ADMIN — OXYCODONE HYDROCHLORIDE 5 MILLIGRAM(S): 30 TABLET ORAL at 08:18

## 2025-02-28 RX ADMIN — OXYCODONE HYDROCHLORIDE 5 MILLIGRAM(S): 30 TABLET ORAL at 07:49

## 2025-02-28 RX ADMIN — POLYETHYLENE GLYCOL 3350 17 GRAM(S): 17 POWDER, FOR SOLUTION ORAL at 13:59

## 2025-02-28 RX ADMIN — Medication 0.5 MILLIGRAM(S): at 02:30

## 2025-02-28 RX ADMIN — Medication 2 UNIT(S): at 01:23

## 2025-02-28 RX ADMIN — TACROLIMUS 0.5 MILLIGRAM(S): 0.5 CAPSULE ORAL at 10:00

## 2025-02-28 RX ADMIN — MYCOPHENOLATE MOFETIL 1000 MILLIGRAM(S): 500 TABLET, FILM COATED ORAL at 20:00

## 2025-02-28 RX ADMIN — ALBUMIN (HUMAN) 1000 MILLILITER(S): 12.5 INJECTION, SOLUTION INTRAVENOUS at 01:03

## 2025-02-28 RX ADMIN — Medication 0.25 MILLIGRAM(S): at 04:17

## 2025-02-28 RX ADMIN — MYCOPHENOLATE MOFETIL 1000 MILLIGRAM(S): 500 TABLET, FILM COATED ORAL at 09:59

## 2025-02-28 RX ADMIN — Medication 1 APPLICATION(S): at 05:35

## 2025-02-28 RX ADMIN — Medication 2 UNIT(S)/HR: at 01:14

## 2025-02-28 RX ADMIN — Medication 2 TABLET(S): at 22:05

## 2025-02-28 RX ADMIN — Medication 0.5 MILLIGRAM(S): at 09:52

## 2025-02-28 RX ADMIN — Medication 1 SPRAY(S): at 22:03

## 2025-02-28 RX ADMIN — Medication 25 GRAM(S): at 16:30

## 2025-02-28 RX ADMIN — Medication 0.5 MILLIGRAM(S): at 10:51

## 2025-02-28 RX ADMIN — TRAMADOL HYDROCHLORIDE 50 MILLIGRAM(S): 50 TABLET, FILM COATED ORAL at 20:00

## 2025-02-28 RX ADMIN — Medication 0.5 MILLIGRAM(S): at 02:00

## 2025-02-28 RX ADMIN — OXYCODONE HYDROCHLORIDE 5 MILLIGRAM(S): 30 TABLET ORAL at 16:30

## 2025-02-28 NOTE — PROGRESS NOTE ADULT - SUBJECTIVE AND OBJECTIVE BOX
INTERVAL EVENTS:   - hepatic US showing elevated resistive indices  - 500cc 5% albumin given per transplant. Repeat US ordered for AM    SUBJECTIVE/ROS:  [ ] A ten-point review of systems was otherwise negative except as noted.  [ ] Due to altered mental status/intubation, subjective information were not able to be obtained from the patient. History was obtained, to the extent possible, from review of the chart and collateral sources of information.      NEURO  Exam: awake, alert, oriented  Meds: HYDROmorphone  Injectable 0.25 milliGRAM(s) IV Push every 3 hours PRN Moderate Pain (4 - 6)  HYDROmorphone  Injectable 0.5 milliGRAM(s) IV Push every 3 hours PRN Severe Pain (7 - 10)  [x] Adequacy of sedation and pain control has been assessed and adjusted      RESPIRATORY  RR: 24 (02-28-25 @ 03:00) (18 - 35)  SpO2: 99% (02-28-25 @ 03:00) (95% - 99%)  Exam: unlabored, clear to auscultation bilaterally  ABG - ( 28 Feb 2025 02:02 )  pH: 7.40  /  pCO2: 50    /  pO2: 85    / HCO3: 31    / Base Excess: 5.2   /  SaO2: 98.1        CARDIOVASCULAR  HR: 80 (02-28-25 @ 03:00) (80 - 107)  BP: 136/72 (02-27-25 @ 19:04) (119/64 - 136/72)  BP(mean): 96 (02-27-25 @ 19:04) (78 - 96)  ABP: 168/84 (02-28-25 @ 03:00) (138/63 - 168/84)  ABP(mean): 116 (02-28-25 @ 03:00) (91 - 117)      Exam: regular rate and rhythm  Cardiac Rhythm: sinus  Perfusion     [x]Adequate   [ ]Inadequate  Mentation   [x]Normal       [ ]Reduced  Extremities  [x]Warm         [ ]Cool  Volume Status [ ]Hypervolemic [x]Euvolemic [ ]Hypovolemic      GI/NUTRITION  Exam: soft, nontender, nondistended  Diet: npo  Meds: pantoprazole  Injectable 40 milliGRAM(s) IV Push every 24 hours  senna 2 Tablet(s) Oral two times a day      GENITOURINARY  I&O's Detail    02-26 @ 07:01 - 02-27 @ 07:00  --------------------------------------------------------  IN:    IV PiggyBack: 50 mL    IV PiggyBack: 50 mL    Oral Fluid: 1240 mL  Total IN: 1340 mL    OUT:    Voided (mL): 1625 mL  Total OUT: 1625 mL    Total NET: -285 mL      02-27 @ 07:01 - 02-28 @ 03:19  --------------------------------------------------------  IN:    Albumin 5%  - 250 mL: 500 mL    Enteral Tube Flush: 60 mL    Insulin: 4.5 mL    IV PiggyBack: 200 mL    IV PiggyBack: 150 mL    multiple electrolytes Injection Type 1.: 1000 mL  Total IN: 1914.5 mL    OUT:    Bulb (mL): 330 mL    Bulb (mL): 90 mL    Bulb (mL): 230 mL    Indwelling Catheter - Urethral (mL): 650 mL  Total OUT: 1300 mL    Total NET: 614.5 mL        Weight (kg): 108.3 (02-27 @ 08:02)  02-28    138  |  97  |  42[H]  ----------------------------<  278[H]  4.2   |  29  |  1.04    Ca    10.0      28 Feb 2025 02:16  Phos  5.0     02-28  Mg     2.3     02-28    TPro  7.5  /  Alb  3.3  /  TBili  3.1[H]  /  DBili  x   /  AST  185[H]  /  ALT  55[H]  /  AlkPhos  76  02-28    [ ] Bush catheter, indication: N/A  Meds: multiple electrolytes Injection Type 1 1000 milliLiter(s) IV Continuous <Continuous>        HEMATOLOGIC  Meds:   [x] VTE Prophylaxis                        11.2   5.05  )-----------( 114      ( 28 Feb 2025 02:16 )             31.7     PT/INR - ( 28 Feb 2025 02:16 )   PT: 14.3 sec;   INR: 1.25 ratio         PTT - ( 28 Feb 2025 02:16 )  PTT:31.6 sec      INFECTIOUS DISEASES  WBC Count: 5.05 K/uL (02-28 @ 02:16)  WBC Count: 5.54 K/uL (02-27 @ 22:09)  WBC Count: 6.79 K/uL (02-27 @ 19:18)  WBC Count: 5.74 K/uL (02-27 @ 03:26)    RECENT CULTURES:  Specimen Source: Body Fluid ascites  Date/Time: 02-27 @ 19:26  Culture Results: --  Gram Stain:   No polymorphonuclear leukocytes seen  No organisms seen  by cytocentrifuge  Organism: --  Specimen Source: Clean Catch Clean Catch (Midstream)  Date/Time: 02-24 @ 04:59  Culture Results:   10,000 - 49,000 CFU/mL Enterococcus faecium (vancomycin resistant)[!]  Gram Stain: --  Organism: Enterococcus faecium (vancomycin resistant)[!]    Meds: fluconAZOLE IVPB 400 milliGRAM(s) IV Intermittent daily  influenza   Vaccine 0.5 milliLiter(s) IntraMuscular once  mycophenolate mofetil Suspension 1000 milliGRAM(s) Oral <User Schedule>  piperacillin/tazobactam IVPB.. 3.375 Gram(s) IV Intermittent every 8 hours  valGANciclovir 50 mG/mL Oral Solution 450 milliGRAM(s) Oral daily        ENDOCRINE  CAPILLARY BLOOD GLUCOSE      POCT Blood Glucose.: 270 mg/dL (28 Feb 2025 02:56)  POCT Blood Glucose.: 270 mg/dL (28 Feb 2025 01:17)  POCT Blood Glucose.: 245 mg/dL (27 Feb 2025 23:53)  POCT Blood Glucose.: 253 mg/dL (27 Feb 2025 21:57)  POCT Blood Glucose.: 102 mg/dL (27 Feb 2025 08:14)    Meds: insulin regular Infusion 2 Unit(s)/Hr IV Continuous <Continuous>  methylPREDNISolone sodium succinate IVPB 500 milliGRAM(s) IV Intermittent every 24 hours  methylPREDNISolone sodium succinate IVPB   IV Intermittent         CODE STATUS: full code

## 2025-02-28 NOTE — BH CONSULTATION LIAISON PROGRESS NOTE - NSBHASSESSMENTFT_PSY_ALL_CORE
Pt is a 45 yo  man with PMH decompensated ETOH cirrhosis c/b recurrent ascites, grade II EV, and HE, right calf hematoma 10/2024 (s/p fall)  presented to [facility] for abdominal pain and distension. Patient found to have ascites on exam and MANUELITO requiring HD initiation. PermCath was placed by vascular surgery on 1/10 with post procedure course c/b bleeding from insertion site. Transferred to [facility] for further management. His hospital course has been c/b an empyema s/p VATS decortication on 1/28/25. Chest tubes have since been removed, with reaccumulation of right pleural effusion. Patient is now s/p OLT, brought to SICU extubated, off vasopressors.   Psych consulted for alcohol use disorder. Patient reports that heavy alcohol consumption began 2016 after he father passed away and he moved to [place]. Pt was in rehabilitation twice prior, 2021 and 2023, pt was sober for about 1 yr between two rehabs. Pt began drinking again since 03/2024. Pt reports that he was in [place] 5/2024 and did not drink while he was there because it was election month, no drinking was allowed. Pt says he was in the ICU in Nov 2024 and decided to stop drinking them and has not had any alcohol since the first week in Nov, 2024.   Pt is highly motivated to follow treatment recommendations   Pt demonstrates overall good understanding of transplant process including potential risks and post-operative recovery needs including but not limited to need for immunosuppression, risk of infection, and lifestyle modifications. Admits to some mild anxiety in context of medical stressors with good therapeutic response to mirtazapine which we will recommend continuing. Reporting overall stable mood, is notably future-oriented, is motivated to maintain sobriety from alcohol (last use 11/4/24). Patient is adherent to current recommendations made by transplant team and motivated to continue to follow any future suggestions. Is open to considering counseling or support groups should anxiety worsen post-transplant. Endorses primary family support for this process is his mother.       DDX;  Pre-transplant evaluation  R/O adjustment disorder with anxiety    Plan  -Continue mirtazapine 7.5 mg QHS  - continue with melatonin 3 mg QHS  - SW assistance for referral to inpatient rehab appreciated. Patient is motivated to attend and complete the program.   - Discussed with patient additional psychiatric resources  for anxiety management including counseling/therapy/groups. Patient is open to exploring that in the future if anxiety worsens.  - Dispo: likely to inpatient rehab. no indication for inpatient psychiatric hospitalization

## 2025-02-28 NOTE — BH CONSULTATION LIAISON PROGRESS NOTE - NSBHFUPINTERVALHXFT_PSY_A_CORE
PT was seen with his wife at bedside and he says he is feeling very hopeful and thankful that he received a liver and a new life.

## 2025-02-28 NOTE — BH CONSULTATION LIAISON PROGRESS NOTE - NSBHATTESTBILLING_PSY_A_CORE
[id number]-Lvopuideph OBS or IP - moderate complexity OR 35-49 mins
Billing in another system

## 2025-02-28 NOTE — BH CONSULTATION LIAISON PROGRESS NOTE - OTHER
ascites and jaundiced 

## 2025-02-28 NOTE — BH CONSULTATION LIAISON PROGRESS NOTE - NSBHCHARTREVIEWVS_PSY_A_CORE FT
Vital Signs Last 24 Hrs  T(C): 36.8 (05 Feb 2025 13:05), Max: 37.1 (05 Feb 2025 08:38)  T(F): 98.2 (05 Feb 2025 13:05), Max: 98.7 (05 Feb 2025 08:38)  HR: 102 (05 Feb 2025 13:05) (86 - 113)  BP: 132/65 (05 Feb 2025 13:05) (117/62 - 143/76)  BP(mean): --  RR: 20 (05 Feb 2025 13:05) (18 - 20)  SpO2: 96% (05 Feb 2025 13:05) (93% - 99%)    Parameters below as of 05 Feb 2025 13:05  Patient On (Oxygen Delivery Method): room air    
Vital Signs Last 24 Hrs  T(C): 36.6 (28 Feb 2025 07:00), Max: 37.1 (27 Feb 2025 23:00)  T(F): 97.9 (28 Feb 2025 07:00), Max: 98.8 (27 Feb 2025 23:00)  HR: 76 (28 Feb 2025 17:00) (67 - 107)  BP: 136/72 (27 Feb 2025 19:04) (136/72 - 136/72)  BP(mean): 96 (27 Feb 2025 19:04) (96 - 96)  RR: 26 (28 Feb 2025 17:00) (22 - 37)  SpO2: 92% (28 Feb 2025 17:00) (90% - 99%)    Parameters below as of 28 Feb 2025 09:27  Patient On (Oxygen Delivery Method): nasal cannula    
Vital Signs Last 24 Hrs  T(C): 36.4 (22 Jan 2025 14:45), Max: 36.8 (21 Jan 2025 21:00)  T(F): 97.6 (22 Jan 2025 14:45), Max: 98.3 (21 Jan 2025 21:00)  HR: 78 (22 Jan 2025 14:45) (76 - 95)  BP: 119/69 (22 Jan 2025 14:45) (108/57 - 129/73)  BP(mean): --  RR: 18 (22 Jan 2025 14:45) (18 - 18)  SpO2: 100% (22 Jan 2025 14:45) (96% - 100%)    Parameters below as of 22 Jan 2025 14:45  Patient On (Oxygen Delivery Method): room air    
Vital Signs Last 24 Hrs  T(C): 37.3 (14 Feb 2025 17:12), Max: 37.3 (14 Feb 2025 17:12)  T(F): 99.1 (14 Feb 2025 17:12), Max: 99.1 (14 Feb 2025 17:12)  HR: 97 (14 Feb 2025 17:12) (95 - 112)  BP: 126/72 (14 Feb 2025 17:12) (111/59 - 128/69)  BP(mean): --  RR: 20 (14 Feb 2025 17:12) (18 - 20)  SpO2: 94% (14 Feb 2025 17:12) (93% - 100%)    Parameters below as of 14 Feb 2025 17:12  Patient On (Oxygen Delivery Method): room air    
Vital Signs Last 24 Hrs  T(C): 36.9 (24 Jan 2025 13:09), Max: 37 (24 Jan 2025 09:08)  T(F): 98.4 (24 Jan 2025 13:09), Max: 98.6 (24 Jan 2025 09:08)  HR: 99 (24 Jan 2025 13:09) (68 - 104)  BP: 121/60 (24 Jan 2025 13:09) (113/58 - 131/68)  BP(mean): --  RR: 18 (24 Jan 2025 13:09) (17 - 18)  SpO2: 98% (24 Jan 2025 13:09) (94% - 98%)    Parameters below as of 24 Jan 2025 13:09  Patient On (Oxygen Delivery Method): room air    
Vital Signs Last 24 Hrs  T(C): 36.8 (28 Jan 2025 11:57), Max: 36.9 (28 Jan 2025 04:40)  T(F): 98.2 (28 Jan 2025 11:57), Max: 98.5 (28 Jan 2025 04:57)  HR: 88 (28 Jan 2025 11:57) (84 - 118)  BP: 113/63 (28 Jan 2025 11:57) (113/63 - 127/74)  BP(mean): --  RR: 18 (28 Jan 2025 11:57) (18 - 18)  SpO2: 96% (28 Jan 2025 11:57) (91% - 97%)    Parameters below as of 28 Jan 2025 11:57  Patient On (Oxygen Delivery Method): room air    
Vital Signs Last 24 Hrs  T(C): 36.5 (23 Jan 2025 12:36), Max: 36.8 (22 Jan 2025 17:15)  T(F): 97.7 (23 Jan 2025 12:36), Max: 98.3 (23 Jan 2025 00:35)  HR: 76 (23 Jan 2025 12:36) (76 - 93)  BP: 122/73 (23 Jan 2025 12:36) (110/60 - 125/64)  BP(mean): --  RR: 18 (23 Jan 2025 12:36) (18 - 18)  SpO2: 99% (23 Jan 2025 12:36) (94% - 100%)    Parameters below as of 23 Jan 2025 12:36  Patient On (Oxygen Delivery Method): room air    
Vital Signs Last 24 Hrs  T(C): 36.9 (07 Feb 2025 12:40), Max: 37.1 (06 Feb 2025 17:56)  T(F): 98.5 (07 Feb 2025 12:40), Max: 98.8 (06 Feb 2025 17:56)  HR: 102 (07 Feb 2025 12:40) (102 - 116)  BP: 127/66 (07 Feb 2025 12:40) (119/63 - 128/72)  BP(mean): --  RR: 20 (07 Feb 2025 12:40) (18 - 20)  SpO2: 94% (07 Feb 2025 12:40) (93% - 98%)    Parameters below as of 07 Feb 2025 12:40  Patient On (Oxygen Delivery Method): room air

## 2025-02-28 NOTE — PHYSICAL THERAPY INITIAL EVALUATION ADULT - DIAGNOSIS, PT EVAL
Pt presents w/ functional deficits that include balance, deconditioning, strength, and pain that impact pt's ability to perform functional mobility
Decreased functional mobility/capacity secondary to impairments listed below

## 2025-02-28 NOTE — BH CONSULTATION LIAISON PROGRESS NOTE - CURRENT MEDICATION
MEDICATIONS  (STANDING):  albumin human 25% IVPB 100 milliLiter(s) IV Intermittent once  buMETAnide Injectable 2 milliGRAM(s) IV Push once  cefTRIAXone   IVPB      cefTRIAXone   IVPB 2000 milliGRAM(s) IV Intermittent every 24 hours  chlorhexidine 2% Cloths 1 Application(s) Topical daily  folic acid 1 milliGRAM(s) Oral daily  influenza   Vaccine 0.5 milliLiter(s) IntraMuscular once  lactulose Syrup 20 Gram(s) Oral every 12 hours  lidocaine   4% Patch 1 Patch Transdermal every 24 hours  melatonin 5 milliGRAM(s) Oral at bedtime  metroNIDAZOLE    Tablet 500 milliGRAM(s) Oral two times a day  midodrine 10 milliGRAM(s) Oral every 8 hours  mirtazapine 7.5 milliGRAM(s) Oral at bedtime  Nephro-shania 1 Tablet(s) Oral daily  pantoprazole    Tablet 40 milliGRAM(s) Oral before breakfast  rifAXIMin 550 milliGRAM(s) Oral every 12 hours  sevelamer carbonate 800 milliGRAM(s) Oral three times a day with meals  thiamine 100 milliGRAM(s) Oral daily    MEDICATIONS  (PRN):  albuterol    90 MICROgram(s) HFA Inhaler 2 Puff(s) Inhalation every 6 hours PRN Bronchospasm  traMADol 25 milliGRAM(s) Oral every 8 hours PRN Moderate Pain (4 - 6)  
MEDICATIONS  (STANDING):  buMETAnide Injectable 2 milliGRAM(s) IV Push <User Schedule>  cefTRIAXone   IVPB 2000 milliGRAM(s) IV Intermittent every 24 hours  cefTRIAXone   IVPB      chlorhexidine 2% Cloths 1 Application(s) Topical daily  folic acid 1 milliGRAM(s) Oral daily  influenza   Vaccine 0.5 milliLiter(s) IntraMuscular once  lactulose Syrup 20 Gram(s) Oral every 12 hours  lidocaine   4% Patch 1 Patch Transdermal every 24 hours  magnesium oxide 400 milliGRAM(s) Oral two times a day with meals  melatonin 5 milliGRAM(s) Oral at bedtime  midodrine 10 milliGRAM(s) Oral every 8 hours  mirtazapine 7.5 milliGRAM(s) Oral at bedtime  Nephro-shania 1 Tablet(s) Oral daily  pantoprazole    Tablet 40 milliGRAM(s) Oral before breakfast  rifAXIMin 550 milliGRAM(s) Oral every 12 hours  sevelamer carbonate 800 milliGRAM(s) Oral three times a day with meals  thiamine 100 milliGRAM(s) Oral daily    MEDICATIONS  (PRN):  albuterol    90 MICROgram(s) HFA Inhaler 2 Puff(s) Inhalation every 6 hours PRN Bronchospasm  traMADol 25 milliGRAM(s) Oral every 8 hours PRN Moderate Pain (4 - 6)  traMADol 50 milliGRAM(s) Oral every 8 hours PRN Severe Pain (7 - 10)  
MEDICATIONS  (STANDING):  buMETAnide IVPB 4 milliGRAM(s) IV Intermittent once  cefTRIAXone   IVPB      cefTRIAXone   IVPB 2000 milliGRAM(s) IV Intermittent every 24 hours  chlorhexidine 2% Cloths 1 Application(s) Topical daily  folic acid 1 milliGRAM(s) Oral daily  influenza   Vaccine 0.5 milliLiter(s) IntraMuscular once  lactulose Syrup 20 Gram(s) Oral every 12 hours  lidocaine   4% Patch 1 Patch Transdermal every 24 hours  melatonin 5 milliGRAM(s) Oral at bedtime  metroNIDAZOLE    Tablet 500 milliGRAM(s) Oral two times a day  midodrine 10 milliGRAM(s) Oral every 8 hours  mirtazapine 7.5 milliGRAM(s) Oral at bedtime  Nephro-shania 1 Tablet(s) Oral daily  pantoprazole    Tablet 40 milliGRAM(s) Oral before breakfast  rifAXIMin 550 milliGRAM(s) Oral every 12 hours  sevelamer carbonate 800 milliGRAM(s) Oral three times a day with meals  thiamine 100 milliGRAM(s) Oral daily    MEDICATIONS  (PRN):  albuterol    90 MICROgram(s) HFA Inhaler 2 Puff(s) Inhalation every 6 hours PRN Bronchospasm  traMADol 25 milliGRAM(s) Oral every 8 hours PRN Moderate Pain (4 - 6)  
MEDICATIONS  (STANDING):  fluconAZOLE   Tablet 400 milliGRAM(s) Oral daily  heparin   Injectable 5000 Unit(s) SubCutaneous every 12 hours  influenza   Vaccine 0.5 milliLiter(s) IntraMuscular once  insulin regular Infusion 2 Unit(s)/Hr (2 mL/Hr) IV Continuous <Continuous>  methylPREDNISolone sodium succinate IVPB   IV Intermittent   mycophenolate mofetil 1000 milliGRAM(s) Oral <User Schedule>  pantoprazole    Tablet 40 milliGRAM(s) Oral before breakfast  piperacillin/tazobactam IVPB.. 3.375 Gram(s) IV Intermittent every 8 hours  polyethylene glycol 3350 17 Gram(s) Oral daily  senna 2 Tablet(s) Oral at bedtime  tacrolimus 0.5 milliGRAM(s) Oral <User Schedule>    MEDICATIONS  (PRN):  HYDROmorphone  Injectable 0.25 milliGRAM(s) IV Push every 3 hours PRN Breakthrough Pain  oxyCODONE    IR 2.5 milliGRAM(s) Oral every 4 hours PRN Moderate Pain (4 - 6)  oxyCODONE    IR 5 milliGRAM(s) Oral every 4 hours PRN Severe Pain (7 - 10)  
MEDICATIONS  (STANDING):  artificial tears (preservative free) Ophthalmic Solution 1 Drop(s) Both EYES two times a day  cefTRIAXone   IVPB 2000 milliGRAM(s) IV Intermittent every 24 hours  chlorhexidine 2% Cloths 1 Application(s) Topical <User Schedule>  folic acid 1 milliGRAM(s) Oral daily  influenza   Vaccine 0.5 milliLiter(s) IntraMuscular once  insulin lispro (ADMELOG) corrective regimen sliding scale   SubCutaneous three times a day before meals  insulin lispro (ADMELOG) corrective regimen sliding scale   SubCutaneous at bedtime  lactulose Syrup 30 Gram(s) Oral every 12 hours  levalbuterol Inhalation 0.63 milliGRAM(s) Inhalation every 6 hours  magnesium oxide 800 milliGRAM(s) Oral two times a day with meals  metroNIDAZOLE    Tablet 500 milliGRAM(s) Oral every 12 hours  midodrine 10 milliGRAM(s) Oral every 8 hours  multivitamin 1 Tablet(s) Oral daily  pantoprazole    Tablet 40 milliGRAM(s) Oral daily  rifAXIMin 550 milliGRAM(s) Oral every 12 hours  thiamine 100 milliGRAM(s) Oral daily    MEDICATIONS  (PRN):  albuterol    90 MICROgram(s) HFA Inhaler 1 Puff(s) Inhalation every 12 hours PRN Shortness of Breath and/or Wheezing  traMADol 25 milliGRAM(s) Oral every 6 hours PRN Moderate Pain (4 - 6)  traMADol 50 milliGRAM(s) Oral every 6 hours PRN Severe Pain (7 - 10)  
MEDICATIONS  (STANDING):  albumin human 25% IVPB 100 milliLiter(s) IV Intermittent <User Schedule>  buMETAnide Injectable 2 milliGRAM(s) IV Push <User Schedule>  chlorhexidine 2% Cloths 1 Application(s) Topical daily  folic acid 1 milliGRAM(s) Oral daily  influenza   Vaccine 0.5 milliLiter(s) IntraMuscular once  lactulose Syrup 20 Gram(s) Oral every 12 hours  lidocaine   4% Patch 1 Patch Transdermal every 24 hours  melatonin 5 milliGRAM(s) Oral at bedtime  metroNIDAZOLE    Tablet 500 milliGRAM(s) Oral two times a day  midodrine 10 milliGRAM(s) Oral every 8 hours  mirtazapine 7.5 milliGRAM(s) Oral at bedtime  Nephro-shania 1 Tablet(s) Oral daily  pantoprazole    Tablet 40 milliGRAM(s) Oral before breakfast  piperacillin/tazobactam IVPB.. 3.375 Gram(s) IV Intermittent every 12 hours  rifAXIMin 550 milliGRAM(s) Oral every 12 hours  sevelamer carbonate 800 milliGRAM(s) Oral three times a day with meals  thiamine 100 milliGRAM(s) Oral daily    MEDICATIONS  (PRN):  albuterol    90 MICROgram(s) HFA Inhaler 2 Puff(s) Inhalation every 6 hours PRN Bronchospasm  traMADol 25 milliGRAM(s) Oral every 8 hours PRN Moderate Pain (4 - 6)  
MEDICATIONS  (STANDING):  albumin human 25% IVPB 100 milliLiter(s) IV Intermittent every 12 hours  buMETAnide Injectable 2 milliGRAM(s) IV Push every 12 hours  cefTRIAXone   IVPB 1000 milliGRAM(s) IV Intermittent every 24 hours  chlorhexidine 2% Cloths 1 Application(s) Topical <User Schedule>  folic acid 1 milliGRAM(s) Oral daily  influenza   Vaccine 0.5 milliLiter(s) IntraMuscular once  insulin lispro (ADMELOG) corrective regimen sliding scale   SubCutaneous three times a day before meals  insulin lispro (ADMELOG) corrective regimen sliding scale   SubCutaneous at bedtime  lactulose Syrup 30 Gram(s) Oral every 12 hours  magnesium oxide 400 milliGRAM(s) Oral two times a day with meals  metroNIDAZOLE    Tablet 500 milliGRAM(s) Oral every 12 hours  midodrine 10 milliGRAM(s) Oral every 8 hours  multivitamin 1 Tablet(s) Oral daily  pantoprazole    Tablet 40 milliGRAM(s) Oral daily  rifAXIMin 550 milliGRAM(s) Oral every 12 hours  spironolactone 50 milliGRAM(s) Oral daily  thiamine 100 milliGRAM(s) Oral daily    MEDICATIONS  (PRN):  traMADol 25 milliGRAM(s) Oral every 6 hours PRN Moderate Pain (4 - 6)  traMADol 50 milliGRAM(s) Oral every 6 hours PRN Severe Pain (7 - 10)  
MEDICATIONS  (STANDING):  albumin human 25% IVPB 100 milliLiter(s) IV Intermittent every 12 hours  buMETAnide 2 milliGRAM(s) Oral <User Schedule>  cefTRIAXone   IVPB      cefTRIAXone   IVPB 2000 milliGRAM(s) IV Intermittent every 24 hours  chlorhexidine 2% Cloths 1 Application(s) Topical <User Schedule>  folic acid 1 milliGRAM(s) Oral daily  influenza   Vaccine 0.5 milliLiter(s) IntraMuscular once  insulin lispro (ADMELOG) corrective regimen sliding scale   SubCutaneous three times a day before meals  insulin lispro (ADMELOG) corrective regimen sliding scale   SubCutaneous at bedtime  lactulose Syrup 30 Gram(s) Oral every 12 hours  metroNIDAZOLE    Tablet 500 milliGRAM(s) Oral every 12 hours  midodrine 10 milliGRAM(s) Oral every 8 hours  multivitamin 1 Tablet(s) Oral daily  pantoprazole    Tablet 40 milliGRAM(s) Oral daily  rifAXIMin 550 milliGRAM(s) Oral every 12 hours  thiamine 100 milliGRAM(s) Oral daily    MEDICATIONS  (PRN):  traMADol 50 milliGRAM(s) Oral every 6 hours PRN Severe Pain (7 - 10)  traMADol 25 milliGRAM(s) Oral every 6 hours PRN Moderate Pain (4 - 6)

## 2025-02-28 NOTE — OCCUPATIONAL THERAPY INITIAL EVALUATION ADULT - PERTINENT HX OF CURRENT PROBLEM, REHAB EVAL
44M PMH decompensated ETOH cirrhosis c/b recurrent ascites, grade II esophageal varices, and hepatic encephalopathy, who presented to OSH with abd pain and distension found to have decompensated EtOH cirrhosis with MELD 30, MANUELITO (HRS vs ATN; previous HD need with [facility]) found to have R empyema s/p R pigtail placement w/ IR 1/18 requiring several adjustments, s/p R robotic VATS with decortication (Dr. Ramírez 1/28/2025) for loculated empyema. OR course with oropharyngeal bleeding during intubation, coagulopathy/elevated EBL during case.
45yo M with Hx decompensated EtOH cirrhosis c/b recurrent ascites, grade II EV, and HE who presented to OSH for abd pain and distension, found to have ascites and MANUELITO requiring HD. Patient was transferred to [facility] for management and liver transplant evaluation. His hospital course has been c/b an empyema s/p VATS decortication on 1/28/25. Chest tubes have since been removed, with reaccumulation of right pleural effusion. Patient is now s/p OLT, brought to SICU extubated, off vasopressors.

## 2025-02-28 NOTE — OCCUPATIONAL THERAPY INITIAL EVALUATION ADULT - IMPAIRMENTS CONTRIBUTING IMPAIRED BED MOBILITY, REHAB EVAL
impaired balance/pain/impaired postural control/decreased strength
impaired balance/impaired postural control/decreased ROM/decreased strength

## 2025-02-28 NOTE — OCCUPATIONAL THERAPY INITIAL EVALUATION ADULT - PLANNED THERAPY INTERVENTIONS, OT EVAL
ADL retraining/balance training/bed mobility training/transfer training
ADL retraining/balance training/bed mobility training/cognitive, visual perceptual/transfer training

## 2025-02-28 NOTE — OCCUPATIONAL THERAPY INITIAL EVALUATION ADULT - LIVES WITH, PROFILE
Pt reports that he lives with his mother and aunt in a pvt house +4 RAQUEL and additional 5 steps once inside to bedroom/bathroom. Pt states that he was independent with all ADLs and mobility prior to admission however has been growing weaker and relying on rolling walker in recent weeks. Pt owns a rolling walker, wheelchair and straight cane for DME
Pt reports that he lives with his mother and aunt in a pvt house +4 RAQUEL and additional 5 steps once inside to bedroom/bathroom. Pt states that he was independent with all ADLs and mobility prior to admission however has been growing weaker and relying on rolling walker in recent weeks. Pt owns a rolling walker, wheelchair and straight cane for DME

## 2025-02-28 NOTE — BH CONSULTATION LIAISON PROGRESS NOTE - NSICDXBHPRIMARYDX_PSY_ALL_CORE
Severe alcohol use disorder, in early remission   F10.21  

## 2025-02-28 NOTE — PHYSICAL THERAPY INITIAL EVALUATION ADULT - GENERAL OBSERVATIONS, REHAB EVAL
Rec'd semi-supine in bed in NAD, VSS, +RIJ, +tahmina, +ADAM x3, +1L O2 via NC, +yates, +IVs, +ICU monitoring, A&O x4.
Pt rec'd supine in bed NAD, +UE IVL.

## 2025-02-28 NOTE — PHYSICAL THERAPY INITIAL EVALUATION ADULT - PERTINENT HX OF CURRENT PROBLEM, REHAB EVAL
45 yo M with PMH decompensated ETOH cirrhosis c/b recurrent ascites, grade II EV, and HE, right calf hematoma 10/2024 (s/p fall)  presented to [facility] for abdominal pain and distension. Patient found to have ascites on exam and MANUELITO requiring HD initation. PermCath was placed by vascular surgery on 1/10 with post procedure course c/b bleeding from insertion site. Transferred to [facility] SICU for further management.
45yo M with Hx decompensated EtOH cirrhosis c/b recurrent ascites, grade II EV, and HE who presented to OSH for abd pain and distension, found to have ascites and MANUELITO requiring HD. Patient was transferred to [facility] for management and liver transplant evaluation. His hospital course has been c/b an empyema s/p VATS decortication on 1/28/25. Chest tubes have since been removed, with reaccumulation of right pleural effusion. Patient is now s/p OLT, brought to SICU extubated, off vasopressors.     intra-op: 10 prbc, 9 ffp, 6 platelet, 4 cryo; 2.5L IVF; 1L urine; ; 5L ascites drained

## 2025-02-28 NOTE — PROGRESS NOTE ADULT - SUBJECTIVE AND OBJECTIVE BOX
MOE WALL is a 44y Male s/p liver transplant on 2/27/25. PMH is signifcant for decompensated ETOH cirrhosis c/b recurrent ascites, grade II EV, and HE.    Allergies  sulfa drugs (Unknown)  Salicylic Acid (Other)    CMV -/+    Transplant Medications  Induction  -Basiliximab 20 mg POD 0 (given in OR) and POD 4  -Methyprednisolone taper (switch to PO prednisone on POD 6)            POD 0: 1000 mg IV in OR            POD 1: 500 mg IV x 1            POD 2: 250 mg IV x 1            POD 3: 125 mg IV x 1            POD 4: 60 mg IV x 1            POD 5: 40 mg IV x 1        Maintenance Immunosuppression  -Tacrolimus 0.05 mg/kg/dose Q12H at 8AM and 8PM (Adjust for goal trough: 8-10) to be started on POD 1 per liver transplant team  -Mycophenolate 1,000 mg PO Q12H  -Prednisone             POD 6: 20 mg PO daily    Anti-infection   -Mepron 1500 mg daily  -Valganciclovir (dose based on CMV serostatus and frequency based on renal function)  -Fluconazole 400 mg daily    Surgical prophylaxis pre- and intra-operative dosing  -Zosyn    Prophylaxis  -HAT ppx: aspirin 81 mg daily to start on POD3 per liver transplant team  -GI ppx: pantoprazole 40 mg IV daily (switch to PO when patient tolerates)  -Bowel ppx: senna  -DVT: sequential compression device  -Pain:            Mild: Acetaminophen 650 mg every 6 hours PRN           Moderate: Tramadol 25 mg every 4 hours PRN (adjust for renal function)           Severe: Tramadol 50 mg every 4 hours PRN (adjust for renal function)    Home Medications:  folic acid: 1 tab(s) orally once a day (25 Jul 2024 13:14)  gabapentin 300 mg oral tablet: 1 tab(s) orally 2 times a day (13 Jul 2024 00:12)  lactulose 10 g/15 mL oral syrup: 45 milliliter(s) orally 2 times a day (13 Jul 2024 00:12)  pantoprazole 40 mg oral delayed release tablet: 1 tab(s) orally once a day (13 Jul 2024 00:11)      Outpatient medication reconciliation reviewed and will be re-started appropriately.  Plan discussed with multidisciplinary team.

## 2025-02-28 NOTE — BH CONSULTATION LIAISON PROGRESS NOTE - NSBHMSEINTELL_PSY_A_CORE
Above average

## 2025-02-28 NOTE — PHYSICAL THERAPY INITIAL EVALUATION ADULT - BED MOBILITY TRAINING, PT EVAL
Goal: Pt will be independent in all bed mobility in 3-weeks.
GOAL: Patient will perform bed mobility independently in 2 weeks.

## 2025-02-28 NOTE — PHYSICAL THERAPY INITIAL EVALUATION ADULT - ADDITIONAL COMMENTS
Per care coordination note, pt lives with mother and family in a private house with steps to enter and inside. Prior to admission, independent in ADLs and ambulation.
Pt reports that he lives with his mother and aunt in a pvt house +4 RAQUEL and additional 5 steps once inside to bedroom/bathroom. Pt states that he was independent with all ADLs and mobility prior to admission however has been growing weaker and relying on rolling walker in recent weeks. Pt owns a rolling walker, wheelchair and straight cane for DME.

## 2025-02-28 NOTE — BH CONSULTATION LIAISON PROGRESS NOTE - NSBHCHARTREVIEWLAB_PSY_A_CORE FT
INTERVAL LAB RESULTS:                        7.6    6.57  )-----------( 75       ( 23 Jan 2025 07:10 )             23.3                         7.0    6.40  )-----------( 67       ( 22 Jan 2025 06:39 )             21.1                         7.5    8.20  )-----------( 83       ( 21 Jan 2025 06:28 )             22.6                               131    |  98     |  50                  Calcium: 9.2   / iCa: x      (01-23 @ 07:04)    ----------------------------<  120       Magnesium: 1.9                              4.9     |  20     |  3.05             Phosphorous: 5.7      TPro  6.8    /  Alb  2.8    /  TBili  6.4    /  DBili  1.8    /  AST  29     /  ALT  7      /  AlkPhos  99     23 Jan 2025 07:04    Urinalysis Basic - ( 23 Jan 2025 07:04 )    Color: x / Appearance: x / SG: x / pH: x  Gluc: 120 mg/dL / Ketone: x  / Bili: x / Urobili: x   Blood: x / Protein: x / Nitrite: x   Leuk Esterase: x / RBC: x / WBC x   Sq Epi: x / Non Sq Epi: x / Bacteria: x        INTERVAL IMAGING STUDIES:  

## 2025-02-28 NOTE — BH CONSULTATION LIAISON PROGRESS NOTE - NSBHFUPINTERVALCCFT_PSY_A_CORE
"I have the best news."
"Yes they have gone over what needs to be done for the transplant"
"I'm having some back pain." 
"I have an infection so we have not yet talked about the transplant process."
"I feel a little better" 
"I feel a little better since the paracentesis." 
"I feel a little better" 
"I feel a little better"

## 2025-02-28 NOTE — OCCUPATIONAL THERAPY INITIAL EVALUATION ADULT - NS ASR FOLLOW COMMAND OT EVAL
100% of the time/75% of the time/able to follow single-step instructions
100% of the time/able to follow multistep instructions

## 2025-02-28 NOTE — PROGRESS NOTE ADULT - ASSESSMENT
45yo M with Hx decompensated EtOH cirrhosis c/b recurrent ascites, grade II EV, and HE who presented to OSH for abd pain and distension, found to have ascites and MANUELITO requiring HD. Patient was transferred to [facility] for management and liver transplant evaluation. His hospital course has been c/b an empyema s/p VATS decortication on 1/28 with Dr. Ramírez. Postoperative course has been c/b recurrent R pleural effusion s/p IR drainage. Admitted for transplant evaluation, now s/p OLT on 2/27.       [ ] OLT  - U/S liver with doppler   - Continue to monitor CBC, CMP, Coags  - NPO, IVF, monitor UOP 43yo M with Hx decompensated EtOH cirrhosis c/b recurrent ascites, grade II EV, and HE who presented to OSH for abd pain and distension, found to have ascites and MANUELITO requiring HD. Patient was transferred to [facility] for management and liver transplant evaluation. His hospital course has been c/b an empyema s/p VATS decortication on 1/28 with Dr. Ramírez. Postoperative course has been c/b recurrent R pleural effusion s/p IR drainage. Admitted for transplant evaluation, now s/p OLT on 2/27.       [ ] OLT POD 0  - U/S liver with doppler patent vessels  - Extubated   - Continue to monitor CBC, CMP, Coags  - Strict I/O's dc yates, NGT, jpx3  - ADAT  - SQH  - FU Hep C genotype/PCR 2/28  - Pain control  - bowel regimen  - PT/SCD/IS  - SICU care    [] Immuno  - simulect induction POD4  - FK start 0.5 BID, MMF 1/1, SST  - ppx: no bactrim due to sulfa allergy, fluc, valcyte

## 2025-02-28 NOTE — OCCUPATIONAL THERAPY INITIAL EVALUATION ADULT - IMPAIRED TRANSFERS: SIT/STAND, REHAB EVAL
impaired balance/pain/decreased ROM/decreased strength
impaired balance/impaired postural control/decreased ROM/decreased strength

## 2025-02-28 NOTE — PHYSICAL THERAPY INITIAL EVALUATION ADULT - PLANNED THERAPY INTERVENTIONS, PT EVAL
Goal: In 3-weeks pt will/balance training/bed mobility training/gait training/strengthening/transfer training
stair training: GOAL: Pt will negotiate up/down 5 steps with 1 handrail ascending independently in 2 weeks./balance training/bed mobility training/gait training/strengthening/transfer training

## 2025-02-28 NOTE — PHYSICAL THERAPY INITIAL EVALUATION ADULT - TRANSFER TRAINING, PT EVAL
The patient is a 13y Male complaining of ankle pain/injury.
Goal: Pt will transfer sit to stand independently at rolling walker in 3-weeks.
GOAL: Patient will perform sit to stand transfers independently with proper hand placement in 2 weeks.

## 2025-02-28 NOTE — PROGRESS NOTE ADULT - SUBJECTIVE AND OBJECTIVE BOX
Transplant Surgery - Multidisciplinary Rounds  --------------------------------------------------------------   Donor OLTx      Date:  25       POD#1    Present:   Patient seen and examined with multidisciplinary Transplant team including Surgeon, Hepatologist, Surgical/Hepatology fellow, ACP,  Pharmacist, Nutritionis,  and bedside RN during AM rounds.   Disciplines not in attendance will be notified of the plan.     HPI: 44M PMH decompensated ETOH cirrhosis c/b recurrent ascites, grade II esophageal varices, and hepatic encephalopathy, who presented to OSH with abd pain and distension found to have decompensated EtOH cirrhosis with MELD 30, MANUELITO (HRS vs ATN; previous HD need with [facility]) found to have R empyema s/p R pigtail placement w/ IR  requiring several adjustments, s/p R robotic VATS with decortication (Dr. Ramírez 2025) for loculated empyema. OR course with oropharyngeal bleeding during intubation, coagulopathy/elevated EBL. Admitted for transplant evaluation and found to have reaccumulation of R pleural effusion, now s/p IR drainage and s/p OLTx.     Interval Events:  -s/p OLT, extubated now on RA  - Reporting some pain which is controlled  - Afebrile, VSS    Immunosupression:  Induction: Simulect  Maintenance:   Ongoing monitoring for signs of rejection     Potential Discharge date: TBD  Education:  Medications  Plan of care:  See Below    MEDICATIONS  (STANDING):  chlorhexidine 2% Cloths 1 Application(s) Topical <User Schedule>  chlorhexidine 4% Liquid 1 Application(s) Topical <User Schedule>  dextrose 50% Injectable 50 milliLiter(s) IV Push every 15 minutes  fluconAZOLE IVPB 400 milliGRAM(s) IV Intermittent daily  influenza   Vaccine 0.5 milliLiter(s) IntraMuscular once  insulin regular  human recombinant 2 Unit(s) IV Push once  insulin regular Infusion 2 Unit(s)/Hr (2 mL/Hr) IV Continuous <Continuous>  methylPREDNISolone sodium succinate IVPB 500 milliGRAM(s) IV Intermittent every 24 hours  methylPREDNISolone sodium succinate IVPB   IV Intermittent   multiple electrolytes Injection Type 1 1000 milliLiter(s) (125 mL/Hr) IV Continuous <Continuous>  mycophenolate mofetil Suspension 1000 milliGRAM(s) Oral <User Schedule>  pantoprazole  Injectable 40 milliGRAM(s) IV Push every 24 hours  piperacillin/tazobactam IVPB.. 3.375 Gram(s) IV Intermittent every 8 hours  senna 2 Tablet(s) Oral two times a day  valGANciclovir 50 mG/mL Oral Solution 450 milliGRAM(s) Oral daily    MEDICATIONS  (PRN):  HYDROmorphone  Injectable 0.25 milliGRAM(s) IV Push every 3 hours PRN Moderate Pain (4 - 6)  HYDROmorphone  Injectable 0.5 milliGRAM(s) IV Push every 3 hours PRN Severe Pain (7 - 10)      PAST MEDICAL & SURGICAL HISTORY:  Sleep apnea, obstructive      Alcohol abuse      Cirrhosis of liver      Portal hypertension      H/O esophageal varices      Anemia      Mild alcohol use disorder      No significant past surgical history          Vital Signs Last 24 Hrs  T(C): 37.1 (2025 23:00), Max: 37.6 (2025 00:46)  T(F): 98.8 (2025 23:00), Max: 99.6 (2025 00:46)  HR: 107 (2025 00:00) (90 - 107)  BP: 136/72 (2025 19:04) (118/63 - 136/72)  BP(mean): 96 (2025 19:04) (78 - 96)  RR: 34 (2025 00:00) (18 - 35)  SpO2: 95% (2025 00:00) (95% - 98%)    Parameters below as of 2025 20:00  Patient On (Oxygen Delivery Method): nasal cannula  O2 Flow (L/min): 4      I&O's Summary    2025 07:01  -  2025 07:00  --------------------------------------------------------  IN: 1340 mL / OUT: 1625 mL / NET: -285 mL    2025 07:01  -  2025 00:35  --------------------------------------------------------  IN: 985 mL / OUT: 915 mL / NET: 70 mL                              12.2   5.54  )-----------( 138      ( 2025 22:09 )             34.0         138  |  97  |  39[H]  ----------------------------<  256[H]  3.8   |  26  |  0.99    Ca    10.6[H]      2025 22:08  Phos  4.9       Mg     1.8         TPro  7.3  /  Alb  3.1[L]  /  TBili  5.3[H]  /  DBili  x   /  AST  284[H]  /  ALT  63[H]  /  AlkPhos  84          Review of systems  All other systems were reviewed and are negative, except as noted.      PHYSICAL EXAM:  Constitutional: Well developed / well nourished  Eyes: PERRLA  ENMT: nc/at, no thrush  Neck: supple, central line  Respiratory: CTA B/L  Cardiovascular: RRR  Gastrointestinal: Soft abdomen, ND, appropriate incisional TTP. chevron incision c/d/i, staples in place. No signs of infection.   Genitourinary: Urinary catheter in place  Extremities: SCD's in place and working bilaterally  Vascular: Palpable dp pulses bilaterally.   Neurological: A&O x3  Skin: no rashes, ulcerations, lesions  Musculoskeletal: Moving all extremities  Psychiatric: Responsive     Transplant Surgery - Multidisciplinary Rounds/POC  --------------------------------------------------------------   Donor OLTx      Date:  25       POD#0    Present:   Patient seen and examined with multidisciplinary Transplant team including Surgeon Dr. Callaway, Hepatologist Dr. Samaniego, Surgical/Hepatology fellow, KAMAR Gagnon,  Pharmacist, Nutritionis,  and bedside RN during AM rounds.   Disciplines not in attendance will be notified of the plan.     HPI: 44M PMH decompensated ETOH cirrhosis c/b recurrent ascites, grade II esophageal varices, and hepatic encephalopathy, who presented to OSH with abd pain and distension found to have decompensated EtOH cirrhosis with MELD 30, MANUELITO (HRS vs ATN; previous HD need with [facility]) found to have R empyema s/p R pigtail placement w/ IR  requiring several adjustments, s/p R robotic VATS with decortication (Dr. Ramírez 2025) for loculated empyema. OR course with oropharyngeal bleeding during intubation, coagulopathy/elevated EBL. Admitted for transplant evaluation and found to have reaccumulation of R pleural effusion, now s/p IR drainage and s/p OLTx.     Interval Events:  -s/p OLT, extubated now on RA  - Reporting some pain which is controlled  - Afebrile, VSS    Immunosuppression  Induction: Simulect  Maintenance: FK per level, MMF , SST  Ongoing monitoring for signs of rejection     Potential Discharge date: TBD  Education:  Medications  Plan of care:  See Below    MEDICATIONS  (STANDING):  chlorhexidine 2% Cloths 1 Application(s) Topical <User Schedule>  chlorhexidine 4% Liquid 1 Application(s) Topical <User Schedule>  dextrose 50% Injectable 50 milliLiter(s) IV Push every 15 minutes  fluconAZOLE IVPB 400 milliGRAM(s) IV Intermittent daily  influenza   Vaccine 0.5 milliLiter(s) IntraMuscular once  insulin regular  human recombinant 2 Unit(s) IV Push once  insulin regular Infusion 2 Unit(s)/Hr (2 mL/Hr) IV Continuous <Continuous>  methylPREDNISolone sodium succinate IVPB 500 milliGRAM(s) IV Intermittent every 24 hours  methylPREDNISolone sodium succinate IVPB   IV Intermittent   multiple electrolytes Injection Type 1 1000 milliLiter(s) (125 mL/Hr) IV Continuous <Continuous>  mycophenolate mofetil Suspension 1000 milliGRAM(s) Oral <User Schedule>  pantoprazole  Injectable 40 milliGRAM(s) IV Push every 24 hours  piperacillin/tazobactam IVPB.. 3.375 Gram(s) IV Intermittent every 8 hours  senna 2 Tablet(s) Oral two times a day  valGANciclovir 50 mG/mL Oral Solution 450 milliGRAM(s) Oral daily    MEDICATIONS  (PRN):  HYDROmorphone  Injectable 0.25 milliGRAM(s) IV Push every 3 hours PRN Moderate Pain (4 - 6)  HYDROmorphone  Injectable 0.5 milliGRAM(s) IV Push every 3 hours PRN Severe Pain (7 - 10)      PAST MEDICAL & SURGICAL HISTORY:  Sleep apnea, obstructive      Alcohol abuse      Cirrhosis of liver      Portal hypertension      H/O esophageal varices      Anemia      Mild alcohol use disorder      No significant past surgical history          Vital Signs Last 24 Hrs  T(C): 37.1 (2025 23:00), Max: 37.6 (2025 00:46)  T(F): 98.8 (2025 23:00), Max: 99.6 (2025 00:46)  HR: 107 (2025 00:00) (90 - 107)  BP: 136/72 (2025 19:04) (118/63 - 136/72)  BP(mean): 96 (2025 19:04) (78 - 96)  RR: 34 (2025 00:00) (18 - 35)  SpO2: 95% (2025 00:00) (95% - 98%)    Parameters below as of 2025 20:00  Patient On (Oxygen Delivery Method): nasal cannula  O2 Flow (L/min): 4      I&O's Summary    2025 07:01  -  2025 07:00  --------------------------------------------------------  IN: 1340 mL / OUT: 1625 mL / NET: -285 mL    2025 07:01  -  2025 00:35  --------------------------------------------------------  IN: 985 mL / OUT: 915 mL / NET: 70 mL                              12.2   5.54  )-----------( 138      ( 2025 22:09 )             34.0         138  |  97  |  39[H]  ----------------------------<  256[H]  3.8   |  26  |  0.99    Ca    10.6[H]      2025 22:08  Phos  4.9       Mg     1.8         TPro  7.3  /  Alb  3.1[L]  /  TBili  5.3[H]  /  DBili  x   /  AST  284[H]  /  ALT  63[H]  /  AlkPhos  84          Review of systems  All other systems were reviewed and are negative, except as noted.      PHYSICAL EXAM:  Constitutional: Well developed / well nourished  Eyes: PERRLA  ENMT: nc/at, no thrush  Neck: supple, central line  Respiratory: CTA B/L  Cardiovascular: RRR  Gastrointestinal: Soft abdomen, ND, appropriate incisional TTP. chevron incision c/d/i, staples in place. No signs of infection.   Genitourinary: Urinary catheter in place  Extremities: SCD's in place and working bilaterally  Vascular: Palpable dp pulses bilaterally.   Neurological: A&O x3  Skin: no rashes, ulcerations, lesions  Musculoskeletal: Moving all extremities  Psychiatric: Responsive

## 2025-02-28 NOTE — OCCUPATIONAL THERAPY INITIAL EVALUATION ADULT - BED MOBILITY TRAINING, PT EVAL
GOAL: Pt will perform bed mobility with I in 4 weeks
GOAL: Pt will perform bed mobility with I in 4 weeks

## 2025-02-28 NOTE — OCCUPATIONAL THERAPY INITIAL EVALUATION ADULT - BALANCE DISTURBANCE, IDENTIFIED IMPAIRMENT CONTRIBUTE, REHAB EVAL
pain/impaired postural control/decreased strength
impaired postural control/decreased ROM/decreased strength

## 2025-02-28 NOTE — PROGRESS NOTE ADULT - ASSESSMENT
45 yo M with PMH decompensated ETOH cirrhosis c/b recurrent ascites, grade II EV, and HE, right calf hematoma 10/2024 (s/p fall)  presented to [facility] for abdominal pain and distension. Patient found to have ascites on exam and MANUELITO requiring HD initiation PermCath was placed by vascular surgery on 1/10 with post procedure course c/b bleeding from insertion site. Transferred to [facility] SICU for further management.     Blood Cultures (1/16) 1/4 Staph epi.   GI PCR (1/17) + Giardia.   Paracentesis (1/17) 155 nucleated cell counts.     CT Chest (1/17) Moderate loculated right pleural effusion containing a few foci of air, which are new from 1/8/2025 and may represent empyema/bronchopleural fistula versus sequelae of prior thoracentesis.       # s/p OLT 2/27/25 CMV D-/R+, EBV D+/R+, Toxoplasma D+/R-, HCV NAAT pos donor  Simulect. steroids induction  zosyn perioperatively for 24 hours followed by ceftriaxone until the reminder of Empyema/hematoma is addressed    Fluconazole per protocol   Note patient has bactrim allergy- needs atovaquone AND NO OTHER ALTERNATIVE for PCP , toxoplasma Ppx as he is high risk of donor derived infection  If atovaquone not tolerated, will need bactrim desensitization or challenge    will also need valcyte ppx    # HCV positive (NAAT  and Ab) donor  HCV PCR testing per protocol and check genotype  and antivirals epclusa vs mavyret      #Empyema- citrobacter koseri  s/p VATS 1/28/25-  last positive cultures 1/25/25  for citrobacter koseri  --Continue Ceftriaxone to 2g IV Q24H + Metronidazole 500 mg BID.   --While CT chest appears to visualize residual fluid at the right lung base it is unclear at this point if this represents hematoma or infected hematoma.  Patient does not have any systemic signs or symptoms of infection.  It does not appear that we will achieve optimal source control prior to liver transplant given patient's coagulopathy.  At the minimum I am reassured that patient has had good source control with the VATS procedure for the empyema. benefits of transplant > risks of infection-  Still would try to achieve drainage of the residual hematoma versus fluid post liver transplant.  Consider CT chest next week with stabilization post-op and reassess pleural components    #Giardia  s/p 7 days of Metronidazole    #Positive Blood Culture ([facility] epi, 1/16)  procurement contaminant    #Pre-Liver Transplant Evaluation, Decompensated Cirrhosis  COVID19 Rodríguez Antibody Positive  HAV IgG Positive  HBVs Ab Positive  HBVsAg Negative  HBVc Ab Negative  HCV Ab Negative  HSV 1 IgG Positive  HSV 2 IgG Negative  EBV IgG Positive  CMV IgG Positive  VZV IgG Positive  Measles IgG Positive  Mumps IgG Positive  Rubella IgG Positive  Quantiferon Gold negative  HIV Ag/Ab by CMIA Negative  Syphilis Screen Negative  Toxoplasma IgG Negative  Strongyloides Ab negative  Coccidiodes Ab Screen Positive but confirmatory negative  Leishmania Ab negative    #Encounter to Vaccinate Patient  COVID19: Would benefit from COVID19 [id number] Vaccine Dose  Influenza: Had vaccination for this year  Pneumococcal: Would benefit from PCV20  HAV: Immune, will not require further vaccination  HBV: Immune, will not require further vaccination  MMR: Immune, will not require further vaccination  Varicella: Immune, will not require further vaccination  Shingles: Will require Shingrix  Tdap: Tdap 6/23/24  immune to hepatitis A and B  Would benefit from RSV vaccine Arexvy (approved for young patinets) vs Abrysvo - can get this as early as March 28 (though season will be at tail)      Thank you for involving us in the care of this patient  Transplant ID will continue to follow  Please call or page with additional questions  Pager; #[phone]  Teams: from 8 am to 5 pm  Jocelyn Chavez MD

## 2025-02-28 NOTE — BH CONSULTATION LIAISON PROGRESS NOTE - NSICDXBHSECONDARYDX_PSY_ALL_CORE
Adjustment disorder with anxiety   F43.22  

## 2025-02-28 NOTE — BH CONSULTATION LIAISON PROGRESS NOTE - NSBHCONSULTFOLLOWAFTERCARE_PSY_A_CORE FT
SW for OP psych/substance abuse referral resources

## 2025-02-28 NOTE — PHYSICAL THERAPY INITIAL EVALUATION ADULT - STRENGTHENING, PT EVAL
GOAL: Patient will demonstrate a 1/3 increase in strength where deficient to assist with performing functional mobility and ADLs in 2 weeks.
Goal: Pt will increase strength >half a grade  t/o extremities to improve safety of transfers and ambulation in 3-weeks.

## 2025-02-28 NOTE — OCCUPATIONAL THERAPY INITIAL EVALUATION ADULT - ADL RETRAINING, OT EVAL
GOAL: Pt will perform toilet transfer & hygiene with I within 4 weeks
GOAL: Pt will perform toilet transfer & hygiene with I, within 4 weeks

## 2025-02-28 NOTE — PROGRESS NOTE ADULT - ASSESSMENT
45yo M with Hx decompensated EtOH cirrhosis c/b recurrent ascites, grade II EV, and HE who presented to OSH for abd pain and distension, found to have ascites and MANUELITO requiring HD. Patient was transferred to [facility] for management and liver transplant evaluation. His hospital course has been c/b an empyema s/p VATS decortication on 1/28/25. Chest tubes have since been removed, with reaccumulation of right pleural effusion. Patient is now s/p OLT, brought to SICU extubated, off vasopressors.     PLAN:   Neuro:  - Multimodal pain control w/ PRN Dilaudid    Resp:  - Out of bed to chair, ambulate as tolerated, and incentive spirometry to prevent atelectasis  - nasal cannula, titrate down as tolerated  - hx VATS decortication 1/28 with reaccumulation of right pleural effusion  - thoracics following, appreciate recs    CVS:   - hemodynamically stable  - lactate cleared    GI:   - s/p OLT  - NPO, NGT to low intermittent suction  - protonix  - ADAM x3, monitor ouput  - trend LFT's     Renal:  - plasmalyte @125  - Monitor I&Os and electrolytes  - replete electrolytes as needed   - yates in place    Heme:  - Monitor CBC and coags  - holding chemical VTE prophylaxis per primary team  - SCD's    ID:   - Monitor for clinical evidence of active infection  - Antibiotics: zosyn x48 hours, valcyte, fluconazole (bactrim held due to sulfa allergy)  - immunosuppression per transplant team    Endo:   - insulin gtt  - steroid taper    Lines/Tubes:  - RIJ intro  - b/l radial a-line  - yates  - NGT  - L EDUARDO

## 2025-02-28 NOTE — OCCUPATIONAL THERAPY INITIAL EVALUATION ADULT - GENERAL OBSERVATIONS, REHAB EVAL
Pt received semisupine in bed, +ICU monitoring, +ADAM x3, +tahmina, +IV, +yates, +o2 via NC
Pt received semisupine in bed, +o2 via NC, +chest tubes x3, +yates, +R radial tahmina, +R IJ

## 2025-02-28 NOTE — PHYSICAL THERAPY INITIAL EVALUATION ADULT - IMPAIRMENTS CONTRIBUTING TO GAIT DEVIATIONS, PT EVAL
impaired balance/pain/impaired postural control/decreased strength
+dec'd endurance/impaired balance/decreased strength

## 2025-02-28 NOTE — BH CONSULTATION LIAISON PROGRESS NOTE - NSBHPTASSESSDT_PSY_A_CORE
24-Jan-2025 17:06
05-Feb-2025 16:13
22-Jan-2025 16:00
28-Jan-2025 14:27
14-Feb-2025 17:17
07-Feb-2025 16:34
28-Feb-2025 17:22
23-Jan-2025 15:42

## 2025-02-28 NOTE — OCCUPATIONAL THERAPY INITIAL EVALUATION ADULT - DIAGNOSIS, OT EVAL
Pt presents with post op pain, decreased strength, balance, ADLs and functional mobility
Pt presents with decreased strength, balance, ROM, ADLs and functional mobility

## 2025-02-28 NOTE — PHYSICAL THERAPY INITIAL EVALUATION ADULT - GAIT TRAINING, PT EVAL
Goal: Pt will amb 500ft independently with least restrictive device in 3-weeks.
GOAL: Patient will ambulate 600 feet independently with no AD in 2 weeks.

## 2025-03-01 LAB
ALBUMIN SERPL ELPH-MCNC: 3.1 G/DL — LOW (ref 3.3–5)
ALBUMIN SERPL ELPH-MCNC: 3.2 G/DL — LOW (ref 3.3–5)
ALP SERPL-CCNC: 64 U/L — SIGNIFICANT CHANGE UP (ref 40–120)
ALP SERPL-CCNC: 65 U/L — SIGNIFICANT CHANGE UP (ref 40–120)
ALT FLD-CCNC: 31 U/L — SIGNIFICANT CHANGE UP (ref 10–45)
ALT FLD-CCNC: 36 U/L — SIGNIFICANT CHANGE UP (ref 10–45)
ANION GAP SERPL CALC-SCNC: 13 MMOL/L — SIGNIFICANT CHANGE UP (ref 5–17)
ANION GAP SERPL CALC-SCNC: 13 MMOL/L — SIGNIFICANT CHANGE UP (ref 5–17)
APTT BLD: 28 SEC — SIGNIFICANT CHANGE UP (ref 24.5–35.6)
APTT BLD: 28 SEC — SIGNIFICANT CHANGE UP (ref 24.5–35.6)
AST SERPL-CCNC: 56 U/L — HIGH (ref 10–40)
AST SERPL-CCNC: 70 U/L — HIGH (ref 10–40)
BILIRUB SERPL-MCNC: 1.9 MG/DL — HIGH (ref 0.2–1.2)
BILIRUB SERPL-MCNC: 2 MG/DL — HIGH (ref 0.2–1.2)
BLD GP AB SCN SERPL QL: NEGATIVE — SIGNIFICANT CHANGE UP
BUN SERPL-MCNC: 54 MG/DL — HIGH (ref 7–23)
BUN SERPL-MCNC: 56 MG/DL — HIGH (ref 7–23)
CALCIUM SERPL-MCNC: 9.4 MG/DL — SIGNIFICANT CHANGE UP (ref 8.4–10.5)
CALCIUM SERPL-MCNC: 9.5 MG/DL — SIGNIFICANT CHANGE UP (ref 8.4–10.5)
CHLORIDE SERPL-SCNC: 95 MMOL/L — LOW (ref 96–108)
CHLORIDE SERPL-SCNC: 96 MMOL/L — SIGNIFICANT CHANGE UP (ref 96–108)
CO2 SERPL-SCNC: 27 MMOL/L — SIGNIFICANT CHANGE UP (ref 22–31)
CO2 SERPL-SCNC: 29 MMOL/L — SIGNIFICANT CHANGE UP (ref 22–31)
CREAT SERPL-MCNC: 1.12 MG/DL — SIGNIFICANT CHANGE UP (ref 0.5–1.3)
CREAT SERPL-MCNC: 1.13 MG/DL — SIGNIFICANT CHANGE UP (ref 0.5–1.3)
EGFR: 81 ML/MIN/1.73M2 — SIGNIFICANT CHANGE UP
EGFR: 81 ML/MIN/1.73M2 — SIGNIFICANT CHANGE UP
EGFR: 82 ML/MIN/1.73M2 — SIGNIFICANT CHANGE UP
EGFR: 82 ML/MIN/1.73M2 — SIGNIFICANT CHANGE UP
GLUCOSE BLDC GLUCOMTR-MCNC: 121 MG/DL — HIGH (ref 70–99)
GLUCOSE BLDC GLUCOMTR-MCNC: 142 MG/DL — HIGH (ref 70–99)
GLUCOSE BLDC GLUCOMTR-MCNC: 159 MG/DL — HIGH (ref 70–99)
GLUCOSE BLDC GLUCOMTR-MCNC: 171 MG/DL — HIGH (ref 70–99)
GLUCOSE BLDC GLUCOMTR-MCNC: 178 MG/DL — HIGH (ref 70–99)
GLUCOSE SERPL-MCNC: 141 MG/DL — HIGH (ref 70–99)
GLUCOSE SERPL-MCNC: 142 MG/DL — HIGH (ref 70–99)
HCT VFR BLD CALC: 28.2 % — LOW (ref 39–50)
HCT VFR BLD CALC: 28.8 % — LOW (ref 39–50)
HCV RNA SERPL NAA DL=5-ACNC: SIGNIFICANT CHANGE UP IU/ML
HCV RNA SPEC NAA+PROBE-LOG IU: 7.1 LOGIU/ML — SIGNIFICANT CHANGE UP
HCV RNA SPEC NAA+PROBE-LOG IU: DETECTED
HGB BLD-MCNC: 10 G/DL — LOW (ref 13–17)
HGB BLD-MCNC: 10.1 G/DL — LOW (ref 13–17)
INR BLD: 1.16 RATIO — SIGNIFICANT CHANGE UP (ref 0.85–1.16)
INR BLD: 1.18 RATIO — HIGH (ref 0.85–1.16)
MAGNESIUM SERPL-MCNC: 2.2 MG/DL — SIGNIFICANT CHANGE UP (ref 1.6–2.6)
MAGNESIUM SERPL-MCNC: 2.2 MG/DL — SIGNIFICANT CHANGE UP (ref 1.6–2.6)
MCHC RBC-ENTMCNC: 31.2 PG — SIGNIFICANT CHANGE UP (ref 27–34)
MCHC RBC-ENTMCNC: 31.2 PG — SIGNIFICANT CHANGE UP (ref 27–34)
MCHC RBC-ENTMCNC: 35.1 G/DL — SIGNIFICANT CHANGE UP (ref 32–36)
MCHC RBC-ENTMCNC: 35.5 G/DL — SIGNIFICANT CHANGE UP (ref 32–36)
MCV RBC AUTO: 87.9 FL — SIGNIFICANT CHANGE UP (ref 80–100)
MCV RBC AUTO: 88.9 FL — SIGNIFICANT CHANGE UP (ref 80–100)
NRBC BLD AUTO-RTO: 0 /100 WBCS — SIGNIFICANT CHANGE UP (ref 0–0)
NRBC BLD AUTO-RTO: 0 /100 WBCS — SIGNIFICANT CHANGE UP (ref 0–0)
PHOSPHATE SERPL-MCNC: 4.1 MG/DL — SIGNIFICANT CHANGE UP (ref 2.5–4.5)
PHOSPHATE SERPL-MCNC: 4.3 MG/DL — SIGNIFICANT CHANGE UP (ref 2.5–4.5)
PLATELET # BLD AUTO: 87 K/UL — LOW (ref 150–400)
PLATELET # BLD AUTO: 96 K/UL — LOW (ref 150–400)
POTASSIUM SERPL-MCNC: 3.6 MMOL/L — SIGNIFICANT CHANGE UP (ref 3.5–5.3)
POTASSIUM SERPL-MCNC: 3.7 MMOL/L — SIGNIFICANT CHANGE UP (ref 3.5–5.3)
POTASSIUM SERPL-SCNC: 3.6 MMOL/L — SIGNIFICANT CHANGE UP (ref 3.5–5.3)
POTASSIUM SERPL-SCNC: 3.7 MMOL/L — SIGNIFICANT CHANGE UP (ref 3.5–5.3)
PROT SERPL-MCNC: 7.3 G/DL — SIGNIFICANT CHANGE UP (ref 6–8.3)
PROT SERPL-MCNC: 7.5 G/DL — SIGNIFICANT CHANGE UP (ref 6–8.3)
PROTHROM AB SERPL-ACNC: 13.2 SEC — SIGNIFICANT CHANGE UP (ref 9.9–13.4)
PROTHROM AB SERPL-ACNC: 13.4 SEC — SIGNIFICANT CHANGE UP (ref 9.9–13.4)
RBC # BLD: 3.21 M/UL — LOW (ref 4.2–5.8)
RBC # BLD: 3.24 M/UL — LOW (ref 4.2–5.8)
RBC # FLD: 19.4 % — HIGH (ref 10.3–14.5)
RBC # FLD: 19.5 % — HIGH (ref 10.3–14.5)
RH IG SCN BLD-IMP: POSITIVE — SIGNIFICANT CHANGE UP
SODIUM SERPL-SCNC: 136 MMOL/L — SIGNIFICANT CHANGE UP (ref 135–145)
SODIUM SERPL-SCNC: 137 MMOL/L — SIGNIFICANT CHANGE UP (ref 135–145)
TACROLIMUS SERPL-MCNC: 1.4 NG/ML — SIGNIFICANT CHANGE UP
WBC # BLD: 7.54 K/UL — SIGNIFICANT CHANGE UP (ref 3.8–10.5)
WBC # BLD: 8.03 K/UL — SIGNIFICANT CHANGE UP (ref 3.8–10.5)
WBC # FLD AUTO: 7.54 K/UL — SIGNIFICANT CHANGE UP (ref 3.8–10.5)
WBC # FLD AUTO: 8.03 K/UL — SIGNIFICANT CHANGE UP (ref 3.8–10.5)

## 2025-03-01 PROCEDURE — 74018 RADEX ABDOMEN 1 VIEW: CPT | Mod: 26

## 2025-03-01 PROCEDURE — 71045 X-RAY EXAM CHEST 1 VIEW: CPT | Mod: 26

## 2025-03-01 RX ORDER — ACETAMINOPHEN 500 MG/5ML
1000 LIQUID (ML) ORAL ONCE
Refills: 0 | Status: COMPLETED | OUTPATIENT
Start: 2025-03-01 | End: 2025-03-01

## 2025-03-01 RX ORDER — TRAMADOL HYDROCHLORIDE 50 MG/1
25 TABLET, FILM COATED ORAL ONCE
Refills: 0 | Status: DISCONTINUED | OUTPATIENT
Start: 2025-03-01 | End: 2025-03-01

## 2025-03-01 RX ORDER — HYDROMORPHONE/SOD CHLOR,ISO/PF 2 MG/10 ML
0.5 SYRINGE (ML) INJECTION
Refills: 0 | Status: DISCONTINUED | OUTPATIENT
Start: 2025-03-01 | End: 2025-03-02

## 2025-03-01 RX ORDER — SIMETHICONE 80 MG
80 TABLET,CHEWABLE ORAL ONCE
Refills: 0 | Status: COMPLETED | OUTPATIENT
Start: 2025-03-01 | End: 2025-03-01

## 2025-03-01 RX ORDER — BISACODYL 5 MG
10 TABLET, DELAYED RELEASE (ENTERIC COATED) ORAL ONCE
Refills: 0 | Status: COMPLETED | OUTPATIENT
Start: 2025-03-01 | End: 2025-03-01

## 2025-03-01 RX ORDER — SIMETHICONE 80 MG
80 TABLET,CHEWABLE ORAL
Refills: 0 | Status: DISCONTINUED | OUTPATIENT
Start: 2025-03-01 | End: 2025-03-11

## 2025-03-01 RX ORDER — INSULIN LISPRO 100 U/ML
INJECTION, SOLUTION INTRAVENOUS; SUBCUTANEOUS EVERY 6 HOURS
Refills: 0 | Status: DISCONTINUED | OUTPATIENT
Start: 2025-03-01 | End: 2025-03-03

## 2025-03-01 RX ORDER — ONDANSETRON HCL/PF 4 MG/2 ML
4 VIAL (ML) INJECTION ONCE
Refills: 0 | Status: COMPLETED | OUTPATIENT
Start: 2025-03-01 | End: 2025-03-01

## 2025-03-01 RX ORDER — CEFTRIAXONE 500 MG/1
1000 INJECTION, POWDER, FOR SOLUTION INTRAMUSCULAR; INTRAVENOUS EVERY 24 HOURS
Refills: 0 | Status: DISCONTINUED | OUTPATIENT
Start: 2025-03-01 | End: 2025-03-03

## 2025-03-01 RX ORDER — OXYCODONE HYDROCHLORIDE 30 MG/1
5 TABLET ORAL ONCE
Refills: 0 | Status: DISCONTINUED | OUTPATIENT
Start: 2025-03-01 | End: 2025-03-01

## 2025-03-01 RX ORDER — VELPATASVIR AND SOFOSBUVIR 50; 200 MG/1; MG/1
1 TABLET, FILM COATED ORAL AT BEDTIME
Refills: 0 | Status: DISCONTINUED | OUTPATIENT
Start: 2025-03-01 | End: 2025-03-07

## 2025-03-01 RX ORDER — OXYCODONE HYDROCHLORIDE 30 MG/1
5 TABLET ORAL EVERY 4 HOURS
Refills: 0 | Status: DISCONTINUED | OUTPATIENT
Start: 2025-03-01 | End: 2025-03-02

## 2025-03-01 RX ORDER — LACTULOSE 10 G/15ML
20 SOLUTION ORAL ONCE
Refills: 0 | Status: COMPLETED | OUTPATIENT
Start: 2025-03-01 | End: 2025-03-01

## 2025-03-01 RX ORDER — OXYCODONE HYDROCHLORIDE 30 MG/1
2.5 TABLET ORAL EVERY 4 HOURS
Refills: 0 | Status: DISCONTINUED | OUTPATIENT
Start: 2025-03-01 | End: 2025-03-02

## 2025-03-01 RX ORDER — MELATONIN 5 MG
5 TABLET ORAL ONCE
Refills: 0 | Status: COMPLETED | OUTPATIENT
Start: 2025-03-01 | End: 2025-03-01

## 2025-03-01 RX ORDER — ASPIRIN 325 MG
81 TABLET ORAL DAILY
Refills: 0 | Status: DISCONTINUED | OUTPATIENT
Start: 2025-03-01 | End: 2025-03-04

## 2025-03-01 RX ORDER — ONDANSETRON HCL/PF 4 MG/2 ML
4 VIAL (ML) INJECTION EVERY 6 HOURS
Refills: 0 | Status: DISCONTINUED | OUTPATIENT
Start: 2025-03-01 | End: 2025-03-11

## 2025-03-01 RX ADMIN — MYCOPHENOLATE MOFETIL 1000 MILLIGRAM(S): 500 TABLET, FILM COATED ORAL at 08:45

## 2025-03-01 RX ADMIN — Medication 4 MILLIGRAM(S): at 22:43

## 2025-03-01 RX ADMIN — Medication 0.5 MILLIGRAM(S): at 22:03

## 2025-03-01 RX ADMIN — TACROLIMUS 0.5 MILLIGRAM(S): 0.5 CAPSULE ORAL at 10:48

## 2025-03-01 RX ADMIN — Medication 0.25 MILLIGRAM(S): at 11:13

## 2025-03-01 RX ADMIN — TRAMADOL HYDROCHLORIDE 25 MILLIGRAM(S): 50 TABLET, FILM COATED ORAL at 02:10

## 2025-03-01 RX ADMIN — INSULIN LISPRO 2: 100 INJECTION, SOLUTION INTRAVENOUS; SUBCUTANEOUS at 08:26

## 2025-03-01 RX ADMIN — Medication 2 TABLET(S): at 21:51

## 2025-03-01 RX ADMIN — TRAMADOL HYDROCHLORIDE 50 MILLIGRAM(S): 50 TABLET, FILM COATED ORAL at 14:36

## 2025-03-01 RX ADMIN — OXYCODONE HYDROCHLORIDE 2.5 MILLIGRAM(S): 30 TABLET ORAL at 18:37

## 2025-03-01 RX ADMIN — Medication 1000 MILLIGRAM(S): at 06:00

## 2025-03-01 RX ADMIN — TRAMADOL HYDROCHLORIDE 50 MILLIGRAM(S): 50 TABLET, FILM COATED ORAL at 05:50

## 2025-03-01 RX ADMIN — Medication 0.5 MILLIGRAM(S): at 20:00

## 2025-03-01 RX ADMIN — Medication 25 GRAM(S): at 00:32

## 2025-03-01 RX ADMIN — Medication 0.5 MILLIGRAM(S): at 19:00

## 2025-03-01 RX ADMIN — Medication 0.5 MILLIGRAM(S): at 23:00

## 2025-03-01 RX ADMIN — Medication 75 MILLILITER(S): at 21:52

## 2025-03-01 RX ADMIN — HEPARIN SODIUM 5000 UNIT(S): 1000 INJECTION INTRAVENOUS; SUBCUTANEOUS at 04:54

## 2025-03-01 RX ADMIN — TRAMADOL HYDROCHLORIDE 25 MILLIGRAM(S): 50 TABLET, FILM COATED ORAL at 08:52

## 2025-03-01 RX ADMIN — Medication 1000 MILLIGRAM(S): at 19:15

## 2025-03-01 RX ADMIN — Medication 40 MILLIGRAM(S): at 04:54

## 2025-03-01 RX ADMIN — Medication 25 GRAM(S): at 08:26

## 2025-03-01 RX ADMIN — TRAMADOL HYDROCHLORIDE 50 MILLIGRAM(S): 50 TABLET, FILM COATED ORAL at 04:54

## 2025-03-01 RX ADMIN — Medication 400 MILLIGRAM(S): at 05:32

## 2025-03-01 RX ADMIN — TRAMADOL HYDROCHLORIDE 50 MILLIGRAM(S): 50 TABLET, FILM COATED ORAL at 13:36

## 2025-03-01 RX ADMIN — Medication 400 MILLIGRAM(S): at 13:23

## 2025-03-01 RX ADMIN — Medication 400 MILLIGRAM(S): at 18:15

## 2025-03-01 RX ADMIN — VALGANCICLOVIR 450 MILLIGRAM(S): 450 TABLET, FILM COATED ORAL at 13:22

## 2025-03-01 RX ADMIN — POLYETHYLENE GLYCOL 3350 17 GRAM(S): 17 POWDER, FOR SOLUTION ORAL at 13:23

## 2025-03-01 RX ADMIN — Medication 1 SPRAY(S): at 04:54

## 2025-03-01 RX ADMIN — Medication 5 MILLIGRAM(S): at 01:56

## 2025-03-01 RX ADMIN — INSULIN GLARGINE-YFGN 6 UNIT(S): 100 INJECTION, SOLUTION SUBCUTANEOUS at 23:26

## 2025-03-01 RX ADMIN — OXYCODONE HYDROCHLORIDE 5 MILLIGRAM(S): 30 TABLET ORAL at 14:52

## 2025-03-01 RX ADMIN — TRAMADOL HYDROCHLORIDE 25 MILLIGRAM(S): 50 TABLET, FILM COATED ORAL at 01:09

## 2025-03-01 RX ADMIN — CEFTRIAXONE 100 MILLIGRAM(S): 500 INJECTION, POWDER, FOR SOLUTION INTRAMUSCULAR; INTRAVENOUS at 13:22

## 2025-03-01 RX ADMIN — TRAMADOL HYDROCHLORIDE 25 MILLIGRAM(S): 50 TABLET, FILM COATED ORAL at 09:52

## 2025-03-01 RX ADMIN — METHYLPREDNISOLONE ACETATE 100 MILLIGRAM(S): 80 INJECTION, SUSPENSION INTRA-ARTICULAR; INTRALESIONAL; INTRAMUSCULAR; SOFT TISSUE at 04:54

## 2025-03-01 RX ADMIN — LACTULOSE 20 GRAM(S): 10 SOLUTION ORAL at 21:51

## 2025-03-01 RX ADMIN — Medication 80 MILLIGRAM(S): at 02:19

## 2025-03-01 RX ADMIN — VELPATASVIR AND SOFOSBUVIR 1 TABLET(S): 50; 200 TABLET, FILM COATED ORAL at 21:52

## 2025-03-01 RX ADMIN — INSULIN LISPRO 2: 100 INJECTION, SOLUTION INTRAVENOUS; SUBCUTANEOUS at 17:34

## 2025-03-01 RX ADMIN — MYCOPHENOLATE MOFETIL 1000 MILLIGRAM(S): 500 TABLET, FILM COATED ORAL at 19:46

## 2025-03-01 RX ADMIN — OXYCODONE HYDROCHLORIDE 2.5 MILLIGRAM(S): 30 TABLET ORAL at 17:37

## 2025-03-01 RX ADMIN — Medication 81 MILLIGRAM(S): at 13:36

## 2025-03-01 RX ADMIN — Medication 80 MILLIGRAM(S): at 18:37

## 2025-03-01 RX ADMIN — Medication 10 MILLIGRAM(S): at 19:46

## 2025-03-01 RX ADMIN — Medication 1 SPRAY(S): at 17:32

## 2025-03-01 RX ADMIN — Medication 0.25 MILLIGRAM(S): at 12:13

## 2025-03-01 RX ADMIN — Medication 4 MILLIGRAM(S): at 18:16

## 2025-03-01 RX ADMIN — HEPARIN SODIUM 5000 UNIT(S): 1000 INJECTION INTRAVENOUS; SUBCUTANEOUS at 17:34

## 2025-03-01 RX ADMIN — INSULIN LISPRO 2: 100 INJECTION, SOLUTION INTRAVENOUS; SUBCUTANEOUS at 13:21

## 2025-03-01 RX ADMIN — OXYCODONE HYDROCHLORIDE 5 MILLIGRAM(S): 30 TABLET ORAL at 15:52

## 2025-03-01 NOTE — PROGRESS NOTE ADULT - SUBJECTIVE AND OBJECTIVE BOX
Transplant Surgery - Multidisciplinary Rounds/POC  --------------------------------------------------------------   Donor OLTx      Date:  25       POD#1    Present:   Patient seen and examined with multidisciplinary Transplant team including Surgeon Dr. Callaway, Hepatologist Dr. Samaniego, Surgical/Hepatology fellow, KAMAR Gagnon,  Pharmacist, Nutritionis,  and bedside RN during AM rounds.   Disciplines not in attendance will be notified of the plan.     HPI: 44M PMH decompensated ETOH cirrhosis c/b recurrent ascites, grade II esophageal varices, and hepatic encephalopathy, who presented to OSH with abd pain and distension found to have decompensated EtOH cirrhosis with MELD 30, MANUELITO (HRS vs ATN; previous HD need with [facility]) found to have R empyema s/p R pigtail placement w/ IR  requiring several adjustments, s/p R robotic VATS with decortication (Dr. Ramírez 2025) for loculated empyema. OR course with oropharyngeal bleeding during intubation, coagulopathy/elevated EBL. Admitted for transplant evaluation and found to have reaccumulation of R pleural effusion, now s/p IR drainage and s/p OLTx.     Interval Events:      Immunosuppression  Induction: Simulect  Maintenance: FK per level, MMF , SST  Ongoing monitoring for signs of rejection     Potential Discharge date: TBD  Education:  Medications  Plan of care:  See Below      Potential Discharge date: Pending clinical course    Education: Medications    Plan of care: See Below    MEDICATIONS  (STANDING):  dextrose 5%. 1000 milliLiter(s) (50 mL/Hr) IV Continuous <Continuous>  dextrose 5%. 1000 milliLiter(s) (100 mL/Hr) IV Continuous <Continuous>  dextrose 50% Injectable 25 Gram(s) IV Push once  dextrose 50% Injectable 12.5 Gram(s) IV Push once  dextrose 50% Injectable 25 Gram(s) IV Push once  fluconAZOLE   Tablet 400 milliGRAM(s) Oral daily  glucagon  Injectable 1 milliGRAM(s) IntraMuscular once  heparin   Injectable 5000 Unit(s) SubCutaneous every 12 hours  influenza   Vaccine 0.5 milliLiter(s) IntraMuscular once  insulin glargine Injectable (LANTUS) 6 Unit(s) SubCutaneous at bedtime  insulin lispro (ADMELOG) corrective regimen sliding scale   SubCutaneous three times a day before meals  insulin lispro (ADMELOG) corrective regimen sliding scale   SubCutaneous at bedtime  methylPREDNISolone sodium succinate IVPB   IV Intermittent   methylPREDNISolone sodium succinate IVPB 250 milliGRAM(s) IV Intermittent every 24 hours  mycophenolate mofetil 1000 milliGRAM(s) Oral <User Schedule>  pantoprazole    Tablet 40 milliGRAM(s) Oral before breakfast  piperacillin/tazobactam IVPB.. 3.375 Gram(s) IV Intermittent every 8 hours  polyethylene glycol 3350 17 Gram(s) Oral daily  senna 2 Tablet(s) Oral at bedtime  sodium chloride 0.65% Nasal 1 Spray(s) Both Nostrils two times a day  tacrolimus 0.5 milliGRAM(s) Oral <User Schedule>  valGANciclovir 450 milliGRAM(s) Oral daily    MEDICATIONS  (PRN):  dextrose Oral Gel 15 Gram(s) Oral once PRN Blood Glucose LESS THAN 70 milliGRAM(s)/deciliter  HYDROmorphone  Injectable 0.25 milliGRAM(s) IV Push every 3 hours PRN Breakthrough Pain  traMADol 25 milliGRAM(s) Oral every 6 hours PRN Mild Pain (1 - 3)  traMADol 50 milliGRAM(s) Oral every 8 hours PRN Severe Pain (7 - 10)      PAST MEDICAL & SURGICAL HISTORY:  Sleep apnea, obstructive      Alcohol abuse      Cirrhosis of liver      Portal hypertension      H/O esophageal varices      Anemia      Mild alcohol use disorder      No significant past surgical history          Vital Signs Last 24 Hrs  T(C): 36.8 (01 Mar 2025 00:11), Max: 36.8 (01 Mar 2025 00:11)  T(F): 98.3 (01 Mar 2025 00:11), Max: 98.3 (01 Mar 2025 00:11)  HR: 84 (01 Mar 2025 00:11) (67 - 94)  BP: 142/79 (01 Mar 2025 00:11) (140/79 - 144/77)  BP(mean): 105 (01 Mar 2025 00:11) (105 - 105)  RR: 18 (01 Mar 2025 00:11) (18 - 37)  SpO2: 97% (01 Mar 2025 00:11) (90% - 99%)    Parameters below as of 01 Mar 2025 00:11  Patient On (Oxygen Delivery Method): room air        I&O's Summary    2025 07:01  -  2025 07:00  --------------------------------------------------------  IN: 2502 mL / OUT: 2150 mL / NET: 352 mL    2025 07:01  -  01 Mar 2025 02:30  --------------------------------------------------------  IN: 392 mL / OUT: 1385 mL / NET: -993 mL                              10.0   8.03  )-----------( 96       ( 01 Mar 2025 00:16 )             28.2     03-    136  |  96  |  56[H]  ----------------------------<  141[H]  3.7   |  27  |  1.12    Ca    9.5      01 Mar 2025 00:16  Phos  4.1     03-  Mg     2.2         TPro  7.5  /  Alb  3.1[L]  /  TBili  2.0[H]  /  DBili  x   /  AST  70[H]  /  ALT  36  /  AlkPhos  64  03-        Review of systems  All other systems were reviewed and are negative, except as noted.      PHYSICAL EXAM:  Constitutional: Well developed / well nourished  Eyes: PERRLA  ENMT: nc/at, no thrush  Neck: supple, central line  Respiratory: CTA B/L  Cardiovascular: RRR  Gastrointestinal: Soft abdomen, ND, appropriate incisional TTP. chevron incision c/d/i, staples in place. No signs of infection.   Genitourinary: Urinary catheter in place  Extremities: SCD's in place and working bilaterally  Vascular: Palpable dp pulses bilaterally.   Neurological: A&O x3  Skin: no rashes, ulcerations, lesions  Musculoskeletal: Moving all extremities  Psychiatric: Responsive Transplant Surgery - Multidisciplinary Rounds/POC  --------------------------------------------------------------   Donor OLTx      Date:  25       POD#2    Present:   Patient seen and examined with multidisciplinary Transplant team including Surgeon Dr. Godfrey, Dr. Carey, Hepatologist Dr. Larsen, ESTRELLA Gagnon/Addis, Pharmacist: David and bedside RN during AM rounds.   Disciplines not in attendance will be notified of the plan    HPI: 44M PMH decompensated ETOH cirrhosis c/b recurrent ascites, grade II esophageal varices, and hepatic encephalopathy, who presented to OSH with abd pain and distension found to have decompensated EtOH cirrhosis with MELD 30, MANUELITO (HRS vs ATN; previous HD need with [facility]) found to have R empyema s/p R pigtail placement w/ IR  requiring several adjustments, s/p R robotic VATS with decortication (Dr. Ramírez 2025) for loculated empyema. OR course with oropharyngeal bleeding during intubation, coagulopathy/elevated EBL. Admitted for transplant evaluation and found to have reaccumulation of R pleural effusion, now s/p IR drainage and s/p OLTx.     Interval Events:                    Immunosuppression  Induction: Simulect  Maintenance: FK per level, MMF , SST  Ongoing monitoring for signs of rejection     Potential Discharge date: TBD  Education:  Medications  Plan of care:  See Below                              Review of systems  All other systems were reviewed and are negative, except as noted.    PHYSICAL EXAM:  Constitutional: Well developed / well nourished  Eyes: PERRLA  ENMT: nc/at, no thrush  Neck: supple, central line  Respiratory: CTA B/L  Cardiovascular: RRR  Gastrointestinal: Soft abdomen, ND, appropriate incisional TTP. chevron incision c/d/i, staples in place. No signs of infection.   Genitourinary: Urinary catheter in place  Extremities: SCD's in place and working bilaterally  Vascular: Palpable dp pulses bilaterally.   Neurological: A&O x3  Skin: no rashes, ulcerations, lesions  Musculoskeletal: Moving all extremities  Psychiatric: Responsive  Transplant Surgery - Multidisciplinary Rounds/POC  --------------------------------------------------------------   Donor OLTx      Date:  25       POD#2    Present:   Patient seen and examined with multidisciplinary Transplant team including Surgeon Dr. Vanessa, Dr. Carey, Hepatologist Dr. Larsen, ESTRELLA Gagnon/Addis and bedside RN during AM rounds.   Disciplines not in attendance will be notified of the plan    HPI: 44M PMH decompensated ETOH cirrhosis c/b recurrent ascites, grade II esophageal varices, and hepatic encephalopathy, who presented to OSH with abd pain and distension found to have decompensated EtOH cirrhosis with MELD 30, MANUELITO (HRS vs ATN; previous HD need with [facility]) found to have R empyema s/p R pigtail placement w/ IR  requiring several adjustments, s/p R robotic VATS with decortication (Dr. Ramírez 2025) for loculated empyema. OR course with oropharyngeal bleeding during intubation, coagulopathy/elevated EBL. Admitted for transplant evaluation and found to have reaccumulation of R pleural effusion, now s/p IR drainage and s/p OLTx.     Interval Events:  - Afebrile, VSS  - started FK  - LFT's trending down  - reg diet  - sqh      Immunosuppression  Induction: Simulect POD4  Maintenance: FK per level, MMF , SST  Ongoing monitoring for signs of rejection     Potential Discharge date: TBD  Education:  Medications  Plan of care:  See Below      MEDICATIONS  (STANDING):  aspirin enteric coated 81 milliGRAM(s) Oral daily  cefTRIAXone   IVPB 1000 milliGRAM(s) IV Intermittent every 24 hours  dextrose 5%. 1000 milliLiter(s) (100 mL/Hr) IV Continuous <Continuous>  dextrose 5%. 1000 milliLiter(s) (50 mL/Hr) IV Continuous <Continuous>  dextrose 50% Injectable 25 Gram(s) IV Push once  dextrose 50% Injectable 12.5 Gram(s) IV Push once  dextrose 50% Injectable 25 Gram(s) IV Push once  fluconAZOLE   Tablet 400 milliGRAM(s) Oral daily  glucagon  Injectable 1 milliGRAM(s) IntraMuscular once  heparin   Injectable 5000 Unit(s) SubCutaneous every 12 hours  influenza   Vaccine 0.5 milliLiter(s) IntraMuscular once  insulin glargine Injectable (LANTUS) 6 Unit(s) SubCutaneous at bedtime  insulin lispro (ADMELOG) corrective regimen sliding scale   SubCutaneous three times a day before meals  insulin lispro (ADMELOG) corrective regimen sliding scale   SubCutaneous at bedtime  mycophenolate mofetil 1000 milliGRAM(s) Oral <User Schedule>  pantoprazole    Tablet 40 milliGRAM(s) Oral before breakfast  polyethylene glycol 3350 17 Gram(s) Oral daily  senna 2 Tablet(s) Oral at bedtime  sodium chloride 0.65% Nasal 1 Spray(s) Both Nostrils two times a day  sofosbuvir 400 mG/velpatasvir 100 mG (EPCLUSA) 1 Tablet(s) Oral at bedtime  tacrolimus 0.5 milliGRAM(s) Oral <User Schedule>  valGANciclovir 450 milliGRAM(s) Oral daily    MEDICATIONS  (PRN):  dextrose Oral Gel 15 Gram(s) Oral once PRN Blood Glucose LESS THAN 70 milliGRAM(s)/deciliter  HYDROmorphone  Injectable 0.5 milliGRAM(s) IV Push every 3 hours PRN Breakthrough pain  oxyCODONE    IR 2.5 milliGRAM(s) Oral every 4 hours PRN Moderate Pain (4 - 6)  oxyCODONE    IR 5 milliGRAM(s) Oral every 4 hours PRN Severe Pain (7 - 10)      PAST MEDICAL & SURGICAL HISTORY:  Sleep apnea, obstructive      Alcohol abuse      Cirrhosis of liver      Portal hypertension      H/O esophageal varices      Anemia      Mild alcohol use disorder      No significant past surgical history          Vital Signs Last 24 Hrs  T(C): 36.4 (01 Mar 2025 14:52), Max: 37.6 (01 Mar 2025 09:00)  T(F): 97.5 (01 Mar 2025 14:52), Max: 99.6 (01 Mar 2025 09:00)  HR: 66 (01 Mar 2025 14:52) (66 - 94)  BP: 144/82 (01 Mar 2025 14:52) (137/74 - 152/81)  BP(mean): 109 (01 Mar 2025 13:52) (105 - 113)  RR: 20 (01 Mar 2025 14:52) (18 - 26)  SpO2: 100% (01 Mar 2025 14:52) (92% - 100%)    Parameters below as of 01 Mar 2025 14:52  Patient On (Oxygen Delivery Method): nasal cannula  O2 Flow (L/min): 1      I&O's Summary    2025 07:01  -  01 Mar 2025 07:00  --------------------------------------------------------  IN: 872 mL / OUT: 1705 mL / NET: -833 mL    01 Mar 2025 07:01  -  01 Mar 2025 16:54  --------------------------------------------------------  IN: 480 mL / OUT: 597 mL / NET: -117 mL                              10.1   7.54  )-----------( 87       ( 01 Mar 2025 07:22 )             28.8     03-01    137  |  95[L]  |  54[H]  ----------------------------<  142[H]  3.6   |  29  |  1.13    Ca    9.4      01 Mar 2025 07:18  Phos  4.3     03-01  Mg     2.2     -    TPro  7.3  /  Alb  3.2[L]  /  TBili  1.9[H]  /  DBili  x   /  AST  56[H]  /  ALT  31  /  AlkPhos  65  03-01    Tacrolimus (), Serum: 1.4 ng/mL ( @ 07:23)        Culture - Body Fluid with Gram Stain (collected 25 @ 19:26)  Source: Body Fluid ascites  Gram Stain (25 @ 02:36):    No polymorphonuclear leukocytes seen    No organisms seen    by cytocentrifuge  Preliminary Report (25 @ 16:48):    No growth    Culture - Acid Fast - Body Fluid w/Smear (collected 25 @ 19:26)  Source: Body Fluid ascites    Culture - Fungal, Body Fluid (collected 25 @ 19:26)  Source: Body Fluid ascites  Preliminary Report (25 @ 07:50):    No growth    Culture - Urine (collected 25 @ 04:59)  Source: Clean Catch Clean Catch (Midstream)  Final Report (25 @ 07:43):    10,000 - 49,000 CFU/mL Enterococcus faecium (vancomycin resistant)  Organism: Enterococcus faecium (vancomycin resistant) (25 @ 07:43)  Organism: Enterococcus faecium (vancomycin resistant) (25 @ 07:43)          Review of systems  All other systems were reviewed and are negative, except as noted.    PHYSICAL EXAM:  Constitutional: Well developed / well nourished  Eyes: PERRLA  ENMT: nc/at, no thrush  Neck: supple  Respiratory: CTA B/L  Cardiovascular: RRR  Gastrointestinal: Soft abdomen, ND, appropriate incisional TTP. chevron incision c/d/i, staples in place. No signs of infection.   Genitourinary: voiding  Extremities: SCD's in place and working bilaterally  Vascular: Palpable dp pulses bilaterally.   Neurological: A&O x3  Skin: no rashes, ulcerations, lesions  Musculoskeletal: Moving all extremities  Psychiatric: Responsive

## 2025-03-01 NOTE — PROGRESS NOTE ADULT - ASSESSMENT
45yo M with Hx decompensated EtOH cirrhosis c/b recurrent ascites, grade II EV, and HE who presented to OSH for abd pain and distension, found to have ascites and MANUELITO requiring HD. Patient was transferred to [facility] for management and liver transplant evaluation. His hospital course has been c/b an empyema s/p VATS decortication on 1/28 with Dr. Ramírez. Postoperative course has been c/b recurrent R pleural effusion s/p IR drainage. Admitted for transplant evaluation, now s/p OLT on 2/27.       [ ] OLT POD 0  - U/S liver with doppler patent vessels  - Extubated   - Continue to monitor CBC, CMP, Coags  - Strict I/O's dc yates, NGT, jpx3  - ADAT  - SQH  - FU Hep C genotype/PCR 2/28  - Pain control  - bowel regimen  - PT/SCD/IS  - SICU care    [] Immuno  - simulect induction POD4  - FK start 0.5 BID, MMF 1/1, SST  - ppx: no bactrim due to sulfa allergy, fluc, valcyte     43yo M with Hx decompensated EtOH cirrhosis c/b recurrent ascites, grade II EV, and HE who presented to OSH for abd pain and distension, found to have ascites and MANUELITO requiring HD. Patient was transferred to [facility] for management and liver transplant evaluation. His hospital course has been c/b an empyema s/p VATS decortication on 1/28 with Dr. Ramírez. Postoperative course has been c/b recurrent R pleural effusion s/p IR drainage. Admitted for transplant evaluation, now s/p OLT on 2/27.     [ ] OLT POD 2  - U/S liver with doppler patent vessels  - Extubated   - Continue to monitor CBC, CMP, Coags  - Strict I/O's dc yates, NGT, jpx3  - ADAT  - SQH  - FU Hep C genotype/PCR 2/28  - Pain control  - bowel regimen  - PT/SCD/IS    [] Immuno  - simulect induction POD4  - FK start 0.5 BID, MMF 1/1, SST  - ppx: no bactrim due to sulfa allergy, fluc, valcyte 43yo M with Hx decompensated EtOH cirrhosis c/b recurrent ascites, grade II EV, and HE who presented to OSH for abd pain and distension, found to have ascites and MANUELITO requiring HD. Patient was transferred to [facility] for management and liver transplant evaluation. His hospital course has been c/b an empyema s/p VATS decortication on 1/28 with Dr. Ramírez. Postoperative course has been c/b recurrent R pleural effusion s/p IR drainage. Admitted for transplant evaluation, now s/p OLT on 2/27.     [ ] OLT POD 2  - U/S liver with doppler patent vessels  - Strict I/O's jpx3  - regular diet  - SQH, ASA  - FU Hep C genotype/PCR +2/28  - Start Epclusa  - Pain control  - bowel regimen  - PT/SCD/IS    [] Immuno  - simulect induction POD4  - FK per level, MMF 1/1, SST  - ppx: no bactrim due to sulfa allergy, fluc, valcyte 45yo M with Hx decompensated EtOH cirrhosis c/b recurrent ascites, grade II EV, and HE who presented to OSH for abd pain and distension, found to have ascites and MANUELITO requiring HD. Patient was transferred to [facility] for management and liver transplant evaluation. His hospital course has been c/b an empyema s/p VATS decortication on 1/28 with Dr. Ramírez. Postoperative course has been c/b recurrent R pleural effusion s/p IR drainage. Admitted for transplant evaluation, now s/p OLT on 2/27.     [ ] OLT POD 2  - U/S liver with doppler patent vessels  - Strict I/O's jpx3  - regular diet  - SQH, ASA  - FU Hep C genotype/PCR +2/28  - Start Epclusa  - Pain control  - bowel regimen  - PT/SCD/IS  - Cont CTX for the R pleural effusion per ID    [] Immuno  - simulect induction POD4  - FK per level, MMF 1/1, SST  - ppx: no bactrim due to sulfa allergy, fluc, valcyte

## 2025-03-02 LAB
ALBUMIN SERPL ELPH-MCNC: 3.2 G/DL — LOW (ref 3.3–5)
ALP SERPL-CCNC: 71 U/L — SIGNIFICANT CHANGE UP (ref 40–120)
ALT FLD-CCNC: 27 U/L — SIGNIFICANT CHANGE UP (ref 10–45)
ANION GAP SERPL CALC-SCNC: 14 MMOL/L — SIGNIFICANT CHANGE UP (ref 5–17)
APTT BLD: 26.5 SEC — SIGNIFICANT CHANGE UP (ref 24.5–35.6)
AST SERPL-CCNC: 31 U/L — SIGNIFICANT CHANGE UP (ref 10–40)
BILIRUB SERPL-MCNC: 1.7 MG/DL — HIGH (ref 0.2–1.2)
BUN SERPL-MCNC: 57 MG/DL — HIGH (ref 7–23)
CALCIUM SERPL-MCNC: 9.8 MG/DL — SIGNIFICANT CHANGE UP (ref 8.4–10.5)
CHLORIDE SERPL-SCNC: 99 MMOL/L — SIGNIFICANT CHANGE UP (ref 96–108)
CO2 SERPL-SCNC: 25 MMOL/L — SIGNIFICANT CHANGE UP (ref 22–31)
CREAT SERPL-MCNC: 1.03 MG/DL — SIGNIFICANT CHANGE UP (ref 0.5–1.3)
EGFR: 91 ML/MIN/1.73M2 — SIGNIFICANT CHANGE UP
EGFR: 91 ML/MIN/1.73M2 — SIGNIFICANT CHANGE UP
GLUCOSE BLDC GLUCOMTR-MCNC: 127 MG/DL — HIGH (ref 70–99)
GLUCOSE BLDC GLUCOMTR-MCNC: 127 MG/DL — HIGH (ref 70–99)
GLUCOSE BLDC GLUCOMTR-MCNC: 163 MG/DL — HIGH (ref 70–99)
GLUCOSE BLDC GLUCOMTR-MCNC: 171 MG/DL — HIGH (ref 70–99)
GLUCOSE SERPL-MCNC: 101 MG/DL — HIGH (ref 70–99)
HCT VFR BLD CALC: 32 % — LOW (ref 39–50)
HGB BLD-MCNC: 10.8 G/DL — LOW (ref 13–17)
INR BLD: 0.95 RATIO — SIGNIFICANT CHANGE UP (ref 0.85–1.16)
LACTATE BLDV-MCNC: 1.6 MMOL/L — SIGNIFICANT CHANGE UP (ref 0.5–2)
MAGNESIUM SERPL-MCNC: 2.3 MG/DL — SIGNIFICANT CHANGE UP (ref 1.6–2.6)
MCHC RBC-ENTMCNC: 31.2 PG — SIGNIFICANT CHANGE UP (ref 27–34)
MCHC RBC-ENTMCNC: 33.8 G/DL — SIGNIFICANT CHANGE UP (ref 32–36)
MCV RBC AUTO: 92.5 FL — SIGNIFICANT CHANGE UP (ref 80–100)
NRBC BLD AUTO-RTO: 0 /100 WBCS — SIGNIFICANT CHANGE UP (ref 0–0)
PHOSPHATE SERPL-MCNC: 4.1 MG/DL — SIGNIFICANT CHANGE UP (ref 2.5–4.5)
PLATELET # BLD AUTO: 69 K/UL — LOW (ref 150–400)
POTASSIUM SERPL-MCNC: 4 MMOL/L — SIGNIFICANT CHANGE UP (ref 3.5–5.3)
POTASSIUM SERPL-SCNC: 4 MMOL/L — SIGNIFICANT CHANGE UP (ref 3.5–5.3)
PROT SERPL-MCNC: 7.8 G/DL — SIGNIFICANT CHANGE UP (ref 6–8.3)
PROTHROM AB SERPL-ACNC: 10.9 SEC — SIGNIFICANT CHANGE UP (ref 9.9–13.4)
RBC # BLD: 3.46 M/UL — LOW (ref 4.2–5.8)
RBC # FLD: 18.9 % — HIGH (ref 10.3–14.5)
SODIUM SERPL-SCNC: 138 MMOL/L — SIGNIFICANT CHANGE UP (ref 135–145)
TACROLIMUS SERPL-MCNC: 1.4 NG/ML — SIGNIFICANT CHANGE UP
WBC # BLD: 7.5 K/UL — SIGNIFICANT CHANGE UP (ref 3.8–10.5)
WBC # FLD AUTO: 7.5 K/UL — SIGNIFICANT CHANGE UP (ref 3.8–10.5)

## 2025-03-02 PROCEDURE — 74018 RADEX ABDOMEN 1 VIEW: CPT | Mod: 26

## 2025-03-02 RX ORDER — GLYCERIN
1 LIQUID (ML) MISCELLANEOUS ONCE
Refills: 0 | Status: COMPLETED | OUTPATIENT
Start: 2025-03-02 | End: 2025-03-02

## 2025-03-02 RX ORDER — ACETAMINOPHEN 500 MG/5ML
1000 LIQUID (ML) ORAL ONCE
Refills: 0 | Status: COMPLETED | OUTPATIENT
Start: 2025-03-02 | End: 2025-03-02

## 2025-03-02 RX ORDER — METHYLPREDNISOLONE ACETATE 80 MG/ML
40 INJECTION, SUSPENSION INTRA-ARTICULAR; INTRALESIONAL; INTRAMUSCULAR; SOFT TISSUE EVERY 24 HOURS
Refills: 0 | Status: COMPLETED | OUTPATIENT
Start: 2025-03-04 | End: 2025-03-04

## 2025-03-02 RX ORDER — PREDNISONE 20 MG/1
20 TABLET ORAL DAILY
Refills: 0 | Status: DISCONTINUED | OUTPATIENT
Start: 2025-03-05 | End: 2025-03-11

## 2025-03-02 RX ORDER — ACETAMINOPHEN 500 MG/5ML
1000 LIQUID (ML) ORAL ONCE
Refills: 0 | Status: COMPLETED | OUTPATIENT
Start: 2025-03-02 | End: 2025-03-03

## 2025-03-02 RX ORDER — METHYLPREDNISOLONE ACETATE 80 MG/ML
60 INJECTION, SUSPENSION INTRA-ARTICULAR; INTRALESIONAL; INTRAMUSCULAR; SOFT TISSUE EVERY 24 HOURS
Refills: 0 | Status: COMPLETED | OUTPATIENT
Start: 2025-03-03 | End: 2025-03-03

## 2025-03-02 RX ADMIN — Medication 1 SPRAY(S): at 17:42

## 2025-03-02 RX ADMIN — Medication 1000 MILLIGRAM(S): at 16:10

## 2025-03-02 RX ADMIN — OXYCODONE HYDROCHLORIDE 2.5 MILLIGRAM(S): 30 TABLET ORAL at 02:27

## 2025-03-02 RX ADMIN — Medication 4 MILLIGRAM(S): at 08:34

## 2025-03-02 RX ADMIN — Medication 1 SUPPOSITORY(S): at 12:28

## 2025-03-02 RX ADMIN — OXYCODONE HYDROCHLORIDE 2.5 MILLIGRAM(S): 30 TABLET ORAL at 01:47

## 2025-03-02 RX ADMIN — OXYCODONE HYDROCHLORIDE 5 MILLIGRAM(S): 30 TABLET ORAL at 08:35

## 2025-03-02 RX ADMIN — Medication 400 MILLIGRAM(S): at 12:29

## 2025-03-02 RX ADMIN — Medication 400 MILLIGRAM(S): at 15:10

## 2025-03-02 RX ADMIN — Medication 4 MILLIGRAM(S): at 15:14

## 2025-03-02 RX ADMIN — OXYCODONE HYDROCHLORIDE 5 MILLIGRAM(S): 30 TABLET ORAL at 09:35

## 2025-03-02 RX ADMIN — Medication 0.5 MILLIGRAM(S): at 04:19

## 2025-03-02 RX ADMIN — HEPARIN SODIUM 5000 UNIT(S): 1000 INJECTION INTRAVENOUS; SUBCUTANEOUS at 05:09

## 2025-03-02 RX ADMIN — Medication 80 MILLIGRAM(S): at 15:41

## 2025-03-02 RX ADMIN — VELPATASVIR AND SOFOSBUVIR 1 TABLET(S): 50; 200 TABLET, FILM COATED ORAL at 20:54

## 2025-03-02 RX ADMIN — Medication 80 MILLIGRAM(S): at 20:55

## 2025-03-02 RX ADMIN — CEFTRIAXONE 100 MILLIGRAM(S): 500 INJECTION, POWDER, FOR SOLUTION INTRAMUSCULAR; INTRAVENOUS at 12:29

## 2025-03-02 RX ADMIN — VALGANCICLOVIR 450 MILLIGRAM(S): 450 TABLET, FILM COATED ORAL at 12:28

## 2025-03-02 RX ADMIN — Medication 0.5 MILLIGRAM(S): at 03:18

## 2025-03-02 RX ADMIN — POLYETHYLENE GLYCOL 3350 17 GRAM(S): 17 POWDER, FOR SOLUTION ORAL at 12:29

## 2025-03-02 RX ADMIN — Medication 75 MILLILITER(S): at 20:54

## 2025-03-02 RX ADMIN — INSULIN LISPRO 2: 100 INJECTION, SOLUTION INTRAVENOUS; SUBCUTANEOUS at 17:41

## 2025-03-02 RX ADMIN — Medication 40 MILLIGRAM(S): at 05:09

## 2025-03-02 RX ADMIN — MYCOPHENOLATE MOFETIL 1000 MILLIGRAM(S): 500 TABLET, FILM COATED ORAL at 10:41

## 2025-03-02 RX ADMIN — Medication 2 TABLET(S): at 20:55

## 2025-03-02 RX ADMIN — Medication 1 APPLICATION(S): at 12:29

## 2025-03-02 RX ADMIN — MYCOPHENOLATE MOFETIL 1000 MILLIGRAM(S): 500 TABLET, FILM COATED ORAL at 20:54

## 2025-03-02 RX ADMIN — INSULIN GLARGINE-YFGN 6 UNIT(S): 100 INJECTION, SOLUTION SUBCUTANEOUS at 21:06

## 2025-03-02 RX ADMIN — Medication 1 SPRAY(S): at 05:09

## 2025-03-02 RX ADMIN — INSULIN LISPRO 2: 100 INJECTION, SOLUTION INTRAVENOUS; SUBCUTANEOUS at 13:03

## 2025-03-02 RX ADMIN — METHYLPREDNISOLONE ACETATE 125 MILLIGRAM(S): 80 INJECTION, SUSPENSION INTRA-ARTICULAR; INTRALESIONAL; INTRAMUSCULAR; SOFT TISSUE at 05:09

## 2025-03-02 RX ADMIN — HEPARIN SODIUM 5000 UNIT(S): 1000 INJECTION INTRAVENOUS; SUBCUTANEOUS at 17:42

## 2025-03-02 RX ADMIN — Medication 81 MILLIGRAM(S): at 12:29

## 2025-03-02 NOTE — PROGRESS NOTE ADULT - ASSESSMENT
43yo M with Hx decompensated EtOH cirrhosis c/b recurrent ascites, grade II EV, and HE who presented to OSH for abd pain and distension, found to have ascites and MANUELITO requiring HD. Patient was transferred to [facility] for management and liver transplant evaluation. His hospital course has been c/b an empyema s/p VATS decortication on 1/28 with Dr. Ramírez. Postoperative course has been c/b recurrent R pleural effusion s/p IR drainage. Admitted for transplant evaluation, now s/p OLT on 2/27.     [ ] DCD OLT POD 3  - LFTs trending down   - HCV viremia (donor hcv +) - f/u genotype; started Epclusa 3/1   - Strict I/O's jpx3  - SQH, ASA  - Pain control  - bowel regimen  - PT/SCD/IS  - Cont CTX for the R pleural effusion per ID    [] Immuno  - simulect induction POD4  - FK held for worsening MS, MMF 1/1, SST  - ppx: no bactrim due to sulfa allergy, fluc, valcyte    [] Ileus   - Keep NPO/IVF  - avoid narcotics, dced oxy   - glycerine supp x 1   - encourage oob

## 2025-03-02 NOTE — PROGRESS NOTE ADULT - SUBJECTIVE AND OBJECTIVE BOX
Transplant Surgery - Multidisciplinary Rounds  --------------------------------------------------------------   Donor OLTx      Date:  25       POD#3    Present:   Patient seen and examined with multidisciplinary Transplant team including Surgeon Dr. Carter, Dr. Carey, Hepatologist Dr. Larsen, ESTRELLA Cooley and bedside RN during AM rounds.   Disciplines not in attendance will be notified of the plan    HPI: 44M PMH decompensated ETOH cirrhosis c/b recurrent ascites, grade II esophageal varices, and hepatic encephalopathy, who presented to OSH with abd pain and distension found to have decompensated EtOH cirrhosis with MELD 30, MANUELITO (HRS vs ATN; previous HD need with [facility]) found to have R empyema s/p R pigtail placement w/ IR  requiring several adjustments, s/p R robotic VATS with decortication (Dr. Ramírez 2025) for loculated empyema. OR course with oropharyngeal bleeding during intubation, coagulopathy/elevated EBL. Admitted for transplant evaluation and found to have reaccumulation of R pleural effusion, now s/p IR drainage and s/p OLTx.     Interval Events:  - LFTs trending down  - held FK last night for worsening mental status  - AXR c/f ileus  - refusing NGT   - passing flatus, no BM. + bowel sounds     Immunosuppression  Induction: Simulect POD4  Maintenance: FK held, MMF , SST  Ongoing monitoring for signs of rejection     Potential Discharge date: TBD  Education:  Medications  Plan of care:  See Below      MEDICATIONS  (STANDING):  aspirin enteric coated 81 milliGRAM(s) Oral daily  cefTRIAXone   IVPB 1000 milliGRAM(s) IV Intermittent every 24 hours  chlorhexidine 2% Cloths 1 Application(s) Topical daily  dextrose 5%. 1000 milliLiter(s) (100 mL/Hr) IV Continuous <Continuous>  dextrose 5%. 1000 milliLiter(s) (50 mL/Hr) IV Continuous <Continuous>  dextrose 50% Injectable 25 Gram(s) IV Push once  dextrose 50% Injectable 12.5 Gram(s) IV Push once  dextrose 50% Injectable 25 Gram(s) IV Push once  fluconAZOLE   Tablet 400 milliGRAM(s) Oral daily  glucagon  Injectable 1 milliGRAM(s) IntraMuscular once  heparin   Injectable 5000 Unit(s) SubCutaneous every 12 hours  influenza   Vaccine 0.5 milliLiter(s) IntraMuscular once  insulin glargine Injectable (LANTUS) 6 Unit(s) SubCutaneous at bedtime  insulin lispro (ADMELOG) corrective regimen sliding scale   SubCutaneous every 6 hours  methylPREDNISolone sodium succinate Injectable   IV Push   mycophenolate mofetil 1000 milliGRAM(s) Oral <User Schedule>  pantoprazole    Tablet 40 milliGRAM(s) Oral before breakfast  polyethylene glycol 3350 17 Gram(s) Oral daily  senna 2 Tablet(s) Oral at bedtime  sodium chloride 0.65% Nasal 1 Spray(s) Both Nostrils two times a day  sodium chloride 0.9%. 1000 milliLiter(s) (75 mL/Hr) IV Continuous <Continuous>  sofosbuvir 400 mG/velpatasvir 100 mG (EPCLUSA) 1 Tablet(s) Oral at bedtime  valGANciclovir 450 milliGRAM(s) Oral daily    MEDICATIONS  (PRN):  dextrose Oral Gel 15 Gram(s) Oral once PRN Blood Glucose LESS THAN 70 milliGRAM(s)/deciliter  ondansetron Injectable 4 milliGRAM(s) IV Push every 6 hours PRN Nausea and/or Vomiting  simethicone 80 milliGRAM(s) Chew four times a day PRN Gas      PAST MEDICAL & SURGICAL HISTORY:  Sleep apnea, obstructive  Alcohol abuse  Cirrhosis of liver  Portal hypertension  H/O esophageal varices  Anemia  Mild alcohol use disorder  No significant past surgical history    Vital Signs Last 24 Hrs  T(C): 36.4 (02 Mar 2025 13:15), Max: 37.1 (01 Mar 2025 17:19)  T(F): 97.6 (02 Mar 2025 13:15), Max: 98.8 (01 Mar 2025 17:19)  HR: 70 (02 Mar 2025 15:46) (58 - 79)  BP: 144/80 (02 Mar 2025 15:46) (139/78 - 161/87)  BP(mean): 105 (02 Mar 2025 15:46) (105 - 105)  RR: 18 (02 Mar 2025 13:15) (17 - 20)  SpO2: 96% (02 Mar 2025 15:46) (96% - 100%)    Parameters below as of 02 Mar 2025 15:46  Patient On (Oxygen Delivery Method): nasal cannula  O2 Flow (L/min): 1    I&O's Summary    01 Mar 2025 07:01  -  02 Mar 2025 07:00  --------------------------------------------------------  IN: 1470 mL / OUT: 2022 mL / NET: -552 mL    02 Mar 2025 07:01  -  02 Mar 2025 16:33  --------------------------------------------------------  IN: 300 mL / OUT: 395 mL / NET: -95 mL                        10.8   7.50  )-----------( 69       ( 02 Mar 2025 06:48 )             32.0     03-02    138  |  99  |  57[H]  ----------------------------<  101[H]  4.0   |  25  |  1.03    Ca    9.8      02 Mar 2025 06:46  Phos  4.1     03-02  Mg     2.3     -    TPro  7.8  /  Alb  3.2[L]  /  TBili  1.7[H]  /  DBili  x   /  AST  31  /  ALT  27  /  AlkPhos  71  03-02    Tacrolimus (), Serum: 1.4 ng/mL ( @ 06:48)    Culture - Acid Fast - Body Fluid w/Smear (collected 25 @ 19:26)  Source: Body Fluid ascites  Preliminary Report (25 @ 23:06):    Culture is being performed.    Culture - Fungal, Body Fluid (collected 25 @ 19:26)  Source: Body Fluid ascites  Preliminary Report (25 @ 10:23):    No growth    Culture - Body Fluid with Gram Stain (collected 25 @ 19:26)  Source: Body Fluid ascites  Gram Stain (25 @ 02:36):    No polymorphonuclear leukocytes seen    No organisms seen    by cytocentrifuge  Preliminary Report (25 @ 16:48):    No growth    Culture - Urine (collected 25 @ 04:59)  Source: Clean Catch Clean Catch (Midstream)  Final Report (25 @ 07:43):    10,000 - 49,000 CFU/mL Enterococcus faecium (vancomycin resistant)  Organism: Enterococcus faecium (vancomycin resistant) (25 @ 07:43)  Organism: Enterococcus faecium (vancomycin resistant) (25 @ 07:43)    Review of systems  All other systems were reviewed and are negative, except as noted.    PHYSICAL EXAM:  Constitutional: Well developed / well nourished  Eyes: PERRLA  ENMT: nc/at, no thrush  Neck: supple  Respiratory: CTA B/L  Cardiovascular: RRR  Gastrointestinal: Soft abdomen, ND, appropriate incisional TTP. chevron incision c/d/i, staples in place. No signs of infection.   Genitourinary: voiding  Extremities: SCD's in place and working bilaterally  Vascular: Palpable dp pulses bilaterally.   Neurological: A&O x3  Skin: no rashes, ulcerations, lesions  Musculoskeletal: Moving all extremities  Psychiatric: Responsive

## 2025-03-03 LAB
ALBUMIN SERPL ELPH-MCNC: 2.8 G/DL — LOW (ref 3.3–5)
ALP SERPL-CCNC: 56 U/L — SIGNIFICANT CHANGE UP (ref 40–120)
ALT FLD-CCNC: 18 U/L — SIGNIFICANT CHANGE UP (ref 10–45)
ANION GAP SERPL CALC-SCNC: 7 MMOL/L — SIGNIFICANT CHANGE UP (ref 5–17)
APPEARANCE UR: CLEAR — SIGNIFICANT CHANGE UP
APPEARANCE UR: CLEAR — SIGNIFICANT CHANGE UP
APTT BLD: 26.7 SEC — SIGNIFICANT CHANGE UP (ref 24.5–35.6)
AST SERPL-CCNC: 22 U/L — SIGNIFICANT CHANGE UP (ref 10–40)
BACTERIA # UR AUTO: NEGATIVE /HPF — SIGNIFICANT CHANGE UP
BACTERIA # UR AUTO: NEGATIVE /HPF — SIGNIFICANT CHANGE UP
BILIRUB SERPL-MCNC: 1.4 MG/DL — HIGH (ref 0.2–1.2)
BILIRUB UR-MCNC: NEGATIVE — SIGNIFICANT CHANGE UP
BILIRUB UR-MCNC: NEGATIVE — SIGNIFICANT CHANGE UP
BUN SERPL-MCNC: 50 MG/DL — HIGH (ref 7–23)
CALCIUM SERPL-MCNC: 9.2 MG/DL — SIGNIFICANT CHANGE UP (ref 8.4–10.5)
CAST: 1 /LPF — SIGNIFICANT CHANGE UP (ref 0–4)
CAST: 2 /LPF — SIGNIFICANT CHANGE UP (ref 0–4)
CHLORIDE SERPL-SCNC: 105 MMOL/L — SIGNIFICANT CHANGE UP (ref 96–108)
CO2 SERPL-SCNC: 29 MMOL/L — SIGNIFICANT CHANGE UP (ref 22–31)
COLOR SPEC: YELLOW — SIGNIFICANT CHANGE UP
COLOR SPEC: YELLOW — SIGNIFICANT CHANGE UP
CREAT SERPL-MCNC: 0.9 MG/DL — SIGNIFICANT CHANGE UP (ref 0.5–1.3)
DIFF PNL FLD: ABNORMAL
DIFF PNL FLD: ABNORMAL
EGFR: 107 ML/MIN/1.73M2 — SIGNIFICANT CHANGE UP
EGFR: 107 ML/MIN/1.73M2 — SIGNIFICANT CHANGE UP
GLUCOSE BLDC GLUCOMTR-MCNC: 111 MG/DL — HIGH (ref 70–99)
GLUCOSE BLDC GLUCOMTR-MCNC: 119 MG/DL — HIGH (ref 70–99)
GLUCOSE BLDC GLUCOMTR-MCNC: 121 MG/DL — HIGH (ref 70–99)
GLUCOSE BLDC GLUCOMTR-MCNC: 156 MG/DL — HIGH (ref 70–99)
GLUCOSE BLDC GLUCOMTR-MCNC: 164 MG/DL — HIGH (ref 70–99)
GLUCOSE BLDC GLUCOMTR-MCNC: 179 MG/DL — HIGH (ref 70–99)
GLUCOSE BLDC GLUCOMTR-MCNC: 205 MG/DL — HIGH (ref 70–99)
GLUCOSE BLDC GLUCOMTR-MCNC: 211 MG/DL — HIGH (ref 70–99)
GLUCOSE SERPL-MCNC: 118 MG/DL — HIGH (ref 70–99)
GLUCOSE UR QL: NEGATIVE MG/DL — SIGNIFICANT CHANGE UP
GLUCOSE UR QL: NEGATIVE MG/DL — SIGNIFICANT CHANGE UP
HCT VFR BLD CALC: 29.9 % — LOW (ref 39–50)
HGB BLD-MCNC: 10 G/DL — LOW (ref 13–17)
INR BLD: 1.04 RATIO — SIGNIFICANT CHANGE UP (ref 0.85–1.16)
KETONES UR-MCNC: NEGATIVE MG/DL — SIGNIFICANT CHANGE UP
KETONES UR-MCNC: NEGATIVE MG/DL — SIGNIFICANT CHANGE UP
LACTATE SERPL-SCNC: 0.9 MMOL/L — SIGNIFICANT CHANGE UP (ref 0.5–2)
LEUKOCYTE ESTERASE UR-ACNC: NEGATIVE — SIGNIFICANT CHANGE UP
LEUKOCYTE ESTERASE UR-ACNC: NEGATIVE — SIGNIFICANT CHANGE UP
MAGNESIUM SERPL-MCNC: 2.2 MG/DL — SIGNIFICANT CHANGE UP (ref 1.6–2.6)
MCHC RBC-ENTMCNC: 30.9 PG — SIGNIFICANT CHANGE UP (ref 27–34)
MCHC RBC-ENTMCNC: 33.4 G/DL — SIGNIFICANT CHANGE UP (ref 32–36)
MCV RBC AUTO: 92.3 FL — SIGNIFICANT CHANGE UP (ref 80–100)
NITRITE UR-MCNC: NEGATIVE — SIGNIFICANT CHANGE UP
NITRITE UR-MCNC: NEGATIVE — SIGNIFICANT CHANGE UP
NRBC BLD AUTO-RTO: 0 /100 WBCS — SIGNIFICANT CHANGE UP (ref 0–0)
PH UR: 5.5 — SIGNIFICANT CHANGE UP (ref 5–8)
PH UR: 5.5 — SIGNIFICANT CHANGE UP (ref 5–8)
PHOSPHATE SERPL-MCNC: 2.6 MG/DL — SIGNIFICANT CHANGE UP (ref 2.5–4.5)
PLATELET # BLD AUTO: 53 K/UL — LOW (ref 150–400)
POTASSIUM SERPL-MCNC: 3.8 MMOL/L — SIGNIFICANT CHANGE UP (ref 3.5–5.3)
POTASSIUM SERPL-SCNC: 3.8 MMOL/L — SIGNIFICANT CHANGE UP (ref 3.5–5.3)
PROCALCITONIN SERPL-MCNC: 0.58 NG/ML — HIGH (ref 0.02–0.1)
PROT SERPL-MCNC: 6.8 G/DL — SIGNIFICANT CHANGE UP (ref 6–8.3)
PROT UR-MCNC: 100 MG/DL
PROT UR-MCNC: 100 MG/DL
PROTHROM AB SERPL-ACNC: 11.9 SEC — SIGNIFICANT CHANGE UP (ref 9.9–13.4)
RBC # BLD: 3.24 M/UL — LOW (ref 4.2–5.8)
RBC # FLD: 18.1 % — HIGH (ref 10.3–14.5)
RBC CASTS # UR COMP ASSIST: 41 /HPF — HIGH (ref 0–4)
RBC CASTS # UR COMP ASSIST: 74 /HPF — HIGH (ref 0–4)
SODIUM SERPL-SCNC: 141 MMOL/L — SIGNIFICANT CHANGE UP (ref 135–145)
SP GR SPEC: 1.01 — SIGNIFICANT CHANGE UP (ref 1–1.03)
SP GR SPEC: 1.01 — SIGNIFICANT CHANGE UP (ref 1–1.03)
SQUAMOUS # UR AUTO: 0 /HPF — SIGNIFICANT CHANGE UP (ref 0–5)
SQUAMOUS # UR AUTO: 1 /HPF — SIGNIFICANT CHANGE UP (ref 0–5)
TACROLIMUS SERPL-MCNC: <0.8 NG/ML — SIGNIFICANT CHANGE UP
UROBILINOGEN FLD QL: 0.2 MG/DL — SIGNIFICANT CHANGE UP (ref 0.2–1)
UROBILINOGEN FLD QL: 0.2 MG/DL — SIGNIFICANT CHANGE UP (ref 0.2–1)
WBC # BLD: 4.05 K/UL — SIGNIFICANT CHANGE UP (ref 3.8–10.5)
WBC # FLD AUTO: 4.05 K/UL — SIGNIFICANT CHANGE UP (ref 3.8–10.5)
WBC UR QL: 2 /HPF — SIGNIFICANT CHANGE UP (ref 0–5)
WBC UR QL: 2 /HPF — SIGNIFICANT CHANGE UP (ref 0–5)

## 2025-03-03 PROCEDURE — 71250 CT THORAX DX C-: CPT | Mod: 26

## 2025-03-03 PROCEDURE — 74176 CT ABD & PELVIS W/O CONTRAST: CPT | Mod: 26

## 2025-03-03 PROCEDURE — G0545: CPT

## 2025-03-03 PROCEDURE — 99233 SBSQ HOSP IP/OBS HIGH 50: CPT

## 2025-03-03 PROCEDURE — 70450 CT HEAD/BRAIN W/O DYE: CPT | Mod: 26

## 2025-03-03 RX ORDER — MEROPENEM 1 G/30ML
1000 INJECTION INTRAVENOUS EVERY 8 HOURS
Refills: 0 | Status: DISCONTINUED | OUTPATIENT
Start: 2025-03-03 | End: 2025-03-08

## 2025-03-03 RX ORDER — MYCOPHENOLATE MOFETIL 500 MG/1
500 TABLET, FILM COATED ORAL
Refills: 0 | Status: DISCONTINUED | OUTPATIENT
Start: 2025-03-04 | End: 2025-03-08

## 2025-03-03 RX ORDER — TRAMADOL HYDROCHLORIDE 50 MG/1
50 TABLET, FILM COATED ORAL ONCE
Refills: 0 | Status: DISCONTINUED | OUTPATIENT
Start: 2025-03-03 | End: 2025-03-03

## 2025-03-03 RX ORDER — INSULIN LISPRO 100 U/ML
INJECTION, SOLUTION INTRAVENOUS; SUBCUTANEOUS AT BEDTIME
Refills: 0 | Status: DISCONTINUED | OUTPATIENT
Start: 2025-03-03 | End: 2025-03-11

## 2025-03-03 RX ORDER — MEROPENEM 1 G/30ML
1000 INJECTION INTRAVENOUS ONCE
Refills: 0 | Status: COMPLETED | OUTPATIENT
Start: 2025-03-03 | End: 2025-03-03

## 2025-03-03 RX ORDER — MELATONIN 5 MG
5 TABLET ORAL ONCE
Refills: 0 | Status: COMPLETED | OUTPATIENT
Start: 2025-03-03 | End: 2025-03-03

## 2025-03-03 RX ORDER — LIDOCAINE HCL/PF 10 MG/ML
1 VIAL (ML) INJECTION ONCE
Refills: 0 | Status: DISCONTINUED | OUTPATIENT
Start: 2025-03-03 | End: 2025-03-11

## 2025-03-03 RX ORDER — BASILIXIMAB 20 MG/5ML
20 INJECTION, POWDER, FOR SOLUTION INTRAVENOUS ONCE
Refills: 0 | Status: COMPLETED | OUTPATIENT
Start: 2025-03-03 | End: 2025-03-03

## 2025-03-03 RX ORDER — MEROPENEM 1 G/30ML
INJECTION INTRAVENOUS
Refills: 0 | Status: DISCONTINUED | OUTPATIENT
Start: 2025-03-03 | End: 2025-03-08

## 2025-03-03 RX ORDER — INSULIN LISPRO 100 U/ML
INJECTION, SOLUTION INTRAVENOUS; SUBCUTANEOUS
Refills: 0 | Status: DISCONTINUED | OUTPATIENT
Start: 2025-03-03 | End: 2025-03-11

## 2025-03-03 RX ADMIN — BASILIXIMAB 100 MILLIGRAM(S): 20 INJECTION, POWDER, FOR SOLUTION INTRAVENOUS at 13:29

## 2025-03-03 RX ADMIN — MEROPENEM 100 MILLIGRAM(S): 1 INJECTION INTRAVENOUS at 17:55

## 2025-03-03 RX ADMIN — MYCOPHENOLATE MOFETIL 1000 MILLIGRAM(S): 500 TABLET, FILM COATED ORAL at 08:55

## 2025-03-03 RX ADMIN — POLYETHYLENE GLYCOL 3350 17 GRAM(S): 17 POWDER, FOR SOLUTION ORAL at 12:03

## 2025-03-03 RX ADMIN — Medication 100 MILLIGRAM(S): at 13:29

## 2025-03-03 RX ADMIN — TRAMADOL HYDROCHLORIDE 50 MILLIGRAM(S): 50 TABLET, FILM COATED ORAL at 23:51

## 2025-03-03 RX ADMIN — Medication 5 MILLIGRAM(S): at 01:57

## 2025-03-03 RX ADMIN — HEPARIN SODIUM 5000 UNIT(S): 1000 INJECTION INTRAVENOUS; SUBCUTANEOUS at 17:55

## 2025-03-03 RX ADMIN — INSULIN LISPRO 2: 100 INJECTION, SOLUTION INTRAVENOUS; SUBCUTANEOUS at 12:03

## 2025-03-03 RX ADMIN — Medication 1 SPRAY(S): at 17:57

## 2025-03-03 RX ADMIN — VALGANCICLOVIR 450 MILLIGRAM(S): 450 TABLET, FILM COATED ORAL at 12:02

## 2025-03-03 RX ADMIN — MEROPENEM 100 MILLIGRAM(S): 1 INJECTION INTRAVENOUS at 21:52

## 2025-03-03 RX ADMIN — Medication 400 MILLIGRAM(S): at 12:02

## 2025-03-03 RX ADMIN — Medication 1 SPRAY(S): at 05:42

## 2025-03-03 RX ADMIN — VELPATASVIR AND SOFOSBUVIR 1 TABLET(S): 50; 200 TABLET, FILM COATED ORAL at 21:52

## 2025-03-03 RX ADMIN — Medication 1 APPLICATION(S): at 12:03

## 2025-03-03 RX ADMIN — CEFTRIAXONE 100 MILLIGRAM(S): 500 INJECTION, POWDER, FOR SOLUTION INTRAMUSCULAR; INTRAVENOUS at 14:10

## 2025-03-03 RX ADMIN — Medication 40 MILLIGRAM(S): at 05:42

## 2025-03-03 RX ADMIN — HEPARIN SODIUM 5000 UNIT(S): 1000 INJECTION INTRAVENOUS; SUBCUTANEOUS at 05:42

## 2025-03-03 RX ADMIN — Medication 400 MILLIGRAM(S): at 15:46

## 2025-03-03 RX ADMIN — Medication 1000 MILLIGRAM(S): at 16:45

## 2025-03-03 RX ADMIN — INSULIN LISPRO 2: 100 INJECTION, SOLUTION INTRAVENOUS; SUBCUTANEOUS at 17:56

## 2025-03-03 RX ADMIN — Medication 81 MILLIGRAM(S): at 12:02

## 2025-03-03 RX ADMIN — METHYLPREDNISOLONE ACETATE 60 MILLIGRAM(S): 80 INJECTION, SUSPENSION INTRA-ARTICULAR; INTRALESIONAL; INTRAMUSCULAR; SOFT TISSUE at 05:42

## 2025-03-03 RX ADMIN — INSULIN GLARGINE-YFGN 6 UNIT(S): 100 INJECTION, SOLUTION SUBCUTANEOUS at 21:52

## 2025-03-03 RX ADMIN — Medication 2 TABLET(S): at 21:52

## 2025-03-03 NOTE — PROGRESS NOTE ADULT - SUBJECTIVE AND OBJECTIVE BOX
Transplant Surgery - Multidisciplinary Rounds  --------------------------------------------------------------   Donor OLTx      Date:  25       POD#4    Present:   Patient seen and examined with multidisciplinary Transplant team including Surgeon Dr. Carter, Hepatologist Dr. Larsen, ESTRELLA Cooley and bedside RN during AM rounds.   Disciplines not in attendance will be notified of the plan    HPI: 44M PMH decompensated ETOH cirrhosis c/b recurrent ascites, grade II esophageal varices, and hepatic encephalopathy, who presented to OSH with abd pain and distension found to have decompensated EtOH cirrhosis with MELD 30, MANUELITO (HRS vs ATN; previous HD need with [facility]) found to have R empyema s/p R pigtail placement w/ IR  requiring several adjustments, s/p R robotic VATS with decortication (Dr. Ramírez 2025) for loculated empyema. OR course with oropharyngeal bleeding during intubation, coagulopathy/elevated EBL. Admitted for transplant evaluation and found to have reaccumulation of R pleural effusion, now s/p IR drainage and s/p OLTx.     Interval Events:  - LFTs trending down  - having bowel function, c/o  abd pain  - MS improving AO 2-3, but lethargic  - oxy discontinued     Immunosuppression  Induction: Simulect last dose today  Maintenance: FK held, MMF , SST  Ongoing monitoring for signs of rejection     Potential Discharge date: TBD  Education:  Medications  Plan of care:  See Below    MEDICATIONS  (STANDING):  aspirin enteric coated 81 milliGRAM(s) Oral daily  chlorhexidine 2% Cloths 1 Application(s) Topical daily  dextrose 5%. 1000 milliLiter(s) (100 mL/Hr) IV Continuous <Continuous>  dextrose 5%. 1000 milliLiter(s) (50 mL/Hr) IV Continuous <Continuous>  dextrose 50% Injectable 25 Gram(s) IV Push once  dextrose 50% Injectable 12.5 Gram(s) IV Push once  dextrose 50% Injectable 25 Gram(s) IV Push once  fluconAZOLE   Tablet 400 milliGRAM(s) Oral daily  glucagon  Injectable 1 milliGRAM(s) IntraMuscular once  heparin   Injectable 5000 Unit(s) SubCutaneous every 12 hours  influenza   Vaccine 0.5 milliLiter(s) IntraMuscular once  insulin glargine Injectable (LANTUS) 6 Unit(s) SubCutaneous at bedtime  insulin lispro (ADMELOG) corrective regimen sliding scale   SubCutaneous every 6 hours  lidocaine 1% Injectable 1 Vial(s) Local Injection once  meropenem  IVPB      meropenem  IVPB 1000 milliGRAM(s) IV Intermittent every 8 hours  methylPREDNISolone sodium succinate Injectable   IV Push   mycophenolate mofetil 1000 milliGRAM(s) Oral <User Schedule>  pantoprazole    Tablet 40 milliGRAM(s) Oral before breakfast  polyethylene glycol 3350 17 Gram(s) Oral daily  senna 2 Tablet(s) Oral at bedtime  sodium chloride 0.65% Nasal 1 Spray(s) Both Nostrils two times a day  sofosbuvir 400 mG/velpatasvir 100 mG (EPCLUSA) 1 Tablet(s) Oral at bedtime  thiamine 100 milliGRAM(s) Oral daily  valGANciclovir 450 milliGRAM(s) Oral daily    MEDICATIONS  (PRN):  dextrose Oral Gel 15 Gram(s) Oral once PRN Blood Glucose LESS THAN 70 milliGRAM(s)/deciliter  ondansetron Injectable 4 milliGRAM(s) IV Push every 6 hours PRN Nausea and/or Vomiting  simethicone 80 milliGRAM(s) Chew four times a day PRN Gas      PAST MEDICAL & SURGICAL HISTORY:  Sleep apnea, obstructive  Alcohol abuse  Cirrhosis of liver  Portal hypertension  H/O esophageal varices  Anemia  Mild alcohol use disorder  No significant past surgical history    Vital Signs Last 24 Hrs  T(C): 36.9 (03 Mar 2025 16:39), Max: 36.9 (03 Mar 2025 00:15)  T(F): 98.4 (03 Mar 2025 16:39), Max: 98.5 (03 Mar 2025 05:30)  HR: 62 (03 Mar 2025 16:39) (62 - 80)  BP: 149/75 (03 Mar 2025 16:39) (145/76 - 154/81)  BP(mean): 106 (03 Mar 2025 05:30) (105 - 106)  RR: 18 (03 Mar 2025 16:39) (17 - 18)  SpO2: 100% (03 Mar 2025 16:39) (94% - 100%)    Parameters below as of 03 Mar 2025 16:39  Patient On (Oxygen Delivery Method): nasal cannula      I&O's Summary    02 Mar 2025 07:01  -  03 Mar 2025 07:00  --------------------------------------------------------  IN: 1725 mL / OUT: 1775 mL / NET: -50 mL    03 Mar 2025 07:01  -  03 Mar 2025 18:15  --------------------------------------------------------  IN: 375 mL / OUT: 1450 mL / NET: -1075 mL                         10.0   4.05  )-----------( 53       ( 03 Mar 2025 06:44 )             29.9         141  |  105  |  50[H]  ----------------------------<  118[H]  3.8   |  29  |  0.90    Ca    9.2      03 Mar 2025 06:44  Phos  2.6     -  Mg     2.2         TPro  6.8  /  Alb  2.8[L]  /  TBili  1.4[H]  /  DBili  x   /  AST  22  /  ALT  18  /  AlkPhos  56      Tacrolimus (), Serum: <0.8 ng/mL ( @ 06:44)    Culture - Acid Fast - Body Fluid w/Smear (collected 25 @ 19:26)  Source: Body Fluid ascites  Preliminary Report (25 @ 23:06):    Culture is being performed.    Culture - Fungal, Body Fluid (collected 25 @ 19:26)  Source: Body Fluid ascites  Preliminary Report (25 @ 10:23):    No growth    Culture - Body Fluid with Gram Stain (collected 25 @ 19:26)  Source: Body Fluid ascites  Gram Stain (25 @ 02:36):    No polymorphonuclear leukocytes seen    No organisms seen    by cytocentrifuge  Preliminary Report (25 @ 16:48):    No growth      Review of systems  All other systems were reviewed and are negative, except as noted.    PHYSICAL EXAM:  Constitutional: Well developed / well nourished  Eyes: PERRLA  ENMT: nc/at, no thrush  Neck: supple  Respiratory: CTA B/L  Cardiovascular: RRR  Gastrointestinal: Soft abdomen, ND, appropriate incisional TTP. chevron incision c/d/i, staples in place. No signs of infection.   Genitourinary: voiding  Extremities: SCD's in place and working bilaterally  Vascular: Palpable dp pulses bilaterally.   Neurological: A&O x3  Skin: no rashes, ulcerations, lesions  Musculoskeletal: Moving all extremities  Psychiatric: Responsive

## 2025-03-03 NOTE — PROGRESS NOTE ADULT - ASSESSMENT
43 yo M with PMH decompensated ETOH cirrhosis c/b recurrent ascites, grade II EV, and HE, right calf hematoma 10/2024 (s/p fall)  presented to [facility] for abdominal pain and distension. Patient found to have ascites on exam and MANUELITO requiring HD initiation PermCath was placed by vascular surgery on 1/10 with post procedure course c/b bleeding from insertion site. Transferred to [facility] SICU for further management.     Blood Cultures (1/16) 1/4 Staph epi.   GI PCR (1/17) + Giardia.   Paracentesis (1/17) 155 nucleated cell counts.     CT Chest (1/17) Moderate loculated right pleural effusion containing a few foci of air, which are new from 1/8/2025 and may represent empyema/bronchopleural fistula versus sequelae of prior thoracentesis.       #Abdominal Pain, Encephalopathy  --Recommend 2x blood cultures, UA+UCx  --Recommend CT A/P  --Can broaden Ceftriaxone to Meropenem for now     # s/p OLT 2/27/25 CMV D-/R+, EBV D+/R+, Toxoplasma D+/R-, HCV NAAT pos donor  Simulect. steroids induction  zosyn perioperatively for 24 hours followed by ceftriaxone until the reminder of Empyema/hematoma is addressed    Fluconazole per protocol   Note patient has bactrim allergy- needs atovaquone AND NO OTHER ALTERNATIVE for PCP , toxoplasma Ppx as he is high risk of donor derived infection  If atovaquone not tolerated, will need bactrim desensitization or challenge  --Continue Valcyte 450 mg PO Q24H     # HCV positive (NAAT  and Ab) donor  Follow HCV PCR testing per protocol and check genotype  and antivirals epclusa    #Empyema- citrobacter koseri  s/p VATS 1/28/25-  last positive cultures 1/25/25  for citrobacter koseri  --Repeat CT Chest 3/3 with persistent moderate right pleural effusion / hemothorax; recommend thoracic surgery follow up now that patient is s/p OLT    #Giardia  s/p 7 days of Metronidazole    #Positive Blood Culture (Staph epi, 1/16)  procurement contaminant    I will continue to follow. Please feel free to contact me with any further questions.    Froilan Whitmore M.D.  [facility] Division of Infectious Disease  8AM-5PM Monday - Friday: Available on [employer] Teams  After Hours and Holidays (or if no response on [employer] Teams): Please contact the Infectious Diseases Office at [phone]    The above assessment and plan were discussed with Sindhu, transplant surgery PA

## 2025-03-03 NOTE — PROGRESS NOTE ADULT - SUBJECTIVE AND OBJECTIVE BOX
patient seen , sitting up in bed, on cell phone - with mom.   discussed findings on most recent scan including the moderate size likely retained hemothorax.   was asked to evaluate given AMS - ammonia levels to be sent in AM per transplant ACP .   discussed possibility of requiring vats/likely thoracotomy for evacuation of hemothorax, decortication.   will await for timing - after discussion with Tx team.

## 2025-03-03 NOTE — PROVIDER CONTACT NOTE (OTHER) - RECOMMENDATIONS
Notify provider
as per provider
Notify provider, EKG. lab work.
contact provider
Notify provider and reinforce dressing
send ren lab
Notify provider
as per provider

## 2025-03-03 NOTE — PROGRESS NOTE ADULT - SUBJECTIVE AND OBJECTIVE BOX
Follow Up:      Interval History:    REVIEW OF SYSTEMS  [  ] ROS unobtainable because:    [  ] All other systems negative except as noted below    Constitutional:  [ ] fever [ ] chills  [ ] weight loss  [ ] weakness  Skin:  [ ] rash [ ] phlebitis	  Eyes: [ ] icterus [ ] pain  [ ] discharge	  ENMT: [ ] sore throat  [ ] thrush [ ] ulcers [ ] exudates  Respiratory: [ ] dyspnea [ ] hemoptysis [ ] cough [ ] sputum	  Cardiovascular:  [ ] chest pain [ ] palpitations [ ] edema	  Gastrointestinal:  [ ] nausea [ ] vomiting [ ] diarrhea [ ] constipation [ ] pain	  Genitourinary:  [ ] dysuria [ ] frequency [ ] hematuria [ ] discharge [ ] flank pain  [ ] incontinence  Musculoskeletal:  [ ] myalgias [ ] arthralgias [ ] arthritis  [ ] back pain  Neurological:  [ ] headache [ ] seizures  [ ] confusion/altered mental status    Allergies  sulfa drugs (Unknown)  Salicylic Acid (Other)        ANTIMICROBIALS:  fluconAZOLE   Tablet 400 daily  meropenem  IVPB    meropenem  IVPB 1000 once  meropenem  IVPB 1000 every 8 hours  sofosbuvir 400 mG/velpatasvir 100 mG (EPCLUSA) 1 at bedtime  valGANciclovir 450 daily      OTHER MEDS:  MEDICATIONS  (STANDING):  aspirin enteric coated 81 daily  dextrose 50% Injectable 25 once  dextrose 50% Injectable 12.5 once  dextrose 50% Injectable 25 once  dextrose Oral Gel 15 once PRN  glucagon  Injectable 1 once  heparin   Injectable 5000 every 12 hours  influenza   Vaccine 0.5 once  insulin glargine Injectable (LANTUS) 6 at bedtime  insulin lispro (ADMELOG) corrective regimen sliding scale  every 6 hours  methylPREDNISolone sodium succinate Injectable    mycophenolate mofetil 1000 <User Schedule>  ondansetron Injectable 4 every 6 hours PRN  pantoprazole    Tablet 40 before breakfast  polyethylene glycol 3350 17 daily  senna 2 at bedtime  simethicone 80 four times a day PRN      Vital Signs Last 24 Hrs  T(C): 36.9 (03 Mar 2025 16:39), Max: 36.9 (03 Mar 2025 00:15)  T(F): 98.4 (03 Mar 2025 16:39), Max: 98.5 (03 Mar 2025 05:30)  HR: 62 (03 Mar 2025 16:39) (62 - 80)  BP: 149/75 (03 Mar 2025 16:39) (145/76 - 154/81)  BP(mean): 106 (03 Mar 2025 05:30) (105 - 106)  RR: 18 (03 Mar 2025 16:39) (17 - 18)  SpO2: 100% (03 Mar 2025 16:39) (94% - 100%)    Parameters below as of 03 Mar 2025 16:39  Patient On (Oxygen Delivery Method): nasal cannula        PHYSICAL EXAMINATION:  General: Alert and Awake, NAD  HEENT: PERRL, EOMI  Neck: Supple  Cardiac: RRR, No M/R/G  Resp: CTAB, No Wh/Rh/Ra  Abdomen: NBS, NT/ND, No HSM, No rigidity or guarding  MSK: No LE edema. No Calf tenderness  : No yates  Skin: No rashes or lesions. Skin is warm and dry to the touch.   Neuro: Alert and Awake. CN 2-12 Grossly intact. Moves all four extremities spontaneously.  Psych: Calm, Pleasant, Cooperative                          10.0   4.05  )-----------( 53       ( 03 Mar 2025 06:44 )             29.9       03-03    141  |  105  |  50[H]  ----------------------------<  118[H]  3.8   |  29  |  0.90    Ca    9.2      03 Mar 2025 06:44  Phos  2.6     -  Mg     2.2     -    TPro  6.8  /  Alb  2.8[L]  /  TBili  1.4[H]  /  DBili  x   /  AST  22  /  ALT  18  /  AlkPhos  56  03-03      Urinalysis Basic - ( 03 Mar 2025 10:31 )    Color: Yellow / Appearance: Clear / S.014 / pH: x  Gluc: x / Ketone: Negative mg/dL  / Bili: Negative / Urobili: 0.2 mg/dL   Blood: x / Protein: 100 mg/dL / Nitrite: Negative   Leuk Esterase: Negative / RBC: 41 /HPF / WBC 2 /HPF   Sq Epi: x / Non Sq Epi: 0 /HPF / Bacteria: Negative /HPF        MICROBIOLOGY:  v  Body Fluid ascites  25   No growth  --    No polymorphonuclear leukocytes seen  No organisms seen  by cytocentrifuge      Clean Catch Clean Catch (Midstream)  25   10,000 - 49,000 CFU/mL Enterococcus faecium (vancomycin resistant)  --  Enterococcus faecium (vancomycin resistant)      Peritoneal Peritoneal Fluid  25   No growth at 5 days  --    polymorphonuclear leukocytes seen  No organisms seen  by cytocentrifuge      Ascites Fl  25   No growth at 5 days  --    No polymorphonuclear cells seen  No organisms seen  by cytocentrifuge        CMV IgG Antibody: >10.00 U/mL (25 @ 04:59)  CMV IgG Antibody: >10.00 U/mL (25 @ 00:42)  CMV IgG Antibody: >10.00 U/mL (25 @ 06:41)  Toxoplasma IgG Screen: <3.00 IU/mL (25 @ 06:41)          RADIOLOGY:    <The imaging below has been reviewed and visualized by me independently. Findings as detailed in report below> Follow Up:  empyema    Interval History: noting significant abdominal pain today. afebrile. more lethargic.     REVIEW OF SYSTEMS  [  ] ROS unobtainable because:    [ x ] All other systems negative except as noted below    Constitutional:  [ ] fever [ ] chills  [ ] weight loss  [ ] weakness  Skin:  [ ] rash [ ] phlebitis	  Eyes: [ ] icterus [ ] pain  [ ] discharge	  ENMT: [ ] sore throat  [ ] thrush [ ] ulcers [ ] exudates  Respiratory: [ ] dyspnea [ ] hemoptysis [ ] cough [ ] sputum	  Cardiovascular:  [ ] chest pain [ ] palpitations [ ] edema	  Gastrointestinal:  [ ] nausea [ ] vomiting [ ] diarrhea [ ] constipation [ ] pain	  Genitourinary:  [ ] dysuria [ ] frequency [ ] hematuria [ ] discharge [ ] flank pain  [ ] incontinence  Musculoskeletal:  [ ] myalgias [ ] arthralgias [ ] arthritis  [ ] back pain  Neurological:  [ ] headache [ ] seizures  [ ] confusion/altered mental status    Allergies  sulfa drugs (Unknown)  Salicylic Acid (Other)        ANTIMICROBIALS:  fluconAZOLE   Tablet 400 daily  meropenem  IVPB    meropenem  IVPB 1000 once  meropenem  IVPB 1000 every 8 hours  sofosbuvir 400 mG/velpatasvir 100 mG (EPCLUSA) 1 at bedtime  valGANciclovir 450 daily      OTHER MEDS:  MEDICATIONS  (STANDING):  aspirin enteric coated 81 daily  dextrose 50% Injectable 25 once  dextrose 50% Injectable 12.5 once  dextrose 50% Injectable 25 once  dextrose Oral Gel 15 once PRN  glucagon  Injectable 1 once  heparin   Injectable 5000 every 12 hours  influenza   Vaccine 0.5 once  insulin glargine Injectable (LANTUS) 6 at bedtime  insulin lispro (ADMELOG) corrective regimen sliding scale  every 6 hours  methylPREDNISolone sodium succinate Injectable    mycophenolate mofetil 1000 <User Schedule>  ondansetron Injectable 4 every 6 hours PRN  pantoprazole    Tablet 40 before breakfast  polyethylene glycol 3350 17 daily  senna 2 at bedtime  simethicone 80 four times a day PRN      Vital Signs Last 24 Hrs  T(C): 36.9 (03 Mar 2025 16:39), Max: 36.9 (03 Mar 2025 00:15)  T(F): 98.4 (03 Mar 2025 16:39), Max: 98.5 (03 Mar 2025 05:30)  HR: 62 (03 Mar 2025 16:39) (62 - 80)  BP: 149/75 (03 Mar 2025 16:39) (145/76 - 154/81)  BP(mean): 106 (03 Mar 2025 05:30) (105 - 106)  RR: 18 (03 Mar 2025 16:39) (17 - 18)  SpO2: 100% (03 Mar 2025 16:39) (94% - 100%)    Parameters below as of 03 Mar 2025 16:39  Patient On (Oxygen Delivery Method): nasal cannula        PHYSICAL EXAMINATION:  General: Alert and Awake, NAD  Cardiac: RRR, No M/R/G  Resp: CTAB, No Wh/Rh/Ra  Abdomen: NBS, RUQ tenderness to palpation, No HSM, No rigidity or guarding  MSK: No LE edema. No Calf tenderness  : No yates  Skin: No rashes or lesions. Skin is warm and dry to the touch.   Neuro: Alert and Awake. CN 2-12 Grossly intact. Moves all four extremities spontaneously.  Psych: Calm, Pleasant, Cooperative                          10.0   4.05  )-----------( 53       ( 03 Mar 2025 06:44 )             29.9       03-03    141  |  105  |  50[H]  ----------------------------<  118[H]  3.8   |  29  |  0.90    Ca    9.2      03 Mar 2025 06:44  Phos  2.6     03-03  Mg     2.2     03-03    TPro  6.8  /  Alb  2.8[L]  /  TBili  1.4[H]  /  DBili  x   /  AST  22  /  ALT  18  /  AlkPhos  56  03-03      Urinalysis Basic - ( 03 Mar 2025 10:31 )    Color: Yellow / Appearance: Clear / S.014 / pH: x  Gluc: x / Ketone: Negative mg/dL  / Bili: Negative / Urobili: 0.2 mg/dL   Blood: x / Protein: 100 mg/dL / Nitrite: Negative   Leuk Esterase: Negative / RBC: 41 /HPF / WBC 2 /HPF   Sq Epi: x / Non Sq Epi: 0 /HPF / Bacteria: Negative /HPF        MICROBIOLOGY:  v  Body Fluid ascites  25   No growth  --    No polymorphonuclear leukocytes seen  No organisms seen  by cytocentrifuge      Clean Catch Clean Catch (Midstream)  25   10,000 - 49,000 CFU/mL Enterococcus faecium (vancomycin resistant)  --  Enterococcus faecium (vancomycin resistant)      Peritoneal Peritoneal Fluid  25   No growth at 5 days  --    polymorphonuclear leukocytes seen  No organisms seen  by cytocentrifuge      Ascites Fl  25   No growth at 5 days  --    No polymorphonuclear cells seen  No organisms seen  by cytocentrifuge        CMV IgG Antibody: >10.00 U/mL (25 @ 04:59)  CMV IgG Antibody: >10.00 U/mL (25 @ 00:42)  CMV IgG Antibody: >10.00 U/mL (25 @ 06:41)  Toxoplasma IgG Screen: <3.00 IU/mL (25 @ 06:41)          RADIOLOGY:    <The imaging below has been reviewed and visualized by me independently. Findings as detailed in report below>    < from: CT Chest No Cont (25 @ 10:08) >  IMPRESSION:  Decreased right intrapleural gas bubbles, otherwise unchanged moderate   loculated right pleural effusion and associated pleural thickening.    Status post liver transplant.    < end of copied text >

## 2025-03-03 NOTE — PROGRESS NOTE ADULT - ASSESSMENT
43yo M with Hx decompensated EtOH cirrhosis c/b recurrent ascites, grade II EV, and HE who presented to OSH for abd pain and distension, found to have ascites and MANUELITO requiring HD. Patient was transferred to [facility] for management and liver transplant evaluation. His hospital course has been c/b an empyema s/p VATS decortication on 1/28 with Dr. Ramírez. Postoperative course has been c/b recurrent R pleural effusion s/p IR drainage. Admitted for transplant evaluation, now s/p OLT on 2/27.     [ ] DCD OLT POD 4  - LFTs trending down   - HCV viremia (donor hcv +) - f/u genotype; started Epclusa 3/1   - Strict I/O's jpx3  - SQH, ASA  - Pain control  - bowel regimen  - PT/SCD/IS  - On CTX for the R pleural effusion per ID    [] Immuno  - simulect last dose today  - FK held for worsening MS, decrease MMF 500mg bid, , SST  - ppx: no bactrim due to sulfa allergy, fluc, valcyte    [] Ileus   - having bowel function  - avoid narcotics,   - Advanced to clears     [] Worsening MS  - FK held, decr MMF 500mg bid  - Bld/urine cxs, lactate/procal  - check ammonia with AM labs  - CTH neg, CT chest with Loculated R pleural effusion  - CTS reconsulted  - ID consult: broadened to meropenem   - CTAP tonight

## 2025-03-03 NOTE — PROVIDER CONTACT NOTE (OTHER) - SITUATION
Pt now presenting w/ new onset of incontinence. Pt A&Ox4 with orientation questions, yet pt illogical in speech.

## 2025-03-04 LAB
ALBUMIN SERPL ELPH-MCNC: 2.8 G/DL — LOW (ref 3.3–5)
ALP SERPL-CCNC: 50 U/L — SIGNIFICANT CHANGE UP (ref 40–120)
ALT FLD-CCNC: 14 U/L — SIGNIFICANT CHANGE UP (ref 10–45)
AMMONIA BLD-MCNC: 38 UMOL/L — SIGNIFICANT CHANGE UP (ref 11–55)
ANION GAP SERPL CALC-SCNC: 7 MMOL/L — SIGNIFICANT CHANGE UP (ref 5–17)
APTT BLD: 27.1 SEC — SIGNIFICANT CHANGE UP (ref 24.5–35.6)
AST SERPL-CCNC: 12 U/L — SIGNIFICANT CHANGE UP (ref 10–40)
BILIRUB FLD-MCNC: 1.3 MG/DL — SIGNIFICANT CHANGE UP
BILIRUB SERPL-MCNC: 1.2 MG/DL — SIGNIFICANT CHANGE UP (ref 0.2–1.2)
BLD GP AB SCN SERPL QL: NEGATIVE — SIGNIFICANT CHANGE UP
BUN SERPL-MCNC: 36 MG/DL — HIGH (ref 7–23)
CALCIUM SERPL-MCNC: 8.8 MG/DL — SIGNIFICANT CHANGE UP (ref 8.4–10.5)
CHLORIDE SERPL-SCNC: 103 MMOL/L — SIGNIFICANT CHANGE UP (ref 96–108)
CO2 SERPL-SCNC: 28 MMOL/L — SIGNIFICANT CHANGE UP (ref 22–31)
CREAT SERPL-MCNC: 0.69 MG/DL — SIGNIFICANT CHANGE UP (ref 0.5–1.3)
CULTURE RESULTS: NO GROWTH — SIGNIFICANT CHANGE UP
CULTURE RESULTS: SIGNIFICANT CHANGE UP
EGFR: 116 ML/MIN/1.73M2 — SIGNIFICANT CHANGE UP
EGFR: 116 ML/MIN/1.73M2 — SIGNIFICANT CHANGE UP
GLUCOSE BLDC GLUCOMTR-MCNC: 126 MG/DL — HIGH (ref 70–99)
GLUCOSE BLDC GLUCOMTR-MCNC: 128 MG/DL — HIGH (ref 70–99)
GLUCOSE BLDC GLUCOMTR-MCNC: 187 MG/DL — HIGH (ref 70–99)
GLUCOSE BLDC GLUCOMTR-MCNC: 194 MG/DL — HIGH (ref 70–99)
GLUCOSE BLDC GLUCOMTR-MCNC: 248 MG/DL — HIGH (ref 70–99)
GLUCOSE SERPL-MCNC: 138 MG/DL — HIGH (ref 70–99)
HCT VFR BLD CALC: 29.8 % — LOW (ref 39–50)
HGB BLD-MCNC: 10 G/DL — LOW (ref 13–17)
INR BLD: 1.06 RATIO — SIGNIFICANT CHANGE UP (ref 0.85–1.16)
MAGNESIUM SERPL-MCNC: 1.9 MG/DL — SIGNIFICANT CHANGE UP (ref 1.6–2.6)
MCHC RBC-ENTMCNC: 31.2 PG — SIGNIFICANT CHANGE UP (ref 27–34)
MCHC RBC-ENTMCNC: 33.6 G/DL — SIGNIFICANT CHANGE UP (ref 32–36)
MCV RBC AUTO: 92.8 FL — SIGNIFICANT CHANGE UP (ref 80–100)
NRBC BLD AUTO-RTO: 0 /100 WBCS — SIGNIFICANT CHANGE UP (ref 0–0)
PHOSPHATE SERPL-MCNC: 1.7 MG/DL — LOW (ref 2.5–4.5)
PLATELET # BLD AUTO: 50 K/UL — LOW (ref 150–400)
POTASSIUM SERPL-MCNC: 3.4 MMOL/L — LOW (ref 3.5–5.3)
POTASSIUM SERPL-SCNC: 3.4 MMOL/L — LOW (ref 3.5–5.3)
PROT SERPL-MCNC: 6.4 G/DL — SIGNIFICANT CHANGE UP (ref 6–8.3)
PROTHROM AB SERPL-ACNC: 12.1 SEC — SIGNIFICANT CHANGE UP (ref 9.9–13.4)
RBC # BLD: 3.21 M/UL — LOW (ref 4.2–5.8)
RBC # FLD: 17.4 % — HIGH (ref 10.3–14.5)
RH IG SCN BLD-IMP: POSITIVE — SIGNIFICANT CHANGE UP
SODIUM SERPL-SCNC: 138 MMOL/L — SIGNIFICANT CHANGE UP (ref 135–145)
SPECIMEN SOURCE: SIGNIFICANT CHANGE UP
SPECIMEN SOURCE: SIGNIFICANT CHANGE UP
TACROLIMUS SERPL-MCNC: <0.8 NG/ML — SIGNIFICANT CHANGE UP
WBC # BLD: 3.92 K/UL — SIGNIFICANT CHANGE UP (ref 3.8–10.5)
WBC # FLD AUTO: 3.92 K/UL — SIGNIFICANT CHANGE UP (ref 3.8–10.5)

## 2025-03-04 PROCEDURE — 93010 ELECTROCARDIOGRAM REPORT: CPT

## 2025-03-04 PROCEDURE — 99232 SBSQ HOSP IP/OBS MODERATE 35: CPT

## 2025-03-04 PROCEDURE — 93010 ELECTROCARDIOGRAM REPORT: CPT | Mod: 77

## 2025-03-04 PROCEDURE — 99233 SBSQ HOSP IP/OBS HIGH 50: CPT

## 2025-03-04 PROCEDURE — G0545: CPT

## 2025-03-04 RX ORDER — SODIUM PHOSPHATE,DIBASIC DIHYD
30 POWDER (GRAM) MISCELLANEOUS ONCE
Refills: 0 | Status: COMPLETED | OUTPATIENT
Start: 2025-03-04 | End: 2025-03-04

## 2025-03-04 RX ORDER — ACETAMINOPHEN 500 MG/5ML
1000 LIQUID (ML) ORAL ONCE
Refills: 0 | Status: COMPLETED | OUTPATIENT
Start: 2025-03-04 | End: 2025-03-04

## 2025-03-04 RX ORDER — CARVEDILOL 3.12 MG/1
3.12 TABLET, FILM COATED ORAL EVERY 12 HOURS
Refills: 0 | Status: DISCONTINUED | OUTPATIENT
Start: 2025-03-05 | End: 2025-03-11

## 2025-03-04 RX ORDER — TACROLIMUS 0.5 MG/1
0.5 CAPSULE ORAL
Refills: 0 | Status: DISCONTINUED | OUTPATIENT
Start: 2025-03-05 | End: 2025-03-05

## 2025-03-04 RX ORDER — TACROLIMUS 0.5 MG/1
0.5 CAPSULE ORAL ONCE
Refills: 0 | Status: COMPLETED | OUTPATIENT
Start: 2025-03-04 | End: 2025-03-04

## 2025-03-04 RX ORDER — TRAMADOL HYDROCHLORIDE 50 MG/1
50 TABLET, FILM COATED ORAL EVERY 8 HOURS
Refills: 0 | Status: DISCONTINUED | OUTPATIENT
Start: 2025-03-04 | End: 2025-03-11

## 2025-03-04 RX ORDER — OXYCODONE HYDROCHLORIDE 30 MG/1
5 TABLET ORAL ONCE
Refills: 0 | Status: DISCONTINUED | OUTPATIENT
Start: 2025-03-04 | End: 2025-03-04

## 2025-03-04 RX ORDER — CARVEDILOL 3.12 MG/1
3.12 TABLET, FILM COATED ORAL ONCE
Refills: 0 | Status: COMPLETED | OUTPATIENT
Start: 2025-03-04 | End: 2025-03-04

## 2025-03-04 RX ORDER — ALBUMIN (HUMAN) 12.5 G/50ML
250 INJECTION, SOLUTION INTRAVENOUS
Refills: 0 | Status: COMPLETED | OUTPATIENT
Start: 2025-03-04 | End: 2025-03-04

## 2025-03-04 RX ORDER — TRAMADOL HYDROCHLORIDE 50 MG/1
25 TABLET, FILM COATED ORAL EVERY 6 HOURS
Refills: 0 | Status: DISCONTINUED | OUTPATIENT
Start: 2025-03-04 | End: 2025-03-10

## 2025-03-04 RX ADMIN — OXYCODONE HYDROCHLORIDE 5 MILLIGRAM(S): 30 TABLET ORAL at 04:09

## 2025-03-04 RX ADMIN — MEROPENEM 100 MILLIGRAM(S): 1 INJECTION INTRAVENOUS at 14:28

## 2025-03-04 RX ADMIN — TRAMADOL HYDROCHLORIDE 50 MILLIGRAM(S): 50 TABLET, FILM COATED ORAL at 18:59

## 2025-03-04 RX ADMIN — OXYCODONE HYDROCHLORIDE 5 MILLIGRAM(S): 30 TABLET ORAL at 03:10

## 2025-03-04 RX ADMIN — VALGANCICLOVIR 450 MILLIGRAM(S): 450 TABLET, FILM COATED ORAL at 11:21

## 2025-03-04 RX ADMIN — MYCOPHENOLATE MOFETIL 500 MILLIGRAM(S): 500 TABLET, FILM COATED ORAL at 09:38

## 2025-03-04 RX ADMIN — POLYETHYLENE GLYCOL 3350 17 GRAM(S): 17 POWDER, FOR SOLUTION ORAL at 11:22

## 2025-03-04 RX ADMIN — MEROPENEM 100 MILLIGRAM(S): 1 INJECTION INTRAVENOUS at 05:38

## 2025-03-04 RX ADMIN — Medication 400 MILLIGRAM(S): at 01:49

## 2025-03-04 RX ADMIN — TACROLIMUS 0.5 MILLIGRAM(S): 0.5 CAPSULE ORAL at 11:22

## 2025-03-04 RX ADMIN — HEPARIN SODIUM 5000 UNIT(S): 1000 INJECTION INTRAVENOUS; SUBCUTANEOUS at 17:58

## 2025-03-04 RX ADMIN — ALBUMIN (HUMAN) 125 MILLILITER(S): 12.5 INJECTION, SOLUTION INTRAVENOUS at 02:57

## 2025-03-04 RX ADMIN — Medication 1 SPRAY(S): at 05:38

## 2025-03-04 RX ADMIN — Medication 20 MILLIEQUIVALENT(S): at 09:38

## 2025-03-04 RX ADMIN — TRAMADOL HYDROCHLORIDE 50 MILLIGRAM(S): 50 TABLET, FILM COATED ORAL at 17:59

## 2025-03-04 RX ADMIN — INSULIN LISPRO 2: 100 INJECTION, SOLUTION INTRAVENOUS; SUBCUTANEOUS at 09:38

## 2025-03-04 RX ADMIN — INSULIN LISPRO 4: 100 INJECTION, SOLUTION INTRAVENOUS; SUBCUTANEOUS at 12:48

## 2025-03-04 RX ADMIN — Medication 81 MILLIGRAM(S): at 11:21

## 2025-03-04 RX ADMIN — Medication 85 MILLIMOLE(S): at 11:22

## 2025-03-04 RX ADMIN — ALBUMIN (HUMAN) 125 MILLILITER(S): 12.5 INJECTION, SOLUTION INTRAVENOUS at 22:26

## 2025-03-04 RX ADMIN — Medication 1000 MILLIGRAM(S): at 02:49

## 2025-03-04 RX ADMIN — TRAMADOL HYDROCHLORIDE 50 MILLIGRAM(S): 50 TABLET, FILM COATED ORAL at 00:49

## 2025-03-04 RX ADMIN — MYCOPHENOLATE MOFETIL 500 MILLIGRAM(S): 500 TABLET, FILM COATED ORAL at 21:05

## 2025-03-04 RX ADMIN — CARVEDILOL 3.12 MILLIGRAM(S): 3.12 TABLET, FILM COATED ORAL at 19:40

## 2025-03-04 RX ADMIN — Medication 100 MILLIGRAM(S): at 11:22

## 2025-03-04 RX ADMIN — METHYLPREDNISOLONE ACETATE 40 MILLIGRAM(S): 80 INJECTION, SUSPENSION INTRA-ARTICULAR; INTRALESIONAL; INTRAMUSCULAR; SOFT TISSUE at 05:37

## 2025-03-04 RX ADMIN — Medication 1 SPRAY(S): at 17:59

## 2025-03-04 RX ADMIN — Medication 400 MILLIGRAM(S): at 11:21

## 2025-03-04 RX ADMIN — INSULIN GLARGINE-YFGN 6 UNIT(S): 100 INJECTION, SOLUTION SUBCUTANEOUS at 22:25

## 2025-03-04 RX ADMIN — Medication 40 MILLIGRAM(S): at 05:39

## 2025-03-04 RX ADMIN — HEPARIN SODIUM 5000 UNIT(S): 1000 INJECTION INTRAVENOUS; SUBCUTANEOUS at 05:38

## 2025-03-04 RX ADMIN — Medication 1 APPLICATION(S): at 11:27

## 2025-03-04 RX ADMIN — VELPATASVIR AND SOFOSBUVIR 1 TABLET(S): 50; 200 TABLET, FILM COATED ORAL at 22:25

## 2025-03-04 RX ADMIN — MEROPENEM 100 MILLIGRAM(S): 1 INJECTION INTRAVENOUS at 22:27

## 2025-03-04 NOTE — PROVIDER CONTACT NOTE (OTHER) - ACTION/TREATMENT ORDERED:
ESTRELLA Barnard made aware, EKG done and sent to provider. As per PA no need for lab work or further intervention at this time. After HD was completed, HR sustaining in 110s, PA aware.
CT head and UA ordered for further evaluation
Provider contacted- EKG ordered, completed and results reviewed by Dr. Jernigan.
Per ESTRELLA Guadarrama, perform another set of vitals and let her know if the event happens again on tele.
EKG. vitals as per PA
Provider notified and ok with reinforcement with abdominal pads and guaze/tape. Recommends to notify if increase in drainage and/or bleeding.
Provider notified and recommends currently ordered dose of midodrine
continue monitoring pt on bedside cardiac monitor - escalate any change in pt condition and send AM labs w/ electrolytes
PA Adarsh Barnard made aware, as per PA will reach out to team who placed chest tube. No further interventions needed at this time as per PA.
okay to empty ren drains now

## 2025-03-04 NOTE — PROVIDER CONTACT NOTE (OTHER) - BACKGROUND
43yo M with Hx decompensated EtOH cirrhosis c/b recurrent ascites, grade II EV, and HE who presented to OSH for abd pain and distension, found to have ascites and MANUELITO requiring HD. Pt POD#5 OLT.
pt POD 4 s/p OLT
Pt presents with liver failure
admitted for liver transplant evaluation
Liver failure
liver failure
43 y/o M pmhx ETOH cirrhosis s/p VATS procedure for empyema on 1/28/25 and OLT on 2/26/25.
Pt presents with liver failure
Pt liver transplant POD 2
Pt s/p liver transplant

## 2025-03-04 NOTE — PROGRESS NOTE ADULT - ASSESSMENT
45yo M with Hx decompensated EtOH cirrhosis c/b recurrent ascites, grade II EV, and HE who presented to OSH for abd pain and distension, found to have ascites and MANUELITO requiring HD. Patient was transferred to [facility] for management and liver transplant evaluation. His hospital course has been c/b an empyema s/p VATS decortication on 1/28 with Dr. Ramírez. Postoperative course has been c/b recurrent R pleural effusion. Thoracic Surgery consulted for intraoperative chest tube placement for drainage of recurrent R pleural effusion.     3/3   ct chest -Decreased right intrapleural gas bubbles, otherwise unchanged moderate loculated right pleural effusion and associated pleural thickening.     4/4   abx per ID,   ambulates w asst  on room air.

## 2025-03-04 NOTE — PROVIDER CONTACT NOTE (OTHER) - NAME OF MD/NP/PA/DO NOTIFIED:
ESTRELLA Abel
ESTRELLA Abel
ESTRELLA Barnard
ESTRELLA Barnard
ESTRELLA Wolf
ESTRELLA Beltran/ ESTRELLA GONZÁLES
ESTRELLA Barnard
Dominique Cooley
Dr. Almas Jernigan
ESTRELLA Guadarrama

## 2025-03-04 NOTE — PROVIDER CONTACT NOTE (OTHER) - ASSESSMENT
Pt A&Ox4 c/o pain at incision site (IV tylenol given). No c/o chest pain. Pt has slight SOB, but comfortable on 2LNC.
Pt had PAT of 125 for 4.3 seconds. Pt resting in bed, denies any adverse s/s
Pt now presenting w/ new onset of incontinence. Pt A&Ox4 with orientation questions, yet pt illogical in speech. Vital signs stable, FS remain WDL, pt afebrile
Vitals charted, non-symptomatic
pt denies discomfort and pain, vital signs stable- see flowsheet. Blood pressure: 107/65. HR:91. Chest tube site without drainage, redness. Pt denies any shortness of breath and denies chest pain.
Pt stable at this time VS as charted
Pt A&Ox4, able to make needs known. Pt asymptomatic. HR in the 110s other VSS. Scant serosanguinous drainage from right upper flank suture site. Pt is s/p chest tube removal from site. Pt denies cp, denies SOB, reports pain on exertion.
Pt A&Ox4, able to make needs known. Pt asymptomatic. Hr 121 other VSS. No s/s of bleeding. Pt denies cp, denies SOB, denies pain.
pt A&Ox4, c/o of heart palpitations. At time of assessment pt reports no longer having palpitations. /72, HR 98, O2 97% on room air, temp 99.2. Pt states "unsure if it is anxiety."
pt denies pain, chest tube site intact without drainage. pt blood pressure 125/67. O2 sat: 98% on room air. HR sustaining into the 130s.

## 2025-03-04 NOTE — PROGRESS NOTE ADULT - ASSESSMENT
43 yo M with PMH decompensated ETOH cirrhosis c/b recurrent ascites, grade II EV, and HE, right calf hematoma 10/2024 (s/p fall)  presented to [facility] for abdominal pain and distension. Patient found to have ascites on exam and MANUELITO requiring HD initiation PermCath was placed by vascular surgery on 1/10 with post procedure course c/b bleeding from insertion site. Transferred to [facility] SICU for further management.     Blood Cultures (1/16) 1/4 Staph epi.   GI PCR (1/17) + Giardia.   Paracentesis (1/17) 155 nucleated cell counts.     CT Chest (1/17) Moderate loculated right pleural effusion containing a few foci of air, which are new from 1/8/2025 and may represent empyema/bronchopleural fistula versus sequelae of prior thoracentesis.     Noted to have worsening abdominal pain and encephalopathy on 3/3/2025    BCx (3/3) NGTD  UCx (3/3) No growth    CT Chest (3/3) Decreased right intrapleural gas bubbles, otherwise unchanged moderate loculated right pleural effusion and associated pleural thickening.    CT A/P (3/3) Complex collection in the gallbladder fossa measuring 5.1 x 3.0 x 3.0 cm, which may be mildly decreased in size since the prior ultrasound of 2/28/2025, allowing for differences in modality. Hyperdensity within the collection, suggestive of hemorrhagic content. Small amount of free peritoneal fluid without additional discrete organized collection identified. Small amount of pneumoperitoneum, which may be postsurgical.    #Abdominal Pain, Encephalopathy, Abnormal CT A/P (fluid collections)  --Evaluate abdominal fluid collection for drainage (especially if clinical status changes, further encephalopathy)  --Continue Meropenem for now, if cultures remain unrevealing would switch back to Ceftriaxone 2g IV Q24H tomorrow (targeting preceding Citrobacter empyema)  --Continue to follow CBC with diff  --Continue to follow temperature curve  --Follow up on preliminary blood cultures    # s/p OLT 2/27/25 CMV D-/R+, EBV D+/R+, Toxoplasma D+/R-, HCV NAAT pos donor  Simulect. steroids induction  zosyn perioperatively for 24 hours followed by ceftriaxone until the reminder of Empyema/hematoma is addressed    Fluconazole per protocol   Note patient has bactrim allergy- needs atovaquone AND NO OTHER ALTERNATIVE for PCP , toxoplasma Ppx as he is high risk of donor derived infection  If atovaquone not tolerated, will need bactrim desensitization or challenge  --Continue Valcyte 450 mg PO Q24H     # HCV positive (NAAT  and Ab) donor  Follow HCV PCR testing per protocol and check genotype  and antivirals epclusa    #Empyema- citrobacter koseri  s/p VATS 1/28/25-  last positive cultures 1/25/25  for citrobacter koseri  --Repeat CT Chest 3/3 with persistent moderate right pleural effusion / hemothorax; thoracic surgery discussing surgical intervention     #Giardia  s/p 7 days of Metronidazole    #Positive Blood Culture ([facility] epi, 1/16)  procurement contaminant    I will continue to follow. Please feel free to contact me with any further questions.    Froilan Whitmore M.D.  [facility] Division of Infectious Disease  8AM-5PM Monday - Friday: Available on [employer] Teams  After Hours and Holidays (or if no response on [employer] Teams): Please contact the Infectious Diseases Office at [phone]    The above assessment and plan were discussed with Juanjo transplant surgery PA

## 2025-03-04 NOTE — PROVIDER CONTACT NOTE (OTHER) - DATE AND TIME:
03-Mar-2025 18:40
01-Mar-2025 05:50
10-Feb-2025 01:07
04-Mar-2025 01:46
03-Mar-2025 05:40
10-Feb-2025 00:00
17-Jan-2025 00:47
04-Mar-2025 18:26
18-Jan-2025 19:30
18-Jan-2025 22:45

## 2025-03-04 NOTE — PROGRESS NOTE ADULT - SUBJECTIVE AND OBJECTIVE BOX
Follow Up:      Interval History:    REVIEW OF SYSTEMS  [  ] ROS unobtainable because:    [  ] All other systems negative except as noted below    Constitutional:  [ ] fever [ ] chills  [ ] weight loss  [ ] weakness  Skin:  [ ] rash [ ] phlebitis	  Eyes: [ ] icterus [ ] pain  [ ] discharge	  ENMT: [ ] sore throat  [ ] thrush [ ] ulcers [ ] exudates  Respiratory: [ ] dyspnea [ ] hemoptysis [ ] cough [ ] sputum	  Cardiovascular:  [ ] chest pain [ ] palpitations [ ] edema	  Gastrointestinal:  [ ] nausea [ ] vomiting [ ] diarrhea [ ] constipation [ ] pain	  Genitourinary:  [ ] dysuria [ ] frequency [ ] hematuria [ ] discharge [ ] flank pain  [ ] incontinence  Musculoskeletal:  [ ] myalgias [ ] arthralgias [ ] arthritis  [ ] back pain  Neurological:  [ ] headache [ ] seizures  [ ] confusion/altered mental status    Allergies  sulfa drugs (Unknown)  Salicylic Acid (Other)        ANTIMICROBIALS:  fluconAZOLE   Tablet 400 daily  meropenem  IVPB    meropenem  IVPB 1000 every 8 hours  sofosbuvir 400 mG/velpatasvir 100 mG (EPCLUSA) 1 at bedtime  valGANciclovir 450 daily      OTHER MEDS:  MEDICATIONS  (STANDING):  dextrose 50% Injectable 25 once  dextrose 50% Injectable 12.5 once  dextrose 50% Injectable 25 once  dextrose Oral Gel 15 once PRN  glucagon  Injectable 1 once  heparin   Injectable 5000 every 12 hours  influenza   Vaccine 0.5 once  insulin glargine Injectable (LANTUS) 6 at bedtime  insulin lispro (ADMELOG) corrective regimen sliding scale  three times a day before meals  insulin lispro (ADMELOG) corrective regimen sliding scale  at bedtime  mycophenolate mofetil 500 <User Schedule>  ondansetron Injectable 4 every 6 hours PRN  pantoprazole    Tablet 40 before breakfast  polyethylene glycol 3350 17 daily  senna 2 at bedtime  simethicone 80 four times a day PRN      Vital Signs Last 24 Hrs  T(C): 36.8 (04 Mar 2025 12:49), Max: 36.9 (03 Mar 2025 16:39)  T(F): 98.3 (04 Mar 2025 12:49), Max: 98.4 (03 Mar 2025 16:39)  HR: 72 (04 Mar 2025 12:49) (56 - 74)  BP: 147/80 (04 Mar 2025 12:49) (140/76 - 166/79)  BP(mean): 103 (04 Mar 2025 04:39) (103 - 115)  RR: 18 (04 Mar 2025 12:49) (16 - 18)  SpO2: 100% (04 Mar 2025 12:49) (94% - 100%)    Parameters below as of 04 Mar 2025 12:49  Patient On (Oxygen Delivery Method): room air        PHYSICAL EXAMINATION:  General: Alert and Awake, NAD  HEENT: PERRL, EOMI  Neck: Supple  Cardiac: RRR, No M/R/G  Resp: CTAB, No Wh/Rh/Ra  Abdomen: NBS, NT/ND, No HSM, No rigidity or guarding  MSK: No LE edema. No Calf tenderness  : No yates  Skin: No rashes or lesions. Skin is warm and dry to the touch.   Neuro: Alert and Awake. CN 2-12 Grossly intact. Moves all four extremities spontaneously.  Psych: Calm, Pleasant, Cooperative                          10.0   3.92  )-----------( 50       ( 04 Mar 2025 07:31 )             29.8       03-04    138  |  103  |  36[H]  ----------------------------<  138[H]  3.4[L]   |  28  |  0.69    Ca    8.8      04 Mar 2025 07:31  Phos  1.7     03-04  Mg     1.9     03-04    TPro  6.4  /  Alb  2.8[L]  /  TBili  1.2  /  DBili  x   /  AST  12  /  ALT  14  /  AlkPhos  50  03-04      Urinalysis Basic - ( 04 Mar 2025 07:31 )    Color: x / Appearance: x / SG: x / pH: x  Gluc: 138 mg/dL / Ketone: x  / Bili: x / Urobili: x   Blood: x / Protein: x / Nitrite: x   Leuk Esterase: x / RBC: x / WBC x   Sq Epi: x / Non Sq Epi: x / Bacteria: x        MICROBIOLOGY:  v  Body Fluid ascites  02-27-25   No growth  --    No polymorphonuclear leukocytes seen  No organisms seen  by cytocentrifuge      Clean Catch Clean Catch (Midstream)  02-24-25   10,000 - 49,000 CFU/mL Enterococcus faecium (vancomycin resistant)  --  Enterococcus faecium (vancomycin resistant)      Peritoneal Peritoneal Fluid  02-21-25   No growth at 5 days  --    polymorphonuclear leukocytes seen  No organisms seen  by cytocentrifuge      Ascites Fl  02-06-25   No growth at 5 days  --    No polymorphonuclear cells seen  No organisms seen  by cytocentrifuge        CMV IgG Antibody: >10.00 U/mL (02-24-25 @ 04:59)  CMV IgG Antibody: >10.00 U/mL (02-16-25 @ 00:42)  CMV IgG Antibody: >10.00 U/mL (01-17-25 @ 06:41)  Toxoplasma IgG Screen: <3.00 IU/mL (01-17-25 @ 06:41)          RADIOLOGY:    <The imaging below has been reviewed and visualized by me independently. Findings as detailed in report below> Follow Up:  empyema    Interval History: afebrile. abdominal pain improved today. more alert.     REVIEW OF SYSTEMS  [  ] ROS unobtainable because:    [ x ] All other systems negative except as noted below    Constitutional:  [ ] fever [ ] chills  [ ] weight loss  [ ] weakness  Skin:  [ ] rash [ ] phlebitis	  Eyes: [ ] icterus [ ] pain  [ ] discharge	  ENMT: [ ] sore throat  [ ] thrush [ ] ulcers [ ] exudates  Respiratory: [ ] dyspnea [ ] hemoptysis [ ] cough [ ] sputum	  Cardiovascular:  [ ] chest pain [ ] palpitations [ ] edema	  Gastrointestinal:  [ ] nausea [ ] vomiting [ ] diarrhea [ ] constipation [ ] pain	  Genitourinary:  [ ] dysuria [ ] frequency [ ] hematuria [ ] discharge [ ] flank pain  [ ] incontinence  Musculoskeletal:  [ ] myalgias [ ] arthralgias [ ] arthritis  [ ] back pain  Neurological:  [ ] headache [ ] seizures  [ ] confusion/altered mental status    Allergies  sulfa drugs (Unknown)  Salicylic Acid (Other)        ANTIMICROBIALS:  fluconAZOLE   Tablet 400 daily  meropenem  IVPB    meropenem  IVPB 1000 every 8 hours  sofosbuvir 400 mG/velpatasvir 100 mG (EPCLUSA) 1 at bedtime  valGANciclovir 450 daily      OTHER MEDS:  MEDICATIONS  (STANDING):  dextrose 50% Injectable 25 once  dextrose 50% Injectable 12.5 once  dextrose 50% Injectable 25 once  dextrose Oral Gel 15 once PRN  glucagon  Injectable 1 once  heparin   Injectable 5000 every 12 hours  influenza   Vaccine 0.5 once  insulin glargine Injectable (LANTUS) 6 at bedtime  insulin lispro (ADMELOG) corrective regimen sliding scale  three times a day before meals  insulin lispro (ADMELOG) corrective regimen sliding scale  at bedtime  mycophenolate mofetil 500 <User Schedule>  ondansetron Injectable 4 every 6 hours PRN  pantoprazole    Tablet 40 before breakfast  polyethylene glycol 3350 17 daily  senna 2 at bedtime  simethicone 80 four times a day PRN      Vital Signs Last 24 Hrs  T(C): 36.8 (04 Mar 2025 12:49), Max: 36.9 (03 Mar 2025 16:39)  T(F): 98.3 (04 Mar 2025 12:49), Max: 98.4 (03 Mar 2025 16:39)  HR: 72 (04 Mar 2025 12:49) (56 - 74)  BP: 147/80 (04 Mar 2025 12:49) (140/76 - 166/79)  BP(mean): 103 (04 Mar 2025 04:39) (103 - 115)  RR: 18 (04 Mar 2025 12:49) (16 - 18)  SpO2: 100% (04 Mar 2025 12:49) (94% - 100%)    Parameters below as of 04 Mar 2025 12:49  Patient On (Oxygen Delivery Method): room air      PHYSICAL EXAMINATION:  General: Alert and Awake, NAD  Cardiac: RRR, No M/R/G  Resp: CTAB, No Wh/Rh/Ra  Abdomen: NBS, mild RUQ tenderness to palpation, No HSM, No rigidity or guarding  MSK: No LE edema. No Calf tenderness  : No yates  Skin: No rashes or lesions. Skin is warm and dry to the touch.   Neuro: Alert and Awake. CN 2-12 Grossly intact. Moves all four extremities spontaneously.  Psych: Calm, Pleasant, Cooperative                          10.0   3.92  )-----------( 50       ( 04 Mar 2025 07:31 )             29.8       03-04    138  |  103  |  36[H]  ----------------------------<  138[H]  3.4[L]   |  28  |  0.69    Ca    8.8      04 Mar 2025 07:31  Phos  1.7     03-04  Mg     1.9     03-04    TPro  6.4  /  Alb  2.8[L]  /  TBili  1.2  /  DBili  x   /  AST  12  /  ALT  14  /  AlkPhos  50  03-04      Urinalysis Basic - ( 04 Mar 2025 07:31 )    Color: x / Appearance: x / SG: x / pH: x  Gluc: 138 mg/dL / Ketone: x  / Bili: x / Urobili: x   Blood: x / Protein: x / Nitrite: x   Leuk Esterase: x / RBC: x / WBC x   Sq Epi: x / Non Sq Epi: x / Bacteria: x        MICROBIOLOGY:  v  Body Fluid ascites  02-27-25   No growth  --    No polymorphonuclear leukocytes seen  No organisms seen  by cytocentrifuge      Clean Catch Clean Catch (Midstream)  02-24-25   10,000 - 49,000 CFU/mL Enterococcus faecium (vancomycin resistant)  --  Enterococcus faecium (vancomycin resistant)      Peritoneal Peritoneal Fluid  02-21-25   No growth at 5 days  --    polymorphonuclear leukocytes seen  No organisms seen  by cytocentrifuge      Ascites Fl  02-06-25   No growth at 5 days  --    No polymorphonuclear cells seen  No organisms seen  by cytocentrifuge        CMV IgG Antibody: >10.00 U/mL (02-24-25 @ 04:59)  CMV IgG Antibody: >10.00 U/mL (02-16-25 @ 00:42)  CMV IgG Antibody: >10.00 U/mL (01-17-25 @ 06:41)  Toxoplasma IgG Screen: <3.00 IU/mL (01-17-25 @ 06:41)          RADIOLOGY:    <The imaging below has been reviewed and visualized by me independently. Findings as detailed in report below>    < from: CT Abdomen and Pelvis No Cont (03.03.25 @ 23:08) >  IMPRESSION:  *  Status post liver transplant. Periportal edema.  *  Complex collection in the gallbladder fossa measuring 5.1 x 3.0 x 3.0   cm, which may be mildly decreased in size since the prior ultrasound of   2/28/2025, allowing for differences in modality. Hyperdensity within the   collection, suggestive of hemorrhagic content.  *  Small amount of free peritoneal fluid without additional discrete   organized collection identified. Small amount of pneumoperitoneum, which   may be postsurgical.  *  Mildly distended small bowel loops without a discrete site of   transition identified, which may represent ileus.  *  Redemonstrated moderate loculated right pleural effusion with pleural   thickening and small pneumothorax component, imaged portion similar to   the chest CT performed earlier the same day. Unchanged trace left pleural   effusion.    < end of copied text >

## 2025-03-04 NOTE — PROGRESS NOTE ADULT - SUBJECTIVE AND OBJECTIVE BOX
Transplant Surgery - Multidisciplinary Rounds  --------------------------------------------------------------   Donor OLTx      Date:  25       POD#5    Present:   Patient seen and examined with multidisciplinary Transplant team including Surgeon Dr. Carter, Hepatologist Dr. Larsen, ESTRELLA Gagnon/ESTRELLA Valente and bedside RN during AM rounds.   Disciplines not in attendance will be notified of the plan    HPI: 44M PMH decompensated ETOH cirrhosis c/b recurrent ascites, grade II esophageal varices, and hepatic encephalopathy, who presented to OSH with abd pain and distension found to have decompensated EtOH cirrhosis with MELD 30, MANUELITO (HRS vs ATN; previous HD need with [facility]) found to have R empyema s/p R pigtail placement w/ IR  requiring several adjustments, s/p R robotic VATS with decortication (Dr. Ramírez 2025) for loculated empyema. OR course with oropharyngeal bleeding during intubation, coagulopathy/elevated EBL. Admitted for transplant evaluation and found to have reaccumulation of R pleural effusion, now s/p IR drainage and s/p OLTx.     Interval Events:            Immunosuppression  Induction: Simulect completed  Maintenance: FK held, MMF , SST  Ongoing monitoring for signs of rejection     Potential Discharge date: TBD  Education:  Medications  Plan of care:  See Below                                      Review of systems  All other systems were reviewed and are negative, except as noted.    PHYSICAL EXAM:  Constitutional: Well developed / well nourished  Eyes: PERRLA  ENMT: nc/at, no thrush  Neck: supple  Respiratory: CTA B/L  Cardiovascular: RRR  Gastrointestinal: Soft abdomen, ND, appropriate incisional TTP. chevron incision c/d/i, staples in place. No signs of infection.   Genitourinary: voiding  Extremities: SCD's in place and working bilaterally  Vascular: Palpable dp pulses bilaterally.   Neurological: A&O x3  Skin: no rashes, ulcerations, lesions  Musculoskeletal: Moving all extremities  Psychiatric: Responsive  Transplant Surgery - Multidisciplinary Rounds  --------------------------------------------------------------   Donor OLTx      Date:  25       POD#5    Present:   Patient seen and examined with multidisciplinary Transplant team including Surgeon Dr. Carter, Hepatologist Dr. Larsen, ESTRELLA Gagnon/ESTRELLA Valente and bedside RN during AM rounds.   Disciplines not in attendance will be notified of the plan    HPI: 44M PMH decompensated ETOH cirrhosis c/b recurrent ascites, grade II esophageal varices, and hepatic encephalopathy, who presented to OSH with abd pain and distension found to have decompensated EtOH cirrhosis with MELD 30, MANUELITO (HRS vs ATN; previous HD need with [facility]) found to have R empyema s/p R pigtail placement w/ IR  requiring several adjustments, s/p R robotic VATS with decortication (Dr. Ramírez 2025) for loculated empyema. OR course with oropharyngeal bleeding during intubation, coagulopathy/elevated EBL. Admitted for transplant evaluation and found to have reaccumulation of R pleural effusion, now s/p IR drainage and s/p OLTx.     Interval Events:  - afebrile, VSS  - completed SImulect  - improving ileus  - CT HCAP: neg head, loculated R pleural effusion  - broadened to sharon      Immunosuppression  Induction: Simulect completed  Maintenance: FK per level, MMF , SST  Ongoing monitoring for signs of rejection     Potential Discharge date: TBD  Education:  Medications  Plan of care:  See Below      MEDICATIONS  (STANDING):  albumin human  5% IVPB 250 milliLiter(s) IV Intermittent <User Schedule>  chlorhexidine 2% Cloths 1 Application(s) Topical daily  dextrose 5%. 1000 milliLiter(s) (100 mL/Hr) IV Continuous <Continuous>  dextrose 5%. 1000 milliLiter(s) (50 mL/Hr) IV Continuous <Continuous>  dextrose 50% Injectable 25 Gram(s) IV Push once  dextrose 50% Injectable 12.5 Gram(s) IV Push once  dextrose 50% Injectable 25 Gram(s) IV Push once  fluconAZOLE   Tablet 400 milliGRAM(s) Oral daily  glucagon  Injectable 1 milliGRAM(s) IntraMuscular once  heparin   Injectable 5000 Unit(s) SubCutaneous every 12 hours  influenza   Vaccine 0.5 milliLiter(s) IntraMuscular once  insulin glargine Injectable (LANTUS) 6 Unit(s) SubCutaneous at bedtime  insulin lispro (ADMELOG) corrective regimen sliding scale   SubCutaneous three times a day before meals  insulin lispro (ADMELOG) corrective regimen sliding scale   SubCutaneous at bedtime  lidocaine 1% Injectable 1 Vial(s) Local Injection once  meropenem  IVPB      meropenem  IVPB 1000 milliGRAM(s) IV Intermittent every 8 hours  mycophenolate mofetil 500 milliGRAM(s) Oral <User Schedule>  pantoprazole    Tablet 40 milliGRAM(s) Oral before breakfast  polyethylene glycol 3350 17 Gram(s) Oral daily  senna 2 Tablet(s) Oral at bedtime  sodium chloride 0.65% Nasal 1 Spray(s) Both Nostrils two times a day  sofosbuvir 400 mG/velpatasvir 100 mG (EPCLUSA) 1 Tablet(s) Oral at bedtime  thiamine 100 milliGRAM(s) Oral daily  valGANciclovir 450 milliGRAM(s) Oral daily    MEDICATIONS  (PRN):  dextrose Oral Gel 15 Gram(s) Oral once PRN Blood Glucose LESS THAN 70 milliGRAM(s)/deciliter  ondansetron Injectable 4 milliGRAM(s) IV Push every 6 hours PRN Nausea and/or Vomiting  simethicone 80 milliGRAM(s) Chew four times a day PRN Gas      PAST MEDICAL & SURGICAL HISTORY:  Sleep apnea, obstructive      Alcohol abuse      Cirrhosis of liver      Portal hypertension      H/O esophageal varices      Anemia      Mild alcohol use disorder      No significant past surgical history          Vital Signs Last 24 Hrs  T(C): 36.8 (04 Mar 2025 12:49), Max: 36.9 (03 Mar 2025 13:30)  T(F): 98.3 (04 Mar 2025 12:49), Max: 98.5 (03 Mar 2025 13:30)  HR: 72 (04 Mar 2025 12:49) (56 - 74)  BP: 147/80 (04 Mar 2025 12:49) (140/76 - 166/79)  BP(mean): 103 (04 Mar 2025 04:39) (103 - 115)  RR: 18 (04 Mar 2025 12:49) (16 - 18)  SpO2: 100% (04 Mar 2025 12:49) (94% - 100%)    Parameters below as of 04 Mar 2025 12:49  Patient On (Oxygen Delivery Method): room air        I&O's Summary    03 Mar 2025 07:01  -  04 Mar 2025 07:00  --------------------------------------------------------  IN: 655 mL / OUT: 2875 mL / NET: -2220 mL    04 Mar 2025 07:01  -  04 Mar 2025 13:09  --------------------------------------------------------  IN: 480 mL / OUT: 1360 mL / NET: -880 mL                              10.0   3.92  )-----------( 50       ( 04 Mar 2025 07:31 )             29.8     03-04    138  |  103  |  36[H]  ----------------------------<  138[H]  3.4[L]   |  28  |  0.69    Ca    8.8      04 Mar 2025 07:31  Phos  1.7     03-04  Mg     1.9     03-04    TPro  6.4  /  Alb  2.8[L]  /  TBili  1.2  /  DBili  x   /  AST  12  /  ALT  14  /  AlkPhos  50  03-04    Tacrolimus (), Serum: <0.8 ng/mL ( @ 07:31)        Culture - Acid Fast - Body Fluid w/Smear (collected 25 @ 19:26)  Source: Body Fluid ascites  Preliminary Report (25 @ 23:06):    Culture is being performed.    Culture - Fungal, Body Fluid (collected 25 @ 19:26)  Source: Body Fluid ascites  Preliminary Report (25 @ 10:23):    No growth    Culture - Body Fluid with Gram Stain (collected 25 @ 19:26)  Source: Body Fluid ascites  Gram Stain (25 @ 02:36):    No polymorphonuclear leukocytes seen    No organisms seen    by cytocentrifuge  Preliminary Report (25 @ 16:48):    No growth        Review of systems  All other systems were reviewed and are negative, except as noted.    PHYSICAL EXAM:  Constitutional: Well developed / well nourished  Eyes: PERRLA  ENMT: nc/at, no thrush  Neck: supple  Respiratory: CTA B/L  Cardiovascular: RRR  Gastrointestinal: Soft abdomen, ND, appropriate incisional TTP. chevron incision c/d/i, staples in place. No signs of infection. jpx2   Genitourinary: voiding  Extremities: SCD's in place and working bilaterally  Vascular: Palpable dp pulses bilaterally.   Neurological: A&O x3  Skin: no rashes, ulcerations, lesions  Musculoskeletal: Moving all extremities  Psychiatric: Responsive

## 2025-03-04 NOTE — PROGRESS NOTE ADULT - SUBJECTIVE AND OBJECTIVE BOX
VITAL SIGNS    Vital Signs Last 24 Hrs  T(C): 36.8 (03-04-25 @ 12:49), Max: 36.9 (03-03-25 @ 16:39)  T(F): 98.3 (03-04-25 @ 12:49), Max: 98.4 (03-03-25 @ 16:39)  HR: 72 (03-04-25 @ 12:49) (56 - 74)  BP: 147/80 (03-04-25 @ 12:49) (140/76 - 166/79)  RR: 18 (03-04-25 @ 12:49) (16 - 18)  SpO2: 100% (03-04-25 @ 12:49) (94% - 100%)                   Daily     Daily       Bilirubin Total: 1.2 mg/dL (03-04 @ 07:31)    CAPILLARY BLOOD GLUCOSE      POCT Blood Glucose.: 248 mg/dL (04 Mar 2025 12:08)  POCT Blood Glucose.: 187 mg/dL (04 Mar 2025 09:37)  POCT Blood Glucose.: 194 mg/dL (04 Mar 2025 08:30)  POCT Blood Glucose.: 205 mg/dL (03 Mar 2025 21:35)  POCT Blood Glucose.: 164 mg/dL (03 Mar 2025 17:52)  POCT Blood Glucose.: 211 mg/dL (03 Mar 2025 16:37)                         PHYSICAL EXAM    Neurology: alert and oriented x 3, moves all extremities with no defecits  CV :  RRR    Lungs:   CTA B/L  Abdomen: soft, nontender, nondistended, positive bowel sounds,  Abdominal stpales intact  Extremities:     plus one pedal edema

## 2025-03-04 NOTE — PROGRESS NOTE ADULT - ASSESSMENT
45yo M with Hx decompensated EtOH cirrhosis c/b recurrent ascites, grade II EV, and HE who presented to OSH for abd pain and distension, found to have ascites and MANUELITO requiring HD. Patient was transferred to [facility] for management and liver transplant evaluation. His hospital course has been c/b an empyema s/p VATS decortication on 1/28 with Dr. Ramírez. Postoperative course has been c/b recurrent R pleural effusion s/p IR drainage. Admitted for transplant evaluation, now s/p OLT on 2/27.     [ ] DCD OLT POD 5  - LFTs trending down   - HCV viremia (donor hcv +) - f/u genotype; started Epclusa 3/1   - Strict I/O's jpx3  - SQH, ASA  - Pain control  - bowel regimen  - PT/SCD/IS  - On CTX for the R pleural effusion per ID    [] Immuno  - simulect completed  - FK held for worsening MS, decrease MMF 500mg bid, , SST  - ppx: no bactrim due to sulfa allergy, fluc, valcyte    [] Ileus   - having bowel function  - avoid narcotics,   - Advanced to clears     [] Worsening MS  - FK held, decr MMF 500mg bid  - Bld/urine cxs, lactate/procal  - check ammonia with AM labs  - CTH neg, CT chest with Loculated R pleural effusion  - CTS reconsulted  - ID consult: broadened to meropenem   - CTAP tonight  45yo M with Hx decompensated EtOH cirrhosis c/b recurrent ascites, grade II EV, and HE who presented to OSH for abd pain and distension, found to have ascites and MANUELITO requiring HD. Patient was transferred to [facility] for management and liver transplant evaluation. His hospital course has been c/b an empyema s/p VATS decortication on 1/28 with Dr. Ramírez. Postoperative course has been c/b recurrent R pleural effusion s/p IR drainage. Admitted for transplant evaluation, now s/p OLT on 2/27.     [ ] DCD OLT POD 5  - LFTs trending down   - HCV viremia (donor hcv +) - f/u genotype; started Epclusa 3/1   - Strict I/O's jpx2  - replace ADAM output with Alb  - SQH, ASA- HELD for possible VATS  - Pain control  - bowel regimen  - PT/SCD/IS  - On CTX for the R pleural effusion per ID    [] Immuno  - simulect completed  - FK resume FK 0.5 QD, MMF 500mg bid, SST  - ppx: no bactrim due to sulfa allergy, fluc, valcyte    [] Ileus   - having bowel function  - avoid narcotics,   - Advanced to regular diet    [] Worsening MS  - fu infectious work up  - check ammonia with AM labs  - CTH neg, CT chest with Loculated R pleural effusion  - CTS reconsulted, possible VATS on Thursday  - ID consult: broadened to meropenem

## 2025-03-05 DIAGNOSIS — Z01.818 ENCOUNTER FOR OTHER PREPROCEDURAL EXAMINATION: ICD-10-CM

## 2025-03-05 PROBLEM — Z79.60 LONG-TERM USE OF IMMUNOSUPPRESSANT MEDICATION: Status: ACTIVE | Noted: 2025-03-05

## 2025-03-05 PROBLEM — Z94.4 LIVER TRANSPLANT RECIPIENT: Status: ACTIVE | Noted: 2025-03-05

## 2025-03-05 LAB
ALBUMIN FLD-MCNC: <0.3 G/DL — SIGNIFICANT CHANGE UP
ALBUMIN SERPL ELPH-MCNC: 2.9 G/DL — LOW (ref 3.3–5)
ALP SERPL-CCNC: 46 U/L — SIGNIFICANT CHANGE UP (ref 40–120)
ALT FLD-CCNC: 10 U/L — SIGNIFICANT CHANGE UP (ref 10–45)
ANION GAP SERPL CALC-SCNC: 8 MMOL/L — SIGNIFICANT CHANGE UP (ref 5–17)
APTT BLD: 27.5 SEC — SIGNIFICANT CHANGE UP (ref 24.5–35.6)
AST SERPL-CCNC: 8 U/L — LOW (ref 10–40)
B PERT IGG+IGM PNL SER: ABNORMAL
BILIRUB SERPL-MCNC: 1.2 MG/DL — SIGNIFICANT CHANGE UP (ref 0.2–1.2)
BUN SERPL-MCNC: 27 MG/DL — HIGH (ref 7–23)
CALCIUM SERPL-MCNC: 8.6 MG/DL — SIGNIFICANT CHANGE UP (ref 8.4–10.5)
CHLORIDE SERPL-SCNC: 105 MMOL/L — SIGNIFICANT CHANGE UP (ref 96–108)
CO2 SERPL-SCNC: 27 MMOL/L — SIGNIFICANT CHANGE UP (ref 22–31)
COLOR FLD: ABNORMAL
CREAT SERPL-MCNC: 0.7 MG/DL — SIGNIFICANT CHANGE UP (ref 0.5–1.3)
CULTURE RESULTS: SIGNIFICANT CHANGE UP
EGFR: 115 ML/MIN/1.73M2 — SIGNIFICANT CHANGE UP
EGFR: 115 ML/MIN/1.73M2 — SIGNIFICANT CHANGE UP
FLUID INTAKE SUBSTANCE CLASS: SIGNIFICANT CHANGE UP
GLUCOSE BLDC GLUCOMTR-MCNC: 106 MG/DL — HIGH (ref 70–99)
GLUCOSE BLDC GLUCOMTR-MCNC: 123 MG/DL — HIGH (ref 70–99)
GLUCOSE BLDC GLUCOMTR-MCNC: 138 MG/DL — HIGH (ref 70–99)
GLUCOSE BLDC GLUCOMTR-MCNC: 245 MG/DL — HIGH (ref 70–99)
GLUCOSE FLD-MCNC: <6 MG/DL — SIGNIFICANT CHANGE UP
GLUCOSE SERPL-MCNC: 101 MG/DL — HIGH (ref 70–99)
HCT VFR BLD CALC: 28.5 % — LOW (ref 39–50)
HCV GENTYP BLD NAA+PROBE: SIGNIFICANT CHANGE UP
HGB BLD-MCNC: 9.6 G/DL — LOW (ref 13–17)
INR BLD: 1.02 RATIO — SIGNIFICANT CHANGE UP (ref 0.85–1.16)
LDH SERPL L TO P-CCNC: 159 U/L — SIGNIFICANT CHANGE UP
MAGNESIUM SERPL-MCNC: 1.7 MG/DL — SIGNIFICANT CHANGE UP (ref 1.6–2.6)
MCHC RBC-ENTMCNC: 31.3 PG — SIGNIFICANT CHANGE UP (ref 27–34)
MCHC RBC-ENTMCNC: 33.7 G/DL — SIGNIFICANT CHANGE UP (ref 32–36)
MCV RBC AUTO: 92.8 FL — SIGNIFICANT CHANGE UP (ref 80–100)
NEUTROPHILS-BODY FLUID: SIGNIFICANT CHANGE UP %
NRBC BLD AUTO-RTO: 0 /100 WBCS — SIGNIFICANT CHANGE UP (ref 0–0)
PHOSPHATE SERPL-MCNC: 1.7 MG/DL — LOW (ref 2.5–4.5)
PLATELET # BLD AUTO: 42 K/UL — LOW (ref 150–400)
POTASSIUM SERPL-MCNC: 3.7 MMOL/L — SIGNIFICANT CHANGE UP (ref 3.5–5.3)
POTASSIUM SERPL-SCNC: 3.7 MMOL/L — SIGNIFICANT CHANGE UP (ref 3.5–5.3)
PROT FLD-MCNC: <0.6 G/DL — SIGNIFICANT CHANGE UP
PROT SERPL-MCNC: 5.9 G/DL — LOW (ref 6–8.3)
PROTHROM AB SERPL-ACNC: 11.6 SEC — SIGNIFICANT CHANGE UP (ref 9.9–13.4)
RBC # BLD: 3.07 M/UL — LOW (ref 4.2–5.8)
RBC # FLD: 17.4 % — HIGH (ref 10.3–14.5)
RCV VOL RI: HIGH CELLS/UL
SODIUM SERPL-SCNC: 140 MMOL/L — SIGNIFICANT CHANGE UP (ref 135–145)
SPECIMEN SOURCE: SIGNIFICANT CHANGE UP
TACROLIMUS SERPL-MCNC: 0.9 NG/ML — SIGNIFICANT CHANGE UP
TOTAL CELLS COUNTED, BODY FLUID: 59 CELLS — SIGNIFICANT CHANGE UP
TOTAL NUCLEATED CELL COUNT, BODY FLUID: 59 CELLS/UL — SIGNIFICANT CHANGE UP
TUBE TYPE: SIGNIFICANT CHANGE UP
WBC # BLD: 3.8 K/UL — SIGNIFICANT CHANGE UP (ref 3.8–10.5)
WBC # FLD AUTO: 3.8 K/UL — SIGNIFICANT CHANGE UP (ref 3.8–10.5)
WBC COUNT.: 59 CELLS/UL — SIGNIFICANT CHANGE UP

## 2025-03-05 PROCEDURE — 99232 SBSQ HOSP IP/OBS MODERATE 35: CPT

## 2025-03-05 PROCEDURE — 71045 X-RAY EXAM CHEST 1 VIEW: CPT | Mod: 26,76

## 2025-03-05 PROCEDURE — 32555 ASPIRATE PLEURA W/ IMAGING: CPT | Mod: RT

## 2025-03-05 PROCEDURE — G0545: CPT

## 2025-03-05 RX ORDER — TACROLIMUS 0.5 MG/1
0.5 CAPSULE ORAL
Refills: 0 | Status: COMPLETED | OUTPATIENT
Start: 2025-03-05 | End: 2025-03-05

## 2025-03-05 RX ORDER — VALGANCICLOVIR HYDROCHLORIDE 450 MG/1
450 TABLET ORAL
Qty: 30 | Refills: 2 | Status: ACTIVE | COMMUNITY
Start: 2025-03-05 | End: 1900-01-01

## 2025-03-05 RX ORDER — ATOVAQUONE 750 MG/5ML
1500 SUSPENSION ORAL
Refills: 0 | Status: COMPLETED | OUTPATIENT
Start: 2025-03-05 | End: 2025-03-05

## 2025-03-05 RX ORDER — OMEGA-3/DHA/EPA/FISH OIL 300-1000MG
400 CAPSULE ORAL
Qty: 60 | Refills: 3 | Status: ACTIVE | COMMUNITY
Start: 2025-03-05 | End: 1900-01-01

## 2025-03-05 RX ORDER — VELPATASVIR AND SOFOSBUVIR 100; 400 MG/1; MG/1
400-100 TABLET, FILM COATED ORAL
Qty: 30 | Refills: 2 | Status: ACTIVE | COMMUNITY
Start: 2025-03-05 | End: 1900-01-01

## 2025-03-05 RX ORDER — MAGNESIUM SULFATE 500 MG/ML
2 SYRINGE (ML) INJECTION ONCE
Refills: 0 | Status: COMPLETED | OUTPATIENT
Start: 2025-03-05 | End: 2025-03-05

## 2025-03-05 RX ORDER — SENNOSIDES 8.6 MG TABLETS 8.6 MG/1
8.6 TABLET ORAL DAILY
Qty: 30 | Refills: 3 | Status: ACTIVE | COMMUNITY
Start: 2025-03-05 | End: 1900-01-01

## 2025-03-05 RX ORDER — TACROLIMUS 0.5 MG/1
0.5 CAPSULE ORAL
Qty: 60 | Refills: 5 | Status: ACTIVE | COMMUNITY
Start: 2025-03-05 | End: 1900-01-01

## 2025-03-05 RX ORDER — PANTOPRAZOLE 40 MG/1
40 TABLET, DELAYED RELEASE ORAL DAILY
Qty: 30 | Refills: 3 | Status: ACTIVE | COMMUNITY
Start: 2025-03-05 | End: 1900-01-01

## 2025-03-05 RX ORDER — ATOVAQUONE 750 MG/5ML
1500 SUSPENSION ORAL DAILY
Refills: 0 | Status: DISCONTINUED | OUTPATIENT
Start: 2025-03-06 | End: 2025-03-11

## 2025-03-05 RX ORDER — PREDNISONE 10 MG/1
10 TABLET ORAL DAILY
Qty: 60 | Refills: 3 | Status: ACTIVE | COMMUNITY
Start: 2025-03-05 | End: 1900-01-01

## 2025-03-05 RX ORDER — TACROLIMUS 1 MG/1
1 CAPSULE ORAL TWICE DAILY
Qty: 240 | Refills: 5 | Status: ACTIVE | COMMUNITY
Start: 2025-03-05 | End: 1900-01-01

## 2025-03-05 RX ORDER — ACETAMINOPHEN 325 MG/1
325 TABLET ORAL
Qty: 240 | Refills: 3 | Status: ACTIVE | COMMUNITY
Start: 2025-03-05 | End: 1900-01-01

## 2025-03-05 RX ORDER — TACROLIMUS 0.5 MG/1
0.5 CAPSULE ORAL
Refills: 0 | Status: DISCONTINUED | OUTPATIENT
Start: 2025-03-06 | End: 2025-03-06

## 2025-03-05 RX ORDER — TACROLIMUS 5 MG/1
5 CAPSULE ORAL
Qty: 60 | Refills: 5 | Status: ACTIVE | COMMUNITY
Start: 2025-03-05 | End: 1900-01-01

## 2025-03-05 RX ORDER — SODIUM PHOSPHATE,DIBASIC DIHYD
15 POWDER (GRAM) MISCELLANEOUS ONCE
Refills: 0 | Status: COMPLETED | OUTPATIENT
Start: 2025-03-05 | End: 2025-03-05

## 2025-03-05 RX ORDER — CARVEDILOL 3.12 MG/1
3.12 TABLET, FILM COATED ORAL TWICE DAILY
Qty: 60 | Refills: 3 | Status: ACTIVE | COMMUNITY
Start: 2025-03-05 | End: 1900-01-01

## 2025-03-05 RX ORDER — MYCOPHENOLATE MOFETIL 500 MG/1
500 TABLET ORAL
Qty: 120 | Refills: 5 | Status: ACTIVE | COMMUNITY
Start: 2025-03-05 | End: 1900-01-01

## 2025-03-05 RX ORDER — FUROSEMIDE 10 MG/ML
40 INJECTION INTRAMUSCULAR; INTRAVENOUS
Refills: 0 | Status: COMPLETED | OUTPATIENT
Start: 2025-03-05 | End: 2025-03-05

## 2025-03-05 RX ORDER — B-COMPLEX WITH VITAMIN C
500-5 TABLET ORAL
Qty: 60 | Refills: 3 | Status: ACTIVE | COMMUNITY
Start: 2025-03-05 | End: 1900-01-01

## 2025-03-05 RX ORDER — ATOVAQUONE 750 MG/5ML
750 SUSPENSION ORAL DAILY
Qty: 300 | Refills: 5 | Status: ACTIVE | COMMUNITY
Start: 2025-03-05 | End: 1900-01-01

## 2025-03-05 RX ADMIN — Medication 100 MILLIGRAM(S): at 12:11

## 2025-03-05 RX ADMIN — HEPARIN SODIUM 5000 UNIT(S): 1000 INJECTION INTRAVENOUS; SUBCUTANEOUS at 05:38

## 2025-03-05 RX ADMIN — INSULIN LISPRO 4: 100 INJECTION, SOLUTION INTRAVENOUS; SUBCUTANEOUS at 12:12

## 2025-03-05 RX ADMIN — TRAMADOL HYDROCHLORIDE 50 MILLIGRAM(S): 50 TABLET, FILM COATED ORAL at 15:31

## 2025-03-05 RX ADMIN — INSULIN GLARGINE-YFGN 6 UNIT(S): 100 INJECTION, SOLUTION SUBCUTANEOUS at 22:06

## 2025-03-05 RX ADMIN — MEROPENEM 100 MILLIGRAM(S): 1 INJECTION INTRAVENOUS at 05:39

## 2025-03-05 RX ADMIN — VELPATASVIR AND SOFOSBUVIR 1 TABLET(S): 50; 200 TABLET, FILM COATED ORAL at 22:29

## 2025-03-05 RX ADMIN — MEROPENEM 100 MILLIGRAM(S): 1 INJECTION INTRAVENOUS at 14:57

## 2025-03-05 RX ADMIN — TRAMADOL HYDROCHLORIDE 25 MILLIGRAM(S): 50 TABLET, FILM COATED ORAL at 23:30

## 2025-03-05 RX ADMIN — TACROLIMUS 0.5 MILLIGRAM(S): 0.5 CAPSULE ORAL at 08:32

## 2025-03-05 RX ADMIN — VALGANCICLOVIR 450 MILLIGRAM(S): 450 TABLET, FILM COATED ORAL at 12:11

## 2025-03-05 RX ADMIN — Medication 1 APPLICATION(S): at 12:09

## 2025-03-05 RX ADMIN — Medication 40 MILLIGRAM(S): at 05:38

## 2025-03-05 RX ADMIN — CARVEDILOL 3.12 MILLIGRAM(S): 3.12 TABLET, FILM COATED ORAL at 05:41

## 2025-03-05 RX ADMIN — MYCOPHENOLATE MOFETIL 500 MILLIGRAM(S): 500 TABLET, FILM COATED ORAL at 08:32

## 2025-03-05 RX ADMIN — FUROSEMIDE 40 MILLIGRAM(S): 10 INJECTION INTRAMUSCULAR; INTRAVENOUS at 12:12

## 2025-03-05 RX ADMIN — Medication 400 MILLIGRAM(S): at 12:11

## 2025-03-05 RX ADMIN — CARVEDILOL 3.12 MILLIGRAM(S): 3.12 TABLET, FILM COATED ORAL at 20:31

## 2025-03-05 RX ADMIN — TACROLIMUS 0.5 MILLIGRAM(S): 0.5 CAPSULE ORAL at 20:31

## 2025-03-05 RX ADMIN — TRAMADOL HYDROCHLORIDE 50 MILLIGRAM(S): 50 TABLET, FILM COATED ORAL at 05:30

## 2025-03-05 RX ADMIN — TRAMADOL HYDROCHLORIDE 50 MILLIGRAM(S): 50 TABLET, FILM COATED ORAL at 16:01

## 2025-03-05 RX ADMIN — TRAMADOL HYDROCHLORIDE 50 MILLIGRAM(S): 50 TABLET, FILM COATED ORAL at 04:12

## 2025-03-05 RX ADMIN — MYCOPHENOLATE MOFETIL 500 MILLIGRAM(S): 500 TABLET, FILM COATED ORAL at 20:31

## 2025-03-05 RX ADMIN — MEROPENEM 100 MILLIGRAM(S): 1 INJECTION INTRAVENOUS at 22:06

## 2025-03-05 RX ADMIN — Medication 63.75 MILLIMOLE(S): at 10:26

## 2025-03-05 RX ADMIN — TRAMADOL HYDROCHLORIDE 25 MILLIGRAM(S): 50 TABLET, FILM COATED ORAL at 22:30

## 2025-03-05 RX ADMIN — ATOVAQUONE 1500 MILLIGRAM(S): 750 SUSPENSION ORAL at 14:58

## 2025-03-05 RX ADMIN — PREDNISONE 20 MILLIGRAM(S): 20 TABLET ORAL at 05:41

## 2025-03-05 RX ADMIN — Medication 25 GRAM(S): at 10:28

## 2025-03-05 NOTE — PRE PROCEDURE NOTE - PRE PROCEDURE EVALUATION
Interventional Radiology    HPI: 44M PMH decompensated ETOH cirrhosis c/b recurrent ascites, grade II esophageal varices, and hepatic encephalopathy, who presented to OSH with abd pain and distension found to have decompensated EtOH cirrhosis with MELD 30, MANUELITO (HRS vs ATN; previous HD need with [facility]) found to have R empyema s/p R pigtail placement w/ IR 1/18 requiring several adjustments, s/p R robotic VATS with decortication (Dr. Ramírez 1/28/2025) for loculated empyema. s/p OLT on 2/2725- OR course with oropharyngeal bleeding during intubation, coagulopathy/elevated EBL.   Pt now with reaccumulation of right pleural effusion. Pt presents to IR for right thoracentesis +/- chest tube placement. Plan for 1 unit of platelets prior to procedure.     Allergies: sulfa drugs (Unknown)  Salicylic Acid (Other)    Medications (Abx/Cardiac/Anticoagulation/Blood Products)  aspirin enteric coated: 81 milliGRAM(s) Oral (03-04 @ 11:21)  atovaquone  Suspension: 1500 milliGRAM(s) Oral (03-05 @ 14:58)  carvedilol: 3.125 milliGRAM(s) Oral (03-05 @ 05:41)  carvedilol: 3.125 milliGRAM(s) Oral (03-04 @ 19:40)  fluconAZOLE   Tablet: 400 milliGRAM(s) Oral (03-05 @ 12:11)  furosemide   Injectable: 40 milliGRAM(s) IV Push (03-05 @ 12:12)  heparin   Injectable: 5000 Unit(s) SubCutaneous (03-05 @ 05:38)  meropenem  IVPB: 100 mL/Hr IV Intermittent (03-03 @ 17:55)  meropenem  IVPB: 100 mL/Hr IV Intermittent (03-05 @ 14:57)  sofosbuvir 400 mG/velpatasvir 100 mG (EPCLUSA): 1 Tablet(s) Oral (03-04 @ 22:25)  valGANciclovir: 450 milliGRAM(s) Oral (03-05 @ 12:11)    Data:    T(C): 37.1  HR: 63  BP: 145/74  RR: 18  SpO2: 100%    Exam  General: No acute distress  Chest: Non labored breathing  Abdomen: Non-distended  Extremities: No swelling, warm    -WBC 3.80 / HgB 9.6 / Hct 28.5 / Plt 42  -Na 140 / Cl 105 / BUN 27 / Glucose 101  -K 3.7 / CO2 27 / Cr 0.70  -ALT 10 / Alk Phos 46 / T.Bili 1.2  -INR1.02    Imaging: reviewed    Plan: 44y Male presents for Right thoracentesis +/- chest tube placement.  -Risks/Benefits/alternatives explained with the patient and/or healthcare proxy and witnessed informed consent obtained.

## 2025-03-05 NOTE — PROCEDURE NOTE - NSPROCDETAILS_GEN_ALL_CORE
location identified, draped/prepped, sterile technique used, needle inserted/introduced/Seldinger technique/catheter inserted over needle/connection to syringe/ultrasound assessment of effusion (localization)
location identified, sterile technique used, catheter introduced, fluid drained/Seldinger technique/sterile dressing applied
location identified, sterile technique used, catheter introduced, fluid drained/Seldinger technique/sterile dressing applied
location identified, sterile technique used, catheter introduced, fluid drained/sterile dressing applied

## 2025-03-05 NOTE — CHART NOTE - NSCHARTNOTEFT_GEN_A_CORE
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 supplementation: multivitamin, folic acid, thiamine       MONITORING AND EVALUATION:   RD remains available upon request and will follow up per protocol.    Lila Hoskins, MS, RDN, CDN (Teams)   Available on MS TEAMS
NUTRITION FOLLOW UP NOTE    PATIENT SEEN FOR: transplant work-up follow up     SOURCE: [x] Patient  [x] Current Medical Record  [] RN  [x] Family/support person at bedside  [] Patient unavailable/inappropriate  [] Other:    CHART REVIEWED/EVENTS NOTED.  [x] Nutrition Status:  -MELD: 27 (2/)  -Chest tube removed       DIET ORDER:   Diet, Consistent Carbohydrate w/Evening Snack:   Low Sodium  Supplement Feeding Modality:  Oral  Ensure Max Cans or Servings Per Day:  2       Frequency:  Daily (25)      NUTRITION INTAKE/PROVISION:  [x] PO:  -per flowsheet, % intake of meals   -per Pt, good PO intake; endorses consuming x2 Ensure/day       ANTHROPOMETRICS:  Drug Dosing Weight  Height (cm): 180.3 (2025 15:45)  Weight (kg): 108.3 (2025 15:45)  BMI (kg/m2): 33.3 (2025 15:45)  BSA (m2): 2.27 (2025 15:45)  Weights:   Daily Weight in k.2 (), Weight in k.4 (), Weight in k.2 (), Weight in k.1 (), Weight in k ()     NUTRITIONALLY PERTINENT MEDICATIONS:  MEDICATIONS  (STANDING):  albumin human 25% IVPB  buMETAnide Injectable  folic acid  insulin lispro (ADMELOG) corrective regimen sliding scale  insulin lispro (ADMELOG) corrective regimen sliding scale  lactulose Syrup  metroNIDAZOLE    Tablet  midodrine  multivitamin  pantoprazole    Tablet  rifAXIMin  spironolactone  thiamine       NUTRITIONALLY PERTINENT LABS:   Na135 mmol/L Glu 120 mg/dL[H] K+ 3.7 mmol/L Cr  1.19 mg/dL BUN 46 mg/dL[H]  Phos 3.0 mg/dL - Alb 3.4 g/dL  Chol 65 mg/dL LDL --    HDL 26 mg/dL[L] Trig 46 mg/dL ALT 8 U/L[L] AST 41 U/L[H] Alkaline Phosphatase 125 U/L[H]  25 @ 06:40 a1c 5.0    A1C with Estimated Average Glucose Result: 5.0 % (25 @ 06:40)          Finger Sticks:  POCT Blood Glucose.: 138 mg/dL ( @ 12:33)  POCT Blood Glucose.: 162 mg/dL ( @ 09:01)  POCT Blood Glucose.: 152 mg/dL ( @ 21:29)  POCT Blood Glucose.: 144 mg/dL ( @ 16:37)      NUTRITIONALLY PERTINENT MEDICATIONS/LABS:  [x] Reviewed  [x] Relevant notes on medications/labs:  -Bumex, Aldactone  -Sliding scale insulin     EDEMA:  [x] Reviewed  [] Relevant notes:    GI/ I&O:  [x] Reviewed  [] Relevant notes:  [] Other:    SKIN:   [x] No pressure injuries documented, per nursing flowsheet      ESTIMATED NEEDS:  [x] No change:  Energy:   2340-2730kcal/day (30-35kcal/kg)  Protein:   101-124g/day (1.3-1.6 g/kg)  Fluid:   ml/day or [x] defer to team  Based on: 78kg     NUTRITION DIAGNOSIS:  [x] Prior Dx:  1. Increased protein-energy needs     EDUCATION:  [] Yes:  [x] Not appropriate/warranted    NUTRITION CARE PLAN:  1. Diet: Consistent carbohydrate diet  2. Supplements: Ensure Max BID   3. Multivitamin/mineral supplementation: multivitamin, folic acid, thiamine       MONITORING AND EVALUATION:   RD remains available upon request and will follow up per protocol.    Lila Hoskins, MS, RDN, CDN (Teams)   Available on MS TEAMS
NUTRITION FOLLOW UP NOTE    PATIENT SEEN FOR: transplant work-up follow up    SOURCE: [x] Patient  [x] Current Medical Record  [] RN  [] Family/support person at bedside  [] Patient unavailable/inappropriate  [] Other:    CHART REVIEWED/EVENTS NOTED.  [x] Nutrition Status:  -MELD: 28 ()   -Decompensated ETOH cirrhosis   -Listed for OLT    DIET ORDER:   Diet, NPO after Midnight:      NPO Start Date: 2025,   NPO Start Time: 23:59  Except Medications  With Ice Chips/Sips of Water (25)  Diet, Consistent Carbohydrate w/Evening Snack:   Low Sodium  Supplement Feeding Modality:  Oral  Ensure Max Cans or Servings Per Day:  2       Frequency:  Daily (25)    NUTRITION INTAKE/PROVISION:  [x] PO:  -Per flowsheet, 51-75% intake of meals  -Per Pt, finishing majority of all meals   -Endorses consuming x2 Ensure Max shakes daily; flavor preferences obtained     Nutrition Focused Physical Exam: Completed [ x - Pt consented  ]    Muscle Wasting- Temples[  moderately ]  Clavicle [ moderately   ]  Shoulder[ moderately  ]    Fat Wasting- Orbital [ moderately  ]  Triceps [ moderately  ]  Buccal[  moderately ]            ANTHROPOMETRICS:  Drug Dosing Weight  Height (cm): 180.3 (2025 08:11)  Weight (kg): 108.3 (2025 08:11)  BMI (kg/m2): 33.3 (2025 08:11)  BSA (m2): 2.27 (2025 08:11)  Weights:   Daily Weight in k (), Weight in k.7 (), Weight in k.3 (), Weight in k.4 (), Weight in k.5 (), Weight in k.2 (), Weight in k.4 ()   -10% weight loss x 1 week; ? true muscle/fat loss vs fluid shifts     NUTRITIONALLY PERTINENT MEDICATIONS:  MEDICATIONS  (STANDING):  buMETAnide IVPB  cefTRIAXone   IVPB  folic acid  insulin lispro (ADMELOG) corrective regimen sliding scale  insulin lispro (ADMELOG) corrective regimen sliding scale  magnesium oxide  magnesium sulfate  IVPB  midodrine  multivitamin  pantoprazole    Tablet  rifAXIMin  spironolactone  thiamine       NUTRITIONALLY PERTINENT LABS:   Na133 mmol/L[L] Glu 153 mg/dL[H] K+ 3.9 mmol/L Cr  1.19 mg/dL BUN 42 mg/dL[H]  Phos 3.3 mg/dL  Alb 2.9 g/dL[L] ALT 10 U/L AST 42 U/L[H] Alkaline Phosphatase 107 U/L    A1C with Estimated Average Glucose Result: 5.0 % (25 @ 06:40)        Finger Sticks:  POCT Blood Glucose.: 148 mg/dL ( @ 12:36)  POCT Blood Glucose.: 137 mg/dL ( @ 08:16)  POCT Blood Glucose.: 186 mg/dL ( @ 21:16)  POCT Blood Glucose.: 147 mg/dL ( @ 17:00)      NUTRITIONALLY PERTINENT MEDICATIONS/LABS:  [x] Reviewed  [x] Relevant notes on medications/labs:  -Bumex, Aldactone for diuresis   -Hyponatremic   -Sliding scale insulin     EDEMA:  [x] Reviewed  [] Relevant notes:    GI/ I&O:  [x] Reviewed  [] Relevant notes:  [] Other:    SKIN:   [x] No pressure injuries documented, per nursing flowsheet    ESTIMATED NEEDS:  [x] No change:  Energy:   2340-2730kcal/day (30-35kcal/kg)  Protein:   101-124g/day (1.3-1.6 g/kg)  Fluid:   ml/day or [x] defer to team  Based on: 78kg     NUTRITION DIAGNOSIS:  [x] Prior Dx:  1. Increased protein-energy needs   [x] New Dx:  P: Severe malnutrition in the context of acute illness  E: related to inadequate protein-energy intake in the setting of prolonged hospitalization 2/2 decompensated ETOH cirrhosis   S: as evidenced by moderate signs of muscle/fat loss; 10% weight loss x 1 week   Goal: Pt to consume >75% of estimated needs     EDUCATION:  [x] Yes: reinforced importance of adequate protein-energy intake     NUTRITION CARE PLAN:  1. Diet: Consistent carbohydrate, low sodium   2. Supplements: Ensure Max BID   3. Multivitamin/mineral supplementation: multivitamin, folic acid, thiamine   4. Malnutrition sticker placed     MONITORING AND EVALUATION:   RD remains available upon request and will follow up per protocol.    Lila Hoskins, MS, RDN, CDN (Teams)   Available on [place] TEAMS
Patient was scheduled for paracentesis with IR today however team reached out stating patient requesting to do paracentesis tomorrow, therefore paracentesis r/s to Friday 2/21/25.    Kaila Zaragoza PA-C  Interventional Radiology  Available on TEAMS/ IR ext. [phone]    - Non-emergent consults: Place IR consult order in [place]  - Emergent issues (pager): [facility] [phone]; [facility] [phone]; [phone]  - Scheduling questions: [facility] [phone]; [facility] [phone]  - Clinic/outpatient booking: [facility] [phone]; [facility] [phone].
Liver Frailty Index    1. Gender: Male    2. Dominant hand  strength (kg): attempt   1. 18 kg attempt   2. 12 kg attempt   3. 10 kg    Av.33 kg    3. Time to do 5 chair stands 0 seconds    4. Seconds holding 3 position balance:   Side: 10 seconds    SemiTandem: 10 seconds   Tandem: 10 seconds    Total: 30 seconds    Results Liver Frailty Index is 5.49  Decimal precision: 2    Based on suggested cut-offs of the Liver Frailty Index, a patient with this Liver Frailty Index score is considered Frail.    This Liver Frailty Index score falls within the 96 percentile of outpatients with cirrhosis who are listed for liver transplantation.     6MWT: 506 ft

## 2025-03-05 NOTE — PROCEDURE NOTE - NSPROCNAME_GEN_A_CORE
Interventional Radiology
Paracentesis
Thoracentesis
Interventional Radiology
Paracentesis
Paracentesis

## 2025-03-05 NOTE — CONSULT NOTE ADULT - CONSULT REQUESTED BY NAME
Dr. Callaway
Transplant Surgery
Dr. Ramírez
Primary team
Selam Cantor
Carl Callaway
Dr. Smith
Karen Gagnon
SICU
miguel angel arzate md
Selam Cantor, DO
Almas Jernigan
IM
fernando

## 2025-03-05 NOTE — PROCEDURE NOTE - NSPERFORMEDBY_GEN_A_CORE
ESTRELLA Gomez-C/Myself/PA
Louise Nazario, NP/NP
Myself
NP Aravind/NP
Myself
Myself/PA
Attending
Myself/Resident
Anastasia Brown, DNP/NP
[Negative] : Heme/Lymph
[FreeTextEntry2] : kidney stones

## 2025-03-05 NOTE — PRE PROCEDURE NOTE - PROCEDURE SERVICE
Thoracic Surgery
Vascular and Interventional Radiology
Vascular and Interventional Radiology
Interventional Radiology
Vascular and Interventional Radiology
Interventional Radiology

## 2025-03-05 NOTE — CONSULT NOTE ADULT - CONSULT REQUESTED DATE/TIME
05-Mar-2025 15:30
17-Jan-2025 06:00
27-Feb-2025 19:39
16-Jan-2025 07:42
24-Feb-2025 11:10
16-Jan-2025 09:00
19-Feb-2025 18:12
28-Jan-2025 21:30
05-Feb-2025 12:12
17-Jan-2025 15:33
18-Jan-2025 16:32
19-Jan-2025 17:01
17-Jan-2025 06:44
22-Jan-2025 13:04

## 2025-03-05 NOTE — PROGRESS NOTE ADULT - ASSESSMENT
45yo M with Hx decompensated EtOH cirrhosis c/b recurrent ascites, grade II EV, and HE who presented to OSH for abd pain and distension, found to have ascites and MANUELITO requiring HD. Patient was transferred to [facility] for management and liver transplant evaluation. His hospital course has been c/b an empyema s/p VATS decortication on 1/28 with Dr. Ramírez. Postoperative course has been c/b recurrent R pleural effusion s/p IR drainage. Admitted for transplant evaluation, now s/p OLT on 2/27.     [ ] DCD OLT POD 6   - LFTs trending down   - HCV viremia (donor hcv +) - f/u genotype; started Epclusa 3/1   - Strict I/O's jpx2  - replace ADAM output with Alb  - SQH, ASA- HELD for possible VATS  - Pain control  - bowel regimen  - PT/SCD/IS  - On CTX for the R pleural effusion per ID    [] Immuno  - simulect completed  - FK per level, MMF 500mg bid, SST  - ppx: no bactrim due to sulfa allergy, fluc, valcyte    [] Ileus   - having bowel function  - avoid narcotics,   - Advanced to regular diet    [] Worsening MS  - fu infectious work up  - check ammonia with AM labs  - CTH neg, CT chest with Loculated R pleural effusion  - CTS reconsulted, possible VATS on Thursday  - ID consult: broadened to meropenem  45yo M with Hx decompensated EtOH cirrhosis c/b recurrent ascites, grade II EV, and HE who presented to OSH for abd pain and distension, found to have ascites and MANUELITO requiring HD. Patient was transferred to [facility] for management and liver transplant evaluation. His hospital course has been c/b an empyema s/p VATS decortication on 1/28 with Dr. Ramírez. Postoperative course has been c/b recurrent R pleural effusion s/p IR drainage. Admitted for transplant evaluation, now s/p OLT on 2/27.     [ ] DCD OLT POD 6   - LFTs trending down   - HCV viremia (donor hcv +) - f/u genotype; started Epclusa 3/1   - Strict I/O's jpx2  - Lasix 40 IV x1  - SQH, ASA- HELD for possible VATS  - Pain control  - bowel regimen - HELD for diarrhea  - PT/SCD/IS  - On CTX for the R pleural effusion per ID    [] Immuno  - simulect completed  - FK per level, MMF 500mg bid, SST  - ppx: no bactrim due to sulfa allergy -> Mepron, fluc, valcyte    [] Ileus   - having bowel function  - avoid narcotics,   - Advanced to regular diet; c/s RD for nutritional beverage    [] Worsening MS  - fu infectious work up  - check ammonia with AM labs  - CTH neg, CT chest with Loculated R pleural effusion  - IR thora 3/5  - ID consult: broadened to meropenem

## 2025-03-05 NOTE — PROCEDURE NOTE - PROCEDURE DATE TIME, MLM
31-Jan-2025 11:13
17-Jan-2025 14:42
24-Jan-2025 19:02
05-Mar-2025 18:20
18-Jan-2025 16:38
21-Feb-2025 16:10
23-Jan-2025 18:03
23-Jan-2025 18:06
06-Feb-2025 18:38

## 2025-03-05 NOTE — CONSULT NOTE ADULT - SUBJECTIVE AND OBJECTIVE BOX
Interventional Radiology    Evaluate for Procedure: Thoracentesis    HPI: 43yo M with Hx decompensated EtOH cirrhosis c/b recurrent ascites, grade II EV, and HE who presented to OSH for abd pain and distension, found to have ascites and MANUELITO requiring HD. Patient was transferred to [facility] for management and liver transplant evaluation. His hospital course has been c/b an empyema s/p VATS decortication on 1/28 with Dr. Ramírez. Postoperative course has been c/b recurrent R pleural effusion.     IR contacted by thoracic surgery for right thoracentesis +/- chest tube, confirmed with primary team.     Allergies: sulfa drugs (Unknown)  Salicylic Acid (Other)    Medications (Abx/Cardiac/Anticoagulation/Blood Products)  aspirin enteric coated: 81 milliGRAM(s) Oral (03-04 @ 11:21)  atovaquone  Suspension: 1500 milliGRAM(s) Oral (03-05 @ 14:58)  carvedilol: 3.125 milliGRAM(s) Oral (03-05 @ 05:41)  carvedilol: 3.125 milliGRAM(s) Oral (03-04 @ 19:40)  fluconAZOLE   Tablet: 400 milliGRAM(s) Oral (03-05 @ 12:11)  furosemide   Injectable: 40 milliGRAM(s) IV Push (03-05 @ 12:12)  heparin   Injectable: 5000 Unit(s) SubCutaneous (03-05 @ 05:38)  meropenem  IVPB: 100 mL/Hr IV Intermittent (03-03 @ 17:55)  meropenem  IVPB: 100 mL/Hr IV Intermittent (03-05 @ 14:57)  sofosbuvir 400 mG/velpatasvir 100 mG (EPCLUSA): 1 Tablet(s) Oral (03-04 @ 22:25)  valGANciclovir: 450 milliGRAM(s) Oral (03-05 @ 12:11)    Data:    T(C): 37.1  HR: 63  BP: 145/74  RR: 18  SpO2: 100%    -WBC 3.80 / HgB 9.6 / Hct 28.5 / Plt 42  -Na 140 / Cl 105 / BUN 27 / Glucose 101  -K 3.7 / CO2 27 / Cr 0.70  -ALT 10 / Alk Phos 46 / T.Bili 1.2  -INR 1.02 / PTT 27.5    Radiology: Reviewed.    Assessment/Plan: 43yo M with Hx decompensated EtOH cirrhosis c/b recurrent ascites, grade II EV, and HE who presented to OSH for abd pain and distension, found to have ascites and MANUELITO requiring HD. Patient was transferred to [facility] for management and liver transplant evaluation. His hospital course has been c/b an empyema s/p VATS decortication on 1/28 with Dr. Ramírez. Postoperative course has been c/b recurrent R pleural effusion.     IR contacted by thoracic surgery for right thoracentesis +/- chest tube, confirmed with primary team.     - case reviewed and approved for 3/5/25  - please place IR procedure order under Eriberto Up MD  - STAT labs in AM (cbc,coags, bmp, T&S)  - not currently on any AC, will need 1 u of plts in periprocedural time period  - does not need to be NPO  - d/w primary team

## 2025-03-05 NOTE — CONSULT NOTE ADULT - CONSULT REASON
Pre-OLT
decompensated cirrhosis
right pigtail access for possible reposition and large volume para
HD monitoring s/p OLT
drainage of effusion
MANUELITO
intraoperative chest tube placement
pretransplant cardiac evaluation prior to possible liver transplant
Large volume blood loss with blood in airway postop
dx/tx paracentesis
empyema
Bleeding dialysis catheter
Paracentesis
therapeutic para

## 2025-03-05 NOTE — PROGRESS NOTE ADULT - SUBJECTIVE AND OBJECTIVE BOX
Follow Up:      Interval History:    REVIEW OF SYSTEMS  [  ] ROS unobtainable because:    [  ] All other systems negative except as noted below    Constitutional:  [ ] fever [ ] chills  [ ] weight loss  [ ] weakness  Skin:  [ ] rash [ ] phlebitis	  Eyes: [ ] icterus [ ] pain  [ ] discharge	  ENMT: [ ] sore throat  [ ] thrush [ ] ulcers [ ] exudates  Respiratory: [ ] dyspnea [ ] hemoptysis [ ] cough [ ] sputum	  Cardiovascular:  [ ] chest pain [ ] palpitations [ ] edema	  Gastrointestinal:  [ ] nausea [ ] vomiting [ ] diarrhea [ ] constipation [ ] pain	  Genitourinary:  [ ] dysuria [ ] frequency [ ] hematuria [ ] discharge [ ] flank pain  [ ] incontinence  Musculoskeletal:  [ ] myalgias [ ] arthralgias [ ] arthritis  [ ] back pain  Neurological:  [ ] headache [ ] seizures  [ ] confusion/altered mental status    Allergies  sulfa drugs (Unknown)  Salicylic Acid (Other)        ANTIMICROBIALS:  fluconAZOLE   Tablet 400 daily  meropenem  IVPB    meropenem  IVPB 1000 every 8 hours  sofosbuvir 400 mG/velpatasvir 100 mG (EPCLUSA) 1 at bedtime  valGANciclovir 450 daily      OTHER MEDS:  MEDICATIONS  (STANDING):  carvedilol 3.125 every 12 hours  dextrose 50% Injectable 25 once  dextrose 50% Injectable 12.5 once  dextrose 50% Injectable 25 once  dextrose Oral Gel 15 once PRN  glucagon  Injectable 1 once  influenza   Vaccine 0.5 once  insulin glargine Injectable (LANTUS) 6 at bedtime  insulin lispro (ADMELOG) corrective regimen sliding scale  three times a day before meals  insulin lispro (ADMELOG) corrective regimen sliding scale  at bedtime  mycophenolate mofetil 500 <User Schedule>  ondansetron Injectable 4 every 6 hours PRN  pantoprazole    Tablet 40 before breakfast  predniSONE   Tablet 20 daily  senna 2 at bedtime  simethicone 80 four times a day PRN  tacrolimus 0.5 <User Schedule>  traMADol 25 every 6 hours PRN  traMADol 50 every 8 hours PRN      Vital Signs Last 24 Hrs  T(C): 37.1 (05 Mar 2025 13:00), Max: 37.1 (05 Mar 2025 13:00)  T(F): 98.7 (05 Mar 2025 13:00), Max: 98.7 (05 Mar 2025 13:00)  HR: 63 (05 Mar 2025 13:00) (63 - 75)  BP: 145/74 (05 Mar 2025 13:00) (145/74 - 154/78)  BP(mean): 110 (05 Mar 2025 05:20) (103 - 111)  RR: 18 (05 Mar 2025 13:00) (18 - 18)  SpO2: 100% (05 Mar 2025 13:00) (92% - 100%)    Parameters below as of 05 Mar 2025 13:00  Patient On (Oxygen Delivery Method): room air        PHYSICAL EXAMINATION:  General: Alert and Awake, NAD  HEENT: PERRL, EOMI  Neck: Supple  Cardiac: RRR, No M/R/G  Resp: CTAB, No Wh/Rh/Ra  Abdomen: NBS, NT/ND, No HSM, No rigidity or guarding  MSK: No LE edema. No Calf tenderness  : No yatse  Skin: No rashes or lesions. Skin is warm and dry to the touch.   Neuro: Alert and Awake. CN 2-12 Grossly intact. Moves all four extremities spontaneously.  Psych: Calm, Pleasant, Cooperative                          9.6    3.80  )-----------( 42       ( 05 Mar 2025 06:39 )             28.5       03-05    140  |  105  |  27[H]  ----------------------------<  101[H]  3.7   |  27  |  0.70    Ca    8.6      05 Mar 2025 06:37  Phos  1.7     03-05  Mg     1.7     03-05    TPro  5.9[L]  /  Alb  2.9[L]  /  TBili  1.2  /  DBili  x   /  AST  8[L]  /  ALT  10  /  AlkPhos  46  03-05      Urinalysis Basic - ( 05 Mar 2025 06:37 )    Color: x / Appearance: x / SG: x / pH: x  Gluc: 101 mg/dL / Ketone: x  / Bili: x / Urobili: x   Blood: x / Protein: x / Nitrite: x   Leuk Esterase: x / RBC: x / WBC x   Sq Epi: x / Non Sq Epi: x / Bacteria: x        MICROBIOLOGY:  v  Blood Blood  03-03-25   No growth at 24 hours  --  --      Clean Catch Clean Catch (Midstream)  03-03-25   No growth  --  --      Blood Blood  03-03-25   No growth at 24 hours  --  --      Body Fluid ascites  02-27-25   No growth at 5 days  --    No polymorphonuclear leukocytes seen  No organisms seen  by cytocentrifuge      Clean Catch Clean Catch (Midstream)  02-24-25   10,000 - 49,000 CFU/mL Enterococcus faecium (vancomycin resistant)  --  Enterococcus faecium (vancomycin resistant)      Peritoneal Peritoneal Fluid  02-21-25   No growth at 5 days  --    polymorphonuclear leukocytes seen  No organisms seen  by cytocentrifuge      Ascites Fl  02-06-25   No growth at 5 days  --    No polymorphonuclear cells seen  No organisms seen  by cytocentrifuge        CMV IgG Antibody: >10.00 U/mL (02-24-25 @ 04:59)  CMV IgG Antibody: >10.00 U/mL (02-16-25 @ 00:42)  CMV IgG Antibody: >10.00 U/mL (01-17-25 @ 06:41)  Toxoplasma IgG Screen: <3.00 IU/mL (01-17-25 @ 06:41)          RADIOLOGY:    <The imaging below has been reviewed and visualized by me independently. Findings as detailed in report below> Follow Up:  empyema    Interval History: afebrile. abdominal pain improving.     REVIEW OF SYSTEMS  [  ] ROS unobtainable because:    [ x ] All other systems negative except as noted below    Constitutional:  [ ] fever [ ] chills  [ ] weight loss  [ ] weakness  Skin:  [ ] rash [ ] phlebitis	  Eyes: [ ] icterus [ ] pain  [ ] discharge	  ENMT: [ ] sore throat  [ ] thrush [ ] ulcers [ ] exudates  Respiratory: [ ] dyspnea [ ] hemoptysis [ ] cough [ ] sputum	  Cardiovascular:  [ ] chest pain [ ] palpitations [ ] edema	  Gastrointestinal:  [ ] nausea [ ] vomiting [ ] diarrhea [ ] constipation [ ] pain	  Genitourinary:  [ ] dysuria [ ] frequency [ ] hematuria [ ] discharge [ ] flank pain  [ ] incontinence  Musculoskeletal:  [ ] myalgias [ ] arthralgias [ ] arthritis  [ ] back pain  Neurological:  [ ] headache [ ] seizures  [ ] confusion/altered mental status    Allergies  sulfa drugs (Unknown)  Salicylic Acid (Other)        ANTIMICROBIALS:  fluconAZOLE   Tablet 400 daily  meropenem  IVPB    meropenem  IVPB 1000 every 8 hours  sofosbuvir 400 mG/velpatasvir 100 mG (EPCLUSA) 1 at bedtime  valGANciclovir 450 daily      OTHER MEDS:  MEDICATIONS  (STANDING):  carvedilol 3.125 every 12 hours  dextrose 50% Injectable 25 once  dextrose 50% Injectable 12.5 once  dextrose 50% Injectable 25 once  dextrose Oral Gel 15 once PRN  glucagon  Injectable 1 once  influenza   Vaccine 0.5 once  insulin glargine Injectable (LANTUS) 6 at bedtime  insulin lispro (ADMELOG) corrective regimen sliding scale  three times a day before meals  insulin lispro (ADMELOG) corrective regimen sliding scale  at bedtime  mycophenolate mofetil 500 <User Schedule>  ondansetron Injectable 4 every 6 hours PRN  pantoprazole    Tablet 40 before breakfast  predniSONE   Tablet 20 daily  senna 2 at bedtime  simethicone 80 four times a day PRN  tacrolimus 0.5 <User Schedule>  traMADol 25 every 6 hours PRN  traMADol 50 every 8 hours PRN      Vital Signs Last 24 Hrs  T(C): 37.1 (05 Mar 2025 13:00), Max: 37.1 (05 Mar 2025 13:00)  T(F): 98.7 (05 Mar 2025 13:00), Max: 98.7 (05 Mar 2025 13:00)  HR: 63 (05 Mar 2025 13:00) (63 - 75)  BP: 145/74 (05 Mar 2025 13:00) (145/74 - 154/78)  BP(mean): 110 (05 Mar 2025 05:20) (103 - 111)  RR: 18 (05 Mar 2025 13:00) (18 - 18)  SpO2: 100% (05 Mar 2025 13:00) (92% - 100%)    Parameters below as of 05 Mar 2025 13:00  Patient On (Oxygen Delivery Method): room air    PHYSICAL EXAMINATION:  General: Alert and Awake, NAD  HEENT: Normocephalic / Atraumatic  Resp: No accessory muscles of respiration utilized  Abdomen: NBS, mild RUQ tenderness to palpation, No HSM, No rigidity or guarding  MSK: No LE edema.   : No yates  Skin: No rashes or lesions.    Neuro: Alert and Awake. CN 2-12 Grossly intact. Moves all four extremities spontaneously.  Psych: Calm, Pleasant, Cooperative                          9.6    3.80  )-----------( 42       ( 05 Mar 2025 06:39 )             28.5       03-05    140  |  105  |  27[H]  ----------------------------<  101[H]  3.7   |  27  |  0.70    Ca    8.6      05 Mar 2025 06:37  Phos  1.7     03-05  Mg     1.7     03-05    TPro  5.9[L]  /  Alb  2.9[L]  /  TBili  1.2  /  DBili  x   /  AST  8[L]  /  ALT  10  /  AlkPhos  46  03-05      Urinalysis Basic - ( 05 Mar 2025 06:37 )    Color: x / Appearance: x / SG: x / pH: x  Gluc: 101 mg/dL / Ketone: x  / Bili: x / Urobili: x   Blood: x / Protein: x / Nitrite: x   Leuk Esterase: x / RBC: x / WBC x   Sq Epi: x / Non Sq Epi: x / Bacteria: x        MICROBIOLOGY:  v  Blood Blood  03-03-25   No growth at 24 hours  --  --      Clean Catch Clean Catch (Midstream)  03-03-25   No growth  --  --      Blood Blood  03-03-25   No growth at 24 hours  --  --      Body Fluid ascites  02-27-25   No growth at 5 days  --    No polymorphonuclear leukocytes seen  No organisms seen  by cytocentrifuge      Clean Catch Clean Catch (Midstream)  02-24-25   10,000 - 49,000 CFU/mL Enterococcus faecium (vancomycin resistant)  --  Enterococcus faecium (vancomycin resistant)      Peritoneal Peritoneal Fluid  02-21-25   No growth at 5 days  --    polymorphonuclear leukocytes seen  No organisms seen  by cytocentrifuge      Ascites Fl  02-06-25   No growth at 5 days  --    No polymorphonuclear cells seen  No organisms seen  by cytocentrifuge        CMV IgG Antibody: >10.00 U/mL (02-24-25 @ 04:59)  CMV IgG Antibody: >10.00 U/mL (02-16-25 @ 00:42)  CMV IgG Antibody: >10.00 U/mL (01-17-25 @ 06:41)  Toxoplasma IgG Screen: <3.00 IU/mL (01-17-25 @ 06:41)          RADIOLOGY:    <The imaging below has been reviewed and visualized by me independently. Findings as detailed in report below>    < from: Xray Chest 1 View- PORTABLE-Urgent (Xray Chest 1 View- PORTABLE-Urgent .) (03.05.25 @ 10:16) >  IMPRESSION:  Unchanged large right pleural effusion.    < end of copied text >

## 2025-03-05 NOTE — PROGRESS NOTE ADULT - SUBJECTIVE AND OBJECTIVE BOX
SUBJECTIVE:  "Hi." Denies acute chest pain, palpitations, or shortness of breath    Vital Signs Last 24 Hrs  T(C): 36.8 (03-05-25 @ 09:30), Max: 36.9 (03-05-25 @ 00:37)  T(F): 98.2 (03-05-25 @ 09:30), Max: 98.5 (03-05-25 @ 00:37)  HR: 67 (03-05-25 @ 09:30) (66 - 75)  BP: 146/70 (03-05-25 @ 09:30) (146/70 - 154/78)  RR: 18 (03-05-25 @ 09:30) (18 - 18)  SpO2: 100% (03-05-25 @ 09:30) (92% - 100%)           INPUT/OUTPUT:  04 Mar 2025 07:01  -  05 Mar 2025 07:00  --------------------------------------------------------  IN:    Albumin 5%  - 250 mL: 250 mL    IV PiggyBack: 50 mL    IV PiggyBack: 595 mL    Oral Fluid: 1200 mL  Total IN: 2095 mL  OUT:    Bulb (mL): 730 mL    Bulb (mL): 955 mL    Voided (mL): 1750 mL    Voided (mL): 550 mL  Total OUT: 3985 mL  Total NET: -1890 mL      LABS:  03-05  140  |  105  |  27[H]  ----------------------------<  101[H]  3.7   |  27  |  0.70  Ca    8.6      05 Mar 2025 06:37  TPro  5.9[L]  /  Alb  2.9[L]  /  TBili  1.2  /  DBili  x   /  AST  8[L]  /  ALT  10  /  AlkPhos  46  03-05             9.6    3.80  )-----------( 42       ( 05 Mar 2025 06:39 )             28.5   PT/INR - ( 05 Mar 2025 06:39 )   PT: 11.6 sec;   INR: 1.02 ratio    PTT - ( 05 Mar 2025 06:39 )  PTT:27.5 sec      PHYSICAL EXAM:  GEN: no acute distress  CVS: s1, s2  RESP: nonlabored respirations; no use of accessory muscles  GI: +abdominal staples, intact; +bowel sounds, nontender  EXT: +edema      ACTIVE MEDICATIONS:  carvedilol 3.125 milliGRAM(s) Oral every 12 hours  chlorhexidine 2% Cloths 1 Application(s) Topical daily  dextrose 5%. 1000 milliLiter(s) IV Continuous <Continuous>  dextrose 5%. 1000 milliLiter(s) IV Continuous <Continuous>  dextrose 50% Injectable 25 Gram(s) IV Push once  dextrose 50% Injectable 12.5 Gram(s) IV Push once  dextrose 50% Injectable 25 Gram(s) IV Push once  dextrose Oral Gel 15 Gram(s) Oral once PRN  fluconAZOLE   Tablet 400 milliGRAM(s) Oral daily  glucagon  Injectable 1 milliGRAM(s) IntraMuscular once  heparin   Injectable 5000 Unit(s) SubCutaneous every 12 hours  influenza   Vaccine 0.5 milliLiter(s) IntraMuscular once  insulin glargine Injectable (LANTUS) 6 Unit(s) SubCutaneous at bedtime  insulin lispro (ADMELOG) corrective regimen sliding scale   SubCutaneous three times a day before meals  insulin lispro (ADMELOG) corrective regimen sliding scale   SubCutaneous at bedtime  lidocaine 1% Injectable 1 Vial(s) Local Injection once  meropenem  IVPB      meropenem  IVPB 1000 milliGRAM(s) IV Intermittent every 8 hours  mycophenolate mofetil 500 milliGRAM(s) Oral <User Schedule>  ondansetron Injectable 4 milliGRAM(s) IV Push every 6 hours PRN  pantoprazole    Tablet 40 milliGRAM(s) Oral before breakfast  predniSONE   Tablet 20 milliGRAM(s) Oral daily  senna 2 Tablet(s) Oral at bedtime  simethicone 80 milliGRAM(s) Chew four times a day PRN  sodium chloride 0.65% Nasal 1 Spray(s) Both Nostrils two times a day  sofosbuvir 400 mG/velpatasvir 100 mG (EPCLUSA) 1 Tablet(s) Oral at bedtime  tacrolimus 0.5 milliGRAM(s) Oral <User Schedule>  thiamine 100 milliGRAM(s) Oral daily  traMADol 25 milliGRAM(s) Oral every 6 hours PRN  traMADol 50 milliGRAM(s) Oral every 8 hours PRN  valGANciclovir 450 milliGRAM(s) Oral daily    Case discussed in detail with Thoracic Attending Dr Ramírez. Plan below as per discussion.

## 2025-03-05 NOTE — PRE-OP CHECKLIST - SPO2 (%)
Called patient to schedule an appointment with Occupational Health for their work injury. Patient mother answered (patient is a minor) and put writer on hold for 7 mins, call was disconnected and number was redialed. Voice mail box was full and no message was able to be left.    Patient should be seen in 10 days for suture removal.  
100
96
97
98
96
96

## 2025-03-05 NOTE — CONSULT NOTE ADULT - REASON FOR ADMISSION
decompensated cirrhosis

## 2025-03-05 NOTE — PRE PROCEDURE NOTE - DAY OF PROCEDURE ATTESTATION
Pt often times feels light headed (orthostatic)  Pt has been on lisinopril 10 mg for years  Pt does recall being atleast 10 lbs more when dx'd with htn a few years ago  bp today is 94/60      Lets decrease lisinopril 5 mg    We will setup nurse visit for bp check, if elevated we will make an aptmnt  
I have personally seen, examined, and participated in the care of this patient.

## 2025-03-05 NOTE — PROCEDURE NOTE - ADDITIONAL PROCEDURE DETAILS
Thoracentesis revealed scant serosanguinous fluid. Fluid in pleural space unable to be aspirated via syringe. Thoracentesis revealed complex multi-loculated effusion with scant thick sanguinous fluid. 1ml aspirated and sent for culture. Accessed a separate loculation in attempt to aspirate additional fluid, 1 ml of thick serosanginous sent for culture. Unable to aspirate additional fluid from either space via syringe. Images reviewed with MD Up in real time.     Plan: follow up culture, follow up post XRay.   MD Up discussed finding with MD Ramírez.

## 2025-03-05 NOTE — PRE PROCEDURE NOTE - GENERAL PROCEDURE NAME
Paracentesis
chest tube repositioning
Right thoracentesis +/- chest tube placement
right chest tube
Paracentesis
Paracentesis
Right VATS
Tunneled HD catheter removal
Paracentesis and possible chest tube repositioning

## 2025-03-05 NOTE — PROGRESS NOTE ADULT - PROBLEM SELECTOR PLAN 1
- imagings review by Thoracic Attending  - patient remains on room air, comfortable at time of evaluation  - Dr Ramírez to discuss tenative Rt vats/ Thoracotomy  decortication plan with primary service  - global care as per primary team  - Above plan per d/w Thoracic Attending - imagings review by Thoracic Attending  - patient remains on room air, comfortable at time of evaluation  - Dr. Ramírez spoke with IR regarding drainage/ PTC placement; high-risk for VATS decort at this time.   - IR consult placed  - global care as per primary team  - Above plan per d/w Thoracic Attending - imagings review by Thoracic Attending  - patient remains on room air, comfortable at time of evaluation  - Dr. Ramírez spoke with IR Dr. Up regarding drainage/ PTC placement; high-risk for VATS decort at this time per Attending.   - IR procedure order placed per d/w Dr. Up and Darrell  - global care as per primary team  - Above plan per d/w Thoracic Attending Dr. Ramírez  ---  Thoracic [id number]

## 2025-03-05 NOTE — CONSULT NOTE ADULT - PROVIDER SPECIALTY LIST ADULT
Intervent Radiology
Intervent Radiology
Transplant ID
SICU
Thoracic Surgery
Intervent Radiology
SICU
Thoracic Surgery
Transplant Hepatology
Cardiology
Intervent Radiology
SICU
Transplant Nephrology
Intervent Radiology

## 2025-03-05 NOTE — PROGRESS NOTE ADULT - ASSESSMENT
45 yo M with PMH decompensated ETOH cirrhosis c/b recurrent ascites, grade II EV, and HE, right calf hematoma 10/2024 (s/p fall)  presented to [facility] for abdominal pain and distension. Patient found to have ascites on exam and MANUELITO requiring HD initiation PermCath was placed by vascular surgery on 1/10 with post procedure course c/b bleeding from insertion site. Transferred to [facility] SICU for further management.     Blood Cultures (1/16) 1/4 Staph epi.   GI PCR (1/17) + Giardia.   Paracentesis (1/17) 155 nucleated cell counts.     CT Chest (1/17) Moderate loculated right pleural effusion containing a few foci of air, which are new from 1/8/2025 and may represent empyema/bronchopleural fistula versus sequelae of prior thoracentesis.     Noted to have worsening abdominal pain and encephalopathy on 3/3/2025    BCx (3/3) NGTD  UCx (3/3) No growth    CT Chest (3/3) Decreased right intrapleural gas bubbles, otherwise unchanged moderate loculated right pleural effusion and associated pleural thickening.    CT A/P (3/3) Complex collection in the gallbladder fossa measuring 5.1 x 3.0 x 3.0 cm, which may be mildly decreased in size since the prior ultrasound of 2/28/2025, allowing for differences in modality. Hyperdensity within the collection, suggestive of hemorrhagic content. Small amount of free peritoneal fluid without additional discrete organized collection identified. Small amount of pneumoperitoneum, which may be postsurgical.    #Abdominal Pain, Encephalopathy, Abnormal CT A/P (fluid collections)  --Evaluate abdominal fluid collection for drainage (especially if clinical status changes, further encephalopathy)  --Continue Meropenem for now, if cultures remain unrevealing would switch back to Ceftriaxone 2g IV Q24H (post-possible VATs)  --Continue to follow CBC with diff  --Continue to follow temperature curve  --Follow up on preliminary blood cultures    # s/p OLT 2/27/25 CMV D-/R+, EBV D+/R+, Toxoplasma D+/R-, HCV NAAT pos donor  Simulect. steroids induction  Fluconazole per protocol   Note patient has bactrim allergy- needs atovaquone AND NO OTHER ALTERNATIVE for PCP , toxoplasma Ppx as he is high risk of donor derived infection  If atovaquone not tolerated, will need bactrim desensitization or challenge  --Continue Valcyte 450 mg PO Q24H     # HCV positive (NAAT  and Ab) donor  Follow HCV PCR testing per protocol and check genotype  and antivirals epclusa    #Empyema- citrobacter koseri  s/p VATS 1/28/25-  last positive cultures 1/25/25  for citrobacter koseri  --Repeat CT Chest 3/3 with persistent moderate right pleural effusion / hemothorax; thoracic surgery discussing surgical intervention     #Giardia  s/p 7 days of Metronidazole    #Positive Blood Culture ([facility], 1/16)  procurement contaminant    I will continue to follow. Please feel free to contact me with any further questions.    Froilan Whitmore M.D.  [facility] Division of Infectious Disease  8AM-5PM Monday - Friday: Available on [employer] Teams  After Hours and Holidays (or if no response on [employer] Teams): Please contact the Infectious Diseases Office at [phone]

## 2025-03-05 NOTE — PROGRESS NOTE ADULT - SUBJECTIVE AND OBJECTIVE BOX
Transplant Surgery - Multidisciplinary Rounds  --------------------------------------------------------------   Donor OLTx      Date:  25       POD#6     Present:   Patient seen and examined with multidisciplinary Transplant team including Surgeon Dr. Carter, Hepatologist Dr. Larsen, ESTRELLA Gagnon/ESTRELLA Valente and bedside RN during AM rounds.   Disciplines not in attendance will be notified of the plan    HPI: 44M PMH decompensated ETOH cirrhosis c/b recurrent ascites, grade II esophageal varices, and hepatic encephalopathy, who presented to OSH with abd pain and distension found to have decompensated EtOH cirrhosis with MELD 30, MANUELITO (HRS vs ATN; previous HD need with [facility]) found to have R empyema s/p R pigtail placement w/ IR  requiring several adjustments, s/p R robotic VATS with decortication (Dr. Ramírez 2025) for loculated empyema. OR course with oropharyngeal bleeding during intubation, coagulopathy/elevated EBL. Admitted for transplant evaluation and found to have reaccumulation of R pleural effusion, now s/p IR drainage and s/p OLTx.     Interval Events:  - afebrile, VSS  - diet advanced to regular   - ASA held   - plan for VATS on thursday     Immunosuppression  Induction: Simulect completed  Maintenance: FK per level, MMF , SST  Ongoing monitoring for signs of rejection     Potential Discharge date: TBD  Education:  Medications  Plan of care:  See Below    TX DATA HERE     Review of systems  All other systems were reviewed and are negative, except as noted.    PHYSICAL EXAM:  Constitutional: Well developed / well nourished  Eyes: PERRLA  ENMT: nc/at, no thrush  Neck: supple  Respiratory: CTA B/L  Cardiovascular: RRR  Gastrointestinal: Soft abdomen, ND, appropriate incisional TTP. chevron incision c/d/i, staples in place. No signs of infection. jpx2   Genitourinary: voiding  Extremities: SCD's in place and working bilaterally  Vascular: Palpable dp pulses bilaterally.   Neurological: A&O x3  Skin: no rashes, ulcerations, lesions  Musculoskeletal: Moving all extremities  Psychiatric: Responsive  Transplant Surgery - Multidisciplinary Rounds  --------------------------------------------------------------   Donor OLTx      Date:  25       POD#6     Present:   Patient seen and examined with multidisciplinary Transplant team including Surgeon Dr. Carter, Hepatologist Dr. Larsen, ESTRELLA Gagnon/ESTRELLA Valente and bedside RN during AM rounds.   Disciplines not in attendance will be notified of the plan    HPI: 44M PMH decompensated ETOH cirrhosis c/b recurrent ascites, grade II esophageal varices, and hepatic encephalopathy, who presented to OSH with abd pain and distension found to have decompensated EtOH cirrhosis with MELD 30, MANUELITO (HRS vs ATN; previous HD need with [facility]) found to have R empyema s/p R pigtail placement w/ IR  requiring several adjustments, s/p R robotic VATS with decortication (Dr. Ramírez 2025) for loculated empyema. OR course with oropharyngeal bleeding during intubation, coagulopathy/elevated EBL. Admitted for transplant evaluation and found to have reaccumulation of R pleural effusion, now s/p IR drainage and s/p OLTx.     Interval Events:  - afebrile, VSS  - diet advanced to regular   - ASA held   - plan for VATS Thursday    Immunosuppression  Induction: Simulect completed  Maintenance: FK per level, MMF , SST  Ongoing monitoring for signs of rejection     Potential Discharge date: TBD  Education:  Medications  Plan of care:  See Below      MEDICATIONS  (STANDING):  carvedilol 3.125 milliGRAM(s) Oral every 12 hours  chlorhexidine 2% Cloths 1 Application(s) Topical daily  dextrose 5%. 1000 milliLiter(s) (50 mL/Hr) IV Continuous <Continuous>  dextrose 5%. 1000 milliLiter(s) (100 mL/Hr) IV Continuous <Continuous>  dextrose 50% Injectable 25 Gram(s) IV Push once  dextrose 50% Injectable 12.5 Gram(s) IV Push once  dextrose 50% Injectable 25 Gram(s) IV Push once  fluconAZOLE   Tablet 400 milliGRAM(s) Oral daily  glucagon  Injectable 1 milliGRAM(s) IntraMuscular once  influenza   Vaccine 0.5 milliLiter(s) IntraMuscular once  insulin glargine Injectable (LANTUS) 6 Unit(s) SubCutaneous at bedtime  insulin lispro (ADMELOG) corrective regimen sliding scale   SubCutaneous three times a day before meals  insulin lispro (ADMELOG) corrective regimen sliding scale   SubCutaneous at bedtime  lidocaine 1% Injectable 1 Vial(s) Local Injection once  meropenem  IVPB      meropenem  IVPB 1000 milliGRAM(s) IV Intermittent every 8 hours  mycophenolate mofetil 500 milliGRAM(s) Oral <User Schedule>  pantoprazole    Tablet 40 milliGRAM(s) Oral before breakfast  predniSONE   Tablet 20 milliGRAM(s) Oral daily  senna 2 Tablet(s) Oral at bedtime  sodium chloride 0.65% Nasal 1 Spray(s) Both Nostrils two times a day  sofosbuvir 400 mG/velpatasvir 100 mG (EPCLUSA) 1 Tablet(s) Oral at bedtime  tacrolimus 0.5 milliGRAM(s) Oral <User Schedule>  thiamine 100 milliGRAM(s) Oral daily  valGANciclovir 450 milliGRAM(s) Oral daily    MEDICATIONS  (PRN):  dextrose Oral Gel 15 Gram(s) Oral once PRN Blood Glucose LESS THAN 70 milliGRAM(s)/deciliter  ondansetron Injectable 4 milliGRAM(s) IV Push every 6 hours PRN Nausea and/or Vomiting  simethicone 80 milliGRAM(s) Chew four times a day PRN Gas  traMADol 25 milliGRAM(s) Oral every 6 hours PRN Moderate Pain (4 - 6)  traMADol 50 milliGRAM(s) Oral every 8 hours PRN Severe Pain (7 - 10)      PAST MEDICAL & SURGICAL HISTORY:  Sleep apnea, obstructive      Alcohol abuse      Cirrhosis of liver      Portal hypertension      H/O esophageal varices      Anemia      Mild alcohol use disorder      No significant past surgical history          Vital Signs Last 24 Hrs  T(C): 37.1 (05 Mar 2025 13:00), Max: 37.1 (05 Mar 2025 13:00)  T(F): 98.7 (05 Mar 2025 13:00), Max: 98.7 (05 Mar 2025 13:00)  HR: 63 (05 Mar 2025 13:00) (63 - 75)  BP: 145/74 (05 Mar 2025 13:00) (145/74 - 154/78)  BP(mean): 110 (05 Mar 2025 05:20) (103 - 111)  RR: 18 (05 Mar 2025 13:00) (18 - 18)  SpO2: 100% (05 Mar 2025 13:00) (92% - 100%)    Parameters below as of 05 Mar 2025 13:00  Patient On (Oxygen Delivery Method): room air        I&O's Summary    04 Mar 2025 07:01  -  05 Mar 2025 07:00  --------------------------------------------------------  IN: 2095 mL / OUT: 3985 mL / NET: -1890 mL    05 Mar 2025 07:01  -  05 Mar 2025 16:21  --------------------------------------------------------  IN: 480 mL / OUT: 870 mL / NET: -390 mL                              9.6    3.80  )-----------( 42       ( 05 Mar 2025 06:39 )             28.5     03-05    140  |  105  |  27[H]  ----------------------------<  101[H]  3.7   |  27  |  0.70    Ca    8.6      05 Mar 2025 06:37  Phos  1.7     -05  Mg     1.7     -05    TPro  5.9[L]  /  Alb  2.9[L]  /  TBili  1.2  /  DBili  x   /  AST  8[L]  /  ALT  10  /  AlkPhos  46  03-    Tacrolimus (), Serum: 0.9 ng/mL ( @ 06:39)        Culture - Blood (collected 25 @ 18:51)  Source: Blood Blood  Preliminary Report (25 @ 23:01):    No growth at 24 hours    Culture - Urine (collected 25 @ 18:34)  Source: Clean Catch Clean Catch (Midstream)  Final Report (25 @ 18:41):    No growth    Culture - Blood (collected 25 @ 17:00)  Source: Blood Blood  Preliminary Report (25 @ 23:01):    No growth at 24 hours    Culture - Body Fluid with Gram Stain (collected 25 @ 19:26)  Source: Body Fluid ascites  Gram Stain (25 @ 02:36):    No polymorphonuclear leukocytes seen    No organisms seen    by cytocentrifuge  Final Report (25 @ 16:45):    No growth at 5 days    Culture - Fungal, Body Fluid (collected 25 @ 19:26)  Source: Body Fluid ascites  Preliminary Report (25 @ 10:23):    No growth    Culture - Acid Fast - Body Fluid w/Smear (collected 25 @ 19:26)  Source: Body Fluid ascites  Preliminary Report (25 @ 23:06):    Culture is being performed.                Review of systems  All other systems were reviewed and are negative, except as noted.    PHYSICAL EXAM:  Constitutional: Well developed / well nourished  Eyes: PERRLA  ENMT: nc/at, no thrush  Neck: supple  Respiratory: CTA B/L  Cardiovascular: RRR  Gastrointestinal: Soft abdomen, ND, appropriate incisional TTP. chevron incision c/d/i, staples in place. No signs of infection. jpx2 SS  Genitourinary: voiding  Extremities: SCD's in place and working bilaterally  Vascular: Palpable dp pulses bilaterally.   Neurological: A&O x3  Skin: no rashes, ulcerations, lesions  Musculoskeletal: Moving all extremities  Psychiatric: Responsive

## 2025-03-05 NOTE — PROGRESS NOTE ADULT - ASSESSMENT
45yo M with Hx decompensated EtOH cirrhosis c/b recurrent ascites, grade II EV, and HE who presented to OSH for abd pain and distension, found to have ascites and MANUELITO requiring HD. Patient was transferred to [facility] for management and liver transplant evaluation. His hospital course has been c/b an empyema s/p VATS decortication on 1/28 with Dr. Ramírez. Postoperative course has been c/b recurrent R pleural effusion. Thoracic Surgery consulted for intraoperative chest tube placement for drainage of recurrent R pleural effusion.     3/3   ct chest -Decreased right intrapleural gas bubbles, otherwise unchanged moderate loculated right pleural effusion and associated pleural thickening.     3/4   abx per ID,   ambulates w asst  on room air.  3/5 stable; on room air; able to speak in complete, full sentences 45yo M with Hx decompensated EtOH cirrhosis c/b recurrent ascites, grade II EV, and HE who presented to OSH for abd pain and distension, found to have ascites and MANUELITO requiring HD. Patient was transferred to [facility] for management and liver transplant evaluation. His hospital course has been c/b an empyema s/p VATS decortication on 1/28 with Dr. Ramírez. Postoperative course has been c/b recurrent R pleural effusion. Thoracic Surgery consulted for intraoperative chest tube placement for drainage of recurrent R pleural effusion.     3/3   ct chest -Decreased right intrapleural gas bubbles, otherwise unchanged moderate loculated right pleural effusion and associated pleural thickening.     3/4   abx per ID,   ambulates w asst  on room air.  3/5 stable; on room air; IR for drainage/ PTC placement

## 2025-03-05 NOTE — CHART NOTE - NSCHARTNOTESELECT_GEN_ALL_CORE
Event Note
Event Note
Hemodialysis/Event Note
IR
IR Chart Note - Chest Tube
Nutrition Services
Event Note
Event Note
IR
Interventional Radiology
Nutrition Services

## 2025-03-06 LAB
ALBUMIN SERPL ELPH-MCNC: 2.7 G/DL — LOW (ref 3.3–5)
ALP SERPL-CCNC: 47 U/L — SIGNIFICANT CHANGE UP (ref 40–120)
ALT FLD-CCNC: 7 U/L — LOW (ref 10–45)
ANION GAP SERPL CALC-SCNC: 8 MMOL/L — SIGNIFICANT CHANGE UP (ref 5–17)
APTT BLD: 26.9 SEC — SIGNIFICANT CHANGE UP (ref 24.5–35.6)
AST SERPL-CCNC: 11 U/L — SIGNIFICANT CHANGE UP (ref 10–40)
BILIRUB SERPL-MCNC: 1.1 MG/DL — SIGNIFICANT CHANGE UP (ref 0.2–1.2)
BUN SERPL-MCNC: 25 MG/DL — HIGH (ref 7–23)
CALCIUM SERPL-MCNC: 8.8 MG/DL — SIGNIFICANT CHANGE UP (ref 8.4–10.5)
CHLORIDE SERPL-SCNC: 104 MMOL/L — SIGNIFICANT CHANGE UP (ref 96–108)
CO2 SERPL-SCNC: 27 MMOL/L — SIGNIFICANT CHANGE UP (ref 22–31)
CREAT SERPL-MCNC: 0.66 MG/DL — SIGNIFICANT CHANGE UP (ref 0.5–1.3)
EGFR: 117 ML/MIN/1.73M2 — SIGNIFICANT CHANGE UP
EGFR: 117 ML/MIN/1.73M2 — SIGNIFICANT CHANGE UP
GLUCOSE BLDC GLUCOMTR-MCNC: 104 MG/DL — HIGH (ref 70–99)
GLUCOSE BLDC GLUCOMTR-MCNC: 114 MG/DL — HIGH (ref 70–99)
GLUCOSE BLDC GLUCOMTR-MCNC: 121 MG/DL — HIGH (ref 70–99)
GLUCOSE BLDC GLUCOMTR-MCNC: 136 MG/DL — HIGH (ref 70–99)
GLUCOSE BLDC GLUCOMTR-MCNC: 217 MG/DL — HIGH (ref 70–99)
GLUCOSE SERPL-MCNC: 115 MG/DL — HIGH (ref 70–99)
GRAM STN FLD: SIGNIFICANT CHANGE UP
HCT VFR BLD CALC: 29.2 % — LOW (ref 39–50)
HGB BLD-MCNC: 9.8 G/DL — LOW (ref 13–17)
INR BLD: 0.96 RATIO — SIGNIFICANT CHANGE UP (ref 0.85–1.16)
MAGNESIUM SERPL-MCNC: 1.8 MG/DL — SIGNIFICANT CHANGE UP (ref 1.6–2.6)
MCHC RBC-ENTMCNC: 30.7 PG — SIGNIFICANT CHANGE UP (ref 27–34)
MCHC RBC-ENTMCNC: 33.6 G/DL — SIGNIFICANT CHANGE UP (ref 32–36)
MCV RBC AUTO: 91.5 FL — SIGNIFICANT CHANGE UP (ref 80–100)
NRBC BLD AUTO-RTO: 0 /100 WBCS — SIGNIFICANT CHANGE UP (ref 0–0)
PHOSPHATE SERPL-MCNC: 2.3 MG/DL — LOW (ref 2.5–4.5)
PLATELET # BLD AUTO: 52 K/UL — LOW (ref 150–400)
POTASSIUM SERPL-MCNC: 3.8 MMOL/L — SIGNIFICANT CHANGE UP (ref 3.5–5.3)
POTASSIUM SERPL-SCNC: 3.8 MMOL/L — SIGNIFICANT CHANGE UP (ref 3.5–5.3)
PROT SERPL-MCNC: 5.8 G/DL — LOW (ref 6–8.3)
PROTHROM AB SERPL-ACNC: 11.1 SEC — SIGNIFICANT CHANGE UP (ref 9.9–13.4)
RBC # BLD: 3.19 M/UL — LOW (ref 4.2–5.8)
RBC # FLD: 17.2 % — HIGH (ref 10.3–14.5)
SODIUM SERPL-SCNC: 139 MMOL/L — SIGNIFICANT CHANGE UP (ref 135–145)
SPECIMEN SOURCE: SIGNIFICANT CHANGE UP
TACROLIMUS SERPL-MCNC: 2.1 NG/ML — SIGNIFICANT CHANGE UP
WBC # BLD: 4.37 K/UL — SIGNIFICANT CHANGE UP (ref 3.8–10.5)
WBC # FLD AUTO: 4.37 K/UL — SIGNIFICANT CHANGE UP (ref 3.8–10.5)

## 2025-03-06 PROCEDURE — ZZZZZ: CPT

## 2025-03-06 RX ORDER — HEPARIN SODIUM 1000 [USP'U]/ML
5000 INJECTION INTRAVENOUS; SUBCUTANEOUS EVERY 12 HOURS
Refills: 0 | Status: DISCONTINUED | OUTPATIENT
Start: 2025-03-06 | End: 2025-03-11

## 2025-03-06 RX ORDER — SOD PHOS DI, MONO/K PHOS MONO 250 MG
1 TABLET ORAL ONCE
Refills: 0 | Status: COMPLETED | OUTPATIENT
Start: 2025-03-06 | End: 2025-03-06

## 2025-03-06 RX ORDER — FUROSEMIDE 10 MG/ML
40 INJECTION INTRAMUSCULAR; INTRAVENOUS ONCE
Refills: 0 | Status: COMPLETED | OUTPATIENT
Start: 2025-03-06 | End: 2025-03-06

## 2025-03-06 RX ORDER — TACROLIMUS 0.5 MG/1
0.5 CAPSULE ORAL ONCE
Refills: 0 | Status: COMPLETED | OUTPATIENT
Start: 2025-03-06 | End: 2025-03-06

## 2025-03-06 RX ORDER — TACROLIMUS 0.5 MG/1
0.5 CAPSULE ORAL
Refills: 0 | Status: DISCONTINUED | OUTPATIENT
Start: 2025-03-06 | End: 2025-03-07

## 2025-03-06 RX ORDER — MELATONIN 5 MG
5 TABLET ORAL AT BEDTIME
Refills: 0 | Status: DISCONTINUED | OUTPATIENT
Start: 2025-03-06 | End: 2025-03-11

## 2025-03-06 RX ORDER — MAGNESIUM OXIDE 400 MG
400 TABLET ORAL
Refills: 0 | Status: DISCONTINUED | OUTPATIENT
Start: 2025-03-06 | End: 2025-03-11

## 2025-03-06 RX ORDER — FUROSEMIDE 10 MG/ML
40 INJECTION INTRAMUSCULAR; INTRAVENOUS DAILY
Refills: 0 | Status: DISCONTINUED | OUTPATIENT
Start: 2025-03-07 | End: 2025-03-11

## 2025-03-06 RX ORDER — TACROLIMUS 0.5 MG/1
1 CAPSULE ORAL
Refills: 0 | Status: DISCONTINUED | OUTPATIENT
Start: 2025-03-07 | End: 2025-03-07

## 2025-03-06 RX ORDER — ASPIRIN 325 MG
81 TABLET ORAL DAILY
Refills: 0 | Status: DISCONTINUED | OUTPATIENT
Start: 2025-03-06 | End: 2025-03-11

## 2025-03-06 RX ADMIN — TACROLIMUS 0.5 MILLIGRAM(S): 0.5 CAPSULE ORAL at 13:06

## 2025-03-06 RX ADMIN — Medication 5 MILLIGRAM(S): at 23:24

## 2025-03-06 RX ADMIN — MEROPENEM 100 MILLIGRAM(S): 1 INJECTION INTRAVENOUS at 21:42

## 2025-03-06 RX ADMIN — Medication 81 MILLIGRAM(S): at 13:05

## 2025-03-06 RX ADMIN — MYCOPHENOLATE MOFETIL 500 MILLIGRAM(S): 500 TABLET, FILM COATED ORAL at 20:23

## 2025-03-06 RX ADMIN — VALGANCICLOVIR 450 MILLIGRAM(S): 450 TABLET, FILM COATED ORAL at 12:17

## 2025-03-06 RX ADMIN — PREDNISONE 20 MILLIGRAM(S): 20 TABLET ORAL at 05:55

## 2025-03-06 RX ADMIN — Medication 100 MILLIGRAM(S): at 12:17

## 2025-03-06 RX ADMIN — Medication 400 MILLIGRAM(S): at 09:24

## 2025-03-06 RX ADMIN — CARVEDILOL 3.12 MILLIGRAM(S): 3.12 TABLET, FILM COATED ORAL at 17:34

## 2025-03-06 RX ADMIN — TRAMADOL HYDROCHLORIDE 50 MILLIGRAM(S): 50 TABLET, FILM COATED ORAL at 04:13

## 2025-03-06 RX ADMIN — FUROSEMIDE 40 MILLIGRAM(S): 10 INJECTION INTRAMUSCULAR; INTRAVENOUS at 12:17

## 2025-03-06 RX ADMIN — TRAMADOL HYDROCHLORIDE 50 MILLIGRAM(S): 50 TABLET, FILM COATED ORAL at 05:15

## 2025-03-06 RX ADMIN — VELPATASVIR AND SOFOSBUVIR 1 TABLET(S): 50; 200 TABLET, FILM COATED ORAL at 21:41

## 2025-03-06 RX ADMIN — TACROLIMUS 0.5 MILLIGRAM(S): 0.5 CAPSULE ORAL at 09:22

## 2025-03-06 RX ADMIN — Medication 1 PACKET(S): at 09:22

## 2025-03-06 RX ADMIN — ATOVAQUONE 1500 MILLIGRAM(S): 750 SUSPENSION ORAL at 12:17

## 2025-03-06 RX ADMIN — Medication 1 SPRAY(S): at 05:55

## 2025-03-06 RX ADMIN — Medication 400 MILLIGRAM(S): at 12:17

## 2025-03-06 RX ADMIN — Medication 1 SPRAY(S): at 17:32

## 2025-03-06 RX ADMIN — TRAMADOL HYDROCHLORIDE 50 MILLIGRAM(S): 50 TABLET, FILM COATED ORAL at 22:47

## 2025-03-06 RX ADMIN — MEROPENEM 100 MILLIGRAM(S): 1 INJECTION INTRAVENOUS at 13:06

## 2025-03-06 RX ADMIN — TRAMADOL HYDROCHLORIDE 50 MILLIGRAM(S): 50 TABLET, FILM COATED ORAL at 21:41

## 2025-03-06 RX ADMIN — INSULIN LISPRO 4: 100 INJECTION, SOLUTION INTRAVENOUS; SUBCUTANEOUS at 13:06

## 2025-03-06 RX ADMIN — Medication 1 APPLICATION(S): at 12:18

## 2025-03-06 RX ADMIN — MEROPENEM 100 MILLIGRAM(S): 1 INJECTION INTRAVENOUS at 05:55

## 2025-03-06 RX ADMIN — HEPARIN SODIUM 5000 UNIT(S): 1000 INJECTION INTRAVENOUS; SUBCUTANEOUS at 17:32

## 2025-03-06 RX ADMIN — TACROLIMUS 0.5 MILLIGRAM(S): 0.5 CAPSULE ORAL at 20:23

## 2025-03-06 RX ADMIN — MYCOPHENOLATE MOFETIL 500 MILLIGRAM(S): 500 TABLET, FILM COATED ORAL at 09:22

## 2025-03-06 RX ADMIN — Medication 40 MILLIGRAM(S): at 05:55

## 2025-03-06 RX ADMIN — CARVEDILOL 3.12 MILLIGRAM(S): 3.12 TABLET, FILM COATED ORAL at 05:55

## 2025-03-06 RX ADMIN — Medication 400 MILLIGRAM(S): at 17:32

## 2025-03-06 NOTE — PROGRESS NOTE ADULT - NS ATTEND OPT1A GEN_ALL_CORE
History/Exam/Medical decision making
Medical decision making

## 2025-03-06 NOTE — PROGRESS NOTE ADULT - SUBJECTIVE AND OBJECTIVE BOX
Subjective:   feeling ok     PAST MEDICAL & SURGICAL HISTORY:  Sleep apnea, obstructive      Alcohol abuse      Cirrhosis of liver      Portal hypertension      H/O esophageal varices      Anemia      Mild alcohol use disorder      No significant past surgical history                       MEDICATIONS  atovaquone  Suspension 1500 milliGRAM(s) Oral daily  carvedilol 3.125 milliGRAM(s) Oral every 12 hours  chlorhexidine 2% Cloths 1 Application(s) Topical daily  dextrose 5%. 1000 milliLiter(s) IV Continuous <Continuous>  dextrose 5%. 1000 milliLiter(s) IV Continuous <Continuous>  dextrose 50% Injectable 25 Gram(s) IV Push once  dextrose 50% Injectable 12.5 Gram(s) IV Push once  dextrose 50% Injectable 25 Gram(s) IV Push once  dextrose Oral Gel 15 Gram(s) Oral once PRN  fluconAZOLE   Tablet 400 milliGRAM(s) Oral daily  furosemide    Tablet 40 milliGRAM(s) Oral once  glucagon  Injectable 1 milliGRAM(s) IntraMuscular once  influenza   Vaccine 0.5 milliLiter(s) IntraMuscular once  insulin glargine Injectable (LANTUS) 6 Unit(s) SubCutaneous at bedtime  insulin lispro (ADMELOG) corrective regimen sliding scale   SubCutaneous three times a day before meals  insulin lispro (ADMELOG) corrective regimen sliding scale   SubCutaneous at bedtime  lidocaine 1% Injectable 1 Vial(s) Local Injection once  magnesium oxide 400 milliGRAM(s) Oral two times a day with meals  meropenem  IVPB      meropenem  IVPB 1000 milliGRAM(s) IV Intermittent every 8 hours  mycophenolate mofetil 500 milliGRAM(s) Oral <User Schedule>  ondansetron Injectable 4 milliGRAM(s) IV Push every 6 hours PRN  pantoprazole    Tablet 40 milliGRAM(s) Oral before breakfast  predniSONE   Tablet 20 milliGRAM(s) Oral daily  senna 2 Tablet(s) Oral at bedtime  simethicone 80 milliGRAM(s) Chew four times a day PRN  sodium chloride 0.65% Nasal 1 Spray(s) Both Nostrils two times a day  sofosbuvir 400 mG/velpatasvir 100 mG (EPCLUSA) 1 Tablet(s) Oral at bedtime  tacrolimus 0.5 milliGRAM(s) Oral <User Schedule>  thiamine 100 milliGRAM(s) Oral daily  traMADol 25 milliGRAM(s) Oral every 6 hours PRN  traMADol 50 milliGRAM(s) Oral every 8 hours PRN  valGANciclovir 450 milliGRAM(s) Oral daily      Vital Signs Last 24 Hrs  T(C): 36.7 (06 Mar 2025 09:00), Max: 37.1 (05 Mar 2025 13:00)  T(F): 98.1 (06 Mar 2025 09:00), Max: 98.7 (05 Mar 2025 13:00)  HR: 72 (06 Mar 2025 09:00) (62 - 77)  BP: 148/74 (06 Mar 2025 09:00) (133/85 - 160/77)  BP(mean): 102 (06 Mar 2025 05:53) (102 - 112)  RR: 18 (06 Mar 2025 09:00) (17 - 18)  SpO2: 96% (06 Mar 2025 09:00) (96% - 100%)    Parameters below as of 06 Mar 2025 09:00  Patient On (Oxygen Delivery Method): room air          PHYSICAL EXAM:  Neurology: alert and oriented x 3, nonfocal, no gross deficits    CXR:  right hemoptx    LABS  03-06    139  |  104  |  25[H]  ----------------------------<  115[H]  3.8   |  27  |  0.66    Ca    8.8      06 Mar 2025 06:56  Phos  2.3     03-06  Mg     1.8     03-06    TPro  5.8[L]  /  Alb  2.7[L]  /  TBili  1.1  /  DBili  x   /  AST  11  /  ALT  7[L]  /  AlkPhos  47  03-06                                 9.8    4.37  )-----------( 52       ( 06 Mar 2025 06:56 )             29.2          PT/INR - ( 06 Mar 2025 06:56 )   PT: 11.1 sec;   INR: 0.96 ratio         PTT - ( 06 Mar 2025 06:56 )  PTT:26.9 sec    Culture - Fungal, Body Fluid (collected 03-05-25 @ 19:24)  Source: Peritoneal Peritoneal Fluid  Preliminary Report (03-06-25 @ 10:46):    Testing in progress    Culture - Body Fluid with Gram Stain (collected 03-05-25 @ 19:24)  Source: Pleural Fl Pleural Fluid  Gram Stain (03-06-25 @ 00:57):    No polymorphonuclear cells seen    No organisms seen    by cytocentrifuge

## 2025-03-06 NOTE — PROGRESS NOTE ADULT - NS ATTEND AMEND GEN_ALL_CORE FT
44M with alcohol-associated cirrhosis complicated by ascites, grade II EVs, and HE, presents with abdominal pain/distension, renal failure, and empyema. He is actively drinking.  MELD is 25.  OLT evaluation is pending cardiac assessment and empyema resolution.  Agree with lactulose and rifaximin for HE, bumex for diuresis, and midodrine.  He has a history of alcohol-associated hepatitis. The empyema, confirmed by positive *Citrobacter* cultures from chest tube drainage, required VATS. Continue CTX.  MANUELITO, likely HRS/ATN, requires dialysis. Will restart bumex judiciously, consult IR for tunneled catheter removal, and follow renal recommendations.  Will also follow for repeat HCC imaging once stabilized.
I have seen and examined patient at bedside. I agree with hx, ROS, physical exam, imp/suggestions as written by the fellow/NP/PA.
S/p R VATS, 2/2 empyema, decompensated cirrhosis.  N Multimodal pain management. AOx3.  P NC 2L sat >90.   C Off pressors. Lactate cleared. 12EKG, intermittent tachycardia. Resume beta blocker.  G Mathew PO. PPI. Monitor LFTs.  R Cr 1.63(1.42). Trend CMP. Net +200. Continue bumetanide.  H Hgb 7.5, 1U PRBC.   DVT SCDs.  I Continue ceftriaxone, Flagyl. + Citrobacter on empyema cx.  E ISS.
44M with a history of alcohol-associated cirrhosis complicated by recurrent ascites, grade II esophageal varices, and hepatic encephalopathy, presents with abdominal pain and distension. He also has a history of alcoholic hepatitis (7/2024) and a right calf hematoma (10/2024). He was found to have renal failure, large volume ascites, and an empyema requiring chest tube placement and subsequent VATS.  His MELD score is 27.  He is being evaluated for OLT pending cardiac evaluation (need cardiac cath) and empyema management.  He is currently on lactulose and rifaximin for HE, and ceftriaxone for the *Citrobacter* empyema. Diuretics on hold due to MANUELITO.  He also has anemia and thrombocytopenia and was recently transfused. Chest tube removal after correction of coagulopathy. TEG ordered, and will replenish with FFP per CT surgery request. Will follow.
POD1  can remove lines, NGT.  Floor status.  Standard anatomy donor.  start SQH  Immuno: tac 0.5mg BID, cellcept 1gm BID, steroid taper.  HCV+ donor -- will get genotype
43 yo M with AUD (with PEth 255 and 213 ng/mL in 10/2024 and with repeat PEth sent on 1/17 and pending), obesity, ROSA, PUD, history of alcohol-associated hepatitis (7/2024), history of traumatic right calf hematoma (10/2024), and decompensated alcohol-associated cirrhosis complicated by ascites, pleural effusions, hyponatremia, and hepatic encephalopathy, transferred [facility] on 1/16/25 due to recurrent ascites (with most recent LVP on 1/17 with 5.5L removed and no SBP), increased right pleural effusion, and HRS-MANUELITO (with baseline Cr 1.1 in 10/2024) for which received intermittent HD (started on 1/10 and last on 1/19). HD held today and planned for IV Lasix challenge as per Transplant Nephrology. CT chest (1/17) showed an empyema, now s/p IR placement of a right-sided chest tube (1/18- ) with fluid growing Citrobacter koseri. He is currently on Zosyn (1/16- ) for the empyema as well as metronidazole (1/18- ) for Giardia on GI PCR (1/17). He is planned for a repeat non-contrast CT chest tomorrow as per Thoracic Surgery. Blood cultures (1/16) with 1 bottle MR Staph epidermidis, with repeat blood cultures (1/18 and 1/19) with NGTD. S/p IV vancomycin (1/17). ABO O with current MELD 3.0 score of 34 with liver transplant evaluation in progress. Awaiting repeat TTE to reassess pulmonary pressures which were elevated on last TTE (7/2024). Pending the results, anticipate that he will need LHC +/- RHC for pre-transplant cardiac testing as per discussion with cardiologist Dr. Dean.    Victoriano Marie M.D., Ph.D.  Transplant Hepatology
44 yr M etoh cirrhosis now OLT    ON: no major events    aaox3, pain control issues, multi modal   on RA, encourage IS   AM CXR rt lung opacification, un changed from before   hx of VATS decortication   hypertensive, nifedipine if transplant agrees  lact mildly elevated   advance diet   bowel regimen   ADAM drains serosang output 900cc  LFTs downtrending   liver duplex reassuring  yates in place, remove and TOV  uop 1 lit, balance 400cc  Hb 11, plt 118  start sqh, transplant agrees  SCDs  afebrile, immunoppx and immunosuppression per transplant and ID  insulin drip @ 4 , glycemic control  PT OT to see, ambulating     dc a line and MAC
patient seen - serous fluid draining from pigtail. ct scan pending to be done this evening.   reviewed with patient and sister in law (over facetime) the cultures, plan for surgery vs tpa, as well as risks of either   will await to see scan and discuss with primary team for possible tpa if able to get INR ~1.8.
patient seen - diffuse oozing during case yesterday with rim of fluid on cxr this AM improved from post op. now fluid in tubing all serosanguinous. no mckenzie blood noted .   plan is for extubation today - per icu -   continue management per primary team. keep chest tubes to suction x 72 hours.
patient seen - repeat ct demonstrates much smaller collection. discussion via email with team regarding options of tpa, surgery vs repositioning or upsizing tube.   decision made to attempt least invasive with least risk first as tpa in decompensated liver failure also carries risks of bleeding as does a surgical decortication. he is tentatively on schedule for next tuesday if fails most conservative management with drainage and/or repositioning.
patient with persistent + cultures from right pleural effusion and small fluid collection with foci of air.   discussed with multiple team members regarding proceeding with surgical decort. all in agreement patient would benefit given his need for immunosuppression in near future as well as source control of any existing infections.  discussed plan for surgery including robotic right vats, possible open thoracotomy decortication multiple chest tubes, bronchoscopy and possible cryoablation.   discussed risks of surgery including and not limited to bleeding, infection, injury to surrounding structures, worsening liver failure, prolonged intubation and death.   patient expressed understanding and agreeable to proceed. consent signed  in chart.
IR attempted drainage of right chest with only removal of 5cc of fluid sent for culture.   discussed with tx team - tentatively on schedule for tomorrow but would rather give him more time to recover from liver tx.   patient not demonstrating signs of sepsis despite immunosuppression and will eventually need washout of right chest - accumulation of residual blood/clot after his original decort when he was still coagulopathic without ability to control specific site of bleeding and had diffuse oozing secondary to liver failure and decort
spoke to transplant team last night and today and agree to reschedule for 3/17 for vats likely thoracotomy decortication and evacuation of hemothorax.   patient still recovering from liver tx and is doing well.   can be done electively as outpatient if he is discharged.
patient with much smaller effusion on right - citrobacter sensitive to zosyn, repeat imaging demonstrated significant improvement in size of collection with small residual. tube was adjusted by IR yesterday - were going to attempt replacement today . per report (not seen on flow sheet) was told he drained an additional liter of fluid.   consider repeat imaging Monday to assess collection for tentative surgery Tuesday. if no significant collection would defer surgical decortication that is tentatively scheduled for Tuesday given high risk of bleeding, unless patient with persistent signs of active infection. discussed with patient today, mother at bedside, serous drainage in pleurevac.
patient seen - discussed the possibility of proceeding with vats/thoracotomy for evacuation of residual hemothorax. at this time does not appear to be infected. awaiting for liver tx team to clear for proceeding . washout of the chest is not emergent - patient is not septic and mental status improved .

## 2025-03-06 NOTE — PROGRESS NOTE ADULT - PROBLEM SELECTOR PLAN 1
- imagings review by Thoracic Attending  - patient remains on room air, comfortable at time of evaluation  - Dr. Ramírez spoke with IR Dr. Up regarding drainage/ PTC placement; high-risk for  decort at this time per Attending.   - OR scheduled for 3/17/25  - global care as per primary team  - Above plan per d/w Thoracic Attending Dr. Ramírez  ---  Thoracic [id number] - imagings review by Thoracic Attending  - patient remains on room air, comfortable at time of evaluation  - Dr. Ramírez spoke with IR Dr. Up regarding drainage/ PTC placement; high-risk for  decort at this time per Attending.   -follow up cultures  - OR scheduled for 3/17/25  - global care as per primary team  - Above plan per d/w Thoracic Attending Dr. Ramírez  ---  Thoracic [id number]

## 2025-03-06 NOTE — PROGRESS NOTE ADULT - SUBJECTIVE AND OBJECTIVE BOX
Transplant Surgery - Multidisciplinary Rounds  --------------------------------------------------------------   Donor OLTx      Date:  25       POD#7     Present:   Patient seen and examined with multidisciplinary Transplant team including Surgeon Dr. Carter, Hepatologist Dr. Larsen, ESTRELLA Gagnon/ESTRELLA Valente and bedside RN during AM rounds.   Disciplines not in attendance will be notified of the plan    HPI: 44M PMH decompensated ETOH cirrhosis c/b recurrent ascites, grade II esophageal varices, and hepatic encephalopathy, who presented to OSH with abd pain and distension found to have decompensated EtOH cirrhosis with MELD 30, MANUELITO (HRS vs ATN; previous HD need with [facility]) found to have R empyema s/p R pigtail placement w/ IR  requiring several adjustments, s/p R robotic VATS with decortication (Dr. Ramírez 2025) for loculated empyema. OR course with oropharyngeal bleeding during intubation, coagulopathy/elevated EBL. Admitted for transplant evaluation and found to have reaccumulation of R pleural effusion, now s/p IR drainage and s/p OLTx.     Interval Events:  - afebrile, VSS  - diarrhea - miralax d/c'd   - s/p thoracentesis w/ IR- complex multi-loculated effusion with scant thick sanguinous fluid  - 1ml aspirated and sent for culture. Accessed a separate loculation in attempt to aspirate additional fluid, 1 ml of thick serosanginous sent for culture     Immunosuppression  Induction: Simulect completed  Maintenance: FK per level, MMF , SST  Ongoing monitoring for signs of rejection     Potential Discharge date: TBD  Education:  Medications  Plan of care:  See Below    tx data here         Review of systems  All other systems were reviewed and are negative, except as noted.    PHYSICAL EXAM:  Constitutional: Well developed / well nourished  Eyes: PERRLA  ENMT: nc/at, no thrush  Neck: supple  Respiratory: CTA B/L  Cardiovascular: RRR  Gastrointestinal: Soft abdomen, ND, appropriate incisional TTP. chevron incision c/d/i, staples in place. No signs of infection. jpx2 SS  Genitourinary: voiding  Extremities: SCD's in place and working bilaterally  Vascular: Palpable dp pulses bilaterally.   Neurological: A&O x3  Skin: no rashes, ulcerations, lesions  Musculoskeletal: Moving all extremities  Psychiatric: Responsive  Transplant Surgery - Multidisciplinary Rounds  --------------------------------------------------------------  DCD OLT     Date:  2/27/25       POD#7   CMV -/+    Present:   Patient seen and examined with multidisciplinary Transplant team including Surgeon Dr. Carter, Hepatologist Dr. Larsen, ESTRELLA Gagnon/ESTRELLA Valente and bedside RN during AM rounds.   Disciplines not in attendance will be notified of the plan    HPI: 44M PMH decompensated ETOH cirrhosis c/b recurrent ascites, grade II esophageal varices, and hepatic encephalopathy, who presented to OSH with abd pain and distension found to have decompensated EtOH cirrhosis with MELD 30, MANUELITO (HRS vs ATN; previous HD need with [facility]) found to have R empyema s/p R pigtail placement w/ IR 1/18 requiring several adjustments, s/p R robotic VATS with decortication (Dr. Ramírez 1/28/2025) for loculated empyema. OR course with oropharyngeal bleeding during intubation, coagulopathy/elevated EBL. Admitted for transplant evaluation and found to have reaccumulation of R pleural effusion, now s/p IR drainage and s/p DCD OLTx.     Interval Events:  - afebrile, VSS  - diarrhea - miralax d/c'd   - s/p thoracentesis w/ IR- complex multi-loculated effusion with scant thick sanguinous fluid  - 1ml aspirated and sent for culture. Accessed a separate loculation in attempt to aspirate additional fluid, 1 ml of thick serosanginous sent for culture     Immunosuppression  Induction: Simulect completed  Maintenance: FK per level, MMF 1/1, SST  Ongoing monitoring for signs of rejection     Potential Discharge date: TBD  Education:  Medications  Plan of care:  See Below      MEDICATIONS  (STANDING):  atovaquone  Suspension 1500 milliGRAM(s) Oral daily  carvedilol 3.125 milliGRAM(s) Oral every 12 hours  chlorhexidine 2% Cloths 1 Application(s) Topical daily  dextrose 5%. 1000 milliLiter(s) (50 mL/Hr) IV Continuous <Continuous>  dextrose 5%. 1000 milliLiter(s) (100 mL/Hr) IV Continuous <Continuous>  dextrose 50% Injectable 25 Gram(s) IV Push once  dextrose 50% Injectable 12.5 Gram(s) IV Push once  dextrose 50% Injectable 25 Gram(s) IV Push once  fluconAZOLE   Tablet 400 milliGRAM(s) Oral daily  furosemide    Tablet 40 milliGRAM(s) Oral once  glucagon  Injectable 1 milliGRAM(s) IntraMuscular once  influenza   Vaccine 0.5 milliLiter(s) IntraMuscular once  insulin glargine Injectable (LANTUS) 6 Unit(s) SubCutaneous at bedtime  insulin lispro (ADMELOG) corrective regimen sliding scale   SubCutaneous three times a day before meals  insulin lispro (ADMELOG) corrective regimen sliding scale   SubCutaneous at bedtime  lidocaine 1% Injectable 1 Vial(s) Local Injection once  magnesium oxide 400 milliGRAM(s) Oral two times a day with meals  meropenem  IVPB      meropenem  IVPB 1000 milliGRAM(s) IV Intermittent every 8 hours  mycophenolate mofetil 500 milliGRAM(s) Oral <User Schedule>  pantoprazole    Tablet 40 milliGRAM(s) Oral before breakfast  predniSONE   Tablet 20 milliGRAM(s) Oral daily  senna 2 Tablet(s) Oral at bedtime  sodium chloride 0.65% Nasal 1 Spray(s) Both Nostrils two times a day  sofosbuvir 400 mG/velpatasvir 100 mG (EPCLUSA) 1 Tablet(s) Oral at bedtime  tacrolimus 0.5 milliGRAM(s) Oral <User Schedule>  thiamine 100 milliGRAM(s) Oral daily  valGANciclovir 450 milliGRAM(s) Oral daily    MEDICATIONS  (PRN):  dextrose Oral Gel 15 Gram(s) Oral once PRN Blood Glucose LESS THAN 70 milliGRAM(s)/deciliter  ondansetron Injectable 4 milliGRAM(s) IV Push every 6 hours PRN Nausea and/or Vomiting  simethicone 80 milliGRAM(s) Chew four times a day PRN Gas  traMADol 25 milliGRAM(s) Oral every 6 hours PRN Moderate Pain (4 - 6)  traMADol 50 milliGRAM(s) Oral every 8 hours PRN Severe Pain (7 - 10)      PAST MEDICAL & SURGICAL HISTORY:  Sleep apnea, obstructive  Alcohol abuse  Cirrhosis of liver  Portal hypertension  H/O esophageal varices  Anemia  Mild alcohol use disorder  No significant past surgical history    Vital Signs Last 24 Hrs  T(C): 36.7 (06 Mar 2025 09:00), Max: 37.1 (05 Mar 2025 13:00)  T(F): 98.1 (06 Mar 2025 09:00), Max: 98.7 (05 Mar 2025 13:00)  HR: 72 (06 Mar 2025 09:00) (62 - 77)  BP: 148/74 (06 Mar 2025 09:00) (133/85 - 160/77)  BP(mean): 102 (06 Mar 2025 05:53) (102 - 112)  RR: 18 (06 Mar 2025 09:00) (17 - 18)  SpO2: 96% (06 Mar 2025 09:00) (96% - 100%)    Parameters below as of 06 Mar 2025 09:00  Patient On (Oxygen Delivery Method): room air    I&O's Summary    05 Mar 2025 07:01  -  06 Mar 2025 07:00  --------------------------------------------------------  IN: 1080 mL / OUT: 3110 mL / NET: -2030 mL                         9.8    4.37  )-----------( 52       ( 06 Mar 2025 06:56 )             29.2     03-06    139  |  104  |  25[H]  ----------------------------<  115[H]  3.8   |  27  |  0.66    Ca    8.8      06 Mar 2025 06:56  Phos  2.3     03-06  Mg     1.8     03-06    TPro  5.8[L]  /  Alb  2.7[L]  /  TBili  1.1  /  DBili  x   /  AST  11  /  ALT  7[L]  /  AlkPhos  47  03-06    Tacrolimus (), Serum: 2.1 ng/mL (03-06 @ 06:56)    Culture - Body Fluid with Gram Stain (collected 03-05-25 @ 19:24)  Source: Pleural Fl Pleural Fluid  Gram Stain (03-06-25 @ 00:57):    No polymorphonuclear cells seen    No organisms seen    by cytocentrifuge    Culture - Blood (collected 03-03-25 @ 18:51)  Source: Blood Blood  Preliminary Report (03-05-25 @ 23:08):    No growth at 48 Hours    Culture - Urine (collected 03-03-25 @ 18:34)  Source: Clean Catch Clean Catch (Midstream)  Final Report (03-04-25 @ 18:41):    No growth    Culture - Blood (collected 03-03-25 @ 17:00)  Source: Blood Blood  Preliminary Report (03-05-25 @ 23:08):    No growth at 48 Hours    Culture - Acid Fast - Body Fluid w/Smear (collected 02-27-25 @ 19:26)  Source: Body Fluid ascites  Preliminary Report (03-01-25 @ 23:06):    Culture is being performed.    Culture - Fungal, Body Fluid (collected 02-27-25 @ 19:26)  Source: Body Fluid ascites  Preliminary Report (03-02-25 @ 10:23):    No growth    Culture - Body Fluid with Gram Stain (collected 02-27-25 @ 19:26)  Source: Body Fluid ascites  Gram Stain (02-28-25 @ 02:36):    No polymorphonuclear leukocytes seen    No organisms seen    by cytocentrifuge  Final Report (03-04-25 @ 16:45):    No growth at 5 days      Review of systems  All other systems were reviewed and are negative, except as noted.    PHYSICAL EXAM:  Constitutional: Well developed / well nourished  Eyes: PERRLA  ENMT: nc/at, no thrush  Neck: supple  Respiratory: CTA B/L  Cardiovascular: RRR  Gastrointestinal: Soft abdomen, ND, appropriate incisional TTP. chevron incision c/d/i, staples in place. No signs of infection. ADAM x 2  Genitourinary: voiding  Extremities: SCD's in place and working bilaterally  Vascular: Palpable dp pulses bilaterally.   Neurological: A&O x3  Skin: no rashes, ulcerations, lesions  Musculoskeletal: Moving all extremities  Psychiatric: Responsive

## 2025-03-06 NOTE — PROGRESS NOTE ADULT - ASSESSMENT
Mother called and stated she will call back to reschedule when she is home.     45yo M with Hx decompensated EtOH cirrhosis c/b recurrent ascites, grade II EV, and HE who presented to OSH for abd pain and distension, found to have ascites and MANUELITO requiring HD. Patient was transferred to [facility] for management and liver transplant evaluation. His hospital course has been c/b an empyema s/p VATS decortication on 1/28 with Dr. Ramírez. Postoperative course has been c/b recurrent R pleural effusion s/p IR drainage. Admitted for transplant evaluation, now s/p OLT on 2/27.     [ ] DCD OLT POD 7   - LFTs trending down   - HCV viremia (donor hcv +) - f/u genotype; started Epclusa 3/1   - Strict I/O's jpx2  - Lasix 40 IV x1  - SQH, ASA- HELD for possible VATS  - Pain control  - bowel regimen - HELD for diarrhea  - PT/SCD/IS  - On CTX for the R pleural effusion per ID    [] Immuno  - simulect completed  - FK per level, MMF 500mg bid, SST  - ppx: no bactrim due to sulfa allergy -> Mepron, fluc, valcyte    [] Ileus   - having bowel function  - avoid narcotics,   - Advanced to regular diet; c/s RD for nutritional beverage    [] Worsening MS  - fu infectious work up  - check ammonia with AM labs  - CTH neg, CT chest with Loculated R pleural effusion  - IR thora 3/5  - ID consult: broadened to meropenem  45yo M with Hx decompensated EtOH cirrhosis c/b recurrent ascites, grade II EV, and HE who presented to OSH for abd pain and distension, found to have ascites and MANUELITO requiring HD. Patient was transferred to [facility] for management and liver transplant evaluation. His hospital course has been c/b an empyema s/p VATS decortication on 1/28 with Dr. Ramírez. Postoperative course has been c/b recurrent R pleural effusion s/p IR drainage. Admitted for transplant evaluation, now s/p OLT on 2/27.     [ ] DCD OLT POD 7   - LFTs trending down   - HCV viremia (donor hcv +) - f/u genotype; started Epclusa 3/1   - Strict I/O's jpx2  - Lasix 40 IV x1  - SQH, ASA- HELD for possible VATS  - Pain control  - bowel regimen - HELD for diarrhea  - PT/SCD/IS    [] Immuno  - simulect completed  - FK per level, MMF 500mg bid, SST  - ppx: no bactrim due to sulfa allergy -> Mepron, fluc, valcyte    [] Ileus   - resolved   - avoid narcotics,     [] Worsening MS  - bld cxs with NGTD  - CTH neg, CT chest with Loculated R pleural effusion  - IR thora 3/5 minimal drainage  - plan for thoracotomy in 1 week  - ID following: cont  meropenem

## 2025-03-06 NOTE — PROGRESS NOTE ADULT - NS ATTEND OPT1 GEN_ALL_CORE
I attest my time as attending is greater than 50% of the total combined time spent on qualifying patient care activities by the PA/NP and attending.
I independently performed the documented:
I attest my time as attending is greater than 50% of the total combined time spent on qualifying patient care activities by the PA/NP and attending.
I independently performed the documented:

## 2025-03-06 NOTE — PROGRESS NOTE ADULT - ASSESSMENT
45yo M with Hx decompensated EtOH cirrhosis c/b recurrent ascites, grade II EV, and HE who presented to OSH for abd pain and distension, found to have ascites and MANUELITO requiring HD. Patient was transferred to [facility] for management and liver transplant evaluation. His hospital course has been c/b an empyema s/p VATS decortication on 1/28 with Dr. Ramírez. Postoperative course has been c/b recurrent R pleural effusion. Thoracic Surgery consulted for intraoperative chest tube placement for drainage of recurrent R pleural effusion.     3/3   ct chest -Decreased right intrapleural gas bubbles, otherwise unchanged moderate loculated right pleural effusion and associated pleural thickening.     3/4   abx per ID,   ambulates w asst  on room air.  3/5 stable; on room air; IR for drainage/ PTC placement  3/6  VSS  right hemopneumothorax.  Pt will most likely require a thoracotomy for decortication.  Not medically stable for open procedure at this time.  OR scheduled for 3/17/25 45yo M with Hx decompensated EtOH cirrhosis c/b recurrent ascites, grade II EV, and HE who presented to OSH for abd pain and distension, found to have ascites and MANUELITO requiring HD. Patient was transferred to [facility] for management and liver transplant evaluation. His hospital course has been c/b an empyema s/p VATS decortication on 1/28 with Dr. Ramírez. Postoperative course has been c/b recurrent R pleural effusion. Thoracic Surgery consulted for intraoperative chest tube placement for drainage of recurrent R pleural effusion.     3/3   ct chest -Decreased right intrapleural gas bubbles, otherwise unchanged moderate loculated right pleural effusion and associated pleural thickening.     3/4   abx per ID,   ambulates w asst  on room air.  3/5 stable; on room air; IR for drainage/ PTC placement  3/6  VSS  right hemopneumothorax. Sp thoracentesis 1ml cultures sent.  GS-neg.   Pt will most likely require a thoracotomy for decortication.  Not medically stable for open procedure at this time.  OR scheduled for 3/17/25

## 2025-03-07 LAB
ALBUMIN SERPL ELPH-MCNC: 2.5 G/DL — LOW (ref 3.3–5)
ALP SERPL-CCNC: 44 U/L — SIGNIFICANT CHANGE UP (ref 40–120)
ALT FLD-CCNC: 7 U/L — LOW (ref 10–45)
ANION GAP SERPL CALC-SCNC: 7 MMOL/L — SIGNIFICANT CHANGE UP (ref 5–17)
APTT BLD: 27.1 SEC — SIGNIFICANT CHANGE UP (ref 24.5–35.6)
AST SERPL-CCNC: 7 U/L — LOW (ref 10–40)
BILIRUB SERPL-MCNC: 1 MG/DL — SIGNIFICANT CHANGE UP (ref 0.2–1.2)
BLD GP AB SCN SERPL QL: NEGATIVE — SIGNIFICANT CHANGE UP
BUN SERPL-MCNC: 26 MG/DL — HIGH (ref 7–23)
CALCIUM SERPL-MCNC: 8.6 MG/DL — SIGNIFICANT CHANGE UP (ref 8.4–10.5)
CHLORIDE SERPL-SCNC: 106 MMOL/L — SIGNIFICANT CHANGE UP (ref 96–108)
CO2 SERPL-SCNC: 27 MMOL/L — SIGNIFICANT CHANGE UP (ref 22–31)
CREAT SERPL-MCNC: 0.6 MG/DL — SIGNIFICANT CHANGE UP (ref 0.5–1.3)
EGFR: 121 ML/MIN/1.73M2 — SIGNIFICANT CHANGE UP
EGFR: 121 ML/MIN/1.73M2 — SIGNIFICANT CHANGE UP
GLUCOSE BLDC GLUCOMTR-MCNC: 105 MG/DL — HIGH (ref 70–99)
GLUCOSE BLDC GLUCOMTR-MCNC: 152 MG/DL — HIGH (ref 70–99)
GLUCOSE BLDC GLUCOMTR-MCNC: 189 MG/DL — HIGH (ref 70–99)
GLUCOSE BLDC GLUCOMTR-MCNC: 200 MG/DL — HIGH (ref 70–99)
GLUCOSE BLDC GLUCOMTR-MCNC: 86 MG/DL — SIGNIFICANT CHANGE UP (ref 70–99)
GLUCOSE SERPL-MCNC: 96 MG/DL — SIGNIFICANT CHANGE UP (ref 70–99)
HCT VFR BLD CALC: 30 % — LOW (ref 39–50)
HGB BLD-MCNC: 9.8 G/DL — LOW (ref 13–17)
INR BLD: 0.96 RATIO — SIGNIFICANT CHANGE UP (ref 0.85–1.16)
MAGNESIUM SERPL-MCNC: 1.8 MG/DL — SIGNIFICANT CHANGE UP (ref 1.6–2.6)
MCHC RBC-ENTMCNC: 30 PG — SIGNIFICANT CHANGE UP (ref 27–34)
MCHC RBC-ENTMCNC: 32.7 G/DL — SIGNIFICANT CHANGE UP (ref 32–36)
MCV RBC AUTO: 91.7 FL — SIGNIFICANT CHANGE UP (ref 80–100)
NRBC BLD AUTO-RTO: 0 /100 WBCS — SIGNIFICANT CHANGE UP (ref 0–0)
PHOSPHATE SERPL-MCNC: 2.4 MG/DL — LOW (ref 2.5–4.5)
PLATELET # BLD AUTO: 72 K/UL — LOW (ref 150–400)
POTASSIUM SERPL-MCNC: 4.2 MMOL/L — SIGNIFICANT CHANGE UP (ref 3.5–5.3)
POTASSIUM SERPL-SCNC: 4.2 MMOL/L — SIGNIFICANT CHANGE UP (ref 3.5–5.3)
PROT SERPL-MCNC: 5.6 G/DL — LOW (ref 6–8.3)
PROTHROM AB SERPL-ACNC: 11 SEC — SIGNIFICANT CHANGE UP (ref 9.9–13.4)
RBC # BLD: 3.27 M/UL — LOW (ref 4.2–5.8)
RBC # FLD: 17.7 % — HIGH (ref 10.3–14.5)
RH IG SCN BLD-IMP: POSITIVE — SIGNIFICANT CHANGE UP
SODIUM SERPL-SCNC: 140 MMOL/L — SIGNIFICANT CHANGE UP (ref 135–145)
TACROLIMUS SERPL-MCNC: 2.6 NG/ML — SIGNIFICANT CHANGE UP
WBC # BLD: 4.54 K/UL — SIGNIFICANT CHANGE UP (ref 3.8–10.5)
WBC # FLD AUTO: 4.54 K/UL — SIGNIFICANT CHANGE UP (ref 3.8–10.5)

## 2025-03-07 PROCEDURE — 99232 SBSQ HOSP IP/OBS MODERATE 35: CPT

## 2025-03-07 PROCEDURE — G0545: CPT

## 2025-03-07 RX ORDER — SOD PHOS DI, MONO/K PHOS MONO 250 MG
2 TABLET ORAL ONCE
Refills: 0 | Status: COMPLETED | OUTPATIENT
Start: 2025-03-07 | End: 2025-03-07

## 2025-03-07 RX ORDER — VELPATASVIR AND SOFOSBUVIR 50; 200 MG/1; MG/1
1 TABLET, FILM COATED ORAL AT BEDTIME
Refills: 0 | Status: DISCONTINUED | OUTPATIENT
Start: 2025-03-07 | End: 2025-03-11

## 2025-03-07 RX ORDER — TACROLIMUS 0.5 MG/1
1 CAPSULE ORAL
Refills: 0 | Status: DISCONTINUED | OUTPATIENT
Start: 2025-03-07 | End: 2025-03-08

## 2025-03-07 RX ADMIN — PREDNISONE 20 MILLIGRAM(S): 20 TABLET ORAL at 05:59

## 2025-03-07 RX ADMIN — TRAMADOL HYDROCHLORIDE 25 MILLIGRAM(S): 50 TABLET, FILM COATED ORAL at 06:51

## 2025-03-07 RX ADMIN — Medication 400 MILLIGRAM(S): at 09:13

## 2025-03-07 RX ADMIN — Medication 400 MILLIGRAM(S): at 14:01

## 2025-03-07 RX ADMIN — TRAMADOL HYDROCHLORIDE 25 MILLIGRAM(S): 50 TABLET, FILM COATED ORAL at 06:07

## 2025-03-07 RX ADMIN — MEROPENEM 100 MILLIGRAM(S): 1 INJECTION INTRAVENOUS at 05:59

## 2025-03-07 RX ADMIN — Medication 2 PACKET(S): at 09:15

## 2025-03-07 RX ADMIN — TRAMADOL HYDROCHLORIDE 50 MILLIGRAM(S): 50 TABLET, FILM COATED ORAL at 14:03

## 2025-03-07 RX ADMIN — Medication 1 APPLICATION(S): at 14:01

## 2025-03-07 RX ADMIN — TACROLIMUS 1 MILLIGRAM(S): 0.5 CAPSULE ORAL at 09:15

## 2025-03-07 RX ADMIN — Medication 1 SPRAY(S): at 06:00

## 2025-03-07 RX ADMIN — CARVEDILOL 3.12 MILLIGRAM(S): 3.12 TABLET, FILM COATED ORAL at 05:59

## 2025-03-07 RX ADMIN — INSULIN GLARGINE-YFGN 6 UNIT(S): 100 INJECTION, SOLUTION SUBCUTANEOUS at 21:27

## 2025-03-07 RX ADMIN — Medication 81 MILLIGRAM(S): at 14:02

## 2025-03-07 RX ADMIN — ATOVAQUONE 1500 MILLIGRAM(S): 750 SUSPENSION ORAL at 14:03

## 2025-03-07 RX ADMIN — MEROPENEM 100 MILLIGRAM(S): 1 INJECTION INTRAVENOUS at 20:06

## 2025-03-07 RX ADMIN — MEROPENEM 100 MILLIGRAM(S): 1 INJECTION INTRAVENOUS at 14:03

## 2025-03-07 RX ADMIN — TRAMADOL HYDROCHLORIDE 50 MILLIGRAM(S): 50 TABLET, FILM COATED ORAL at 23:36

## 2025-03-07 RX ADMIN — TRAMADOL HYDROCHLORIDE 50 MILLIGRAM(S): 50 TABLET, FILM COATED ORAL at 14:37

## 2025-03-07 RX ADMIN — VALGANCICLOVIR 450 MILLIGRAM(S): 450 TABLET, FILM COATED ORAL at 14:03

## 2025-03-07 RX ADMIN — CARVEDILOL 3.12 MILLIGRAM(S): 3.12 TABLET, FILM COATED ORAL at 18:16

## 2025-03-07 RX ADMIN — INSULIN LISPRO 2: 100 INJECTION, SOLUTION INTRAVENOUS; SUBCUTANEOUS at 09:13

## 2025-03-07 RX ADMIN — Medication 1 SPRAY(S): at 18:16

## 2025-03-07 RX ADMIN — HEPARIN SODIUM 5000 UNIT(S): 1000 INJECTION INTRAVENOUS; SUBCUTANEOUS at 18:16

## 2025-03-07 RX ADMIN — Medication 400 MILLIGRAM(S): at 18:16

## 2025-03-07 RX ADMIN — MYCOPHENOLATE MOFETIL 500 MILLIGRAM(S): 500 TABLET, FILM COATED ORAL at 09:13

## 2025-03-07 RX ADMIN — INSULIN LISPRO 2: 100 INJECTION, SOLUTION INTRAVENOUS; SUBCUTANEOUS at 14:00

## 2025-03-07 RX ADMIN — INSULIN GLARGINE-YFGN 6 UNIT(S): 100 INJECTION, SOLUTION SUBCUTANEOUS at 00:40

## 2025-03-07 RX ADMIN — TACROLIMUS 1 MILLIGRAM(S): 0.5 CAPSULE ORAL at 20:04

## 2025-03-07 RX ADMIN — HEPARIN SODIUM 5000 UNIT(S): 1000 INJECTION INTRAVENOUS; SUBCUTANEOUS at 05:59

## 2025-03-07 RX ADMIN — MYCOPHENOLATE MOFETIL 500 MILLIGRAM(S): 500 TABLET, FILM COATED ORAL at 20:05

## 2025-03-07 RX ADMIN — Medication 5 MILLIGRAM(S): at 23:34

## 2025-03-07 RX ADMIN — VELPATASVIR AND SOFOSBUVIR 1 TABLET(S): 50; 200 TABLET, FILM COATED ORAL at 20:04

## 2025-03-07 RX ADMIN — FUROSEMIDE 40 MILLIGRAM(S): 10 INJECTION INTRAMUSCULAR; INTRAVENOUS at 05:59

## 2025-03-07 RX ADMIN — Medication 100 MILLIGRAM(S): at 14:01

## 2025-03-07 RX ADMIN — Medication 40 MILLIGRAM(S): at 05:59

## 2025-03-07 NOTE — PROGRESS NOTE ADULT - ASSESSMENT
43 yo M with PMH decompensated ETOH cirrhosis c/b recurrent ascites, grade II EV, and HE, right calf hematoma 10/2024 (s/p fall)  presented to [facility] for abdominal pain and distension. Patient found to have ascites on exam and MANUELITO requiring HD initiation PermCath was placed by vascular surgery on 1/10 with post procedure course c/b bleeding from insertion site. Transferred to [facility] SICU for further management.     Blood Cultures (1/16) 1/4 Staph epi.   GI PCR (1/17) + Giardia.   Paracentesis (1/17) 155 nucleated cell counts.     CT Chest (1/17) Moderate loculated right pleural effusion containing a few foci of air, which are new from 1/8/2025 and may represent empyema/bronchopleural fistula versus sequelae of prior thoracentesis.     Noted to have worsening abdominal pain and encephalopathy on 3/3/2025    BCx (3/3) NGTD  UCx (3/3) No growth    CT Chest (3/3) Decreased right intrapleural gas bubbles, otherwise unchanged moderate loculated right pleural effusion and associated pleural thickening.    CT A/P (3/3) Complex collection in the gallbladder fossa measuring 5.1 x 3.0 x 3.0 cm, which may be mildly decreased in size since the prior ultrasound of 2/28/2025, allowing for differences in modality. Hyperdensity within the collection, suggestive of hemorrhagic content. Small amount of free peritoneal fluid without additional discrete organized collection identified. Small amount of pneumoperitoneum, which may be postsurgical.    #Abdominal Pain, Encephalopathy, Abnormal CT A/P (fluid collections)  --Switch Meropenem back to Ceftriaxone 2g IV Q24H as ID workup from 3/3 has been unrevealing  --Continue to follow CBC with diff  --Continue to follow temperature curve  --Follow up on preliminary blood cultures    # s/p OLT 2/27/25 CMV D-/R+, EBV D+/R+, Toxoplasma D+/R-, HCV NAAT pos donor  Simulect. steroids induction  Fluconazole per protocol   Note patient has bactrim allergy- needs atovaquone AND NO OTHER ALTERNATIVE for PCP , toxoplasma Ppx as he is high risk of donor derived infection  If atovaquone not tolerated, will need bactrim desensitization or challenge  --Continue Valcyte 450 mg PO Q24H     # HCV positive (NAAT  and Ab) donor  Follow HCV PCR testing per protocol and check genotype  and antivirals epclusa    #Empyema- citrobacter koseri  s/p VATS 1/28/25-  last positive cultures 1/25/25  for citrobacter koseri  Thoracentesis 3/5 pleural fluid culture NGTD  --Repeat CT Chest 3/3 with persistent moderate right pleural effusion / hemothorax; thoracic surgery planning eventual surgical intervention     #Giardia  s/p 7 days of Metronidazole    #Positive Blood Culture ([facility] epi, 1/16)  procurement contaminant    I will continue to follow. Please feel free to contact me with any further questions.    Froilan Whitmore M.D.  [facility] Division of Infectious Disease  8AM-5PM Monday - Friday: Available on [employer] Teams  After Hours and Holidays (or if no response on [employer] Teams): Please contact the Infectious Diseases Office at [phone]    The above assessment and plan were discussed with Juanjo transplant surgery PA

## 2025-03-07 NOTE — PROGRESS NOTE ADULT - SUBJECTIVE AND OBJECTIVE BOX
VITAL SIGN    Vital Signs Last 24 Hrs  T(C): 36.6 (25 @ 09:08), Max: 36.9 (25 @ 00:30)  T(F): 97.8 (25 @ 09:08), Max: 98.4 (25 @ 00:30)  HR: 68 (25 @ 09:08) (68 - 88)  BP: 134/69 (25 @ 09:08) (131/63 - 150/81)  RR: 18 (25 @ 09:08) (17 - 18)  SpO2: 98% (25 @ 09:08) (96% - 99%)                   Daily     Daily Weight in k.9 (07 Mar 2025 05:57)      Bilirubin Total: 1.0 mg/dL ( @ 06:52)    CAPILLARY BLOOD GLUCOSE      POCT Blood Glucose.: 152 mg/dL (07 Mar 2025 09:07)  POCT Blood Glucose.: 200 mg/dL (07 Mar 2025 00:26)  POCT Blood Glucose.: 104 mg/dL (06 Mar 2025 21:25)  POCT Blood Glucose.: 121 mg/dL (06 Mar 2025 17:31)  POCT Blood Glucose.: 217 mg/dL (06 Mar 2025 12:20)                          PHYSICAL EXAM  s   No SOB  No BP"  Neurology: alert and oriented x 3, moves all extremities with no defecits  CV :  RRR    Lungs:   CTA B/L  Abdomen: soft, nontender, nondistended, positive bowel sounds,- staples intact  Extremities:     1-2 pedal edema

## 2025-03-07 NOTE — PROGRESS NOTE ADULT - SUBJECTIVE AND OBJECTIVE BOX
Transplant Surgery - Multidisciplinary Rounds  --------------------------------------------------------------  DCD OLT     Date:  2/27/25       POD#8   CMV -/+    Present:   Patient seen and examined with multidisciplinary Transplant team including Surgeon Dr. Carter, Hepatologist Dr. Larsen, ESTRELLA Gagnon/ESTRELLA Valente and bedside RN during AM rounds.   Disciplines not in attendance will be notified of the plan    HPI: 44M PMH decompensated ETOH cirrhosis c/b recurrent ascites, grade II esophageal varices, and hepatic encephalopathy, who presented to OSH with abd pain and distension found to have decompensated EtOH cirrhosis with MELD 30, MANUELITO (HRS vs ATN; previous HD need with [facility]) found to have R empyema s/p R pigtail placement w/ IR 1/18 requiring several adjustments, s/p R robotic VATS with decortication (Dr. Ramírez 1/28/2025) for loculated empyema. OR course with oropharyngeal bleeding during intubation, coagulopathy/elevated EBL. Admitted for transplant evaluation and found to have reaccumulation of R pleural effusion, now s/p IR drainage and s/p DCD OLTx.     Interval Events:  - afebrile, VSS  - pleural fluid cx/gr stain (no PMN, negative culture)     Immunosuppression  Induction: Simulect completed  Maintenance: FK per level, MMF 1/1, SST  Ongoing monitoring for signs of rejection     Potential Discharge date: TBD  Education:  Medications  Plan of care:  See Below    MEDICATIONS  (STANDING):  aspirin enteric coated 81 milliGRAM(s) Oral daily  atovaquone  Suspension 1500 milliGRAM(s) Oral daily  carvedilol 3.125 milliGRAM(s) Oral every 12 hours  chlorhexidine 2% Cloths 1 Application(s) Topical daily  dextrose 5%. 1000 milliLiter(s) (50 mL/Hr) IV Continuous <Continuous>  dextrose 5%. 1000 milliLiter(s) (100 mL/Hr) IV Continuous <Continuous>  dextrose 50% Injectable 25 Gram(s) IV Push once  dextrose 50% Injectable 12.5 Gram(s) IV Push once  dextrose 50% Injectable 25 Gram(s) IV Push once  fluconAZOLE   Tablet 400 milliGRAM(s) Oral daily  furosemide    Tablet 40 milliGRAM(s) Oral daily  glucagon  Injectable 1 milliGRAM(s) IntraMuscular once  heparin   Injectable 5000 Unit(s) SubCutaneous every 12 hours  influenza   Vaccine 0.5 milliLiter(s) IntraMuscular once  insulin glargine Injectable (LANTUS) 6 Unit(s) SubCutaneous at bedtime  insulin lispro (ADMELOG) corrective regimen sliding scale   SubCutaneous three times a day before meals  insulin lispro (ADMELOG) corrective regimen sliding scale   SubCutaneous at bedtime  lidocaine 1% Injectable 1 Vial(s) Local Injection once  magnesium oxide 400 milliGRAM(s) Oral two times a day with meals  meropenem  IVPB      meropenem  IVPB 1000 milliGRAM(s) IV Intermittent every 8 hours  mycophenolate mofetil 500 milliGRAM(s) Oral <User Schedule>  pantoprazole    Tablet 40 milliGRAM(s) Oral before breakfast  predniSONE   Tablet 20 milliGRAM(s) Oral daily  senna 2 Tablet(s) Oral at bedtime  sodium chloride 0.65% Nasal 1 Spray(s) Both Nostrils two times a day  sofosbuvir 400 mG/velpatasvir 100 mG (EPCLUSA) 1 Tablet(s) Oral at bedtime  tacrolimus 0.5 milliGRAM(s) Oral <User Schedule>  tacrolimus 1 milliGRAM(s) Oral <User Schedule>  thiamine 100 milliGRAM(s) Oral daily  valGANciclovir 450 milliGRAM(s) Oral daily    MEDICATIONS  (PRN):  dextrose Oral Gel 15 Gram(s) Oral once PRN Blood Glucose LESS THAN 70 milliGRAM(s)/deciliter  melatonin 5 milliGRAM(s) Oral at bedtime PRN Insomnia  ondansetron Injectable 4 milliGRAM(s) IV Push every 6 hours PRN Nausea and/or Vomiting  simethicone 80 milliGRAM(s) Chew four times a day PRN Gas  traMADol 25 milliGRAM(s) Oral every 6 hours PRN Moderate Pain (4 - 6)  traMADol 50 milliGRAM(s) Oral every 8 hours PRN Severe Pain (7 - 10)      PAST MEDICAL & SURGICAL HISTORY:  Sleep apnea, obstructive      Alcohol abuse      Cirrhosis of liver      Portal hypertension      H/O esophageal varices      Anemia      Mild alcohol use disorder      No significant past surgical history          Vital Signs Last 24 Hrs  T(C): 36.9 (07 Mar 2025 00:30), Max: 36.9 (07 Mar 2025 00:30)  T(F): 98.4 (07 Mar 2025 00:30), Max: 98.4 (07 Mar 2025 00:30)  HR: 88 (07 Mar 2025 00:30) (70 - 88)  BP: 131/63 (07 Mar 2025 00:30) (131/63 - 149/76)  BP(mean): 90 (07 Mar 2025 00:30) (90 - 102)  RR: 18 (07 Mar 2025 00:30) (17 - 18)  SpO2: 97% (07 Mar 2025 00:30) (96% - 99%)    Parameters below as of 07 Mar 2025 00:30  Patient On (Oxygen Delivery Method): room air        I&O's Summary    05 Mar 2025 07:01  -  06 Mar 2025 07:00  --------------------------------------------------------  IN: 1080 mL / OUT: 3110 mL / NET: -2030 mL    06 Mar 2025 07:01  -  07 Mar 2025 04:26  --------------------------------------------------------  IN: 1130 mL / OUT: 2220 mL / NET: -1090 mL                              9.8    4.37  )-----------( 52       ( 06 Mar 2025 06:56 )             29.2     03-06    139  |  104  |  25[H]  ----------------------------<  115[H]  3.8   |  27  |  0.66    Ca    8.8      06 Mar 2025 06:56  Phos  2.3     03-06  Mg     1.8     03-06    TPro  5.8[L]  /  Alb  2.7[L]  /  TBili  1.1  /  DBili  x   /  AST  11  /  ALT  7[L]  /  AlkPhos  47  03-06    Tacrolimus (), Serum: 2.1 ng/mL (03-06 @ 06:56)    Review of systems  All other systems were reviewed and are negative, except as noted.    PHYSICAL EXAM:  Constitutional: Well developed / well nourished  Eyes: PERRLA  ENMT: nc/at, no thrush  Neck: supple  Respiratory: CTA B/L  Cardiovascular: RRR  Gastrointestinal: Soft abdomen, ND, appropriate incisional TTP. chevron incision c/d/i, staples in place. No signs of infection. ADAM x 2  Genitourinary: voiding  Extremities: SCD's in place and working bilaterally  Vascular: Palpable dp pulses bilaterally.   Neurological: A&O x3  Skin: no rashes, ulcerations, lesions  Musculoskeletal: Moving all extremities  Psychiatric: Responsive    Transplant Surgery - Multidisciplinary Progress Note  --------------------------------------------------------------  DCD OLT     Date:  2/27/25       POD#8   CMV -/+    Present:   Patient seen and examined with multidisciplinary Transplant team including Surgeon Dr. Carter, Hepatologist Dr. Larsen, ESTRELLA Abel, Pharmacist: Sommer and bedside RN during AM rounds.   Disciplines not in attendance will be notified of the plan    HPI: 44M PMH decompensated ETOH cirrhosis c/b recurrent ascites, grade II esophageal varices, and hepatic encephalopathy, who presented to OSH with abd pain and distension found to have decompensated EtOH cirrhosis with MELD 30, MANUELITO (HRS vs ATN; previous HD need with [facility]) found to have R empyema s/p R pigtail placement w/ IR 1/18 requiring several adjustments, s/p R robotic VATS with decortication (Dr. Ramírez 1/28/2025) for loculated empyema. OR course with oropharyngeal bleeding during intubation, coagulopathy/elevated EBL. Admitted for transplant evaluation and found to have reaccumulation of R pleural effusion, now s/p IR drainage and s/p DCD OLTx.     Interval Events:  - afebrile, VSS  - pleural fluid cx/gr stain (no PMN, negative culture)  - AAOx3, no o/n events     Immunosuppression  Induction: Simulect completed  Maintenance: FK per level, MMF 1/1, SST  Ongoing monitoring for signs of rejection     Potential Discharge date: TBD  Education:  Medications  Plan of care:  See Below        MEDICATIONS  (STANDING):  aspirin enteric coated 81 milliGRAM(s) Oral daily  atovaquone  Suspension 1500 milliGRAM(s) Oral daily  carvedilol 3.125 milliGRAM(s) Oral every 12 hours  chlorhexidine 2% Cloths 1 Application(s) Topical daily  dextrose 5%. 1000 milliLiter(s) (100 mL/Hr) IV Continuous <Continuous>  dextrose 5%. 1000 milliLiter(s) (50 mL/Hr) IV Continuous <Continuous>  dextrose 50% Injectable 25 Gram(s) IV Push once  dextrose 50% Injectable 12.5 Gram(s) IV Push once  dextrose 50% Injectable 25 Gram(s) IV Push once  fluconAZOLE   Tablet 400 milliGRAM(s) Oral daily  furosemide    Tablet 40 milliGRAM(s) Oral daily  glucagon  Injectable 1 milliGRAM(s) IntraMuscular once  heparin   Injectable 5000 Unit(s) SubCutaneous every 12 hours  influenza   Vaccine 0.5 milliLiter(s) IntraMuscular once  insulin glargine Injectable (LANTUS) 6 Unit(s) SubCutaneous at bedtime  insulin lispro (ADMELOG) corrective regimen sliding scale   SubCutaneous three times a day before meals  insulin lispro (ADMELOG) corrective regimen sliding scale   SubCutaneous at bedtime  lidocaine 1% Injectable 1 Vial(s) Local Injection once  magnesium oxide 400 milliGRAM(s) Oral two times a day with meals  meropenem  IVPB      meropenem  IVPB 1000 milliGRAM(s) IV Intermittent every 8 hours  mycophenolate mofetil 500 milliGRAM(s) Oral <User Schedule>  pantoprazole    Tablet 40 milliGRAM(s) Oral before breakfast  predniSONE   Tablet 20 milliGRAM(s) Oral daily  senna 2 Tablet(s) Oral at bedtime  sodium chloride 0.65% Nasal 1 Spray(s) Both Nostrils two times a day  sofosbuvir 400 mG/velpatasvir 100 mG (EPCLUSA) 1 Tablet(s) Oral at bedtime  tacrolimus 0.5 milliGRAM(s) Oral <User Schedule>  tacrolimus 1 milliGRAM(s) Oral <User Schedule>  thiamine 100 milliGRAM(s) Oral daily  valGANciclovir 450 milliGRAM(s) Oral daily    MEDICATIONS  (PRN):  dextrose Oral Gel 15 Gram(s) Oral once PRN Blood Glucose LESS THAN 70 milliGRAM(s)/deciliter  melatonin 5 milliGRAM(s) Oral at bedtime PRN Insomnia  ondansetron Injectable 4 milliGRAM(s) IV Push every 6 hours PRN Nausea and/or Vomiting  simethicone 80 milliGRAM(s) Chew four times a day PRN Gas  traMADol 25 milliGRAM(s) Oral every 6 hours PRN Moderate Pain (4 - 6)  traMADol 50 milliGRAM(s) Oral every 8 hours PRN Severe Pain (7 - 10)      PAST MEDICAL & SURGICAL HISTORY:  Sleep apnea, obstructive      Alcohol abuse      Cirrhosis of liver      Portal hypertension      H/O esophageal varices      Anemia      Mild alcohol use disorder      No significant past surgical history          Vital Signs Last 24 Hrs  T(C): 36.6 (07 Mar 2025 09:08), Max: 36.9 (07 Mar 2025 00:30)  T(F): 97.8 (07 Mar 2025 09:08), Max: 98.4 (07 Mar 2025 00:30)  HR: 68 (07 Mar 2025 09:08) (68 - 88)  BP: 134/69 (07 Mar 2025 09:08) (131/63 - 150/81)  BP(mean): 95 (07 Mar 2025 09:08) (90 - 109)  RR: 18 (07 Mar 2025 09:08) (17 - 18)  SpO2: 98% (07 Mar 2025 09:08) (96% - 99%)    Parameters below as of 07 Mar 2025 09:08  Patient On (Oxygen Delivery Method): room air        I&O's Summary    06 Mar 2025 07:01  -  07 Mar 2025 07:00  --------------------------------------------------------  IN: 1250 mL / OUT: 2870 mL / NET: -1620 mL    07 Mar 2025 07:01  -  07 Mar 2025 11:50  --------------------------------------------------------  IN: 0 mL / OUT: 1100 mL / NET: -1100 mL                              9.8    4.54  )-----------( 72       ( 07 Mar 2025 06:53 )             30.0     03-07    140  |  106  |  26[H]  ----------------------------<  96  4.2   |  27  |  0.60    Ca    8.6      07 Mar 2025 06:52  Phos  2.4     03-07  Mg     1.8     03-07    TPro  5.6[L]  /  Alb  2.5[L]  /  TBili  1.0  /  DBili  x   /  AST  7[L]  /  ALT  7[L]  /  AlkPhos  44  03-07    Tacrolimus (), Serum: 2.6 ng/mL (03-07 @ 06:55)        Culture - Fungal, Body Fluid (collected 03-05-25 @ 19:24)  Source: Peritoneal Peritoneal Fluid  Preliminary Report (03-06-25 @ 10:46):    Testing in progress    Culture - Body Fluid with Gram Stain (collected 03-05-25 @ 19:24)  Source: Pleural Fl Pleural Fluid  Gram Stain (03-06-25 @ 00:57):    No polymorphonuclear cells seen    No organisms seen    by cytocentrifuge  Preliminary Report (03-06-25 @ 19:42):    No growth    Culture - Blood (collected 03-03-25 @ 18:51)  Source: Blood Blood  Preliminary Report (03-06-25 @ 23:01):    No growth at 72 Hours    Culture - Urine (collected 03-03-25 @ 18:34)  Source: Clean Catch Clean Catch (Midstream)  Final Report (03-04-25 @ 18:41):    No growth    Culture - Blood (collected 03-03-25 @ 17:00)  Source: Blood Blood  Preliminary Report (03-06-25 @ 23:01):    No growth at 72 Hours                    Review of systems  All other systems were reviewed and are negative, except as noted.    PHYSICAL EXAM:  Constitutional: Well developed / well nourished  Eyes: PERRLA  ENMT: nc/at, no thrush  Neck: supple  Respiratory: CTA B/L  Cardiovascular: RRR  Gastrointestinal: Soft abdomen, ND, appropriate incisional TTP. chevron incision c/d/i, staples in place. No signs of infection. JP2 ss  Genitourinary: voiding  Extremities: SCD's in place and working bilaterally  Vascular: Palpable dp pulses bilaterally.   Neurological: A&O x3  Skin: no rashes, ulcerations, lesions  Musculoskeletal: Moving all extremities  Psychiatric: Responsive

## 2025-03-07 NOTE — PROGRESS NOTE ADULT - PROBLEM SELECTOR PLAN 1
- imagings review by Thoracic Attending  - patient remains on room air, comfortable at time of evaluation  - Dr. Ramírez spoke with IR Dr. Up regarding drainage/ PTC placement; high-risk for  decort at this time per Attending.   -follow up cultures   from IR  taken 3/5  - OR scheduled for 3/17/25    - Above plan per d/w Thoracic Attending Dr. Ramírez  ---  Thoracic [id number]

## 2025-03-07 NOTE — PROGRESS NOTE ADULT - SUBJECTIVE AND OBJECTIVE BOX
Follow Up:  s/p OLT    Interval History: afebrile. no acute events.     REVIEW OF SYSTEMS  [  ] ROS unobtainable because:    [ x ] All other systems negative except as noted below    Constitutional:  [ ] fever [ ] chills  [ ] weight loss  [ ] weakness  Skin:  [ ] rash [ ] phlebitis	  Eyes: [ ] icterus [ ] pain  [ ] discharge	  ENMT: [ ] sore throat  [ ] thrush [ ] ulcers [ ] exudates  Respiratory: [ ] dyspnea [ ] hemoptysis [ ] cough [ ] sputum	  Cardiovascular:  [ ] chest pain [ ] palpitations [ ] edema	  Gastrointestinal:  [ ] nausea [ ] vomiting [ ] diarrhea [ ] constipation [ ] pain	  Genitourinary:  [ ] dysuria [ ] frequency [ ] hematuria [ ] discharge [ ] flank pain  [ ] incontinence  Musculoskeletal:  [ ] myalgias [ ] arthralgias [ ] arthritis  [ ] back pain  Neurological:  [ ] headache [ ] seizures  [ ] confusion/altered mental status    Allergies  sulfa drugs (Unknown)  Salicylic Acid (Other)        ANTIMICROBIALS:  atovaquone  Suspension 1500 daily  fluconAZOLE   Tablet 400 daily  meropenem  IVPB    meropenem  IVPB 1000 every 8 hours  sofosbuvir 400 mG/velpatasvir 100 mG (EPCLUSA) 1 at bedtime  valGANciclovir 450 daily      OTHER MEDS:  MEDICATIONS  (STANDING):  aspirin enteric coated 81 daily  carvedilol 3.125 every 12 hours  dextrose 50% Injectable 25 once  dextrose 50% Injectable 12.5 once  dextrose 50% Injectable 25 once  dextrose Oral Gel 15 once PRN  furosemide    Tablet 40 daily  glucagon  Injectable 1 once  heparin   Injectable 5000 every 12 hours  influenza   Vaccine 0.5 once  insulin glargine Injectable (LANTUS) 6 at bedtime  insulin lispro (ADMELOG) corrective regimen sliding scale  three times a day before meals  insulin lispro (ADMELOG) corrective regimen sliding scale  at bedtime  melatonin 5 at bedtime PRN  mycophenolate mofetil 500 <User Schedule>  ondansetron Injectable 4 every 6 hours PRN  pantoprazole    Tablet 40 before breakfast  predniSONE   Tablet 20 daily  simethicone 80 four times a day PRN  tacrolimus 1 <User Schedule>  traMADol 25 every 6 hours PRN  traMADol 50 every 8 hours PRN      Vital Signs Last 24 Hrs  T(C): 36.8 (07 Mar 2025 20:02), Max: 36.9 (07 Mar 2025 00:30)  T(F): 98.3 (07 Mar 2025 20:02), Max: 98.4 (07 Mar 2025 00:30)  HR: 72 (07 Mar 2025 20:02) (68 - 97)  BP: 133/75 (07 Mar 2025 20:02) (131/63 - 162/84)  BP(mean): 99 (07 Mar 2025 20:02) (90 - 121)  RR: 18 (07 Mar 2025 20:02) (17 - 18)  SpO2: 100% (07 Mar 2025 20:02) (94% - 100%)    Parameters below as of 07 Mar 2025 20:02  Patient On (Oxygen Delivery Method): room air      PHYSICAL EXAMINATION:  General: Alert and Awake, NAD  HEENT: Normocephalic / Atraumatic  Resp: No accessory muscles of respiration utilized  Abdomen: Not distended.  MSK: No LE edema.   : No yates  Skin: No rashes or lesions.    Neuro: Alert and Awake. CN 2-12 Grossly intact. Moves all four extremities spontaneously.  Psych: Calm, Pleasant, Cooperative                          9.8    4.54  )-----------( 72       ( 07 Mar 2025 06:53 )             30.0       03-07    140  |  106  |  26[H]  ----------------------------<  96  4.2   |  27  |  0.60    Ca    8.6      07 Mar 2025 06:52  Phos  2.4     03-07  Mg     1.8     03-07    TPro  5.6[L]  /  Alb  2.5[L]  /  TBili  1.0  /  DBili  x   /  AST  7[L]  /  ALT  7[L]  /  AlkPhos  44  03-07      Urinalysis Basic - ( 07 Mar 2025 06:52 )    Color: x / Appearance: x / SG: x / pH: x  Gluc: 96 mg/dL / Ketone: x  / Bili: x / Urobili: x   Blood: x / Protein: x / Nitrite: x   Leuk Esterase: x / RBC: x / WBC x   Sq Epi: x / Non Sq Epi: x / Bacteria: x        MICROBIOLOGY:  v  Pleural Fl Pleural Fluid  03-05-25   No growth  --    No polymorphonuclear cells seen  No organisms seen  by cytocentrifuge      Blood Blood  03-03-25   No growth at 72 Hours  --  --      Clean Catch Clean Catch (Midstream)  03-03-25   No growth  --  --      Blood Blood  03-03-25   No growth at 72 Hours  --  --      Body Fluid ascites  02-27-25   No growth at 5 days  --    No polymorphonuclear leukocytes seen  No organisms seen  by cytocentrifuge      Clean Catch Clean Catch (Midstream)  02-24-25   10,000 - 49,000 CFU/mL Enterococcus faecium (vancomycin resistant)  --  Enterococcus faecium (vancomycin resistant)      Peritoneal Peritoneal Fluid  02-21-25   No growth at 5 days  --    polymorphonuclear leukocytes seen  No organisms seen  by cytocentrifuge      Ascites Fl  02-06-25   No growth at 5 days  --    No polymorphonuclear cells seen  No organisms seen  by cytocentrifuge        CMV IgG Antibody: >10.00 U/mL (02-24-25 @ 04:59)  CMV IgG Antibody: >10.00 U/mL (02-16-25 @ 00:42)  CMV IgG Antibody: >10.00 U/mL (01-17-25 @ 06:41)  Toxoplasma IgG Screen: <3.00 IU/mL (01-17-25 @ 06:41)          RADIOLOGY:    <The imaging below has been reviewed and visualized by me independently. Findings as detailed in report below>    < from: Xray Chest 1 View- PORTABLE-Urgent (Xray Chest 1 View- PORTABLE-Urgent .) (03.05.25 @ 18:40) >  IMPRESSION:  No interval change    < end of copied text >

## 2025-03-07 NOTE — PROGRESS NOTE ADULT - ASSESSMENT
43yo M with Hx decompensated EtOH cirrhosis c/b recurrent ascites, grade II EV, and HE who presented to OSH for abd pain and distension, found to have ascites and MANUELITO requiring HD. Patient was transferred to [facility] for management and liver transplant evaluation. His hospital course has been c/b an empyema s/p VATS decortication on 1/28 with Dr. Ramírez. Postoperative course has been c/b recurrent R pleural effusion. Thoracic Surgery consulted for intraoperative chest tube placement for drainage of recurrent R pleural effusion.     3/3   ct chest -Decreased right intrapleural gas bubbles, otherwise unchanged moderate loculated right pleural effusion and associated pleural thickening.     3/4   abx per ID,   ambulates w asst  on room air.  3/5 stable; on room air; IR for drainage/ PTC placement  3/6  VSS  right hemopneumothorax. Sp thoracentesis 1ml cultures sent.  GS-neg.   Pt will most likely require a thoracotomy for decortication.  Not medically stable for open procedure at this time.  OR scheduled for 3/17/25  3/7    vss

## 2025-03-07 NOTE — PROGRESS NOTE ADULT - ASSESSMENT
45yo M with Hx decompensated EtOH cirrhosis c/b recurrent ascites, grade II EV, and HE who presented to OSH for abd pain and distension, found to have ascites and AMNUELITO requiring HD. Patient was transferred to [facility] for management and liver transplant evaluation. His hospital course has been c/b an empyema s/p VATS decortication on 1/28 with Dr. Ramírez. Postoperative course has been c/b recurrent R pleural effusion s/p IR drainage. Admitted for transplant evaluation, now s/p OLT on 2/27.     [ ] DCD OLT POD 8   - LFTs trending down   - HCV viremia (donor hcv +) - f/u genotype; started Epclusa 3/1   - Strict I/O's jpx2  - Lasix 40 IV x1 on 3/6  - SQH, ASA- HELD for possible VATS  - Pain control  - bowel regimen - HELD for diarrhea  - PT/SCD/IS    [] Immuno  - simulect completed  - FK per level, MMF 500mg bid, SST  - ppx: no bactrim due to sulfa allergy -> Mepron, fluc, valcyte    [] Ileus   - resolved   - avoid narcotics,     [] Worsening MS  - bld cxs with NGTD  - CTH neg, CT chest with Loculated R pleural effusion  - IR thora 3/5 minimal drainage  - plan for thoracotomy in 3/17  - ID following: cont  meropenem  45yo M with Hx decompensated EtOH cirrhosis c/b recurrent ascites, grade II EV, and HE who presented to OSH for abd pain and distension, found to have ascites and MANUELITO requiring HD. Patient was transferred to [facility] for management and liver transplant evaluation. His hospital course has been c/b an empyema s/p VATS decortication on 1/28 with Dr. Ramírez. Postoperative course has been c/b recurrent R pleural effusion s/p IR drainage. Admitted for transplant evaluation, now s/p OLT on 2/27.     [ ] DCD OLT (POD 8 )  - LFTs trending down   - HCV viremia (donor hcv +) - f/u genotype; started Epclusa 3/1   - Strict I/O's: UO/ADAM#2  - SQH, ASA  - Pain control  - bowel regimen as able  - PT/SCD/IS    [] Immuno  - simulect completed  - FK per level, MMF 500mg bid, SST  - ppx: no bactrim due to sulfa allergy -> Mepron, fluc, valcyte, oscal    [] Ileus   - resolved   - avoid narcotics    [] Worsening MS--resolved  - bld cxs with NGTD  - CTH neg, CT chest with Loculated R pleural effusion  - IR thora 3/5 minimal drainage, f/u cultures  - plan for VATS 3/17 with Thoracic  - ID following: cont meropenem

## 2025-03-08 LAB
ALBUMIN SERPL ELPH-MCNC: 2.7 G/DL — LOW (ref 3.3–5)
ALP SERPL-CCNC: 48 U/L — SIGNIFICANT CHANGE UP (ref 40–120)
ALT FLD-CCNC: <5 U/L — LOW (ref 10–45)
ANION GAP SERPL CALC-SCNC: 7 MMOL/L — SIGNIFICANT CHANGE UP (ref 5–17)
APTT BLD: 28.2 SEC — SIGNIFICANT CHANGE UP (ref 24.5–35.6)
AST SERPL-CCNC: 14 U/L — SIGNIFICANT CHANGE UP (ref 10–40)
BILIRUB SERPL-MCNC: 0.9 MG/DL — SIGNIFICANT CHANGE UP (ref 0.2–1.2)
BUN SERPL-MCNC: 30 MG/DL — HIGH (ref 7–23)
CALCIUM SERPL-MCNC: 9 MG/DL — SIGNIFICANT CHANGE UP (ref 8.4–10.5)
CHLORIDE SERPL-SCNC: 104 MMOL/L — SIGNIFICANT CHANGE UP (ref 96–108)
CO2 SERPL-SCNC: 26 MMOL/L — SIGNIFICANT CHANGE UP (ref 22–31)
CREAT SERPL-MCNC: 0.69 MG/DL — SIGNIFICANT CHANGE UP (ref 0.5–1.3)
CULTURE RESULTS: SIGNIFICANT CHANGE UP
EGFR: 116 ML/MIN/1.73M2 — SIGNIFICANT CHANGE UP
EGFR: 116 ML/MIN/1.73M2 — SIGNIFICANT CHANGE UP
GI PCR PANEL: SIGNIFICANT CHANGE UP
GLUCOSE BLDC GLUCOMTR-MCNC: 110 MG/DL — HIGH (ref 70–99)
GLUCOSE BLDC GLUCOMTR-MCNC: 116 MG/DL — HIGH (ref 70–99)
GLUCOSE BLDC GLUCOMTR-MCNC: 134 MG/DL — HIGH (ref 70–99)
GLUCOSE BLDC GLUCOMTR-MCNC: 185 MG/DL — HIGH (ref 70–99)
GLUCOSE SERPL-MCNC: 106 MG/DL — HIGH (ref 70–99)
HCT VFR BLD CALC: 30.4 % — LOW (ref 39–50)
HGB BLD-MCNC: 9.9 G/DL — LOW (ref 13–17)
INR BLD: 0.94 RATIO — SIGNIFICANT CHANGE UP (ref 0.85–1.16)
MAGNESIUM SERPL-MCNC: 1.7 MG/DL — SIGNIFICANT CHANGE UP (ref 1.6–2.6)
MCHC RBC-ENTMCNC: 30.9 PG — SIGNIFICANT CHANGE UP (ref 27–34)
MCHC RBC-ENTMCNC: 32.6 G/DL — SIGNIFICANT CHANGE UP (ref 32–36)
MCV RBC AUTO: 95 FL — SIGNIFICANT CHANGE UP (ref 80–100)
NRBC BLD AUTO-RTO: 0 /100 WBCS — SIGNIFICANT CHANGE UP (ref 0–0)
PHOSPHATE SERPL-MCNC: 2.6 MG/DL — SIGNIFICANT CHANGE UP (ref 2.5–4.5)
PLATELET # BLD AUTO: 101 K/UL — LOW (ref 150–400)
POTASSIUM SERPL-MCNC: 4.9 MMOL/L — SIGNIFICANT CHANGE UP (ref 3.5–5.3)
POTASSIUM SERPL-SCNC: 4.9 MMOL/L — SIGNIFICANT CHANGE UP (ref 3.5–5.3)
PROT SERPL-MCNC: 5.7 G/DL — LOW (ref 6–8.3)
PROTHROM AB SERPL-ACNC: 10.8 SEC — SIGNIFICANT CHANGE UP (ref 9.9–13.4)
RBC # BLD: 3.2 M/UL — LOW (ref 4.2–5.8)
RBC # FLD: 18.1 % — HIGH (ref 10.3–14.5)
SODIUM SERPL-SCNC: 137 MMOL/L — SIGNIFICANT CHANGE UP (ref 135–145)
SPECIMEN SOURCE: SIGNIFICANT CHANGE UP
TACROLIMUS SERPL-MCNC: 3.3 NG/ML — SIGNIFICANT CHANGE UP
WBC # BLD: 4.47 K/UL — SIGNIFICANT CHANGE UP (ref 3.8–10.5)
WBC # FLD AUTO: 4.47 K/UL — SIGNIFICANT CHANGE UP (ref 3.8–10.5)

## 2025-03-08 PROCEDURE — 99232 SBSQ HOSP IP/OBS MODERATE 35: CPT

## 2025-03-08 PROCEDURE — 71045 X-RAY EXAM CHEST 1 VIEW: CPT | Mod: 26

## 2025-03-08 RX ORDER — CEFTRIAXONE 500 MG/1
2000 INJECTION, POWDER, FOR SOLUTION INTRAMUSCULAR; INTRAVENOUS EVERY 24 HOURS
Refills: 0 | Status: DISCONTINUED | OUTPATIENT
Start: 2025-03-09 | End: 2025-03-11

## 2025-03-08 RX ORDER — TACROLIMUS 0.5 MG/1
1 CAPSULE ORAL ONCE
Refills: 0 | Status: COMPLETED | OUTPATIENT
Start: 2025-03-08 | End: 2025-03-08

## 2025-03-08 RX ORDER — IPRATROPIUM BROMIDE AND ALBUTEROL SULFATE .5; 2.5 MG/3ML; MG/3ML
3 SOLUTION RESPIRATORY (INHALATION) EVERY 6 HOURS
Refills: 0 | Status: DISCONTINUED | OUTPATIENT
Start: 2025-03-08 | End: 2025-03-11

## 2025-03-08 RX ORDER — MYCOPHENOLIC ACID 360 MG/1
360 TABLET, DELAYED RELEASE ORAL
Refills: 0 | Status: DISCONTINUED | OUTPATIENT
Start: 2025-03-08 | End: 2025-03-11

## 2025-03-08 RX ORDER — MAGNESIUM SULFATE 500 MG/ML
2 SYRINGE (ML) INJECTION ONCE
Refills: 0 | Status: COMPLETED | OUTPATIENT
Start: 2025-03-08 | End: 2025-03-08

## 2025-03-08 RX ORDER — TACROLIMUS 0.5 MG/1
2 CAPSULE ORAL
Refills: 0 | Status: DISCONTINUED | OUTPATIENT
Start: 2025-03-08 | End: 2025-03-09

## 2025-03-08 RX ADMIN — Medication 40 MILLIGRAM(S): at 05:49

## 2025-03-08 RX ADMIN — Medication 100 MILLIGRAM(S): at 12:44

## 2025-03-08 RX ADMIN — VALGANCICLOVIR 450 MILLIGRAM(S): 450 TABLET, FILM COATED ORAL at 12:46

## 2025-03-08 RX ADMIN — CARVEDILOL 3.12 MILLIGRAM(S): 3.12 TABLET, FILM COATED ORAL at 05:49

## 2025-03-08 RX ADMIN — TRAMADOL HYDROCHLORIDE 50 MILLIGRAM(S): 50 TABLET, FILM COATED ORAL at 00:36

## 2025-03-08 RX ADMIN — ATOVAQUONE 1500 MILLIGRAM(S): 750 SUSPENSION ORAL at 12:45

## 2025-03-08 RX ADMIN — TACROLIMUS 1 MILLIGRAM(S): 0.5 CAPSULE ORAL at 09:06

## 2025-03-08 RX ADMIN — Medication 400 MILLIGRAM(S): at 09:07

## 2025-03-08 RX ADMIN — Medication 400 MILLIGRAM(S): at 17:19

## 2025-03-08 RX ADMIN — IPRATROPIUM BROMIDE AND ALBUTEROL SULFATE 3 MILLILITER(S): .5; 2.5 SOLUTION RESPIRATORY (INHALATION) at 23:27

## 2025-03-08 RX ADMIN — Medication 25 GRAM(S): at 09:06

## 2025-03-08 RX ADMIN — TRAMADOL HYDROCHLORIDE 50 MILLIGRAM(S): 50 TABLET, FILM COATED ORAL at 16:20

## 2025-03-08 RX ADMIN — CARVEDILOL 3.12 MILLIGRAM(S): 3.12 TABLET, FILM COATED ORAL at 17:20

## 2025-03-08 RX ADMIN — HEPARIN SODIUM 5000 UNIT(S): 1000 INJECTION INTRAVENOUS; SUBCUTANEOUS at 05:53

## 2025-03-08 RX ADMIN — PREDNISONE 20 MILLIGRAM(S): 20 TABLET ORAL at 05:49

## 2025-03-08 RX ADMIN — INSULIN GLARGINE-YFGN 6 UNIT(S): 100 INJECTION, SOLUTION SUBCUTANEOUS at 21:04

## 2025-03-08 RX ADMIN — HEPARIN SODIUM 5000 UNIT(S): 1000 INJECTION INTRAVENOUS; SUBCUTANEOUS at 17:20

## 2025-03-08 RX ADMIN — MEROPENEM 100 MILLIGRAM(S): 1 INJECTION INTRAVENOUS at 05:49

## 2025-03-08 RX ADMIN — Medication 1 SPRAY(S): at 17:18

## 2025-03-08 RX ADMIN — TRAMADOL HYDROCHLORIDE 50 MILLIGRAM(S): 50 TABLET, FILM COATED ORAL at 15:56

## 2025-03-08 RX ADMIN — TACROLIMUS 2 MILLIGRAM(S): 0.5 CAPSULE ORAL at 19:50

## 2025-03-08 RX ADMIN — INSULIN LISPRO 2: 100 INJECTION, SOLUTION INTRAVENOUS; SUBCUTANEOUS at 12:42

## 2025-03-08 RX ADMIN — TACROLIMUS 1 MILLIGRAM(S): 0.5 CAPSULE ORAL at 15:57

## 2025-03-08 RX ADMIN — MYCOPHENOLIC ACID 360 MILLIGRAM(S): 360 TABLET, DELAYED RELEASE ORAL at 19:50

## 2025-03-08 RX ADMIN — Medication 81 MILLIGRAM(S): at 12:44

## 2025-03-08 RX ADMIN — Medication 1 SPRAY(S): at 05:54

## 2025-03-08 RX ADMIN — VELPATASVIR AND SOFOSBUVIR 1 TABLET(S): 50; 200 TABLET, FILM COATED ORAL at 21:04

## 2025-03-08 RX ADMIN — Medication 1 APPLICATION(S): at 12:43

## 2025-03-08 RX ADMIN — Medication 400 MILLIGRAM(S): at 12:44

## 2025-03-08 RX ADMIN — MYCOPHENOLATE MOFETIL 500 MILLIGRAM(S): 500 TABLET, FILM COATED ORAL at 09:04

## 2025-03-08 RX ADMIN — FUROSEMIDE 40 MILLIGRAM(S): 10 INJECTION INTRAMUSCULAR; INTRAVENOUS at 05:50

## 2025-03-08 NOTE — PROGRESS NOTE ADULT - SUBJECTIVE AND OBJECTIVE BOX
VITAL SIGNS      Vital Signs Last 24 Hrs  T(C): 36 (25 @ 09:03), Max: 37.2 (25 @ 05:45)  T(F): 96.8 (25 @ 09:03), Max: 98.9 (25 @ 05:45)  HR: 66 (25 @ 09:03) (66 - 97)  BP: 132/80 (25 @ 09:03) (132/72 - 162/84)  RR: 18 (25 @ 09:03) (18 - 18)  SpO2: 100% (25 @ 09:03) (97% - 100%)                   Daily     Daily Weight in k.9 (08 Mar 2025 05:45)      Bilirubin Total: 0.9 mg/dL ( @ 06:53)    CAPILLARY BLOOD GLUCOSE      POCT Blood Glucose.: 185 mg/dL (08 Mar 2025 12:31)  POCT Blood Glucose.: 134 mg/dL (08 Mar 2025 09:06)  POCT Blood Glucose.: 105 mg/dL (07 Mar 2025 20:54)  POCT Blood Glucose.: 86 mg/dL (07 Mar 2025 17:25)                        PHYSICAL EXAM    Neurology: alert and oriented x 3, moves all extremities with no defecits  CV :  RRR  Sternal Wound :  CDI , Stable  Lungs:   CTA B/L  Abdomen: soft, nontender, nondistended, positive bowel sounds, last bowel movement +   + satples  Extremities:     plus 2 pedal edema                                    None

## 2025-03-08 NOTE — PROGRESS NOTE ADULT - ASSESSMENT
43yo M with Hx decompensated EtOH cirrhosis c/b recurrent ascites, grade II EV, and HE who presented to OSH for abd pain and distension, found to have ascites and MANUELITO requiring HD. Patient was transferred to [facility] for management and liver transplant evaluation. His hospital course has been c/b an empyema s/p VATS decortication on 1/28 with Dr. Ramírez. Postoperative course has been c/b recurrent R pleural effusion. Thoracic Surgery consulted for intraoperative chest tube placement for drainage of recurrent R pleural effusion.     3/3   ct chest -Decreased right intrapleural gas bubbles, otherwise unchanged moderate loculated right pleural effusion and associated pleural thickening.     3/4   abx per ID,   ambulates w asst  on room air.  3/5 stable; on room air; IR for drainage/ PTC placement  3/6  VSS  right hemopneumothorax. Sp thoracentesis 1ml cultures sent.  GS-neg.   Pt will most likely require a thoracotomy for decortication.  Not medically stable for open procedure at this time.  OR scheduled for 3/17/25  3/7    vss        3/8     vss

## 2025-03-08 NOTE — PROGRESS NOTE ADULT - SUBJECTIVE AND OBJECTIVE BOX
Transplant Surgery - Multidisciplinary Progress Note  --------------------------------------------------------------  DCD OLT     Date:  2/27/25       POD#9    CMV -/+    Present:   Patient seen and examined with multidisciplinary Transplant team including Surgeon Dr. Carter, Hepatologist Dr. Larsen, ESTRELLA Abel, Pharmacist: Sommer and bedside RN during AM rounds.   Disciplines not in attendance will be notified of the plan    HPI: 44M PMH decompensated ETOH cirrhosis c/b recurrent ascites, grade II esophageal varices, and hepatic encephalopathy, who presented to OSH with abd pain and distension found to have decompensated EtOH cirrhosis with MELD 30, MANUELITO (HRS vs ATN; previous HD need with [facility]) found to have R empyema s/p R pigtail placement w/ IR 1/18 requiring several adjustments, s/p R robotic VATS with decortication (Dr. Ramírez 1/28/2025) for loculated empyema. OR course with oropharyngeal bleeding during intubation, coagulopathy/elevated EBL. Admitted for transplant evaluation and found to have reaccumulation of R pleural effusion, now s/p IR drainage and s/p DCD OLTx.       Interval Events:      Immunosupression:  Induction: Simulect  Maintenance: FK/MMF/SST  Ongoing monitoring for signs of rejection     Potential Discharge date: TBD  Education:  Medications  Plan of care:  See Below    MEDICATIONS  (STANDING):  aspirin enteric coated 81 milliGRAM(s) Oral daily  atovaquone  Suspension 1500 milliGRAM(s) Oral daily  carvedilol 3.125 milliGRAM(s) Oral every 12 hours  chlorhexidine 2% Cloths 1 Application(s) Topical daily  dextrose 5%. 1000 milliLiter(s) (100 mL/Hr) IV Continuous <Continuous>  dextrose 5%. 1000 milliLiter(s) (50 mL/Hr) IV Continuous <Continuous>  dextrose 50% Injectable 25 Gram(s) IV Push once  dextrose 50% Injectable 12.5 Gram(s) IV Push once  dextrose 50% Injectable 25 Gram(s) IV Push once  fluconAZOLE   Tablet 400 milliGRAM(s) Oral daily  furosemide    Tablet 40 milliGRAM(s) Oral daily  glucagon  Injectable 1 milliGRAM(s) IntraMuscular once  heparin   Injectable 5000 Unit(s) SubCutaneous every 12 hours  influenza   Vaccine 0.5 milliLiter(s) IntraMuscular once  insulin glargine Injectable (LANTUS) 6 Unit(s) SubCutaneous at bedtime  insulin lispro (ADMELOG) corrective regimen sliding scale   SubCutaneous three times a day before meals  insulin lispro (ADMELOG) corrective regimen sliding scale   SubCutaneous at bedtime  lidocaine 1% Injectable 1 Vial(s) Local Injection once  magnesium oxide 400 milliGRAM(s) Oral two times a day with meals  meropenem  IVPB      meropenem  IVPB 1000 milliGRAM(s) IV Intermittent every 8 hours  mycophenolate mofetil 500 milliGRAM(s) Oral <User Schedule>  pantoprazole    Tablet 40 milliGRAM(s) Oral before breakfast  predniSONE   Tablet 20 milliGRAM(s) Oral daily  sodium chloride 0.65% Nasal 1 Spray(s) Both Nostrils two times a day  sofosbuvir 400 mG/velpatasvir 100 mG (EPCLUSA) 1 Tablet(s) Oral at bedtime  tacrolimus 1 milliGRAM(s) Oral <User Schedule>  thiamine 100 milliGRAM(s) Oral daily  valGANciclovir 450 milliGRAM(s) Oral daily    MEDICATIONS  (PRN):  dextrose Oral Gel 15 Gram(s) Oral once PRN Blood Glucose LESS THAN 70 milliGRAM(s)/deciliter  melatonin 5 milliGRAM(s) Oral at bedtime PRN Insomnia  ondansetron Injectable 4 milliGRAM(s) IV Push every 6 hours PRN Nausea and/or Vomiting  simethicone 80 milliGRAM(s) Chew four times a day PRN Gas  traMADol 25 milliGRAM(s) Oral every 6 hours PRN Moderate Pain (4 - 6)  traMADol 50 milliGRAM(s) Oral every 8 hours PRN Severe Pain (7 - 10)      PAST MEDICAL & SURGICAL HISTORY:  Sleep apnea, obstructive      Alcohol abuse      Cirrhosis of liver      Portal hypertension      H/O esophageal varices      Anemia      Mild alcohol use disorder      No significant past surgical history          Vital Signs Last 24 Hrs  T(C): 36.8 (07 Mar 2025 23:45), Max: 36.8 (07 Mar 2025 13:08)  T(F): 98.3 (07 Mar 2025 23:45), Max: 98.3 (07 Mar 2025 13:08)  HR: 67 (07 Mar 2025 23:45) (67 - 97)  BP: 144/74 (07 Mar 2025 23:45) (132/79 - 162/84)  BP(mean): 99 (07 Mar 2025 20:02) (95 - 121)  RR: 18 (07 Mar 2025 23:45) (17 - 18)  SpO2: 97% (07 Mar 2025 23:45) (94% - 100%)    Parameters below as of 07 Mar 2025 23:45  Patient On (Oxygen Delivery Method): room air        I&O's Summary    06 Mar 2025 07:01  -  07 Mar 2025 07:00  --------------------------------------------------------  IN: 1250 mL / OUT: 2870 mL / NET: -1620 mL    07 Mar 2025 07:01  -  08 Mar 2025 00:41  --------------------------------------------------------  IN: 1010 mL / OUT: 2810 mL / NET: -1800 mL                              9.8    4.54  )-----------( 72       ( 07 Mar 2025 06:53 )             30.0     03-07    140  |  106  |  26[H]  ----------------------------<  96  4.2   |  27  |  0.60    Ca    8.6      07 Mar 2025 06:52  Phos  2.4     03-07  Mg     1.8     03-07    TPro  5.6[L]  /  Alb  2.5[L]  /  TBili  1.0  /  DBili  x   /  AST  7[L]  /  ALT  7[L]  /  AlkPhos  44  03-07    Tacrolimus (), Serum: 2.6 ng/mL (03-07 @ 06:55)      Review of systems  All other systems were reviewed and are negative, except as noted.      PHYSICAL EXAM:  Constitutional: Well developed / well nourished  Eyes: PERRLA  ENMT: nc/at, no thrush  Neck: supple  Respiratory: CTA B/L  Cardiovascular: RRR  Gastrointestinal: Soft abdomen, ND, appropriate incisional TTP. chevron incision c/d/i, staples in place. No signs of infection. JP2 ss  Genitourinary: voiding  Extremities: SCD's in place and working bilaterally  Vascular: Palpable dp pulses bilaterally.   Neurological: A&O x3  Skin: no rashes, ulcerations, lesions  Musculoskeletal: Moving all extremities     Transplant Surgery - Multidisciplinary Progress Note  --------------------------------------------------------------  DCD OLT     Date:  2/27/25       POD#9    CMV -/+    Present:   Patient seen and examined with multidisciplinary Transplant team including Surgeon Dr. Carter, Dr. Hinojosa, Hepatologist Dr. Cantor, ESTRELLA Abel, and bedside RN during AM rounds.   Disciplines not in attendance will be notified of the plan    HPI: 44M PMH decompensated ETOH cirrhosis c/b recurrent ascites, grade II esophageal varices, and hepatic encephalopathy, who presented to OSH with abd pain and distension found to have decompensated EtOH cirrhosis with MELD 30, MANUELITO (HRS vs ATN; previous HD need with [facility]) found to have R empyema s/p R pigtail placement w/ IR 1/18 requiring several adjustments, s/p R robotic VATS with decortication (Dr. Ramírez 1/28/2025) for loculated empyema. OR course with oropharyngeal bleeding during intubation, coagulopathy/elevated EBL. Admitted for transplant evaluation and found to have reaccumulation of R pleural effusion, now s/p IR drainage and s/p DCD OLTx.       Interval Events:  Afebrile, VSS  seen ambulatory on RA  loose stools, GI PCR negative  tolerating PO and having bowel function  good graft function    Immunosuppression  Induction: Simulect completed  Maintenance: FK per level, switch to Myfortic, Pred 20QD  Ongoing monitoring for signs of rejection     Potential Discharge date: TBD  Education:  Medications  Plan of care:  See Below        MEDICATIONS  (STANDING):  aspirin enteric coated 81 milliGRAM(s) Oral daily  atovaquone  Suspension 1500 milliGRAM(s) Oral daily  carvedilol 3.125 milliGRAM(s) Oral every 12 hours  chlorhexidine 2% Cloths 1 Application(s) Topical daily  dextrose 5%. 1000 milliLiter(s) (100 mL/Hr) IV Continuous <Continuous>  dextrose 5%. 1000 milliLiter(s) (50 mL/Hr) IV Continuous <Continuous>  dextrose 50% Injectable 25 Gram(s) IV Push once  dextrose 50% Injectable 12.5 Gram(s) IV Push once  dextrose 50% Injectable 25 Gram(s) IV Push once  fluconAZOLE   Tablet 400 milliGRAM(s) Oral daily  furosemide    Tablet 40 milliGRAM(s) Oral daily  glucagon  Injectable 1 milliGRAM(s) IntraMuscular once  heparin   Injectable 5000 Unit(s) SubCutaneous every 12 hours  influenza   Vaccine 0.5 milliLiter(s) IntraMuscular once  insulin glargine Injectable (LANTUS) 6 Unit(s) SubCutaneous at bedtime  insulin lispro (ADMELOG) corrective regimen sliding scale   SubCutaneous three times a day before meals  insulin lispro (ADMELOG) corrective regimen sliding scale   SubCutaneous at bedtime  lidocaine 1% Injectable 1 Vial(s) Local Injection once  magnesium oxide 400 milliGRAM(s) Oral two times a day with meals  mycophenolic acid  milliGRAM(s) Oral <User Schedule>  pantoprazole    Tablet 40 milliGRAM(s) Oral before breakfast  predniSONE   Tablet 20 milliGRAM(s) Oral daily  sodium chloride 0.65% Nasal 1 Spray(s) Both Nostrils two times a day  sofosbuvir 400 mG/velpatasvir 100 mG (EPCLUSA) 1 Tablet(s) Oral at bedtime  tacrolimus 1 milliGRAM(s) Oral once  tacrolimus 2 milliGRAM(s) Oral <User Schedule>  thiamine 100 milliGRAM(s) Oral daily  valGANciclovir 450 milliGRAM(s) Oral daily    MEDICATIONS  (PRN):  dextrose Oral Gel 15 Gram(s) Oral once PRN Blood Glucose LESS THAN 70 milliGRAM(s)/deciliter  melatonin 5 milliGRAM(s) Oral at bedtime PRN Insomnia  ondansetron Injectable 4 milliGRAM(s) IV Push every 6 hours PRN Nausea and/or Vomiting  simethicone 80 milliGRAM(s) Chew four times a day PRN Gas  traMADol 25 milliGRAM(s) Oral every 6 hours PRN Moderate Pain (4 - 6)  traMADol 50 milliGRAM(s) Oral every 8 hours PRN Severe Pain (7 - 10)      PAST MEDICAL & SURGICAL HISTORY:  Sleep apnea, obstructive      Alcohol abuse      Cirrhosis of liver      Portal hypertension      H/O esophageal varices      Anemia      Mild alcohol use disorder      No significant past surgical history          Vital Signs Last 24 Hrs  T(C): 36 (08 Mar 2025 09:03), Max: 37.2 (08 Mar 2025 05:45)  T(F): 96.8 (08 Mar 2025 09:03), Max: 98.9 (08 Mar 2025 05:45)  HR: 66 (08 Mar 2025 09:03) (66 - 97)  BP: 132/80 (08 Mar 2025 09:03) (132/72 - 162/84)  BP(mean): 101 (08 Mar 2025 09:03) (94 - 101)  RR: 18 (08 Mar 2025 09:03) (18 - 18)  SpO2: 100% (08 Mar 2025 09:03) (97% - 100%)    Parameters below as of 08 Mar 2025 09:03  Patient On (Oxygen Delivery Method): room air        I&O's Summary    07 Mar 2025 07:01  -  08 Mar 2025 07:00  --------------------------------------------------------  IN: 1010 mL / OUT: 3685 mL / NET: -2675 mL    08 Mar 2025 06:01  -  08 Mar 2025 15:13  --------------------------------------------------------  IN: 290 mL / OUT: 2060 mL / NET: -1770 mL                              9.9    4.47  )-----------( 101      ( 08 Mar 2025 06:53 )             30.4     03-08    137  |  104  |  30[H]  ----------------------------<  106[H]  4.9   |  26  |  0.69    Ca    9.0      08 Mar 2025 06:53  Phos  2.6     03-08  Mg     1.7     03-08    TPro  5.7[L]  /  Alb  2.7[L]  /  TBili  0.9  /  DBili  x   /  AST  14  /  ALT  <5[L]  /  AlkPhos  48  03-08    Tacrolimus (), Serum: 3.3 ng/mL (03-08 @ 06:51)        Culture - Fungal, Body Fluid (collected 03-05-25 @ 19:24)  Source: Peritoneal Peritoneal Fluid  Preliminary Report (03-07-25 @ 12:03):    No growth    Culture - Body Fluid with Gram Stain (collected 03-05-25 @ 19:24)  Source: Pleural Fl Pleural Fluid  Gram Stain (03-06-25 @ 00:57):    No polymorphonuclear cells seen    No organisms seen    by cytocentrifuge  Preliminary Report (03-06-25 @ 19:42):    No growth    Culture - Blood (collected 03-03-25 @ 18:51)  Source: Blood Blood  Preliminary Report (03-07-25 @ 23:00):    No growth at 4 days    Culture - Urine (collected 03-03-25 @ 18:34)  Source: Clean Catch Clean Catch (Midstream)  Final Report (03-04-25 @ 18:41):    No growth    Culture - Blood (collected 03-03-25 @ 17:00)  Source: Blood Blood  Preliminary Report (03-07-25 @ 23:00):    No growth at 4 days                      Review of systems  All other systems were reviewed and are negative, except as noted.      PHYSICAL EXAM:  Constitutional: Well developed / well nourished  Eyes: PERRLA  ENMT: nc/at, no thrush  Neck: supple  Respiratory: CTA B/L  Cardiovascular: RRR  Gastrointestinal: Soft abdomen, ND, appropriate incisional TTP. chevron incision c/d/i, staples in place. No signs of infection. ADAM#2 ss  Genitourinary: voiding  Extremities: SCD's in place and working bilaterally, LE edema improving daily on lasix  Vascular: Palpable dp pulses bilaterally.   Neurological: A&O x3  Skin: no rashes, ulcerations, lesions  Musculoskeletal: Moving all extremities

## 2025-03-08 NOTE — PROGRESS NOTE ADULT - ASSESSMENT
43yo M with Hx decompensated EtOH cirrhosis c/b recurrent ascites, grade II EV, and HE who presented to OSH for abd pain and distension, found to have ascites and MANUELITO requiring HD. Patient was transferred to [facility] for management and liver transplant evaluation. His hospital course has been c/b an empyema s/p VATS decortication on 1/28 with Dr. Ramírez. Postoperative course has been c/b recurrent R pleural effusion s/p IR drainage. Admitted for transplant evaluation, now s/p OLT on 2/27.     [ ] DCD OLT (POD 8 )  - LFTs trending down   - HCV viremia (donor hcv +) - f/u genotype; started Epclusa 3/1   - Strict I/O's: UO/ADAM#2  - SQH, ASA  - Pain control  - bowel regimen as able  - PT/SCD/IS    [] Immuno  - simulect completed  - FK per level, MMF 500mg bid, SST  - ppx: no bactrim due to sulfa allergy -> Mepron, fluc, valcyte, oscal    [] Ileus   - resolved   - avoid narcotics    [] Worsening MS--resolved  - bld cxs with NGTD  - CTH neg, CT chest with Loculated R pleural effusion  - IR thora 3/5 minimal drainage, f/u cultures  - plan for VATS 3/17 with Thoracic  - ID following: cont meropenem    43yo M with Hx decompensated EtOH cirrhosis c/b recurrent ascites, grade II EV, and HE who presented to OSH for abd pain and distension, found to have ascites and MANUELITO requiring HD. Patient was transferred to [facility] for management and liver transplant evaluation. His hospital course has been c/b an empyema s/p VATS decortication on 1/28 with Dr. Ramírez. Postoperative course has been c/b recurrent R pleural effusion s/p IR drainage. Admitted for transplant evaluation, now s/p OLT on 2/27.     [ ] DCD OLT (POD 8 )  - good graft function  - HCV viremia (donor hcv +) - f/u genotype; started Epclusa 3/1   - Strict I/O's: UO/ADAM#2  - lasix 40QD PO  - SQH, ASA  - Pain control  - bowel regimen as able  - PT/SCD/IS    [] Immuno  - simulect completed  - FK per level, switch to Myfortic 360BID as trial for loose stools, Pred 20QD  - ppx: no bactrim due to sulfa allergy -> Mepron, fluc, valcyte, oscal, PPI    [] Ileus   - resolved   - avoid narcotics    [] Worsening MS--resolved  - bld cxs with NGTD  - CTH neg, CT chest with Loculated R pleural effusion  - IR thora 3/5 minimal drainage, f/u cultures  - plan for VATS 3/17 with Thoracic  - ID following: Meropenem-->CTX daksha VATS

## 2025-03-08 NOTE — PROGRESS NOTE ADULT - PROBLEM SELECTOR PLAN 1
- patient remains on room air, comfortable at time of evaluation  - Dr. Ramírez spoke with IR Dr. Up regarding drainage/ PTC placement; high-risk for  decort at this time per Attending.     - OR scheduled for 3/17/25    - Above plan per d/w Thoracic Attending Dr. Ramírez  ---  Thoracic [id number]

## 2025-03-09 LAB
ALBUMIN SERPL ELPH-MCNC: 2.8 G/DL — LOW (ref 3.3–5)
ALP SERPL-CCNC: 54 U/L — SIGNIFICANT CHANGE UP (ref 40–120)
ALT FLD-CCNC: 6 U/L — LOW (ref 10–45)
ANION GAP SERPL CALC-SCNC: 8 MMOL/L — SIGNIFICANT CHANGE UP (ref 5–17)
APTT BLD: 28.9 SEC — SIGNIFICANT CHANGE UP (ref 24.5–35.6)
AST SERPL-CCNC: 12 U/L — SIGNIFICANT CHANGE UP (ref 10–40)
BILIRUB SERPL-MCNC: 0.9 MG/DL — SIGNIFICANT CHANGE UP (ref 0.2–1.2)
BUN SERPL-MCNC: 36 MG/DL — HIGH (ref 7–23)
CALCIUM SERPL-MCNC: 9.1 MG/DL — SIGNIFICANT CHANGE UP (ref 8.4–10.5)
CHLORIDE SERPL-SCNC: 104 MMOL/L — SIGNIFICANT CHANGE UP (ref 96–108)
CO2 SERPL-SCNC: 25 MMOL/L — SIGNIFICANT CHANGE UP (ref 22–31)
CREAT SERPL-MCNC: 0.83 MG/DL — SIGNIFICANT CHANGE UP (ref 0.5–1.3)
EGFR: 109 ML/MIN/1.73M2 — SIGNIFICANT CHANGE UP
EGFR: 109 ML/MIN/1.73M2 — SIGNIFICANT CHANGE UP
GLUCOSE BLDC GLUCOMTR-MCNC: 105 MG/DL — HIGH (ref 70–99)
GLUCOSE BLDC GLUCOMTR-MCNC: 135 MG/DL — HIGH (ref 70–99)
GLUCOSE BLDC GLUCOMTR-MCNC: 179 MG/DL — HIGH (ref 70–99)
GLUCOSE BLDC GLUCOMTR-MCNC: 217 MG/DL — HIGH (ref 70–99)
GLUCOSE SERPL-MCNC: 140 MG/DL — HIGH (ref 70–99)
HCT VFR BLD CALC: 32.9 % — LOW (ref 39–50)
HGB BLD-MCNC: 10.6 G/DL — LOW (ref 13–17)
INR BLD: 0.94 RATIO — SIGNIFICANT CHANGE UP (ref 0.85–1.16)
MAGNESIUM SERPL-MCNC: 1.8 MG/DL — SIGNIFICANT CHANGE UP (ref 1.6–2.6)
MCHC RBC-ENTMCNC: 31 PG — SIGNIFICANT CHANGE UP (ref 27–34)
MCHC RBC-ENTMCNC: 32.2 G/DL — SIGNIFICANT CHANGE UP (ref 32–36)
MCV RBC AUTO: 96.2 FL — SIGNIFICANT CHANGE UP (ref 80–100)
NRBC BLD AUTO-RTO: 0 /100 WBCS — SIGNIFICANT CHANGE UP (ref 0–0)
PHOSPHATE SERPL-MCNC: 2.8 MG/DL — SIGNIFICANT CHANGE UP (ref 2.5–4.5)
PLATELET # BLD AUTO: 127 K/UL — LOW (ref 150–400)
POTASSIUM SERPL-MCNC: 4.9 MMOL/L — SIGNIFICANT CHANGE UP (ref 3.5–5.3)
POTASSIUM SERPL-SCNC: 4.9 MMOL/L — SIGNIFICANT CHANGE UP (ref 3.5–5.3)
PROT SERPL-MCNC: 6.3 G/DL — SIGNIFICANT CHANGE UP (ref 6–8.3)
PROTHROM AB SERPL-ACNC: 10.7 SEC — SIGNIFICANT CHANGE UP (ref 9.9–13.4)
RBC # BLD: 3.42 M/UL — LOW (ref 4.2–5.8)
RBC # FLD: 18.1 % — HIGH (ref 10.3–14.5)
SODIUM SERPL-SCNC: 137 MMOL/L — SIGNIFICANT CHANGE UP (ref 135–145)
TACROLIMUS SERPL-MCNC: 6.7 NG/ML — SIGNIFICANT CHANGE UP
WBC # BLD: 5.03 K/UL — SIGNIFICANT CHANGE UP (ref 3.8–10.5)
WBC # FLD AUTO: 5.03 K/UL — SIGNIFICANT CHANGE UP (ref 3.8–10.5)

## 2025-03-09 PROCEDURE — 99232 SBSQ HOSP IP/OBS MODERATE 35: CPT

## 2025-03-09 RX ADMIN — TRAMADOL HYDROCHLORIDE 25 MILLIGRAM(S): 50 TABLET, FILM COATED ORAL at 13:46

## 2025-03-09 RX ADMIN — Medication 400 MILLIGRAM(S): at 08:14

## 2025-03-09 RX ADMIN — Medication 40 MILLIGRAM(S): at 06:26

## 2025-03-09 RX ADMIN — Medication 400 MILLIGRAM(S): at 16:37

## 2025-03-09 RX ADMIN — PREDNISONE 20 MILLIGRAM(S): 20 TABLET ORAL at 06:26

## 2025-03-09 RX ADMIN — Medication 100 MILLIGRAM(S): at 12:28

## 2025-03-09 RX ADMIN — TRAMADOL HYDROCHLORIDE 25 MILLIGRAM(S): 50 TABLET, FILM COATED ORAL at 14:20

## 2025-03-09 RX ADMIN — HEPARIN SODIUM 5000 UNIT(S): 1000 INJECTION INTRAVENOUS; SUBCUTANEOUS at 17:48

## 2025-03-09 RX ADMIN — VELPATASVIR AND SOFOSBUVIR 1 TABLET(S): 50; 200 TABLET, FILM COATED ORAL at 21:43

## 2025-03-09 RX ADMIN — TACROLIMUS 2 MILLIGRAM(S): 0.5 CAPSULE ORAL at 20:19

## 2025-03-09 RX ADMIN — CARVEDILOL 3.12 MILLIGRAM(S): 3.12 TABLET, FILM COATED ORAL at 06:26

## 2025-03-09 RX ADMIN — Medication 81 MILLIGRAM(S): at 12:28

## 2025-03-09 RX ADMIN — ATOVAQUONE 1500 MILLIGRAM(S): 750 SUSPENSION ORAL at 12:29

## 2025-03-09 RX ADMIN — HEPARIN SODIUM 5000 UNIT(S): 1000 INJECTION INTRAVENOUS; SUBCUTANEOUS at 06:26

## 2025-03-09 RX ADMIN — CEFTRIAXONE 100 MILLIGRAM(S): 500 INJECTION, POWDER, FOR SOLUTION INTRAMUSCULAR; INTRAVENOUS at 06:29

## 2025-03-09 RX ADMIN — TRAMADOL HYDROCHLORIDE 50 MILLIGRAM(S): 50 TABLET, FILM COATED ORAL at 22:42

## 2025-03-09 RX ADMIN — Medication 1 APPLICATION(S): at 12:29

## 2025-03-09 RX ADMIN — INSULIN GLARGINE-YFGN 6 UNIT(S): 100 INJECTION, SOLUTION SUBCUTANEOUS at 21:42

## 2025-03-09 RX ADMIN — VALGANCICLOVIR 450 MILLIGRAM(S): 450 TABLET, FILM COATED ORAL at 12:28

## 2025-03-09 RX ADMIN — Medication 400 MILLIGRAM(S): at 12:28

## 2025-03-09 RX ADMIN — TRAMADOL HYDROCHLORIDE 50 MILLIGRAM(S): 50 TABLET, FILM COATED ORAL at 08:44

## 2025-03-09 RX ADMIN — TACROLIMUS 2 MILLIGRAM(S): 0.5 CAPSULE ORAL at 08:14

## 2025-03-09 RX ADMIN — Medication 5 MILLIGRAM(S): at 00:26

## 2025-03-09 RX ADMIN — INSULIN LISPRO 4: 100 INJECTION, SOLUTION INTRAVENOUS; SUBCUTANEOUS at 12:29

## 2025-03-09 RX ADMIN — TRAMADOL HYDROCHLORIDE 50 MILLIGRAM(S): 50 TABLET, FILM COATED ORAL at 23:45

## 2025-03-09 RX ADMIN — TRAMADOL HYDROCHLORIDE 50 MILLIGRAM(S): 50 TABLET, FILM COATED ORAL at 01:30

## 2025-03-09 RX ADMIN — TRAMADOL HYDROCHLORIDE 50 MILLIGRAM(S): 50 TABLET, FILM COATED ORAL at 00:26

## 2025-03-09 RX ADMIN — Medication 1 SPRAY(S): at 06:27

## 2025-03-09 RX ADMIN — INSULIN LISPRO 2: 100 INJECTION, SOLUTION INTRAVENOUS; SUBCUTANEOUS at 16:37

## 2025-03-09 RX ADMIN — MYCOPHENOLIC ACID 360 MILLIGRAM(S): 360 TABLET, DELAYED RELEASE ORAL at 20:19

## 2025-03-09 RX ADMIN — FUROSEMIDE 40 MILLIGRAM(S): 10 INJECTION INTRAMUSCULAR; INTRAVENOUS at 06:26

## 2025-03-09 RX ADMIN — MYCOPHENOLIC ACID 360 MILLIGRAM(S): 360 TABLET, DELAYED RELEASE ORAL at 08:14

## 2025-03-09 RX ADMIN — TRAMADOL HYDROCHLORIDE 50 MILLIGRAM(S): 50 TABLET, FILM COATED ORAL at 09:20

## 2025-03-09 RX ADMIN — Medication 1 SPRAY(S): at 17:49

## 2025-03-09 RX ADMIN — CARVEDILOL 3.12 MILLIGRAM(S): 3.12 TABLET, FILM COATED ORAL at 17:48

## 2025-03-09 NOTE — PROGRESS NOTE ADULT - SUBJECTIVE AND OBJECTIVE BOX
VITAL SIGNS      Vital Signs Last 24 Hrs  T(C): 36.5 (25 @ 13:00), Max: 36.9 (25 @ 18:10)  T(F): 97.7 (25 @ 13:00), Max: 98.5 (25 @ 18:10)  HR: 63 (25 @ 13:00) (62 - 88)  BP: 151/77 (25 @ 13:00) (125/70 - 164/81)  RR: 18 (25 @ 13:00) (16 - 18)  SpO2: 99% (25 @ 13:00) (88% - 99%)                   Daily     Daily Weight in k.4 (09 Mar 2025 05:56)      Bilirubin Total: 0.9 mg/dL ( @ 06:41)    CAPILLARY BLOOD GLUCOSE      POCT Blood Glucose.: 217 mg/dL (09 Mar 2025 12:20)  POCT Blood Glucose.: 135 mg/dL (09 Mar 2025 08:03)  POCT Blood Glucose.: 110 mg/dL (08 Mar 2025 20:53)  POCT Blood Glucose.: 116 mg/dL (08 Mar 2025 17:10)                                PHYSICAL EXAM  s   No CP  no SOB"  Neurology: alert and oriented x 3, moves all extremities with no defecits  CV :  RRR    Lungs:    rt base rhonchi  Abdomen: soft, nontender, nondistended, positive bowel sounds,    abdominal staples  Extremities:    no edema

## 2025-03-09 NOTE — PROGRESS NOTE ADULT - SUBJECTIVE AND OBJECTIVE BOX
Transplant Surgery - Multidisciplinary Progress Note  --------------------------------------------------------------  DCD OLT     Date:  2/27/25       POD#10    CMV -/+    Present:   Patient seen and examined with multidisciplinary Transplant team including Surgeon Dr. Carter, Dr. Hinojosa, Hepatologist Dr. Cantor Hepatology Fellow: Irais Olivera, and bedside RN during AM rounds.   Disciplines not in attendance will be notified of the plan    HPI: 44M PMH decompensated ETOH cirrhosis c/b recurrent ascites, grade II esophageal varices, and hepatic encephalopathy, who presented to OSH with abd pain and distension found to have decompensated EtOH cirrhosis with MELD 30, MANUELITO (HRS vs ATN; previous HD need with [facility]) found to have R empyema s/p R pigtail placement w/ IR 1/18 requiring several adjustments, s/p R robotic VATS with decortication (Dr. Ramírez 1/28/2025) for loculated empyema. OR course with oropharyngeal bleeding during intubation, coagulopathy/elevated EBL. Admitted for transplant evaluation and found to have reaccumulation of R pleural effusion, now s/p IR drainage and s/p DCD OLTx.       Interval Events:            Immunosuppression  Induction: Simulect completed  Maintenance: FK per level, switch to Myfortic, Pred 20QD  Ongoing monitoring for signs of rejection     Potential Discharge date: TBD  Education:  Medications  Plan of care:  See Below                                          Review of systems  All other systems were reviewed and are negative, except as noted.      PHYSICAL EXAM:  Constitutional: Well developed / well nourished  Eyes: PERRLA  ENMT: nc/at, no thrush  Neck: supple  Respiratory: CTA B/L  Cardiovascular: RRR  Gastrointestinal: Soft abdomen, ND, appropriate incisional TTP. chevron incision c/d/i, staples in place. No signs of infection. ADAM#2 ss  Genitourinary: voiding  Extremities: SCD's in place and working bilaterally, LE edema improving daily on lasix  Vascular: Palpable dp pulses bilaterally.   Neurological: A&O x3  Skin: no rashes, ulcerations, lesions  Musculoskeletal: Moving all extremities     Transplant Surgery - Multidisciplinary Progress Note  --------------------------------------------------------------  DCD OLT     Date:  2/27/25       POD#10    CMV -/+    Present:   Patient seen and examined with multidisciplinary Transplant team including Surgeon Dr. Carter, Dr. Hinojosa, Hepatologist Dr. Cantor Hepatology Fellow: Irais Olivera, and bedside RN during AM rounds.   Disciplines not in attendance will be notified of the plan    HPI: 44M PMH decompensated ETOH cirrhosis c/b recurrent ascites, grade II esophageal varices, and hepatic encephalopathy, who presented to OSH with abd pain and distension found to have decompensated EtOH cirrhosis with MELD 30, MANUELITO (HRS vs ATN; previous HD need with [facility]) found to have R empyema s/p R pigtail placement w/ IR 1/18 requiring several adjustments, s/p R robotic VATS with decortication (Dr. Ramírez 1/28/2025) for loculated empyema. OR course with oropharyngeal bleeding during intubation, coagulopathy/elevated EBL. Admitted for transplant evaluation and found to have reaccumulation of R pleural effusion, now s/p IR drainage and s/p DCD OLTx.       Interval Events:  - diarrhea improving, but still having some loose stools   - no ON events       Immunosuppression  Induction: Simulect completed  Maintenance: FK per level, Myfortic, Pred 20QD  Ongoing monitoring for signs of rejection     Potential Discharge date: TBD  Education:  Medications  Plan of care:  See Below          MEDICATIONS  (STANDING):  aspirin enteric coated 81 milliGRAM(s) Oral daily  atovaquone  Suspension 1500 milliGRAM(s) Oral daily  carvedilol 3.125 milliGRAM(s) Oral every 12 hours  cefTRIAXone   IVPB 2000 milliGRAM(s) IV Intermittent every 24 hours  chlorhexidine 2% Cloths 1 Application(s) Topical daily  dextrose 5%. 1000 milliLiter(s) (100 mL/Hr) IV Continuous <Continuous>  dextrose 5%. 1000 milliLiter(s) (50 mL/Hr) IV Continuous <Continuous>  dextrose 50% Injectable 25 Gram(s) IV Push once  dextrose 50% Injectable 12.5 Gram(s) IV Push once  dextrose 50% Injectable 25 Gram(s) IV Push once  fluconAZOLE   Tablet 400 milliGRAM(s) Oral daily  furosemide    Tablet 40 milliGRAM(s) Oral daily  glucagon  Injectable 1 milliGRAM(s) IntraMuscular once  heparin   Injectable 5000 Unit(s) SubCutaneous every 12 hours  influenza   Vaccine 0.5 milliLiter(s) IntraMuscular once  insulin glargine Injectable (LANTUS) 6 Unit(s) SubCutaneous at bedtime  insulin lispro (ADMELOG) corrective regimen sliding scale   SubCutaneous three times a day before meals  insulin lispro (ADMELOG) corrective regimen sliding scale   SubCutaneous at bedtime  lidocaine 1% Injectable 1 Vial(s) Local Injection once  magnesium oxide 400 milliGRAM(s) Oral two times a day with meals  mycophenolic acid  milliGRAM(s) Oral <User Schedule>  pantoprazole    Tablet 40 milliGRAM(s) Oral before breakfast  predniSONE   Tablet 20 milliGRAM(s) Oral daily  sodium chloride 0.65% Nasal 1 Spray(s) Both Nostrils two times a day  sofosbuvir 400 mG/velpatasvir 100 mG (EPCLUSA) 1 Tablet(s) Oral at bedtime  tacrolimus 2 milliGRAM(s) Oral <User Schedule>  thiamine 100 milliGRAM(s) Oral daily  valGANciclovir 450 milliGRAM(s) Oral daily    MEDICATIONS  (PRN):  albuterol/ipratropium for Nebulization 3 milliLiter(s) Nebulizer every 6 hours PRN Shortness of Breath  dextrose Oral Gel 15 Gram(s) Oral once PRN Blood Glucose LESS THAN 70 milliGRAM(s)/deciliter  melatonin 5 milliGRAM(s) Oral at bedtime PRN Insomnia  ondansetron Injectable 4 milliGRAM(s) IV Push every 6 hours PRN Nausea and/or Vomiting  simethicone 80 milliGRAM(s) Chew four times a day PRN Gas  traMADol 25 milliGRAM(s) Oral every 6 hours PRN Moderate Pain (4 - 6)  traMADol 50 milliGRAM(s) Oral every 8 hours PRN Severe Pain (7 - 10)      PAST MEDICAL & SURGICAL HISTORY:  Sleep apnea, obstructive      Alcohol abuse      Cirrhosis of liver      Portal hypertension      H/O esophageal varices      Anemia      Mild alcohol use disorder      No significant past surgical history          Vital Signs Last 24 Hrs  T(C): 36.5 (09 Mar 2025 13:00), Max: 36.9 (08 Mar 2025 18:10)  T(F): 97.7 (09 Mar 2025 13:00), Max: 98.5 (08 Mar 2025 18:10)  HR: 63 (09 Mar 2025 13:00) (62 - 88)  BP: 151/77 (09 Mar 2025 13:00) (125/70 - 164/81)  BP(mean): 101 (09 Mar 2025 05:56) (101 - 101)  RR: 18 (09 Mar 2025 13:00) (16 - 18)  SpO2: 99% (09 Mar 2025 13:00) (88% - 99%)    Parameters below as of 09 Mar 2025 13:00  Patient On (Oxygen Delivery Method): room air        I&O's Summary    08 Mar 2025 06:01  -  09 Mar 2025 07:00  --------------------------------------------------------  IN: 1370 mL / OUT: 3400 mL / NET: -2030 mL    09 Mar 2025 07:01  -  09 Mar 2025 16:25  --------------------------------------------------------  IN: 0 mL / OUT: 490 mL / NET: -490 mL                              10.6   5.03  )-----------( 127      ( 09 Mar 2025 06:41 )             32.9     03-09    137  |  104  |  36[H]  ----------------------------<  140[H]  4.9   |  25  |  0.83    Ca    9.1      09 Mar 2025 06:41  Phos  2.8     03-09  Mg     1.8     03-09    TPro  6.3  /  Alb  2.8[L]  /  TBili  0.9  /  DBili  x   /  AST  12  /  ALT  6[L]  /  AlkPhos  54  03-09    Tacrolimus (), Serum: 6.7 ng/mL (03-09 @ 06:41)        Culture - Fungal, Body Fluid (collected 03-05-25 @ 19:24)  Source: Peritoneal Peritoneal Fluid  Preliminary Report (03-09-25 @ 08:03):    No growth    Culture - Body Fluid with Gram Stain (collected 03-05-25 @ 19:24)  Source: Pleural Fl Pleural Fluid  Gram Stain (03-06-25 @ 00:57):    No polymorphonuclear cells seen    No organisms seen    by cytocentrifuge  Preliminary Report (03-06-25 @ 19:42):    No growth    Culture - Blood (collected 03-03-25 @ 18:51)  Source: Blood Blood  Final Report (03-08-25 @ 23:07):    No growth at 5 days    Culture - Urine (collected 03-03-25 @ 18:34)  Source: Clean Catch Clean Catch (Midstream)  Final Report (03-04-25 @ 18:41):    No growth    Culture - Blood (collected 03-03-25 @ 17:00)  Source: Blood Blood  Final Report (03-08-25 @ 23:07):    No growth at 5 days            Review of systems  All other systems were reviewed and are negative, except as noted.      PHYSICAL EXAM:  Constitutional: Well developed / well nourished  Eyes: PERRLA  ENMT: nc/at, no thrush  Neck: supple  Respiratory: CTA B/L  Cardiovascular: RRR  Gastrointestinal: Soft abdomen, ND, appropriate incisional TTP. chevron incision c/d/i, staples in place. No signs of infection. JPx1 ss   Genitourinary: voiding  Extremities: SCD's in place and working bilaterally, LE edema improving daily on lasix  Vascular: Palpable dp pulses bilaterally.   Neurological: A&O x3  Skin: no rashes, ulcerations, lesions  Musculoskeletal: Moving all extremities

## 2025-03-09 NOTE — PROGRESS NOTE ADULT - ASSESSMENT
45yo M with Hx decompensated EtOH cirrhosis c/b recurrent ascites, grade II EV, and HE who presented to OSH for abd pain and distension, found to have ascites and MANUELITO requiring HD. Patient was transferred to [facility] for management and liver transplant evaluation. His hospital course has been c/b an empyema s/p VATS decortication on 1/28 with Dr. Ramírez. Postoperative course has been c/b recurrent R pleural effusion. Thoracic Surgery consulted for intraoperative chest tube placement for drainage of recurrent R pleural effusion.     3/3   ct chest -Decreased right intrapleural gas bubbles, otherwise unchanged moderate loculated right pleural effusion and associated pleural thickening.     3/4   abx per ID,   ambulates w asst  on room air.  3/5 stable; on room air; IR for drainage/ PTC placement  3/6  VSS  right hemopneumothorax. Sp thoracentesis 1ml cultures sent.  GS-neg.   Pt will most likely require a thoracotomy for decortication.  Not medically stable for open procedure at this time.  OR scheduled for 3/17/25  3/7    vss        3/8     vss     3/9      vss   abdominal staples

## 2025-03-09 NOTE — PROGRESS NOTE ADULT - ASSESSMENT
43yo M with Hx decompensated EtOH cirrhosis c/b recurrent ascites, grade II EV, and HE who presented to OSH for abd pain and distension, found to have ascites and MANUELITO requiring HD. Patient was transferred to [facility] for management and liver transplant evaluation. His hospital course has been c/b an empyema s/p VATS decortication on 1/28 with Dr. Ramírez. Postoperative course has been c/b recurrent R pleural effusion s/p IR drainage. Admitted for transplant evaluation, now s/p OLT on 2/27.     [ ] DCD OLT (POD #10)  - good graft function  - HCV viremia (donor hcv +) - f/u genotype; started Epclusa 3/1   - Strict I/O's: UO/ADAM#2  - lasix 40QD PO  - SQH, ASA  - Pain control  - bowel regimen as able  - PT/SCD/IS    [] Immuno  - simulect completed  - FK per level, switch to Myfortic 360BID as trial for loose stools, Pred 20QD  - ppx: no bactrim due to sulfa allergy -> Mepron, fluc, valcyte, oscal, PPI    [] Ileus   - resolved   - avoid narcotics    [] Worsening MS--resolved  - bld cxs with NGTD  - CTH neg, CT chest with Loculated R pleural effusion  - IR thora 3/5 minimal drainage, f/u cultures  - plan for VATS 3/17 with Thoracic  - ID following: Meropenem-->CTX daksha VATS   43yo M with Hx decompensated EtOH cirrhosis c/b recurrent ascites, grade II EV, and HE who presented to OSH for abd pain and distension, found to have ascites and MANUELITO requiring HD. Patient was transferred to [facility] for management and liver transplant evaluation. His hospital course has been c/b an empyema s/p VATS decortication on 1/28 with Dr. Ramírez. Postoperative course has been c/b recurrent R pleural effusion s/p IR drainage. Admitted for transplant evaluation, now s/p OLT on 2/27.     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

## 2025-03-09 NOTE — PROGRESS NOTE ADULT - PROBLEM SELECTOR PLAN 1
- patient remains on room air,     - OR scheduled for 3/17/25  thoracotomy vs VATS   decortication    - Above plan per d/w Thoracic Attending

## 2025-03-09 NOTE — PROGRESS NOTE ADULT - TIME BILLING
Darrell
face-to-face encounter, review of extensive medical records in this and prior charts, laboratory findings, radiographic and microbiology results; documentation as noted above and discussion of diagnostic impressions and plan with the patient and team
clinical exam, review of labs, review of recent imaging and discussion of assessment and plan with other team members.
face-to-face encounter, review of extensive medical records in this and prior charts, laboratory findings, radiographic and microbiology results; documentation as noted above and discussion of diagnostic impressions and plan with the patient and team
pretransplant cardiac evaluation prior to possible liver transplant   empyema
Darrell
Imelda

## 2025-03-10 ENCOUNTER — TRANSCRIPTION ENCOUNTER (OUTPATIENT)
Age: 45
End: 2025-03-10

## 2025-03-10 LAB
ALBUMIN SERPL ELPH-MCNC: 2.7 G/DL — LOW (ref 3.3–5)
ALP SERPL-CCNC: 49 U/L — SIGNIFICANT CHANGE UP (ref 40–120)
ALT FLD-CCNC: 8 U/L — LOW (ref 10–45)
ANION GAP SERPL CALC-SCNC: 9 MMOL/L — SIGNIFICANT CHANGE UP (ref 5–17)
APTT BLD: 28.7 SEC — SIGNIFICANT CHANGE UP (ref 24.5–35.6)
AST SERPL-CCNC: 10 U/L — SIGNIFICANT CHANGE UP (ref 10–40)
BILIRUB SERPL-MCNC: 0.8 MG/DL — SIGNIFICANT CHANGE UP (ref 0.2–1.2)
BLD GP AB SCN SERPL QL: NEGATIVE — SIGNIFICANT CHANGE UP
BUN SERPL-MCNC: 34 MG/DL — HIGH (ref 7–23)
CALCIUM SERPL-MCNC: 9 MG/DL — SIGNIFICANT CHANGE UP (ref 8.4–10.5)
CHLORIDE SERPL-SCNC: 107 MMOL/L — SIGNIFICANT CHANGE UP (ref 96–108)
CO2 SERPL-SCNC: 24 MMOL/L — SIGNIFICANT CHANGE UP (ref 22–31)
CREAT SERPL-MCNC: 0.72 MG/DL — SIGNIFICANT CHANGE UP (ref 0.5–1.3)
CULTURE RESULTS: SIGNIFICANT CHANGE UP
EGFR: 114 ML/MIN/1.73M2 — SIGNIFICANT CHANGE UP
EGFR: 114 ML/MIN/1.73M2 — SIGNIFICANT CHANGE UP
GLUCOSE BLDC GLUCOMTR-MCNC: 110 MG/DL — HIGH (ref 70–99)
GLUCOSE BLDC GLUCOMTR-MCNC: 143 MG/DL — HIGH (ref 70–99)
GLUCOSE BLDC GLUCOMTR-MCNC: 150 MG/DL — HIGH (ref 70–99)
GLUCOSE BLDC GLUCOMTR-MCNC: 163 MG/DL — HIGH (ref 70–99)
GLUCOSE SERPL-MCNC: 109 MG/DL — HIGH (ref 70–99)
HCT VFR BLD CALC: 30.5 % — LOW (ref 39–50)
HGB BLD-MCNC: 9.8 G/DL — LOW (ref 13–17)
INR BLD: 0.94 RATIO — SIGNIFICANT CHANGE UP (ref 0.85–1.16)
MAGNESIUM SERPL-MCNC: 1.7 MG/DL — SIGNIFICANT CHANGE UP (ref 1.6–2.6)
MCHC RBC-ENTMCNC: 30.7 PG — SIGNIFICANT CHANGE UP (ref 27–34)
MCHC RBC-ENTMCNC: 32.1 G/DL — SIGNIFICANT CHANGE UP (ref 32–36)
MCV RBC AUTO: 95.6 FL — SIGNIFICANT CHANGE UP (ref 80–100)
NRBC BLD AUTO-RTO: 0 /100 WBCS — SIGNIFICANT CHANGE UP (ref 0–0)
PHOSPHATE SERPL-MCNC: 3.2 MG/DL — SIGNIFICANT CHANGE UP (ref 2.5–4.5)
PLATELET # BLD AUTO: 130 K/UL — LOW (ref 150–400)
POTASSIUM SERPL-MCNC: 5.1 MMOL/L — SIGNIFICANT CHANGE UP (ref 3.5–5.3)
POTASSIUM SERPL-SCNC: 5.1 MMOL/L — SIGNIFICANT CHANGE UP (ref 3.5–5.3)
PROT SERPL-MCNC: 5.9 G/DL — LOW (ref 6–8.3)
PROTHROM AB SERPL-ACNC: 10.8 SEC — SIGNIFICANT CHANGE UP (ref 9.9–13.4)
RBC # BLD: 3.19 M/UL — LOW (ref 4.2–5.8)
RBC # FLD: 18.1 % — HIGH (ref 10.3–14.5)
RH IG SCN BLD-IMP: POSITIVE — SIGNIFICANT CHANGE UP
SODIUM SERPL-SCNC: 140 MMOL/L — SIGNIFICANT CHANGE UP (ref 135–145)
SPECIMEN SOURCE: SIGNIFICANT CHANGE UP
TACROLIMUS SERPL-MCNC: 6.1 NG/ML — SIGNIFICANT CHANGE UP
WBC # BLD: 4.11 K/UL — SIGNIFICANT CHANGE UP (ref 3.8–10.5)
WBC # FLD AUTO: 4.11 K/UL — SIGNIFICANT CHANGE UP (ref 3.8–10.5)

## 2025-03-10 PROCEDURE — G0545: CPT

## 2025-03-10 PROCEDURE — 99233 SBSQ HOSP IP/OBS HIGH 50: CPT

## 2025-03-10 PROCEDURE — 99232 SBSQ HOSP IP/OBS MODERATE 35: CPT

## 2025-03-10 RX ORDER — ISOPROPYL ALCOHOL, BENZOCAINE .7; .06 ML/ML; ML/ML
0 SWAB TOPICAL
Qty: 100 | Refills: 1
Start: 2025-03-10

## 2025-03-10 RX ORDER — INSULIN GLARGINE-YFGN 100 [IU]/ML
6 INJECTION, SOLUTION SUBCUTANEOUS
Qty: 5 | Refills: 0
Start: 2025-03-10 | End: 2025-04-08

## 2025-03-10 RX ORDER — TACROLIMUS 0.5 MG/1
2 CAPSULE ORAL ONCE
Refills: 0 | Status: COMPLETED | OUTPATIENT
Start: 2025-03-10 | End: 2025-03-10

## 2025-03-10 RX ORDER — CEFTRIAXONE 500 MG/1
2 INJECTION, POWDER, FOR SOLUTION INTRAMUSCULAR; INTRAVENOUS
Qty: 20 | Refills: 0
Start: 2025-03-10 | End: 2025-03-19

## 2025-03-10 RX ORDER — MYCOPHENOLIC ACID 360 MG/1
2 TABLET, DELAYED RELEASE ORAL
Qty: 120 | Refills: 0
Start: 2025-03-10 | End: 2025-04-08

## 2025-03-10 RX ORDER — FUROSEMIDE 10 MG/ML
1 INJECTION INTRAMUSCULAR; INTRAVENOUS
Qty: 30 | Refills: 0
Start: 2025-03-10 | End: 2025-04-08

## 2025-03-10 RX ORDER — INSULIN GLARGINE-YFGN 100 [IU]/ML
0 INJECTION, SOLUTION SUBCUTANEOUS
Qty: 5 | Refills: 0
Start: 2025-03-10

## 2025-03-10 RX ORDER — TACROLIMUS 0.5 MG/1
2 CAPSULE ORAL
Refills: 0 | Status: DISCONTINUED | OUTPATIENT
Start: 2025-03-10 | End: 2025-03-11

## 2025-03-10 RX ADMIN — TRAMADOL HYDROCHLORIDE 50 MILLIGRAM(S): 50 TABLET, FILM COATED ORAL at 23:27

## 2025-03-10 RX ADMIN — Medication 100 MILLIGRAM(S): at 11:42

## 2025-03-10 RX ADMIN — ATOVAQUONE 1500 MILLIGRAM(S): 750 SUSPENSION ORAL at 11:40

## 2025-03-10 RX ADMIN — HEPARIN SODIUM 5000 UNIT(S): 1000 INJECTION INTRAVENOUS; SUBCUTANEOUS at 17:43

## 2025-03-10 RX ADMIN — CARVEDILOL 3.12 MILLIGRAM(S): 3.12 TABLET, FILM COATED ORAL at 06:07

## 2025-03-10 RX ADMIN — TACROLIMUS 2 MILLIGRAM(S): 0.5 CAPSULE ORAL at 11:41

## 2025-03-10 RX ADMIN — FUROSEMIDE 40 MILLIGRAM(S): 10 INJECTION INTRAMUSCULAR; INTRAVENOUS at 06:07

## 2025-03-10 RX ADMIN — Medication 400 MILLIGRAM(S): at 08:06

## 2025-03-10 RX ADMIN — TACROLIMUS 2 MILLIGRAM(S): 0.5 CAPSULE ORAL at 20:53

## 2025-03-10 RX ADMIN — MYCOPHENOLIC ACID 360 MILLIGRAM(S): 360 TABLET, DELAYED RELEASE ORAL at 20:53

## 2025-03-10 RX ADMIN — CARVEDILOL 3.12 MILLIGRAM(S): 3.12 TABLET, FILM COATED ORAL at 17:43

## 2025-03-10 RX ADMIN — INSULIN LISPRO 2: 100 INJECTION, SOLUTION INTRAVENOUS; SUBCUTANEOUS at 08:54

## 2025-03-10 RX ADMIN — Medication 1 SPRAY(S): at 06:07

## 2025-03-10 RX ADMIN — Medication 1 SPRAY(S): at 17:43

## 2025-03-10 RX ADMIN — CEFTRIAXONE 100 MILLIGRAM(S): 500 INJECTION, POWDER, FOR SOLUTION INTRAMUSCULAR; INTRAVENOUS at 06:06

## 2025-03-10 RX ADMIN — Medication 5 MILLIGRAM(S): at 01:07

## 2025-03-10 RX ADMIN — Medication 400 MILLIGRAM(S): at 17:43

## 2025-03-10 RX ADMIN — Medication 81 MILLIGRAM(S): at 11:42

## 2025-03-10 RX ADMIN — VALGANCICLOVIR 450 MILLIGRAM(S): 450 TABLET, FILM COATED ORAL at 11:41

## 2025-03-10 RX ADMIN — INSULIN GLARGINE-YFGN 6 UNIT(S): 100 INJECTION, SOLUTION SUBCUTANEOUS at 22:22

## 2025-03-10 RX ADMIN — PREDNISONE 20 MILLIGRAM(S): 20 TABLET ORAL at 06:06

## 2025-03-10 RX ADMIN — MYCOPHENOLIC ACID 360 MILLIGRAM(S): 360 TABLET, DELAYED RELEASE ORAL at 08:06

## 2025-03-10 RX ADMIN — Medication 40 MILLIGRAM(S): at 06:07

## 2025-03-10 RX ADMIN — TRAMADOL HYDROCHLORIDE 25 MILLIGRAM(S): 50 TABLET, FILM COATED ORAL at 06:14

## 2025-03-10 RX ADMIN — TRAMADOL HYDROCHLORIDE 25 MILLIGRAM(S): 50 TABLET, FILM COATED ORAL at 14:34

## 2025-03-10 RX ADMIN — TRAMADOL HYDROCHLORIDE 25 MILLIGRAM(S): 50 TABLET, FILM COATED ORAL at 13:34

## 2025-03-10 RX ADMIN — HEPARIN SODIUM 5000 UNIT(S): 1000 INJECTION INTRAVENOUS; SUBCUTANEOUS at 06:06

## 2025-03-10 RX ADMIN — TRAMADOL HYDROCHLORIDE 25 MILLIGRAM(S): 50 TABLET, FILM COATED ORAL at 06:55

## 2025-03-10 RX ADMIN — Medication 400 MILLIGRAM(S): at 11:41

## 2025-03-10 RX ADMIN — VELPATASVIR AND SOFOSBUVIR 1 TABLET(S): 50; 200 TABLET, FILM COATED ORAL at 22:22

## 2025-03-10 RX ADMIN — Medication 5 MILLIGRAM(S): at 23:27

## 2025-03-10 NOTE — DISCHARGE NOTE PROVIDER - NSDCFUADDAPPT_GEN_ALL_CORE_FT
- Thoracic Surgery team Dr. Ramírez will performed a procedure on your lungs, which is scheduled for 3/17/. You will be re-admitted in the hospital afterwards  - Please follow up in the Transplant clinic on 3/19 (Thursday) for lab check

## 2025-03-10 NOTE — PROGRESS NOTE ADULT - ASSESSMENT
45yo M with Hx decompensated EtOH cirrhosis c/b recurrent ascites, grade II EV, and HE who presented to OSH for abd pain and distension, found to have ascites and MANUELITO requiring HD. Patient was transferred to [facility] for management and liver transplant evaluation. His hospital course has been c/b an empyema s/p VATS decortication on 1/28 with Dr. Ramírez. Postoperative course has been c/b recurrent R pleural effusion. Thoracic Surgery consulted for intraoperative chest tube placement for drainage of recurrent R pleural effusion.     3/3   ct chest -Decreased right intrapleural gas bubbles, otherwise unchanged moderate loculated right pleural effusion and associated pleural thickening.     3/4   abx per ID,   ambulates w asst  on room air.  3/5 stable; on room air; IR for drainage/ PTC placement  3/6  VSS  right hemopneumothorax. Sp thoracentesis 1ml cultures sent.  GS-neg.   Pt will most likely require a thoracotomy for decortication.  Not medically stable for open procedure at this time.  OR scheduled for 3/17/25  3/7    vss        3/8     vss     3/9      vss   abdominal staples     3/10 VSS up in chair comfortable room air OR 3/17

## 2025-03-10 NOTE — PROGRESS NOTE ADULT - SUBJECTIVE AND OBJECTIVE BOX
Transplant Surgery - Multidisciplinary Progress Note  --------------------------------------------------------------  DCD OLT     Date:  2/27/25       POD#11  CMV -/+    Present:   Patient seen and examined with multidisciplinary Transplant team including Surgeon Dr. Vanessa, Dr. Hinojosa, Hepatologist Dr. Cantor ACP: Lenora/Nicholas, Pharmacist, and bedside RN during AM rounds.   Disciplines not in attendance will be notified of the plan    HPI: 44M PMH decompensated ETOH cirrhosis c/b recurrent ascites, grade II esophageal varices, and hepatic encephalopathy, who presented to OSH with abd pain and distension found to have decompensated EtOH cirrhosis with MELD 30, MANUELITO (HRS vs ATN; previous HD need with [facility]) found to have R empyema s/p R pigtail placement w/ IR 1/18 requiring several adjustments, s/p R robotic VATS with decortication (Dr. Ramírez 1/28/2025) for loculated empyema. OR course with oropharyngeal bleeding during intubation, coagulopathy/elevated EBL. Admitted for transplant evaluation and found to have reaccumulation of R pleural effusion, now s/p IR drainage and s/p DCD OLTx.     Interval Events:  - MS better  - no acute events overnight     Immunosuppression  Induction: Simulect completed  Maintenance: FK per level, Myfortic, Pred 20QD  Ongoing monitoring for signs of rejection     Potential Discharge date: TBD  Education:  Medications  Plan of care:  See Below    MEDICATIONS  (STANDING):  aspirin enteric coated 81 milliGRAM(s) Oral daily  atovaquone  Suspension 1500 milliGRAM(s) Oral daily  carvedilol 3.125 milliGRAM(s) Oral every 12 hours  cefTRIAXone   IVPB 2000 milliGRAM(s) IV Intermittent every 24 hours  chlorhexidine 2% Cloths 1 Application(s) Topical daily  dextrose 5%. 1000 milliLiter(s) (100 mL/Hr) IV Continuous <Continuous>  dextrose 5%. 1000 milliLiter(s) (50 mL/Hr) IV Continuous <Continuous>  dextrose 50% Injectable 25 Gram(s) IV Push once  dextrose 50% Injectable 12.5 Gram(s) IV Push once  dextrose 50% Injectable 25 Gram(s) IV Push once  fluconAZOLE   Tablet 400 milliGRAM(s) Oral daily  furosemide    Tablet 40 milliGRAM(s) Oral daily  glucagon  Injectable 1 milliGRAM(s) IntraMuscular once  heparin   Injectable 5000 Unit(s) SubCutaneous every 12 hours  influenza   Vaccine 0.5 milliLiter(s) IntraMuscular once  insulin glargine Injectable (LANTUS) 6 Unit(s) SubCutaneous at bedtime  insulin lispro (ADMELOG) corrective regimen sliding scale   SubCutaneous three times a day before meals  insulin lispro (ADMELOG) corrective regimen sliding scale   SubCutaneous at bedtime  lidocaine 1% Injectable 1 Vial(s) Local Injection once  magnesium oxide 400 milliGRAM(s) Oral two times a day with meals  mycophenolic acid  milliGRAM(s) Oral <User Schedule>  pantoprazole    Tablet 40 milliGRAM(s) Oral before breakfast  predniSONE   Tablet 20 milliGRAM(s) Oral daily  sodium chloride 0.65% Nasal 1 Spray(s) Both Nostrils two times a day  sofosbuvir 400 mG/velpatasvir 100 mG (EPCLUSA) 1 Tablet(s) Oral at bedtime  tacrolimus 2 milliGRAM(s) Oral <User Schedule>  thiamine 100 milliGRAM(s) Oral daily  valGANciclovir 450 milliGRAM(s) Oral daily    MEDICATIONS  (PRN):  albuterol/ipratropium for Nebulization 3 milliLiter(s) Nebulizer every 6 hours PRN Shortness of Breath  dextrose Oral Gel 15 Gram(s) Oral once PRN Blood Glucose LESS THAN 70 milliGRAM(s)/deciliter  melatonin 5 milliGRAM(s) Oral at bedtime PRN Insomnia  ondansetron Injectable 4 milliGRAM(s) IV Push every 6 hours PRN Nausea and/or Vomiting  simethicone 80 milliGRAM(s) Chew four times a day PRN Gas  traMADol 25 milliGRAM(s) Oral every 6 hours PRN Moderate Pain (4 - 6)  traMADol 50 milliGRAM(s) Oral every 8 hours PRN Severe Pain (7 - 10)      PAST MEDICAL & SURGICAL HISTORY:  Sleep apnea, obstructive  Alcohol abuse  Cirrhosis of liver  Portal hypertension  H/O esophageal varices  Anemia  Mild alcohol use disorder  No significant past surgical history    Vital Signs Last 24 Hrs  T(C): 36.7 (10 Mar 2025 12:33), Max: 36.8 (09 Mar 2025 20:52)  T(F): 98 (10 Mar 2025 12:33), Max: 98.2 (09 Mar 2025 20:52)  HR: 58 (10 Mar 2025 12:33) (58 - 82)  BP: 164/83 (10 Mar 2025 12:33) (140/74 - 164/87)  BP(mean): 105 (10 Mar 2025 08:09) (101 - 105)  RR: 18 (10 Mar 2025 12:33) (16 - 18)  SpO2: 100% (10 Mar 2025 12:33) (97% - 100%)    Parameters below as of 10 Mar 2025 12:33  Patient On (Oxygen Delivery Method): room air    I&O's Summary    09 Mar 2025 07:01  -  10 Mar 2025 07:00  --------------------------------------------------------  IN: 1320 mL / OUT: 3050 mL / NET: -1730 mL    10 Mar 2025 07:01  -  10 Mar 2025 15:42  --------------------------------------------------------  IN: 240 mL / OUT: 1455 mL / NET: -1215 mL                        9.8    4.11  )-----------( 130      ( 10 Mar 2025 06:16 )             30.5     03-10    140  |  107  |  34[H]  ----------------------------<  109[H]  5.1   |  24  |  0.72    Ca    9.0      10 Mar 2025 06:14  Phos  3.2     03-10  Mg     1.7     03-10    TPro  5.9[L]  /  Alb  2.7[L]  /  TBili  0.8  /  DBili  x   /  AST  10  /  ALT  8[L]  /  AlkPhos  49  03-10    Tacrolimus (), Serum: 6.1 ng/mL (03-10 @ 06:16)    Culture - Fungal, Body Fluid (collected 03-05-25 @ 19:24)  Source: Peritoneal Peritoneal Fluid  Preliminary Report (03-09-25 @ 08:03):    No growth    Culture - Body Fluid with Gram Stain (collected 03-05-25 @ 19:24)  Source: Pleural Fl Pleural Fluid  Gram Stain (03-06-25 @ 00:57):    No polymorphonuclear cells seen    No organisms seen    by cytocentrifuge  Preliminary Report (03-06-25 @ 19:42):    No growth    Culture - Blood (collected 03-03-25 @ 18:51)  Source: Blood Blood  Final Report (03-08-25 @ 23:07):    No growth at 5 days    Culture - Urine (collected 03-03-25 @ 18:34)  Source: Clean Catch Clean Catch (Midstream)  Final Report (03-04-25 @ 18:41):    No growth    Culture - Blood (collected 03-03-25 @ 17:00)  Source: Blood Blood  Final Report (03-08-25 @ 23:07):    No growth at 5 days      Review of systems  All other systems were reviewed and are negative, except as noted.      PHYSICAL EXAM:  Constitutional: Well developed / well nourished  Eyes: PERRLA  ENMT: nc/at, no thrush  Neck: supple  Respiratory: CTA B/L  Cardiovascular: RRR  Gastrointestinal: Soft abdomen, ND, appropriate incisional TTP. chevron incision c/d/i, staples in place. No signs of infection. JPx1 ss   Genitourinary: voiding  Extremities: SCD's in place and working bilaterally, LE edema improving daily on lasix  Vascular: Palpable dp pulses bilaterally.   Neurological: A&O x3  Skin: no rashes, ulcerations, lesions  Musculoskeletal: Moving all extremities

## 2025-03-10 NOTE — DISCHARGE NOTE PROVIDER - CARE PROVIDERS DIRECT ADDRESSES
,[email or web address]

## 2025-03-10 NOTE — PROGRESS NOTE ADULT - ASSESSMENT
43yo M with Hx decompensated EtOH cirrhosis c/b recurrent ascites, grade II EV, and HE who presented to OSH for abd pain and distension, found to have ascites and MANUELITO requiring HD. Patient was transferred to [facility] for management and liver transplant evaluation. His hospital course has been c/b an empyema s/p VATS decortication on 1/28 with Dr. Ramírez. Postoperative course has been c/b recurrent R pleural effusion s/p IR drainage. Admitted for transplant evaluation, now s/p OLT on 2/27.     [ ] DCD OLT (POD #11)  - good graft function  - HCV viremia/ Genotype 1A (donor hcv +) -  Epclusa 3/1   - Strict I/O's: remove ADAM #2  - lasix 40QD PO  - SQH, ASA  - Pain control  - bowel regimen as able  - PT/SCD/IS    [] Immuno  - simulect completed  - FK per level,  Myfortic 360BID, Pred 20QD  - ppx: no bactrim due to sulfa allergy -> Mepron, fluc, valcyte, oscal, PPI    [] Ileus   - resolved   - avoid narcotics    [] Worsening MS--resolved  - bld cxs with NGTD  - CTH neg, CT chest with Loculated R pleural effusion  - IR thora 3/5 minimal drainage, f/u cultures  - plan for VATS 3/17 with Thoracic  - ID following: Meropenem-->CTX daksha VATS

## 2025-03-10 NOTE — DISCHARGE NOTE PROVIDER - NSDCFUADDINST_GEN_ALL_CORE_FT
NEEDS PST   primary team please give instructions to patient for medications /immunosuppressant   prior to OR  scheduled for 3/17   surgical coordinator for Dr Ramírez will contact patient for time/schedule of procedure

## 2025-03-10 NOTE — PROGRESS NOTE ADULT - PROBLEM SELECTOR PLAN 1
- patient remains on room air,     - OR scheduled for 3/17/25  thoracotomy vs VATS   decortication    - Above plan per d/w Thoracic Attending  care as per primary team

## 2025-03-10 NOTE — PROGRESS NOTE ADULT - ASSESSMENT
45 yo M with PMH decompensated ETOH cirrhosis c/b recurrent ascites, grade II EV, and HE, right calf hematoma 10/2024 (s/p fall)  presented to [facility] for abdominal pain and distension. Patient found to have ascites on exam and MANUELITO requiring HD initiation PermCath was placed by vascular surgery on 1/10 with post procedure course c/b bleeding from insertion site. Transferred to [facility] SICU for further management.     Blood Cultures (1/16) 1/4 Staph epi.   GI PCR (1/17) + Giardia.   Paracentesis (1/17) 155 nucleated cell counts.     CT Chest (1/17) Moderate loculated right pleural effusion containing a few foci of air, which are new from 1/8/2025 and may represent empyema/bronchopleural fistula versus sequelae of prior thoracentesis.     Noted to have worsening abdominal pain and encephalopathy on 3/3/2025    BCx (3/3) NGTD  UCx (3/3) No growth    CT Chest (3/3) Decreased right intrapleural gas bubbles, otherwise unchanged moderate loculated right pleural effusion and associated pleural thickening.    CT A/P (3/3) Complex collection in the gallbladder fossa measuring 5.1 x 3.0 x 3.0 cm, which may be mildly decreased in size since the prior ultrasound of 2/28/2025, allowing for differences in modality. Hyperdensity within the collection, suggestive of hemorrhagic content. Small amount of free peritoneal fluid without additional discrete organized collection identified. Small amount of pneumoperitoneum, which may be postsurgical.    #Abdominal Pain, Encephalopathy, Abnormal CT A/P (fluid collections)   --Ceftraixone 2gm daily  --Continue to follow CBC with diff  --Continue to follow temperature curve  --Follow up on preliminary blood cultures    # s/p OLT 2/27/25 CMV D-/R+, EBV D+/R+, Toxoplasma D+/R-, HCV NAAT pos donor  Simulect. steroids induction  Fluconazole per protocol   Note patient has bactrim allergy- needs atovaquone AND NO OTHER ALTERNATIVE for PCP , toxoplasma Ppx as he is high risk of donor derived infection  If atovaquone not tolerated, will need bactrim desensitization or challenge  --Continue Valcyte 450 mg PO Q24H     # HCV positive (NAAT  and Ab) donor  Follow HCV PCR testing per protocol and check genotype  and antivirals epclusa    #Empyema- citrobacter koseri  s/p VATS 1/28/25-  last positive cultures 1/25/25  for citrobacter koseri  Thoracentesis 3/5 pleural fluid culture NGTD  --Repeat CT Chest 3/3 with persistent moderate right pleural effusion / hemothorax; thoracic surgery planning eventual surgical intervention   --plans for surgical intervention next week per patient, with possible discharge home in between    #Giardia  s/p 7 days of Metronidazole    #Positive Blood Culture ([facility], 1/16)  procurement contaminant    Meliton Kam MD  Can be called via Teams  After 5pm/weekends [phone]

## 2025-03-10 NOTE — PROGRESS NOTE ADULT - SUBJECTIVE AND OBJECTIVE BOX
INFECTIOUS DISEASES FOLLOW UP-- Carley Kam  [phone]    This is a follow up note for this  44yMale with  Liver failure without hepatic coma        ROS:  CONSTITUTIONAL:  No fever, good appetite  CARDIOVASCULAR:  No chest pain or palpitations  RESPIRATORY:  No dyspnea  GASTROINTESTINAL:  No nausea, vomiting, diarrhea, or abdominal pain  GENITOURINARY:  No dysuria  NEUROLOGIC:  No headache,     Allergies    sulfa drugs (Unknown)  Salicylic Acid (Other)    Intolerances        ANTIBIOTICS/RELEVANT:  antimicrobials  atovaquone  Suspension 1500 milliGRAM(s) Oral daily  cefTRIAXone   IVPB 2000 milliGRAM(s) IV Intermittent every 24 hours  fluconAZOLE   Tablet 400 milliGRAM(s) Oral daily  sofosbuvir 400 mG/velpatasvir 100 mG (EPCLUSA) 1 Tablet(s) Oral at bedtime  valGANciclovir 450 milliGRAM(s) Oral daily    immunologic:  influenza   Vaccine 0.5 milliLiter(s) IntraMuscular once  mycophenolic acid  milliGRAM(s) Oral <User Schedule>  tacrolimus 2 milliGRAM(s) Oral <User Schedule>    OTHER:  albuterol/ipratropium for Nebulization 3 milliLiter(s) Nebulizer every 6 hours PRN  aspirin enteric coated 81 milliGRAM(s) Oral daily  carvedilol 3.125 milliGRAM(s) Oral every 12 hours  chlorhexidine 2% Cloths 1 Application(s) Topical daily  dextrose 5%. 1000 milliLiter(s) IV Continuous <Continuous>  dextrose 5%. 1000 milliLiter(s) IV Continuous <Continuous>  dextrose 50% Injectable 25 Gram(s) IV Push once  dextrose 50% Injectable 12.5 Gram(s) IV Push once  dextrose 50% Injectable 25 Gram(s) IV Push once  dextrose Oral Gel 15 Gram(s) Oral once PRN  furosemide    Tablet 40 milliGRAM(s) Oral daily  glucagon  Injectable 1 milliGRAM(s) IntraMuscular once  heparin   Injectable 5000 Unit(s) SubCutaneous every 12 hours  insulin glargine Injectable (LANTUS) 6 Unit(s) SubCutaneous at bedtime  insulin lispro (ADMELOG) corrective regimen sliding scale   SubCutaneous three times a day before meals  insulin lispro (ADMELOG) corrective regimen sliding scale   SubCutaneous at bedtime  lidocaine 1% Injectable 1 Vial(s) Local Injection once  magnesium oxide 400 milliGRAM(s) Oral two times a day with meals  melatonin 5 milliGRAM(s) Oral at bedtime PRN  ondansetron Injectable 4 milliGRAM(s) IV Push every 6 hours PRN  pantoprazole    Tablet 40 milliGRAM(s) Oral before breakfast  predniSONE   Tablet 20 milliGRAM(s) Oral daily  simethicone 80 milliGRAM(s) Chew four times a day PRN  sodium chloride 0.65% Nasal 1 Spray(s) Both Nostrils two times a day  thiamine 100 milliGRAM(s) Oral daily  traMADol 25 milliGRAM(s) Oral every 6 hours PRN  traMADol 50 milliGRAM(s) Oral every 8 hours PRN      Objective:  Vital Signs Last 24 Hrs  T(C): 36.7 (10 Mar 2025 21:29), Max: 36.8 (10 Mar 2025 01:15)  T(F): 98.1 (10 Mar 2025 21:29), Max: 98.2 (10 Mar 2025 01:15)  HR: 78 (10 Mar 2025 21:29) (58 - 82)  BP: 140/77 (10 Mar 2025 21:29) (140/74 - 164/83)  BP(mean): 105 (10 Mar 2025 08:09) (101 - 105)  RR: 18 (10 Mar 2025 21:29) (18 - 18)  SpO2: 100% (10 Mar 2025 21:29) (97% - 100%)    Parameters below as of 10 Mar 2025 21:29  Patient On (Oxygen Delivery Method): room air        PHYSICAL EXAM:  Constitutional:no acute distress  Eyes:EDITH, EOMI  Ear/Nose/Throat: no oral lesions, 	  Respiratory: clear BL  Cardiovascular: S1S2  Gastrointestinal:soft, (+) BS, no tenderness  Extremities:no e/e/c  No Lymphadenopathy  IV sites not inflammed.    LABS:                        9.8    4.11  )-----------( 130      ( 10 Mar 2025 06:16 )             30.5     03-10    140  |  107  |  34[H]  ----------------------------<  109[H]  5.1   |  24  |  0.72    Ca    9.0      10 Mar 2025 06:14  Phos  3.2     03-10  Mg     1.7     03-10    TPro  5.9[L]  /  Alb  2.7[L]  /  TBili  0.8  /  DBili  x   /  AST  10  /  ALT  8[L]  /  AlkPhos  49  03-10    PT/INR - ( 10 Mar 2025 06:17 )   PT: 10.8 sec;   INR: 0.94 ratio         PTT - ( 10 Mar 2025 06:17 )  PTT:28.7 sec  Urinalysis Basic - ( 10 Mar 2025 06:14 )    Color: x / Appearance: x / SG: x / pH: x  Gluc: 109 mg/dL / Ketone: x  / Bili: x / Urobili: x   Blood: x / Protein: x / Nitrite: x   Leuk Esterase: x / RBC: x / WBC x   Sq Epi: x / Non Sq Epi: x / Bacteria: x        MICROBIOLOGY:            RECENT CULTURES:  03-05 @ 19:24  Pleural Fl Pleural Fluid  --  --  --    No growth at 5 days  --      RADIOLOGY & ADDITIONAL STUDIES:    < from: Xray Chest 1 View- PORTABLE-Routine (Xray Chest 1 View- PORTABLE-Routine in AM.) (03.08.25 @ 09:57) >  FINDINGS:  Redemonstration of large loculated right pleural effusion with subjacent   atelectasis.  S/P removal of lower right chest tube.  Heart size not accurately assessed on this AP film.  Left lung is clear.  No pneumothorax.    IMPRESSION:  No interval change.    < end of copied text >

## 2025-03-10 NOTE — DISCHARGE NOTE PROVIDER - NSDCMRMEDTOKEN_GEN_ALL_CORE_FT
Aldactone 25 mg oral tablet: 6 tab(s) orally once a day To be further filled as outpatient  bumetanide 2 mg oral tablet: 1 tab(s) orally every 12 hours To be further filled as outpatient  carvedilol 3.125 mg oral tablet: 1 tab(s) orally every 12 hours To be further filled as outpatient  folic acid: 1 tab(s) orally once a day  gabapentin 300 mg oral tablet: 1 tab(s) orally 2 times a day  lactulose 10 g/15 mL oral syrup: 45 milliliter(s) orally 2 times a day  Lantus Solostar Pen 100 units/mL subcutaneous solution: 6 unit(s) subcutaneous once a day (at bedtime)  Lasix 40 mg oral tablet: 1 tab(s) orally once a day  mirtazapine 7.5 mg oral tablet: 1 tab(s) orally once a day (at bedtime)  Multiple Vitamins oral tablet: 1 tab(s) orally once a day  Myfortic 360 mg oral delayed release tablet: 2 tab(s) orally 2 times a day  oxyCODONE 5 mg oral tablet: 1 tab(s) orally every 6 hours as needed for Severe Pain (7 - 10) MDD: 4 tablets  pantoprazole 40 mg oral delayed release tablet: 1 tab(s) orally once a day  rifAXIMin 550 mg oral tablet: 1 tab(s) orally 2 times a day To be further filled as outpatient  thiamine 100 mg oral tablet: 1 tab(s) orally once a day  thiamine 100 mg oral tablet: 1 tab(s) orally once a day   alcohol swabs: Apply topically to affected area 4 times a day  Aldactone 25 mg oral tablet: 6 tab(s) orally once a day To be further filled as outpatient  bumetanide 2 mg oral tablet: 1 tab(s) orally every 12 hours To be further filled as outpatient  carvedilol 3.125 mg oral tablet: 1 tab(s) orally every 12 hours To be further filled as outpatient  cefTRIAXone 2 g injection: 2 gram(s) intravenously once a day  folic acid: 1 tab(s) orally once a day  Freestyle Jen 3 [place]: Dispense 1 [place]  Freestyle Jen 3 Sensors: Please change every 14 days  Freestyle Precision John Strips: Test blood sugar four times a day when you see check blood glucose symbol or when symptoms don&#x27;t match Jen reading.  gabapentin 300 mg oral tablet: 1 tab(s) orally 2 times a day  Insulin Pen Needles, 4mm: 1 application subcutaneously 4 times a day. ** Use with insulin pen **  lactulose 10 g/15 mL oral syrup: 45 milliliter(s) orally 2 times a day  lancets: 1 application subcutaneously 4 times a day  Lantus Solostar Pen 100 units/mL subcutaneous solution: 6 unit(s) subcutaneous once a day (at bedtime)  Lasix 40 mg oral tablet: 1 tab(s) orally once a day  mirtazapine 7.5 mg oral tablet: 1 tab(s) orally once a day (at bedtime)  Multiple Vitamins oral tablet: 1 tab(s) orally once a day  Myfortic 360 mg oral delayed release tablet: 2 tab(s) orally 2 times a day  oxyCODONE 5 mg oral tablet: 1 tab(s) orally every 6 hours as needed for Severe Pain (7 - 10) MDD: 4 tablets  pantoprazole 40 mg oral delayed release tablet: 1 tab(s) orally once a day  rifAXIMin 550 mg oral tablet: 1 tab(s) orally 2 times a day To be further filled as outpatient  thiamine 100 mg oral tablet: 1 tab(s) orally once a day  thiamine 100 mg oral tablet: 1 tab(s) orally once a day   aspirin 81 mg oral delayed release tablet: 1 tab(s) orally once a day  atovaquone 750 mg/5 mL oral suspension: 10 milliliter(s) orally once a day  carvedilol 3.125 mg oral tablet: 1 tab(s) orally every 12 hours  cefTRIAXone 2 g injection: 2 gram(s) intravenously once a day  fluconazole 200 mg oral tablet: 2 tab(s) orally once a day  furosemide 40 mg oral tablet: 1 tab(s) orally once a day  ipratropium-albuterol 0.5 mg-2.5 mg/3 mL inhalation solution: 3 milliliter(s) inhaled every 6 hours As needed Shortness of Breath  magnesium oxide 400 mg oral tablet: 1 tab(s) orally 2 times a day (with meals)  mycophenolic acid 360 mg oral delayed release tablet: 1 tab(s) orally 2 times a day  pantoprazole 40 mg oral delayed release tablet: 1 tab(s) orally once a day (before a meal)  predniSONE 20 mg oral tablet: 1 tab(s) orally once a day  sofosbuvir-velpatasvir 400 mg-100 mg oral tablet: 1 tab(s) orally once a day (at bedtime)  tacrolimus 1 mg oral capsule: 2 cap(s) orally 2 times a day  thiamine 100 mg oral tablet: 1 tab(s) orally once a day  valGANciclovir 450 mg oral tablet: 1 tab(s) orally once a day

## 2025-03-10 NOTE — DISCHARGE NOTE PROVIDER - HOSPITAL COURSE
44M PMH decompensated ETOH cirrhosis c/b recurrent ascites, grade II esophageal varices, and hepatic encephalopathy, transferred from [facility] to [facility] on 1/16/25 due to refractory ascites, recurrent right pleural effusion, and initial HRS-MANUELITO that has since improved (s/p permacath removal 1/31). R empyema (as initially seen on CT chest on 1/17 and with pleural fluid culture with Citrobacter koseri) s/p R pigtail placement w/ IR (1/18) requiring several adjustments, he underwent R VATS ( Dr. Ramírez 1/28/2025) with decortication complicated by hemothorax. He was extubated on 1/30 and eventually had the last chest tube removed on (2/6). Listed for liver transplant now s/p DCD HCV + OLT 2/27/25 under Simulect induction. Post op altered mental status. CTH neg, CTC w/ loculated R pleural effusion s/p IR drainage (3/5) 1cc thick fluid sent for culture,  unable to aspirate additional fluid     Pt tolerated regular diet, had bowel function, had good pain control with minimal narcotics, and ambulated well with PT. He tolerated all of the new immunosuppression medication regimen.  Medication/diet education was provided regularly by Transplant Pharmacy and Nutrition teams. He was followed closely by our multidisciplinary transplant team and deemed stable for discharge home with the following plan:     [ ] DCD OLT  - FK ____ Myfortic 360BID, Pred 20QD  - ppx: Mepron, fluc, valcyte, oscal, PPI  - HCV viremia/ Genotype 1A (donor hcv +) -  Epclusa 3/1   - lasix 40QD PO  - ASA    [] R pleural effusion   - s/p thoracentesis with CT placement 1/18  - IR 1/24 new chest tube placed---replaced by CT Sx  - Thoracic: R robotic VATS with decortication 1/29, new chest tube placed  - IR thora attempt (3/5) minimal drainage --> Cx negative   - plan for VATS 3/17 with Thoracic Surgery   - ID following: Continue IV Ceftriaxone via Midline on d/c          44M PMH decompensated ETOH cirrhosis c/b recurrent ascites, grade II esophageal varices, and hepatic encephalopathy, transferred from [facility] to [facility] on 1/16/25 due to refractory ascites, recurrent right pleural effusion, and initial HRS-MANUELITO that has since improved (s/p permacath removal 1/31). R empyema (as initially seen on CT chest on 1/17 and with pleural fluid culture with Citrobacter koseri) s/p R pigtail placement w/ IR (1/18) requiring several adjustments, he underwent R VATS ( Dr. Ramírez 1/28/2025) with decortication complicated by hemothorax. He was extubated on 1/30 and eventually had the last chest tube removed on (2/6). Listed for liver transplant now s/p DCD HCV + OLT 2/27/25 under Simulect induction. Post op altered mental status. CTH neg, CTC w/ loculated R pleural effusion s/p IR drainage (3/5) 1cc thick fluid sent for culture,  unable to aspirate additional fluid     Pt tolerated regular diet, had bowel function, had good pain control with minimal narcotics, and ambulated well with PT. He tolerated all of the new immunosuppression medication regimen.  Medication/diet education was provided regularly by Transplant Pharmacy and Nutrition teams. He was followed closely by our multidisciplinary transplant team and deemed stable for discharge home with the following plan:     [ ] DCD OLT  - FK 2/2, Myfortic 360BID, Pred 20QD  - ppx: Mepron, fluc, valcyte, oscal, PPI  - HCV viremia/ Genotype 1A (donor hcv +) -  Epclusa 3/1   - lasix 40QD PO  - ASA    [] R pleural effusion   - s/p thoracentesis with CT placement 1/18  - IR 1/24 new chest tube placed---replaced by CT Sx  - Thoracic: R robotic VATS with decortication 1/29, new chest tube placed  - IR thora attempt (3/5) minimal drainage --> Cx negative   - plan for VATS 3/17 with Thoracic Surgery   - ID following: Continue IV Ceftriaxone via Midline on d/c     [] Discharge planning  -          44M PMH decompensated ETOH cirrhosis c/b recurrent ascites, grade II esophageal varices, and hepatic encephalopathy, transferred from [facility] to [facility] on 1/16/25 due to refractory ascites, recurrent right pleural effusion, and initial HRS-MANUELITO that has since improved (s/p permacath removal 1/31). R empyema (as initially seen on CT chest on 1/17 and with pleural fluid culture with Citrobacter koseri) s/p R pigtail placement w/ IR (1/18) requiring several adjustments, he underwent R VATS ( Dr. Ramírez 1/28/2025) with decortication complicated by hemothorax. He was extubated on 1/30 and eventually had the last chest tube removed on (2/6). Listed for liver transplant now s/p DCD HCV + OLT 2/27/25 under Simulect induction. Post op altered mental status. CTH neg, CTC w/ loculated R pleural effusion s/p IR drainage (3/5) 1cc thick fluid sent for culture,  unable to aspirate additional fluid     Pt tolerated regular diet, had bowel function, had good pain control with minimal narcotics, and ambulated well with PT. He tolerated all of the new immunosuppression medication regimen.  Medication/diet education was provided regularly by Transplant Pharmacy and Nutrition teams. He was followed closely by our multidisciplinary transplant team and deemed stable for discharge home with the following plan:     [ ] DCD OLT  - FK 2/2, Myfortic 360BID, Pred 20QD  - ppx: Mepron, fluc, valcyte, oscal, PPI  - HCV viremia/ Genotype 1A (donor hcv +) -  Epclusa 3/1   - lasix 40QD PO  - ASA    [] R pleural effusion   - s/p thoracentesis with CT placement 1/18  - IR 1/24 new chest tube placed---replaced by CT Sx  - Thoracic: R robotic VATS with decortication 1/29, new chest tube placed  - IR thora attempt (3/5) minimal drainage --> Cx negative   - plan for VATS 3/17 with Thoracic Surgery   - ID following: Continue IV Ceftriaxone via Midline on d/c     [] Discharge planning  - Home with PT and RW  - Follow-up on Thursday with Transplant Surgeon's office  - VATS scheduled for 3/17         44M PMH decompensated ETOH cirrhosis c/b recurrent ascites, grade II esophageal varices, and hepatic encephalopathy, transferred from [facility] to [facility] on 1/16/25 with the following issues:    Refractory ascites:  - was on diuresis  - LVP 5.5L no SBP    Recurrent right pleural effusion:  -2/2 R empyema (as initially seen on CT chest on 1/17 and with pleural fluid culture with Citrobacter koseri), treated with Meropenem  -->s/p R pigtail placement w/ IR (1/18) requiring several adjustments  -->R VATS ( Dr. Ramírez 1/28/2025) with decortication complicated by hemothorax. He was extubated on 1/30 and eventually had the last chest tube removed on (2/6).     HRS/MANUELITO  -has since improved post transplant (s/p PermCath removal 1/31).     Listed for liver transplant on 2/10.  ====================================    Now s/p HCV + DCD OLT 2/27/25 under Simulect induction  -Post op altered mental status. CTH neg, resolved after 2-3 days. remains AAOx3 on d/c  -still required intermittent O2 post-op, CT chest w/ loculated R pleural effusion   -s/p IR drainage (3/5) of R chest, 20cc viscous fluid sent for culture,  unable to aspirate additional fluid. Negative so far.  -given continued R pleural effusion, on room air upon d/c, pt will undergo a R thoracotomy vs. VATS decortication on 3/17  -Received Meropenem-->CTX on 3/8.  Midline was placed on 3/10.  Will continue until OR VATS  -post-op ileus, self resolved. Now tolerating PO, ambulatory with PT/RN  -all drains, central lines and yates removed without issues.  -has otherwise good graft function with downtrending LFTs and good flow on dopplers.    [] Immunosuppression  - FK 2/2, Myfortic 360BID, Pred 20QD  - ppx: Mepron, fluc, valcyte, oscal, PPI, ASA  - HCV viremia/ Genotype 1A (donor hcv +) -  Epclusa 3/1   - lasix 40QD PO    [] DISPO  - will d/c home with CTX until OR with Thoracic Team  - VATS scheduled for 3/17, will be re-admitted afterwards  - F/u in the Transplant clinic on 3/19 (Thursday) for lab check         44M PMH decompensated ETOH cirrhosis c/b recurrent ascites, grade II esophageal varices, and hepatic encephalopathy, transferred from [facility] to [facility] on 1/16/25 with the following issues:    Refractory ascites:  - was on diuresis  - LVP 5.5L no SBP    Recurrent right pleural effusion:  -2/2 R empyema (as initially seen on CT chest on 1/17 and with pleural fluid culture with Citrobacter koseri), treated with Meropenem  -->s/p R pigtail placement w/ IR (1/18) requiring several adjustments  -->R VATS ( Dr. Ramírez 1/28/2025) with decortication complicated by hemothorax. He was extubated on 1/30 and eventually had the last chest tube removed on (2/6).     HRS/MANUELITO  -has since improved post transplant (s/p PermCath removal 1/31).     Listed for liver transplant on 2/10.  ====================================    Now s/p HCV + DCD OLT 2/27/25 under Simulect induction  -Post op altered mental status. CTH neg, resolved after 2-3 days. remains AAOx3 on d/c  -still required intermittent O2 post-op, CT chest w/ loculated R pleural effusion   -s/p IR drainage (3/5) of R chest, 20cc viscous fluid sent for culture,  unable to aspirate additional fluid. Negative so far.  -given continued R pleural effusion, on room air upon d/c, pt will undergo a R thoracotomy vs. VATS decortication on 3/17  -Received Meropenem-->CTX on 3/8.  Midline was placed on 3/10.  Will continue until OR VATS  -post-op ileus, self resolved. Now tolerating PO, ambulatory with PT/RN  -all drains, central lines and yates removed without issues.  -has otherwise good graft function with downtrending LFTs and good flow on dopplers.    [] Immunosuppression  - FK 2/2, Myfortic 360BID (diarrhea on full dose MMF), Pred 20QD  - ppx: Mepron, fluc, valcyte, oscal, PPI, ASA  - HCV viremia/ Genotype 1A (donor hcv +) -  Epclusa 3/1   - lasix 40QD PO    [] DISPO  - will d/c home with CTX until OR with Thoracic Team  - VATS scheduled for 3/17, will be re-admitted afterwards  - F/u in the Transplant clinic on 3/19 (Thursday) for lab check

## 2025-03-10 NOTE — DISCHARGE NOTE PROVIDER - NSDCCPCAREPLAN_GEN_ALL_CORE_FT
PRINCIPAL DISCHARGE DIAGNOSIS  Diagnosis: Liver transplant recipient  Assessment and Plan of Treatment: - Pain: You will have some pain after surgery. Take pain medication as prescribed. Avoid Aspirin, Motrin and Ibuprofen, unless intstructed by the team. You should NOT drive while on narcotic medications.   -Incision: You will have incision from the transplant surgery that will most often be held closed by staples until they heal, and will be removed in the transplant clinic.   The incision should be left open to air unless it's draining fluid; if it's draining, it shoud be covered by gauze and changed when gauze becomes wet. It is ok to shower with staples. Gently clense the area around your insciion with only soap and water and pat dry. Do NOT take bath until your incision is healed and you are cleared by your surgeon.    -Activity: avoid strenouous activity/excercise and heavy lifting for the first 6-8 weeks after surgery. You should not lift anything over 10 lbs. You should not drive until cleared by your transplant team. Avoid straining. Use stool softners as needed. Stop stool softners if diarrhea develops.   -Diet: Follow FOOD SAFETY instruction provided to you in your patient education guide bookelet   -Women:  using adequate contraception is highly recommended. In [facility], women who receive a transplant should not become pregnant for at least 18 months after transplant.   -Skin care: Transplant medication can increase your risk for skin cancer. Avoid extended exposure to the sun and wear SPH 30 sunscreen or higher. Visit a dermologist at least once a year.   -You should have an annual eye exam.   -You should never smoke.   -You should avoid ALL types of alcohol  Contact our Transplant clinic at [phone]  if you experience any of the following:   [] Fever of 100.3F (38C) or above  [] Surgical incision is bleeding, red or warm to touch or there's discharge from the incision  [] Worsening abdominal pain, nausea or vomiting  [] Shortness of breath  [] Diffuculty with urinating such as burning, frequency or urgency, blood in urine         SECONDARY DISCHARGE DIAGNOSES  Diagnosis: Immunosuppression  Assessment and Plan of Treatment: - Transplant recipients are at risk for developing infection because immune system is lowered due to transplant medications.   - Avoid contact with sick people; practice good hand hygiene;   - Take medications as directed by your transplant team. Never stop taking medications unless instructed by your transplant physician.  - Do NOT double up medication dose if you missed your dose; call the transplant office for instructions.    Diagnosis: Pleural effusion, right  Assessment and Plan of Treatment: Continue IV Ceftriaxone through midline as directed. You are planned for thoracotomy on 3/17/2025 with thoracic surgery

## 2025-03-10 NOTE — ADVANCED PRACTICE NURSE CONSULT - ASSESSMENT
Midline Catheter Insertion Note    Indication: long term antibiotics  Catheter type: 4F  : Bard  Power injectable: Yes  LOT# [id number]                                                                                                                                                                                                                     Time out was performed with Ingris Langston RN, confirming the patient's first and last name, date of birth, procedure, and correct site prior to state of procedure.    Patient was placed with HOB 30 degrees. A mask provided for patient. Patient placement site was prepped with chlorhexidine solution, then draped using maximum sterile barrier protection. The area was injected with 2 ml of 1% lidocaine. Using the Bard Site Rite 8, the catheter was placed using the Modified Seldinger Technique. Strict adherence to outline aseptic technique including handwashing, glove and gown, utilizing mask and cap, plus draping the patient with a sterile drape was observed. Upon completion of line placement, the insertion site was covered with a sterile occlusive CHG dressing. Pt tolerated procedure well.     Site: New  Anatomical Site of insertion: Left Basilic vein  Catheter Size: 4F, 20cm  US guided Bard single lumen power midline placed.    All materials used for catheter insertion, including the intact guide wires, were accounted for at the end of the procedure.  Number of attempts: 1  Complications/Comments: Echymosis noted prior to insertion on left under arm.  Emergency Placement: No    Post procedure verbal instructions given to the patient or patient representative. Patient or patient representative  instructed regarding signs and symptoms of infection. Patient instructed to keep dressing dry and clean. Care for catheter as per hospital protocols

## 2025-03-10 NOTE — PROGRESS NOTE ADULT - PROBLEM SELECTOR PROBLEM 1
Pleural effusion, right

## 2025-03-10 NOTE — PROGRESS NOTE ADULT - SUBJECTIVE AND OBJECTIVE BOX
Imported and hyperlinked mammogram.    Janine Ugarte MA - Panel Care Coordinator       Subjective " hello I need coordination of my team for a plan"      Vital Signs Last 24 Hrs  T(C): 36.4 (03-10-25 @ 08:09), Max: 36.8 (25 @ 20:52)  T(F): 97.5 (03-10-25 @ 08:09), Max: 98.2 (25 @ 20:52)  HR: 73 (03-10-25 @ 08:09) (62 - 82)  BP: 142/79 (03-10-25 @ 08:09) (140/74 - 164/87)  RR: 18 (03-10-25 @ 08:09) (16 - 18)  SpO2: 97% (03-10-25 @ 08:09) (97% - 100%)            @ 07:01  -  03-10 @ 07:00  --------------------------------------------------------  IN: 1320 mL / OUT: 3050 mL / NET: -1730 mL    03-10 @ 07:01  -  03-10 @ 09:26  --------------------------------------------------------  IN: 240 mL / OUT: 405 mL / NET: -165 mL                          9.8    4.11  )-----------( 130      ( 10 Mar 2025 06:16 )             30.5     03-10    140  |  107  |  34[H]  ----------------------------<  109[H]  5.1   |  24  |  0.72    Ca    9.0      10 Mar 2025 06:14  Phos  3.2     03-10  Mg     1.7     03-10    TPro  5.9[L]  /  Alb  2.7[L]  /  TBili  0.8  /  DBili  x   /  AST  10  /  ALT  8[L]  /  AlkPhos  49  03-10      MEDICATIONS  (STANDING):  aspirin enteric coated 81 milliGRAM(s) Oral daily  atovaquone  Suspension 1500 milliGRAM(s) Oral daily  carvedilol 3.125 milliGRAM(s) Oral every 12 hours  cefTRIAXone   IVPB 2000 milliGRAM(s) IV Intermittent every 24 hours  chlorhexidine 2% Cloths 1 Application(s) Topical daily  fluconAZOLE   Tablet 400 milliGRAM(s) Oral daily  furosemide    Tablet 40 milliGRAM(s) Oral daily  glucagon  Injectable 1 milliGRAM(s) IntraMuscular once  heparin   Injectable 5000 Unit(s) SubCutaneous every 12 hours  influenza   Vaccine 0.5 milliLiter(s) IntraMuscular once  insulin glargine Injectable (LANTUS) 6 Unit(s) SubCutaneous at bedtime  insulin lispro (ADMELOG) corrective regimen sliding scale   SubCutaneous three times a day before meals  insulin lispro (ADMELOG) corrective regimen sliding scale   SubCutaneous at bedtime  lidocaine 1% Injectable 1 Vial(s) Local Injection once  magnesium oxide 400 milliGRAM(s) Oral two times a day with meals  mycophenolic acid  milliGRAM(s) Oral <User Schedule>  pantoprazole    Tablet 40 milliGRAM(s) Oral before breakfast  predniSONE   Tablet 20 milliGRAM(s) Oral daily  sodium chloride 0.65% Nasal 1 Spray(s) Both Nostrils two times a day  sofosbuvir 400 mG/velpatasvir 100 mG (EPCLUSA) 1 Tablet(s) Oral at bedtime  thiamine 100 milliGRAM(s) Oral daily  valGANciclovir 450 milliGRAM(s) Oral daily    MEDICATIONS  (PRN):  albuterol/ipratropium for Nebulization 3 milliLiter(s) Nebulizer every 6 hours PRN Shortness of Breath  dextrose Oral Gel 15 Gram(s) Oral once PRN Blood Glucose LESS THAN 70 milliGRAM(s)/deciliter  melatonin 5 milliGRAM(s) Oral at bedtime PRN Insomnia  ondansetron Injectable 4 milliGRAM(s) IV Push every 6 hours PRN Nausea and/or Vomiting  simethicone 80 milliGRAM(s) Chew four times a day PRN Gas  traMADol 25 milliGRAM(s) Oral every 6 hours PRN Moderate Pain (4 - 6)  traMADol 50 milliGRAM(s) Oral every 8 hours PRN Severe Pain (7 - 10)    Daily Weight in k.4 (10 Mar 2025 05:01)      Bilirubin Total: 0.8 mg/dL (03-10 @ 06:14)    CAPILLARY BLOOD GLUCOSE      POCT Blood Glucose.: 163 mg/dL (10 Mar 2025 08:38)  POCT Blood Glucose.: 105 mg/dL (09 Mar 2025 20:56)  POCT Blood Glucose.: 179 mg/dL (09 Mar 2025 16:32)  POCT Blood Glucose.: 217 mg/dL (09 Mar 2025 12:20)  :                               PHYSICAL EXAM        Neurology: alert and oriented x 3, nonfocal, no gross deficits  OOB chair     CV :S1S2   Gastrointestinal: Soft abdomen, ND, appropriate incisional TTP. chevron incision c/d/i, staples in place. No signs of infection. JPx1 ss   Genitourinary: voiding    Lungs: easy breath on room air     Abdomen: soft, nontender, nondistended, positive bowel sounds, last bowel movement     :voids               Extremities:   warm well perfused equal strength throughout     B.lle+ DP                                           Discussed with Cardiothoracic Team at AM rounds.

## 2025-03-10 NOTE — DISCHARGE NOTE PROVIDER - NSDCFUSCHEDAPPT_GEN_ALL_CORE_FT
[facility]  [facility] [address]  Scheduled Appointment: 03/17/2025

## 2025-03-10 NOTE — DISCHARGE NOTE PROVIDER - PROVIDER TOKENS
PROVIDER:[TOKEN:[[id number]],SCHEDULEDAPPT:[03/17/2025]] PROVIDER:[TOKEN:[[id number]],SCHEDULEDAPPT:[03/17/2025]],PROVIDER:[TOKEN:[[id number]]]

## 2025-03-10 NOTE — DISCHARGE NOTE PROVIDER - CARE PROVIDER_API CALL
[facility]  Thoracic Surgery  [address] [place] [address], [address] [place] [address]  Phone: [phone]  Fax: [phone]  Scheduled Appointment: 03/17/2025   [facility]ody [facility]rez  Thoracic Surgery  [address] [place] [address], [address] [place] [address]  Phone: [phone]  Fax: [phone]  Scheduled Appointment: 03/17/2025    Carl Callaway  Surgery  [address] [place] [address]  Phone: [phone]  Fax: [phone]  Follow Up Time:

## 2025-03-10 NOTE — ADVANCED PRACTICE NURSE CONSULT - REASON FOR CONSULT
Vascular Access Team    Evaluation for: Bedside Midline placement  Requested by name: Dominique Cooley  Date/Time: 3/10@16:03    Indication: ceftriaxone  Allergy: salicylic acid, sulfa     Anticoagulants/ antiplatelets: asa, hep sq    Platelets(>20): 130  INR(<3): 0.94  eGFR(>40): 114  Blood cultures sent: n/a    Arm restrictions: no    Consent obtained: n/a    Comments: Bedside midline order evaluated. Please, call [phone] for the VAT RN with any questions.

## 2025-03-11 ENCOUNTER — TRANSCRIPTION ENCOUNTER (OUTPATIENT)
Age: 45
End: 2025-03-11

## 2025-03-11 VITALS
HEART RATE: 69 BPM | RESPIRATION RATE: 18 BRPM | DIASTOLIC BLOOD PRESSURE: 83 MMHG | SYSTOLIC BLOOD PRESSURE: 114 MMHG | OXYGEN SATURATION: 100 % | TEMPERATURE: 98 F

## 2025-03-11 LAB
ALBUMIN SERPL ELPH-MCNC: 2.6 G/DL — LOW (ref 3.3–5)
ALP SERPL-CCNC: 58 U/L — SIGNIFICANT CHANGE UP (ref 40–120)
ALT FLD-CCNC: 7 U/L — LOW (ref 10–45)
ANION GAP SERPL CALC-SCNC: 9 MMOL/L — SIGNIFICANT CHANGE UP (ref 5–17)
APTT BLD: 28.6 SEC — SIGNIFICANT CHANGE UP (ref 24.5–35.6)
AST SERPL-CCNC: 10 U/L — SIGNIFICANT CHANGE UP (ref 10–40)
BILIRUB SERPL-MCNC: 0.7 MG/DL — SIGNIFICANT CHANGE UP (ref 0.2–1.2)
BUN SERPL-MCNC: 38 MG/DL — HIGH (ref 7–23)
CALCIUM SERPL-MCNC: 8.8 MG/DL — SIGNIFICANT CHANGE UP (ref 8.4–10.5)
CHLORIDE SERPL-SCNC: 107 MMOL/L — SIGNIFICANT CHANGE UP (ref 96–108)
CO2 SERPL-SCNC: 23 MMOL/L — SIGNIFICANT CHANGE UP (ref 22–31)
CREAT SERPL-MCNC: 0.78 MG/DL — SIGNIFICANT CHANGE UP (ref 0.5–1.3)
EGFR: 111 ML/MIN/1.73M2 — SIGNIFICANT CHANGE UP
EGFR: 111 ML/MIN/1.73M2 — SIGNIFICANT CHANGE UP
GLUCOSE BLDC GLUCOMTR-MCNC: 113 MG/DL — HIGH (ref 70–99)
GLUCOSE BLDC GLUCOMTR-MCNC: 143 MG/DL — HIGH (ref 70–99)
GLUCOSE BLDC GLUCOMTR-MCNC: 175 MG/DL — HIGH (ref 70–99)
GLUCOSE SERPL-MCNC: 153 MG/DL — HIGH (ref 70–99)
HCT VFR BLD CALC: 28.2 % — LOW (ref 39–50)
HGB BLD-MCNC: 9.4 G/DL — LOW (ref 13–17)
INR BLD: 0.92 RATIO — SIGNIFICANT CHANGE UP (ref 0.85–1.16)
MAGNESIUM SERPL-MCNC: 1.7 MG/DL — SIGNIFICANT CHANGE UP (ref 1.6–2.6)
MCHC RBC-ENTMCNC: 31 PG — SIGNIFICANT CHANGE UP (ref 27–34)
MCHC RBC-ENTMCNC: 33.3 G/DL — SIGNIFICANT CHANGE UP (ref 32–36)
MCV RBC AUTO: 93.1 FL — SIGNIFICANT CHANGE UP (ref 80–100)
NRBC BLD AUTO-RTO: 0 /100 WBCS — SIGNIFICANT CHANGE UP (ref 0–0)
PHOSPHATE SERPL-MCNC: 3.5 MG/DL — SIGNIFICANT CHANGE UP (ref 2.5–4.5)
PLATELET # BLD AUTO: 154 K/UL — SIGNIFICANT CHANGE UP (ref 150–400)
POTASSIUM SERPL-MCNC: 5 MMOL/L — SIGNIFICANT CHANGE UP (ref 3.5–5.3)
POTASSIUM SERPL-SCNC: 5 MMOL/L — SIGNIFICANT CHANGE UP (ref 3.5–5.3)
PROT SERPL-MCNC: 5.8 G/DL — LOW (ref 6–8.3)
PROTHROM AB SERPL-ACNC: 10.6 SEC — SIGNIFICANT CHANGE UP (ref 9.9–13.4)
RBC # BLD: 3.03 M/UL — LOW (ref 4.2–5.8)
RBC # FLD: 18.3 % — HIGH (ref 10.3–14.5)
SODIUM SERPL-SCNC: 139 MMOL/L — SIGNIFICANT CHANGE UP (ref 135–145)
TACROLIMUS SERPL-MCNC: 6.7 NG/ML — SIGNIFICANT CHANGE UP
WBC # BLD: 4.31 K/UL — SIGNIFICANT CHANGE UP (ref 3.8–10.5)
WBC # FLD AUTO: 4.31 K/UL — SIGNIFICANT CHANGE UP (ref 3.8–10.5)

## 2025-03-11 PROCEDURE — 82570 ASSAY OF URINE CREATININE: CPT

## 2025-03-11 PROCEDURE — 87799 DETECT AGENT NOS DNA QUANT: CPT

## 2025-03-11 PROCEDURE — 89051 BODY FLUID CELL COUNT: CPT

## 2025-03-11 PROCEDURE — 82390 ASSAY OF CERULOPLASMIN: CPT

## 2025-03-11 PROCEDURE — P9100: CPT

## 2025-03-11 PROCEDURE — P9037: CPT

## 2025-03-11 PROCEDURE — 36430 TRANSFUSION BLD/BLD COMPNT: CPT

## 2025-03-11 PROCEDURE — 86891 AUTOLOGOUS BLOOD OP SALVAGE: CPT

## 2025-03-11 PROCEDURE — 83036 HEMOGLOBIN GLYCOSYLATED A1C: CPT

## 2025-03-11 PROCEDURE — 86480 TB TEST CELL IMMUN MEASURE: CPT

## 2025-03-11 PROCEDURE — C1751: CPT

## 2025-03-11 PROCEDURE — 94660 CPAP INITIATION&MGMT: CPT

## 2025-03-11 PROCEDURE — P9047: CPT

## 2025-03-11 PROCEDURE — 99261: CPT

## 2025-03-11 PROCEDURE — 87507 IADNA-DNA/RNA PROBE TQ 12-25: CPT

## 2025-03-11 PROCEDURE — P9073: CPT

## 2025-03-11 PROCEDURE — P9059: CPT

## 2025-03-11 PROCEDURE — 86665 EPSTEIN-BARR CAPSID VCA: CPT

## 2025-03-11 PROCEDURE — 82803 BLOOD GASES ANY COMBINATION: CPT

## 2025-03-11 PROCEDURE — 93975 VASCULAR STUDY: CPT

## 2025-03-11 PROCEDURE — C1887: CPT

## 2025-03-11 PROCEDURE — 87340 HEPATITIS B SURFACE AG IA: CPT

## 2025-03-11 PROCEDURE — 86735 MUMPS ANTIBODY: CPT

## 2025-03-11 PROCEDURE — 86663 EPSTEIN-BARR ANTIBODY: CPT

## 2025-03-11 PROCEDURE — 86708 HEPATITIS A ANTIBODY: CPT

## 2025-03-11 PROCEDURE — 80048 BASIC METABOLIC PNL TOTAL CA: CPT

## 2025-03-11 PROCEDURE — 86769 SARS-COV-2 COVID-19 ANTIBODY: CPT

## 2025-03-11 PROCEDURE — 87086 URINE CULTURE/COLONY COUNT: CPT

## 2025-03-11 PROCEDURE — 88112 CYTOPATH CELL ENHANCE TECH: CPT

## 2025-03-11 PROCEDURE — 87556 M.TUBERCULO DNA AMP PROBE: CPT

## 2025-03-11 PROCEDURE — 76705 ECHO EXAM OF ABDOMEN: CPT

## 2025-03-11 PROCEDURE — C9399: CPT

## 2025-03-11 PROCEDURE — 88304 TISSUE EXAM BY PATHOLOGIST: CPT

## 2025-03-11 PROCEDURE — 87641 MR-STAPH DNA AMP PROBE: CPT

## 2025-03-11 PROCEDURE — 82784 ASSAY IGA/IGD/IGG/IGM EACH: CPT

## 2025-03-11 PROCEDURE — 74176 CT ABD & PELVIS W/O CONTRAST: CPT | Mod: MC

## 2025-03-11 PROCEDURE — 80053 COMPREHEN METABOLIC PANEL: CPT

## 2025-03-11 PROCEDURE — 97168 OT RE-EVAL EST PLAN CARE: CPT

## 2025-03-11 PROCEDURE — 97110 THERAPEUTIC EXERCISES: CPT

## 2025-03-11 PROCEDURE — 87186 SC STD MICRODIL/AGAR DIL: CPT

## 2025-03-11 PROCEDURE — 81001 URINALYSIS AUTO W/SCOPE: CPT

## 2025-03-11 PROCEDURE — 86922 COMPATIBILITY TEST ANTIGLOB: CPT

## 2025-03-11 PROCEDURE — 74018 RADEX ABDOMEN 1 VIEW: CPT

## 2025-03-11 PROCEDURE — 93308 TTE F-UP OR LMTD: CPT

## 2025-03-11 PROCEDURE — 74183 MRI ABD W/O CNTR FLWD CNTR: CPT | Mod: MC

## 2025-03-11 PROCEDURE — P9045: CPT

## 2025-03-11 PROCEDURE — 88309 TISSUE EXAM BY PATHOLOGIST: CPT

## 2025-03-11 PROCEDURE — C1894: CPT

## 2025-03-11 PROCEDURE — 87116 MYCOBACTERIA CULTURE: CPT

## 2025-03-11 PROCEDURE — 82962 GLUCOSE BLOOD TEST: CPT

## 2025-03-11 PROCEDURE — 93454 CORONARY ARTERY ANGIO S&I: CPT

## 2025-03-11 PROCEDURE — 84145 PROCALCITONIN (PCT): CPT

## 2025-03-11 PROCEDURE — 88313 SPECIAL STAINS GROUP 2: CPT

## 2025-03-11 PROCEDURE — 86762 RUBELLA ANTIBODY: CPT

## 2025-03-11 PROCEDURE — 49083 ABD PARACENTESIS W/IMAGING: CPT

## 2025-03-11 PROCEDURE — C1769: CPT

## 2025-03-11 PROCEDURE — 86706 HEP B SURFACE ANTIBODY: CPT

## 2025-03-11 PROCEDURE — 86901 BLOOD TYPING SEROLOGIC RH(D): CPT

## 2025-03-11 PROCEDURE — 97165 OT EVAL LOW COMPLEX 30 MIN: CPT

## 2025-03-11 PROCEDURE — 84295 ASSAY OF SERUM SODIUM: CPT

## 2025-03-11 PROCEDURE — 86635 COCCIDIOIDES ANTIBODY: CPT

## 2025-03-11 PROCEDURE — 80076 HEPATIC FUNCTION PANEL: CPT

## 2025-03-11 PROCEDURE — 87637 SARSCOV2&INF A&B&RSV AMP PRB: CPT

## 2025-03-11 PROCEDURE — 87902 NFCT AGT GNTYP ALYS HEP C: CPT

## 2025-03-11 PROCEDURE — 86644 CMV ANTIBODY: CPT

## 2025-03-11 PROCEDURE — 84157 ASSAY OF PROTEIN OTHER: CPT

## 2025-03-11 PROCEDURE — 80061 LIPID PANEL: CPT

## 2025-03-11 PROCEDURE — 32557 INSERT CATH PLEURA W/ IMAGE: CPT

## 2025-03-11 PROCEDURE — 86717 LEISHMANIA ANTIBODY: CPT

## 2025-03-11 PROCEDURE — 36589 REMOVAL TUNNELED CV CATH: CPT

## 2025-03-11 PROCEDURE — 84100 ASSAY OF PHOSPHORUS: CPT

## 2025-03-11 PROCEDURE — 84156 ASSAY OF PROTEIN URINE: CPT

## 2025-03-11 PROCEDURE — S2900: CPT

## 2025-03-11 PROCEDURE — 85730 THROMBOPLASTIN TIME PARTIAL: CPT

## 2025-03-11 PROCEDURE — 87389 HIV-1 AG W/HIV-1&-2 AB AG IA: CPT

## 2025-03-11 PROCEDURE — 83540 ASSAY OF IRON: CPT

## 2025-03-11 PROCEDURE — 86709 HEPATITIS A IGM ANTIBODY: CPT

## 2025-03-11 PROCEDURE — 84132 ASSAY OF SERUM POTASSIUM: CPT

## 2025-03-11 PROCEDURE — 83735 ASSAY OF MAGNESIUM: CPT

## 2025-03-11 PROCEDURE — 86780 TREPONEMA PALLIDUM: CPT

## 2025-03-11 PROCEDURE — 86038 ANTINUCLEAR ANTIBODIES: CPT

## 2025-03-11 PROCEDURE — 87015 SPECIMEN INFECT AGNT CONCNTJ: CPT

## 2025-03-11 PROCEDURE — 86682 HELMINTH ANTIBODY: CPT

## 2025-03-11 PROCEDURE — 82330 ASSAY OF CALCIUM: CPT

## 2025-03-11 PROCEDURE — 86965 POOLING BLOOD PLATELETS: CPT

## 2025-03-11 PROCEDURE — P9040: CPT

## 2025-03-11 PROCEDURE — 85025 COMPLETE CBC W/AUTO DIFF WBC: CPT

## 2025-03-11 PROCEDURE — 94003 VENT MGMT INPAT SUBQ DAY: CPT

## 2025-03-11 PROCEDURE — 87522 HEPATITIS C REVRS TRNSCRPJ: CPT

## 2025-03-11 PROCEDURE — 84300 ASSAY OF URINE SODIUM: CPT

## 2025-03-11 PROCEDURE — 97164 PT RE-EVAL EST PLAN CARE: CPT

## 2025-03-11 PROCEDURE — 86787 VARICELLA-ZOSTER ANTIBODY: CPT

## 2025-03-11 PROCEDURE — 86777 TOXOPLASMA ANTIBODY: CPT

## 2025-03-11 PROCEDURE — 85014 HEMATOCRIT: CPT

## 2025-03-11 PROCEDURE — 97116 GAIT TRAINING THERAPY: CPT

## 2025-03-11 PROCEDURE — 80197 ASSAY OF TACROLIMUS: CPT

## 2025-03-11 PROCEDURE — C1729: CPT

## 2025-03-11 PROCEDURE — 84153 ASSAY OF PSA TOTAL: CPT

## 2025-03-11 PROCEDURE — 97162 PT EVAL MOD COMPLEX 30 MIN: CPT

## 2025-03-11 PROCEDURE — G0480: CPT

## 2025-03-11 PROCEDURE — 87150 DNA/RNA AMPLIFIED PROBE: CPT

## 2025-03-11 PROCEDURE — 86880 COOMBS TEST DIRECT: CPT

## 2025-03-11 PROCEDURE — 86696 HERPES SIMPLEX TYPE 2 TEST: CPT

## 2025-03-11 PROCEDURE — 70450 CT HEAD/BRAIN W/O DYE: CPT | Mod: MC

## 2025-03-11 PROCEDURE — 32555 ASPIRATE PLEURA W/ IMAGING: CPT

## 2025-03-11 PROCEDURE — 82042 OTHER SOURCE ALBUMIN QUAN EA: CPT

## 2025-03-11 PROCEDURE — 83605 ASSAY OF LACTIC ACID: CPT

## 2025-03-11 PROCEDURE — 85018 HEMOGLOBIN: CPT

## 2025-03-11 PROCEDURE — A9585: CPT

## 2025-03-11 PROCEDURE — 88305 TISSUE EXAM BY PATHOLOGIST: CPT

## 2025-03-11 PROCEDURE — 87521 HEPATITIS C PROBE&RVRS TRNSC: CPT

## 2025-03-11 PROCEDURE — 71250 CT THORAX DX C-: CPT | Mod: MC

## 2025-03-11 PROCEDURE — 85610 PROTHROMBIN TIME: CPT

## 2025-03-11 PROCEDURE — 93325 DOPPLER ECHO COLOR FLOW MAPG: CPT

## 2025-03-11 PROCEDURE — 94002 VENT MGMT INPAT INIT DAY: CPT

## 2025-03-11 PROCEDURE — 93356 MYOCRD STRAIN IMG SPCKL TRCK: CPT

## 2025-03-11 PROCEDURE — 83550 IRON BINDING TEST: CPT

## 2025-03-11 PROCEDURE — 82728 ASSAY OF FERRITIN: CPT

## 2025-03-11 PROCEDURE — 97530 THERAPEUTIC ACTIVITIES: CPT

## 2025-03-11 PROCEDURE — 88307 TISSUE EXAM BY PATHOLOGIST: CPT

## 2025-03-11 PROCEDURE — 87798 DETECT AGENT NOS DNA AMP: CPT

## 2025-03-11 PROCEDURE — 87077 CULTURE AEROBIC IDENTIFY: CPT

## 2025-03-11 PROCEDURE — 82247 BILIRUBIN TOTAL: CPT

## 2025-03-11 PROCEDURE — 71045 X-RAY EXAM CHEST 1 VIEW: CPT

## 2025-03-11 PROCEDURE — P9016: CPT

## 2025-03-11 PROCEDURE — 82977 ASSAY OF GGT: CPT

## 2025-03-11 PROCEDURE — 87640 STAPH A DNA AMP PROBE: CPT

## 2025-03-11 PROCEDURE — 86695 HERPES SIMPLEX TYPE 1 TEST: CPT

## 2025-03-11 PROCEDURE — 86900 BLOOD TYPING SEROLOGIC ABO: CPT

## 2025-03-11 PROCEDURE — 93321 DOPPLER ECHO F-UP/LMTD STD: CPT

## 2025-03-11 PROCEDURE — 82105 ALPHA-FETOPROTEIN SERUM: CPT

## 2025-03-11 PROCEDURE — 86381 MITOCHONDRIAL ANTIBODY EACH: CPT

## 2025-03-11 PROCEDURE — 87075 CULTR BACTERIA EXCEPT BLOOD: CPT

## 2025-03-11 PROCEDURE — 82565 ASSAY OF CREATININE: CPT

## 2025-03-11 PROCEDURE — 83935 ASSAY OF URINE OSMOLALITY: CPT

## 2025-03-11 PROCEDURE — 94640 AIRWAY INHALATION TREATMENT: CPT

## 2025-03-11 PROCEDURE — 97535 SELF CARE MNGMENT TRAINING: CPT

## 2025-03-11 PROCEDURE — 86803 HEPATITIS C AB TEST: CPT

## 2025-03-11 PROCEDURE — 86850 RBC ANTIBODY SCREEN: CPT

## 2025-03-11 PROCEDURE — 86664 EPSTEIN-BARR NUCLEAR ANTIGEN: CPT

## 2025-03-11 PROCEDURE — 82945 GLUCOSE OTHER FLUID: CPT

## 2025-03-11 PROCEDURE — 93005 ELECTROCARDIOGRAM TRACING: CPT

## 2025-03-11 PROCEDURE — 82435 ASSAY OF BLOOD CHLORIDE: CPT

## 2025-03-11 PROCEDURE — 87070 CULTURE OTHR SPECIMN AEROBIC: CPT

## 2025-03-11 PROCEDURE — 86704 HEP B CORE ANTIBODY TOTAL: CPT

## 2025-03-11 PROCEDURE — 83615 LACTATE (LD) (LDH) ENZYME: CPT

## 2025-03-11 PROCEDURE — 87102 FUNGUS ISOLATION CULTURE: CPT

## 2025-03-11 PROCEDURE — 86255 FLUORESCENT ANTIBODY SCREEN: CPT

## 2025-03-11 PROCEDURE — 84133 ASSAY OF URINE POTASSIUM: CPT

## 2025-03-11 PROCEDURE — 84540 ASSAY OF URINE/UREA-N: CPT

## 2025-03-11 PROCEDURE — 85396 CLOTTING ASSAY WHOLE BLOOD: CPT

## 2025-03-11 PROCEDURE — P9012: CPT

## 2025-03-11 PROCEDURE — C1889: CPT

## 2025-03-11 PROCEDURE — 87206 SMEAR FLUORESCENT/ACID STAI: CPT

## 2025-03-11 PROCEDURE — 87205 SMEAR GRAM STAIN: CPT

## 2025-03-11 PROCEDURE — 87040 BLOOD CULTURE FOR BACTERIA: CPT

## 2025-03-11 PROCEDURE — 85384 FIBRINOGEN ACTIVITY: CPT

## 2025-03-11 PROCEDURE — 36415 COLL VENOUS BLD VENIPUNCTURE: CPT

## 2025-03-11 PROCEDURE — 82947 ASSAY GLUCOSE BLOOD QUANT: CPT

## 2025-03-11 PROCEDURE — 85027 COMPLETE CBC AUTOMATED: CPT

## 2025-03-11 PROCEDURE — 86765 RUBEOLA ANTIBODY: CPT

## 2025-03-11 PROCEDURE — 82140 ASSAY OF AMMONIA: CPT

## 2025-03-11 PROCEDURE — 84443 ASSAY THYROID STIM HORMONE: CPT

## 2025-03-11 PROCEDURE — 36569 INSJ PICC 5 YR+ W/O IMAGING: CPT

## 2025-03-11 RX ORDER — PREDNISONE 20 MG/1
1 TABLET ORAL
Qty: 0 | Refills: 0 | DISCHARGE
Start: 2025-03-11

## 2025-03-11 RX ORDER — ASPIRIN 325 MG
1 TABLET ORAL
Qty: 0 | Refills: 0 | DISCHARGE
Start: 2025-03-11

## 2025-03-11 RX ORDER — VELPATASVIR AND SOFOSBUVIR 50; 200 MG/1; MG/1
1 TABLET, FILM COATED ORAL
Qty: 0 | Refills: 0 | DISCHARGE
Start: 2025-03-11

## 2025-03-11 RX ORDER — HEPARIN SODIUM 1000 [USP'U]/ML
5000 INJECTION INTRAVENOUS; SUBCUTANEOUS EVERY 12 HOURS
Refills: 0 | Status: DISCONTINUED | OUTPATIENT
Start: 2025-03-11 | End: 2025-03-11

## 2025-03-11 RX ORDER — MAGNESIUM OXIDE 400 MG
1 TABLET ORAL
Qty: 0 | Refills: 0 | DISCHARGE
Start: 2025-03-11

## 2025-03-11 RX ORDER — TACROLIMUS 0.5 MG/1
2 CAPSULE ORAL
Qty: 0 | Refills: 0 | DISCHARGE
Start: 2025-03-11

## 2025-03-11 RX ORDER — ATOVAQUONE 750 MG/5ML
10 SUSPENSION ORAL
Qty: 0 | Refills: 0 | DISCHARGE
Start: 2025-03-11

## 2025-03-11 RX ORDER — VALGANCICLOVIR 450 MG/1
1 TABLET, FILM COATED ORAL
Qty: 0 | Refills: 0 | DISCHARGE
Start: 2025-03-11

## 2025-03-11 RX ORDER — IPRATROPIUM BROMIDE AND ALBUTEROL SULFATE .5; 2.5 MG/3ML; MG/3ML
3 SOLUTION RESPIRATORY (INHALATION)
Qty: 0 | Refills: 0 | DISCHARGE
Start: 2025-03-11

## 2025-03-11 RX ORDER — FUROSEMIDE 10 MG/ML
1 INJECTION INTRAMUSCULAR; INTRAVENOUS
Qty: 0 | Refills: 0 | DISCHARGE
Start: 2025-03-11

## 2025-03-11 RX ORDER — TRAMADOL HYDROCHLORIDE 50 MG/1
1 TABLET, FILM COATED ORAL
Qty: 15 | Refills: 0
Start: 2025-03-11 | End: 2025-03-15

## 2025-03-11 RX ORDER — INSULIN GLARGINE-YFGN 100 [IU]/ML
6 INJECTION, SOLUTION SUBCUTANEOUS
Qty: 0 | Refills: 0 | DISCHARGE
Start: 2025-03-11

## 2025-03-11 RX ORDER — FUROSEMIDE 10 MG/ML
1 INJECTION INTRAMUSCULAR; INTRAVENOUS
Qty: 30 | Refills: 11
Start: 2025-03-11 | End: 2026-03-05

## 2025-03-11 RX ORDER — MAGNESIUM SULFATE 500 MG/ML
2 SYRINGE (ML) INJECTION ONCE
Refills: 0 | Status: COMPLETED | OUTPATIENT
Start: 2025-03-11 | End: 2025-03-11

## 2025-03-11 RX ORDER — CARVEDILOL 3.12 MG/1
1 TABLET, FILM COATED ORAL
Qty: 0 | Refills: 0 | DISCHARGE
Start: 2025-03-11

## 2025-03-11 RX ORDER — MYCOPHENOLIC ACID 360 MG/1
1 TABLET, DELAYED RELEASE ORAL
Qty: 0 | Refills: 0 | DISCHARGE
Start: 2025-03-11

## 2025-03-11 RX ORDER — FLUCONAZOLE 150 MG
2 TABLET ORAL
Qty: 4 | Refills: 0
Start: 2025-03-11 | End: 2025-03-12

## 2025-03-11 RX ADMIN — Medication 40 MILLIGRAM(S): at 05:00

## 2025-03-11 RX ADMIN — Medication 400 MILLIGRAM(S): at 07:58

## 2025-03-11 RX ADMIN — CARVEDILOL 3.12 MILLIGRAM(S): 3.12 TABLET, FILM COATED ORAL at 05:01

## 2025-03-11 RX ADMIN — Medication 1 APPLICATION(S): at 12:04

## 2025-03-11 RX ADMIN — Medication 81 MILLIGRAM(S): at 11:51

## 2025-03-11 RX ADMIN — INSULIN LISPRO 2: 100 INJECTION, SOLUTION INTRAVENOUS; SUBCUTANEOUS at 12:51

## 2025-03-11 RX ADMIN — TRAMADOL HYDROCHLORIDE 50 MILLIGRAM(S): 50 TABLET, FILM COATED ORAL at 00:24

## 2025-03-11 RX ADMIN — FUROSEMIDE 40 MILLIGRAM(S): 10 INJECTION INTRAMUSCULAR; INTRAVENOUS at 05:01

## 2025-03-11 RX ADMIN — Medication 100 MILLIGRAM(S): at 11:50

## 2025-03-11 RX ADMIN — CEFTRIAXONE 100 MILLIGRAM(S): 500 INJECTION, POWDER, FOR SOLUTION INTRAMUSCULAR; INTRAVENOUS at 05:13

## 2025-03-11 RX ADMIN — TACROLIMUS 2 MILLIGRAM(S): 0.5 CAPSULE ORAL at 07:59

## 2025-03-11 RX ADMIN — TRAMADOL HYDROCHLORIDE 50 MILLIGRAM(S): 50 TABLET, FILM COATED ORAL at 17:58

## 2025-03-11 RX ADMIN — Medication 25 GRAM(S): at 11:51

## 2025-03-11 RX ADMIN — HEPARIN SODIUM 5000 UNIT(S): 1000 INJECTION INTRAVENOUS; SUBCUTANEOUS at 05:01

## 2025-03-11 RX ADMIN — ATOVAQUONE 1500 MILLIGRAM(S): 750 SUSPENSION ORAL at 12:29

## 2025-03-11 RX ADMIN — Medication 400 MILLIGRAM(S): at 11:50

## 2025-03-11 RX ADMIN — VALGANCICLOVIR 450 MILLIGRAM(S): 450 TABLET, FILM COATED ORAL at 11:50

## 2025-03-11 RX ADMIN — PREDNISONE 20 MILLIGRAM(S): 20 TABLET ORAL at 05:01

## 2025-03-11 RX ADMIN — Medication 1 SPRAY(S): at 05:01

## 2025-03-11 RX ADMIN — MYCOPHENOLIC ACID 360 MILLIGRAM(S): 360 TABLET, DELAYED RELEASE ORAL at 07:59

## 2025-03-11 NOTE — PROGRESS NOTE ADULT - SUBJECTIVE AND OBJECTIVE BOX
Transplant Surgery - Multidisciplinary Progress Note  --------------------------------------------------------------  DCD OLT     Date:  2/27/25       POD#12  CMV -/+    Present:   Patient seen and examined with multidisciplinary Transplant team including Surgeon Dr. Vanessa, Dr. Hinojosa, Hepatologist Dr. Cantor ACP: ESTRELLA Gagnon/Colten, Pharmacist, and bedside RN during AM rounds.   Disciplines not in attendance will be notified of the plan    HPI: 44M PMH decompensated ETOH cirrhosis c/b recurrent ascites, grade II esophageal varices, and hepatic encephalopathy, who presented to OSH with abd pain and distension found to have decompensated EtOH cirrhosis with MELD 30, MANUELITO (HRS vs ATN; previous HD need with [facility]) found to have R empyema s/p R pigtail placement w/ IR 1/18 requiring several adjustments, s/p R robotic VATS with decortication (Dr. Ramírez 1/28/2025) for loculated empyema. OR course with oropharyngeal bleeding during intubation, coagulopathy/elevated EBL. Admitted for transplant evaluation and found to have reaccumulation of R pleural effusion, now s/p IR drainage and s/p DCD OLTx.     Interval Events:            Immunosuppression  Induction: Simulect completed  Maintenance: FK per level, Myfortic, Pred 20QD  Ongoing monitoring for signs of rejection     Potential Discharge date: TBD  Education:  Medications  Plan of care:  See Below                                          Review of systems  All other systems were reviewed and are negative, except as noted.      PHYSICAL EXAM:  Constitutional: Well developed / well nourished  Eyes: PERRLA  ENMT: nc/at, no thrush  Neck: supple  Respiratory: CTA B/L  Cardiovascular: RRR  Gastrointestinal: Soft abdomen, ND, appropriate incisional TTP. chevron incision c/d/i, staples in place. No signs of infection. JPx1 ss   Genitourinary: voiding  Extremities: SCD's in place and working bilaterally, LE edema improving daily on lasix  Vascular: Palpable dp pulses bilaterally.   Neurological: A&O x3  Skin: no rashes, ulcerations, lesions  Musculoskeletal: Moving all extremities     Transplant Surgery - Multidisciplinary Progress Note  --------------------------------------------------------------  DCD OLT     Date:  2/27/25       POD#12  CMV -/+    Present:   Patient seen and examined with multidisciplinary Transplant team including Surgeon Dr. Vanessa, Dr. Hinojosa, Hepatologist Dr. Cantor ACP: ESTRELLA Gagnon/Colten, Pharmacist, and bedside RN during AM rounds.   Disciplines not in attendance will be notified of the plan    HPI: 44M PMH decompensated ETOH cirrhosis c/b recurrent ascites, grade II esophageal varices, and hepatic encephalopathy, who presented to OSH with abd pain and distension found to have decompensated EtOH cirrhosis with MELD 30, MANUELITO (HRS vs ATN; previous HD need with [facility]) found to have R empyema s/p R pigtail placement w/ IR 1/18 requiring several adjustments, s/p R robotic VATS with decortication (Dr. Ramírez 1/28/2025) for loculated empyema. OR course with oropharyngeal bleeding during intubation, coagulopathy/elevated EBL. Admitted for transplant evaluation and found to have reaccumulation of R pleural effusion, now s/p IR drainage and s/p DCD OLTx.     Interval Events:  - LFTs improving  - d/c'd ADAM #2  - d/c planning with plan for VATS w/ thoracic on 3/17          Immunosuppression  Induction: Simulect completed  Maintenance: FK per level, Myfortic, Pred 20QD  Ongoing monitoring for signs of rejection     Potential Discharge date: TBD  Education:  Medications  Plan of care:  See Below                                          Review of systems  All other systems were reviewed and are negative, except as noted.      PHYSICAL EXAM:  Constitutional: Well developed / well nourished  Eyes: PERRLA  ENMT: nc/at, no thrush  Neck: supple  Respiratory: CTA B/L  Cardiovascular: RRR  Gastrointestinal: Soft abdomen, ND, appropriate incisional TTP. chevron incision c/d/i, staples in place. No signs of infection. JPx1 ss   Genitourinary: voiding  Extremities: SCD's in place and working bilaterally, LE edema improving daily on lasix  Vascular: Palpable dp pulses bilaterally.   Neurological: A&O x3  Skin: no rashes, ulcerations, lesions  Musculoskeletal: Moving all extremities     Transplant Surgery - Multidisciplinary Progress Note  --------------------------------------------------------------  DCD OLT     Date:  2/27/25       POD#12  CMV -/+    Present:   Patient seen and examined with multidisciplinary Transplant team including Surgeon Dr. Vanessa,  Hepatologist Dr. Cantor, ESTRELLA Abel, Pharmacist: Yasmin and bedside RN during AM rounds.   Disciplines not in attendance will be notified of the plan    HPI: 44M PMH decompensated ETOH cirrhosis c/b recurrent ascites, grade II esophageal varices, and hepatic encephalopathy, who presented to OSH with abd pain and distension found to have decompensated EtOH cirrhosis with MELD 30, MANUELITO (HRS vs ATN; previous HD need with [facility]) found to have R empyema s/p R pigtail placement w/ IR 1/18 requiring several adjustments, s/p R robotic VATS with decortication (Dr. Ramírez 1/28/2025) for loculated empyema. OR course with oropharyngeal bleeding during intubation, coagulopathy/elevated EBL. Admitted for transplant evaluation and found to have reaccumulation of R pleural effusion, now s/p IR drainage and s/p DCD OLTx.     Interval Events:  -Afebrile, VSS on RA  -good graft function  - no o/n events        Immunosuppression  Induction: Simulect completed  Maintenance: FK per level, Myfortic, Pred 20QD  Ongoing monitoring for signs of rejection     Potential Discharge date: today  Education:  Medications  Plan of care:  See Below                      MEDICATIONS  (STANDING):  aspirin enteric coated 81 milliGRAM(s) Oral daily  atovaquone  Suspension 1500 milliGRAM(s) Oral daily  carvedilol 3.125 milliGRAM(s) Oral every 12 hours  cefTRIAXone   IVPB 2000 milliGRAM(s) IV Intermittent every 24 hours  chlorhexidine 2% Cloths 1 Application(s) Topical daily  dextrose 5%. 1000 milliLiter(s) (100 mL/Hr) IV Continuous <Continuous>  dextrose 5%. 1000 milliLiter(s) (50 mL/Hr) IV Continuous <Continuous>  dextrose 50% Injectable 25 Gram(s) IV Push once  dextrose 50% Injectable 12.5 Gram(s) IV Push once  dextrose 50% Injectable 25 Gram(s) IV Push once  fluconAZOLE   Tablet 400 milliGRAM(s) Oral daily  furosemide    Tablet 40 milliGRAM(s) Oral daily  glucagon  Injectable 1 milliGRAM(s) IntraMuscular once  heparin   Injectable 5000 Unit(s) SubCutaneous every 12 hours  influenza   Vaccine 0.5 milliLiter(s) IntraMuscular once  insulin glargine Injectable (LANTUS) 6 Unit(s) SubCutaneous at bedtime  insulin lispro (ADMELOG) corrective regimen sliding scale   SubCutaneous three times a day before meals  insulin lispro (ADMELOG) corrective regimen sliding scale   SubCutaneous at bedtime  lidocaine 1% Injectable 1 Vial(s) Local Injection once  magnesium oxide 400 milliGRAM(s) Oral two times a day with meals  mycophenolic acid  milliGRAM(s) Oral <User Schedule>  pantoprazole    Tablet 40 milliGRAM(s) Oral before breakfast  predniSONE   Tablet 20 milliGRAM(s) Oral daily  sodium chloride 0.65% Nasal 1 Spray(s) Both Nostrils two times a day  sofosbuvir 400 mG/velpatasvir 100 mG (EPCLUSA) 1 Tablet(s) Oral at bedtime  tacrolimus 2 milliGRAM(s) Oral <User Schedule>  thiamine 100 milliGRAM(s) Oral daily  valGANciclovir 450 milliGRAM(s) Oral daily    MEDICATIONS  (PRN):  albuterol/ipratropium for Nebulization 3 milliLiter(s) Nebulizer every 6 hours PRN Shortness of Breath  dextrose Oral Gel 15 Gram(s) Oral once PRN Blood Glucose LESS THAN 70 milliGRAM(s)/deciliter  melatonin 5 milliGRAM(s) Oral at bedtime PRN Insomnia  ondansetron Injectable 4 milliGRAM(s) IV Push every 6 hours PRN Nausea and/or Vomiting  simethicone 80 milliGRAM(s) Chew four times a day PRN Gas  traMADol 25 milliGRAM(s) Oral every 6 hours PRN Moderate Pain (4 - 6)  traMADol 50 milliGRAM(s) Oral every 8 hours PRN Severe Pain (7 - 10)      PAST MEDICAL & SURGICAL HISTORY:  Sleep apnea, obstructive      Alcohol abuse      Cirrhosis of liver      Portal hypertension      H/O esophageal varices      Anemia      Mild alcohol use disorder      No significant past surgical history          Vital Signs Last 24 Hrs  T(C): 36.7 (11 Mar 2025 13:02), Max: 36.8 (11 Mar 2025 09:07)  T(F): 98 (11 Mar 2025 13:02), Max: 98.3 (11 Mar 2025 09:07)  HR: 69 (11 Mar 2025 13:02) (69 - 85)  BP: 114/83 (11 Mar 2025 13:02) (114/83 - 160/89)  BP(mean): 103 (11 Mar 2025 04:52) (98 - 103)  RR: 18 (11 Mar 2025 13:02) (18 - 18)  SpO2: 100% (11 Mar 2025 13:02) (100% - 100%)    Parameters below as of 11 Mar 2025 13:02  Patient On (Oxygen Delivery Method): room air        I&O's Summary    10 Mar 2025 07:01  -  11 Mar 2025 07:00  --------------------------------------------------------  IN: 960 mL / OUT: 3130 mL / NET: -2170 mL    11 Mar 2025 07:01  -  11 Mar 2025 14:36  --------------------------------------------------------  IN: 360 mL / OUT: 600 mL / NET: -240 mL                              9.4    4.31  )-----------( 154      ( 11 Mar 2025 06:59 )             28.2     03-11    139  |  107  |  38[H]  ----------------------------<  153[H]  5.0   |  23  |  0.78    Ca    8.8      11 Mar 2025 06:58  Phos  3.5     03-11  Mg     1.7     03-11    TPro  5.8[L]  /  Alb  2.6[L]  /  TBili  0.7  /  DBili  x   /  AST  10  /  ALT  7[L]  /  AlkPhos  58  03-11    Tacrolimus (), Serum: 6.7 ng/mL (03-11 @ 07:00)        Culture - Fungal, Body Fluid (collected 03-05-25 @ 19:24)  Source: Peritoneal Peritoneal Fluid  Preliminary Report (03-09-25 @ 08:03):    No growth    Culture - Body Fluid with Gram Stain (collected 03-05-25 @ 19:24)  Source: Pleural Fl Pleural Fluid  Gram Stain (03-06-25 @ 00:57):    No polymorphonuclear cells seen    No organisms seen    by cytocentrifuge  Final Report (03-10-25 @ 20:23):    No growth at 5 days                                  Review of systems  All other systems were reviewed and are negative, except as noted.      PHYSICAL EXAM:  Constitutional: Well developed / well nourished  Eyes: PERRLA  ENMT: nc/at, no thrush  Neck: supple  Respiratory: slight decreased BS on RLL, good airway movement, no adventitious sounds. L ok.  Cardiovascular: RRR  Gastrointestinal: Soft abdomen, ND, appropriate incisional TTP. chevron incision c/d/i, staples in place. No signs of infection.   Genitourinary: voiding  Extremities: SCD's in place and working bilaterally, LE edema improving daily on lasix  Vascular: Palpable dp pulses bilaterally.   Neurological: A&O x3  Skin: no rashes, ulcerations, lesions  Musculoskeletal: Moving all extremities

## 2025-03-11 NOTE — PHARMACY EDUCATION NOTE - EDUCATION SUMMARY
Discharge immunosuppressant medications and prophylatic anti-infective agents reviewed with the patient. Outpatient medication schedule was discussed in detail including: medication name, indication, dose, administration times, treatment duration, side effects, drug interactions, and special instructions. MMF REMS discussed. Patient questions and concerns were answered and addressed. Patient demonstrated understanding. Stent education was provided to patient.

## 2025-03-11 NOTE — PROGRESS NOTE ADULT - ASSESSMENT
43yo M with Hx decompensated EtOH cirrhosis c/b recurrent ascites, grade II EV, and HE who presented to OSH for abd pain and distension, found to have ascites and MANUELITO requiring HD. Patient was transferred to [facility] for management and liver transplant evaluation. His hospital course has been c/b an empyema s/p VATS decortication on 1/28 with Dr. Ramírez. Postoperative course has been c/b recurrent R pleural effusion s/p IR drainage. Admitted for transplant evaluation, now s/p OLT on 2/27.     [ ] DCD OLT (POD #12)  - good graft function  - HCV viremia/ Genotype 1A (donor hcv +) -  Epclusa 3/1   - Strict I/O's: remove ADAM #2  - lasix 40QD PO  - SQH, ASA  - Pain control  - bowel regimen as able  - PT/SCD/IS    [] Immuno  - simulect completed  - FK per level,  Myfortic 360BID, Pred 20QD  - ppx: no bactrim due to sulfa allergy -> Mepron, fluc, valcyte, oscal, PPI    [] Ileus   - resolved   - avoid narcotics    [] Worsening MS--resolved  - bld cxs with NGTD  - CTH neg, CT chest with Loculated R pleural effusion  - IR thora 3/5 minimal drainage, f/u cultures  - plan for VATS 3/17 with Thoracic  - ID following: Meropenem-->CTX daksha VATS   45yo M with Hx decompensated EtOH cirrhosis c/b recurrent ascites, grade II EV, and HE who presented to OSH for abd pain and distension, found to have ascites and MANUELITO requiring HD. Patient was transferred to [facility] for management and liver transplant evaluation. His hospital course has been c/b an empyema s/p VATS decortication on 1/28 with Dr. Ramírez. Postoperative course has been c/b recurrent R pleural effusion s/p IR drainage. Admitted for transplant evaluation, now s/p OLT on 2/27.     [ ] DCD OLT (POD #12)  - good graft function  - HCV viremia/ Genotype 1A (donor hcv +) -  Epclusa 3/1   - Strict I/O's  - lasix 40QD PO  - SQH, ASA  - Pain control  - bowel regimen as able  - PT/SCD/IS    [] Immuno  - simulect completed  - FK per level,  Myfortic 360BID, Pred 20QD  - ppx: no bactrim due to sulfa allergy -> Mepron, fluc, valcyte, oscal, PPI    [] Ileus   - resolved   - avoid narcotics    [] Worsening MS--resolved  - bld cxs with NGTD  - CTH neg, CT chest with Loculated R pleural effusion  - IR thora 3/5 minimal drainage, f/u cultures  - plan for VATS 3/17 with Thoracic  - ID following: Meropenem-->CTX daksha VATS   45yo M with Hx decompensated EtOH cirrhosis c/b recurrent ascites, grade II EV, and HE who presented to OSH for abd pain and distension, found to have ascites and MANUELITO requiring HD. Patient was transferred to [facility] for management and liver transplant evaluation. His hospital course has been c/b an empyema s/p VATS decortication on 1/28 with Dr. Ramírez. Postoperative course has been c/b recurrent R pleural effusion s/p IR drainage. Admitted for transplant evaluation, now s/p OLT on 2/27.     [ ] DCD OLT (POD #12)  - good graft function  - HCV viremia/ Genotype 1A (donor hcv +) -  Epclusa 3/1   - Strict I/O's  - lasix 40QD PO  - SQH, ASA  - Pain control  - bowel regimen as able  - PT/SCD/IS    [] Immuno  - simulect completed  - FK 2/2,  Myfortic 360BID, Pred 20QD  - ppx: no bactrim due to sulfa allergy -> Mepron, fluc, valcyte, oscal, PPI    [] Ileus   - resolved   - avoid narcotics    [] Worsening MS--resolved  [] R pleural effusion since pre-op  - bld cxs with NGTD  - CTH neg, CT chest post-op  with continued Loculated R pleural effusion  - IR thoracentesis 3/5 minimal drainage, cultures negative  - plan for VATS 3/17 with Thoracic  - ID following: Meropenem-->CTX daksha VATS     [] DISPO  - d/c home today  - will obtain [facility] on Thursday  - will return for VATS with Dr. Restrepo on 3/17 and be re-admitted afterwards for observation  - CTX will continue on d/c daksha-op, via midline placed 3/10

## 2025-03-11 NOTE — DISCHARGE NOTE NURSING/CASE MANAGEMENT/SOCIAL WORK - PATIENT PORTAL LINK FT
You can access the FollowMyHealth Patient Portal offered by [facility] by registering at the following website: [email or web address]. By joining [facility]’s FollowMyHealth portal, you will also be able to view your health information using other applications (apps) compatible with our system.

## 2025-03-11 NOTE — DISCHARGE NOTE NURSING/CASE MANAGEMENT/SOCIAL WORK - FINANCIAL ASSISTANCE
[facility] provides services at a reduced cost to those who are determined to be eligible through [facility]’s financial assistance program. Information regarding [facility]’s financial assistance program can be found by going to [email or web address] or by calling [phone].

## 2025-03-11 NOTE — PROGRESS NOTE ADULT - REASON FOR ADMISSION
decompensated cirrhosis
decompensated cirrhosis  VATS
decompensated cirrhosis

## 2025-03-11 NOTE — PROGRESS NOTE ADULT - NUTRITIONAL ASSESSMENT
This patient has been assessed with a concern for Malnutrition and has been determined to have a diagnosis/diagnoses of Severe protein-calorie malnutrition.    This patient is being managed with:   Diet Consistent Carbohydrate w/Evening Snack-  Entered: Feb 28 2025  4:50PM  
This patient has been assessed with a concern for Malnutrition and has been determined to have a diagnosis/diagnoses of Severe protein-calorie malnutrition.    This patient is being managed with:   Diet NPO-  Except Medications  Entered: Feb 27 2025  7:05PM  
This patient has been assessed with a concern for Malnutrition and has been determined to have a diagnosis/diagnoses of Severe protein-calorie malnutrition.    This patient is being managed with:   Diet Clear Liquid-  Entered: Mar  3 2025 11:41AM  
This patient has been assessed with a concern for Malnutrition and has been determined to have a diagnosis/diagnoses of Severe protein-calorie malnutrition.    This patient is being managed with:   Diet Consistent Carbohydrate w/Evening Snack-  Entered: Mar  4 2025 10:42AM  
This patient has been assessed with a concern for Malnutrition and has been determined to have a diagnosis/diagnoses of Severe protein-calorie malnutrition.    This patient is being managed with:   Diet Consistent Carbohydrate w/Evening Snack-  Supplement Feeding Modality:  Oral  Ensure Max Cans or Servings Per Day:  1       Frequency:  Two Times a day  Entered: Mar  5 2025  2:34AM  
This patient has been assessed with a concern for Malnutrition and has been determined to have a diagnosis/diagnoses of Severe protein-calorie malnutrition.    This patient is being managed with:   Diet Consistent Carbohydrate w/Evening Snack-  Supplement Feeding Modality:  Oral  Ensure Max Cans or Servings Per Day:  1       Frequency:  Two Times a day  Entered: Mar  5 2025  2:34AM  
This patient has been assessed with a concern for Malnutrition and has been determined to have a diagnosis/diagnoses of Severe protein-calorie malnutrition.    This patient is being managed with:   Diet NPO after Midnight-     NPO Start Date: 05-Mar-2025   NPO Start Time: 23:59  Entered: Mar  5 2025  7:13PM    Diet NPO after Midnight-     NPO Start Date: 05-Mar-2025   NPO Start Time: 23:59  Entered: Mar  5 2025 12:46PM    Diet Consistent Carbohydrate w/Evening Snack-  Supplement Feeding Modality:  Oral  Ensure Max Cans or Servings Per Day:  1       Frequency:  Two Times a day  Entered: Mar  5 2025  2:34AM  
This patient has been assessed with a concern for Malnutrition and has been determined to have a diagnosis/diagnoses of Severe protein-calorie malnutrition.    This patient is being managed with:   Diet Consistent Carbohydrate w/Evening Snack-  Supplement Feeding Modality:  Oral  Ensure Max Cans or Servings Per Day:  1       Frequency:  Two Times a day  Entered: Mar  5 2025  2:34AM  
This patient has been assessed with a concern for Malnutrition and has been determined to have a diagnosis/diagnoses of Severe protein-calorie malnutrition.    This patient is being managed with:   Diet Consistent Carbohydrate w/Evening Snack-  Supplement Feeding Modality:  Oral  Ensure Max Cans or Servings Per Day:  1       Frequency:  Two Times a day  Entered: Mar  5 2025  2:34AM

## 2025-03-11 NOTE — DISCHARGE NOTE NURSING/CASE MANAGEMENT/SOCIAL WORK - CAREGIVER ADDRESS
[address], [id number] [facility]

## 2025-03-12 ENCOUNTER — APPOINTMENT (OUTPATIENT)
Dept: TRANSPLANT | Facility: CLINIC | Age: 45
End: 2025-03-12

## 2025-03-12 DIAGNOSIS — Z94.4 LIVER TRANSPLANT STATUS: ICD-10-CM

## 2025-03-12 DIAGNOSIS — Z79.60 LONG TERM (CURRENT) USE OF UNSPECIFIED IMMUNOMODULATORS AND IMMUNOSUPPRESSANTS: ICD-10-CM

## 2025-03-12 PROCEDURE — 99215 OFFICE O/P EST HI 40 MIN: CPT | Mod: 24

## 2025-03-13 ENCOUNTER — OUTPATIENT (OUTPATIENT)
Dept: OUTPATIENT SERVICES | Facility: HOSPITAL | Age: 45
LOS: 1 days | End: 2025-03-13
Payer: MEDICAID

## 2025-03-13 VITALS
HEIGHT: 71 IN | HEART RATE: 76 BPM | WEIGHT: 197.09 LBS | SYSTOLIC BLOOD PRESSURE: 142 MMHG | RESPIRATION RATE: 18 BRPM | OXYGEN SATURATION: 97 % | TEMPERATURE: 98 F | DIASTOLIC BLOOD PRESSURE: 87 MMHG

## 2025-03-13 DIAGNOSIS — G47.33 OBSTRUCTIVE SLEEP APNEA (ADULT) (PEDIATRIC): ICD-10-CM

## 2025-03-13 DIAGNOSIS — J90 PLEURAL EFFUSION, NOT ELSEWHERE CLASSIFIED: ICD-10-CM

## 2025-03-13 DIAGNOSIS — Z98.890 OTHER SPECIFIED POSTPROCEDURAL STATES: Chronic | ICD-10-CM

## 2025-03-13 DIAGNOSIS — Z94.4 LIVER TRANSPLANT STATUS: ICD-10-CM

## 2025-03-13 DIAGNOSIS — Z94.4 LIVER TRANSPLANT STATUS: Chronic | ICD-10-CM

## 2025-03-13 DIAGNOSIS — E11.9 TYPE 2 DIABETES MELLITUS WITHOUT COMPLICATIONS: ICD-10-CM

## 2025-03-13 DIAGNOSIS — Z01.818 ENCOUNTER FOR OTHER PREPROCEDURAL EXAMINATION: ICD-10-CM

## 2025-03-13 DIAGNOSIS — Z29.9 ENCOUNTER FOR PROPHYLACTIC MEASURES, UNSPECIFIED: ICD-10-CM

## 2025-03-13 LAB
A1C WITH ESTIMATED AVERAGE GLUCOSE RESULT: 5.1 % — SIGNIFICANT CHANGE UP (ref 4–5.6)
ALBUMIN SERPL ELPH-MCNC: 3.5 G/DL — SIGNIFICANT CHANGE UP (ref 3.3–5)
ALP SERPL-CCNC: 79 U/L — SIGNIFICANT CHANGE UP (ref 40–120)
ALT FLD-CCNC: 6 U/L — LOW (ref 10–45)
ANION GAP SERPL CALC-SCNC: 12 MMOL/L — SIGNIFICANT CHANGE UP (ref 5–17)
APTT BLD: 32.2 SEC — SIGNIFICANT CHANGE UP (ref 24.5–35.6)
AST SERPL-CCNC: 10 U/L — SIGNIFICANT CHANGE UP (ref 10–40)
BILIRUB SERPL-MCNC: 0.8 MG/DL — SIGNIFICANT CHANGE UP (ref 0.2–1.2)
BLD GP AB SCN SERPL QL: NEGATIVE — SIGNIFICANT CHANGE UP
BUN SERPL-MCNC: 37 MG/DL — HIGH (ref 7–23)
CALCIUM SERPL-MCNC: 9.7 MG/DL — SIGNIFICANT CHANGE UP (ref 8.4–10.5)
CHLORIDE SERPL-SCNC: 102 MMOL/L — SIGNIFICANT CHANGE UP (ref 96–108)
CO2 SERPL-SCNC: 24 MMOL/L — SIGNIFICANT CHANGE UP (ref 22–31)
CREAT SERPL-MCNC: 0.82 MG/DL — SIGNIFICANT CHANGE UP (ref 0.5–1.3)
EGFR: 110 ML/MIN/1.73M2 — SIGNIFICANT CHANGE UP
EGFR: 110 ML/MIN/1.73M2 — SIGNIFICANT CHANGE UP
ESTIMATED AVERAGE GLUCOSE: 100 MG/DL — SIGNIFICANT CHANGE UP (ref 68–114)
GLUCOSE SERPL-MCNC: 191 MG/DL — HIGH (ref 70–99)
HCT VFR BLD CALC: 35.7 % — LOW (ref 39–50)
HGB BLD-MCNC: 11.8 G/DL — LOW (ref 13–17)
INR BLD: 0.96 RATIO — SIGNIFICANT CHANGE UP (ref 0.85–1.16)
MCHC RBC-ENTMCNC: 31 PG — SIGNIFICANT CHANGE UP (ref 27–34)
MCHC RBC-ENTMCNC: 33.1 G/DL — SIGNIFICANT CHANGE UP (ref 32–36)
MCV RBC AUTO: 93.7 FL — SIGNIFICANT CHANGE UP (ref 80–100)
NRBC BLD AUTO-RTO: 0 /100 WBCS — SIGNIFICANT CHANGE UP (ref 0–0)
PLATELET # BLD AUTO: 219 K/UL — SIGNIFICANT CHANGE UP (ref 150–400)
POTASSIUM SERPL-MCNC: 6 MMOL/L — HIGH (ref 3.5–5.3)
POTASSIUM SERPL-SCNC: 6 MMOL/L — HIGH (ref 3.5–5.3)
PROT SERPL-MCNC: 7.8 G/DL — SIGNIFICANT CHANGE UP (ref 6–8.3)
PROTHROM AB SERPL-ACNC: 11 SEC — SIGNIFICANT CHANGE UP (ref 9.9–13.4)
RBC # BLD: 3.81 M/UL — LOW (ref 4.2–5.8)
RBC # FLD: 18.2 % — HIGH (ref 10.3–14.5)
RH IG SCN BLD-IMP: POSITIVE — SIGNIFICANT CHANGE UP
SODIUM SERPL-SCNC: 138 MMOL/L — SIGNIFICANT CHANGE UP (ref 135–145)
WBC # BLD: 6.39 K/UL — SIGNIFICANT CHANGE UP (ref 3.8–10.5)
WBC # FLD AUTO: 6.39 K/UL — SIGNIFICANT CHANGE UP (ref 3.8–10.5)

## 2025-03-13 PROCEDURE — 86850 RBC ANTIBODY SCREEN: CPT

## 2025-03-13 PROCEDURE — 86900 BLOOD TYPING SEROLOGIC ABO: CPT

## 2025-03-13 PROCEDURE — 86901 BLOOD TYPING SEROLOGIC RH(D): CPT

## 2025-03-13 PROCEDURE — G0463: CPT

## 2025-03-13 PROCEDURE — 80053 COMPREHEN METABOLIC PANEL: CPT

## 2025-03-13 PROCEDURE — 85610 PROTHROMBIN TIME: CPT

## 2025-03-13 PROCEDURE — 85027 COMPLETE CBC AUTOMATED: CPT

## 2025-03-13 PROCEDURE — 87640 STAPH A DNA AMP PROBE: CPT

## 2025-03-13 PROCEDURE — 71046 X-RAY EXAM CHEST 2 VIEWS: CPT

## 2025-03-13 PROCEDURE — 83036 HEMOGLOBIN GLYCOSYLATED A1C: CPT

## 2025-03-13 PROCEDURE — 85730 THROMBOPLASTIN TIME PARTIAL: CPT

## 2025-03-13 PROCEDURE — 87641 MR-STAPH DNA AMP PROBE: CPT

## 2025-03-13 PROCEDURE — 71046 X-RAY EXAM CHEST 2 VIEWS: CPT | Mod: 26

## 2025-03-13 NOTE — H&P PST ADULT - PROBLEM SELECTOR PLAN 4
The Caprini score indicates that this patient is at high risk for a VTE event (score 6 or greater). Surgical patients in this group will benefit from both pharmacologic prophylaxis and intermittent compression devices.  The surgical team will determine the balance between VTE risk and bleeding risk, and other clinical considerations. On Prednisone    Advised Reduction to 4 Units Lantus evening prior to procedure. evening of 03/16/2025

## 2025-03-13 NOTE — H&P PST ADULT - ASSESSMENT
DASI score: 4.2 METS  DASI activity: walking, light housework   Loose teeth or denture: Denies  MP: Class 2     CAPRINI SCORE    AGE RELATED RISK FACTORS                                                             [ X] Age 41-60 years                                            (1 Point)  [ ] Age: 61-74 years                                           (2 Points)                 [ ] Age= 75 years                                                (3 Points)             DISEASE RELATED RISK FACTORS                                                       [ ] Edema in the lower extremities                 (1 Point)                     [ ] Varicose veins                                               (1 Point)                                 [ X] BMI > 25 Kg/m2                                            (1 Point)                                  [ ] Serious infection (ie PNA)                            (1 Point)                     [ ] Lung disease ( COPD, Emphysema)            (1 Point)                                                                          [ ] Acute myocardial infarction                         (1 Point)                  [ ] Congestive heart failure (in the previous month)  (1 Point)         [ ] Inflammatory bowel disease                            (1 Point)                  [ X] Central venous access, PICC or Port               (2 points)       (within the last month)                                                                [ ] Stroke (in the previous month)                        (5 Points)    [ ] Previous or present malignancy                       (2 points)                                                                                                                                                         HEMATOLOGY RELATED FACTORS                                                         [ ] Prior episodes of VTE                                     (3 Points)                     [ ] Positive family history for VTE                      (3 Points)                  [ ] Prothrombin 91721 A                                     (3 Points)                     [ ] Factor V Leiden                                                (3 Points)                        [ ] Lupus anticoagulants                                      (3 Points)                                                           [ ] Anticardiolipin antibodies                              (3 Points)                                                       [ ] High homocysteine in the blood                   (3 Points)                                             [ ] Other congenital or acquired thrombophilia      (3 Points)                                                [ ] Heparin induced thrombocytopenia                  (3 Points)                                        MOBILITY RELATED FACTORS  [ ] Bed rest                                                         (1 Point)  [ ] Plaster cast                                                    (2 points)  [ ] Bed bound for more than 72 hours           (2 Points)    GENDER SPECIFIC FACTORS  [ ] Pregnancy or had a baby within the last month   (1 Point)  [ ] Post-partum < 6 weeks                                   (1 Point)  [ ] Hormonal therapy  or oral contraception   (1 Point)  [ ] History of pregnancy complications              (1 point)  [ ] Unexplained or recurrent              (1 Point)    OTHER RISK FACTORS                                           (1 Point)  [ ] BMI >40, smoking, diabetes requiring insulin, chemotherapy  blood transfusions and length of surgery over 2 hours    SURGERY RELATED RISK FACTORS  [ ]  Section within the last month     (1 Point)  [ ] Minor surgery                                                  (1 Point)  [ ] Arthroscopic surgery                                       (2 Points)  [X ] Planned major surgery lasting more            (2 Points)      than 45 minutes     [ ] Elective hip or knee joint replacement       (5 points)       surgery                                                TRAUMA RELATED RISK FACTORS  [ ] Fracture of the hip, pelvis, or leg                       (5 Points)  [ ] Spinal cord injury resulting in paralysis             (5 points)       (in the previous month)    [ ] Paralysis  (less than 1 month)                             (5 Points)  [ ] Multiple Trauma within 1 month                        (5 Points)    Total Score [    6    ]    Caprini Score 0-2: Low Risk, NO VTE prophylaxis required for most patients, encourage ambulation  Caprini Score 3-6: Moderate Risk , pharmacologic VTE prophylaxis is indicated for most patients (in the absence of contraindications)  Caprini Score Greater than or =7: High risk, pharmocologic VTE prophylaxis indicated for most patients (in the absence of contraindications)                               DASI score: 4.2 METS  DASI activity: walking, light housework   Loose teeth or denture: Denies  MP: Class 2     CAPRINI SCORE    AGE RELATED RISK FACTORS                                                             [ X] Age 41-60 years                                            (1 Point)  [ ] Age: 61-74 years                                           (2 Points)                 [ ] Age= 75 years                                                (3 Points)             DISEASE RELATED RISK FACTORS                                                       [ ] Edema in the lower extremities                 (1 Point)                     [ ] Varicose veins                                               (1 Point)                                 [ X] BMI > 25 Kg/m2                                            (1 Point)                                  [ ] Serious infection (ie PNA)                            (1 Point)                     [ ] Lung disease ( COPD, Emphysema)            (1 Point)                                                                          [ ] Acute myocardial infarction                         (1 Point)                  [ ] Congestive heart failure (in the previous month)  (1 Point)         [ ] Inflammatory bowel disease                            (1 Point)                  [ X] Central venous access, PICC or Port               (2 points)       (within the last month)                                                                [ ] Stroke (in the previous month)                        (5 Points)    [ ] Previous or present malignancy                       (2 points)                                                                                                                                                         HEMATOLOGY RELATED FACTORS                                                         [ ] Prior episodes of VTE                                     (3 Points)                     [ ] Positive family history for VTE                      (3 Points)                  [ ] Prothrombin 38603 A                                     (3 Points)                     [ ] Factor V Leiden                                                (3 Points)                        [ ] Lupus anticoagulants                                      (3 Points)                                                           [ ] Anticardiolipin antibodies                              (3 Points)                                                       [ ] High homocysteine in the blood                   (3 Points)                                             [ ] Other congenital or acquired thrombophilia      (3 Points)                                                [ ] Heparin induced thrombocytopenia                  (3 Points)                                        MOBILITY RELATED FACTORS  [ ] Bed rest                                                         (1 Point)  [ ] Plaster cast                                                    (2 points)  [ ] Bed bound for more than 72 hours           (2 Points)    GENDER SPECIFIC FACTORS  [ ] Pregnancy or had a baby within the last month   (1 Point)  [ ] Post-partum < 6 weeks                                   (1 Point)  [ ] Hormonal therapy  or oral contraception   (1 Point)  [ ] History of pregnancy complications              (1 point)  [ ] Unexplained or recurrent              (1 Point)    OTHER RISK FACTORS                                           (1 Point)  [ ] BMI >40, smoking, diabetes requiring insulin, chemotherapy  blood transfusions and length of surgery over 2 hours    SURGERY RELATED RISK FACTORS  [ ]  Section within the last month     (1 Point)  [ ] Minor surgery                                                  (1 Point)  [ ] Arthroscopic surgery                                       (2 Points)  [X ] Planned major surgery lasting more            (2 Points)      than 45 minutes     [ ] Elective hip or knee joint replacement       (5 points)       surgery                                                TRAUMA RELATED RISK FACTORS  [ ] Fracture of the hip, pelvis, or leg                       (5 Points)  [ ] Spinal cord injury resulting in paralysis             (5 points)       (in the previous month)    [ ] Paralysis  (less than 1 month)                             (5 Points)  [ ] Multiple Trauma within 1 month                        (5 Points)    Total Score [    6    ]    Caprini Score 0-2: Low Risk, NO VTE prophylaxis required for most patients, encourage ambulation  Caprini Score 3-6: Moderate Risk , pharmacologic VTE prophylaxis is indicated for most patients (in the absence of contraindications)  Caprini Score Greater than or =7: High risk, pharmocologic VTE prophylaxis indicated for most patients (in the absence of contraindications)

## 2025-03-13 NOTE — H&P PST ADULT - GASTROINTESTINAL COMMENTS
Abdominal Surgical Wound Healing + large healing surgical wound, staples intact no apparent erythema or drainage

## 2025-03-13 NOTE — H&P PST ADULT - NSICDXPASTMEDICALHX_GEN_ALL_CORE_FT
PAST MEDICAL HISTORY:  Alcohol abuse     Anemia     Cirrhosis of liver     H/O esophageal varices     History of alcohol use disorder     Portal hypertension     Sleep apnea, obstructive      PAST MEDICAL HISTORY:  Alcohol abuse     Anemia     Cirrhosis of liver     H/O esophageal varices     Hepatic encephalopathy     History of alcohol use disorder     History of pleural empyema     Pleural effusion     Portal hypertension     Sleep apnea, obstructive

## 2025-03-13 NOTE — H&P PST ADULT - PROBLEM SELECTOR PLAN 1
Scheduled or Right Video Assisted Thoracoscopic Surgery, Possible Thoracotomy  Pre-procedure labs collected. Surgical soap given to patient. PST instructions provided. Patient verbalized understanding of instructions.

## 2025-03-13 NOTE — H&P PST ADULT - NSICDXFAMILYHX_GEN_ALL_CORE_FT
FAMILY HISTORY:  Father  Still living? Unknown  FHx: diabetes mellitus, Age at diagnosis: Age Unknown  FHx: multiple myeloma, Age at diagnosis: Age Unknown    Mother  Still living? Unknown  Family history of CABG, Age at diagnosis: Age Unknown

## 2025-03-13 NOTE — H&P PST ADULT - NSICDXPASTSURGICALHX_GEN_ALL_CORE_FT
PAST SURGICAL HISTORY:  History of lung surgery     Liver transplanted     S/P abdominal paracentesis

## 2025-03-14 ENCOUNTER — INPATIENT (INPATIENT)
Facility: HOSPITAL | Age: 45
LOS: 15 days | Discharge: HOME | DRG: 641 | End: 2025-03-30
Attending: TRANSPLANT SURGERY | Admitting: TRANSPLANT SURGERY
Payer: MEDICAID

## 2025-03-14 VITALS
RESPIRATION RATE: 20 BRPM | HEIGHT: 71 IN | SYSTOLIC BLOOD PRESSURE: 157 MMHG | OXYGEN SATURATION: 99 % | HEART RATE: 79 BPM | DIASTOLIC BLOOD PRESSURE: 95 MMHG | TEMPERATURE: 98 F | WEIGHT: 192.9 LBS

## 2025-03-14 DIAGNOSIS — Z98.890 OTHER SPECIFIED POSTPROCEDURAL STATES: Chronic | ICD-10-CM

## 2025-03-14 DIAGNOSIS — Z94.4 LIVER TRANSPLANT STATUS: Chronic | ICD-10-CM

## 2025-03-14 DIAGNOSIS — E87.5 HYPERKALEMIA: ICD-10-CM

## 2025-03-14 PROBLEM — F10.10 ALCOHOL ABUSE, UNCOMPLICATED: Chronic | Status: INACTIVE | Noted: 2024-07-13 | Resolved: 2025-03-13

## 2025-03-14 LAB
ALBUMIN SERPL ELPH-MCNC: 3.1 G/DL — LOW (ref 3.3–5)
ALBUMIN SERPL ELPH-MCNC: 3.3 G/DL
ALP BLD-CCNC: 69 U/L
ALP SERPL-CCNC: 68 U/L — SIGNIFICANT CHANGE UP (ref 40–120)
ALT FLD-CCNC: 7 U/L — LOW (ref 10–45)
ALT SERPL-CCNC: 6 U/L
ANION GAP SERPL CALC-SCNC: 10 MMOL/L
ANION GAP SERPL CALC-SCNC: 10 MMOL/L — SIGNIFICANT CHANGE UP (ref 5–17)
ANION GAP SERPL CALC-SCNC: 11 MMOL/L — SIGNIFICANT CHANGE UP (ref 5–17)
APPEARANCE UR: CLEAR — SIGNIFICANT CHANGE UP
AST SERPL-CCNC: 10 U/L
AST SERPL-CCNC: 9 U/L — LOW (ref 10–40)
BACTERIA # UR AUTO: NEGATIVE /HPF — SIGNIFICANT CHANGE UP
BASOPHILS # BLD AUTO: 0 K/UL — SIGNIFICANT CHANGE UP (ref 0–0.2)
BASOPHILS NFR BLD AUTO: 0 % — SIGNIFICANT CHANGE UP (ref 0–2)
BILIRUB SERPL-MCNC: 0.7 MG/DL — SIGNIFICANT CHANGE UP (ref 0.2–1.2)
BILIRUB SERPL-MCNC: 0.8 MG/DL
BILIRUB UR-MCNC: NEGATIVE — SIGNIFICANT CHANGE UP
BUN SERPL-MCNC: 34 MG/DL
BUN SERPL-MCNC: 35 MG/DL — HIGH (ref 7–23)
BUN SERPL-MCNC: 35 MG/DL — HIGH (ref 7–23)
CALCIUM SERPL-MCNC: 9.3 MG/DL — SIGNIFICANT CHANGE UP (ref 8.4–10.5)
CALCIUM SERPL-MCNC: 9.5 MG/DL
CALCIUM SERPL-MCNC: 9.8 MG/DL — SIGNIFICANT CHANGE UP (ref 8.4–10.5)
CAST: 0 /LPF — SIGNIFICANT CHANGE UP (ref 0–4)
CHLORIDE SERPL-SCNC: 105 MMOL/L — SIGNIFICANT CHANGE UP (ref 96–108)
CHLORIDE SERPL-SCNC: 106 MMOL/L — SIGNIFICANT CHANGE UP (ref 96–108)
CHLORIDE SERPL-SCNC: 107 MMOL/L
CMV DNA SPEC QL NAA+PROBE: NOT DETECTED IU/ML
CMVPCR LOG: NOT DETECTED LOG10IU/ML
CO2 SERPL-SCNC: 23 MMOL/L — SIGNIFICANT CHANGE UP (ref 22–31)
CO2 SERPL-SCNC: 24 MMOL/L — SIGNIFICANT CHANGE UP (ref 22–31)
CO2 SERPL-SCNC: 26 MMOL/L
COLOR SPEC: YELLOW — SIGNIFICANT CHANGE UP
CREAT SERPL-MCNC: 0.77 MG/DL
CREAT SERPL-MCNC: 0.81 MG/DL — SIGNIFICANT CHANGE UP (ref 0.5–1.3)
CREAT SERPL-MCNC: 0.85 MG/DL — SIGNIFICANT CHANGE UP (ref 0.5–1.3)
DIFF PNL FLD: ABNORMAL
EGFR: 109 ML/MIN/1.73M2 — SIGNIFICANT CHANGE UP
EGFR: 109 ML/MIN/1.73M2 — SIGNIFICANT CHANGE UP
EGFR: 110 ML/MIN/1.73M2 — SIGNIFICANT CHANGE UP
EGFR: 110 ML/MIN/1.73M2 — SIGNIFICANT CHANGE UP
EGFRCR SERPLBLD CKD-EPI 2021: 112 ML/MIN/1.73M2
EOSINOPHIL # BLD AUTO: 0 K/UL — SIGNIFICANT CHANGE UP (ref 0–0.5)
EOSINOPHIL NFR BLD AUTO: 0 % — SIGNIFICANT CHANGE UP (ref 0–6)
GAS PNL BLDV: SIGNIFICANT CHANGE UP
GGT SERPL-CCNC: 26 U/L
GLUCOSE BLDC GLUCOMTR-MCNC: 145 MG/DL — HIGH (ref 70–99)
GLUCOSE SERPL-MCNC: 100 MG/DL
GLUCOSE SERPL-MCNC: 126 MG/DL — HIGH (ref 70–99)
GLUCOSE SERPL-MCNC: 221 MG/DL — HIGH (ref 70–99)
GLUCOSE UR QL: 100 MG/DL
HCT VFR BLD CALC: 30 % — LOW (ref 39–50)
HCT VFR BLD CALC: 31 %
HGB BLD-MCNC: 10.3 G/DL
HGB BLD-MCNC: 9.7 G/DL — LOW (ref 13–17)
KETONES UR-MCNC: NEGATIVE MG/DL — SIGNIFICANT CHANGE UP
LEUKOCYTE ESTERASE UR-ACNC: NEGATIVE — SIGNIFICANT CHANGE UP
LYMPHOCYTES # BLD AUTO: 0.26 K/UL — LOW (ref 1–3.3)
LYMPHOCYTES # BLD AUTO: 4.4 % — LOW (ref 13–44)
MAGNESIUM SERPL-MCNC: 1.6 MG/DL — SIGNIFICANT CHANGE UP (ref 1.6–2.6)
MAGNESIUM SERPL-MCNC: 1.8 MG/DL
MANUAL SMEAR VERIFICATION: SIGNIFICANT CHANGE UP
MCHC RBC-ENTMCNC: 30.3 PG — SIGNIFICANT CHANGE UP (ref 27–34)
MCHC RBC-ENTMCNC: 30.8 PG
MCHC RBC-ENTMCNC: 32.3 G/DL — SIGNIFICANT CHANGE UP (ref 32–36)
MCHC RBC-ENTMCNC: 33.2 G/DL
MCV RBC AUTO: 92.8 FL
MCV RBC AUTO: 93.8 FL — SIGNIFICANT CHANGE UP (ref 80–100)
MONOCYTES # BLD AUTO: 0.15 K/UL — SIGNIFICANT CHANGE UP (ref 0–0.9)
MONOCYTES NFR BLD AUTO: 2.6 % — SIGNIFICANT CHANGE UP (ref 2–14)
MRSA PCR RESULT.: SIGNIFICANT CHANGE UP
NEUTROPHILS # BLD AUTO: 5.54 K/UL — SIGNIFICANT CHANGE UP (ref 1.8–7.4)
NEUTROPHILS NFR BLD AUTO: 93 % — HIGH (ref 43–77)
NITRITE UR-MCNC: NEGATIVE — SIGNIFICANT CHANGE UP
NT-PROBNP SERPL-SCNC: 1980 PG/ML — HIGH (ref 0–300)
PH UR: 7 — SIGNIFICANT CHANGE UP (ref 5–8)
PHOSPHATE SERPL-MCNC: 4.3 MG/DL
PLAT MORPH BLD: NORMAL — SIGNIFICANT CHANGE UP
PLATELET # BLD AUTO: 194 K/UL — SIGNIFICANT CHANGE UP (ref 150–400)
PLATELET # BLD AUTO: 213 K/UL
POTASSIUM SERPL-MCNC: 5.7 MMOL/L — HIGH (ref 3.5–5.3)
POTASSIUM SERPL-MCNC: 6.2 MMOL/L — CRITICAL HIGH (ref 3.5–5.3)
POTASSIUM SERPL-SCNC: 5.4 MMOL/L
POTASSIUM SERPL-SCNC: 5.7 MMOL/L — HIGH (ref 3.5–5.3)
POTASSIUM SERPL-SCNC: 6.2 MMOL/L — CRITICAL HIGH (ref 3.5–5.3)
PROT SERPL-MCNC: 6.6 G/DL
PROT SERPL-MCNC: 6.7 G/DL — SIGNIFICANT CHANGE UP (ref 6–8.3)
PROT UR-MCNC: 30 MG/DL
RBC # BLD: 3.2 M/UL — LOW (ref 4.2–5.8)
RBC # BLD: 3.34 M/UL
RBC # FLD: 18.5 % — HIGH (ref 10.3–14.5)
RBC # FLD: 18.6 %
RBC BLD AUTO: SIGNIFICANT CHANGE UP
RBC CASTS # UR COMP ASSIST: 16 /HPF — HIGH (ref 0–4)
S AUREUS DNA NOSE QL NAA+PROBE: SIGNIFICANT CHANGE UP
SODIUM SERPL-SCNC: 139 MMOL/L — SIGNIFICANT CHANGE UP (ref 135–145)
SODIUM SERPL-SCNC: 140 MMOL/L — SIGNIFICANT CHANGE UP (ref 135–145)
SODIUM SERPL-SCNC: 142 MMOL/L
SP GR SPEC: 1.01 — SIGNIFICANT CHANGE UP (ref 1–1.03)
SQUAMOUS # UR AUTO: 0 /HPF — SIGNIFICANT CHANGE UP (ref 0–5)
TACROLIMUS SERPL-MCNC: 9 NG/ML
TROPONIN T, HIGH SENSITIVITY RESULT: 28 NG/L — SIGNIFICANT CHANGE UP (ref 0–51)
UROBILINOGEN FLD QL: 0.2 MG/DL — SIGNIFICANT CHANGE UP (ref 0.2–1)
WBC # BLD: 5.96 K/UL — SIGNIFICANT CHANGE UP (ref 3.8–10.5)
WBC # FLD AUTO: 5.96 K/UL — SIGNIFICANT CHANGE UP (ref 3.8–10.5)
WBC # FLD AUTO: 6.14 K/UL
WBC UR QL: 0 /HPF — SIGNIFICANT CHANGE UP (ref 0–5)

## 2025-03-14 PROCEDURE — 71045 X-RAY EXAM CHEST 1 VIEW: CPT | Mod: 26

## 2025-03-14 PROCEDURE — 99285 EMERGENCY DEPT VISIT HI MDM: CPT

## 2025-03-14 RX ORDER — DEXTROSE 50 % IN WATER 50 %
50 SYRINGE (ML) INTRAVENOUS ONCE
Refills: 0 | Status: COMPLETED | OUTPATIENT
Start: 2025-03-14 | End: 2025-03-14

## 2025-03-14 RX ORDER — TRAMADOL HYDROCHLORIDE 50 MG/1
50 TABLET, FILM COATED ORAL EVERY 8 HOURS
Refills: 0 | Status: DISCONTINUED | OUTPATIENT
Start: 2025-03-14 | End: 2025-03-17

## 2025-03-14 RX ORDER — TACROLIMUS 0.5 MG/1
2 CAPSULE ORAL
Refills: 0 | Status: DISCONTINUED | OUTPATIENT
Start: 2025-03-14 | End: 2025-03-17

## 2025-03-14 RX ORDER — GLUCAGON 3 MG/1
1 POWDER NASAL ONCE
Refills: 0 | Status: DISCONTINUED | OUTPATIENT
Start: 2025-03-14 | End: 2025-03-17

## 2025-03-14 RX ORDER — SODIUM CHLORIDE 9 G/1000ML
1000 INJECTION, SOLUTION INTRAVENOUS
Refills: 0 | Status: DISCONTINUED | OUTPATIENT
Start: 2025-03-14 | End: 2025-03-17

## 2025-03-14 RX ORDER — CALCIUM GLUCONATE 20 MG/ML
1 INJECTION, SOLUTION INTRAVENOUS ONCE
Refills: 0 | Status: COMPLETED | OUTPATIENT
Start: 2025-03-14 | End: 2025-03-14

## 2025-03-14 RX ORDER — SODIUM ZIRCONIUM CYCLOSILICATE 5 G/5G
10 POWDER, FOR SUSPENSION ORAL ONCE
Refills: 0 | Status: COMPLETED | OUTPATIENT
Start: 2025-03-14 | End: 2025-03-14

## 2025-03-14 RX ORDER — DEXTROSE 50 % IN WATER 50 %
25 SYRINGE (ML) INTRAVENOUS ONCE
Refills: 0 | Status: DISCONTINUED | OUTPATIENT
Start: 2025-03-14 | End: 2025-03-17

## 2025-03-14 RX ORDER — FUROSEMIDE 10 MG/ML
40 INJECTION INTRAMUSCULAR; INTRAVENOUS DAILY
Refills: 0 | Status: DISCONTINUED | OUTPATIENT
Start: 2025-03-15 | End: 2025-03-17

## 2025-03-14 RX ORDER — DEXTROSE 50 % IN WATER 50 %
15 SYRINGE (ML) INTRAVENOUS ONCE
Refills: 0 | Status: DISCONTINUED | OUTPATIENT
Start: 2025-03-14 | End: 2025-03-17

## 2025-03-14 RX ORDER — MAGNESIUM OXIDE 400 MG
400 TABLET ORAL
Refills: 0 | Status: DISCONTINUED | OUTPATIENT
Start: 2025-03-14 | End: 2025-03-15

## 2025-03-14 RX ORDER — CEFTRIAXONE 500 MG/1
INJECTION, POWDER, FOR SOLUTION INTRAMUSCULAR; INTRAVENOUS
Refills: 0 | Status: DISCONTINUED | OUTPATIENT
Start: 2025-03-14 | End: 2025-03-17

## 2025-03-14 RX ORDER — INSULIN LISPRO 100 U/ML
INJECTION, SOLUTION INTRAVENOUS; SUBCUTANEOUS
Refills: 0 | Status: DISCONTINUED | OUTPATIENT
Start: 2025-03-14 | End: 2025-03-17

## 2025-03-14 RX ORDER — PREDNISONE 20 MG/1
20 TABLET ORAL DAILY
Refills: 0 | Status: DISCONTINUED | OUTPATIENT
Start: 2025-03-15 | End: 2025-03-17

## 2025-03-14 RX ORDER — FUROSEMIDE 10 MG/ML
40 INJECTION INTRAMUSCULAR; INTRAVENOUS ONCE
Refills: 0 | Status: COMPLETED | OUTPATIENT
Start: 2025-03-14 | End: 2025-03-14

## 2025-03-14 RX ORDER — VELPATASVIR AND SOFOSBUVIR 50; 200 MG/1; MG/1
1 TABLET, FILM COATED ORAL ONCE
Refills: 0 | Status: DISCONTINUED | OUTPATIENT
Start: 2025-03-14 | End: 2025-03-14

## 2025-03-14 RX ORDER — CEFTRIAXONE 500 MG/1
2000 INJECTION, POWDER, FOR SOLUTION INTRAMUSCULAR; INTRAVENOUS ONCE
Refills: 0 | Status: COMPLETED | OUTPATIENT
Start: 2025-03-14 | End: 2025-03-14

## 2025-03-14 RX ORDER — CARVEDILOL 3.12 MG/1
3.12 TABLET, FILM COATED ORAL EVERY 12 HOURS
Refills: 0 | Status: DISCONTINUED | OUTPATIENT
Start: 2025-03-14 | End: 2025-03-17

## 2025-03-14 RX ORDER — VALGANCICLOVIR 450 MG/1
450 TABLET, FILM COATED ORAL DAILY
Refills: 0 | Status: DISCONTINUED | OUTPATIENT
Start: 2025-03-15 | End: 2025-03-17

## 2025-03-14 RX ORDER — ATOVAQUONE 750 MG/5ML
1500 SUSPENSION ORAL DAILY
Refills: 0 | Status: DISCONTINUED | OUTPATIENT
Start: 2025-03-14 | End: 2025-03-17

## 2025-03-14 RX ORDER — MYCOPHENOLIC ACID 360 MG/1
360 TABLET, DELAYED RELEASE ORAL
Refills: 0 | Status: DISCONTINUED | OUTPATIENT
Start: 2025-03-14 | End: 2025-03-17

## 2025-03-14 RX ORDER — MELATONIN 5 MG
3 TABLET ORAL ONCE
Refills: 0 | Status: COMPLETED | OUTPATIENT
Start: 2025-03-14 | End: 2025-03-14

## 2025-03-14 RX ORDER — CEFTRIAXONE 500 MG/1
2000 INJECTION, POWDER, FOR SOLUTION INTRAMUSCULAR; INTRAVENOUS EVERY 24 HOURS
Refills: 0 | Status: DISCONTINUED | OUTPATIENT
Start: 2025-03-15 | End: 2025-03-17

## 2025-03-14 RX ORDER — INSULIN LISPRO 100 U/ML
INJECTION, SOLUTION INTRAVENOUS; SUBCUTANEOUS AT BEDTIME
Refills: 0 | Status: DISCONTINUED | OUTPATIENT
Start: 2025-03-14 | End: 2025-03-17

## 2025-03-14 RX ORDER — DEXTROSE 50 % IN WATER 50 %
12.5 SYRINGE (ML) INTRAVENOUS ONCE
Refills: 0 | Status: DISCONTINUED | OUTPATIENT
Start: 2025-03-14 | End: 2025-03-17

## 2025-03-14 RX ORDER — HEPARIN SODIUM 1000 [USP'U]/ML
5000 INJECTION INTRAVENOUS; SUBCUTANEOUS EVERY 12 HOURS
Refills: 0 | Status: DISCONTINUED | OUTPATIENT
Start: 2025-03-14 | End: 2025-03-16

## 2025-03-14 RX ADMIN — FUROSEMIDE 40 MILLIGRAM(S): 10 INJECTION INTRAMUSCULAR; INTRAVENOUS at 15:27

## 2025-03-14 RX ADMIN — CARVEDILOL 3.12 MILLIGRAM(S): 3.12 TABLET, FILM COATED ORAL at 20:59

## 2025-03-14 RX ADMIN — TACROLIMUS 2 MILLIGRAM(S): 0.5 CAPSULE ORAL at 20:51

## 2025-03-14 RX ADMIN — CALCIUM GLUCONATE 100 GRAM(S): 20 INJECTION, SOLUTION INTRAVENOUS at 15:26

## 2025-03-14 RX ADMIN — SODIUM ZIRCONIUM CYCLOSILICATE 10 GRAM(S): 5 POWDER, FOR SUSPENSION ORAL at 15:27

## 2025-03-14 RX ADMIN — Medication 50 MILLILITER(S): at 15:25

## 2025-03-14 RX ADMIN — Medication 400 MILLIGRAM(S): at 23:45

## 2025-03-14 RX ADMIN — ATOVAQUONE 1500 MILLIGRAM(S): 750 SUSPENSION ORAL at 23:45

## 2025-03-14 RX ADMIN — Medication 5 UNIT(S): at 21:05

## 2025-03-14 RX ADMIN — Medication 5 UNIT(S): at 15:26

## 2025-03-14 RX ADMIN — SODIUM ZIRCONIUM CYCLOSILICATE 10 GRAM(S): 5 POWDER, FOR SUSPENSION ORAL at 20:51

## 2025-03-14 RX ADMIN — MYCOPHENOLIC ACID 360 MILLIGRAM(S): 360 TABLET, DELAYED RELEASE ORAL at 20:50

## 2025-03-14 RX ADMIN — Medication 50 MILLILITER(S): at 21:05

## 2025-03-14 RX ADMIN — CEFTRIAXONE 2000 MILLIGRAM(S): 500 INJECTION, POWDER, FOR SOLUTION INTRAMUSCULAR; INTRAVENOUS at 20:55

## 2025-03-14 RX ADMIN — TRAMADOL HYDROCHLORIDE 50 MILLIGRAM(S): 50 TABLET, FILM COATED ORAL at 23:45

## 2025-03-14 RX ADMIN — Medication 3 MILLIGRAM(S): at 23:45

## 2025-03-14 RX ADMIN — CALCIUM GLUCONATE 1 GRAM(S): 20 INJECTION, SOLUTION INTRAVENOUS at 15:52

## 2025-03-14 NOTE — H&P ADULT - HISTORY OF PRESENT ILLNESS
44M PMH with PMHx of decompensated ETOH cirrhosis c/b recurrent ascites, grade II esophageal varices, and hepatic encephalopathy. 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     Admitted with Hyperkalemia and planned Thoracotomy on 3/17/2025

## 2025-03-14 NOTE — ED ADULT NURSE NOTE - HIV OFFER
Exam today indicates A-fib with controlled ventricular response.   The rate is controlled without AV blocking drugs.   Continue anticoagulation with warfarin. Target INR 2-3.     Previously Negative (within the last year)

## 2025-03-14 NOTE — ED ADULT NURSE NOTE - OBJECTIVE STATEMENT
Patient  is  alert  and  oriented  x4.  Color  is  good  and  skin warm to touch.  He  is  sent here  for  elevated potassium  . Patient  denies  chest pain or  any other  discomfort.

## 2025-03-14 NOTE — H&P ADULT - NSHPPHYSICALEXAM_GEN_ALL_CORE
PHYSICAL EXAM:  Constitutional: Well developed / well nourished  Eyes: PERRLA  ENMT: nc/at, no thrush  Neck: supple  Respiratory: slight decreased BS on RLL, good airway movement, no adventitious sounds. L ok.  Cardiovascular: RRR  Gastrointestinal: Soft abdomen, ND, appropriate incisional TTP. chevron incision c/d/i, staples in place. No signs of infection.   Genitourinary: voiding  Extremities: SCD's in place and working bilaterally, LE edema improving daily on lasix  Vascular: Palpable dp pulses bilaterally.   Neurological: A&O x3  Skin: no rashes, ulcerations, lesions  Musculoskeletal: Moving all extremities

## 2025-03-14 NOTE — ED PROVIDER NOTE - NSICDXPASTMEDICALHX_GEN_ALL_CORE_FT
PAST MEDICAL HISTORY:  Alcohol abuse     Anemia     Cirrhosis of liver     H/O esophageal varices     Hepatic encephalopathy     History of alcohol use disorder     History of pleural empyema     Pleural effusion     Portal hypertension     Sleep apnea, obstructive

## 2025-03-14 NOTE — ED PROVIDER NOTE - ATTENDING CONTRIBUTION TO CARE
[facility],  44-year-old male past medical history decompensated EtOH cirrhosis C/B recurrent ascites, esophageal varices, hepatic encephalopathy status post liver transplant, complicated by MANUELITO, right pleural effusion status post VATS, discharge and planning to have VATS for 3/17/2025.  Patient was seen yesterday for PST and had a potassium of 6.  Patient was referred to ER for evaluation.  Patient denies nausea vomiting, diarrhea, fever chills..  States pedal edema is chronic somewhat improved with diuretics.  Patient has maintain alcohol abstinence.  Physical exam adult male awake alert GCS 15  HEENT normocephalic atraumatic.  Chest clear A&P.  No use of  muscles.  Abdomen soft, multiple large surgical wounds with staples in place, without tenderness discharge erythema.  CV no rubs gallop murmur.  MSK 3+ bilateral pedal edema  Nicholas Kumar MD, Facep

## 2025-03-14 NOTE — ED PROVIDER NOTE - CLINICAL SUMMARY MEDICAL DECISION MAKING FREE TEXT BOX
44-year-old male, past medical history of recent liver transplant for EtOH cirrhosis, esophageal varices, recently discharged, presenting with hyperkalemia.  Patient reports that he was sent in by his physician after blood work showed that he was hyperkalemic.  Patient denies any nausea/vomiting, fever.  Has been having abdominal pain at surgical site.  Patient reports that he has been on Lasix at home.    VS show htn otherwise WNL     GENERAL: NAD, lying in bed comfortably  HEAD:  Atraumatic, Normocephalic  EYES: EOMI, conjunctiva and sclera clear  CHEST/LUNG: Unlabored respirations. Clear to auscultation bilaterally. No rales, rhonchi, wheezing, or rubs  HEART: Regular rate and rhythm.   ABDOMEN: Soft, distended, ttp. +multiple surgical staples on abdomen. No purulence, not hot touch  EXTREMITIES:  b/l LE pitting edema 3+  NERVOUS SYSTEM:  No focal deficits. A&Ox3    Will assess for hyperkalemia, assess for renal function. EKG shows some t-wave abnormalities. Ca ordered. Patient TBA

## 2025-03-14 NOTE — ED ADULT NURSE NOTE - NSFALLRISKINTERV_ED_ALL_ED

## 2025-03-14 NOTE — H&P ADULT - ASSESSMENT
44M PMH with PMHx of decompensated ETOH cirrhosis c/b recurrent ascites, grade II esophageal varices, and hepatic encephalopathy. 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  Admitted with Hyperkalemia and planned Thoracotomy on 3/17/2025    [] Hyperkalemia  - s/p I/D50 in ED  - Lasix 40 x1 and calcium carb  - f/u repeat BMP (ordered)   - Lokelma daily    [ ] s/p HCV + DCD OLT   - good graft function  - HCV viremia/ Genotype 1A (donor hcv +) -  Epclusa 3/1   - Lasix 40QD PO  - ASA 81mg daily (held on admission for OR on Monday)   - SQH bid     [] Immuno  - FK 2/2,  Myfortic 360BID, Pred 20QD  - ppx: no bactrim due to sulfa allergy -> Mepron, valcyte, oscal, PPI     44M PMH with PMHx of decompensated ETOH cirrhosis c/b recurrent ascites, grade II esophageal varices, and hepatic encephalopathy. 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  Admitted with Hyperkalemia and planned Thoracotomy on 3/17/2025    [] Hyperkalemia  - s/p I/D50 in ED  - Lasix 40 x1 and calcium carb  - f/u repeat BMP (ordered)   - Lokelma daily    [ ] s/p HCV + DCD OLT   - good graft function  - HCV viremia/ Genotype 1A (donor hcv +) -  Epclusa 3/1   - Lasix 40QD PO  - ASA 81mg daily (held on admission for OR on Monday)   - SQH bid     [] Immuno  - FK 2/2,  Myfortic 360BID, Pred 20QD  - ppx: no bactrim due to sulfa allergy -> Mepron, valcyte, oscal, PPI    [] h/o R Empyema  - Thoracotomy scheduled Monday   - holding ASA   - continue CTX

## 2025-03-14 NOTE — ED ADULT NURSE NOTE - NS ED NOTE ABUSE RESPONSE YN
[facility] CARE  [facility] [address]  [address] [address]  Dept: [phone]  Dept Fax: [phone]    Suly Umana is a 34 y.o. female who presentstoday for her medical conditions/complaints as noted below.   Suly Umana is c/o of  Chief Complaint   Patient presents with    ADHD     5 month recheck     Headache    Dizziness         HPI:     Presents today for ADHD check  Feels good with medication, c/o of dry mouth and has increased her water intake  She feels as though her mood swings have improved, is happy with the dosage  She feels like she is herself again and is paying more attention to detail  Currently takes Adderall 10mg BID in the morning and at noon  Sometimes will hold her second dose because she is afraid to take it later and not be able to sleep  She then asks if we can increase the med to 3x daily, I told her I would not recommend especially if she has trouble sleeping    C/o generalized headaches 4x per week  Takes advil when they come on which helps relieve the pain normally  Yesterday she experienced a headache and when she stood up she felt like she was going to fall  The lights have been bothering her, making her headache worse   Experienced an episode of nausea during this time  Keeping her from doing her ADL's yesterday    Was recently fired from her job, twice in the last 6 months which as caused a lot of stress recently   She states it's hard to wake up in the morning and she feels fatigue more often than not  When she does not take adderall she feels like she cannot function with no energy  Recently moved in with her mother  Feels down and depression, suicidal at times but never develops a plan   Currently taking zoloft per patient  Willing to meet with a counselor    Up to date on immunizations  Denies other concerns/complaints       Hemoglobin A1C (%)   Date Value   07/19/2019 5.6             ( goal A1C is < 7)   No Yes  Pneumococcal 0-64 years Vaccine (1 of 1 - PPSV23) 05/02/1985    Diabetes screen  07/19/2022    Cervical cancer screen  01/14/2024    Lipid screen  05/20/2024    DTaP/Tdap/Td vaccine (3 - Td) 11/30/2027    Flu vaccine  Completed    HIV screen  Completed       Subjective:      Review of Systems   Constitutional: Positive for fatigue. Negative for chills and fever. HENT: Negative for congestion. Eyes: Positive for photophobia. Negative for visual disturbance. Respiratory: Negative for cough and shortness of breath. Cardiovascular: Negative for chest pain and palpitations. Gastrointestinal: Positive for nausea (with headaches at times). Negative for abdominal pain and vomiting. Endocrine: Negative. Genitourinary: Negative for dysuria. Musculoskeletal: Negative for back pain. Neurological: Positive for light-headedness and headaches. Negative for dizziness and numbness. Psychiatric/Behavioral: Positive for decreased concentration, dysphoric mood, sleep disturbance and suicidal ideas (has thoughts but never a plan). Negative for hallucinations and self-injury. The patient is nervous/anxious. The patient is not hyperactive. Objective:     Physical Exam  Vitals signs and nursing note reviewed. Constitutional:       Appearance: She is well-developed. HENT:      Head: Normocephalic and atraumatic. Eyes:      Pupils: Pupils are equal, round, and reactive to light. Neck:      Musculoskeletal: Normal range of motion and neck supple. Cardiovascular:      Rate and Rhythm: Normal rate and regular rhythm. Heart sounds: Normal heart sounds. Pulmonary:      Effort: Pulmonary effort is normal.      Breath sounds: Normal breath sounds. Abdominal:      General: Bowel sounds are normal.      Palpations: Abdomen is soft. Tenderness: There is no tenderness. Musculoskeletal: Normal range of motion. Skin:     General: Skin is warm and dry.    Neurological:      Mental Status: Follow up as directed.      Electronicallysigned by ALINE Santana CNP on 1/6/2020 at 10:48 AM

## 2025-03-15 LAB
ALBUMIN SERPL ELPH-MCNC: 2.7 G/DL — LOW (ref 3.3–5)
ALP SERPL-CCNC: 58 U/L — SIGNIFICANT CHANGE UP (ref 40–120)
ALT FLD-CCNC: <5 U/L — LOW (ref 10–45)
ANION GAP SERPL CALC-SCNC: 11 MMOL/L — SIGNIFICANT CHANGE UP (ref 5–17)
APTT BLD: 29.7 SEC — SIGNIFICANT CHANGE UP (ref 24.5–35.6)
AST SERPL-CCNC: 8 U/L — LOW (ref 10–40)
BASOPHILS # BLD AUTO: 0.07 K/UL — SIGNIFICANT CHANGE UP (ref 0–0.2)
BASOPHILS NFR BLD AUTO: 1.3 % — SIGNIFICANT CHANGE UP (ref 0–2)
BILIRUB SERPL-MCNC: 0.6 MG/DL — SIGNIFICANT CHANGE UP (ref 0.2–1.2)
BLD GP AB SCN SERPL QL: NEGATIVE — SIGNIFICANT CHANGE UP
BUN SERPL-MCNC: 29 MG/DL — HIGH (ref 7–23)
CALCIUM SERPL-MCNC: 8.9 MG/DL — SIGNIFICANT CHANGE UP (ref 8.4–10.5)
CHLORIDE SERPL-SCNC: 104 MMOL/L — SIGNIFICANT CHANGE UP (ref 96–108)
CO2 SERPL-SCNC: 24 MMOL/L — SIGNIFICANT CHANGE UP (ref 22–31)
CREAT SERPL-MCNC: 0.78 MG/DL — SIGNIFICANT CHANGE UP (ref 0.5–1.3)
CULTURE RESULTS: SIGNIFICANT CHANGE UP
CULTURE RESULTS: SIGNIFICANT CHANGE UP
EGFR: 111 ML/MIN/1.73M2 — SIGNIFICANT CHANGE UP
EGFR: 111 ML/MIN/1.73M2 — SIGNIFICANT CHANGE UP
EOSINOPHIL # BLD AUTO: 0.15 K/UL — SIGNIFICANT CHANGE UP (ref 0–0.5)
EOSINOPHIL NFR BLD AUTO: 2.8 % — SIGNIFICANT CHANGE UP (ref 0–6)
GLUCOSE BLDC GLUCOMTR-MCNC: 103 MG/DL — HIGH (ref 70–99)
GLUCOSE BLDC GLUCOMTR-MCNC: 114 MG/DL — HIGH (ref 70–99)
GLUCOSE BLDC GLUCOMTR-MCNC: 178 MG/DL — HIGH (ref 70–99)
GLUCOSE BLDC GLUCOMTR-MCNC: 201 MG/DL — HIGH (ref 70–99)
GLUCOSE BLDC GLUCOMTR-MCNC: 214 MG/DL — HIGH (ref 70–99)
GLUCOSE BLDC GLUCOMTR-MCNC: 236 MG/DL — HIGH (ref 70–99)
GLUCOSE SERPL-MCNC: 132 MG/DL — HIGH (ref 70–99)
HCT VFR BLD CALC: 27.1 % — LOW (ref 39–50)
HGB BLD-MCNC: 9 G/DL — LOW (ref 13–17)
IMM GRANULOCYTES NFR BLD AUTO: 0.4 % — SIGNIFICANT CHANGE UP (ref 0–0.9)
INR BLD: 0.96 RATIO — SIGNIFICANT CHANGE UP (ref 0.85–1.16)
LYMPHOCYTES # BLD AUTO: 0.68 K/UL — LOW (ref 1–3.3)
LYMPHOCYTES # BLD AUTO: 12.7 % — LOW (ref 13–44)
MAGNESIUM SERPL-MCNC: 1.6 MG/DL — SIGNIFICANT CHANGE UP (ref 1.6–2.6)
MCHC RBC-ENTMCNC: 30.6 PG — SIGNIFICANT CHANGE UP (ref 27–34)
MCHC RBC-ENTMCNC: 33.2 G/DL — SIGNIFICANT CHANGE UP (ref 32–36)
MCV RBC AUTO: 92.2 FL — SIGNIFICANT CHANGE UP (ref 80–100)
MONOCYTES # BLD AUTO: 0.38 K/UL — SIGNIFICANT CHANGE UP (ref 0–0.9)
MONOCYTES NFR BLD AUTO: 7.1 % — SIGNIFICANT CHANGE UP (ref 2–14)
NEUTROPHILS # BLD AUTO: 4.04 K/UL — SIGNIFICANT CHANGE UP (ref 1.8–7.4)
NEUTROPHILS NFR BLD AUTO: 75.7 % — SIGNIFICANT CHANGE UP (ref 43–77)
NRBC BLD AUTO-RTO: 0 /100 WBCS — SIGNIFICANT CHANGE UP (ref 0–0)
PHOSPHATE SERPL-MCNC: 4 MG/DL — SIGNIFICANT CHANGE UP (ref 2.5–4.5)
PLATELET # BLD AUTO: 188 K/UL — SIGNIFICANT CHANGE UP (ref 150–400)
POTASSIUM SERPL-MCNC: 4.5 MMOL/L — SIGNIFICANT CHANGE UP (ref 3.5–5.3)
POTASSIUM SERPL-SCNC: 4.5 MMOL/L — SIGNIFICANT CHANGE UP (ref 3.5–5.3)
PROT SERPL-MCNC: 5.9 G/DL — LOW (ref 6–8.3)
PROTHROM AB SERPL-ACNC: 10.9 SEC — SIGNIFICANT CHANGE UP (ref 9.9–13.4)
RBC # BLD: 2.94 M/UL — LOW (ref 4.2–5.8)
RBC # FLD: 18.2 % — HIGH (ref 10.3–14.5)
RH IG SCN BLD-IMP: POSITIVE — SIGNIFICANT CHANGE UP
SODIUM SERPL-SCNC: 139 MMOL/L — SIGNIFICANT CHANGE UP (ref 135–145)
SPECIMEN SOURCE: SIGNIFICANT CHANGE UP
SPECIMEN SOURCE: SIGNIFICANT CHANGE UP
TACROLIMUS SERPL-MCNC: 7 NG/ML — SIGNIFICANT CHANGE UP
WBC # BLD: 5.34 K/UL — SIGNIFICANT CHANGE UP (ref 3.8–10.5)
WBC # FLD AUTO: 5.34 K/UL — SIGNIFICANT CHANGE UP (ref 3.8–10.5)

## 2025-03-15 RX ORDER — MELATONIN 5 MG
3 TABLET ORAL ONCE
Refills: 0 | Status: COMPLETED | OUTPATIENT
Start: 2025-03-15 | End: 2025-03-15

## 2025-03-15 RX ORDER — VELPATASVIR AND SOFOSBUVIR 50; 200 MG/1; MG/1
1 TABLET, FILM COATED ORAL DAILY
Refills: 0 | Status: DISCONTINUED | OUTPATIENT
Start: 2025-03-15 | End: 2025-03-17

## 2025-03-15 RX ORDER — TRAMADOL HYDROCHLORIDE 50 MG/1
50 TABLET, FILM COATED ORAL ONCE
Refills: 0 | Status: DISCONTINUED | OUTPATIENT
Start: 2025-03-15 | End: 2025-03-15

## 2025-03-15 RX ORDER — SODIUM CHLORIDE 0.65 %
1 AEROSOL, SPRAY (ML) NASAL
Refills: 0 | Status: DISCONTINUED | OUTPATIENT
Start: 2025-03-15 | End: 2025-03-17

## 2025-03-15 RX ORDER — MAGNESIUM OXIDE 400 MG
800 TABLET ORAL
Refills: 0 | Status: DISCONTINUED | OUTPATIENT
Start: 2025-03-15 | End: 2025-03-17

## 2025-03-15 RX ORDER — MAGNESIUM SULFATE 500 MG/ML
2 SYRINGE (ML) INJECTION ONCE
Refills: 0 | Status: COMPLETED | OUTPATIENT
Start: 2025-03-15 | End: 2025-03-15

## 2025-03-15 RX ADMIN — TRAMADOL HYDROCHLORIDE 50 MILLIGRAM(S): 50 TABLET, FILM COATED ORAL at 04:19

## 2025-03-15 RX ADMIN — Medication 40 MILLIGRAM(S): at 05:31

## 2025-03-15 RX ADMIN — HEPARIN SODIUM 5000 UNIT(S): 1000 INJECTION INTRAVENOUS; SUBCUTANEOUS at 18:00

## 2025-03-15 RX ADMIN — Medication 100 MILLIGRAM(S): at 13:21

## 2025-03-15 RX ADMIN — CARVEDILOL 3.12 MILLIGRAM(S): 3.12 TABLET, FILM COATED ORAL at 05:31

## 2025-03-15 RX ADMIN — TACROLIMUS 2 MILLIGRAM(S): 0.5 CAPSULE ORAL at 09:21

## 2025-03-15 RX ADMIN — Medication 400 MILLIGRAM(S): at 09:21

## 2025-03-15 RX ADMIN — INSULIN LISPRO 2: 100 INJECTION, SOLUTION INTRAVENOUS; SUBCUTANEOUS at 09:21

## 2025-03-15 RX ADMIN — FUROSEMIDE 40 MILLIGRAM(S): 10 INJECTION INTRAMUSCULAR; INTRAVENOUS at 05:31

## 2025-03-15 RX ADMIN — Medication 400 MILLIGRAM(S): at 18:00

## 2025-03-15 RX ADMIN — PREDNISONE 20 MILLIGRAM(S): 20 TABLET ORAL at 05:31

## 2025-03-15 RX ADMIN — TRAMADOL HYDROCHLORIDE 50 MILLIGRAM(S): 50 TABLET, FILM COATED ORAL at 03:29

## 2025-03-15 RX ADMIN — TACROLIMUS 2 MILLIGRAM(S): 0.5 CAPSULE ORAL at 19:50

## 2025-03-15 RX ADMIN — MYCOPHENOLIC ACID 360 MILLIGRAM(S): 360 TABLET, DELAYED RELEASE ORAL at 19:50

## 2025-03-15 RX ADMIN — INSULIN LISPRO 2: 100 INJECTION, SOLUTION INTRAVENOUS; SUBCUTANEOUS at 13:20

## 2025-03-15 RX ADMIN — CARVEDILOL 3.12 MILLIGRAM(S): 3.12 TABLET, FILM COATED ORAL at 17:59

## 2025-03-15 RX ADMIN — TRAMADOL HYDROCHLORIDE 50 MILLIGRAM(S): 50 TABLET, FILM COATED ORAL at 00:09

## 2025-03-15 RX ADMIN — Medication 1 SPRAY(S): at 20:41

## 2025-03-15 RX ADMIN — TRAMADOL HYDROCHLORIDE 50 MILLIGRAM(S): 50 TABLET, FILM COATED ORAL at 23:00

## 2025-03-15 RX ADMIN — CEFTRIAXONE 100 MILLIGRAM(S): 500 INJECTION, POWDER, FOR SOLUTION INTRAMUSCULAR; INTRAVENOUS at 17:57

## 2025-03-15 RX ADMIN — VALGANCICLOVIR 450 MILLIGRAM(S): 450 TABLET, FILM COATED ORAL at 13:21

## 2025-03-15 RX ADMIN — HEPARIN SODIUM 5000 UNIT(S): 1000 INJECTION INTRAVENOUS; SUBCUTANEOUS at 05:31

## 2025-03-15 RX ADMIN — ATOVAQUONE 1500 MILLIGRAM(S): 750 SUSPENSION ORAL at 13:21

## 2025-03-15 RX ADMIN — TRAMADOL HYDROCHLORIDE 50 MILLIGRAM(S): 50 TABLET, FILM COATED ORAL at 22:19

## 2025-03-15 RX ADMIN — Medication 3 MILLIGRAM(S): at 22:50

## 2025-03-15 RX ADMIN — VELPATASVIR AND SOFOSBUVIR 1 TABLET(S): 50; 200 TABLET, FILM COATED ORAL at 20:23

## 2025-03-15 RX ADMIN — MYCOPHENOLIC ACID 360 MILLIGRAM(S): 360 TABLET, DELAYED RELEASE ORAL at 09:21

## 2025-03-15 NOTE — PROGRESS NOTE ADULT - SUBJECTIVE AND OBJECTIVE BOX
Transplant Surgery - Multidisciplinary Rounds  --------------------------------------------------------------   Tx Date: 02/27/25            Present: Patient seen and examined with multidisciplinary Transplant team including Surgeon Dr. Vanessa, Hepatologist Dr. Burgess fellow, ACP,  Pharmacist, Nutritionist,  and bedside RN during AM rounds.   Disciplines not in attendance will be notified of the plan.     HPI: 44M PMH with PMHx of decompensated ETOH cirrhosis c/b recurrent ascites, grade II esophageal varices, and hepatic encephalopathy. 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 Admitted with Hyperkalemia and planned Thoracotomy on 3/17/2025    Interval Events:      Immunosupression:  Induction: Simulect  Maintenance: FK/MMF/SST  Ongoing monitoring for signs of rejection     Potential Discharge date: TBD  Education:  Medications  Plan of care:  See Below    TX DATA HERE    Review of Systems:  Gen: No weight changes, fatigue, fevers/chills, weakness  Skin: No rashes  Head/Eyes/Ears/Mouth: No headache; Normal hearing; Normal vision w/o blurriness; No sinus pain/discomfort, sore throat  Respiratory: No dyspnea, cough, wheezing, hemoptysis  CV: No chest pain, PND, orthopnea  GI: No diarrhea, constipation, nausea, vomiting, melena, hematochezia  : No increased frequency, dysuria, hematuria, nocturia  MSK: No joint pain/swelling; no back pain; no edema  Neuro: No dizziness/lightheadedness, weakness, seizures, numbness, tingling  Heme: No easy bruising or bleeding  Endo: No heat/cold intolerance  Psych: No significant nervousness, anxiety, stress, depression  All other systems were reviewed and are negative, except as noted.    Physical Exam:   Constitutional: Well developed / well nourished  Eyes: PERRLA  ENMT: nc/at, no thrush  Neck: supple  Respiratory: slight decreased BS on RLL, good airway movement, no adventitious sounds. L ok.  Cardiovascular: RRR  Gastrointestinal: Soft abdomen, ND, appropriate incisional TTP. chevron incision c/d/i, staples in place. No signs of infection.   Genitourinary: voiding  Extremities: SCD's in place and working bilaterally, LE edema improving daily on lasix  Vascular: Palpable dp pulses bilaterally.   Neurological: A&O x3  Skin: no rashes, ulcerations, lesions  Musculoskeletal: Moving all extremities   Transplant Surgery - Multidisciplinary Rounds  --------------------------------------------------------------   Tx Date: 02/27/25            Present: Patient seen and examined with multidisciplinary Transplant team including Surgeon Dr. Vanessa, Hepatologist Dr. Burgess fellow, KAMAR Olivera,  Pharmacist, Nutritionist,  and bedside RN during AM rounds.   Disciplines not in attendance will be notified of the plan.     HPI: 44M PMH with PMHx of decompensated ETOH cirrhosis c/b recurrent ascites, grade II esophageal varices, and hepatic encephalopathy. 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 Admitted with Hyperkalemia and planned Thoracotomy on 3/17/2025    Interval Events:      Immunosupression:  Induction: Simulect  Maintenance: FK/MMF/SST  Ongoing monitoring for signs of rejection     Potential Discharge date: TBD  Education:  Medications  Plan of care:  See Below    TX DATA HERE    Review of Systems:  Gen: No weight changes, fatigue, fevers/chills, weakness  Skin: No rashes  Head/Eyes/Ears/Mouth: No headache; Normal hearing; Normal vision w/o blurriness; No sinus pain/discomfort, sore throat  Respiratory: No dyspnea, cough, wheezing, hemoptysis  CV: No chest pain, PND, orthopnea  GI: No diarrhea, constipation, nausea, vomiting, melena, hematochezia  : No increased frequency, dysuria, hematuria, nocturia  MSK: No joint pain/swelling; no back pain; no edema  Neuro: No dizziness/lightheadedness, weakness, seizures, numbness, tingling  Heme: No easy bruising or bleeding  Endo: No heat/cold intolerance  Psych: No significant nervousness, anxiety, stress, depression  All other systems were reviewed and are negative, except as noted.    Physical Exam:   Constitutional: Well developed / well nourished  Eyes: PERRLA  ENMT: nc/at, no thrush  Neck: supple  Respiratory: slight decreased BS on RLL, good airway movement, no adventitious sounds. L ok.  Cardiovascular: RRR  Gastrointestinal: Soft abdomen, ND, appropriate incisional TTP. chevron incision c/d/i, staples in place. No signs of infection.   Genitourinary: voiding  Extremities: SCD's in place and working bilaterally, LE edema improving daily on lasix  Vascular: Palpable dp pulses bilaterally.   Neurological: A&O x3  Skin: no rashes, ulcerations, lesions  Musculoskeletal: Moving all extremities   Transplant Surgery - Multidisciplinary Rounds  --------------------------------------------------------------   Tx Date: 02/27/25            Present: Patient seen and examined with multidisciplinary Transplant team including Surgeon Dr. Vanessa, Hepatologist Dr. Buregss fellow, KAMAR Olivera,  Pharmacist, Nutritionist,  and bedside RN during AM rounds.   Disciplines not in attendance will be notified of the plan.     HPI: 44M PMH with PMHx of decompensated ETOH cirrhosis c/b recurrent ascites, grade II esophageal varices, and hepatic encephalopathy. 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 Admitted with Hyperkalemia and planned Thoracotomy on 3/17/2025    Interval Events:  - K 4.5   - VSS    Immunosupression:  Induction: Simulect  Maintenance: FK/MMF/SST  Ongoing monitoring for signs of rejection     Potential Discharge date: TBD  Education:  Medications  Plan of care:  See Below      MEDICATIONS  (STANDING):  atovaquone  Suspension 1500 milliGRAM(s) Oral daily  carvedilol 3.125 milliGRAM(s) Oral every 12 hours  cefTRIAXone   IVPB      cefTRIAXone   IVPB 2000 milliGRAM(s) IV Intermittent every 24 hours  dextrose 5%. 1000 milliLiter(s) (50 mL/Hr) IV Continuous <Continuous>  dextrose 5%. 1000 milliLiter(s) (100 mL/Hr) IV Continuous <Continuous>  dextrose 50% Injectable 25 Gram(s) IV Push once  dextrose 50% Injectable 12.5 Gram(s) IV Push once  dextrose 50% Injectable 25 Gram(s) IV Push once  furosemide    Tablet 40 milliGRAM(s) Oral daily  glucagon  Injectable 1 milliGRAM(s) IntraMuscular once  heparin   Injectable 5000 Unit(s) SubCutaneous every 12 hours  insulin lispro (ADMELOG) corrective regimen sliding scale   SubCutaneous three times a day before meals  insulin lispro (ADMELOG) corrective regimen sliding scale   SubCutaneous at bedtime  magnesium oxide 400 milliGRAM(s) Oral two times a day with meals  mycophenolic acid  milliGRAM(s) Oral <User Schedule>  pantoprazole    Tablet 40 milliGRAM(s) Oral before breakfast  predniSONE   Tablet 20 milliGRAM(s) Oral daily  tacrolimus 2 milliGRAM(s) Oral <User Schedule>  thiamine 100 milliGRAM(s) Oral daily  valGANciclovir 450 milliGRAM(s) Oral daily    MEDICATIONS  (PRN):  dextrose Oral Gel 15 Gram(s) Oral once PRN Blood Glucose LESS THAN 70 milliGRAM(s)/deciliter  traMADol 50 milliGRAM(s) Oral every 8 hours PRN Severe Pain (7 - 10)      PAST MEDICAL & SURGICAL HISTORY:  Sleep apnea, obstructive      Alcohol abuse      Cirrhosis of liver      Portal hypertension      H/O esophageal varices      Anemia      History of alcohol use disorder      Hepatic encephalopathy      Pleural effusion      History of pleural empyema      Liver transplanted      S/P abdominal paracentesis      History of lung surgery          Vital Signs Last 24 Hrs  T(C): 36.6 (15 Mar 2025 08:20), Max: 36.8 (14 Mar 2025 19:11)  T(F): 97.9 (15 Mar 2025 08:20), Max: 98.2 (14 Mar 2025 19:11)  HR: 62 (15 Mar 2025 08:20) (62 - 89)  BP: 153/84 (15 Mar 2025 08:20) (153/84 - 165/85)  BP(mean): 105 (15 Mar 2025 05:21) (105 - 108)  RR: 18 (15 Mar 2025 08:20) (18 - 20)  SpO2: 96% (15 Mar 2025 08:20) (94% - 100%)    Parameters below as of 15 Mar 2025 08:20  Patient On (Oxygen Delivery Method): room air        I&O's Summary    14 Mar 2025 07:01  -  15 Mar 2025 07:00  --------------------------------------------------------  IN: 850 mL / OUT: 1250 mL / NET: -400 mL    15 Mar 2025 07:01  -  15 Mar 2025 12:59  --------------------------------------------------------  IN: 240 mL / OUT: 900 mL / NET: -660 mL                              9.0    5.34  )-----------( 188      ( 15 Mar 2025 06:33 )             27.1     03-15    139  |  104  |  29[H]  ----------------------------<  132[H]  4.5   |  24  |  0.78    Ca    8.9      15 Mar 2025 06:33  Phos  4.0     03-15  Mg     1.6     03-15    TPro  5.9[L]  /  Alb  2.7[L]  /  TBili  0.6  /  DBili  x   /  AST  8[L]  /  ALT  <5[L]  /  AlkPhos  58  03-15    Tacrolimus (), Serum: 7.0 ng/mL (03-15 @ 06:33)            Review of Systems:  All other systems were reviewed and are negative, except as noted.    Physical Exam:   Constitutional: Well developed / well nourished  Eyes: PERRLA  ENMT: nc/at, no thrush  Neck: supple  Respiratory: slight decreased BS on RLL, good airway movement, no adventitious sounds.   Cardiovascular: RRR  Gastrointestinal: Soft abdomen, ND, appropriate incisional TTP. chevron incision c/d/i, staples in place. No signs of infection.   Genitourinary: voiding  Extremities: SCD's in place and working bilaterally  Vascular: Palpable dp pulses bilaterally.   Neurological: A&O x3  Skin: no rashes, ulcerations, lesions  Musculoskeletal: Moving all extremities   Transplant Surgery - Multidisciplinary Rounds  --------------------------------------------------------------   Tx Date: 02/27/25            Present: Patient seen and examined with multidisciplinary Transplant team including Surgeon Dr. Vanessa, Hepatologist Dr. Burgess fellow, KAMAR Olivera,  Pharmacist, Nutritionist,  and bedside RN during AM rounds.   Disciplines not in attendance will be notified of the plan.     HPI: 44M PMH with PMHx of decompensated ETOH cirrhosis c/b recurrent ascites, grade II esophageal varices, and hepatic encephalopathy. 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 Admitted with Hyperkalemia and planned Thoracotomy on 3/17/2025    Interval Events:  - K 4.5   - VSS    Immunosupression:  Induction: Simulect  Maintenance: FK/Myfortic/SST  Ongoing monitoring for signs of rejection     Potential Discharge date: TBD  Education:  Medications  Plan of care:  See Below      MEDICATIONS  (STANDING):  atovaquone  Suspension 1500 milliGRAM(s) Oral daily  carvedilol 3.125 milliGRAM(s) Oral every 12 hours  cefTRIAXone   IVPB      cefTRIAXone   IVPB 2000 milliGRAM(s) IV Intermittent every 24 hours  dextrose 5%. 1000 milliLiter(s) (50 mL/Hr) IV Continuous <Continuous>  dextrose 5%. 1000 milliLiter(s) (100 mL/Hr) IV Continuous <Continuous>  dextrose 50% Injectable 25 Gram(s) IV Push once  dextrose 50% Injectable 12.5 Gram(s) IV Push once  dextrose 50% Injectable 25 Gram(s) IV Push once  furosemide    Tablet 40 milliGRAM(s) Oral daily  glucagon  Injectable 1 milliGRAM(s) IntraMuscular once  heparin   Injectable 5000 Unit(s) SubCutaneous every 12 hours  insulin lispro (ADMELOG) corrective regimen sliding scale   SubCutaneous three times a day before meals  insulin lispro (ADMELOG) corrective regimen sliding scale   SubCutaneous at bedtime  magnesium oxide 400 milliGRAM(s) Oral two times a day with meals  mycophenolic acid  milliGRAM(s) Oral <User Schedule>  pantoprazole    Tablet 40 milliGRAM(s) Oral before breakfast  predniSONE   Tablet 20 milliGRAM(s) Oral daily  tacrolimus 2 milliGRAM(s) Oral <User Schedule>  thiamine 100 milliGRAM(s) Oral daily  valGANciclovir 450 milliGRAM(s) Oral daily    MEDICATIONS  (PRN):  dextrose Oral Gel 15 Gram(s) Oral once PRN Blood Glucose LESS THAN 70 milliGRAM(s)/deciliter  traMADol 50 milliGRAM(s) Oral every 8 hours PRN Severe Pain (7 - 10)      PAST MEDICAL & SURGICAL HISTORY:  Sleep apnea, obstructive      Alcohol abuse      Cirrhosis of liver      Portal hypertension      H/O esophageal varices      Anemia      History of alcohol use disorder      Hepatic encephalopathy      Pleural effusion      History of pleural empyema      Liver transplanted      S/P abdominal paracentesis      History of lung surgery          Vital Signs Last 24 Hrs  T(C): 36.6 (15 Mar 2025 08:20), Max: 36.8 (14 Mar 2025 19:11)  T(F): 97.9 (15 Mar 2025 08:20), Max: 98.2 (14 Mar 2025 19:11)  HR: 62 (15 Mar 2025 08:20) (62 - 89)  BP: 153/84 (15 Mar 2025 08:20) (153/84 - 165/85)  BP(mean): 105 (15 Mar 2025 05:21) (105 - 108)  RR: 18 (15 Mar 2025 08:20) (18 - 20)  SpO2: 96% (15 Mar 2025 08:20) (94% - 100%)    Parameters below as of 15 Mar 2025 08:20  Patient On (Oxygen Delivery Method): room air        I&O's Summary    14 Mar 2025 07:01  -  15 Mar 2025 07:00  --------------------------------------------------------  IN: 850 mL / OUT: 1250 mL / NET: -400 mL    15 Mar 2025 07:01  -  15 Mar 2025 12:59  --------------------------------------------------------  IN: 240 mL / OUT: 900 mL / NET: -660 mL                              9.0    5.34  )-----------( 188      ( 15 Mar 2025 06:33 )             27.1     03-15    139  |  104  |  29[H]  ----------------------------<  132[H]  4.5   |  24  |  0.78    Ca    8.9      15 Mar 2025 06:33  Phos  4.0     03-15  Mg     1.6     03-15    TPro  5.9[L]  /  Alb  2.7[L]  /  TBili  0.6  /  DBili  x   /  AST  8[L]  /  ALT  <5[L]  /  AlkPhos  58  03-15    Tacrolimus (), Serum: 7.0 ng/mL (03-15 @ 06:33)            Review of Systems:  All other systems were reviewed and are negative, except as noted.    Physical Exam:   Constitutional: Well developed / well nourished  Eyes: PERRLA  ENMT: nc/at, no thrush  Neck: supple  Respiratory: slight decreased BS on RLL, good airway movement, no adventitious sounds.   Cardiovascular: RRR  Gastrointestinal: Soft abdomen, ND, appropriate incisional TTP. chevron incision c/d/i, staples in place. No signs of infection.   Genitourinary: voiding  Extremities: SCD's in place and working bilaterally  Vascular: Palpable dp pulses bilaterally.   Neurological: A&O x3  Skin: no rashes, ulcerations, lesions  Musculoskeletal: Moving all extremities

## 2025-03-15 NOTE — PATIENT PROFILE ADULT - HAS THE PATIENT RECEIVED THE INFLUENZA VACCINE THIS SEASON?
[facility] HOSP - [facility]    Progress Note    Tai Larios Patient Status:  Surgery Admit    1949 MRN [id number]   Location [address] [facility] Attending Emma Carranza MD   Hosp Day # 1 PCP Cari Guillen DO 07/31/2018    K 3.7 07/31/2018     07/31/2018    CO2 31 07/31/2018    GLU 89 07/31/2018    CA 9.6 07/31/2018    ALB 4.0 07/31/2018    ALKPHO 71 07/31/2018    BILT 0.5 07/31/2018    TP 6.7 07/31/2018    AST 27 07/31/2018    ALT 34 07/31/2018    INR 1. yes...

## 2025-03-15 NOTE — PATIENT PROFILE ADULT - NSPROHMSYMPCOND_GEN_A_NUR
Per PDMP, Lorazepam filled 9/11/2024 #18/6 days, 8/20/2024 #30/10 days, 8/6/2024 #30/10 days.     I am still highly concerned about the high dosage and long-term, daily use of Lorazepam. Long-term use typically results in building tolerance (meaning the therapeutic effects of the medication wear off sooner and sooner after taking the medication) and physiological dependence (patients will have withdrawal symptoms after the medication wears out, and withdrawal symptoms includes WORSENING anxiety compared to what the anxiety was prior to starting the medication in the first place). Between the positive drug screen for cocaine, the FDA having no indications for long-term use of benzodiazepines, and [facility] benzodiazepine & Z-drug prescribing guidelines, it would be in the patient's best interest to taper off Lorazepam. Continued use as the patient ages ages results in personality changes, increase risk for falls, decrease cognitive function to the point the patient  may present with symptoms similar to those with dementia.      As a result, I am not comfortable (nor would any [facility] prescriber) refilling this medication at this current dosage. Here is the proposed taper developed specifically for the patient:      Week 1 & 2: Lorazepam 0.5 mg take 1 tablet (0.5 mg) as needed twice a day and half tablet (0.25 mg) daily as needed.     The patient may stay at this dose until they are re-evaluated by her next  prescriber who may recommend to continue to taper down because this dosing may still be a high dose. If the patient would like, we could refer the patient to [facility] for added support for the patient as they taper off the medication. Let me know if they would be interested in a referral.     If the patient does not agree with the taper, writer may not be able to provide any further refills and can offer adding Clonidine 0.1 mg po BID and Depakote  mg po at bedtime x 5 days then increase to 1,000 mg po  at bedtime to manage withdrawal symptoms and prevent seizures. The patient can call their insurance to find a covered provider outside of [facility] for a second opinion since the [facility] prescribers follow the benzodiazepine and Z-drug prescribing guidelines.      Please cancel Rx that was sent yesterday for 7 tablets. Thanks!     Liver

## 2025-03-15 NOTE — PROGRESS NOTE ADULT - ASSESSMENT
44M PMH with PMHx of decompensated ETOH cirrhosis c/b recurrent ascites, grade II esophageal varices, and hepatic encephalopathy. 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  Admitted with Hyperkalemia and planned Thoracotomy on 3/17/2025    [] Hyperkalemia  - s/p I/D50 in ED  - Lasix 40 x1 and calcium carb  - f/u repeat BMP (ordered)   - Lokelma daily    [ ] s/p HCV + DCD OLT   - good graft function  - HCV viremia/ Genotype 1A (donor hcv +) -  Epclusa 3/1   - Lasix 40QD PO  - ASA 81mg daily (held on admission for OR on Monday)   - SQH bid     [] Immuno  - FK 2/2,  Myfortic 360BID, Pred 20QD  - ppx: no bactrim due to sulfa allergy -> Mepron, valcyte, oscal, PPI    [] h/o R Empyema  - Thoracotomy scheduled Monday   - holding ASA   - continue CTX    44M PMH with PMHx of decompensated ETOH cirrhosis c/b recurrent ascites, grade II esophageal varices, and hepatic encephalopathy. 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  Admitted with Hyperkalemia and planned Thoracotomy on 3/17/2025    [] Hyperkalemia  - s/p I/D50 in ED  - Lasix 40 x1 and calcium carb  - repeat K normal   - Lokelma daily    [ ] s/p HCV + DCD OLT   - good graft function  - HCV viremia/ Genotype 1A (donor hcv +) -  Epclusa 3/1   - Lasix 40QD PO  - ASA 81mg daily (held on admission for OR on Monday)   - SQH bid     [] Immuno  - FK 2/2,  Myfortic 360BID, Pred 20QD  - ppx: no bactrim due to sulfa allergy -> Mepron, valcyte, oscal, PPI    [] h/o R Empyema  - Thoracotomy scheduled Monday   - holding ASA   - continue CTX

## 2025-03-16 LAB
ALBUMIN SERPL ELPH-MCNC: 2.8 G/DL — LOW (ref 3.3–5)
ALP SERPL-CCNC: 75 U/L — SIGNIFICANT CHANGE UP (ref 40–120)
ALT FLD-CCNC: 6 U/L — LOW (ref 10–45)
ANION GAP SERPL CALC-SCNC: 11 MMOL/L — SIGNIFICANT CHANGE UP (ref 5–17)
APTT BLD: 28.4 SEC — SIGNIFICANT CHANGE UP (ref 24.5–35.6)
AST SERPL-CCNC: 7 U/L — LOW (ref 10–40)
BASOPHILS # BLD AUTO: 0.07 K/UL — SIGNIFICANT CHANGE UP (ref 0–0.2)
BASOPHILS NFR BLD AUTO: 1.5 % — SIGNIFICANT CHANGE UP (ref 0–2)
BILIRUB SERPL-MCNC: 0.6 MG/DL — SIGNIFICANT CHANGE UP (ref 0.2–1.2)
BLD GP AB SCN SERPL QL: NEGATIVE — SIGNIFICANT CHANGE UP
BUN SERPL-MCNC: 32 MG/DL — HIGH (ref 7–23)
CALCIUM SERPL-MCNC: 8.8 MG/DL — SIGNIFICANT CHANGE UP (ref 8.4–10.5)
CHLORIDE SERPL-SCNC: 101 MMOL/L — SIGNIFICANT CHANGE UP (ref 96–108)
CO2 SERPL-SCNC: 24 MMOL/L — SIGNIFICANT CHANGE UP (ref 22–31)
CREAT SERPL-MCNC: 0.93 MG/DL — SIGNIFICANT CHANGE UP (ref 0.5–1.3)
EGFR: 103 ML/MIN/1.73M2 — SIGNIFICANT CHANGE UP
EGFR: 103 ML/MIN/1.73M2 — SIGNIFICANT CHANGE UP
EOSINOPHIL # BLD AUTO: 0.19 K/UL — SIGNIFICANT CHANGE UP (ref 0–0.5)
EOSINOPHIL NFR BLD AUTO: 4.2 % — SIGNIFICANT CHANGE UP (ref 0–6)
GLUCOSE BLDC GLUCOMTR-MCNC: 122 MG/DL — HIGH (ref 70–99)
GLUCOSE BLDC GLUCOMTR-MCNC: 148 MG/DL — HIGH (ref 70–99)
GLUCOSE BLDC GLUCOMTR-MCNC: 200 MG/DL — HIGH (ref 70–99)
GLUCOSE BLDC GLUCOMTR-MCNC: 259 MG/DL — HIGH (ref 70–99)
GLUCOSE SERPL-MCNC: 233 MG/DL — HIGH (ref 70–99)
HCT VFR BLD CALC: 26.5 % — LOW (ref 39–50)
HGB BLD-MCNC: 9 G/DL — LOW (ref 13–17)
IMM GRANULOCYTES NFR BLD AUTO: 0.7 % — SIGNIFICANT CHANGE UP (ref 0–0.9)
INR BLD: 0.88 RATIO — SIGNIFICANT CHANGE UP (ref 0.85–1.16)
LYMPHOCYTES # BLD AUTO: 0.63 K/UL — LOW (ref 1–3.3)
LYMPHOCYTES # BLD AUTO: 13.8 % — SIGNIFICANT CHANGE UP (ref 13–44)
MAGNESIUM SERPL-MCNC: 2 MG/DL — SIGNIFICANT CHANGE UP (ref 1.6–2.6)
MCHC RBC-ENTMCNC: 30.6 PG — SIGNIFICANT CHANGE UP (ref 27–34)
MCHC RBC-ENTMCNC: 34 G/DL — SIGNIFICANT CHANGE UP (ref 32–36)
MCV RBC AUTO: 90.1 FL — SIGNIFICANT CHANGE UP (ref 80–100)
MONOCYTES # BLD AUTO: 0.34 K/UL — SIGNIFICANT CHANGE UP (ref 0–0.9)
MONOCYTES NFR BLD AUTO: 7.4 % — SIGNIFICANT CHANGE UP (ref 2–14)
NEUTROPHILS # BLD AUTO: 3.31 K/UL — SIGNIFICANT CHANGE UP (ref 1.8–7.4)
NEUTROPHILS NFR BLD AUTO: 72.4 % — SIGNIFICANT CHANGE UP (ref 43–77)
NRBC BLD AUTO-RTO: 0 /100 WBCS — SIGNIFICANT CHANGE UP (ref 0–0)
PHOSPHATE SERPL-MCNC: 3.6 MG/DL — SIGNIFICANT CHANGE UP (ref 2.5–4.5)
PLATELET # BLD AUTO: 187 K/UL — SIGNIFICANT CHANGE UP (ref 150–400)
POTASSIUM SERPL-MCNC: 4.4 MMOL/L — SIGNIFICANT CHANGE UP (ref 3.5–5.3)
POTASSIUM SERPL-SCNC: 4.4 MMOL/L — SIGNIFICANT CHANGE UP (ref 3.5–5.3)
PROT SERPL-MCNC: 6 G/DL — SIGNIFICANT CHANGE UP (ref 6–8.3)
PROTHROM AB SERPL-ACNC: 10.1 SEC — SIGNIFICANT CHANGE UP (ref 9.9–13.4)
RBC # BLD: 2.94 M/UL — LOW (ref 4.2–5.8)
RBC # FLD: 18 % — HIGH (ref 10.3–14.5)
RH IG SCN BLD-IMP: POSITIVE — SIGNIFICANT CHANGE UP
SODIUM SERPL-SCNC: 136 MMOL/L — SIGNIFICANT CHANGE UP (ref 135–145)
TACROLIMUS SERPL-MCNC: 6 NG/ML — SIGNIFICANT CHANGE UP
WBC # BLD: 4.57 K/UL — SIGNIFICANT CHANGE UP (ref 3.8–10.5)
WBC # FLD AUTO: 4.57 K/UL — SIGNIFICANT CHANGE UP (ref 3.8–10.5)

## 2025-03-16 PROCEDURE — 71045 X-RAY EXAM CHEST 1 VIEW: CPT | Mod: 26

## 2025-03-16 RX ORDER — MELATONIN 5 MG
3 TABLET ORAL AT BEDTIME
Refills: 0 | Status: DISCONTINUED | OUTPATIENT
Start: 2025-03-16 | End: 2025-03-17

## 2025-03-16 RX ORDER — TRAMADOL HYDROCHLORIDE 50 MG/1
50 TABLET, FILM COATED ORAL ONCE
Refills: 0 | Status: DISCONTINUED | OUTPATIENT
Start: 2025-03-16 | End: 2025-03-16

## 2025-03-16 RX ADMIN — VALGANCICLOVIR 450 MILLIGRAM(S): 450 TABLET, FILM COATED ORAL at 12:42

## 2025-03-16 RX ADMIN — Medication 40 MILLIGRAM(S): at 06:18

## 2025-03-16 RX ADMIN — Medication 3 MILLIGRAM(S): at 21:50

## 2025-03-16 RX ADMIN — TRAMADOL HYDROCHLORIDE 50 MILLIGRAM(S): 50 TABLET, FILM COATED ORAL at 21:50

## 2025-03-16 RX ADMIN — ATOVAQUONE 1500 MILLIGRAM(S): 750 SUSPENSION ORAL at 12:42

## 2025-03-16 RX ADMIN — PREDNISONE 20 MILLIGRAM(S): 20 TABLET ORAL at 06:18

## 2025-03-16 RX ADMIN — MYCOPHENOLIC ACID 360 MILLIGRAM(S): 360 TABLET, DELAYED RELEASE ORAL at 20:20

## 2025-03-16 RX ADMIN — TACROLIMUS 2 MILLIGRAM(S): 0.5 CAPSULE ORAL at 08:37

## 2025-03-16 RX ADMIN — TRAMADOL HYDROCHLORIDE 50 MILLIGRAM(S): 50 TABLET, FILM COATED ORAL at 07:00

## 2025-03-16 RX ADMIN — TRAMADOL HYDROCHLORIDE 50 MILLIGRAM(S): 50 TABLET, FILM COATED ORAL at 22:30

## 2025-03-16 RX ADMIN — CARVEDILOL 3.12 MILLIGRAM(S): 3.12 TABLET, FILM COATED ORAL at 06:18

## 2025-03-16 RX ADMIN — TACROLIMUS 2 MILLIGRAM(S): 0.5 CAPSULE ORAL at 20:20

## 2025-03-16 RX ADMIN — TRAMADOL HYDROCHLORIDE 50 MILLIGRAM(S): 50 TABLET, FILM COATED ORAL at 17:26

## 2025-03-16 RX ADMIN — Medication 25 GRAM(S): at 00:00

## 2025-03-16 RX ADMIN — INSULIN LISPRO 3: 100 INJECTION, SOLUTION INTRAVENOUS; SUBCUTANEOUS at 17:23

## 2025-03-16 RX ADMIN — HEPARIN SODIUM 5000 UNIT(S): 1000 INJECTION INTRAVENOUS; SUBCUTANEOUS at 17:24

## 2025-03-16 RX ADMIN — MYCOPHENOLIC ACID 360 MILLIGRAM(S): 360 TABLET, DELAYED RELEASE ORAL at 08:37

## 2025-03-16 RX ADMIN — CEFTRIAXONE 100 MILLIGRAM(S): 500 INJECTION, POWDER, FOR SOLUTION INTRAMUSCULAR; INTRAVENOUS at 17:33

## 2025-03-16 RX ADMIN — TRAMADOL HYDROCHLORIDE 50 MILLIGRAM(S): 50 TABLET, FILM COATED ORAL at 06:18

## 2025-03-16 RX ADMIN — FUROSEMIDE 40 MILLIGRAM(S): 10 INJECTION INTRAMUSCULAR; INTRAVENOUS at 06:19

## 2025-03-16 RX ADMIN — VELPATASVIR AND SOFOSBUVIR 1 TABLET(S): 50; 200 TABLET, FILM COATED ORAL at 12:43

## 2025-03-16 RX ADMIN — CARVEDILOL 3.12 MILLIGRAM(S): 3.12 TABLET, FILM COATED ORAL at 17:24

## 2025-03-16 RX ADMIN — Medication 100 MILLIGRAM(S): at 12:42

## 2025-03-16 RX ADMIN — HEPARIN SODIUM 5000 UNIT(S): 1000 INJECTION INTRAVENOUS; SUBCUTANEOUS at 06:21

## 2025-03-16 RX ADMIN — INSULIN LISPRO 1: 100 INJECTION, SOLUTION INTRAVENOUS; SUBCUTANEOUS at 08:37

## 2025-03-16 RX ADMIN — Medication 800 MILLIGRAM(S): at 17:24

## 2025-03-16 RX ADMIN — Medication 800 MILLIGRAM(S): at 08:37

## 2025-03-16 NOTE — PRE PROCEDURE NOTE - PRE PROCEDURE EVALUATION
Surgery Pre-op Note:    CC: Patient is a 44y old  Male who presents with a chief complaint of hyperkalemia (16 Mar 2025 02:34)                                                                                                           Surgeon:  Darrell    Procedure: (Date) (Procedure)  Rt Vats/Thoracotomy lung decortication    Allergies    sulfa drugs (Rash)    Intolerances        HPI:  44M PMH with PMHx of decompensated ETOH cirrhosis c/b recurrent ascites, grade II esophageal varices, and hepatic encephalopathy. 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     Admitted with Hyperkalemia and planned Thoracotomy on 3/17/2025 (14 Mar 2025 18:07)      PAST MEDICAL & SURGICAL HISTORY:  Sleep apnea, obstructive      Alcohol abuse      Cirrhosis of liver      Portal hypertension      H/O esophageal varices      Anemia      History of alcohol use disorder      Hepatic encephalopathy      Pleural effusion      History of pleural empyema      Liver transplanted      S/P abdominal paracentesis      History of lung surgery          MEDICATIONS  (STANDING):  atovaquone  Suspension 1500 milliGRAM(s) Oral daily  carvedilol 3.125 milliGRAM(s) Oral every 12 hours  cefTRIAXone   IVPB      cefTRIAXone   IVPB 2000 milliGRAM(s) IV Intermittent every 24 hours  dextrose 5%. 1000 milliLiter(s) (50 mL/Hr) IV Continuous <Continuous>  dextrose 5%. 1000 milliLiter(s) (100 mL/Hr) IV Continuous <Continuous>  dextrose 50% Injectable 25 Gram(s) IV Push once  dextrose 50% Injectable 12.5 Gram(s) IV Push once  dextrose 50% Injectable 25 Gram(s) IV Push once  furosemide    Tablet 40 milliGRAM(s) Oral daily  glucagon  Injectable 1 milliGRAM(s) IntraMuscular once  heparin   Injectable 5000 Unit(s) SubCutaneous every 12 hours  insulin lispro (ADMELOG) corrective regimen sliding scale   SubCutaneous three times a day before meals  insulin lispro (ADMELOG) corrective regimen sliding scale   SubCutaneous at bedtime  magnesium oxide 800 milliGRAM(s) Oral two times a day with meals  mycophenolic acid  milliGRAM(s) Oral <User Schedule>  pantoprazole    Tablet 40 milliGRAM(s) Oral before breakfast  predniSONE   Tablet 20 milliGRAM(s) Oral daily  sofosbuvir 400 mG/velpatasvir 100 mG (EPCLUSA) 1 Tablet(s) Oral daily  tacrolimus 2 milliGRAM(s) Oral <User Schedule>  thiamine 100 milliGRAM(s) Oral daily  valGANciclovir 450 milliGRAM(s) Oral daily    MEDICATIONS  (PRN):  dextrose Oral Gel 15 Gram(s) Oral once PRN Blood Glucose LESS THAN 70 milliGRAM(s)/deciliter  sodium chloride 0.65% Nasal 1 Spray(s) Both Nostrils two times a day PRN Nasal Congestion  traMADol 50 milliGRAM(s) Oral every 8 hours PRN Severe Pain (7 - 10)        Labs:                        9.0    4.57  )-----------( 187      ( 16 Mar 2025 06:43 )             26.5     03-16    136  |  101  |  32[H]  ----------------------------<  233[H]  4.4   |  24  |  0.93    Ca    8.8      16 Mar 2025 06:43  Phos  3.6     03-16  Mg     2.0     03-16    TPro  6.0  /  Alb  2.8[L]  /  TBili  0.6  /  DBili  x   /  AST  7[L]  /  ALT  6[L]  /  AlkPhos  75  03-16    PT/INR - ( 16 Mar 2025 06:43 )   PT: 10.1 sec;   INR: 0.88 ratio         PTT - ( 16 Mar 2025 06:43 )  PTT:28.4 sec    Blood Type: ABO Interpretation: O (03-16 @ 06:54  MRSA: MRSA PCR Result.: NotDetec (03-13 @ 15:27)   / MSSA:   Urinalysis Basic - ( 16 Mar 2025 06:43 )    Color: x / Appearance: x / SG: x / pH: x  Gluc: 233 mg/dL / Ketone: x  / Bili: x / Urobili: x   Blood: x / Protein: x / Nitrite: x   Leuk Esterase: x / RBC: x / WBC x   Sq Epi: x / Non Sq Epi: x / Bacteria: x        CXR: IMPRESSION:  Loculated right pleural effusion is not significantly changed from prior   examination.    EKG:  Diagnosis Line NORMAL SINUS RHYTHM  MINIMAL VOLTAGE CRITERIA FOR LVH, MAY BE NORMAL VARIANT ( R in aVL )  BORDERLINE ECG  WHEN COMPARED WITH ECG OF 04-MAR-2025 18:52,  NO SIGNIFICANT CHANGE WAS FOUND  Carotid Duplex:      PFT's:    Echocardiogram:    Cardiac catheterization:    Vein Mapping:    Gen: WN/WD NAD  Neuro: AAOx3, nonfocal  Pulm: CTA B/L  CV: RRR, S1S2  Abd: Soft, NT, ND +BS  Ext: No edema, + peripheral pulses      Pt has AICD/PPM  [ ] No       Pre-op Beta Blocker ordered within 24 hrs of surgery (CABG ONLY)? na  Type & Cross  [ ] Yes    NPO after Midnight [ ] Yes     Consent obtained  [ ] Yes  [ ] No   Surgery Pre-op Note:    CC: Patient is a 44y old  Male who presents with a chief complaint of hyperkalemia (16 Mar 2025 02:34)                                                                                                           Surgeon:  Darrell    Procedure: (Date) (Procedure)  Rt Vats/Thoracotomy lung decortication    Allergies    sulfa drugs (Rash)    Intolerances        HPI:  44M PMH with PMHx of decompensated ETOH cirrhosis c/b recurrent ascites, grade II esophageal varices, and hepatic encephalopathy. 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     Admitted with Hyperkalemia and planned Thoracotomy on 3/17/2025 (14 Mar 2025 18:07)      PAST MEDICAL & SURGICAL HISTORY:  Sleep apnea, obstructive      Alcohol abuse      Cirrhosis of liver      Portal hypertension      H/O esophageal varices      Anemia      History of alcohol use disorder      Hepatic encephalopathy      Pleural effusion      History of pleural empyema      Liver transplanted      S/P abdominal paracentesis      History of lung surgery          MEDICATIONS  (STANDING):  atovaquone  Suspension 1500 milliGRAM(s) Oral daily  carvedilol 3.125 milliGRAM(s) Oral every 12 hours  cefTRIAXone   IVPB      cefTRIAXone   IVPB 2000 milliGRAM(s) IV Intermittent every 24 hours  dextrose 5%. 1000 milliLiter(s) (50 mL/Hr) IV Continuous <Continuous>  dextrose 5%. 1000 milliLiter(s) (100 mL/Hr) IV Continuous <Continuous>  dextrose 50% Injectable 25 Gram(s) IV Push once  dextrose 50% Injectable 12.5 Gram(s) IV Push once  dextrose 50% Injectable 25 Gram(s) IV Push once  furosemide    Tablet 40 milliGRAM(s) Oral daily  glucagon  Injectable 1 milliGRAM(s) IntraMuscular once  heparin   Injectable 5000 Unit(s) SubCutaneous every 12 hours  insulin lispro (ADMELOG) corrective regimen sliding scale   SubCutaneous three times a day before meals  insulin lispro (ADMELOG) corrective regimen sliding scale   SubCutaneous at bedtime  magnesium oxide 800 milliGRAM(s) Oral two times a day with meals  mycophenolic acid  milliGRAM(s) Oral <User Schedule>  pantoprazole    Tablet 40 milliGRAM(s) Oral before breakfast  predniSONE   Tablet 20 milliGRAM(s) Oral daily  sofosbuvir 400 mG/velpatasvir 100 mG (EPCLUSA) 1 Tablet(s) Oral daily  tacrolimus 2 milliGRAM(s) Oral <User Schedule>  thiamine 100 milliGRAM(s) Oral daily  valGANciclovir 450 milliGRAM(s) Oral daily    MEDICATIONS  (PRN):  dextrose Oral Gel 15 Gram(s) Oral once PRN Blood Glucose LESS THAN 70 milliGRAM(s)/deciliter  sodium chloride 0.65% Nasal 1 Spray(s) Both Nostrils two times a day PRN Nasal Congestion  traMADol 50 milliGRAM(s) Oral every 8 hours PRN Severe Pain (7 - 10)        Labs:                        9.0    4.57  )-----------( 187      ( 16 Mar 2025 06:43 )             26.5     03-16    136  |  101  |  32[H]  ----------------------------<  233[H]  4.4   |  24  |  0.93    Ca    8.8      16 Mar 2025 06:43  Phos  3.6     03-16  Mg     2.0     03-16    TPro  6.0  /  Alb  2.8[L]  /  TBili  0.6  /  DBili  x   /  AST  7[L]  /  ALT  6[L]  /  AlkPhos  75  03-16    PT/INR - ( 16 Mar 2025 06:43 )   PT: 10.1 sec;   INR: 0.88 ratio         PTT - ( 16 Mar 2025 06:43 )  PTT:28.4 sec    Blood Type: ABO Interpretation: O (03-16 @ 06:54  MRSA: MRSA PCR Result.: NotDetec (03-13 @ 15:27)   / MSSA:   Urinalysis Basic - ( 16 Mar 2025 06:43 )    Color: x / Appearance: x / SG: x / pH: x  Gluc: 233 mg/dL / Ketone: x  / Bili: x / Urobili: x   Blood: x / Protein: x / Nitrite: x   Leuk Esterase: x / RBC: x / WBC x   Sq Epi: x / Non Sq Epi: x / Bacteria: x        CXR: IMPRESSION:  Loculated right pleural effusion is not significantly changed from prior   examination.    EKG:  Diagnosis Line NORMAL SINUS RHYTHM  MINIMAL VOLTAGE CRITERIA FOR LVH, MAY BE NORMAL VARIANT ( R in aVL )  BORDERLINE ECG  WHEN COMPARED WITH ECG OF 04-MAR-2025 18:52,  NO SIGNIFICANT CHANGE WAS FOUND  Carotid Duplex:      PFT's:        Gen: WN/WD NAD  Neuro: AAOx3, nonfocal  Pulm: CTA B/L  CV: RRR, S1S2  Abd: Soft, NT, ND +BS  Ext plus one pedal edema       Pt has AICD/PPM  [ ] No       Pre-op Beta Blocker ordered within 24 hrs of surgery (CABG ONLY)? na  Type & Cross  [ ] Yes    NPO after Midnight [ ] Yes     Consent obtained  [ ] Yes  [ ] No

## 2025-03-16 NOTE — PROGRESS NOTE ADULT - ASSESSMENT
44M PMH with PMHx of decompensated ETOH cirrhosis c/b recurrent ascites, grade II esophageal varices, and hepatic encephalopathy. 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  Admitted with Hyperkalemia and planned Thoracotomy on 3/17/2025    Plan:    [] Hyperkalemia  - resolved    [ ] s/p HCV + DCD OLT   - good graft function  - HCV viremia/ Genotype 1A (donor hcv +) -  Epclusa 3/1   - Lasix 40QD PO  - ASA 81mg daily (held on admission for OR on Monday)   - SQH bid     [] Immuno  - FK per level,  Myfortic 360BID, Pred 20QD  - ppx: no bactrim due to sulfa allergy -> Mepron, valcyte, oscal, PPI    [] h/o R Empyema  - Thoracotomy scheduled Monday   - holding ASA   - continue CTX

## 2025-03-16 NOTE — PRE PROCEDURE NOTE - NS ATTEND AMEND GEN_ALL_CORE FT
discussed plan for OR today for right vats, possible and likely thoracotomy for evacuation of hemothorax, decortication, chest tubes. discussed risks including and not limited to bleeding, air leak, scarring  patient expressed understanding and agreeable to proceed

## 2025-03-16 NOTE — PROGRESS NOTE ADULT - SUBJECTIVE AND OBJECTIVE BOX
Transplant Surgery - Multidisciplinary Rounds  --------------------------------------------------------------   OLT 02/27/25            Present: Patient seen and examined with multidisciplinary Transplant team including Surgeon Dr. Vanessa, Hepatologist Dr. Burgess fellow, KAMAR Peterson and bedside RN during AM rounds.   Disciplines not in attendance will be notified of the plan.     HPI: 44M PMH with PMHx of decompensated ETOH cirrhosis c/b recurrent ascites, grade II esophageal varices, and hepatic encephalopathy. 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 Admitted with Hyperkalemia and planned Thoracotomy on 3/17/2025    Interval Events:  - afebrile, VSS  - K 4.5    Immunosuppression  Maintenance: FK per level, Myfortic 360 BID, pred 20  Ongoing monitoring for signs of rejection     Potential Discharge date: TBD  Education:  Medications  Plan of care:  See Below      TX DATA HERE      Review of Systems:  All other systems were reviewed and are negative, except as noted.    Physical Exam:   Constitutional: Well developed / well nourished  Eyes: PERRLA  ENMT: nc/at, no thrush  Neck: supple  Respiratory: slight decreased BS on RLL, good airway movement, no adventitious sounds.   Cardiovascular: RRR  Gastrointestinal: Soft abdomen, ND, appropriate incisional TTP. chevron incision c/d/i, staples in place. No signs of infection.   Genitourinary: voiding  Extremities: SCD's in place and working bilaterally  Vascular: Palpable dp pulses bilaterally.   Neurological: A&O x3  Skin: no rashes, ulcerations, lesions  Musculoskeletal: Moving all extremities   Transplant Surgery - Multidisciplinary Rounds  --------------------------------------------------------------   OLT 02/27/25            Present: Patient seen and examined with multidisciplinary Transplant team including Surgeon Dr. Vanessa, Hepatologist Dr. Burgess fellow, KAMAR Peterson and bedside RN during AM rounds.   Disciplines not in attendance will be notified of the plan.     HPI: 44M PMH with PMHx of decompensated ETOH cirrhosis c/b recurrent ascites, grade II esophageal varices, and hepatic encephalopathy. 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 Admitted with Hyperkalemia and planned Thoracotomy on 3/17/2025    Interval Events:  - afebrile, VSS  - K 4.5    Immunosuppression  Maintenance: FK per level, Myfortic 360 BID, pred 20  Ongoing monitoring for signs of rejection     Potential Discharge date: TBD  Education:  Medications  Plan of care:  See Below        MEDICATIONS  (STANDING):  atovaquone  Suspension 1500 milliGRAM(s) Oral daily  carvedilol 3.125 milliGRAM(s) Oral every 12 hours  cefTRIAXone   IVPB      cefTRIAXone   IVPB 2000 milliGRAM(s) IV Intermittent every 24 hours  dextrose 5%. 1000 milliLiter(s) (50 mL/Hr) IV Continuous <Continuous>  dextrose 5%. 1000 milliLiter(s) (100 mL/Hr) IV Continuous <Continuous>  dextrose 50% Injectable 25 Gram(s) IV Push once  dextrose 50% Injectable 12.5 Gram(s) IV Push once  dextrose 50% Injectable 25 Gram(s) IV Push once  furosemide    Tablet 40 milliGRAM(s) Oral daily  glucagon  Injectable 1 milliGRAM(s) IntraMuscular once  heparin   Injectable 5000 Unit(s) SubCutaneous every 12 hours  insulin lispro (ADMELOG) corrective regimen sliding scale   SubCutaneous three times a day before meals  insulin lispro (ADMELOG) corrective regimen sliding scale   SubCutaneous at bedtime  magnesium oxide 800 milliGRAM(s) Oral two times a day with meals  mycophenolic acid  360 milliGRAM(s) Oral <User Schedule>  pantoprazole    Tablet 40 milliGRAM(s) Oral before breakfast  predniSONE   Tablet 20 milliGRAM(s) Oral daily  sofosbuvir 400 mG/velpatasvir 100 mG (EPCLUSA) 1 Tablet(s) Oral daily  tacrolimus 2 milliGRAM(s) Oral <User Schedule>  thiamine 100 milliGRAM(s) Oral daily  valGANciclovir 450 milliGRAM(s) Oral daily    MEDICATIONS  (PRN):  dextrose Oral Gel 15 Gram(s) Oral once PRN Blood Glucose LESS THAN 70 milliGRAM(s)/deciliter  sodium chloride 0.65% Nasal 1 Spray(s) Both Nostrils two times a day PRN Nasal Congestion  traMADol 50 milliGRAM(s) Oral every 8 hours PRN Severe Pain (7 - 10)      PAST MEDICAL & SURGICAL HISTORY:  Sleep apnea, obstructive      Alcohol abuse      Cirrhosis of liver      Portal hypertension      H/O esophageal varices      Anemia      History of alcohol use disorder      Hepatic encephalopathy      Pleural effusion      History of pleural empyema      Liver transplanted      S/P abdominal paracentesis      History of lung surgery          Vital Signs Last 24 Hrs  T(C): 36.6 (16 Mar 2025 09:00), Max: 37 (15 Mar 2025 13:00)  T(F): 97.9 (16 Mar 2025 09:00), Max: 98.6 (15 Mar 2025 13:00)  HR: 80 (16 Mar 2025 09:00) (67 - 80)  BP: 118/74 (16 Mar 2025 09:00) (114/60 - 148/90)  BP(mean): 78 (16 Mar 2025 05:53) (78 - 78)  RR: 18 (16 Mar 2025 09:00) (18 - 18)  SpO2: 98% (16 Mar 2025 09:00) (93% - 99%)    Parameters below as of 16 Mar 2025 09:00  Patient On (Oxygen Delivery Method): room air        I&O's Summary    15 Mar 2025 07:01  -  16 Mar 2025 07:00  --------------------------------------------------------  IN: 770 mL / OUT: 2700 mL / NET: -1930 mL    16 Mar 2025 07:01  -  16 Mar 2025 11:51  --------------------------------------------------------  IN: 0 mL / OUT: 300 mL / NET: -300 mL                              9.0    4.57  )-----------( 187      ( 16 Mar 2025 06:43 )             26.5     03-16    136  |  101  |  32[H]  ----------------------------<  233[H]  4.4   |  24  |  0.93    Ca    8.8      16 Mar 2025 06:43  Phos  3.6     03-16  Mg     2.0     03-16    TPro  6.0  /  Alb  2.8[L]  /  TBili  0.6  /  DBili  x   /  AST  7[L]  /  ALT  6[L]  /  AlkPhos  75  03-16    Tacrolimus (), Serum: 6.0 ng/mL (03-16 @ 06:43)            Review of Systems:  All other systems were reviewed and are negative, except as noted.    Physical Exam:   Constitutional: Well developed / well nourished  Eyes: PERRLA  ENMT: nc/at, no thrush  Neck: supple  Respiratory: slight decreased BS on RLL, good airway movement, no adventitious sounds.   Cardiovascular: RRR  Gastrointestinal: Soft abdomen, ND, appropriate incisional TTP. chevron incision c/d/i, staples in place. No signs of infection.   Genitourinary: voiding  Extremities: SCD's in place and working bilaterally  Vascular: Palpable dp pulses bilaterally.   Neurological: A&O x3  Skin: no rashes, ulcerations, lesions  Musculoskeletal: Moving all extremities

## 2025-03-17 ENCOUNTER — APPOINTMENT (OUTPATIENT)
Dept: THORACIC SURGERY | Facility: HOSPITAL | Age: 45
End: 2025-03-17

## 2025-03-17 ENCOUNTER — TRANSCRIPTION ENCOUNTER (OUTPATIENT)
Age: 45
End: 2025-03-17

## 2025-03-17 LAB
ALBUMIN SERPL ELPH-MCNC: 2.7 G/DL — LOW (ref 3.3–5)
ALBUMIN SERPL ELPH-MCNC: 2.8 G/DL — LOW (ref 3.3–5)
ALBUMIN SERPL ELPH-MCNC: 3 G/DL — LOW (ref 3.3–5)
ALP SERPL-CCNC: 62 U/L — SIGNIFICANT CHANGE UP (ref 40–120)
ALP SERPL-CCNC: 68 U/L — SIGNIFICANT CHANGE UP (ref 40–120)
ALP SERPL-CCNC: 68 U/L — SIGNIFICANT CHANGE UP (ref 40–120)
ALT FLD-CCNC: 6 U/L — LOW (ref 10–45)
ANION GAP SERPL CALC-SCNC: 10 MMOL/L — SIGNIFICANT CHANGE UP (ref 5–17)
APTT BLD: 28.8 SEC — SIGNIFICANT CHANGE UP (ref 24.5–35.6)
APTT BLD: 30.6 SEC — SIGNIFICANT CHANGE UP (ref 24.5–35.6)
AST SERPL-CCNC: 11 U/L — SIGNIFICANT CHANGE UP (ref 10–40)
AST SERPL-CCNC: 13 U/L — SIGNIFICANT CHANGE UP (ref 10–40)
AST SERPL-CCNC: <5 U/L — LOW (ref 10–40)
BASOPHILS # BLD AUTO: 0.08 K/UL — SIGNIFICANT CHANGE UP (ref 0–0.2)
BASOPHILS NFR BLD AUTO: 1.8 % — SIGNIFICANT CHANGE UP (ref 0–2)
BILIRUB SERPL-MCNC: 0.5 MG/DL — SIGNIFICANT CHANGE UP (ref 0.2–1.2)
BILIRUB SERPL-MCNC: 0.6 MG/DL — SIGNIFICANT CHANGE UP (ref 0.2–1.2)
BILIRUB SERPL-MCNC: 0.7 MG/DL — SIGNIFICANT CHANGE UP (ref 0.2–1.2)
BUN SERPL-MCNC: 34 MG/DL — HIGH (ref 7–23)
BUN SERPL-MCNC: 34 MG/DL — HIGH (ref 7–23)
BUN SERPL-MCNC: 37 MG/DL — HIGH (ref 7–23)
CALCIUM SERPL-MCNC: 9 MG/DL — SIGNIFICANT CHANGE UP (ref 8.4–10.5)
CALCIUM SERPL-MCNC: 9 MG/DL — SIGNIFICANT CHANGE UP (ref 8.4–10.5)
CALCIUM SERPL-MCNC: 9.9 MG/DL — SIGNIFICANT CHANGE UP (ref 8.4–10.5)
CHLORIDE SERPL-SCNC: 103 MMOL/L — SIGNIFICANT CHANGE UP (ref 96–108)
CHLORIDE SERPL-SCNC: 103 MMOL/L — SIGNIFICANT CHANGE UP (ref 96–108)
CHLORIDE SERPL-SCNC: 104 MMOL/L — SIGNIFICANT CHANGE UP (ref 96–108)
CO2 SERPL-SCNC: 22 MMOL/L — SIGNIFICANT CHANGE UP (ref 22–31)
CO2 SERPL-SCNC: 24 MMOL/L — SIGNIFICANT CHANGE UP (ref 22–31)
CO2 SERPL-SCNC: 25 MMOL/L — SIGNIFICANT CHANGE UP (ref 22–31)
CREAT SERPL-MCNC: 0.89 MG/DL — SIGNIFICANT CHANGE UP (ref 0.5–1.3)
CREAT SERPL-MCNC: 0.91 MG/DL — SIGNIFICANT CHANGE UP (ref 0.5–1.3)
CREAT SERPL-MCNC: 1.03 MG/DL — SIGNIFICANT CHANGE UP (ref 0.5–1.3)
EGFR: 105 ML/MIN/1.73M2 — SIGNIFICANT CHANGE UP
EGFR: 105 ML/MIN/1.73M2 — SIGNIFICANT CHANGE UP
EGFR: 107 ML/MIN/1.73M2 — SIGNIFICANT CHANGE UP
EGFR: 107 ML/MIN/1.73M2 — SIGNIFICANT CHANGE UP
EGFR: 91 ML/MIN/1.73M2 — SIGNIFICANT CHANGE UP
EGFR: 91 ML/MIN/1.73M2 — SIGNIFICANT CHANGE UP
EOSINOPHIL # BLD AUTO: 0.16 K/UL — SIGNIFICANT CHANGE UP (ref 0–0.5)
EOSINOPHIL NFR BLD AUTO: 3.6 % — SIGNIFICANT CHANGE UP (ref 0–6)
GAS PNL BLDA: SIGNIFICANT CHANGE UP
GLUCOSE BLDC GLUCOMTR-MCNC: 141 MG/DL — HIGH (ref 70–99)
GLUCOSE BLDC GLUCOMTR-MCNC: 154 MG/DL — HIGH (ref 70–99)
GLUCOSE BLDC GLUCOMTR-MCNC: 155 MG/DL — HIGH (ref 70–99)
GLUCOSE BLDC GLUCOMTR-MCNC: 155 MG/DL — HIGH (ref 70–99)
GLUCOSE BLDC GLUCOMTR-MCNC: 178 MG/DL — HIGH (ref 70–99)
GLUCOSE SERPL-MCNC: 133 MG/DL — HIGH (ref 70–99)
GLUCOSE SERPL-MCNC: 177 MG/DL — HIGH (ref 70–99)
GLUCOSE SERPL-MCNC: 178 MG/DL — HIGH (ref 70–99)
HCT VFR BLD CALC: 26.8 % — LOW (ref 39–50)
HCT VFR BLD CALC: 30.8 % — LOW (ref 39–50)
HGB BLD-MCNC: 10.3 G/DL — LOW (ref 13–17)
HGB BLD-MCNC: 9 G/DL — LOW (ref 13–17)
IMM GRANULOCYTES NFR BLD AUTO: 0.4 % — SIGNIFICANT CHANGE UP (ref 0–0.9)
INR BLD: 0.9 RATIO — SIGNIFICANT CHANGE UP (ref 0.85–1.16)
INR BLD: 0.99 RATIO — SIGNIFICANT CHANGE UP (ref 0.85–1.16)
LYMPHOCYTES # BLD AUTO: 0.58 K/UL — LOW (ref 1–3.3)
LYMPHOCYTES # BLD AUTO: 12.9 % — LOW (ref 13–44)
MAGNESIUM SERPL-MCNC: 1.7 MG/DL — SIGNIFICANT CHANGE UP (ref 1.6–2.6)
MAGNESIUM SERPL-MCNC: 1.7 MG/DL — SIGNIFICANT CHANGE UP (ref 1.6–2.6)
MAGNESIUM SERPL-MCNC: 1.8 MG/DL — SIGNIFICANT CHANGE UP (ref 1.6–2.6)
MCHC RBC-ENTMCNC: 30.6 PG — SIGNIFICANT CHANGE UP (ref 27–34)
MCHC RBC-ENTMCNC: 30.6 PG — SIGNIFICANT CHANGE UP (ref 27–34)
MCHC RBC-ENTMCNC: 33.4 G/DL — SIGNIFICANT CHANGE UP (ref 32–36)
MCHC RBC-ENTMCNC: 33.6 G/DL — SIGNIFICANT CHANGE UP (ref 32–36)
MCV RBC AUTO: 91.2 FL — SIGNIFICANT CHANGE UP (ref 80–100)
MCV RBC AUTO: 91.4 FL — SIGNIFICANT CHANGE UP (ref 80–100)
MONOCYTES # BLD AUTO: 0.32 K/UL — SIGNIFICANT CHANGE UP (ref 0–0.9)
MONOCYTES NFR BLD AUTO: 7.1 % — SIGNIFICANT CHANGE UP (ref 2–14)
NEUTROPHILS # BLD AUTO: 3.32 K/UL — SIGNIFICANT CHANGE UP (ref 1.8–7.4)
NEUTROPHILS NFR BLD AUTO: 74.2 % — SIGNIFICANT CHANGE UP (ref 43–77)
NRBC BLD AUTO-RTO: 0 /100 WBCS — SIGNIFICANT CHANGE UP (ref 0–0)
NRBC BLD AUTO-RTO: 0 /100 WBCS — SIGNIFICANT CHANGE UP (ref 0–0)
PHOSPHATE SERPL-MCNC: 3.2 MG/DL — SIGNIFICANT CHANGE UP (ref 2.5–4.5)
PHOSPHATE SERPL-MCNC: 3.9 MG/DL — SIGNIFICANT CHANGE UP (ref 2.5–4.5)
PHOSPHATE SERPL-MCNC: 3.9 MG/DL — SIGNIFICANT CHANGE UP (ref 2.5–4.5)
PLATELET # BLD AUTO: 185 K/UL — SIGNIFICANT CHANGE UP (ref 150–400)
PLATELET # BLD AUTO: 213 K/UL — SIGNIFICANT CHANGE UP (ref 150–400)
POTASSIUM SERPL-MCNC: 4.7 MMOL/L — SIGNIFICANT CHANGE UP (ref 3.5–5.3)
POTASSIUM SERPL-MCNC: 5.7 MMOL/L — HIGH (ref 3.5–5.3)
POTASSIUM SERPL-MCNC: 6.5 MMOL/L — CRITICAL HIGH (ref 3.5–5.3)
POTASSIUM SERPL-SCNC: 4.7 MMOL/L — SIGNIFICANT CHANGE UP (ref 3.5–5.3)
POTASSIUM SERPL-SCNC: 5.7 MMOL/L — HIGH (ref 3.5–5.3)
POTASSIUM SERPL-SCNC: 6.5 MMOL/L — CRITICAL HIGH (ref 3.5–5.3)
PROT SERPL-MCNC: 6.1 G/DL — SIGNIFICANT CHANGE UP (ref 6–8.3)
PROT SERPL-MCNC: 6.1 G/DL — SIGNIFICANT CHANGE UP (ref 6–8.3)
PROT SERPL-MCNC: 6.5 G/DL — SIGNIFICANT CHANGE UP (ref 6–8.3)
PROTHROM AB SERPL-ACNC: 10.3 SEC — SIGNIFICANT CHANGE UP (ref 9.9–13.4)
PROTHROM AB SERPL-ACNC: 11.4 SEC — SIGNIFICANT CHANGE UP (ref 9.9–13.4)
RBC # BLD: 2.94 M/UL — LOW (ref 4.2–5.8)
RBC # BLD: 3.37 M/UL — LOW (ref 4.2–5.8)
RBC # FLD: 17.5 % — HIGH (ref 10.3–14.5)
RBC # FLD: 18 % — HIGH (ref 10.3–14.5)
SODIUM SERPL-SCNC: 135 MMOL/L — SIGNIFICANT CHANGE UP (ref 135–145)
SODIUM SERPL-SCNC: 138 MMOL/L — SIGNIFICANT CHANGE UP (ref 135–145)
SODIUM SERPL-SCNC: 138 MMOL/L — SIGNIFICANT CHANGE UP (ref 135–145)
TACROLIMUS SERPL-MCNC: 6.9 NG/ML — SIGNIFICANT CHANGE UP
WBC # BLD: 4.48 K/UL — SIGNIFICANT CHANGE UP (ref 3.8–10.5)
WBC # BLD: 5.44 K/UL — SIGNIFICANT CHANGE UP (ref 3.8–10.5)
WBC # FLD AUTO: 4.48 K/UL — SIGNIFICANT CHANGE UP (ref 3.8–10.5)
WBC # FLD AUTO: 5.44 K/UL — SIGNIFICANT CHANGE UP (ref 3.8–10.5)

## 2025-03-17 PROCEDURE — 32653 THORACOSCOPY REMOV FB/FIBRIN: CPT | Mod: RT,78

## 2025-03-17 PROCEDURE — 31624 DX BRONCHOSCOPE/LAVAGE: CPT | Mod: 78

## 2025-03-17 PROCEDURE — 99232 SBSQ HOSP IP/OBS MODERATE 35: CPT | Mod: 24

## 2025-03-17 PROCEDURE — 31645 BRNCHSC W/THER ASPIR 1ST: CPT | Mod: 78

## 2025-03-17 PROCEDURE — 32652 THORACOSCOPY REM TOTL CORTEX: CPT | Mod: RT,78

## 2025-03-17 PROCEDURE — 71045 X-RAY EXAM CHEST 1 VIEW: CPT | Mod: 26

## 2025-03-17 DEVICE — PROGEL PLEURAL AIR LEAK 4ML: Type: IMPLANTABLE DEVICE | Site: RIGHT | Status: FUNCTIONAL

## 2025-03-17 DEVICE — CHEST DRAIN THORACIC ARGYLE PVC 28FR STRAIGHT: Type: IMPLANTABLE DEVICE | Site: RIGHT | Status: FUNCTIONAL

## 2025-03-17 RX ORDER — VALGANCICLOVIR 450 MG/1
450 TABLET, FILM COATED ORAL DAILY
Refills: 0 | Status: DISCONTINUED | OUTPATIENT
Start: 2025-03-17 | End: 2025-03-30

## 2025-03-17 RX ORDER — ATOVAQUONE 750 MG/5ML
1500 SUSPENSION ORAL DAILY
Refills: 0 | Status: DISCONTINUED | OUTPATIENT
Start: 2025-03-17 | End: 2025-03-30

## 2025-03-17 RX ORDER — TACROLIMUS 0.5 MG/1
2 CAPSULE ORAL
Refills: 0 | Status: DISCONTINUED | OUTPATIENT
Start: 2025-03-17 | End: 2025-03-20

## 2025-03-17 RX ORDER — HYDROXYZINE HYDROCHLORIDE 25 MG/1
25 TABLET, FILM COATED ORAL ONCE
Refills: 0 | Status: COMPLETED | OUTPATIENT
Start: 2025-03-17 | End: 2025-03-17

## 2025-03-17 RX ORDER — DEXTROSE 50 % IN WATER 50 %
25 SYRINGE (ML) INTRAVENOUS ONCE
Refills: 0 | Status: COMPLETED | OUTPATIENT
Start: 2025-03-17 | End: 2025-03-17

## 2025-03-17 RX ORDER — INSULIN LISPRO 100 U/ML
INJECTION, SOLUTION INTRAVENOUS; SUBCUTANEOUS
Refills: 0 | Status: DISCONTINUED | OUTPATIENT
Start: 2025-03-17 | End: 2025-03-30

## 2025-03-17 RX ORDER — FUROSEMIDE 10 MG/ML
40 INJECTION INTRAMUSCULAR; INTRAVENOUS DAILY
Refills: 0 | Status: DISCONTINUED | OUTPATIENT
Start: 2025-03-18 | End: 2025-03-19

## 2025-03-17 RX ORDER — OXYCODONE HYDROCHLORIDE 30 MG/1
2.5 TABLET ORAL EVERY 4 HOURS
Refills: 0 | Status: DISCONTINUED | OUTPATIENT
Start: 2025-03-17 | End: 2025-03-18

## 2025-03-17 RX ORDER — MAGNESIUM SULFATE 500 MG/ML
2 SYRINGE (ML) INJECTION ONCE
Refills: 0 | Status: COMPLETED | OUTPATIENT
Start: 2025-03-17 | End: 2025-03-18

## 2025-03-17 RX ORDER — DEXTROSE 50 % IN WATER 50 %
50 SYRINGE (ML) INTRAVENOUS ONCE
Refills: 0 | Status: COMPLETED | OUTPATIENT
Start: 2025-03-17 | End: 2025-03-17

## 2025-03-17 RX ORDER — MELATONIN 5 MG
3 TABLET ORAL AT BEDTIME
Refills: 0 | Status: DISCONTINUED | OUTPATIENT
Start: 2025-03-17 | End: 2025-03-30

## 2025-03-17 RX ORDER — FUROSEMIDE 10 MG/ML
20 INJECTION INTRAMUSCULAR; INTRAVENOUS ONCE
Refills: 0 | Status: COMPLETED | OUTPATIENT
Start: 2025-03-17 | End: 2025-03-18

## 2025-03-17 RX ORDER — SODIUM BICARBONATE 1 MEQ/ML
25 SYRINGE (ML) INTRAVENOUS ONCE
Refills: 0 | Status: COMPLETED | OUTPATIENT
Start: 2025-03-17 | End: 2025-03-17

## 2025-03-17 RX ORDER — INSULIN LISPRO 100 U/ML
INJECTION, SOLUTION INTRAVENOUS; SUBCUTANEOUS AT BEDTIME
Refills: 0 | Status: DISCONTINUED | OUTPATIENT
Start: 2025-03-17 | End: 2025-03-30

## 2025-03-17 RX ORDER — CALCIUM GLUCONATE 20 MG/ML
2 INJECTION, SOLUTION INTRAVENOUS ONCE
Refills: 0 | Status: COMPLETED | OUTPATIENT
Start: 2025-03-17 | End: 2025-03-17

## 2025-03-17 RX ORDER — CALCIUM GLUCONATE 20 MG/ML
2 INJECTION, SOLUTION INTRAVENOUS ONCE
Refills: 0 | Status: COMPLETED | OUTPATIENT
Start: 2025-03-17 | End: 2025-03-18

## 2025-03-17 RX ORDER — OXYCODONE HYDROCHLORIDE 30 MG/1
5 TABLET ORAL EVERY 4 HOURS
Refills: 0 | Status: DISCONTINUED | OUTPATIENT
Start: 2025-03-17 | End: 2025-03-18

## 2025-03-17 RX ORDER — MAGNESIUM OXIDE 400 MG
800 TABLET ORAL
Refills: 0 | Status: DISCONTINUED | OUTPATIENT
Start: 2025-03-17 | End: 2025-03-30

## 2025-03-17 RX ORDER — SODIUM CHLORIDE 0.65 %
1 AEROSOL, SPRAY (ML) NASAL
Refills: 0 | Status: DISCONTINUED | OUTPATIENT
Start: 2025-03-17 | End: 2025-03-30

## 2025-03-17 RX ORDER — PREDNISONE 20 MG/1
20 TABLET ORAL DAILY
Refills: 0 | Status: DISCONTINUED | OUTPATIENT
Start: 2025-03-17 | End: 2025-03-24

## 2025-03-17 RX ORDER — MYCOPHENOLIC ACID 360 MG/1
360 TABLET, DELAYED RELEASE ORAL
Refills: 0 | Status: DISCONTINUED | OUTPATIENT
Start: 2025-03-17 | End: 2025-03-30

## 2025-03-17 RX ADMIN — OXYCODONE HYDROCHLORIDE 2.5 MILLIGRAM(S): 30 TABLET ORAL at 21:18

## 2025-03-17 RX ADMIN — ATOVAQUONE 1500 MILLIGRAM(S): 750 SUSPENSION ORAL at 11:07

## 2025-03-17 RX ADMIN — TACROLIMUS 2 MILLIGRAM(S): 0.5 CAPSULE ORAL at 21:57

## 2025-03-17 RX ADMIN — OXYCODONE HYDROCHLORIDE 5 MILLIGRAM(S): 30 TABLET ORAL at 22:17

## 2025-03-17 RX ADMIN — CALCIUM GLUCONATE 200 GRAM(S): 20 INJECTION, SOLUTION INTRAVENOUS at 20:37

## 2025-03-17 RX ADMIN — VALGANCICLOVIR 450 MILLIGRAM(S): 450 TABLET, FILM COATED ORAL at 11:08

## 2025-03-17 RX ADMIN — Medication 10 UNIT(S): at 20:36

## 2025-03-17 RX ADMIN — Medication 25 MILLIEQUIVALENT(S): at 20:36

## 2025-03-17 RX ADMIN — TRAMADOL HYDROCHLORIDE 50 MILLIGRAM(S): 50 TABLET, FILM COATED ORAL at 07:04

## 2025-03-17 RX ADMIN — Medication 3 MILLIGRAM(S): at 21:04

## 2025-03-17 RX ADMIN — OXYCODONE HYDROCHLORIDE 2.5 MILLIGRAM(S): 30 TABLET ORAL at 22:17

## 2025-03-17 RX ADMIN — Medication 50 MILLILITER(S): at 20:36

## 2025-03-17 RX ADMIN — Medication 40 MILLIGRAM(S): at 05:21

## 2025-03-17 RX ADMIN — Medication 100 MILLIGRAM(S): at 11:08

## 2025-03-17 RX ADMIN — PREDNISONE 20 MILLIGRAM(S): 20 TABLET ORAL at 05:21

## 2025-03-17 RX ADMIN — HYDROXYZINE HYDROCHLORIDE 25 MILLIGRAM(S): 25 TABLET, FILM COATED ORAL at 21:04

## 2025-03-17 RX ADMIN — MYCOPHENOLIC ACID 360 MILLIGRAM(S): 360 TABLET, DELAYED RELEASE ORAL at 08:50

## 2025-03-17 RX ADMIN — FUROSEMIDE 40 MILLIGRAM(S): 10 INJECTION INTRAMUSCULAR; INTRAVENOUS at 05:21

## 2025-03-17 RX ADMIN — MYCOPHENOLIC ACID 360 MILLIGRAM(S): 360 TABLET, DELAYED RELEASE ORAL at 21:57

## 2025-03-17 RX ADMIN — TACROLIMUS 2 MILLIGRAM(S): 0.5 CAPSULE ORAL at 08:49

## 2025-03-17 RX ADMIN — CARVEDILOL 3.12 MILLIGRAM(S): 3.12 TABLET, FILM COATED ORAL at 05:21

## 2025-03-17 RX ADMIN — OXYCODONE HYDROCHLORIDE 5 MILLIGRAM(S): 30 TABLET ORAL at 23:00

## 2025-03-17 RX ADMIN — TRAMADOL HYDROCHLORIDE 50 MILLIGRAM(S): 50 TABLET, FILM COATED ORAL at 06:34

## 2025-03-17 RX ADMIN — VELPATASVIR AND SOFOSBUVIR 1 TABLET(S): 50; 200 TABLET, FILM COATED ORAL at 11:08

## 2025-03-17 RX ADMIN — INSULIN LISPRO 1: 100 INJECTION, SOLUTION INTRAVENOUS; SUBCUTANEOUS at 11:50

## 2025-03-17 NOTE — PRE-ANESTHESIA EVALUATION ADULT - NSANTHPMHFT_GEN_ALL_CORE
44M PMH with PMHx of decompensated ETOH cirrhosis c/b recurrent ascites, grade II esophageal varices, and hepatic encephalopathy. Recent admission 1/16 -3/11 for R empyema s/p robotic VATs with decortication (Dr Ramírez 1/28/25), MANUELITO (required HD, improved, permcath removed 1/31) and s/p liver transplant 2/27/2025.    denies CP/SOB

## 2025-03-17 NOTE — BRIEF OPERATIVE NOTE - NSICDXBRIEFPROCEDURE_GEN_ALL_CORE_FT
PROCEDURES:  Bronchoscopy with decortication of right lung using video-assisted thoracoscopic surgery (VATS) 17-Mar-2025 18:41:00  Reynaldo Guerra

## 2025-03-17 NOTE — PRE-ANESTHESIA EVALUATION ADULT - NSRADCARDRESULTSFT_GEN_ALL_CORE
< from: TTE Limited W or WO Ultrasound Enhancing Agent (01.23.25 @ 13:56) >    ONCLUSIONS:      1. Left ventricular cavity is normal in size. Left ventricular wall thickness is normal. Left ventricular systolic function is normal with an ejection fraction of 70 % by Roberson's method of disks. There are no regional wall motion abnormalities seen.   2. Left ventricular global longitudinal strain is -28.0 % which is normal (< -18%). Images were acquired on a [email or web address] ultrasound system and processed on the ultrasound machine with a heart rate of 76 bpm and a blood pressure of 110/60 mmHg.   3. Normal left ventricular diastolic function, with normal left ventricular filling pressure.   4. Normal right ventricular cavity size, with normal wall thickness, and normal right ventricular systolic function.   5. No significant valvular disease.   6. No pericardial effusion seen.   7. Estimated pulmonary artery systolic pressure is 54 mmHg.    < end of copied text >    < from: Cardiac Catheterization (02.04.25 @ 12:56) >    Diagnostic Conclusions:     Right dominant coronary anatomy with minimal, non obstructive disease      < end of copied text >

## 2025-03-17 NOTE — ADVANCED PRACTICE NURSE CONSULT - RECOMMEDATIONS
1. sacrum, b/l buttocks: continue to monitor  2. continue to encourage mobility, T&P  3. seat cushion when oob to chair  4. off-load heels when in bed  5. nutrition support as pt condition allows  Tx plan discussed with pt/RN

## 2025-03-17 NOTE — PROGRESS NOTE ADULT - ASSESSMENT
44M PMH with PMHx of decompensated ETOH cirrhosis c/b recurrent ascites, grade II esophageal varices, and hepatic encephalopathy. 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  Admitted with Hyperkalemia and planned Thoracotomy on 3/17/2025    Plan:    [] Hyperkalemia  - resolved    [ ] s/p HCV + DCD OLT   - good graft function  - HCV viremia/ Genotype 1A (donor hcv +) -  Epclusa 3/1   - Lasix 40QD PO  - ASA 81mg daily (held on admission for OR on Monday)   - SQH bid     [] Immuno  - FK per level,  Myfortic 360BID, Pred 20QD  - ppx: no bactrim due to sulfa allergy -> Mepron, valcyte, oscal, PPI    [] h/o R Empyema  - Thoracotomy scheduled Monday   - holding ASA   - continue CTX    44M PMH with PMHx of decompensated ETOH cirrhosis c/b recurrent ascites, grade II esophageal varices, and hepatic encephalopathy. 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  Admitted with Hyperkalemia and planned Thoracotomy on 3/17/2025    [ ] s/p HCV + DCD OLT   - good graft function  - HCV viremia/ Genotype 1A (donor hcv +) -  Epclusa 3/1   - Lasix 40QD PO  - ASA 81mg daily (held on admission for OR on Monday)   - SQH bid     [] Immuno  - FK per level,  Myfortic 360BID, Pred 20QD  - ppx: no bactrim due to sulfa allergy -> Mepron, valcyte, oscal, PPI    [] h/o R Empyema  - Thoracotomy scheduled today  - holding ASA   - continue CTX

## 2025-03-17 NOTE — ADVANCED PRACTICE NURSE CONSULT - ASSESSMENT
The pt was encountered on [address]. Mr Krissy was awake and alert, engaging in conversation. He is on a low air-loss surface and can turn himself independently in the bed. As he was a/w skin  changes, a nutrition consult is pending for further evaluation. the pt states that he walks independently around the unit, he is continent.  Abdominal staples are intact.  Upon assessment, it was noted that the skin on the sacrum and b/l buttocks was hyperpigmented; slightly darker that the pts remaining skin. this may be the pts normal skin tone but cannot r/o a component of DTI.  the skin on the b/l heels is intact.  Education was provided re: skin condition, tx plan and PI prevention.  the pt verbalized his happiness with his new liver and discussed his plan for not returning to drinking alcohol. Emotional support was given.

## 2025-03-17 NOTE — CONSULT NOTE ADULT - SUBJECTIVE AND OBJECTIVE BOX
HISTORY OF PRESENT ILLNESS:  44M PMH with PMHx of decompensated ETOH cirrhosis c/b recurrent ascites, grade II esophageal varices, and hepatic encephalopathy. 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 Admitted with Hyperkalemia and planned Thoracotomy on 3/17/2025. Now s/p Bronchoscopy with decortication of right lung using video-assisted thoracoscopic surgery (VATS).     PAST MEDICAL HISTORY: Sleep apnea, obstructive    Alcohol abuse    Cirrhosis of liver    Portal hypertension    H/O esophageal varices    Anemia    Mild alcohol use disorder    History of alcohol use disorder    Hepatic encephalopathy    Pleural effusion    History of pleural empyema      PAST SURGICAL HISTORY: No significant past surgical history    No significant past surgical history    Liver transplanted    S/P abdominal paracentesis    History of lung surgery      HOME MEDICATIONS: Home Medications:  aspirin 81 mg oral delayed release tablet: 1 tab(s) orally once a day (13 Mar 2025 19:14)  atovaquone 750 mg/5 mL oral suspension: 10 milliliter(s) orally once a day (13 Mar 2025 19:14)  carvedilol 3.125 mg oral tablet: 1 tab(s) orally every 12 hours (13 Mar 2025 19:14)  Lantus Solostar Pen 100 units/mL subcutaneous solution: 6 unit(s) subcutaneous once a day (at bedtime) (13 Mar 2025 19:14)  magnesium oxide 400 mg oral tablet: 1 tab(s) orally 2 times a day (with meals) (13 Mar 2025 19:14)  mycophenolic acid 360 mg oral delayed release tablet: 1 tab(s) orally 2 times a day (13 Mar 2025 19:14)  pantoprazole 40 mg oral delayed release tablet: 1 tab(s) orally once a day (before a meal) (13 Mar 2025 19:14)  predniSONE 20 mg oral tablet: 1 tab(s) orally once a day (13 Mar 2025 19:14)  sofosbuvir-velpatasvir 400 mg-100 mg oral tablet: 1 tab(s) orally once a day (at bedtime) (13 Mar 2025 19:14)  tacrolimus 1 mg oral capsule: 2 cap(s) orally 2 times a day (13 Mar 2025 19:14)  thiamine 100 mg oral tablet: 1 tab(s) orally once a day (13 Mar 2025 19:14)  valGANciclovir 450 mg oral tablet: 1 tab(s) orally once a day (13 Mar 2025 19:14)    ALLERGIES: sulfa drugs (Rash)    FAMILY HISTORY: No pertinent family history in first degree relatives    Family history of CABG (Mother)    FHx: multiple myeloma (Father)    FHx: diabetes mellitus (Father)      SOCIAL HISTORY:  REVIEW OF SYSTEMS:  Constitutional - no fatigue, fever, weakness, or night sweats  HEENT - no vision changes, ear pain, vertigo, post-nasal drip, changes to smell, sore throat, voice changes, throat swelling, or swollen glands  CV - no chest pain, chest tightness, or palpitations  Resp - no shortness of breath, cough, or wheezing  GI - no abdominal pain, nausea/vomiting, bowel habit changes, indigestion, diarrhea, or constipation  Renal - no pain w/ urination, flank pain, or incontinence  MSK - no muscle cramps, muscle pain, or difficulty walking  Skin - no itching, hives, rashes, unusual bruising, or unusual bleeding  VITAL SIGNS:  ICU Vital Signs Last 24 Hrs  T(C): 36.7 (17 Mar 2025 13:17), Max: 36.7 (17 Mar 2025 09:07)  T(F): 98.1 (17 Mar 2025 12:22), Max: 98.1 (17 Mar 2025 09:07)  HR: 62 (17 Mar 2025 20:00) (62 - 82)  BP: 133/83 (17 Mar 2025 19:10) (116/67 - 152/84)  BP(mean): 103 (17 Mar 2025 19:10) (94 - 103)  ABP: 146/73 (17 Mar 2025 20:00) (135/67 - 146/73)  ABP(mean): 99 (17 Mar 2025 20:00) (94 - 99)  RR: 16 (17 Mar 2025 13:17) (16 - 20)  SpO2: 98% (17 Mar 2025 20:00) (76% - 99%)    O2 Parameters below as of 17 Mar 2025 12:22  Patient On (Oxygen Delivery Method): room air          PHYSICAL EXAMINATION:  Gen: NAD, non-toxic appearing  Head: normal appearing  HEENT: normal conjunctiva, oral mucosa dry   Lung: no respiratory distress, speaking in full sentences, diminished right breath sounds, chest tubes x2 in place w/ proper output      CV: regular rate and rhythm, no murmurs  Abd: soft, non distended, non tender   MSK: no visible deformities  Neuro: No focal deficits, AAOx3  Skin: Warm  Psych: normal affect                          10.3   5.44  )-----------( 213      ( 17 Mar 2025 19:36 )             30.8     03-17    135  |  103  |  34[H]  ----------------------------<  178[H]  6.5[HH]   |  22  |  0.89    Ca    9.0      17 Mar 2025 19:36  Phos  3.9     03-17  Mg     1.7     03-17    TPro  6.5  /  Alb  3.0[L]  /  TBili  0.7  /  DBili  x   /  AST  11  /  ALT  6[L]  /  AlkPhos  68  03-17    PT/INR - ( 17 Mar 2025 19:36 )   PT: 11.4 sec;   INR: 0.99 ratio         PTT - ( 17 Mar 2025 19:36 )  PTT:30.6 sec  ABG - ( 17 Mar 2025 19:15 )  pH: 7.32  /  pCO2: 49    /  pO2: 92    / HCO3: 25    / Base Excess: -1.2  /  SaO2: 98.9    Lactate: x                  RECENT CULTURES:    CAPILLARY BLOOD GLUCOSE      POCT Blood Glucose.: 155 mg/dL (17 Mar 2025 20:32)  POCT Blood Glucose.: 178 mg/dL (17 Mar 2025 11:46)  POCT Blood Glucose.: 141 mg/dL (17 Mar 2025 05:54)  POCT Blood Glucose.: 154 mg/dL (17 Mar 2025 00:00)  POCT Blood Glucose.: 122 mg/dL (16 Mar 2025 21:25)    IMAGING STUDIES:

## 2025-03-17 NOTE — BRIEF OPERATIVE NOTE - OPERATION/FINDINGS
VATS exploration of pleural space, with evacuation of hematoma. Cortical peel of R lower, middle, and upper lobes collected. Lung re-expanded with contact to chest wall at end of case. VATS exploration of pleural space, with evacuation of hemothorax. Cortical peel of R lower, middle, and upper lobes collected. Lung re-expanded with contact to chest wall at end of case.

## 2025-03-17 NOTE — PROGRESS NOTE ADULT - SUBJECTIVE AND OBJECTIVE BOX
Transplant Surgery - Multidisciplinary Rounds  --------------------------------------------------------------   OLT 02/27/25            Present: Patient seen and examined with multidisciplinary Transplant team including Surgeon Dr. Dagher, Hepatologist Dr. Burgess fellow, KAMAR Peterson/Lenora and bedside RN during AM rounds.   Disciplines not in attendance will be notified of the plan.     HPI: 44M PMH with PMHx of decompensated ETOH cirrhosis c/b recurrent ascites, grade II esophageal varices, and hepatic encephalopathy. 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 Admitted with Hyperkalemia and planned Thoracotomy on 3/17/2025    Interval Events:  - afebrile, VSS  - K 4.5    Immunosuppression  Maintenance: FK per level, Myfortic 360 BID, pred 20  Ongoing monitoring for signs of rejection     Potential Discharge date: TBD  Education:  Medications  Plan of care:  See Below    TX DATA HERE    Review of Systems:  All other systems were reviewed and are negative, except as noted.    Physical Exam:   Constitutional: Well developed / well nourished  Eyes: PERRLA  ENMT: nc/at, no thrush  Neck: supple  Respiratory: slight decreased BS on RLL, good airway movement, no adventitious sounds.   Cardiovascular: RRR  Gastrointestinal: Soft abdomen, ND, appropriate incisional TTP. chevron incision c/d/i, staples in place. No signs of infection.   Genitourinary: voiding  Extremities: SCD's in place and working bilaterally  Vascular: Palpable dp pulses bilaterally.   Neurological: A&O x3  Skin: no rashes, ulcerations, lesions  Musculoskeletal: Moving all extremities   Transplant Surgery - Multidisciplinary Rounds  --------------------------------------------------------------   OLT 02/27/25            Present: Patient seen and examined with multidisciplinary Transplant team including Surgeon Dr. Dagher, Hepatologist Dr. Burgess fellow, KAMAR Peterson/Lenora and bedside RN during AM rounds.   Disciplines not in attendance will be notified of the plan.     HPI: 44M PMH with PMHx of decompensated ETOH cirrhosis c/b recurrent ascites, grade II esophageal varices, and hepatic encephalopathy. 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 Admitted with Hyperkalemia and planned Thoracotomy on 3/17/2025    Interval Events:  - afebrile, VSS  - K 4.5    Immunosuppression  Maintenance: FK per level, Myfortic 360 BID, pred 20  Ongoing monitoring for signs of rejection     Potential Discharge date: TBD  Education:  Medications  Plan of care:  See Below      MEDICATIONS  (STANDING):    MEDICATIONS  (PRN):      PAST MEDICAL & SURGICAL HISTORY:  Sleep apnea, obstructive      Alcohol abuse      Cirrhosis of liver      Portal hypertension      H/O esophageal varices      Anemia      History of alcohol use disorder      Hepatic encephalopathy      Pleural effusion      History of pleural empyema      Liver transplanted      S/P abdominal paracentesis      History of lung surgery          Vital Signs Last 24 Hrs  T(C): 36.7 (17 Mar 2025 13:17), Max: 36.7 (17 Mar 2025 09:07)  T(F): 98.1 (17 Mar 2025 12:22), Max: 98.1 (17 Mar 2025 09:07)  HR: 69 (17 Mar 2025 13:17) (65 - 82)  BP: 142/84 (17 Mar 2025 13:17) (116/67 - 152/84)  BP(mean): 94 (17 Mar 2025 13:17) (94 - 94)  RR: 16 (17 Mar 2025 13:17) (16 - 20)  SpO2: 98% (17 Mar 2025 13:17) (93% - 99%)    Parameters below as of 17 Mar 2025 12:22  Patient On (Oxygen Delivery Method): room air        I&O's Summary    16 Mar 2025 07:01  -  17 Mar 2025 07:00  --------------------------------------------------------  IN: 0 mL / OUT: 2200 mL / NET: -2200 mL    17 Mar 2025 07:01  -  17 Mar 2025 15:48  --------------------------------------------------------  IN: 0 mL / OUT: 400 mL / NET: -400 mL                         9.0    4.48  )-----------( 185      ( 17 Mar 2025 06:23 )             26.8     03-17    138  |  103  |  34[H]  ----------------------------<  133[H]  4.7   |  25  |  1.03    Ca    9.0      17 Mar 2025 06:25  Phos  3.2     03-17  Mg     1.8     03-17    TPro  6.1  /  Alb  2.7[L]  /  TBili  0.5  /  DBili  x   /  AST  <5[L]  /  ALT  6[L]  /  AlkPhos  68  03-17    Tacrolimus (), Serum: 6.9 ng/mL (03-17 @ 06:23)    Review of Systems:  All other systems were reviewed and are negative, except as noted.    Physical Exam:   Constitutional: Well developed / well nourished  Eyes: PERRLA  ENMT: nc/at, no thrush  Neck: supple  Respiratory: slight decreased BS on RLL, good airway movement, no adventitious sounds.   Cardiovascular: RRR  Gastrointestinal: Soft abdomen, ND, appropriate incisional TTP. chevron incision c/d/i, staples in place. No signs of infection.   Genitourinary: voiding  Extremities: SCD's in place and working bilaterally  Vascular: Palpable dp pulses bilaterally.   Neurological: A&O x3  Skin: no rashes, ulcerations, lesions  Musculoskeletal: Moving all extremities

## 2025-03-18 DIAGNOSIS — E87.5 HYPERKALEMIA: ICD-10-CM

## 2025-03-18 LAB
ALBUMIN SERPL ELPH-MCNC: 2.8 G/DL — LOW (ref 3.3–5)
ALBUMIN SERPL ELPH-MCNC: 2.9 G/DL — LOW (ref 3.3–5)
ALBUMIN SERPL ELPH-MCNC: 2.9 G/DL — LOW (ref 3.3–5)
ALP SERPL-CCNC: 60 U/L — SIGNIFICANT CHANGE UP (ref 40–120)
ALP SERPL-CCNC: 61 U/L — SIGNIFICANT CHANGE UP (ref 40–120)
ALP SERPL-CCNC: 64 U/L — SIGNIFICANT CHANGE UP (ref 40–120)
ALP SERPL-CCNC: 72 U/L — SIGNIFICANT CHANGE UP (ref 40–120)
ALP SERPL-CCNC: 80 U/L — SIGNIFICANT CHANGE UP (ref 40–120)
ALT FLD-CCNC: 6 U/L — LOW (ref 10–45)
ALT FLD-CCNC: 7 U/L — LOW (ref 10–45)
ALT FLD-CCNC: <5 U/L — LOW (ref 10–45)
ANION GAP SERPL CALC-SCNC: 10 MMOL/L — SIGNIFICANT CHANGE UP (ref 5–17)
ANION GAP SERPL CALC-SCNC: 11 MMOL/L — SIGNIFICANT CHANGE UP (ref 5–17)
ANION GAP SERPL CALC-SCNC: 12 MMOL/L — SIGNIFICANT CHANGE UP (ref 5–17)
APTT BLD: 29.2 SEC — SIGNIFICANT CHANGE UP (ref 24.5–35.6)
AST SERPL-CCNC: 10 U/L — SIGNIFICANT CHANGE UP (ref 10–40)
AST SERPL-CCNC: 13 U/L — SIGNIFICANT CHANGE UP (ref 10–40)
AST SERPL-CCNC: 8 U/L — LOW (ref 10–40)
AST SERPL-CCNC: 9 U/L — LOW (ref 10–40)
AST SERPL-CCNC: 9 U/L — LOW (ref 10–40)
BILIRUB SERPL-MCNC: 0.5 MG/DL — SIGNIFICANT CHANGE UP (ref 0.2–1.2)
BILIRUB SERPL-MCNC: 0.6 MG/DL — SIGNIFICANT CHANGE UP (ref 0.2–1.2)
BILIRUB SERPL-MCNC: 0.6 MG/DL — SIGNIFICANT CHANGE UP (ref 0.2–1.2)
BUN SERPL-MCNC: 36 MG/DL — HIGH (ref 7–23)
BUN SERPL-MCNC: 37 MG/DL — HIGH (ref 7–23)
BUN SERPL-MCNC: 38 MG/DL — HIGH (ref 7–23)
CALCIUM SERPL-MCNC: 10.3 MG/DL — SIGNIFICANT CHANGE UP (ref 8.4–10.5)
CALCIUM SERPL-MCNC: 10.5 MG/DL — SIGNIFICANT CHANGE UP (ref 8.4–10.5)
CALCIUM SERPL-MCNC: 9.2 MG/DL — SIGNIFICANT CHANGE UP (ref 8.4–10.5)
CALCIUM SERPL-MCNC: 9.2 MG/DL — SIGNIFICANT CHANGE UP (ref 8.4–10.5)
CALCIUM SERPL-MCNC: 9.9 MG/DL — SIGNIFICANT CHANGE UP (ref 8.4–10.5)
CHLORIDE SERPL-SCNC: 101 MMOL/L — SIGNIFICANT CHANGE UP (ref 96–108)
CHLORIDE SERPL-SCNC: 103 MMOL/L — SIGNIFICANT CHANGE UP (ref 96–108)
CHLORIDE SERPL-SCNC: 97 MMOL/L — SIGNIFICANT CHANGE UP (ref 96–108)
CHLORIDE SERPL-SCNC: 98 MMOL/L — SIGNIFICANT CHANGE UP (ref 96–108)
CHLORIDE SERPL-SCNC: 99 MMOL/L — SIGNIFICANT CHANGE UP (ref 96–108)
CO2 SERPL-SCNC: 23 MMOL/L — SIGNIFICANT CHANGE UP (ref 22–31)
CO2 SERPL-SCNC: 23 MMOL/L — SIGNIFICANT CHANGE UP (ref 22–31)
CO2 SERPL-SCNC: 24 MMOL/L — SIGNIFICANT CHANGE UP (ref 22–31)
CO2 SERPL-SCNC: 25 MMOL/L — SIGNIFICANT CHANGE UP (ref 22–31)
CO2 SERPL-SCNC: 25 MMOL/L — SIGNIFICANT CHANGE UP (ref 22–31)
CREAT SERPL-MCNC: 0.96 MG/DL — SIGNIFICANT CHANGE UP (ref 0.5–1.3)
CREAT SERPL-MCNC: 0.97 MG/DL — SIGNIFICANT CHANGE UP (ref 0.5–1.3)
CREAT SERPL-MCNC: 0.97 MG/DL — SIGNIFICANT CHANGE UP (ref 0.5–1.3)
CREAT SERPL-MCNC: 1.08 MG/DL — SIGNIFICANT CHANGE UP (ref 0.5–1.3)
CREAT SERPL-MCNC: 1.25 MG/DL — SIGNIFICANT CHANGE UP (ref 0.5–1.3)
EGFR: 72 ML/MIN/1.73M2 — SIGNIFICANT CHANGE UP
EGFR: 72 ML/MIN/1.73M2 — SIGNIFICANT CHANGE UP
EGFR: 86 ML/MIN/1.73M2 — SIGNIFICANT CHANGE UP
EGFR: 86 ML/MIN/1.73M2 — SIGNIFICANT CHANGE UP
EGFR: 98 ML/MIN/1.73M2 — SIGNIFICANT CHANGE UP
EGFR: 99 ML/MIN/1.73M2 — SIGNIFICANT CHANGE UP
EGFR: 99 ML/MIN/1.73M2 — SIGNIFICANT CHANGE UP
GLUCOSE BLDC GLUCOMTR-MCNC: 106 MG/DL — HIGH (ref 70–99)
GLUCOSE BLDC GLUCOMTR-MCNC: 135 MG/DL — HIGH (ref 70–99)
GLUCOSE BLDC GLUCOMTR-MCNC: 144 MG/DL — HIGH (ref 70–99)
GLUCOSE BLDC GLUCOMTR-MCNC: 185 MG/DL — HIGH (ref 70–99)
GLUCOSE BLDC GLUCOMTR-MCNC: 196 MG/DL — HIGH (ref 70–99)
GLUCOSE BLDC GLUCOMTR-MCNC: 242 MG/DL — HIGH (ref 70–99)
GLUCOSE BLDC GLUCOMTR-MCNC: 255 MG/DL — HIGH (ref 70–99)
GLUCOSE SERPL-MCNC: 117 MG/DL — HIGH (ref 70–99)
GLUCOSE SERPL-MCNC: 192 MG/DL — HIGH (ref 70–99)
GLUCOSE SERPL-MCNC: 224 MG/DL — HIGH (ref 70–99)
GLUCOSE SERPL-MCNC: 245 MG/DL — HIGH (ref 70–99)
GLUCOSE SERPL-MCNC: 252 MG/DL — HIGH (ref 70–99)
GRAM STN FLD: SIGNIFICANT CHANGE UP
HCT VFR BLD CALC: 27.5 % — LOW (ref 39–50)
HGB BLD-MCNC: 9.4 G/DL — LOW (ref 13–17)
INR BLD: 0.98 RATIO — SIGNIFICANT CHANGE UP (ref 0.85–1.16)
MAGNESIUM SERPL-MCNC: 1.8 MG/DL — SIGNIFICANT CHANGE UP (ref 1.6–2.6)
MAGNESIUM SERPL-MCNC: 1.8 MG/DL — SIGNIFICANT CHANGE UP (ref 1.6–2.6)
MAGNESIUM SERPL-MCNC: 2 MG/DL — SIGNIFICANT CHANGE UP (ref 1.6–2.6)
MAGNESIUM SERPL-MCNC: 2.2 MG/DL — SIGNIFICANT CHANGE UP (ref 1.6–2.6)
MAGNESIUM SERPL-MCNC: 2.2 MG/DL — SIGNIFICANT CHANGE UP (ref 1.6–2.6)
MCHC RBC-ENTMCNC: 31 PG — SIGNIFICANT CHANGE UP (ref 27–34)
MCHC RBC-ENTMCNC: 34.2 G/DL — SIGNIFICANT CHANGE UP (ref 32–36)
MCV RBC AUTO: 90.8 FL — SIGNIFICANT CHANGE UP (ref 80–100)
NIGHT BLUE STAIN TISS: SIGNIFICANT CHANGE UP
NRBC BLD AUTO-RTO: 0 /100 WBCS — SIGNIFICANT CHANGE UP (ref 0–0)
PHOSPHATE SERPL-MCNC: 4 MG/DL — SIGNIFICANT CHANGE UP (ref 2.5–4.5)
PHOSPHATE SERPL-MCNC: 4.1 MG/DL — SIGNIFICANT CHANGE UP (ref 2.5–4.5)
PHOSPHATE SERPL-MCNC: 4.2 MG/DL — SIGNIFICANT CHANGE UP (ref 2.5–4.5)
PHOSPHATE SERPL-MCNC: 4.4 MG/DL — SIGNIFICANT CHANGE UP (ref 2.5–4.5)
PHOSPHATE SERPL-MCNC: 4.4 MG/DL — SIGNIFICANT CHANGE UP (ref 2.5–4.5)
PLATELET # BLD AUTO: 220 K/UL — SIGNIFICANT CHANGE UP (ref 150–400)
POTASSIUM SERPL-MCNC: 5 MMOL/L — SIGNIFICANT CHANGE UP (ref 3.5–5.3)
POTASSIUM SERPL-MCNC: 5.4 MMOL/L — HIGH (ref 3.5–5.3)
POTASSIUM SERPL-MCNC: 5.4 MMOL/L — HIGH (ref 3.5–5.3)
POTASSIUM SERPL-MCNC: 5.6 MMOL/L — HIGH (ref 3.5–5.3)
POTASSIUM SERPL-MCNC: 6.1 MMOL/L — HIGH (ref 3.5–5.3)
POTASSIUM SERPL-SCNC: 5 MMOL/L — SIGNIFICANT CHANGE UP (ref 3.5–5.3)
POTASSIUM SERPL-SCNC: 5.4 MMOL/L — HIGH (ref 3.5–5.3)
POTASSIUM SERPL-SCNC: 5.4 MMOL/L — HIGH (ref 3.5–5.3)
POTASSIUM SERPL-SCNC: 5.6 MMOL/L — HIGH (ref 3.5–5.3)
POTASSIUM SERPL-SCNC: 6.1 MMOL/L — HIGH (ref 3.5–5.3)
PROT SERPL-MCNC: 6 G/DL — SIGNIFICANT CHANGE UP (ref 6–8.3)
PROT SERPL-MCNC: 6 G/DL — SIGNIFICANT CHANGE UP (ref 6–8.3)
PROT SERPL-MCNC: 6.1 G/DL — SIGNIFICANT CHANGE UP (ref 6–8.3)
PROT SERPL-MCNC: 6.1 G/DL — SIGNIFICANT CHANGE UP (ref 6–8.3)
PROT SERPL-MCNC: 6.2 G/DL — SIGNIFICANT CHANGE UP (ref 6–8.3)
PROTHROM AB SERPL-ACNC: 11.2 SEC — SIGNIFICANT CHANGE UP (ref 9.9–13.4)
RBC # BLD: 3.03 M/UL — LOW (ref 4.2–5.8)
RBC # FLD: 17.5 % — HIGH (ref 10.3–14.5)
SODIUM SERPL-SCNC: 132 MMOL/L — LOW (ref 135–145)
SODIUM SERPL-SCNC: 133 MMOL/L — LOW (ref 135–145)
SODIUM SERPL-SCNC: 135 MMOL/L — SIGNIFICANT CHANGE UP (ref 135–145)
SODIUM SERPL-SCNC: 137 MMOL/L — SIGNIFICANT CHANGE UP (ref 135–145)
SODIUM SERPL-SCNC: 138 MMOL/L — SIGNIFICANT CHANGE UP (ref 135–145)
SPECIMEN SOURCE: SIGNIFICANT CHANGE UP
TACROLIMUS SERPL-MCNC: 7.4 NG/ML — SIGNIFICANT CHANGE UP
WBC # BLD: 8.58 K/UL — SIGNIFICANT CHANGE UP (ref 3.8–10.5)
WBC # FLD AUTO: 8.58 K/UL — SIGNIFICANT CHANGE UP (ref 3.8–10.5)

## 2025-03-18 PROCEDURE — 99222 1ST HOSP IP/OBS MODERATE 55: CPT | Mod: GC

## 2025-03-18 PROCEDURE — 99233 SBSQ HOSP IP/OBS HIGH 50: CPT | Mod: 57

## 2025-03-18 PROCEDURE — 71045 X-RAY EXAM CHEST 1 VIEW: CPT | Mod: 26

## 2025-03-18 PROCEDURE — 99232 SBSQ HOSP IP/OBS MODERATE 35: CPT | Mod: 24

## 2025-03-18 RX ORDER — HYDROMORPHONE/SOD CHLOR,ISO/PF 2 MG/10 ML
10 SYRINGE (ML) INJECTION ONCE
Refills: 0 | Status: DISCONTINUED | OUTPATIENT
Start: 2025-03-18 | End: 2025-03-18

## 2025-03-18 RX ORDER — CEFTRIAXONE 500 MG/1
1000 INJECTION, POWDER, FOR SOLUTION INTRAMUSCULAR; INTRAVENOUS EVERY 24 HOURS
Refills: 0 | Status: COMPLETED | OUTPATIENT
Start: 2025-03-19 | End: 2025-03-23

## 2025-03-18 RX ORDER — HYDROMORPHONE/SOD CHLOR,ISO/PF 2 MG/10 ML
0.5 SYRINGE (ML) INJECTION ONCE
Refills: 0 | Status: DISCONTINUED | OUTPATIENT
Start: 2025-03-18 | End: 2025-03-18

## 2025-03-18 RX ORDER — OXYCODONE HYDROCHLORIDE 30 MG/1
5 TABLET ORAL EVERY 4 HOURS
Refills: 0 | Status: DISCONTINUED | OUTPATIENT
Start: 2025-03-18 | End: 2025-03-19

## 2025-03-18 RX ORDER — CARVEDILOL 3.12 MG/1
3.12 TABLET, FILM COATED ORAL EVERY 12 HOURS
Refills: 0 | Status: DISCONTINUED | OUTPATIENT
Start: 2025-03-18 | End: 2025-03-30

## 2025-03-18 RX ORDER — DEXTROSE 50 % IN WATER 50 %
50 SYRINGE (ML) INTRAVENOUS ONCE
Refills: 0 | Status: COMPLETED | OUTPATIENT
Start: 2025-03-18 | End: 2025-03-18

## 2025-03-18 RX ORDER — SODIUM ZIRCONIUM CYCLOSILICATE 5 G/5G
10 POWDER, FOR SUSPENSION ORAL ONCE
Refills: 0 | Status: COMPLETED | OUTPATIENT
Start: 2025-03-18 | End: 2025-03-18

## 2025-03-18 RX ORDER — VELPATASVIR AND SOFOSBUVIR 50; 200 MG/1; MG/1
1 TABLET, FILM COATED ORAL DAILY
Refills: 0 | Status: DISCONTINUED | OUTPATIENT
Start: 2025-03-18 | End: 2025-03-30

## 2025-03-18 RX ORDER — INSULIN GLARGINE-YFGN 100 [IU]/ML
6 INJECTION, SOLUTION SUBCUTANEOUS AT BEDTIME
Refills: 0 | Status: DISCONTINUED | OUTPATIENT
Start: 2025-03-18 | End: 2025-03-30

## 2025-03-18 RX ORDER — SENNA 187 MG
2 TABLET ORAL AT BEDTIME
Refills: 0 | Status: DISCONTINUED | OUTPATIENT
Start: 2025-03-18 | End: 2025-03-30

## 2025-03-18 RX ORDER — OXYCODONE HYDROCHLORIDE 30 MG/1
10 TABLET ORAL EVERY 4 HOURS
Refills: 0 | Status: DISCONTINUED | OUTPATIENT
Start: 2025-03-18 | End: 2025-03-19

## 2025-03-18 RX ORDER — MAGNESIUM SULFATE 500 MG/ML
2 SYRINGE (ML) INJECTION ONCE
Refills: 0 | Status: COMPLETED | OUTPATIENT
Start: 2025-03-18 | End: 2025-03-18

## 2025-03-18 RX ORDER — CEFTRIAXONE 500 MG/1
INJECTION, POWDER, FOR SOLUTION INTRAMUSCULAR; INTRAVENOUS
Refills: 0 | Status: COMPLETED | OUTPATIENT
Start: 2025-03-18 | End: 2025-03-24

## 2025-03-18 RX ORDER — FUROSEMIDE 10 MG/ML
20 INJECTION INTRAMUSCULAR; INTRAVENOUS ONCE
Refills: 0 | Status: COMPLETED | OUTPATIENT
Start: 2025-03-18 | End: 2025-03-18

## 2025-03-18 RX ORDER — LABETALOL HYDROCHLORIDE 200 MG/1
10 TABLET, FILM COATED ORAL ONCE
Refills: 0 | Status: COMPLETED | OUTPATIENT
Start: 2025-03-18 | End: 2025-03-18

## 2025-03-18 RX ORDER — CEFTRIAXONE 500 MG/1
1000 INJECTION, POWDER, FOR SOLUTION INTRAMUSCULAR; INTRAVENOUS ONCE
Refills: 0 | Status: COMPLETED | OUTPATIENT
Start: 2025-03-18 | End: 2025-03-18

## 2025-03-18 RX ORDER — ALBUTEROL SULFATE 2.5 MG/3ML
10 VIAL, NEBULIZER (ML) INHALATION ONCE
Refills: 0 | Status: COMPLETED | OUTPATIENT
Start: 2025-03-18 | End: 2025-03-18

## 2025-03-18 RX ORDER — SODIUM BICARBONATE 1 MEQ/ML
25 SYRINGE (ML) INTRAVENOUS ONCE
Refills: 0 | Status: COMPLETED | OUTPATIENT
Start: 2025-03-18 | End: 2025-03-18

## 2025-03-18 RX ORDER — TRAMADOL HYDROCHLORIDE 50 MG/1
50 TABLET, FILM COATED ORAL ONCE
Refills: 0 | Status: DISCONTINUED | OUTPATIENT
Start: 2025-03-18 | End: 2025-03-18

## 2025-03-18 RX ORDER — POLYETHYLENE GLYCOL 3350 17 G/17G
17 POWDER, FOR SOLUTION ORAL DAILY
Refills: 0 | Status: DISCONTINUED | OUTPATIENT
Start: 2025-03-18 | End: 2025-03-30

## 2025-03-18 RX ORDER — SODIUM ZIRCONIUM CYCLOSILICATE 5 G/5G
10 POWDER, FOR SUSPENSION ORAL DAILY
Refills: 0 | Status: DISCONTINUED | OUTPATIENT
Start: 2025-03-18 | End: 2025-03-24

## 2025-03-18 RX ORDER — CARVEDILOL 3.12 MG/1
3.12 TABLET, FILM COATED ORAL ONCE
Refills: 0 | Status: COMPLETED | OUTPATIENT
Start: 2025-03-18 | End: 2025-03-18

## 2025-03-18 RX ORDER — CALCIUM GLUCONATE 20 MG/ML
2 INJECTION, SOLUTION INTRAVENOUS ONCE
Refills: 0 | Status: COMPLETED | OUTPATIENT
Start: 2025-03-18 | End: 2025-03-18

## 2025-03-18 RX ADMIN — OXYCODONE HYDROCHLORIDE 10 MILLIGRAM(S): 30 TABLET ORAL at 11:23

## 2025-03-18 RX ADMIN — INSULIN LISPRO 4: 100 INJECTION, SOLUTION INTRAVENOUS; SUBCUTANEOUS at 12:23

## 2025-03-18 RX ADMIN — CARVEDILOL 3.12 MILLIGRAM(S): 3.12 TABLET, FILM COATED ORAL at 17:29

## 2025-03-18 RX ADMIN — FUROSEMIDE 20 MILLIGRAM(S): 10 INJECTION INTRAMUSCULAR; INTRAVENOUS at 00:17

## 2025-03-18 RX ADMIN — CARVEDILOL 3.12 MILLIGRAM(S): 3.12 TABLET, FILM COATED ORAL at 01:42

## 2025-03-18 RX ADMIN — TACROLIMUS 2 MILLIGRAM(S): 0.5 CAPSULE ORAL at 19:34

## 2025-03-18 RX ADMIN — ATOVAQUONE 1500 MILLIGRAM(S): 750 SUSPENSION ORAL at 11:03

## 2025-03-18 RX ADMIN — MYCOPHENOLIC ACID 360 MILLIGRAM(S): 360 TABLET, DELAYED RELEASE ORAL at 07:59

## 2025-03-18 RX ADMIN — Medication 0.5 MILLIGRAM(S): at 02:49

## 2025-03-18 RX ADMIN — Medication 50 MILLILITER(S): at 02:33

## 2025-03-18 RX ADMIN — Medication 0.5 MILLIGRAM(S): at 13:24

## 2025-03-18 RX ADMIN — OXYCODONE HYDROCHLORIDE 10 MILLIGRAM(S): 30 TABLET ORAL at 10:53

## 2025-03-18 RX ADMIN — Medication 3 MILLIGRAM(S): at 22:30

## 2025-03-18 RX ADMIN — VALGANCICLOVIR 450 MILLIGRAM(S): 450 TABLET, FILM COATED ORAL at 11:04

## 2025-03-18 RX ADMIN — TACROLIMUS 2 MILLIGRAM(S): 0.5 CAPSULE ORAL at 07:58

## 2025-03-18 RX ADMIN — INSULIN LISPRO 2: 100 INJECTION, SOLUTION INTRAVENOUS; SUBCUTANEOUS at 16:45

## 2025-03-18 RX ADMIN — OXYCODONE HYDROCHLORIDE 5 MILLIGRAM(S): 30 TABLET ORAL at 08:28

## 2025-03-18 RX ADMIN — OXYCODONE HYDROCHLORIDE 10 MILLIGRAM(S): 30 TABLET ORAL at 06:21

## 2025-03-18 RX ADMIN — OXYCODONE HYDROCHLORIDE 5 MILLIGRAM(S): 30 TABLET ORAL at 07:58

## 2025-03-18 RX ADMIN — PREDNISONE 20 MILLIGRAM(S): 20 TABLET ORAL at 05:58

## 2025-03-18 RX ADMIN — FUROSEMIDE 20 MILLIGRAM(S): 10 INJECTION INTRAMUSCULAR; INTRAVENOUS at 14:26

## 2025-03-18 RX ADMIN — OXYCODONE HYDROCHLORIDE 10 MILLIGRAM(S): 30 TABLET ORAL at 17:29

## 2025-03-18 RX ADMIN — Medication 40 MILLIGRAM(S): at 06:21

## 2025-03-18 RX ADMIN — FUROSEMIDE 40 MILLIGRAM(S): 10 INJECTION INTRAMUSCULAR; INTRAVENOUS at 05:58

## 2025-03-18 RX ADMIN — Medication 10 UNIT(S): at 02:33

## 2025-03-18 RX ADMIN — SODIUM ZIRCONIUM CYCLOSILICATE 10 GRAM(S): 5 POWDER, FOR SUSPENSION ORAL at 02:33

## 2025-03-18 RX ADMIN — LABETALOL HYDROCHLORIDE 10 MILLIGRAM(S): 200 TABLET, FILM COATED ORAL at 02:57

## 2025-03-18 RX ADMIN — SODIUM ZIRCONIUM CYCLOSILICATE 10 GRAM(S): 5 POWDER, FOR SUSPENSION ORAL at 11:03

## 2025-03-18 RX ADMIN — Medication 800 MILLIGRAM(S): at 08:20

## 2025-03-18 RX ADMIN — OXYCODONE HYDROCHLORIDE 10 MILLIGRAM(S): 30 TABLET ORAL at 17:59

## 2025-03-18 RX ADMIN — Medication 800 MILLIGRAM(S): at 16:45

## 2025-03-18 RX ADMIN — Medication 1 APPLICATION(S): at 05:58

## 2025-03-18 RX ADMIN — INSULIN GLARGINE-YFGN 6 UNIT(S): 100 INJECTION, SOLUTION SUBCUTANEOUS at 21:51

## 2025-03-18 RX ADMIN — CALCIUM GLUCONATE 200 GRAM(S): 20 INJECTION, SOLUTION INTRAVENOUS at 00:11

## 2025-03-18 RX ADMIN — Medication 100 MILLIGRAM(S): at 11:03

## 2025-03-18 RX ADMIN — Medication 0.5 MILLIGRAM(S): at 13:39

## 2025-03-18 RX ADMIN — OXYCODONE HYDROCHLORIDE 2.5 MILLIGRAM(S): 30 TABLET ORAL at 01:29

## 2025-03-18 RX ADMIN — Medication 25 GRAM(S): at 00:44

## 2025-03-18 RX ADMIN — Medication 10 MILLIGRAM(S): at 03:05

## 2025-03-18 RX ADMIN — Medication 0.5 MILLIGRAM(S): at 03:19

## 2025-03-18 RX ADMIN — Medication 25 MILLIEQUIVALENT(S): at 02:35

## 2025-03-18 RX ADMIN — MYCOPHENOLIC ACID 360 MILLIGRAM(S): 360 TABLET, DELAYED RELEASE ORAL at 19:34

## 2025-03-18 RX ADMIN — VELPATASVIR AND SOFOSBUVIR 1 TABLET(S): 50; 200 TABLET, FILM COATED ORAL at 11:17

## 2025-03-18 RX ADMIN — CARVEDILOL 3.12 MILLIGRAM(S): 3.12 TABLET, FILM COATED ORAL at 05:58

## 2025-03-18 RX ADMIN — Medication 50 GRAM(S): at 18:36

## 2025-03-18 RX ADMIN — Medication 25 MILLILITER(S): at 00:16

## 2025-03-18 RX ADMIN — TRAMADOL HYDROCHLORIDE 50 MILLIGRAM(S): 50 TABLET, FILM COATED ORAL at 23:00

## 2025-03-18 RX ADMIN — OXYCODONE HYDROCHLORIDE 10 MILLIGRAM(S): 30 TABLET ORAL at 06:59

## 2025-03-18 RX ADMIN — INSULIN LISPRO 2: 100 INJECTION, SOLUTION INTRAVENOUS; SUBCUTANEOUS at 07:56

## 2025-03-18 RX ADMIN — OXYCODONE HYDROCHLORIDE 2.5 MILLIGRAM(S): 30 TABLET ORAL at 02:29

## 2025-03-18 RX ADMIN — Medication 10 UNIT(S): at 00:17

## 2025-03-18 RX ADMIN — CALCIUM GLUCONATE 200 GRAM(S): 20 INJECTION, SOLUTION INTRAVENOUS at 02:32

## 2025-03-18 RX ADMIN — CEFTRIAXONE 100 MILLIGRAM(S): 500 INJECTION, POWDER, FOR SOLUTION INTRAMUSCULAR; INTRAVENOUS at 09:25

## 2025-03-18 RX ADMIN — TRAMADOL HYDROCHLORIDE 50 MILLIGRAM(S): 50 TABLET, FILM COATED ORAL at 22:31

## 2025-03-18 NOTE — DIETITIAN INITIAL EVALUATION ADULT - OTHER INFO
GI/INTAKE:   -Diet advanced; Pt noted with good PO intake thus far  -Requesting PO nutrition supplements   -Last BM documented 3/16; no bowel regimen ordered   -Hx of decompensated ETOH cirrhosis; s/p OLT    -Myfortic and Cellcept ordered for immunosuppression     ENDO:   -No hx of DM; latest A1c: 5.1% - none detected   -Post-transplant steroid regimen ordered (Prednisone)   -Sliding scale insulin ordered in-house  -Endorsees taking long acting insulin PTA    RENAL:  -Lasix ordered for diuresis   -Hyperkalemic; Lokelma ordered    PULM:   -S/p VATS for decortication of right lung (3/17)   -X2 chest tubes in place     WEIGHT HX:   -Current dosin pounds   -Per previous RD note: 238 pounds   -Weight loss likely in the setting of fluid shifts and true muscle/fat loss

## 2025-03-18 NOTE — PROGRESS NOTE ADULT - SUBJECTIVE AND OBJECTIVE BOX
Subjective: "Hello"  OOB chair      V/S  T(C): 36.2 (03-18-25 @ 11:00), Max: 37.1 (03-17-25 @ 19:10)  HR: 87 (03-18-25 @ 14:00) (60 - 104)  BP: 133/83 (03-17-25 @ 19:10) (133/83 - 133/83)  ABP: 145/73 (03-18-25 @ 14:00) (135/67 - 180/85)  ABP(mean): 99 (03-18-25 @ 14:00) (93 - 123)  RR: 19 (03-18-25 @ 14:00) (12 - 31)  SpO2: 97% (03-18-25 @ 14:00) (93% - 100%)    CHEST TUBES:    Right CT  x 2     [ x ]LWS  [  ]H2O seal    I&O's Detail    17 Mar 2025 07:01  -  18 Mar 2025 07:00  --------------------------------------------------------  IN:    IV PiggyBack: 50 mL    IV PiggyBack: 300 mL    Oral Fluid: 290 mL  Total IN: 640 mL    OUT:    Chest Tube (mL): 190 mL    Chest Tube (mL): 100 mL    Indwelling Catheter - Urethral (mL): 1220 mL    Voided (mL): 400 mL  Total OUT: 1910 mL    Total NET: -1270 mL      18 Mar 2025 07:01  -  18 Mar 2025 14:35  --------------------------------------------------------  IN:    IV PiggyBack: 100 mL    Oral Fluid: 480 mL  Total IN: 580 mL    OUT:    Indwelling Catheter - Urethral (mL): 700 mL  Total OUT: 700 mL    Total NET: -120 mL          03-17-25 @ 07:01  -  03-18-25 @ 07:00  --------------------------------------------------------  IN: 640 mL / OUT: 1910 mL / NET: -1270 mL    03-18-25 @ 07:01  -  03-18-25 @ 14:35  --------------------------------------------------------  IN: 580 mL / OUT: 700 mL / NET: -120 mL      MEDICATIONS  (STANDING):  atovaquone  Suspension 1500 milliGRAM(s) Oral daily  carvedilol 3.125 milliGRAM(s) Oral every 12 hours  cefTRIAXone   IVPB      chlorhexidine 2% Cloths 1 Application(s) Topical <User Schedule>  furosemide    Tablet 40 milliGRAM(s) Oral daily  insulin glargine Injectable (LANTUS) 6 Unit(s) SubCutaneous at bedtime  insulin lispro (ADMELOG) corrective regimen sliding scale   SubCutaneous three times a day before meals  insulin lispro (ADMELOG) corrective regimen sliding scale   SubCutaneous at bedtime  magnesium oxide 800 milliGRAM(s) Oral two times a day with meals  mycophenolic acid  milliGRAM(s) Oral <User Schedule>  pantoprazole    Tablet 40 milliGRAM(s) Oral before breakfast  polyethylene glycol 3350 17 Gram(s) Oral daily  predniSONE   Tablet 20 milliGRAM(s) Oral daily  senna 2 Tablet(s) Oral at bedtime  sodium zirconium cyclosilicate 10 Gram(s) Oral daily  sofosbuvir 400 mG/velpatasvir 100 mG (EPCLUSA) 1 Tablet(s) Oral daily  tacrolimus 2 milliGRAM(s) Oral <User Schedule>  thiamine 100 milliGRAM(s) Oral daily  valGANciclovir 450 milliGRAM(s) Oral daily      03-18    133[L]  |  98  |  37[H]  ----------------------------<  252[H]  5.6[H]   |  23  |  1.08    Ca    9.2      18 Mar 2025 12:33  Phos  4.1     03-18  Mg     2.0     03-18    TPro  6.2  /  Alb  2.8[L]  /  TBili  0.5  /  DBili  x   /  AST  9[L]  /  ALT  7[L]  /  AlkPhos  72  03-18                               9.4    8.58  )-----------( 220      ( 18 Mar 2025 09:25 )             27.5        PT/INR - ( 18 Mar 2025 09:25 )   PT: 11.2 sec;   INR: 0.98 ratio         PTT - ( 18 Mar 2025 09:25 )  PTT:29.2 sec         CAPILLARY BLOOD GLUCOSE      POCT Blood Glucose.: 242 mg/dL (18 Mar 2025 12:07)  POCT Blood Glucose.: 196 mg/dL (18 Mar 2025 07:46)  POCT Blood Glucose.: 255 mg/dL (18 Mar 2025 03:01)  POCT Blood Glucose.: 135 mg/dL (18 Mar 2025 00:47)  POCT Blood Glucose.: 106 mg/dL (18 Mar 2025 00:13)  POCT Blood Glucose.: 155 mg/dL (17 Mar 2025 21:22)  POCT Blood Glucose.: 155 mg/dL (17 Mar 2025 20:32)           CXR:      Physical Exam:    Constitutional: Well developed / well nourished  Respiratory: slight decreased BS on RLL, good airway movement,   Cardiovascular: RRR  Gastrointestinal: Soft abdomen, ND, incision c/d/i, staples in place.   No signs of infection.   Genitourinary: voiding                PAST MEDICAL & SURGICAL HISTORY:  Sleep apnea, obstructive      Alcohol abuse      Cirrhosis of liver      Portal hypertension      H/O esophageal varices      Anemia      History of alcohol use disorder      Hepatic encephalopathy      Pleural effusion      History of pleural empyema      Liver transplanted      S/P abdominal paracentesis      History of lung surgery

## 2025-03-18 NOTE — DIETITIAN INITIAL EVALUATION ADULT - ORAL INTAKE PTA/DIET HISTORY
PTA per pt  -Intake: good PO intake in between admission; reports following post-transplant nutrition therapy guidelines (home-cooked meals, increased protein intake via Ensure shakes)  -Chewing/Swallowing: denies difficulty   -Allergies/Intolerances: NKFA

## 2025-03-18 NOTE — DIETITIAN INITIAL EVALUATION ADULT - PERTINENT LABORATORY DATA
03-18    135  |  99  |  36[H]  ----------------------------<  245[H]  5.4[H]   |  24  |  0.96    Ca    9.9      18 Mar 2025 06:18  Phos  4.4     03-18  Mg     2.2     03-18    TPro  6.1  /  Alb  2.9[L]  /  TBili  0.5  /  DBili  x   /  AST  8[L]  /  ALT  7[L]  /  AlkPhos  64  03-18  POCT Blood Glucose.: 196 mg/dL (03-18-25 @ 07:46)  A1C with Estimated Average Glucose Result: 5.1 % (03-13-25 @ 15:27)  A1C with Estimated Average Glucose Result: 5.0 % (01-17-25 @ 06:40)

## 2025-03-18 NOTE — CONSULT NOTE ADULT - PROBLEM SELECTOR RECOMMENDATION 9
Pt. with hyperkalemia. [facility], serum potassium 5.0 on 3/11/25. Serum potassium elevated at 6.2 on admission (3/14/25). Pt. with persistent hyperkalemia with potassium 6.5 ->5.7 -> 6.1 -> 5.4 today. Pt. received medical management with IV insulin/D50, lasix 20mg and lokelma 10gm once. Would start pt. on standing lokelma 10gm daily and can increase up to TID if needed. Repeat serum potassium later today.    **Recommendations preliminary until attending attestation**  If you have any questions, please feel free to contact me  Christophe Wheatley  Nephrology Fellow  Page [phone] / [employer] Teams (Preferred)  (After 4pm or on weekends please page the on-call fellow)

## 2025-03-18 NOTE — DIETITIAN INITIAL EVALUATION ADULT - PERTINENT MEDS FT
MEDICATIONS  (STANDING):  atovaquone  Suspension 1500 milliGRAM(s) Oral daily  carvedilol 3.125 milliGRAM(s) Oral every 12 hours  cefTRIAXone   IVPB      cefTRIAXone   IVPB 1000 milliGRAM(s) IV Intermittent once  chlorhexidine 2% Cloths 1 Application(s) Topical <User Schedule>  furosemide    Tablet 40 milliGRAM(s) Oral daily  insulin lispro (ADMELOG) corrective regimen sliding scale   SubCutaneous three times a day before meals  insulin lispro (ADMELOG) corrective regimen sliding scale   SubCutaneous at bedtime  magnesium oxide 800 milliGRAM(s) Oral two times a day with meals  mycophenolic acid  milliGRAM(s) Oral <User Schedule>  pantoprazole    Tablet 40 milliGRAM(s) Oral before breakfast  predniSONE   Tablet 20 milliGRAM(s) Oral daily  sodium zirconium cyclosilicate 10 Gram(s) Oral daily  sofosbuvir 400 mG/velpatasvir 100 mG (EPCLUSA) 1 Tablet(s) Oral daily  tacrolimus 2 milliGRAM(s) Oral <User Schedule>  thiamine 100 milliGRAM(s) Oral daily  valGANciclovir 450 milliGRAM(s) Oral daily    MEDICATIONS  (PRN):  melatonin 3 milliGRAM(s) Oral at bedtime PRN Insomnia  oxyCODONE    IR 10 milliGRAM(s) Oral every 4 hours PRN Severe Pain (7 - 10)  oxyCODONE    IR 5 milliGRAM(s) Oral every 4 hours PRN Moderate Pain (4 - 6)  sodium chloride 0.65% Nasal 1 Spray(s) Both Nostrils two times a day PRN Nasal Congestion

## 2025-03-18 NOTE — DIETITIAN INITIAL EVALUATION ADULT - ADD RECOMMEND
1) Continue Consistent carbohydrate diet   2) Ensure Max x1/day   3) multivitamin, [id number]+D, vitamin C   4) Monitor PO intake, diet tolerance, weight trends, labs, GI function, and skin integrity  5) Malnutrition sticker placed     Lila Hoskins MS, RDN, CDN (Teams)

## 2025-03-18 NOTE — DIETITIAN INITIAL EVALUATION ADULT - SIGNS/SYMPTOMS
as evidenced by s/p OLT, pressure injury  as evidenced by 18% weight loss x 2 months; moderate signs of muscle/fat loss

## 2025-03-18 NOTE — PROGRESS NOTE ADULT - ASSESSMENT
44M PMH with PMHx of decompensated ETOH cirrhosis c/b recurrent ascites, grade II esophageal varices, hepatic encephalopathy. 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 Admitted with Hyperkalemia and planned Thoracotomy on 3/17/2025. Now s/p Bronchoscopy with decortication of right lung using video-assisted thoracoscopic surgery (VATS). Admitted to SICU for HDM and managment post-op.    PLAN:    Neuro:  - A&Ox4   - Pain control w/ Tylenol and Oxycodone      Resp:  - s/p VATS for decortication of right lung  - R. Chest tube x2; monitor output   - CXR: Loculated right hydropneumothorax, overall similar in size to prior but with new air component.  - Sat well RA   - nasal cannula, wean as tolerated    CV:  - goal MAP > 65 mmHg  - trend lactate   - Coreg 3.125 q12  - Lasix 40mg once daily     GI:   - Diet: CC diet       Renal:  - Monitor I&Os and electrolytes w/ repletions as necessary  - maintain yates    Heme:  - Monitor CBC and coags  - VTE prophylaxis: SCDs     ID:   - Monitor for clinical evidence of active infection  - Monitor WBC, temperature, and procalcitonin  - Antibiotics w/ Atovaquone  - Transplant ppx w/ Valcyte  - immunosuppression w/ Myfortic, [place] and Prednisone     Endo:   - Monitor glucose   - ISS      Code Status: full code    Disposition: SICU

## 2025-03-18 NOTE — PROGRESS NOTE ADULT - PROBLEM SELECTOR PLAN 1
- s/p VATS for decortication of right lung  - Right  Chest tube x2  maintain to suction x 48 hours  monitor output   - CXR: Loculated right hydropneumothorax, overall similar in size to prior but with new air component.  - Sat well RA   - nasal cannula, wean as tolerated  Daily CXR

## 2025-03-18 NOTE — PROGRESS NOTE ADULT - SUBJECTIVE AND OBJECTIVE BOX
Transplant Surgery - Multidisciplinary Rounds  --------------------------------------------------------------   OLT 02/27/25            Present: Patient seen and examined with multidisciplinary Transplant team including Surgeon Dr. Dagher, Hepatologist Dr. Adrienne Cooley and bedside RN during AM rounds.   Disciplines not in attendance will be notified of the plan.     HPI: 44M PMH with PMHx of decompensated ETOH cirrhosis c/b recurrent ascites, grade II esophageal varices, and hepatic encephalopathy. 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 Admitted with Hyperkalemia and planned Thoracotomy on 3/17/2025    Interval Events:            Immunosuppression  Maintenance: FK per level, Myfortic 360 BID, pred 20  Ongoing monitoring for signs of rejection     Potential Discharge date: TBD  Education:  Medications  Plan of care:  See Below                       Review of Systems:  All other systems were reviewed and are negative, except as noted.    Physical Exam:   Constitutional: Well developed / well nourished  Eyes: PERRLA  ENMT: nc/at, no thrush  Neck: supple  Respiratory: slight decreased BS on RLL, good airway movement, no adventitious sounds.   Cardiovascular: RRR  Gastrointestinal: Soft abdomen, ND, appropriate incisional TTP. chevron incision c/d/i, staples in place. No signs of infection.   Genitourinary: voiding  Extremities: SCD's in place and working bilaterally  Vascular: Palpable dp pulses bilaterally.   Neurological: A&O x3  Skin: no rashes, ulcerations, lesions  Musculoskeletal: Moving all extremities  Transplant Surgery - Multidisciplinary Rounds  --------------------------------------------------------------   OLT 02/27/25            Present: Patient seen and examined with multidisciplinary Transplant team including Surgeon Dr. Dagher, Hepatologist Dr. Adrienne Cooley and bedside RN during AM rounds.   Disciplines not in attendance will be notified of the plan.     HPI: 44M PMH with PMHx of decompensated ETOH cirrhosis c/b recurrent ascites, grade II esophageal varices, and hepatic encephalopathy. 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 Admitted with Hyperkalemia and planned Thoracotomy on 3/17/2025    Interval Events:  - s/p VATs, CT x 2  - c/o pain at the CT sites      Immunosuppression  Maintenance: FK per level, Myfortic 360 BID, pred 20  Ongoing monitoring for signs of rejection     Potential Discharge date: TBD  Education:  Medications  Plan of care:  See Below     MEDICATIONS  (STANDING):  atovaquone  Suspension 1500 milliGRAM(s) Oral daily  carvedilol 3.125 milliGRAM(s) Oral every 12 hours  cefTRIAXone   IVPB      chlorhexidine 2% Cloths 1 Application(s) Topical <User Schedule>  furosemide    Tablet 40 milliGRAM(s) Oral daily  insulin glargine Injectable (LANTUS) 6 Unit(s) SubCutaneous at bedtime  insulin lispro (ADMELOG) corrective regimen sliding scale   SubCutaneous three times a day before meals  insulin lispro (ADMELOG) corrective regimen sliding scale   SubCutaneous at bedtime  magnesium oxide 800 milliGRAM(s) Oral two times a day with meals  mycophenolic acid  milliGRAM(s) Oral <User Schedule>  pantoprazole    Tablet 40 milliGRAM(s) Oral before breakfast  polyethylene glycol 3350 17 Gram(s) Oral daily  predniSONE   Tablet 20 milliGRAM(s) Oral daily  senna 2 Tablet(s) Oral at bedtime  sodium zirconium cyclosilicate 10 Gram(s) Oral daily  sofosbuvir 400 mG/velpatasvir 100 mG (EPCLUSA) 1 Tablet(s) Oral daily  tacrolimus 2 milliGRAM(s) Oral <User Schedule>  thiamine 100 milliGRAM(s) Oral daily  valGANciclovir 450 milliGRAM(s) Oral daily    MEDICATIONS  (PRN):  melatonin 3 milliGRAM(s) Oral at bedtime PRN Insomnia  oxyCODONE    IR 10 milliGRAM(s) Oral every 4 hours PRN Severe Pain (7 - 10)  oxyCODONE    IR 5 milliGRAM(s) Oral every 4 hours PRN Moderate Pain (4 - 6)  sodium chloride 0.65% Nasal 1 Spray(s) Both Nostrils two times a day PRN Nasal Congestion      PAST MEDICAL & SURGICAL HISTORY:  Sleep apnea, obstructive  Alcohol abuse  Cirrhosis of liver  Portal hypertension  H/O esophageal varices  Anemia  History of alcohol use disorder  Hepatic encephalopathy  Pleural effusion  History of pleural empyema  Liver transplanted  S/P abdominal paracentesis  History of lung surgery    Vital Signs Last 24 Hrs  T(C): 36.2 (18 Mar 2025 11:00), Max: 37.1 (17 Mar 2025 19:10)  T(F): 97.2 (18 Mar 2025 11:00), Max: 98.7 (17 Mar 2025 19:10)  HR: 87 (18 Mar 2025 14:00) (60 - 104)  BP: 133/83 (17 Mar 2025 19:10) (133/83 - 133/83)  BP(mean): 103 (17 Mar 2025 19:10) (103 - 103)  RR: 19 (18 Mar 2025 14:00) (12 - 31)  SpO2: 97% (18 Mar 2025 14:00) (93% - 100%)    Parameters below as of 18 Mar 2025 07:00  Patient On (Oxygen Delivery Method): room air    I&O's Summary    17 Mar 2025 07:01  -  18 Mar 2025 07:00  --------------------------------------------------------  IN: 640 mL / OUT: 1910 mL / NET: -1270 mL    18 Mar 2025 07:01  -  18 Mar 2025 14:56  --------------------------------------------------------  IN: 820 mL / OUT: 850 mL / NET: -30 mL                        9.4    8.58  )-----------( 220      ( 18 Mar 2025 09:25 )             27.5     03-18    133[L]  |  98  |  37[H]  ----------------------------<  252[H]  5.6[H]   |  23  |  1.08    Ca    9.2      18 Mar 2025 12:33  Phos  4.1     03-18  Mg     2.0     03-18    TPro  6.2  /  Alb  2.8[L]  /  TBili  0.5  /  DBili  x   /  AST  9[L]  /  ALT  7[L]  /  AlkPhos  72  03-18    Tacrolimus (), Serum: 7.4 ng/mL (03-18 @ 06:18)    Culture - Acid Fast - Body Fluid w/Smear (collected 03-17-25 @ 20:33)  Source: Body Fluid Right Pleura effusion    Culture - Fungal, Body Fluid (collected 03-17-25 @ 20:33)  Source: Body Fluid Right Pleura effusion  Preliminary Report (03-18-25 @ 08:07):    Testing in progress    Culture - Body Fluid with Gram Stain (collected 03-17-25 @ 20:33)  Source: Body Fluid Right Pleura effusion  Gram Stain (03-18-25 @ 03:20):    polymorphonuclear leukocytes seen    No organisms seen    by cytocentrifuge    Culture - Acid Fast - Tissue w/Smear (collected 03-17-25 @ 20:33)  Source: Tissue Right Lower Lobe Peel    Culture - Fungal, Tissue (collected 03-17-25 @ 20:33)  Source: Tissue Right Lower Lobe Peel  Preliminary Report (03-18-25 @ 08:07):    Testing in progress    Culture - Tissue with Gram Stain (collected 03-17-25 @ 20:33)  Source: Tissue Right Lower Lobe Peel  Gram Stain (03-18-25 @ 06:36):    No polymorphonuclear cells seen per low power field    No organisms seen per oil power field    Culture - Acid Fast - Tissue w/Smear (collected 03-17-25 @ 20:33)  Source: Tissue Upper Lobe Peel    Culture - Fungal, Tissue (collected 03-17-25 @ 20:33)  Source: Tissue Upper Lobe Peel  Preliminary Report (03-18-25 @ 08:07):    Testing in progress    Culture - Tissue with Gram Stain (collected 03-17-25 @ 20:33)  Source: Tissue Upper Lobe Peel  Gram Stain (03-18-25 @ 06:30):    No polymorphonuclear cells seen per low power field    No organisms seen per oil power field    Culture - Acid Fast - Tissue w/Smear (collected 03-17-25 @ 20:33)  Source: Tissue Middle Lobe Peel    Culture - Tissue with Gram Stain (collected 03-17-25 @ 20:33)  Source: Tissue Middle Lobe Peel  Gram Stain (03-18-25 @ 06:31):    No polymorphonuclear cells seen per low power field    No organisms seen per oil power field    Culture - Acid Fast - Tissue w/Smear (collected 03-17-25 @ 20:33)  Source: Tissue Right Pleural Peel    Culture - Tissue with Gram Stain (collected 03-17-25 @ 20:33)  Source: Tissue Right Pleural Peel  Gram Stain (03-18-25 @ 06:35):    No polymorphonuclear cells seen per low power field    No organisms seen per oil power field      Review of Systems:  All other systems were reviewed and are negative, except as noted.    Physical Exam:   Constitutional: Well developed / well nourished  Eyes: PERRLA  ENMT: nc/at, no thrush  Neck: supple  Respiratory: slight decreased BS on RLL, good airway movement, no adventitious sounds. CT x 2  Cardiovascular: RRR  Gastrointestinal: Soft abdomen, ND, incision healed   Genitourinary: voiding  Extremities: SCD's in place and working bilaterally  Vascular: Palpable dp pulses bilaterally.   Neurological: A&O x3  Skin: no rashes, ulcerations, lesions  Musculoskeletal: Moving all extremities

## 2025-03-18 NOTE — DIETITIAN INITIAL EVALUATION ADULT - OBTAIN DAILY WEIGHT
Quality 47: Advance Care Plan: Advance Care Planning discussed and documented; advance care plan or surrogate decision maker documented in the medical record. Quality 431: Preventive Care And Screening: Unhealthy Alcohol Use - Screening: Patient screened for unhealthy alcohol use using a single question and scores less than 2 times per year Quality 130: Documentation Of Current Medications In The Medical Record: Current Medications Documented Detail Level: Detailed Quality 226: Preventive Care And Screening: Tobacco Use: Screening And Cessation Intervention: Patient screened for tobacco use and is an ex/non-smoker yes

## 2025-03-18 NOTE — PROGRESS NOTE ADULT - ASSESSMENT
44M PMH with PMHx of decompensated ETOH cirrhosis c/b recurrent ascites, grade II esophageal varices, and hepatic encephalopathy. 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  S/P Thoracotomy on 3/17/2025    [ ] s/p HCV + DCD OLT   - good graft function  - HCV viremia/ Genotype 1A (donor hcv +) -  Epclusa 3/1   - Lasix 40QD PO  - ASA 81mg daily (held on admission for OR on Monday)   - SQH bid     [] Immuno  - FK per level,  Myfortic 360BID, Pred 20QD  - ppx: no bactrim due to sulfa allergy -> Mepron, valcyte, oscal, PPI    [] h/o R Empyema  - s/p VATS for decortication of right lung [3/17]  - R. Chest tube x2  - holding ASA/SQH  - continue CTX  44M PMH with PMHx of decompensated ETOH cirrhosis c/b recurrent ascites, grade II esophageal varices, and hepatic encephalopathy. 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  S/P Thoracotomy on 3/17/2025    [ ] s/p HCV + DCD OLT   - good graft function  - HCV viremia/ Genotype 1A (donor hcv +) - on Epclusa since 3/1   - Lasix 40QD PO  - ASA 81mg daily (held on admission for OR), discuss in AM  - SQH bid (held for VATs,) discuss in AM    [] Immuno  - FK per level,  Myfortic 360BID, Pred 20QD  - ppx: no bactrim due to sulfa allergy -> Mepron, valcyte, oscal, PPI    [] h/o R Empyema  - s/p VATS for decortication of right lung [3/17]  - DC yates and epidural   - R. Chest tube x2  - holding ASA/SQH  - continue CTX

## 2025-03-18 NOTE — PROGRESS NOTE ADULT - SUBJECTIVE AND OBJECTIVE BOX
Pain Management Attending Addendum    SUBJECTIVE: Patient doing well     Therapy:    [X] PCEA    OBJECTIVE:   [X] Pain appropriately controlled    [ ] Other:    Side Effects:  [X] None	             [ ] Nausea              [ ] Pruritis        [ ] Weakness          [ ] Numbness        	[ ] Other:    ASSESSMENT/PLAN:    [ ] Continue current therapy    [X] Therapy changed to:    [X] PRN Analgesics   [ ] IV PCA    Comments: PCEA never used; plan to remove today

## 2025-03-18 NOTE — CONSULT NOTE ADULT - ATTENDING COMMENTS
Pt seen and examined,  Now s/p thoracentesis.  Believes hyperkalemia due to ingestion of high K protein bar.  In addition tacrolimus and hyperglycemia likely causing hyperkalemia  Continue lokelma and follow low K diet.   Check urine potassium and urine creatinine.

## 2025-03-18 NOTE — DIETITIAN INITIAL EVALUATION ADULT - ETIOLOGY
related to inadequate protein-energy intake in the setting of prolonged hospitalization for OLT in the setting of decompensated ETOH cirrhosis  related to increased physiological demand

## 2025-03-18 NOTE — PROGRESS NOTE ADULT - SUBJECTIVE AND OBJECTIVE BOX
Day 1 of Anesthesia Pain Management Service    SUBJECTIVE: Im having a little pain in the shoulder    Pain Scale Score:   Refer to charted pain scores    THERAPY:  [X] Epidural capped    MEDICATIONS  (STANDING):  atovaquone  Suspension 1500 milliGRAM(s) Oral daily  carvedilol 3.125 milliGRAM(s) Oral every 12 hours  cefTRIAXone   IVPB      chlorhexidine 2% Cloths 1 Application(s) Topical <User Schedule>  furosemide    Tablet 40 milliGRAM(s) Oral daily  insulin lispro (ADMELOG) corrective regimen sliding scale   SubCutaneous three times a day before meals  insulin lispro (ADMELOG) corrective regimen sliding scale   SubCutaneous at bedtime  magnesium oxide 800 milliGRAM(s) Oral two times a day with meals  mycophenolic acid  milliGRAM(s) Oral <User Schedule>  pantoprazole    Tablet 40 milliGRAM(s) Oral before breakfast  predniSONE   Tablet 20 milliGRAM(s) Oral daily  sodium zirconium cyclosilicate 10 Gram(s) Oral daily  sofosbuvir 400 mG/velpatasvir 100 mG (EPCLUSA) 1 Tablet(s) Oral daily  tacrolimus 2 milliGRAM(s) Oral <User Schedule>  thiamine 100 milliGRAM(s) Oral daily  valGANciclovir 450 milliGRAM(s) Oral daily    MEDICATIONS  (PRN):  melatonin 3 milliGRAM(s) Oral at bedtime PRN Insomnia  oxyCODONE    IR 10 milliGRAM(s) Oral every 4 hours PRN Severe Pain (7 - 10)  oxyCODONE    IR 5 milliGRAM(s) Oral every 4 hours PRN Moderate Pain (4 - 6)  sodium chloride 0.65% Nasal 1 Spray(s) Both Nostrils two times a day PRN Nasal Congestion      OBJECTIVE:    Assessment of Catheter Site:    [X] Epidural  capped	  [X] Dressing intact	[X] Site non-tender	[X] Site without erythema, discharge, edema  [ ] Epidural tubing and connection checked	[X] Gross neurological exam within normal limits  [ ] Catheter removed – tip intact		[X] Afebrile	            [ ] Febrile: ___    PT/INR - ( 18 Mar 2025 09:25 )   PT: 11.2 sec;   INR: 0.98 ratio         PTT - ( 18 Mar 2025 09:25 )  PTT:29.2 sec                      9.4    8.58  )-----------( 220      ( 18 Mar 2025 09:25 )             27.5     Vital Signs Last 24 Hrs  T(C): 36.7 (03-18-25 @ 07:00), Max: 37.1 (03-17-25 @ 19:10)  T(F): 98 (03-18-25 @ 07:00), Max: 98.7 (03-17-25 @ 19:10)  HR: 79 (03-18-25 @ 09:00) (60 - 104)  BP: 133/83 (03-17-25 @ 19:10) (133/83 - 142/84)  BP(mean): 103 (03-17-25 @ 19:10) (94 - 103)  RR: 20 (03-18-25 @ 09:00) (12 - 31)  SpO2: 96% (03-18-25 @ 09:00) (93% - 100%)      Sedation Score:	[X] Alert	[ ] Drowsy	[ ] Arousable  [ ] Asleep     [ ] Unresponsive    Side Effects:	[X] None	[ ] Nausea	[ ] Vomiting   [ ] Pruritus  		[ ] Weakness     [ ] Numbness	[ ] Other:    ASSESSMENT/ PLAN:    Therapy:	[X] Continue   [ ] Discontinue   [ ] Change to PRN Analgesics   [ ] Change to PCA    Documentation and Verification of current medications:  [X] Done	[ ] Not done, not eligible, reason:    COMMENTS: OOB in chair. Endorsing good analgesia. Epidural capped. Anticoagulation status and labs reviewed.  Will remove epidural.

## 2025-03-18 NOTE — DIETITIAN INITIAL EVALUATION ADULT - REASON FOR ADMISSION
"44M PMHx of decompensated ETOH cirrhosis c/b recurrent ascites, grade II esophageal varices, hepatic encephalopathy. 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 Admitted with Hyperkalemia and planned Thoracotomy on 3/17/2025. Now s/p Bronchoscopy with decortication of right lung using video-assisted thoracoscopic surgery (VATS). Admitted to SICU for HDM and management post-op"

## 2025-03-18 NOTE — CONSULT NOTE ADULT - SUBJECTIVE AND OBJECTIVE BOX
[facility] DIVISION OF KIDNEY DISEASES AND HYPERTENSION -- [phone]  -- INITIAL CONSULT NOTE  --------------------------------------------------------------------------------  HPI: Pt. is a 44 y.o. M w/ PMHx decompensated EtOH cirrohosis c/b recurrent ascites, grade II esophageal varices, hepatic encephalopathy, R empyema s/p robot    44M PMH with PMHx of decompensated ETOH cirrhosis c/b recurrent ascites, grade II esophageal varices, and hepatic encephalopathy. 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         44M PMH with PMHx of decompensated ETOH cirrhosis c/b recurrent ascites, grade II esophageal varices, and hepatic encephalopathy. 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 Admitted with Hyperkalemia and planned Thoracotomy on 3/17/2025. Now s/p Bronchoscopy with decortication of right lung using video-assisted thoracoscopic surgery (VATS).       PAST HISTORY  --------------------------------------------------------------------------------  PAST MEDICAL & SURGICAL HISTORY:  Sleep apnea, obstructive    Alcohol abuse    Cirrhosis of liver    Portal hypertension    H/O esophageal varices    Anemia    History of alcohol use disorder    Hepatic encephalopathy    Pleural effusion    History of pleural empyema    Liver transplanted    S/P abdominal paracentesis    History of lung surgery    FAMILY HISTORY:  Family history of CABG (Mother)    FHx: multiple myeloma (Father)    FHx: diabetes mellitus (Father)    PAST SOCIAL HISTORY:    ALLERGIES & MEDICATIONS  --------------------------------------------------------------------------------  Allergies    sulfa drugs (Rash)    Intolerances    Standing Inpatient Medications  atovaquone  Suspension 1500 milliGRAM(s) Oral daily  carvedilol 3.125 milliGRAM(s) Oral every 12 hours  chlorhexidine 2% Cloths 1 Application(s) Topical <User Schedule>  furosemide    Tablet 40 milliGRAM(s) Oral daily  insulin lispro (ADMELOG) corrective regimen sliding scale   SubCutaneous three times a day before meals  insulin lispro (ADMELOG) corrective regimen sliding scale   SubCutaneous at bedtime  magnesium oxide 800 milliGRAM(s) Oral two times a day with meals  mycophenolic acid  milliGRAM(s) Oral <User Schedule>  pantoprazole    Tablet 40 milliGRAM(s) Oral before breakfast  predniSONE   Tablet 20 milliGRAM(s) Oral daily  sofosbuvir 400 mG/velpatasvir 100 mG (EPCLUSA) 1 Tablet(s) Oral daily  tacrolimus 2 milliGRAM(s) Oral <User Schedule>  thiamine 100 milliGRAM(s) Oral daily  valGANciclovir 450 milliGRAM(s) Oral daily    PRN Inpatient Medications  melatonin 3 milliGRAM(s) Oral at bedtime PRN  oxyCODONE    IR 10 milliGRAM(s) Oral every 4 hours PRN  oxyCODONE    IR 5 milliGRAM(s) Oral every 4 hours PRN  sodium chloride 0.65% Nasal 1 Spray(s) Both Nostrils two times a day PRN    REVIEW OF SYSTEMS  --------------------------------------------------------------------------------  Gen: No fevers/chills  Skin: No rashes  Head/Eyes/Ears: Normal hearing,   Respiratory: No dyspnea, cough  CV: No chest pain  GI: No abdominal pain, diarrhea  : No dysuria, hematuria  MSK: No  edema  Heme: No easy bruising or bleeding  Psych: No significant depression    All other systems were reviewed and are negative, except as noted.    VITALS/PHYSICAL EXAM  --------------------------------------------------------------------------------  T(C): 36.4 (03-18-25 @ 03:00), Max: 37.1 (03-17-25 @ 19:10)  HR: 99 (03-18-25 @ 04:00) (60 - 104)  BP: 133/83 (03-17-25 @ 19:10) (125/75 - 142/84)  RR: 24 (03-18-25 @ 04:00) (12 - 25)  SpO2: 95% (03-18-25 @ 04:00) (93% - 100%)  Wt(kg): --  Height (cm): 180.3 (03-17-25 @ 13:17)  Weight (kg): 88 (03-17-25 @ 13:17)  BMI (kg/m2): 27.1 (03-17-25 @ 13:17)  BSA (m2): 2.08 (03-17-25 @ 13:17)    03-16-25 @ 07:01  -  03-17-25 @ 07:00  --------------------------------------------------------  IN: 0 mL / OUT: 2200 mL / NET: -2200 mL    03-17-25 @ 07:01  -  03-18-25 @ 05:39  --------------------------------------------------------  IN: 440 mL / OUT: 1525 mL / NET: -1085 mL    Physical Exam:  	Gen: NAD  	HEENT: MMM  	Pulm: CTA B/L  	CV: S1S2  	Abd: Soft, +BS   	Ext: No LE edema B/L  	Neuro: Awake  	Skin: Warm and dry  	Vascular access:    LABS/STUDIES  --------------------------------------------------------------------------------              10.3   5.44  >-----------<  213      [03-17-25 @ 19:36]              30.8     137  |  101  |  38  ----------------------------<  224      [03-18-25 @ 03:44]  5.4   |  25  |  0.97        Ca     10.5     [03-18-25 @ 03:44]      Mg     2.2     [03-18-25 @ 03:44]      Phos  4.2     [03-18-25 @ 03:44]    TPro  6.1  /  Alb  2.8  /  TBili  0.6  /  DBili  x   /  AST  13  /  ALT  7   /  AlkPhos  61  [03-18-25 @ 03:44]    PT/INR: PT 11.4 , INR 0.99       [03-17-25 @ 19:36]  PTT: 30.6       [03-17-25 @ 19:36]    Creatinine Trend:  SCr 0.97 [03-18 @ 03:44]  SCr 0.97 [03-18 @ 01:38]  SCr 0.91 [03-17 @ 21:45]  SCr 0.89 [03-17 @ 19:36]  SCr 1.03 [03-17 @ 06:25]    Iron 57, TIBC 91, %sat 62      [01-17-25 @ 06:41]  Ferritin 849      [01-17-25 @ 06:41]  TSH 8.18      [01-17-25 @ 06:41]  Lipid: chol 65, TG 46, HDL 26, LDL --      [01-17-25 @ 06:41]    HBsAb 15.0      [02-24-25 @ 04:59]  HBsAb Reactive      [01-17-25 @ 06:41]  HBsAg Nonreact      [02-24-25 @ 04:59]  HBcAb Nonreact      [02-24-25 @ 04:59]  HCV 0.07, Nonreact      [02-24-25 @ 04:59]  HIV Nonreact      [02-24-25 @ 04:59]    IRMA: titer 1:160, pattern Speckled      [01-17-25 @ 06:41]  Syphilis Screen (Treponema Pallidum Ab) Negative      [01-17-25 @ 06:41] [facility] DIVISION OF KIDNEY DISEASES AND HYPERTENSION -- [phone]  -- INITIAL CONSULT NOTE  --------------------------------------------------------------------------------  HPI: Pt. is a 44 y.o. M w/ PMHx decompensated EtOH cirrohosis c/b recurrent ascites, grade II esophageal varices, hepatic encephalopathy, R empyema s/p robot s/p robotic VATs with decortication (Dr Ramírez 1/28/25), MANUELITO (required HD, improved, permcath removed 1/31) and s/p HCV + DCD LT on 2/27/2025, who presented to the ED for planned thoracotomy. Nephrology consulted for hyperkalemia.    Pt. seen and examined in the SICU. Pt. was recently discharged from [facility] on 3/11/25 after receiving DCD OLTx and IR drainage or R pleural effusion. Pt. was discharged with plan to readmit on 3/17/25 for thoracotomy. Upon arrival, pt. noted to be hyperkalemic. Pt. medically shifted x3 as per primary team with latest potassium 5.4. No fever, chills, abdominal pain, or LE swelling. +Chest pain near surgical site.    PAST HISTORY  --------------------------------------------------------------------------------  PAST MEDICAL & SURGICAL HISTORY:  Sleep apnea, obstructive    Alcohol abuse    Cirrhosis of liver    Portal hypertension    H/O esophageal varices    Anemia    History of alcohol use disorder    Hepatic encephalopathy    Pleural effusion    History of pleural empyema    Liver transplanted    S/P abdominal paracentesis    History of lung surgery    FAMILY HISTORY:  Family history of CABG (Mother)    FHx: multiple myeloma (Father)    FHx: diabetes mellitus (Father)    PAST SOCIAL HISTORY:    ALLERGIES & MEDICATIONS  --------------------------------------------------------------------------------  Allergies    sulfa drugs (Rash)    Intolerances    Standing Inpatient Medications  atovaquone  Suspension 1500 milliGRAM(s) Oral daily  carvedilol 3.125 milliGRAM(s) Oral every 12 hours  chlorhexidine 2% Cloths 1 Application(s) Topical <User Schedule>  furosemide    Tablet 40 milliGRAM(s) Oral daily  insulin lispro (ADMELOG) corrective regimen sliding scale   SubCutaneous three times a day before meals  insulin lispro (ADMELOG) corrective regimen sliding scale   SubCutaneous at bedtime  magnesium oxide 800 milliGRAM(s) Oral two times a day with meals  mycophenolic acid  milliGRAM(s) Oral <User Schedule>  pantoprazole    Tablet 40 milliGRAM(s) Oral before breakfast  predniSONE   Tablet 20 milliGRAM(s) Oral daily  sofosbuvir 400 mG/velpatasvir 100 mG (EPCLUSA) 1 Tablet(s) Oral daily  tacrolimus 2 milliGRAM(s) Oral <User Schedule>  thiamine 100 milliGRAM(s) Oral daily  valGANciclovir 450 milliGRAM(s) Oral daily    PRN Inpatient Medications  melatonin 3 milliGRAM(s) Oral at bedtime PRN  oxyCODONE    IR 10 milliGRAM(s) Oral every 4 hours PRN  oxyCODONE    IR 5 milliGRAM(s) Oral every 4 hours PRN  sodium chloride 0.65% Nasal 1 Spray(s) Both Nostrils two times a day PRN    REVIEW OF SYSTEMS  --------------------------------------------------------------------------------  Gen: No fevers/chills  Skin: No rash  Head/Eyes/Ears: No headache  Respiratory: +Mild dyspnea  CV: +CP near surgical site  GI: No abdominal pain  : No dysuria  MSK: No edema  Heme: No easy bruising or bleeding    All other systems were reviewed and are negative, except as noted.    VITALS/PHYSICAL EXAM  --------------------------------------------------------------------------------  T(C): 36.4 (03-18-25 @ 03:00), Max: 37.1 (03-17-25 @ 19:10)  HR: 99 (03-18-25 @ 04:00) (60 - 104)  BP: 133/83 (03-17-25 @ 19:10) (125/75 - 142/84)  RR: 24 (03-18-25 @ 04:00) (12 - 25)  SpO2: 95% (03-18-25 @ 04:00) (93% - 100%)  Wt(kg): --  Height (cm): 180.3 (03-17-25 @ 13:17)  Weight (kg): 88 (03-17-25 @ 13:17)  BMI (kg/m2): 27.1 (03-17-25 @ 13:17)  BSA (m2): 2.08 (03-17-25 @ 13:17)    03-16-25 @ 07:01  -  03-17-25 @ 07:00  --------------------------------------------------------  IN: 0 mL / OUT: 2200 mL / NET: -2200 mL    03-17-25 @ 07:01  -  03-18-25 @ 05:39  --------------------------------------------------------  IN: 440 mL / OUT: 1525 mL / NET: -1085 mL    Physical Exam:	  Gen: NAD  HEENT: MMM  Pulm: Dec. breath sounds on R, +NC  CV: S1S2, tachycardic, +chest tubes  Abd: Soft, +BS   : +Bush catheter  Ext: No LE edema B/L  Neuro: Awake  Skin: Warm and dry  Vascular access: peripheral IVs    LABS/STUDIES  --------------------------------------------------------------------------------              10.3   5.44  >-----------<  213      [03-17-25 @ 19:36]              30.8     137  |  101  |  38  ----------------------------<  224      [03-18-25 @ 03:44]  5.4   |  25  |  0.97        Ca     10.5     [03-18-25 @ 03:44]      Mg     2.2     [03-18-25 @ 03:44]      Phos  4.2     [03-18-25 @ 03:44]    TPro  6.1  /  Alb  2.8  /  TBili  0.6  /  DBili  x   /  AST  13  /  ALT  7   /  AlkPhos  61  [03-18-25 @ 03:44]    PT/INR: PT 11.4 , INR 0.99       [03-17-25 @ 19:36]  PTT: 30.6       [03-17-25 @ 19:36]    Creatinine Trend:  SCr 0.97 [03-18 @ 03:44]  SCr 0.97 [03-18 @ 01:38]  SCr 0.91 [03-17 @ 21:45]  SCr 0.89 [03-17 @ 19:36]  SCr 1.03 [03-17 @ 06:25]    Iron 57, TIBC 91, %sat 62      [01-17-25 @ 06:41]  Ferritin 849      [01-17-25 @ 06:41]  TSH 8.18      [01-17-25 @ 06:41]  Lipid: chol 65, TG 46, HDL 26, LDL --      [01-17-25 @ 06:41]    HBsAb 15.0      [02-24-25 @ 04:59]  HBsAb Reactive      [01-17-25 @ 06:41]  HBsAg Nonreact      [02-24-25 @ 04:59]  HBcAb Nonreact      [02-24-25 @ 04:59]  HCV 0.07, Nonreact      [02-24-25 @ 04:59]  HIV Nonreact      [02-24-25 @ 04:59]    IRMA: titer 1:160, pattern Speckled      [01-17-25 @ 06:41]  Syphilis Screen (Treponema Pallidum Ab) Negative      [01-17-25 @ 06:41]

## 2025-03-18 NOTE — PROGRESS NOTE ADULT - SUBJECTIVE AND OBJECTIVE BOX
24 HOUR EVENTS:  - HyperK shifted   - Given 20 IV lasix for K 5.7; repeat labs at 3am       NEURO  RASS (if intubated): 		CAM ICU (if concern for delirium):  Exam:   Meds: melatonin 3 milliGRAM(s) Oral at bedtime PRN Insomnia  oxyCODONE    IR 2.5 milliGRAM(s) Oral every 4 hours PRN Moderate Pain (4 - 6)  oxyCODONE    IR 5 milliGRAM(s) Oral every 4 hours PRN Severe Pain (7 - 10)      RESPIRATORY  RR: 24 (03-18-25 @ 00:00) (12 - 24)  SpO2: 98% (03-18-25 @ 00:00) (93% - 100%)  Wt(kg): --  Exam: Lungs CTA b/l  Mechanical Ventilation:   ABG - ( 17 Mar 2025 19:15 )  pH: 7.32  /  pCO2: 49    /  pO2: 92    / HCO3: 25    / Base Excess: -1.2  /  SaO2: 98.9    Lactate: x                Meds:     CARDIOVASCULAR  HR: 74 (03-18-25 @ 00:00) (60 - 76)  BP: 133/83 (03-17-25 @ 19:10) (125/75 - 142/84)  BP(mean): 103 (03-17-25 @ 19:10) (94 - 103)  ABP: 162/82 (03-18-25 @ 00:00) (135/67 - 166/78)  ABP(mean): 113 (03-18-25 @ 00:00) (94 - 113)  Wt(kg): --  CVP(cm H2O): --      Exam: Normal S1/S2 w/o murmurs or rubs  Cardiac Rhythm:   Perfusion     [ ]Adequate   [ ]Inadequate  Mentation   [ ]Normal       [ ]Reduced  Extremities  [ ]Warm         [ ]Cool  Volume Status [ ]Hypervolemic [ ]Euvolemic [ ]Hypovolemic  Meds: furosemide    Tablet 40 milliGRAM(s) Oral daily      GI/NUTRITION  Exam:   Diet:   Last Bowel Movement: 16-Mar-2025 (03-17-25 @ 09:35)  Last Bowel Movement: 16-Mar-2025 (03-16-25 @ 20:44)  Last Bowel Movement: 16-Mar-2025 (03-16-25 @ 14:29)    Meds: pantoprazole    Tablet 40 milliGRAM(s) Oral before breakfast      GENITOURINARY  I&O's Detail    03-16 @ 07:01  -  03-17 @ 07:00  --------------------------------------------------------  IN:  Total IN: 0 mL    OUT:    Voided (mL): 2200 mL  Total OUT: 2200 mL    Total NET: -2200 mL      03-17 @ 07:01  -  03-18 @ 01:35  --------------------------------------------------------  IN:    IV PiggyBack: 200 mL    IV PiggyBack: 50 mL    Oral Fluid: 70 mL  Total IN: 320 mL    OUT:    Indwelling Catheter - Urethral (mL): 675 mL    Voided (mL): 400 mL  Total OUT: 1075 mL    Total NET: -755 mL        Weight (kg): 88 (03-17 @ 13:17)  03-17    138  |  104  |  37[H]  ----------------------------<  177[H]  5.7[H]   |  24  |  0.91    Ca    9.9      17 Mar 2025 21:45  Phos  3.9     03-17  Mg     1.7     03-17    TPro  6.1  /  Alb  2.8[L]  /  TBili  0.6  /  DBili  x   /  AST  13  /  ALT  6[L]  /  AlkPhos  62  03-17    Meds: magnesium oxide 800 milliGRAM(s) Oral two times a day with meals  thiamine 100 milliGRAM(s) Oral daily      HEMATOLOGIC  Meds:                         10.3   5.44  )-----------( 213      ( 17 Mar 2025 19:36 )             30.8     PT/INR - ( 17 Mar 2025 19:36 )   PT: 11.4 sec;   INR: 0.99 ratio         PTT - ( 17 Mar 2025 19:36 )  PTT:30.6 sec    INFECTIOUS DISEASES  T(C): 36.1 (03-17-25 @ 23:00), Max: 37.1 (03-17-25 @ 19:10)  Wt(kg): --  WBC Count: 5.44 K/uL (03-17 @ 19:36)  WBC Count: 4.48 K/uL (03-17 @ 06:23)    Recent Cultures:    Meds: atovaquone  Suspension 1500 milliGRAM(s) Oral daily  mycophenolic acid  milliGRAM(s) Oral <User Schedule>  tacrolimus 2 milliGRAM(s) Oral <User Schedule>  valGANciclovir 450 milliGRAM(s) Oral daily      ENDOCRINE  Capillary Blood Glucose    Meds: insulin lispro (ADMELOG) corrective regimen sliding scale   SubCutaneous three times a day before meals  insulin lispro (ADMELOG) corrective regimen sliding scale   SubCutaneous at bedtime  predniSONE   Tablet 20 milliGRAM(s) Oral daily      ACCESS DEVICES:  [ ] Peripheral IV  [ ] Central Venous Line		[ ] R	[ ] L	[ ] IJ	[ ] Fem	[ ] SC	Placed:   [ ] Arterial Line			[ ] R	[ ] L	[ ] Fem	[ ] Rad	[ ] Ax	Placed:   [ ] PICC:					[ ] Mediport  [ ] Urinary Catheter, Date Placed:   [ ] Necessity of urinary, arterial, and venous catheters discussed    OTHER MEDICATIONS:  chlorhexidine 2% Cloths 1 Application(s) Topical <User Schedule>  sodium chloride 0.65% Nasal 1 Spray(s) Both Nostrils two times a day PRN      IMAGING:

## 2025-03-18 NOTE — PROGRESS NOTE ADULT - ASSESSMENT
44M PMH with PMHx of decompensated ETOH cirrhosis c/b recurrent ascites, grade II esophageal varices, hepatic encephalopathy. Recent admission 1/16 -3/11 for R empyema s/p robotic VATs with decortication (Dr Ramírez 1/28/25), MAUNELITO (required HD, improved, permcath removed 1/31) and s/p HCV + DCD LT on 2/27/2025. Admitted with Hyperkalemia S/P  R VATS  decort on 3/17/2025.

## 2025-03-18 NOTE — DIETITIAN INITIAL EVALUATION ADULT - NSFNSGIIOFT_GEN_A_CORE
03-17-25 @ 07:01  -  03-18-25 @ 07:00  --------------------------------------------------------  OUT:    Chest Tube (mL): 190 mL    Chest Tube (mL): 100 mL  Total OUT: 290 mL    Total NET: -290 mL

## 2025-03-19 LAB
ALBUMIN SERPL ELPH-MCNC: 2.5 G/DL — LOW (ref 3.3–5)
ALP SERPL-CCNC: 68 U/L — SIGNIFICANT CHANGE UP (ref 40–120)
ALT FLD-CCNC: <5 U/L — LOW (ref 10–45)
ANION GAP SERPL CALC-SCNC: 10 MMOL/L — SIGNIFICANT CHANGE UP (ref 5–17)
APTT BLD: 27 SEC — SIGNIFICANT CHANGE UP (ref 24.5–35.6)
AST SERPL-CCNC: 5 U/L — LOW (ref 10–40)
BILIRUB SERPL-MCNC: 0.5 MG/DL — SIGNIFICANT CHANGE UP (ref 0.2–1.2)
BLD GP AB SCN SERPL QL: NEGATIVE — SIGNIFICANT CHANGE UP
BUN SERPL-MCNC: 39 MG/DL — HIGH (ref 7–23)
CALCIUM SERPL-MCNC: 9 MG/DL — SIGNIFICANT CHANGE UP (ref 8.4–10.5)
CHLORIDE SERPL-SCNC: 101 MMOL/L — SIGNIFICANT CHANGE UP (ref 96–108)
CO2 SERPL-SCNC: 23 MMOL/L — SIGNIFICANT CHANGE UP (ref 22–31)
CREAT SERPL-MCNC: 1.33 MG/DL — HIGH (ref 0.5–1.3)
EGFR: 67 ML/MIN/1.73M2 — SIGNIFICANT CHANGE UP
EGFR: 67 ML/MIN/1.73M2 — SIGNIFICANT CHANGE UP
GLUCOSE BLDC GLUCOMTR-MCNC: 106 MG/DL — HIGH (ref 70–99)
GLUCOSE BLDC GLUCOMTR-MCNC: 164 MG/DL — HIGH (ref 70–99)
GLUCOSE BLDC GLUCOMTR-MCNC: 197 MG/DL — HIGH (ref 70–99)
GLUCOSE BLDC GLUCOMTR-MCNC: 244 MG/DL — HIGH (ref 70–99)
GLUCOSE SERPL-MCNC: 194 MG/DL — HIGH (ref 70–99)
HCT VFR BLD CALC: 23.7 % — LOW (ref 39–50)
HGB BLD-MCNC: 7.8 G/DL — LOW (ref 13–17)
INR BLD: 0.93 RATIO — SIGNIFICANT CHANGE UP (ref 0.85–1.16)
MAGNESIUM SERPL-MCNC: 2 MG/DL — SIGNIFICANT CHANGE UP (ref 1.6–2.6)
MCHC RBC-ENTMCNC: 30.5 PG — SIGNIFICANT CHANGE UP (ref 27–34)
MCHC RBC-ENTMCNC: 32.9 G/DL — SIGNIFICANT CHANGE UP (ref 32–36)
MCV RBC AUTO: 92.6 FL — SIGNIFICANT CHANGE UP (ref 80–100)
NRBC BLD AUTO-RTO: 0 /100 WBCS — SIGNIFICANT CHANGE UP (ref 0–0)
PHOSPHATE SERPL-MCNC: 3.5 MG/DL — SIGNIFICANT CHANGE UP (ref 2.5–4.5)
PLATELET # BLD AUTO: 174 K/UL — SIGNIFICANT CHANGE UP (ref 150–400)
POTASSIUM SERPL-MCNC: 4.4 MMOL/L — SIGNIFICANT CHANGE UP (ref 3.5–5.3)
POTASSIUM SERPL-SCNC: 4.4 MMOL/L — SIGNIFICANT CHANGE UP (ref 3.5–5.3)
PROT SERPL-MCNC: 5.6 G/DL — LOW (ref 6–8.3)
PROTHROM AB SERPL-ACNC: 10.7 SEC — SIGNIFICANT CHANGE UP (ref 9.9–13.4)
RBC # BLD: 2.56 M/UL — LOW (ref 4.2–5.8)
RBC # FLD: 18.5 % — HIGH (ref 10.3–14.5)
RH IG SCN BLD-IMP: POSITIVE — SIGNIFICANT CHANGE UP
SODIUM SERPL-SCNC: 134 MMOL/L — LOW (ref 135–145)
TACROLIMUS SERPL-MCNC: 3.5 NG/ML — SIGNIFICANT CHANGE UP
WBC # BLD: 5.24 K/UL — SIGNIFICANT CHANGE UP (ref 3.8–10.5)
WBC # FLD AUTO: 5.24 K/UL — SIGNIFICANT CHANGE UP (ref 3.8–10.5)

## 2025-03-19 PROCEDURE — 71045 X-RAY EXAM CHEST 1 VIEW: CPT | Mod: 26

## 2025-03-19 PROCEDURE — 99232 SBSQ HOSP IP/OBS MODERATE 35: CPT

## 2025-03-19 PROCEDURE — 99232 SBSQ HOSP IP/OBS MODERATE 35: CPT | Mod: GC

## 2025-03-19 RX ORDER — TRAMADOL HYDROCHLORIDE 50 MG/1
50 TABLET, FILM COATED ORAL EVERY 6 HOURS
Refills: 0 | Status: DISCONTINUED | OUTPATIENT
Start: 2025-03-19 | End: 2025-03-26

## 2025-03-19 RX ORDER — ACETAMINOPHEN 500 MG/5ML
1000 LIQUID (ML) ORAL ONCE
Refills: 0 | Status: COMPLETED | OUTPATIENT
Start: 2025-03-19 | End: 2025-03-19

## 2025-03-19 RX ORDER — ALBUTEROL SULFATE 2.5 MG/3ML
2.5 VIAL, NEBULIZER (ML) INHALATION ONCE
Refills: 0 | Status: COMPLETED | OUTPATIENT
Start: 2025-03-19 | End: 2025-03-19

## 2025-03-19 RX ADMIN — Medication 800 MILLIGRAM(S): at 17:30

## 2025-03-19 RX ADMIN — Medication 40 MILLIGRAM(S): at 06:05

## 2025-03-19 RX ADMIN — Medication 100 MILLIGRAM(S): at 11:56

## 2025-03-19 RX ADMIN — Medication 1000 MILLIGRAM(S): at 00:45

## 2025-03-19 RX ADMIN — SODIUM ZIRCONIUM CYCLOSILICATE 10 GRAM(S): 5 POWDER, FOR SUSPENSION ORAL at 12:02

## 2025-03-19 RX ADMIN — TRAMADOL HYDROCHLORIDE 50 MILLIGRAM(S): 50 TABLET, FILM COATED ORAL at 22:37

## 2025-03-19 RX ADMIN — TRAMADOL HYDROCHLORIDE 50 MILLIGRAM(S): 50 TABLET, FILM COATED ORAL at 15:39

## 2025-03-19 RX ADMIN — INSULIN LISPRO 4: 100 INJECTION, SOLUTION INTRAVENOUS; SUBCUTANEOUS at 17:31

## 2025-03-19 RX ADMIN — CEFTRIAXONE 100 MILLIGRAM(S): 500 INJECTION, POWDER, FOR SOLUTION INTRAMUSCULAR; INTRAVENOUS at 08:43

## 2025-03-19 RX ADMIN — TACROLIMUS 2 MILLIGRAM(S): 0.5 CAPSULE ORAL at 08:43

## 2025-03-19 RX ADMIN — FUROSEMIDE 40 MILLIGRAM(S): 10 INJECTION INTRAMUSCULAR; INTRAVENOUS at 06:04

## 2025-03-19 RX ADMIN — PREDNISONE 20 MILLIGRAM(S): 20 TABLET ORAL at 06:05

## 2025-03-19 RX ADMIN — TRAMADOL HYDROCHLORIDE 50 MILLIGRAM(S): 50 TABLET, FILM COATED ORAL at 23:07

## 2025-03-19 RX ADMIN — INSULIN LISPRO 2: 100 INJECTION, SOLUTION INTRAVENOUS; SUBCUTANEOUS at 12:10

## 2025-03-19 RX ADMIN — INSULIN LISPRO 2: 100 INJECTION, SOLUTION INTRAVENOUS; SUBCUTANEOUS at 09:05

## 2025-03-19 RX ADMIN — MYCOPHENOLIC ACID 360 MILLIGRAM(S): 360 TABLET, DELAYED RELEASE ORAL at 08:43

## 2025-03-19 RX ADMIN — TACROLIMUS 2 MILLIGRAM(S): 0.5 CAPSULE ORAL at 20:00

## 2025-03-19 RX ADMIN — VELPATASVIR AND SOFOSBUVIR 1 TABLET(S): 50; 200 TABLET, FILM COATED ORAL at 11:57

## 2025-03-19 RX ADMIN — VALGANCICLOVIR 450 MILLIGRAM(S): 450 TABLET, FILM COATED ORAL at 11:57

## 2025-03-19 RX ADMIN — Medication 3 MILLIGRAM(S): at 21:12

## 2025-03-19 RX ADMIN — TRAMADOL HYDROCHLORIDE 50 MILLIGRAM(S): 50 TABLET, FILM COATED ORAL at 16:39

## 2025-03-19 RX ADMIN — ATOVAQUONE 1500 MILLIGRAM(S): 750 SUSPENSION ORAL at 11:58

## 2025-03-19 RX ADMIN — INSULIN GLARGINE-YFGN 6 UNIT(S): 100 INJECTION, SOLUTION SUBCUTANEOUS at 21:12

## 2025-03-19 RX ADMIN — Medication 400 MILLIGRAM(S): at 20:35

## 2025-03-19 RX ADMIN — Medication 400 MILLIGRAM(S): at 00:25

## 2025-03-19 RX ADMIN — CARVEDILOL 3.12 MILLIGRAM(S): 3.12 TABLET, FILM COATED ORAL at 06:04

## 2025-03-19 RX ADMIN — Medication 400 MILLIGRAM(S): at 11:53

## 2025-03-19 RX ADMIN — Medication 2.5 MILLIGRAM(S): at 21:12

## 2025-03-19 RX ADMIN — Medication 800 MILLIGRAM(S): at 08:43

## 2025-03-19 RX ADMIN — CARVEDILOL 3.12 MILLIGRAM(S): 3.12 TABLET, FILM COATED ORAL at 17:30

## 2025-03-19 RX ADMIN — MYCOPHENOLIC ACID 360 MILLIGRAM(S): 360 TABLET, DELAYED RELEASE ORAL at 20:00

## 2025-03-19 RX ADMIN — Medication 1000 MILLIGRAM(S): at 12:23

## 2025-03-19 RX ADMIN — Medication 1 APPLICATION(S): at 06:05

## 2025-03-19 RX ADMIN — Medication 1000 MILLIGRAM(S): at 21:05

## 2025-03-19 NOTE — PROGRESS NOTE ADULT - ATTENDING COMMENTS
Hyperkalemia improved  MANUELITO due to diuretics, possibly anemia.  Agree with holding diuretics, will reassess.
43 yo m Laennec's cirrhosis, s/p OLT. S/p VATS decortication empyema.  N Multimodal pain management.  P , 100 serosanguineous, no air leaks CXR no acute changes.  C Off pressors. Monitor hemodynamics.  G Reg diet. Bowel regimen. PPI.  R /h. K 5.6, 10 Lokelma, 20 lasix IV.  H Hgb 9.4. Trend CBC.  I Continue ceftriaxone. Monitor WBC.

## 2025-03-19 NOTE — PROGRESS NOTE ADULT - SUBJECTIVE AND OBJECTIVE BOX
Subjective ; Hello  I am ok today"        Vital Signs Last 24 Hrs  T(C): 36.7 (25 @ 09:22), Max: 36.9 (25 @ 21:45)  T(F): 98.1 (25 @ 09:22), Max: 98.4 (25 @ 21:45)  HR: 78 (25 @ 09:22) (78 - 92)  BP: 110/67 (25 @ 09:22) (110/67 - 135/66)  RR: 20 (25 @ 09:22) (15 - 25)  SpO2: 97% (25 @ 09:22) (95% - 98%)          @ 07:01  -   @ 07:00  --------------------------------------------------------  IN: 1210 mL / OUT: 2740 mL / NET: -1530 mL    Daily Weight in k.5 (19 Mar 2025 06:07)                           7.8    5.24  )-----------( 174      ( 19 Mar 2025 07:15 )             23.7           134[L]  |  101  |  39[H]  ----------------------------<  194[H]  4.4   |  23  |  1.33[H]    Ca    9.0      19 Mar 2025 07:13  Phos  3.5       Mg     2.0         TPro  5.6[L]  /  Alb  2.5[L]  /  TBili  0.5  /  DBili  x   /  AST  5[L]  /  ALT  <5[L]  /  AlkPhos  68        MEDICATIONS  (STANDING):  atovaquone  Suspension 1500 milliGRAM(s) Oral daily  carvedilol 3.125 milliGRAM(s) Oral every 12 hours  cefTRIAXone   IVPB      cefTRIAXone   IVPB 1000 milliGRAM(s) IV Intermittent every 24 hours  chlorhexidine 2% Cloths 1 Application(s) Topical <User Schedule>  insulin glargine Injectable (LANTUS) 6 Unit(s) SubCutaneous at bedtime  insulin lispro (ADMELOG) corrective regimen sliding scale   SubCutaneous three times a day before meals  insulin lispro (ADMELOG) corrective regimen sliding scale   SubCutaneous at bedtime  magnesium oxide 800 milliGRAM(s) Oral two times a day with meals  mycophenolic acid  milliGRAM(s) Oral <User Schedule>  pantoprazole    Tablet 40 milliGRAM(s) Oral before breakfast  polyethylene glycol 3350 17 Gram(s) Oral daily  predniSONE   Tablet 20 milliGRAM(s) Oral daily  senna 2 Tablet(s) Oral at bedtime  sodium zirconium cyclosilicate 10 Gram(s) Oral daily  sofosbuvir 400 mG/velpatasvir 100 mG (EPCLUSA) 1 Tablet(s) Oral daily  tacrolimus 2 milliGRAM(s) Oral <User Schedule>  thiamine 100 milliGRAM(s) Oral daily  valGANciclovir 450 milliGRAM(s) Oral daily    MEDICATIONS  (PRN):  melatonin 3 milliGRAM(s) Oral at bedtime PRN Insomnia  sodium chloride 0.65% Nasal 1 Spray(s) Both Nostrils two times a day PRN Nasal Congestion  traMADol 50 milliGRAM(s) Oral every 6 hours PRN Severe Pain (7 - 10)    CAPILLARY BLOOD GLUCOSE      POCT Blood Glucose.: 164 mg/dL (19 Mar 2025 09:01)  POCT Blood Glucose.: 144 mg/dL (18 Mar 2025 21:50)  POCT Blood Glucose.: 185 mg/dL (18 Mar 2025 16:34)  POCT Blood Glucose.: 242 mg/dL (18 Mar 2025 12:07)          Drains:          R Pleural  [x ]  Drainage: 0/60 anterior + AL  Rt pleural #2 Posterior 40/130                          PHYSICAL EXAM        Neurology: alert and oriented x 3, nonfocal, no gross deficits    CV :s1s2    Rt lateral Wound :   Stable  #1 chest tube  anterior  + al  #2 chest tube  posterior     Lungs: B/l breath sound s    Abdomen: soft, nontender, nondistended, positive bowel sounds, last bowel movement+ BM    : voids              Extremities:    warm well perfused equal strength throughout   B//le + DP                                             Discussed with Cardiothoracic Team at AM rounds.

## 2025-03-19 NOTE — PROGRESS NOTE ADULT - SUBJECTIVE AND OBJECTIVE BOX
[facility] DIVISION OF KIDNEY DISEASES AND HYPERTENSION -- FOLLOW UP NOTE  --------------------------------------------------------------------------------  Chief Complaint:     24 hour events/subjective: Pt seen at bedside, s/p two right chest tube placement with noted drainage.       PAST HISTORY  --------------------------------------------------------------------------------  No significant changes to PMH, PSH, FHx, SHx, unless otherwise noted    ALLERGIES & MEDICATIONS  --------------------------------------------------------------------------------  Allergies    sulfa drugs (Rash)    Intolerances      Standing Inpatient Medications  atovaquone  Suspension 1500 milliGRAM(s) Oral daily  carvedilol 3.125 milliGRAM(s) Oral every 12 hours  cefTRIAXone   IVPB      cefTRIAXone   IVPB 1000 milliGRAM(s) IV Intermittent every 24 hours  chlorhexidine 2% Cloths 1 Application(s) Topical <User Schedule>  insulin glargine Injectable (LANTUS) 6 Unit(s) SubCutaneous at bedtime  insulin lispro (ADMELOG) corrective regimen sliding scale   SubCutaneous three times a day before meals  insulin lispro (ADMELOG) corrective regimen sliding scale   SubCutaneous at bedtime  magnesium oxide 800 milliGRAM(s) Oral two times a day with meals  mycophenolic acid  milliGRAM(s) Oral <User Schedule>  pantoprazole    Tablet 40 milliGRAM(s) Oral before breakfast  polyethylene glycol 3350 17 Gram(s) Oral daily  predniSONE   Tablet 20 milliGRAM(s) Oral daily  senna 2 Tablet(s) Oral at bedtime  sodium zirconium cyclosilicate 10 Gram(s) Oral daily  sofosbuvir 400 mG/velpatasvir 100 mG (EPCLUSA) 1 Tablet(s) Oral daily  tacrolimus 2 milliGRAM(s) Oral <User Schedule>  thiamine 100 milliGRAM(s) Oral daily  valGANciclovir 450 milliGRAM(s) Oral daily    PRN Inpatient Medications  melatonin 3 milliGRAM(s) Oral at bedtime PRN  sodium chloride 0.65% Nasal 1 Spray(s) Both Nostrils two times a day PRN  traMADol 50 milliGRAM(s) Oral every 6 hours PRN      REVIEW OF SYSTEMS  --------------------------------------------------------------------------------  Gen: No fevers/chills  Respiratory: No dyspnea, cough,   CV: No chest pain, PND, orthopnea  GI: No abdominal pain, diarrhea, constipation, nausea, vomiting  Transplant: No pain  : No increased frequency, dysuria, hematuria   MSK: No edema  Neuro: No dizziness/lightheadedness    All other systems were reviewed and are negative, except as noted.    VITALS/PHYSICAL EXAM  --------------------------------------------------------------------------------  T(C): 36.7 (03-19-25 @ 12:28), Max: 36.9 (03-18-25 @ 21:45)  HR: 76 (03-19-25 @ 12:28) (76 - 92)  BP: 111/78 (03-19-25 @ 12:28) (110/67 - 135/66)  RR: 202 (03-19-25 @ 12:28) (16 - 202)  SpO2: 97% (03-19-25 @ 12:28) (95% - 98%)  Wt(kg): --        03-18-25 @ 07:01  -  03-19-25 @ 07:00  --------------------------------------------------------  IN: 1210 mL / OUT: 2740 mL / NET: -1530 mL      Physical Exam:  	Gen: NAD, able to speak in full sentences   	HEENT: PERRL, MMM   	Pulm: CTA B/L, no crackles   	CV: RRR, S1S2+  	Abd: +BS, soft          Transplant: No tenderness, swelling  	: No suprapubic tenderness  	MSK: no edema   	Psych: Normal affect and mood  	Skin: Warm    LABS/STUDIES  --------------------------------------------------------------------------------              7.8    5.24  >-----------<  174      [03-19-25 @ 07:15]              23.7     134  |  101  |  39  ----------------------------<  194      [03-19-25 @ 07:13]  4.4   |  23  |  1.33        Ca     9.0     [03-19-25 @ 07:13]      Mg     2.0     [03-19-25 @ 07:13]      Phos  3.5     [03-19-25 @ 07:13]    TPro  5.6  /  Alb  2.5  /  TBili  0.5  /  DBili  x   /  AST  5   /  ALT  <5  /  AlkPhos  68  [03-19-25 @ 07:13]    PT/INR: PT 10.7 , INR 0.93       [03-19-25 @ 07:17]  PTT: 27.0       [03-19-25 @ 07:17]      Creatinine Trend:  SCr 1.33 [03-19 @ 07:13]  SCr 1.25 [03-18 @ 17:28]  SCr 1.08 [03-18 @ 12:33]  SCr 0.96 [03-18 @ 06:18]  SCr 0.97 [03-18 @ 03:44]    Tacrolimus (), Serum: 3.5 ng/mL (03-19 @ 07:15)  Tacrolimus (), Serum: 7.4 ng/mL (03-18 @ 06:18)  Tacrolimus (), Serum: 6.9 ng/mL (03-17 @ 06:23)  Tacrolimus (), Serum: 6.0 ng/mL (03-16 @ 06:43)            Urinalysis - [03-19-25 @ 07:13]      Color  / Appearance  / SG  / pH       Gluc 194 / Ketone   / Bili  / Urobili        Blood  / Protein  / Leuk Est  / Nitrite       RBC  / WBC  / Hyaline  / Gran  / Sq Epi  / Non Sq Epi  / Bacteria       Iron 57, TIBC 91, %sat 62      [01-17-25 @ 06:41]  Ferritin 849      [01-17-25 @ 06:41]  TSH 8.18      [01-17-25 @ 06:41]  Lipid: chol 65, TG 46, HDL 26, LDL --      [01-17-25 @ 06:41]    HBsAb 15.0      [02-24-25 @ 04:59]  HBsAg Nonreact      [02-24-25 @ 04:59]  HBcAb Nonreact      [02-24-25 @ 04:59]  HCV 0.07, Nonreact      [02-24-25 @ 04:59]  HIV Nonreact      [02-24-25 @ 04:59]

## 2025-03-19 NOTE — PROGRESS NOTE ADULT - ASSESSMENT
44M PMH with PMHx of decompensated ETOH cirrhosis c/b recurrent ascites, grade II esophageal varices, hepatic encephalopathy. 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 Admitted with Hyperkalemia  3/17Bronchoscopy with decortication of right lung using video-assisted thoracoscopic surgery (VATS)   VATS exploration of pleural space, with evacuation of hemothorax. Cortical peel of R lower, middle, and upper lobes collected. Lung re-expanded with contact to chest wall at end of case.  Right empyema fluid, Right middle, upper, and lower lobe peels  3/18 VSS maintain chest  tube lws  3/19 VSS maintain   chest  tube  lws .  water seal in am 3/20 daily xray  OOB to chair   may ambulate with chest tube incentive spirometry

## 2025-03-19 NOTE — PROGRESS NOTE ADULT - SUBJECTIVE AND OBJECTIVE BOX
Transplant Surgery - Multidisciplinary Rounds  --------------------------------------------------------------   OLT 02/27/25            Present: Patient seen and examined with multidisciplinary Transplant team including Surgeon Dr. Dagher, Hepatologist Dr. Adrienne Gutierrez  and bedside RN during AM rounds.   Disciplines not in attendance will be notified of the plan.     HPI: 44M PMH with PMHx of decompensated ETOH cirrhosis c/b recurrent ascites, grade II esophageal varices, and hepatic encephalopathy. 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 Admitted with Hyperkalemia and planned Thoracotomy on 3/17/2025    Interval Events:  - s/p VATs POD 2, CT x 2  - c/o pain at the CT sites    - VSS and WNL, afebrile    Immunosuppression  Maintenance: FK per level, Myfortic 360 BID, pred 20  Ongoing monitoring for signs of rejection     Potential Discharge date: TBD  Education:  Medications  Plan of care:  See Below       MEDICATIONS  (STANDING):  atovaquone  Suspension 1500 milliGRAM(s) Oral daily  carvedilol 3.125 milliGRAM(s) Oral every 12 hours  cefTRIAXone   IVPB      cefTRIAXone   IVPB 1000 milliGRAM(s) IV Intermittent every 24 hours  chlorhexidine 2% Cloths 1 Application(s) Topical <User Schedule>  insulin glargine Injectable (LANTUS) 6 Unit(s) SubCutaneous at bedtime  insulin lispro (ADMELOG) corrective regimen sliding scale   SubCutaneous three times a day before meals  insulin lispro (ADMELOG) corrective regimen sliding scale   SubCutaneous at bedtime  magnesium oxide 800 milliGRAM(s) Oral two times a day with meals  mycophenolic acid  milliGRAM(s) Oral <User Schedule>  pantoprazole    Tablet 40 milliGRAM(s) Oral before breakfast  polyethylene glycol 3350 17 Gram(s) Oral daily  predniSONE   Tablet 20 milliGRAM(s) Oral daily  senna 2 Tablet(s) Oral at bedtime  sodium zirconium cyclosilicate 10 Gram(s) Oral daily  sofosbuvir 400 mG/velpatasvir 100 mG (EPCLUSA) 1 Tablet(s) Oral daily  tacrolimus 2 milliGRAM(s) Oral <User Schedule>  thiamine 100 milliGRAM(s) Oral daily  valGANciclovir 450 milliGRAM(s) Oral daily    MEDICATIONS  (PRN):  melatonin 3 milliGRAM(s) Oral at bedtime PRN Insomnia  oxyCODONE    IR 10 milliGRAM(s) Oral every 4 hours PRN Severe Pain (7 - 10)  oxyCODONE    IR 5 milliGRAM(s) Oral every 4 hours PRN Moderate Pain (4 - 6)  sodium chloride 0.65% Nasal 1 Spray(s) Both Nostrils two times a day PRN Nasal Congestion      PAST MEDICAL & SURGICAL HISTORY:  Sleep apnea, obstructive      Alcohol abuse      Cirrhosis of liver      Portal hypertension      H/O esophageal varices      Anemia      History of alcohol use disorder      Hepatic encephalopathy      Pleural effusion      History of pleural empyema      Liver transplanted      S/P abdominal paracentesis      History of lung surgery          Vital Signs Last 24 Hrs  T(C): 36.7 (19 Mar 2025 09:22), Max: 36.9 (18 Mar 2025 21:45)  T(F): 98.1 (19 Mar 2025 09:22), Max: 98.4 (18 Mar 2025 21:45)  HR: 78 (19 Mar 2025 09:22) (78 - 92)  BP: 110/67 (19 Mar 2025 09:22) (110/67 - 135/66)  BP(mean): 85 (19 Mar 2025 06:07) (85 - 94)  RR: 20 (19 Mar 2025 09:22) (15 - 25)  SpO2: 97% (19 Mar 2025 09:22) (95% - 98%)    Parameters below as of 19 Mar 2025 09:22  Patient On (Oxygen Delivery Method): room air        I&O's Summary    18 Mar 2025 07:01  -  19 Mar 2025 07:00  --------------------------------------------------------  IN: 1210 mL / OUT: 2740 mL / NET: -1530 mL                              7.8    5.24  )-----------( 174      ( 19 Mar 2025 07:15 )             23.7     03-19    134[L]  |  101  |  39[H]  ----------------------------<  194[H]  4.4   |  23  |  1.33[H]    Ca    9.0      19 Mar 2025 07:13  Phos  3.5     03-19  Mg     2.0     03-19    TPro  5.6[L]  /  Alb  2.5[L]  /  TBili  0.5  /  DBili  x   /  AST  5[L]  /  ALT  <5[L]  /  AlkPhos  68  03-19    Tacrolimus (), Serum: 3.5 ng/mL (03-19 @ 07:15)        Culture - Acid Fast - Body Fluid w/Smear (collected 03-17-25 @ 20:33)  Source: Body Fluid Right Pleura effusion    Culture - Fungal, Body Fluid (collected 03-17-25 @ 20:33)  Source: Body Fluid Right Pleura effusion  Preliminary Report (03-19-25 @ 07:00):    No growth    Culture - Body Fluid with Gram Stain (collected 03-17-25 @ 20:33)  Source: Body Fluid Right Pleura effusion  Gram Stain (03-18-25 @ 03:20):    polymorphonuclear leukocytes seen    No organisms seen    by cytocentrifuge  Preliminary Report (03-18-25 @ 18:22):    No growth    Culture - Acid Fast - Tissue w/Smear (collected 03-17-25 @ 20:33)  Source: Tissue Right Lower Lobe Peel    Culture - Fungal, Tissue (collected 03-17-25 @ 20:33)  Source: Tissue Right Lower Lobe Peel  Preliminary Report (03-19-25 @ 07:00):    No growth    Culture - Tissue with Gram Stain (collected 03-17-25 @ 20:33)  Source: Tissue Right Lower Lobe Peel  Gram Stain (03-18-25 @ 06:36):    No polymorphonuclear cells seen per low power field    No organisms seen per oil power field  Preliminary Report (03-19-25 @ 10:21):    No growth to date.    Culture - Acid Fast - Tissue w/Smear (collected 03-17-25 @ 20:33)  Source: Tissue Upper Lobe Peel    Culture - Fungal, Tissue (collected 03-17-25 @ 20:33)  Source: Tissue Upper Lobe Peel  Preliminary Report (03-19-25 @ 07:00):    No growth    Culture - Tissue with Gram Stain (collected 03-17-25 @ 20:33)  Source: Tissue Upper Lobe Peel  Gram Stain (03-18-25 @ 06:30):    No polymorphonuclear cells seen per low power field    No organisms seen per oil power field  Preliminary Report (03-18-25 @ 21:50):    No growth to date.    Culture - Acid Fast - Tissue w/Smear (collected 03-17-25 @ 20:33)  Source: Tissue Middle Lobe Peel    Culture - Fungal, Tissue (collected 03-17-25 @ 20:33)  Source: Tissue Middle Lobe Peel  Preliminary Report (03-19-25 @ 07:34):    Testing in progress    Culture - Tissue with Gram Stain (collected 03-17-25 @ 20:33)  Source: Tissue Middle Lobe Peel  Gram Stain (03-18-25 @ 06:31):    No polymorphonuclear cells seen per low power field    No organisms seen per oil power field  Preliminary Report (03-18-25 @ 21:49):    No growth to date.    Culture - Acid Fast - Tissue w/Smear (collected 03-17-25 @ 20:33)  Source: Tissue Right Pleural Peel    Culture - Fungal, Tissue (collected 03-17-25 @ 20:33)  Source: Tissue Right Pleural Peel  Preliminary Report (03-19-25 @ 07:34):    Testing in progress    Culture - Tissue with Gram Stain (collected 03-17-25 @ 20:33)  Source: Tissue Right Pleural Peel  Gram Stain (03-18-25 @ 06:35):    No polymorphonuclear cells seen per low power field    No organisms seen per oil power field  Preliminary Report (03-19-25 @ 10:21):    No growth to date.      Review of Systems:  All other systems were reviewed and are negative, except as noted.    Physical Exam:   Constitutional: Well developed / well nourished  Eyes: PERRLA  ENMT: nc/at, no thrush  Neck: supple  Respiratory: slight decreased BS on RLL, good airway movement, no adventitious sounds. CT x 2  Cardiovascular: RRR  Gastrointestinal: Soft abdomen, ND, incision healed   Genitourinary: voiding  Extremities: SCD's in place and working bilaterally  Vascular: Palpable dp pulses bilaterally.   Neurological: A&O x3  Skin: no rashes, ulcerations, lesions  Musculoskeletal: Moving all extremities  Transplant Surgery - Multidisciplinary Rounds  --------------------------------------------------------------   OLT 02/27/25            Present: Patient seen and examined with multidisciplinary Transplant team including Surgeon Dr. Dagher, Hepatologist Dr. Adrienne Gutierrez  and bedside RN during AM rounds.   Disciplines not in attendance will be notified of the plan.     HPI: 44M PMH with PMHx of decompensated ETOH cirrhosis c/b recurrent ascites, grade II esophageal varices, and hepatic encephalopathy. 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 Admitted with Hyperkalemia and planned Thoracotomy on 3/17/2025    Interval Events:  - afebrile, VSS   - s/p VATs POD 2, CT x 2 to suction   - yates d/c'd - passed TOV     Immunosuppression  Maintenance: FK per level, Myfortic 360 BID, pred 20  Ongoing monitoring for signs of rejection     Potential Discharge date: TBD  Education:  Medications  Plan of care:  See Below       MEDICATIONS  (STANDING):  atovaquone  Suspension 1500 milliGRAM(s) Oral daily  carvedilol 3.125 milliGRAM(s) Oral every 12 hours  cefTRIAXone   IVPB      cefTRIAXone   IVPB 1000 milliGRAM(s) IV Intermittent every 24 hours  chlorhexidine 2% Cloths 1 Application(s) Topical <User Schedule>  insulin glargine Injectable (LANTUS) 6 Unit(s) SubCutaneous at bedtime  insulin lispro (ADMELOG) corrective regimen sliding scale   SubCutaneous three times a day before meals  insulin lispro (ADMELOG) corrective regimen sliding scale   SubCutaneous at bedtime  magnesium oxide 800 milliGRAM(s) Oral two times a day with meals  mycophenolic acid  milliGRAM(s) Oral <User Schedule>  pantoprazole    Tablet 40 milliGRAM(s) Oral before breakfast  polyethylene glycol 3350 17 Gram(s) Oral daily  predniSONE   Tablet 20 milliGRAM(s) Oral daily  senna 2 Tablet(s) Oral at bedtime  sodium zirconium cyclosilicate 10 Gram(s) Oral daily  sofosbuvir 400 mG/velpatasvir 100 mG (EPCLUSA) 1 Tablet(s) Oral daily  tacrolimus 2 milliGRAM(s) Oral <User Schedule>  thiamine 100 milliGRAM(s) Oral daily  valGANciclovir 450 milliGRAM(s) Oral daily    MEDICATIONS  (PRN):  melatonin 3 milliGRAM(s) Oral at bedtime PRN Insomnia  oxyCODONE    IR 10 milliGRAM(s) Oral every 4 hours PRN Severe Pain (7 - 10)  oxyCODONE    IR 5 milliGRAM(s) Oral every 4 hours PRN Moderate Pain (4 - 6)  sodium chloride 0.65% Nasal 1 Spray(s) Both Nostrils two times a day PRN Nasal Congestion      PAST MEDICAL & SURGICAL HISTORY:  Sleep apnea, obstructive      Alcohol abuse      Cirrhosis of liver      Portal hypertension      H/O esophageal varices      Anemia      History of alcohol use disorder      Hepatic encephalopathy      Pleural effusion      History of pleural empyema      Liver transplanted      S/P abdominal paracentesis      History of lung surgery          Vital Signs Last 24 Hrs  T(C): 36.7 (19 Mar 2025 09:22), Max: 36.9 (18 Mar 2025 21:45)  T(F): 98.1 (19 Mar 2025 09:22), Max: 98.4 (18 Mar 2025 21:45)  HR: 78 (19 Mar 2025 09:22) (78 - 92)  BP: 110/67 (19 Mar 2025 09:22) (110/67 - 135/66)  BP(mean): 85 (19 Mar 2025 06:07) (85 - 94)  RR: 20 (19 Mar 2025 09:22) (15 - 25)  SpO2: 97% (19 Mar 2025 09:22) (95% - 98%)    Parameters below as of 19 Mar 2025 09:22  Patient On (Oxygen Delivery Method): room air        I&O's Summary    18 Mar 2025 07:01  -  19 Mar 2025 07:00  --------------------------------------------------------  IN: 1210 mL / OUT: 2740 mL / NET: -1530 mL                              7.8    5.24  )-----------( 174      ( 19 Mar 2025 07:15 )             23.7     03-19    134[L]  |  101  |  39[H]  ----------------------------<  194[H]  4.4   |  23  |  1.33[H]    Ca    9.0      19 Mar 2025 07:13  Phos  3.5     03-19  Mg     2.0     03-19    TPro  5.6[L]  /  Alb  2.5[L]  /  TBili  0.5  /  DBili  x   /  AST  5[L]  /  ALT  <5[L]  /  AlkPhos  68  03-19    Tacrolimus (), Serum: 3.5 ng/mL (03-19 @ 07:15)        Culture - Acid Fast - Body Fluid w/Smear (collected 03-17-25 @ 20:33)  Source: Body Fluid Right Pleura effusion    Culture - Fungal, Body Fluid (collected 03-17-25 @ 20:33)  Source: Body Fluid Right Pleura effusion  Preliminary Report (03-19-25 @ 07:00):    No growth    Culture - Body Fluid with Gram Stain (collected 03-17-25 @ 20:33)  Source: Body Fluid Right Pleura effusion  Gram Stain (03-18-25 @ 03:20):    polymorphonuclear leukocytes seen    No organisms seen    by cytocentrifuge  Preliminary Report (03-18-25 @ 18:22):    No growth    Culture - Acid Fast - Tissue w/Smear (collected 03-17-25 @ 20:33)  Source: Tissue Right Lower Lobe Peel    Culture - Fungal, Tissue (collected 03-17-25 @ 20:33)  Source: Tissue Right Lower Lobe Peel  Preliminary Report (03-19-25 @ 07:00):    No growth    Culture - Tissue with Gram Stain (collected 03-17-25 @ 20:33)  Source: Tissue Right Lower Lobe Peel  Gram Stain (03-18-25 @ 06:36):    No polymorphonuclear cells seen per low power field    No organisms seen per oil power field  Preliminary Report (03-19-25 @ 10:21):    No growth to date.    Culture - Acid Fast - Tissue w/Smear (collected 03-17-25 @ 20:33)  Source: Tissue Upper Lobe Peel    Culture - Fungal, Tissue (collected 03-17-25 @ 20:33)  Source: Tissue Upper Lobe Peel  Preliminary Report (03-19-25 @ 07:00):    No growth    Culture - Tissue with Gram Stain (collected 03-17-25 @ 20:33)  Source: Tissue Upper Lobe Peel  Gram Stain (03-18-25 @ 06:30):    No polymorphonuclear cells seen per low power field    No organisms seen per oil power field  Preliminary Report (03-18-25 @ 21:50):    No growth to date.    Culture - Acid Fast - Tissue w/Smear (collected 03-17-25 @ 20:33)  Source: Tissue Middle Lobe Peel    Culture - Fungal, Tissue (collected 03-17-25 @ 20:33)  Source: Tissue Middle Lobe Peel  Preliminary Report (03-19-25 @ 07:34):    Testing in progress    Culture - Tissue with Gram Stain (collected 03-17-25 @ 20:33)  Source: Tissue Middle Lobe Peel  Gram Stain (03-18-25 @ 06:31):    No polymorphonuclear cells seen per low power field    No organisms seen per oil power field  Preliminary Report (03-18-25 @ 21:49):    No growth to date.    Culture - Acid Fast - Tissue w/Smear (collected 03-17-25 @ 20:33)  Source: Tissue Right Pleural Peel    Culture - Fungal, Tissue (collected 03-17-25 @ 20:33)  Source: Tissue Right Pleural Peel  Preliminary Report (03-19-25 @ 07:34):    Testing in progress    Culture - Tissue with Gram Stain (collected 03-17-25 @ 20:33)  Source: Tissue Right Pleural Peel  Gram Stain (03-18-25 @ 06:35):    No polymorphonuclear cells seen per low power field    No organisms seen per oil power field  Preliminary Report (03-19-25 @ 10:21):    No growth to date.      Review of Systems:  All other systems were reviewed and are negative, except as noted.    Physical Exam:   Constitutional: Well developed / well nourished  Eyes: PERRLA  ENMT: nc/at, no thrush  Neck: supple  Respiratory: slight decreased BS on RLL, good airway movement, no adventitious sounds. CT x 2  Cardiovascular: RRR  Gastrointestinal: Soft abdomen, ND, incision healed   Genitourinary: voiding  Extremities: SCD's in place and working bilaterally  Vascular: Palpable dp pulses bilaterally.   Neurological: A&O x3  Skin: no rashes, ulcerations, lesions  Musculoskeletal: Moving all extremities

## 2025-03-19 NOTE — PROGRESS NOTE ADULT - ASSESSMENT
44M PMH with PMHx of decompensated ETOH cirrhosis c/b recurrent ascites, grade II esophageal varices, and hepatic encephalopathy. 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  S/P Thoracotomy on 3/17/2025    [ ] s/p HCV + DCD OLT   - good graft function  - HCV viremia/ Genotype 1A (donor hcv +) - on Epclusa since 3/1   - D/c Lasix 40QD PO, received AM dose on 3/19  - C/w holding ASA 81mg daily, discuss in AM 03/20  - SQH bid (held for VATs,) discuss in AM    [] Immuno  - FK per level,  Myfortic 360BID, Pred 20QD  - ppx: no bactrim due to sulfa allergy -> Mepron, valcyte, oscal, PPI    [] h/o R Empyema   - s/p VATS POD#2 for decortication of right lung [3/17]   - R. Chest tube x2, monitor output  - Daily CXR  - c/w holding ASA/SQH  - continue CTX   - Thoracic Sx following 44M PMH with PMHx of decompensated ETOH cirrhosis c/b recurrent ascites, grade II esophageal varices, and hepatic encephalopathy. 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  S/P Thoracotomy on 3/17/2025    [ ] s/p HCV + DCD OLT   - good graft function  - HCV viremia/ Genotype 1A (donor hcv +) - on Epclusa since 3/1   - D/c Lasix 40QD PO   - C/w holding ASA 81mg daily, discuss in AM 03/20  - SQH bid (held for VATs,) discuss in AM    [] Immuno  - FK per level,  Myfortic 360BID, Pred 20QD  - ppx: no bactrim due to sulfa allergy -> Mepron, valcyte, oscal, PPI    [] h/o R Empyema   - s/p VATS POD#2 for decortication of right lung [3/17]   - R. Chest tube x2, monitor output  - Daily CXR  - c/w holding ASA/SQH  - continue CTX   - Thoracic Sx following

## 2025-03-19 NOTE — PROGRESS NOTE ADULT - ASSESSMENT
Pt. with hyperkalemia and now MANUELITO.      MANUELITO possibly 2/2 diuretics/anemia-Pt's baseline Scr is about 0.6-1.1. t noted to have mild elevated to 1.3 today. Diuretics were stopped. Pt had about 2.5L UOP. Continue to monitor output drainage from pleural catheter. Continue to monitor kidney function.     Pt. with hyperkalemia. On review of [facility] HIE/[place], serum potassium 5.0 on 3/11/25. Serum potassium elevated at 6.2 on admission with persistent elevation. Pt. received medical management and K has resolved. Currently on Lokelma 10 qd.     IS-as per hepatology.    Please call if you have any questions  Jake Fallon, PGY4  Nephrology Fellow  [phone]/Teams preferred  (After 5PM or weekends, reach out to fellow on call)

## 2025-03-19 NOTE — PROGRESS NOTE ADULT - PROBLEM SELECTOR PLAN 1
3/17 - s/p VATS for decortication of right lung  - Right  Chest tube x2  maintain to suction x 48 hours  monitor output   3/20 water seal chest tubes   - CXR: Loculated right hydropneumothorax, overall similar in size to prior but with new air component.  - Sat well RA   - nasal cannula, wean as tolerated  OOB to chair   may ambulate with chest tube incentive spirometry   Daily CXR  care as per primary team  Thoracic surgery following

## 2025-03-20 LAB
ALBUMIN SERPL ELPH-MCNC: 2.6 G/DL — LOW (ref 3.3–5)
ALP SERPL-CCNC: 67 U/L — SIGNIFICANT CHANGE UP (ref 40–120)
ALT FLD-CCNC: <5 U/L — LOW (ref 10–45)
ANION GAP SERPL CALC-SCNC: 9 MMOL/L — SIGNIFICANT CHANGE UP (ref 5–17)
APTT BLD: 26.8 SEC — SIGNIFICANT CHANGE UP (ref 24.5–35.6)
AST SERPL-CCNC: 5 U/L — LOW (ref 10–40)
BILIRUB SERPL-MCNC: 0.4 MG/DL — SIGNIFICANT CHANGE UP (ref 0.2–1.2)
BUN SERPL-MCNC: 40 MG/DL — HIGH (ref 7–23)
CALCIUM SERPL-MCNC: 8.8 MG/DL — SIGNIFICANT CHANGE UP (ref 8.4–10.5)
CHLORIDE SERPL-SCNC: 103 MMOL/L — SIGNIFICANT CHANGE UP (ref 96–108)
CO2 SERPL-SCNC: 24 MMOL/L — SIGNIFICANT CHANGE UP (ref 22–31)
CREAT SERPL-MCNC: 1.11 MG/DL — SIGNIFICANT CHANGE UP (ref 0.5–1.3)
EGFR: 83 ML/MIN/1.73M2 — SIGNIFICANT CHANGE UP
EGFR: 83 ML/MIN/1.73M2 — SIGNIFICANT CHANGE UP
GLUCOSE BLDC GLUCOMTR-MCNC: 109 MG/DL — HIGH (ref 70–99)
GLUCOSE BLDC GLUCOMTR-MCNC: 155 MG/DL — HIGH (ref 70–99)
GLUCOSE BLDC GLUCOMTR-MCNC: 156 MG/DL — HIGH (ref 70–99)
GLUCOSE BLDC GLUCOMTR-MCNC: 239 MG/DL — HIGH (ref 70–99)
GLUCOSE SERPL-MCNC: 145 MG/DL — HIGH (ref 70–99)
HCT VFR BLD CALC: 21.9 % — LOW (ref 39–50)
HCV RNA SERPL NAA DL=5-ACNC: 150 IU/ML — SIGNIFICANT CHANGE UP
HCV RNA SPEC NAA+PROBE-LOG IU: 2.18 LOGIU/ML — SIGNIFICANT CHANGE UP
HCV RNA SPEC NAA+PROBE-LOG IU: DETECTED
HGB BLD-MCNC: 7.3 G/DL — LOW (ref 13–17)
INR BLD: 0.89 RATIO — SIGNIFICANT CHANGE UP (ref 0.85–1.16)
MAGNESIUM SERPL-MCNC: 1.8 MG/DL — SIGNIFICANT CHANGE UP (ref 1.6–2.6)
MCHC RBC-ENTMCNC: 30.8 PG — SIGNIFICANT CHANGE UP (ref 27–34)
MCHC RBC-ENTMCNC: 33.3 G/DL — SIGNIFICANT CHANGE UP (ref 32–36)
MCV RBC AUTO: 92.4 FL — SIGNIFICANT CHANGE UP (ref 80–100)
NRBC BLD AUTO-RTO: 0 /100 WBCS — SIGNIFICANT CHANGE UP (ref 0–0)
PHOSPHATE SERPL-MCNC: 2.6 MG/DL — SIGNIFICANT CHANGE UP (ref 2.5–4.5)
PLATELET # BLD AUTO: 175 K/UL — SIGNIFICANT CHANGE UP (ref 150–400)
POTASSIUM SERPL-MCNC: 4.5 MMOL/L — SIGNIFICANT CHANGE UP (ref 3.5–5.3)
POTASSIUM SERPL-SCNC: 4.5 MMOL/L — SIGNIFICANT CHANGE UP (ref 3.5–5.3)
PROT SERPL-MCNC: 5.7 G/DL — LOW (ref 6–8.3)
PROTHROM AB SERPL-ACNC: 10.3 SEC — SIGNIFICANT CHANGE UP (ref 9.9–13.4)
RBC # BLD: 2.37 M/UL — LOW (ref 4.2–5.8)
RBC # FLD: 18.2 % — HIGH (ref 10.3–14.5)
SODIUM SERPL-SCNC: 136 MMOL/L — SIGNIFICANT CHANGE UP (ref 135–145)
TACROLIMUS SERPL-MCNC: 3 NG/ML — SIGNIFICANT CHANGE UP
WBC # BLD: 5.09 K/UL — SIGNIFICANT CHANGE UP (ref 3.8–10.5)
WBC # FLD AUTO: 5.09 K/UL — SIGNIFICANT CHANGE UP (ref 3.8–10.5)

## 2025-03-20 PROCEDURE — 71045 X-RAY EXAM CHEST 1 VIEW: CPT | Mod: 26

## 2025-03-20 RX ORDER — TACROLIMUS 0.5 MG/1
3 CAPSULE ORAL
Refills: 0 | Status: DISCONTINUED | OUTPATIENT
Start: 2025-03-21 | End: 2025-03-21

## 2025-03-20 RX ORDER — ASPIRIN 325 MG
81 TABLET ORAL DAILY
Refills: 0 | Status: DISCONTINUED | OUTPATIENT
Start: 2025-03-20 | End: 2025-03-30

## 2025-03-20 RX ORDER — IPRATROPIUM BROMIDE AND ALBUTEROL SULFATE .5; 2.5 MG/3ML; MG/3ML
3 SOLUTION RESPIRATORY (INHALATION) EVERY 6 HOURS
Refills: 0 | Status: DISCONTINUED | OUTPATIENT
Start: 2025-03-20 | End: 2025-03-30

## 2025-03-20 RX ORDER — ALBUTEROL SULFATE 2.5 MG/3ML
1 VIAL, NEBULIZER (ML) INHALATION EVERY 6 HOURS
Refills: 0 | Status: DISCONTINUED | OUTPATIENT
Start: 2025-03-20 | End: 2025-03-30

## 2025-03-20 RX ORDER — ACETAMINOPHEN 500 MG/5ML
1000 LIQUID (ML) ORAL ONCE
Refills: 0 | Status: COMPLETED | OUTPATIENT
Start: 2025-03-20 | End: 2025-03-20

## 2025-03-20 RX ORDER — TACROLIMUS 0.5 MG/1
1 CAPSULE ORAL ONCE
Refills: 0 | Status: COMPLETED | OUTPATIENT
Start: 2025-03-20 | End: 2025-03-20

## 2025-03-20 RX ORDER — TACROLIMUS 0.5 MG/1
2 CAPSULE ORAL
Refills: 0 | Status: DISCONTINUED | OUTPATIENT
Start: 2025-03-20 | End: 2025-03-21

## 2025-03-20 RX ORDER — MAGNESIUM SULFATE 500 MG/ML
1 SYRINGE (ML) INJECTION ONCE
Refills: 0 | Status: COMPLETED | OUTPATIENT
Start: 2025-03-20 | End: 2025-03-20

## 2025-03-20 RX ADMIN — IPRATROPIUM BROMIDE AND ALBUTEROL SULFATE 3 MILLILITER(S): .5; 2.5 SOLUTION RESPIRATORY (INHALATION) at 17:52

## 2025-03-20 RX ADMIN — Medication 800 MILLIGRAM(S): at 09:02

## 2025-03-20 RX ADMIN — Medication 100 GRAM(S): at 10:03

## 2025-03-20 RX ADMIN — TRAMADOL HYDROCHLORIDE 50 MILLIGRAM(S): 50 TABLET, FILM COATED ORAL at 13:16

## 2025-03-20 RX ADMIN — Medication 81 MILLIGRAM(S): at 12:16

## 2025-03-20 RX ADMIN — TACROLIMUS 2 MILLIGRAM(S): 0.5 CAPSULE ORAL at 09:02

## 2025-03-20 RX ADMIN — TRAMADOL HYDROCHLORIDE 50 MILLIGRAM(S): 50 TABLET, FILM COATED ORAL at 12:16

## 2025-03-20 RX ADMIN — TRAMADOL HYDROCHLORIDE 50 MILLIGRAM(S): 50 TABLET, FILM COATED ORAL at 06:15

## 2025-03-20 RX ADMIN — Medication 800 MILLIGRAM(S): at 17:52

## 2025-03-20 RX ADMIN — INSULIN GLARGINE-YFGN 6 UNIT(S): 100 INJECTION, SOLUTION SUBCUTANEOUS at 21:28

## 2025-03-20 RX ADMIN — TRAMADOL HYDROCHLORIDE 50 MILLIGRAM(S): 50 TABLET, FILM COATED ORAL at 19:25

## 2025-03-20 RX ADMIN — SODIUM ZIRCONIUM CYCLOSILICATE 10 GRAM(S): 5 POWDER, FOR SUSPENSION ORAL at 12:17

## 2025-03-20 RX ADMIN — ATOVAQUONE 1500 MILLIGRAM(S): 750 SUSPENSION ORAL at 12:16

## 2025-03-20 RX ADMIN — Medication 40 MILLIGRAM(S): at 06:14

## 2025-03-20 RX ADMIN — INSULIN LISPRO 2: 100 INJECTION, SOLUTION INTRAVENOUS; SUBCUTANEOUS at 17:52

## 2025-03-20 RX ADMIN — Medication 100 MILLIGRAM(S): at 12:16

## 2025-03-20 RX ADMIN — Medication 400 MILLIGRAM(S): at 21:21

## 2025-03-20 RX ADMIN — PREDNISONE 20 MILLIGRAM(S): 20 TABLET ORAL at 06:14

## 2025-03-20 RX ADMIN — MYCOPHENOLIC ACID 360 MILLIGRAM(S): 360 TABLET, DELAYED RELEASE ORAL at 09:02

## 2025-03-20 RX ADMIN — CARVEDILOL 3.12 MILLIGRAM(S): 3.12 TABLET, FILM COATED ORAL at 06:13

## 2025-03-20 RX ADMIN — CARVEDILOL 3.12 MILLIGRAM(S): 3.12 TABLET, FILM COATED ORAL at 17:53

## 2025-03-20 RX ADMIN — IPRATROPIUM BROMIDE AND ALBUTEROL SULFATE 3 MILLILITER(S): .5; 2.5 SOLUTION RESPIRATORY (INHALATION) at 10:06

## 2025-03-20 RX ADMIN — TACROLIMUS 2 MILLIGRAM(S): 0.5 CAPSULE ORAL at 19:55

## 2025-03-20 RX ADMIN — INSULIN LISPRO 4: 100 INJECTION, SOLUTION INTRAVENOUS; SUBCUTANEOUS at 13:06

## 2025-03-20 RX ADMIN — MYCOPHENOLIC ACID 360 MILLIGRAM(S): 360 TABLET, DELAYED RELEASE ORAL at 19:55

## 2025-03-20 RX ADMIN — Medication 1 PUFF(S): at 21:26

## 2025-03-20 RX ADMIN — INSULIN LISPRO 2: 100 INJECTION, SOLUTION INTRAVENOUS; SUBCUTANEOUS at 09:01

## 2025-03-20 RX ADMIN — VALGANCICLOVIR 450 MILLIGRAM(S): 450 TABLET, FILM COATED ORAL at 12:16

## 2025-03-20 RX ADMIN — TACROLIMUS 1 MILLIGRAM(S): 0.5 CAPSULE ORAL at 14:45

## 2025-03-20 RX ADMIN — CEFTRIAXONE 100 MILLIGRAM(S): 500 INJECTION, POWDER, FOR SOLUTION INTRAMUSCULAR; INTRAVENOUS at 09:02

## 2025-03-20 RX ADMIN — TRAMADOL HYDROCHLORIDE 50 MILLIGRAM(S): 50 TABLET, FILM COATED ORAL at 18:25

## 2025-03-20 RX ADMIN — VELPATASVIR AND SOFOSBUVIR 1 TABLET(S): 50; 200 TABLET, FILM COATED ORAL at 12:18

## 2025-03-20 RX ADMIN — Medication 1 APPLICATION(S): at 06:13

## 2025-03-20 RX ADMIN — IPRATROPIUM BROMIDE AND ALBUTEROL SULFATE 3 MILLILITER(S): .5; 2.5 SOLUTION RESPIRATORY (INHALATION) at 21:33

## 2025-03-20 RX ADMIN — Medication 1000 MILLIGRAM(S): at 22:20

## 2025-03-20 NOTE — PROGRESS NOTE ADULT - ASSESSMENT
44M PMH with PMHx of decompensated ETOH cirrhosis c/b recurrent ascites, grade II esophageal varices, hepatic encephalopathy. 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 Admitted with Hyperkalemia  3/17Bronchoscopy with decortication of right lung using video-assisted thoracoscopic surgery (VATS)   VATS exploration of pleural space, with evacuation of hemothorax. Cortical peel of R lower, middle, and upper lobes collected. Lung re-expanded with contact to chest wall at end of case.  Right empyema fluid, Right middle, upper, and lower lobe peels  3/18 VSS maintain chest  tube lws  3/19 VSS maintain   chest  tube  lws .  water seal in am 3/20 daily xray  OOB to chair   may ambulate with chest tube incentive spirometry   3/20 Chest tubes to waterseal  + airleak posterior tube  CXR 2 pm

## 2025-03-20 NOTE — PROGRESS NOTE ADULT - PROBLEM SELECTOR PLAN 1
[facility] - Cardiac Medical ICU  Critical Care Medicine  Progress Note    Patient Name: Kristin Goodman  MRN: [id number]  Admission Date: 10/17/2022  Hospital Length of Stay: 10 days  Code Status: Full Code  Attending Provider: Fox Holbrook MD  Primary Care Provider: Lori Hernandez MD   Principal Problem: Microscopic polyangiitis    Subjective:     HPI:  Ms. Goodman is a 73 year old female with PMH notable for HTN, hypothyroidism, HFpEF (65%, TR, elevated PA pressure), and osteoarthritis of the arm who [facility] 10/17 with progressive shortness of breath, weakness, cough, and hemoptysis. Previously, she presented the ED 9/24 with hemoptysis and CT and CXR at that time concerning for PNA which she was given a 10 day course of abx and albuterol. She followed up with her PCP and had improvement of symptoms. She then presented to the ED 10/14 with no interventions. At the time of this presentation, 10/17, in addition to above symptoms, she reports fever up to 102.4 and increasing amounts of hemoptysis. CT chest with extensive opacification at admission. She was started on broad spectrum IV abx coverage with Vanc / Zosyn and an infectious work up was sent. ID was later consulted during the hospitalization.     Her hospitalization has been complicated by GIB which required 2 u PRBC transfusion and PPI therapy. No EGD with GI. Additionally had bilateral cervical adenopathy which was evaluated by ENT and felt to be reactive in nature. She has had worsening renal function and nephrology was consulted with plans for a kidney biopsy. She has been trailed with IV diuresis. Nephrology concerned GN. Rheumatologic work up in process.     Critical care consulted on the evening of 10/23 for worsening respiratory failure now requiring NIPPV. Earlier today on 4L nasal cannula then transitioned to venti mask and later BiPap. Worsening opacification on CXR and AMS. Patient upgraded to MICU for higher level of  care and closer monitoring in setting of her tenuous respiratory status.       Hospital/ICU Course:  Pt transferred to ICU 10/23/22 2/2 respiratory distress in the setting of hemoptysis. Decision made to consult IR and rheumatology. High-dose solumedrol x 3 days followed by predinitiated. Complete rheumatologic work-up initiated. IR consulted for renal biopsy, planned for 10/25 but postponed. Worsening mental and respiratory status 10/25. Trialysis catheter placed overnight 10/25 with plans for plasmapheresis overnight. Pt tolerated procedure well despite multiple attempts to place line. Went for IR Renal biopsy AM of 10/26. Tolerated procedure well. Endorsing burning at szymanski insertion site. Continuing Azithromycin and Cefepime. Unable to start PJP prophylaxis due to sulfa allergy and inability to tolerate PO meds at this point due to worsening encephalopathy and mild sedation. Rheumatology planning to start rituximab and cyclophosphamide. Pt's MPO Ab strongly positive (in contrast to borderline positive PR3), consistent with clinical picture of microscopic polyangiitis with pulmonary and renal involvement.      Interval History/Significant Events: NAEON. VSS. Tolerated plasmapheresis well. Worsening encephalopathy, precedex continued and 1 dose of dilaudid given. Pt intermittently complaining of burning around catheter. Nephro, blood bank, and rheumatology following.    Review of Systems   Unable to perform ROS: Mental status change   Genitourinary:  Positive for dysuria.   Psychiatric/Behavioral:  Positive for confusion.    Objective:     Vital Signs (Most Recent):  Temp: 96 °F (35.6 °C) (10/27/22 0701)  Pulse: (!) 55 (10/27/22 0800)  Resp: (!) 9 (10/27/22 0800)  BP: 109/61 (10/27/22 0701)  SpO2: 100 % (10/27/22 0800)   Vital Signs (24h Range):  Temp:  [96 °F (35.6 °C)-98 °F (36.7 °C)] 96 °F (35.6 °C)  Pulse:  [55-95] 55  Resp:  [9-37] 9  SpO2:  [90 %-100 %] 100 %  BP: (102-160)/(53-75) 109/61   Weight: 63.5 kg  (139 lb 15.9 oz)  Body mass index is 26.45 kg/m².      Intake/Output Summary (Last 24 hours) at 10/27/2022 0807  Last data filed at 10/27/2022 0701  Gross per 24 hour   Intake 3569.57 ml   Output 3489 ml   Net 80.57 ml       Physical Exam  Vitals and nursing note reviewed.   Constitutional:       General: She is not in acute distress.     Appearance: She is well-developed. She is ill-appearing.   HENT:      Head: Normocephalic and atraumatic.   Neck:      Comments: LIJ trialysis  Cardiovascular:      Rate and Rhythm: Normal rate and regular rhythm.      Heart sounds: Normal heart sounds.   Pulmonary:      Effort: Pulmonary effort is normal. No respiratory distress.      Breath sounds: Rales present. No wheezing.   Abdominal:      General: Bowel sounds are normal. There is no distension.      Palpations: Abdomen is soft.      Tenderness: There is no abdominal tenderness.   Musculoskeletal:         General: No tenderness. Normal range of motion.      Cervical back: Normal range of motion and neck supple.      Right lower leg: Edema present.      Left lower leg: Edema present.   Lymphadenopathy:      Cervical: No cervical adenopathy.   Skin:     General: Skin is warm and dry.      Capillary Refill: Capillary refill takes less than 2 seconds.      Findings: No rash.   Neurological:      General: No focal deficit present.      Mental Status: She is oriented to person, place, and time. She is lethargic and confused.       Vents:  Oxygen Concentration (%): 50 (10/27/22 0505)  Lines/Drains/Airways       Central Venous Catheter Line  Duration             Trialysis (Dialysis) Catheter 10/25/22 2329 left internal jugular 1 day              Drain  Duration                  Urethral Catheter 10/23/22 2230 16 Fr. 3 days              Peripheral Intravenous Line  Duration                  Midline Catheter Insertion/Assessment  - Single Lumen 10/18/22 1831 Left brachial vein 18g x 10cm 8 days                  Significant  Labs:    CBC/Anemia Profile:  Recent Labs   Lab 10/25/22  1557 10/26/22  0249 10/27/22  0418   WBC 42.24* 38.52* 40.36*   HGB 7.9* 7.3* 7.6*   HCT 23.1* 21.8* 22.3*   * 562* 477*   MCV 81* 83 82   RDW 14.2 14.5 14.4        Chemistries:  Recent Labs   Lab 10/25/22  1416 10/26/22  0249 10/26/22  1233 10/27/22  0418   * 135* 136 140   K 4.1 4.2 4.3 4.1    106 105 103   CO2 15* 16* 15* 20*   * 154* 172* 164*   CREATININE 5.1* 5.7* 6.0* 6.1*   CALCIUM 8.5* 8.3* 8.2* 8.4*   ALBUMIN 1.8* 1.8*  --  3.0*   PROT 7.1 6.4  --  6.4   BILITOT 2.0* 1.7*  --  1.9*   ALKPHOS 180* 136*  --  95   * 85*  --  48*   AST 97* 57*  --  53*   MG  --  3.0* 3.2* 3.1*   PHOS  --  6.3*  --  8.0*       Blood Culture: No results for input(s): LABBLOO in the last 48 hours.  CMP:   Recent Labs   Lab 10/25/22  1416 10/26/22  0249 10/26/22  1233 10/27/22  0418   * 135* 136 140   K 4.1 4.2 4.3 4.1    106 105 103   CO2 15* 16* 15* 20*   * 171* 181* 177*   * 154* 172* 164*   CREATININE 5.1* 5.7* 6.0* 6.1*   CALCIUM 8.5* 8.3* 8.2* 8.4*   PROT 7.1 6.4  --  6.4   ALBUMIN 1.8* 1.8*  --  3.0*   BILITOT 2.0* 1.7*  --  1.9*   ALKPHOS 180* 136*  --  95   AST 97* 57*  --  53*   * 85*  --  48*   ANIONGAP 16 13 16 17*     Pathology Results  (Last 10 years)                 09/28/20 1023  Specimen to Pathology, Surgery Gastrointestinal tract Final result    Narrative:  Dx: HTN, CHF, Hypothyroid   Preo procedure Dx: Colon cancer screen   Post procedure Dx: Polyp, Diverticulosis, Hemorrhoids   Jar #1: Cecal polyp   Jar #2:Transverse polyp   Specimen total (fresh, frozen, permanent):->2             Urine Culture: No results for input(s): LABURIN in the last 48 hours.  Urine Studies: No results for input(s): COLORU, APPEARANCEUA, PHUR, SPECGRAV, PROTEINUA, GLUCUA, KETONESU, BILIRUBINUA, OCCULTUA, NITRITE, UROBILINOGEN, LEUKOCYTESUR, RBCUA, WBCUA, BACTERIA, SQUAMEPITHEL, HYALINECASTS in the last 48  hours.    Invalid input(s): CATRACHITO  All pertinent labs within the past 24 hours have been reviewed.    Significant Imaging:  I have reviewed all pertinent imaging results/findings within the past 24 hours.      ABG  Recent Labs   Lab 10/26/22  1201   PH 7.263*   PO2 39*   PCO2 40.9   HCO3 18.5*   BE -9     Assessment/Plan:     Pulmonary  Acute hypoxemic respiratory failure  Microscopic polyangitis  Multifocal pneumonia  Hemoptysis    72 yo PMHx HTN, hypothyroid, HFpEF, OA admitted for fevers and respiratory symptoms, treated while on Hospital Medicine for multifocal PNA. Course complicated by GIB bleed (no EGD yet d/t PNA), hyponatremia, ATN. MICU consulted for rapidly increasing oxygen requirement (4L NC to BiPAP 15/10 50%), respiratory distress/work of breathing, somnolence. Nephrology consulted for ATN, concerns for possible nephritic disease as  behind ATN d/t acanthrocytes. Reports of scant hemoptysis by nursing staff 10/21, 10/22.    Imaging: CXR: Diffuse bilateral airspace infiltrates w/ c/f alveolar fillings; CT chest wc 10/17 with bl perihilar dense consolidations w/ peripheral patcy areas of GGO, BL PNA, less c/f cardiogenic pulmonary edema.  Labs: WBC uptrending 28.97, Hgb lowest 6.0 (s/p 2 u PRBC), continues to downtrend approx 1 point / day. sCr uptrending, (baseline 1.0-1.2). .      Recent Labs     10/26/22  1201   PH 7.263*   PCO2 40.9   PO2 39*   HCO3 18.5*   POCSATURATED 66*   BE -9     Etiology: Microscopic polyangitis likely. Concerns for possible PE as patient was not on thrombotic ppx s/o GIB. Incomplete I/Os charted, though reports of low UOP also renders pulmonary congestion edema as a possibility. Less c/f acute progression of multifocal PNA as adequate ABx coverage and no new fevers recently.    -- US DVT BLE unremarkable  -- Consideration for urgent BAL +/- repeat chest imaging (last done 10/17)  -- IV solumedrol   -- PJP prophylaxis  -- Rheumatology consulted, planning for  rituximab  -- Per nephrology consulted, monitoring for HD needs  -- Plasmapheresis started 10/26, plans for 7 more cycles over 14 days. Will need to hold if Rheumatology planning to start rituximab today.  -- Continue cefepime and azithromycin for treatment of multifocal PNA; ID consulted  -- Diuretic trial PRN  -- Wean supplemental O2 as tolerated  -- Neuro checks  -- supplemental O2 prn    Multifocal pneumonia  See acute hypoxemic respiratory failure    Cardiac/Vascular  Chronic diastolic heart failure  Pulmonary Hypertension    TTE (10/2022) EF 65%, G1DD  Home meds: Lisinopril, Toprol  Dry weight: N/A    -- volume control plan per Acute Hypoxemic Respiratory Failure A/P  -- Daily weights (standing if tolerated)  -- Strict I/Os; goal net negative 1.5 - 2 L / day  -- Cardiac diet w/ 2 g Na restriction      HTN (hypertension)  -- Continue home medications as tolerated  -- pt not tolerating PO medications, prn hydralazine ordered    Renal/  Acute renal failure  See microscopic polyangitis    Hyponatremia  RESOLVED  Patient presents with Na 126, baseline mid/high 130s. Now recovered to baseline. Symptoms include: None. Volume status: Eu    -- Urine Na, Osm  -- Serum Osm  -- Trend Na; goal correction < 6-8 units / 24 hours  -- legionella ordered    Acute renal failure superimposed on stage 3 chronic kidney disease  See microscopic polyangitis    Patient w/o CKD presenting with WILFREDO with rapidly increasing sCr (baseline sCr 1.0-1.2). New onset proteinuria/hematuria in last 30 days. .  DDx: ATN vs GN.     Serum creatinine: 6.1 mg/dL (H) 10/27/22 0418  Estimated creatinine clearance: 6.8 mL/min (A)    -- F/U serologies per Nephro (MITUL, ANCA, anti-GBM, IgA, C3, C4, HCV, HBV, cryo, RF)  -- Renal Bx done today  -- IV Solumedrol 1mg x3 days completed. Now continuing lower dose IV solumedrol in setting of pt's intolerance for PO meds. Will transition to PO pred once able.  -- Trend sCr  -- Strict I&Os  -- Avoid  nephrotoxic agents  -- Renally adjust medications  -- added bicarb today  -- Nephro and Rheumatology following  -- Discussing HD w/ nephro      CKD (chronic kidney disease), stage III  See microscopic polyangitis    ID  Septic shock  See acute hypoxemic respiratory failure    Immunology/Multi System  * Microscopic polyangiitis  As of 10/26/22 strongly positive MPO Ab (in contrast to borderline positive PR3), consistent with clinical picture of microscopic polyangiitis with pulmonary, and renal involvement. Trialysis catheter placed overnight with plans for plasmapheresis early this AM. Pt tolerated procedure well despite multiple attempts to place line. Went for IR Renal biopsy this AM. Tolerated procedure well. Endorsing burning at szymanski insertion site. Continuing Azithromycin and Cefepime. Rheumatology planning to start rituximab and cyclophosphamide.     - Underwent Plasmapheresis 10/26 w/ plans for 7 treatments over 14-day period (Schedule: Wed, Thurs, Fri, Sun, Tues, Wed, Fri)  - rituximab and cyclophosphamide, will need to hold off on plex if initiating rituximab today.  - PJP prophylaxis with atovaquone given pt's sulfa allergy.  - nephrology consulted  - rheumatology consulted  - continuing IV solumedrol given patient's intolerance to PO meds, will transition to Pred 60mg daily once able.        Oncology  Acute blood loss anemia  resolved    Per GI, bleed remote, no evidence of acute re-bleed; patient has had normal stool for 10-14 days. No EGD this admission d/t PNA.    -- Continue recommended PO PPI QD  -- Monitor for bleed  -- Trend CBC; transfuse Hgb < 7  -- type and screen ordered    Endocrine  Hypothyroid  -- Continue home medications as tolerated  -- rechecking TSH today, unclear whether patient was taking this medication at home     Critical Care Daily Checklist:    A: Awake: RASS Goal/Actual Goal:    Actual: [place] Agitation Sedation Scale (RASS): Drowsy   B: Spontaneous Breathing Trial Performed?      C: SAT & SBT Coordinated?  NA                      D: Delirium: CAM-ICU Overall CAM-ICU: Positive   E: Early Mobility Performed? No   F: Feeding Goal:    Status:     Current Diet Order   Procedures    Diet renal      AS: Analgesia/Sedation precedex   T: Thromboembolic Prophylaxis holding   H: HOB > 300 Yes   U: Stress Ulcer Prophylaxis (if needed) heparin   G: Glucose Control monitoring   B: Bowel Function Stool Occurrence: 1   I: Indwelling Catheter (Lines & Curry) Necessity LIJ trialysis x1 day  Midline x8 days  Urethral catheter x3 days     D: De-escalation of Antimicrobials/Pharmacotherapies Vanc  Azithromycin  atovaquone    Plan for the day/ETD NG/OG, improve nutritional status; rituximab    Code Status:  Family/Goals of Care: Full Code  Discussed with family at bedside.       Critical secondary to Patient has a condition that poses threat to life and bodily function: Acute Renal Failure and Respiratory Failure 2/2 microscopic polyangitis.      Critical care was time spent personally by me on the following activities: development of treatment plan with patient or surrogate and bedside caregivers, discussions with consultants, evaluation of patient's response to treatment, examination of patient, ordering and performing treatments and interventions, ordering and review of laboratory studies, ordering and review of radiographic studies, pulse oximetry, re-evaluation of patient's condition. This critical care time did not overlap with that of any other provider or involve time for any procedures.     Magui Cage MD  Critical Care Medicine  [facility] - Cardiac Medical ICU   3/17 - s/p VATS for decortication of right lung  - Right  Chest tube x2 placed to waterseal  monitor output   - CXR: Loculated right hydropneumothorax, overall similar in size to prior but with new air component.  - Sat well RA   - nasal cannula, wean as tolerated  OOB to chair   incentive spirometry   Daily CXR  care as per primary team  Thoracic surgery following

## 2025-03-20 NOTE — PROGRESS NOTE ADULT - SUBJECTIVE AND OBJECTIVE BOX
Subjective:  "Hello"  OOB chair      V/S  T(C): 36.7 (03-20-25 @ 09:00), Max: 36.8 (03-19-25 @ 17:00)  HR: 83 (03-20-25 @ 09:00) (66 - 86)  BP: 121/69 (03-20-25 @ 09:00) (111/78 - 127/71)  RR: 18 (03-20-25 @ 09:00) (18 - 20)  SpO2: 97% (03-20-25 @ 09:00) (95% - 97%)    CHEST TUBES:    Right CT  x 2    [ x ]LWS  [  ]H2O seal    I&O's Detail    19 Mar 2025 07:01  -  20 Mar 2025 07:00  --------------------------------------------------------  IN:    Oral Fluid: 1260 mL  Total IN: 1260 mL    OUT:    Chest Tube (mL): 130 mL    Chest Tube (mL): 20 mL    Voided (mL): 2855 mL  Total OUT: 3005 mL    Total NET: -1745 mL      20 Mar 2025 07:01  -  20 Mar 2025 10:55  --------------------------------------------------------  IN:  Total IN: 0 mL    OUT:    Voided (mL): 450 mL  Total OUT: 450 mL    Total NET: -450 mL          03-19-25 @ 07:01  -  03-20-25 @ 07:00  --------------------------------------------------------  IN: 1260 mL / OUT: 3005 mL / NET: -1745 mL    03-20-25 @ 07:01  -  03-20-25 @ 10:55  --------------------------------------------------------  IN: 0 mL / OUT: 450 mL / NET: -450 mL      MEDICATIONS  (STANDING):  albuterol/ipratropium for Nebulization 3 milliLiter(s) Nebulizer every 6 hours  aspirin enteric coated 81 milliGRAM(s) Oral daily  atovaquone  Suspension 1500 milliGRAM(s) Oral daily  carvedilol 3.125 milliGRAM(s) Oral every 12 hours  cefTRIAXone   IVPB      cefTRIAXone   IVPB 1000 milliGRAM(s) IV Intermittent every 24 hours  chlorhexidine 2% Cloths 1 Application(s) Topical <User Schedule>  insulin glargine Injectable (LANTUS) 6 Unit(s) SubCutaneous at bedtime  insulin lispro (ADMELOG) corrective regimen sliding scale   SubCutaneous three times a day before meals  insulin lispro (ADMELOG) corrective regimen sliding scale   SubCutaneous at bedtime  magnesium oxide 800 milliGRAM(s) Oral two times a day with meals  mycophenolic acid  milliGRAM(s) Oral <User Schedule>  pantoprazole    Tablet 40 milliGRAM(s) Oral before breakfast  polyethylene glycol 3350 17 Gram(s) Oral daily  predniSONE   Tablet 20 milliGRAM(s) Oral daily  senna 2 Tablet(s) Oral at bedtime  sodium zirconium cyclosilicate 10 Gram(s) Oral daily  sofosbuvir 400 mG/velpatasvir 100 mG (EPCLUSA) 1 Tablet(s) Oral daily  tacrolimus 2 milliGRAM(s) Oral <User Schedule>  thiamine 100 milliGRAM(s) Oral daily  valGANciclovir 450 milliGRAM(s) Oral daily      03-20    136  |  103  |  40[H]  ----------------------------<  145[H]  4.5   |  24  |  1.11    Ca    8.8      20 Mar 2025 06:41  Phos  2.6     03-20  Mg     1.8     03-20    TPro  5.7[L]  /  Alb  2.6[L]  /  TBili  0.4  /  DBili  x   /  AST  5[L]  /  ALT  <5[L]  /  AlkPhos  67  03-20                               7.3    5.09  )-----------( 175      ( 20 Mar 2025 06:42 )             21.9        PT/INR - ( 20 Mar 2025 06:42 )   PT: 10.3 sec;   INR: 0.89 ratio         PTT - ( 20 Mar 2025 06:42 )  PTT:26.8 sec         CAPILLARY BLOOD GLUCOSE      POCT Blood Glucose.: 155 mg/dL (20 Mar 2025 08:39)  POCT Blood Glucose.: 106 mg/dL (19 Mar 2025 21:11)  POCT Blood Glucose.: 244 mg/dL (19 Mar 2025 16:36)  POCT Blood Glucose.: 197 mg/dL (19 Mar 2025 12:03)           CXR:      Physical Exam:    Neurology: alert and oriented x 3,    CV :s1s2    Rt lateral Wound :   Stable  #1 chest tube  anterior   #2 chest tube  posterior airleak     Lungs: B/l breath sound s    Abdomen: soft, nontender, nondistended, positive bowel sounds, last bowel movement+ BM    : voids              Extremities:    warm well perfused equal strength throughout   B//le + DP       Dsg secure          PAST MEDICAL & SURGICAL HISTORY:  Sleep apnea, obstructive      Alcohol abuse      Cirrhosis of liver      Portal hypertension      H/O esophageal varices      Anemia      History of alcohol use disorder      Hepatic encephalopathy      Pleural effusion      History of pleural empyema      Liver transplanted      S/P abdominal paracentesis      History of lung surgery

## 2025-03-20 NOTE — PROGRESS NOTE ADULT - ASSESSMENT
44M PMH with PMHx of decompensated ETOH cirrhosis c/b recurrent ascites, grade II esophageal varices, and hepatic encephalopathy. 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  S/P Thoracotomy on 3/17/2025    [ ] s/p HCV + DCD OLT   - good graft function  - HCV viremia/ Genotype 1A (donor hcv +) - on Epclusa since 3/1   - SQH bid (held for VATs,) discuss in AM    [] Immuno  - FK per level,  Myfortic 360BID, Pred 20QD  - ppx: no bactrim due to sulfa allergy -> Mepron, valcyte, oscal, PPI    [] h/o R Empyema   - s/p VATS POD#2 for decortication of right lung [3/17]   - R. Chest tube x2, monitor output  - Daily CXR  - c/w holding ASA/SQH  - continue CTX   - Thoracic Sx following- plan to convert chest tubes to WS  44M PMH with PMHx of decompensated ETOH cirrhosis c/b recurrent ascites, grade II esophageal varices, and hepatic encephalopathy. 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  S/P Thoracotomy on 3/17/2025    [ ] s/p HCV + DCD OLT POD 3  - good graft function  - HCV viremia/ Genotype 1A (donor hcv +) - on Epclusa since 3/1   - SQH bid (held for VATs,) discuss in AM  - resume ASA  - Remove half of staples today    [] Immuno  - FK per level,  Myfortic 360BID, Pred 20QD  - ppx: no bactrim due to sulfa allergy -> Mepron, valcyte, oscal, PPI    [] h/o R Empyema   - s/p VATS POD#2 for decortication of right lung [3/17]   - R. Chest tube x2, monitor output  - Daily CXR  - PTX on CXR cont with suction for today  - c/w holding SQH  - continue CTX   - Thoracic Sx following- plan to convert chest tubes to WS when able to

## 2025-03-20 NOTE — PROGRESS NOTE ADULT - SUBJECTIVE AND OBJECTIVE BOX
Transplant Surgery - Multidisciplinary Rounds  --------------------------------------------------------------   OLT 02/27/25            Present: Patient seen and examined with multidisciplinary Transplant team including Surgeon Dr. Dagher, Hepatologist Dr. Adrienne Gutierrez/Kalin  and bedside RN during AM rounds.   Disciplines not in attendance will be notified of the plan.     HPI: 44M PMH with PMHx of decompensated ETOH cirrhosis c/b recurrent ascites, grade II esophageal varices, and hepatic encephalopathy. 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 Admitted with Hyperkalemia and planned Thoracotomy on 3/17/2025    Interval Events:  - afebrile, VSS   - no ON events     Immunosuppression  Maintenance: FK per level, Myfortic 360 BID, pred 20  Ongoing monitoring for signs of rejection     Potential Discharge date: TBD  Education:  Medications  Plan of care:  See Below       TX DATA       Review of Systems:  All other systems were reviewed and are negative, except as noted.    Physical Exam:   Constitutional: Well developed / well nourished  Eyes: PERRLA  ENMT: nc/at, no thrush  Neck: supple  Respiratory: slight decreased BS on RLL, good airway movement, no adventitious sounds. CT x 2  Cardiovascular: RRR  Gastrointestinal: Soft abdomen, ND, incision healed   Genitourinary: voiding  Extremities: SCD's in place and working bilaterally  Vascular: Palpable dp pulses bilaterally.   Neurological: A&O x3  Skin: no rashes, ulcerations, lesions  Musculoskeletal: Moving all extremities  Transplant Surgery - Multidisciplinary Rounds  --------------------------------------------------------------   OLT 02/27/25            Present: Patient seen and examined with multidisciplinary Transplant team including Surgeon Dr. Dagher, Hepatologist Dr. Adrienne Gutierrez/Kalin  and bedside RN during AM rounds.   Disciplines not in attendance will be notified of the plan.     HPI: 44M PMH with PMHx of decompensated ETOH cirrhosis c/b recurrent ascites, grade II esophageal varices, and hepatic encephalopathy. 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 Admitted with Hyperkalemia and planned Thoracotomy on 3/17/2025    Interval Events:  - afebrile, VSS   - no ON events     Immunosuppression  Maintenance: FK per level, Myfortic 360 BID, pred 20  Ongoing monitoring for signs of rejection     Potential Discharge date: TBD  Education:  Medications  Plan of care:  See Below       MEDICATIONS  (STANDING):  albuterol/ipratropium for Nebulization 3 milliLiter(s) Nebulizer every 6 hours  aspirin enteric coated 81 milliGRAM(s) Oral daily  atovaquone  Suspension 1500 milliGRAM(s) Oral daily  carvedilol 3.125 milliGRAM(s) Oral every 12 hours  cefTRIAXone   IVPB      cefTRIAXone   IVPB 1000 milliGRAM(s) IV Intermittent every 24 hours  chlorhexidine 2% Cloths 1 Application(s) Topical <User Schedule>  insulin glargine Injectable (LANTUS) 6 Unit(s) SubCutaneous at bedtime  insulin lispro (ADMELOG) corrective regimen sliding scale   SubCutaneous three times a day before meals  insulin lispro (ADMELOG) corrective regimen sliding scale   SubCutaneous at bedtime  magnesium oxide 800 milliGRAM(s) Oral two times a day with meals  mycophenolic acid  milliGRAM(s) Oral <User Schedule>  pantoprazole    Tablet 40 milliGRAM(s) Oral before breakfast  polyethylene glycol 3350 17 Gram(s) Oral daily  predniSONE   Tablet 20 milliGRAM(s) Oral daily  senna 2 Tablet(s) Oral at bedtime  sodium zirconium cyclosilicate 10 Gram(s) Oral daily  sofosbuvir 400 mG/velpatasvir 100 mG (EPCLUSA) 1 Tablet(s) Oral daily  tacrolimus 2 milliGRAM(s) Oral <User Schedule>  thiamine 100 milliGRAM(s) Oral daily  valGANciclovir 450 milliGRAM(s) Oral daily    MEDICATIONS  (PRN):  melatonin 3 milliGRAM(s) Oral at bedtime PRN Insomnia  sodium chloride 0.65% Nasal 1 Spray(s) Both Nostrils two times a day PRN Nasal Congestion  traMADol 50 milliGRAM(s) Oral every 6 hours PRN Severe Pain (7 - 10)      PAST MEDICAL & SURGICAL HISTORY:  Sleep apnea, obstructive      Alcohol abuse      Cirrhosis of liver      Portal hypertension      H/O esophageal varices      Anemia      History of alcohol use disorder      Hepatic encephalopathy      Pleural effusion      History of pleural empyema      Liver transplanted      S/P abdominal paracentesis      History of lung surgery          Vital Signs Last 24 Hrs  T(C): 36.7 (20 Mar 2025 13:00), Max: 36.8 (19 Mar 2025 17:00)  T(F): 98.1 (20 Mar 2025 13:00), Max: 98.3 (19 Mar 2025 17:00)  HR: 71 (20 Mar 2025 13:00) (66 - 86)  BP: 145/83 (20 Mar 2025 13:00) (121/69 - 145/83)  BP(mean): --  RR: 18 (20 Mar 2025 13:00) (18 - 18)  SpO2: 97% (20 Mar 2025 13:00) (95% - 97%)    Parameters below as of 20 Mar 2025 13:00  Patient On (Oxygen Delivery Method): room air        I&O's Summary    19 Mar 2025 07:01  -  20 Mar 2025 07:00  --------------------------------------------------------  IN: 1260 mL / OUT: 3005 mL / NET: -1745 mL    20 Mar 2025 07:01  -  20 Mar 2025 14:58  --------------------------------------------------------  IN: 520 mL / OUT: 910 mL / NET: -390 mL                              7.3    5.09  )-----------( 175      ( 20 Mar 2025 06:42 )             21.9     03-20    136  |  103  |  40[H]  ----------------------------<  145[H]  4.5   |  24  |  1.11    Ca    8.8      20 Mar 2025 06:41  Phos  2.6     03-20  Mg     1.8     03-20    TPro  5.7[L]  /  Alb  2.6[L]  /  TBili  0.4  /  DBili  x   /  AST  5[L]  /  ALT  <5[L]  /  AlkPhos  67  03-20    Tacrolimus (), Serum: 3.0 ng/mL (03-20 @ 06:42)        Culture - Acid Fast - Body Fluid w/Smear (collected 03-17-25 @ 20:33)  Source: Body Fluid Right Pleura effusion  Preliminary Report (03-19-25 @ 23:23):    Culture is being performed.    Culture - Fungal, Body Fluid (collected 03-17-25 @ 20:33)  Source: Body Fluid Right Pleura effusion  Preliminary Report (03-20-25 @ 09:44):    No growth    Culture - Body Fluid with Gram Stain (collected 03-17-25 @ 20:33)  Source: Body Fluid Right Pleura effusion  Gram Stain (03-18-25 @ 03:20):    polymorphonuclear leukocytes seen    No organisms seen    by cytocentrifuge  Preliminary Report (03-18-25 @ 18:22):    No growth    Culture - Acid Fast - Tissue w/Smear (collected 03-17-25 @ 20:33)  Source: Tissue Right Lower Lobe Peel  Preliminary Report (03-19-25 @ 23:23):    Culture is being performed.    Culture - Fungal, Tissue (collected 03-17-25 @ 20:33)  Source: Tissue Right Lower Lobe Peel  Preliminary Report (03-20-25 @ 09:44):    No growth    Culture - Tissue with Gram Stain (collected 03-17-25 @ 20:33)  Source: Tissue Right Lower Lobe Peel  Gram Stain (03-18-25 @ 06:36):    No polymorphonuclear cells seen per low power field    No organisms seen per oil power field  Preliminary Report (03-19-25 @ 10:21):    No growth to date.    Culture - Acid Fast - Tissue w/Smear (collected 03-17-25 @ 20:33)  Source: Tissue Upper Lobe Peel  Preliminary Report (03-19-25 @ 23:23):    Culture is being performed.    Culture - Fungal, Tissue (collected 03-17-25 @ 20:33)  Source: Tissue Upper Lobe Peel  Preliminary Report (03-20-25 @ 09:44):    No growth    Culture - Tissue with Gram Stain (collected 03-17-25 @ 20:33)  Source: Tissue Upper Lobe Peel  Gram Stain (03-18-25 @ 06:30):    No polymorphonuclear cells seen per low power field    No organisms seen per oil power field  Preliminary Report (03-18-25 @ 21:50):    No growth to date.    Culture - Acid Fast - Tissue w/Smear (collected 03-17-25 @ 20:33)  Source: Tissue Middle Lobe Peel  Preliminary Report (03-19-25 @ 23:23):    Culture is being performed.    Culture - Fungal, Tissue (collected 03-17-25 @ 20:33)  Source: Tissue Middle Lobe Peel  Preliminary Report (03-20-25 @ 10:58):    No growth    Culture - Tissue with Gram Stain (collected 03-17-25 @ 20:33)  Source: Tissue Middle Lobe Peel  Gram Stain (03-18-25 @ 06:31):    No polymorphonuclear cells seen per low power field    No organisms seen per oil power field  Preliminary Report (03-18-25 @ 21:49):    No growth to date.    Culture - Acid Fast - Tissue w/Smear (collected 03-17-25 @ 20:33)  Source: Tissue Right Pleural Peel  Preliminary Report (03-19-25 @ 23:23):    Culture is being performed.    Culture - Fungal, Tissue (collected 03-17-25 @ 20:33)  Source: Tissue Right Pleural Peel  Preliminary Report (03-20-25 @ 10:58):    No growth    Culture - Tissue with Gram Stain (collected 03-17-25 @ 20:33)  Source: Tissue Right Pleural Peel  Gram Stain (03-18-25 @ 06:35):    No polymorphonuclear cells seen per low power field    No organisms seen per oil power field  Preliminary Report (03-19-25 @ 10:21):    No growth to date.      Review of Systems:  All other systems were reviewed and are negative, except as noted.    Physical Exam:   Constitutional: Well developed / well nourished  Eyes: PERRLA  ENMT: nc/at, no thrush  Neck: supple  Respiratory: slight decreased BS on RLL, good airway movement, no adventitious sounds. CT x 2  Cardiovascular: RRR  Gastrointestinal: Soft abdomen, ND, incision healed   Genitourinary: voiding  Extremities: SCD's in place and working bilaterally  Vascular: Palpable dp pulses bilaterally.   Neurological: A&O x3  Skin: no rashes, ulcerations, lesions  Musculoskeletal: Moving all extremities

## 2025-03-21 LAB
ALBUMIN SERPL ELPH-MCNC: 2.7 G/DL — LOW (ref 3.3–5)
ALP SERPL-CCNC: 62 U/L — SIGNIFICANT CHANGE UP (ref 40–120)
ALT FLD-CCNC: <5 U/L — LOW (ref 10–45)
ANION GAP SERPL CALC-SCNC: 8 MMOL/L — SIGNIFICANT CHANGE UP (ref 5–17)
APTT BLD: 27.9 SEC — SIGNIFICANT CHANGE UP (ref 24.5–35.6)
AST SERPL-CCNC: 6 U/L — LOW (ref 10–40)
BILIRUB SERPL-MCNC: 0.4 MG/DL — SIGNIFICANT CHANGE UP (ref 0.2–1.2)
BUN SERPL-MCNC: 37 MG/DL — HIGH (ref 7–23)
CALCIUM SERPL-MCNC: 9 MG/DL — SIGNIFICANT CHANGE UP (ref 8.4–10.5)
CHLORIDE SERPL-SCNC: 104 MMOL/L — SIGNIFICANT CHANGE UP (ref 96–108)
CO2 SERPL-SCNC: 25 MMOL/L — SIGNIFICANT CHANGE UP (ref 22–31)
CREAT SERPL-MCNC: 0.88 MG/DL — SIGNIFICANT CHANGE UP (ref 0.5–1.3)
EGFR: 107 ML/MIN/1.73M2 — SIGNIFICANT CHANGE UP
EGFR: 107 ML/MIN/1.73M2 — SIGNIFICANT CHANGE UP
GLUCOSE BLDC GLUCOMTR-MCNC: 122 MG/DL — HIGH (ref 70–99)
GLUCOSE BLDC GLUCOMTR-MCNC: 166 MG/DL — HIGH (ref 70–99)
GLUCOSE BLDC GLUCOMTR-MCNC: 186 MG/DL — HIGH (ref 70–99)
GLUCOSE BLDC GLUCOMTR-MCNC: 274 MG/DL — HIGH (ref 70–99)
GLUCOSE SERPL-MCNC: 121 MG/DL — HIGH (ref 70–99)
HCT VFR BLD CALC: 22.7 % — LOW (ref 39–50)
HGB BLD-MCNC: 7.5 G/DL — LOW (ref 13–17)
INR BLD: 0.9 RATIO — SIGNIFICANT CHANGE UP (ref 0.85–1.16)
MAGNESIUM SERPL-MCNC: 2 MG/DL — SIGNIFICANT CHANGE UP (ref 1.6–2.6)
MCHC RBC-ENTMCNC: 30.9 PG — SIGNIFICANT CHANGE UP (ref 27–34)
MCHC RBC-ENTMCNC: 33 G/DL — SIGNIFICANT CHANGE UP (ref 32–36)
MCV RBC AUTO: 93.4 FL — SIGNIFICANT CHANGE UP (ref 80–100)
NRBC BLD AUTO-RTO: 0 /100 WBCS — SIGNIFICANT CHANGE UP (ref 0–0)
PHOSPHATE SERPL-MCNC: 2.8 MG/DL — SIGNIFICANT CHANGE UP (ref 2.5–4.5)
PLATELET # BLD AUTO: 176 K/UL — SIGNIFICANT CHANGE UP (ref 150–400)
POTASSIUM SERPL-MCNC: 4.6 MMOL/L — SIGNIFICANT CHANGE UP (ref 3.5–5.3)
POTASSIUM SERPL-SCNC: 4.6 MMOL/L — SIGNIFICANT CHANGE UP (ref 3.5–5.3)
PROT SERPL-MCNC: 5.6 G/DL — LOW (ref 6–8.3)
PROTHROM AB SERPL-ACNC: 10.3 SEC — SIGNIFICANT CHANGE UP (ref 9.9–13.4)
RBC # BLD: 2.43 M/UL — LOW (ref 4.2–5.8)
RBC # FLD: 17.9 % — HIGH (ref 10.3–14.5)
SODIUM SERPL-SCNC: 137 MMOL/L — SIGNIFICANT CHANGE UP (ref 135–145)
TACROLIMUS SERPL-MCNC: 3.1 NG/ML — SIGNIFICANT CHANGE UP
WBC # BLD: 4.36 K/UL — SIGNIFICANT CHANGE UP (ref 3.8–10.5)
WBC # FLD AUTO: 4.36 K/UL — SIGNIFICANT CHANGE UP (ref 3.8–10.5)

## 2025-03-21 PROCEDURE — 71045 X-RAY EXAM CHEST 1 VIEW: CPT | Mod: 26

## 2025-03-21 PROCEDURE — 99233 SBSQ HOSP IP/OBS HIGH 50: CPT

## 2025-03-21 PROCEDURE — G0545: CPT

## 2025-03-21 RX ORDER — HEPARIN SODIUM 1000 [USP'U]/ML
5000 INJECTION INTRAVENOUS; SUBCUTANEOUS EVERY 12 HOURS
Refills: 0 | Status: DISCONTINUED | OUTPATIENT
Start: 2025-03-21 | End: 2025-03-30

## 2025-03-21 RX ORDER — TACROLIMUS 0.5 MG/1
3 CAPSULE ORAL
Refills: 0 | Status: DISCONTINUED | OUTPATIENT
Start: 2025-03-21 | End: 2025-03-22

## 2025-03-21 RX ADMIN — TRAMADOL HYDROCHLORIDE 50 MILLIGRAM(S): 50 TABLET, FILM COATED ORAL at 20:45

## 2025-03-21 RX ADMIN — TACROLIMUS 3 MILLIGRAM(S): 0.5 CAPSULE ORAL at 19:44

## 2025-03-21 RX ADMIN — SODIUM ZIRCONIUM CYCLOSILICATE 10 GRAM(S): 5 POWDER, FOR SUSPENSION ORAL at 12:34

## 2025-03-21 RX ADMIN — MYCOPHENOLIC ACID 360 MILLIGRAM(S): 360 TABLET, DELAYED RELEASE ORAL at 19:44

## 2025-03-21 RX ADMIN — INSULIN LISPRO 2: 100 INJECTION, SOLUTION INTRAVENOUS; SUBCUTANEOUS at 09:11

## 2025-03-21 RX ADMIN — HEPARIN SODIUM 5000 UNIT(S): 1000 INJECTION INTRAVENOUS; SUBCUTANEOUS at 18:01

## 2025-03-21 RX ADMIN — TRAMADOL HYDROCHLORIDE 50 MILLIGRAM(S): 50 TABLET, FILM COATED ORAL at 12:49

## 2025-03-21 RX ADMIN — TRAMADOL HYDROCHLORIDE 50 MILLIGRAM(S): 50 TABLET, FILM COATED ORAL at 13:49

## 2025-03-21 RX ADMIN — POLYETHYLENE GLYCOL 3350 17 GRAM(S): 17 POWDER, FOR SOLUTION ORAL at 12:34

## 2025-03-21 RX ADMIN — TRAMADOL HYDROCHLORIDE 50 MILLIGRAM(S): 50 TABLET, FILM COATED ORAL at 19:44

## 2025-03-21 RX ADMIN — INSULIN LISPRO 6: 100 INJECTION, SOLUTION INTRAVENOUS; SUBCUTANEOUS at 12:34

## 2025-03-21 RX ADMIN — MYCOPHENOLIC ACID 360 MILLIGRAM(S): 360 TABLET, DELAYED RELEASE ORAL at 09:12

## 2025-03-21 RX ADMIN — IPRATROPIUM BROMIDE AND ALBUTEROL SULFATE 3 MILLILITER(S): .5; 2.5 SOLUTION RESPIRATORY (INHALATION) at 05:40

## 2025-03-21 RX ADMIN — Medication 800 MILLIGRAM(S): at 09:12

## 2025-03-21 RX ADMIN — INSULIN GLARGINE-YFGN 6 UNIT(S): 100 INJECTION, SOLUTION SUBCUTANEOUS at 21:27

## 2025-03-21 RX ADMIN — CEFTRIAXONE 100 MILLIGRAM(S): 500 INJECTION, POWDER, FOR SOLUTION INTRAMUSCULAR; INTRAVENOUS at 09:12

## 2025-03-21 RX ADMIN — INSULIN LISPRO 2: 100 INJECTION, SOLUTION INTRAVENOUS; SUBCUTANEOUS at 18:00

## 2025-03-21 RX ADMIN — TRAMADOL HYDROCHLORIDE 50 MILLIGRAM(S): 50 TABLET, FILM COATED ORAL at 01:00

## 2025-03-21 RX ADMIN — IPRATROPIUM BROMIDE AND ALBUTEROL SULFATE 3 MILLILITER(S): .5; 2.5 SOLUTION RESPIRATORY (INHALATION) at 18:02

## 2025-03-21 RX ADMIN — IPRATROPIUM BROMIDE AND ALBUTEROL SULFATE 3 MILLILITER(S): .5; 2.5 SOLUTION RESPIRATORY (INHALATION) at 09:11

## 2025-03-21 RX ADMIN — Medication 3 MILLIGRAM(S): at 00:00

## 2025-03-21 RX ADMIN — VELPATASVIR AND SOFOSBUVIR 1 TABLET(S): 50; 200 TABLET, FILM COATED ORAL at 12:34

## 2025-03-21 RX ADMIN — TRAMADOL HYDROCHLORIDE 50 MILLIGRAM(S): 50 TABLET, FILM COATED ORAL at 06:40

## 2025-03-21 RX ADMIN — TRAMADOL HYDROCHLORIDE 50 MILLIGRAM(S): 50 TABLET, FILM COATED ORAL at 06:13

## 2025-03-21 RX ADMIN — TACROLIMUS 3 MILLIGRAM(S): 0.5 CAPSULE ORAL at 09:11

## 2025-03-21 RX ADMIN — PREDNISONE 20 MILLIGRAM(S): 20 TABLET ORAL at 05:41

## 2025-03-21 RX ADMIN — CARVEDILOL 3.12 MILLIGRAM(S): 3.12 TABLET, FILM COATED ORAL at 18:02

## 2025-03-21 RX ADMIN — VALGANCICLOVIR 450 MILLIGRAM(S): 450 TABLET, FILM COATED ORAL at 12:34

## 2025-03-21 RX ADMIN — Medication 800 MILLIGRAM(S): at 18:02

## 2025-03-21 RX ADMIN — ATOVAQUONE 1500 MILLIGRAM(S): 750 SUSPENSION ORAL at 12:34

## 2025-03-21 RX ADMIN — Medication 40 MILLIGRAM(S): at 05:41

## 2025-03-21 RX ADMIN — Medication 100 MILLIGRAM(S): at 12:34

## 2025-03-21 RX ADMIN — TRAMADOL HYDROCHLORIDE 50 MILLIGRAM(S): 50 TABLET, FILM COATED ORAL at 00:00

## 2025-03-21 RX ADMIN — CARVEDILOL 3.12 MILLIGRAM(S): 3.12 TABLET, FILM COATED ORAL at 05:41

## 2025-03-21 RX ADMIN — Medication 81 MILLIGRAM(S): at 12:34

## 2025-03-21 NOTE — PROGRESS NOTE ADULT - SUBJECTIVE AND OBJECTIVE BOX
Transplant ID consult  patient discharged 3/10 and readmitted 3/14 for decorticatiion  ASked to reconsult for antibiotic recommendations now s/p VATS for R L decortication on 3/17, afebrile,VSS 1 chest tube to seal, doinig well but still stating occasional Wheezing, no cough, some nico tightness      Vital Signs Last 24 Hrs  T(C): 36.8 (21 Mar 2025 19:57), Max: 36.8 (21 Mar 2025 12:00)  T(F): 98.2 (21 Mar 2025 19:57), Max: 98.2 (21 Mar 2025 12:00)  HR: 80 (21 Mar 2025 19:57) (69 - 85)  BP: 135/74 (21 Mar 2025 19:57) (118/65 - 136/69)  BP(mean): --  RR: 18 (21 Mar 2025 19:57) (18 - 20)  SpO2: 98% (21 Mar 2025 19:57) (96% - 100%)    Parameters below as of 21 Mar 2025 19:57  Patient On (Oxygen Delivery Method): room air      PHYSICAL EXAM:  Constitutional:no acute distress  Eyes:EDITH, EOMI  Ear/Nose/Throat: no oral lesions, 	  Respiratory: clear BL  Cardiovascular: S1S2  Gastrointestinal:soft, (+) BS, no tenderness  Extremities:no e/e/c  No Lymphadenopathy  IV sites not inflammed.        ____________________________________________________  ROS  GENERAL: denies chills, , night sweats, weight loss.   PSYCH: denies depression, anxiety, suicidal ideation, hallucination, and delusions  SKIN: no rash or lesions; no color changes, no abnormal nevi,no  dryness, and nojaundice    EYES: denies visual changes, floaters, pain, inflammation, blurred vision, and discharge  ENT: denies tinnitus, vertigo, epistaxis, oral lesion, and decreased acuity  PULM: denies, hemoptysis, pleurisy  CVS: denies angina, palpitations,+ orthopnea, no syncope, or heart murmur  GI: denies constipation, diarrhea, melena, abdominal pain, nausea.   : denies dysuria, frequency, discharge, incontinence, stones or macroscopic hematuria  MS: no arthralgias, no erythema or swelling, no myalgias, noedema, or lower back pain.   CNS: denies numbness, dizziness, seizure, or tremor  ENDO: denies heat/cold intolerance, polyuria, polydipsia, malaise.    HEME: denies bruising, bleeding, lymphadenopathy, anemia, and calf pain    Allergies  sulfa drugs (Rash)    __________________________________________________  MEDS:  MEDICATIONS  (STANDING):  albuterol    90 MICROgram(s) HFA Inhaler 1 every 6 hours PRN  albuterol/ipratropium for Nebulization 3 every 6 hours  aspirin enteric coated 81 daily  carvedilol 3.125 every 12 hours  heparin   Injectable 5000 every 12 hours  insulin glargine Injectable (LANTUS) 6 at bedtime  insulin lispro (ADMELOG) corrective regimen sliding scale  three times a day before meals  insulin lispro (ADMELOG) corrective regimen sliding scale  at bedtime  melatonin 3 at bedtime PRN  mycophenolic acid  <User Schedule>  pantoprazole    Tablet 40 before breakfast  polyethylene glycol 3350 17 daily  predniSONE   Tablet 20 daily  senna 2 at bedtime  tacrolimus 3 two times a day  traMADol 50 every 6 hours PRN    _________________________________________________  ANTIMICOBIALS  atovaquone  Suspension 1500 daily  cefTRIAXone   IVPB    cefTRIAXone   IVPB 1000 every 24 hours  mycophenolic acid  <User Schedule>  sofosbuvir 400 mG/velpatasvir 100 mG (EPCLUSA) 1 daily  tacrolimus 3 two times a day  valGANciclovir 450 daily      GENERAL LABS              7.5                  137  | 25   | 37           4.36  >-----------< 176     ------------------------< 121                   22.7                 4.6  | 104  | 0.88                                         Ca 9.0   Mg 2.0   Ph 2.8        Urinalysis Basic - ( 21 Mar 2025 06:45 )    Color: x / Appearance: x / SG: x / pH: x  Gluc: 121 mg/dL / Ketone: x  / Bili: x / Urobili: x   Blood: x / Protein: x / Nitrite: x   Leuk Esterase: x / RBC: x / WBC x   Sq Epi: x / Non Sq Epi: x / Bacteria: x        _________________________________________________  MICROBIOLOGY  -----------    Culture - Acid Fast - Body Fluid w/Smear (collected 17 Mar 2025 20:33)  Source: Body Fluid Right Pleura effusion  Preliminary Report (19 Mar 2025 23:23):    Culture is being performed.    Culture - Fungal, Tissue (collected 17 Mar 2025 20:33)  Source: Tissue Middle Lobe Peel  Preliminary Report (20 Mar 2025 10:58):    No growth    Culture - Acid Fast - Tissue w/Smear (collected 17 Mar 2025 20:33)  Source: Tissue Middle Lobe Peel  Preliminary Report (19 Mar 2025 23:23):    Culture is being performed.    Culture - Fungal, Tissue (collected 17 Mar 2025 20:33)  Source: Tissue Upper Lobe Peel  Preliminary Report (20 Mar 2025 09:44):    No growth    Culture - Acid Fast - Tissue w/Smear (collected 17 Mar 2025 20:33)  Source: Tissue Upper Lobe Peel  Preliminary Report (19 Mar 2025 23:23):    Culture is being performed.    Culture - Fungal, Tissue (collected 17 Mar 2025 20:33)  Source: Tissue Right Lower Lobe Peel  Preliminary Report (20 Mar 2025 09:44):    No growth    Culture - Acid Fast - Tissue w/Smear (collected 17 Mar 2025 20:33)  Source: Tissue Right Lower Lobe Peel  Preliminary Report (19 Mar 2025 23:23):    Culture is being performed.    Culture - Tissue with Gram Stain (collected 17 Mar 2025 20:33)  Source: Tissue Right Pleural Peel  Gram Stain (18 Mar 2025 06:35):    No polymorphonuclear cells seen per low power field    No organisms seen per oil power field  Preliminary Report (19 Mar 2025 10:21):    No growth to date.    Culture - Fungal, Tissue (collected 17 Mar 2025 20:33)  Source: Tissue Right Pleural Peel  Preliminary Report (20 Mar 2025 10:58):    No growth    Culture - Acid Fast - Tissue w/Smear (collected 17 Mar 2025 20:33)  Source: Tissue Right Pleural Peel  Preliminary Report (19 Mar 2025 23:23):    Culture is being performed.    Culture - Fungal, Body Fluid (collected 17 Mar 2025 20:33)  Source: Body Fluid Right Pleura effusion  Preliminary Report (20 Mar 2025 09:44):    No growth    Culture - Tissue with Gram Stain (collected 17 Mar 2025 20:33)  Source: Tissue Middle Lobe Peel  Gram Stain (18 Mar 2025 06:31):    No polymorphonuclear cells seen per low power field    No organisms seen per oil power field  Preliminary Report (18 Mar 2025 21:49):    No growth to date.    Culture - Tissue with Gram Stain (collected 17 Mar 2025 20:33)  Source: Tissue Upper Lobe Peel  Gram Stain (18 Mar 2025 06:30):    No polymorphonuclear cells seen per low power field    No organisms seen per oil power field  Preliminary Report (18 Mar 2025 21:50):    No growth to date.    Culture - Tissue with Gram Stain (collected 17 Mar 2025 20:33)  Source: Tissue Right Lower Lobe Peel  Gram Stain (18 Mar 2025 06:36):    No polymorphonuclear cells seen per low power field    No organisms seen per oil power field  Preliminary Report (19 Mar 2025 10:21):    No growth to date.    Culture - Body Fluid with Gram Stain (collected 17 Mar 2025 20:33)  Source: Body Fluid Right Pleura effusion  Gram Stain (18 Mar 2025 03:20):    polymorphonuclear leukocytes seen    No organisms seen    by cytocentrifuge  Preliminary Report (18 Mar 2025 18:22):    No growth                    Fungitell:   _______________________________________________  PERTINENT IMAGING

## 2025-03-21 NOTE — PROGRESS NOTE ADULT - ASSESSMENT
44M PMH with PMHx of decompensated ETOH cirrhosis c/b recurrent ascites, grade II esophageal varices, hepatic encephalopathy. 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 Admitted with Hyperkalemia  3/17Bronchoscopy with decortication of right lung using video-assisted thoracoscopic surgery (VATS)   VATS exploration of pleural space, with evacuation of hemothorax. Cortical peel of R lower, middle, and upper lobes collected. Lung re-expanded with contact to chest wall at end of case.  Right empyema fluid, Right middle, upper, and lower lobe peels  3/18 VSS maintain chest  tube lws  3/19 VSS maintain   chest  tube  lws .  water seal in am 3/20 daily xray  OOB to chair   may ambulate with chest tube incentive spirometry   3/20 Chest tubes to waterseal  + airleak posterior tube  CXR 2 pm  3/21 CT # 2 posterior CT to remain to suction.  CT # 1 anterior - clamped, obtain cxr this afternoon    44M PMH with PMHx of decompensated ETOH cirrhosis c/b recurrent ascites, grade II esophageal varices, hepatic encephalopathy. 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 Admitted with Hyperkalemia  3/17Bronchoscopy with decortication of right lung using video-assisted thoracoscopic surgery (VATS)   VATS exploration of pleural space, with evacuation of hemothorax. Cortical peel of R lower, middle, and upper lobes collected. Lung re-expanded with contact to chest wall at end of case.  Right empyema fluid, Right middle, upper, and lower lobe peels  3/18 VSS maintain chest  tube lws  3/19 VSS maintain   chest  tube  lws .  water seal in am 3/20 daily xray  OOB to chair   may ambulate with chest tube incentive spirometry   3/20 Chest tubes to waterseal  + airleak posterior tube  CXR 2 pm  3/21 CT # 2 posterior CT to remain to suction.  CT # 1 anterior - clamped, obtain cxr this afternoon.   Addendum: Anterior CT removed, pending CXR.

## 2025-03-21 NOTE — PROGRESS NOTE ADULT - SUBJECTIVE AND OBJECTIVE BOX
Transplant Surgery - Multidisciplinary Rounds  --------------------------------------------------------------   OLT 02/27/25            Present:   Patient seen and examined with multidisciplinary Transplant team including Surgeon, Hepatologist, Surgical/Hepatology fellow, ACP,  Pharmacist, Nutritionis,  and bedside RN during AM rounds.   Disciplines not in attendance will be notified of the plan.     HPI: 44M PMH with PMHx of decompensated ETOH cirrhosis c/b recurrent ascites, grade II esophageal varices, and hepatic encephalopathy. 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 Admitted with Hyperkalemia and planned Thoracotomy on 3/17/2025    Interval Events:  - Added albuterol inhaler for chest tightness  - Afebrile, VSS    Immunosupression:  Maintenance: FK per level, Myfortic 360 BID, pred 20  Ongoing monitoring for signs of rejection     Potential Discharge date: TBD  Education:  Medications  Plan of care:  See Below    MEDICATIONS  (STANDING):  albuterol/ipratropium for Nebulization 3 milliLiter(s) Nebulizer every 6 hours  aspirin enteric coated 81 milliGRAM(s) Oral daily  atovaquone  Suspension 1500 milliGRAM(s) Oral daily  carvedilol 3.125 milliGRAM(s) Oral every 12 hours  cefTRIAXone   IVPB      cefTRIAXone   IVPB 1000 milliGRAM(s) IV Intermittent every 24 hours  chlorhexidine 2% Cloths 1 Application(s) Topical <User Schedule>  insulin glargine Injectable (LANTUS) 6 Unit(s) SubCutaneous at bedtime  insulin lispro (ADMELOG) corrective regimen sliding scale   SubCutaneous three times a day before meals  insulin lispro (ADMELOG) corrective regimen sliding scale   SubCutaneous at bedtime  magnesium oxide 800 milliGRAM(s) Oral two times a day with meals  mycophenolic acid  milliGRAM(s) Oral <User Schedule>  pantoprazole    Tablet 40 milliGRAM(s) Oral before breakfast  polyethylene glycol 3350 17 Gram(s) Oral daily  predniSONE   Tablet 20 milliGRAM(s) Oral daily  senna 2 Tablet(s) Oral at bedtime  sodium zirconium cyclosilicate 10 Gram(s) Oral daily  sofosbuvir 400 mG/velpatasvir 100 mG (EPCLUSA) 1 Tablet(s) Oral daily  tacrolimus 3 milliGRAM(s) Oral <User Schedule>  tacrolimus 2 milliGRAM(s) Oral <User Schedule>  thiamine 100 milliGRAM(s) Oral daily  valGANciclovir 450 milliGRAM(s) Oral daily    MEDICATIONS  (PRN):  albuterol    90 MICROgram(s) HFA Inhaler 1 Puff(s) Inhalation every 6 hours PRN Shortness of Breath and/or Wheezing  melatonin 3 milliGRAM(s) Oral at bedtime PRN Insomnia  sodium chloride 0.65% Nasal 1 Spray(s) Both Nostrils two times a day PRN Nasal Congestion  traMADol 50 milliGRAM(s) Oral every 6 hours PRN Severe Pain (7 - 10)      PAST MEDICAL & SURGICAL HISTORY:  Sleep apnea, obstructive      Alcohol abuse      Cirrhosis of liver      Portal hypertension      H/O esophageal varices      Anemia      History of alcohol use disorder      Hepatic encephalopathy      Pleural effusion      History of pleural empyema      Liver transplanted      S/P abdominal paracentesis      History of lung surgery                      Review of systems  All other systems were reviewed and are negative, except as noted.      PHYSICAL EXAM:  Constitutional: Well developed / well nourished  Eyes: PERRLA  ENMT: nc/at, no thrush  Neck: supple  Respiratory: slight decreased BS on RLL, good airway movement, no adventitious sounds. CT x 2  Cardiovascular: RRR  Gastrointestinal: Soft abdomen, ND, incision healed   Genitourinary: voiding  Extremities: SCD's in place and working bilaterally  Vascular: Palpable dp pulses bilaterally.   Neurological: A&O x3  Skin: no rashes, ulcerations, lesions  Musculoskeletal: Moving all extremities    Transplant Surgery - Multidisciplinary Rounds  --------------------------------------------------------------   OLT 02/27/25            Present:   Patient seen and examined with multidisciplinary Transplant team including Surgeon Dr. Dagher, Hepatologist Dr. Winters, Surgical/Hepatology fellow, KAMAR Gagnon,  Pharmacist, Nutritionis,  and bedside RN during AM rounds.   Disciplines not in attendance will be notified of the plan.     HPI: 44M PMH with PMHx of decompensated ETOH cirrhosis c/b recurrent ascites, grade II esophageal varices, and hepatic encephalopathy. 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 Admitted with Hyperkalemia and planned Thoracotomy on 3/17/2025    Interval Events:  - Added albuterol inhaler for chest tightness  - Afebrile, VSS  - CXR stable    Immunosuppression  Maintenance: FK per level, Myfortic 360 BID, pred 20  Ongoing monitoring for signs of rejection     Potential Discharge date: TBD  Education:  Medications  Plan of care:  See Below      MEDICATIONS  (STANDING):  albuterol/ipratropium for Nebulization 3 milliLiter(s) Nebulizer every 6 hours  aspirin enteric coated 81 milliGRAM(s) Oral daily  atovaquone  Suspension 1500 milliGRAM(s) Oral daily  carvedilol 3.125 milliGRAM(s) Oral every 12 hours  cefTRIAXone   IVPB      cefTRIAXone   IVPB 1000 milliGRAM(s) IV Intermittent every 24 hours  chlorhexidine 2% Cloths 1 Application(s) Topical <User Schedule>  heparin   Injectable 5000 Unit(s) SubCutaneous every 12 hours  insulin glargine Injectable (LANTUS) 6 Unit(s) SubCutaneous at bedtime  insulin lispro (ADMELOG) corrective regimen sliding scale   SubCutaneous three times a day before meals  insulin lispro (ADMELOG) corrective regimen sliding scale   SubCutaneous at bedtime  magnesium oxide 800 milliGRAM(s) Oral two times a day with meals  mycophenolic acid  milliGRAM(s) Oral <User Schedule>  pantoprazole    Tablet 40 milliGRAM(s) Oral before breakfast  polyethylene glycol 3350 17 Gram(s) Oral daily  predniSONE   Tablet 20 milliGRAM(s) Oral daily  senna 2 Tablet(s) Oral at bedtime  sodium zirconium cyclosilicate 10 Gram(s) Oral daily  sofosbuvir 400 mG/velpatasvir 100 mG (EPCLUSA) 1 Tablet(s) Oral daily  tacrolimus 3 milliGRAM(s) Oral two times a day  thiamine 100 milliGRAM(s) Oral daily  valGANciclovir 450 milliGRAM(s) Oral daily    MEDICATIONS  (PRN):  albuterol    90 MICROgram(s) HFA Inhaler 1 Puff(s) Inhalation every 6 hours PRN Shortness of Breath and/or Wheezing  melatonin 3 milliGRAM(s) Oral at bedtime PRN Insomnia  sodium chloride 0.65% Nasal 1 Spray(s) Both Nostrils two times a day PRN Nasal Congestion  traMADol 50 milliGRAM(s) Oral every 6 hours PRN Severe Pain (7 - 10)      PAST MEDICAL & SURGICAL HISTORY:  Sleep apnea, obstructive      Alcohol abuse      Cirrhosis of liver      Portal hypertension      H/O esophageal varices      Anemia      History of alcohol use disorder      Hepatic encephalopathy      Pleural effusion      History of pleural empyema      Liver transplanted      S/P abdominal paracentesis      History of lung surgery          Vital Signs Last 24 Hrs  T(C): 36.8 (21 Mar 2025 19:57), Max: 36.8 (21 Mar 2025 12:00)  T(F): 98.2 (21 Mar 2025 19:57), Max: 98.2 (21 Mar 2025 12:00)  HR: 80 (21 Mar 2025 19:57) (69 - 85)  BP: 135/74 (21 Mar 2025 19:57) (118/65 - 136/69)  BP(mean): --  RR: 18 (21 Mar 2025 19:57) (18 - 20)  SpO2: 98% (21 Mar 2025 19:57) (96% - 100%)    Parameters below as of 21 Mar 2025 19:57  Patient On (Oxygen Delivery Method): room air        I&O's Summary    20 Mar 2025 07:01  -  21 Mar 2025 07:00  --------------------------------------------------------  IN: 920 mL / OUT: 3215 mL / NET: -2295 mL    21 Mar 2025 07:01  -  21 Mar 2025 23:25  --------------------------------------------------------  IN: 890 mL / OUT: 1305 mL / NET: -415 mL                              7.5    4.36  )-----------( 176      ( 21 Mar 2025 06:45 )             22.7     03-21    137  |  104  |  37[H]  ----------------------------<  121[H]  4.6   |  25  |  0.88    Ca    9.0      21 Mar 2025 06:45  Phos  2.8     03-21  Mg     2.0     03-21    TPro  5.6[L]  /  Alb  2.7[L]  /  TBili  0.4  /  DBili  x   /  AST  6[L]  /  ALT  <5[L]  /  AlkPhos  62  03-21    Tacrolimus (), Serum: 3.1 ng/mL (03-21 @ 06:45)        Culture - Acid Fast - Body Fluid w/Smear (collected 03-17-25 @ 20:33)  Source: Body Fluid Right Pleura effusion  Preliminary Report (03-19-25 @ 23:23):    Culture is being performed.    Culture - Fungal, Tissue (collected 03-17-25 @ 20:33)  Source: Tissue Middle Lobe Peel  Preliminary Report (03-20-25 @ 10:58):    No growth    Culture - Acid Fast - Tissue w/Smear (collected 03-17-25 @ 20:33)  Source: Tissue Middle Lobe Peel  Preliminary Report (03-19-25 @ 23:23):    Culture is being performed.    Culture - Fungal, Tissue (collected 03-17-25 @ 20:33)  Source: Tissue Upper Lobe Peel  Preliminary Report (03-20-25 @ 09:44):    No growth    Culture - Acid Fast - Tissue w/Smear (collected 03-17-25 @ 20:33)  Source: Tissue Upper Lobe Peel  Preliminary Report (03-19-25 @ 23:23):    Culture is being performed.    Culture - Fungal, Tissue (collected 03-17-25 @ 20:33)  Source: Tissue Right Lower Lobe Peel  Preliminary Report (03-20-25 @ 09:44):    No growth    Culture - Acid Fast - Tissue w/Smear (collected 03-17-25 @ 20:33)  Source: Tissue Right Lower Lobe Peel  Preliminary Report (03-19-25 @ 23:23):    Culture is being performed.    Culture - Tissue with Gram Stain (collected 03-17-25 @ 20:33)  Source: Tissue Right Pleural Peel  Gram Stain (03-18-25 @ 06:35):    No polymorphonuclear cells seen per low power field    No organisms seen per oil power field  Preliminary Report (03-19-25 @ 10:21):    No growth to date.    Culture - Fungal, Tissue (collected 03-17-25 @ 20:33)  Source: Tissue Right Pleural Peel  Preliminary Report (03-20-25 @ 10:58):    No growth    Culture - Acid Fast - Tissue w/Smear (collected 03-17-25 @ 20:33)  Source: Tissue Right Pleural Peel  Preliminary Report (03-19-25 @ 23:23):    Culture is being performed.    Culture - Fungal, Body Fluid (collected 03-17-25 @ 20:33)  Source: Body Fluid Right Pleura effusion  Preliminary Report (03-20-25 @ 09:44):    No growth    Culture - Tissue with Gram Stain (collected 03-17-25 @ 20:33)  Source: Tissue Middle Lobe Peel  Gram Stain (03-18-25 @ 06:31):    No polymorphonuclear cells seen per low power field    No organisms seen per oil power field  Preliminary Report (03-18-25 @ 21:49):    No growth to date.    Culture - Tissue with Gram Stain (collected 03-17-25 @ 20:33)  Source: Tissue Upper Lobe Peel  Gram Stain (03-18-25 @ 06:30):    No polymorphonuclear cells seen per low power field    No organisms seen per oil power field  Preliminary Report (03-18-25 @ 21:50):    No growth to date.    Culture - Tissue with Gram Stain (collected 03-17-25 @ 20:33)  Source: Tissue Right Lower Lobe Peel  Gram Stain (03-18-25 @ 06:36):    No polymorphonuclear cells seen per low power field    No organisms seen per oil power field  Preliminary Report (03-19-25 @ 10:21):    No growth to date.    Culture - Body Fluid with Gram Stain (collected 03-17-25 @ 20:33)  Source: Body Fluid Right Pleura effusion  Gram Stain (03-18-25 @ 03:20):    polymorphonuclear leukocytes seen    No organisms seen    by cytocentrifuge  Preliminary Report (03-18-25 @ 18:22):    No growth          Review of systems  All other systems were reviewed and are negative, except as noted.      PHYSICAL EXAM:  Constitutional: Well developed / well nourished  Eyes: PERRLA  ENMT: nc/at, no thrush  Neck: supple  Respiratory: slight decreased BS on RLL, good airway movement, no adventitious sounds. CT x 2  Cardiovascular: RRR  Gastrointestinal: Soft abdomen, ND, incision healed   Genitourinary: voiding  Extremities: SCD's in place and working bilaterally  Vascular: Palpable dp pulses bilaterally.   Neurological: A&O x3  Skin: no rashes, ulcerations, lesions  Musculoskeletal: Moving all extremities

## 2025-03-21 NOTE — PROGRESS NOTE ADULT - ASSESSMENT
43 yo M with PMH decompensated ETOH cirrhosis c/b recurrent ascites, grade II EV, and HE, right calf hematoma 10/2024 (s/p fall)  presented to [facility] for abdominal pain and distension. Patient found to have ascites on exam and MANUELITO requiring HD initiation PermCath was placed by vascular surgery on 1/10 with post procedure course c/b bleeding from insertion site. Transferred to [facility] SICU for further management.     Blood Cultures (1/16) 1/4 Staph epi.   GI PCR (1/17) + Giardia.   Paracentesis (1/17) 155 nucleated cell counts.     CT Chest (1/17) Moderate loculated right pleural effusion containing a few foci of air, which are new from 1/8/2025 and may represent empyema/bronchopleural fistula versus sequelae of prior thoracentesis.     Noted to have worsening abdominal pain and encephalopathy on 3/3/2025    BCx (3/3) NGTD  UCx (3/3) No growth    CT Chest (3/3) Decreased right intrapleural gas bubbles, otherwise unchanged moderate loculated right pleural effusion and associated pleural thickening.    CT A/P (3/3) Complex collection in the gallbladder fossa measuring 5.1 x 3.0 x 3.0 cm, which may be mildly decreased in size since the prior ultrasound of 2/28/2025, allowing for differences in modality. Hyperdensity within the collection, suggestive of hemorrhagic content. Small amount of free peritoneal fluid without additional discrete organized collection identified. Small amount of pneumoperitoneum, which may be postsurgical.    #Abdominal Pain, Encephalopathy, Abnormal CT A/P (fluid collections)   --Ceftraixone 2gm daily  --Continue to follow CBC with diff  --Continue to follow temperature curve  --Follow up on preliminary blood cultures    # s/p OLT 2/27/25 CMV D-/R+, EBV D+/R+, Toxoplasma D+/R-, HCV NAAT pos donor  Simulect. steroids induction  Fluconazole per protocol   Note patient has bactrim allergy- needs atovaquone AND NO OTHER ALTERNATIVE for PCP , toxoplasma Ppx as he is high risk of donor derived infection  If atovaquone not tolerated, will need bactrim desensitization or challenge  --Continue Valcyte 450 mg PO Q24H     # HCV positive (NAAT  and Ab) donor  Follow HCV PCR testing per protocol and check genotype  and antivirals epclusa    #Empyema- citrobacter koseri  s/p VATS 1/28/25-  last positive cultures 1/25/25  for citrobacter koseri  Thoracentesis 3/5 pleural fluid culture NGTD  --Repeat CT Chest 3/3 with persistent moderate right pleural effusion / hemothorax; thoracic surgery planning eventual surgical intervention   - s/p decorticatinon via VATS of R empyema on 3/17  Right empyema fluid, Right middle, upper, and lower lobe peels sent for cultures and negative fluid cx and NGTD tissue cx   - still with 1 Chest tube (anterior removed )  -in immunosuppressed patient with complex empyema history favor longer than traditional 7 days of post-decortication course of antibiotics , assuming no growth  Would aim to treat through 3/31/25 with plan to repeat CT Chest then and have follow up with thoracic surgery and ID    in anticipation for continuation of antibiotics on/once discharged  OPAT note  indication Empyema s/p VATS  iv antibiotic Ceftriaxone 2 g daily  duration: anticipate through 3/31  Labs CBC with diff, CMP  to Dr Whitmore, Marily Kam or Dr Chavez [email or web address]  Imaging needed: YES: Ct chest at the end of course  Appointment needed: yes, with any TID provider at the end of course    #Giardia  s/p 7 days of Metronidazole    #Positive Blood Culture (Staph epi, 1/16)  procurement contaminant        Thank you for involving us in the care of this patient  Transplant ID will sign off at this time  Please call or page with additional questions  Pager; #[phone]  Teams: from 8 am to 5 pm  Jocelyn Chavez MD

## 2025-03-21 NOTE — PROGRESS NOTE ADULT - SUBJECTIVE AND OBJECTIVE BOX
Patient is a 44y old  Male who presents with a chief complaint of hyperkalemia (21 Mar 2025 01:25)      Vital Signs Last 24 Hrs  T(C): 36.6 (25 @ 09:00), Max: 36.8 (25 @ 19:51)  T(F): 97.8 (25 @ 09:00), Max: 98.2 (25 @ 19:51)  HR: 69 (25 @ 09:00) (69 - 85)  BP: 133/71 (25 @ 09:00) (118/65 - 145/83)  RR: 20 (25 @ 09:00) (16 - 20)  SpO2: 96% (25 @ 09:00) (96% - 97%)                25 @ 07:01  -  25 @ 07:00  --------------------------------------------------------  IN: 920 mL / OUT: 3215 mL / NET: -2295 mL    25 @ 07:01  -  25 @ 10:55  --------------------------------------------------------  IN: 240 mL / OUT: 300 mL / NET: -60 mL        Daily     Daily Weight in k (21 Mar 2025 05:45)                          7.5    4.36  )-----------( 176      ( 21 Mar 2025 06:45 )             22.7         137  |  104  |  37[H]  ----------------------------<  121[H]  4.6   |  25  |  0.88    Ca    9.0      21 Mar 2025 06:45  Phos  2.8       Mg     2.0         TPro  5.6[L]  /  Alb  2.7[L]  /  TBili  0.4  /  DBili  x   /  AST  6[L]  /  ALT  <5[L]  /  AlkPhos  62  03-          PHYSICAL EXAM  Neurology: A&Ox3, NAD, no gross deficits  CV : RRR+S1S2  Lungs: Respirations non-labored, B/L BS  Abdomen: Soft, NT/ND, +BSx4Q  Extremities: B/L LE no  edema, negative calf tenderness, +PP         CT # 1 anterior - clamped: 5/15   CT # 2 posterior - suction :     MEDICATIONS  albuterol    90 MICROgram(s) HFA Inhaler 1 Puff(s) Inhalation every 6 hours PRN  albuterol/ipratropium for Nebulization 3 milliLiter(s) Nebulizer every 6 hours  aspirin enteric coated 81 milliGRAM(s) Oral daily  atovaquone  Suspension 1500 milliGRAM(s) Oral daily  carvedilol 3.125 milliGRAM(s) Oral every 12 hours  cefTRIAXone   IVPB      cefTRIAXone   IVPB 1000 milliGRAM(s) IV Intermittent every 24 hours  chlorhexidine 2% Cloths 1 Application(s) Topical <User Schedule>  insulin glargine Injectable (LANTUS) 6 Unit(s) SubCutaneous at bedtime  insulin lispro (ADMELOG) corrective regimen sliding scale   SubCutaneous three times a day before meals  insulin lispro (ADMELOG) corrective regimen sliding scale   SubCutaneous at bedtime  magnesium oxide 800 milliGRAM(s) Oral two times a day with meals  melatonin 3 milliGRAM(s) Oral at bedtime PRN  mycophenolic acid  milliGRAM(s) Oral <User Schedule>  pantoprazole    Tablet 40 milliGRAM(s) Oral before breakfast  polyethylene glycol 3350 17 Gram(s) Oral daily  predniSONE   Tablet 20 milliGRAM(s) Oral daily  senna 2 Tablet(s) Oral at bedtime  sodium chloride 0.65% Nasal 1 Spray(s) Both Nostrils two times a day PRN  sodium zirconium cyclosilicate 10 Gram(s) Oral daily  sofosbuvir 400 mG/velpatasvir 100 mG (EPCLUSA) 1 Tablet(s) Oral daily  tacrolimus 3 milliGRAM(s) Oral <User Schedule>  tacrolimus 2 milliGRAM(s) Oral <User Schedule>  thiamine 100 milliGRAM(s) Oral daily  traMADol 50 milliGRAM(s) Oral every 6 hours PRN  valGANciclovir 450 milliGRAM(s) Oral daily

## 2025-03-21 NOTE — PROGRESS NOTE ADULT - PROBLEM SELECTOR PLAN 1
3/17 - s/p VATS for decortication of right lung  3/21 CT # 2 posterior CT to remain to suction.  CT # 1 anterior - clamped, obtain cxr this afternoon   OOB to chair   incentive spirometry   Daily CXR  care as per primary team  Thoracic surgery following 3/17 - s/p VATS for decortication of right lung  3/21 CT # 2 posterior CT to remain to suction.  CT # 1 anterior - clamped, obtain cxr this afternoon - Anterior chest tube removed, pending post removal CXR  OOB to chair   incentive spirometry   Daily CXR  care as per primary team  Thoracic surgery following

## 2025-03-21 NOTE — PROGRESS NOTE ADULT - ASSESSMENT
44M PMH with PMHx of decompensated ETOH cirrhosis c/b recurrent ascites, grade II esophageal varices, and hepatic encephalopathy. 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  S/P Thoracotomy on 3/17/2025    [ ] s/p HCV + DCD OLT   - good graft function  - HCV viremia/ Genotype 1A (donor hcv +) - on Epclusa since 3/1   - SQH bid (held for VATs,) discuss in AM  - resume ASA  - Remove half of staples today    [] Immuno  - FK per level,  Myfortic 360BID, Pred 20QD  - ppx: no bactrim due to sulfa allergy -> Mepron, valcyte, oscal, PPI    [] h/o R Empyema   - s/p VATS POD#4 for decortication of right lung [3/17]   - R. Chest tube x2, monitor output  - Daily CXR  - Posterior CT on suction for PTX, anterior placed to water seal, pending AM CXR  - c/w holding SQH  - continue CTX   - Thoracic Sx following   44M PMH with PMHx of decompensated ETOH cirrhosis c/b recurrent ascites, grade II esophageal varices, and hepatic encephalopathy. 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  S/P Thoracotomy on 3/17/2025    [ ] s/p HCV + DCD OLT   - good graft function  - HCV viremia/ Genotype 1A (donor hcv +) - on Epclusa since 3/1   - SQH/ ASA    [] Immuno  - FK per level,  Myfortic 360BID, Pred 20QD  - ppx: no bactrim due to sulfa allergy -> Mepron, valcyte, oscal, PPI    [] h/o R Empyema   - s/p VATS POD#4 for decortication of right lung [3/17]   - anterior CT removed 3/21  - posterior CT to suction  - Daily CXR  - continue CTX   - Thoracic Sx following

## 2025-03-22 LAB
ALBUMIN SERPL ELPH-MCNC: 2.7 G/DL — LOW (ref 3.3–5)
ALP SERPL-CCNC: 66 U/L — SIGNIFICANT CHANGE UP (ref 40–120)
ALT FLD-CCNC: <5 U/L — LOW (ref 10–45)
ANION GAP SERPL CALC-SCNC: 10 MMOL/L — SIGNIFICANT CHANGE UP (ref 5–17)
APTT BLD: 27.4 SEC — SIGNIFICANT CHANGE UP (ref 24.5–35.6)
AST SERPL-CCNC: 6 U/L — LOW (ref 10–40)
BILIRUB SERPL-MCNC: 0.4 MG/DL — SIGNIFICANT CHANGE UP (ref 0.2–1.2)
BLD GP AB SCN SERPL QL: NEGATIVE — SIGNIFICANT CHANGE UP
BUN SERPL-MCNC: 38 MG/DL — HIGH (ref 7–23)
CALCIUM SERPL-MCNC: 8.9 MG/DL — SIGNIFICANT CHANGE UP (ref 8.4–10.5)
CHLORIDE SERPL-SCNC: 104 MMOL/L — SIGNIFICANT CHANGE UP (ref 96–108)
CO2 SERPL-SCNC: 23 MMOL/L — SIGNIFICANT CHANGE UP (ref 22–31)
CREAT SERPL-MCNC: 0.91 MG/DL — SIGNIFICANT CHANGE UP (ref 0.5–1.3)
CULTURE RESULTS: SIGNIFICANT CHANGE UP
EGFR: 105 ML/MIN/1.73M2 — SIGNIFICANT CHANGE UP
EGFR: 105 ML/MIN/1.73M2 — SIGNIFICANT CHANGE UP
GLUCOSE BLDC GLUCOMTR-MCNC: 145 MG/DL — HIGH (ref 70–99)
GLUCOSE BLDC GLUCOMTR-MCNC: 175 MG/DL — HIGH (ref 70–99)
GLUCOSE BLDC GLUCOMTR-MCNC: 188 MG/DL — HIGH (ref 70–99)
GLUCOSE BLDC GLUCOMTR-MCNC: 214 MG/DL — HIGH (ref 70–99)
GLUCOSE SERPL-MCNC: 131 MG/DL — HIGH (ref 70–99)
HCT VFR BLD CALC: 22.6 % — LOW (ref 39–50)
HGB BLD-MCNC: 7.5 G/DL — LOW (ref 13–17)
INR BLD: 0.89 RATIO — SIGNIFICANT CHANGE UP (ref 0.85–1.16)
MAGNESIUM SERPL-MCNC: 1.8 MG/DL — SIGNIFICANT CHANGE UP (ref 1.6–2.6)
MCHC RBC-ENTMCNC: 30.5 PG — SIGNIFICANT CHANGE UP (ref 27–34)
MCHC RBC-ENTMCNC: 33.2 G/DL — SIGNIFICANT CHANGE UP (ref 32–36)
MCV RBC AUTO: 91.9 FL — SIGNIFICANT CHANGE UP (ref 80–100)
NRBC BLD AUTO-RTO: 0 /100 WBCS — SIGNIFICANT CHANGE UP (ref 0–0)
PHOSPHATE SERPL-MCNC: 3 MG/DL — SIGNIFICANT CHANGE UP (ref 2.5–4.5)
PLATELET # BLD AUTO: 198 K/UL — SIGNIFICANT CHANGE UP (ref 150–400)
POTASSIUM SERPL-MCNC: 4.6 MMOL/L — SIGNIFICANT CHANGE UP (ref 3.5–5.3)
POTASSIUM SERPL-SCNC: 4.6 MMOL/L — SIGNIFICANT CHANGE UP (ref 3.5–5.3)
PROT SERPL-MCNC: 5.6 G/DL — LOW (ref 6–8.3)
PROTHROM AB SERPL-ACNC: 10.3 SEC — SIGNIFICANT CHANGE UP (ref 9.9–13.4)
RBC # BLD: 2.46 M/UL — LOW (ref 4.2–5.8)
RBC # FLD: 18.2 % — HIGH (ref 10.3–14.5)
RH IG SCN BLD-IMP: POSITIVE — SIGNIFICANT CHANGE UP
SODIUM SERPL-SCNC: 137 MMOL/L — SIGNIFICANT CHANGE UP (ref 135–145)
SPECIMEN SOURCE: SIGNIFICANT CHANGE UP
TACROLIMUS SERPL-MCNC: 3 NG/ML — SIGNIFICANT CHANGE UP
WBC # BLD: 4.4 K/UL — SIGNIFICANT CHANGE UP (ref 3.8–10.5)
WBC # FLD AUTO: 4.4 K/UL — SIGNIFICANT CHANGE UP (ref 3.8–10.5)

## 2025-03-22 PROCEDURE — 71045 X-RAY EXAM CHEST 1 VIEW: CPT | Mod: 26

## 2025-03-22 PROCEDURE — 71045 X-RAY EXAM CHEST 1 VIEW: CPT | Mod: 26,77

## 2025-03-22 RX ORDER — TACROLIMUS 0.5 MG/1
1 CAPSULE ORAL ONCE
Refills: 0 | Status: COMPLETED | OUTPATIENT
Start: 2025-03-22 | End: 2025-03-22

## 2025-03-22 RX ORDER — TACROLIMUS 0.5 MG/1
3 CAPSULE ORAL
Refills: 0 | Status: DISCONTINUED | OUTPATIENT
Start: 2025-03-22 | End: 2025-03-23

## 2025-03-22 RX ORDER — TRAMADOL HYDROCHLORIDE 50 MG/1
25 TABLET, FILM COATED ORAL ONCE
Refills: 0 | Status: DISCONTINUED | OUTPATIENT
Start: 2025-03-22 | End: 2025-03-22

## 2025-03-22 RX ORDER — TACROLIMUS 0.5 MG/1
4 CAPSULE ORAL
Refills: 0 | Status: DISCONTINUED | OUTPATIENT
Start: 2025-03-23 | End: 2025-03-23

## 2025-03-22 RX ORDER — ACETAMINOPHEN 500 MG/5ML
1000 LIQUID (ML) ORAL ONCE
Refills: 0 | Status: COMPLETED | OUTPATIENT
Start: 2025-03-22 | End: 2025-03-22

## 2025-03-22 RX ADMIN — IPRATROPIUM BROMIDE AND ALBUTEROL SULFATE 3 MILLILITER(S): .5; 2.5 SOLUTION RESPIRATORY (INHALATION) at 17:28

## 2025-03-22 RX ADMIN — Medication 40 MILLIGRAM(S): at 05:33

## 2025-03-22 RX ADMIN — POLYETHYLENE GLYCOL 3350 17 GRAM(S): 17 POWDER, FOR SOLUTION ORAL at 12:31

## 2025-03-22 RX ADMIN — CEFTRIAXONE 100 MILLIGRAM(S): 500 INJECTION, POWDER, FOR SOLUTION INTRAMUSCULAR; INTRAVENOUS at 09:15

## 2025-03-22 RX ADMIN — ATOVAQUONE 1500 MILLIGRAM(S): 750 SUSPENSION ORAL at 12:31

## 2025-03-22 RX ADMIN — HEPARIN SODIUM 5000 UNIT(S): 1000 INJECTION INTRAVENOUS; SUBCUTANEOUS at 17:28

## 2025-03-22 RX ADMIN — TRAMADOL HYDROCHLORIDE 50 MILLIGRAM(S): 50 TABLET, FILM COATED ORAL at 15:56

## 2025-03-22 RX ADMIN — TRAMADOL HYDROCHLORIDE 50 MILLIGRAM(S): 50 TABLET, FILM COATED ORAL at 16:56

## 2025-03-22 RX ADMIN — HEPARIN SODIUM 5000 UNIT(S): 1000 INJECTION INTRAVENOUS; SUBCUTANEOUS at 05:33

## 2025-03-22 RX ADMIN — TACROLIMUS 1 MILLIGRAM(S): 0.5 CAPSULE ORAL at 10:10

## 2025-03-22 RX ADMIN — PREDNISONE 20 MILLIGRAM(S): 20 TABLET ORAL at 05:33

## 2025-03-22 RX ADMIN — IPRATROPIUM BROMIDE AND ALBUTEROL SULFATE 3 MILLILITER(S): .5; 2.5 SOLUTION RESPIRATORY (INHALATION) at 12:32

## 2025-03-22 RX ADMIN — INSULIN LISPRO 2: 100 INJECTION, SOLUTION INTRAVENOUS; SUBCUTANEOUS at 09:16

## 2025-03-22 RX ADMIN — TACROLIMUS 3 MILLIGRAM(S): 0.5 CAPSULE ORAL at 20:36

## 2025-03-22 RX ADMIN — Medication 400 MILLIGRAM(S): at 20:53

## 2025-03-22 RX ADMIN — VELPATASVIR AND SOFOSBUVIR 1 TABLET(S): 50; 200 TABLET, FILM COATED ORAL at 12:31

## 2025-03-22 RX ADMIN — IPRATROPIUM BROMIDE AND ALBUTEROL SULFATE 3 MILLILITER(S): .5; 2.5 SOLUTION RESPIRATORY (INHALATION) at 05:44

## 2025-03-22 RX ADMIN — Medication 1000 MILLIGRAM(S): at 21:38

## 2025-03-22 RX ADMIN — INSULIN GLARGINE-YFGN 6 UNIT(S): 100 INJECTION, SOLUTION SUBCUTANEOUS at 22:02

## 2025-03-22 RX ADMIN — TRAMADOL HYDROCHLORIDE 50 MILLIGRAM(S): 50 TABLET, FILM COATED ORAL at 06:33

## 2025-03-22 RX ADMIN — Medication 800 MILLIGRAM(S): at 17:28

## 2025-03-22 RX ADMIN — VALGANCICLOVIR 450 MILLIGRAM(S): 450 TABLET, FILM COATED ORAL at 12:31

## 2025-03-22 RX ADMIN — SODIUM ZIRCONIUM CYCLOSILICATE 10 GRAM(S): 5 POWDER, FOR SUSPENSION ORAL at 12:31

## 2025-03-22 RX ADMIN — MYCOPHENOLIC ACID 360 MILLIGRAM(S): 360 TABLET, DELAYED RELEASE ORAL at 09:16

## 2025-03-22 RX ADMIN — TRAMADOL HYDROCHLORIDE 50 MILLIGRAM(S): 50 TABLET, FILM COATED ORAL at 05:33

## 2025-03-22 RX ADMIN — MYCOPHENOLIC ACID 360 MILLIGRAM(S): 360 TABLET, DELAYED RELEASE ORAL at 20:36

## 2025-03-22 RX ADMIN — IPRATROPIUM BROMIDE AND ALBUTEROL SULFATE 3 MILLILITER(S): .5; 2.5 SOLUTION RESPIRATORY (INHALATION) at 22:01

## 2025-03-22 RX ADMIN — TRAMADOL HYDROCHLORIDE 25 MILLIGRAM(S): 50 TABLET, FILM COATED ORAL at 00:22

## 2025-03-22 RX ADMIN — Medication 800 MILLIGRAM(S): at 09:15

## 2025-03-22 RX ADMIN — Medication 3 MILLIGRAM(S): at 00:22

## 2025-03-22 RX ADMIN — Medication 100 MILLIGRAM(S): at 12:32

## 2025-03-22 RX ADMIN — TRAMADOL HYDROCHLORIDE 25 MILLIGRAM(S): 50 TABLET, FILM COATED ORAL at 01:25

## 2025-03-22 RX ADMIN — Medication 81 MILLIGRAM(S): at 12:32

## 2025-03-22 RX ADMIN — INSULIN LISPRO 4: 100 INJECTION, SOLUTION INTRAVENOUS; SUBCUTANEOUS at 12:32

## 2025-03-22 RX ADMIN — TACROLIMUS 3 MILLIGRAM(S): 0.5 CAPSULE ORAL at 09:15

## 2025-03-22 RX ADMIN — CARVEDILOL 3.12 MILLIGRAM(S): 3.12 TABLET, FILM COATED ORAL at 17:28

## 2025-03-22 NOTE — PROGRESS NOTE ADULT - SUBJECTIVE AND OBJECTIVE BOX
Patient is a 44y old  Male who presents with a chief complaint of hyperkalemia (22 Mar 2025 03:19)      Vital Signs Last 24 Hrs  T(C): 36.7 (25 @ 09:21), Max: 36.8 (25 @ 12:00)  T(F): 98.1 (25 @ 09:21), Max: 98.2 (25 @ 12:00)  HR: 80 (25 @ 09:21) (73 - 87)  BP: 121/67 (25 @ 09:21) (103/61 - 136/69)  RR: 20 (25 @ 09:21) (18 - 20)  SpO2: 97% (25 @ 09:21) (97% - 100%)                25 @ 07:01  -  25 @ 07:00  --------------------------------------------------------  IN: 890 mL / OUT: 1965 mL / NET: -1075 mL        Daily     Daily Weight in k.4 (22 Mar 2025 05:00)                          7.5    4.40  )-----------( 198      ( 22 Mar 2025 07:07 )             22.6         137  |  104  |  38[H]  ----------------------------<  131[H]  4.6   |  23  |  0.91    Ca    8.9      22 Mar 2025 07:06  Phos  3.0       Mg     1.8         TPro  5.6[L]  /  Alb  2.7[L]  /  TBili  0.4  /  DBili  x   /  AST  6[L]  /  ALT  <5[L]  /  AlkPhos  66            PHYSICAL EXAM  Neurology: A&Ox3, NAD, no gross deficits  CV : RRR+S1S2  Lungs: Respirations non-labored, B/L BS  Abdomen: Soft, NT/ND, +BSx4Q  Extremities: B/L LE edema, negative calf tenderness, +PP               MEDICATIONS  albuterol    90 MICROgram(s) HFA Inhaler 1 Puff(s) Inhalation every 6 hours PRN  albuterol/ipratropium for Nebulization 3 milliLiter(s) Nebulizer every 6 hours  aspirin enteric coated 81 milliGRAM(s) Oral daily  atovaquone  Suspension 1500 milliGRAM(s) Oral daily  carvedilol 3.125 milliGRAM(s) Oral every 12 hours  cefTRIAXone   IVPB      cefTRIAXone   IVPB 1000 milliGRAM(s) IV Intermittent every 24 hours  chlorhexidine 2% Cloths 1 Application(s) Topical <User Schedule>  heparin   Injectable 5000 Unit(s) SubCutaneous every 12 hours  insulin glargine Injectable (LANTUS) 6 Unit(s) SubCutaneous at bedtime  insulin lispro (ADMELOG) corrective regimen sliding scale   SubCutaneous three times a day before meals  insulin lispro (ADMELOG) corrective regimen sliding scale   SubCutaneous at bedtime  magnesium oxide 800 milliGRAM(s) Oral two times a day with meals  melatonin 3 milliGRAM(s) Oral at bedtime PRN  mycophenolic acid  milliGRAM(s) Oral <User Schedule>  pantoprazole    Tablet 40 milliGRAM(s) Oral before breakfast  polyethylene glycol 3350 17 Gram(s) Oral daily  predniSONE   Tablet 20 milliGRAM(s) Oral daily  senna 2 Tablet(s) Oral at bedtime  sodium chloride 0.65% Nasal 1 Spray(s) Both Nostrils two times a day PRN  sodium zirconium cyclosilicate 10 Gram(s) Oral daily  sofosbuvir 400 mG/velpatasvir 100 mG (EPCLUSA) 1 Tablet(s) Oral daily  tacrolimus 3 milliGRAM(s) Oral two times a day  thiamine 100 milliGRAM(s) Oral daily  traMADol 50 milliGRAM(s) Oral every 6 hours PRN  valGANciclovir 450 milliGRAM(s) Oral daily     Patient is a 44y old  Male who presents with a chief complaint of hyperkalemia (22 Mar 2025 03:19)      Vital Signs Last 24 Hrs  T(C): 36.7 (25 @ 09:21), Max: 36.8 (25 @ 12:00)  T(F): 98.1 (25 @ 09:21), Max: 98.2 (25 @ 12:00)  HR: 80 (25 @ 09:21) (73 - 87)  BP: 121/67 (25 @ 09:21) (103/61 - 136/69)  RR: 20 (25 @ 09:21) (18 - 20)  SpO2: 97% (25 @ 09:21) (97% - 100%)                25 @ 07:01  -  25 @ 07:00  --------------------------------------------------------  IN: 890 mL / OUT: 1965 mL / NET: -1075 mL        Daily     Daily Weight in k.4 (22 Mar 2025 05:00)                          7.5    4.40  )-----------( 198      ( 22 Mar 2025 07:07 )             22.6         137  |  104  |  38[H]  ----------------------------<  131[H]  4.6   |  23  |  0.91    Ca    8.9      22 Mar 2025 07:06  Phos  3.0       Mg     1.8         TPro  5.6[L]  /  Alb  2.7[L]  /  TBili  0.4  /  DBili  x   /  AST  6[L]  /  ALT  <5[L]  /  AlkPhos  66            PHYSICAL EXAM  Neurology: A&Ox3, NAD, no gross deficits  CV : RRR+S1S2  Lungs: Respirations non-labored, B/L BS  Abdomen: Soft, NT/ND, +BSx4Q  Extremities: B/L LE no edema, negative calf tenderness, +PP           + Posterior CT to water seal         MEDICATIONS  albuterol    90 MICROgram(s) HFA Inhaler 1 Puff(s) Inhalation every 6 hours PRN  albuterol/ipratropium for Nebulization 3 milliLiter(s) Nebulizer every 6 hours  aspirin enteric coated 81 milliGRAM(s) Oral daily  atovaquone  Suspension 1500 milliGRAM(s) Oral daily  carvedilol 3.125 milliGRAM(s) Oral every 12 hours  cefTRIAXone   IVPB      cefTRIAXone   IVPB 1000 milliGRAM(s) IV Intermittent every 24 hours  chlorhexidine 2% Cloths 1 Application(s) Topical <User Schedule>  heparin   Injectable 5000 Unit(s) SubCutaneous every 12 hours  insulin glargine Injectable (LANTUS) 6 Unit(s) SubCutaneous at bedtime  insulin lispro (ADMELOG) corrective regimen sliding scale   SubCutaneous three times a day before meals  insulin lispro (ADMELOG) corrective regimen sliding scale   SubCutaneous at bedtime  magnesium oxide 800 milliGRAM(s) Oral two times a day with meals  melatonin 3 milliGRAM(s) Oral at bedtime PRN  mycophenolic acid  milliGRAM(s) Oral <User Schedule>  pantoprazole    Tablet 40 milliGRAM(s) Oral before breakfast  polyethylene glycol 3350 17 Gram(s) Oral daily  predniSONE   Tablet 20 milliGRAM(s) Oral daily  senna 2 Tablet(s) Oral at bedtime  sodium chloride 0.65% Nasal 1 Spray(s) Both Nostrils two times a day PRN  sodium zirconium cyclosilicate 10 Gram(s) Oral daily  sofosbuvir 400 mG/velpatasvir 100 mG (EPCLUSA) 1 Tablet(s) Oral daily  tacrolimus 3 milliGRAM(s) Oral two times a day  thiamine 100 milliGRAM(s) Oral daily  traMADol 50 milliGRAM(s) Oral every 6 hours PRN  valGANciclovir 450 milliGRAM(s) Oral daily

## 2025-03-22 NOTE — PROGRESS NOTE ADULT - SUBJECTIVE AND OBJECTIVE BOX
Transplant Surgery - Multidisciplinary Rounds  --------------------------------------------------------------   Donor OLTx      Date:         POD#    Present:   Patient seen and examined with multidisciplinary Transplant team including Surgeon, Hepatologist, Surgical/Hepatology fellow, ACP,  Pharmacist, Nutritionis,  and bedside RN during AM rounds.   Disciplines not in attendance will be notified of the plan.     HPI:     Interval Events:      Immunosupression:  Induction: Simulect  Maintenance: FK/MMF/SST  Ongoing monitoring for signs of rejection     Potential Discharge date: TBD  Education:  Medications  Plan of care:  See Below    MEDICATIONS  (STANDING):  albuterol/ipratropium for Nebulization 3 milliLiter(s) Nebulizer every 6 hours  aspirin enteric coated 81 milliGRAM(s) Oral daily  atovaquone  Suspension 1500 milliGRAM(s) Oral daily  carvedilol 3.125 milliGRAM(s) Oral every 12 hours  cefTRIAXone   IVPB 1000 milliGRAM(s) IV Intermittent every 24 hours  cefTRIAXone   IVPB      chlorhexidine 2% Cloths 1 Application(s) Topical <User Schedule>  heparin   Injectable 5000 Unit(s) SubCutaneous every 12 hours  insulin glargine Injectable (LANTUS) 6 Unit(s) SubCutaneous at bedtime  insulin lispro (ADMELOG) corrective regimen sliding scale   SubCutaneous three times a day before meals  insulin lispro (ADMELOG) corrective regimen sliding scale   SubCutaneous at bedtime  magnesium oxide 800 milliGRAM(s) Oral two times a day with meals  mycophenolic acid  milliGRAM(s) Oral <User Schedule>  pantoprazole    Tablet 40 milliGRAM(s) Oral before breakfast  polyethylene glycol 3350 17 Gram(s) Oral daily  predniSONE   Tablet 20 milliGRAM(s) Oral daily  senna 2 Tablet(s) Oral at bedtime  sodium zirconium cyclosilicate 10 Gram(s) Oral daily  sofosbuvir 400 mG/velpatasvir 100 mG (EPCLUSA) 1 Tablet(s) Oral daily  tacrolimus 3 milliGRAM(s) Oral two times a day  thiamine 100 milliGRAM(s) Oral daily  valGANciclovir 450 milliGRAM(s) Oral daily    MEDICATIONS  (PRN):  albuterol    90 MICROgram(s) HFA Inhaler 1 Puff(s) Inhalation every 6 hours PRN Shortness of Breath and/or Wheezing  melatonin 3 milliGRAM(s) Oral at bedtime PRN Insomnia  sodium chloride 0.65% Nasal 1 Spray(s) Both Nostrils two times a day PRN Nasal Congestion  traMADol 50 milliGRAM(s) Oral every 6 hours PRN Severe Pain (7 - 10)      PAST MEDICAL & SURGICAL HISTORY:  Sleep apnea, obstructive      Alcohol abuse      Cirrhosis of liver      Portal hypertension      H/O esophageal varices      Anemia      History of alcohol use disorder      Hepatic encephalopathy      Pleural effusion      History of pleural empyema      Liver transplanted      S/P abdominal paracentesis      History of lung surgery          Vital Signs Last 24 Hrs  T(C): 36.8 (22 Mar 2025 00:00), Max: 36.8 (21 Mar 2025 12:00)  T(F): 98.2 (22 Mar 2025 00:00), Max: 98.2 (21 Mar 2025 12:00)  HR: 87 (22 Mar 2025 00:00) (69 - 87)  BP: 123/64 (22 Mar 2025 00:00) (118/65 - 136/69)  BP(mean): --  RR: 18 (22 Mar 2025 00:00) (18 - 20)  SpO2: 97% (22 Mar 2025 00:00) (96% - 100%)    Parameters below as of 22 Mar 2025 00:00  Patient On (Oxygen Delivery Method): room air        I&O's Summary    20 Mar 2025 07:  -  21 Mar 2025 07:00  --------------------------------------------------------  IN: 920 mL / OUT: 3215 mL / NET: -2295 mL    21 Mar 2025 07:01  -  22 Mar 2025 03:19  --------------------------------------------------------  IN: 890 mL / OUT: 1455 mL / NET: -565 mL                              7.5    4.36  )-----------( 176      ( 21 Mar 2025 06:45 )             22.7         137  |  104  |  37[H]  ----------------------------<  121[H]  4.6   |  25  |  0.88    Ca    9.0      21 Mar 2025 06:45  Phos  2.8       Mg     2.0         TPro  5.6[L]  /  Alb  2.7[L]  /  TBili  0.4  /  DBili  x   /  AST  6[L]  /  ALT  <5[L]  /  AlkPhos  62      Tacrolimus (), Serum: 3.1 ng/mL ( @ 06:45)      Review of systems  Gen: No weight changes, fatigue, fevers/chills, weakness  Skin: No rashes  Head/Eyes/Ears/Mouth: No headache; Normal hearing; Normal vision w/o blurriness; No sinus pain/discomfort, sore throat  Respiratory: No dyspnea, cough, wheezing, hemoptysis  CV: No chest pain, PND, orthopnea  GI: C/O mild abdominal pain at surgical site. no diarrhea, constipation, nausea, vomiting, melena, hematochezia  : No increased frequency, dysuria, hematuria, nocturia  MSK: No joint pain/swelling; no back pain; no edema  Neuro: No dizziness/lightheadedness, weakness, seizures, numbness, tingling  Heme: No easy bruising or bleeding  Endo: No heat/cold intolerance  Psych: No significant nervousness, anxiety, stress, depression  All other systems were reviewed and are negative, except as noted.      PHYSICAL EXAM:  Constitutional: Well developed / well nourished  Eyes: *****Anicteric?***, PERRLA  ENMT: nc/at, no thrush  Neck: supple, central line *****************  Respiratory: CTA B/L  Cardiovascular: RRR  Gastrointestinal: Soft abdomen, ND, appropriate incisional TTP. chevron incision c/d/i, staples in place. No signs of infection.   Genitourinary: Urinary catheter in place*****Voiding spontaneously  Extremities: SCD's in place and working bilaterally  Vascular: Palpable dp pulses bilaterally.   Neurological: A&O x3  Skin: no rashes, ulcerations, lesions  Musculoskeletal: Moving all extremities  Psychiatric: Responsive     Transplant Surgery - Multidisciplinary Rounds  --------------------------------------------------------------   OLT 02/27/25            Present:   Patient seen and examined with multidisciplinary Transplant team including Surgeon Dr. Dagher, Hepatologist Dr. Burgess, Surgical/Hepatology fellow, KAMAR Cooley,  Pharmacist, Nutritionist,  and bedside RN during AM rounds.   Disciplines not in attendance will be notified of the plan.     HPI: 44M PMH with PMHx of decompensated ETOH cirrhosis c/b recurrent ascites, grade II esophageal varices, and hepatic encephalopathy. Recent admission 1/16 -3/11 for R empyema s/p robotic VATs with decortication (Dr Rmaírez 1/28/25), [facility] (required HD, improved, permcath removed 1/31) and s/p HCV + DCD LT on 2/27/2025. Admitted with Hyperkalemia and planned Thoracotomy on 3/17/2025    Interval Events:  - Ant chest tube removed, posterior to suction  - FK 3.1 incr to 3/3    Immunosuppression  Maintenance: FK per level, Myfortic 360 BID, pred 20  Ongoing monitoring for signs of rejection     Potential Discharge date: TBD  Education:  Medications  Plan of care:  See Below      MEDICATIONS  (STANDING):  albuterol/ipratropium for Nebulization 3 milliLiter(s) Nebulizer every 6 hours  aspirin enteric coated 81 milliGRAM(s) Oral daily  atovaquone  Suspension 1500 milliGRAM(s) Oral daily  carvedilol 3.125 milliGRAM(s) Oral every 12 hours  cefTRIAXone   IVPB 1000 milliGRAM(s) IV Intermittent every 24 hours  cefTRIAXone   IVPB      chlorhexidine 2% Cloths 1 Application(s) Topical <User Schedule>  heparin   Injectable 5000 Unit(s) SubCutaneous every 12 hours  insulin glargine Injectable (LANTUS) 6 Unit(s) SubCutaneous at bedtime  insulin lispro (ADMELOG) corrective regimen sliding scale   SubCutaneous three times a day before meals  insulin lispro (ADMELOG) corrective regimen sliding scale   SubCutaneous at bedtime  magnesium oxide 800 milliGRAM(s) Oral two times a day with meals  mycophenolic acid  milliGRAM(s) Oral <User Schedule>  pantoprazole    Tablet 40 milliGRAM(s) Oral before breakfast  polyethylene glycol 3350 17 Gram(s) Oral daily  predniSONE   Tablet 20 milliGRAM(s) Oral daily  senna 2 Tablet(s) Oral at bedtime  sodium zirconium cyclosilicate 10 Gram(s) Oral daily  sofosbuvir 400 mG/velpatasvir 100 mG (EPCLUSA) 1 Tablet(s) Oral daily  tacrolimus 3 milliGRAM(s) Oral two times a day  thiamine 100 milliGRAM(s) Oral daily  valGANciclovir 450 milliGRAM(s) Oral daily    MEDICATIONS  (PRN):  albuterol    90 MICROgram(s) HFA Inhaler 1 Puff(s) Inhalation every 6 hours PRN Shortness of Breath and/or Wheezing  melatonin 3 milliGRAM(s) Oral at bedtime PRN Insomnia  sodium chloride 0.65% Nasal 1 Spray(s) Both Nostrils two times a day PRN Nasal Congestion  traMADol 50 milliGRAM(s) Oral every 6 hours PRN Severe Pain (7 - 10)      PAST MEDICAL & SURGICAL HISTORY:  Sleep apnea, obstructive      Alcohol abuse      Cirrhosis of liver      Portal hypertension      H/O esophageal varices      Anemia      History of alcohol use disorder      Hepatic encephalopathy      Pleural effusion      History of pleural empyema      Liver transplanted      S/P abdominal paracentesis      History of lung surgery          Vital Signs Last 24 Hrs  T(C): 36.8 (22 Mar 2025 00:00), Max: 36.8 (21 Mar 2025 12:00)  T(F): 98.2 (22 Mar 2025 00:00), Max: 98.2 (21 Mar 2025 12:00)  HR: 87 (22 Mar 2025 00:00) (69 - 87)  BP: 123/64 (22 Mar 2025 00:00) (118/65 - 136/69)  BP(mean): --  RR: 18 (22 Mar 2025 00:00) (18 - 20)  SpO2: 97% (22 Mar 2025 00:00) (96% - 100%)    Parameters below as of 22 Mar 2025 00:00  Patient On (Oxygen Delivery Method): room air        I&O's Summary    20 Mar 2025 07:01  -  21 Mar 2025 07:00  --------------------------------------------------------  IN: 920 mL / OUT: 3215 mL / NET: -2295 mL    21 Mar 2025 07:01  -  22 Mar 2025 03:19  --------------------------------------------------------  IN: 890 mL / OUT: 1455 mL / NET: -565 mL                              7.5    4.36  )-----------( 176      ( 21 Mar 2025 06:45 )             22.7     03-21    137  |  104  |  37[H]  ----------------------------<  121[H]  4.6   |  25  |  0.88    Ca    9.0      21 Mar 2025 06:45  Phos  2.8     03-21  Mg     2.0     03-21    TPro  5.6[L]  /  Alb  2.7[L]  /  TBili  0.4  /  DBili  x   /  AST  6[L]  /  ALT  <5[L]  /  AlkPhos  62  03-21    Tacrolimus (), Serum: 3.1 ng/mL (03-21 @ 06:45)      Review of systems  All other systems were reviewed and are negative, except as noted.      PHYSICAL EXAM:  Constitutional: Well developed / well nourished  Eyes: PERRLA  ENMT: nc/at, no thrush  Neck: supple  Respiratory: slight decreased BS on RLL, good airway movement, no adventitious sounds. CT x 2  Cardiovascular: RRR  Gastrointestinal: Soft abdomen, ND, incision healed   Genitourinary: voiding  Extremities: SCD's in place and working bilaterally  Vascular: Palpable dp pulses bilaterally.   Neurological: A&O x3  Skin: no rashes, ulcerations, lesions  Musculoskeletal: Moving all extremities      Transplant Surgery - Multidisciplinary Rounds  --------------------------------------------------------------   OLT 02/27/25            Present:   Patient seen and examined with multidisciplinary Transplant team including Surgeon Dr. Dagher, Hepatologist Dr. Burgess, Surgical fellow, KAMAR Cooley,  and bedside RN during AM rounds.   Disciplines not in attendance will be notified of the plan.     HPI: 44M PMH with PMHx of decompensated ETOH cirrhosis c/b recurrent ascites, grade II esophageal varices, and hepatic encephalopathy. 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 Admitted with Hyperkalemia and planned Thoracotomy on 3/17/2025    Interval Events:  - Ant chest tube removed, posterior to suction  - no acute events overnight     Immunosuppression  Maintenance: FK per level, Myfortic 360 BID, pred 20  Ongoing monitoring for signs of rejection     Potential Discharge date: TBD  Education:  Medications  Plan of care:  See Below      MEDICATIONS  (STANDING):  albuterol/ipratropium for Nebulization 3 milliLiter(s) Nebulizer every 6 hours  aspirin enteric coated 81 milliGRAM(s) Oral daily  atovaquone  Suspension 1500 milliGRAM(s) Oral daily  carvedilol 3.125 milliGRAM(s) Oral every 12 hours  cefTRIAXone   IVPB      cefTRIAXone   IVPB 1000 milliGRAM(s) IV Intermittent every 24 hours  chlorhexidine 2% Cloths 1 Application(s) Topical <User Schedule>  heparin   Injectable 5000 Unit(s) SubCutaneous every 12 hours  insulin glargine Injectable (LANTUS) 6 Unit(s) SubCutaneous at bedtime  insulin lispro (ADMELOG) corrective regimen sliding scale   SubCutaneous three times a day before meals  insulin lispro (ADMELOG) corrective regimen sliding scale   SubCutaneous at bedtime  magnesium oxide 800 milliGRAM(s) Oral two times a day with meals  mycophenolic acid  milliGRAM(s) Oral <User Schedule>  pantoprazole    Tablet 40 milliGRAM(s) Oral before breakfast  polyethylene glycol 3350 17 Gram(s) Oral daily  predniSONE   Tablet 20 milliGRAM(s) Oral daily  senna 2 Tablet(s) Oral at bedtime  sodium zirconium cyclosilicate 10 Gram(s) Oral daily  sofosbuvir 400 mG/velpatasvir 100 mG (EPCLUSA) 1 Tablet(s) Oral daily  tacrolimus 3 milliGRAM(s) Oral <User Schedule>  thiamine 100 milliGRAM(s) Oral daily  valGANciclovir 450 milliGRAM(s) Oral daily    MEDICATIONS  (PRN):  albuterol    90 MICROgram(s) HFA Inhaler 1 Puff(s) Inhalation every 6 hours PRN Shortness of Breath and/or Wheezing  melatonin 3 milliGRAM(s) Oral at bedtime PRN Insomnia  sodium chloride 0.65% Nasal 1 Spray(s) Both Nostrils two times a day PRN Nasal Congestion  traMADol 50 milliGRAM(s) Oral every 6 hours PRN Severe Pain (7 - 10)      PAST MEDICAL & SURGICAL HISTORY:  Sleep apnea, obstructive  Alcohol abuse  Cirrhosis of liver  Portal hypertension  H/O esophageal varices  Anemia  History of alcohol use disorder  Hepatic encephalopathy  Pleural effusion  History of pleural empyema  Liver transplanted  S/P abdominal paracentesis  History of lung surgery    Vital Signs Last 24 Hrs  T(C): 36.9 (22 Mar 2025 12:28), Max: 36.9 (22 Mar 2025 12:28)  T(F): 98.5 (22 Mar 2025 12:28), Max: 98.5 (22 Mar 2025 12:28)  HR: 77 (22 Mar 2025 12:28) (73 - 87)  BP: 127/73 (22 Mar 2025 12:28) (103/61 - 136/69)  BP(mean): --  RR: 20 (22 Mar 2025 12:28) (18 - 20)  SpO2: 98% (22 Mar 2025 12:28) (97% - 100%)    Parameters below as of 22 Mar 2025 12:28  Patient On (Oxygen Delivery Method): room air    I&O's Summary    21 Mar 2025 07:01  -  22 Mar 2025 07:00  --------------------------------------------------------  IN: 890 mL / OUT: 1965 mL / NET: -1075 mL                        7.5    4.40  )-----------( 198      ( 22 Mar 2025 07:07 )             22.6     03-22    137  |  104  |  38[H]  ----------------------------<  131[H]  4.6   |  23  |  0.91    Ca    8.9      22 Mar 2025 07:06  Phos  3.0     03-22  Mg     1.8     03-22    TPro  5.6[L]  /  Alb  2.7[L]  /  TBili  0.4  /  DBili  x   /  AST  6[L]  /  ALT  <5[L]  /  AlkPhos  66  03-22    Tacrolimus (), Serum: 3.0 ng/mL (03-22 @ 07:07)    Culture - Acid Fast - Body Fluid w/Smear (collected 03-17-25 @ 20:33)  Source: Body Fluid Right Pleura effusion  Preliminary Report (03-19-25 @ 23:23):    Culture is being performed.    Culture - Fungal, Body Fluid (collected 03-17-25 @ 20:33)  Source: Body Fluid Right Pleura effusion  Preliminary Report (03-20-25 @ 09:44):    No growth    Culture - Body Fluid with Gram Stain (collected 03-17-25 @ 20:33)  Source: Body Fluid Right Pleura effusion  Gram Stain (03-18-25 @ 03:20):    polymorphonuclear leukocytes seen    No organisms seen    by cytocentrifuge  Preliminary Report (03-18-25 @ 18:22):    No growth    Culture - Acid Fast - Tissue w/Smear (collected 03-17-25 @ 20:33)  Source: Tissue Right Lower Lobe Peel  Preliminary Report (03-19-25 @ 23:23):    Culture is being performed.    Culture - Fungal, Tissue (collected 03-17-25 @ 20:33)  Source: Tissue Right Lower Lobe Peel  Preliminary Report (03-20-25 @ 09:44):    No growth    Culture - Tissue with Gram Stain (collected 03-17-25 @ 20:33)  Source: Tissue Right Lower Lobe Peel  Gram Stain (03-18-25 @ 06:36):    No polymorphonuclear cells seen per low power field    No organisms seen per oil power field  Preliminary Report (03-19-25 @ 10:21):    No growth to date.    Culture - Acid Fast - Tissue w/Smear (collected 03-17-25 @ 20:33)  Source: Tissue Upper Lobe Peel  Preliminary Report (03-19-25 @ 23:23):    Culture is being performed.    Culture - Fungal, Tissue (collected 03-17-25 @ 20:33)  Source: Tissue Upper Lobe Peel  Preliminary Report (03-20-25 @ 09:44):    No growth    Culture - Tissue with Gram Stain (collected 03-17-25 @ 20:33)  Source: Tissue Upper Lobe Peel  Gram Stain (03-18-25 @ 06:30):    No polymorphonuclear cells seen per low power field    No organisms seen per oil power field  Preliminary Report (03-18-25 @ 21:50):    No growth to date.    Culture - Acid Fast - Tissue w/Smear (collected 03-17-25 @ 20:33)  Source: Tissue Middle Lobe Peel  Preliminary Report (03-19-25 @ 23:23):    Culture is being performed.    Culture - Fungal, Tissue (collected 03-17-25 @ 20:33)  Source: Tissue Middle Lobe Peel  Preliminary Report (03-20-25 @ 10:58):    No growth    Culture - Tissue with Gram Stain (collected 03-17-25 @ 20:33)  Source: Tissue Middle Lobe Peel  Gram Stain (03-18-25 @ 06:31):    No polymorphonuclear cells seen per low power field    No organisms seen per oil power field  Preliminary Report (03-18-25 @ 21:49):    No growth to date.    Culture - Acid Fast - Tissue w/Smear (collected 03-17-25 @ 20:33)  Source: Tissue Right Pleural Peel  Preliminary Report (03-19-25 @ 23:23):    Culture is being performed.    Culture - Fungal, Tissue (collected 03-17-25 @ 20:33)  Source: Tissue Right Pleural Peel  Preliminary Report (03-20-25 @ 10:58):    No growth    Culture - Tissue with Gram Stain (collected 03-17-25 @ 20:33)  Source: Tissue Right Pleural Peel  Gram Stain (03-18-25 @ 06:35):    No polymorphonuclear cells seen per low power field    No organisms seen per oil power field  Preliminary Report (03-19-25 @ 10:21):    No growth to date.    Review of systems  All other systems were reviewed and are negative, except as noted.    PHYSICAL EXAM:  Constitutional: Well developed / well nourished  Eyes: PERRLA  ENMT: nc/at, no thrush  Neck: supple  Respiratory: slight decreased BS on RLL, good airway movement, no adventitious sounds. CT x 1  Cardiovascular: RRR  Gastrointestinal: Soft abdomen, ND, incision healed   Genitourinary: voiding  Extremities: SCD's in place and working bilaterally  Vascular: Palpable dp pulses bilaterally.   Neurological: A&O x3  Skin: no rashes, ulcerations, lesions  Musculoskeletal: Moving all extremities

## 2025-03-22 NOTE — PROGRESS NOTE ADULT - PROBLEM SELECTOR PLAN 1
3/17 - s/p VATS for decortication of right lung  3/21 CT # 2 posterior CT to remain to suction.  CT # 1 anterior - clamped, obtain cxr this afternoon - Anterior chest tube removed, pending post removal CXR  OOB to chair   incentive spirometry   Daily CXR  care as per primary team  Thoracic surgery following 3/17 - s/p VATS for decortication of right lung  3/21 CT # 2 posterior CT to remain to suction.  CT # 1 anterior - clamped, obtain cxr this afternoon - Anterior chest tube removed, pending post removal CXR  3/22 VSS Posterior CT - to be water seal, rpt cxr this afternoon.   OOB to chair   incentive spirometry   Daily CXR  care as per primary team  Thoracic surgery following

## 2025-03-22 NOTE — PROGRESS NOTE ADULT - ASSESSMENT
44M PMH with PMHx of decompensated ETOH cirrhosis c/b recurrent ascites, grade II esophageal varices, hepatic encephalopathy. 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 Admitted with Hyperkalemia  3/17Bronchoscopy with decortication of right lung using video-assisted thoracoscopic surgery (VATS)   VATS exploration of pleural space, with evacuation of hemothorax. Cortical peel of R lower, middle, and upper lobes collected. Lung re-expanded with contact to chest wall at end of case.  Right empyema fluid, Right middle, upper, and lower lobe peels  3/18 VSS maintain chest  tube lws  3/19 VSS maintain   chest  tube  lws .  water seal in am 3/20 daily xray  OOB to chair   may ambulate with chest tube incentive spirometry   3/20 Chest tubes to waterseal  + airleak posterior tube  CXR 2 pm  3/21 CT # 2 posterior CT to remain to suction.  CT # 1 anterior - clamped, obtain cxr this afternoon.   Addendum: Anterior CT removed, pending CXR.    44M PMH with PMHx of decompensated ETOH cirrhosis c/b recurrent ascites, grade II esophageal varices, hepatic encephalopathy. 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 Admitted with Hyperkalemia  3/17Bronchoscopy with decortication of right lung using video-assisted thoracoscopic surgery (VATS)   VATS exploration of pleural space, with evacuation of hemothorax. Cortical peel of R lower, middle, and upper lobes collected. Lung re-expanded with contact to chest wall at end of case.  Right empyema fluid, Right middle, upper, and lower lobe peels  3/18 VSS maintain chest  tube lws  3/19 VSS maintain   chest  tube  lws .  water seal in am 3/20 daily xray  OOB to chair   may ambulate with chest tube incentive spirometry   3/20 Chest tubes to waterseal  + airleak posterior tube  CXR 2 pm  3/21 CT # 2 posterior CT to remain to suction.  CT # 1 anterior - clamped, obtain cxr this afternoon.   3/22 VSS Posterior CT - to be water seal, rpt cxr this afternoon.   Addendum: Anterior CT removed, pending CXR.    44M PMH with PMHx of decompensated ETOH cirrhosis c/b recurrent ascites, grade II esophageal varices, hepatic encephalopathy. 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 Admitted with Hyperkalemia  3/17Bronchoscopy with decortication of right lung using video-assisted thoracoscopic surgery (VATS)   VATS exploration of pleural space, with evacuation of hemothorax. Cortical peel of R lower, middle, and upper lobes collected. Lung re-expanded with contact to chest wall at end of case.  Right empyema fluid, Right middle, upper, and lower lobe peels  3/18 VSS maintain chest  tube lws  3/19 VSS maintain   chest  tube  lws .  water seal in am 3/20 daily xray  OOB to chair   may ambulate with chest tube incentive spirometry   3/20 Chest tubes to waterseal  + airleak posterior tube  CXR 2 pm  3/21 CT # 2 posterior CT to remain to suction.  CT # 1 anterior - clamped, obtain cxr this afternoon. Addendum: Anterior CT removed, pending CXR.   3/22 VSS Posterior CT - to be placed to water seal, rpt cxr this afternoon. Addendum: CXR with slight increase in size in right pnx. CT placed back to suction.

## 2025-03-22 NOTE — PROGRESS NOTE ADULT - ASSESSMENT
44M PMH with PMHx of decompensated ETOH cirrhosis c/b recurrent ascites, grade II esophageal varices, and hepatic encephalopathy. 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  S/P Thoracotomy on 3/17/2025    [ ] s/p HCV + DCD OLT   - good graft function  - HCV viremia/ Genotype 1A (donor hcv +) - on Epclusa since 3/1   - SQH/ ASA    [] Immuno  - FK per level,  Myfortic 360BID, Pred 20QD  - ppx: no bactrim due to sulfa allergy -> Mepron, valcyte, oscal, PPI    [] h/o R Empyema   - s/p VATS POD#4 for decortication of right lung [3/17]   - anterior CT removed 3/21  - posterior CT to suction  - Daily CXR  - continue CTX   - Thoracic Sx following     44M PMH with PMHx of decompensated ETOH cirrhosis c/b recurrent ascites, grade II esophageal varices, and hepatic encephalopathy. 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  S/P Thoracotomy on 3/17/2025    [ ] s/p HCV + DCD OLT   - good graft function  - HCV viremia/ Genotype 1A (donor hcv +) - on Epclusa since 3/1   - SQH/ ASA    [] Immuno  - FK per level,  Myfortic 360BID, Pred 20QD  - ppx: no bactrim due to sulfa allergy -> Mepron, valcyte, oscal, PPI    [] h/o R Empyema   - s/p VATS POD#5 for decortication of right lung [3/17]   - anterior CT removed 3/21  - posterior CT to suction  - Daily CXR  - continue CTX   - Thoracic Sx following     44M PMH with PMHx of decompensated ETOH cirrhosis c/b recurrent ascites, grade II esophageal varices, and hepatic encephalopathy. 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  S/P Thoracotomy on 3/17/2025    [ ] s/p HCV + DCD OLT   - good graft function  - HCV viremia/ Genotype 1A (donor hcv +) - on Epclusa since 3/1   - SQH/ ASA    [] Immuno  - FK per level,  Myfortic 360BID, Pred 20QD  - ppx: no bactrim due to sulfa allergy -> Mepron, valcyte, oscal, PPI    [] h/o R Empyema   - s/p VATS POD#5 for decortication of right lung [3/17]   - anterior CT removed 3/21  - posterior CT changed to waterseal   - Daily CXR  - continue CTX   - Thoracic Sx following

## 2025-03-23 LAB
ALBUMIN SERPL ELPH-MCNC: 2.8 G/DL — LOW (ref 3.3–5)
ALP SERPL-CCNC: 66 U/L — SIGNIFICANT CHANGE UP (ref 40–120)
ALT FLD-CCNC: <5 U/L — LOW (ref 10–45)
ANION GAP SERPL CALC-SCNC: 14 MMOL/L — SIGNIFICANT CHANGE UP (ref 5–17)
APTT BLD: 28.4 SEC — SIGNIFICANT CHANGE UP (ref 24.5–35.6)
AST SERPL-CCNC: 6 U/L — LOW (ref 10–40)
BILIRUB SERPL-MCNC: 0.4 MG/DL — SIGNIFICANT CHANGE UP (ref 0.2–1.2)
BUN SERPL-MCNC: 36 MG/DL — HIGH (ref 7–23)
CALCIUM SERPL-MCNC: 9.1 MG/DL — SIGNIFICANT CHANGE UP (ref 8.4–10.5)
CHLORIDE SERPL-SCNC: 104 MMOL/L — SIGNIFICANT CHANGE UP (ref 96–108)
CO2 SERPL-SCNC: 22 MMOL/L — SIGNIFICANT CHANGE UP (ref 22–31)
CREAT SERPL-MCNC: 0.83 MG/DL — SIGNIFICANT CHANGE UP (ref 0.5–1.3)
CULTURE RESULTS: SIGNIFICANT CHANGE UP
CULTURE RESULTS: SIGNIFICANT CHANGE UP
EGFR: 109 ML/MIN/1.73M2 — SIGNIFICANT CHANGE UP
EGFR: 109 ML/MIN/1.73M2 — SIGNIFICANT CHANGE UP
GLUCOSE BLDC GLUCOMTR-MCNC: 135 MG/DL — HIGH (ref 70–99)
GLUCOSE BLDC GLUCOMTR-MCNC: 144 MG/DL — HIGH (ref 70–99)
GLUCOSE BLDC GLUCOMTR-MCNC: 190 MG/DL — HIGH (ref 70–99)
GLUCOSE BLDC GLUCOMTR-MCNC: 229 MG/DL — HIGH (ref 70–99)
GLUCOSE BLDC GLUCOMTR-MCNC: 95 MG/DL — SIGNIFICANT CHANGE UP (ref 70–99)
GLUCOSE SERPL-MCNC: 141 MG/DL — HIGH (ref 70–99)
HCT VFR BLD CALC: 23.5 % — LOW (ref 39–50)
HGB BLD-MCNC: 7.6 G/DL — LOW (ref 13–17)
INR BLD: 0.91 RATIO — SIGNIFICANT CHANGE UP (ref 0.85–1.16)
MAGNESIUM SERPL-MCNC: 1.7 MG/DL — SIGNIFICANT CHANGE UP (ref 1.6–2.6)
MCHC RBC-ENTMCNC: 30.8 PG — SIGNIFICANT CHANGE UP (ref 27–34)
MCHC RBC-ENTMCNC: 32.3 G/DL — SIGNIFICANT CHANGE UP (ref 32–36)
MCV RBC AUTO: 95.1 FL — SIGNIFICANT CHANGE UP (ref 80–100)
NRBC BLD AUTO-RTO: 0 /100 WBCS — SIGNIFICANT CHANGE UP (ref 0–0)
PHOSPHATE SERPL-MCNC: 3.2 MG/DL — SIGNIFICANT CHANGE UP (ref 2.5–4.5)
PLATELET # BLD AUTO: 205 K/UL — SIGNIFICANT CHANGE UP (ref 150–400)
POTASSIUM SERPL-MCNC: 4.5 MMOL/L — SIGNIFICANT CHANGE UP (ref 3.5–5.3)
POTASSIUM SERPL-SCNC: 4.5 MMOL/L — SIGNIFICANT CHANGE UP (ref 3.5–5.3)
PROT SERPL-MCNC: 5.7 G/DL — LOW (ref 6–8.3)
PROTHROM AB SERPL-ACNC: 10.5 SEC — SIGNIFICANT CHANGE UP (ref 9.9–13.4)
RBC # BLD: 2.47 M/UL — LOW (ref 4.2–5.8)
RBC # FLD: 18.6 % — HIGH (ref 10.3–14.5)
SODIUM SERPL-SCNC: 140 MMOL/L — SIGNIFICANT CHANGE UP (ref 135–145)
SPECIMEN SOURCE: SIGNIFICANT CHANGE UP
SPECIMEN SOURCE: SIGNIFICANT CHANGE UP
TACROLIMUS SERPL-MCNC: 3.1 NG/ML — SIGNIFICANT CHANGE UP
WBC # BLD: 4.11 K/UL — SIGNIFICANT CHANGE UP (ref 3.8–10.5)
WBC # FLD AUTO: 4.11 K/UL — SIGNIFICANT CHANGE UP (ref 3.8–10.5)

## 2025-03-23 PROCEDURE — 71045 X-RAY EXAM CHEST 1 VIEW: CPT | Mod: 26

## 2025-03-23 RX ORDER — CEFTRIAXONE 500 MG/1
2000 INJECTION, POWDER, FOR SOLUTION INTRAMUSCULAR; INTRAVENOUS EVERY 24 HOURS
Refills: 0 | Status: COMPLETED | OUTPATIENT
Start: 2025-03-24 | End: 2025-03-30

## 2025-03-23 RX ORDER — TACROLIMUS 0.5 MG/1
4 CAPSULE ORAL
Refills: 0 | Status: DISCONTINUED | OUTPATIENT
Start: 2025-03-23 | End: 2025-03-24

## 2025-03-23 RX ADMIN — TACROLIMUS 4 MILLIGRAM(S): 0.5 CAPSULE ORAL at 08:23

## 2025-03-23 RX ADMIN — MYCOPHENOLIC ACID 360 MILLIGRAM(S): 360 TABLET, DELAYED RELEASE ORAL at 08:23

## 2025-03-23 RX ADMIN — TRAMADOL HYDROCHLORIDE 50 MILLIGRAM(S): 50 TABLET, FILM COATED ORAL at 13:14

## 2025-03-23 RX ADMIN — IPRATROPIUM BROMIDE AND ALBUTEROL SULFATE 3 MILLILITER(S): .5; 2.5 SOLUTION RESPIRATORY (INHALATION) at 12:05

## 2025-03-23 RX ADMIN — PREDNISONE 20 MILLIGRAM(S): 20 TABLET ORAL at 05:14

## 2025-03-23 RX ADMIN — TRAMADOL HYDROCHLORIDE 50 MILLIGRAM(S): 50 TABLET, FILM COATED ORAL at 12:14

## 2025-03-23 RX ADMIN — Medication 81 MILLIGRAM(S): at 12:06

## 2025-03-23 RX ADMIN — CARVEDILOL 3.12 MILLIGRAM(S): 3.12 TABLET, FILM COATED ORAL at 05:14

## 2025-03-23 RX ADMIN — HEPARIN SODIUM 5000 UNIT(S): 1000 INJECTION INTRAVENOUS; SUBCUTANEOUS at 05:15

## 2025-03-23 RX ADMIN — TRAMADOL HYDROCHLORIDE 50 MILLIGRAM(S): 50 TABLET, FILM COATED ORAL at 04:13

## 2025-03-23 RX ADMIN — POLYETHYLENE GLYCOL 3350 17 GRAM(S): 17 POWDER, FOR SOLUTION ORAL at 12:07

## 2025-03-23 RX ADMIN — TRAMADOL HYDROCHLORIDE 50 MILLIGRAM(S): 50 TABLET, FILM COATED ORAL at 03:13

## 2025-03-23 RX ADMIN — INSULIN LISPRO 2: 100 INJECTION, SOLUTION INTRAVENOUS; SUBCUTANEOUS at 12:08

## 2025-03-23 RX ADMIN — MYCOPHENOLIC ACID 360 MILLIGRAM(S): 360 TABLET, DELAYED RELEASE ORAL at 20:22

## 2025-03-23 RX ADMIN — Medication 800 MILLIGRAM(S): at 17:21

## 2025-03-23 RX ADMIN — INSULIN LISPRO 4: 100 INJECTION, SOLUTION INTRAVENOUS; SUBCUTANEOUS at 17:22

## 2025-03-23 RX ADMIN — SODIUM ZIRCONIUM CYCLOSILICATE 10 GRAM(S): 5 POWDER, FOR SUSPENSION ORAL at 12:06

## 2025-03-23 RX ADMIN — TRAMADOL HYDROCHLORIDE 50 MILLIGRAM(S): 50 TABLET, FILM COATED ORAL at 19:08

## 2025-03-23 RX ADMIN — IPRATROPIUM BROMIDE AND ALBUTEROL SULFATE 3 MILLILITER(S): .5; 2.5 SOLUTION RESPIRATORY (INHALATION) at 04:57

## 2025-03-23 RX ADMIN — IPRATROPIUM BROMIDE AND ALBUTEROL SULFATE 3 MILLILITER(S): .5; 2.5 SOLUTION RESPIRATORY (INHALATION) at 17:20

## 2025-03-23 RX ADMIN — ATOVAQUONE 1500 MILLIGRAM(S): 750 SUSPENSION ORAL at 12:06

## 2025-03-23 RX ADMIN — Medication 3 MILLIGRAM(S): at 01:42

## 2025-03-23 RX ADMIN — Medication 1 APPLICATION(S): at 05:14

## 2025-03-23 RX ADMIN — Medication 40 MILLIGRAM(S): at 05:14

## 2025-03-23 RX ADMIN — Medication 100 MILLIGRAM(S): at 12:06

## 2025-03-23 RX ADMIN — VELPATASVIR AND SOFOSBUVIR 1 TABLET(S): 50; 200 TABLET, FILM COATED ORAL at 12:07

## 2025-03-23 RX ADMIN — CARVEDILOL 3.12 MILLIGRAM(S): 3.12 TABLET, FILM COATED ORAL at 17:21

## 2025-03-23 RX ADMIN — TRAMADOL HYDROCHLORIDE 50 MILLIGRAM(S): 50 TABLET, FILM COATED ORAL at 20:08

## 2025-03-23 RX ADMIN — HEPARIN SODIUM 5000 UNIT(S): 1000 INJECTION INTRAVENOUS; SUBCUTANEOUS at 17:22

## 2025-03-23 RX ADMIN — Medication 800 MILLIGRAM(S): at 08:23

## 2025-03-23 RX ADMIN — TACROLIMUS 4 MILLIGRAM(S): 0.5 CAPSULE ORAL at 20:22

## 2025-03-23 RX ADMIN — CEFTRIAXONE 100 MILLIGRAM(S): 500 INJECTION, POWDER, FOR SOLUTION INTRAMUSCULAR; INTRAVENOUS at 08:23

## 2025-03-23 RX ADMIN — VALGANCICLOVIR 450 MILLIGRAM(S): 450 TABLET, FILM COATED ORAL at 12:06

## 2025-03-23 NOTE — PROVIDER CONTACT NOTE (OTHER) - BACKGROUND
44M PMH with PMHx of decompensated ETOH cirrhosis c/b recurrent ascites, grade II esophageal varices, hepatic encephalopathy. Recent admission 1/16 -3/11 for R empyema

## 2025-03-23 NOTE — PROGRESS NOTE ADULT - SUBJECTIVE AND OBJECTIVE BOX
Transplant Surgery - Multidisciplinary Rounds  --------------------------------------------------------------   OLT 02/27/25            Present:   Patient seen and examined with multidisciplinary Transplant team including Surgeon Dr. Dagher, Hepatologist Dr. Burgess,  KAMAR Cooley,  and bedside RN during AM rounds.   Disciplines not in attendance will be notified of the plan.     HPI: 44M PMH with PMHx of decompensated ETOH cirrhosis c/b recurrent ascites, grade II esophageal varices, and hepatic encephalopathy. 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 Admitted with Hyperkalemia and planned Thoracotomy on 3/17/2025    Interval Events:  -Afebrile, VSS  -remaining chest tube placed to suction, minimal    Immunosuppression  Maintenance: FK per level, Myfortic 360 BID, pred 20  Ongoing monitoring for signs of rejection     Potential Discharge date: TBD  Education:  Medications  Plan of care:  See Below        TX DATA HERE        Review of systems  All other systems were reviewed and are negative, except as noted.    PHYSICAL EXAM:  Constitutional: Well developed / well nourished  Eyes: PERRLA  ENMT: nc/at, no thrush  Neck: supple  Respiratory: slight decreased BS on RLL, good airway movement, no adventitious sounds. CT x 1 to suction  Cardiovascular: RRR  Gastrointestinal: Soft abdomen, ND, incision healed   Genitourinary: voiding  Extremities: SCD's in place and working bilaterally  Vascular: Palpable dp pulses bilaterally.   Neurological: A&O x3  Skin: no rashes, ulcerations, lesions  Musculoskeletal: Moving all extremities      Transplant Surgery - Multidisciplinary Rounds  --------------------------------------------------------------   OLT 02/27/25            Present:   Patient seen and examined with multidisciplinary Transplant team including Surgeon Dr. Dagher, Hepatologist Dr. Burgess,  KAMAR Cooley,  and bedside RN during AM rounds.   Disciplines not in attendance will be notified of the plan.     HPI: 44M PMH with PMHx of decompensated ETOH cirrhosis c/b recurrent ascites, grade II esophageal varices, and hepatic encephalopathy. 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 Admitted with Hyperkalemia and planned Thoracotomy on 3/17/2025    Interval Events:  -Afebrile, VSS  -no acute events overnight   -CT to WS this am    Immunosuppression  Maintenance: FK per level, Myfortic 360 BID, pred 20  Ongoing monitoring for signs of rejection     Potential Discharge date: TBD  Education:  Medications  Plan of care:  See Below      MEDICATIONS  (STANDING):  albuterol/ipratropium for Nebulization 3 milliLiter(s) Nebulizer every 6 hours  aspirin enteric coated 81 milliGRAM(s) Oral daily  atovaquone  Suspension 1500 milliGRAM(s) Oral daily  carvedilol 3.125 milliGRAM(s) Oral every 12 hours  chlorhexidine 2% Cloths 1 Application(s) Topical <User Schedule>  heparin   Injectable 5000 Unit(s) SubCutaneous every 12 hours  insulin glargine Injectable (LANTUS) 6 Unit(s) SubCutaneous at bedtime  insulin lispro (ADMELOG) corrective regimen sliding scale   SubCutaneous three times a day before meals  insulin lispro (ADMELOG) corrective regimen sliding scale   SubCutaneous at bedtime  magnesium oxide 800 milliGRAM(s) Oral two times a day with meals  mycophenolic acid  milliGRAM(s) Oral <User Schedule>  pantoprazole    Tablet 40 milliGRAM(s) Oral before breakfast  polyethylene glycol 3350 17 Gram(s) Oral daily  predniSONE   Tablet 20 milliGRAM(s) Oral daily  senna 2 Tablet(s) Oral at bedtime  sodium zirconium cyclosilicate 10 Gram(s) Oral daily  sofosbuvir 400 mG/velpatasvir 100 mG (EPCLUSA) 1 Tablet(s) Oral daily  tacrolimus 4 milliGRAM(s) Oral <User Schedule>  thiamine 100 milliGRAM(s) Oral daily  valGANciclovir 450 milliGRAM(s) Oral daily    MEDICATIONS  (PRN):  albuterol    90 MICROgram(s) HFA Inhaler 1 Puff(s) Inhalation every 6 hours PRN Shortness of Breath and/or Wheezing  melatonin 3 milliGRAM(s) Oral at bedtime PRN Insomnia  sodium chloride 0.65% Nasal 1 Spray(s) Both Nostrils two times a day PRN Nasal Congestion  traMADol 50 milliGRAM(s) Oral every 6 hours PRN Severe Pain (7 - 10)      PAST MEDICAL & SURGICAL HISTORY:  Sleep apnea, obstructive      Alcohol abuse      Cirrhosis of liver      Portal hypertension      H/O esophageal varices      Anemia      History of alcohol use disorder      Hepatic encephalopathy      Pleural effusion      History of pleural empyema      Liver transplanted      S/P abdominal paracentesis      History of lung surgery          Vital Signs Last 24 Hrs  T(C): 36.7 (23 Mar 2025 12:00), Max: 36.8 (23 Mar 2025 04:55)  T(F): 98.1 (23 Mar 2025 12:00), Max: 98.3 (23 Mar 2025 04:55)  HR: 73 (23 Mar 2025 12:00) (73 - 96)  BP: 143/81 (23 Mar 2025 12:00) (120/69 - 143/81)  BP(mean): --  RR: 20 (23 Mar 2025 12:00) (18 - 20)  SpO2: 99% (23 Mar 2025 12:00) (96% - 100%)    Parameters below as of 23 Mar 2025 12:00  Patient On (Oxygen Delivery Method): room air        I&O's Summary    22 Mar 2025 07:01  -  23 Mar 2025 07:00  --------------------------------------------------------  IN: 1400 mL / OUT: 2560 mL / NET: -1160 mL    23 Mar 2025 07:01  -  23 Mar 2025 14:21  --------------------------------------------------------  IN: 680 mL / OUT: 975 mL / NET: -295 mL                              7.6    4.11  )-----------( 205      ( 23 Mar 2025 07:06 )             23.5     03-23    140  |  104  |  36[H]  ----------------------------<  141[H]  4.5   |  22  |  0.83    Ca    9.1      23 Mar 2025 07:04  Phos  3.2     03-23  Mg     1.7     03-23    TPro  5.7[L]  /  Alb  2.8[L]  /  TBili  0.4  /  DBili  x   /  AST  6[L]  /  ALT  <5[L]  /  AlkPhos  66  03-23    Tacrolimus (), Serum: 3.1 ng/mL (03-23 @ 07:08)        Culture - Acid Fast - Body Fluid w/Smear (collected 03-17-25 @ 20:33)  Source: Body Fluid Right Pleura effusion  Preliminary Report (03-19-25 @ 23:23):    Culture is being performed.    Culture - Fungal, Body Fluid (collected 03-17-25 @ 20:33)  Source: Body Fluid Right Pleura effusion  Preliminary Report (03-20-25 @ 09:44):    No growth    Culture - Body Fluid with Gram Stain (collected 03-17-25 @ 20:33)  Source: Body Fluid Right Pleura effusion  Gram Stain (03-18-25 @ 03:20):    polymorphonuclear leukocytes seen    No organisms seen    by cytocentrifuge  Final Report (03-22-25 @ 22:52):    No growth at 5 days    Culture - Acid Fast - Tissue w/Smear (collected 03-17-25 @ 20:33)  Source: Tissue Right Lower Lobe Peel  Preliminary Report (03-19-25 @ 23:23):    Culture is being performed.    Culture - Fungal, Tissue (collected 03-17-25 @ 20:33)  Source: Tissue Right Lower Lobe Peel  Preliminary Report (03-20-25 @ 09:44):    No growth    Culture - Tissue with Gram Stain (collected 03-17-25 @ 20:33)  Source: Tissue Right Lower Lobe Peel  Gram Stain (03-18-25 @ 06:36):    No polymorphonuclear cells seen per low power field    No organisms seen per oil power field  Final Report (03-23-25 @ 08:10):    No growth at 5 days    Culture - Acid Fast - Tissue w/Smear (collected 03-17-25 @ 20:33)  Source: Tissue Upper Lobe Peel  Preliminary Report (03-19-25 @ 23:23):    Culture is being performed.    Culture - Fungal, Tissue (collected 03-17-25 @ 20:33)  Source: Tissue Upper Lobe Peel  Preliminary Report (03-20-25 @ 09:44):    No growth    Culture - Tissue with Gram Stain (collected 03-17-25 @ 20:33)  Source: Tissue Upper Lobe Peel  Gram Stain (03-18-25 @ 06:30):    No polymorphonuclear cells seen per low power field    No organisms seen per oil power field  Final Report (03-22-25 @ 22:05):    No growth at 5 days    Culture - Acid Fast - Tissue w/Smear (collected 03-17-25 @ 20:33)  Source: Tissue Middle Lobe Peel  Preliminary Report (03-19-25 @ 23:23):    Culture is being performed.    Culture - Fungal, Tissue (collected 03-17-25 @ 20:33)  Source: Tissue Middle Lobe Peel  Preliminary Report (03-20-25 @ 10:58):    No growth    Culture - Tissue with Gram Stain (collected 03-17-25 @ 20:33)  Source: Tissue Middle Lobe Peel  Gram Stain (03-18-25 @ 06:31):    No polymorphonuclear cells seen per low power field    No organisms seen per oil power field  Final Report (03-22-25 @ 22:05):    No growth at 5 days    Culture - Acid Fast - Tissue w/Smear (collected 03-17-25 @ 20:33)  Source: Tissue Right Pleural Peel  Preliminary Report (03-19-25 @ 23:23):    Culture is being performed.    Culture - Fungal, Tissue (collected 03-17-25 @ 20:33)  Source: Tissue Right Pleural Peel  Preliminary Report (03-20-25 @ 10:58):    No growth    Culture - Tissue with Gram Stain (collected 03-17-25 @ 20:33)  Source: Tissue Right Pleural Peel  Gram Stain (03-18-25 @ 06:35):    No polymorphonuclear cells seen per low power field    No organisms seen per oil power field  Final Report (03-23-25 @ 07:34):    No growth at 5 days      Review of systems  All other systems were reviewed and are negative, except as noted.    PHYSICAL EXAM:  Constitutional: Well developed / well nourished  Eyes: PERRLA  ENMT: nc/at, no thrush  Neck: supple  Respiratory: slight decreased BS on RLL, good airway movement, no adventitious sounds. CT x 1 to suction  Cardiovascular: RRR  Gastrointestinal: Soft abdomen, ND, incision healed   Genitourinary: voiding  Extremities: SCD's in place and working bilaterally  Vascular: Palpable dp pulses bilaterally.   Neurological: A&O x3  Skin: no rashes, ulcerations, lesions  Musculoskeletal: Moving all extremities

## 2025-03-23 NOTE — PROGRESS NOTE ADULT - ASSESSMENT
44M PMH with PMHx of decompensated ETOH cirrhosis c/b recurrent ascites, grade II esophageal varices, and hepatic encephalopathy. 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  S/P Thoracotomy on 3/17/2025    [ ] s/p HCV + DCD OLT   - good graft function  - HCV viremia/ Genotype 1A (donor hcv +) - on Epclusa since 3/1   - SQH/ ASA    [] Immuno  - FK per level,  Myfortic 360BID, Pred 20QD  - ppx: no bactrim due to sulfa allergy -> Mepron, valcyte, oscal, PPI    [] h/o R Empyema   - s/p VATS POD#5 for decortication of right lung [3/17]   - anterior CT removed 3/21  - posterior CT to suction, PTX, check daily CXRs  - continue CTX   - Thoracic Sx following     44M PMH with PMHx of decompensated ETOH cirrhosis c/b recurrent ascites, grade II esophageal varices, and hepatic encephalopathy. 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  S/P Thoracotomy on 3/17/2025    [ ] s/p HCV + DCD OLT   - good graft function  - HCV viremia/ Genotype 1A (donor hcv +) - on Epclusa since 3/1   - SQH/ ASA    [] Immuno  - FK per level,  Myfortic 360BID, Pred 20QD  - ppx: no bactrim due to sulfa allergy -> Mepron, valcyte, oscal, PPI    [] h/o R Empyema   - s/p VATS POD#5 for decortication of right lung [3/17]   - anterior CT removed 3/21  - posterior CT to WS check daily CXRs  - continue CTX, end date 3/31   - Thoracic Sx following

## 2025-03-23 NOTE — PROGRESS NOTE ADULT - SUBJECTIVE AND OBJECTIVE BOX
Patient is a 44y old  Male who presents with a chief complaint of hyperkalemia (23 Mar 2025 03:23)      Vital Signs Last 24 Hrs  T(C): 36.8 (25 @ 09:13), Max: 36.9 (25 @ 12:28)  T(F): 98.3 (25 @ 09:13), Max: 98.5 (25 @ 12:28)  HR: 85 (25 @ 09:13) (77 - 96)  BP: 120/70 (25 @ 09:13) (120/69 - 143/70)  RR: 20 (25 @ 09:13) (18 - 20)  SpO2: 98% (25 @ 09:13) (96% - 100%)              25 @ 07:01  -  25 @ 07:00  --------------------------------------------------------  IN: 1400 mL / OUT: 2560 mL / NET: -1160 mL      Daily Weight in k.6 (23 Mar 2025 04:55)                          7.6    4.11  )-----------( 205      ( 23 Mar 2025 07:06 )             23.5     140  |  104  |  36[H]  ----------------------------<  141[H]  4.5   |  22  |  0.83    Phos  3.2       Mg     1.7     23  AST  6[L]  /  ALT  <5[L]  /  AlkPhos  66  0323          PHYSICAL EXAM  Neurology: A&Ox3, NAD  CV : RRR+S1S2  Lungs: Respirations non-labored, B/L BS CTA     +Right posterior chest tube to LWS with serosanguinous drainage, no air leak  +SQ air  Abdomen: Soft, NT/ND, +BSx4Q  Extremities: B/L LE warm, no edema, +PP             MEDICATIONS  albuterol    90 MICROgram(s) HFA Inhaler 1 Puff(s) Inhalation every 6 hours PRN  albuterol/ipratropium for Nebulization 3 milliLiter(s) Nebulizer every 6 hours  aspirin enteric coated 81 milliGRAM(s) Oral daily  atovaquone  Suspension 1500 milliGRAM(s) Oral daily  carvedilol 3.125 milliGRAM(s) Oral every 12 hours  chlorhexidine 2% Cloths 1 Application(s) Topical <User Schedule>  heparin   Injectable 5000 Unit(s) SubCutaneous every 12 hours  insulin glargine Injectable (LANTUS) 6 Unit(s) SubCutaneous at bedtime  insulin lispro (ADMELOG) corrective regimen sliding scale   SubCutaneous three times a day before meals  insulin lispro (ADMELOG) corrective regimen sliding scale   SubCutaneous at bedtime  magnesium oxide 800 milliGRAM(s) Oral two times a day with meals  melatonin 3 milliGRAM(s) Oral at bedtime PRN  mycophenolic acid  milliGRAM(s) Oral <User Schedule>  pantoprazole    Tablet 40 milliGRAM(s) Oral before breakfast  polyethylene glycol 3350 17 Gram(s) Oral daily  predniSONE   Tablet 20 milliGRAM(s) Oral daily  senna 2 Tablet(s) Oral at bedtime  sodium chloride 0.65% Nasal 1 Spray(s) Both Nostrils two times a day PRN  sodium zirconium cyclosilicate 10 Gram(s) Oral daily  sofosbuvir 400 mG/velpatasvir 100 mG (EPCLUSA) 1 Tablet(s) Oral daily  tacrolimus 4 milliGRAM(s) Oral <User Schedule>  tacrolimus 3 milliGRAM(s) Oral <User Schedule>  thiamine 100 milliGRAM(s) Oral daily  traMADol 50 milliGRAM(s) Oral every 6 hours PRN  valGANciclovir 450 milliGRAM(s) Oral daily

## 2025-03-23 NOTE — PROGRESS NOTE ADULT - PROBLEM SELECTOR PLAN 1
3/17 - s/p VATS for decortication of right lung  3/21 CT # 2 posterior CT to remain to suction.  CT # 1 anterior - clamped, obtain cxr this afternoon - Anterior chest tube removed  3/23 Right Posterior CT placed to water seal, rpt cxr this afternoon.   OOB to chair   incentive spirometry   Daily CXR  care as per primary team

## 2025-03-23 NOTE — PROGRESS NOTE ADULT - ASSESSMENT
44M PMH with PMHx of decompensated ETOH cirrhosis c/b recurrent ascites, grade II esophageal varices, hepatic encephalopathy. 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 Admitted with Hyperkalemia  3/17Bronchoscopy with decortication of right lung using video-assisted thoracoscopic surgery (VATS)   VATS exploration of pleural space, with evacuation of hemothorax. Cortical peel of R lower, middle, and upper lobes collected. Lung re-expanded with contact to chest wall at end of case.  Right empyema fluid, Right middle, upper, and lower lobe peels  3/18 VSS maintain chest  tube lws  3/19 VSS maintain   chest  tube  lws .  water seal in am 3/20 daily xray  OOB to chair   may ambulate with chest tube incentive spirometry   3/20 Chest tubes to waterseal  + airleak posterior tube  CXR 2 pm  3/21 CT # 2 posterior CT to remain to suction.  CT # 1 anterior - clamped, obtain cxr this afternoon. Addendum: Anterior CT removed, pending CXR.   3/22 VSS Posterior CT - to be placed to water seal, rpt cxr this afternoon. Addendum: CXR with slight increase in size in right pnx. CT placed back to suction.   3/23 VSS, Posterior chest tube output 60cc/24h, no air leak, chest tube placed to water seal, check repeat CXR in 4 hrs.

## 2025-03-24 LAB
ALBUMIN SERPL ELPH-MCNC: 2.8 G/DL — LOW (ref 3.3–5)
ALP SERPL-CCNC: 68 U/L — SIGNIFICANT CHANGE UP (ref 40–120)
ALT FLD-CCNC: <5 U/L — LOW (ref 10–45)
ANION GAP SERPL CALC-SCNC: 11 MMOL/L — SIGNIFICANT CHANGE UP (ref 5–17)
APTT BLD: 27.8 SEC — SIGNIFICANT CHANGE UP (ref 24.5–35.6)
AST SERPL-CCNC: 6 U/L — LOW (ref 10–40)
BILIRUB SERPL-MCNC: 0.4 MG/DL — SIGNIFICANT CHANGE UP (ref 0.2–1.2)
BUN SERPL-MCNC: 34 MG/DL — HIGH (ref 7–23)
CALCIUM SERPL-MCNC: 8.9 MG/DL — SIGNIFICANT CHANGE UP (ref 8.4–10.5)
CHLORIDE SERPL-SCNC: 107 MMOL/L — SIGNIFICANT CHANGE UP (ref 96–108)
CO2 SERPL-SCNC: 22 MMOL/L — SIGNIFICANT CHANGE UP (ref 22–31)
CREAT SERPL-MCNC: 0.84 MG/DL — SIGNIFICANT CHANGE UP (ref 0.5–1.3)
EGFR: 109 ML/MIN/1.73M2 — SIGNIFICANT CHANGE UP
EGFR: 109 ML/MIN/1.73M2 — SIGNIFICANT CHANGE UP
GLUCOSE BLDC GLUCOMTR-MCNC: 121 MG/DL — HIGH (ref 70–99)
GLUCOSE BLDC GLUCOMTR-MCNC: 148 MG/DL — HIGH (ref 70–99)
GLUCOSE BLDC GLUCOMTR-MCNC: 158 MG/DL — HIGH (ref 70–99)
GLUCOSE BLDC GLUCOMTR-MCNC: 231 MG/DL — HIGH (ref 70–99)
GLUCOSE SERPL-MCNC: 161 MG/DL — HIGH (ref 70–99)
HCT VFR BLD CALC: 23.7 % — LOW (ref 39–50)
HGB BLD-MCNC: 7.7 G/DL — LOW (ref 13–17)
INR BLD: 0.89 RATIO — SIGNIFICANT CHANGE UP (ref 0.85–1.16)
MAGNESIUM SERPL-MCNC: 1.7 MG/DL — SIGNIFICANT CHANGE UP (ref 1.6–2.6)
MCHC RBC-ENTMCNC: 30.9 PG — SIGNIFICANT CHANGE UP (ref 27–34)
MCHC RBC-ENTMCNC: 32.5 G/DL — SIGNIFICANT CHANGE UP (ref 32–36)
MCV RBC AUTO: 95.2 FL — SIGNIFICANT CHANGE UP (ref 80–100)
NRBC BLD AUTO-RTO: 0 /100 WBCS — SIGNIFICANT CHANGE UP (ref 0–0)
PHOSPHATE SERPL-MCNC: 3.3 MG/DL — SIGNIFICANT CHANGE UP (ref 2.5–4.5)
PLATELET # BLD AUTO: 199 K/UL — SIGNIFICANT CHANGE UP (ref 150–400)
POTASSIUM SERPL-MCNC: 4.4 MMOL/L — SIGNIFICANT CHANGE UP (ref 3.5–5.3)
POTASSIUM SERPL-SCNC: 4.4 MMOL/L — SIGNIFICANT CHANGE UP (ref 3.5–5.3)
PROT SERPL-MCNC: 5.8 G/DL — LOW (ref 6–8.3)
PROTHROM AB SERPL-ACNC: 10.2 SEC — SIGNIFICANT CHANGE UP (ref 9.9–13.4)
RBC # BLD: 2.49 M/UL — LOW (ref 4.2–5.8)
RBC # FLD: 19 % — HIGH (ref 10.3–14.5)
SODIUM SERPL-SCNC: 140 MMOL/L — SIGNIFICANT CHANGE UP (ref 135–145)
TACROLIMUS SERPL-MCNC: 3.6 NG/ML — SIGNIFICANT CHANGE UP
WBC # BLD: 4.12 K/UL — SIGNIFICANT CHANGE UP (ref 3.8–10.5)
WBC # FLD AUTO: 4.12 K/UL — SIGNIFICANT CHANGE UP (ref 3.8–10.5)

## 2025-03-24 PROCEDURE — 71045 X-RAY EXAM CHEST 1 VIEW: CPT | Mod: 26

## 2025-03-24 RX ORDER — TACROLIMUS 0.5 MG/1
5 CAPSULE ORAL
Refills: 0 | Status: DISCONTINUED | OUTPATIENT
Start: 2025-03-25 | End: 2025-03-25

## 2025-03-24 RX ORDER — MAGNESIUM SULFATE 500 MG/ML
2 SYRINGE (ML) INJECTION ONCE
Refills: 0 | Status: COMPLETED | OUTPATIENT
Start: 2025-03-24 | End: 2025-03-24

## 2025-03-24 RX ORDER — TACROLIMUS 0.5 MG/1
4 CAPSULE ORAL
Refills: 0 | Status: DISCONTINUED | OUTPATIENT
Start: 2025-03-24 | End: 2025-03-25

## 2025-03-24 RX ORDER — PREDNISONE 20 MG/1
15 TABLET ORAL DAILY
Refills: 0 | Status: DISCONTINUED | OUTPATIENT
Start: 2025-03-25 | End: 2025-03-25

## 2025-03-24 RX ORDER — TACROLIMUS 0.5 MG/1
1 CAPSULE ORAL ONCE
Refills: 0 | Status: COMPLETED | OUTPATIENT
Start: 2025-03-24 | End: 2025-03-24

## 2025-03-24 RX ADMIN — CARVEDILOL 3.12 MILLIGRAM(S): 3.12 TABLET, FILM COATED ORAL at 17:57

## 2025-03-24 RX ADMIN — IPRATROPIUM BROMIDE AND ALBUTEROL SULFATE 3 MILLILITER(S): .5; 2.5 SOLUTION RESPIRATORY (INHALATION) at 00:01

## 2025-03-24 RX ADMIN — Medication 25 GRAM(S): at 11:56

## 2025-03-24 RX ADMIN — TRAMADOL HYDROCHLORIDE 50 MILLIGRAM(S): 50 TABLET, FILM COATED ORAL at 22:55

## 2025-03-24 RX ADMIN — IPRATROPIUM BROMIDE AND ALBUTEROL SULFATE 3 MILLILITER(S): .5; 2.5 SOLUTION RESPIRATORY (INHALATION) at 11:57

## 2025-03-24 RX ADMIN — Medication 3 MILLIGRAM(S): at 02:20

## 2025-03-24 RX ADMIN — CEFTRIAXONE 100 MILLIGRAM(S): 500 INJECTION, POWDER, FOR SOLUTION INTRAMUSCULAR; INTRAVENOUS at 08:55

## 2025-03-24 RX ADMIN — INSULIN GLARGINE-YFGN 6 UNIT(S): 100 INJECTION, SOLUTION SUBCUTANEOUS at 21:48

## 2025-03-24 RX ADMIN — HEPARIN SODIUM 5000 UNIT(S): 1000 INJECTION INTRAVENOUS; SUBCUTANEOUS at 17:55

## 2025-03-24 RX ADMIN — TACROLIMUS 4 MILLIGRAM(S): 0.5 CAPSULE ORAL at 20:01

## 2025-03-24 RX ADMIN — CARVEDILOL 3.12 MILLIGRAM(S): 3.12 TABLET, FILM COATED ORAL at 06:31

## 2025-03-24 RX ADMIN — POLYETHYLENE GLYCOL 3350 17 GRAM(S): 17 POWDER, FOR SOLUTION ORAL at 11:56

## 2025-03-24 RX ADMIN — MYCOPHENOLIC ACID 360 MILLIGRAM(S): 360 TABLET, DELAYED RELEASE ORAL at 08:56

## 2025-03-24 RX ADMIN — MYCOPHENOLIC ACID 360 MILLIGRAM(S): 360 TABLET, DELAYED RELEASE ORAL at 20:02

## 2025-03-24 RX ADMIN — INSULIN LISPRO 4: 100 INJECTION, SOLUTION INTRAVENOUS; SUBCUTANEOUS at 11:57

## 2025-03-24 RX ADMIN — INSULIN GLARGINE-YFGN 6 UNIT(S): 100 INJECTION, SOLUTION SUBCUTANEOUS at 00:00

## 2025-03-24 RX ADMIN — TRAMADOL HYDROCHLORIDE 50 MILLIGRAM(S): 50 TABLET, FILM COATED ORAL at 14:42

## 2025-03-24 RX ADMIN — Medication 800 MILLIGRAM(S): at 17:55

## 2025-03-24 RX ADMIN — TACROLIMUS 1 MILLIGRAM(S): 0.5 CAPSULE ORAL at 11:56

## 2025-03-24 RX ADMIN — TRAMADOL HYDROCHLORIDE 50 MILLIGRAM(S): 50 TABLET, FILM COATED ORAL at 15:42

## 2025-03-24 RX ADMIN — VALGANCICLOVIR 450 MILLIGRAM(S): 450 TABLET, FILM COATED ORAL at 11:56

## 2025-03-24 RX ADMIN — Medication 1 APPLICATION(S): at 06:31

## 2025-03-24 RX ADMIN — PREDNISONE 20 MILLIGRAM(S): 20 TABLET ORAL at 06:31

## 2025-03-24 RX ADMIN — HEPARIN SODIUM 5000 UNIT(S): 1000 INJECTION INTRAVENOUS; SUBCUTANEOUS at 06:32

## 2025-03-24 RX ADMIN — Medication 40 MILLIGRAM(S): at 06:32

## 2025-03-24 RX ADMIN — INSULIN LISPRO 2: 100 INJECTION, SOLUTION INTRAVENOUS; SUBCUTANEOUS at 17:56

## 2025-03-24 RX ADMIN — IPRATROPIUM BROMIDE AND ALBUTEROL SULFATE 3 MILLILITER(S): .5; 2.5 SOLUTION RESPIRATORY (INHALATION) at 17:55

## 2025-03-24 RX ADMIN — TRAMADOL HYDROCHLORIDE 50 MILLIGRAM(S): 50 TABLET, FILM COATED ORAL at 21:55

## 2025-03-24 RX ADMIN — TRAMADOL HYDROCHLORIDE 50 MILLIGRAM(S): 50 TABLET, FILM COATED ORAL at 02:20

## 2025-03-24 RX ADMIN — TRAMADOL HYDROCHLORIDE 50 MILLIGRAM(S): 50 TABLET, FILM COATED ORAL at 03:20

## 2025-03-24 RX ADMIN — ATOVAQUONE 1500 MILLIGRAM(S): 750 SUSPENSION ORAL at 11:56

## 2025-03-24 RX ADMIN — VELPATASVIR AND SOFOSBUVIR 1 TABLET(S): 50; 200 TABLET, FILM COATED ORAL at 11:58

## 2025-03-24 RX ADMIN — TACROLIMUS 4 MILLIGRAM(S): 0.5 CAPSULE ORAL at 08:56

## 2025-03-24 RX ADMIN — Medication 81 MILLIGRAM(S): at 11:56

## 2025-03-24 RX ADMIN — Medication 100 MILLIGRAM(S): at 11:56

## 2025-03-24 RX ADMIN — Medication 800 MILLIGRAM(S): at 08:56

## 2025-03-24 NOTE — PROGRESS NOTE ADULT - SUBJECTIVE AND OBJECTIVE BOX
Transplant Surgery - Multidisciplinary Rounds  --------------------------------------------------------------   OLT 02/27/25            Present:   Patient seen and examined with multidisciplinary Transplant team including Surgeon Dr. Callaway Hepatologist Dr. Burgess,  ESTRELLA Sanchez/Nicholas, Pharmacist: Yasmin and bedside RN during AM rounds.   Disciplines not in attendance will be notified of the plan.     HPI: 44M PMH with PMHx of decompensated ETOH cirrhosis c/b recurrent ascites, grade II esophageal varices, and hepatic encephalopathy. 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 Admitted with Hyperkalemia and planned Thoracotomy on 3/17/2025    Interval Events:  -Afebrile, VSS  -no acute events overnight   -CT to WS     Immunosuppression  Maintenance: FK per level, Myfortic 360 BID, pred 20  Ongoing monitoring for signs of rejection     Potential Discharge date: TBD  Education:  Medications  Plan of care:  See Below        tx data              Review of systems  All other systems were reviewed and are negative, except as noted.    PHYSICAL EXAM:  Constitutional: Well developed / well nourished  Eyes: PERRLA  ENMT: nc/at, no thrush  Neck: supple  Respiratory: slight decreased BS on RLL, good airway movement, no adventitious sounds. CT x 1 to WS  Cardiovascular: RRR  Gastrointestinal: Soft abdomen, ND, incision healed   Genitourinary: voiding  Extremities: SCD's in place and working bilaterally  Vascular: Palpable dp pulses bilaterally.   Neurological: A&O x3  Skin: no rashes, ulcerations, lesions  Musculoskeletal: Moving all extremities      Transplant Surgery - Multidisciplinary Rounds  --------------------------------------------------------------   OLT 02/27/25            Present:   Patient seen and examined with multidisciplinary Transplant team including Surgeon Dr. Callaway Hepatologist Dr. Burgess,  ESTRELLA Sanchez/Nicholas, Pharmacist: David and bedside RN during AM rounds.   Disciplines not in attendance will be notified of the plan.     HPI: 44M PMH with PMHx of decompensated ETOH cirrhosis c/b recurrent ascites, grade II esophageal varices, and hepatic encephalopathy. 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 Admitted with Hyperkalemia and planned Thoracotomy on 3/17/2025    Interval Events:  -Afebrile, VSS  -no acute events overnight   -CT to WS     Immunosuppression  Maintenance: FK per level, Myfortic 360 BID, pred 20  Ongoing monitoring for signs of rejection     Potential Discharge date: TBD  Education:  Medications  Plan of care:  See Below          MEDICATIONS  (STANDING):  albuterol/ipratropium for Nebulization 3 milliLiter(s) Nebulizer every 6 hours  aspirin enteric coated 81 milliGRAM(s) Oral daily  atovaquone  Suspension 1500 milliGRAM(s) Oral daily  carvedilol 3.125 milliGRAM(s) Oral every 12 hours  cefTRIAXone   IVPB 2000 milliGRAM(s) IV Intermittent every 24 hours  chlorhexidine 2% Cloths 1 Application(s) Topical <User Schedule>  heparin   Injectable 5000 Unit(s) SubCutaneous every 12 hours  insulin glargine Injectable (LANTUS) 6 Unit(s) SubCutaneous at bedtime  insulin lispro (ADMELOG) corrective regimen sliding scale   SubCutaneous three times a day before meals  insulin lispro (ADMELOG) corrective regimen sliding scale   SubCutaneous at bedtime  magnesium oxide 800 milliGRAM(s) Oral two times a day with meals  mycophenolic acid  milliGRAM(s) Oral <User Schedule>  pantoprazole    Tablet 40 milliGRAM(s) Oral before breakfast  polyethylene glycol 3350 17 Gram(s) Oral daily  senna 2 Tablet(s) Oral at bedtime  sofosbuvir 400 mG/velpatasvir 100 mG (EPCLUSA) 1 Tablet(s) Oral daily  tacrolimus 4 milliGRAM(s) Oral <User Schedule>  thiamine 100 milliGRAM(s) Oral daily  valGANciclovir 450 milliGRAM(s) Oral daily    MEDICATIONS  (PRN):  albuterol    90 MICROgram(s) HFA Inhaler 1 Puff(s) Inhalation every 6 hours PRN Shortness of Breath and/or Wheezing  melatonin 3 milliGRAM(s) Oral at bedtime PRN Insomnia  sodium chloride 0.65% Nasal 1 Spray(s) Both Nostrils two times a day PRN Nasal Congestion  traMADol 50 milliGRAM(s) Oral every 6 hours PRN Severe Pain (7 - 10)      PAST MEDICAL & SURGICAL HISTORY:  Sleep apnea, obstructive      Alcohol abuse      Cirrhosis of liver      Portal hypertension      H/O esophageal varices      Anemia      History of alcohol use disorder      Hepatic encephalopathy      Pleural effusion      History of pleural empyema      Liver transplanted      S/P abdominal paracentesis      History of lung surgery          Vital Signs Last 24 Hrs  T(C): 36.7 (24 Mar 2025 13:00), Max: 36.8 (23 Mar 2025 21:00)  T(F): 98.1 (24 Mar 2025 13:00), Max: 98.3 (23 Mar 2025 21:00)  HR: 97 (24 Mar 2025 13:00) (72 - 97)  BP: 131/79 (24 Mar 2025 13:00) (122/67 - 132/74)  BP(mean): --  RR: 20 (24 Mar 2025 13:00) (18 - 20)  SpO2: 99% (24 Mar 2025 13:00) (97% - 100%)    Parameters below as of 24 Mar 2025 13:00  Patient On (Oxygen Delivery Method): room air        I&O's Summary    23 Mar 2025 07:01  -  24 Mar 2025 07:00  --------------------------------------------------------  IN: 1380 mL / OUT: 2255 mL / NET: -875 mL    24 Mar 2025 07:01  -  24 Mar 2025 13:56  --------------------------------------------------------  IN: 420 mL / OUT: 560 mL / NET: -140 mL                              7.7    4.12  )-----------( 199      ( 24 Mar 2025 07:14 )             23.7     03-24    140  |  107  |  34[H]  ----------------------------<  161[H]  4.4   |  22  |  0.84    Ca    8.9      24 Mar 2025 07:11  Phos  3.3     03-24  Mg     1.7     03-24    TPro  5.8[L]  /  Alb  2.8[L]  /  TBili  0.4  /  DBili  x   /  AST  6[L]  /  ALT  <5[L]  /  AlkPhos  68  03-24    Tacrolimus (), Serum: 3.6 ng/mL (03-24 @ 07:14)        Culture - Acid Fast - Body Fluid w/Smear (collected 03-17-25 @ 20:33)  Source: Body Fluid Right Pleura effusion  Preliminary Report (03-19-25 @ 23:23):    Culture is being performed.    Culture - Fungal, Body Fluid (collected 03-17-25 @ 20:33)  Source: Body Fluid Right Pleura effusion  Preliminary Report (03-20-25 @ 09:44):    No growth    Culture - Body Fluid with Gram Stain (collected 03-17-25 @ 20:33)  Source: Body Fluid Right Pleura effusion  Gram Stain (03-18-25 @ 03:20):    polymorphonuclear leukocytes seen    No organisms seen    by cytocentrifuge  Final Report (03-22-25 @ 22:52):    No growth at 5 days    Culture - Acid Fast - Tissue w/Smear (collected 03-17-25 @ 20:33)  Source: Tissue Right Lower Lobe Peel  Preliminary Report (03-19-25 @ 23:23):    Culture is being performed.    Culture - Fungal, Tissue (collected 03-17-25 @ 20:33)  Source: Tissue Right Lower Lobe Peel  Preliminary Report (03-20-25 @ 09:44):    No growth    Culture - Tissue with Gram Stain (collected 03-17-25 @ 20:33)  Source: Tissue Right Lower Lobe Peel  Gram Stain (03-18-25 @ 06:36):    No polymorphonuclear cells seen per low power field    No organisms seen per oil power field  Final Report (03-23-25 @ 08:10):    No growth at 5 days    Culture - Acid Fast - Tissue w/Smear (collected 03-17-25 @ 20:33)  Source: Tissue Upper Lobe Peel  Preliminary Report (03-19-25 @ 23:23):    Culture is being performed.    Culture - Fungal, Tissue (collected 03-17-25 @ 20:33)  Source: Tissue Upper Lobe Peel  Preliminary Report (03-20-25 @ 09:44):    No growth    Culture - Tissue with Gram Stain (collected 03-17-25 @ 20:33)  Source: Tissue Upper Lobe Peel  Gram Stain (03-18-25 @ 06:30):    No polymorphonuclear cells seen per low power field    No organisms seen per oil power field  Final Report (03-22-25 @ 22:05):    No growth at 5 days    Culture - Acid Fast - Tissue w/Smear (collected 03-17-25 @ 20:33)  Source: Tissue Middle Lobe Peel  Preliminary Report (03-19-25 @ 23:23):    Culture is being performed.    Culture - Fungal, Tissue (collected 03-17-25 @ 20:33)  Source: Tissue Middle Lobe Peel  Preliminary Report (03-20-25 @ 10:58):    No growth    Culture - Tissue with Gram Stain (collected 03-17-25 @ 20:33)  Source: Tissue Middle Lobe Peel  Gram Stain (03-18-25 @ 06:31):    No polymorphonuclear cells seen per low power field    No organisms seen per oil power field  Final Report (03-22-25 @ 22:05):    No growth at 5 days    Culture - Acid Fast - Tissue w/Smear (collected 03-17-25 @ 20:33)  Source: Tissue Right Pleural Peel  Preliminary Report (03-19-25 @ 23:23):    Culture is being performed.    Culture - Fungal, Tissue (collected 03-17-25 @ 20:33)  Source: Tissue Right Pleural Peel  Preliminary Report (03-20-25 @ 10:58):    No growth    Culture - Tissue with Gram Stain (collected 03-17-25 @ 20:33)  Source: Tissue Right Pleural Peel  Gram Stain (03-18-25 @ 06:35):    No polymorphonuclear cells seen per low power field    No organisms seen per oil power field  Final Report (03-23-25 @ 07:34):    No growth at 5 days          Review of systems  All other systems were reviewed and are negative, except as noted.    PHYSICAL EXAM:  Constitutional: Well developed / well nourished  Eyes: PERRLA  ENMT: nc/at, no thrush  Neck: supple  Respiratory: slight decreased BS on RLL, good airway movement, no adventitious sounds. CT x 1 to WS  Cardiovascular: RRR  Gastrointestinal: Soft abdomen, ND, incision healed   Genitourinary: voiding  Extremities: SCD's in place and working bilaterally  Vascular: Palpable dp pulses bilaterally.   Neurological: A&O x3  Skin: no rashes, ulcerations, lesions  Musculoskeletal: Moving all extremities

## 2025-03-24 NOTE — PROGRESS NOTE ADULT - SUBJECTIVE AND OBJECTIVE BOX
Patient is a 44y old  Male who presents with a chief complaint of hyperkalemia (24 Mar 2025 03:36)      Vital Signs Last 24 Hrs  T(C): 36.5 (25 @ 08:00), Max: 36.8 (25 @ 21:00)  T(F): 97.7 (25 @ 08:00), Max: 98.3 (25 @ 21:00)  HR: 80 (25 @ 08:00) (73 - 95)  BP: 126/68 (25 @ 08:00) (122/67 - 143/81)  RR: 18 (25 @ 08:00) (18 - 20)  SpO2: 98% (25 @ 08:00) (97% - 99%)                25 @ 07:01  -  25 @ 07:00  --------------------------------------------------------  IN: 1380 mL / OUT: 2255 mL / NET: -875 mL    25 @ 07:01  -  25 @ 11:26  --------------------------------------------------------  IN: 420 mL / OUT: 560 mL / NET: -140 mL        Daily     Daily Weight in k.3 (24 Mar 2025 06:16)                          7.7    4.12  )-----------( 199      ( 24 Mar 2025 07:14 )             23.7     03-24    140  |  107  |  34[H]  ----------------------------<  161[H]  4.4   |  22  |  0.84    Ca    8.9      24 Mar 2025 07:11  Phos  3.3     -24  Mg     1.7     03-24    TPro  5.8[L]  /  Alb  2.8[L]  /  TBili  0.4  /  DBili  x   /  AST  6[L]  /  ALT  <5[L]  /  AlkPhos  68            PHYSICAL EXAM  Neurology: A&Ox3, NAD, no gross deficits  CV : RRR+S1S2  Lungs: Respirations non-labored, B/L BS  Abdomen: Soft, NT/ND, +BSx4Q  Extremities: B/L LE edema, negative calf tenderness, +PP           +right CT water seal +airleak     MEDICATIONS  albuterol    90 MICROgram(s) HFA Inhaler 1 Puff(s) Inhalation every 6 hours PRN  albuterol/ipratropium for Nebulization 3 milliLiter(s) Nebulizer every 6 hours  aspirin enteric coated 81 milliGRAM(s) Oral daily  atovaquone  Suspension 1500 milliGRAM(s) Oral daily  carvedilol 3.125 milliGRAM(s) Oral every 12 hours  cefTRIAXone   IVPB 2000 milliGRAM(s) IV Intermittent every 24 hours  chlorhexidine 2% Cloths 1 Application(s) Topical <User Schedule>  heparin   Injectable 5000 Unit(s) SubCutaneous every 12 hours  insulin glargine Injectable (LANTUS) 6 Unit(s) SubCutaneous at bedtime  insulin lispro (ADMELOG) corrective regimen sliding scale   SubCutaneous three times a day before meals  insulin lispro (ADMELOG) corrective regimen sliding scale   SubCutaneous at bedtime  magnesium oxide 800 milliGRAM(s) Oral two times a day with meals  magnesium sulfate  IVPB 2 Gram(s) IV Intermittent once  melatonin 3 milliGRAM(s) Oral at bedtime PRN  mycophenolic acid  milliGRAM(s) Oral <User Schedule>  pantoprazole    Tablet 40 milliGRAM(s) Oral before breakfast  polyethylene glycol 3350 17 Gram(s) Oral daily  senna 2 Tablet(s) Oral at bedtime  sodium chloride 0.65% Nasal 1 Spray(s) Both Nostrils two times a day PRN  sofosbuvir 400 mG/velpatasvir 100 mG (EPCLUSA) 1 Tablet(s) Oral daily  tacrolimus 4 milliGRAM(s) Oral <User Schedule>  tacrolimus 1 milliGRAM(s) Oral once  thiamine 100 milliGRAM(s) Oral daily  traMADol 50 milliGRAM(s) Oral every 6 hours PRN  valGANciclovir 450 milliGRAM(s) Oral daily

## 2025-03-24 NOTE — PROGRESS NOTE ADULT - ASSESSMENT
44M PMH with PMHx of decompensated ETOH cirrhosis c/b recurrent ascites, grade II esophageal varices, hepatic encephalopathy. 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 Admitted with Hyperkalemia  3/17Bronchoscopy with decortication of right lung using video-assisted thoracoscopic surgery (VATS)   VATS exploration of pleural space, with evacuation of hemothorax. Cortical peel of R lower, middle, and upper lobes collected. Lung re-expanded with contact to chest wall at end of case.  Right empyema fluid, Right middle, upper, and lower lobe peels  3/18 VSS maintain chest  tube lws  3/19 VSS maintain   chest  tube  lws .  water seal in am 3/20 daily xray  OOB to chair   may ambulate with chest tube incentive spirometry   3/20 Chest tubes to waterseal  + airleak posterior tube  CXR 2 pm  3/21 CT # 2 posterior CT to remain to suction.  CT # 1 anterior - clamped, obtain cxr this afternoon. Addendum: Anterior CT removed, pending CXR.   3/22 VSS Posterior CT - to be placed to water seal, rpt cxr this afternoon. Addendum: CXR with slight increase in size in right pnx. CT placed back to suction.   3/23 VSS, Posterior chest tube output 60cc/24h, no air leak, chest tube placed to water seal, check repeat CXR in 4 hrs.   3/24 VSS Posterior chest tube output 20/30 24 hrs, + air leak   44M PMH with PMHx of decompensated ETOH cirrhosis c/b recurrent ascites, grade II esophageal varices, hepatic encephalopathy. 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 Admitted with Hyperkalemia  3/17Bronchoscopy with decortication of right lung using video-assisted thoracoscopic surgery (VATS)   VATS exploration of pleural space, with evacuation of hemothorax. Cortical peel of R lower, middle, and upper lobes collected. Lung re-expanded with contact to chest wall at end of case.  Right empyema fluid, Right middle, upper, and lower lobe peels  3/18 VSS maintain chest  tube lws  3/19 VSS maintain   chest  tube  lws .  water seal in am 3/20 daily xray  OOB to chair   may ambulate with chest tube incentive spirometry   3/20 Chest tubes to waterseal  + airleak posterior tube  CXR 2 pm  3/21 CT # 2 posterior CT to remain to suction.  CT # 1 anterior - clamped, obtain cxr this afternoon. Addendum: Anterior CT removed, pending CXR.   3/22 VSS Posterior CT - to be placed to water seal, rpt cxr this afternoon. Addendum: CXR with slight increase in size in right pnx. CT placed back to suction.   3/23 VSS, Posterior chest tube output 60cc/24h, no air leak, chest tube placed to water seal, check repeat CXR in 4 hrs.   3/24 VSS Posterior chest tube output 20/30 24 hrs, + air leak. IP consulted per Dr. Ramírez for possible en   44M PMH with PMHx of decompensated ETOH cirrhosis c/b recurrent ascites, grade II esophageal varices, hepatic encephalopathy. 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 Admitted with Hyperkalemia  3/17Bronchoscopy with decortication of right lung using video-assisted thoracoscopic surgery (VATS)   VATS exploration of pleural space, with evacuation of hemothorax. Cortical peel of R lower, middle, and upper lobes collected. Lung re-expanded with contact to chest wall at end of case.  Right empyema fluid, Right middle, upper, and lower lobe peels  3/18 VSS maintain chest  tube lws  3/19 VSS maintain   chest  tube  lws .  water seal in am 3/20 daily xray  OOB to chair   may ambulate with chest tube incentive spirometry   3/20 Chest tubes to waterseal  + airleak posterior tube  CXR 2 pm  3/21 CT # 2 posterior CT to remain to suction.  CT # 1 anterior - clamped, obtain cxr this afternoon. Addendum: Anterior CT removed, pending CXR.   3/22 VSS Posterior CT - to be placed to water seal, rpt cxr this afternoon. Addendum: CXR with slight increase in size in right pnx. CT placed back to suction.   3/23 VSS, Posterior chest tube output 60cc/24h, no air leak, chest tube placed to water seal, check repeat CXR in 4 hrs.   3/24 VSS Posterior chest tube output 20/30 24 hrs, + air leak. IP consulted per Dr. Ramírez for possible endobronchial valve.

## 2025-03-24 NOTE — PHYSICAL THERAPY INITIAL EVALUATION ADULT - NS ASR RISK AREAS PT EVAL
fall The patient has a h/o Afib on Eliquis, and presents with EKG showing sinus bradycardia.  Plan:  - c/w Eliquis 5mg PO BID  - c/w home toprol w/ hold parameters

## 2025-03-24 NOTE — ADVANCED PRACTICE NURSE CONSULT - REASON FOR CONSULT
Vascular Access Team    Evaluation for: Bedside SL PICC placement  Requested by name: Talon Peterson (24-Mar-2025 09:37)    Indication for PICC: IV Antibiotics   Allergies- sulfa drugs   Platelets(>20): 199  INR(<3): 0.89  eGFR(>40):  Blood cultures sent: 3/3/25  Blood culture negative in 48hrs: yes  Anticoagulants: heparin 5000units, ASA 81mg   Arms DVT: no  Mastectomy: no  Fistula: no  PPM/Defib: no  IR or Nephrology or ID clearance needed- no     Pending: consent     Plan: Bedside picc order evaluated. Please obtain PICC placement consent and then call [phone] for the VAT RN to place the bedside picc.  
Requested by staff to assess skin status: sacrum. PMH is noted:    Reason for Admission: hyperkalemia  History of Present Illness:   44M PMH with PMHx of decompensated ETOH cirrhosis c/b recurrent ascites, grade II esophageal varices, and hepatic encephalopathy. 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     Admitted with Hyperkalemia and planned Thoracotomy on 3/17/2025

## 2025-03-24 NOTE — PHYSICAL THERAPY INITIAL EVALUATION ADULT - PLANNED THERAPY INTERVENTIONS, PT EVAL
GOAL: Stair Negotiation Training: Patient will be able to negotiate up & down 1 flight of stairs with unilateral rail, step to gait pattern, in 2 weeks./balance training/bed mobility training/gait training/strengthening

## 2025-03-24 NOTE — PHYSICAL THERAPY INITIAL EVALUATION ADULT - GAIT DISTANCE, PT EVAL
Simponi Pregnancy And Lactation Text: The risk during pregnancy and breastfeeding is uncertain with this medication. Tazorac Pregnancy And Lactation Text: This medication is not safe during pregnancy. It is unknown if this medication is excreted in breast milk. Opioid Counseling: I discussed with the patient the potential side effects of opioids including but not limited to addiction, altered mental status, and depression. I stressed avoiding alcohol, benzodiazepines, muscle relaxants and sleep aids unless specifically okayed by a physician. The patient verbalized understanding of the proper use and possible adverse effects of opioids. All of the patient's questions and concerns were addressed. They were instructed to flush the remaining pills down the toilet if they did not need them for pain. Finasteride Pregnancy And Lactation Text: This medication is absolutely contraindicated during pregnancy. It is unknown if it is excreted in breast milk. Benzoyl Peroxide Counseling: Patient counseled that medicine may cause skin irritation and bleach clothing.  In the event of skin irritation, the patient was advised to reduce the amount of the drug applied or use it less frequently.   The patient verbalized understanding of the proper use and possible adverse effects of benzoyl peroxide.  All of the patient's questions and concerns were addressed. Tazorac Counseling:  Patient advised that medication is irritating and drying.  Patient may need to apply sparingly and wash off after an hour before eventually leaving it on overnight.  The patient verbalized understanding of the proper use and possible adverse effects of tazorac.  All of the patient's questions and concerns were addressed. Ilumya Counseling: I discussed with the patient the risks of tildrakizumab including but not limited to immunosuppression, malignancy, posterior leukoencephalopathy syndrome, and serious infections.  The patient understands that monitoring is required including a PPD at baseline and must alert us or the primary physician if symptoms of infection or other concerning signs are noted. Metronidazole Counseling:  I discussed with the patient the risks of metronidazole including but not limited to seizures, nausea/vomiting, a metallic taste in the mouth, nausea/vomiting and severe allergy. Quinolones Pregnancy And Lactation Text: This medication is Pregnancy Category C and it isn't know if it is safe during pregnancy. It is also excreted in breast milk. Imiquimod Counseling:  I discussed with the patient the risks of imiquimod including but not limited to erythema, scaling, itching, weeping, crusting, and pain.  Patient understands that the inflammatory response to imiquimod is variable from person to person and was educated regarded proper titration schedule.  If flu-like symptoms develop, patient knows to discontinue the medication and contact us. Fluconazole Counseling:  Patient counseled regarding adverse effects of fluconazole including but not limited to headache, diarrhea, nausea, upset stomach, liver function test abnormalities, taste disturbance, and stomach pain.  There is a rare possibility of liver failure that can occur when taking fluconazole.  The patient understands that monitoring of LFTs and kidney function test may be required, especially at baseline. The patient verbalized understanding of the proper use and possible adverse effects of fluconazole.  All of the patient's questions and concerns were addressed. Rhofade Counseling: Rhofade is a topical medication which can decrease superficial blood flow where applied. Side effects are uncommon and include stinging, redness and allergic reactions. Picato Counseling:  I discussed with the patient the risks of Picato including but not limited to erythema, scaling, itching, weeping, crusting, and pain. Hydroquinone Counseling:  Patient advised that medication may result in skin irritation, lightening (hypopigmentation), dryness, and burning.  In the event of skin irritation, the patient was advised to reduce the amount of the drug applied or use it less frequently.  Rarely, spots that are treated with hydroquinone can become darker (pseudoochronosis).  Should this occur, patient instructed to stop medication and call the office. The patient verbalized understanding of the proper use and possible adverse effects of hydroquinone.  All of the patient's questions and concerns were addressed. Arava Counseling:  Patient counseled regarding adverse effects of Arava including but not limited to nausea, vomiting, abnormalities in liver function tests. Patients may develop mouth sores, rash, diarrhea, and abnormalities in blood counts. The patient understands that monitoring is required including LFTs and blood counts.  There is a rare possibility of scarring of the liver and lung problems that can occur when taking methotrexate. Persistent nausea, loss of appetite, pale stools, dark urine, cough, and shortness of breath should be reported immediately. Patient advised to discontinue Arava treatment and consult with a physician prior to attempting conception. The patient will have to undergo a treatment to eliminate Arava from the body prior to conception. Azithromycin Counseling:  I discussed with the patient the risks of azithromycin including but not limited to GI upset, allergic reaction, drug rash, diarrhea, and yeast infections. Clofazimine Pregnancy And Lactation Text: This medication is Pregnancy Category C and isn't considered safe during pregnancy. It is excreted in breast milk. Topical Clindamycin Counseling: Patient counseled that this medication may cause skin irritation or allergic reactions.  In the event of skin irritation, the patient was advised to reduce the amount of the drug applied or use it less frequently.   The patient verbalized understanding of the proper use and possible adverse effects of clindamycin.  All of the patient's questions and concerns were addressed. Propranolol Pregnancy And Lactation Text: This medication is Pregnancy Category C and it isn't known if it is safe during pregnancy. It is excreted in breast milk. Albendazole Counseling:  I discussed with the patient the risks of albendazole including but not limited to cytopenia, kidney damage, nausea/vomiting and severe allergy.  The patient understands that this medication is being used in an off-label manner. Finasteride Counseling:  I discussed with the patient the risks of use of finasteride including but not limited to decreased libido, decreased ejaculate volume, gynecomastia, and depression. Women should not handle medication.  All of the patient's questions and concerns were addressed. Drysol Counseling:  I discussed with the patient the risks of drysol/aluminum chloride including but not limited to skin rash, itching, irritation, burning. Griseofulvin Pregnancy And Lactation Text: This medication is Pregnancy Category X and is known to cause serious birth defects. It is unknown if this medication is excreted in breast milk but breast feeding should be avoided. Nsaids Pregnancy And Lactation Text: These medications are considered safe up to 30 weeks gestation. It is excreted in breast milk. Erythromycin Counseling:  I discussed with the patient the risks of erythromycin including but not limited to GI upset, allergic reaction, drug rash, diarrhea, increase in liver enzymes, and yeast infections. Wartpeel Pregnancy And Lactation Text: This medication is Pregnancy Category X and contraindicated in pregnancy and in women who may become pregnant. It is unknown if this medication is excreted in breast milk. Valtrex Counseling: I discussed with the patient the risks of valacyclovir including but not limited to kidney damage, nausea, vomiting and severe allergy.  The patient understands that if the infection seems to be worsening or is not improving, they are to call. Bexarotene Counseling:  I discussed with the patient the risks of bexarotene including but not limited to hair loss, dry lips/skin/eyes, liver abnormalities, hyperlipidemia, pancreatitis, depression/suicidal ideation, photosensitivity, drug rash/allergic reactions, hypothyroidism, anemia, leukopenia, infection, cataracts, and teratogenicity.  Patient understands that they will need regular blood tests to check lipid profile, liver function tests, white blood cell count, thyroid function tests and pregnancy test if applicable. Nsaids Counseling: NSAID Counseling: I discussed with the patient that NSAIDs should be taken with food. Prolonged use of NSAIDs can result in the development of stomach ulcers.  Patient advised to stop taking NSAIDs if abdominal pain occurs.  The patient verbalized understanding of the proper use and possible adverse effects of NSAIDs.  All of the patient's questions and concerns were addressed. Use Enhanced Medication Counseling?: No Spironolactone Counseling: Patient advised regarding risks of diarrhea, abdominal pain, hyperkalemia, birth defects (for female patients), liver toxicity and renal toxicity. The patient may need blood work to monitor liver and kidney function and potassium levels while on therapy. The patient verbalized understanding of the proper use and possible adverse effects of spironolactone.  All of the patient's questions and concerns were addressed. Bactrim Pregnancy And Lactation Text: This medication is Pregnancy Category D and is known to cause fetal risk.  It is also excreted in breast milk. Clindamycin Counseling: I counseled the patient regarding use of clindamycin as an antibiotic for prophylactic and/or therapeutic purposes. Clindamycin is active against numerous classes of bacteria, including skin bacteria. Side effects may include nausea, diarrhea, gastrointestinal upset, rash, hives, yeast infections, and in rare cases, colitis. Doxycycline Pregnancy And Lactation Text: This medication is Pregnancy Category D and not consider safe during pregnancy. It is also excreted in breast milk but is considered safe for shorter treatment courses. Humira Pregnancy And Lactation Text: This medication is Pregnancy Category B and is considered safe during pregnancy. It is unknown if this medication is excreted in breast milk. Cellcept Counseling:  I discussed with the patient the risks of mycophenolate mofetil including but not limited to infection/immunosuppression, GI upset, hypokalemia, hypercholesterolemia, bone marrow suppression, lymphoproliferative disorders, malignancy, GI ulceration/bleed/perforation, colitis, interstitial lung disease, kidney failure, progressive multifocal leukoencephalopathy, and birth defects.  The patient understands that monitoring is required including a baseline creatinine and regular CBC testing. In addition, patient must alert us immediately if symptoms of infection or other concerning signs are noted. High Dose Vitamin A Pregnancy And Lactation Text: High dose vitamin A therapy is contraindicated during pregnancy and breast feeding. Protopic Pregnancy And Lactation Text: This medication is Pregnancy Category C. It is unknown if this medication is excreted in breast milk when applied topically. 400 ft Minoxidil Counseling: Minoxidil is a topical medication which can increase blood flow where it is applied. It is uncertain how this medication increases hair growth. Side effects are uncommon and include stinging and allergic reactions. Topical Sulfur Applications Pregnancy And Lactation Text: This medication is Pregnancy Category C and has an unknown safety profile during pregnancy. It is unknown if this topical medication is excreted in breast milk. Cephalexin Counseling: I counseled the patient regarding use of cephalexin as an antibiotic for prophylactic and/or therapeutic purposes. Cephalexin (commonly prescribed under brand name Keflex) is a cephalosporin antibiotic which is active against numerous classes of bacteria, including most skin bacteria. Side effects may include nausea, diarrhea, gastrointestinal upset, rash, hives, yeast infections, and in rare cases, hepatitis, kidney disease, seizures, fever, confusion, neurologic symptoms, and others. Patients with severe allergies to penicillin medications are cautioned that there is about a 10% incidence of cross-reactivity with cephalosporins. When possible, patients with penicillin allergies should use alternatives to cephalosporins for antibiotic therapy. Thalidomide Pregnancy And Lactation Text: This medication is Pregnancy Category X and is absolutely contraindicated during pregnancy. It is unknown if it is excreted in breast milk. Dupixent Pregnancy And Lactation Text: This medication likely crosses the placenta but the risk for the fetus is uncertain. This medication is excreted in breast milk. Siliq Counseling:  I discussed with the patient the risks of Siliq including but not limited to new or worsening depression, suicidal thoughts and behavior, immunosuppression, malignancy, posterior leukoencephalopathy syndrome, and serious infections.  The patient understands that monitoring is required including a PPD at baseline and must alert us or the primary physician if symptoms of infection or other concerning signs are noted. There is also a special program designed to monitor depression which is required with Siliq. Erythromycin Pregnancy And Lactation Text: This medication is Pregnancy Category B and is considered safe during pregnancy. It is also excreted in breast milk. Mirvaso Counseling: Mirvaso is a topical medication which can decrease superficial blood flow where applied. Side effects are uncommon and include stinging, redness and allergic reactions. Erivedge Counseling- I discussed with the patient the risks of Erivedge including but not limited to nausea, vomiting, diarrhea, constipation, weight loss, changes in the sense of taste, decreased appetite, muscle spasms, and hair loss.  The patient verbalized understanding of the proper use and possible adverse effects of Erivedge.  All of the patient's questions and concerns were addressed. Clindamycin Pregnancy And Lactation Text: This medication can be used in pregnancy if certain situations. Clindamycin is also present in breast milk. Terbinafine Counseling: Patient counseling regarding adverse effects of terbinafine including but not limited to headache, diarrhea, rash, upset stomach, liver function test abnormalities, itching, taste/smell disturbance, nausea, abdominal pain, and flatulence.  There is a rare possibility of liver failure that can occur when taking terbinafine.  The patient understands that a baseline LFT and kidney function test may be required. The patient verbalized understanding of the proper use and possible adverse effects of terbinafine.  All of the patient's questions and concerns were addressed. Otezla Pregnancy And Lactation Text: This medication is Pregnancy Category C and it isn't known if it is safe during pregnancy. It is unknown if it is excreted in breast milk. Azithromycin Pregnancy And Lactation Text: This medication is considered safe during pregnancy and is also secreted in breast milk. Wartpeel Counseling:  I discussed with the patient the risks of Wartpeel including but not limited to erythema, scaling, itching, weeping, crusting, and pain. Doxycycline Counseling:  Patient counseled regarding possible photosensitivity and increased risk for sunburn.  Patient instructed to avoid sunlight, if possible.  When exposed to sunlight, patients should wear protective clothing, sunglasses, and sunscreen.  The patient was instructed to call the office immediately if the following severe adverse effects occur:  hearing changes, easy bruising/bleeding, severe headache, or vision changes.  The patient verbalized understanding of the proper use and possible adverse effects of doxycycline.  All of the patient's questions and concerns were addressed. Niacinamide Pregnancy And Lactation Text: These medications are considered safe during pregnancy. Cimetidine Counseling:  I discussed with the patient the risks of Cimetidine including but not limited to gynecomastia, headache, diarrhea, nausea, drowsiness, arrhythmias, pancreatitis, skin rashes, psychosis, bone marrow suppression and kidney toxicity. Cimzia Counseling:  I discussed with the patient the risks of Cimzia including but not limited to immunosuppression, allergic reactions and infections.  The patient understands that monitoring is required including a PPD at baseline and must alert us or the primary physician if symptoms of infection or other concerning signs are noted. Rifampin Counseling: I discussed with the patient the risks of rifampin including but not limited to liver damage, kidney damage, red-orange body fluids, nausea/vomiting and severe allergy. Rituxan Pregnancy And Lactation Text: This medication is Pregnancy Category C and it isn't know if it is safe during pregnancy. It is unknown if this medication is excreted in breast milk but similar antibodies are known to be excreted. High Dose Vitamin A Counseling: Side effects reviewed, pt to contact office should one occur. Eucrisa Pregnancy And Lactation Text: This medication has not been assigned a Pregnancy Risk Category but animal studies failed to show danger with the topical medication. It is unknown if the medication is excreted in breast milk. Tetracycline Pregnancy And Lactation Text: This medication is Pregnancy Category D and not consider safe during pregnancy. It is also excreted in breast milk. Cyclophosphamide Counseling:  I discussed with the patient the risks of cyclophosphamide including but not limited to hair loss, hormonal abnormalities, decreased fertility, abdominal pain, diarrhea, nausea and vomiting, bone marrow suppression and infection. The patient understands that monitoring is required while taking this medication. Propranolol Counseling:  I discussed with the patient the risks of propranolol including but not limited to low heart rate, low blood pressure, low blood sugar, restlessness and increased cold sensitivity. They should call the office if they experience any of these side effects. Xeljanz Counseling: I discussed with the patient the risks of Xeljanz therapy including increased risk of infection, liver issues, headache, diarrhea, or cold symptoms. Live vaccines should be avoided. They were instructed to call if they have any problems. Tetracycline Counseling: Patient counseled regarding possible photosensitivity and increased risk for sunburn.  Patient instructed to avoid sunlight, if possible.  When exposed to sunlight, patients should wear protective clothing, sunglasses, and sunscreen.  The patient was instructed to call the office immediately if the following severe adverse effects occur:  hearing changes, easy bruising/bleeding, severe headache, or vision changes.  The patient verbalized understanding of the proper use and possible adverse effects of tetracycline.  All of the patient's questions and concerns were addressed. Patient understands to avoid pregnancy while on therapy due to potential birth defects. Topical Sulfur Applications Counseling: Topical Sulfur Counseling: Patient counseled that this medication may cause skin irritation or allergic reactions.  In the event of skin irritation, the patient was advised to reduce the amount of the drug applied or use it less frequently.   The patient verbalized understanding of the proper use and possible adverse effects of topical sulfur application.  All of the patient's questions and concerns were addressed. Acitretin Pregnancy And Lactation Text: This medication is Pregnancy Category X and should not be given to women who are pregnant or may become pregnant in the future. This medication is excreted in breast milk. SSKI Counseling:  I discussed with the patient the risks of SSKI including but not limited to thyroid abnormalities, metallic taste, GI upset, fever, headache, acne, arthralgias, paraesthesias, lymphadenopathy, easy bleeding, arrhythmias, and allergic reaction. Ivermectin Pregnancy And Lactation Text: This medication is Pregnancy Category C and it isn't known if it is safe during pregnancy. It is also excreted in breast milk. Quinolones Counseling:  I discussed with the patient the risks of fluoroquinolones including but not limited to GI upset, allergic reaction, drug rash, diarrhea, dizziness, photosensitivity, yeast infections, liver function test abnormalities, tendonitis/tendon rupture. Bexarotene Pregnancy And Lactation Text: This medication is Pregnancy Category X and should not be given to women who are pregnant or may become pregnant. This medication should not be used if you are breast feeding. Picato Pregnancy And Lactation Text: This medication is Pregnancy Category C. It is unknown if this medication is excreted in breast milk. Glycopyrrolate Pregnancy And Lactation Text: This medication is Pregnancy Category B and is considered safe during pregnancy. It is unknown if it is excreted breast milk. Otezla Counseling: The side effects of Otezla were discussed with the patient, including but not limited to worsening or new depression, weight loss, diarrhea, nausea, upper respiratory tract infection, and headache. Patient instructed to call the office should any adverse effect occur.  The patient verbalized understanding of the proper use and possible adverse effects of Otezla.  All the patient's questions and concerns were addressed. Cosentyx Counseling:  I discussed with the patient the risks of Cosentyx including but not limited to worsening of Crohn's disease, immunosuppression, allergic reactions and infections.  The patient understands that monitoring is required including a PPD at baseline and must alert us or the primary physician if symptoms of infection or other concerning signs are noted. Zyclara Counseling:  I discussed with the patient the risks of imiquimod including but not limited to erythema, scaling, itching, weeping, crusting, and pain.  Patient understands that the inflammatory response to imiquimod is variable from person to person and was educated regarded proper titration schedule.  If flu-like symptoms develop, patient knows to discontinue the medication and contact us. Elidel Counseling: Patient may experience a mild burning sensation during topical application. Elidel is not approved in children less than 2 years of age. There have been case reports of hematologic and skin malignancies in patients using topical calcineurin inhibitors although causality is questionable. Cephalexin Pregnancy And Lactation Text: This medication is Pregnancy Category B and considered safe during pregnancy.  It is also excreted in breast milk but can be used safely for shorter doses. Sski Pregnancy And Lactation Text: This medication is Pregnancy Category D and isn't considered safe during pregnancy. It is excreted in breast milk. Rituxan Counseling:  I discussed with the patient the risks of Rituxan infusions. Side effects can include infusion reactions, severe drug rashes including mucocutaneous reactions, reactivation of latent hepatitis and other infections and rarely progressive multifocal leukoencephalopathy.  All of the patient's questions and concerns were addressed. Colchicine Counseling:  Patient counseled regarding adverse effects including but not limited to stomach upset (nausea, vomiting, stomach pain, or diarrhea).  Patient instructed to limit alcohol consumption while taking this medication.  Colchicine may reduce blood counts especially with prolonged use.  The patient understands that monitoring of kidney function and blood counts may be required, especially at baseline. The patient verbalized understanding of the proper use and possible adverse effects of colchicine.  All of the patient's questions and concerns were addressed. Doxepin Pregnancy And Lactation Text: This medication is Pregnancy Category C and it isn't known if it is safe during pregnancy. It is also excreted in breast milk and breast feeding isn't recommended. Cellcept Pregnancy And Lactation Text: This medication is Pregnancy Category D and isn't considered safe during pregnancy. It is unknown if this medication is excreted in breast milk. Opioid Pregnancy And Lactation Text: These medications can lead to premature delivery and should be avoided during pregnancy. These medications are also present in breast milk in small amounts. Azathioprine Counseling:  I discussed with the patient the risks of azathioprine including but not limited to myelosuppression, immunosuppression, hepatotoxicity, lymphoma, and infections.  The patient understands that monitoring is required including baseline LFTs, Creatinine, possible TPMP genotyping and weekly CBCs for the first month and then every 2 weeks thereafter.  The patient verbalized understanding of the proper use and possible adverse effects of azathioprine.  All of the patient's questions and concerns were addressed. Birth Control Pills Pregnancy And Lactation Text: This medication should be avoided if pregnant and for the first 30 days post-partum. Ivermectin Counseling:  Patient instructed to take medication on an empty stomach with a full glass of water.  Patient informed of potential adverse effects including but not limited to nausea, diarrhea, dizziness, itching, and swelling of the extremities or lymph nodes.  The patient verbalized understanding of the proper use and possible adverse effects of ivermectin.  All of the patient's questions and concerns were addressed. Hydroxychloroquine Counseling:  I discussed with the patient that a baseline ophthalmologic exam is needed at the start of therapy and every year thereafter while on therapy. A CBC may also be warranted for monitoring.  The side effects of this medication were discussed with the patient, including but not limited to agranulocytosis, aplastic anemia, seizures, rashes, retinopathy, and liver toxicity. Patient instructed to call the office should any adverse effect occur.  The patient verbalized understanding of the proper use and possible adverse effects of Plaquenil.  All the patient's questions and concerns were addressed. Odomzo Counseling- I discussed with the patient the risks of Odomzo including but not limited to nausea, vomiting, diarrhea, constipation, weight loss, changes in the sense of taste, decreased appetite, muscle spasms, and hair loss.  The patient verbalized understanding of the proper use and possible adverse effects of Odomzo.  All of the patient's questions and concerns were addressed. Acitretin Counseling:  I discussed with the patient the risks of acitretin including but not limited to hair loss, dry lips/skin/eyes, liver damage, hyperlipidemia, depression/suicidal ideation, photosensitivity.  Serious rare side effects can include but are not limited to pancreatitis, pseudotumor cerebri, bony changes, clot formation/stroke/heart attack.  Patient understands that alcohol is contraindicated since it can result in liver toxicity and significantly prolong the elimination of the drug by many years. Humira Counseling:  I discussed with the patient the risks of adalimumab including but not limited to myelosuppression, immunosuppression, autoimmune hepatitis, demyelinating diseases, lymphoma, and serious infections.  The patient understands that monitoring is required including a PPD at baseline and must alert us or the primary physician if symptoms of infection or other concerning signs are noted. Methotrexate Counseling:  Patient counseled regarding adverse effects of methotrexate including but not limited to nausea, vomiting, abnormalities in liver function tests. Patients may develop mouth sores, rash, diarrhea, and abnormalities in blood counts. The patient understands that monitoring is required including LFT's and blood counts.  There is a rare possibility of scarring of the liver and lung problems that can occur when taking methotrexate. Persistent nausea, loss of appetite, pale stools, dark urine, cough, and shortness of breath should be reported immediately. Patient advised to discontinue methotrexate treatment at least three months before attempting to become pregnant.  I discussed the need for folate supplements while taking methotrexate.  These supplements can decrease side effects during methotrexate treatment. The patient verbalized understanding of the proper use and possible adverse effects of methotrexate.  All of the patient's questions and concerns were addressed. Thalidomide Counseling: I discussed with the patient the risks of thalidomide including but not limited to birth defects, anxiety, weakness, chest pain, dizziness, cough and severe allergy. Niacinamide Counseling: I recommended taking niacin or niacinamide, also know as vitamin B3, twice daily. Recent evidence suggests that taking vitamin B3 (500 mg twice daily) can reduce the risk of actinic keratoses and non-melanoma skin cancers. Side effects of vitamin B3 include flushing and headache. Cimetidine Pregnancy And Lactation Text: This medication is Pregnancy Category B and is considered safe during pregnancy. It is also excreted in breast milk and breast feeding isn't recommended. Ketoconazole Counseling:   Patient counseled regarding improving absorption with orange juice.  Adverse effects include but are not limited to breast enlargement, headache, diarrhea, nausea, upset stomach, liver function test abnormalities, taste disturbance, and stomach pain.  There is a rare possibility of liver failure that can occur when taking ketoconazole. The patient understands that monitoring of LFTs may be required, especially at baseline. The patient verbalized understanding of the proper use and possible adverse effects of ketoconazole.  All of the patient's questions and concerns were addressed. Carac Counseling:  I discussed with the patient the risks of Carac including but not limited to erythema, scaling, itching, weeping, crusting, and pain. Cyclosporine Pregnancy And Lactation Text: This medication is Pregnancy Category C and it isn't know if it is safe during pregnancy. This medication is excreted in breast milk. Xolair Pregnancy And Lactation Text: This medication is Pregnancy Category B and is considered safe during pregnancy. This medication is excreted in breast milk. Isotretinoin Pregnancy And Lactation Text: This medication is Pregnancy Category X and is considered extremely dangerous during pregnancy. It is unknown if it is excreted in breast milk. Bactrim Counseling:  I discussed with the patient the risks of sulfa antibiotics including but not limited to GI upset, allergic reaction, drug rash, diarrhea, dizziness, photosensitivity, and yeast infections.  Rarely, more serious reactions can occur including but not limited to aplastic anemia, agranulocytosis, methemoglobinemia, blood dyscrasias, liver or kidney failure, lung infiltrates or desquamative/blistering drug rashes. Hydroxychloroquine Pregnancy And Lactation Text: This medication has been shown to cause fetal harm but it isn't assigned a Pregnancy Risk Category. There are small amounts excreted in breast milk. Doxepin Counseling:  Patient advised that the medication is sedating and not to drive a car after taking this medication. Patient informed of potential adverse effects including but not limited to dry mouth, urinary retention, and blurry vision.  The patient verbalized understanding of the proper use and possible adverse effects of doxepin.  All of the patient's questions and concerns were addressed. Topical Clindamycin Pregnancy And Lactation Text: This medication is Pregnancy Category B and is considered safe during pregnancy. It is unknown if it is excreted in breast milk. Benzoyl Peroxide Pregnancy And Lactation Text: This medication is Pregnancy Category C. It is unknown if benzoyl peroxide is excreted in breast milk. Eucrisa Counseling: Patient may experience a mild burning sensation during topical application. Eucrisa is not approved in children less than 2 years of age. Valtrex Pregnancy And Lactation Text: this medication is Pregnancy Category B and is considered safe during pregnancy. This medication is not directly found in breast milk but it's metabolite acyclovir is present. Itraconazole Counseling:  I discussed with the patient the risks of itraconazole including but not limited to liver damage, nausea/vomiting, neuropathy, and severe allergy.  The patient understands that this medication is best absorbed when taken with acidic beverages such as non-diet cola or ginger ale.  The patient understands that monitoring is required including baseline LFTs and repeat LFTs at intervals.  The patient understands that they are to contact us or the primary physician if concerning signs are noted. Solaraze Counseling:  I discussed with the patient the risks of Solaraze including but not limited to erythema, scaling, itching, weeping, crusting, and pain. Simponi Counseling:  I discussed with the patient the risks of golimumab including but not limited to myelosuppression, immunosuppression, autoimmune hepatitis, demyelinating diseases, lymphoma, and serious infections.  The patient understands that monitoring is required including a PPD at baseline and must alert us or the primary physician if symptoms of infection or other concerning signs are noted. Drysol Pregnancy And Lactation Text: This medication is considered safe during pregnancy and breast feeding. 5-Fu Counseling: 5-Fluorouracil Counseling:  I discussed with the patient the risks of 5-fluorouracil including but not limited to erythema, scaling, itching, weeping, crusting, and pain. Methotrexate Pregnancy And Lactation Text: This medication is Pregnancy Category X and is known to cause fetal harm. This medication is excreted in breast milk. Enbrel Counseling:  I discussed with the patient the risks of etanercept including but not limited to myelosuppression, immunosuppression, autoimmune hepatitis, demyelinating diseases, lymphoma, and infections.  The patient understands that monitoring is required including a PPD at baseline and must alert us or the primary physician if symptoms of infection or other concerning signs are noted. Minocycline Counseling: Patient advised regarding possible photosensitivity and discoloration of the teeth, skin, lips, tongue and gums.  Patient instructed to avoid sunlight, if possible.  When exposed to sunlight, patients should wear protective clothing, sunglasses, and sunscreen.  The patient was instructed to call the office immediately if the following severe adverse effects occur:  hearing changes, easy bruising/bleeding, severe headache, or vision changes.  The patient verbalized understanding of the proper use and possible adverse effects of minocycline.  All of the patient's questions and concerns were addressed. Metronidazole Pregnancy And Lactation Text: This medication is Pregnancy Category B and considered safe during pregnancy.  It is also excreted in breast milk. Infliximab Counseling:  I discussed with the patient the risks of infliximab including but not limited to myelosuppression, immunosuppression, autoimmune hepatitis, demyelinating diseases, lymphoma, and serious infections.  The patient understands that monitoring is required including a PPD at baseline and must alert us or the primary physician if symptoms of infection or other concerning signs are noted. Ketoconazole Pregnancy And Lactation Text: This medication is Pregnancy Category C and it isn't know if it is safe during pregnancy. It is also excreted in breast milk and breast feeding isn't recommended. Clofazimine Counseling:  I discussed with the patient the risks of clofazimine including but not limited to skin and eye pigmentation, liver damage, nausea/vomiting, gastrointestinal bleeding and allergy. Stelara Counseling:  I discussed with the patient the risks of ustekinumab including but not limited to immunosuppression, malignancy, posterior leukoencephalopathy syndrome, and serious infections.  The patient understands that monitoring is required including a PPD at baseline and must alert us or the primary physician if symptoms of infection or other concerning signs are noted. Cyclophosphamide Pregnancy And Lactation Text: This medication is Pregnancy Category D and it isn't considered safe during pregnancy. This medication is excreted in breast milk. Birth Control Pills Counseling: Birth Control Pill Counseling: I discussed with the patient the potential side effects of OCPs including but not limited to increased risk of stroke, heart attack, thrombophlebitis, deep venous thrombosis, hepatic adenomas, breast changes, GI upset, headaches, and depression.  The patient verbalized understanding of the proper use and possible adverse effects of OCPs. All of the patient's questions and concerns were addressed. Mirvaso Pregnancy And Lactation Text: This medication has not been assigned a Pregnancy Risk Category. It is unknown if the medication is excreted in breast milk. Cimzia Pregnancy And Lactation Text: This medication crosses the placenta but can be considered safe in certain situations. Cimzia may be excreted in breast milk. Topical Retinoid counseling:  Patient advised to apply a pea-sized amount only at bedtime and wait 30 minutes after washing their face before applying.  If too drying, patient may add a non-comedogenic moisturizer. The patient verbalized understanding of the proper use and possible adverse effects of retinoids.  All of the patient's questions and concerns were addressed. Griseofulvin Counseling:  I discussed with the patient the risks of griseofulvin including but not limited to photosensitivity, cytopenia, liver damage, nausea/vomiting and severe allergy.  The patient understands that this medication is best absorbed when taken with a fatty meal (e.g., ice cream or french fries). Hydroxyzine Counseling: Patient advised that the medication is sedating and not to drive a car after taking this medication.  Patient informed of potential adverse effects including but not limited to dry mouth, urinary retention, and blurry vision.  The patient verbalized understanding of the proper use and possible adverse effects of hydroxyzine.  All of the patient's questions and concerns were addressed. Oxybutynin Counseling:  I discussed with the patient the risks of oxybutynin including but not limited to skin rash, drowsiness, dry mouth, difficulty urinating, and blurred vision. Xolair Counseling:  Patient informed of potential adverse effects including but not limited to fever, muscle aches, rash and allergic reactions.  The patient verbalized understanding of the proper use and possible adverse effects of Xolair.  All of the patient's questions and concerns were addressed. Dupixent Counseling: I discussed with the patient the risks of dupilumab including but not limited to eye infection and irritation, cold sores, injection site reactions, worsening of asthma, allergic reactions and increased risk of parasitic infection.  Live vaccines should be avoided while taking dupilumab. Dupilumab will also interact with certain medications such as warfarin and cyclosporine. The patient understands that monitoring is required and they must alert us or the primary physician if symptoms of infection or other concerning signs are noted. Prednisone Counseling:  I discussed with the patient the risks of prolonged use of prednisone including but not limited to weight gain, insomnia, osteoporosis, mood changes, diabetes, susceptibility to infection, glaucoma and high blood pressure.  In cases where prednisone use is prolonged, patients should be monitored with blood pressure checks, serum glucose levels and an eye exam.  Additionally, the patient may need to be placed on GI prophylaxis, PCP prophylaxis, and calcium and vitamin D supplementation and/or a bisphosphonate.  The patient verbalized understanding of the proper use and the possible adverse effects of prednisone.  All of the patient's questions and concerns were addressed. Detail Level: Simple Spironolactone Pregnancy And Lactation Text: This medication can cause feminization of the male fetus and should be avoided during pregnancy. The active metabolite is also found in breast milk. Gabapentin Counseling: I discussed with the patient the risks of gabapentin including but not limited to dizziness, somnolence, fatigue and ataxia. Dapsone Pregnancy And Lactation Text: This medication is Pregnancy Category C and is not considered safe during pregnancy or breast feeding. Hydroxyzine Pregnancy And Lactation Text: This medication is not safe during pregnancy and should not be taken. It is also excreted in breast milk and breast feeding isn't recommended. Solaraze Pregnancy And Lactation Text: This medication is Pregnancy Category B and is considered safe. There is some data to suggest avoiding during the third trimester. It is unknown if this medication is excreted in breast milk. Isotretinoin Counseling: Patient should get monthly blood tests, not donate blood, not drive at night if vision affected, not share medication, and not undergo elective surgery for 6 months after tx completed. Side effects reviewed, pt to contact office should one occur. Dapsone Counseling: I discussed with the patient the risks of dapsone including but not limited to hemolytic anemia, agranulocytosis, rashes, methemoglobinemia, kidney failure, peripheral neuropathy, headaches, GI upset, and liver toxicity.  Patients who start dapsone require monitoring including baseline LFTs and weekly CBCs for the first month, then every month thereafter.  The patient verbalized understanding of the proper use and possible adverse effects of dapsone.  All of the patient's questions and concerns were addressed. Glycopyrrolate Counseling:  I discussed with the patient the risks of glycopyrrolate including but not limited to skin rash, drowsiness, dry mouth, difficulty urinating, and blurred vision. Taltz Counseling: I discussed with the patient the risks of ixekizumab including but not limited to immunosuppression, serious infections, worsening of inflammatory bowel disease and drug reactions.  The patient understands that monitoring is required including a PPD at baseline and must alert us or the primary physician if symptoms of infection or other concerning signs are noted. Tremfya Counseling: I discussed with the patient the risks of guselkumab including but not limited to immunosuppression, serious infections, worsening of inflammatory bowel disease and drug reactions.  The patient understands that monitoring is required including a PPD at baseline and must alert us or the primary physician if symptoms of infection or other concerning signs are noted. Cyclosporine Counseling:  I discussed with the patient the risks of cyclosporine including but not limited to hypertension, gingival hyperplasia,myelosuppression, immunosuppression, liver damage, kidney damage, neurotoxicity, lymphoma, and serious infections. The patient understands that monitoring is required including baseline blood pressure, CBC, CMP, lipid panel and uric acid, and then 1-2 times monthly CMP and blood pressure. Xelamarilisz Pregnancy And Lactation Text: This medication is Pregnancy Category D and is not considered safe during pregnancy.  The risk during breast feeding is also uncertain. Protopic Counseling: Patient may experience a mild burning sensation during topical application. Protopic is not approved in children less than 2 years of age. There have been case reports of hematologic and skin malignancies in patients using topical calcineurin inhibitors although causality is questionable. Rifampin Pregnancy And Lactation Text: This medication is Pregnancy Category C and it isn't know if it is safe during pregnancy. It is also excreted in breast milk and should not be used if you are breast feeding.

## 2025-03-24 NOTE — PROGRESS NOTE ADULT - ASSESSMENT
44M PMH with PMHx of decompensated ETOH cirrhosis c/b recurrent ascites, grade II esophageal varices, and hepatic encephalopathy. 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  S/P Thoracotomy on 3/17/2025    [ ] s/p HCV + DCD OLT   - good graft function  - HCV viremia/ Genotype 1A (donor hcv +) - on Epclusa since 3/1   - SQH/ ASA    [] Immuno  - FK per level,  Myfortic 360BID, Pred 20QD  - ppx: no bactrim due to sulfa allergy -> Mepron, valcyte, oscal, PPI    [] h/o R Empyema   - s/p VATS POD#5 for decortication of right lung [3/17]   - anterior CT removed 3/21  - posterior CT to WS check daily CXRs  - continue CTX, end date 3/31   - Thoracic Sx following     44M PMH with PMHx of decompensated ETOH cirrhosis c/b recurrent ascites, grade II esophageal varices, and hepatic encephalopathy. 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  S/P Thoracotomy on 3/17/2025    [ ] s/p HCV + DCD OLT   - good graft function  - HCV viremia/ Genotype 1A (donor hcv +) - on Epclusa since 3/1   - SQH/ ASA    [] Immuno  - FK per level,  Myfortic 360BID, Pred 20QD  - ppx: no bactrim due to sulfa allergy -> Mepron, valcyte, oscal, PPI    [] h/o R Empyema   - s/p VATS POD#5 for decortication of right lung [3/17]   - anterior CT removed 3/21  - posterior CT to WS check daily CXRs, CT w/ air leak per thoracic   - continue CTX, end date 3/31   - Thoracic Sx following

## 2025-03-24 NOTE — PHYSICAL THERAPY INITIAL EVALUATION ADULT - THERAPY FREQUENCY, PT EVAL
"Pt reports "I bumped it" and stated it hurts / pt with 1/2cm round wound possible bug bite to R LE inner / treated PTA witn antibiotic ointment   " 1-2x/week

## 2025-03-24 NOTE — PHYSICAL THERAPY INITIAL EVALUATION ADULT - PERTINENT HX OF CURRENT PROBLEM, REHAB EVAL
Pt is a 44M PMH with PMHx of decompensated ETOH cirrhosis c/b recurrent ascites, grade II esophageal varices, and hepatic encephalopathy. 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 Pt admitted for hyperkalemia. Pt s/p thoracotomy on 3/17/2025. CXR 3/23/25: Stable small right-sided pneumothorax.

## 2025-03-24 NOTE — PROGRESS NOTE ADULT - PROBLEM SELECTOR PLAN 1
3/17 - s/p VATS for decortication of right lung  3/21 CT # 2 posterior CT to remain to suction.  CT # 1 anterior - clamped, obtain cxr this afternoon - Anterior chest tube removed  3/23 Right Posterior CT placed to water seal, rpt cxr this afternoon.   3/24 VSS Posterior chest tube output 20/30 24 hrs,  + air leak  OOB to chair  incentive spirometry   Daily CXR  care as per primary team 3/17 - s/p VATS for decortication of right lung  3/21 CT # 2 posterior CT to remain to suction.  CT # 1 anterior - clamped, obtain cxr this afternoon - Anterior chest tube removed  3/23 Right Posterior CT placed to water seal, rpt cxr this afternoon.   3/24 VSS Posterior chest tube output 20/30 24 hrs,  + air leak, IP consulted for possible endobronchial valve   OOB to chair  incentive spirometry   Daily CXR  care as per primary team

## 2025-03-24 NOTE — PHYSICAL THERAPY INITIAL EVALUATION ADULT - ACTIVE RANGE OF MOTION EXAMINATION, REHAB EVAL
Referral Dexcom G7 - you will need formal training on this.    -- Medications adjusted for today's visit include:    Increase Toujeo 30 units once a day.     Start Novolog 10 units before each main meal. Do not take Novolog if your meal is skipped or is carb free.    Finish Ozempic, then stop due to cost - referral in for pharmacy assistance.    Continue Metformin 2 tablets twice a day.       -- Limit carbs to no more than 30-45 grams with each meal. Never eat carbs by themselves, always add protein. Make snacks low carb or non-carb only.    -- Continue checking blood sugar 3 times daily: Fasting blood sugar and vary your next 2 readings: Before lunch, before supper, 2 hours after any meal, or bedtime.   -Blood sugar goals should be a fasting blood sugar between , and no blood sugars throughout the day over 180 is good, less than 160 better, less than 140 perfect.    --Carry glucose tablets/soft peppermints/regular juice or Coke product with you at all times to treat a low blood sugar episode (less than 70). If your blood sugar is between 50-70, Chew 4 tablets or drink 1/2 cup of juice or regular Coke product. If your blood sugar is below 50, double the treatment. Re-check blood sugar in 15 minutes. If still low, repeat this. Always call the clinic to give an update for any low blood sugar episodes.    --Exercise as tolerated: Goal 30 minutes/day, 5 days/week. Start slowly and increase as tolerated.    --Follow-up for your next visit in 6 weeks.     --Please either drop off, fax, or MyChart your readings to me as needed.                                                   
bilateral upper extremity Active ROM was WFL (within functional limits)/bilateral  lower extremity Active ROM was WFL (within functional limits)

## 2025-03-25 LAB
ALBUMIN SERPL ELPH-MCNC: 2.7 G/DL — LOW (ref 3.3–5)
ALP SERPL-CCNC: 61 U/L — SIGNIFICANT CHANGE UP (ref 40–120)
ALT FLD-CCNC: <5 U/L — LOW (ref 10–45)
ANION GAP SERPL CALC-SCNC: 8 MMOL/L — SIGNIFICANT CHANGE UP (ref 5–17)
APTT BLD: 29.3 SEC — SIGNIFICANT CHANGE UP (ref 24.5–35.6)
AST SERPL-CCNC: 5 U/L — LOW (ref 10–40)
BILIRUB SERPL-MCNC: 0.3 MG/DL — SIGNIFICANT CHANGE UP (ref 0.2–1.2)
BLD GP AB SCN SERPL QL: NEGATIVE — SIGNIFICANT CHANGE UP
BUN SERPL-MCNC: 31 MG/DL — HIGH (ref 7–23)
CALCIUM SERPL-MCNC: 9.1 MG/DL — SIGNIFICANT CHANGE UP (ref 8.4–10.5)
CHLORIDE SERPL-SCNC: 109 MMOL/L — HIGH (ref 96–108)
CO2 SERPL-SCNC: 24 MMOL/L — SIGNIFICANT CHANGE UP (ref 22–31)
CREAT SERPL-MCNC: 0.88 MG/DL — SIGNIFICANT CHANGE UP (ref 0.5–1.3)
EGFR: 107 ML/MIN/1.73M2 — SIGNIFICANT CHANGE UP
EGFR: 107 ML/MIN/1.73M2 — SIGNIFICANT CHANGE UP
GLUCOSE BLDC GLUCOMTR-MCNC: 116 MG/DL — HIGH (ref 70–99)
GLUCOSE BLDC GLUCOMTR-MCNC: 124 MG/DL — HIGH (ref 70–99)
GLUCOSE BLDC GLUCOMTR-MCNC: 158 MG/DL — HIGH (ref 70–99)
GLUCOSE BLDC GLUCOMTR-MCNC: 163 MG/DL — HIGH (ref 70–99)
GLUCOSE SERPL-MCNC: 106 MG/DL — HIGH (ref 70–99)
HCT VFR BLD CALC: 23.9 % — LOW (ref 39–50)
HGB BLD-MCNC: 7.6 G/DL — LOW (ref 13–17)
INR BLD: 0.92 RATIO — SIGNIFICANT CHANGE UP (ref 0.85–1.16)
MAGNESIUM SERPL-MCNC: 2 MG/DL — SIGNIFICANT CHANGE UP (ref 1.6–2.6)
MCHC RBC-ENTMCNC: 30.8 PG — SIGNIFICANT CHANGE UP (ref 27–34)
MCHC RBC-ENTMCNC: 31.8 G/DL — LOW (ref 32–36)
MCV RBC AUTO: 96.8 FL — SIGNIFICANT CHANGE UP (ref 80–100)
NRBC BLD AUTO-RTO: 0 /100 WBCS — SIGNIFICANT CHANGE UP (ref 0–0)
PHOSPHATE SERPL-MCNC: 3.6 MG/DL — SIGNIFICANT CHANGE UP (ref 2.5–4.5)
PLATELET # BLD AUTO: 173 K/UL — SIGNIFICANT CHANGE UP (ref 150–400)
POTASSIUM SERPL-MCNC: 4.9 MMOL/L — SIGNIFICANT CHANGE UP (ref 3.5–5.3)
POTASSIUM SERPL-SCNC: 4.9 MMOL/L — SIGNIFICANT CHANGE UP (ref 3.5–5.3)
PROT SERPL-MCNC: 5.6 G/DL — LOW (ref 6–8.3)
PROTHROM AB SERPL-ACNC: 10.5 SEC — SIGNIFICANT CHANGE UP (ref 9.9–13.4)
RBC # BLD: 2.47 M/UL — LOW (ref 4.2–5.8)
RBC # FLD: 18.7 % — HIGH (ref 10.3–14.5)
RH IG SCN BLD-IMP: POSITIVE — SIGNIFICANT CHANGE UP
SODIUM SERPL-SCNC: 141 MMOL/L — SIGNIFICANT CHANGE UP (ref 135–145)
TACROLIMUS SERPL-MCNC: 3.5 NG/ML — SIGNIFICANT CHANGE UP
WBC # BLD: 3.75 K/UL — LOW (ref 3.8–10.5)
WBC # FLD AUTO: 3.75 K/UL — LOW (ref 3.8–10.5)

## 2025-03-25 PROCEDURE — 71045 X-RAY EXAM CHEST 1 VIEW: CPT | Mod: 26

## 2025-03-25 PROCEDURE — 99223 1ST HOSP IP/OBS HIGH 75: CPT | Mod: GC

## 2025-03-25 RX ORDER — PREDNISONE 20 MG/1
10 TABLET ORAL DAILY
Refills: 0 | Status: DISCONTINUED | OUTPATIENT
Start: 2025-03-26 | End: 2025-03-28

## 2025-03-25 RX ORDER — TACROLIMUS 0.5 MG/1
6 CAPSULE ORAL
Refills: 0 | Status: DISCONTINUED | OUTPATIENT
Start: 2025-03-26 | End: 2025-03-26

## 2025-03-25 RX ORDER — TACROLIMUS 0.5 MG/1
1 CAPSULE ORAL ONCE
Refills: 0 | Status: COMPLETED | OUTPATIENT
Start: 2025-03-25 | End: 2025-03-25

## 2025-03-25 RX ORDER — TACROLIMUS 0.5 MG/1
5 CAPSULE ORAL
Refills: 0 | Status: DISCONTINUED | OUTPATIENT
Start: 2025-03-25 | End: 2025-03-26

## 2025-03-25 RX ADMIN — VALGANCICLOVIR 450 MILLIGRAM(S): 450 TABLET, FILM COATED ORAL at 11:36

## 2025-03-25 RX ADMIN — Medication 81 MILLIGRAM(S): at 11:45

## 2025-03-25 RX ADMIN — TRAMADOL HYDROCHLORIDE 50 MILLIGRAM(S): 50 TABLET, FILM COATED ORAL at 12:50

## 2025-03-25 RX ADMIN — MYCOPHENOLIC ACID 360 MILLIGRAM(S): 360 TABLET, DELAYED RELEASE ORAL at 08:05

## 2025-03-25 RX ADMIN — HEPARIN SODIUM 5000 UNIT(S): 1000 INJECTION INTRAVENOUS; SUBCUTANEOUS at 17:13

## 2025-03-25 RX ADMIN — CARVEDILOL 3.12 MILLIGRAM(S): 3.12 TABLET, FILM COATED ORAL at 17:18

## 2025-03-25 RX ADMIN — Medication 3 MILLIGRAM(S): at 21:21

## 2025-03-25 RX ADMIN — Medication 40 MILLIGRAM(S): at 06:16

## 2025-03-25 RX ADMIN — CEFTRIAXONE 100 MILLIGRAM(S): 500 INJECTION, POWDER, FOR SOLUTION INTRAMUSCULAR; INTRAVENOUS at 08:05

## 2025-03-25 RX ADMIN — TRAMADOL HYDROCHLORIDE 50 MILLIGRAM(S): 50 TABLET, FILM COATED ORAL at 22:22

## 2025-03-25 RX ADMIN — ATOVAQUONE 1500 MILLIGRAM(S): 750 SUSPENSION ORAL at 11:34

## 2025-03-25 RX ADMIN — IPRATROPIUM BROMIDE AND ALBUTEROL SULFATE 3 MILLILITER(S): .5; 2.5 SOLUTION RESPIRATORY (INHALATION) at 17:13

## 2025-03-25 RX ADMIN — TRAMADOL HYDROCHLORIDE 50 MILLIGRAM(S): 50 TABLET, FILM COATED ORAL at 11:50

## 2025-03-25 RX ADMIN — TRAMADOL HYDROCHLORIDE 50 MILLIGRAM(S): 50 TABLET, FILM COATED ORAL at 21:22

## 2025-03-25 RX ADMIN — IPRATROPIUM BROMIDE AND ALBUTEROL SULFATE 3 MILLILITER(S): .5; 2.5 SOLUTION RESPIRATORY (INHALATION) at 06:22

## 2025-03-25 RX ADMIN — IPRATROPIUM BROMIDE AND ALBUTEROL SULFATE 3 MILLILITER(S): .5; 2.5 SOLUTION RESPIRATORY (INHALATION) at 11:35

## 2025-03-25 RX ADMIN — IPRATROPIUM BROMIDE AND ALBUTEROL SULFATE 3 MILLILITER(S): .5; 2.5 SOLUTION RESPIRATORY (INHALATION) at 00:40

## 2025-03-25 RX ADMIN — Medication 1 APPLICATION(S): at 06:16

## 2025-03-25 RX ADMIN — Medication 800 MILLIGRAM(S): at 08:04

## 2025-03-25 RX ADMIN — CARVEDILOL 3.12 MILLIGRAM(S): 3.12 TABLET, FILM COATED ORAL at 06:16

## 2025-03-25 RX ADMIN — MYCOPHENOLIC ACID 360 MILLIGRAM(S): 360 TABLET, DELAYED RELEASE ORAL at 19:39

## 2025-03-25 RX ADMIN — VELPATASVIR AND SOFOSBUVIR 1 TABLET(S): 50; 200 TABLET, FILM COATED ORAL at 11:44

## 2025-03-25 RX ADMIN — TACROLIMUS 1 MILLIGRAM(S): 0.5 CAPSULE ORAL at 11:50

## 2025-03-25 RX ADMIN — PREDNISONE 15 MILLIGRAM(S): 20 TABLET ORAL at 06:16

## 2025-03-25 RX ADMIN — HEPARIN SODIUM 5000 UNIT(S): 1000 INJECTION INTRAVENOUS; SUBCUTANEOUS at 06:16

## 2025-03-25 RX ADMIN — POLYETHYLENE GLYCOL 3350 17 GRAM(S): 17 POWDER, FOR SOLUTION ORAL at 11:35

## 2025-03-25 RX ADMIN — TACROLIMUS 5 MILLIGRAM(S): 0.5 CAPSULE ORAL at 19:39

## 2025-03-25 RX ADMIN — INSULIN LISPRO 2: 100 INJECTION, SOLUTION INTRAVENOUS; SUBCUTANEOUS at 12:40

## 2025-03-25 RX ADMIN — TACROLIMUS 5 MILLIGRAM(S): 0.5 CAPSULE ORAL at 08:05

## 2025-03-25 RX ADMIN — Medication 100 MILLIGRAM(S): at 11:36

## 2025-03-25 RX ADMIN — Medication 800 MILLIGRAM(S): at 17:13

## 2025-03-25 RX ADMIN — INSULIN GLARGINE-YFGN 6 UNIT(S): 100 INJECTION, SOLUTION SUBCUTANEOUS at 21:21

## 2025-03-25 NOTE — PROGRESS NOTE ADULT - PROBLEM SELECTOR PLAN 1
3/17 - s/p VATS for decortication of right lung  3/21 CT # 2 posterior CT to remain to suction.  CT # 1 anterior - clamped, obtain cxr this afternoon - Anterior chest tube removed  3/23 Right Posterior CT placed to water seal, rpt cxr this afternoon.   3/24 VSS Posterior chest tube output 20/30 24 hrs,  + air leak, IP consulted for possible endobronchial valve   3/25 VSS Posterior chest tube output 20/60 24 hrs, +air leak.   OOB to chair  incentive spirometry   Daily CXR  care as per primary team

## 2025-03-25 NOTE — PROGRESS NOTE ADULT - SUBJECTIVE AND OBJECTIVE BOX
Transplant Surgery - Multidisciplinary Rounds  --------------------------------------------------------------   OLT 02/27/25            Present:   Patient seen and examined with multidisciplinary Transplant team including Surgeon Dr. Callaway Hepatologist Dr. Burgess,  ESTRELLA Sanchez/Nicholas, Pharmacist: David and bedside RN during AM rounds.   Disciplines not in attendance will be notified of the plan.     HPI: 44M PMH with PMHx of decompensated ETOH cirrhosis c/b recurrent ascites, grade II esophageal varices, and hepatic encephalopathy. 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 Admitted with Hyperkalemia and planned Thoracotomy on 3/17/2025    Interval Events:  -Afebrile, VSS  -no acute events overnight   -CT to WS, w/ + air leak    Immunosuppression  Maintenance: FK per level, Myfortic 360 BID, pred 10  Ongoing monitoring for signs of rejection     Potential Discharge date: TBD  Education:  Medications  Plan of care:  See Below        MEDICATIONS  (STANDING):  albuterol/ipratropium for Nebulization 3 milliLiter(s) Nebulizer every 6 hours  aspirin enteric coated 81 milliGRAM(s) Oral daily  atovaquone  Suspension 1500 milliGRAM(s) Oral daily  carvedilol 3.125 milliGRAM(s) Oral every 12 hours  cefTRIAXone   IVPB 2000 milliGRAM(s) IV Intermittent every 24 hours  chlorhexidine 2% Cloths 1 Application(s) Topical <User Schedule>  heparin   Injectable 5000 Unit(s) SubCutaneous every 12 hours  insulin glargine Injectable (LANTUS) 6 Unit(s) SubCutaneous at bedtime  insulin lispro (ADMELOG) corrective regimen sliding scale   SubCutaneous three times a day before meals  insulin lispro (ADMELOG) corrective regimen sliding scale   SubCutaneous at bedtime  magnesium oxide 800 milliGRAM(s) Oral two times a day with meals  mycophenolic acid  milliGRAM(s) Oral <User Schedule>  pantoprazole    Tablet 40 milliGRAM(s) Oral before breakfast  polyethylene glycol 3350 17 Gram(s) Oral daily  senna 2 Tablet(s) Oral at bedtime  sofosbuvir 400 mG/velpatasvir 100 mG (EPCLUSA) 1 Tablet(s) Oral daily  tacrolimus 5 milliGRAM(s) Oral <User Schedule>  thiamine 100 milliGRAM(s) Oral daily  valGANciclovir 450 milliGRAM(s) Oral daily    MEDICATIONS  (PRN):  albuterol    90 MICROgram(s) HFA Inhaler 1 Puff(s) Inhalation every 6 hours PRN Shortness of Breath and/or Wheezing  melatonin 3 milliGRAM(s) Oral at bedtime PRN Insomnia  sodium chloride 0.65% Nasal 1 Spray(s) Both Nostrils two times a day PRN Nasal Congestion  traMADol 50 milliGRAM(s) Oral every 6 hours PRN Severe Pain (7 - 10)      PAST MEDICAL & SURGICAL HISTORY:  Sleep apnea, obstructive      Alcohol abuse      Cirrhosis of liver      Portal hypertension      H/O esophageal varices      Anemia      History of alcohol use disorder      Hepatic encephalopathy      Pleural effusion      History of pleural empyema      Liver transplanted      S/P abdominal paracentesis      History of lung surgery          Vital Signs Last 24 Hrs  T(C): 36.6 (25 Mar 2025 21:11), Max: 36.8 (25 Mar 2025 17:10)  T(F): 97.8 (25 Mar 2025 21:11), Max: 98.3 (25 Mar 2025 17:10)  HR: 78 (25 Mar 2025 21:11) (77 - 88)  BP: 139/66 (25 Mar 2025 21:11) (120/67 - 139/66)  BP(mean): --  RR: 18 (25 Mar 2025 21:11) (18 - 18)  SpO2: 100% (25 Mar 2025 21:11) (97% - 100%)    Parameters below as of 25 Mar 2025 21:11  Patient On (Oxygen Delivery Method): room air        I&O's Summary    24 Mar 2025 07:01  -  25 Mar 2025 07:00  --------------------------------------------------------  IN: 1600 mL / OUT: 3360 mL / NET: -1760 mL    25 Mar 2025 07:01  -  25 Mar 2025 22:20  --------------------------------------------------------  IN: 820 mL / OUT: 1550 mL / NET: -730 mL                              7.6    3.75  )-----------( 173      ( 25 Mar 2025 06:14 )             23.9     03-25    141  |  109[H]  |  31[H]  ----------------------------<  106[H]  4.9   |  24  |  0.88    Ca    9.1      25 Mar 2025 06:15  Phos  3.6     03-25  Mg     2.0     03-25    TPro  5.6[L]  /  Alb  2.7[L]  /  TBili  0.3  /  DBili  x   /  AST  5[L]  /  ALT  <5[L]  /  AlkPhos  61  03-25    Tacrolimus (), Serum: 3.5 ng/mL (03-25 @ 06:14)                    Review of systems  All other systems were reviewed and are negative, except as noted.    PHYSICAL EXAM:  Constitutional: Well developed / well nourished  Eyes: PERRLA  ENMT: nc/at, no thrush  Neck: supple  Respiratory: slight decreased BS on RLL, good airway movement, no adventitious sounds. CT x 1 to WS  Cardiovascular: RRR  Gastrointestinal: Soft abdomen, ND, incision healed   Genitourinary: voiding  Extremities: SCD's in place and working bilaterally  Vascular: Palpable dp pulses bilaterally.   Neurological: A&O x3  Skin: no rashes, ulcerations, lesions  Musculoskeletal: Moving all extremities

## 2025-03-25 NOTE — CONSULT NOTE ADULT - ATTENDING COMMENTS
Patient is a 44M with a history of ROSA on PAP therapy (currently held), decompensated EtOH cirrhosis with varices/ascites/hepatic encephalopathy s/p liver transplant on 2/27/25 who was admitted 3/14/25 for planned thoracotomy with Dr. Ramírez.    During hospitalization in January 2025, prior to transplant, the patient was found to have a complex pleural effusion/empyema requiring chest tube placement and then R VATS (1/28/25) which was done with unroofing of an abscess. He had subsequent coagulopathy due to his liver disease and oozing resulting in retained hemothorax. The decision was made to proceed post liver transplant with VATS for removal of hemothorax and total decortication.  - He was noted to have fibrinous material throughout the chest cavity which was removed and then the decortication was performed. 2 chest tubes were placed and the lung was reinflated. Subsequent to this he has 1 chest tube that remains in place with a persistent forced air leak.    Fortunately, he is saturating well on RA (100% at time of our evaluation) and denies chest pain or palpitations. He denies any other prior pulmonary history.  - He tells me that his sister-in-law is a pulmonary/sleep physician in [place]. We attempted to call her during the evaluation but were unable to get through.      #Pneumothorax  - Chest tube noted with persistent forced air leak  - Dr. Durbin is aware of the case and will discuss further with Dr. Ramírez regarding potential endobronchial valve placement. At [facility] this would have to be done in the main OR and will need to be coordinated based on timing and availability.  - Either Dr. Durbin or Dr. Mackay will come to [facility] to discuss the risks/benefits of valve placement with patient as well as potential logistics of procedure    #Obstructive Sleep Apnea  - Would defer PAP therapy at this time  - Outpatient follow-up for repeat sleep study    #DVT proph      Discussed with patient and Interventional Pulmonary team. All questions answered. Patient is a 44M with a history of ROSA on PAP therapy (currently held), decompensated EtOH cirrhosis with varices/ascites/hepatic encephalopathy s/p liver transplant on 2/27/25 who was admitted 3/14/25 for planned thoracotomy with Dr. Ramírez.    During hospitalization in January 2025, prior to transplant, the patient was found to have a complex pleural effusion/empyema requiring chest tube placement and then R VATS (1/28/25) which was done with unroofing of an abscess. He had subsequent coagulopathy due to his liver disease and oozing resulting in retained hemothorax. The decision was made to proceed post liver transplant with VATS for removal of hemothorax and total decortication.  - He was noted to have fibrinous material throughout the chest cavity which was removed and then the decortication was performed. 2 chest tubes were placed and the lung was reinflated. Subsequent to this he has 1 chest tube that remains in place with a persistent forced air leak.    Fortunately, he is saturating well on RA (100% at time of our evaluation) and denies chest pain or palpitations. He denies any other prior pulmonary history.  - He tells me that his sister-in-law is a pulmonary/sleep physician in [place]. We attempted to call her during the evaluation but were unable to get through.      #Pneumothorax  - Chest tube noted with persistent forced air leak and further evaluation and potential intervention with Interventional Pulmonary would be appropriate  - Dr. Durbin is aware of the case and either he, or Dr. Mackay, will discuss further with Dr. Ramírez after evaluating the patient regarding potential endobronchial valve placement. At [facility] this would have to be done in the main OR and will need to be coordinated based on timing and availability. Fortunately patient is presently stable from a respiratory standpoint and without signs of acute distress  - Either Dr. Durbin or Dr. Mackay will come to [facility] tomorrow to discuss the risks/benefits of valve placement with patient as well as potential logistics and timing of said procedure    #Obstructive Sleep Apnea  - Would defer PAP therapy at this time given persistent air leak noted on chest tube  - Outpatient follow-up for repeat sleep study    #DVT proph      Discussed with patient and Interventional Pulmonary team. All questions answered. Please do not hesitate to contact the inpatient pulmonary consult team with any questions or concerns.

## 2025-03-25 NOTE — CONSULT NOTE ADULT - TIME BILLING
- preparing to see the patient (eg, review of tests, documents, and imaging)  - performing a medically appropriate evaluation and examination  - speaking with patient and/or family as appropriate  - referring and communicating with other health care professionals  - documenting clinical information in the electronic or other health record  - care coordination.    time spent does not include teaching services

## 2025-03-25 NOTE — PROGRESS NOTE ADULT - SUBJECTIVE AND OBJECTIVE BOX
Patient is a 44y old  Male who presents with a chief complaint of hyperkalemia (25 Mar 2025 09:02)      Vital Signs Last 24 Hrs  T(C): 36.7 (25 @ 09:01), Max: 37 (25 @ 21:32)  T(F): 98 (25 @ 09:01), Max: 98.6 (25 @ 21:32)  HR: 88 (25 09:) (72 - 97)  BP: 130/71 (25 @ 09:01) (126/70 - 156/79)  RR: 18 (25 09:) (18 - 20)  SpO2: 99% (25 09:01) (95% - 100%)                25 @ 07:01  -  25 @ 07:00  --------------------------------------------------------  IN: 1600 mL / OUT: 3360 mL / NET: -1760 mL    25 @ 07:01  -  25 @ 11:30  --------------------------------------------------------  IN: 240 mL / OUT: 300 mL / NET: -60 mL        Daily     Daily Weight in k.3 (25 Mar 2025 05:33)                          7.6    3.75  )-----------( 173      ( 25 Mar 2025 06:14 )             23.9     03-25    141  |  109[H]  |  31[H]  ----------------------------<  106[H]  4.9   |  24  |  0.88    Ca    9.1      25 Mar 2025 06:15  Phos  3.6     -25  Mg     2.0         TPro  5.6[L]  /  Alb  2.7[L]  /  TBili  0.3  /  DBili  x   /  AST  5[L]  /  ALT  <5[L]  /  AlkPhos  61  -          PHYSICAL EXAM  Neurology: A&Ox3, NAD, no gross deficits  CV : RRR+S1S2  Lungs: Respirations non-labored, B/L BS  Abdomen: Soft, NT/ND, +BSx4Q  Extremities: B/L LE no edema, negative calf tenderness, +PP           +right chest tube +airleak     MEDICATIONS  albuterol    90 MICROgram(s) HFA Inhaler 1 Puff(s) Inhalation every 6 hours PRN  albuterol/ipratropium for Nebulization 3 milliLiter(s) Nebulizer every 6 hours  aspirin enteric coated 81 milliGRAM(s) Oral daily  atovaquone  Suspension 1500 milliGRAM(s) Oral daily  carvedilol 3.125 milliGRAM(s) Oral every 12 hours  cefTRIAXone   IVPB 2000 milliGRAM(s) IV Intermittent every 24 hours  chlorhexidine 2% Cloths 1 Application(s) Topical <User Schedule>  heparin   Injectable 5000 Unit(s) SubCutaneous every 12 hours  insulin glargine Injectable (LANTUS) 6 Unit(s) SubCutaneous at bedtime  insulin lispro (ADMELOG) corrective regimen sliding scale   SubCutaneous three times a day before meals  insulin lispro (ADMELOG) corrective regimen sliding scale   SubCutaneous at bedtime  magnesium oxide 800 milliGRAM(s) Oral two times a day with meals  melatonin 3 milliGRAM(s) Oral at bedtime PRN  mycophenolic acid  milliGRAM(s) Oral <User Schedule>  pantoprazole    Tablet 40 milliGRAM(s) Oral before breakfast  polyethylene glycol 3350 17 Gram(s) Oral daily  senna 2 Tablet(s) Oral at bedtime  sodium chloride 0.65% Nasal 1 Spray(s) Both Nostrils two times a day PRN  sofosbuvir 400 mG/velpatasvir 100 mG (EPCLUSA) 1 Tablet(s) Oral daily  thiamine 100 milliGRAM(s) Oral daily  traMADol 50 milliGRAM(s) Oral every 6 hours PRN  valGANciclovir 450 milliGRAM(s) Oral daily

## 2025-03-25 NOTE — PROGRESS NOTE ADULT - ASSESSMENT
44M PMH with PMHx of decompensated ETOH cirrhosis c/b recurrent ascites, grade II esophageal varices, and hepatic encephalopathy. 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  S/P Thoracotomy on 3/17/2025    [ ] s/p HCV + DCD OLT   - good graft function  - HCV viremia/ Genotype 1A (donor hcv +) - on Epclusa since 3/1   - SQH/ ASA    [] Immuno  - FK per level,  Myfortic 360BID, Pred 10QD  - ppx: no bactrim due to sulfa allergy -> Mepron, valcyte, oscal, PPI    [] h/o R Empyema   - s/p VATS POD#5 for decortication of right lung [3/17]   - anterior CT removed 3/21  - posterior CT to WS check daily CXRs, CT w/ air leak per thoracic, IP following  - continue CTX, end date 3/31   - Thoracic Sx following

## 2025-03-25 NOTE — PROGRESS NOTE ADULT - ASSESSMENT
44M PMH with PMHx of decompensated ETOH cirrhosis c/b recurrent ascites, grade II esophageal varices, hepatic encephalopathy. 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 Admitted with Hyperkalemia  3/17Bronchoscopy with decortication of right lung using video-assisted thoracoscopic surgery (VATS)   VATS exploration of pleural space, with evacuation of hemothorax. Cortical peel of R lower, middle, and upper lobes collected. Lung re-expanded with contact to chest wall at end of case.  Right empyema fluid, Right middle, upper, and lower lobe peels  3/18 VSS maintain chest  tube lws  3/19 VSS maintain   chest  tube  lws .  water seal in am 3/20 daily xray  OOB to chair   may ambulate with chest tube incentive spirometry   3/20 Chest tubes to waterseal  + airleak posterior tube  CXR 2 pm  3/21 CT # 2 posterior CT to remain to suction.  CT # 1 anterior - clamped, obtain cxr this afternoon. Addendum: Anterior CT removed, pending CXR.   3/22 VSS Posterior CT - to be placed to water seal, rpt cxr this afternoon. Addendum: CXR with slight increase in size in right pnx. CT placed back to suction.   3/23 VSS, Posterior chest tube output 60cc/24h, no air leak, chest tube placed to water seal, check repeat CXR in 4 hrs.   3/24 VSS Posterior chest tube output 20/30 24 hrs, + air leak. IP consulted per Dr. Ramírez for possible endobronchial valve.   3/25 VSS Posterior chest tube output 20/60 24 hrs, +air leak.   44M PMH with PMHx of decompensated ETOH cirrhosis c/b recurrent ascites, grade II esophageal varices, hepatic encephalopathy. 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 Admitted with Hyperkalemia  3/17Bronchoscopy with decortication of right lung using video-assisted thoracoscopic surgery (VATS)   VATS exploration of pleural space, with evacuation of hemothorax. Cortical peel of R lower, middle, and upper lobes collected. Lung re-expanded with contact to chest wall at end of case.  Right empyema fluid, Right middle, upper, and lower lobe peels  3/18 VSS maintain chest  tube lws  3/19 VSS maintain   chest  tube  lws .  water seal in am 3/20 daily xray  OOB to chair   may ambulate with chest tube incentive spirometry   3/20 Chest tubes to waterseal  + airleak posterior tube  CXR 2 pm  3/21 CT # 2 posterior CT to remain to suction.  CT # 1 anterior - clamped, obtain cxr this afternoon. Addendum: Anterior CT removed, pending CXR.   3/22 VSS Posterior CT - to be placed to water seal, rpt cxr this afternoon. Addendum: CXR with slight increase in size in right pnx. CT placed back to suction.   3/23 VSS, Posterior chest tube output 60cc/24h, no air leak, chest tube placed to water seal, check repeat CXR in 4 hrs.   3/24 VSS Posterior chest tube output 20/30 24 hrs, + air leak. IP consulted per Dr. Ramírez for possible endobronchial valve.   3/25 VSS Posterior chest tube output 20/60 24 hrs, +air leak.

## 2025-03-25 NOTE — CONSULT NOTE ADULT - SUBJECTIVE AND OBJECTIVE BOX
CHIEF COMPLAINT:    HPI:    PAST MEDICAL & SURGICAL HISTORY:  Sleep apnea, obstructive      Alcohol abuse      Cirrhosis of liver      Portal hypertension      H/O esophageal varices      Anemia      History of alcohol use disorder      Hepatic encephalopathy      Pleural effusion      History of pleural empyema      Liver transplanted      S/P abdominal paracentesis      History of lung surgery          FAMILY HISTORY:  Family history of CABG (Mother)    FHx: multiple myeloma (Father)    FHx: diabetes mellitus (Father)        SOCIAL HISTORY:  Smoking: [ ] Never Smoked [ ] Former Smoker (__ packs x ___ years) [ ] Current Smoker  (__ packs x ___ years)  Substance Use: [ ] Never Used [ ] Used ____  EtOH Use:  Marital Status: [ ] Single [ ]  [ ]  [ ]   Sexual History:   Occupation:  Recent Travel:  Country of Birth:  Advance Directives:    Allergies    sulfa drugs (Rash)    Intolerances        HOME MEDICATIONS:  Home Medications:  aspirin 81 mg oral delayed release tablet: 1 tab(s) orally once a day (13 Mar 2025 19:14)  atovaquone 750 mg/5 mL oral suspension: 10 milliliter(s) orally once a day (13 Mar 2025 19:14)  carvedilol 3.125 mg oral tablet: 1 tab(s) orally every 12 hours (13 Mar 2025 19:14)  Lantus Solostar Pen 100 units/mL subcutaneous solution: 6 unit(s) subcutaneous once a day (at bedtime) (13 Mar 2025 19:14)  magnesium oxide 400 mg oral tablet: 1 tab(s) orally 2 times a day (with meals) (13 Mar 2025 19:14)  mycophenolic acid 360 mg oral delayed release tablet: 1 tab(s) orally 2 times a day (13 Mar 2025 19:14)  pantoprazole 40 mg oral delayed release tablet: 1 tab(s) orally once a day (before a meal) (13 Mar 2025 19:14)  predniSONE 20 mg oral tablet: 1 tab(s) orally once a day (13 Mar 2025 19:14)  sofosbuvir-velpatasvir 400 mg-100 mg oral tablet: 1 tab(s) orally once a day (at bedtime) (13 Mar 2025 19:14)  tacrolimus 1 mg oral capsule: 2 cap(s) orally 2 times a day (13 Mar 2025 19:14)  thiamine 100 mg oral tablet: 1 tab(s) orally once a day (13 Mar 2025 19:14)  valGANciclovir 450 mg oral tablet: 1 tab(s) orally once a day (13 Mar 2025 19:14)      REVIEW OF SYSTEMS:  [ x ] All other systems negative  [ ] Unable to assess ROS because ________    OBJECTIVE:  ICU Vital Signs Last 24 Hrs  T(C): 36.7 (25 Mar 2025 13:10), Max: 37 (24 Mar 2025 21:32)  T(F): 98 (25 Mar 2025 13:10), Max: 98.6 (24 Mar 2025 21:32)  HR: 78 (25 Mar 2025 13:10) (78 - 88)  BP: 124/65 (25 Mar 2025 13:10) (124/65 - 156/79)  BP(mean): --  ABP: --  ABP(mean): --  RR: 18 (25 Mar 2025 13:10) (18 - 18)  SpO2: 100% (25 Mar 2025 13:10) (95% - 100%)    O2 Parameters below as of 25 Mar 2025 13:10  Patient On (Oxygen Delivery Method): room air              03-24 @ 07:01  -  03-25 @ 07:00  --------------------------------------------------------  IN: 1600 mL / OUT: 3360 mL / NET: -1760 mL    03-25 @ 07:01 - 03-25 @ 15:11  --------------------------------------------------------  IN: 600 mL / OUT: 950 mL / NET: -350 mL      CAPILLARY BLOOD GLUCOSE      POCT Blood Glucose.: 163 mg/dL (25 Mar 2025 12:34)      PHYSICAL EXAM:  General: NAD  HEENT: Normal sclera  Lymph Nodes: No LAD  Respiratory: CTABL  Cardiovascular: Regular rate and rhythm no m/r/g  Abdomen: Nontender nondistended  Extremities: No peripheral edema  Skin: No rashes  Neurological: No appreciable neurologic deficits  Psychiatry: Appropriate mood and affect    LINES:     HOSPITAL MEDICATIONS:  Standing Meds:  albuterol/ipratropium for Nebulization 3 milliLiter(s) Nebulizer every 6 hours  aspirin enteric coated 81 milliGRAM(s) Oral daily  atovaquone  Suspension 1500 milliGRAM(s) Oral daily  carvedilol 3.125 milliGRAM(s) Oral every 12 hours  cefTRIAXone   IVPB 2000 milliGRAM(s) IV Intermittent every 24 hours  chlorhexidine 2% Cloths 1 Application(s) Topical <User Schedule>  heparin   Injectable 5000 Unit(s) SubCutaneous every 12 hours  insulin glargine Injectable (LANTUS) 6 Unit(s) SubCutaneous at bedtime  insulin lispro (ADMELOG) corrective regimen sliding scale   SubCutaneous three times a day before meals  insulin lispro (ADMELOG) corrective regimen sliding scale   SubCutaneous at bedtime  magnesium oxide 800 milliGRAM(s) Oral two times a day with meals  mycophenolic acid  milliGRAM(s) Oral <User Schedule>  pantoprazole    Tablet 40 milliGRAM(s) Oral before breakfast  polyethylene glycol 3350 17 Gram(s) Oral daily  senna 2 Tablet(s) Oral at bedtime  sofosbuvir 400 mG/velpatasvir 100 mG (EPCLUSA) 1 Tablet(s) Oral daily  tacrolimus 5 milliGRAM(s) Oral <User Schedule>  thiamine 100 milliGRAM(s) Oral daily  valGANciclovir 450 milliGRAM(s) Oral daily      PRN Meds:  albuterol    90 MICROgram(s) HFA Inhaler 1 Puff(s) Inhalation every 6 hours PRN  melatonin 3 milliGRAM(s) Oral at bedtime PRN  sodium chloride 0.65% Nasal 1 Spray(s) Both Nostrils two times a day PRN  traMADol 50 milliGRAM(s) Oral every 6 hours PRN      LABS:                        7.6    3.75  )-----------( 173      ( 25 Mar 2025 06:14 )             23.9     Hgb Trend: 7.6<--, 7.7<--, 7.6<--, 7.5<--, 7.5<--  03-25    141  |  109[H]  |  31[H]  ----------------------------<  106[H]  4.9   |  24  |  0.88    Ca    9.1      25 Mar 2025 06:15  Phos  3.6     03-25  Mg     2.0     03-25    TPro  5.6[L]  /  Alb  2.7[L]  /  TBili  0.3  /  DBili  x   /  AST  5[L]  /  ALT  <5[L]  /  AlkPhos  61  03-25    Creatinine Trend: 0.88<--, 0.84<--, 0.83<--, 0.91<--, 0.88<--, 1.11<--  PT/INR - ( 25 Mar 2025 06:14 )   PT: 10.5 sec;   INR: 0.92 ratio         PTT - ( 25 Mar 2025 06:14 )  PTT:29.3 sec  Urinalysis Basic - ( 25 Mar 2025 06:15 )    Color: x / Appearance: x / SG: x / pH: x  Gluc: 106 mg/dL / Ketone: x  / Bili: x / Urobili: x   Blood: x / Protein: x / Nitrite: x   Leuk Esterase: x / RBC: x / WBC x   Sq Epi: x / Non Sq Epi: x / Bacteria: x            MICROBIOLOGY:       RADIOLOGY:  [ x ] Reviewed and interpreted by me    PULMONARY FUNCTION TESTS:    EKG:   CHIEF COMPLAINT:    HPI:    44M w/ PMH of decompensated EtOH cirrhosis w/ esophageal varices, s/p liver transplant on 2/27/2025. He is s/p R empyema s/p VATS w/ decortication in 1/2025 and re-VATS on 3/17/2025 for evacuation of hemothorax.  Now found to have a persistent air leak.  Pulmonary consulted for evaluation.      PAST MEDICAL & SURGICAL HISTORY:  Sleep apnea, obstructive      Alcohol abuse      Cirrhosis of liver      Portal hypertension      H/O esophageal varices      Anemia      History of alcohol use disorder      Hepatic encephalopathy      Pleural effusion      History of pleural empyema      Liver transplanted      S/P abdominal paracentesis      History of lung surgery          FAMILY HISTORY:  Family history of CABG (Mother)    FHx: multiple myeloma (Father)    FHx: diabetes mellitus (Father)        SOCIAL HISTORY:  Smoking: [ ] Never Smoked [ ] Former Smoker (__ packs x ___ years) [ ] Current Smoker  (__ packs x ___ years)  Substance Use: [ ] Never Used [ ] Used ____  EtOH Use:  Marital Status: [ ] Single [ ]  [ ]  [ ]   Sexual History:   Occupation:  Recent Travel:  Country of Birth:  Advance Directives:    Allergies    sulfa drugs (Rash)    Intolerances        HOME MEDICATIONS:  Home Medications:  aspirin 81 mg oral delayed release tablet: 1 tab(s) orally once a day (13 Mar 2025 19:14)  atovaquone 750 mg/5 mL oral suspension: 10 milliliter(s) orally once a day (13 Mar 2025 19:14)  carvedilol 3.125 mg oral tablet: 1 tab(s) orally every 12 hours (13 Mar 2025 19:14)  Lantus Solostar Pen 100 units/mL subcutaneous solution: 6 unit(s) subcutaneous once a day (at bedtime) (13 Mar 2025 19:14)  magnesium oxide 400 mg oral tablet: 1 tab(s) orally 2 times a day (with meals) (13 Mar 2025 19:14)  mycophenolic acid 360 mg oral delayed release tablet: 1 tab(s) orally 2 times a day (13 Mar 2025 19:14)  pantoprazole 40 mg oral delayed release tablet: 1 tab(s) orally once a day (before a meal) (13 Mar 2025 19:14)  predniSONE 20 mg oral tablet: 1 tab(s) orally once a day (13 Mar 2025 19:14)  sofosbuvir-velpatasvir 400 mg-100 mg oral tablet: 1 tab(s) orally once a day (at bedtime) (13 Mar 2025 19:14)  tacrolimus 1 mg oral capsule: 2 cap(s) orally 2 times a day (13 Mar 2025 19:14)  thiamine 100 mg oral tablet: 1 tab(s) orally once a day (13 Mar 2025 19:14)  valGANciclovir 450 mg oral tablet: 1 tab(s) orally once a day (13 Mar 2025 19:14)      REVIEW OF SYSTEMS:  [ x ] All other systems negative  [ ] Unable to assess ROS because ________    OBJECTIVE:  ICU Vital Signs Last 24 Hrs  T(C): 36.7 (25 Mar 2025 13:10), Max: 37 (24 Mar 2025 21:32)  T(F): 98 (25 Mar 2025 13:10), Max: 98.6 (24 Mar 2025 21:32)  HR: 78 (25 Mar 2025 13:10) (78 - 88)  BP: 124/65 (25 Mar 2025 13:10) (124/65 - 156/79)  BP(mean): --  ABP: --  ABP(mean): --  RR: 18 (25 Mar 2025 13:10) (18 - 18)  SpO2: 100% (25 Mar 2025 13:10) (95% - 100%)    O2 Parameters below as of 25 Mar 2025 13:10  Patient On (Oxygen Delivery Method): room air              03-24 @ 07:01 - 03-25 @ 07:00  --------------------------------------------------------  IN: 1600 mL / OUT: 3360 mL / NET: -1760 mL    03-25 @ 07:01 - 03-25 @ 15:11  --------------------------------------------------------  IN: 600 mL / OUT: 950 mL / NET: -350 mL      CAPILLARY BLOOD GLUCOSE      POCT Blood Glucose.: 163 mg/dL (25 Mar 2025 12:34)      PHYSICAL EXAM:  General: NAD  HEENT: Normal sclera  Lymph Nodes: No LAD  Respiratory: CTABL  Cardiovascular: Regular rate and rhythm no m/r/g  Abdomen: Nontender nondistended  Extremities: No peripheral edema  Skin: No rashes  Neurological: No appreciable neurologic deficits  Psychiatry: Appropriate mood and affect    LINES:     HOSPITAL MEDICATIONS:  Standing Meds:  albuterol/ipratropium for Nebulization 3 milliLiter(s) Nebulizer every 6 hours  aspirin enteric coated 81 milliGRAM(s) Oral daily  atovaquone  Suspension 1500 milliGRAM(s) Oral daily  carvedilol 3.125 milliGRAM(s) Oral every 12 hours  cefTRIAXone   IVPB 2000 milliGRAM(s) IV Intermittent every 24 hours  chlorhexidine 2% Cloths 1 Application(s) Topical <User Schedule>  heparin   Injectable 5000 Unit(s) SubCutaneous every 12 hours  insulin glargine Injectable (LANTUS) 6 Unit(s) SubCutaneous at bedtime  insulin lispro (ADMELOG) corrective regimen sliding scale   SubCutaneous three times a day before meals  insulin lispro (ADMELOG) corrective regimen sliding scale   SubCutaneous at bedtime  magnesium oxide 800 milliGRAM(s) Oral two times a day with meals  mycophenolic acid  milliGRAM(s) Oral <User Schedule>  pantoprazole    Tablet 40 milliGRAM(s) Oral before breakfast  polyethylene glycol 3350 17 Gram(s) Oral daily  senna 2 Tablet(s) Oral at bedtime  sofosbuvir 400 mG/velpatasvir 100 mG (EPCLUSA) 1 Tablet(s) Oral daily  tacrolimus 5 milliGRAM(s) Oral <User Schedule>  thiamine 100 milliGRAM(s) Oral daily  valGANciclovir 450 milliGRAM(s) Oral daily      PRN Meds:  albuterol    90 MICROgram(s) HFA Inhaler 1 Puff(s) Inhalation every 6 hours PRN  melatonin 3 milliGRAM(s) Oral at bedtime PRN  sodium chloride 0.65% Nasal 1 Spray(s) Both Nostrils two times a day PRN  traMADol 50 milliGRAM(s) Oral every 6 hours PRN      LABS:                        7.6    3.75  )-----------( 173      ( 25 Mar 2025 06:14 )             23.9     Hgb Trend: 7.6<--, 7.7<--, 7.6<--, 7.5<--, 7.5<--  03-25    141  |  109[H]  |  31[H]  ----------------------------<  106[H]  4.9   |  24  |  0.88    Ca    9.1      25 Mar 2025 06:15  Phos  3.6     03-25  Mg     2.0     03-25    TPro  5.6[L]  /  Alb  2.7[L]  /  TBili  0.3  /  DBili  x   /  AST  5[L]  /  ALT  <5[L]  /  AlkPhos  61  03-25    Creatinine Trend: 0.88<--, 0.84<--, 0.83<--, 0.91<--, 0.88<--, 1.11<--  PT/INR - ( 25 Mar 2025 06:14 )   PT: 10.5 sec;   INR: 0.92 ratio         PTT - ( 25 Mar 2025 06:14 )  PTT:29.3 sec  Urinalysis Basic - ( 25 Mar 2025 06:15 )    Color: x / Appearance: x / SG: x / pH: x  Gluc: 106 mg/dL / Ketone: x  / Bili: x / Urobili: x   Blood: x / Protein: x / Nitrite: x   Leuk Esterase: x / RBC: x / WBC x   Sq Epi: x / Non Sq Epi: x / Bacteria: x            MICROBIOLOGY:       RADIOLOGY:  [ x ] Reviewed and interpreted by me    PULMONARY FUNCTION TESTS:    EKG:

## 2025-03-26 LAB
ALBUMIN SERPL ELPH-MCNC: 2.7 G/DL — LOW (ref 3.3–5)
ALP SERPL-CCNC: 64 U/L — SIGNIFICANT CHANGE UP (ref 40–120)
ALT FLD-CCNC: <5 U/L — LOW (ref 10–45)
ANION GAP SERPL CALC-SCNC: 11 MMOL/L — SIGNIFICANT CHANGE UP (ref 5–17)
APTT BLD: 29.5 SEC — SIGNIFICANT CHANGE UP (ref 24.5–35.6)
AST SERPL-CCNC: <5 U/L — LOW (ref 10–40)
BILIRUB SERPL-MCNC: 0.4 MG/DL — SIGNIFICANT CHANGE UP (ref 0.2–1.2)
BUN SERPL-MCNC: 32 MG/DL — HIGH (ref 7–23)
CALCIUM SERPL-MCNC: 9.1 MG/DL — SIGNIFICANT CHANGE UP (ref 8.4–10.5)
CHLORIDE SERPL-SCNC: 106 MMOL/L — SIGNIFICANT CHANGE UP (ref 96–108)
CO2 SERPL-SCNC: 22 MMOL/L — SIGNIFICANT CHANGE UP (ref 22–31)
CREAT SERPL-MCNC: 0.86 MG/DL — SIGNIFICANT CHANGE UP (ref 0.5–1.3)
EGFR: 108 ML/MIN/1.73M2 — SIGNIFICANT CHANGE UP
EGFR: 108 ML/MIN/1.73M2 — SIGNIFICANT CHANGE UP
GLUCOSE BLDC GLUCOMTR-MCNC: 109 MG/DL — HIGH (ref 70–99)
GLUCOSE BLDC GLUCOMTR-MCNC: 127 MG/DL — HIGH (ref 70–99)
GLUCOSE BLDC GLUCOMTR-MCNC: 139 MG/DL — HIGH (ref 70–99)
GLUCOSE BLDC GLUCOMTR-MCNC: 204 MG/DL — HIGH (ref 70–99)
GLUCOSE SERPL-MCNC: 106 MG/DL — HIGH (ref 70–99)
HCT VFR BLD CALC: 24.5 % — LOW (ref 39–50)
HGB BLD-MCNC: 8 G/DL — LOW (ref 13–17)
INR BLD: 0.92 RATIO — SIGNIFICANT CHANGE UP (ref 0.85–1.16)
MAGNESIUM SERPL-MCNC: 1.8 MG/DL — SIGNIFICANT CHANGE UP (ref 1.6–2.6)
MCHC RBC-ENTMCNC: 31.5 PG — SIGNIFICANT CHANGE UP (ref 27–34)
MCHC RBC-ENTMCNC: 32.7 G/DL — SIGNIFICANT CHANGE UP (ref 32–36)
MCV RBC AUTO: 96.5 FL — SIGNIFICANT CHANGE UP (ref 80–100)
NRBC BLD AUTO-RTO: 0 /100 WBCS — SIGNIFICANT CHANGE UP (ref 0–0)
PHOSPHATE SERPL-MCNC: 3.8 MG/DL — SIGNIFICANT CHANGE UP (ref 2.5–4.5)
PLATELET # BLD AUTO: 181 K/UL — SIGNIFICANT CHANGE UP (ref 150–400)
POTASSIUM SERPL-MCNC: 5 MMOL/L — SIGNIFICANT CHANGE UP (ref 3.5–5.3)
POTASSIUM SERPL-SCNC: 5 MMOL/L — SIGNIFICANT CHANGE UP (ref 3.5–5.3)
PROT SERPL-MCNC: 5.8 G/DL — LOW (ref 6–8.3)
PROTHROM AB SERPL-ACNC: 10.6 SEC — SIGNIFICANT CHANGE UP (ref 9.9–13.4)
RBC # BLD: 2.54 M/UL — LOW (ref 4.2–5.8)
RBC # FLD: 18.7 % — HIGH (ref 10.3–14.5)
SODIUM SERPL-SCNC: 139 MMOL/L — SIGNIFICANT CHANGE UP (ref 135–145)
TACROLIMUS SERPL-MCNC: 5.3 NG/ML — SIGNIFICANT CHANGE UP
WBC # BLD: 4.34 K/UL — SIGNIFICANT CHANGE UP (ref 3.8–10.5)
WBC # FLD AUTO: 4.34 K/UL — SIGNIFICANT CHANGE UP (ref 3.8–10.5)

## 2025-03-26 PROCEDURE — 71045 X-RAY EXAM CHEST 1 VIEW: CPT | Mod: 26

## 2025-03-26 PROCEDURE — 71045 X-RAY EXAM CHEST 1 VIEW: CPT | Mod: 26,77

## 2025-03-26 RX ORDER — TACROLIMUS 0.5 MG/1
6 CAPSULE ORAL
Refills: 0 | Status: DISCONTINUED | OUTPATIENT
Start: 2025-03-26 | End: 2025-03-27

## 2025-03-26 RX ORDER — EPOETIN ALFA 10000 [IU]/ML
10000 SOLUTION INTRAVENOUS; SUBCUTANEOUS ONCE
Refills: 0 | Status: COMPLETED | OUTPATIENT
Start: 2025-03-26 | End: 2025-03-26

## 2025-03-26 RX ADMIN — TRAMADOL HYDROCHLORIDE 50 MILLIGRAM(S): 50 TABLET, FILM COATED ORAL at 07:00

## 2025-03-26 RX ADMIN — TRAMADOL HYDROCHLORIDE 50 MILLIGRAM(S): 50 TABLET, FILM COATED ORAL at 16:41

## 2025-03-26 RX ADMIN — Medication 3 MILLIGRAM(S): at 21:51

## 2025-03-26 RX ADMIN — POLYETHYLENE GLYCOL 3350 17 GRAM(S): 17 POWDER, FOR SOLUTION ORAL at 12:18

## 2025-03-26 RX ADMIN — Medication 800 MILLIGRAM(S): at 17:13

## 2025-03-26 RX ADMIN — VELPATASVIR AND SOFOSBUVIR 1 TABLET(S): 50; 200 TABLET, FILM COATED ORAL at 12:19

## 2025-03-26 RX ADMIN — CARVEDILOL 3.12 MILLIGRAM(S): 3.12 TABLET, FILM COATED ORAL at 05:45

## 2025-03-26 RX ADMIN — HEPARIN SODIUM 5000 UNIT(S): 1000 INJECTION INTRAVENOUS; SUBCUTANEOUS at 05:45

## 2025-03-26 RX ADMIN — Medication 1 APPLICATION(S): at 05:45

## 2025-03-26 RX ADMIN — TRAMADOL HYDROCHLORIDE 50 MILLIGRAM(S): 50 TABLET, FILM COATED ORAL at 15:41

## 2025-03-26 RX ADMIN — Medication 100 MILLIGRAM(S): at 12:20

## 2025-03-26 RX ADMIN — Medication 81 MILLIGRAM(S): at 12:18

## 2025-03-26 RX ADMIN — CEFTRIAXONE 100 MILLIGRAM(S): 500 INJECTION, POWDER, FOR SOLUTION INTRAMUSCULAR; INTRAVENOUS at 08:12

## 2025-03-26 RX ADMIN — HEPARIN SODIUM 5000 UNIT(S): 1000 INJECTION INTRAVENOUS; SUBCUTANEOUS at 17:13

## 2025-03-26 RX ADMIN — TACROLIMUS 6 MILLIGRAM(S): 0.5 CAPSULE ORAL at 19:50

## 2025-03-26 RX ADMIN — VALGANCICLOVIR 450 MILLIGRAM(S): 450 TABLET, FILM COATED ORAL at 12:20

## 2025-03-26 RX ADMIN — MYCOPHENOLIC ACID 360 MILLIGRAM(S): 360 TABLET, DELAYED RELEASE ORAL at 19:50

## 2025-03-26 RX ADMIN — CARVEDILOL 3.12 MILLIGRAM(S): 3.12 TABLET, FILM COATED ORAL at 17:14

## 2025-03-26 RX ADMIN — TACROLIMUS 6 MILLIGRAM(S): 0.5 CAPSULE ORAL at 08:12

## 2025-03-26 RX ADMIN — Medication 800 MILLIGRAM(S): at 08:11

## 2025-03-26 RX ADMIN — Medication 40 MILLIGRAM(S): at 05:45

## 2025-03-26 RX ADMIN — IPRATROPIUM BROMIDE AND ALBUTEROL SULFATE 3 MILLILITER(S): .5; 2.5 SOLUTION RESPIRATORY (INHALATION) at 12:18

## 2025-03-26 RX ADMIN — IPRATROPIUM BROMIDE AND ALBUTEROL SULFATE 3 MILLILITER(S): .5; 2.5 SOLUTION RESPIRATORY (INHALATION) at 17:13

## 2025-03-26 RX ADMIN — TRAMADOL HYDROCHLORIDE 50 MILLIGRAM(S): 50 TABLET, FILM COATED ORAL at 21:48

## 2025-03-26 RX ADMIN — PREDNISONE 10 MILLIGRAM(S): 20 TABLET ORAL at 05:45

## 2025-03-26 RX ADMIN — MYCOPHENOLIC ACID 360 MILLIGRAM(S): 360 TABLET, DELAYED RELEASE ORAL at 08:12

## 2025-03-26 RX ADMIN — IPRATROPIUM BROMIDE AND ALBUTEROL SULFATE 3 MILLILITER(S): .5; 2.5 SOLUTION RESPIRATORY (INHALATION) at 05:58

## 2025-03-26 RX ADMIN — INSULIN LISPRO 4: 100 INJECTION, SOLUTION INTRAVENOUS; SUBCUTANEOUS at 12:19

## 2025-03-26 RX ADMIN — EPOETIN ALFA 10000 UNIT(S): 10000 SOLUTION INTRAVENOUS; SUBCUTANEOUS at 09:52

## 2025-03-26 RX ADMIN — TRAMADOL HYDROCHLORIDE 50 MILLIGRAM(S): 50 TABLET, FILM COATED ORAL at 05:45

## 2025-03-26 RX ADMIN — TRAMADOL HYDROCHLORIDE 50 MILLIGRAM(S): 50 TABLET, FILM COATED ORAL at 22:50

## 2025-03-26 RX ADMIN — ATOVAQUONE 1500 MILLIGRAM(S): 750 SUSPENSION ORAL at 12:20

## 2025-03-26 NOTE — PROGRESS NOTE ADULT - SUBJECTIVE AND OBJECTIVE BOX
Patient is a 44y old  Male who presents with a chief complaint of hyperkalemia (26 Mar 2025 01:57)      Vital Signs Last 24 Hrs  T(C): 36.8 (25 @ 08:35), Max: 36.8 (25 @ 17:10)  T(F): 98.2 (25 @ 08:35), Max: 98.3 (25 @ 17:10)  HR: 74 (25 @ 08:35) (74 - 78)  BP: 127/76 (25 @ 08:35) (120/67 - 139/66)  RR: 18 (25 @ 08:35) (18 - 18)  SpO2: 98% (25 @ 08:35) (95% - 100%)                25 @ 07:01  -  25 @ 07:00  --------------------------------------------------------  IN: 1260 mL / OUT: 3200 mL / NET: -1940 mL    25 @ 07:01  -  25 @ 10:32  --------------------------------------------------------  IN: 460 mL / OUT: 600 mL / NET: -140 mL        Daily     Daily Weight in k.6 (26 Mar 2025 05:12)                          8.0    4.34  )-----------( 181      ( 26 Mar 2025 06:59 )             24.5         139  |  106  |  32[H]  ----------------------------<  106[H]  5.0   |  22  |  0.86    Ca    9.1      26 Mar 2025 06:59  Phos  3.8       Mg     1.8     03-26    TPro  5.8[L]  /  Alb  2.7[L]  /  TBili  0.4  /  DBili  x   /  AST  <5[L]  /  ALT  <5[L]  /  AlkPhos  64            PHYSICAL EXAM  Neurology: A&Ox3, NAD, no gross deficits  CV : RRR+S1S2  Lungs: Respirations non-labored, B/L BS  Abdomen: Soft, NT/ND, +BSx4Q  Extremities: B/L LE edema, negative calf tenderness, +PP           MEDICATIONS  albuterol    90 MICROgram(s) HFA Inhaler 1 Puff(s) Inhalation every 6 hours PRN  albuterol/ipratropium for Nebulization 3 milliLiter(s) Nebulizer every 6 hours  aspirin enteric coated 81 milliGRAM(s) Oral daily  atovaquone  Suspension 1500 milliGRAM(s) Oral daily  carvedilol 3.125 milliGRAM(s) Oral every 12 hours  cefTRIAXone   IVPB 2000 milliGRAM(s) IV Intermittent every 24 hours  chlorhexidine 2% Cloths 1 Application(s) Topical <User Schedule>  heparin   Injectable 5000 Unit(s) SubCutaneous every 12 hours  insulin glargine Injectable (LANTUS) 6 Unit(s) SubCutaneous at bedtime  insulin lispro (ADMELOG) corrective regimen sliding scale   SubCutaneous three times a day before meals  insulin lispro (ADMELOG) corrective regimen sliding scale   SubCutaneous at bedtime  magnesium oxide 800 milliGRAM(s) Oral two times a day with meals  melatonin 3 milliGRAM(s) Oral at bedtime PRN  mycophenolic acid  milliGRAM(s) Oral <User Schedule>  pantoprazole    Tablet 40 milliGRAM(s) Oral before breakfast  polyethylene glycol 3350 17 Gram(s) Oral daily  predniSONE   Tablet 10 milliGRAM(s) Oral daily  senna 2 Tablet(s) Oral at bedtime  sodium chloride 0.65% Nasal 1 Spray(s) Both Nostrils two times a day PRN  sofosbuvir 400 mG/velpatasvir 100 mG (EPCLUSA) 1 Tablet(s) Oral daily  tacrolimus 5 milliGRAM(s) Oral <User Schedule>  tacrolimus 6 milliGRAM(s) Oral <User Schedule>  thiamine 100 milliGRAM(s) Oral daily  traMADol 50 milliGRAM(s) Oral every 6 hours PRN  valGANciclovir 450 milliGRAM(s) Oral daily

## 2025-03-26 NOTE — PROGRESS NOTE ADULT - PROBLEM SELECTOR PLAN 1
3/17 - s/p VATS for decortication of right lung  3/21 CT # 2 posterior CT to remain to suction.  CT # 1 anterior - clamped, obtain cxr this afternoon - Anterior chest tube removed  3/23 Right Posterior CT placed to water seal, rpt cxr this afternoon.   3/24 VSS Posterior chest tube output 20/30 24 hrs,  + air leak, IP consulted for possible endobronchial valve   3/25 VSS Posterior chest tube output 20/60 24 hrs, +air leak.   3/26 VSS Posterior chest tube output 80 in 25 hrs, no air leak seen this AM. CT to be clamped, rpt CXR this afternoon.   OOB to chair  incentive spirometry   Daily CXR  care as per primary team 3/17 - s/p VATS for decortication of right lung  3/21 CT # 2 posterior CT to remain to suction.  CT # 1 anterior - clamped, obtain cxr this afternoon - Anterior chest tube removed  3/23 Right Posterior CT placed to water seal, rpt cxr this afternoon.   3/24 VSS Posterior chest tube output 20/30 24 hrs,  + air leak, IP consulted for possible endobronchial valve   3/25 VSS Posterior chest tube output 20/60 24 hrs, +air leak.   3/26 VSS Posterior chest tube output 80 in 25 hrs, no air leak seen this AM. CT to be clamped, rpt CXR this afternoon. Addendum: CXR stable. CT to remained clamped this afternoon and overnight. rpt CXR in the AM.   OOB to chair  incentive spirometry   Daily CXR  care as per primary team

## 2025-03-26 NOTE — CONSULT NOTE ADULT - SUBJECTIVE AND OBJECTIVE BOX
HPI:  44 year old male with PMH decompensated cirrhosis s/p OLT on 2/27/2025 with post operative course. Patient's pulmonary history is notable for empyema in 2/2025 s/p cortication and hemothorax s/p VATS with evacuation and chest tube placement on 3/17/2025. Patient noted to have persistent air leak from chest tube therefore interventional pulmonology consulted for consideration for endobronchial valve placement.     On exam patient feeling well without chest pain or difficulty breathing. Patient's chest tube was clamped on exam, reports no changes in respiratory status since clamping.       PAST MEDICAL & SURGICAL HISTORY:  Sleep apnea, obstructive      Alcohol abuse      Cirrhosis of liver      Portal hypertension      H/O esophageal varices      Anemia      History of alcohol use disorder      Hepatic encephalopathy      Pleural effusion      History of pleural empyema      Liver transplanted      S/P abdominal paracentesis      History of lung surgery          FAMILY HISTORY:  Family history of CABG (Mother)    FHx: multiple myeloma (Father)    FHx: diabetes mellitus (Father)        SOCIAL HISTORY:  Smoking: [ ] Never Smoked [ ] Former Smoker (__ packs x ___ years) [ ] Current Smoker  (__ packs x ___ years)  Substance Use: [ ] Never Used [ ] Used ____  EtOH Use:  Marital Status: [ ] Single [ ]  [ ]  [ ]   Sexual History:   Occupation:  Recent Travel:  Country of Birth:  Advance Directives:    Allergies    sulfa drugs (Rash)    Intolerances        HOME MEDICATIONS:  Home Medications:  aspirin 81 mg oral delayed release tablet: 1 tab(s) orally once a day (13 Mar 2025 19:14)  atovaquone 750 mg/5 mL oral suspension: 10 milliliter(s) orally once a day (13 Mar 2025 19:14)  carvedilol 3.125 mg oral tablet: 1 tab(s) orally every 12 hours (13 Mar 2025 19:14)  Lantus Solostar Pen 100 units/mL subcutaneous solution: 6 unit(s) subcutaneous once a day (at bedtime) (13 Mar 2025 19:14)  magnesium oxide 400 mg oral tablet: 1 tab(s) orally 2 times a day (with meals) (13 Mar 2025 19:14)  mycophenolic acid 360 mg oral delayed release tablet: 1 tab(s) orally 2 times a day (13 Mar 2025 19:14)  pantoprazole 40 mg oral delayed release tablet: 1 tab(s) orally once a day (before a meal) (13 Mar 2025 19:14)  predniSONE 20 mg oral tablet: 1 tab(s) orally once a day (13 Mar 2025 19:14)  sofosbuvir-velpatasvir 400 mg-100 mg oral tablet: 1 tab(s) orally once a day (at bedtime) (13 Mar 2025 19:14)  tacrolimus 1 mg oral capsule: 2 cap(s) orally 2 times a day (13 Mar 2025 19:14)  thiamine 100 mg oral tablet: 1 tab(s) orally once a day (13 Mar 2025 19:14)  valGANciclovir 450 mg oral tablet: 1 tab(s) orally once a day (13 Mar 2025 19:14)      REVIEW OF SYSTEMS:  All systems negative except as documented above.    OBJECTIVE:  ICU Vital Signs Last 24 Hrs  T(C): 36.6 (26 Mar 2025 12:15), Max: 36.8 (25 Mar 2025 17:10)  T(F): 97.9 (26 Mar 2025 12:15), Max: 98.3 (25 Mar 2025 17:10)  HR: 77 (26 Mar 2025 12:15) (74 - 78)  BP: 133/77 (26 Mar 2025 12:15) (120/67 - 139/66)  BP(mean): --  ABP: --  ABP(mean): --  RR: 20 (26 Mar 2025 12:15) (18 - 20)  SpO2: 99% (26 Mar 2025 12:15) (95% - 100%)    O2 Parameters below as of 26 Mar 2025 08:35  Patient On (Oxygen Delivery Method): room air              03-25 @ 07:01 - 03-26 @ 07:00  --------------------------------------------------------  IN: 1260 mL / OUT: 3200 mL / NET: -1940 mL    03-26 @ 07:01  - 03-26 @ 15:47  --------------------------------------------------------  IN: 700 mL / OUT: 1100 mL / NET: -400 mL      CAPILLARY BLOOD GLUCOSE      POCT Blood Glucose.: 204 mg/dL (26 Mar 2025 11:56)      PHYSICAL EXAM:  General: NAD, resting comfortably   HEENT: Sclera anicteric   Respiratory: normal respiratory effort   Extremities: warm and well perfused  Skin: no rashes  Neurological: AOx3, moving all extremities     HOSPITAL MEDICATIONS:  Standing Meds:  albuterol/ipratropium for Nebulization 3 milliLiter(s) Nebulizer every 6 hours  aspirin enteric coated 81 milliGRAM(s) Oral daily  atovaquone  Suspension 1500 milliGRAM(s) Oral daily  carvedilol 3.125 milliGRAM(s) Oral every 12 hours  cefTRIAXone   IVPB 2000 milliGRAM(s) IV Intermittent every 24 hours  chlorhexidine 2% Cloths 1 Application(s) Topical <User Schedule>  heparin   Injectable 5000 Unit(s) SubCutaneous every 12 hours  insulin glargine Injectable (LANTUS) 6 Unit(s) SubCutaneous at bedtime  insulin lispro (ADMELOG) corrective regimen sliding scale   SubCutaneous three times a day before meals  insulin lispro (ADMELOG) corrective regimen sliding scale   SubCutaneous at bedtime  magnesium oxide 800 milliGRAM(s) Oral two times a day with meals  mycophenolic acid  milliGRAM(s) Oral <User Schedule>  pantoprazole    Tablet 40 milliGRAM(s) Oral before breakfast  polyethylene glycol 3350 17 Gram(s) Oral daily  predniSONE   Tablet 10 milliGRAM(s) Oral daily  senna 2 Tablet(s) Oral at bedtime  sofosbuvir 400 mG/velpatasvir 100 mG (EPCLUSA) 1 Tablet(s) Oral daily  tacrolimus 6 milliGRAM(s) Oral <User Schedule>  thiamine 100 milliGRAM(s) Oral daily  valGANciclovir 450 milliGRAM(s) Oral daily      PRN Meds:  albuterol    90 MICROgram(s) HFA Inhaler 1 Puff(s) Inhalation every 6 hours PRN  melatonin 3 milliGRAM(s) Oral at bedtime PRN  sodium chloride 0.65% Nasal 1 Spray(s) Both Nostrils two times a day PRN  traMADol 50 milliGRAM(s) Oral every 6 hours PRN      LABS:                        8.0    4.34  )-----------( 181      ( 26 Mar 2025 06:59 )             24.5     Hgb Trend: 8.0<--, 7.6<--, 7.7<--, 7.6<--, 7.5<--  03-26    139  |  106  |  32[H]  ----------------------------<  106[H]  5.0   |  22  |  0.86    Ca    9.1      26 Mar 2025 06:59  Phos  3.8     03-26  Mg     1.8     03-26    TPro  5.8[L]  /  Alb  2.7[L]  /  TBili  0.4  /  DBili  x   /  AST  <5[L]  /  ALT  <5[L]  /  AlkPhos  64  03-26    Creatinine Trend: 0.86<--, 0.88<--, 0.84<--, 0.83<--, 0.91<--, 0.88<--  PT/INR - ( 26 Mar 2025 07:04 )   PT: 10.6 sec;   INR: 0.92 ratio         PTT - ( 26 Mar 2025 07:04 )  PTT:29.5 sec  Urinalysis Basic - ( 26 Mar 2025 06:59 )    Color: x / Appearance: x / SG: x / pH: x  Gluc: 106 mg/dL / Ketone: x  / Bili: x / Urobili: x   Blood: x / Protein: x / Nitrite: x   Leuk Esterase: x / RBC: x / WBC x   Sq Epi: x / Non Sq Epi: x / Bacteria: x            MICROBIOLOGY:       RADIOLOGY:  [x] Reviewed and interpreted by me

## 2025-03-26 NOTE — PROGRESS NOTE ADULT - ASSESSMENT
44M PMH with PMHx of decompensated ETOH cirrhosis c/b recurrent ascites, grade II esophageal varices, hepatic encephalopathy. 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 Admitted with Hyperkalemia  3/17Bronchoscopy with decortication of right lung using video-assisted thoracoscopic surgery (VATS)   VATS exploration of pleural space, with evacuation of hemothorax. Cortical peel of R lower, middle, and upper lobes collected. Lung re-expanded with contact to chest wall at end of case.  Right empyema fluid, Right middle, upper, and lower lobe peels  3/18 VSS maintain chest  tube lws  3/19 VSS maintain   chest  tube  lws .  water seal in am 3/20 daily xray  OOB to chair   may ambulate with chest tube incentive spirometry   3/20 Chest tubes to waterseal  + airleak posterior tube  CXR 2 pm  3/21 CT # 2 posterior CT to remain to suction.  CT # 1 anterior - clamped, obtain cxr this afternoon. Addendum: Anterior CT removed, pending CXR.   3/22 VSS Posterior CT - to be placed to water seal, rpt cxr this afternoon. Addendum: CXR with slight increase in size in right pnx. CT placed back to suction.   3/23 VSS, Posterior chest tube output 60cc/24h, no air leak, chest tube placed to water seal, check repeat CXR in 4 hrs.   3/24 VSS Posterior chest tube output 20/30 24 hrs, + air leak. IP consulted per Dr. Ramírez for possible endobronchial valve.   3/25 VSS Posterior chest tube output 20/60 24 hrs, +air leak.   3/26 VSS Posterior chest tube output 80 in 25 hrs, no air leak seen this AM. CT to be clamped, rpt CXR this afternoon.    44M PMH with PMHx of decompensated ETOH cirrhosis c/b recurrent ascites, grade II esophageal varices, hepatic encephalopathy. 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 Admitted with Hyperkalemia  3/17Bronchoscopy with decortication of right lung using video-assisted thoracoscopic surgery (VATS)   VATS exploration of pleural space, with evacuation of hemothorax. Cortical peel of R lower, middle, and upper lobes collected. Lung re-expanded with contact to chest wall at end of case.  Right empyema fluid, Right middle, upper, and lower lobe peels  3/18 VSS maintain chest  tube lws  3/19 VSS maintain   chest  tube  lws .  water seal in am 3/20 daily xray  OOB to chair   may ambulate with chest tube incentive spirometry   3/20 Chest tubes to waterseal  + airleak posterior tube  CXR 2 pm  3/21 CT # 2 posterior CT to remain to suction.  CT # 1 anterior - clamped, obtain cxr this afternoon. Addendum: Anterior CT removed, pending CXR.   3/22 VSS Posterior CT - to be placed to water seal, rpt cxr this afternoon. Addendum: CXR with slight increase in size in right pnx. CT placed back to suction.   3/23 VSS, Posterior chest tube output 60cc/24h, no air leak, chest tube placed to water seal, check repeat CXR in 4 hrs.   3/24 VSS Posterior chest tube output 20/30 24 hrs, + air leak. IP consulted per Dr. Ramírez for possible endobronchial valve.   3/25 VSS Posterior chest tube output 20/60 24 hrs, +air leak.   3/26 VSS Posterior chest tube output 80 in 25 hrs, no air leak seen this AM. CT to be clamped, rpt CXR this afternoon. Addendum: CXR stable. CT to remained clamped this afternoon and overnight. rpt CXR in the AM.

## 2025-03-26 NOTE — PROGRESS NOTE ADULT - SUBJECTIVE AND OBJECTIVE BOX
Transplant Surgery - Multidisciplinary Rounds  --------------------------------------------------------------   OLT 02/27/25            Present:   Patient seen and examined with multidisciplinary Transplant team including Surgeon Dr. Callaway Hepatologist Dr. Burgess,  ESTRELLA Abel/NP Krys, Pharmacist: David and bedside RN during AM rounds.   Disciplines not in attendance will be notified of the plan.     HPI: 44M PMH with PMHx of decompensated ETOH cirrhosis c/b recurrent ascites, grade II esophageal varices, and hepatic encephalopathy. 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 Admitted with Hyperkalemia and planned Thoracotomy on 3/17/2025    Interval Events:  - afebrile, VSS  - pred dec to 10  - IP c/s: endobronchial valve           Immunosuppression  Maintenance: FK per level, Myfortic 360 BID, pred 10  Ongoing monitoring for signs of rejection     Potential Discharge date: TBD  Education:  Medications  Plan of care:  See Below                                              Review of systems  All other systems were reviewed and are negative, except as noted.    PHYSICAL EXAM:  Constitutional: Well developed / well nourished  Eyes: PERRLA  ENMT: nc/at, no thrush  Neck: supple  Respiratory: slight decreased BS on RLL, good airway movement, no adventitious sounds. CT x 1 to WS  Cardiovascular: RRR  Gastrointestinal: Soft abdomen, ND, incision healed   Genitourinary: voiding  Extremities: SCD's in place and working bilaterally  Vascular: Palpable dp pulses bilaterally.   Neurological: A&O x3  Skin: no rashes, ulcerations, lesions  Musculoskeletal: Moving all extremities      Transplant Surgery - Multidisciplinary Rounds  --------------------------------------------------------------   OLT 02/27/25            Present:   Patient seen and examined with multidisciplinary Transplant team including Surgeon Dr. Callaway Hepatologist Dr. Burgess,  ESTRELLA Abel/PIEDAD Marcano, Pharmacist: David and bedside RN during AM rounds.   Disciplines not in attendance will be notified of the plan.     HPI: 44M PMH with PMHx of decompensated ETOH cirrhosis c/b recurrent ascites, grade II esophageal varices, and hepatic encephalopathy. 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 Admitted with Hyperkalemia and planned Thoracotomy on 3/17/2025    Interval Events:  - afebrile, VSS  - pred dec to 10  - IP c/s: endobronchial valve   - CT clamped per thoracic sx          Immunosuppression  Maintenance: FK per level, Myfortic 360 BID, pred 10  Ongoing monitoring for signs of rejection     Potential Discharge date: TBD  Education:  Medications  Plan of care:  See Below      MEDICATIONS  (STANDING):  albuterol/ipratropium for Nebulization 3 milliLiter(s) Nebulizer every 6 hours  aspirin enteric coated 81 milliGRAM(s) Oral daily  atovaquone  Suspension 1500 milliGRAM(s) Oral daily  carvedilol 3.125 milliGRAM(s) Oral every 12 hours  cefTRIAXone   IVPB 2000 milliGRAM(s) IV Intermittent every 24 hours  chlorhexidine 2% Cloths 1 Application(s) Topical <User Schedule>  heparin   Injectable 5000 Unit(s) SubCutaneous every 12 hours  insulin glargine Injectable (LANTUS) 6 Unit(s) SubCutaneous at bedtime  insulin lispro (ADMELOG) corrective regimen sliding scale   SubCutaneous three times a day before meals  insulin lispro (ADMELOG) corrective regimen sliding scale   SubCutaneous at bedtime  magnesium oxide 800 milliGRAM(s) Oral two times a day with meals  mycophenolic acid  milliGRAM(s) Oral <User Schedule>  pantoprazole    Tablet 40 milliGRAM(s) Oral before breakfast  polyethylene glycol 3350 17 Gram(s) Oral daily  predniSONE   Tablet 10 milliGRAM(s) Oral daily  senna 2 Tablet(s) Oral at bedtime  sofosbuvir 400 mG/velpatasvir 100 mG (EPCLUSA) 1 Tablet(s) Oral daily  tacrolimus 5 milliGRAM(s) Oral <User Schedule>  tacrolimus 6 milliGRAM(s) Oral <User Schedule>  thiamine 100 milliGRAM(s) Oral daily  valGANciclovir 450 milliGRAM(s) Oral daily    MEDICATIONS  (PRN):  albuterol    90 MICROgram(s) HFA Inhaler 1 Puff(s) Inhalation every 6 hours PRN Shortness of Breath and/or Wheezing  melatonin 3 milliGRAM(s) Oral at bedtime PRN Insomnia  sodium chloride 0.65% Nasal 1 Spray(s) Both Nostrils two times a day PRN Nasal Congestion  traMADol 50 milliGRAM(s) Oral every 6 hours PRN Severe Pain (7 - 10)      PAST MEDICAL & SURGICAL HISTORY:  Sleep apnea, obstructive      Alcohol abuse      Cirrhosis of liver      Portal hypertension      H/O esophageal varices      Anemia      History of alcohol use disorder      Hepatic encephalopathy      Pleural effusion      History of pleural empyema      Liver transplanted      S/P abdominal paracentesis      History of lung surgery          Vital Signs Last 24 Hrs  T(C): 36.8 (26 Mar 2025 08:35), Max: 36.8 (25 Mar 2025 17:10)  T(F): 98.2 (26 Mar 2025 08:35), Max: 98.3 (25 Mar 2025 17:10)  HR: 74 (26 Mar 2025 08:35) (74 - 78)  BP: 127/76 (26 Mar 2025 08:35) (120/67 - 139/66)  BP(mean): --  RR: 18 (26 Mar 2025 08:35) (18 - 18)  SpO2: 98% (26 Mar 2025 08:35) (95% - 100%)    Parameters below as of 26 Mar 2025 08:35  Patient On (Oxygen Delivery Method): room air        I&O's Summary    25 Mar 2025 07:01  -  26 Mar 2025 07:00  --------------------------------------------------------  IN: 1260 mL / OUT: 3200 mL / NET: -1940 mL    26 Mar 2025 07:01  -  26 Mar 2025 12:02  --------------------------------------------------------  IN: 460 mL / OUT: 600 mL / NET: -140 mL                              8.0    4.34  )-----------( 181      ( 26 Mar 2025 06:59 )             24.5     03-26    139  |  106  |  32[H]  ----------------------------<  106[H]  5.0   |  22  |  0.86    Ca    9.1      26 Mar 2025 06:59  Phos  3.8     03-26  Mg     1.8     03-26    TPro  5.8[L]  /  Alb  2.7[L]  /  TBili  0.4  /  DBili  x   /  AST  <5[L]  /  ALT  <5[L]  /  AlkPhos  64  03-26    Tacrolimus (), Serum: 5.3 ng/mL (03-26 @ 07:00)      Review of systems  All other systems were reviewed and are negative, except as noted.    PHYSICAL EXAM:  Constitutional: Well developed / well nourished  Eyes: PERRLA  ENMT: nc/at, no thrush  Neck: supple  Respiratory: slight decreased BS on RLL, good airway movement, no adventitious sounds. CT x 1 clamped  Cardiovascular: RRR  Gastrointestinal: Soft abdomen, ND, incision healed   Genitourinary: voiding  Extremities: SCD's in place and working bilaterally  Vascular: Palpable dp pulses bilaterally.   Neurological: A&O x3  Skin: no rashes, ulcerations, lesions  Musculoskeletal: Moving all extremities

## 2025-03-26 NOTE — PROGRESS NOTE ADULT - ASSESSMENT
44M PMH with PMHx of decompensated ETOH cirrhosis c/b recurrent ascites, grade II esophageal varices, and hepatic encephalopathy. 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  S/P Thoracotomy on 3/17/2025    [ ] s/p HCV + DCD OLT   - good graft function  - HCV viremia/ Genotype 1A (donor hcv +) - on Epclusa since 3/1   - SQH/ ASA    [] Immuno  - FK per level,  Myfortic 360BID, Pred 10QD  - ppx: no bactrim due to sulfa allergy -> Mepron, valcyte, oscal, PPI    [] h/o R Empyema   - s/p VATS POD#5 for decortication of right lung [3/17]   - anterior CT removed 3/21  - posterior CT to WS check daily CXRs, CT w/ air leak per thoracic, IP following  - continue CTX, end date 3/31   - Thoracic Sx following     44M PMH with PMHx of decompensated ETOH cirrhosis c/b recurrent ascites, grade II esophageal varices, and hepatic encephalopathy. 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  S/P Thoracotomy on 3/17/2025    [ ] s/p HCV + DCD OLT   - good graft function  - HCV viremia/ Genotype 1A (donor hcv +) - on Epclusa since 3/1   - SQH/ ASA    [] Immuno  - FK per level,  Myfortic 360BID, Pred 10QD  - ppx: no bactrim due to sulfa allergy -> Mepron, valcyte, oscal, PPI    [] h/o R Empyema   - s/p VATS POD#9 for decortication of right lung [3/17]   - anterior CT removed 3/21  - posterior CT clamped check daily CXRs, CT w/ air leak per thoracic, IP following  - continue CTX, end date 3/31   - Thoracic Sx following: OOB to chair, IS  - Rpt CXR this afternoon 3/26

## 2025-03-26 NOTE — CONSULT NOTE ADULT - ASSESSMENT
44M PMH with PMHx of decompensated ETOH cirrhosis c/b recurrent ascites, grade II esophageal varices, hepatic encephalopathy. 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 Admitted with Hyperkalemia and planned Thoracotomy on 3/17/2025. Now s/p Bronchoscopy with decortication of right lung using video-assisted thoracoscopic surgery (VATS). Admitted to SICU for HDM and managment post-op.    PLAN:    Neuro:  - A&Ox3   - Pain control w/ Tylenol and Oxycodone      Resp:  - s/p VATS for decortication of right lung  - R. Chest tube x2; monitor output   - CXR: Loculated right hydropneumothorax, overall similar in size to prior but with new air component.  - Sat well RA   - nasal cannula, wean as tolerated    CV:  - goal MAP > 65 mmHg  - trend lactate     GI:   - Diet: CC diet       Renal:  - Monitor I&Os and electrolytes w/ repletions as necessary  - maintain yates    Heme:  - Monitor CBC and coags  - VTE prophylaxis: SCDs     ID:   - Monitor for clinical evidence of active infection  - Monitor WBC, temperature, and procalcitonin  - Antibiotics w/ Atovaquone  - DCDLT - immunosupression w/ Valcyte, Myfortic, [place] and Prednisone     Endo:   - Monitor glucose       Code Status: full code    Disposition: SICU
44 year old male with PMH alcohol associated cirrhosis s/p OLT on 2/27/2025 with post operative course complicated by hemothorax s/p VATS for evacuation of space and chest tube placement on 3/17. Interventional pulmonology consulted for endobronchial valve evaluation.     Recommendations:  - Agree with clamping trial per thoracic surgery   - No current indication for endobronchial valves as patient reports no air leak on exam this AM and tolerating clamping trial clinically   - If pneumothorax reaccumulates please contact interventional pulmonology for repeat assessment   
44M w/ PMH of decompensated EtOH cirrhosis w/ esophageal varices, s/p liver transplant on 2/27/2025. He is s/p R empyema s/p VATS w/ decortication in 1/2025 and re-VATS on 3/17/2025 for evacuation of hemothorax.  Now found to have a persistent air leak.    # right-sided air leak  Patient with persistent, forced air-leak s/p VATS x2 following empyema. Air leak presenti when speaking or coughing.  - patient to be evaluated by interventional pulmonary for possible endobronchial valve placement  - R chest tube management per thoracic surgery
Pt. with hyperkalemia.

## 2025-03-26 NOTE — CONSULT NOTE ADULT - ATTENDING COMMENTS
Patient seen and examined with team at bedside during rounds after lab data, medical records and radiology reports reviewed. I have read and agreeable in general with the documented note, assessment, and management plan which reflects my opinions from bedside rounds. Agree with note above and will highlight the followin year old male with PMH alcohol associated cirrhosis s/p OLT on 2025 with post operative course complicated by hemothorax s/p VATS for evacuation of space and chest tube placement on 3/17. Interventional pulmonology consulted for endobronchial valve evaluation.     44-year-old man with past medical history of EtOH cirrhosis status post OLT 2025 complicated by hemothorax requiring VATS decortication persistent airleak since 3/17.  IP consulted for evaluation of endobronchial valve placement.      Patient seen at bedside comfortable currently with clamp trial.  Post clamp trial x-ray pending.  Patient reports  he feels well without any shortness of breath or shoulder or chest discomfort.  Review of her chart demonstrates that airleak has been intermittent on waterseal.  Identifying a leak, should he fail clamp trial, will be challenging given how minimal it is at present. I discussed the procedure with the patient should he require it however we will follow-up on clamp trial      I have personally provided 62minutes of non-critical care time concurrently with the resident/fellow/NP/PA. This time excludes time spent on separate procedures and time spent teaching. I have reviewed the resident/fellow/NP/PA’s documentation and I agree with the assessment and plan of care. I have personally reviewed laboratory data, radiology results, discussion with primary team\patient, discussion with consulting services, and monitoring for potential decompensation. Interventional Pulmonology services provided to the patient were separate from general pulmonary service due to complexity of issues and interventions required.  Complex patient requiring advanced services.    Gary Mackay MD  Interventional Pulmonology & Critical Care Medicine

## 2025-03-27 LAB
ALBUMIN SERPL ELPH-MCNC: 2.6 G/DL — LOW (ref 3.3–5)
ALP SERPL-CCNC: 60 U/L — SIGNIFICANT CHANGE UP (ref 40–120)
ALT FLD-CCNC: <5 U/L — LOW (ref 10–45)
ANION GAP SERPL CALC-SCNC: 11 MMOL/L — SIGNIFICANT CHANGE UP (ref 5–17)
AST SERPL-CCNC: 8 U/L — LOW (ref 10–40)
BILIRUB SERPL-MCNC: 0.4 MG/DL — SIGNIFICANT CHANGE UP (ref 0.2–1.2)
BUN SERPL-MCNC: 33 MG/DL — HIGH (ref 7–23)
CALCIUM SERPL-MCNC: 8.8 MG/DL — SIGNIFICANT CHANGE UP (ref 8.4–10.5)
CHLORIDE SERPL-SCNC: 106 MMOL/L — SIGNIFICANT CHANGE UP (ref 96–108)
CO2 SERPL-SCNC: 22 MMOL/L — SIGNIFICANT CHANGE UP (ref 22–31)
CREAT SERPL-MCNC: 0.83 MG/DL — SIGNIFICANT CHANGE UP (ref 0.5–1.3)
EGFR: 109 ML/MIN/1.73M2 — SIGNIFICANT CHANGE UP
EGFR: 109 ML/MIN/1.73M2 — SIGNIFICANT CHANGE UP
GLUCOSE BLDC GLUCOMTR-MCNC: 123 MG/DL — HIGH (ref 70–99)
GLUCOSE BLDC GLUCOMTR-MCNC: 135 MG/DL — HIGH (ref 70–99)
GLUCOSE BLDC GLUCOMTR-MCNC: 147 MG/DL — HIGH (ref 70–99)
GLUCOSE BLDC GLUCOMTR-MCNC: 162 MG/DL — HIGH (ref 70–99)
GLUCOSE SERPL-MCNC: 113 MG/DL — HIGH (ref 70–99)
HCT VFR BLD CALC: 24.9 % — LOW (ref 39–50)
HGB BLD-MCNC: 7.9 G/DL — LOW (ref 13–17)
MAGNESIUM SERPL-MCNC: 1.7 MG/DL — SIGNIFICANT CHANGE UP (ref 1.6–2.6)
MCHC RBC-ENTMCNC: 30.9 PG — SIGNIFICANT CHANGE UP (ref 27–34)
MCHC RBC-ENTMCNC: 31.7 G/DL — LOW (ref 32–36)
MCV RBC AUTO: 97.3 FL — SIGNIFICANT CHANGE UP (ref 80–100)
NRBC BLD AUTO-RTO: 0 /100 WBCS — SIGNIFICANT CHANGE UP (ref 0–0)
PHOSPHATE SERPL-MCNC: 3.8 MG/DL — SIGNIFICANT CHANGE UP (ref 2.5–4.5)
PLATELET # BLD AUTO: 178 K/UL — SIGNIFICANT CHANGE UP (ref 150–400)
POTASSIUM SERPL-MCNC: 5.2 MMOL/L — SIGNIFICANT CHANGE UP (ref 3.5–5.3)
POTASSIUM SERPL-SCNC: 5.2 MMOL/L — SIGNIFICANT CHANGE UP (ref 3.5–5.3)
PROT SERPL-MCNC: 5.5 G/DL — LOW (ref 6–8.3)
RBC # BLD: 2.56 M/UL — LOW (ref 4.2–5.8)
RBC # FLD: 18.9 % — HIGH (ref 10.3–14.5)
SODIUM SERPL-SCNC: 139 MMOL/L — SIGNIFICANT CHANGE UP (ref 135–145)
TACROLIMUS SERPL-MCNC: 4.9 NG/ML — SIGNIFICANT CHANGE UP
WBC # BLD: 4.5 K/UL — SIGNIFICANT CHANGE UP (ref 3.8–10.5)
WBC # FLD AUTO: 4.5 K/UL — SIGNIFICANT CHANGE UP (ref 3.8–10.5)

## 2025-03-27 PROCEDURE — 71045 X-RAY EXAM CHEST 1 VIEW: CPT | Mod: 26

## 2025-03-27 PROCEDURE — 99233 SBSQ HOSP IP/OBS HIGH 50: CPT | Mod: GC

## 2025-03-27 PROCEDURE — 71045 X-RAY EXAM CHEST 1 VIEW: CPT | Mod: 26,77

## 2025-03-27 PROCEDURE — 99231 SBSQ HOSP IP/OBS SF/LOW 25: CPT

## 2025-03-27 RX ORDER — TACROLIMUS 0.5 MG/1
6 CAPSULE ORAL
Refills: 0 | Status: DISCONTINUED | OUTPATIENT
Start: 2025-03-27 | End: 2025-03-28

## 2025-03-27 RX ORDER — TRAMADOL HYDROCHLORIDE 50 MG/1
50 TABLET, FILM COATED ORAL EVERY 6 HOURS
Refills: 0 | Status: DISCONTINUED | OUTPATIENT
Start: 2025-03-27 | End: 2025-03-30

## 2025-03-27 RX ORDER — TACROLIMUS 0.5 MG/1
1 CAPSULE ORAL ONCE
Refills: 0 | Status: COMPLETED | OUTPATIENT
Start: 2025-03-27 | End: 2025-03-27

## 2025-03-27 RX ORDER — TACROLIMUS 0.5 MG/1
7 CAPSULE ORAL
Refills: 0 | Status: DISCONTINUED | OUTPATIENT
Start: 2025-03-28 | End: 2025-03-28

## 2025-03-27 RX ORDER — MAGNESIUM SULFATE 500 MG/ML
2 SYRINGE (ML) INJECTION ONCE
Refills: 0 | Status: COMPLETED | OUTPATIENT
Start: 2025-03-27 | End: 2025-03-27

## 2025-03-27 RX ORDER — FUROSEMIDE 10 MG/ML
40 INJECTION INTRAMUSCULAR; INTRAVENOUS ONCE
Refills: 0 | Status: COMPLETED | OUTPATIENT
Start: 2025-03-27 | End: 2025-03-27

## 2025-03-27 RX ADMIN — Medication 81 MILLIGRAM(S): at 12:06

## 2025-03-27 RX ADMIN — TRAMADOL HYDROCHLORIDE 50 MILLIGRAM(S): 50 TABLET, FILM COATED ORAL at 23:00

## 2025-03-27 RX ADMIN — Medication 3 MILLIGRAM(S): at 23:00

## 2025-03-27 RX ADMIN — CARVEDILOL 3.12 MILLIGRAM(S): 3.12 TABLET, FILM COATED ORAL at 06:50

## 2025-03-27 RX ADMIN — IPRATROPIUM BROMIDE AND ALBUTEROL SULFATE 3 MILLILITER(S): .5; 2.5 SOLUTION RESPIRATORY (INHALATION) at 06:50

## 2025-03-27 RX ADMIN — MYCOPHENOLIC ACID 360 MILLIGRAM(S): 360 TABLET, DELAYED RELEASE ORAL at 20:15

## 2025-03-27 RX ADMIN — Medication 100 MILLIGRAM(S): at 12:07

## 2025-03-27 RX ADMIN — POLYETHYLENE GLYCOL 3350 17 GRAM(S): 17 POWDER, FOR SOLUTION ORAL at 12:07

## 2025-03-27 RX ADMIN — TACROLIMUS 6 MILLIGRAM(S): 0.5 CAPSULE ORAL at 08:31

## 2025-03-27 RX ADMIN — Medication 25 GRAM(S): at 08:31

## 2025-03-27 RX ADMIN — TRAMADOL HYDROCHLORIDE 50 MILLIGRAM(S): 50 TABLET, FILM COATED ORAL at 16:54

## 2025-03-27 RX ADMIN — INSULIN GLARGINE-YFGN 6 UNIT(S): 100 INJECTION, SOLUTION SUBCUTANEOUS at 22:04

## 2025-03-27 RX ADMIN — VALGANCICLOVIR 450 MILLIGRAM(S): 450 TABLET, FILM COATED ORAL at 12:06

## 2025-03-27 RX ADMIN — HEPARIN SODIUM 5000 UNIT(S): 1000 INJECTION INTRAVENOUS; SUBCUTANEOUS at 16:55

## 2025-03-27 RX ADMIN — FUROSEMIDE 40 MILLIGRAM(S): 10 INJECTION INTRAMUSCULAR; INTRAVENOUS at 12:51

## 2025-03-27 RX ADMIN — TACROLIMUS 1 MILLIGRAM(S): 0.5 CAPSULE ORAL at 16:54

## 2025-03-27 RX ADMIN — Medication 800 MILLIGRAM(S): at 08:33

## 2025-03-27 RX ADMIN — IPRATROPIUM BROMIDE AND ALBUTEROL SULFATE 3 MILLILITER(S): .5; 2.5 SOLUTION RESPIRATORY (INHALATION) at 12:06

## 2025-03-27 RX ADMIN — ATOVAQUONE 1500 MILLIGRAM(S): 750 SUSPENSION ORAL at 12:06

## 2025-03-27 RX ADMIN — TRAMADOL HYDROCHLORIDE 50 MILLIGRAM(S): 50 TABLET, FILM COATED ORAL at 17:54

## 2025-03-27 RX ADMIN — TRAMADOL HYDROCHLORIDE 50 MILLIGRAM(S): 50 TABLET, FILM COATED ORAL at 08:31

## 2025-03-27 RX ADMIN — CARVEDILOL 3.12 MILLIGRAM(S): 3.12 TABLET, FILM COATED ORAL at 16:55

## 2025-03-27 RX ADMIN — IPRATROPIUM BROMIDE AND ALBUTEROL SULFATE 3 MILLILITER(S): .5; 2.5 SOLUTION RESPIRATORY (INHALATION) at 23:00

## 2025-03-27 RX ADMIN — Medication 40 MILLIGRAM(S): at 06:49

## 2025-03-27 RX ADMIN — INSULIN LISPRO 2: 100 INJECTION, SOLUTION INTRAVENOUS; SUBCUTANEOUS at 16:56

## 2025-03-27 RX ADMIN — TRAMADOL HYDROCHLORIDE 50 MILLIGRAM(S): 50 TABLET, FILM COATED ORAL at 23:30

## 2025-03-27 RX ADMIN — PREDNISONE 10 MILLIGRAM(S): 20 TABLET ORAL at 06:49

## 2025-03-27 RX ADMIN — TACROLIMUS 6 MILLIGRAM(S): 0.5 CAPSULE ORAL at 20:15

## 2025-03-27 RX ADMIN — CEFTRIAXONE 100 MILLIGRAM(S): 500 INJECTION, POWDER, FOR SOLUTION INTRAMUSCULAR; INTRAVENOUS at 08:32

## 2025-03-27 RX ADMIN — IPRATROPIUM BROMIDE AND ALBUTEROL SULFATE 3 MILLILITER(S): .5; 2.5 SOLUTION RESPIRATORY (INHALATION) at 16:54

## 2025-03-27 RX ADMIN — MYCOPHENOLIC ACID 360 MILLIGRAM(S): 360 TABLET, DELAYED RELEASE ORAL at 08:31

## 2025-03-27 RX ADMIN — Medication 1 APPLICATION(S): at 06:50

## 2025-03-27 RX ADMIN — VELPATASVIR AND SOFOSBUVIR 1 TABLET(S): 50; 200 TABLET, FILM COATED ORAL at 12:07

## 2025-03-27 RX ADMIN — TRAMADOL HYDROCHLORIDE 50 MILLIGRAM(S): 50 TABLET, FILM COATED ORAL at 09:31

## 2025-03-27 RX ADMIN — Medication 800 MILLIGRAM(S): at 16:55

## 2025-03-27 NOTE — CHART NOTE - NSCHARTNOTEFT_GEN_A_CORE
Consult - Pressure injury >/2   Chart reviewed, events noted.     Pt in OR; RD unable to complete initial assessment at this time. RD to follow up s/p OR.     Check back to initial dietitian assessment for full recommendations when medically feasible.     Lila Hoskins MS, RDN, CDN (Teams)
Labs reviewed and MANUELITO resolved and K now wnl. Will sign off case. Please re-consult if have further questions.
NUTRITION FOLLOW UP NOTE    PATIENT SEEN FOR: follow up     SOURCE: [x] Patient  [x] Current Medical Record  [] RN  [] Family/support person at bedside  [] Patient unavailable/inappropriate  [x] Other: Team    CHART REVIEWED/EVENTS NOTED.  [x] Nutrition Status:  -S/p OLT 25    DIET ORDER:   Diet, Consistent Carbohydrate w/Evening Snack:   No Concentrated Potassium  Supplement Feeding Modality:  Oral  Ensure Max Cans or Servings Per Day:  1       Frequency:  Daily (25)    NUTRITION INTAKE/PROVISION:  [x] PO:  -Good PO intake of meals and supplements   -Requesting additional supplements       ANTHROPOMETRICS:  Drug Dosing Weight  Height (cm): 180.3 (17 Mar 2025 13:17)  Weight (kg): 88 (17 Mar 2025 13:17)  BMI (kg/m2): 27.1 (17 Mar 2025 13:17)  Daily Weight in k.7 (), Weight in k.6 (), Weight in k.3 (), Weight in k.3 (), Weight in k.6 (), Weight in k.4 (), Weight in k ()     NUTRITIONALLY PERTINENT MEDICATIONS:  MEDICATIONS  (STANDING):  atovaquone  Suspension  carvedilol  cefTRIAXone   IVPB  insulin glargine Injectable (LANTUS)  insulin lispro (ADMELOG) corrective regimen sliding scale  insulin lispro (ADMELOG) corrective regimen sliding scale  magnesium oxide  pantoprazole    Tablet  polyethylene glycol 3350  predniSONE   Tablet  senna  sofosbuvir 400 mG/velpatasvir 100 mG (EPCLUSA)  thiamine  valGANciclovir       NUTRITIONALLY PERTINENT LABS:   Na139 mmol/L Glu 113 mg/dL[H] K+ 5.2 mmol/L Cr  0.83 mg/dL BUN 33 mg/dL[H]  Phos 3.8 mg/dL  Alb 2.6 g/dL[L] ALT <5 U/L[L] AST 8 U/L[L] Alkaline Phosphatase 60 U/L  25 @ 15:27 a1c 5.1    A1C with Estimated Average Glucose Result: 5.1 % (25 @ 15:27)  A1C with Estimated Average Glucose Result: 5.0 % (25 @ 06:40)          Finger Sticks:  POCT Blood Glucose.: 135 mg/dL ( @ 12:12)  POCT Blood Glucose.: 147 mg/dL ( @ 08:02)  POCT Blood Glucose.: 109 mg/dL ( @ 21:17)  POCT Blood Glucose.: 139 mg/dL ( @ 16:43)      NUTRITIONALLY PERTINENT MEDICATIONS/LABS:  [x] Reviewed  [x] Relevant notes on medications/labs:  -Prograf, Myfortic   -Prednisone   -6 units long acting, sliding scale insulin     EDEMA:  [x] Reviewed  [] Relevant notes:    GI/ I&O:  [x] Reviewed  [] Relevant notes:  [] Other:    SKIN:   per nursing flowsheet  [x] Pressure injury previously noted  -sacrum: suspected deep tissue injury     ESTIMATED NEEDS:  [x] No change:  Energy:   2340-2730kcal/day (30-35 kcal/kg)  Protein:   93-124g/day (1.2-1.6g/kg)  Fluid:   ml/day or [x] defer to team  Based on: 78kg (IBW)    NUTRITION DIAGNOSIS:  [x] Prior Dx:  1. Severe malnutrition   2. Increased protein-energy needs       EDUCATION:  [] Yes:  [x] Not appropriate/warranted    NUTRITION CARE PLAN:  1. Diet: Consistent carbohydrate diet   2. Supplements: Ensure Max BID   3. Multivitamin/mineral supplementation: multivitamin, vitamin C, Ofnht604+D      MONITORING AND EVALUATION:   RD remains available upon request and will follow up per protocol.    Lila Hoskins, MS, RDN, CDN (Teams)   Available on [facility] TEAMS
Pt's K and MANUELITO has resolved. Continue on Lokelma. Will sign off. Please re-consult if have any questions.

## 2025-03-27 NOTE — PROGRESS NOTE ADULT - ASSESSMENT
44M PMH with PMHx of decompensated ETOH cirrhosis c/b recurrent ascites, grade II esophageal varices, hepatic encephalopathy. 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 Admitted with Hyperkalemia  3/17Bronchoscopy with decortication of right lung using video-assisted thoracoscopic surgery (VATS)   VATS exploration of pleural space, with evacuation of hemothorax. Cortical peel of R lower, middle, and upper lobes collected. Lung re-expanded with contact to chest wall at end of case.  Right empyema fluid, Right middle, upper, and lower lobe peels  3/18 VSS maintain chest  tube lws  3/19 VSS maintain   chest  tube  lws .  water seal in am 3/20 daily xray  OOB to chair   may ambulate with chest tube incentive spirometry   3/20 Chest tubes to waterseal  + airleak posterior tube  CXR 2 pm  3/21 CT # 2 posterior CT to remain to suction.  CT # 1 anterior - clamped, obtain cxr this afternoon. Addendum: Anterior CT removed, pending CXR.   3/22 VSS Posterior CT - to be placed to water seal, rpt cxr this afternoon. Addendum: CXR with slight increase in size in right pnx. CT placed back to suction.   3/23 VSS, Posterior chest tube output 60cc/24h, no air leak, chest tube placed to water seal, check repeat CXR in 4 hrs.   3/24 VSS Posterior chest tube output 20/30 24 hrs, + air leak. IP consulted per Dr. Ramírez for possible endobronchial valve.   3/25 VSS Posterior chest tube output 20/60 24 hrs, +air leak.   3/26 VSS Posterior chest tube output 80 in 25 hrs, no air leak seen this AM. CT to be clamped, rpt CXR this afternoon. Addendum: CXR stable. CT to remained clamped this afternoon and overnight. rpt CXR in the AM.   3/27 VSS; Posterior CT had been clamped overnigt this am CXR with increased pneumothorax, CT placed on Waterseal no gush of air and no air leak noted this am. CT to remain on waterseal, ck daily CXR

## 2025-03-27 NOTE — PROGRESS NOTE ADULT - SUBJECTIVE AND OBJECTIVE BOX
Subjective: Pt states "Hello " denies any CP, SOB, N/V and palpitations. No acute events overnight.     VITAL SIGNS    Telemetry: SR   Vital Signs Last 24 Hrs  T(C): 36.7 (25 @ 09:14), Max: 36.9 (25 @ 16:47)  T(F): 98.1 (25 @ 09:14), Max: 98.5 (25 @ 21:22)  HR: 80 (25 @ 09:14) (76 - 91)  BP: 127/72 (25 @ 09:14) (116/64 - 133/77)  RR: 20 (25 @ 09:14) (18 - 20)  SpO2: 98% (25 @ 09:14) (96% - 99%)             @ 07:01  -   @ 07:00  --------------------------------------------------------  IN: 1280 mL / OUT: 3500 mL / NET: -2220 mL     @ 07:01  -   @ 10:41  --------------------------------------------------------  IN: 280 mL / OUT: 650 mL / NET: -370 mL            Daily Weight in k.7 (27 Mar 2025 05:20)          Bilirubin Total: 0.4 mg/dL ( @ 07:12)    CAPILLARY BLOOD GLUCOSE      POCT Blood Glucose.: 147 mg/dL (27 Mar 2025 08:02)  POCT Blood Glucose.: 109 mg/dL (26 Mar 2025 21:17)  POCT Blood Glucose.: 139 mg/dL (26 Mar 2025 16:43)  POCT Blood Glucose.: 204 mg/dL (26 Mar 2025 11:56)          MEDICATIONS  albuterol    90 MICROgram(s) HFA Inhaler 1 Puff(s) Inhalation every 6 hours PRN  albuterol/ipratropium for Nebulization 3 milliLiter(s) Nebulizer every 6 hours  aspirin enteric coated 81 milliGRAM(s) Oral daily  atovaquone  Suspension 1500 milliGRAM(s) Oral daily  carvedilol 3.125 milliGRAM(s) Oral every 12 hours  cefTRIAXone   IVPB 2000 milliGRAM(s) IV Intermittent every 24 hours  chlorhexidine 2% Cloths 1 Application(s) Topical <User Schedule>  heparin   Injectable 5000 Unit(s) SubCutaneous every 12 hours  insulin glargine Injectable (LANTUS) 6 Unit(s) SubCutaneous at bedtime  insulin lispro (ADMELOG) corrective regimen sliding scale   SubCutaneous three times a day before meals  insulin lispro (ADMELOG) corrective regimen sliding scale   SubCutaneous at bedtime  magnesium oxide 800 milliGRAM(s) Oral two times a day with meals  melatonin 3 milliGRAM(s) Oral at bedtime PRN  mycophenolic acid  milliGRAM(s) Oral <User Schedule>  pantoprazole    Tablet 40 milliGRAM(s) Oral before breakfast  polyethylene glycol 3350 17 Gram(s) Oral daily  predniSONE   Tablet 10 milliGRAM(s) Oral daily  senna 2 Tablet(s) Oral at bedtime  sodium chloride 0.65% Nasal 1 Spray(s) Both Nostrils two times a day PRN  sofosbuvir 400 mG/velpatasvir 100 mG (EPCLUSA) 1 Tablet(s) Oral daily  tacrolimus 6 milliGRAM(s) Oral <User Schedule>  thiamine 100 milliGRAM(s) Oral daily  traMADol 50 milliGRAM(s) Oral every 6 hours PRN  valGANciclovir 450 milliGRAM(s) Oral daily        PHYSICAL EXAM    Neurology: A&Ox3, nonfocal, no gross deficits  CV : RRR+S1S2  Lungs: Respirations non-labored, B/L BS; decreased BS on Right  Abdomen: Soft, NT/ND, +BSx4Q,   (-)N/V/D  : Voiding without difficulty  Extremities: 1-2+ B/L LE edema, negative calf tenderness, +PP   L #2 post unclamped now to waterseal      LABS      139  |  106  |  33[H]  ----------------------------<  113[H]  5.2   |  22  |  0.83    Ca    8.8      27 Mar 2025 07:12  Phos  3.8       Mg     1.7         TPro  5.5[L]  /  Alb  2.6[L]  /  TBili  0.4  /  DBili  x   /  AST  8[L]  /  ALT  <5[L]  /  AlkPhos  60  -                                 7.9    4.50  )-----------( 178      ( 27 Mar 2025 07:13 )             24.9          PT/INR - ( 26 Mar 2025 07:04 )   PT: 10.6 sec;   INR: 0.92 ratio         PTT - ( 26 Mar 2025 07:04 )  PTT:29.5 sec       PAST MEDICAL & SURGICAL HISTORY:  Sleep apnea, obstructive      Alcohol abuse      Cirrhosis of liver      Portal hypertension      H/O esophageal varices      Anemia      History of alcohol use disorder      Hepatic encephalopathy      Pleural effusion      History of pleural empyema      Liver transplanted      S/P abdominal paracentesis      History of lung surgery           Discussed with CT Surgery attending.

## 2025-03-27 NOTE — PROGRESS NOTE ADULT - ASSESSMENT
44M PMH with PMHx of decompensated ETOH cirrhosis c/b recurrent ascites, grade II esophageal varices, and hepatic encephalopathy. 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  S/P Thoracotomy on 3/17/2025    [ ] s/p HCV + DCD OLT   - good graft function  - HCV viremia/ Genotype 1A (donor hcv +) - on Epclusa since 3/1   - SQH/ ASA    [] Immuno  - FK per level,  Myfortic 360BID, Pred 10QD  - ppx: no bactrim due to sulfa allergy -> Mepron, valcyte, oscal, PPI    [] h/o R Empyema   - s/p VATS POD#10 for decortication of right lung [3/17]   - anterior CT removed 3/21  - posterior CT clamped check daily CXRs, CT w/ air leak per thoracic, IP following  - continue CTX, end date 3/31   - Thoracic Sx following: OOB to chair, IS     44M PMH with PMHx of decompensated ETOH cirrhosis c/b recurrent ascites, grade II esophageal varices, and hepatic encephalopathy. 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  S/P Thoracotomy on 3/17/2025    [ ] s/p HCV + DCD OLT   - good graft function  - HCV viremia/ Genotype 1A (donor hcv +) - on Epclusa since 3/1   - SQH/ ASA    [] Immuno  - FK per level,  Myfortic 360BID, Pred 10QD  - ppx: no bactrim due to sulfa allergy -> Mepron, valcyte, oscal, PPI    [] h/o R Empyema   - s/p VATS for decortication of right lung [3/17]   - anterior CT removed 3/21  - posterior CT now open, Thoracic managing, daily CXRs  - continue CTX, end date 3/31   - spirometry

## 2025-03-27 NOTE — PROVIDER CONTACT NOTE (CRITICAL VALUE NOTIFICATION) - BACKGROUND
Pt admitted for hyperkalemia. PMH of decompensated ETOH cirrhosis c/b recurrent ascites, grade II EV, hepatic encephalopathy, hx of pleural effusion, anemia, portal HTN, MANUELITO, OLT 2/27/2024, thoracotomy 3/17/25, and VATS 3/17/25.

## 2025-03-27 NOTE — PROVIDER CONTACT NOTE (CRITICAL VALUE NOTIFICATION) - ASSESSMENT
Pt [id number], vital signs stable, prn pain management, right side chest tube water seal in place and intact, clamped per thoracic team with orders in place.

## 2025-03-27 NOTE — PROGRESS NOTE ADULT - SUBJECTIVE AND OBJECTIVE BOX
Transplant Surgery - Multidisciplinary Rounds  --------------------------------------------------------------   OLT 02/27/25            Present:   Patient seen and examined with multidisciplinary Transplant team including Surgeon Dr. Callaway Hepatologist Dr. Burgess,  ESTRELLA Abel, Pharmacist: David and bedside RN during AM rounds.   Disciplines not in attendance will be notified of the plan.     HPI: 44M PMH with PMHx of decompensated ETOH cirrhosis c/b recurrent ascites, grade II esophageal varices, and hepatic encephalopathy. 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 Admitted with Hyperkalemia and planned Thoracotomy on 3/17/2025    Interval Events:  - afebrile, VSS  - IP c/s: endobronchial valve   - CT clamped per thoracic sx      Immunosuppression  Maintenance: FK per level, Myfortic 360 BID, pred 10  Ongoing monitoring for signs of rejection     Potential Discharge date: TBD  Education:  Medications  Plan of care:  See Below    TX DATA HERE    Review of systems  All other systems were reviewed and are negative, except as noted.    PHYSICAL EXAM:  Constitutional: Well developed / well nourished  Eyes: PERRLA  ENMT: nc/at, no thrush  Neck: supple  Respiratory: slight decreased BS on RLL, good airway movement, no adventitious sounds. CT x 1 clamped  Cardiovascular: RRR  Gastrointestinal: Soft abdomen, ND, incision healed   Genitourinary: voiding  Extremities: SCD's in place and working bilaterally  Vascular: Palpable dp pulses bilaterally.   Neurological: A&O x3  Skin: no rashes, ulcerations, lesions  Musculoskeletal: Moving all extremities      Transplant Surgery - Multidisciplinary Rounds  --------------------------------------------------------------   OLT 02/27/25            Present:   Patient seen and examined with multidisciplinary Transplant team including Surgeon Dr. Callaway Hepatologist ESTRELLA Haines, Pharmacist: David and bedside RN during AM rounds.   Disciplines not in attendance will be notified of the plan.     HPI: 44M PMH with PMHx of decompensated ETOH cirrhosis c/b recurrent ascites, grade II esophageal varices, and hepatic encephalopathy. 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 Admitted with Hyperkalemia and planned Thoracotomy on 3/17/2025    Interval Events:  - afebrile, VSS  - c/o LE edema  - CT with residual PTX, unclamped by Thoracic  - good graft function      Immunosuppression  Maintenance: FK per level, Myfortic 360 BID, pred 10  Ongoing monitoring for signs of rejection     Potential Discharge date: TBD  Education:  Medications  Plan of care:  See Below      MEDICATIONS  (STANDING):  albuterol/ipratropium for Nebulization 3 milliLiter(s) Nebulizer every 6 hours  aspirin enteric coated 81 milliGRAM(s) Oral daily  atovaquone  Suspension 1500 milliGRAM(s) Oral daily  carvedilol 3.125 milliGRAM(s) Oral every 12 hours  cefTRIAXone   IVPB 2000 milliGRAM(s) IV Intermittent every 24 hours  chlorhexidine 2% Cloths 1 Application(s) Topical <User Schedule>  heparin   Injectable 5000 Unit(s) SubCutaneous every 12 hours  insulin glargine Injectable (LANTUS) 6 Unit(s) SubCutaneous at bedtime  insulin lispro (ADMELOG) corrective regimen sliding scale   SubCutaneous three times a day before meals  insulin lispro (ADMELOG) corrective regimen sliding scale   SubCutaneous at bedtime  magnesium oxide 800 milliGRAM(s) Oral two times a day with meals  mycophenolic acid  milliGRAM(s) Oral <User Schedule>  pantoprazole    Tablet 40 milliGRAM(s) Oral before breakfast  polyethylene glycol 3350 17 Gram(s) Oral daily  predniSONE   Tablet 10 milliGRAM(s) Oral daily  senna 2 Tablet(s) Oral at bedtime  sofosbuvir 400 mG/velpatasvir 100 mG (EPCLUSA) 1 Tablet(s) Oral daily  tacrolimus 6 milliGRAM(s) Oral <User Schedule>  thiamine 100 milliGRAM(s) Oral daily  valGANciclovir 450 milliGRAM(s) Oral daily    MEDICATIONS  (PRN):  albuterol    90 MICROgram(s) HFA Inhaler 1 Puff(s) Inhalation every 6 hours PRN Shortness of Breath and/or Wheezing  melatonin 3 milliGRAM(s) Oral at bedtime PRN Insomnia  sodium chloride 0.65% Nasal 1 Spray(s) Both Nostrils two times a day PRN Nasal Congestion  traMADol 50 milliGRAM(s) Oral every 6 hours PRN Severe Pain (7 - 10)      PAST MEDICAL & SURGICAL HISTORY:  Sleep apnea, obstructive      Alcohol abuse      Cirrhosis of liver      Portal hypertension      H/O esophageal varices      Anemia      History of alcohol use disorder      Hepatic encephalopathy      Pleural effusion      History of pleural empyema      Liver transplanted      S/P abdominal paracentesis      History of lung surgery          Vital Signs Last 24 Hrs  T(C): 36.8 (27 Mar 2025 17:00), Max: 36.9 (26 Mar 2025 21:22)  T(F): 98.2 (27 Mar 2025 17:00), Max: 98.5 (26 Mar 2025 21:22)  HR: 83 (27 Mar 2025 17:00) (76 - 91)  BP: 121/72 (27 Mar 2025 17:00) (116/64 - 146/78)  BP(mean): --  RR: 20 (27 Mar 2025 17:00) (18 - 20)  SpO2: 98% (27 Mar 2025 17:00) (96% - 99%)    Parameters below as of 27 Mar 2025 17:00  Patient On (Oxygen Delivery Method): room air        I&O's Summary    26 Mar 2025 07:01  -  27 Mar 2025 07:00  --------------------------------------------------------  IN: 1280 mL / OUT: 3500 mL / NET: -2220 mL    27 Mar 2025 07:01  -  27 Mar 2025 18:10  --------------------------------------------------------  IN: 800 mL / OUT: 1850 mL / NET: -1050 mL                              7.9    4.50  )-----------( 178      ( 27 Mar 2025 07:13 )             24.9     03-27    139  |  106  |  33[H]  ----------------------------<  113[H]  5.2   |  22  |  0.83    Ca    8.8      27 Mar 2025 07:12  Phos  3.8     03-27  Mg     1.7     03-27    TPro  5.5[L]  /  Alb  2.6[L]  /  TBili  0.4  /  DBili  x   /  AST  8[L]  /  ALT  <5[L]  /  AlkPhos  60  03-27    Tacrolimus (), Serum: 4.9 ng/mL (03-27 @ 07:13)                          Review of systems  All other systems were reviewed and are negative, except as noted.    PHYSICAL EXAM:  Constitutional: Well developed / well nourished  Eyes: PERRLA  ENMT: nc/at, no thrush  Neck: supple  Respiratory: slight decreased BS on RLL, good airway movement, no adventitious sounds. CT x 1 WS  Cardiovascular: RRR  Gastrointestinal: Soft abdomen, ND, incision healed   Genitourinary: voiding  Extremities: SCD's in place and working bilaterally  Vascular: Palpable dp pulses bilaterally.   Neurological: A&O x3  Skin: no rashes, ulcerations, lesions  Musculoskeletal: Moving all extremities

## 2025-03-27 NOTE — PROGRESS NOTE ADULT - PROBLEM SELECTOR PLAN 1
3/17 - s/p VATS for decortication of right lung  3/21 CT # 2 posterior CT to remain to suction.  CT # 1 anterior - clamped, obtain cxr this afternoon - Anterior chest tube removed  3/23 Right Posterior CT placed to water seal, rpt cxr this afternoon.   3/24 VSS Posterior chest tube output 20/30 24 hrs,  + air leak, IP consulted for possible endobronchial valve   3/25 VSS Posterior chest tube output 20/60 24 hrs, +air leak.   3/26 VSS Posterior chest tube output 80 in 25 hrs, no air leak seen this AM. CT to be clamped, rpt CXR this afternoon. Addendum: CXR stable. CT to remained clamped this afternoon and overnight. rpt CXR in the AM.   3/27 VSS, CT now to water seal as CXR revealed enlarging pneumo   OOB to chair  incentive spirometry   Daily CXR  care as per primary team

## 2025-03-28 LAB
ALBUMIN SERPL ELPH-MCNC: 2.8 G/DL — LOW (ref 3.3–5)
ALP SERPL-CCNC: 57 U/L — SIGNIFICANT CHANGE UP (ref 40–120)
ALT FLD-CCNC: <5 U/L — LOW (ref 10–45)
ANION GAP SERPL CALC-SCNC: 10 MMOL/L — SIGNIFICANT CHANGE UP (ref 5–17)
APTT BLD: 31.1 SEC — SIGNIFICANT CHANGE UP (ref 24.5–35.6)
AST SERPL-CCNC: 6 U/L — LOW (ref 10–40)
BILIRUB SERPL-MCNC: 0.4 MG/DL — SIGNIFICANT CHANGE UP (ref 0.2–1.2)
BLD GP AB SCN SERPL QL: NEGATIVE — SIGNIFICANT CHANGE UP
BUN SERPL-MCNC: 32 MG/DL — HIGH (ref 7–23)
CALCIUM SERPL-MCNC: 8.9 MG/DL — SIGNIFICANT CHANGE UP (ref 8.4–10.5)
CHLORIDE SERPL-SCNC: 105 MMOL/L — SIGNIFICANT CHANGE UP (ref 96–108)
CO2 SERPL-SCNC: 23 MMOL/L — SIGNIFICANT CHANGE UP (ref 22–31)
CREAT SERPL-MCNC: 0.91 MG/DL — SIGNIFICANT CHANGE UP (ref 0.5–1.3)
EGFR: 105 ML/MIN/1.73M2 — SIGNIFICANT CHANGE UP
EGFR: 105 ML/MIN/1.73M2 — SIGNIFICANT CHANGE UP
GLUCOSE BLDC GLUCOMTR-MCNC: 115 MG/DL — HIGH (ref 70–99)
GLUCOSE BLDC GLUCOMTR-MCNC: 135 MG/DL — HIGH (ref 70–99)
GLUCOSE BLDC GLUCOMTR-MCNC: 139 MG/DL — HIGH (ref 70–99)
GLUCOSE BLDC GLUCOMTR-MCNC: 145 MG/DL — HIGH (ref 70–99)
GLUCOSE SERPL-MCNC: 109 MG/DL — HIGH (ref 70–99)
HCT VFR BLD CALC: 24.1 % — LOW (ref 39–50)
HGB BLD-MCNC: 7.8 G/DL — LOW (ref 13–17)
INR BLD: 0.95 RATIO — SIGNIFICANT CHANGE UP (ref 0.85–1.16)
MAGNESIUM SERPL-MCNC: 1.8 MG/DL — SIGNIFICANT CHANGE UP (ref 1.6–2.6)
MCHC RBC-ENTMCNC: 31.3 PG — SIGNIFICANT CHANGE UP (ref 27–34)
MCHC RBC-ENTMCNC: 32.4 G/DL — SIGNIFICANT CHANGE UP (ref 32–36)
MCV RBC AUTO: 96.8 FL — SIGNIFICANT CHANGE UP (ref 80–100)
NRBC BLD AUTO-RTO: 0 /100 WBCS — SIGNIFICANT CHANGE UP (ref 0–0)
PHOSPHATE SERPL-MCNC: 4.2 MG/DL — SIGNIFICANT CHANGE UP (ref 2.5–4.5)
PLATELET # BLD AUTO: 152 K/UL — SIGNIFICANT CHANGE UP (ref 150–400)
POTASSIUM SERPL-MCNC: 5.1 MMOL/L — SIGNIFICANT CHANGE UP (ref 3.5–5.3)
POTASSIUM SERPL-SCNC: 5.1 MMOL/L — SIGNIFICANT CHANGE UP (ref 3.5–5.3)
PROT SERPL-MCNC: 5.5 G/DL — LOW (ref 6–8.3)
PROTHROM AB SERPL-ACNC: 10.9 SEC — SIGNIFICANT CHANGE UP (ref 9.9–13.4)
RBC # BLD: 2.49 M/UL — LOW (ref 4.2–5.8)
RBC # FLD: 18.7 % — HIGH (ref 10.3–14.5)
RH IG SCN BLD-IMP: POSITIVE — SIGNIFICANT CHANGE UP
SODIUM SERPL-SCNC: 138 MMOL/L — SIGNIFICANT CHANGE UP (ref 135–145)
TACROLIMUS SERPL-MCNC: 5 NG/ML — SIGNIFICANT CHANGE UP
WBC # BLD: 3.9 K/UL — SIGNIFICANT CHANGE UP (ref 3.8–10.5)
WBC # FLD AUTO: 3.9 K/UL — SIGNIFICANT CHANGE UP (ref 3.8–10.5)

## 2025-03-28 PROCEDURE — 71045 X-RAY EXAM CHEST 1 VIEW: CPT | Mod: 26

## 2025-03-28 PROCEDURE — 99232 SBSQ HOSP IP/OBS MODERATE 35: CPT

## 2025-03-28 RX ORDER — TACROLIMUS 0.5 MG/1
7 CAPSULE ORAL
Refills: 0 | Status: DISCONTINUED | OUTPATIENT
Start: 2025-03-28 | End: 2025-03-30

## 2025-03-28 RX ORDER — PREDNISONE 20 MG/1
5 TABLET ORAL DAILY
Refills: 0 | Status: DISCONTINUED | OUTPATIENT
Start: 2025-03-29 | End: 2025-03-30

## 2025-03-28 RX ORDER — FUROSEMIDE 10 MG/ML
40 INJECTION INTRAMUSCULAR; INTRAVENOUS ONCE
Refills: 0 | Status: COMPLETED | OUTPATIENT
Start: 2025-03-28 | End: 2025-03-28

## 2025-03-28 RX ADMIN — IPRATROPIUM BROMIDE AND ALBUTEROL SULFATE 3 MILLILITER(S): .5; 2.5 SOLUTION RESPIRATORY (INHALATION) at 12:20

## 2025-03-28 RX ADMIN — POLYETHYLENE GLYCOL 3350 17 GRAM(S): 17 POWDER, FOR SOLUTION ORAL at 12:20

## 2025-03-28 RX ADMIN — IPRATROPIUM BROMIDE AND ALBUTEROL SULFATE 3 MILLILITER(S): .5; 2.5 SOLUTION RESPIRATORY (INHALATION) at 23:48

## 2025-03-28 RX ADMIN — Medication 1 APPLICATION(S): at 05:44

## 2025-03-28 RX ADMIN — CARVEDILOL 3.12 MILLIGRAM(S): 3.12 TABLET, FILM COATED ORAL at 17:10

## 2025-03-28 RX ADMIN — Medication 800 MILLIGRAM(S): at 08:44

## 2025-03-28 RX ADMIN — HEPARIN SODIUM 5000 UNIT(S): 1000 INJECTION INTRAVENOUS; SUBCUTANEOUS at 05:40

## 2025-03-28 RX ADMIN — TACROLIMUS 7 MILLIGRAM(S): 0.5 CAPSULE ORAL at 19:41

## 2025-03-28 RX ADMIN — ATOVAQUONE 1500 MILLIGRAM(S): 750 SUSPENSION ORAL at 12:20

## 2025-03-28 RX ADMIN — Medication 800 MILLIGRAM(S): at 17:09

## 2025-03-28 RX ADMIN — TRAMADOL HYDROCHLORIDE 50 MILLIGRAM(S): 50 TABLET, FILM COATED ORAL at 05:39

## 2025-03-28 RX ADMIN — MYCOPHENOLIC ACID 360 MILLIGRAM(S): 360 TABLET, DELAYED RELEASE ORAL at 19:41

## 2025-03-28 RX ADMIN — TACROLIMUS 7 MILLIGRAM(S): 0.5 CAPSULE ORAL at 08:43

## 2025-03-28 RX ADMIN — Medication 40 MILLIGRAM(S): at 05:39

## 2025-03-28 RX ADMIN — Medication 3 MILLIGRAM(S): at 21:23

## 2025-03-28 RX ADMIN — CARVEDILOL 3.12 MILLIGRAM(S): 3.12 TABLET, FILM COATED ORAL at 05:39

## 2025-03-28 RX ADMIN — TRAMADOL HYDROCHLORIDE 50 MILLIGRAM(S): 50 TABLET, FILM COATED ORAL at 13:45

## 2025-03-28 RX ADMIN — VELPATASVIR AND SOFOSBUVIR 1 TABLET(S): 50; 200 TABLET, FILM COATED ORAL at 12:37

## 2025-03-28 RX ADMIN — PREDNISONE 10 MILLIGRAM(S): 20 TABLET ORAL at 05:39

## 2025-03-28 RX ADMIN — Medication 100 MILLIGRAM(S): at 12:21

## 2025-03-28 RX ADMIN — TRAMADOL HYDROCHLORIDE 50 MILLIGRAM(S): 50 TABLET, FILM COATED ORAL at 21:23

## 2025-03-28 RX ADMIN — TRAMADOL HYDROCHLORIDE 50 MILLIGRAM(S): 50 TABLET, FILM COATED ORAL at 14:45

## 2025-03-28 RX ADMIN — IPRATROPIUM BROMIDE AND ALBUTEROL SULFATE 3 MILLILITER(S): .5; 2.5 SOLUTION RESPIRATORY (INHALATION) at 05:41

## 2025-03-28 RX ADMIN — HEPARIN SODIUM 5000 UNIT(S): 1000 INJECTION INTRAVENOUS; SUBCUTANEOUS at 17:10

## 2025-03-28 RX ADMIN — MYCOPHENOLIC ACID 360 MILLIGRAM(S): 360 TABLET, DELAYED RELEASE ORAL at 08:43

## 2025-03-28 RX ADMIN — CEFTRIAXONE 100 MILLIGRAM(S): 500 INJECTION, POWDER, FOR SOLUTION INTRAMUSCULAR; INTRAVENOUS at 08:44

## 2025-03-28 RX ADMIN — INSULIN GLARGINE-YFGN 6 UNIT(S): 100 INJECTION, SOLUTION SUBCUTANEOUS at 21:22

## 2025-03-28 RX ADMIN — FUROSEMIDE 40 MILLIGRAM(S): 10 INJECTION INTRAMUSCULAR; INTRAVENOUS at 14:37

## 2025-03-28 RX ADMIN — VALGANCICLOVIR 450 MILLIGRAM(S): 450 TABLET, FILM COATED ORAL at 12:21

## 2025-03-28 RX ADMIN — Medication 81 MILLIGRAM(S): at 12:21

## 2025-03-28 RX ADMIN — TRAMADOL HYDROCHLORIDE 50 MILLIGRAM(S): 50 TABLET, FILM COATED ORAL at 22:23

## 2025-03-28 RX ADMIN — IPRATROPIUM BROMIDE AND ALBUTEROL SULFATE 3 MILLILITER(S): .5; 2.5 SOLUTION RESPIRATORY (INHALATION) at 17:10

## 2025-03-28 RX ADMIN — TRAMADOL HYDROCHLORIDE 50 MILLIGRAM(S): 50 TABLET, FILM COATED ORAL at 06:09

## 2025-03-28 NOTE — PROGRESS NOTE ADULT - ASSESSMENT
44M PMH with PMHx of decompensated ETOH cirrhosis c/b recurrent ascites, grade II esophageal varices, hepatic encephalopathy. 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 Admitted with Hyperkalemia  3/17Bronchoscopy with decortication of right lung using video-assisted thoracoscopic surgery (VATS)   VATS exploration of pleural space, with evacuation of hemothorax. Cortical peel of R lower, middle, and upper lobes collected. Lung re-expanded with contact to chest wall at end of case.  Right empyema fluid, Right middle, upper, and lower lobe peels  3/18 VSS maintain chest  tube lws  3/19 VSS maintain   chest  tube  lws .  water seal in am 3/20 daily xray  OOB to chair   may ambulate with chest tube incentive spirometry   3/20 Chest tubes to waterseal  + airleak posterior tube  CXR 2 pm  3/21 CT # 2 posterior CT to remain to suction.  CT # 1 anterior - clamped, obtain cxr this afternoon. Addendum: Anterior CT removed, pending CXR.   3/22 VSS Posterior CT - to be placed to water seal, rpt cxr this afternoon. Addendum: CXR with slight increase in size in right pnx. CT placed back to suction.   3/23 VSS, Posterior chest tube output 60cc/24h, no air leak, chest tube placed to water seal, check repeat CXR in 4 hrs.   3/24 VSS Posterior chest tube output 20/30 24 hrs, + air leak. IP consulted per Dr. Ramírez for possible endobronchial valve.   3/25 VSS Posterior chest tube output 20/60 24 hrs, +air leak.   3/26 VSS Posterior chest tube output 80 in 25 hrs, no air leak seen this AM. CT to be clamped, rpt CXR this afternoon. Addendum: CXR stable. CT to remained clamped this afternoon and overnight. rpt CXR in the AM.   3/27 VSS; Posterior CT had been clamped overnight this am CXR with increased pneumothorax, CT placed on Water seal no gush of air and no air leak noted this am. CT to remain on water seal, ck daily CXR  3/28 VSS Posterior CT to water seal cxr reviewed this am by Dr. Ramírez, plan to d/c ct and repeat cxr, then Thoracic will sign off

## 2025-03-28 NOTE — PROGRESS NOTE ADULT - SUBJECTIVE AND OBJECTIVE BOX
Patient is a 44y old  Male who presents with a chief complaint of hyperkalemia (28 Mar 2025 05:45)      Vital Signs Last 24 Hrs  T(C): 36.4 (25 @ 09:21), Max: 36.8 (25 @ 17:00)  T(F): 97.6 (25 @ 09:21), Max: 98.2 (25 @ 17:00)  HR: 70 (25 @ 09:21) (70 - 86)  BP: 136/74 (25 @ 09:21) (110/68 - 146/78)  RR: 20 (25 @ 09:21) (18 - 20)  SpO2: 98% (25 @ 09:21) (98% - 100%)                25 @ 07:01  -  25 @ 07:00  --------------------------------------------------------  IN: 1290 mL / OUT: 3320 mL / NET: -2030 mL        Daily     Daily Weight in k.1 (28 Mar 2025 05:30)                          7.8    3.90  )-----------( 152      ( 28 Mar 2025 07:04 )             24.1         138  |  105  |  32[H]  ----------------------------<  109[H]  5.1   |  23  |  0.91    Ca    8.9      28 Mar 2025 07:04  Phos  4.2       Mg     1.8         TPro  5.5[L]  /  Alb  2.8[L]  /  TBili  0.4  /  DBili  x   /  AST  6[L]  /  ALT  <5[L]  /  AlkPhos  57            PHYSICAL EXAM  Neurology: A&Ox3, NAD, no gross deficits  CV : RRR+S1S2  Lungs: Respirations non-labored, B/L BS  Abdomen: Soft, NT/ND, +BSx4Q, + BM 3/27  Extremities: 1-2+ B/L LE edema, negative calf tenderness, +PP        RPCT to waterseal  no airleak only 10 cc drainage in 24 hrs     MEDICATIONS  albuterol    90 MICROgram(s) HFA Inhaler 1 Puff(s) Inhalation every 6 hours PRN  albuterol/ipratropium for Nebulization 3 milliLiter(s) Nebulizer every 6 hours  aspirin enteric coated 81 milliGRAM(s) Oral daily  atovaquone  Suspension 1500 milliGRAM(s) Oral daily  carvedilol 3.125 milliGRAM(s) Oral every 12 hours  cefTRIAXone   IVPB 2000 milliGRAM(s) IV Intermittent every 24 hours  chlorhexidine 2% Cloths 1 Application(s) Topical <User Schedule>  heparin   Injectable 5000 Unit(s) SubCutaneous every 12 hours  insulin glargine Injectable (LANTUS) 6 Unit(s) SubCutaneous at bedtime  insulin lispro (ADMELOG) corrective regimen sliding scale   SubCutaneous three times a day before meals  insulin lispro (ADMELOG) corrective regimen sliding scale   SubCutaneous at bedtime  magnesium oxide 800 milliGRAM(s) Oral two times a day with meals  melatonin 3 milliGRAM(s) Oral at bedtime PRN  mycophenolic acid  milliGRAM(s) Oral <User Schedule>  pantoprazole    Tablet 40 milliGRAM(s) Oral before breakfast  polyethylene glycol 3350 17 Gram(s) Oral daily  senna 2 Tablet(s) Oral at bedtime  sodium chloride 0.65% Nasal 1 Spray(s) Both Nostrils two times a day PRN  sofosbuvir 400 mG/velpatasvir 100 mG (EPCLUSA) 1 Tablet(s) Oral daily  tacrolimus 6 milliGRAM(s) Oral <User Schedule>  tacrolimus 7 milliGRAM(s) Oral <User Schedule>  thiamine 100 milliGRAM(s) Oral daily  traMADol 50 milliGRAM(s) Oral every 6 hours PRN  valGANciclovir 450 milliGRAM(s) Oral daily

## 2025-03-28 NOTE — PROGRESS NOTE ADULT - PROBLEM SELECTOR PROBLEM 1
R/O Hydropneumothorax

## 2025-03-28 NOTE — PROGRESS NOTE ADULT - SUBJECTIVE AND OBJECTIVE BOX
Transplant Surgery - Multidisciplinary Rounds  --------------------------------------------------------------   OLT 02/27/25            Present:   Patient seen and examined with multidisciplinary Transplant team including Surgeon Dr. Callaway Hepatologist ESTRELLA Haines, Pharmacist: David and bedside RN during AM rounds.   Disciplines not in attendance will be notified of the plan.     HPI: 44M PMH with PMHx of decompensated ETOH cirrhosis c/b recurrent ascites, grade II esophageal varices, and hepatic encephalopathy. 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 Admitted with Hyperkalemia and planned Thoracotomy on 3/17/2025    Interval Events:  - small airleak noticed 3/27 PM  - CXR showing stable PTX vs trapped lung  - f/u AM CXR    Immunosuppression  Maintenance: FK per level, Myfortic 360 BID, pred 10  Ongoing monitoring for signs of rejection     Potential Discharge date: TBD  Education:  Medications  Plan of care:  See Below    MEDICATIONS  (STANDING):  albuterol/ipratropium for Nebulization 3 milliLiter(s) Nebulizer every 6 hours  aspirin enteric coated 81 milliGRAM(s) Oral daily  atovaquone  Suspension 1500 milliGRAM(s) Oral daily  carvedilol 3.125 milliGRAM(s) Oral every 12 hours  cefTRIAXone   IVPB 2000 milliGRAM(s) IV Intermittent every 24 hours  chlorhexidine 2% Cloths 1 Application(s) Topical <User Schedule>  heparin   Injectable 5000 Unit(s) SubCutaneous every 12 hours  insulin glargine Injectable (LANTUS) 6 Unit(s) SubCutaneous at bedtime  insulin lispro (ADMELOG) corrective regimen sliding scale   SubCutaneous three times a day before meals  insulin lispro (ADMELOG) corrective regimen sliding scale   SubCutaneous at bedtime  magnesium oxide 800 milliGRAM(s) Oral two times a day with meals  mycophenolic acid  milliGRAM(s) Oral <User Schedule>  pantoprazole    Tablet 40 milliGRAM(s) Oral before breakfast  polyethylene glycol 3350 17 Gram(s) Oral daily  predniSONE   Tablet 10 milliGRAM(s) Oral daily  senna 2 Tablet(s) Oral at bedtime  sofosbuvir 400 mG/velpatasvir 100 mG (EPCLUSA) 1 Tablet(s) Oral daily  tacrolimus 6 milliGRAM(s) Oral <User Schedule>  tacrolimus 7 milliGRAM(s) Oral <User Schedule>  thiamine 100 milliGRAM(s) Oral daily  valGANciclovir 450 milliGRAM(s) Oral daily    MEDICATIONS  (PRN):  albuterol    90 MICROgram(s) HFA Inhaler 1 Puff(s) Inhalation every 6 hours PRN Shortness of Breath and/or Wheezing  melatonin 3 milliGRAM(s) Oral at bedtime PRN Insomnia  sodium chloride 0.65% Nasal 1 Spray(s) Both Nostrils two times a day PRN Nasal Congestion  traMADol 50 milliGRAM(s) Oral every 6 hours PRN Severe Pain (7 - 10)      PAST MEDICAL & SURGICAL HISTORY:  Sleep apnea, obstructive      Alcohol abuse      Cirrhosis of liver      Portal hypertension      H/O esophageal varices      Anemia      History of alcohol use disorder      Hepatic encephalopathy      Pleural effusion      History of pleural empyema      Liver transplanted      S/P abdominal paracentesis      History of lung surgery          Vital Signs Last 24 Hrs  T(C): 36.7 (28 Mar 2025 00:42), Max: 36.8 (27 Mar 2025 17:00)  T(F): 98 (28 Mar 2025 00:42), Max: 98.2 (27 Mar 2025 17:00)  HR: 81 (28 Mar 2025 00:42) (80 - 86)  BP: 142/74 (28 Mar 2025 00:42) (121/72 - 146/78)  BP(mean): --  RR: 18 (28 Mar 2025 00:42) (18 - 20)  SpO2: 98% (28 Mar 2025 00:42) (98% - 99%)    Parameters below as of 28 Mar 2025 00:42  Patient On (Oxygen Delivery Method): room air        I&O's Summary    26 Mar 2025 07:01  -  27 Mar 2025 07:00  --------------------------------------------------------  IN: 1280 mL / OUT: 3500 mL / NET: -2220 mL    27 Mar 2025 07:01  -  28 Mar 2025 05:46  --------------------------------------------------------  IN: 1040 mL / OUT: 3000 mL / NET: -1960 mL                              7.9    4.50  )-----------( 178      ( 27 Mar 2025 07:13 )             24.9     03-27    139  |  106  |  33[H]  ----------------------------<  113[H]  5.2   |  22  |  0.83    Ca    8.8      27 Mar 2025 07:12  Phos  3.8     03-27  Mg     1.7     03-27    TPro  5.5[L]  /  Alb  2.6[L]  /  TBili  0.4  /  DBili  x   /  AST  8[L]  /  ALT  <5[L]  /  AlkPhos  60  03-27    Tacrolimus (), Serum: 4.9 ng/mL (03-27 @ 07:13)    Review of systems  All other systems were reviewed and are negative, except as noted.    PHYSICAL EXAM:  Constitutional: Well developed / well nourished  Eyes: PERRLA  ENMT: nc/at, no thrush  Neck: supple  Respiratory: slight decreased BS on RLL, good airway movement, no adventitious sounds. CT x 1 WS  Cardiovascular: RRR  Gastrointestinal: Soft abdomen, ND, incision healed   Genitourinary: voiding  Extremities: SCD's in place and working bilaterally  Vascular: Palpable dp pulses bilaterally.   Neurological: A&O x3  Skin: no rashes, ulcerations, lesions  Musculoskeletal: Moving all extremities  Transplant Surgery - Multidisciplinary Rounds  --------------------------------------------------------------   OLT 02/27/25            Present:   Patient seen and examined with multidisciplinary Transplant team including Surgeon Dr. Callaway Hepatologist Dr. Burgess,  ESTRELLA Abel/Jarod, Pharmacist: David and bedside RN during AM rounds.   Disciplines not in attendance will be notified of the plan.     HPI: 44M PMH with PMHx of decompensated ETOH cirrhosis c/b recurrent ascites, grade II esophageal varices, and hepatic encephalopathy. 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 Admitted with Hyperkalemia and planned Thoracotomy on 3/17/2025    Interval Events:  -Afebrile, VSS  -CXRs stable, no leak seen on exam this AM  -LE edema improving daily    Immunosuppression  Maintenance: FK per level, Myfortic 360 BID, pred 10  Ongoing monitoring for signs of rejection     Potential Discharge date: TBD  Education:  Medications  Plan of care:  See Below        MEDICATIONS  (STANDING):  albuterol/ipratropium for Nebulization 3 milliLiter(s) Nebulizer every 6 hours  aspirin enteric coated 81 milliGRAM(s) Oral daily  atovaquone  Suspension 1500 milliGRAM(s) Oral daily  carvedilol 3.125 milliGRAM(s) Oral every 12 hours  cefTRIAXone   IVPB 2000 milliGRAM(s) IV Intermittent every 24 hours  chlorhexidine 2% Cloths 1 Application(s) Topical <User Schedule>  furosemide    Tablet 40 milliGRAM(s) Oral once  heparin   Injectable 5000 Unit(s) SubCutaneous every 12 hours  insulin glargine Injectable (LANTUS) 6 Unit(s) SubCutaneous at bedtime  insulin lispro (ADMELOG) corrective regimen sliding scale   SubCutaneous three times a day before meals  insulin lispro (ADMELOG) corrective regimen sliding scale   SubCutaneous at bedtime  magnesium oxide 800 milliGRAM(s) Oral two times a day with meals  mycophenolic acid  milliGRAM(s) Oral <User Schedule>  pantoprazole    Tablet 40 milliGRAM(s) Oral before breakfast  polyethylene glycol 3350 17 Gram(s) Oral daily  senna 2 Tablet(s) Oral at bedtime  sofosbuvir 400 mG/velpatasvir 100 mG (EPCLUSA) 1 Tablet(s) Oral daily  tacrolimus 7 milliGRAM(s) Oral <User Schedule>  thiamine 100 milliGRAM(s) Oral daily  valGANciclovir 450 milliGRAM(s) Oral daily    MEDICATIONS  (PRN):  albuterol    90 MICROgram(s) HFA Inhaler 1 Puff(s) Inhalation every 6 hours PRN Shortness of Breath and/or Wheezing  melatonin 3 milliGRAM(s) Oral at bedtime PRN Insomnia  sodium chloride 0.65% Nasal 1 Spray(s) Both Nostrils two times a day PRN Nasal Congestion  traMADol 50 milliGRAM(s) Oral every 6 hours PRN Severe Pain (7 - 10)      PAST MEDICAL & SURGICAL HISTORY:  Sleep apnea, obstructive      Alcohol abuse      Cirrhosis of liver      Portal hypertension      H/O esophageal varices      Anemia      History of alcohol use disorder      Hepatic encephalopathy      Pleural effusion      History of pleural empyema      Liver transplanted      S/P abdominal paracentesis      History of lung surgery          Vital Signs Last 24 Hrs  T(C): 36.4 (28 Mar 2025 09:21), Max: 36.8 (27 Mar 2025 17:00)  T(F): 97.6 (28 Mar 2025 09:21), Max: 98.2 (27 Mar 2025 17:00)  HR: 70 (28 Mar 2025 09:21) (70 - 86)  BP: 136/74 (28 Mar 2025 09:21) (110/68 - 142/74)  BP(mean): --  RR: 20 (28 Mar 2025 09:21) (18 - 20)  SpO2: 98% (28 Mar 2025 09:21) (98% - 100%)    Parameters below as of 28 Mar 2025 09:21  Patient On (Oxygen Delivery Method): room air        I&O's Summary    27 Mar 2025 07:01  -  28 Mar 2025 07:00  --------------------------------------------------------  IN: 1290 mL / OUT: 3320 mL / NET: -2030 mL                              7.8    3.90  )-----------( 152      ( 28 Mar 2025 07:04 )             24.1     03-28    138  |  105  |  32[H]  ----------------------------<  109[H]  5.1   |  23  |  0.91    Ca    8.9      28 Mar 2025 07:04  Phos  4.2     03-28  Mg     1.8     03-28    TPro  5.5[L]  /  Alb  2.8[L]  /  TBili  0.4  /  DBili  x   /  AST  6[L]  /  ALT  <5[L]  /  AlkPhos  57  03-28    Tacrolimus (), Serum: 5.0 ng/mL (03-28 @ 07:04)                        Review of systems  All other systems were reviewed and are negative, except as noted.    PHYSICAL EXAM:  Constitutional: Well developed / well nourished  Eyes: PERRLA  ENMT: nc/at, no thrush  Neck: supple  Respiratory: slight decreased BS on RLL, good airway movement, no adventitious sounds. CT x 1 WS  Cardiovascular: RRR  Gastrointestinal: Soft abdomen, ND, incision healed   Genitourinary: voiding  Extremities: SCD's in place and working bilaterally  Vascular: Palpable dp pulses bilaterally.   Neurological: A&O x3  Skin: no rashes, ulcerations, lesions  Musculoskeletal: Moving all extremities

## 2025-03-28 NOTE — PROGRESS NOTE ADULT - PROBLEM SELECTOR PLAN 1
3/17 - s/p VATS for decortication of right lung  3/21 CT # 2 posterior CT to remain to suction.  CT # 1 anterior - clamped, obtain cxr this afternoon - Anterior chest tube removed  3/23 Right Posterior CT placed to water seal, rpt cxr this afternoon.   3/24 VSS Posterior chest tube output 20/30 24 hrs,  + air leak, IP consulted for possible endobronchial valve   3/25 VSS Posterior chest tube output 20/60 24 hrs, +air leak.   3/26 VSS Posterior chest tube output 80 in 25 hrs, no air leak seen this AM. CT to be clamped, rpt CXR this afternoon. Addendum: CXR stable. CT to remained clamped this afternoon and overnight. rpt CXR in the AM.   3/27 VSS, CT now to water seal as CXR revealed enlarging pneumo   3/28 CT D/C'd awaiting repeat cxr  OOB to chair  incentive spirometry   Daily CXR  care as per primary team

## 2025-03-28 NOTE — PROGRESS NOTE ADULT - ASSESSMENT
44M PMH with PMHx of decompensated ETOH cirrhosis c/b recurrent ascites, grade II esophageal varices, and hepatic encephalopathy. 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  S/P Thoracotomy on 3/17/2025    [ ] s/p HCV + DCD OLT   - good graft function  - HCV viremia/ Genotype 1A (donor hcv +) - on Epclusa since 3/1   - SQH/ ASA    [] Immuno  - FK per level,  Myfortic 360BID, Pred 10QD  - ppx: no bactrim due to sulfa allergy -> Mepron, valcyte, oscal, PPI    [] h/o R Empyema   - s/p VATS for decortication of right lung [3/17]   - anterior CT removed 3/21  - posterior CT now open, Thoracic managing, daily CXRs  - continue CTX, end date 3/31   - spirometry 44M PMH with PMHx of decompensated ETOH cirrhosis c/b recurrent ascites, grade II esophageal varices, and hepatic encephalopathy. 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  S/P Thoracotomy on 3/17/2025    [ ] s/p HCV + DCD OLT   - good graft function  - HCV viremia/ Genotype 1A (donor hcv +) - on Epclusa since 3/1   - SQH/ ASA  - LE edema: Lasix daily prn    [] Immuno  - FK per level,  Myfortic 360BID, Pred 10QD  - ppx: no bactrim due to sulfa allergy -> Mepron, valcyte, oscal, PPI    [] h/o R Empyema   - s/p VATS for decortication of right lung [3/17]   - anterior CT removed 3/21  - posterior CT now open, Thoracic managing, daily CXRs  - continue CTX, end date 3/31   - spirometry 44M PMH with PMHx of decompensated ETOH cirrhosis c/b recurrent ascites, grade II esophageal varices, and hepatic encephalopathy. 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  S/P Thoracotomy on 3/17/2025    [ ] s/p HCV + DCD OLT   - good graft function  - HCV viremia/ Genotype 1A (donor hcv +) - on Epclusa since 3/1   - SQH/ ASA  - LE edema: Lasix daily prn    [] Immuno  - FK per level,  Myfortic 360BID, Pred 10QD  - ppx: no bactrim due to sulfa allergy -> Mepron, valcyte, oscal, PPI    [] h/o R Empyema   - s/p VATS for decortication of right lung [3/17]   - anterior CT removed 3/21  - posterior CT removed this morning by Thoracic, f/u CXRs  - continue CTX, end date 3/31   - spirometry

## 2025-03-29 LAB
ALBUMIN SERPL ELPH-MCNC: 2.8 G/DL — LOW (ref 3.3–5)
ALP SERPL-CCNC: 63 U/L — SIGNIFICANT CHANGE UP (ref 40–120)
ALT FLD-CCNC: <5 U/L — LOW (ref 10–45)
ANION GAP SERPL CALC-SCNC: 10 MMOL/L — SIGNIFICANT CHANGE UP (ref 5–17)
APTT BLD: 30.6 SEC — SIGNIFICANT CHANGE UP (ref 24.5–35.6)
AST SERPL-CCNC: 7 U/L — LOW (ref 10–40)
BILIRUB SERPL-MCNC: 0.3 MG/DL — SIGNIFICANT CHANGE UP (ref 0.2–1.2)
BUN SERPL-MCNC: 38 MG/DL — HIGH (ref 7–23)
CALCIUM SERPL-MCNC: 8.8 MG/DL — SIGNIFICANT CHANGE UP (ref 8.4–10.5)
CHLORIDE SERPL-SCNC: 106 MMOL/L — SIGNIFICANT CHANGE UP (ref 96–108)
CO2 SERPL-SCNC: 24 MMOL/L — SIGNIFICANT CHANGE UP (ref 22–31)
CREAT SERPL-MCNC: 0.98 MG/DL — SIGNIFICANT CHANGE UP (ref 0.5–1.3)
CULTURE RESULTS: SIGNIFICANT CHANGE UP
EGFR: 96 ML/MIN/1.73M2 — SIGNIFICANT CHANGE UP
EGFR: 96 ML/MIN/1.73M2 — SIGNIFICANT CHANGE UP
GLUCOSE BLDC GLUCOMTR-MCNC: 125 MG/DL — HIGH (ref 70–99)
GLUCOSE BLDC GLUCOMTR-MCNC: 125 MG/DL — HIGH (ref 70–99)
GLUCOSE BLDC GLUCOMTR-MCNC: 142 MG/DL — HIGH (ref 70–99)
GLUCOSE BLDC GLUCOMTR-MCNC: 147 MG/DL — HIGH (ref 70–99)
GLUCOSE SERPL-MCNC: 118 MG/DL — HIGH (ref 70–99)
HCT VFR BLD CALC: 24.6 % — LOW (ref 39–50)
HGB BLD-MCNC: 7.9 G/DL — LOW (ref 13–17)
INR BLD: 0.95 RATIO — SIGNIFICANT CHANGE UP (ref 0.85–1.16)
MAGNESIUM SERPL-MCNC: 1.7 MG/DL — SIGNIFICANT CHANGE UP (ref 1.6–2.6)
MCHC RBC-ENTMCNC: 31.1 PG — SIGNIFICANT CHANGE UP (ref 27–34)
MCHC RBC-ENTMCNC: 32.1 G/DL — SIGNIFICANT CHANGE UP (ref 32–36)
MCV RBC AUTO: 96.9 FL — SIGNIFICANT CHANGE UP (ref 80–100)
NRBC BLD AUTO-RTO: 0 /100 WBCS — SIGNIFICANT CHANGE UP (ref 0–0)
PHOSPHATE SERPL-MCNC: 4.4 MG/DL — SIGNIFICANT CHANGE UP (ref 2.5–4.5)
PLATELET # BLD AUTO: 162 K/UL — SIGNIFICANT CHANGE UP (ref 150–400)
POTASSIUM SERPL-MCNC: 5.3 MMOL/L — SIGNIFICANT CHANGE UP (ref 3.5–5.3)
POTASSIUM SERPL-SCNC: 5.3 MMOL/L — SIGNIFICANT CHANGE UP (ref 3.5–5.3)
PROT SERPL-MCNC: 5.7 G/DL — LOW (ref 6–8.3)
PROTHROM AB SERPL-ACNC: 10.8 SEC — SIGNIFICANT CHANGE UP (ref 9.9–13.4)
RBC # BLD: 2.54 M/UL — LOW (ref 4.2–5.8)
RBC # FLD: 18.6 % — HIGH (ref 10.3–14.5)
SODIUM SERPL-SCNC: 140 MMOL/L — SIGNIFICANT CHANGE UP (ref 135–145)
SPECIMEN SOURCE: SIGNIFICANT CHANGE UP
TACROLIMUS SERPL-MCNC: 5.9 NG/ML — SIGNIFICANT CHANGE UP
WBC # BLD: 3.84 K/UL — SIGNIFICANT CHANGE UP (ref 3.8–10.5)
WBC # FLD AUTO: 3.84 K/UL — SIGNIFICANT CHANGE UP (ref 3.8–10.5)

## 2025-03-29 PROCEDURE — 71045 X-RAY EXAM CHEST 1 VIEW: CPT | Mod: 26

## 2025-03-29 RX ORDER — FUROSEMIDE 10 MG/ML
40 INJECTION INTRAMUSCULAR; INTRAVENOUS DAILY
Refills: 0 | Status: DISCONTINUED | OUTPATIENT
Start: 2025-03-29 | End: 2025-03-30

## 2025-03-29 RX ORDER — MAGNESIUM SULFATE 500 MG/ML
1 SYRINGE (ML) INJECTION ONCE
Refills: 0 | Status: COMPLETED | OUTPATIENT
Start: 2025-03-29 | End: 2025-03-29

## 2025-03-29 RX ORDER — TRAMADOL HYDROCHLORIDE 50 MG/1
25 TABLET, FILM COATED ORAL EVERY 8 HOURS
Refills: 0 | Status: DISCONTINUED | OUTPATIENT
Start: 2025-03-29 | End: 2025-03-30

## 2025-03-29 RX ADMIN — PREDNISONE 5 MILLIGRAM(S): 20 TABLET ORAL at 05:49

## 2025-03-29 RX ADMIN — INSULIN GLARGINE-YFGN 6 UNIT(S): 100 INJECTION, SOLUTION SUBCUTANEOUS at 21:13

## 2025-03-29 RX ADMIN — TACROLIMUS 7 MILLIGRAM(S): 0.5 CAPSULE ORAL at 21:06

## 2025-03-29 RX ADMIN — Medication 100 MILLIGRAM(S): at 13:13

## 2025-03-29 RX ADMIN — VELPATASVIR AND SOFOSBUVIR 1 TABLET(S): 50; 200 TABLET, FILM COATED ORAL at 13:12

## 2025-03-29 RX ADMIN — Medication 1 APPLICATION(S): at 05:51

## 2025-03-29 RX ADMIN — TACROLIMUS 7 MILLIGRAM(S): 0.5 CAPSULE ORAL at 08:21

## 2025-03-29 RX ADMIN — HEPARIN SODIUM 5000 UNIT(S): 1000 INJECTION INTRAVENOUS; SUBCUTANEOUS at 17:27

## 2025-03-29 RX ADMIN — MYCOPHENOLIC ACID 360 MILLIGRAM(S): 360 TABLET, DELAYED RELEASE ORAL at 08:21

## 2025-03-29 RX ADMIN — VALGANCICLOVIR 450 MILLIGRAM(S): 450 TABLET, FILM COATED ORAL at 13:11

## 2025-03-29 RX ADMIN — Medication 800 MILLIGRAM(S): at 08:22

## 2025-03-29 RX ADMIN — TRAMADOL HYDROCHLORIDE 50 MILLIGRAM(S): 50 TABLET, FILM COATED ORAL at 18:20

## 2025-03-29 RX ADMIN — CARVEDILOL 3.12 MILLIGRAM(S): 3.12 TABLET, FILM COATED ORAL at 17:28

## 2025-03-29 RX ADMIN — TRAMADOL HYDROCHLORIDE 50 MILLIGRAM(S): 50 TABLET, FILM COATED ORAL at 06:49

## 2025-03-29 RX ADMIN — TRAMADOL HYDROCHLORIDE 50 MILLIGRAM(S): 50 TABLET, FILM COATED ORAL at 23:07

## 2025-03-29 RX ADMIN — CARVEDILOL 3.12 MILLIGRAM(S): 3.12 TABLET, FILM COATED ORAL at 05:51

## 2025-03-29 RX ADMIN — IPRATROPIUM BROMIDE AND ALBUTEROL SULFATE 3 MILLILITER(S): .5; 2.5 SOLUTION RESPIRATORY (INHALATION) at 13:10

## 2025-03-29 RX ADMIN — TRAMADOL HYDROCHLORIDE 50 MILLIGRAM(S): 50 TABLET, FILM COATED ORAL at 05:49

## 2025-03-29 RX ADMIN — HEPARIN SODIUM 5000 UNIT(S): 1000 INJECTION INTRAVENOUS; SUBCUTANEOUS at 05:49

## 2025-03-29 RX ADMIN — CEFTRIAXONE 100 MILLIGRAM(S): 500 INJECTION, POWDER, FOR SOLUTION INTRAMUSCULAR; INTRAVENOUS at 08:21

## 2025-03-29 RX ADMIN — Medication 81 MILLIGRAM(S): at 13:13

## 2025-03-29 RX ADMIN — ATOVAQUONE 1500 MILLIGRAM(S): 750 SUSPENSION ORAL at 13:11

## 2025-03-29 RX ADMIN — TRAMADOL HYDROCHLORIDE 50 MILLIGRAM(S): 50 TABLET, FILM COATED ORAL at 17:27

## 2025-03-29 RX ADMIN — Medication 800 MILLIGRAM(S): at 17:28

## 2025-03-29 RX ADMIN — IPRATROPIUM BROMIDE AND ALBUTEROL SULFATE 3 MILLILITER(S): .5; 2.5 SOLUTION RESPIRATORY (INHALATION) at 17:27

## 2025-03-29 RX ADMIN — MYCOPHENOLIC ACID 360 MILLIGRAM(S): 360 TABLET, DELAYED RELEASE ORAL at 21:06

## 2025-03-29 RX ADMIN — Medication 40 MILLIGRAM(S): at 05:49

## 2025-03-29 RX ADMIN — IPRATROPIUM BROMIDE AND ALBUTEROL SULFATE 3 MILLILITER(S): .5; 2.5 SOLUTION RESPIRATORY (INHALATION) at 05:49

## 2025-03-29 RX ADMIN — FUROSEMIDE 40 MILLIGRAM(S): 10 INJECTION INTRAMUSCULAR; INTRAVENOUS at 17:27

## 2025-03-29 RX ADMIN — Medication 100 GRAM(S): at 13:11

## 2025-03-29 RX ADMIN — Medication 3 MILLIGRAM(S): at 22:48

## 2025-03-29 NOTE — PROGRESS NOTE ADULT - SUBJECTIVE AND OBJECTIVE BOX
Transplant Surgery - Multidisciplinary Rounds  --------------------------------------------------------------   OLT 02/27/25            Present:   Patient seen and examined with multidisciplinary Transplant team including Surgeon Dr. Callaway Hepatologist Dr. Samaniego,  ESTRELLA Cooley, Pharmacist: David and bedside RN during AM rounds.   Disciplines not in attendance will be notified of the plan.     HPI: 44M PMH with PMHx of decompensated ETOH cirrhosis c/b recurrent ascites, grade II esophageal varices, and hepatic encephalopathy. 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 Admitted with Hyperkalemia and planned Thoracotomy on 3/17/2025    Interval Events:  -Afebrile, VSS  - CXR revealed Stable small right pneumothorax with right chest tube, small loculated right effusion  - Chest tube removed  - PO Lasix 40 x1     Immunosuppression  Maintenance: FK per level, Myfortic 360 BID, pred 10  Ongoing monitoring for signs of rejection     Potential Discharge date: TBD  Education:  Medications  Plan of care:  See Below            TX data here            Review of systems  All other systems were reviewed and are negative, except as noted.    PHYSICAL EXAM:  Constitutional: Well developed / well nourished  Eyes: PERRLA  ENMT: nc/at, no thrush  Neck: supple  Respiratory: slight decreased BS on RLL, good airway movement, no adventitious sounds. CT x 1 WS  Cardiovascular: RRR  Gastrointestinal: Soft abdomen, ND, incision healed   Genitourinary: voiding  Extremities: SCD's in place and working bilaterally  Vascular: Palpable dp pulses bilaterally.   Neurological: A&O x3  Skin: no rashes, ulcerations, lesions  Musculoskeletal: Moving all extremities  Transplant Surgery - Multidisciplinary Rounds  --------------------------------------------------------------   OLT 02/27/25            Present:   Patient seen and examined with multidisciplinary Transplant team including Surgeon Dr. Callaway Hepatologist Dr. Samaniego,  ESTRELLA Cooley, Pharmacist: David and bedside RN during AM rounds.   Disciplines not in attendance will be notified of the plan.     HPI: 44M PMH with PMHx of decompensated ETOH cirrhosis c/b recurrent ascites, grade II esophageal varices, and hepatic encephalopathy. 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 Admitted with Hyperkalemia and planned Thoracotomy on 3/17/2025    Interval Events:  - Afebrile, VSS  - CXR revealed Stable small right pneumothorax with right chest tube, small loculated right effusion  - Chest tube removed  - PO Lasix 40 x1     Immunosuppression  Maintenance: FK per level, Myfortic 360 BID, pred 10  Ongoing monitoring for signs of rejection     Potential Discharge date: TBD  Education:  Medications  Plan of care:  See Below    MEDICATIONS  (STANDING):  albuterol/ipratropium for Nebulization 3 milliLiter(s) Nebulizer every 6 hours  aspirin enteric coated 81 milliGRAM(s) Oral daily  atovaquone  Suspension 1500 milliGRAM(s) Oral daily  carvedilol 3.125 milliGRAM(s) Oral every 12 hours  cefTRIAXone   IVPB 2000 milliGRAM(s) IV Intermittent every 24 hours  chlorhexidine 2% Cloths 1 Application(s) Topical <User Schedule>  furosemide    Tablet 40 milliGRAM(s) Oral daily  heparin   Injectable 5000 Unit(s) SubCutaneous every 12 hours  insulin glargine Injectable (LANTUS) 6 Unit(s) SubCutaneous at bedtime  insulin lispro (ADMELOG) corrective regimen sliding scale   SubCutaneous three times a day before meals  insulin lispro (ADMELOG) corrective regimen sliding scale   SubCutaneous at bedtime  magnesium oxide 800 milliGRAM(s) Oral two times a day with meals  mycophenolic acid  milliGRAM(s) Oral <User Schedule>  pantoprazole    Tablet 40 milliGRAM(s) Oral before breakfast  polyethylene glycol 3350 17 Gram(s) Oral daily  predniSONE   Tablet 5 milliGRAM(s) Oral daily  senna 2 Tablet(s) Oral at bedtime  sofosbuvir 400 mG/velpatasvir 100 mG (EPCLUSA) 1 Tablet(s) Oral daily  tacrolimus 7 milliGRAM(s) Oral <User Schedule>  thiamine 100 milliGRAM(s) Oral daily  valGANciclovir 450 milliGRAM(s) Oral daily    MEDICATIONS  (PRN):  albuterol    90 MICROgram(s) HFA Inhaler 1 Puff(s) Inhalation every 6 hours PRN Shortness of Breath and/or Wheezing  melatonin 3 milliGRAM(s) Oral at bedtime PRN Insomnia  sodium chloride 0.65% Nasal 1 Spray(s) Both Nostrils two times a day PRN Nasal Congestion  traMADol 50 milliGRAM(s) Oral every 6 hours PRN Severe Pain (7 - 10)      PAST MEDICAL & SURGICAL HISTORY:  Sleep apnea, obstructive  Alcohol abuse  Cirrhosis of liver  Portal hypertension  H/O esophageal varices  Anemia  History of alcohol use disorder  Hepatic encephalopathy  Pleural effusion  History of pleural empyema  Liver transplanted  S/P abdominal paracentesis  History of lung surgery    Vital Signs Last 24 Hrs  T(C): 36.7 (29 Mar 2025 17:23), Max: 36.7 (28 Mar 2025 21:45)  T(F): 98.1 (29 Mar 2025 17:23), Max: 98.1 (29 Mar 2025 05:42)  HR: 76 (29 Mar 2025 17:23) (76 - 96)  BP: 122/71 (29 Mar 2025 17:23) (112/64 - 132/81)  BP(mean): --  RR: 18 (29 Mar 2025 17:23) (18 - 20)  SpO2: 98% (29 Mar 2025 17:23) (96% - 100%)    Parameters below as of 29 Mar 2025 17:23  Patient On (Oxygen Delivery Method): room air    I&O's Summary    28 Mar 2025 07:01  -  29 Mar 2025 07:00  --------------------------------------------------------  IN: 1500 mL / OUT: 3500 mL / NET: -2000 mL    29 Mar 2025 07:01  -  29 Mar 2025 18:05  --------------------------------------------------------  IN: 0 mL / OUT: 200 mL / NET: -200 mL                         7.9    3.84  )-----------( 162      ( 29 Mar 2025 07:02 )             24.6     03-29    140  |  106  |  38[H]  ----------------------------<  118[H]  5.3   |  24  |  0.98    Ca    8.8      29 Mar 2025 07:02  Phos  4.4     03-29  Mg     1.7     03-29    TPro  5.7[L]  /  Alb  2.8[L]  /  TBili  0.3  /  DBili  x   /  AST  7[L]  /  ALT  <5[L]  /  AlkPhos  63  03-29    Tacrolimus (), Serum: 5.9 ng/mL (03-29 @ 07:02)    Review of systems  All other systems were reviewed and are negative, except as noted.    PHYSICAL EXAM:  Constitutional: Well developed / well nourished  Eyes: PERRLA  ENMT: nc/at, no thrush  Neck: supple  Respiratory: slight decreased BS on RLL, good airway movement, no adventitious sounds.  Cardiovascular: RRR  Gastrointestinal: Soft abdomen, ND, incision healed   Genitourinary: voiding  Extremities: SCD's in place and working bilaterally  Vascular: Palpable dp pulses bilaterally.   Neurological: A&O x3  Skin: no rashes, ulcerations, lesions  Musculoskeletal: Moving all extremities

## 2025-03-29 NOTE — PROGRESS NOTE ADULT - ASSESSMENT
44M PMH with PMHx of decompensated ETOH cirrhosis c/b recurrent ascites, grade II esophageal varices, and hepatic encephalopathy. 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  S/P Thoracotomy on 3/17/2025    [ ] s/p HCV + DCD OLT   - good graft function  - HCV viremia/ Genotype 1A (donor hcv +) - on Epclusa since 3/1   - SQH/ ASA  - LE edema: Lasix daily prn    [] Immuno  - FK per level,  Myfortic 360BID, Pred 10QD  - ppx: no bactrim due to sulfa allergy -> Mepron, valcyte, oscal, PPI    [] h/o R Empyema   - s/p VATS for decortication of right lung [3/17]   - anterior CT removed 3/21  - posterior CT removed this morning by Thoracic, f/u CXRs  - continue CTX, end date 3/31   - spirometry 44M PMH with PMHx of decompensated ETOH cirrhosis c/b recurrent ascites, grade II esophageal varices, and hepatic encephalopathy. 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  S/P Thoracotomy on 3/17/2025    [ ] s/p HCV + DCD OLT   - good graft function  - HCV viremia/ Genotype 1A (donor hcv +) - on Epclusa since 3/1   - SQH/ ASA  - LE edema: Lasix 40mg PO daily    [] Immuno  - FK per level,  Myfortic 360BID, Pred 10QD  - ppx: no bactrim due to sulfa allergy -> Mepron, valcyte, oscal, PPI    [] h/o R Empyema   - s/p VATS for decortication of right lung [3/17]   - anterior CT removed 3/21  - posterior CT removed 3/28  - continue CTX, end date 3/31   - spirometry    [] Dispo  - dc home tomorrow

## 2025-03-30 VITALS
TEMPERATURE: 98 F | DIASTOLIC BLOOD PRESSURE: 72 MMHG | RESPIRATION RATE: 18 BRPM | HEART RATE: 78 BPM | SYSTOLIC BLOOD PRESSURE: 128 MMHG | OXYGEN SATURATION: 97 %

## 2025-03-30 LAB
ALBUMIN SERPL ELPH-MCNC: 2.7 G/DL — LOW (ref 3.3–5)
ALP SERPL-CCNC: 62 U/L — SIGNIFICANT CHANGE UP (ref 40–120)
ALT FLD-CCNC: <5 U/L — LOW (ref 10–45)
ANION GAP SERPL CALC-SCNC: 12 MMOL/L — SIGNIFICANT CHANGE UP (ref 5–17)
ANION GAP SERPL CALC-SCNC: 13 MMOL/L — SIGNIFICANT CHANGE UP (ref 5–17)
APTT BLD: 31.4 SEC — SIGNIFICANT CHANGE UP (ref 24.5–35.6)
AST SERPL-CCNC: 8 U/L — LOW (ref 10–40)
BILIRUB SERPL-MCNC: 0.4 MG/DL — SIGNIFICANT CHANGE UP (ref 0.2–1.2)
BUN SERPL-MCNC: 35 MG/DL — HIGH (ref 7–23)
BUN SERPL-MCNC: 43 MG/DL — HIGH (ref 7–23)
CALCIUM SERPL-MCNC: 9.3 MG/DL — SIGNIFICANT CHANGE UP (ref 8.4–10.5)
CALCIUM SERPL-MCNC: 9.5 MG/DL — SIGNIFICANT CHANGE UP (ref 8.4–10.5)
CHLORIDE SERPL-SCNC: 103 MMOL/L — SIGNIFICANT CHANGE UP (ref 96–108)
CHLORIDE SERPL-SCNC: 107 MMOL/L — SIGNIFICANT CHANGE UP (ref 96–108)
CO2 SERPL-SCNC: 22 MMOL/L — SIGNIFICANT CHANGE UP (ref 22–31)
CO2 SERPL-SCNC: 24 MMOL/L — SIGNIFICANT CHANGE UP (ref 22–31)
CREAT SERPL-MCNC: 0.92 MG/DL — SIGNIFICANT CHANGE UP (ref 0.5–1.3)
CREAT SERPL-MCNC: 0.95 MG/DL — SIGNIFICANT CHANGE UP (ref 0.5–1.3)
EGFR: 100 ML/MIN/1.73M2 — SIGNIFICANT CHANGE UP
EGFR: 100 ML/MIN/1.73M2 — SIGNIFICANT CHANGE UP
EGFR: 104 ML/MIN/1.73M2 — SIGNIFICANT CHANGE UP
EGFR: 104 ML/MIN/1.73M2 — SIGNIFICANT CHANGE UP
GLUCOSE BLDC GLUCOMTR-MCNC: 105 MG/DL — HIGH (ref 70–99)
GLUCOSE BLDC GLUCOMTR-MCNC: 146 MG/DL — HIGH (ref 70–99)
GLUCOSE BLDC GLUCOMTR-MCNC: 154 MG/DL — HIGH (ref 70–99)
GLUCOSE SERPL-MCNC: 109 MG/DL — HIGH (ref 70–99)
GLUCOSE SERPL-MCNC: 93 MG/DL — SIGNIFICANT CHANGE UP (ref 70–99)
HCT VFR BLD CALC: 25.3 % — LOW (ref 39–50)
HGB BLD-MCNC: 7.9 G/DL — LOW (ref 13–17)
INR BLD: 0.92 RATIO — SIGNIFICANT CHANGE UP (ref 0.85–1.16)
MAGNESIUM SERPL-MCNC: 1.9 MG/DL — SIGNIFICANT CHANGE UP (ref 1.6–2.6)
MCHC RBC-ENTMCNC: 30.5 PG — SIGNIFICANT CHANGE UP (ref 27–34)
MCHC RBC-ENTMCNC: 31.2 G/DL — LOW (ref 32–36)
MCV RBC AUTO: 97.7 FL — SIGNIFICANT CHANGE UP (ref 80–100)
NRBC BLD AUTO-RTO: 0 /100 WBCS — SIGNIFICANT CHANGE UP (ref 0–0)
PHOSPHATE SERPL-MCNC: 3.9 MG/DL — SIGNIFICANT CHANGE UP (ref 2.5–4.5)
PLATELET # BLD AUTO: 167 K/UL — SIGNIFICANT CHANGE UP (ref 150–400)
POTASSIUM SERPL-MCNC: 5.4 MMOL/L — HIGH (ref 3.5–5.3)
POTASSIUM SERPL-MCNC: 5.5 MMOL/L — HIGH (ref 3.5–5.3)
POTASSIUM SERPL-SCNC: 5.4 MMOL/L — HIGH (ref 3.5–5.3)
POTASSIUM SERPL-SCNC: 5.5 MMOL/L — HIGH (ref 3.5–5.3)
PROT SERPL-MCNC: 5.8 G/DL — LOW (ref 6–8.3)
PROTHROM AB SERPL-ACNC: 10.6 SEC — SIGNIFICANT CHANGE UP (ref 9.9–13.4)
RBC # BLD: 2.59 M/UL — LOW (ref 4.2–5.8)
RBC # FLD: 18.7 % — HIGH (ref 10.3–14.5)
SODIUM SERPL-SCNC: 139 MMOL/L — SIGNIFICANT CHANGE UP (ref 135–145)
SODIUM SERPL-SCNC: 142 MMOL/L — SIGNIFICANT CHANGE UP (ref 135–145)
TACROLIMUS SERPL-MCNC: 7.4 NG/ML — SIGNIFICANT CHANGE UP
WBC # BLD: 4.15 K/UL — SIGNIFICANT CHANGE UP (ref 3.8–10.5)
WBC # FLD AUTO: 4.15 K/UL — SIGNIFICANT CHANGE UP (ref 3.8–10.5)

## 2025-03-30 PROCEDURE — 80048 BASIC METABOLIC PNL TOTAL CA: CPT

## 2025-03-30 PROCEDURE — 71045 X-RAY EXAM CHEST 1 VIEW: CPT

## 2025-03-30 PROCEDURE — 83605 ASSAY OF LACTIC ACID: CPT

## 2025-03-30 PROCEDURE — 84100 ASSAY OF PHOSPHORUS: CPT

## 2025-03-30 PROCEDURE — 84132 ASSAY OF SERUM POTASSIUM: CPT

## 2025-03-30 PROCEDURE — 87522 HEPATITIS C REVRS TRNSCRPJ: CPT

## 2025-03-30 PROCEDURE — 85025 COMPLETE CBC W/AUTO DIFF WBC: CPT

## 2025-03-30 PROCEDURE — 87070 CULTURE OTHR SPECIMN AEROBIC: CPT

## 2025-03-30 PROCEDURE — 80053 COMPREHEN METABOLIC PANEL: CPT

## 2025-03-30 PROCEDURE — C9399: CPT

## 2025-03-30 PROCEDURE — 82962 GLUCOSE BLOOD TEST: CPT

## 2025-03-30 PROCEDURE — 85014 HEMATOCRIT: CPT

## 2025-03-30 PROCEDURE — 80197 ASSAY OF TACROLIMUS: CPT

## 2025-03-30 PROCEDURE — 85610 PROTHROMBIN TIME: CPT

## 2025-03-30 PROCEDURE — 96376 TX/PRO/DX INJ SAME DRUG ADON: CPT

## 2025-03-30 PROCEDURE — 86900 BLOOD TYPING SEROLOGIC ABO: CPT

## 2025-03-30 PROCEDURE — 87015 SPECIMEN INFECT AGNT CONCNTJ: CPT

## 2025-03-30 PROCEDURE — 85027 COMPLETE CBC AUTOMATED: CPT

## 2025-03-30 PROCEDURE — 85018 HEMOGLOBIN: CPT

## 2025-03-30 PROCEDURE — 86850 RBC ANTIBODY SCREEN: CPT

## 2025-03-30 PROCEDURE — 86901 BLOOD TYPING SEROLOGIC RH(D): CPT

## 2025-03-30 PROCEDURE — 83880 ASSAY OF NATRIURETIC PEPTIDE: CPT

## 2025-03-30 PROCEDURE — 96375 TX/PRO/DX INJ NEW DRUG ADDON: CPT

## 2025-03-30 PROCEDURE — 81001 URINALYSIS AUTO W/SCOPE: CPT

## 2025-03-30 PROCEDURE — 94640 AIRWAY INHALATION TREATMENT: CPT

## 2025-03-30 PROCEDURE — 82435 ASSAY OF BLOOD CHLORIDE: CPT

## 2025-03-30 PROCEDURE — 82330 ASSAY OF CALCIUM: CPT

## 2025-03-30 PROCEDURE — C1889: CPT

## 2025-03-30 PROCEDURE — 86922 COMPATIBILITY TEST ANTIGLOB: CPT

## 2025-03-30 PROCEDURE — 87206 SMEAR FLUORESCENT/ACID STAI: CPT

## 2025-03-30 PROCEDURE — 87116 MYCOBACTERIA CULTURE: CPT

## 2025-03-30 PROCEDURE — 87075 CULTR BACTERIA EXCEPT BLOOD: CPT

## 2025-03-30 PROCEDURE — 85730 THROMBOPLASTIN TIME PARTIAL: CPT

## 2025-03-30 PROCEDURE — 36415 COLL VENOUS BLD VENIPUNCTURE: CPT

## 2025-03-30 PROCEDURE — 84484 ASSAY OF TROPONIN QUANT: CPT

## 2025-03-30 PROCEDURE — 82803 BLOOD GASES ANY COMBINATION: CPT

## 2025-03-30 PROCEDURE — 87205 SMEAR GRAM STAIN: CPT

## 2025-03-30 PROCEDURE — 97161 PT EVAL LOW COMPLEX 20 MIN: CPT

## 2025-03-30 PROCEDURE — 84295 ASSAY OF SERUM SODIUM: CPT

## 2025-03-30 PROCEDURE — 87102 FUNGUS ISOLATION CULTURE: CPT

## 2025-03-30 PROCEDURE — 96374 THER/PROPH/DIAG INJ IV PUSH: CPT

## 2025-03-30 PROCEDURE — 99285 EMERGENCY DEPT VISIT HI MDM: CPT | Mod: 25

## 2025-03-30 PROCEDURE — 83735 ASSAY OF MAGNESIUM: CPT

## 2025-03-30 PROCEDURE — 82947 ASSAY GLUCOSE BLOOD QUANT: CPT

## 2025-03-30 RX ORDER — VALGANCICLOVIR 450 MG/1
1 TABLET, FILM COATED ORAL
Qty: 0 | Refills: 0 | DISCHARGE
Start: 2025-03-30

## 2025-03-30 RX ORDER — CALCIUM CARBONATE 750 MG/1
1 TABLET ORAL
Qty: 0 | Refills: 0 | DISCHARGE

## 2025-03-30 RX ORDER — ATOVAQUONE 750 MG/5ML
10 SUSPENSION ORAL
Qty: 0 | Refills: 0 | DISCHARGE
Start: 2025-03-30

## 2025-03-30 RX ORDER — TRAMADOL HYDROCHLORIDE 50 MG/1
1 TABLET, FILM COATED ORAL
Qty: 15 | Refills: 0
Start: 2025-03-30 | End: 2025-04-03

## 2025-03-30 RX ORDER — MYCOPHENOLIC ACID 360 MG/1
1 TABLET, DELAYED RELEASE ORAL
Qty: 0 | Refills: 0 | DISCHARGE
Start: 2025-03-30

## 2025-03-30 RX ORDER — SENNA 187 MG
2 TABLET ORAL
Qty: 0 | Refills: 0 | DISCHARGE
Start: 2025-03-30

## 2025-03-30 RX ORDER — SODIUM ZIRCONIUM CYCLOSILICATE 5 G/5G
10 POWDER, FOR SUSPENSION ORAL DAILY
Refills: 0 | Status: DISCONTINUED | OUTPATIENT
Start: 2025-03-31 | End: 2025-03-30

## 2025-03-30 RX ORDER — INSULIN GLARGINE-YFGN 100 [IU]/ML
6 INJECTION, SOLUTION SUBCUTANEOUS
Qty: 0 | Refills: 0 | DISCHARGE
Start: 2025-03-30

## 2025-03-30 RX ORDER — PREDNISONE 20 MG/1
1 TABLET ORAL
Qty: 0 | Refills: 0 | DISCHARGE
Start: 2025-03-30

## 2025-03-30 RX ORDER — ASPIRIN 325 MG
1 TABLET ORAL
Qty: 0 | Refills: 0 | DISCHARGE
Start: 2025-03-30

## 2025-03-30 RX ORDER — SODIUM ZIRCONIUM CYCLOSILICATE 5 G/5G
10 POWDER, FOR SUSPENSION ORAL ONCE
Refills: 0 | Status: COMPLETED | OUTPATIENT
Start: 2025-03-30 | End: 2025-03-30

## 2025-03-30 RX ORDER — FUROSEMIDE 10 MG/ML
1 INJECTION INTRAMUSCULAR; INTRAVENOUS
Qty: 30 | Refills: 0
Start: 2025-03-30 | End: 2025-04-28

## 2025-03-30 RX ORDER — SODIUM ZIRCONIUM CYCLOSILICATE 5 G/5G
1 POWDER, FOR SUSPENSION ORAL
Qty: 30 | Refills: 0
Start: 2025-03-30 | End: 2025-04-28

## 2025-03-30 RX ORDER — CARVEDILOL 3.12 MG/1
1 TABLET, FILM COATED ORAL
Qty: 0 | Refills: 0 | DISCHARGE
Start: 2025-03-30

## 2025-03-30 RX ORDER — TACROLIMUS 0.5 MG/1
7 CAPSULE ORAL
Qty: 0 | Refills: 0 | DISCHARGE
Start: 2025-03-30

## 2025-03-30 RX ORDER — MAGNESIUM OXIDE 400 MG
2 TABLET ORAL
Qty: 0 | Refills: 0 | DISCHARGE
Start: 2025-03-30

## 2025-03-30 RX ADMIN — SODIUM ZIRCONIUM CYCLOSILICATE 10 GRAM(S): 5 POWDER, FOR SUSPENSION ORAL at 08:53

## 2025-03-30 RX ADMIN — Medication 81 MILLIGRAM(S): at 12:23

## 2025-03-30 RX ADMIN — Medication 800 MILLIGRAM(S): at 08:57

## 2025-03-30 RX ADMIN — HEPARIN SODIUM 5000 UNIT(S): 1000 INJECTION INTRAVENOUS; SUBCUTANEOUS at 05:32

## 2025-03-30 RX ADMIN — IPRATROPIUM BROMIDE AND ALBUTEROL SULFATE 3 MILLILITER(S): .5; 2.5 SOLUTION RESPIRATORY (INHALATION) at 05:32

## 2025-03-30 RX ADMIN — TRAMADOL HYDROCHLORIDE 50 MILLIGRAM(S): 50 TABLET, FILM COATED ORAL at 06:46

## 2025-03-30 RX ADMIN — VALGANCICLOVIR 450 MILLIGRAM(S): 450 TABLET, FILM COATED ORAL at 12:22

## 2025-03-30 RX ADMIN — CEFTRIAXONE 100 MILLIGRAM(S): 500 INJECTION, POWDER, FOR SOLUTION INTRAMUSCULAR; INTRAVENOUS at 08:55

## 2025-03-30 RX ADMIN — Medication 1 APPLICATION(S): at 05:41

## 2025-03-30 RX ADMIN — TRAMADOL HYDROCHLORIDE 50 MILLIGRAM(S): 50 TABLET, FILM COATED ORAL at 00:05

## 2025-03-30 RX ADMIN — TRAMADOL HYDROCHLORIDE 50 MILLIGRAM(S): 50 TABLET, FILM COATED ORAL at 05:43

## 2025-03-30 RX ADMIN — ATOVAQUONE 1500 MILLIGRAM(S): 750 SUSPENSION ORAL at 12:22

## 2025-03-30 RX ADMIN — CARVEDILOL 3.12 MILLIGRAM(S): 3.12 TABLET, FILM COATED ORAL at 05:32

## 2025-03-30 RX ADMIN — Medication 100 MILLIGRAM(S): at 12:22

## 2025-03-30 RX ADMIN — INSULIN LISPRO 2: 100 INJECTION, SOLUTION INTRAVENOUS; SUBCUTANEOUS at 12:22

## 2025-03-30 RX ADMIN — TRAMADOL HYDROCHLORIDE 50 MILLIGRAM(S): 50 TABLET, FILM COATED ORAL at 15:28

## 2025-03-30 RX ADMIN — VELPATASVIR AND SOFOSBUVIR 1 TABLET(S): 50; 200 TABLET, FILM COATED ORAL at 12:23

## 2025-03-30 RX ADMIN — TRAMADOL HYDROCHLORIDE 50 MILLIGRAM(S): 50 TABLET, FILM COATED ORAL at 16:15

## 2025-03-30 RX ADMIN — TACROLIMUS 7 MILLIGRAM(S): 0.5 CAPSULE ORAL at 08:57

## 2025-03-30 RX ADMIN — MYCOPHENOLIC ACID 360 MILLIGRAM(S): 360 TABLET, DELAYED RELEASE ORAL at 08:57

## 2025-03-30 RX ADMIN — Medication 40 MILLIGRAM(S): at 05:32

## 2025-03-30 RX ADMIN — PREDNISONE 5 MILLIGRAM(S): 20 TABLET ORAL at 05:31

## 2025-03-30 RX ADMIN — IPRATROPIUM BROMIDE AND ALBUTEROL SULFATE 3 MILLILITER(S): .5; 2.5 SOLUTION RESPIRATORY (INHALATION) at 12:22

## 2025-03-30 RX ADMIN — FUROSEMIDE 40 MILLIGRAM(S): 10 INJECTION INTRAMUSCULAR; INTRAVENOUS at 05:31

## 2025-03-30 NOTE — PROGRESS NOTE ADULT - REASON FOR ADMISSION
hyperkalemia
VATS
hyperkalemia

## 2025-03-30 NOTE — PROGRESS NOTE ADULT - NS ATTEND AMEND GEN_ALL_CORE FT
? air leak stopped.   Will dw thoracic.  good graft function.   Immuno: tac 6/5, myf 360mg BID, pred 5
Cx tube still in place as pneumo enlarged.  viviana f/u with ct surgery.  Immuno: tac 7/6, myf 360, pred 5
Patient seen and examined with team.  Doing well post op VATS.  Two chest tubes to suction.  Liver tests are normal. fk, cellcept, pred
add lokelma  ceftriaxone last dose today  dispo today  Immuno: tac 7/7, myf 360mg BID, pred 5.
persistent air leak, interventional pulm consulted.  good graft function  Immuno: tac 5/4, myf 360mg BID, pred 10.
ceftriaxone   one cx tube left  Immuno: tac 4/3, myf 360mg BID, dec pred 15  dc lokelma.
cx tube unclamped by thoracic after cxr w inc ptx  LFTs wnl.  tac 6/6, myf 360mg BID, pred5
looks well.   planned dispo tomorrow.  chest tube out.  Immuno: tac/cellcept/pred
Patient seen and examined with team.  To OR today for VATS of right chest.  fk, cellcept, pred.

## 2025-03-30 NOTE — PROGRESS NOTE ADULT - ASSESSMENT
44M PMH with PMHx of decompensated ETOH cirrhosis c/b recurrent ascites, grade II esophageal varices, and hepatic encephalopathy. 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  S/P Thoracotomy on 3/17/2025    [ ] s/p HCV + DCD OLT   - good graft function  - HCV viremia/ Genotype 1A (donor hcv +) - on Epclusa since 3/1   - SQH/ ASA  - LE edema: Lasix 40mg PO daily    [] Immuno  - FK per level,  Myfortic 360BID, Pred 10QD  - ppx: no bactrim due to sulfa allergy -> Mepron, valcyte, oscal, PPI    [] h/o R Empyema   - s/p VATS for decortication of right lung [3/17]   - anterior CT removed 3/21  - posterior CT removed 3/28  - continue CTX, end date 3/31   - spirometry    [] Dispo  - dc home today 44M PMH with PMHx of decompensated ETOH cirrhosis c/b recurrent ascites, grade II esophageal varices, and hepatic encephalopathy. 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  S/P Thoracotomy on 3/17/2025    [ ] s/p HCV + DCD OLT   - good graft function  - HCV viremia/ Genotype 1A (donor hcv +) - on Epclusa since 3/1   - SQH/ ASA  - LE edema: Lasix 40mg PO daily  - Lokelma daily (Rx given to pt)     [] Immuno  - FK per level,  Myfortic 360BID, Pred 10QD  - ppx: no bactrim due to sulfa allergy -> Mepron, valcyte, oscal, PPI    [] h/o R Empyema   - s/p VATS for decortication of right lung [3/17]   - anterior CT removed 3/21  - posterior CT removed 3/28  - completed CTX    [] Dispo  - dc home today  - f/u Wed

## 2025-03-30 NOTE — DISCHARGE NOTE PROVIDER - NSDCHOSPICE_GEN_A_CORE
No
Quality 128: Preventive Care And Screening: Body Mass Index (Bmi) Screening And Follow-Up Plan: BMI is documented within normal parameters and no follow-up plan is required.
Quality 226: Preventive Care And Screening: Tobacco Use: Screening And Cessation Intervention: Tobacco Screening not Performed
Quality 431: Preventive Care And Screening: Unhealthy Alcohol Use - Screening: Patient not identified as an unhealthy alcohol user when screened for unhealthy alcohol use using a systematic screening method
Detail Level: Detailed
Quality 130: Documentation Of Current Medications In The Medical Record: Current Medications Documented

## 2025-03-30 NOTE — DISCHARGE NOTE NURSING/CASE MANAGEMENT/SOCIAL WORK - FINANCIAL ASSISTANCE
[facility] provides services at a reduced cost to those who are determined to be eligible through [facility]’s financial assistance program. Information regarding [facility]’s financial assistance program can be found by going to [email or web address] or by calling [phone].

## 2025-03-30 NOTE — DISCHARGE NOTE PROVIDER - NSDCMRMEDTOKEN_GEN_ALL_CORE_FT
aspirin 81 mg oral delayed release tablet: 1 tab(s) orally once a day  atovaquone 750 mg/5 mL oral suspension: 10 milliliter(s) orally once a day  calcium (as carbonate) 500 mg oral tablet: 1 orally 2 times a day  carvedilol 3.125 mg oral tablet: 1 tab(s) orally every 12 hours  insulin glargine 100 units/mL subcutaneous solution: 6 unit(s) subcutaneous once a day (at bedtime)  Lasix 40 mg oral tablet: 1 tab(s) orally once a day  Lokelma 10 g oral powder for reconstitution: 1 packet(s) orally once a day  magnesium oxide 400 mg oral tablet: 2 tab(s) orally 2 times a day (with meals)  mycophenolic acid 360 mg oral delayed release tablet: 1 tab(s) orally 2 times a day  pantoprazole 40 mg oral delayed release tablet: 1 tab(s) orally once a day (before a meal)  predniSONE 5 mg oral tablet: 1 tab(s) orally once a day  senna leaf extract oral tablet: 2 tab(s) orally once a day (at bedtime)  sofosbuvir-velpatasvir 400 mg-100 mg oral tablet: 1 tab(s) orally once a day (at bedtime)  tacrolimus 1 mg oral capsule: 7 cap(s) orally 2 times a day  thiamine 100 mg oral tablet: 1 tab(s) orally once a day  traMADol 50 mg oral tablet: 1 tab(s) orally every 8 hours as needed for  moderate pain MDD: 3  valGANciclovir 450 mg oral tablet: 1 tab(s) orally once a day

## 2025-03-30 NOTE — PROGRESS NOTE ADULT - NUTRITIONAL ASSESSMENT
This patient has been assessed with a concern for Malnutrition and has been determined to have a diagnosis/diagnoses of Severe protein-calorie malnutrition.    This patient is being managed with:   Diet Consistent Carbohydrate w/Evening Snack-  No Concentrated Potassium  Supplement Feeding Modality:  Oral  Ensure Max Cans or Servings Per Day:  1       Frequency:  Daily  Entered: Mar 19 2025 11:33AM  
This patient has been assessed with a concern for Malnutrition and has been determined to have a diagnosis/diagnoses of Severe protein-calorie malnutrition.    This patient is being managed with:   Diet Consistent Carbohydrate w/Evening Snack-  Entered: Mar 17 2025  9:24PM  
This patient has been assessed with a concern for Malnutrition and has been determined to have a diagnosis/diagnoses of Severe protein-calorie malnutrition.    This patient is being managed with:   Diet Consistent Carbohydrate w/Evening Snack-  Entered: Mar 17 2025  9:24PM

## 2025-03-30 NOTE — DISCHARGE NOTE PROVIDER - NSDCCPCAREPLAN_GEN_ALL_CORE_FT
PRINCIPAL DISCHARGE DIAGNOSIS  Diagnosis: Hyperkalemia  Assessment and Plan of Treatment: - Hyperkalemia is the medical term for having too much potassium in your blood.  - Potassium is a mineral found in certain foods. The body needs potassium to work normally. It keeps the heart beating and helps the nerves and muscles work   - In transplant patients, hyperkalemia may develop slowly over weeks to months. It can reoccur and become ongoing.   - Symptoms, if any, are usually mild and non specific and may include: nausea,  fatigue, muscle weakness, numbness, tingling, unusual heartbeat  - Follow a low potassium diet.   - Foods high in potassium are: Fruits: bananas, melons, oranges, luz maria, prunes, pomegranate; Vegetables: avocados, broccoli, sweek potatoes, brussel sproiuts, tomato/tomato based products, green leafy vegetables; Dairy products: milk, yogurt, nuts  Call the transplant team if you experience any of the followig:   [] chest pain  [] irregular heartbeat  [] fast heartbeat   [] trouble breathing   [] muscle weakness   [] Lightheadedness or confusion  [] Persistent Numbness or tingling in your arms or legs      SECONDARY DISCHARGE DIAGNOSES  Diagnosis: Transplanted liver  Assessment and Plan of Treatment: Call the Translant team if  you experience any of the following:   [] Fever of 100.3F (38C) or above  [] Surgical incision is bleeding, red or warm to touch or there's discharge from the incision  [] Worsening abdominal pain, nausea or vomiting  [] Shortness of breath  [] Diffuculty with urinating such as burning, frequency or urgency.

## 2025-03-30 NOTE — PROGRESS NOTE ADULT - PROVIDER SPECIALTY LIST ADULT
Anesthesia
Transplant Surgery
Thoracic Surgery
Transplant ID
Transplant Nephrology
Transplant Surgery
Anesthesia
SICU
Thoracic Surgery
Transplant Surgery
Thoracic Surgery
Transplant Surgery
Transplant Surgery
Thoracic Surgery
Transplant Surgery
Thoracic Surgery
Thoracic Surgery

## 2025-03-30 NOTE — DISCHARGE NOTE PROVIDER - HOSPITAL COURSE
44M PMH with PMHx of decompensated ETOH cirrhosis c/b recurrent ascites, grade II esophageal varices, hepatic encephalopathy. Recent admission 1/16 -3/11 for R empyema s/p robotic VATs with decortication (Dr Ramírez 1/28/25), MANUELITO (required HD, improved, permcath removed 1/31) and s/p Donor HCV CONCETTA+ DCD LT on 2/27/2025.     Admitted with hyperkalemia and planned Thoracotomy. Underwent Bronchoscopy with decortication of right lung using video-assisted thoracoscopic surgery (VATS)  and two chest tubes were placed on 3/17/2025.  Post operatively transferred to SICU for further management. Progressed well from surgical standpoint, chest tubes removed by thoracic team. Completed CTX, midline removed prior to discharge.     Immuno on dc:   FK 7/7, Myfortic 360bid, Pred 5mg

## 2025-03-30 NOTE — PROGRESS NOTE ADULT - SUBJECTIVE AND OBJECTIVE BOX
Transplant Surgery - Multidisciplinary Rounds  --------------------------------------------------------------   OLT 02/27/25            Present:   Patient seen and examined with multidisciplinary Transplant team including Surgeon Dr. Callaway Hepatologist Dr. Samaniego,  ESTRELLA Cooley, Pharmacist: David and bedside RN during AM rounds.   Disciplines not in attendance will be notified of the plan.     HPI: 44M PMH with PMHx of decompensated ETOH cirrhosis c/b recurrent ascites, grade II esophageal varices, and hepatic encephalopathy. 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 Admitted with Hyperkalemia and planned Thoracotomy on 3/17/2025    Interval Events:  - Afebrile, VSS  - lasix 40 qd  - no acute events on    Immunosuppression  Maintenance: FK per level, Myfortic 360 BID, pred 10  Ongoing monitoring for signs of rejection     Potential Discharge date: TBD  Education:  Medications  Plan of care:  See Below      tx data here      Review of systems  All other systems were reviewed and are negative, except as noted.    PHYSICAL EXAM:  Constitutional: Well developed / well nourished  Eyes: PERRLA  ENMT: nc/at, no thrush  Neck: supple  Respiratory: slight decreased BS on RLL, good airway movement, no adventitious sounds.  Cardiovascular: RRR  Gastrointestinal: Soft abdomen, ND, incision healed   Genitourinary: voiding  Extremities: SCD's in place and working bilaterally  Vascular: Palpable dp pulses bilaterally.   Neurological: A&O x3  Skin: no rashes, ulcerations, lesions  Musculoskeletal: Moving all extremities  Transplant Surgery - Multidisciplinary Rounds  --------------------------------------------------------------   OLT 02/27/25            Present:   Patient seen and examined with multidisciplinary Transplant team including Surgeon Dr. Callaway Hepatologist Dr. Samaniego,  ESTRELLA Cooley and bedside RN during AM rounds.   Disciplines not in attendance will be notified of the plan.     HPI: 44M PMH with PMHx of decompensated ETOH cirrhosis c/b recurrent ascites, grade II esophageal varices, and hepatic encephalopathy. 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 Admitted with Hyperkalemia and planned Thoracotomy on 3/17/2025    Interval Events:  - Afebrile, VSS  - no overnight events   - hyperK this am: lokelma x 1    Immunosuppression  Maintenance: FK per level, Myfortic 360 BID, pred 10  Ongoing monitoring for signs of rejection     Potential Discharge date: TBD  Education:  Medications  Plan of care:  See Below    MEDICATIONS  (STANDING):  albuterol/ipratropium for Nebulization 3 milliLiter(s) Nebulizer every 6 hours  aspirin enteric coated 81 milliGRAM(s) Oral daily  atovaquone  Suspension 1500 milliGRAM(s) Oral daily  carvedilol 3.125 milliGRAM(s) Oral every 12 hours  chlorhexidine 2% Cloths 1 Application(s) Topical <User Schedule>  furosemide    Tablet 40 milliGRAM(s) Oral daily  heparin   Injectable 5000 Unit(s) SubCutaneous every 12 hours  insulin glargine Injectable (LANTUS) 6 Unit(s) SubCutaneous at bedtime  insulin lispro (ADMELOG) corrective regimen sliding scale   SubCutaneous three times a day before meals  insulin lispro (ADMELOG) corrective regimen sliding scale   SubCutaneous at bedtime  magnesium oxide 800 milliGRAM(s) Oral two times a day with meals  mycophenolic acid  milliGRAM(s) Oral <User Schedule>  pantoprazole    Tablet 40 milliGRAM(s) Oral before breakfast  polyethylene glycol 3350 17 Gram(s) Oral daily  predniSONE   Tablet 5 milliGRAM(s) Oral daily  senna 2 Tablet(s) Oral at bedtime  sofosbuvir 400 mG/velpatasvir 100 mG (EPCLUSA) 1 Tablet(s) Oral daily  tacrolimus 7 milliGRAM(s) Oral <User Schedule>  thiamine 100 milliGRAM(s) Oral daily  valGANciclovir 450 milliGRAM(s) Oral daily    MEDICATIONS  (PRN):  albuterol    90 MICROgram(s) HFA Inhaler 1 Puff(s) Inhalation every 6 hours PRN Shortness of Breath and/or Wheezing  melatonin 3 milliGRAM(s) Oral at bedtime PRN Insomnia  sodium chloride 0.65% Nasal 1 Spray(s) Both Nostrils two times a day PRN Nasal Congestion  traMADol 25 milliGRAM(s) Oral every 8 hours PRN Moderate Pain (4 - 6)  traMADol 50 milliGRAM(s) Oral every 6 hours PRN Severe Pain (7 - 10)      PAST MEDICAL & SURGICAL HISTORY:  Sleep apnea, obstructive  Alcohol abuse  Cirrhosis of liver  Portal hypertension  H/O esophageal varices  Anemia  History of alcohol use disorder  Hepatic encephalopathy  Pleural effusion  History of pleural empyema  Liver transplanted  S/P abdominal paracentesis  History of lung surgery    Vital Signs Last 24 Hrs  T(C): 36.7 (30 Mar 2025 13:37), Max: 36.8 (30 Mar 2025 01:02)  T(F): 98.1 (30 Mar 2025 13:37), Max: 98.3 (30 Mar 2025 01:02)  HR: 82 (30 Mar 2025 13:37) (70 - 86)  BP: 123/73 (30 Mar 2025 13:37) (120/67 - 152/77)  BP(mean): --  RR: 18 (30 Mar 2025 13:37) (18 - 18)  SpO2: 94% (30 Mar 2025 13:37) (94% - 100%)    Parameters below as of 30 Mar 2025 13:37  Patient On (Oxygen Delivery Method): room air    I&O's Summary    29 Mar 2025 07:01  -  30 Mar 2025 07:00  --------------------------------------------------------  IN: 480 mL / OUT: 3400 mL / NET: -2920 mL    30 Mar 2025 07:01  -  30 Mar 2025 16:32  --------------------------------------------------------  IN: 0 mL / OUT: 1950 mL / NET: -1950 mL                       7.9    4.15  )-----------( 167      ( 30 Mar 2025 06:54 )             25.3     03-30    139  |  103  |  43[H]  ----------------------------<  93  5.4[H]   |  24  |  0.92    Ca    9.5      30 Mar 2025 13:22  Phos  3.9     03-30  Mg     1.9     03-30    TPro  5.8[L]  /  Alb  2.7[L]  /  TBili  0.4  /  DBili  x   /  AST  8[L]  /  ALT  <5[L]  /  AlkPhos  62  03-30    Tacrolimus (), Serum: 7.4 ng/mL (03-30 @ 06:51)    Review of systems  All other systems were reviewed and are negative, except as noted.    PHYSICAL EXAM:  Constitutional: Well developed / well nourished  Eyes: PERRLA  ENMT: nc/at, no thrush  Neck: supple  Respiratory: slight decreased BS on RLL, good airway movement, no adventitious sounds.  Cardiovascular: RRR  Gastrointestinal: Soft abdomen, ND, incision healed   Genitourinary: voiding  Extremities: SCD's in place and working bilaterally  Vascular: Palpable dp pulses bilaterally.   Neurological: A&O x3  Skin: no rashes, ulcerations, lesions  Musculoskeletal: Moving all extremities

## 2025-03-30 NOTE — DISCHARGE NOTE NURSING/CASE MANAGEMENT/SOCIAL WORK - PATIENT PORTAL LINK FT
You can access the FollowMyHealth Patient Portal offered by [facility] by registering at the following website: [email or web address]. By joining [facility]’s FollowMyHealth portal, you will also be able to view your health information using other applications (apps) compatible with our system.

## 2025-04-01 ENCOUNTER — LABORATORY RESULT (OUTPATIENT)
Age: 45
End: 2025-04-01

## 2025-04-02 ENCOUNTER — APPOINTMENT (OUTPATIENT)
Dept: TRANSPLANT | Facility: CLINIC | Age: 45
End: 2025-04-02

## 2025-04-02 VITALS
WEIGHT: 193 LBS | RESPIRATION RATE: 15 BRPM | OXYGEN SATURATION: 97 % | HEIGHT: 71 IN | DIASTOLIC BLOOD PRESSURE: 85 MMHG | SYSTOLIC BLOOD PRESSURE: 154 MMHG | BODY MASS INDEX: 27.02 KG/M2 | TEMPERATURE: 97.5 F | HEART RATE: 75 BPM

## 2025-04-02 PROBLEM — J90 PLEURAL EFFUSION, NOT ELSEWHERE CLASSIFIED: Chronic | Status: ACTIVE | Noted: 2025-03-13

## 2025-04-02 PROBLEM — K76.82 HEPATIC ENCEPHALOPATHY: Chronic | Status: ACTIVE | Noted: 2025-03-13

## 2025-04-02 PROBLEM — Z87.09 PERSONAL HISTORY OF OTHER DISEASES OF THE RESPIRATORY SYSTEM: Chronic | Status: ACTIVE | Noted: 2025-03-13

## 2025-04-02 PROBLEM — Z87.898 PERSONAL HISTORY OF OTHER SPECIFIED CONDITIONS: Chronic | Status: ACTIVE | Noted: 2025-03-13

## 2025-04-02 LAB
CULTURE RESULTS: SIGNIFICANT CHANGE UP
SPECIMEN SOURCE: SIGNIFICANT CHANGE UP

## 2025-04-03 ENCOUNTER — NON-APPOINTMENT (OUTPATIENT)
Age: 45
End: 2025-04-03

## 2025-04-03 LAB
ALBUMIN SERPL ELPH-MCNC: 3.5 G/DL
ALP BLD-CCNC: 70 U/L
ALT SERPL-CCNC: <5 U/L
ANION GAP SERPL CALC-SCNC: 9 MMOL/L
AST SERPL-CCNC: 10 U/L
BILIRUB SERPL-MCNC: 0.5 MG/DL
BUN SERPL-MCNC: 34 MG/DL
CALCIUM SERPL-MCNC: 9.6 MG/DL
CHLORIDE SERPL-SCNC: 106 MMOL/L
CMV DNA SPEC QL NAA+PROBE: NOT DETECTED IU/ML
CMVPCR LOG: NOT DETECTED LOG10IU/ML
CO2 SERPL-SCNC: 27 MMOL/L
CREAT SERPL-MCNC: 0.92 MG/DL
EGFRCR SERPLBLD CKD-EPI 2021: 104 ML/MIN/1.73M2
GGT SERPL-CCNC: 16 U/L
GLUCOSE SERPL-MCNC: 127 MG/DL
HBV CORE IGG+IGM SER QL: NONREACTIVE
HBV SURFACE AB SER QL: REACTIVE
HBV SURFACE AB SERPL IA-ACNC: 15.5 MIU/ML
HBV SURFACE AG SER QL: NONREACTIVE
HCT VFR BLD CALC: 29.4 %
HCV AB SER QL: REACTIVE
HCV RNA SERPL NAA+PROBE-LOG IU: <1.08 LOGIU/ML
HCV S/CO RATIO: 16.52 S/CO
HEPB DNA PCR INT: NOT DETECTED
HEPB DNA PCR LOG: NOT DETECTED LOGIU/ML
HEPC RNA INTERP: DETECTED
HGB BLD-MCNC: 9.1 G/DL
HIV1 RNA # SERPL NAA+PROBE: NORMAL
HIV1 RNA # SERPL NAA+PROBE: NORMAL COPIES/ML
HIV1+2 AB SPEC QL IA.RAPID: NONREACTIVE
MAGNESIUM SERPL-MCNC: 2 MG/DL
MCHC RBC-ENTMCNC: 31 G/DL
MCHC RBC-ENTMCNC: 31.3 PG
MCV RBC AUTO: 101 FL
PHOSPHATE SERPL-MCNC: 4.1 MG/DL
PLATELET # BLD AUTO: 184 K/UL
POTASSIUM SERPL-SCNC: 5.1 MMOL/L
PROT SERPL-MCNC: 6.6 G/DL
RBC # BLD: 2.91 M/UL
RBC # FLD: 18.3 %
SODIUM SERPL-SCNC: 142 MMOL/L
TACROLIMUS SERPL-MCNC: 8.3 NG/ML
VIRAL LOAD INTERP: NORMAL
VIRAL LOAD LOG: NORMAL LG COP/ML
WBC # FLD AUTO: 4.87 K/UL

## 2025-04-07 PROBLEM — J94.2 HEMOTHORAX: Status: ACTIVE | Noted: 2025-04-07

## 2025-04-07 PROBLEM — J86.9 EMPYEMA: Status: ACTIVE | Noted: 2025-04-07

## 2025-04-09 ENCOUNTER — APPOINTMENT (OUTPATIENT)
Dept: TRANSPLANT | Facility: CLINIC | Age: 45
End: 2025-04-09
Payer: MEDICAID

## 2025-04-09 VITALS
SYSTOLIC BLOOD PRESSURE: 158 MMHG | WEIGHT: 184 LBS | HEIGHT: 71 IN | DIASTOLIC BLOOD PRESSURE: 96 MMHG | TEMPERATURE: 97.3 F | BODY MASS INDEX: 25.76 KG/M2 | HEART RATE: 79 BPM | RESPIRATION RATE: 16 BRPM | OXYGEN SATURATION: 98 %

## 2025-04-09 PROCEDURE — 99215 OFFICE O/P EST HI 40 MIN: CPT | Mod: 24

## 2025-04-09 RX ORDER — PREDNISONE 5 MG/1
5 TABLET ORAL
Qty: 30 | Refills: 5 | Status: ACTIVE | COMMUNITY
Start: 2025-04-09 | End: 1900-01-01

## 2025-04-10 LAB
ALBUMIN SERPL ELPH-MCNC: 4.1 G/DL
ALP BLD-CCNC: 86 U/L
ALT SERPL-CCNC: 5 U/L
ANION GAP SERPL CALC-SCNC: 12 MMOL/L
AST SERPL-CCNC: 5 U/L
BILIRUB SERPL-MCNC: 0.6 MG/DL
BUN SERPL-MCNC: 57 MG/DL
CALCIUM SERPL-MCNC: 10 MG/DL
CHLORIDE SERPL-SCNC: 102 MMOL/L
CMV DNA SPEC QL NAA+PROBE: NOT DETECTED IU/ML
CMVPCR LOG: NOT DETECTED LOG10IU/ML
CO2 SERPL-SCNC: 26 MMOL/L
CREAT SERPL-MCNC: 1.39 MG/DL
EGFRCR SERPLBLD CKD-EPI 2021: 63 ML/MIN/1.73M2
GGT SERPL-CCNC: 13 U/L
GLUCOSE SERPL-MCNC: 117 MG/DL
HCT VFR BLD CALC: 29.5 %
HGB BLD-MCNC: 9.5 G/DL
MAGNESIUM SERPL-MCNC: 2.2 MG/DL
MCHC RBC-ENTMCNC: 29.2 PG
MCHC RBC-ENTMCNC: 32.2 G/DL
MCV RBC AUTO: 90.8 FL
PHOSPHATE SERPL-MCNC: 4.8 MG/DL
PLATELET # BLD AUTO: 218 K/UL
POTASSIUM SERPL-SCNC: 5.7 MMOL/L
PROT SERPL-MCNC: 7.2 G/DL
RBC # BLD: 3.25 M/UL
RBC # FLD: 16 %
SODIUM SERPL-SCNC: 139 MMOL/L
TACROLIMUS SERPL-MCNC: 9.6 NG/ML
WBC # FLD AUTO: 3.92 K/UL

## 2025-04-14 ENCOUNTER — OUTPATIENT (OUTPATIENT)
Dept: OUTPATIENT SERVICES | Facility: HOSPITAL | Age: 45
LOS: 1 days | End: 2025-04-14
Payer: MEDICAID

## 2025-04-14 ENCOUNTER — APPOINTMENT (OUTPATIENT)
Dept: THORACIC SURGERY | Facility: CLINIC | Age: 45
End: 2025-04-14
Payer: MEDICAID

## 2025-04-14 ENCOUNTER — APPOINTMENT (OUTPATIENT)
Dept: RADIOLOGY | Facility: HOSPITAL | Age: 45
End: 2025-04-14

## 2025-04-14 VITALS
WEIGHT: 183 LBS | DIASTOLIC BLOOD PRESSURE: 92 MMHG | HEART RATE: 83 BPM | SYSTOLIC BLOOD PRESSURE: 152 MMHG | RESPIRATION RATE: 16 BRPM | BODY MASS INDEX: 25.62 KG/M2 | HEIGHT: 71 IN | OXYGEN SATURATION: 98 %

## 2025-04-14 DIAGNOSIS — Z98.890 OTHER SPECIFIED POSTPROCEDURAL STATES: Chronic | ICD-10-CM

## 2025-04-14 DIAGNOSIS — J86.9 PYOTHORAX W/OUT FISTULA: ICD-10-CM

## 2025-04-14 DIAGNOSIS — J86.9 PYOTHORAX WITHOUT FISTULA: ICD-10-CM

## 2025-04-14 DIAGNOSIS — J94.2 HEMOTHORAX: ICD-10-CM

## 2025-04-14 DIAGNOSIS — Z94.4 LIVER TRANSPLANT STATUS: Chronic | ICD-10-CM

## 2025-04-14 PROCEDURE — 71046 X-RAY EXAM CHEST 2 VIEWS: CPT | Mod: 26

## 2025-04-14 PROCEDURE — 99024 POSTOP FOLLOW-UP VISIT: CPT

## 2025-04-16 LAB
CULTURE RESULTS: SIGNIFICANT CHANGE UP
SPECIMEN SOURCE: SIGNIFICANT CHANGE UP

## 2025-04-17 ENCOUNTER — NON-APPOINTMENT (OUTPATIENT)
Age: 45
End: 2025-04-17

## 2025-04-17 LAB
ALBUMIN SERPL ELPH-MCNC: 3.4 G/DL
ALP BLD-CCNC: 90 U/L
ALT SERPL-CCNC: <5 U/L
ANION GAP SERPL CALC-SCNC: 10 MMOL/L
AST SERPL-CCNC: 9 U/L
BILIRUB SERPL-MCNC: 0.5 MG/DL
BUN SERPL-MCNC: 28 MG/DL
CALCIUM SERPL-MCNC: 10 MG/DL
CHLORIDE SERPL-SCNC: 105 MMOL/L
CMV DNA SPEC QL NAA+PROBE: NOT DETECTED IU/ML
CMVPCR LOG: NOT DETECTED LOG10IU/ML
CO2 SERPL-SCNC: 24 MMOL/L
CREAT SERPL-MCNC: 1.1 MG/DL
EGFRCR SERPLBLD CKD-EPI 2021: 84 ML/MIN/1.73M2
GGT SERPL-CCNC: 11 U/L
GLUCOSE SERPL-MCNC: 108 MG/DL
HCT VFR BLD CALC: 27.2 %
HGB BLD-MCNC: 9 G/DL
MAGNESIUM SERPL-MCNC: 2.1 MG/DL
MCHC RBC-ENTMCNC: 30.2 PG
MCHC RBC-ENTMCNC: 33.1 G/DL
MCV RBC AUTO: 91.3 FL
PHOSPHATE SERPL-MCNC: 4.2 MG/DL
PLATELET # BLD AUTO: 223 K/UL
POTASSIUM SERPL-SCNC: 5 MMOL/L
PROT SERPL-MCNC: 7 G/DL
RBC # BLD: 2.98 M/UL
RBC # FLD: 15.7 %
SODIUM SERPL-SCNC: 139 MMOL/L
TACROLIMUS SERPL-MCNC: 6.3 NG/ML
WBC # FLD AUTO: 2.89 K/UL

## 2025-04-17 RX ORDER — TRAMADOL HYDROCHLORIDE 50 MG/1
50 TABLET, COATED ORAL TWICE DAILY
Qty: 10 | Refills: 0 | Status: ACTIVE | COMMUNITY
Start: 2025-04-17 | End: 1900-01-01

## 2025-04-22 RX ORDER — SODIUM ZIRCONIUM CYCLOSILICATE 10 G/10G
10 POWDER, FOR SUSPENSION ORAL DAILY
Qty: 30 | Refills: 3 | Status: ACTIVE | COMMUNITY
Start: 2025-04-22 | End: 1900-01-01

## 2025-04-23 ENCOUNTER — APPOINTMENT (OUTPATIENT)
Dept: TRANSPLANT | Facility: CLINIC | Age: 45
End: 2025-04-23

## 2025-04-23 VITALS
SYSTOLIC BLOOD PRESSURE: 149 MMHG | BODY MASS INDEX: 26.04 KG/M2 | TEMPERATURE: 97.4 F | HEIGHT: 71 IN | WEIGHT: 186 LBS | OXYGEN SATURATION: 100 % | HEART RATE: 71 BPM | RESPIRATION RATE: 16 BRPM | DIASTOLIC BLOOD PRESSURE: 77 MMHG

## 2025-04-23 DIAGNOSIS — Z79.60 LONG TERM (CURRENT) USE OF UNSPECIFIED IMMUNOMODULATORS AND IMMUNOSUPPRESSANTS: ICD-10-CM

## 2025-04-23 LAB
HCV RNA SERPL NAA+PROBE-LOG IU: <1.08 LOGIU/ML
HEPC RNA INTERP: DETECTED

## 2025-04-23 PROCEDURE — 99214 OFFICE O/P EST MOD 30 MIN: CPT

## 2025-04-28 RX ORDER — MYCOPHENILIC ACID 360 MG/1
360 TABLET, DELAYED RELEASE ORAL
Qty: 60 | Refills: 5 | Status: ACTIVE | COMMUNITY
Start: 2025-04-28 | End: 1900-01-01

## 2025-05-05 ENCOUNTER — NON-APPOINTMENT (OUTPATIENT)
Age: 45
End: 2025-05-05

## 2025-05-06 ENCOUNTER — APPOINTMENT (OUTPATIENT)
Dept: HEPATOLOGY | Facility: CLINIC | Age: 45
End: 2025-05-06
Payer: MEDICAID

## 2025-05-06 VITALS
HEART RATE: 76 BPM | BODY MASS INDEX: 26.04 KG/M2 | TEMPERATURE: 97.4 F | HEIGHT: 71 IN | DIASTOLIC BLOOD PRESSURE: 60 MMHG | SYSTOLIC BLOOD PRESSURE: 110 MMHG | OXYGEN SATURATION: 99 % | WEIGHT: 186 LBS

## 2025-05-06 PROCEDURE — 99215 OFFICE O/P EST HI 40 MIN: CPT

## 2025-05-12 ENCOUNTER — LABORATORY RESULT (OUTPATIENT)
Age: 45
End: 2025-05-12

## 2025-05-12 RX ORDER — MAGNESIUM OXIDE 241.3 MG/1000MG
400 TABLET ORAL
Qty: 360 | Refills: 1 | Status: ACTIVE | COMMUNITY
Start: 2025-05-12

## 2025-05-13 ENCOUNTER — NON-APPOINTMENT (OUTPATIENT)
Age: 45
End: 2025-05-13

## 2025-05-13 LAB
ALBUMIN SERPL ELPH-MCNC: 3.4 G/DL
ALP BLD-CCNC: 81 U/L
ALT SERPL-CCNC: <5 U/L
ANION GAP SERPL CALC-SCNC: 11 MMOL/L
AST SERPL-CCNC: 7 U/L
BASOPHILS # BLD AUTO: 0.03 K/UL
BASOPHILS NFR BLD AUTO: 1.4 %
BILIRUB SERPL-MCNC: 0.3 MG/DL
BUN SERPL-MCNC: 23 MG/DL
CALCIUM SERPL-MCNC: 9.5 MG/DL
CHLORIDE SERPL-SCNC: 111 MMOL/L
CO2 SERPL-SCNC: 20 MMOL/L
CREAT SERPL-MCNC: 1.22 MG/DL
EGFRCR SERPLBLD CKD-EPI 2021: 74 ML/MIN/1.73M2
EOSINOPHIL # BLD AUTO: 0.05 K/UL
EOSINOPHIL NFR BLD AUTO: 2.3 %
ESTIMATED AVERAGE GLUCOSE: 88 MG/DL
FERRITIN SERPL-MCNC: 621 NG/ML
FOLATE SERPL-MCNC: 10.8 NG/ML
GLUCOSE SERPL-MCNC: 117 MG/DL
HBA1C MFR BLD HPLC: 4.7 %
HCT VFR BLD CALC: 28.7 %
HCV AB SER QL: REACTIVE
HCV RNA SERPL NAA+PROBE-LOG IU: NOT DETECTED LOGIU/ML
HCV S/CO RATIO: 15.21 S/CO
HEPC RNA INTERP: NOT DETECTED
HGB BLD-MCNC: 9.1 G/DL
IMM GRANULOCYTES NFR BLD AUTO: 1.4 %
INR PPP: 0.97 RATIO
IRON SATN MFR SERPL: 34 %
IRON SERPL-MCNC: 76 UG/DL
LYMPHOCYTES # BLD AUTO: 0.74 K/UL
LYMPHOCYTES NFR BLD AUTO: 33.6 %
MAGNESIUM SERPL-MCNC: 1.7 MG/DL
MAN DIFF?: NORMAL
MCHC RBC-ENTMCNC: 30 PG
MCHC RBC-ENTMCNC: 31.7 G/DL
MCV RBC AUTO: 94.7 FL
MONOCYTES # BLD AUTO: 0.24 K/UL
MONOCYTES NFR BLD AUTO: 10.9 %
NEUTROPHILS # BLD AUTO: 1.11 K/UL
NEUTROPHILS NFR BLD AUTO: 50.4 %
PLATELET # BLD AUTO: 135 K/UL
POTASSIUM SERPL-SCNC: 5.3 MMOL/L
PROT SERPL-MCNC: 6.4 G/DL
PT BLD: 11.4 SEC
RBC # BLD: 3.03 M/UL
RBC # FLD: 15.9 %
SODIUM SERPL-SCNC: 143 MMOL/L
TACROLIMUS SERPL-MCNC: 7.2 NG/ML
TIBC SERPL-MCNC: 225 UG/DL
TSH SERPL-ACNC: 3.12 UIU/ML
UIBC SERPL-MCNC: 149 UG/DL
VIT B12 SERPL-MCNC: 599 PG/ML
WBC # FLD AUTO: 2.2 K/UL

## 2025-05-14 LAB
CMV DNA SPEC QL NAA+PROBE: NOT DETECTED IU/ML
CMVPCR LOG: NOT DETECTED LOG10IU/ML

## 2025-05-16 LAB
PETH 16:0/18:1: NEGATIVE NG/ML
PETH 16:0/18:2: NEGATIVE NG/ML
PETH COMMENTS: NORMAL

## 2025-05-19 ENCOUNTER — LABORATORY RESULT (OUTPATIENT)
Age: 45
End: 2025-05-19

## 2025-05-21 ENCOUNTER — APPOINTMENT (OUTPATIENT)
Dept: NEUROLOGY | Facility: CLINIC | Age: 45
End: 2025-05-21
Payer: MEDICAID

## 2025-05-21 ENCOUNTER — NON-APPOINTMENT (OUTPATIENT)
Age: 45
End: 2025-05-21

## 2025-05-21 VITALS
BODY MASS INDEX: 27.16 KG/M2 | DIASTOLIC BLOOD PRESSURE: 103 MMHG | WEIGHT: 194 LBS | HEART RATE: 79 BPM | SYSTOLIC BLOOD PRESSURE: 160 MMHG | HEIGHT: 71 IN

## 2025-05-21 DIAGNOSIS — G62.9 POLYNEUROPATHY, UNSPECIFIED: ICD-10-CM

## 2025-05-21 LAB
ALBUMIN SERPL ELPH-MCNC: 3.6 G/DL
ALP BLD-CCNC: 81 U/L
ALT SERPL-CCNC: 5 U/L
ANION GAP SERPL CALC-SCNC: 12 MMOL/L
AST SERPL-CCNC: 12 U/L
BASOPHILS # BLD AUTO: 0.04 K/UL
BASOPHILS NFR BLD AUTO: 1.8 %
BILIRUB SERPL-MCNC: 0.3 MG/DL
BUN SERPL-MCNC: 27 MG/DL
CALCIUM SERPL-MCNC: 10.1 MG/DL
CHLORIDE SERPL-SCNC: 111 MMOL/L
CMV DNA SPEC QL NAA+PROBE: NOT DETECTED IU/ML
CMVPCR LOG: NOT DETECTED LOG10IU/ML
CO2 SERPL-SCNC: 17 MMOL/L
CREAT SERPL-MCNC: 1.17 MG/DL
EGFRCR SERPLBLD CKD-EPI 2021: 78 ML/MIN/1.73M2
EOSINOPHIL # BLD AUTO: 0 K/UL
EOSINOPHIL NFR BLD AUTO: 0 %
GLUCOSE SERPL-MCNC: 103 MG/DL
HCT VFR BLD CALC: 31.1 %
HGB BLD-MCNC: 9.5 G/DL
LYMPHOCYTES # BLD AUTO: 0.9 K/UL
LYMPHOCYTES NFR BLD AUTO: 40.1 %
MAGNESIUM SERPL-MCNC: 1.7 MG/DL
MAN DIFF?: NORMAL
MCHC RBC-ENTMCNC: 29.7 PG
MCHC RBC-ENTMCNC: 30.5 G/DL
MCV RBC AUTO: 97.2 FL
MONOCYTES # BLD AUTO: 0.06 K/UL
MONOCYTES NFR BLD AUTO: 2.7 %
NEUTROPHILS # BLD AUTO: 1.18 K/UL
NEUTROPHILS NFR BLD AUTO: 51.8 %
PLATELET # BLD AUTO: 139 K/UL
POTASSIUM SERPL-SCNC: 5.7 MMOL/L
PROT SERPL-MCNC: 6.6 G/DL
RBC # BLD: 3.2 M/UL
RBC # FLD: 16 %
SODIUM SERPL-SCNC: 140 MMOL/L
TACROLIMUS SERPL-MCNC: 9.7 NG/ML
WBC # FLD AUTO: 2.24 K/UL

## 2025-05-21 PROCEDURE — 99203 OFFICE O/P NEW LOW 30 MIN: CPT

## 2025-05-21 NOTE — H&P PST ADULT - WEIGHT IN LBS
[facility] Progress Note           Maged Petit Patient Status:  Inpatient    1949 MRN [id number]   Location [facility] INTENSIVE CARE Attending Mercedes Akers MD   Hosp Day # 1 PCP Pcp, No     Subjective:      Feeling better.  Good urine output.      Review of Systems    General: No fever or chills, No loss of appetite.    RS: No hemoptysis  GI: No melena or hematochezia  : No urinary disturbance  Derm: No skin disorders   Heme: No blood dyscrasias.  Remainder of 12 point systems reviewed and negative (or as mentioned in HPI)      Objective:     Medications:  Current Facility-Administered Medications   Medication Dose Route Frequency Provider Last Rate Last Admin    losartan (COZAAR) tablet 50 mg  50 mg Oral Daily Tamia Hector MD   50 mg at 25 0804    metoPROLOL succinate (TOPROL-XL) ER tablet 50 mg  50 mg Oral Daily Tamia Hector MD   50 mg at 25 0804    magnesium oxide (MAG-OX) tablet 400 mg  400 mg Oral Once Tamia Hector MD        furosemide (LASIX INJECT) injection 40 mg  40 mg Intravenous BID Matthew Sabillon MD   40 mg at 25 1643    Potassium Standard Replacement Protocol (Levels 3.5 and lower)   Does not apply See Admin Instructions Matthew Sabillon MD        Magnesium Standard Replacement Protocol   Does not apply See Admin Instructions Matthew Sabillon MD        sodium chloride 0.9 % injection 2 mL  2 mL Intracatheter 2 times per day Matthew Sabillon MD   2 mL at 25 0806    heparin (porcine) injection 5,000 Units  5,000 Units Subcutaneous 3 times per day Matthew Sabillon MD   5,000 Units at 25 1433    aspirin (ECOTRIN) enteric coated tablet 81 mg  81 mg Oral Daily Matthew Sabillon MD   81 mg at 25 0805    atorvastatin (LIPITOR) tablet 40 mg  40 mg Oral Daily Matthew Sabillon MD   40 mg at 25 0805    doxycycline hyclate (VIBRAMYCIN) capsule 100 mg  100 mg Oral 2 times  per day Matthew Sabillon MD   100 mg at 05/21/25 0804    cefTRIAXone (ROCEPHIN) 2,000 mg in sterile water (preservative free) IV syringe  2,000 mg Intravenous Daily Matthew Sabillon MD   2,000 mg at 05/21/25 0805    insulin lispro (ADMELOG,HumaLOG) - Correction Dose   Subcutaneous TID WC Matthew Sabillon MD   1 Units at 05/21/25 0837    insulin lispro (ADMELOG,HumaLOG) - Correction Dose   Subcutaneous Nightly Matthew Sabillon MD        [Held by provider] empagliflozin (JARDIANCE) tablet 10 mg  10 mg Oral Daily Mercedes Akers MD          Current Facility-Administered Medications   Medication Dose Route Frequency Provider Last Rate Last Admin    nitroGLYCERIN 50 mg in dextrose 5% 250 mL infusion  0-200 mcg/min Intravenous Continuous Adama Mills MD 3 mL/hr at 05/21/25 1651 10 mcg/min at 05/21/25 1651      Current Facility-Administered Medications   Medication Dose Route Frequency Provider Last Rate Last Admin    ondansetron (ZOFRAN ODT) disintegrating tablet 4 mg  4 mg Oral Q12H PRN Tamia Hector MD        Or    ondansetron (ZOFRAN) injection 4 mg  4 mg Intravenous Q8H PRN Tamia Hector MD        guaiFENesin-DM (ROBITUSSIN DM) 100-10 MG/5ML syrup 10 mL  10 mL Oral Q4H PRN Matthew Sabillon MD        albuterol inhaler 2 puff  2 puff Inhalation Q4H Resp PRN Matthew Sabillon MD        albuterol (VENTOLIN) nebulizer 2.5 mg  2.5 mg Nebulization Q4H Resp PRN Matthew Sabillon MD        acetaminophen (TYLENOL) tablet 650 mg  650 mg Oral Q4H PRN Matthew Sabillon MD        Or    acetaminophen (TYLENOL) suppository 650 mg  650 mg Rectal Q4H PRN Matthew Sabillon MD        polyethylene glycol (MIRALAX) packet 17 g  17 g Oral Daily PRN Matthew Sabillon MD        melatonin tablet 6 mg  6 mg Oral Nightly PRN Matthew Sabillon MD        sodium chloride 0.9 % injection 10 mL  10 mL Intravenous PRN Matthew Sabillon MD        sodium chloride (NORMAL  SALINE) 0.9 % bolus 500 mL  500 mL Intravenous PRN aMtthew Sabillon MD        dextrose 50 % injection 25 g  25 g Intravenous PRN Matthew Sabillon MD        dextrose 50 % injection 12.5 g  12.5 g Intravenous PRN Matthew Sabillon MD        glucagon (GLUCAGEN) injection 1 mg  1 mg Intramuscular PRN Matthew Sabillon MD        dextrose (GLUTOSE) 40 % gel 15 g  15 g Oral PRN Matthew Sabillon MD        dextrose (GLUTOSE) 40 % gel 30 g  30 g Oral PRN Matthew Sabillon MD        ipratropium-albuterol (DUONEB) 0.5-2.5 (3) MG/3ML nebulizer solution 3 mL  3 mL Nebulization Q6H Resp PRN Mercedes Akers MD            Allergies:   ALLERGIES:   Allergen Reactions    Lisinopril Cough        Physical Exam:  Vital Last Value 24 Hour Range   Temperature 98.1 °F (36.7 °C) (05/21/25 1600) Temp  Min: 97.1 °F (36.2 °C)  Max: 98.2 °F (36.8 °C)   Pulse 93 (05/21/25 1600) Pulse  Min: 84  Max: 107   Respiratory (!) 21 (05/21/25 1600) Resp  Min: 11  Max: 33   Non-Invasive  Blood Pressure 119/75 (05/21/25 1600) BP  Min: 111/74  Max: 169/98   Pulse Oximetry 96 % (05/21/25 1600) SpO2  Min: 93 %  Max: 100 %   Arterial   Blood Pressure   No data recorded       Intake/Output:     Intake/Output Summary (Last 24 hours) at 5/21/2025 1721  Last data filed at 5/21/2025 1600  Gross per 24 hour   Intake 103.96 ml   Output 2500 ml   Net -2396.04 ml       Weight    05/20/25 0408 05/20/25 2038 05/21/25 0536   Weight: 61.2 kg (134 lb 14.7 oz) 75.6 kg (166 lb 10.7 oz) 74.8 kg (164 lb 14.5 oz)        GENERAL: No apparent distress  HEENT: Normocephalic.  Neck:  Supple neck.   Oral mucosa : Pink and moist.    Endocrine: There is no goiter.  CVS: Normal first and second heart tones.   Lung fields: Clear to auscultation bilaterally.   GI: Soft. Nontender, nondistended.    Lower extremity: No cyanosis, clubbing or edema.   Peripheral vascular: Both lower extremities are warm and well perfused.    Neuro: Awake and alert.  Nonfocal  examination.  Psych: Appropriate mood and affect  Integumentary: Warm and Dry      Clinical Data:   (PERSONALLY REVIEWED)    Labs    CBC  Recent Labs   Lab 05/21/25  0434 05/20/25  0740 05/20/25  0418   WBC 9.1 14.2* 13.0*   HCT 33.3* 37.0* 35.2*   HGB 9.9* 10.6* 10.0*    349 374       CMP  Recent Labs   Lab 05/21/25  0434 05/20/25  0740 05/20/25  0429 05/20/25  0418   SODIUM 145 144  --  143   POTASSIUM 3.7 4.6  --  4.1   CHLORIDE 108 107  --  106   CO2 31 26  --  25   GLUCOSE 167* 305*  --  318*   BUN 16 25*  --  22*   CREATININE 1.37* 1.32*  --  1.31*   CALCIUM 8.8 9.1  --  8.7   TOTPROTEIN 6.6 7.6 7.6 7.9   ALBUMIN 2.6* 3.0*  --  3.1*   BILIRUBIN 0.6 0.4  --  0.4   AST 14 22  --  29   GPT 28 37  --  42   ALKPT 108 130*  --  142*       Cardiac Labs  Recent Labs   Lab 05/20/25  0740 05/20/25  0429 05/20/25  0418   TSH 0.991 2.074  --    HTROPI 58  --  40   NTPROB  --   --  926*       Lipid Panel  Recent Labs   Lab 05/20/25  0740   CHOLESTEROL 149   HDL 71   CALCLDL 71   TRIGLYCERIDE 34       Coags  No results found    ABG  Recent Labs   Lab 05/21/25  0450 05/20/25  0556   RAPH 7.40 7.37   RAPCO2 49* 51*   RAPO2 92 105   RAHCO3 30* 30*   RASAT 99 99       Imaging    ECG:   Encounter Date: 05/20/25   Electrocardiogram 12-Lead   Result Value    Ventricular Rate EKG/Min (BPM) 119    Atrial Rate (BPM) 119    MI-Interval (MSEC) 160    QRS-Interval (MSEC) 72    QT-Interval (MSEC) 330    QTc 464    P Axis (Degrees) 66    R Axis (Degrees) 34    T Axis (Degrees) 80    REPORT TEXT      Sinus tachycardia  Anterior infarct  , age undetermined  Abnormal ECG  When compared with ECG of  24-APR-2025 09:57,  Nonspecific T wave abnormality no longer evident in  Inferior leads  Confirmed by GWENDOLYN OLIVIA MD ([id number]) on 5/20/2025 3:59:23 PM          Echocardiogram:  Results for orders placed during the hospital encounter of 04/24/25    TRANSTHORACIC ECHO (TTE) COMPLETE W/ W/O IMAGING AGENT    Narrative  *[facility]*  [address] [place] [address] [phone]  Transthoracic Echocardiogram (TTE)    Patient: Maged Petit     Study Date/Time:       2025 9:19AM  MRN:     [id number]              FIN#:                  [id number]  :     1949           Ht/Wt:                 167cm 77.1kg  Age:     75                   BSA/BMI:               1.91m^2 27.6kg/m^2  Gender:  M                    Baseline BP:           155 / 85  *Ordering Physician:* Alyssa Mcallister    *Referring Physician:* Alyssa Mcallister ; Alyssa Mcallister    *Attending Physician:* Yaniv Weiss    *Diagnostic Physician:* Loki Orellana MD  *Sonographer:* Atilio Bazzi,    ------------------------------------------------------------------------------  INDICATIONS:   Dyspnea.    ------------------------------------------------------------------------------  STUDY CONCLUSIONS  SUMMARY:    1. Left ventricle: The cavity size is normal. Wall thickness is normal.  Systolic function is moderately reduced. The ejection fraction was measured  by visual estimation. Doppler parameters are consistent with abnormal left  ventricular relaxation (grade 1 diastolic dysfunction). The ejection  fraction is 40%.  2. Aortic valve: There is mild regurgitation.  3. Left atrium: The atrium is mildly dilated.  4. Right ventricle: The cavity size is normal. Systolic function is normal.    ------------------------------------------------------------------------------  STUDY DATA:   Procedure:  A transthoracic echocardiogram was performed. Image  quality was adequate.  M-mode, complete 2D, complete spectral Doppler, and  color Doppler.  Study status:  Routine.  Study completion:  There were no  complications.    FINDINGS    BASELINE ECG:   Normal sinus rhythm.  LEFT VENTRICLE:  The cavity size is normal. Wall thickness is normal. Systolic  function is moderately reduced.    The ejection fraction was measured by  visual estimation. The ejection fraction is 40%.  The tissue Doppler parameters  are abnormal. Doppler parameters are consistent with abnormal left ventricular  relaxation (grade 1 diastolic dysfunction).    AORTIC VALVE:  The annulus is calcified. The valve is probably trileaflet. The  leaflets are normal thickness. Velocity is within the normal range. There is  no stenosis. There is mild regurgitation. The mean systolic gradient is 6mm  Hg. The peak systolic gradient is 11mm Hg. The LVOT to aortic valve VTI ratio  is 0.58. The valve area is 2.0cm^2. The valve area index is 1.05cm^2/m^2. The  ratio of LVOT to aortic valve peak velocity is 0.56. The valve area is  1.9cm^2. The valve area index is 1.01cm^2/m^2.    AORTA:  Aortic root: The root is normal-sized.    MITRAL VALVE:  The valve is structurally normal. The annulus is normal. The  leaflets are normal thickness. Inflow velocity is within the normal range.  There is no evidence for stenosis. There is trivial regurgitation. The mean  diastolic gradient is 2mm Hg. The peak diastolic gradient is 2mm Hg. The valve  area by pressure half-time is 4.5cm^2. The valve area index by pressure  half-time is 2.35cm^2/m^2. The valve area (LVOT continuity) is 4.1cm^2. The  valve area index (LVOT continuity) is 2.16cm^2/m^2.    ATRIAL SEPTUM:  The septum is normal.  Color doppler shows no obvious shunt.    LEFT ATRIUM:  The atrium is mildly dilated.    RIGHT VENTRICLE:  The cavity size is normal. Systolic function is normal. The  TAPSE is normal, suggestive of normal RV systolic function.  Systolic pressure  is within the normal range.    VENTRICULAR SEPTUM:   Thickness is normal.    PULMONIC VALVE:   Not well visualized.    TRICUSPID VALVE:  The valve is structurally normal. Leaflet separation is  normal. There is trace regurgitation.    RIGHT ATRIUM:  The atrium is normal in size.       The estimated central  venous pressure is 3mm Hg.    PERICARDIUM:   There is no pericardial effusion.    SYSTEMIC VEINS:  Inferior vena  cava: The IVC is normal-sized.  Respirophasic diameter changes  are in the normal range (>= 50%).    ------------------------------------------------------------------------------  Measurements    Left ventricle            Value        Ref         Left atrium              Value          Ref  ANH, LAX chord        (N) 5.1   cm     4.2 - 5.8   AP dim, ES           (N) 3.7   cm       3.0 - 4.0  ESD, LAX chord        (H) 4.1   cm     2.5 - 4.0   AP dim index, ES     (N) 1.9   cm/m^2   1.5 - 2.3  ANH/bsa, LAX chord    (N) 2.7   cm/m^2 2.2 - 3.0   Area ES, A4C         (H) 22    cm^2     <=20  ESD/bsa, LAX chord    (N) 2.1   cm/m^2 1.3 - 2.1   Area/bsa ES, A4C         11.63 cm^2/m^2 ---------  PW, ED, LAX           (N) 0.8   cm     0.6 - 1.0   Area ES, A2C             21    cm^2     ---------  ANH major ax, A4C         7.8   cm     ----------  Area/bsa ES, A2C         10.94 cm^2/m^2 ---------  ESD major ax, A4C         6.8   cm     ----------  SI dim, A2C              5.4   cm       ---------  FS major axis, A4C        12    %      ----------  Vol, S               (H) 66    ml       18 - 58  ANH/bsa major ax, A4C     4.1   cm/m^2 ----------  Vol/bsa, S           (H) 35    ml/m^2   16 - 34  ESD/bsa major ax, A4C     3.6   cm/m^2 ----------  Vol, ES, 1-p A4C     (H) 69    ml       18 - 58  LULÚ, A4C                  28.6  cm^2   ----------  Vol/bsa, ES, 1-p A4C (N) 36    ml/m^2   12 - 37  PANCHO, A4C                  19.3  cm^2   ----------  Vol, ES, 1-p A2C     (H) 66    ml       18 - 58  FAC, A4C                  33    %      ----------  Vol/bsa, ES, 1-p A2C (N) 34    ml/m^2   11 - 43  IVS, ED               (N) 0.8   cm     0.6 - 1.0   Vol, ES, 2-p             70    ml       ---------  PW, ED                (N) 0.8   cm     0.6 - 1.0   Vol/bsa, ES, 2-p     (H) 37    ml/m^2   16 - 34  IVS/PW, ED                1            ----------  EDV                   (N) 133   ml     62 - 150    Aortic valve             Value           Ref  ESV                   (H) 69    ml     21 - 61     Peak v, S                1.7   m/sec    ---------  EF                    (L) 40    %      52 - 72     Mean v, S                1.16  m/sec    ---------  SV                        50    ml     ----------  Mean grad, S             6     mm Hg    ---------  EDV/bsa               (N) 70    ml/m^2 34 - 74     Peak grad, S             11    mm Hg    ---------  ESV/bsa               (H) 36    ml/m^2 11 - 31     LVOT/AV, VTI ratio       0.58           ---------  SV/bsa                    26    ml/m^2 ----------  MECHE, VTI                 2.0   cm^2     ---------  SV, 1-p A4C               41    ml     ----------  MECHE/bsa, VTI             1.05  cm^2/m^2 ---------  SV/bsa, 1-p A4C           21    ml/m^2 ----------  LVOT/AV, Vpeak ratio     0.56           ---------  EDV, 2-p              (N) 90    ml     62 - 150    MECHE, Vmax                1.9   cm^2     ---------  ESV, 2-p              (N) 54    ml     21 - 61     MECHE/bsa, Vmax            1.01  cm^2/m^2 ---------  EF, 2-p               (L) 40    %      52 - 72     AR ED v                  3.48  m/s      ---------  SV, 2-p                   36    ml     ----------  AR decel                 312   cm/s^2   ---------  EDV/bsa, 2-p          (N) 47    ml/m^2 34 - 74     AR PHT                   327   ms       ---------  ESV/bsa, 2-p          (N) 28    ml/m^2 11 - 31     AR ED grad               48    mm Hg    ---------  SV/bsa, 2-p               18.9  ml/m^2 ----------  E', lat favian, TDI      (L) 6.3   cm/sec >=10.0      Mitral valve             Value          Ref  E/e', lat favian, TDI    (N) 12           <=13        Mean v, D                0.61  m/sec    ---------  E', med favian, TDI      (L) 5.3   cm/sec >=7.0       Peak E                   0.79  m/sec    ---------  E/e', med favian, TDI        15           ----------  Peak A                   0.81  m/sec    ---------  E', avg, TDI              5.82  cm/sec ----------  VTI leaflet  coapt        14.5  cm       ---------  E/e', avg, TDI        (N) 14           <=14        MiV/LVOT VTI             0.8            ---------  Decel time               137   ms       ---------  LVOT                      Value        Ref         PHT                      49    ms       ---------  Diam, S                   2.1   cm     ----------  Mean grad, D             2     mm Hg    ---------  Area                      3.5   cm^2   ----------  Peak grad, D             2     mm Hg    ---------  Peak daly, S               0.93  m/sec  ----------  Peak E/A ratio           1              ---------  Peak grad, S              3     mm Hg  ----------  A-VTI                    14.5  cm       ---------  MVA, PHT                 4.5   cm^2     ---------  Right ventricle           Value        Ref         MVA/bsa, PHT             2.35  cm^2/m^2 ---------  ANH, LAX                  2.8   cm     ----------  MVA, LVOT cont           4.1   cm^2     ---------  TAPSE, MM             (N) 1.9   cm     >=1.7       MVA/bsa, LVOT cont       2.16  cm^2/m^2 ---------  S' lateral            (N) 11.4  cm/sec 6.0 - 13.4  Systemic veins           Value          Ref  Estimated CVP            3     mm Hg    ---------  Legend:  (L)  and  (H)  morales values outside specified reference range.    (N)  marks values inside specified reference range.    Prepared and electronically signed by  Loki Orellana MD  04/25/2025 15:22       Cardiac cath:   No results found for this or any previous visit.    Assessment and Plan:      Acute on chronic HFrEF  - Due to nonadherence to his medical regimen  - [facility]  - Slowly improving now with IV diuresis  -Recent negative stress test for ischemia but EF 42%  - CONTINUE IV LASIX AND GDMT     Atherosclerosis of thoracic aorta  -On atorvastatin     Essential hypertension  - Blood pressure is controlled     Hyperlipidemia  - Continue atorvastatin     Chronic  kidney disease stage IIIa  - Monitor Renal function while diuresing  -Start Entresto if renal function remains stable.   - Avoid nephrotoxins        Thank you for allowing us to participate in this patient's care.  Please do not hesitate to call with any questions or concerns.       [phone]

## 2025-05-23 RX ORDER — PREGABALIN 50 MG/1
50 CAPSULE ORAL
Qty: 53 | Refills: 1 | Status: ACTIVE | COMMUNITY
Start: 2025-05-21 | End: 1900-01-01

## 2025-05-29 ENCOUNTER — LABORATORY RESULT (OUTPATIENT)
Age: 45
End: 2025-05-29

## 2025-05-30 LAB
ALBUMIN SERPL ELPH-MCNC: 4 G/DL
ALP BLD-CCNC: 90 U/L
ALT SERPL-CCNC: 7 U/L
ANION GAP SERPL CALC-SCNC: 10 MMOL/L
AST SERPL-CCNC: 15 U/L
BASOPHILS # BLD AUTO: 0 K/UL
BASOPHILS NFR BLD AUTO: 0 %
BILIRUB DIRECT SERPL-MCNC: 0.1 MG/DL
BILIRUB SERPL-MCNC: 0.2 MG/DL
BUN SERPL-MCNC: 18 MG/DL
CALCIUM SERPL-MCNC: 9.7 MG/DL
CHLORIDE SERPL-SCNC: 109 MMOL/L
CMV DNA SPEC QL NAA+PROBE: NOT DETECTED IU/ML
CMVPCR LOG: NOT DETECTED LOG10IU/ML
CO2 SERPL-SCNC: 21 MMOL/L
CREAT SERPL-MCNC: 1.11 MG/DL
EGFRCR SERPLBLD CKD-EPI 2021: 82 ML/MIN/1.73M2
EOSINOPHIL # BLD AUTO: 0.05 K/UL
EOSINOPHIL NFR BLD AUTO: 1.8 %
GLUCOSE SERPL-MCNC: 114 MG/DL
HCT VFR BLD CALC: 31.1 %
HGB BLD-MCNC: 9.7 G/DL
INR PPP: 0.94 RATIO
LYMPHOCYTES # BLD AUTO: 0.63 K/UL
LYMPHOCYTES NFR BLD AUTO: 23.9 %
MAGNESIUM SERPL-MCNC: 1.8 MG/DL
MAN DIFF?: NORMAL
MCHC RBC-ENTMCNC: 29 PG
MCHC RBC-ENTMCNC: 31.2 G/DL
MCV RBC AUTO: 92.8 FL
MONOCYTES # BLD AUTO: 0.37 K/UL
MONOCYTES NFR BLD AUTO: 14.1 %
NEUTROPHILS # BLD AUTO: 1.55 K/UL
NEUTROPHILS NFR BLD AUTO: 57.5 %
PLATELET # BLD AUTO: 129 K/UL
POTASSIUM SERPL-SCNC: 5.3 MMOL/L
PROT SERPL-MCNC: 6.7 G/DL
PT BLD: 11.1 SEC
RBC # BLD: 3.35 M/UL
RBC # FLD: 15.5 %
SODIUM SERPL-SCNC: 141 MMOL/L
TACROLIMUS SERPL-MCNC: 8.4 NG/ML
WBC # FLD AUTO: 2.65 K/UL

## 2025-06-03 ENCOUNTER — APPOINTMENT (OUTPATIENT)
Dept: ENDOCRINOLOGY | Facility: CLINIC | Age: 45
End: 2025-06-03
Payer: MEDICAID

## 2025-06-03 ENCOUNTER — APPOINTMENT (OUTPATIENT)
Dept: ENDOCRINOLOGY | Facility: CLINIC | Age: 45
End: 2025-06-03

## 2025-06-03 VITALS
OXYGEN SATURATION: 98 % | HEIGHT: 71 IN | HEART RATE: 80 BPM | BODY MASS INDEX: 27.44 KG/M2 | WEIGHT: 196 LBS | SYSTOLIC BLOOD PRESSURE: 124 MMHG | DIASTOLIC BLOOD PRESSURE: 80 MMHG

## 2025-06-03 DIAGNOSIS — Z79.60 LONG TERM (CURRENT) USE OF UNSPECIFIED IMMUNOMODULATORS AND IMMUNOSUPPRESSANTS: ICD-10-CM

## 2025-06-03 DIAGNOSIS — Z87.898 PERSONAL HISTORY OF OTHER SPECIFIED CONDITIONS: ICD-10-CM

## 2025-06-03 DIAGNOSIS — Z00.00 ENCOUNTER FOR GENERAL ADULT MEDICAL EXAMINATION W/OUT ABNORMAL FINDINGS: ICD-10-CM

## 2025-06-03 LAB
ALBUMIN SERPL ELPH-MCNC: 3.6 G/DL
ALP BLD-CCNC: 92 U/L
ALT SERPL-CCNC: 5 U/L
ANION GAP SERPL CALC-SCNC: 12 MMOL/L
AST SERPL-CCNC: 10 U/L
BILIRUB SERPL-MCNC: 0.2 MG/DL
BUN SERPL-MCNC: 17 MG/DL
CALCIUM SERPL-MCNC: 9.4 MG/DL
CHLORIDE SERPL-SCNC: 112 MMOL/L
CO2 SERPL-SCNC: 23 MMOL/L
CREAT SERPL-MCNC: 1.12 MG/DL
EGFRCR SERPLBLD CKD-EPI 2021: 82 ML/MIN/1.73M2
FERRITIN SERPL-MCNC: 614 NG/ML
GLUCOSE SERPL-MCNC: 126 MG/DL
HCT VFR BLD CALC: 29.4 %
HGB BLD-MCNC: 9.1 G/DL
INR PPP: 0.88 RATIO
IRON SATN MFR SERPL: 25 %
IRON SERPL-MCNC: 60 UG/DL
MCHC RBC-ENTMCNC: 28.6 PG
MCHC RBC-ENTMCNC: 31 G/DL
MCV RBC AUTO: 92.5 FL
PLATELET # BLD AUTO: 127 K/UL
POTASSIUM SERPL-SCNC: 5.5 MMOL/L
PROT SERPL-MCNC: 6.4 G/DL
PT BLD: 10.5 SEC
RBC # BLD: 3.18 M/UL
RBC # FLD: 15.8 %
SODIUM SERPL-SCNC: 146 MMOL/L
TACROLIMUS SERPL-MCNC: 6.1 NG/ML
TIBC SERPL-MCNC: 235 UG/DL
UIBC SERPL-MCNC: 176 UG/DL
WBC # FLD AUTO: 2.8 K/UL

## 2025-06-03 PROCEDURE — 99204 OFFICE O/P NEW MOD 45 MIN: CPT

## 2025-06-05 NOTE — PHYSICAL THERAPY INITIAL EVALUATION ADULT - BALANCE TRAINING, PT EVAL
Cryotherapy Text: The wound bed was treated with cryotherapy after the biopsy was performed.
GOAL: Patient will demonstrate an increase in static/dynamic balance in sitting/standing where deficient by at least 1 grade to facilitate greater independence during functional mobility and ADL's in 2 weeks.
Goal: Pt will improve balance one half grade to improve performance and safety of transfers and ambulation in 3-weeks.

## 2025-06-09 ENCOUNTER — LABORATORY RESULT (OUTPATIENT)
Age: 45
End: 2025-06-09

## 2025-06-09 LAB
CHOLEST SERPL-MCNC: 162 MG/DL
HDLC SERPL-MCNC: 50 MG/DL
LDLC SERPL-MCNC: 100 MG/DL
NONHDLC SERPL-MCNC: 112 MG/DL
TRIGL SERPL-MCNC: 60 MG/DL

## 2025-06-10 ENCOUNTER — APPOINTMENT (OUTPATIENT)
Dept: HEPATOLOGY | Facility: CLINIC | Age: 45
End: 2025-06-10
Payer: MEDICAID

## 2025-06-10 VITALS
HEART RATE: 82 BPM | HEIGHT: 71 IN | SYSTOLIC BLOOD PRESSURE: 149 MMHG | TEMPERATURE: 97.3 F | BODY MASS INDEX: 28.56 KG/M2 | OXYGEN SATURATION: 100 % | WEIGHT: 204 LBS | RESPIRATION RATE: 16 BRPM | DIASTOLIC BLOOD PRESSURE: 97 MMHG

## 2025-06-10 LAB
ALBUMIN SERPL ELPH-MCNC: 3.7 G/DL
ALP BLD-CCNC: 101 U/L
ALT SERPL-CCNC: 9 U/L
ANION GAP SERPL CALC-SCNC: 12 MMOL/L
AST SERPL-CCNC: 12 U/L
BASOPHILS # BLD AUTO: 0.07 K/UL
BASOPHILS NFR BLD AUTO: 1.9 %
BILIRUB SERPL-MCNC: 0.2 MG/DL
BUN SERPL-MCNC: 18 MG/DL
CALCIUM SERPL-MCNC: 9.6 MG/DL
CHLORIDE SERPL-SCNC: 108 MMOL/L
CO2 SERPL-SCNC: 22 MMOL/L
CREAT SERPL-MCNC: 1.14 MG/DL
CREAT SPEC-SCNC: 70 MG/DL
EGFRCR SERPLBLD CKD-EPI 2021: 80 ML/MIN/1.73M2
EOSINOPHIL # BLD AUTO: 0.17 K/UL
EOSINOPHIL NFR BLD AUTO: 4.8 %
GLUCOSE SERPL-MCNC: 102 MG/DL
HCT VFR BLD CALC: 30.9 %
HGB BLD-MCNC: 9.3 G/DL
INR PPP: 0.92 RATIO
LYMPHOCYTES # BLD AUTO: 1.2 K/UL
LYMPHOCYTES NFR BLD AUTO: 33.7 %
MAN DIFF?: NORMAL
MCHC RBC-ENTMCNC: 28 PG
MCHC RBC-ENTMCNC: 30.1 G/DL
MCV RBC AUTO: 93.1 FL
MICROALBUMIN 24H UR DL<=1MG/L-MCNC: 34 MG/DL
MICROALBUMIN/CREAT 24H UR-RTO: 483 MG/G
MONOCYTES # BLD AUTO: 0.21 K/UL
MONOCYTES NFR BLD AUTO: 5.8 %
NEUTROPHILS # BLD AUTO: 1.91 K/UL
NEUTROPHILS NFR BLD AUTO: 53.8 %
PLATELET # BLD AUTO: 141 K/UL
POTASSIUM SERPL-SCNC: 5.4 MMOL/L
PROT SERPL-MCNC: 6.7 G/DL
PT BLD: 10.9 SEC
RBC # BLD: 3.32 M/UL
RBC # FLD: 15.3 %
SODIUM SERPL-SCNC: 142 MMOL/L
TACROLIMUS SERPL-MCNC: 7.6 NG/ML
WBC # FLD AUTO: 3.55 K/UL

## 2025-06-10 PROCEDURE — 99215 OFFICE O/P EST HI 40 MIN: CPT

## 2025-06-12 ENCOUNTER — APPOINTMENT (OUTPATIENT)
Age: 45
End: 2025-06-12
Payer: MEDICAID

## 2025-06-12 PROCEDURE — D0140: CPT

## 2025-06-12 PROCEDURE — SCREENING: CUSTOM

## 2025-06-12 PROCEDURE — D0330 PANORAMIC RADIOGRAPHIC IMAGE: CPT

## 2025-06-12 RX ORDER — ACAMPROSATE CALCIUM 333 MG/1
333 TABLET, DELAYED RELEASE ORAL
Qty: 180 | Refills: 3 | Status: DISCONTINUED | COMMUNITY
Start: 2025-06-10 | End: 2025-06-12

## 2025-06-16 ENCOUNTER — LABORATORY RESULT (OUTPATIENT)
Age: 45
End: 2025-06-16

## 2025-06-18 ENCOUNTER — APPOINTMENT (OUTPATIENT)
Dept: NEUROLOGY | Facility: CLINIC | Age: 45
End: 2025-06-18
Payer: MEDICAID

## 2025-06-18 VITALS
HEIGHT: 71 IN | BODY MASS INDEX: 30.24 KG/M2 | WEIGHT: 216 LBS | SYSTOLIC BLOOD PRESSURE: 151 MMHG | DIASTOLIC BLOOD PRESSURE: 94 MMHG | HEART RATE: 76 BPM

## 2025-06-18 PROBLEM — G47.33 OBSTRUCTIVE SLEEP APNEA: Status: ACTIVE | Noted: 2025-06-18

## 2025-06-18 PROCEDURE — 99204 OFFICE O/P NEW MOD 45 MIN: CPT

## 2025-06-19 ENCOUNTER — NON-APPOINTMENT (OUTPATIENT)
Age: 45
End: 2025-06-19

## 2025-06-19 PROBLEM — R80.9 ALBUMINURIA: Status: ACTIVE | Noted: 2025-06-19

## 2025-06-19 LAB
ALBUMIN SERPL ELPH-MCNC: 3.5 G/DL
ALP BLD-CCNC: 107 U/L
ALT SERPL-CCNC: 8 U/L
ANION GAP SERPL CALC-SCNC: 11 MMOL/L
AST SERPL-CCNC: 15 U/L
BASOPHILS # BLD AUTO: 0.02 K/UL
BASOPHILS NFR BLD AUTO: 0.7 %
BILIRUB SERPL-MCNC: 0.2 MG/DL
BUN SERPL-MCNC: 19 MG/DL
CALCIUM SERPL-MCNC: 9.8 MG/DL
CHLORIDE SERPL-SCNC: 109 MMOL/L
CMV DNA SPEC QL NAA+PROBE: NOT DETECTED IU/ML
CMVPCR LOG: NOT DETECTED LOG10IU/ML
CO2 SERPL-SCNC: 21 MMOL/L
CREAT SERPL-MCNC: 0.99 MG/DL
EGFRCR SERPLBLD CKD-EPI 2021: 95 ML/MIN/1.73M2
EOSINOPHIL # BLD AUTO: 0.16 K/UL
EOSINOPHIL NFR BLD AUTO: 5.4 %
GLUCOSE SERPL-MCNC: 103 MG/DL
HCT VFR BLD CALC: 31.3 %
HCV RNA SERPL NAA DL=5-ACNC: NOT DETECTED IU/ML
HCV RNA SERPL NAA+PROBE-LOG IU: NOT DETECTED LOGIU/ML
HEPC RNA INTERP: NOT DETECTED
HGB BLD-MCNC: 9.4 G/DL
IMM GRANULOCYTES NFR BLD AUTO: 0.3 %
LYMPHOCYTES # BLD AUTO: 0.64 K/UL
LYMPHOCYTES NFR BLD AUTO: 21.4 %
MAGNESIUM SERPL-MCNC: 1.8 MG/DL
MAN DIFF?: NORMAL
MCHC RBC-ENTMCNC: 28.7 PG
MCHC RBC-ENTMCNC: 30 G/DL
MCV RBC AUTO: 95.7 FL
MONOCYTES # BLD AUTO: 0.34 K/UL
MONOCYTES NFR BLD AUTO: 11.4 %
NEUTROPHILS # BLD AUTO: 1.82 K/UL
NEUTROPHILS NFR BLD AUTO: 60.8 %
PLATELET # BLD AUTO: 152 K/UL
POTASSIUM SERPL-SCNC: 5.5 MMOL/L
PROT SERPL-MCNC: 6.3 G/DL
RBC # BLD: 3.27 M/UL
RBC # FLD: 15.1 %
SODIUM SERPL-SCNC: 141 MMOL/L
TACROLIMUS SERPL-MCNC: 5.6 NG/ML
WBC # FLD AUTO: 2.99 K/UL

## 2025-06-19 RX ORDER — ACAMPROSATE CALCIUM 333 MG/1
333 TABLET, DELAYED RELEASE ORAL
Qty: 540 | Refills: 1 | Status: DISCONTINUED | COMMUNITY
Start: 2025-06-11 | End: 2025-06-19

## 2025-06-23 ENCOUNTER — LABORATORY RESULT (OUTPATIENT)
Age: 45
End: 2025-06-23

## 2025-06-23 ENCOUNTER — APPOINTMENT (OUTPATIENT)
Dept: NEUROLOGY | Facility: CLINIC | Age: 45
End: 2025-06-23
Payer: MEDICAID

## 2025-06-23 PROCEDURE — 95806 SLEEP STUDY UNATT&RESP EFFT: CPT

## 2025-06-24 ENCOUNTER — NON-APPOINTMENT (OUTPATIENT)
Age: 45
End: 2025-06-24

## 2025-06-24 LAB
ALBUMIN SERPL ELPH-MCNC: 3.8 G/DL
ALP BLD-CCNC: 106 U/L
ALT SERPL-CCNC: 8 U/L
ANION GAP SERPL CALC-SCNC: 13 MMOL/L
AST SERPL-CCNC: 19 U/L
BASOPHILS # BLD AUTO: 0.13 K/UL
BASOPHILS NFR BLD AUTO: 3.6 %
BILIRUB SERPL-MCNC: 0.2 MG/DL
BUN SERPL-MCNC: 26 MG/DL
CALCIUM SERPL-MCNC: 9.7 MG/DL
CHLORIDE SERPL-SCNC: 108 MMOL/L
CMV DNA SPEC QL NAA+PROBE: NOT DETECTED IU/ML
CMVPCR LOG: NOT DETECTED LOG10IU/ML
CO2 SERPL-SCNC: 19 MMOL/L
CREAT SERPL-MCNC: 1.11 MG/DL
EGFRCR SERPLBLD CKD-EPI 2021: 82 ML/MIN/1.73M2
EOSINOPHIL # BLD AUTO: 0.33 K/UL
EOSINOPHIL NFR BLD AUTO: 9.1 %
GLUCOSE SERPL-MCNC: 125 MG/DL
HCT VFR BLD CALC: 30.9 %
HGB BLD-MCNC: 9.4 G/DL
LYMPHOCYTES # BLD AUTO: 0.88 K/UL
LYMPHOCYTES NFR BLD AUTO: 24.5 %
MAN DIFF?: NORMAL
MCHC RBC-ENTMCNC: 28.7 PG
MCHC RBC-ENTMCNC: 30.4 G/DL
MCV RBC AUTO: 94.2 FL
MONOCYTES # BLD AUTO: 0.23 K/UL
MONOCYTES NFR BLD AUTO: 6.4 %
NEUTROPHILS # BLD AUTO: 2.02 K/UL
NEUTROPHILS NFR BLD AUTO: 56.4 %
PLATELET # BLD AUTO: 146 K/UL
POTASSIUM SERPL-SCNC: 5.5 MMOL/L
PROT SERPL-MCNC: 6.8 G/DL
RBC # BLD: 3.28 M/UL
RBC # FLD: 15.2 %
SODIUM SERPL-SCNC: 140 MMOL/L
TACROLIMUS SERPL-MCNC: 6.8 NG/ML
WBC # FLD AUTO: 3.58 K/UL

## 2025-06-26 ENCOUNTER — NON-APPOINTMENT (OUTPATIENT)
Age: 45
End: 2025-06-26

## 2025-06-26 LAB
PETH 16:0/18:1: NEGATIVE NG/ML
PETH 16:0/18:2: NEGATIVE NG/ML
PETH COMMENTS: NORMAL

## 2025-07-01 ENCOUNTER — APPOINTMENT (OUTPATIENT)
Dept: DERMATOLOGY | Facility: CLINIC | Age: 45
End: 2025-07-01
Payer: MEDICAID

## 2025-07-01 ENCOUNTER — NON-APPOINTMENT (OUTPATIENT)
Age: 45
End: 2025-07-01

## 2025-07-01 PROBLEM — L81.4 LENTIGINES: Status: ACTIVE | Noted: 2025-07-01

## 2025-07-01 PROBLEM — D22.9 MULTIPLE BENIGN NEVI: Status: ACTIVE | Noted: 2025-07-01

## 2025-07-01 PROBLEM — T14.8XXA FOREIGN BODY IN SKIN: Status: ACTIVE | Noted: 2025-07-01

## 2025-07-01 PROBLEM — Z12.83 SCREENING EXAM FOR SKIN CANCER: Status: ACTIVE | Noted: 2025-07-01 | Resolved: 2025-07-11

## 2025-07-01 LAB
ALBUMIN SERPL ELPH-MCNC: 3.7 G/DL
ALP BLD-CCNC: 106 U/L
ALT SERPL-CCNC: 6 U/L
ANION GAP SERPL CALC-SCNC: 11 MMOL/L
AST SERPL-CCNC: 14 U/L
BILIRUB SERPL-MCNC: 0.2 MG/DL
BUN SERPL-MCNC: 28 MG/DL
CALCIUM SERPL-MCNC: 9.6 MG/DL
CHLORIDE SERPL-SCNC: 105 MMOL/L
CMV DNA SPEC QL NAA+PROBE: NOT DETECTED IU/ML
CMVPCR LOG: NOT DETECTED LOG10IU/ML
CO2 SERPL-SCNC: 23 MMOL/L
CREAT SERPL-MCNC: 1.13 MG/DL
EGFRCR SERPLBLD CKD-EPI 2021: 81 ML/MIN/1.73M2
GLUCOSE SERPL-MCNC: 113 MG/DL
HCT VFR BLD CALC: 30.1 %
HGB BLD-MCNC: 9.1 G/DL
INR PPP: 0.96 RATIO
MCHC RBC-ENTMCNC: 29.1 PG
MCHC RBC-ENTMCNC: 30.2 G/DL
MCV RBC AUTO: 96.2 FL
PLATELET # BLD AUTO: 154 K/UL
POTASSIUM SERPL-SCNC: 5.5 MMOL/L
PROT SERPL-MCNC: 6.4 G/DL
PT BLD: 11.3 SEC
RBC # BLD: 3.13 M/UL
RBC # FLD: 15.5 %
SODIUM SERPL-SCNC: 138 MMOL/L
TACROLIMUS SERPL-MCNC: 5.9 NG/ML
WBC # FLD AUTO: 2.97 K/UL

## 2025-07-01 PROCEDURE — 10120 INC&RMVL FB SUBQ TISS SMPL: CPT

## 2025-07-01 PROCEDURE — 99203 OFFICE O/P NEW LOW 30 MIN: CPT | Mod: 25

## 2025-07-07 ENCOUNTER — NON-APPOINTMENT (OUTPATIENT)
Age: 45
End: 2025-07-07

## 2025-07-08 ENCOUNTER — NON-APPOINTMENT (OUTPATIENT)
Age: 45
End: 2025-07-08

## 2025-07-08 RX ORDER — ACETAMINOPHEN 325 MG/1
325 TABLET ORAL
Qty: 720 | Refills: 0 | Status: ACTIVE | COMMUNITY
Start: 2025-07-08 | End: 1900-01-01

## 2025-07-18 ENCOUNTER — NON-APPOINTMENT (OUTPATIENT)
Age: 45
End: 2025-07-18

## 2025-07-18 LAB
ALBUMIN SERPL ELPH-MCNC: 3.7 G/DL
ALP BLD-CCNC: 115 U/L
ALT SERPL-CCNC: 9 U/L
ANION GAP SERPL CALC-SCNC: 12 MMOL/L
AST SERPL-CCNC: 24 U/L
BASOPHILS # BLD AUTO: 0.01 K/UL
BASOPHILS NFR BLD AUTO: 0.3 %
BILIRUB SERPL-MCNC: 0.3 MG/DL
BUN SERPL-MCNC: 29 MG/DL
CALCIUM SERPL-MCNC: 9.8 MG/DL
CHLORIDE SERPL-SCNC: 108 MMOL/L
CO2 SERPL-SCNC: 22 MMOL/L
CREAT SERPL-MCNC: 1 MG/DL
EGFRCR SERPLBLD CKD-EPI 2021: 93 ML/MIN/1.73M2
EOSINOPHIL # BLD AUTO: 0.16 K/UL
EOSINOPHIL NFR BLD AUTO: 4.3 %
GLUCOSE SERPL-MCNC: 105 MG/DL
HCT VFR BLD CALC: 30 %
HGB BLD-MCNC: 9.5 G/DL
IMM GRANULOCYTES NFR BLD AUTO: 1.3 %
LYMPHOCYTES # BLD AUTO: 0.72 K/UL
LYMPHOCYTES NFR BLD AUTO: 19.3 %
MAN DIFF?: NORMAL
MCHC RBC-ENTMCNC: 29.1 PG
MCHC RBC-ENTMCNC: 31.7 G/DL
MCV RBC AUTO: 92 FL
MONOCYTES # BLD AUTO: 0.38 K/UL
MONOCYTES NFR BLD AUTO: 10.2 %
NEUTROPHILS # BLD AUTO: 2.42 K/UL
NEUTROPHILS NFR BLD AUTO: 64.6 %
PLATELET # BLD AUTO: 161 K/UL
POTASSIUM SERPL-SCNC: 5.4 MMOL/L
PROT SERPL-MCNC: 6.8 G/DL
RBC # BLD: 3.26 M/UL
RBC # FLD: 15.4 %
SODIUM SERPL-SCNC: 141 MMOL/L
TACROLIMUS SERPL-MCNC: 4.7 NG/ML
WBC # FLD AUTO: 3.74 K/UL

## 2025-07-20 LAB
PETH 16:0/18:1: NEGATIVE NG/ML
PETH 16:0/18:2: NEGATIVE NG/ML
PETH COMMENTS: NORMAL

## 2025-07-22 ENCOUNTER — APPOINTMENT (OUTPATIENT)
Dept: NEPHROLOGY | Facility: CLINIC | Age: 45
End: 2025-07-22

## 2025-07-22 ENCOUNTER — APPOINTMENT (OUTPATIENT)
Dept: HEPATOLOGY | Facility: CLINIC | Age: 45
End: 2025-07-22
Payer: MEDICAID

## 2025-07-22 VITALS
SYSTOLIC BLOOD PRESSURE: 135 MMHG | WEIGHT: 226 LBS | TEMPERATURE: 97.4 F | OXYGEN SATURATION: 100 % | HEART RATE: 74 BPM | HEIGHT: 71 IN | DIASTOLIC BLOOD PRESSURE: 85 MMHG | BODY MASS INDEX: 31.64 KG/M2

## 2025-07-22 PROCEDURE — 99215 OFFICE O/P EST HI 40 MIN: CPT

## 2025-07-25 ENCOUNTER — NON-APPOINTMENT (OUTPATIENT)
Age: 45
End: 2025-07-25

## 2025-07-28 ENCOUNTER — APPOINTMENT (OUTPATIENT)
Age: 45
End: 2025-07-28
Payer: MEDICAID

## 2025-07-28 LAB
ALBUMIN SERPL ELPH-MCNC: 4 G/DL
ALP BLD-CCNC: 140 U/L
ALT SERPL-CCNC: 11 U/L
ANION GAP SERPL CALC-SCNC: 15 MMOL/L
APPEARANCE: CLEAR
AST SERPL-CCNC: 23 U/L
BACTERIA: NEGATIVE /HPF
BASOPHILS # BLD AUTO: 0.02 K/UL
BASOPHILS NFR BLD AUTO: 0.5 %
BILIRUB DIRECT SERPL-MCNC: 0.11 MG/DL
BILIRUB SERPL-MCNC: 0.3 MG/DL
BILIRUBIN URINE: NEGATIVE
BLOOD URINE: ABNORMAL
BUN SERPL-MCNC: 29 MG/DL
CALCIUM SERPL-MCNC: 10.1 MG/DL
CAST: 0 /LPF
CHLORIDE SERPL-SCNC: 108 MMOL/L
CMV DNA SPEC QL NAA+PROBE: NOT DETECTED IU/ML
CMVPCR LOG: NOT DETECTED LOG10IU/ML
CO2 SERPL-SCNC: 22 MMOL/L
COLOR: YELLOW
CREAT SERPL-MCNC: 1.06 MG/DL
CREAT SPEC-SCNC: 55 MG/DL
CREAT/PROT UR: 1.7 RATIO
EGFRCR SERPLBLD CKD-EPI 2021: 87 ML/MIN/1.73M2
EOSINOPHIL # BLD AUTO: 0.17 K/UL
EOSINOPHIL NFR BLD AUTO: 4.1 %
EPITHELIAL CELLS: 1 /HPF
GLUCOSE QUALITATIVE U: NEGATIVE MG/DL
GLUCOSE SERPL-MCNC: 91 MG/DL
HCT VFR BLD CALC: 33.3 %
HGB BLD-MCNC: 10.6 G/DL
IMM GRANULOCYTES NFR BLD AUTO: 1 %
KETONES URINE: NEGATIVE MG/DL
LEUKOCYTE ESTERASE URINE: NEGATIVE
LYMPHOCYTES # BLD AUTO: 0.77 K/UL
LYMPHOCYTES NFR BLD AUTO: 18.7 %
MAGNESIUM SERPL-MCNC: 2.2 MG/DL
MAN DIFF?: NORMAL
MCHC RBC-ENTMCNC: 29.5 PG
MCHC RBC-ENTMCNC: 31.8 G/DL
MCV RBC AUTO: 92.8 FL
MICROSCOPIC-UA: NORMAL
MONOCYTES # BLD AUTO: 0.6 K/UL
MONOCYTES NFR BLD AUTO: 14.6 %
NEUTROPHILS # BLD AUTO: 2.52 K/UL
NEUTROPHILS NFR BLD AUTO: 61.1 %
NITRITE URINE: NEGATIVE
PH URINE: 5.5
PLATELET # BLD AUTO: 165 K/UL
POTASSIUM SERPL-SCNC: 5.7 MMOL/L
PROT SERPL-MCNC: 7.3 G/DL
PROT UR-MCNC: 96 MG/DL
PROTEIN URINE: 100 MG/DL
RBC # BLD: 3.59 M/UL
RBC # FLD: 15.2 %
RED BLOOD CELLS URINE: 3 /HPF
REVIEW: NORMAL
SODIUM SERPL-SCNC: 144 MMOL/L
SPECIFIC GRAVITY URINE: 1.01
TACROLIMUS SERPL-MCNC: 5.5 NG/ML
UROBILINOGEN URINE: 0.2 MG/DL
WBC # FLD AUTO: 4.12 K/UL
WHITE BLOOD CELLS URINE: 1 /HPF

## 2025-07-28 PROCEDURE — D0330 PANORAMIC RADIOGRAPHIC IMAGE: CPT

## 2025-07-28 PROCEDURE — D9310: CPT

## 2025-07-29 LAB
PETH 16:0/18:1: NEGATIVE NG/ML
PETH 16:0/18:2: NEGATIVE NG/ML
PETH COMMENTS: NORMAL

## 2025-07-31 ENCOUNTER — LABORATORY RESULT (OUTPATIENT)
Age: 45
End: 2025-07-31

## 2025-08-04 ENCOUNTER — NON-APPOINTMENT (OUTPATIENT)
Age: 45
End: 2025-08-04

## 2025-08-04 LAB
ALBUMIN SERPL ELPH-MCNC: 3.8 G/DL
ALP BLD-CCNC: 129 U/L
ALT SERPL-CCNC: 10 U/L
ANION GAP SERPL CALC-SCNC: 12 MMOL/L
AST SERPL-CCNC: 21 U/L
BASOPHILS # BLD AUTO: 0.03 K/UL
BASOPHILS NFR BLD AUTO: 0.9 %
BILIRUB SERPL-MCNC: 0.2 MG/DL
BUN SERPL-MCNC: 21 MG/DL
CALCIUM SERPL-MCNC: 10 MG/DL
CHLORIDE SERPL-SCNC: 108 MMOL/L
CMV DNA SPEC QL NAA+PROBE: NOT DETECTED IU/ML
CMVPCR LOG: NOT DETECTED LOG10IU/ML
CO2 SERPL-SCNC: 22 MMOL/L
CREAT SERPL-MCNC: 1.02 MG/DL
EGFRCR SERPLBLD CKD-EPI 2021: 91 ML/MIN/1.73M2
EOSINOPHIL # BLD AUTO: 0.09 K/UL
EOSINOPHIL NFR BLD AUTO: 2.6 %
GLUCOSE SERPL-MCNC: 110 MG/DL
HCT VFR BLD CALC: 31.1 %
HGB BLD-MCNC: 9.6 G/DL
INR PPP: 0.97 RATIO
LYMPHOCYTES # BLD AUTO: 1.12 K/UL
LYMPHOCYTES NFR BLD AUTO: 33.9 %
MAGNESIUM SERPL-MCNC: 1.9 MG/DL
MAN DIFF?: NORMAL
MCHC RBC-ENTMCNC: 28.5 PG
MCHC RBC-ENTMCNC: 30.9 G/DL
MCV RBC AUTO: 92.3 FL
MONOCYTES # BLD AUTO: 0.06 K/UL
MONOCYTES NFR BLD AUTO: 1.7 %
NEUTROPHILS # BLD AUTO: 2 K/UL
NEUTROPHILS NFR BLD AUTO: 60.9 %
PLATELET # BLD AUTO: 140 K/UL
POTASSIUM SERPL-SCNC: 5.3 MMOL/L
PROT SERPL-MCNC: 6.9 G/DL
PT BLD: 11.5 SEC
RBC # BLD: 3.37 M/UL
RBC # FLD: 15.2 %
SODIUM SERPL-SCNC: 142 MMOL/L
TACROLIMUS SERPL-MCNC: 4 NG/ML
WBC # FLD AUTO: 3.29 K/UL

## 2025-08-15 ENCOUNTER — NON-APPOINTMENT (OUTPATIENT)
Age: 45
End: 2025-08-15

## 2025-08-15 DIAGNOSIS — Z94.4 LIVER TRANSPLANT STATUS: ICD-10-CM

## 2025-08-15 LAB
ALBUMIN SERPL ELPH-MCNC: 3.7 G/DL
ALP BLD-CCNC: 148 U/L
ALT SERPL-CCNC: 10 U/L
ANION GAP SERPL CALC-SCNC: 12 MMOL/L
AST SERPL-CCNC: 19 U/L
BASOPHILS # BLD AUTO: 0.02 K/UL
BASOPHILS NFR BLD AUTO: 0.5 %
BILIRUB DIRECT SERPL-MCNC: 0.13 MG/DL
BILIRUB SERPL-MCNC: 0.3 MG/DL
BUN SERPL-MCNC: 20 MG/DL
CALCIUM SERPL-MCNC: 9.2 MG/DL
CHLORIDE SERPL-SCNC: 107 MMOL/L
CMV DNA SPEC QL NAA+PROBE: NOT DETECTED IU/ML
CMVPCR LOG: NOT DETECTED LOG10IU/ML
CO2 SERPL-SCNC: 26 MMOL/L
CREAT SERPL-MCNC: 1.15 MG/DL
EGFRCR SERPLBLD CKD-EPI 2021: 79 ML/MIN/1.73M2
EOSINOPHIL # BLD AUTO: 0.26 K/UL
EOSINOPHIL NFR BLD AUTO: 6.6 %
ESTIMATED AVERAGE GLUCOSE: 94 MG/DL
GLUCOSE SERPL-MCNC: 122 MG/DL
HBA1C MFR BLD HPLC: 4.9 %
HCT VFR BLD CALC: 30.5 %
HGB BLD-MCNC: 9.5 G/DL
IMM GRANULOCYTES NFR BLD AUTO: 0.5 %
INR PPP: 0.99 RATIO
LYMPHOCYTES # BLD AUTO: 0.66 K/UL
LYMPHOCYTES NFR BLD AUTO: 16.9 %
MAGNESIUM SERPL-MCNC: 1.8 MG/DL
MAN DIFF?: NORMAL
MCHC RBC-ENTMCNC: 28.8 PG
MCHC RBC-ENTMCNC: 31.1 G/DL
MCV RBC AUTO: 92.4 FL
MONOCYTES # BLD AUTO: 0.35 K/UL
MONOCYTES NFR BLD AUTO: 9 %
NEUTROPHILS # BLD AUTO: 2.6 K/UL
NEUTROPHILS NFR BLD AUTO: 66.5 %
PLATELET # BLD AUTO: 156 K/UL
POTASSIUM SERPL-SCNC: 4.3 MMOL/L
PROT SERPL-MCNC: 6.9 G/DL
PT BLD: 11.7 SEC
RBC # BLD: 3.3 M/UL
RBC # FLD: 15.4 %
SODIUM SERPL-SCNC: 144 MMOL/L
TACROLIMUS SERPL-MCNC: 5.8 NG/ML
WBC # FLD AUTO: 3.91 K/UL

## 2025-08-18 LAB
PETH 16:0/18:1: NEGATIVE NG/ML
PETH 16:0/18:2: NEGATIVE NG/ML
PETH COMMENTS: NORMAL

## 2025-08-21 ENCOUNTER — LABORATORY RESULT (OUTPATIENT)
Age: 45
End: 2025-08-21

## 2025-08-21 LAB
ALBUMIN SERPL ELPH-MCNC: 3.8 G/DL
ALP BLD-CCNC: 163 U/L
ALT SERPL-CCNC: 6 U/L
ANION GAP SERPL CALC-SCNC: 12 MMOL/L
AST SERPL-CCNC: 17 U/L
BILIRUB DIRECT SERPL-MCNC: 0.11 MG/DL
BILIRUB SERPL-MCNC: 0.3 MG/DL
BUN SERPL-MCNC: 21 MG/DL
CALCIUM SERPL-MCNC: 9.7 MG/DL
CHLORIDE SERPL-SCNC: 104 MMOL/L
CO2 SERPL-SCNC: 25 MMOL/L
CREAT SERPL-MCNC: 1.22 MG/DL
EGFRCR SERPLBLD CKD-EPI 2021: 74 ML/MIN/1.73M2
GLUCOSE SERPL-MCNC: 124 MG/DL
INR PPP: 0.93 RATIO
MAGNESIUM SERPL-MCNC: 2 MG/DL
POTASSIUM SERPL-SCNC: 5.4 MMOL/L
PROT SERPL-MCNC: 7.2 G/DL
PT BLD: 10.8 SEC
SODIUM SERPL-SCNC: 141 MMOL/L
TACROLIMUS SERPL-MCNC: 8.1 NG/ML

## 2025-08-22 ENCOUNTER — OUTPATIENT (OUTPATIENT)
Dept: OUTPATIENT SERVICES | Facility: HOSPITAL | Age: 45
LOS: 1 days | End: 2025-08-22
Payer: MEDICAID

## 2025-08-22 ENCOUNTER — APPOINTMENT (OUTPATIENT)
Dept: ULTRASOUND IMAGING | Facility: CLINIC | Age: 45
End: 2025-08-22
Payer: MEDICAID

## 2025-08-22 DIAGNOSIS — Z94.4 LIVER TRANSPLANT STATUS: Chronic | ICD-10-CM

## 2025-08-22 DIAGNOSIS — Z98.890 OTHER SPECIFIED POSTPROCEDURAL STATES: Chronic | ICD-10-CM

## 2025-08-22 DIAGNOSIS — Z94.4 LIVER TRANSPLANT STATUS: ICD-10-CM

## 2025-08-22 LAB
BASOPHILS # BLD AUTO: 0.04 K/UL
BASOPHILS NFR BLD AUTO: 0.9 %
CMV DNA SPEC QL NAA+PROBE: NOT DETECTED IU/ML
CMVPCR LOG: NOT DETECTED LOG10IU/ML
EOSINOPHIL # BLD AUTO: 0.17 K/UL
EOSINOPHIL NFR BLD AUTO: 4.4 %
HCT VFR BLD CALC: 32.4 %
HGB BLD-MCNC: 9.9 G/DL
LYMPHOCYTES # BLD AUTO: 1.21 K/UL
LYMPHOCYTES NFR BLD AUTO: 31 %
MAN DIFF?: NORMAL
MCHC RBC-ENTMCNC: 28.4 PG
MCHC RBC-ENTMCNC: 30.6 G/DL
MCV RBC AUTO: 92.8 FL
MONOCYTES # BLD AUTO: 0.31 K/UL
MONOCYTES NFR BLD AUTO: 8 %
NEUTROPHILS # BLD AUTO: 2.18 K/UL
NEUTROPHILS NFR BLD AUTO: 55.7 %
PLATELET # BLD AUTO: 165 K/UL
RBC # BLD: 3.49 M/UL
RBC # FLD: 15.4 %
WBC # FLD AUTO: 3.91 K/UL

## 2025-08-22 PROCEDURE — 76705 ECHO EXAM OF ABDOMEN: CPT

## 2025-08-22 PROCEDURE — 93975 VASCULAR STUDY: CPT | Mod: 26

## 2025-08-22 PROCEDURE — 93975 VASCULAR STUDY: CPT

## 2025-08-22 PROCEDURE — 76705 ECHO EXAM OF ABDOMEN: CPT | Mod: 26,59

## 2025-08-26 ENCOUNTER — NON-APPOINTMENT (OUTPATIENT)
Age: 45
End: 2025-08-26

## 2025-09-02 ENCOUNTER — LABORATORY RESULT (OUTPATIENT)
Age: 45
End: 2025-09-02

## 2025-09-03 ENCOUNTER — APPOINTMENT (OUTPATIENT)
Dept: HEPATOLOGY | Facility: CLINIC | Age: 45
End: 2025-09-03
Payer: MEDICAID

## 2025-09-03 VITALS
TEMPERATURE: 98.4 F | WEIGHT: 253 LBS | HEIGHT: 71 IN | DIASTOLIC BLOOD PRESSURE: 85 MMHG | BODY MASS INDEX: 35.42 KG/M2 | HEART RATE: 77 BPM | OXYGEN SATURATION: 98 % | SYSTOLIC BLOOD PRESSURE: 144 MMHG

## 2025-09-03 DIAGNOSIS — Z94.4 LIVER TRANSPLANT STATUS: ICD-10-CM

## 2025-09-03 DIAGNOSIS — E66.9 OBESITY, UNSPECIFIED: ICD-10-CM

## 2025-09-03 DIAGNOSIS — Z87.898 PERSONAL HISTORY OF OTHER SPECIFIED CONDITIONS: ICD-10-CM

## 2025-09-03 DIAGNOSIS — G47.33 OBSTRUCTIVE SLEEP APNEA (ADULT) (PEDIATRIC): ICD-10-CM

## 2025-09-03 LAB
25(OH)D3 SERPL-MCNC: 21.2 NG/ML
ALBUMIN SERPL ELPH-MCNC: 3.9 G/DL
ALP BLD-CCNC: 162 U/L
ALT SERPL-CCNC: 7 U/L
ANION GAP SERPL CALC-SCNC: 13 MMOL/L
AST SERPL-CCNC: 25 U/L
BASOPHILS # BLD AUTO: 0.02 K/UL
BASOPHILS NFR BLD AUTO: 0.5 %
BILIRUB SERPL-MCNC: 0.3 MG/DL
BUN SERPL-MCNC: 23 MG/DL
CALCIUM SERPL-MCNC: 9.9 MG/DL
CHLORIDE SERPL-SCNC: 107 MMOL/L
CMV DNA SPEC QL NAA+PROBE: NOT DETECTED IU/ML
CMVPCR LOG: NOT DETECTED LOG10IU/ML
CO2 SERPL-SCNC: 22 MMOL/L
CREAT SERPL-MCNC: 1.11 MG/DL
EGFRCR SERPLBLD CKD-EPI 2021: 82 ML/MIN/1.73M2
EOSINOPHIL # BLD AUTO: 0.3 K/UL
EOSINOPHIL NFR BLD AUTO: 6.9 %
GGT SERPL-CCNC: 15 U/L
GLUCOSE SERPL-MCNC: 91 MG/DL
HCT VFR BLD CALC: 34.6 %
HGB BLD-MCNC: 10.7 G/DL
IMM GRANULOCYTES NFR BLD AUTO: 0.7 %
LYMPHOCYTES # BLD AUTO: 0.85 K/UL
LYMPHOCYTES NFR BLD AUTO: 19.5 %
MAGNESIUM SERPL-MCNC: 2 MG/DL
MAN DIFF?: NORMAL
MCHC RBC-ENTMCNC: 28.8 PG
MCHC RBC-ENTMCNC: 30.9 G/DL
MCV RBC AUTO: 93.3 FL
MONOCYTES # BLD AUTO: 0.47 K/UL
MONOCYTES NFR BLD AUTO: 10.8 %
NEUTROPHILS # BLD AUTO: 2.7 K/UL
NEUTROPHILS NFR BLD AUTO: 61.6 %
PLATELET # BLD AUTO: 161 K/UL
POTASSIUM SERPL-SCNC: 5.1 MMOL/L
PROT SERPL-MCNC: 7.4 G/DL
RBC # BLD: 3.71 M/UL
RBC # FLD: 15.7 %
SODIUM SERPL-SCNC: 141 MMOL/L
TACROLIMUS SERPL-MCNC: 6 NG/ML
WBC # FLD AUTO: 4.37 K/UL

## 2025-09-03 PROCEDURE — 99215 OFFICE O/P EST HI 40 MIN: CPT

## 2025-09-04 RX ORDER — TIRZEPATIDE 2.5 MG/.5ML
2.5 INJECTION, SOLUTION SUBCUTANEOUS
Qty: 1 | Refills: 1 | Status: ACTIVE | COMMUNITY
Start: 2025-09-04 | End: 1900-01-01

## 2025-09-05 LAB
PETH 16:0/18:1: NEGATIVE NG/ML
PETH 16:0/18:2: NEGATIVE NG/ML
PETH COMMENTS: NORMAL

## 2025-09-08 ENCOUNTER — APPOINTMENT (OUTPATIENT)
Dept: NEUROLOGY | Facility: CLINIC | Age: 45
End: 2025-09-08
Payer: MEDICAID

## 2025-09-08 PROCEDURE — 95885 MUSC TST DONE W/NERV TST LIM: CPT

## 2025-09-08 PROCEDURE — 99213 OFFICE O/P EST LOW 20 MIN: CPT | Mod: 25

## 2025-09-08 PROCEDURE — 95912 NRV CNDJ TEST 11-12 STUDIES: CPT

## 2025-09-18 ENCOUNTER — APPOINTMENT (OUTPATIENT)
Dept: RADIOLOGY | Facility: CLINIC | Age: 45
End: 2025-09-18
Payer: MEDICAID

## 2025-09-18 PROCEDURE — 77080 DXA BONE DENSITY AXIAL: CPT | Mod: 26

## 2025-09-19 ENCOUNTER — APPOINTMENT (OUTPATIENT)
Dept: ENDOCRINOLOGY | Facility: CLINIC | Age: 45
End: 2025-09-19

## (undated) DEVICE — GOWN XL

## (undated) DEVICE — Device

## (undated) DEVICE — SUT SOFSILK 3-0 30" TIES

## (undated) DEVICE — SUT SOFSILK 4-0 30" TIES

## (undated) DEVICE — DRAPE ISOLATION BAG 20X20"

## (undated) DEVICE — SUT SURGIPRO 0 30" GS-22

## (undated) DEVICE — ELCTR BIPOLAR CORD J&J 12FT DISP

## (undated) DEVICE — LAP PAD 18 X 18"

## (undated) DEVICE — LAP PAD 4 X 18"

## (undated) DEVICE — DRAPE MAGNETIC INSTRUMENT MEDIUM

## (undated) DEVICE — D HELP - CLEARVIEW CLEARIFY SYSTEM

## (undated) DEVICE — VENODYNE/SCD SLEEVE CALF MEDIUM

## (undated) DEVICE — ELCTR DEFIB PAD PRO-PADZ W 10FT LEAD WIRES ADULT

## (undated) DEVICE — DRAPE ARMATEC MICROSSCOPE HANDLE 5" X 10"

## (undated) DEVICE — SPECIMEN CONTAINER 100ML

## (undated) DEVICE — BLADE SCALPEL SAFETYLOCK #11

## (undated) DEVICE — GLV 8.5 PROTEXIS (WHITE)

## (undated) DEVICE — XI DRAPE COLUMN

## (undated) DEVICE — SUT PROLENE 4-0 36" BB

## (undated) DEVICE — STOPCOCK 4-WAY W SWIVEL MALE LUER LOCK NON VENTED RED CAP

## (undated) DEVICE — APPLICATOR FOR PROGEL EXTENDED SPRAY TIP 29CM

## (undated) DEVICE — CHEST DRAIN PLEUR-EVAC WET/WET ADULT-PEDS SINGLE (QUICK)

## (undated) DEVICE — FOLEY TRAY 16FR 5CC LTX UMETER CLOSED

## (undated) DEVICE — ELECTRO LUBE ANTI-STICK SOLUTION FOR CAUTERY TIP

## (undated) DEVICE — DRAPE TOWEL BLUE 17" X 24"

## (undated) DEVICE — XI OBTURATOR OPTICAL BLADELESS 8MM

## (undated) DEVICE — SYR LUER LOK 10CC

## (undated) DEVICE — PACK GENERAL LAPAROSCOPY

## (undated) DEVICE — BLADE SCALPEL SAFETYLOCK #15

## (undated) DEVICE — SUT PDS II PLUS 4-0 27" SH

## (undated) DEVICE — SUT PDS II 6-0 30" C-1

## (undated) DEVICE — NDL COUNTER FOAM AND MAGNET 40-70

## (undated) DEVICE — SPECIMEN CONTAINER 4OZ

## (undated) DEVICE — ELCTR BOVIE TIP BLADE INSULATED 6.5" EDGE

## (undated) DEVICE — STAPLER SKIN VISI-STAT 35 WIDE

## (undated) DEVICE — DRAIN 24 FR ROUND HUBLESS FULL FLUTED SILICONE

## (undated) DEVICE — TUBING OLYMPUS INSUFFLATION

## (undated) DEVICE — NDL HYPO SAFE 22G X 1.5" (BLACK)

## (undated) DEVICE — SUT SOFSILK 0 30" TIES

## (undated) DEVICE — DRAPE 3/4 SHEET W REINFORCEMENT 56X77"

## (undated) DEVICE — SOL IRR POUR H2O 250ML

## (undated) DEVICE — XI ARM NEEDLE DRIVER MEGA

## (undated) DEVICE — XI CANNULA SEAL 5MM - 8 MM

## (undated) DEVICE — DRAPE MAYO STAND 30"

## (undated) DEVICE — ELCTR HANDPIECE ARGON BEND A BEAM 6"

## (undated) DEVICE — DRSG TAPE MEDIPORE 3"

## (undated) DEVICE — TROCAR COVIDIEN VERSASTEP PLUS 15MM STANDARD

## (undated) DEVICE — SUT POLYSORB 4-0 P-12 UNDYED

## (undated) DEVICE — SUT BIOSYN 4-0 18" P-12

## (undated) DEVICE — TUBING STRYKEFLOW II SUCTION / IRRIGATOR

## (undated) DEVICE — SOL ANTI FOG (FRED)

## (undated) DEVICE — DRAPE IOBAN 23" X 23"

## (undated) DEVICE — TUBING SUCTION 20FT

## (undated) DEVICE — SUT PROLENE 6-0 30" C-1

## (undated) DEVICE — STAPLER COVIDIEN ENDO GIA STANDARD HANDLE

## (undated) DEVICE — NDL HYPO REGULAR BEVEL 22G X 1.5" (TURQUOISE)

## (undated) DEVICE — SUCTION YANKAUER OPEN TIP NO VENT CURVE

## (undated) DEVICE — GOWN TRIMAX XXL

## (undated) DEVICE — SUT POLYSORB 3-0 30" V-20 UNDYED

## (undated) DEVICE — CANNULA IRR ALCON ANTERIOR CHAMBER 27G

## (undated) DEVICE — SUT MONOSOF 3-0 18" C-14

## (undated) DEVICE — SOL IRR POUR NS 0.9% 500ML

## (undated) DEVICE — SUT POLYSORB 2-0 30" V-20 UNDYED

## (undated) DEVICE — WARMING BLANKET LOWER ADULT

## (undated) DEVICE — PACK BASIN SPECIAL PROCEDURE

## (undated) DEVICE — CHEST DRAIN OASIS DRY SUCTION WATER SEAL

## (undated) DEVICE — MARKING PEN W RULER

## (undated) DEVICE — DISSECTOR ENDO PEANUT 5MM

## (undated) DEVICE — SUT PROLENE 3-0 36" SH

## (undated) DEVICE — SUT POLYSORB 0 36" GS-25 UNDYED

## (undated) DEVICE — SUT PDS II 3-0 36" SH

## (undated) DEVICE — BIPOLAR FORCEP CODMAN VERSATRU 8" X 0.5MM (BLUE)

## (undated) DEVICE — ELCTR BOVIE PENCIL HANDPIECE ROCKER SWITCH 15FT

## (undated) DEVICE — SUT SOFSILK 4-0 18" TIES

## (undated) DEVICE — APPLICATOR FOR ARISTA XL 38CM

## (undated) DEVICE — DRAPE C ARM UNIVERSAL

## (undated) DEVICE — DRSG STERISTRIPS 0.5 X 4"

## (undated) DEVICE — SUT PDS II 0 27" OS-6

## (undated) DEVICE — SUT SOFSILK 0 30" V-20

## (undated) DEVICE — GLV 7.5 PROTEXIS (WHITE)

## (undated) DEVICE — SUT MONOCRYL 4-0 18" PS-2

## (undated) DEVICE — GLV 6.5 PROTEXIS (WHITE)

## (undated) DEVICE — MERCIAN VISABILITY BACKROUND YELLOW

## (undated) DEVICE — WARMING BLANKET UPPER ADULT

## (undated) DEVICE — DRSG CURITY GAUZE SPONGE 4 X 4" 12-PLY

## (undated) DEVICE — SUT PDS II 6-0 24" BV-1

## (undated) DEVICE — DRAPE SLUSH / WARMER 44 X 66"

## (undated) DEVICE — ELCTR BOVIE TIP BLADE INSULATED 2.75" EDGE

## (undated) DEVICE — DRSG STERISTRIPS 0.25 X 3"

## (undated) DEVICE — POSITIONER FOAM EGG CRATE ULNAR 2PCS (PINK)

## (undated) DEVICE — ENDOCATCH II 15MM

## (undated) DEVICE — SCOPE WARMER SEAL DISP

## (undated) DEVICE — DRAPE IOBAN 33" X 23"

## (undated) DEVICE — SUT POLYSORB 0 30" GS-21 UNDYED

## (undated) DEVICE — SUT SOFSILK 2-0 18" V-20 (POP-OFF)

## (undated) DEVICE — ELCTR BOVIE TIP CLEANER SCRATCH PAD

## (undated) DEVICE — XI DRAPE ARM

## (undated) DEVICE — SUT PDS II 5-0 30" C-1

## (undated) DEVICE — BAG DECANTER IV STERILE

## (undated) DEVICE — SUT POLYSORB 2-0 27" GS-21

## (undated) DEVICE — WARMING BLANKET FULL UNDERBODY

## (undated) DEVICE — DRAPE 1/2 SHEET 40X57"

## (undated) DEVICE — SUT SOFSILK 3-0 18" V-20 (POP-OFF)

## (undated) DEVICE — SUT SOFSILK 0 18" TIES

## (undated) DEVICE — SUT BOOT STANDARD (ASSORTED) 5 PAIR

## (undated) DEVICE — DRAPE LIGHT HANDLE COVER (BLUE)

## (undated) DEVICE — SYR LUER LOK 3CC

## (undated) DEVICE — ELCTR BOVIE PENCIL SMOKE EVACUATION

## (undated) DEVICE — PREP CHLORAPREP HI-LITE ORANGE 26ML

## (undated) DEVICE — LIGASURE EXACT DISSECTOR

## (undated) DEVICE — ENDOCATCH 10MM

## (undated) DEVICE — GLV 7 PROTEXIS (WHITE)

## (undated) DEVICE — BLADE SURGICAL #10 STAINLESS

## (undated) DEVICE — BLADE SCALPEL SAFETYLOCK #10

## (undated) DEVICE — DRSG OPSITE 2.5 X 2"

## (undated) DEVICE — SUT SOFSILK 2-0 30" TIES

## (undated) DEVICE — DRAIN RESERVOIR FOR JACKSON PRATT 100CC CARDINAL

## (undated) DEVICE — GLV 8 PROTEXIS (WHITE)

## (undated) DEVICE — GLV 6 PROTEXIS (WHITE)

## (undated) DEVICE — DRSG XEROFORM 5 X 9"

## (undated) DEVICE — TUBING AIRSEAL TRI-LUMEN FILTERED

## (undated) DEVICE — TUBING FLUID ADMINISTRATION SET PRIM 70"

## (undated) DEVICE — TROCAR COVIDIEN VERSASTEP PLUS 12MM LONG

## (undated) DEVICE — VESSEL LOOP MAXI-RED  0.120" X 16"

## (undated) DEVICE — SUT POLYSORB 2-0 30" GS-26

## (undated) DEVICE — PACK CV SPLIT PACK II

## (undated) DEVICE — SUT PROLENE 4-0 36" SH

## (undated) DEVICE — SUCTION YANKAUER NO CONTROL VENT

## (undated) DEVICE — TRAP SPECIMEN SPUTUM 40CC

## (undated) DEVICE — MEDICATION LABELS W MARKER

## (undated) DEVICE — SUT SOFSILK 2-0 30" V-20

## (undated) DEVICE — SUT SURGIPRO 1 60" GS-26

## (undated) DEVICE — DRAPE INSTRUMENT POUCH 6.75" X 11"

## (undated) DEVICE — DRSG OPSITE 13.75 X 4"

## (undated) DEVICE — GLV 7.5 PROTEXIS (BLUE)

## (undated) DEVICE — CHEST DRAIN PLEUR-EVAC DRY/WET ADULT-PEDS SINGLE (QUICK)

## (undated) DEVICE — TAPE SILK 3"

## (undated) DEVICE — FOLEY TRAY 16FR SURESTEP LF URINE METER TEMP SENSING STATLOCK

## (undated) DEVICE — GOWN TRIMAX LG

## (undated) DEVICE — SUT POLYSORB 2-0 30" GS-21 UNDYED

## (undated) DEVICE — PACK MAJOR ABDOMINAL SUPINE

## (undated) DEVICE — SYR LUER LOK 20CC

## (undated) DEVICE — DRSG TEGADERM 6 X 8"

## (undated) DEVICE — SUT SOFSILK 2-0 18" TIES

## (undated) DEVICE — STAPLER COVIDIEN ENDO GIA SHORT HANDLE

## (undated) DEVICE — DRAPE 3/4 SHEET 52X76"